# Patient Record
Sex: MALE | Race: WHITE | Employment: FULL TIME | URBAN - METROPOLITAN AREA
[De-identification: names, ages, dates, MRNs, and addresses within clinical notes are randomized per-mention and may not be internally consistent; named-entity substitution may affect disease eponyms.]

---

## 2017-01-01 ENCOUNTER — APPOINTMENT (OUTPATIENT)
Dept: CT IMAGING | Age: 42
DRG: 392 | End: 2017-01-01
Attending: EMERGENCY MEDICINE
Payer: COMMERCIAL

## 2017-01-01 ENCOUNTER — HOSPITAL ENCOUNTER (INPATIENT)
Age: 42
LOS: 4 days | Discharge: HOME | DRG: 392 | End: 2017-01-05
Attending: EMERGENCY MEDICINE | Admitting: INTERNAL MEDICINE
Payer: COMMERCIAL

## 2017-01-01 DIAGNOSIS — R10.84 GENERALIZED ABDOMINAL PAIN: ICD-10-CM

## 2017-01-01 DIAGNOSIS — R11.2 INTRACTABLE VOMITING WITH NAUSEA, UNSPECIFIED VOMITING TYPE: ICD-10-CM

## 2017-01-01 DIAGNOSIS — K50.119 CROHN'S COLITIS, UNSPECIFIED COMPLICATION (HCC): Primary | ICD-10-CM

## 2017-01-01 DIAGNOSIS — F41.9 ANXIETY AND DEPRESSION: ICD-10-CM

## 2017-01-01 DIAGNOSIS — F32.A ANXIETY AND DEPRESSION: ICD-10-CM

## 2017-01-01 DIAGNOSIS — E86.0 DEHYDRATION: ICD-10-CM

## 2017-01-01 LAB
ALBUMIN SERPL BCP-MCNC: 3.2 G/DL (ref 3.5–5)
ALBUMIN/GLOB SERPL: 1 {RATIO} (ref 1.1–2.2)
ALP SERPL-CCNC: 67 U/L (ref 45–117)
ALT SERPL-CCNC: 38 U/L (ref 12–78)
ANION GAP BLD CALC-SCNC: 8 MMOL/L (ref 5–15)
APPEARANCE UR: CLEAR
AST SERPL W P-5'-P-CCNC: 11 U/L (ref 15–37)
BACTERIA URNS QL MICRO: NEGATIVE /HPF
BASOPHILS # BLD AUTO: 0 K/UL (ref 0–0.1)
BASOPHILS # BLD: 0 % (ref 0–1)
BILIRUB SERPL-MCNC: 0.2 MG/DL (ref 0.2–1)
BILIRUB UR QL: NEGATIVE
BUN SERPL-MCNC: 13 MG/DL (ref 6–20)
BUN/CREAT SERPL: 15 (ref 12–20)
CALCIUM SERPL-MCNC: 9 MG/DL (ref 8.5–10.1)
CAOX CRY URNS QL MICRO: ABNORMAL
CHLORIDE SERPL-SCNC: 105 MMOL/L (ref 97–108)
CO2 SERPL-SCNC: 29 MMOL/L (ref 21–32)
COLOR UR: ABNORMAL
CREAT SERPL-MCNC: 0.86 MG/DL (ref 0.7–1.3)
EOSINOPHIL # BLD: 0 K/UL (ref 0–0.4)
EOSINOPHIL NFR BLD: 0 % (ref 0–7)
EPITH CASTS URNS QL MICRO: ABNORMAL /LPF
ERYTHROCYTE [DISTWIDTH] IN BLOOD BY AUTOMATED COUNT: 16.2 % (ref 11.5–14.5)
ETHANOL SERPL-MCNC: <10 MG/DL
GLOBULIN SER CALC-MCNC: 3.2 G/DL (ref 2–4)
GLUCOSE SERPL-MCNC: 124 MG/DL (ref 65–100)
GLUCOSE UR STRIP.AUTO-MCNC: 500 MG/DL
HCT VFR BLD AUTO: 40.1 % (ref 36.6–50.3)
HGB BLD-MCNC: 12.9 G/DL (ref 12.1–17)
HGB UR QL STRIP: NEGATIVE
HYALINE CASTS URNS QL MICRO: ABNORMAL /LPF (ref 0–5)
KETONES UR QL STRIP.AUTO: ABNORMAL MG/DL
LEUKOCYTE ESTERASE UR QL STRIP.AUTO: NEGATIVE
LIPASE SERPL-CCNC: 172 U/L (ref 73–393)
LYMPHOCYTES # BLD AUTO: 7 % (ref 12–49)
LYMPHOCYTES # BLD: 1 K/UL (ref 0.8–3.5)
MCH RBC QN AUTO: 30.1 PG (ref 26–34)
MCHC RBC AUTO-ENTMCNC: 32.2 G/DL (ref 30–36.5)
MCV RBC AUTO: 93.7 FL (ref 80–99)
MONOCYTES # BLD: 0.7 K/UL (ref 0–1)
MONOCYTES NFR BLD AUTO: 5 % (ref 5–13)
NEUTS SEG # BLD: 13.5 K/UL (ref 1.8–8)
NEUTS SEG NFR BLD AUTO: 88 % (ref 32–75)
NITRITE UR QL STRIP.AUTO: NEGATIVE
PH UR STRIP: 5 [PH] (ref 5–8)
PLATELET # BLD AUTO: 220 K/UL (ref 150–400)
POTASSIUM SERPL-SCNC: 4.4 MMOL/L (ref 3.5–5.1)
PROT SERPL-MCNC: 6.4 G/DL (ref 6.4–8.2)
PROT UR STRIP-MCNC: NEGATIVE MG/DL
RBC # BLD AUTO: 4.28 M/UL (ref 4.1–5.7)
RBC #/AREA URNS HPF: ABNORMAL /HPF (ref 0–5)
SODIUM SERPL-SCNC: 142 MMOL/L (ref 136–145)
SP GR UR REFRACTOMETRY: >1.03 (ref 1–1.03)
UA: UC IF INDICATED,UAUC: ABNORMAL
UROBILINOGEN UR QL STRIP.AUTO: 0.2 EU/DL (ref 0.2–1)
WBC # BLD AUTO: 15.2 K/UL (ref 4.1–11.1)
WBC URNS QL MICRO: ABNORMAL /HPF (ref 0–4)

## 2017-01-01 PROCEDURE — 74011250636 HC RX REV CODE- 250/636: Performed by: INTERNAL MEDICINE

## 2017-01-01 PROCEDURE — 74011250636 HC RX REV CODE- 250/636: Performed by: EMERGENCY MEDICINE

## 2017-01-01 PROCEDURE — 36415 COLL VENOUS BLD VENIPUNCTURE: CPT | Performed by: EMERGENCY MEDICINE

## 2017-01-01 PROCEDURE — 81001 URINALYSIS AUTO W/SCOPE: CPT | Performed by: EMERGENCY MEDICINE

## 2017-01-01 PROCEDURE — 80307 DRUG TEST PRSMV CHEM ANLYZR: CPT | Performed by: INTERNAL MEDICINE

## 2017-01-01 PROCEDURE — 96361 HYDRATE IV INFUSION ADD-ON: CPT

## 2017-01-01 PROCEDURE — 74011250637 HC RX REV CODE- 250/637: Performed by: INTERNAL MEDICINE

## 2017-01-01 PROCEDURE — 87040 BLOOD CULTURE FOR BACTERIA: CPT | Performed by: INTERNAL MEDICINE

## 2017-01-01 PROCEDURE — 96375 TX/PRO/DX INJ NEW DRUG ADDON: CPT

## 2017-01-01 PROCEDURE — 65270000029 HC RM PRIVATE

## 2017-01-01 PROCEDURE — 83690 ASSAY OF LIPASE: CPT | Performed by: EMERGENCY MEDICINE

## 2017-01-01 PROCEDURE — 93005 ELECTROCARDIOGRAM TRACING: CPT

## 2017-01-01 PROCEDURE — 74011000258 HC RX REV CODE- 258: Performed by: INTERNAL MEDICINE

## 2017-01-01 PROCEDURE — 85025 COMPLETE CBC W/AUTO DIFF WBC: CPT | Performed by: EMERGENCY MEDICINE

## 2017-01-01 PROCEDURE — 96374 THER/PROPH/DIAG INJ IV PUSH: CPT

## 2017-01-01 PROCEDURE — 74011636320 HC RX REV CODE- 636/320: Performed by: RADIOLOGY

## 2017-01-01 PROCEDURE — 80053 COMPREHEN METABOLIC PANEL: CPT | Performed by: EMERGENCY MEDICINE

## 2017-01-01 PROCEDURE — 99285 EMERGENCY DEPT VISIT HI MDM: CPT

## 2017-01-01 PROCEDURE — C9113 INJ PANTOPRAZOLE SODIUM, VIA: HCPCS | Performed by: INTERNAL MEDICINE

## 2017-01-01 PROCEDURE — 74177 CT ABD & PELVIS W/CONTRAST: CPT

## 2017-01-01 PROCEDURE — 74011000250 HC RX REV CODE- 250: Performed by: INTERNAL MEDICINE

## 2017-01-01 RX ORDER — ONDANSETRON 4 MG/1
4 TABLET, ORALLY DISINTEGRATING ORAL
COMMUNITY
End: 2017-02-25

## 2017-01-01 RX ORDER — MORPHINE SULFATE 4 MG/ML
6 INJECTION, SOLUTION INTRAMUSCULAR; INTRAVENOUS ONCE
Status: COMPLETED | OUTPATIENT
Start: 2017-01-01 | End: 2017-01-01

## 2017-01-01 RX ORDER — PREGABALIN 100 MG/1
100 CAPSULE ORAL 3 TIMES DAILY
Status: DISCONTINUED | OUTPATIENT
Start: 2017-01-01 | End: 2017-01-05 | Stop reason: HOSPADM

## 2017-01-01 RX ORDER — SODIUM FLUORIDE 5 MG/G
PASTE, DENTIFRICE DENTAL AS NEEDED
COMMUNITY
End: 2017-01-05

## 2017-01-01 RX ORDER — MORPHINE SULFATE 2 MG/ML
2 INJECTION, SOLUTION INTRAMUSCULAR; INTRAVENOUS
Status: DISCONTINUED | OUTPATIENT
Start: 2017-01-01 | End: 2017-01-01

## 2017-01-01 RX ORDER — NALOXONE HYDROCHLORIDE 0.4 MG/ML
0.4 INJECTION, SOLUTION INTRAMUSCULAR; INTRAVENOUS; SUBCUTANEOUS AS NEEDED
Status: DISCONTINUED | OUTPATIENT
Start: 2017-01-01 | End: 2017-01-05 | Stop reason: HOSPADM

## 2017-01-01 RX ORDER — KETOROLAC TROMETHAMINE 30 MG/ML
30 INJECTION, SOLUTION INTRAMUSCULAR; INTRAVENOUS
Status: DISCONTINUED | OUTPATIENT
Start: 2017-01-01 | End: 2017-01-04

## 2017-01-01 RX ORDER — ENOXAPARIN SODIUM 100 MG/ML
40 INJECTION SUBCUTANEOUS EVERY 24 HOURS
Status: DISCONTINUED | OUTPATIENT
Start: 2017-01-01 | End: 2017-01-05 | Stop reason: HOSPADM

## 2017-01-01 RX ORDER — OXYCODONE AND ACETAMINOPHEN 7.5; 325 MG/1; MG/1
1 TABLET ORAL
Status: DISCONTINUED | OUTPATIENT
Start: 2017-01-01 | End: 2017-01-04

## 2017-01-01 RX ORDER — HYDROMORPHONE HYDROCHLORIDE 1 MG/ML
1 INJECTION, SOLUTION INTRAMUSCULAR; INTRAVENOUS; SUBCUTANEOUS
Status: COMPLETED | OUTPATIENT
Start: 2017-01-01 | End: 2017-01-01

## 2017-01-01 RX ORDER — DEXTROSE, SODIUM CHLORIDE, AND POTASSIUM CHLORIDE 5; .45; .15 G/100ML; G/100ML; G/100ML
75 INJECTION INTRAVENOUS CONTINUOUS
Status: DISCONTINUED | OUTPATIENT
Start: 2017-01-01 | End: 2017-01-02

## 2017-01-01 RX ORDER — TEMAZEPAM 15 MG/1
30 CAPSULE ORAL
Status: DISCONTINUED | OUTPATIENT
Start: 2017-01-01 | End: 2017-01-05 | Stop reason: HOSPADM

## 2017-01-01 RX ORDER — DIPHENHYDRAMINE HCL 25 MG
25 CAPSULE ORAL
Status: DISCONTINUED | OUTPATIENT
Start: 2017-01-01 | End: 2017-01-05 | Stop reason: HOSPADM

## 2017-01-01 RX ORDER — ONDANSETRON 2 MG/ML
4 INJECTION INTRAMUSCULAR; INTRAVENOUS
Status: COMPLETED | OUTPATIENT
Start: 2017-01-01 | End: 2017-01-01

## 2017-01-01 RX ORDER — HYDROMORPHONE HYDROCHLORIDE 1 MG/ML
0.5 INJECTION, SOLUTION INTRAMUSCULAR; INTRAVENOUS; SUBCUTANEOUS
Status: DISCONTINUED | OUTPATIENT
Start: 2017-01-01 | End: 2017-01-03

## 2017-01-01 RX ORDER — DIAZEPAM 5 MG/1
5 TABLET ORAL
Status: DISCONTINUED | OUTPATIENT
Start: 2017-01-01 | End: 2017-01-05 | Stop reason: HOSPADM

## 2017-01-01 RX ORDER — ACETAMINOPHEN 325 MG/1
650 TABLET ORAL
Status: DISCONTINUED | OUTPATIENT
Start: 2017-01-01 | End: 2017-01-05 | Stop reason: HOSPADM

## 2017-01-01 RX ORDER — ONDANSETRON 2 MG/ML
4 INJECTION INTRAMUSCULAR; INTRAVENOUS
Status: DISCONTINUED | OUTPATIENT
Start: 2017-01-01 | End: 2017-01-04

## 2017-01-01 RX ADMIN — DIAZEPAM 5 MG: 5 TABLET ORAL at 09:14

## 2017-01-01 RX ADMIN — METHYLPREDNISOLONE SODIUM SUCCINATE 40 MG: 40 INJECTION, POWDER, FOR SOLUTION INTRAMUSCULAR; INTRAVENOUS at 17:01

## 2017-01-01 RX ADMIN — Medication 2 MG: at 06:36

## 2017-01-01 RX ADMIN — HYDROMORPHONE HYDROCHLORIDE 1 MG: 1 INJECTION, SOLUTION INTRAMUSCULAR; INTRAVENOUS; SUBCUTANEOUS at 03:53

## 2017-01-01 RX ADMIN — SODIUM CHLORIDE 1000 ML: 900 INJECTION, SOLUTION INTRAVENOUS at 02:42

## 2017-01-01 RX ADMIN — PIPERACILLIN SODIUM,TAZOBACTAM SODIUM 3.38 G: 3; .375 INJECTION, POWDER, FOR SOLUTION INTRAVENOUS at 20:31

## 2017-01-01 RX ADMIN — ONDANSETRON 4 MG: 2 INJECTION INTRAMUSCULAR; INTRAVENOUS at 16:27

## 2017-01-01 RX ADMIN — OXYCODONE HYDROCHLORIDE AND ACETAMINOPHEN 1 TABLET: 7.5; 325 TABLET ORAL at 22:33

## 2017-01-01 RX ADMIN — OXYCODONE HYDROCHLORIDE AND ACETAMINOPHEN 1 TABLET: 7.5; 325 TABLET ORAL at 14:05

## 2017-01-01 RX ADMIN — Medication 6 MG: at 02:36

## 2017-01-01 RX ADMIN — PREGABALIN 100 MG: 100 CAPSULE ORAL at 22:33

## 2017-01-01 RX ADMIN — ONDANSETRON 4 MG: 2 INJECTION INTRAMUSCULAR; INTRAVENOUS at 08:28

## 2017-01-01 RX ADMIN — DEXTROSE MONOHYDRATE, SODIUM CHLORIDE, AND POTASSIUM CHLORIDE 125 ML/HR: 50; 4.5; 1.49 INJECTION, SOLUTION INTRAVENOUS at 06:36

## 2017-01-01 RX ADMIN — ONDANSETRON 4 MG: 2 INJECTION INTRAMUSCULAR; INTRAVENOUS at 12:30

## 2017-01-01 RX ADMIN — SODIUM CHLORIDE 1000 ML: 900 INJECTION, SOLUTION INTRAVENOUS at 02:34

## 2017-01-01 RX ADMIN — TEMAZEPAM 30 MG: 15 CAPSULE ORAL at 22:33

## 2017-01-01 RX ADMIN — ENOXAPARIN SODIUM 40 MG: 40 INJECTION SUBCUTANEOUS at 08:32

## 2017-01-01 RX ADMIN — ONDANSETRON 4 MG: 2 INJECTION INTRAMUSCULAR; INTRAVENOUS at 02:34

## 2017-01-01 RX ADMIN — HYDROMORPHONE HYDROCHLORIDE 0.5 MG: 1 INJECTION, SOLUTION INTRAMUSCULAR; INTRAVENOUS; SUBCUTANEOUS at 20:46

## 2017-01-01 RX ADMIN — IOPAMIDOL 100 ML: 755 INJECTION, SOLUTION INTRAVENOUS at 03:16

## 2017-01-01 RX ADMIN — HYDROMORPHONE HYDROCHLORIDE 0.5 MG: 1 INJECTION, SOLUTION INTRAMUSCULAR; INTRAVENOUS; SUBCUTANEOUS at 16:27

## 2017-01-01 RX ADMIN — PIPERACILLIN SODIUM,TAZOBACTAM SODIUM 3.38 G: 3; .375 INJECTION, POWDER, FOR SOLUTION INTRAVENOUS at 05:42

## 2017-01-01 RX ADMIN — OXYCODONE HYDROCHLORIDE AND ACETAMINOPHEN 1 TABLET: 7.5; 325 TABLET ORAL at 09:54

## 2017-01-01 RX ADMIN — DIAZEPAM 5 MG: 5 TABLET ORAL at 17:00

## 2017-01-01 RX ADMIN — PIPERACILLIN SODIUM,TAZOBACTAM SODIUM 3.38 G: 3; .375 INJECTION, POWDER, FOR SOLUTION INTRAVENOUS at 11:03

## 2017-01-01 RX ADMIN — METHYLPREDNISOLONE SODIUM SUCCINATE 40 MG: 40 INJECTION, POWDER, FOR SOLUTION INTRAMUSCULAR; INTRAVENOUS at 11:04

## 2017-01-01 RX ADMIN — METHYLPREDNISOLONE SODIUM SUCCINATE 40 MG: 40 INJECTION, POWDER, FOR SOLUTION INTRAMUSCULAR; INTRAVENOUS at 23:08

## 2017-01-01 RX ADMIN — PREGABALIN 100 MG: 100 CAPSULE ORAL at 08:32

## 2017-01-01 RX ADMIN — PREGABALIN 100 MG: 100 CAPSULE ORAL at 15:34

## 2017-01-01 RX ADMIN — METHYLPREDNISOLONE SODIUM SUCCINATE 125 MG: 125 INJECTION, POWDER, FOR SOLUTION INTRAMUSCULAR; INTRAVENOUS at 02:36

## 2017-01-01 RX ADMIN — SODIUM CHLORIDE 40 MG: 9 INJECTION INTRAMUSCULAR; INTRAVENOUS; SUBCUTANEOUS at 08:29

## 2017-01-01 RX ADMIN — SODIUM CHLORIDE 40 MG: 9 INJECTION INTRAMUSCULAR; INTRAVENOUS; SUBCUTANEOUS at 20:31

## 2017-01-01 RX ADMIN — KETOROLAC TROMETHAMINE 30 MG: 30 INJECTION, SOLUTION INTRAMUSCULAR at 05:56

## 2017-01-01 RX ADMIN — DEXTROSE MONOHYDRATE, SODIUM CHLORIDE, AND POTASSIUM CHLORIDE 125 ML/HR: 50; 4.5; 1.49 INJECTION, SOLUTION INTRAVENOUS at 17:01

## 2017-01-01 RX ADMIN — HYDROMORPHONE HYDROCHLORIDE 0.5 MG: 1 INJECTION, SOLUTION INTRAMUSCULAR; INTRAVENOUS; SUBCUTANEOUS at 12:30

## 2017-01-01 RX ADMIN — HYDROMORPHONE HYDROCHLORIDE 0.5 MG: 1 INJECTION, SOLUTION INTRAMUSCULAR; INTRAVENOUS; SUBCUTANEOUS at 08:27

## 2017-01-01 RX ADMIN — ONDANSETRON 4 MG: 2 INJECTION INTRAMUSCULAR; INTRAVENOUS at 20:46

## 2017-01-01 RX ADMIN — SODIUM CHLORIDE 1000 ML: 900 INJECTION, SOLUTION INTRAVENOUS at 05:01

## 2017-01-01 RX ADMIN — OXYCODONE HYDROCHLORIDE AND ACETAMINOPHEN 1 TABLET: 7.5; 325 TABLET ORAL at 18:17

## 2017-01-01 NOTE — PROGRESS NOTES
[facility] Dosing Services: Antimicrobial Stewardship Progress Note    Consult for antibiotic dosing of Zosyn by Dr. Melvi Flores  Pharmacist reviewed antibiotic appropriateness for 37year old , male  for indication of SIRS criteria / Fever / Leukocytosis   Day of Therapy 1    Plan:    Non-Kinetic Antimicrobial Dosing: Zosyn 3.375 grams x 1, then 3.375 grams every 8 hours, 4 hour infusion time     Serum Creatinine     Lab Results   Component Value Date/Time    Creatinine 0.86 01/01/2017 01:59 AM       Creatinine Clearance Estimated Creatinine Clearance: 125.3 mL/min (based on Cr of 0.86).      WBC   Lab Results   Component Value Date/Time    WBC 15.2 01/01/2017 01:59 AM           Pharmacist: Janneth Carey,[address] information:

## 2017-01-01 NOTE — ED NOTES
Pt. Ambulatory to the bathroom to empty ileostomy. Label for add on labs sent. Notified lab by phone call as well.

## 2017-01-01 NOTE — ROUTINE PROCESS
TRANSFER - IN REPORT:    Verbal report received from ranjit huang(name) on Trae Washington  being received from ER(unit) for routine progression of care      Report consisted of patients Situation, Background, Assessment and   Recommendations(SBAR). Information from the following report(s) SBAR, Kardex, ED Summary, Intake/Output, MAR, Recent Results and Med Rec Status was reviewed with the receiving nurse. Opportunity for questions and clarification was provided. Assessment completed upon patients arrival to unit and care assumed.

## 2017-01-01 NOTE — IP AVS SNAPSHOT
[address] 
[phone] Patient: Gaby Hall MRN: [id number] :1975 You are allergic to the following Allergen Reactions Remicade (Infliximab) Anaphylaxis Shortness of Breath Bactrim (Sulfamethoprim Ds) Other (comments) Increased GI distress. Nsaids (Non-Steroidal Anti-Inflammatory Drug) Other (comments) Stomach ulcers Recent Documentation Height Weight BMI Smoking Status 1.829 m 86.2 kg 25.77 kg/m2 Light Tobacco Smoker Unresulted Labs Order Current Status CULTURE, BLOOD Preliminary result CULTURE, BLOOD Preliminary result Emergency Contacts Name Discharge Info Relation Home Work Mobile [address]ustamante CAREGIVER [3] Spouse [3] [phone] About your hospitalization You were admitted on:  2017 You last received care in the:  [facility] 5M1 MED SURG 1 You were discharged on:  2017 Unit phone number:  [phone] Why you were hospitalized Your primary diagnosis was: Intractable Vomiting With Nausea Your diagnoses also included:  Abdominal Pain, Anxiety And Depression, Crohn's Colitis (Hcc), Dehydration, Gerd (Gastroesophageal Reflux Disease), S/P Laparoscopic Cholecystectomy Providers Seen During Your Hospitalizations Provider Role Specialty Primary office phone [facility]. Sara Baig MD Attending Provider Emergency Medicine [phone] Venus Bower MD Attending Provider Internal Medicine [phone] Julius Cranker, DO Attending Provider Internal Medicine [phone] Your Primary Care Physician (PCP) Primary Care Physician Office Phone Office Fax [place], [address] [facility] [phone] Follow-up Information Follow up With Details Comments Contact Info Delia Murphy MD Schedule an appointment as soon as possible for a visit in 1 week  [address] [facility] [address] [facility] [address] [place] [address] 
[phone] Regan Garibay MD Schedule an appointment as soon as possible for a visit in 2 weeks  [address] [facility] 505 0980 [facility] 
[phone] Current Discharge Medication List  
  
START taking these medications Dose & Instructions Dispensing Information Comments Morning Noon Evening Bedtime  
 oxyCODONE IR 10 mg Tab immediate release tablet Commonly known as:  Lyndall Margareten Your next dose is: Today, Tomorrow Other:  _________ Dose:  10 mg Take 1 Tab by mouth every six (6) hours as needed for up to 14 days. Max Daily Amount: 40 mg.  
 Quantity:  56 Tab Refills:  0  
     
   
   
   
  
 pantoprazole 40 mg tablet Commonly known as:  PROTONIX Your next dose is: Today, Tomorrow Other:  _________ Dose:  40 mg Take 1 Tab by mouth Before breakfast and dinner. Quantity:  60 Tab Refills:  0 CONTINUE these medications which have CHANGED Dose & Instructions Dispensing Information Comments Morning Noon Evening Bedtime  
 predniSONE 20 mg tablet Commonly known as:  Nanine Knife Start taking on:  1/6/2017 What changed:   
- medication strength 
- how much to take 
- how to take this - when to take this 
- additional instructions Your next dose is: Today, Tomorrow Other:  _________ Dose:  40 mg Take 2 Tabs by mouth daily (with breakfast). Quantity:  30 Tab Refills:  0 CONTINUE these medications which have NOT CHANGED Dose & Instructions Dispensing Information Comments Morning Noon Evening Bedtime  
 cyclobenzaprine 10 mg tablet Commonly known as:  FLEXERIL Your next dose is: Today, Tomorrow Other:  _________  Dose:  10 mg  
 Take 10 mg by mouth three (3) times daily as needed for Muscle Spasm(s). Refills:  0  
     
   
   
   
  
 diphenhydrAMINE 25 mg tablet Commonly known as:  BENADRYL Your next dose is: Today, Tomorrow Other:  _________ Dose:  25 mg Take 25 mg by mouth nightly as needed for Sleep. Refills:  0 LYRICA 100 mg capsule Generic drug:  pregabalin Your next dose is: Today, Tomorrow Other:  _________ Dose:  100 mg Take 100 mg by mouth three (3) times daily. Refills:  0  
     
   
   
   
  
 ondansetron 4 mg disintegrating tablet Commonly known as:  ZOFRAN ODT Your next dose is: Today, Tomorrow Other:  _________ Dose:  4 mg Take 4 mg by mouth every eight (8) hours as needed for Nausea. Refills:  0  
     
   
   
   
  
 promethazine 25 mg tablet Commonly known as:  PHENERGAN Your next dose is: Today, Tomorrow Other:  _________ Dose:  25 mg Take 1 Tab by mouth every six (6) hours as needed for Nausea. Quantity:  60 Tab Refills:  0  
     
   
   
   
  
 temazepam 30 mg capsule Commonly known as:  RESTORIL Your next dose is: Today, Tomorrow Other:  _________ Dose:  30 mg Take 30 mg by mouth nightly. Refills:  0  
     
   
   
   
  
 ustekinaumab 90 mg/mL injection Generic drug:  Ustekinumab Your next dose is: Today, Tomorrow Other:  _________ Dose:  90 mg  
90 mg by SubCUTAneous route. Every 8 weeks Refills:  0 VALIUM 5 mg tablet Generic drug:  diazePAM  
   
Your next dose is: Today, Tomorrow Other:  _________ Dose:  5 mg Take 5 mg by mouth every eight (8) hours as needed (bowel relaxation). Refills:  0  
     
   
   
   
  
 VITAMIN B-12 1,000 mcg/mL injection Generic drug:  cyanocobalamin Your next dose is: Today, Tomorrow Other:  _________ Dose:  1000 mcg  
1,000 mcg by IntraMUSCular route every seven (7) days. Indications: Once weekly Refills:  0 ZEGERID 20-1.1 mg-gram Cap Generic drug:  omeprazole-sodium bicarbonate Your next dose is: Today, Tomorrow Other:  _________ Dose:  20 mg Take 20 mg by mouth daily. Refills:  0 STOP taking these medications COMPAZINE 10 mg tablet Generic drug:  prochlorperazine  
   
  
 oxyCODONE-acetaminophen 7.5-325 mg per tablet Commonly known as:  PERCOCET 7.5 PREVIDENT 5000 PLUS 1.1 % Crea Generic drug:  sodium fluoride Where to Get Your Medications Information on where to get these meds will be given to you by the nurse or doctor. ! Ask your nurse or doctor about these medications  
  oxyCODONE IR 10 mg Tab immediate release tablet  
 pantoprazole 40 mg tablet  
 predniSONE 20 mg tablet  
 promethazine 25 mg tablet Discharge Instructions Patient Discharge Instructions Aneesh Henriquez / [id number] : 1975 Admitted 2017 Discharged: 2017 Primary Diagnoses Problem List as of 2017  Date Reviewed: 2017 Codes Class Noted - Resolved * (Principal)Intractable vomiting with nausea ICD-10-CM: R11.2 ICD-9-CM: 536.2  2017 - Present Anxiety and depression ICD-10-CM: F41.9, F32.9 ICD-9-CM: 300.00, 311  2016 - Present Dehydration ICD-10-CM: E86.0 ICD-9-CM: 276.51  2016 - Present S/P laparoscopic cholecystectomy ICD-10-CM: Z90.49 ICD-9-CM: V45.89  2016 - Present Overview Signed 2016 12:56 PM by Cristin Wheeler MD  
  With [facility]. [facility] ICD-10-CM: K50.10 ICD-9-CM: 555.1  2016 - Present Abdominal pain ICD-10-CM: R10.9 ICD-9-CM: 789.00  2016 - Present GERD (gastroesophageal reflux disease) (Chronic) ICD-10-CM: K21.9 ICD-9-CM: 530.81  11/20/2016 - Present RESOLVED: Insomnia ICD-10-CM: G47.00 ICD-9-CM: 780.52  12/25/2016 - 1/1/2017 RESOLVED: Anemia ICD-10-CM: D64.9 ICD-9-CM: 285.9  12/25/2016 - 1/1/2017 RESOLVED: Hypernatremia ICD-10-CM: E87.0 ICD-9-CM: 276.0  12/25/2016 - 1/1/2017 RESOLVED: Biliary dyskinesia ICD-10-CM: K82.8 ICD-9-CM: 575.8  12/21/2016 - 12/25/2016 RESOLVED: Gallbladder sludge ICD-10-CM: K82.8 ICD-9-CM: 575.8  12/20/2016 - 12/25/2016 RESOLVED: Hypokalemia ICD-10-CM: E87.6 ICD-9-CM: 276.8  12/18/2016 - 1/1/2017 RESOLVED: Leukocytosis ICD-10-CM: U09.479 ICD-9-CM: 288.60  12/18/2016 - 12/25/2016 RESOLVED: Crohn's disease ([facility].) ICD-10-CM: K50.90 ICD-9-CM: 555.9  11/20/2016 - 12/25/2016 RESOLVED: Intractable abdominal pain ICD-10-CM: R10.9 ICD-9-CM: 789.00  11/20/2016 - 1/1/2017 RESOLVED: Intractable nausea and vomiting ICD-10-CM: R11.2 ICD-9-CM: 536.2  11/20/2016 - 12/25/2016 RESOLVED: Enterostomy malfunction ([facility].) (Chronic) ICD-10-CM: H23.84 
ICD-9-CM: 569.62  10/29/2015 - 11/21/2016 RESOLVED: Ileostomy dysfunction ([facility].) ICD-10-CM: H68.68 
ICD-9-CM: 569.62  10/29/2015 - 11/21/2016 RESOLVED: Skin lesion of back ICD-10-CM: L98.9 ICD-9-CM: 709.9  3/17/2014 - 11/21/2016 RESOLVED: Ileal pouchitis ([facility] ICD-10-CM: Y03.744 ICD-9-CM: 569.71  5/1/2004 - 11/21/2016 Take Home Medications · It is important that you take the medication exactly as they are prescribed. · Keep your medication in the bottles provided by the pharmacist and keep a list of the medication names, dosages, and times to be taken in your wallet. · Do not take other medications without consulting your doctor. · You will be given a 2 week supply of pain medication. You have been evaluated by our palliative care department who recommends that you obtain a referral to a pain specialist for further management of chronic pain. What to do at [facility] Recommended diet: Resume previous diet Recommended activity: Activity as tolerated If you experience fever of 101.4 or greater, severe abdominal pain, please follow up with GI or nearest ER. Follow-up with your PCP in 1 week and GI in 2 weeks Information obtained by : 
I understand that if any problems occur once I am at home I am to contact my physician. I understand and acknowledge receipt of the instructions indicated above. Physician's or R.N.'s Signature                                                                  Date/Time Patient or Representative Signature                                                          Date/Time Abdominal Pain: Care Instructions Your Care Instructions Abdominal pain has many possible causes. Some aren't serious and get better on their own in a few days. Others need more testing and treatment. If your pain continues or gets worse, you need to be rechecked and may need more tests to find out what is wrong. You may need surgery to correct the problem. Don't ignore new symptoms, such as fever, nausea and vomiting, urination problems, pain that gets worse, and dizziness. These may be signs of a more serious problem. Your doctor may have recommended a follow-up visit in the next 8 to 12 hours. If you are not getting better, you may need more tests or treatment. The doctor has checked you carefully, but problems can develop later. If you notice any problems or new symptoms, get medical treatment right away. Follow-up care is a key part of your treatment and safety.  Be sure to make and go to all appointments, and call your doctor if you are having problems. It's also a good idea to know your test results and keep a list of the medicines you take. How can you care for yourself at home? · Rest until you feel better. · To prevent dehydration, drink plenty of fluids, enough so that your urine is light yellow or clear like water. Choose water and other caffeine-free clear liquids until you feel better. If you have kidney, heart, or liver disease and have to limit fluids, talk with your doctor before you increase the amount of fluids you drink. · If your stomach is upset, eat mild foods, such as rice, dry toast or crackers, bananas, and applesauce. Try eating several small meals instead of two or three large ones. · Wait until 48 hours after all symptoms have gone away before you have spicy foods, alcohol, and drinks that contain caffeine. · Do not eat foods that are high in fat. · Avoid anti-inflammatory medicines such as aspirin, ibuprofen (Advil, Motrin), and naproxen (Aleve). These can cause stomach upset. Talk to your doctor if you take daily aspirin for another health problem. When should you call for help? Call 911 anytime you think you may need emergency care. For example, call if: 
· You passed out (lost consciousness). · You pass maroon or very bloody stools. · You vomit blood or what looks like coffee grounds. · You have new, severe belly pain. Call your doctor now or seek immediate medical care if: 
· Your pain gets worse, especially if it becomes focused in one area of your belly. · You have a new or higher fever. · Your stools are black and look like tar, or they have streaks of blood. · You have unexpected vaginal bleeding. · You have symptoms of a urinary tract infection. These may include: 
¨ Pain when you urinate. ¨ Urinating more often than usual. 
¨ Blood in your urine. · You are dizzy or lightheaded, or you feel like you may faint. Watch closely for changes in your health, and be sure to contact your doctor if: · You are not getting better after 1 day (24 hours). Where can you learn more? Go to http://malu-ashley.info/. Enter C702 in the search box to learn more about \"Abdominal Pain: Care Instructions. \" Current as of: May 27, 2016 Content Version: 11.1 © 4709-0091 [employer]. Care instructions adapted under license by [employer] (which disclaims liability or warranty for this information). If you have questions about a medical condition or this instruction, always ask your healthcare professional. [address] any warranty or liability for your use of this information. Crohn's Disease: Care Instructions Your Care Instructions Crohn's disease is a lifelong inflammatory bowel disease (IBD). Parts of the digestive tract get swollen and irritated and may develop deep sores called ulcers. Crohn's disease usually occurs in the last part of the small intestine and the first part of the large intestine. But it can develop anywhere from the mouth to the anus. The main symptoms of Crohn's disease are belly pain, diarrhea, fever, and weight loss. Some people may have constipation. Crohn's disease also sometimes causes problems with the joints, eyes, or skin. Your symptoms may be mild at some times and severe at others. The disease can also go into remission, which means that it is not active and you have no symptoms. Bad attacks of Crohn's disease often have to be treated in the hospital so that you can get medicines and liquids through a tube in your vein, called an IV. This gives your digestive system time to rest and recover. Talk with your doctor about the best treatments for you. You may need medicines that help prevent or treat flare-ups of the disease. You may need surgery to remove part of your bowel if you have an abnormal opening in the bowel (fistula), an abscess, or a bowel obstruction.  In some cases, surgery is needed if medicines do not work. But symptoms often return to other areas of the intestines after surgery. Learning good self-care can help you reduce your symptoms and manage Crohn's disease. Follow-up care is a key part of your treatment and safety. Be sure to make and go to all appointments, and call your doctor if you are having problems. Its also a good idea to know your test results and keep a list of the medicines you take. How can you care for yourself at home? · Take your medicines exactly as prescribed. Call your doctor if you think you are having a problem with your medicine. You will get more details on the specific medicines your doctor prescribes. · Do not take anti-inflammatory medicines, such as aspirin, ibuprofen (Advil, Motrin), or naproxen (Aleve). They may make your symptoms worse. Do not take any other medicines or herbal products without talking to your doctor first. 
· Avoid foods that make your symptoms worse. These might include milk, alcohol, high-fiber foods, or spicy foods. · Eat a healthy diet. Make sure to get enough iron. Rectal bleeding may make you lose iron. Good sources of iron include beef, lentils, spinach, raisins, and iron-enriched breads and cereals. · Drink liquid meal replacements if your doctor recommends them. These are high in calories and contain vitamins and minerals. Severe symptoms may make it hard for your body to absorb vitamins and minerals. · Do not smoke. Smoking makes Crohn's disease worse. If you need help quitting, talk to your doctor about stop-smoking programs and medicines. These can increase your chances of quitting for good. · Seek support from friends and family to help cope with Crohn's disease. The illness can affect all parts of your life. Get counseling if you need it. When should you call for help? Call 911 anytime you think you may need emergency care. For example, call if: 
· You have severe belly pain. · You passed out (lost consciousness). Call your doctor now or seek immediate medical care if: 
· You have signs of needing more fluids. You have sunken eyes and a dry mouth, and you pass only a little dark urine. · You have pain and swelling in the anal area, or you have pus draining from the anal area. · You have a fever or shaking chills. · Your belly is bloated. Watch closely for changes in your health, and be sure to contact your doctor if: 
· Your symptoms get worse. · You have diarrhea for more than 2 weeks. · Your pain is not steadily getting better. · You have unexplained weight loss. Where can you learn more? Go to http://maluTPP Global Developmentashley.info/. Enter 21 [phone] in the search box to learn more about \"Crohn's Disease: Care Instructions. \" Current as of: August 9, 2016 Content Version: 11.1 © 1714-6998 [employer]. Care instructions adapted under license by [employer] (which disclaims liability or warranty for this information). If you have questions about a medical condition or this instruction, always ask your healthcare professional. [place] [address] any warranty or liability for your use of this information. Discharge Orders None Elpas Announcement We are excited to announce that we are making your provider's discharge notes available to you in Elpas. You will see these notes when they are completed and signed by the physician that discharged you from your recent hospital stay. If you have any questions or concerns about any information you see in Elpas, please call the Health Information Department where you were seen or reach out to your Primary Care Provider for more information about your plan of care. Introducing [facility]! Dear Madison Leyden: Thank you for requesting a Elpas account. Our records indicate that you already have an active Elpas account.   You can access your account anytime at [email or web address] Did you know that you can access your hospital and ER discharge instructions at any time in [place]? You can also review all of your test results from your hospital stay or ER visit. Additional Information If you have questions, please visit the Frequently Asked Questions section of the Winchannel website at [email or web address] Remember, Winchannel is NOT to be used for urgent needs. For medical emergencies, dial 911. Now available from your iPhone and Android! General Information Please provide this summary of care documentation to your next provider. Patient Signature:  ____________________________________________________________ Date:  ____________________________________________________________  
  
Rad[facility] Provider Signature:  ____________________________________________________________ Date:  ____________________________________________________________

## 2017-01-01 NOTE — PROGRESS NOTES
[facility]: MED RECONCILIATION    Comments/Recommendations:    Regarding multiple medications for nausea: Patient states that ondansetron is the weakest for him and uses this primarily when he needs to function at work because it doesn't cause drowsiness like the others. However it does not work as well as promethazine (which he has taken for years) or prochlorperazine (which is relatively new for him). He is aware that he should only take one of these medications at one time and appears to have a reasonable method for determining when he uses each one. Medications added:     · Prevident 5000 - patient brushes with this several times a day usually    Medications removed:    · none    Medications adjusted:    · none    Information obtained from: patient, Rx query, discharge summary from 16    Significant PMH/Disease States:   Past Medical History   Diagnosis Date    Anal fistula     Anal stenosis     Crohn's disease ([address]     GERD (gastroesophageal reflux disease)     H/O ulcerative colitis      Symptoms began in . Total proctocolectomy was performed in . Pateint later developed Crohn's disease.  Hiatal hernia     Ileal pouchitis ([address] 2004    Nausea & vomiting      Combination of Scopalamine patch & Zofran IV worked well in past    Numbness in right leg     Psychiatric disorder      depression and anxiety    Skin lesion of back 3/17/2014    Syncopal episodes      Chief Complaint for this Admission:   Chief Complaint   Patient presents with    Abdominal Pain     Allergies: Remicade [infliximab]; Bactrim [sulfamethoprim ds]; and Nsaids (non-steroidal anti-inflammatory drug)    Prior to Admission Medications:   Prior to Admission Medications   Prescriptions Last Dose Informant Patient Reported? Taking? Ustekinumab (USTEKINAUMAB) 90 mg/mL injection 2016 Self Yes Yes   Si mg by SubCUTAneous route.  Every 8 weeks   cyanocobalamin (VITAMIN B-12) 1,000 mcg/mL injection 2016 at Unknown time Self Yes Yes   Si,000 mcg by IntraMUSCular route every seven (7) days. Indications: Once weekly   cyclobenzaprine (FLEXERIL) 10 mg tablet 2016 Self Yes Yes   Sig: Take 10 mg by mouth three (3) times daily as needed for Muscle Spasm(s). diazepam (VALIUM) 5 mg tablet 2016 at Unknown time Self Yes Yes   Sig: Take 5 mg by mouth every eight (8) hours as needed (bowel relaxation). diphenhydrAMINE (BENADRYL) 25 mg tablet 2016 at Unknown time Self Yes Yes   Sig: Take 25 mg by mouth nightly as needed for Sleep.   omeprazole-sodium bicarbonate (ZEGERID) 20-1.1 mg-gram Cap 2016 at am Self Yes Yes   Sig: Take 20 mg by mouth daily. ondansetron (ZOFRAN ODT) 4 mg disintegrating tablet 2016 at Unknown time Self Yes Yes   Sig: Take 4 mg by mouth every eight (8) hours as needed for Nausea. oxyCODONE-acetaminophen (PERCOCET 7.5) 7.5-325 mg per tablet 2016 at Unknown time Self No Yes   Sig: Take 1 Tab by mouth every four (4) hours as needed. Max Daily Amount: 6 Tabs. predniSONE (DELTASONE) 10 mg tablet 2016 at am Self No Yes   Sig: Take 30mg daily ( tabs) for 3 days then 20mg daily (2 tabs) for 7 days then 10mg daily (1 tab) for 7 days then 5mg daily (0.5 tab) for 7 days   pregabalin (LYRICA) 100 mg capsule 2016 at pm Self Yes Yes   Sig: Take 100 mg by mouth three (3) times daily. prochlorperazine (COMPAZINE) 10 mg tablet 2016 at Unknown time Self Yes Yes   Sig: Take 10 mg by mouth every six (6) hours as needed for Nausea. promethazine (PHENERGAN) 25 mg tablet 2016 Self Yes Yes   Sig: Take 25 mg by mouth every six (6) hours as needed for Nausea. sodium fluoride (PREVIDENT 5000 PLUS) 1.1 % crea 2016 at Unknown time Self Yes Yes   Sig: by Dental route as needed. temazepam (RESTORIL) 30 mg capsule 2016 at pm Self Yes Yes   Sig: Take 30 mg by mouth nightly.       Facility-Administered Medications: None     Thank you for the consult,  Kenny CORTES [facility]

## 2017-01-01 NOTE — ED PROVIDER NOTES
HPI Comments: 37 y.o. male with extensive past medical history, please see list, significant for Crohn's disease, GERD, Ulcerative colitis, Multiple GI surgeries, Ileostomy who presents from home with chief complaint of abdominal pain. Pt reports acute on chronic exacerbation of abdominal pain. Pt describes symptoms as moderate to severe RLQ pain surrounding illeostomy x 3 days accompanied by nausea and vomiting. Pain has been 8/10 and constant x 1 day. Pt states he has been unable to keep fluids or food down. Pt notes the use of Zofran, Phenergan and Percocet without relief. Pt reports blood surrounding Ileostomy site x \"copule days\" but that there is still normal output into bag. Pt also c/o fever \"99.5-100.7\". Pt specifically denies chills, chest pain, SOB or headache. There are no other acute medical concerns at this time. Old chart review: Pt seen in ED numerous times for similar symptoms. Pt admitted 12/25/16-12/26/16 for abdominal pain secondary to Crohn's colitis, GERD, s/p cholecystectomy. CT showed no evidence of acute post operative complications. Pt to f/u with GI.     PCP: Salud Grey MD  Gastroenterology: Ananda Kerr MD and Nicole Ramos. Ellis Ortiz MD (appointment scheduled for 02/05/17). Note written by Virgen Fuentes, as dictated by Rafi Erazo. Hazel Pressley MD 2:10 AM    The history is provided by the patient. No  was used. Past Medical History:   Diagnosis Date    Anal fistula     Anal stenosis     Crohn's disease ([address]     GERD (gastroesophageal reflux disease)     H/O ulcerative colitis      Symptoms began in 1996. Total proctocolectomy was performed in 2004. Pateint later developed Crohn's disease.     Hiatal hernia     Ileal pouchitis ([address] 5/2004    Nausea & vomiting      Combination of Scopalamine patch & Zofran IV worked well in past    Numbness in right leg     Psychiatric disorder      depression and anxiety    Skin lesion of back 3/17/2014  Syncopal episodes        Past Surgical History:   Procedure Laterality Date    Pr abdomen surgery proc unlisted  3/25/2004     Total proctocolectomy with creation of ileoanal J-pouch and loop ileostomy; Dr. Alka Gottlieb.   orthopaedic  2008     Left ACL repair     gi  5/2/2004     Examination under anesthesia with endoscopic evaluation of ileoanal pouch and placement of drain; Dr. Alka Gottlieb.   gi  5/25/2004     Ileostomy closure with small bowel resection and anastomosis; Dr. Alka Gottlieb.   gi  5/24/2012     Examination under anesthesia, anal dilatation, anal biopsy, and placement of draining seton; Dr. Alka Gottlieb.   gi  7/3/2012     Incision and drainage of perirectal abscess and rigid endoscopy of ileoanal pouch; Dr. Alka Gottlieb.   gi  7/31/2012     Incision and drainage of perirectal abscess, placement of draining seton, and anal dilatation; Dr. Alka Gottlieb.   gi  1/22/2013     Repair of anal fistulas with debridement, partial fistulectomy, and flap closure; Dr. Alka Gottlieb.   gi  5/17/2013     Examination under anesthesia, partial fistulotomy,  and placement of draining setons; Dr. Alka Gottlieb.   gi  8/6/2013     Anal fistulotomy and application of ACell micromatrix; Dr. Alka Gottlieb.   gi  2/20/2014     Examination under anesthesia and dilation of anal canal with rigid proctoscopy; Larissa Ramirez MD.     gi  4/29/2015     Creation of Timothy Earlene continent intestinal reservoir (BCIR), abdominoperineal resection of ileoanal J-pouch; lysis of adhesions, and gastrostomy; Tuyet Garzon MD ([place], [place])     gi  8/7/2015     Stoma revision; Everette Garzon MD ([place], [place])     gi  10/29/2015     Extensive lysis of adhesions, resection of continent intestinal reservoir, repair of serosal tears, and creation of end-ileostomy; Dr. Alka Gottlieb.          Family History:   Problem Relation Age of Onset    Cancer Father     Asthma Neg Hx     Diabetes Neg Hx     Heart Disease Neg Hx     Hypertension Neg Hx     Stroke Neg Hx     Malignant Hyperthermia Neg Hx     Pseudocholinesterase Deficiency Neg Hx     Delayed Awakening Neg Hx     Post-op Nausea/Vomiting Neg Hx        Social History     Social History    Marital status:      Spouse name: N/A    Number of children: N/A    Years of education: N/A     Occupational History    Not on file. Social History Main Topics    Smoking status: Light Tobacco Smoker     Packs/day: 0.50     Years: 7.00    Smokeless tobacco: Never Used    Alcohol use No    Drug use: No    Sexual activity: Not on file     Other Topics Concern    Not on file     Social History Narrative         ALLERGIES: Remicade [infliximab]; Bactrim [sulfamethoprim ds]; and Nsaids (non-steroidal anti-inflammatory drug)    Review of Systems   Constitutional: Positive for fever. Negative for chills. HENT: Negative for ear pain and sore throat. Eyes: Negative for pain. Respiratory: Negative for chest tightness and shortness of breath. Cardiovascular: Negative for chest pain and leg swelling. Gastrointestinal: Positive for abdominal pain, nausea and vomiting. Genitourinary: Negative for dysuria and flank pain. Musculoskeletal: Negative for back pain. Skin: Negative for rash. Neurological: Negative for headaches. All other systems reviewed and are negative. Vitals:    01/01/17 0130   BP: 118/76   Pulse: (!) 140   Resp: 18   Temp: 98.4 °F (36.9 °C)   SpO2: 100%   Weight: 86.2 kg (190 lb)   Height: 6' (1.829 m)            Physical Exam   Constitutional: He appears well-developed and well-nourished. No distress. HENT:   Head: Normocephalic and atraumatic. Eyes: Pupils are equal, round, and reactive to light. No scleral icterus. Neck: Normal range of motion. Neck supple. No tracheal deviation present. Cardiovascular: Normal rate, regular rhythm and normal heart sounds.   Exam reveals no gallop and no friction rub. No murmur heard. Pulmonary/Chest: Effort normal and breath sounds normal. No respiratory distress. He has no wheezes. He has no rales. Abdominal: Soft. He exhibits no distension. There is no rebound and no guarding. RLQ ileostomy present. Tenderness surrounding ileostomy. Musculoskeletal: He exhibits no edema. Neurological: He is alert. Skin: Skin is warm and dry. Psychiatric: He has a normal mood and affect. Nursing note and vitals reviewed. Note written by Virgen Sweeney, as dictated by Michelle Bejarano. Jodene Siemens, MD 2:20 AM    [facility]  ED Course   44-year-old male with Crohn's and multiple GI surgeries presents with abdominal pain, vomiting. Differential diagnosis includes small bowel obstruction, adhesions, abscess, Crohn's flare. CBC remarkable for white blood cells 15. Chemistries unremarkable. CT scan showed no acute process. Patient had continued abdominal pain and nausea after multiple doses of pain medicine and antibiotics. Impression: Crohn's flare  Plan: Solu-Medrol, anti-medics, pain medicine, admit. .      Procedures

## 2017-01-01 NOTE — PROGRESS NOTES
Bedside and Verbal shift change report given to Hao SKINNER RN (oncoming nurse) by Vu Miller (offgoing nurse). Report included the following information SBAR.

## 2017-01-01 NOTE — H&P
[address] [address]  [phone]    Hospitalist Admission Note      NAME:  Bc Beckman   :   1975   MRN:  [id number]     PCP:  Kaden Spivey MD     Date/Time:  2017 5:06 AM          Subjective:     CHIEF COMPLAINT: abd pain     HISTORY OF PRESENT ILLNESS:     Mr. Bhaskar Andersen is a 37 y.o.  male who presented to the Emergency Department complaining of abd pain. It is recurrent. Associated with vomiting. This is his third admit in a month, and had a cholecystectomy weeks ago here. ER workup unremarkable, other than persistent pain and vomiting. He reports also fever to 100.5 at home for a few days, after surgical wound bleed and mixed with stool from ostomy. He has been tapering his steroids, as instructed. We will admit him for management. Past Medical History   Diagnosis Date    Anal fistula     Anal stenosis     Crohn's disease ([address]     GERD (gastroesophageal reflux disease)     H/O ulcerative colitis      Symptoms began in . Total proctocolectomy was performed in . Pateint later developed Crohn's disease.  Hiatal hernia     Ileal pouchitis ([address] 2004    Nausea & vomiting      Combination of Scopalamine patch & Zofran IV worked well in past    Numbness in right leg     Psychiatric disorder      depression and anxiety    Skin lesion of back 3/17/2014    Syncopal episodes         Past Surgical History   Procedure Laterality Date    Pr abdomen surgery proc unlisted  3/25/2004     Total proctocolectomy with creation of ileoanal J-pouch and loop ileostomy; Dr. León Oneil.  Hx orthopaedic  2008     Left ACL repair    Hx gi  2004     Examination under anesthesia with endoscopic evaluation of ileoanal pouch and placement of drain; Dr. León Oneil.  Hx gi  2004     Ileostomy closure with small bowel resection and anastomosis; Dr. León Oneil.     Hx gi  2012     Examination under anesthesia, anal dilatation, anal biopsy, and placement of draining seton; Dr. Bing Nolan.   gi  7/3/2012     Incision and drainage of perirectal abscess and rigid endoscopy of ileoanal pouch; Dr. Bing Nolan.   gi  7/31/2012     Incision and drainage of perirectal abscess, placement of draining seton, and anal dilatation; Dr. Bing Nolan.   gi  1/22/2013     Repair of anal fistulas with debridement, partial fistulectomy, and flap closure; Dr. Bing Nolan.   gi  5/17/2013     Examination under anesthesia, partial fistulotomy,  and placement of draining setons; Dr. Bing Nolan.  Hx gi  8/6/2013     Anal fistulotomy and application of ACell micromatrix; Dr. Bing Nolan.   gi  2/20/2014     Examination under anesthesia and dilation of anal canal with rigid proctoscopy; Dorothea Michele MD.     gi  4/29/2015     Creation of Elana Mehul continent intestinal reservoir (BCIR), abdominoperineal resection of ileoanal J-pouch; lysis of adhesions, and gastrostomy; Dorothy Olvera MD ([place], [place])     gi  8/7/2015     Stoma revision; Vivian Olvera MD ([place], [place])     gi  10/29/2015     Extensive lysis of adhesions, resection of continent intestinal reservoir, repair of serosal tears, and creation of end-ileostomy; Dr. Bing Nolan.        Social History   Substance Use Topics    Smoking status: Light Tobacco Smoker     Packs/day: 0.50     Years: 7.00    Smokeless tobacco: Never Used    Alcohol use No        Family History   Problem Relation Age of Onset    Cancer Father     Asthma Neg Hx     Diabetes Neg Hx     Heart Disease Neg Hx     Hypertension Neg Hx     Stroke Neg Hx     Malignant Hyperthermia Neg Hx     Pseudocholinesterase Deficiency Neg Hx     Delayed Awakening Neg Hx     Post-op Nausea/Vomiting Neg Hx         Allergies   Allergen Reactions    Remicade [Infliximab] Anaphylaxis and Shortness of Breath    Bactrim [Sulfamethoprim Ds] Other (comments)     Increased GI distress.  Nsaids (Non-Steroidal Anti-Inflammatory Drug) Other (comments)     Stomach ulcers        Prior to Admission medications    Medication Sig Start Date End Date Taking? Authorizing Provider   Ustekinumab (USTEKINAUMAB) 90 mg/mL injection 90 mg by SubCUTAneous route. Every 8 weeks    Historical Provider   oxyCODONE-acetaminophen (PERCOCET 7.5) 7.5-325 mg per tablet Take 1 Tab by mouth every four (4) hours as needed. Max Daily Amount: 6 Tabs. 12/22/16   Davian Ponce MD   predniSONE (DELTASONE) 10 mg tablet Take 30mg daily ( tabs) for 3 days then 20mg daily (2 tabs) for 7 days then 10mg daily (1 tab) for 7 days then 5mg daily (0.5 tab) for 7 days 12/22/16   Davian Ponce MD   ondansetron hcl (ZOFRAN, AS HYDROCHLORIDE,) 8 mg tablet Take 8 mg by mouth every eight (8) hours as needed for Nausea. Historical Provider   prochlorperazine (COMPAZINE) 10 mg tablet Take 10 mg by mouth every six (6) hours as needed for Nausea. Historical Provider   promethazine (PHENERGAN) 25 mg tablet Take 25 mg by mouth every six (6) hours as needed for Nausea. Historical Provider   pregabalin (LYRICA) 100 mg capsule Take 100 mg by mouth three (3) times daily. Historical Provider   cyclobenzaprine (FLEXERIL) 10 mg tablet Take 10 mg by mouth three (3) times daily as needed for Muscle Spasm(s). Historical Provider   diphenhydrAMINE (BENADRYL) 25 mg tablet Take 25 mg by mouth nightly as needed for Sleep. Historical Provider   cyanocobalamin (VITAMIN B-12) 1,000 mcg/mL injection 1,000 mcg by IntraMUSCular route every seven (7) days. Indications: Once weekly    Phys MD Emily   temazepam (RESTORIL) 30 mg capsule Take 30 mg by mouth nightly. Historical Provider   diazepam (VALIUM) 5 mg tablet Take 5 mg by mouth every eight (8) hours as needed (bowel relaxation). Phys MD Emily   omeprazole-sodium bicarbonate (ZEGERID) 20-1.1 mg-gram Cap Take 20 mg by mouth daily.     Historical Provider Review of Systems:  (bold if positive, if negative)    Gen:  feverEyes:  ENT:  CVS:  Pulm:  GI:  Abdominal pain, nausea, emesis  :    MS:  Skin:  woundPsych:  Endo:    Hem:  Renal:    Neuro:        Objective:      VITALS:    Vital signs reviewed; most recent are:    Visit Vitals    /66    Pulse 95    Temp 98.4 °F (36.9 °C)    Resp 13    Ht 6' (1.829 m)    Wt 86.2 kg (190 lb)    SpO2 96%    BMI 25.77 kg/m2     SpO2 Readings from Last 6 Encounters:   01/01/17 96%   12/26/16 100%   12/22/16 100%   11/29/16 96%   11/26/16 94%   11/23/16 99%        No intake or output data in the 24 hours ending 01/01/17 0506     Exam:     Physical Exam:    Gen:  Well-developed, well-nourished, in mild acute distress  HEENT:  Pink conjunctivae, PERRL, hearing intact to voice, moist mucous membranes  Neck:  Supple, without masses, thyroid non-tender  Resp:  No accessory muscle use, clear breath sounds without wheezes rales or rhonchi  Card:  No murmurs, tachycardic S1, S2 without thrills, bruits or peripheral edema  Abd:  Soft, non-tender, non-distended, normoactive bowel sounds are present, no mass, ostomy intact  Lymph:  No cervical or inguinal adenopathy  Musc:  No cyanosis or clubbing  Skin:  No rashes or ulcers, skin turgor is good  Neuro:  Cranial nerves are grossly intact, no focal motor weakness, follows commands   Psych:  Good insight, oriented to person, place and time, alert     Labs:    Recent Labs      01/01/17   0159   WBC  15.2*   HGB  12.9   HCT  40.1   PLT  220     Recent Labs      01/01/17   0159   NA  142   K  4.4   CL  105   CO2  29   GLU  124*   BUN  13   CREA  0.86   CA  9.0   ALB  3.2*   TBILI  0.2   SGOT  11*   ALT  38     No results found for: GLUCPOC  No results for input(s): PH, PCO2, PO2, HCO3, FIO2 in the last 72 hours. No results for input(s): INR in the last 72 hours.     No lab exists for component: INREXT  All Micro Results     None          I have reviewed previous records Assessment and Plan:      Intractable vomiting with nausea / Intractable abdominal pain / S/P laparoscopic cholecystectomy - Acute on chronic. No lab or CT evidence of acute post operative issues. Similar admit last week. Consult GI. Clear diet. PO pain meds once vomiting stops. Vomiting due to opioid withdrawal at home?     SIRS criteria / Fever / Leukocytosis - He reports these symptoms after stool contaminated a bleeding lap wound. Check blood cx and start empiric Zosyn. Follow. Crohn's colitis / GERD (gastroesophageal reflux disease) - PPI by IV. GI consult. Steroids per them, if needed.     Anxiety and depression / Insomnia - Perhaps contributes to sensation of pain. Continue lyrica and prn diazepam.    Dehydration - Hydrate and follow.     Telemetry reviewed:   normal sinus rhythm    Risk of deterioration: medium      Total time spent with patient: [id number] discussed with: Patient, Nursing Staff and >50% of time spent in counseling and coordination of care    Discussed:  Care Plan       ___________________________________________________    Attending Physician: Venus Bower MD

## 2017-01-01 NOTE — PROGRESS NOTES
Xavier Tobiaselsen [facility] 79  566 [facility], [address] [place] [address]  [phone]      Medical Progress Note      NAME: Kinjal Villafuerte   :  1975  MRM:  [id number]    Date/Time: 2017  7:06 AM       Assessment and Plan:   1. Intractable vomiting with nausea / Intractable abdominal pain / S/P laparoscopic cholecystectomy - Acute on chronic. No lab or CT evidence of acute post operative issues. Similar admit last week. Consult GI. Clear liquid diet. PO pain meds once vomiting stops. Continue IVF.      2. SIRS criteria: tachycardia/  Leukocytosis/ fever -  Check blood cx and continue empiric Zosyn.        3. Crohn's colitis / GERD (gastroesophageal reflux disease) - PPI. GI consult. CT scan of the abdomen is unremarkable.       4. Anxiety and depression / Insomnia - Continue lyrica and prn diazepam.            Subjective:     Chief Complaint:  Abdominal pain and nausea     ROS:  (bold if positive, if negative)    Abd PainNausea  Tolerating PT  Tolerating Diet        Objective:     Last 24hrs VS reviewed since prior progress note.  Most recent are:    Visit Vitals    /70    Pulse (!) 110    Temp 98.4 °F (36.9 °C)    Resp 17    Ht 6' (1.829 m)    Wt 86.2 kg (190 lb)    SpO2 98%    BMI 25.77 kg/m2     SpO2 Readings from Last 6 Encounters:   17 98%   16 100%   16 100%   16 96%   16 94%   16 99%        No intake or output data in the 24 hours ending 17 0706     Physical Exam:    Gen:  Well-developed, well-nourished, in no acute distress  HEENT:  Pink conjunctivae, PERRL, hearing intact to voice, moist mucous membranes  Neck:  Supple, without masses, thyroid non-tender  Resp:  No accessory muscle use, clear breath sounds without wheezes rales or rhonchi  Card:  No murmurs, normal S1, S2 without thrills, bruits or peripheral edema  Abd:  Soft, non-tender, non-distended, normoactive bowel sounds are present, no palpable organomegaly and no detectable hernias. ileostomy bag in place. Lymph:  No cervical or inguinal adenopathy  Musc:  No cyanosis or clubbing  Skin:  No rashes or ulcers, skin turgor is good  Neuro:  Cranial nerves are grossly intact, no focal motor weakness, follows commands appropriately  Psych:  Good insight, oriented to person, place and time, alert  __________________________________________________________________  Medications Reviewed: (see below)  Medications:     Current Facility-Administered Medications   Medication Dose Route Frequency    ketorolac (TORADOL) injection 30 mg  30 mg IntraVENous Q6H PRN    temazepam (RESTORIL) capsule 30 mg  30 mg Oral QHS    pregabalin (LYRICA) capsule 100 mg  100 mg Oral TID    diazePAM (VALIUM) tablet 5 mg  5 mg Oral Q8H PRN    naloxone (NARCAN) injection 0.4 mg  0.4 mg IntraVENous PRN    dextrose 5% - 0.45% NaCl with KCl 20 mEq/L infusion  125 mL/hr IntraVENous CONTINUOUS    acetaminophen (TYLENOL) tablet 650 mg  650 mg Oral Q4H PRN    morphine injection 2 mg  2 mg IntraVENous Q4H PRN    diphenhydrAMINE (BENADRYL) capsule 25 mg  25 mg Oral Q4H PRN    ondansetron (ZOFRAN) injection 4 mg  4 mg IntraVENous Q4H PRN    enoxaparin (LOVENOX) injection 40 mg  40 mg SubCUTAneous Q24H    pantoprazole (PROTONIX) 40 mg in sodium chloride 0.9 % 10 mL injection  40 mg IntraVENous Q12H    oxyCODONE-acetaminophen (PERCOCET 7.5) 7.5-325 mg per tablet 1 Tab  1 Tab Oral Q4H PRN    piperacillin-tazobactam (ZOSYN) 3.375 g in 0.9% sodium chloride (MBP/ADV) 100 mL  3.375 g IntraVENous Q8H        Lab Data Reviewed: (see below)  Lab Review:     Recent Labs      01/01/17 0159   WBC  15.2*   HGB  12.9   HCT  40.1   PLT  220     Recent Labs      01/01/17 0159   NA  142   K  4.4   CL  105   CO2  29   GLU  124*   BUN  13   CREA  0.86   CA  9.0   ALB  3.2*   TBILI  0.2   SGOT  11*   ALT  38     No results found for: GLUCPOC  No results for input(s): PH, PCO2, PO2, HCO3, FIO2 in the last 72 hours.   No results for input(s): INR in the last 72 hours. No lab exists for component: INREXT  All Micro Results     Procedure Component Value Units Date/Time    CULTURE, BLOOD [[id number]] Collected:  01/01/17 0200    Order Status:  Completed Specimen:  Blood from Blood Updated:  01/01/17 0609    CULTURE, BLOOD [[id number]] Collected:  01/01/17 0539    Order Status:  Completed Specimen:  Blood from Blood Updated:  01/01/17 0608          I have reviewed notes of prior 24hr. Other pertinent lab:       Total time spent with patient: [address] discussed with: Patient, Nursing Staff and >50% of time spent in counseling and coordination of care    Discussed:  Care Plan    Prophylaxis:  SCD's    Disposition:  Home w/Family           ___________________________________________________    Attending Physician: Sho Pugh MD

## 2017-01-01 NOTE — IP AVS SNAPSHOT
Current Discharge Medication List  
  
Take these medications at their scheduled times Dose & Instructions Dispensing Information Comments Morning Noon Evening Bedtime LYRICA 100 mg capsule Generic drug:  pregabalin Your next dose is: Today, Tomorrow Other:  ____________ Dose:  100 mg Take 100 mg by mouth three (3) times daily. Refills:  0  
     
   
   
   
  
 pantoprazole 40 mg tablet Commonly known as:  PROTONIX Your next dose is: Today, Tomorrow Other:  ____________ Dose:  40 mg Take 1 Tab by mouth Before breakfast and dinner. Quantity:  60 Tab Refills:  0  
     
   
   
   
  
 predniSONE 20 mg tablet Commonly known as:  Joshua Felling Start taking on:  1/6/2017 Your next dose is: Today, Tomorrow Other:  ____________ Dose:  40 mg Take 2 Tabs by mouth daily (with breakfast). Quantity:  30 Tab Refills:  0  
     
   
   
   
  
 temazepam 30 mg capsule Commonly known as:  RESTORIL Your next dose is: Today, Tomorrow Other:  ____________ Dose:  30 mg Take 30 mg by mouth nightly. Refills:  0  
     
   
   
   
  
 VITAMIN B-12 1,000 mcg/mL injection Generic drug:  cyanocobalamin Your next dose is: Today, Tomorrow Other:  ____________ Dose:  1000 mcg  
1,000 mcg by IntraMUSCular route every seven (7) days. Indications: Once weekly Refills:  0 ZEGERID 20-1.1 mg-gram Cap Generic drug:  omeprazole-sodium bicarbonate Your next dose is: Today, Tomorrow Other:  ____________ Dose:  20 mg Take 20 mg by mouth daily. Refills:  0 Take these medications as needed Dose & Instructions Dispensing Information Comments Morning Noon Evening Bedtime  
 cyclobenzaprine 10 mg tablet Commonly known as:  FLEXERIL Your next dose is: Today, Tomorrow Other:  ____________ Dose:  10 mg Take 10 mg by mouth three (3) times daily as needed for Muscle Spasm(s). Refills:  0  
     
   
   
   
  
 diphenhydrAMINE 25 mg tablet Commonly known as:  BENADRYL Your next dose is: Today, Tomorrow Other:  ____________ Dose:  25 mg Take 25 mg by mouth nightly as needed for Sleep. Refills:  0  
     
   
   
   
  
 ondansetron 4 mg disintegrating tablet Commonly known as:  ZOFRAN ODT Your next dose is: Today, Tomorrow Other:  ____________ Dose:  4 mg Take 4 mg by mouth every eight (8) hours as needed for Nausea. Refills:  0  
     
   
   
   
  
 oxyCODONE IR 10 mg Tab immediate release tablet Commonly known as:  Franceen Ly Your next dose is: Today, Tomorrow Other:  ____________ Dose:  10 mg Take 1 Tab by mouth every six (6) hours as needed for up to 14 days. Max Daily Amount: 40 mg.  
 Quantity:  56 Tab Refills:  0  
     
   
   
   
  
 promethazine 25 mg tablet Commonly known as:  PHENERGAN Your next dose is: Today, Tomorrow Other:  ____________ Dose:  25 mg Take 1 Tab by mouth every six (6) hours as needed for Nausea. Quantity:  60 Tab Refills:  0 VALIUM 5 mg tablet Generic drug:  diazePAM  
   
Your next dose is: Today, Tomorrow Other:  ____________ Dose:  5 mg Take 5 mg by mouth every eight (8) hours as needed (bowel relaxation). Refills:  0 Take these medications as directed Dose & Instructions Dispensing Information Comments Morning Noon Evening Bedtime  
 ustekinaumab 90 mg/mL injection Generic drug:  Ustekinumab Your next dose is: Today, Tomorrow Other:  ____________ Dose:  90 mg  
90 mg by SubCUTAneous route. Every 8 weeks Refills:  0 Where to Get Your Medications Information about where to get these medications is not yet available ! Ask your nurse or doctor about these medications  
  oxyCODONE IR 10 mg Tab immediate release tablet  
 pantoprazole 40 mg tablet  
 predniSONE 20 mg tablet  
 promethazine 25 mg tablet

## 2017-01-01 NOTE — ED TRIAGE NOTES
Patient c/o RLQ abdominal pain, vomiting, fever onset 3 days. Patient states he has a hx of Crohn's. Patient took Percocet around 2000 without relief.  Patient states he is about 10 days post-op for gallbladder removal.

## 2017-01-01 NOTE — ROUTINE PROCESS
TRANSFER - OUT REPORT:    Verbal report given to ebony RN(name) on Ewa Michelle  being transferred to medical (unit) for routine progression of care       Report consisted of patients Situation, Background, Assessment and   Recommendations(SBAR). Information from the following report(s) SBAR, ED Summary, MAR, Recent Results and Cardiac Rhythm sinus  was reviewed with the receiving nurse. Lines:   Peripheral IV 01/01/17 Left Forearm (Active)   Site Assessment Clean, dry, & intact 1/1/2017  2:05 AM   Phlebitis Assessment 0 1/1/2017  2:05 AM   Infiltration Assessment 0 1/1/2017  2:05 AM   Dressing Status Clean, dry, & intact 1/1/2017  2:05 AM   Hub Color/Line Status Pink 1/1/2017  2:05 AM        Opportunity for questions and clarification was provided.       Patient transported with:   [employer]

## 2017-01-02 LAB
ANION GAP BLD CALC-SCNC: 8 MMOL/L (ref 5–15)
ATRIAL RATE: 131 BPM
BASOPHILS # BLD AUTO: 0 K/UL (ref 0–0.1)
BASOPHILS # BLD: 0 % (ref 0–1)
BUN SERPL-MCNC: 6 MG/DL (ref 6–20)
BUN/CREAT SERPL: 8 (ref 12–20)
CALCIUM SERPL-MCNC: 8.6 MG/DL (ref 8.5–10.1)
CALCULATED P AXIS, ECG09: 54 DEGREES
CALCULATED R AXIS, ECG10: 66 DEGREES
CALCULATED T AXIS, ECG11: 18 DEGREES
CHLORIDE SERPL-SCNC: 105 MMOL/L (ref 97–108)
CO2 SERPL-SCNC: 28 MMOL/L (ref 21–32)
CREAT SERPL-MCNC: 0.8 MG/DL (ref 0.7–1.3)
DIAGNOSIS, 93000: NORMAL
DIFFERENTIAL METHOD BLD: ABNORMAL
EOSINOPHIL # BLD: 0 K/UL (ref 0–0.4)
EOSINOPHIL NFR BLD: 0 % (ref 0–7)
ERYTHROCYTE [DISTWIDTH] IN BLOOD BY AUTOMATED COUNT: 16.6 % (ref 11.5–14.5)
GLUCOSE SERPL-MCNC: 171 MG/DL (ref 65–100)
HCT VFR BLD AUTO: 39.3 % (ref 36.6–50.3)
HGB BLD-MCNC: 12.8 G/DL (ref 12.1–17)
LYMPHOCYTES # BLD AUTO: 3 % (ref 12–49)
LYMPHOCYTES # BLD: 0.6 K/UL (ref 0.8–3.5)
MAGNESIUM SERPL-MCNC: 1.5 MG/DL (ref 1.6–2.4)
MCH RBC QN AUTO: 30 PG (ref 26–34)
MCHC RBC AUTO-ENTMCNC: 32.6 G/DL (ref 30–36.5)
MCV RBC AUTO: 92 FL (ref 80–99)
MONOCYTES # BLD: 0.4 K/UL (ref 0–1)
MONOCYTES NFR BLD AUTO: 2 % (ref 5–13)
NEUTS SEG # BLD: 17.8 K/UL (ref 1.8–8)
NEUTS SEG NFR BLD AUTO: 95 % (ref 32–75)
P-R INTERVAL, ECG05: 134 MS
PHOSPHATE SERPL-MCNC: 3.2 MG/DL (ref 2.6–4.7)
PLATELET # BLD AUTO: 206 K/UL (ref 150–400)
POTASSIUM SERPL-SCNC: 4.1 MMOL/L (ref 3.5–5.1)
Q-T INTERVAL, ECG07: 286 MS
QRS DURATION, ECG06: 86 MS
QTC CALCULATION (BEZET), ECG08: 422 MS
RBC # BLD AUTO: 4.27 M/UL (ref 4.1–5.7)
RBC MORPH BLD: ABNORMAL
SODIUM SERPL-SCNC: 141 MMOL/L (ref 136–145)
VENTRICULAR RATE, ECG03: 131 BPM
WBC # BLD AUTO: 18.8 K/UL (ref 4.1–11.1)

## 2017-01-02 PROCEDURE — 84100 ASSAY OF PHOSPHORUS: CPT | Performed by: INTERNAL MEDICINE

## 2017-01-02 PROCEDURE — 74011250636 HC RX REV CODE- 250/636: Performed by: INTERNAL MEDICINE

## 2017-01-02 PROCEDURE — 80048 BASIC METABOLIC PNL TOTAL CA: CPT | Performed by: INTERNAL MEDICINE

## 2017-01-02 PROCEDURE — 74011000250 HC RX REV CODE- 250: Performed by: INTERNAL MEDICINE

## 2017-01-02 PROCEDURE — 85025 COMPLETE CBC W/AUTO DIFF WBC: CPT | Performed by: INTERNAL MEDICINE

## 2017-01-02 PROCEDURE — 36415 COLL VENOUS BLD VENIPUNCTURE: CPT | Performed by: INTERNAL MEDICINE

## 2017-01-02 PROCEDURE — 65270000029 HC RM PRIVATE

## 2017-01-02 PROCEDURE — 83735 ASSAY OF MAGNESIUM: CPT | Performed by: INTERNAL MEDICINE

## 2017-01-02 PROCEDURE — C9113 INJ PANTOPRAZOLE SODIUM, VIA: HCPCS | Performed by: INTERNAL MEDICINE

## 2017-01-02 PROCEDURE — 74011000258 HC RX REV CODE- 258: Performed by: INTERNAL MEDICINE

## 2017-01-02 PROCEDURE — 74011250637 HC RX REV CODE- 250/637: Performed by: INTERNAL MEDICINE

## 2017-01-02 RX ORDER — MAGNESIUM SULFATE HEPTAHYDRATE 40 MG/ML
2 INJECTION, SOLUTION INTRAVENOUS ONCE
Status: COMPLETED | OUTPATIENT
Start: 2017-01-02 | End: 2017-01-02

## 2017-01-02 RX ORDER — SODIUM CHLORIDE 9 MG/ML
75 INJECTION, SOLUTION INTRAVENOUS CONTINUOUS
Status: DISCONTINUED | OUTPATIENT
Start: 2017-01-02 | End: 2017-01-05 | Stop reason: HOSPADM

## 2017-01-02 RX ADMIN — PIPERACILLIN SODIUM,TAZOBACTAM SODIUM 3.38 G: 3; .375 INJECTION, POWDER, FOR SOLUTION INTRAVENOUS at 20:31

## 2017-01-02 RX ADMIN — HYDROMORPHONE HYDROCHLORIDE 0.5 MG: 1 INJECTION, SOLUTION INTRAMUSCULAR; INTRAVENOUS; SUBCUTANEOUS at 08:35

## 2017-01-02 RX ADMIN — HYDROMORPHONE HYDROCHLORIDE 0.5 MG: 1 INJECTION, SOLUTION INTRAMUSCULAR; INTRAVENOUS; SUBCUTANEOUS at 00:46

## 2017-01-02 RX ADMIN — OXYCODONE HYDROCHLORIDE AND ACETAMINOPHEN 1 TABLET: 7.5; 325 TABLET ORAL at 11:02

## 2017-01-02 RX ADMIN — ENOXAPARIN SODIUM 40 MG: 40 INJECTION SUBCUTANEOUS at 08:35

## 2017-01-02 RX ADMIN — OXYCODONE HYDROCHLORIDE AND ACETAMINOPHEN 1 TABLET: 7.5; 325 TABLET ORAL at 14:34

## 2017-01-02 RX ADMIN — SODIUM CHLORIDE 75 ML/HR: 900 INJECTION, SOLUTION INTRAVENOUS at 08:35

## 2017-01-02 RX ADMIN — HYDROMORPHONE HYDROCHLORIDE 0.5 MG: 1 INJECTION, SOLUTION INTRAMUSCULAR; INTRAVENOUS; SUBCUTANEOUS at 12:42

## 2017-01-02 RX ADMIN — METHYLPREDNISOLONE SODIUM SUCCINATE 40 MG: 40 INJECTION, POWDER, FOR SOLUTION INTRAMUSCULAR; INTRAVENOUS at 12:42

## 2017-01-02 RX ADMIN — ONDANSETRON 4 MG: 2 INJECTION INTRAMUSCULAR; INTRAVENOUS at 04:58

## 2017-01-02 RX ADMIN — DIAZEPAM 5 MG: 5 TABLET ORAL at 09:31

## 2017-01-02 RX ADMIN — OXYCODONE HYDROCHLORIDE AND ACETAMINOPHEN 1 TABLET: 7.5; 325 TABLET ORAL at 22:34

## 2017-01-02 RX ADMIN — OXYCODONE HYDROCHLORIDE AND ACETAMINOPHEN 1 TABLET: 7.5; 325 TABLET ORAL at 03:30

## 2017-01-02 RX ADMIN — TEMAZEPAM 30 MG: 15 CAPSULE ORAL at 22:34

## 2017-01-02 RX ADMIN — PREGABALIN 100 MG: 100 CAPSULE ORAL at 22:34

## 2017-01-02 RX ADMIN — ONDANSETRON 4 MG: 2 INJECTION INTRAMUSCULAR; INTRAVENOUS at 08:35

## 2017-01-02 RX ADMIN — OXYCODONE HYDROCHLORIDE AND ACETAMINOPHEN 1 TABLET: 7.5; 325 TABLET ORAL at 07:15

## 2017-01-02 RX ADMIN — METHYLPREDNISOLONE SODIUM SUCCINATE 40 MG: 40 INJECTION, POWDER, FOR SOLUTION INTRAMUSCULAR; INTRAVENOUS at 18:21

## 2017-01-02 RX ADMIN — PIPERACILLIN SODIUM,TAZOBACTAM SODIUM 3.38 G: 3; .375 INJECTION, POWDER, FOR SOLUTION INTRAVENOUS at 03:23

## 2017-01-02 RX ADMIN — HYDROMORPHONE HYDROCHLORIDE: 1 INJECTION, SOLUTION INTRAMUSCULAR; INTRAVENOUS; SUBCUTANEOUS at 04:59

## 2017-01-02 RX ADMIN — DIAZEPAM 5 MG: 5 TABLET ORAL at 18:21

## 2017-01-02 RX ADMIN — SODIUM CHLORIDE 40 MG: 9 INJECTION INTRAMUSCULAR; INTRAVENOUS; SUBCUTANEOUS at 20:32

## 2017-01-02 RX ADMIN — SODIUM CHLORIDE 40 MG: 9 INJECTION INTRAMUSCULAR; INTRAVENOUS; SUBCUTANEOUS at 08:35

## 2017-01-02 RX ADMIN — PIPERACILLIN SODIUM,TAZOBACTAM SODIUM 3.38 G: 3; .375 INJECTION, POWDER, FOR SOLUTION INTRAVENOUS at 12:42

## 2017-01-02 RX ADMIN — ONDANSETRON 4 MG: 2 INJECTION INTRAMUSCULAR; INTRAVENOUS at 00:45

## 2017-01-02 RX ADMIN — HYDROMORPHONE HYDROCHLORIDE 0.5 MG: 1 INJECTION, SOLUTION INTRAMUSCULAR; INTRAVENOUS; SUBCUTANEOUS at 16:34

## 2017-01-02 RX ADMIN — SODIUM CHLORIDE 5 MG: 9 INJECTION INTRAMUSCULAR; INTRAVENOUS; SUBCUTANEOUS at 14:34

## 2017-01-02 RX ADMIN — OXYCODONE HYDROCHLORIDE AND ACETAMINOPHEN 1 TABLET: 7.5; 325 TABLET ORAL at 18:21

## 2017-01-02 RX ADMIN — PREGABALIN 100 MG: 100 CAPSULE ORAL at 16:33

## 2017-01-02 RX ADMIN — ONDANSETRON 4 MG: 2 INJECTION INTRAMUSCULAR; INTRAVENOUS at 20:39

## 2017-01-02 RX ADMIN — PREGABALIN 100 MG: 100 CAPSULE ORAL at 08:35

## 2017-01-02 RX ADMIN — MAGNESIUM SULFATE HEPTAHYDRATE 2 G: 40 INJECTION, SOLUTION INTRAVENOUS at 08:46

## 2017-01-02 RX ADMIN — SODIUM CHLORIDE 75 ML/HR: 900 INJECTION, SOLUTION INTRAVENOUS at 18:22

## 2017-01-02 RX ADMIN — KETOROLAC TROMETHAMINE 30 MG: 30 INJECTION, SOLUTION INTRAMUSCULAR at 16:34

## 2017-01-02 RX ADMIN — ONDANSETRON 4 MG: 2 INJECTION INTRAMUSCULAR; INTRAVENOUS at 16:34

## 2017-01-02 RX ADMIN — METHYLPREDNISOLONE SODIUM SUCCINATE 40 MG: 40 INJECTION, POWDER, FOR SOLUTION INTRAMUSCULAR; INTRAVENOUS at 06:51

## 2017-01-02 RX ADMIN — DIAZEPAM 5 MG: 5 TABLET ORAL at 03:24

## 2017-01-02 RX ADMIN — ONDANSETRON 4 MG: 2 INJECTION INTRAMUSCULAR; INTRAVENOUS at 12:42

## 2017-01-02 RX ADMIN — SODIUM CHLORIDE 5 MG: 9 INJECTION INTRAMUSCULAR; INTRAVENOUS; SUBCUTANEOUS at 22:34

## 2017-01-02 RX ADMIN — HYDROMORPHONE HYDROCHLORIDE 0.5 MG: 1 INJECTION, SOLUTION INTRAMUSCULAR; INTRAVENOUS; SUBCUTANEOUS at 20:36

## 2017-01-02 NOTE — PROGRESS NOTES
Xavier Corbett leon [place] 79  566 [facility], [address] [place] [address]  [phone]      Medical Progress Note      NAME: Aneesh Henriquez   :  1975  MRM:  [id number]    Date/Time: 2017  7:06 AM       Assessment and Plan:   1. Intractable vomiting with nausea / Intractable abdominal pain / S/P recent laparoscopic cholecystectomy - Acute on chronic. No lab or CT evidence of acute post operative issues. Similar admit last week. GI evaluation appreciated. Clear liquid diet. Symptomatic treatment.      2. SIRS criteria: tachycardia/  Leukocytosis/ fever -  Check blood cx and continue empiric Zosyn for now.        3. Crohn's colitis / GERD (gastroesophageal reflux disease) - PPI. GI consult appreciated. Started on solumedrol. CT scan of the abdomen is unremarkable.       4. Anxiety and depression / Insomnia - Continue lyrica and prn diazepam.    5.  Hypomagnesemia. Replete    6. Hyperglycemia. Likely secondary to steroid.             Subjective:     Chief Complaint:  Abdominal pain and nausea     ROS:  (bold if positive, if negative)    Abd PainNausea  Tolerating PT  Tolerating Diet        Objective:     Last 24hrs VS reviewed since prior progress note.  Most recent are:    Visit Vitals    /58 (BP 1 Location: Right arm, BP Patient Position: At rest)    Pulse 86    Temp 97.9 °F (36.6 °C)    Resp 18    Ht 6' (1.829 m)    Wt 86.2 kg (190 lb)    SpO2 96%    BMI 25.77 kg/m2     SpO2 Readings from Last 6 Encounters:   17 96%   16 100%   16 100%   16 96%   16 94%   16 99%            Intake/Output Summary (Last 24 hours) at 17 4633  Last data filed at 17 0005   Gross per 24 hour   Intake              500 ml   Output             3200 ml   Net            -2700 ml        Physical Exam:    Gen:  Well-developed, well-nourished, in no acute distress  HEENT:  Pink conjunctivae, PERRL, hearing intact to voice, moist mucous membranes  Neck: Supple, without masses, thyroid non-tender  Resp:  No accessory muscle use, clear breath sounds without wheezes rales or rhonchi  Card:  No murmurs, normal S1, S2 without thrills, bruits or peripheral edema  Abd:  Soft, non-tender, non-distended, normoactive bowel sounds are present, no palpable organomegaly and no detectable hernias. ileostomy bag in place.    Lymph:  No cervical or inguinal adenopathy  Musc:  No cyanosis or clubbing  Skin:  No rashes or ulcers, skin turgor is good  Neuro:  Cranial nerves are grossly intact, no focal motor weakness, follows commands appropriately  Psych:  Good insight, oriented to person, place and time, alert  __________________________________________________________________  Medications Reviewed: (see below)  Medications:     Current Facility-Administered Medications   Medication Dose Route Frequency    magnesium sulfate 2 g/50 ml IVPB (premix or compounded)  2 g IntraVENous ONCE    0.9% sodium chloride infusion  75 mL/hr IntraVENous CONTINUOUS    ketorolac (TORADOL) injection 30 mg  30 mg IntraVENous Q6H PRN    temazepam (RESTORIL) capsule 30 mg  30 mg Oral QHS    pregabalin (LYRICA) capsule 100 mg  100 mg Oral TID    diazePAM (VALIUM) tablet 5 mg  5 mg Oral Q8H PRN    naloxone (NARCAN) injection 0.4 mg  0.4 mg IntraVENous PRN    acetaminophen (TYLENOL) tablet 650 mg  650 mg Oral Q4H PRN    diphenhydrAMINE (BENADRYL) capsule 25 mg  25 mg Oral Q4H PRN    ondansetron (ZOFRAN) injection 4 mg  4 mg IntraVENous Q4H PRN    enoxaparin (LOVENOX) injection 40 mg  40 mg SubCUTAneous Q24H    pantoprazole (PROTONIX) 40 mg in sodium chloride 0.9 % 10 mL injection  40 mg IntraVENous Q12H    oxyCODONE-acetaminophen (PERCOCET 7.5) 7.5-325 mg per tablet 1 Tab  1 Tab Oral Q4H PRN    piperacillin-tazobactam (ZOSYN) 3.375 g in 0.9% sodium chloride (MBP/ADV) 100 mL  3.375 g IntraVENous Q8H    HYDROmorphone (PF) (DILAUDID) injection 0.5 mg  0.5 mg IntraVENous Q4H PRN    methylPREDNISolone (PF) (SOLU-MEDROL) injection 40 mg  40 mg IntraVENous Q6H        Lab Data Reviewed: (see below)  Lab Review:     Recent Labs      01/02/17 0322 01/01/17 0159   WBC  18.8*  15.2*   HGB  12.8  12.9   HCT  39.3  40.1   PLT  206  220     Recent Labs      01/02/17   0322  01/01/17 0159   NA  141  142   K  4.1  4.4   CL  105  105   CO2  28  29   GLU  171*  124*   BUN  6  13   CREA  0.80  0.86   CA  8.6  9.0   MG  1.5*   --    PHOS  3.2   --    ALB   --   3.2*   TBILI   --   0.2   SGOT   --   11*   ALT   --   38     No results found for: GLUCPOC  No results for input(s): PH, PCO2, PO2, HCO3, FIO2 in the last 72 hours. No results for input(s): INR in the last 72 hours. No lab exists for component: Mae Szymanski Micro Results     Procedure Component Value Units Date/Time    CULTURE, BLOOD [[id number]] Collected:  01/01/17 0539    Order Status:  Completed Specimen:  Blood from Blood Updated:  01/01/17 1020     Special Requests: NO SPECIAL REQUESTS        Culture result: NO GROWTH AFTER 3 HOURS       CULTURE, BLOOD [[id number]] Collected:  01/01/17 0200    Order Status:  Completed Specimen:  Blood from Blood Updated:  01/01/17 1020     Special Requests: NO SPECIAL REQUESTS        Culture result: NO GROWTH AFTER 3 HOURS             I have reviewed notes of prior 24hr. Other pertinent lab:       Total time spent with patient: [address] discussed with: Patient, Nursing Staff and >50% of time spent in counseling and coordination of care    Discussed:  Care Plan    Prophylaxis:  SCD's    Disposition:  Home w/Family           ___________________________________________________    Attending Physician: Priyanka Monsalve MD

## 2017-01-02 NOTE — ROUTINE PROCESS
Bedside and Verbal shift change report given to Williams Gant RN (oncoming nurse) by Kris Hernández RN (offgoing nurse). Report included the following information SBAR, Kardex, Intake/Output, MAR and Recent Results.

## 2017-01-02 NOTE — PROGRESS NOTES
Bedside and Verbal shift change report given to Ochoa Greene RN (oncoming nurse) by Kaley Pryor (offgoing nurse). Report included the following information SBAR, Kardex, Procedure Summary, Intake/Output, MAR, Recent Results and Med Rec Status.

## 2017-01-02 NOTE — PROGRESS NOTES
Gastrointestinal Progress Note    1/2/2017    Admit Date: 1/1/2017    Subjective:  Feels the same     New Complaints Today:  No: but pain and nausea persist.  No vomiting and no bleeding        Current Facility-Administered Medications   Medication Dose Route Frequency    magnesium sulfate 2 g/50 ml IVPB (premix or compounded)  2 g IntraVENous ONCE    0.9% sodium chloride infusion  75 mL/hr IntraVENous CONTINUOUS    ketorolac (TORADOL) injection 30 mg  30 mg IntraVENous Q6H PRN    temazepam (RESTORIL) capsule 30 mg  30 mg Oral QHS    pregabalin (LYRICA) capsule 100 mg  100 mg Oral TID    diazePAM (VALIUM) tablet 5 mg  5 mg Oral Q8H PRN    naloxone (NARCAN) injection 0.4 mg  0.4 mg IntraVENous PRN    acetaminophen (TYLENOL) tablet 650 mg  650 mg Oral Q4H PRN    diphenhydrAMINE (BENADRYL) capsule 25 mg  25 mg Oral Q4H PRN    ondansetron (ZOFRAN) injection 4 mg  4 mg IntraVENous Q4H PRN    enoxaparin (LOVENOX) injection 40 mg  40 mg SubCUTAneous Q24H    pantoprazole (PROTONIX) 40 mg in sodium chloride 0.9 % 10 mL injection  40 mg IntraVENous Q12H    oxyCODONE-acetaminophen (PERCOCET 7.5) 7.5-325 mg per tablet 1 Tab  1 Tab Oral Q4H PRN    piperacillin-tazobactam (ZOSYN) 3.375 g in 0.9% sodium chloride (MBP/ADV) 100 mL  3.375 g IntraVENous Q8H    HYDROmorphone (PF) (DILAUDID) injection 0.5 mg  0.5 mg IntraVENous Q4H PRN    methylPREDNISolone (PF) (SOLU-MEDROL) injection 40 mg  40 mg IntraVENous Q6H        Objective:     Blood pressure 107/58, pulse 86, temperature 97.9 °F (36.6 °C), resp. rate 18, height 6' (1.829 m), weight 86.2 kg (190 lb), SpO2 96 %.          12/31 1901 - 01/02 0700  In: 500 [P.O.:500]  Out: 3200 [Urine:2700]    EXAM:  GENERAL: alert, cooperative, no distress, HEART: regular rate and rhythm, LUNGS: chest clear, no wheezing, rales, normal symmetric air entry, ABDOMEN:  Bowel sounds are normal, liver is not enlarged, spleen is not enlarged and EXTREMITY: no edema      Data Review Recent Results (from the past 24 hour(s))   CBC WITH AUTOMATED DIFF    Collection Time: 01/02/17  3:22 AM   Result Value Ref Range    WBC 18.8 (H) 4.1 - 11.1 K/uL    RBC 4.27 4. 10 - 5.70 M/uL    HGB 12.8 12.1 - 17.0 g/dL    HCT 39.3 36.6 - 50.3 %    MCV 92.0 80.0 - 99.0 FL    MCH 30.0 26.0 - 34.0 PG    MCHC 32.6 30.0 - 36.5 g/dL    RDW 16.6 (H) 11.5 - 14.5 %    PLATELET 263 671 - 673 K/uL    NEUTROPHILS 95 (H) 32 - 75 %    LYMPHOCYTES 3 (L) 12 - 49 %    MONOCYTES 2 (L) 5 - 13 %    EOSINOPHILS 0 0 - 7 %    BASOPHILS 0 0 - 1 %    ABS. NEUTROPHILS 17.8 (H) 1.8 - 8.0 K/UL    ABS. LYMPHOCYTES 0.6 (L) 0.8 - 3.5 K/UL    ABS. MONOCYTES 0.4 0.0 - 1.0 K/UL    ABS. EOSINOPHILS 0.0 0.0 - 0.4 K/UL    ABS. BASOPHILS 0.0 0.0 - 0.1 K/UL    DF SMEAR SCANNED      RBC COMMENTS NORMOCYTIC, NORMOCHROMIC     METABOLIC PANEL, BASIC    Collection Time: 01/02/17  3:22 AM   Result Value Ref Range    Sodium 141 136 - 145 mmol/L    Potassium 4.1 3.5 - 5.1 mmol/L    Chloride 105 97 - 108 mmol/L    CO2 28 21 - 32 mmol/L    Anion gap 8 5 - 15 mmol/L    Glucose 171 (H) 65 - 100 mg/dL    BUN 6 6 - 20 MG/DL    Creatinine 0.80 0.70 - 1.30 MG/DL    BUN/Creatinine ratio 8 (L) 12 - 20      GFR est AA >60 >60 ml/min/1.73m2    GFR est non-AA >60 >60 ml/min/1.73m2    Calcium 8.6 8.5 - 10.1 MG/DL   MAGNESIUM    Collection Time: 01/02/17  3:22 AM   Result Value Ref Range    Magnesium 1.5 (L) 1.6 - 2.4 mg/dL   PHOSPHORUS    Collection Time: 01/02/17  3:22 AM   Result Value Ref Range    Phosphorus 3.2 2.6 - 4.7 MG/DL       Assessment:     Principal Problem:    Intractable vomiting with nausea (1/1/2017)    Active Problems:    GERD (gastroesophageal reflux disease) (11/20/2016)      Abdominal pain (11/29/2016)      Crohn's colitis ([facility] [address] (12/18/2016)      S/P laparoscopic cholecystectomy (12/21/2016)      Overview: With IOC. Anxiety and depression (12/25/2016)      Dehydration (12/25/2016)        Plan:     1.   Add prochlorperazine

## 2017-01-02 NOTE — PROGRESS NOTES
Interdisciplinary team rounds were held 1/2/2017 with the following team members:Care Management, Nursing, Nutrition, Pharmacy, Physician, Clinical Coordinator and primary RN . Plan of care discussed. See clinical pathway and/or care plan for interventions and desired outcomes.   Discharge when pain is controled

## 2017-01-02 NOTE — PROGRESS NOTES
12/20/2017 CARE MANAGEMENT NOTE: CM is following pt for READMISSION. EMR reviewed. Per chart hx, pt was hospitalized at [facility] most recently from 12/18/17 - 12/22/17 for dx of Crohn's. He returned to the hospital and was readmitted from 12/25/17 - 12/26/17 for right lower abd pain. Initial Assessment: CM met with pt and wife ([phone]) at bedside to obtain hx for this needs assessment. Reportedly, pt resides with his wife and two sons in a two story home with bedroom on the second floor. There are six entry steps and 12 steps between floors. PTA, pt was ambulatory, indepn with ADLs, and drives. He is employed but will be taking medical leave. He has RX drug coverage and uses [facility] at [place]. He does not have home healthcare currently. DME in the home includes a cane. He is aware of mail order for ostomy supplies. PCP is Dr. Jevon Freedman. Readmission Assessment: Pt presents to the hospital with N/V, abd pain and is s/p lap jossie. He was readmitted for further medical management. Action Plan: Plan is to return home when medically stable.   Adriana

## 2017-01-03 LAB
ANION GAP BLD CALC-SCNC: 7 MMOL/L (ref 5–15)
BASOPHILS # BLD AUTO: 0 K/UL (ref 0–0.1)
BASOPHILS # BLD: 0 % (ref 0–1)
BUN SERPL-MCNC: 11 MG/DL (ref 6–20)
BUN/CREAT SERPL: 13 (ref 12–20)
CALCIUM SERPL-MCNC: 9.1 MG/DL (ref 8.5–10.1)
CHLORIDE SERPL-SCNC: 105 MMOL/L (ref 97–108)
CO2 SERPL-SCNC: 29 MMOL/L (ref 21–32)
CREAT SERPL-MCNC: 0.83 MG/DL (ref 0.7–1.3)
EOSINOPHIL # BLD: 0 K/UL (ref 0–0.4)
EOSINOPHIL NFR BLD: 0 % (ref 0–7)
ERYTHROCYTE [DISTWIDTH] IN BLOOD BY AUTOMATED COUNT: 17 % (ref 11.5–14.5)
GLUCOSE SERPL-MCNC: 140 MG/DL (ref 65–100)
HCT VFR BLD AUTO: 37.9 % (ref 36.6–50.3)
HGB BLD-MCNC: 11.9 G/DL (ref 12.1–17)
LYMPHOCYTES # BLD AUTO: 3 % (ref 12–49)
LYMPHOCYTES # BLD: 0.5 K/UL (ref 0.8–3.5)
MAGNESIUM SERPL-MCNC: 1.9 MG/DL (ref 1.6–2.4)
MCH RBC QN AUTO: 29.5 PG (ref 26–34)
MCHC RBC AUTO-ENTMCNC: 31.4 G/DL (ref 30–36.5)
MCV RBC AUTO: 93.8 FL (ref 80–99)
MONOCYTES # BLD: 0.5 K/UL (ref 0–1)
MONOCYTES NFR BLD AUTO: 3 % (ref 5–13)
NEUTS SEG # BLD: 14 K/UL (ref 1.8–8)
NEUTS SEG NFR BLD AUTO: 94 % (ref 32–75)
PLATELET # BLD AUTO: 191 K/UL (ref 150–400)
POTASSIUM SERPL-SCNC: 4.1 MMOL/L (ref 3.5–5.1)
RBC # BLD AUTO: 4.04 M/UL (ref 4.1–5.7)
RBC MORPH BLD: ABNORMAL
SODIUM SERPL-SCNC: 141 MMOL/L (ref 136–145)
WBC # BLD AUTO: 15 K/UL (ref 4.1–11.1)

## 2017-01-03 PROCEDURE — 74011250636 HC RX REV CODE- 250/636: Performed by: INTERNAL MEDICINE

## 2017-01-03 PROCEDURE — 74011250637 HC RX REV CODE- 250/637: Performed by: INTERNAL MEDICINE

## 2017-01-03 PROCEDURE — 85025 COMPLETE CBC W/AUTO DIFF WBC: CPT | Performed by: INTERNAL MEDICINE

## 2017-01-03 PROCEDURE — C9113 INJ PANTOPRAZOLE SODIUM, VIA: HCPCS | Performed by: INTERNAL MEDICINE

## 2017-01-03 PROCEDURE — 74011000258 HC RX REV CODE- 258: Performed by: INTERNAL MEDICINE

## 2017-01-03 PROCEDURE — 65270000029 HC RM PRIVATE

## 2017-01-03 PROCEDURE — 74011000250 HC RX REV CODE- 250: Performed by: INTERNAL MEDICINE

## 2017-01-03 PROCEDURE — 80048 BASIC METABOLIC PNL TOTAL CA: CPT | Performed by: INTERNAL MEDICINE

## 2017-01-03 PROCEDURE — 36415 COLL VENOUS BLD VENIPUNCTURE: CPT | Performed by: INTERNAL MEDICINE

## 2017-01-03 PROCEDURE — 83735 ASSAY OF MAGNESIUM: CPT | Performed by: INTERNAL MEDICINE

## 2017-01-03 RX ORDER — HYDROMORPHONE HYDROCHLORIDE 1 MG/ML
1 INJECTION, SOLUTION INTRAMUSCULAR; INTRAVENOUS; SUBCUTANEOUS
Status: DISCONTINUED | OUTPATIENT
Start: 2017-01-03 | End: 2017-01-05

## 2017-01-03 RX ADMIN — METHYLPREDNISOLONE SODIUM SUCCINATE 40 MG: 40 INJECTION, POWDER, FOR SOLUTION INTRAMUSCULAR; INTRAVENOUS at 17:54

## 2017-01-03 RX ADMIN — SODIUM CHLORIDE 40 MG: 9 INJECTION INTRAMUSCULAR; INTRAVENOUS; SUBCUTANEOUS at 08:32

## 2017-01-03 RX ADMIN — METHYLPREDNISOLONE SODIUM SUCCINATE 40 MG: 40 INJECTION, POWDER, FOR SOLUTION INTRAMUSCULAR; INTRAVENOUS at 13:02

## 2017-01-03 RX ADMIN — DIAZEPAM 5 MG: 5 TABLET ORAL at 18:57

## 2017-01-03 RX ADMIN — DIAZEPAM 5 MG: 5 TABLET ORAL at 10:02

## 2017-01-03 RX ADMIN — HYDROMORPHONE HYDROCHLORIDE 1 MG: 1 INJECTION, SOLUTION INTRAMUSCULAR; INTRAVENOUS; SUBCUTANEOUS at 22:22

## 2017-01-03 RX ADMIN — ONDANSETRON 4 MG: 2 INJECTION INTRAMUSCULAR; INTRAVENOUS at 06:44

## 2017-01-03 RX ADMIN — PREGABALIN 100 MG: 100 CAPSULE ORAL at 08:32

## 2017-01-03 RX ADMIN — ONDANSETRON 4 MG: 2 INJECTION INTRAMUSCULAR; INTRAVENOUS at 13:53

## 2017-01-03 RX ADMIN — HYDROMORPHONE HYDROCHLORIDE 1 MG: 1 INJECTION, SOLUTION INTRAMUSCULAR; INTRAVENOUS; SUBCUTANEOUS at 13:02

## 2017-01-03 RX ADMIN — SODIUM CHLORIDE 5 MG: 9 INJECTION INTRAMUSCULAR; INTRAVENOUS; SUBCUTANEOUS at 16:16

## 2017-01-03 RX ADMIN — TEMAZEPAM 30 MG: 15 CAPSULE ORAL at 22:22

## 2017-01-03 RX ADMIN — SODIUM CHLORIDE 5 MG: 9 INJECTION INTRAMUSCULAR; INTRAVENOUS; SUBCUTANEOUS at 04:33

## 2017-01-03 RX ADMIN — HYDROMORPHONE HYDROCHLORIDE 1 MG: 1 INJECTION, SOLUTION INTRAMUSCULAR; INTRAVENOUS; SUBCUTANEOUS at 10:03

## 2017-01-03 RX ADMIN — SODIUM CHLORIDE 75 ML/HR: 900 INJECTION, SOLUTION INTRAVENOUS at 18:07

## 2017-01-03 RX ADMIN — HYDROMORPHONE HYDROCHLORIDE 1 MG: 1 INJECTION, SOLUTION INTRAMUSCULAR; INTRAVENOUS; SUBCUTANEOUS at 16:16

## 2017-01-03 RX ADMIN — ONDANSETRON 4 MG: 2 INJECTION INTRAMUSCULAR; INTRAVENOUS at 10:03

## 2017-01-03 RX ADMIN — SODIUM CHLORIDE 5 MG: 9 INJECTION INTRAMUSCULAR; INTRAVENOUS; SUBCUTANEOUS at 10:02

## 2017-01-03 RX ADMIN — PREGABALIN 100 MG: 100 CAPSULE ORAL at 16:16

## 2017-01-03 RX ADMIN — HYDROMORPHONE HYDROCHLORIDE 1 MG: 1 INJECTION, SOLUTION INTRAMUSCULAR; INTRAVENOUS; SUBCUTANEOUS at 06:44

## 2017-01-03 RX ADMIN — SODIUM CHLORIDE 40 MG: 9 INJECTION INTRAMUSCULAR; INTRAVENOUS; SUBCUTANEOUS at 22:22

## 2017-01-03 RX ADMIN — METHYLPREDNISOLONE SODIUM SUCCINATE 40 MG: 40 INJECTION, POWDER, FOR SOLUTION INTRAMUSCULAR; INTRAVENOUS at 00:40

## 2017-01-03 RX ADMIN — PIPERACILLIN SODIUM,TAZOBACTAM SODIUM 3.38 G: 3; .375 INJECTION, POWDER, FOR SOLUTION INTRAVENOUS at 03:49

## 2017-01-03 RX ADMIN — SODIUM CHLORIDE 75 ML/HR: 900 INJECTION, SOLUTION INTRAVENOUS at 08:32

## 2017-01-03 RX ADMIN — PIPERACILLIN SODIUM,TAZOBACTAM SODIUM 3.38 G: 3; .375 INJECTION, POWDER, FOR SOLUTION INTRAVENOUS at 19:58

## 2017-01-03 RX ADMIN — HYDROMORPHONE HYDROCHLORIDE 1 MG: 1 INJECTION, SOLUTION INTRAMUSCULAR; INTRAVENOUS; SUBCUTANEOUS at 00:40

## 2017-01-03 RX ADMIN — ONDANSETRON 4 MG: 2 INJECTION INTRAMUSCULAR; INTRAVENOUS at 00:40

## 2017-01-03 RX ADMIN — PREGABALIN 100 MG: 100 CAPSULE ORAL at 22:22

## 2017-01-03 RX ADMIN — PIPERACILLIN SODIUM,TAZOBACTAM SODIUM 3.38 G: 3; .375 INJECTION, POWDER, FOR SOLUTION INTRAVENOUS at 13:02

## 2017-01-03 RX ADMIN — ENOXAPARIN SODIUM 40 MG: 40 INJECTION SUBCUTANEOUS at 08:32

## 2017-01-03 RX ADMIN — HYDROMORPHONE HYDROCHLORIDE 1 MG: 1 INJECTION, SOLUTION INTRAMUSCULAR; INTRAVENOUS; SUBCUTANEOUS at 18:57

## 2017-01-03 RX ADMIN — HYDROMORPHONE HYDROCHLORIDE 1 MG: 1 INJECTION, SOLUTION INTRAMUSCULAR; INTRAVENOUS; SUBCUTANEOUS at 03:49

## 2017-01-03 RX ADMIN — METHYLPREDNISOLONE SODIUM SUCCINATE 40 MG: 40 INJECTION, POWDER, FOR SOLUTION INTRAMUSCULAR; INTRAVENOUS at 06:44

## 2017-01-03 RX ADMIN — ONDANSETRON 4 MG: 2 INJECTION INTRAMUSCULAR; INTRAVENOUS at 22:22

## 2017-01-03 RX ADMIN — ONDANSETRON 4 MG: 2 INJECTION INTRAMUSCULAR; INTRAVENOUS at 18:05

## 2017-01-03 NOTE — PROGRESS NOTES
[address] [facility]. [facility] [place], [address]  [phone]              GI PROGRESS NOTE    NAME: Marlys James   :  1975   MRN:  [id number]     CC:f/u nausea    Subjective:   Improved symptoms today. States he cannot tolerate nausea with his pain and that he could deal with one or other but not both. He would like symptoms to stabalize so he can go back to work. Objective:     VITALS:   Last 24hrs VS reviewed since prior progress note. Most recent are:  Visit Vitals    /81 (BP 1 Location: Right arm, BP Patient Position: At rest)    Pulse 90    Temp 98.4 °F (36.9 °C)    Resp 18    Ht 6' (1.829 m)    Wt 86.2 kg (190 lb)    SpO2 100%    BMI 25.77 kg/m2       Intake & Output      1901 -  0700  In: -   Out: 0189 [Urine:3250]    ROS: Neg for fever, chest pain, SOB. PHYSICAL EXAM:  General: Cooperative, no acute distress    Neurologic:  Alert and oriented X 3. HEENT: PERRLA, EOMI. Lungs:  CTA Bilaterally. No Wheezing  Heart:  s1 s2, Regular  rhythm,  No murmur   Abdomen: Soft, Non distended, Non tender. Bowel sounds normal. No guarding or rebound. No hepatosplenomegaly. Extremities: No edema  Psych:   Good insight. Not anxious nor agitated. Lab Data Reviewed:   Recent Labs      17   0710  17   0322  17   0159   WBC  15.0*  18.8*  15.2*   HGB  11.9*  12.8  12.9   MCV  93.8  92.0  93.7   PLT  191  206  220   NA  141  141  142   K  4.1  4.1  4.4   CREA  0.83  0.80  0.86   BUN  11  6  13   ALB   --    --   3.2*   TBILI   --    --   0.2   SGOT   --    --   11*   ALT   --    --   38   AP   --    --   67   LPSE   --    --   172         ________________________________________________________________________       Assessment/Plan:   Abdominal pain and nausea s/p cholecystectomy recently. CT negative. He is on solumedrol.  Discussed if he is unable to taper off of this slowly, he may benefit from discussion from rheumatology. Crohns' colitis/GERD- Fecal calprotectant normal 12/19 suggestive that IBD is controlled. Received first dose of Stelara 12/6 with Dr Nabeel Dailey. Discussed case with Dr Amrit Baez. Will continue with steroid taper as it does not appear this is colitis related. Discussed consideration for pain management referral outpatient if he does not tolerate narcotic taper per Dr Nabeel Dailey. (Considering recommending opinion at [facility] with Dr Margot Young due to complexity of his case, but this will be discussed with patient by Dr Amrit Baez. ) Agree with d/c of abx after today.       Signed By: William Moore NP     1/3/2017  9:20 AM

## 2017-01-03 NOTE — CDMP QUERY
This Crohn's patient was admitted for intractable N/V/abdominal pain. Based on your medical judgment, could you please clarify if the patient's symptoms are due to a flare of    =>Crohn's colitis  =>Other Explanation of clinical findings  =>Unable to Determine (no explanation of clinical findings)    Please clarify and document your clinical opinion in the progress notes and discharge summary including the definitive and/or presumptive diagnosis, (suspected or probable), related to the above clinical findings. Please include clinical findings supporting your diagnosis.     Orlando Thapa, [facility], [address] [place] [address]  [email or web address]

## 2017-01-03 NOTE — PROGRESS NOTES
Xavier Corbett [facility] [address]  [address] [facility], [place], [address] [place] [address]  [phone]      Medical Progress Note      NAME: Hugo Hidalgo   :  1975  MRM:  [id number]    Date/Time: 1/3/2017  7:06 AM       Assessment and Plan:   1. Intractable vomiting with nausea / Intractable abdominal pain / S/P recent laparoscopic cholecystectomy - Acute on chronic. No lab or CT evidence of acute post operative issues. Frequent flares recently. GI evaluation appreciated. Slowly advance diet. Continue solumedrol and steroid taper per GI. Symptomatic treatment for pain and nausea.      2. SIRS criteria: tachycardia/  Leukocytosis/ fever -  BCx are NGTD, continue zosyn through today and if remains afebrile and cx negative, will dc if okay from GI standpoint        3. Crohn's colitis / GERD (gastroesophageal reflux disease) - PPI. GI consult appreciated. Started on solumedrol. CT scan of the abdomen is unremarkable.       4. Anxiety and depression / Insomnia - Continue lyrica and prn diazepam.    5.  Hypomagnesemia. Replete PRN    6. Hyperglycemia. Likely secondary to steroid.             Subjective:     Chief Complaint:  Abdominal pain and nausea improving but overnight, had worsening requiring increase in pain meds    ROS:  (bold if positive, if negative)    Abd PainNausea  Tolerating PT  Tolerating Diet        Objective:     Last 24hrs VS reviewed since prior progress note.  Most recent are:    Visit Vitals    /81 (BP 1 Location: Right arm, BP Patient Position: At rest)    Pulse 90    Temp 98.4 °F (36.9 °C)    Resp 18    Ht 6' (1.829 m)    Wt 86.2 kg (190 lb)    SpO2 100%    BMI 25.77 kg/m2     SpO2 Readings from Last 6 Encounters:   17 100%   16 100%   16 100%   16 96%   16 94%   16 99%            Intake/Output Summary (Last 24 hours) at 17 1034  Last data filed at 17 1950   Gross per 24 hour   Intake                0 ml   Output 2025 ml   Net            -2025 ml        Physical Exam:    Gen:  Well-developed, well-nourished, in no acute distress  HEENT:  Pink conjunctivae, PERRL, hearing intact to voice, moist mucous membranes  Neck:  Supple, without masses, thyroid non-tender  Resp:  No accessory muscle use, clear breath sounds without wheezes rales or rhonchi  Card:  No murmurs, normal S1, S2 without thrills, bruits or peripheral edema  Abd:  Soft, non-tender, non-distended, normoactive bowel sounds are present, no palpable organomegaly and no detectable hernias. ileostomy bag in place with dark brown/green discharge.    Lymph:  No cervical or inguinal adenopathy  Musc:  No cyanosis or clubbing  Skin:  No rashes or ulcers, skin turgor is good  Neuro:  Cranial nerves are grossly intact, no focal motor weakness, follows commands appropriately  Psych:  Good insight, oriented to person, place and time, alert  __________________________________________________________________  Medications Reviewed: (see below)  Medications:     Current Facility-Administered Medications   Medication Dose Route Frequency    HYDROmorphone (PF) (DILAUDID) injection 1 mg  1 mg IntraVENous Q3H PRN    0.9% sodium chloride infusion  75 mL/hr IntraVENous CONTINUOUS    prochlorperazine (COMPAZINE) with saline injection 5 mg  5 mg IntraVENous Q6H PRN    ketorolac (TORADOL) injection 30 mg  30 mg IntraVENous Q6H PRN    temazepam (RESTORIL) capsule 30 mg  30 mg Oral QHS    pregabalin (LYRICA) capsule 100 mg  100 mg Oral TID    diazePAM (VALIUM) tablet 5 mg  5 mg Oral Q8H PRN    naloxone (NARCAN) injection 0.4 mg  0.4 mg IntraVENous PRN    acetaminophen (TYLENOL) tablet 650 mg  650 mg Oral Q4H PRN    diphenhydrAMINE (BENADRYL) capsule 25 mg  25 mg Oral Q4H PRN    ondansetron (ZOFRAN) injection 4 mg  4 mg IntraVENous Q4H PRN    enoxaparin (LOVENOX) injection 40 mg  40 mg SubCUTAneous Q24H    pantoprazole (PROTONIX) 40 mg in sodium chloride 0.9 % 10 mL injection 40 mg IntraVENous Q12H    oxyCODONE-acetaminophen (PERCOCET 7.5) 7.5-325 mg per tablet 1 Tab  1 Tab Oral Q4H PRN    piperacillin-tazobactam (ZOSYN) 3.375 g in 0.9% sodium chloride (MBP/ADV) 100 mL  3.375 g IntraVENous Q8H    methylPREDNISolone (PF) (SOLU-MEDROL) injection 40 mg  40 mg IntraVENous Q6H        Lab Data Reviewed: (see below)  Lab Review:     Recent Labs      01/03/17 0710 01/02/17 0322 01/01/17 0159   WBC  15.0*  18.8*  15.2*   HGB  11.9*  12.8  12.9   HCT  37.9  39.3  40.1   PLT  191  206  220     Recent Labs      01/03/17 0710 01/02/17 0322 01/01/17 0159   NA  141  141  142   K  4.1  4.1  4.4   CL  105  105  105   CO2  29  28  29   GLU  140*  171*  124*   BUN  11  6  13   CREA  0.83  0.80  0.86   CA  9.1  8.6  9.0   MG  1.9  1.5*   --    PHOS   --   3.2   --    ALB   --    --   3.2*   TBILI   --    --   0.2   SGOT   --    --   11*   ALT   --    --   38     No results found for: GLUCPOC  No results for input(s): PH, PCO2, PO2, HCO3, FIO2 in the last 72 hours. No results for input(s): INR in the last 72 hours. No lab exists for component: Rinku Szymanski Micro Results     Procedure Component Value Units Date/Time    CULTURE, BLOOD [[id number]] Collected:  01/01/17 0539    Order Status:  Completed Specimen:  Blood from Blood Updated:  01/03/17 0726     Special Requests: NO SPECIAL REQUESTS        Culture result: NO GROWTH 2 DAYS       CULTURE, BLOOD [[id number]] Collected:  01/01/17 0200    Order Status:  Completed Specimen:  Blood from Blood Updated:  01/03/17 0726     Special Requests: NO SPECIAL REQUESTS        Culture result: NO GROWTH 2 DAYS             I have reviewed notes of prior 24hr. Other pertinent lab:       Total time spent with patient: 35 min                 Care Plan discussed with: Patient, Nursing Staff and >50% of time spent in counseling and coordination of care    Discussed:  Care Plan    Prophylaxis:  SCD's    Disposition:  Home w/Family           ___________________________________________________    Attending Physician: Marifer Dowell DO

## 2017-01-03 NOTE — PROGRESS NOTES
0740  Bedside and Verbal shift change report given to Melany Mckeon RN (oncoming nurse) by Amee Hernandes RN (offgoing nurse). Report included the following information SBAR, Kardex, Intake/Output, MAR, Recent Results and Med Rec Status. '    0015  Patient stated pain is not being controlled and he is in a lot of pain. Called Dr. Gian Serrano ordered IV dilauded to be change from 0.5 mg every 4 hours to IV dilauded 1 mg every 3 hours. Will continue to monitor.

## 2017-01-03 NOTE — PROGRESS NOTES
Bedside and Verbal shift change report given to Marilyn RN (oncoming nurse) by Laurel Driscoll RN (offgoing nurse). Report included the following information SBAR, Kardex, Procedure Summary, Accordion, Recent Results and Med Rec Status.

## 2017-01-03 NOTE — INTERDISCIPLINARY ROUNDS
Interdisciplinary team rounds were held 1/3/2017 with the following team members:Care Management, Nursing, Nutrition, Pharmacy, Physical Therapy and Physician. Plan of care discussed. Anticipated Discharge: pending.

## 2017-01-04 LAB
ALBUMIN SERPL BCP-MCNC: 2.6 G/DL (ref 3.5–5)
ALBUMIN/GLOB SERPL: 0.9 {RATIO} (ref 1.1–2.2)
ALP SERPL-CCNC: 50 U/L (ref 45–117)
ALT SERPL-CCNC: 36 U/L (ref 12–78)
ANION GAP BLD CALC-SCNC: 5 MMOL/L (ref 5–15)
AST SERPL W P-5'-P-CCNC: 11 U/L (ref 15–37)
BASOPHILS # BLD AUTO: 0 K/UL (ref 0–0.1)
BASOPHILS # BLD: 0 % (ref 0–1)
BILIRUB DIRECT SERPL-MCNC: 0.1 MG/DL (ref 0–0.2)
BILIRUB SERPL-MCNC: 0.3 MG/DL (ref 0.2–1)
BUN SERPL-MCNC: 14 MG/DL (ref 6–20)
BUN/CREAT SERPL: 18 (ref 12–20)
CALCIUM SERPL-MCNC: 8.6 MG/DL (ref 8.5–10.1)
CHLORIDE SERPL-SCNC: 106 MMOL/L (ref 97–108)
CO2 SERPL-SCNC: 30 MMOL/L (ref 21–32)
CREAT SERPL-MCNC: 0.8 MG/DL (ref 0.7–1.3)
EOSINOPHIL # BLD: 0 K/UL (ref 0–0.4)
EOSINOPHIL NFR BLD: 0 % (ref 0–7)
ERYTHROCYTE [DISTWIDTH] IN BLOOD BY AUTOMATED COUNT: 16.8 % (ref 11.5–14.5)
GLOBULIN SER CALC-MCNC: 2.8 G/DL (ref 2–4)
GLUCOSE SERPL-MCNC: 137 MG/DL (ref 65–100)
HCT VFR BLD AUTO: 34.9 % (ref 36.6–50.3)
HGB BLD-MCNC: 11.2 G/DL (ref 12.1–17)
LYMPHOCYTES # BLD AUTO: 4 % (ref 12–49)
LYMPHOCYTES # BLD: 0.5 K/UL (ref 0.8–3.5)
MAGNESIUM SERPL-MCNC: 1.9 MG/DL (ref 1.6–2.4)
MCH RBC QN AUTO: 30 PG (ref 26–34)
MCHC RBC AUTO-ENTMCNC: 32.1 G/DL (ref 30–36.5)
MCV RBC AUTO: 93.6 FL (ref 80–99)
MONOCYTES # BLD: 0.3 K/UL (ref 0–1)
MONOCYTES NFR BLD AUTO: 3 % (ref 5–13)
NEUTS SEG # BLD: 10.5 K/UL (ref 1.8–8)
NEUTS SEG NFR BLD AUTO: 93 % (ref 32–75)
PHOSPHATE SERPL-MCNC: 4.1 MG/DL (ref 2.6–4.7)
PLATELET # BLD AUTO: 162 K/UL (ref 150–400)
POTASSIUM SERPL-SCNC: 4.1 MMOL/L (ref 3.5–5.1)
PROT SERPL-MCNC: 5.4 G/DL (ref 6.4–8.2)
RBC # BLD AUTO: 3.73 M/UL (ref 4.1–5.7)
SODIUM SERPL-SCNC: 141 MMOL/L (ref 136–145)
WBC # BLD AUTO: 11.3 K/UL (ref 4.1–11.1)

## 2017-01-04 PROCEDURE — 74011250636 HC RX REV CODE- 250/636: Performed by: NURSE PRACTITIONER

## 2017-01-04 PROCEDURE — 84100 ASSAY OF PHOSPHORUS: CPT | Performed by: INTERNAL MEDICINE

## 2017-01-04 PROCEDURE — 83735 ASSAY OF MAGNESIUM: CPT | Performed by: INTERNAL MEDICINE

## 2017-01-04 PROCEDURE — 74011250636 HC RX REV CODE- 250/636: Performed by: INTERNAL MEDICINE

## 2017-01-04 PROCEDURE — 74011000250 HC RX REV CODE- 250: Performed by: INTERNAL MEDICINE

## 2017-01-04 PROCEDURE — 65270000029 HC RM PRIVATE

## 2017-01-04 PROCEDURE — C9113 INJ PANTOPRAZOLE SODIUM, VIA: HCPCS | Performed by: INTERNAL MEDICINE

## 2017-01-04 PROCEDURE — 85025 COMPLETE CBC W/AUTO DIFF WBC: CPT | Performed by: INTERNAL MEDICINE

## 2017-01-04 PROCEDURE — 74011250637 HC RX REV CODE- 250/637: Performed by: INTERNAL MEDICINE

## 2017-01-04 PROCEDURE — 74011000258 HC RX REV CODE- 258: Performed by: INTERNAL MEDICINE

## 2017-01-04 PROCEDURE — 76450000000

## 2017-01-04 PROCEDURE — 80076 HEPATIC FUNCTION PANEL: CPT | Performed by: INTERNAL MEDICINE

## 2017-01-04 PROCEDURE — 36415 COLL VENOUS BLD VENIPUNCTURE: CPT | Performed by: INTERNAL MEDICINE

## 2017-01-04 PROCEDURE — 80048 BASIC METABOLIC PNL TOTAL CA: CPT | Performed by: INTERNAL MEDICINE

## 2017-01-04 RX ORDER — PROCHLORPERAZINE EDISYLATE 5 MG/ML
10 INJECTION INTRAMUSCULAR; INTRAVENOUS
Status: DISCONTINUED | OUTPATIENT
Start: 2017-01-04 | End: 2017-01-04

## 2017-01-04 RX ORDER — PROMETHAZINE HYDROCHLORIDE 25 MG/1
25 TABLET ORAL
Status: DISCONTINUED | OUTPATIENT
Start: 2017-01-04 | End: 2017-01-05 | Stop reason: HOSPADM

## 2017-01-04 RX ORDER — SCOLOPAMINE TRANSDERMAL SYSTEM 1 MG/1
1.5 PATCH, EXTENDED RELEASE TRANSDERMAL
Status: DISCONTINUED | OUTPATIENT
Start: 2017-01-04 | End: 2017-01-05 | Stop reason: HOSPADM

## 2017-01-04 RX ADMIN — DIAZEPAM 5 MG: 5 TABLET ORAL at 15:39

## 2017-01-04 RX ADMIN — SODIUM CHLORIDE 40 MG: 9 INJECTION INTRAMUSCULAR; INTRAVENOUS; SUBCUTANEOUS at 21:11

## 2017-01-04 RX ADMIN — PREGABALIN 100 MG: 100 CAPSULE ORAL at 08:06

## 2017-01-04 RX ADMIN — PROMETHAZINE HYDROCHLORIDE 25 MG: 25 TABLET ORAL at 16:56

## 2017-01-04 RX ADMIN — HYDROMORPHONE HYDROCHLORIDE 1 MG: 1 INJECTION, SOLUTION INTRAMUSCULAR; INTRAVENOUS; SUBCUTANEOUS at 14:37

## 2017-01-04 RX ADMIN — HYDROMORPHONE HYDROCHLORIDE 1 MG: 1 INJECTION, SOLUTION INTRAMUSCULAR; INTRAVENOUS; SUBCUTANEOUS at 08:06

## 2017-01-04 RX ADMIN — METHYLPREDNISOLONE SODIUM SUCCINATE 40 MG: 40 INJECTION, POWDER, FOR SOLUTION INTRAMUSCULAR; INTRAVENOUS at 06:35

## 2017-01-04 RX ADMIN — ONDANSETRON 4 MG: 2 INJECTION INTRAMUSCULAR; INTRAVENOUS at 09:00

## 2017-01-04 RX ADMIN — HYDROMORPHONE HYDROCHLORIDE 1 MG: 1 INJECTION, SOLUTION INTRAMUSCULAR; INTRAVENOUS; SUBCUTANEOUS at 17:40

## 2017-01-04 RX ADMIN — ENOXAPARIN SODIUM 40 MG: 40 INJECTION SUBCUTANEOUS at 08:06

## 2017-01-04 RX ADMIN — METHYLPREDNISOLONE SODIUM SUCCINATE 40 MG: 40 INJECTION, POWDER, FOR SOLUTION INTRAMUSCULAR; INTRAVENOUS at 11:15

## 2017-01-04 RX ADMIN — SODIUM CHLORIDE 5 MG: 9 INJECTION INTRAMUSCULAR; INTRAVENOUS; SUBCUTANEOUS at 00:06

## 2017-01-04 RX ADMIN — PREGABALIN 100 MG: 100 CAPSULE ORAL at 21:11

## 2017-01-04 RX ADMIN — HYDROMORPHONE HYDROCHLORIDE 1 MG: 1 INJECTION, SOLUTION INTRAMUSCULAR; INTRAVENOUS; SUBCUTANEOUS at 05:02

## 2017-01-04 RX ADMIN — PROCHLORPERAZINE EDISYLATE 10 MG: 5 INJECTION INTRAMUSCULAR; INTRAVENOUS at 14:38

## 2017-01-04 RX ADMIN — ONDANSETRON 4 MG: 2 INJECTION INTRAMUSCULAR; INTRAVENOUS at 05:02

## 2017-01-04 RX ADMIN — HYDROMORPHONE HYDROCHLORIDE 1 MG: 1 INJECTION, SOLUTION INTRAMUSCULAR; INTRAVENOUS; SUBCUTANEOUS at 11:15

## 2017-01-04 RX ADMIN — HYDROMORPHONE HYDROCHLORIDE 1 MG: 1 INJECTION, SOLUTION INTRAMUSCULAR; INTRAVENOUS; SUBCUTANEOUS at 21:11

## 2017-01-04 RX ADMIN — HYDROMORPHONE HYDROCHLORIDE 1 MG: 1 INJECTION, SOLUTION INTRAMUSCULAR; INTRAVENOUS; SUBCUTANEOUS at 01:33

## 2017-01-04 RX ADMIN — SODIUM CHLORIDE 5 MG: 9 INJECTION INTRAMUSCULAR; INTRAVENOUS; SUBCUTANEOUS at 06:34

## 2017-01-04 RX ADMIN — METHYLPREDNISOLONE SODIUM SUCCINATE 40 MG: 40 INJECTION, POWDER, FOR SOLUTION INTRAMUSCULAR; INTRAVENOUS at 17:13

## 2017-01-04 RX ADMIN — SODIUM CHLORIDE 40 MG: 9 INJECTION INTRAMUSCULAR; INTRAVENOUS; SUBCUTANEOUS at 08:06

## 2017-01-04 RX ADMIN — METHYLPREDNISOLONE SODIUM SUCCINATE 40 MG: 40 INJECTION, POWDER, FOR SOLUTION INTRAMUSCULAR; INTRAVENOUS at 00:08

## 2017-01-04 RX ADMIN — PREGABALIN 100 MG: 100 CAPSULE ORAL at 15:40

## 2017-01-04 RX ADMIN — PIPERACILLIN SODIUM,TAZOBACTAM SODIUM 3.38 G: 3; .375 INJECTION, POWDER, FOR SOLUTION INTRAVENOUS at 05:03

## 2017-01-04 RX ADMIN — TEMAZEPAM 30 MG: 15 CAPSULE ORAL at 21:11

## 2017-01-04 NOTE — PROGRESS NOTES
Interdisciplinary team rounds were held 1/4/2017 with the following team members:Care Management, Nursing, Pharmacy and Physical Therapy and the primary RN. Plan of care discussed. See clinical pathway and/or care plan for interventions and desired outcomes.   Discharge unknown at this time

## 2017-01-04 NOTE — PROGRESS NOTES
Xavier Corbett leon [place] [address]  [address] [facility], [address] [place] [address]  [phone]      Medical Progress Note      NAME: Bereket Suggs   :  1975  MRM:  [id number]    Date/Time: 2017  7:06 AM       Assessment and Plan:   1. Intractable vomiting with nausea / Intractable abdominal pain / S/P recent laparoscopic cholecystectomy - unable to explain clear precipitant with current clinical data. Acute on chronic. In setting of known Crohn's disease and crohn's colitis but this is not active now. No lab or CT evidence of acute post operative issues. Frequent flares recently. GI evaluation appreciated. Slowly advance diet. Continue solumedrol and steroid taper per GI. Symptomatic treatment for pain and nausea. Palliative care consult to assist in tailoring a pain regimen for outpt transition. Dr. Robert Bell to talk to patient about [facility] with Dr. Monalisa Caldwell for further management.      2. SIRS criteria: tachycardia/  Leukocytosis/ fever -  BCx are NGTD, dc zosyn     3. Crohn's colitis / GERD (gastroesophageal reflux disease) - PPI. GI consult appreciated. Started on solumedrol. CT scan of the abdomen is unremarkable.       4. Anxiety and depression / Insomnia - Continue lyrica and prn diazepam.    5.  Hypomagnesemia. Replete PRN    6. Hyperglycemia. Likely secondary to steroid.             Subjective:     Chief Complaint:  Abdominal pain and nausea improving,. able to eat GI soft diet with some success    ROS:  (bold if positive, if negative)    Abd PainNausea  Tolerating PT  Tolerating Diet        Objective:     Last 24hrs VS reviewed since prior progress note.  Most recent are:    Visit Vitals    /76 (BP 1 Location: Right arm, BP Patient Position: At rest)    Pulse 69    Temp 98 °F (36.7 °C)    Resp 20    Ht 6' (1.829 m)    Wt 86.2 kg (190 lb)    SpO2 97%    BMI 25.77 kg/m2     SpO2 Readings from Last 6 Encounters:   17 97%   16 100%   16 100%   16 96% 11/26/16 94%   11/23/16 99%            Intake/Output Summary (Last 24 hours) at 01/04/17 1145  Last data filed at 01/04/17 0231   Gross per 24 hour   Intake                0 ml   Output             2575 ml   Net            -2575 ml        Physical Exam:    Gen:  Well-developed, well-nourished, in no acute distress  HEENT:  Pink conjunctivae, PERRL, hearing intact to voice, moist mucous membranes  Neck:  Supple, without masses, thyroid non-tender  Resp:  No accessory muscle use, clear breath sounds without wheezes rales or rhonchi  Card:  No murmurs, normal S1, S2 without thrills, bruits or peripheral edema  Abd:  Soft, non-tender, non-distended, normoactive bowel sounds are present, no palpable organomegaly and no detectable hernias. ileostomy bag in place with dark brown/green discharge.    Lymph:  No cervical or inguinal adenopathy  Musc:  No cyanosis or clubbing  Skin:  No rashes or ulcers, skin turgor is good  Neuro:  Cranial nerves are grossly intact, no focal motor weakness, follows commands appropriately  Psych:  Good insight, oriented to person, place and time, alert  __________________________________________________________________  Medications Reviewed: (see below)  Medications:     Current Facility-Administered Medications   Medication Dose Route Frequency    HYDROmorphone (PF) (DILAUDID) injection 1 mg  1 mg IntraVENous Q3H PRN    0.9% sodium chloride infusion  75 mL/hr IntraVENous CONTINUOUS    prochlorperazine (COMPAZINE) with saline injection 5 mg  5 mg IntraVENous Q6H PRN    ketorolac (TORADOL) injection 30 mg  30 mg IntraVENous Q6H PRN    temazepam (RESTORIL) capsule 30 mg  30 mg Oral QHS    pregabalin (LYRICA) capsule 100 mg  100 mg Oral TID    diazePAM (VALIUM) tablet 5 mg  5 mg Oral Q8H PRN    naloxone (NARCAN) injection 0.4 mg  0.4 mg IntraVENous PRN    acetaminophen (TYLENOL) tablet 650 mg  650 mg Oral Q4H PRN    diphenhydrAMINE (BENADRYL) capsule 25 mg  25 mg Oral Q4H PRN    ondansetron (ZOFRAN) injection 4 mg  4 mg IntraVENous Q4H PRN    enoxaparin (LOVENOX) injection 40 mg  40 mg SubCUTAneous Q24H    pantoprazole (PROTONIX) 40 mg in sodium chloride 0.9 % 10 mL injection  40 mg IntraVENous Q12H    oxyCODONE-acetaminophen (PERCOCET 7.5) 7.5-325 mg per tablet 1 Tab  1 Tab Oral Q4H PRN    methylPREDNISolone (PF) (SOLU-MEDROL) injection 40 mg  40 mg IntraVENous Q6H        Lab Data Reviewed: (see below)  Lab Review:     Recent Labs      01/04/17 0642 01/03/17 0710 01/02/17 0322   WBC  11.3*  15.0*  18.8*   HGB  11.2*  11.9*  12.8   HCT  34.9*  37.9  39.3   PLT  162  191  206     Recent Labs      01/04/17 0642 01/03/17 0710 01/02/17 0322   NA  141  141  141   K  4.1  4.1  4.1   CL  106  105  105   CO2  30  29  28   GLU  137*  140*  171*   BUN  14  11  6   CREA  0.80  0.83  0.80   CA  8.6  9.1  8.6   MG  1.9  1.9  1.5*   PHOS  4.1   --   3.2   ALB  2.6*   --    --    TBILI  0.3   --    --    SGOT  11*   --    --    ALT  36   --    --      No results found for: GLUCPOC  No results for input(s): PH, PCO2, PO2, HCO3, FIO2 in the last 72 hours. No results for input(s): INR in the last 72 hours. No lab exists for component: Rinku Mitchell  All Micro Results     Procedure Component Value Units Date/Time    CULTURE, BLOOD [[id number]] Collected:  01/01/17 0539    Order Status:  Completed Specimen:  Blood from Blood Updated:  01/04/17 0947     Special Requests: NO SPECIAL REQUESTS        Culture result: NO GROWTH 3 DAYS       CULTURE, BLOOD [[id number]] Collected:  01/01/17 0200    Order Status:  Completed Specimen:  Blood from Blood Updated:  01/04/17 0947     Special Requests: NO SPECIAL REQUESTS        Culture result: NO GROWTH 3 DAYS             I have reviewed notes of prior 24hr. Other pertinent lab:       Total time spent with patient: 35 min                 Care Plan discussed with: Patient, Care Manager, Nursing Staff, Consultant/Specialist and >50% of time spent in counseling and coordination of care    Discussed:  Care Plan    Prophylaxis:  SCD's    Disposition:  Home w/Family           ___________________________________________________    Attending Physician: Natalie Rea DO

## 2017-01-04 NOTE — PROGRESS NOTES
4:34 PM PICC team attempted IV access 2x with no success, patient is not vomiting, is tolerating PO fluid intake with oral meds. Cade Picc RN, present at bedside on standby place IV access for IV phenergan, patient states he could try PO phenergan and states that scopolamine patches have worked in the pass. Called Dr. Reyes Clause, MD states to order 25mg PO phenergan q6hr PRN nausea, and scopolamine patch. MD states to DC order for compazine. Will continue to monitor patient.

## 2017-01-04 NOTE — PROGRESS NOTES
Bedside and Verbal shift change report given to [facility], RN (oncoming nurse) by Asha Calvo RN (offgoing nurse). Report included the following information SBAR, Kardex, Intake/Output, MAR, Recent Results and Med Rec Status.

## 2017-01-04 NOTE — PROGRESS NOTES
Palliative Medicine Consult  Balbir: [phone] ([phone])    Patient Name: Meghann Andersen  YOB: 1975    Date of Initial Consult: 1-4-17  Reason for Consult: overwhelming symptoms  Requesting Provider: Dr. Eleanor Santiago  Primary Care Physician: Al Francisco MD      SUMMARY:   Meghann Andersen is a 37y.o. year old with a past history of chronic crohn's disease with colitis, GERD, anxiety and depression , who was admitted on 1/1/2017 from home with a diagnosis of intractable nausea and vomiting, dehydration, sirs, abdominal pain. Current medical issues leading to Palliative Medicine involvement include: intractable nausea and vomiting, dehydration, sirs, abdominal pain. PALLIATIVE DIAGNOSES:   1. Intractable nausea and vomiting  2. Abdominal pain  3. Anxiety   4. Dehydration, now improved       PLAN:   1. DCd Ketoralac, contraindicated in colitis and chron's  2. DCd Zofran and compazine, not effective  3. Added phenergan 12.5mg IV to be given above cubital and q 4 hrs with  NSS for infusion  4. Continue steroids, continue benadryl, dilaudid 1 mg q 3 hrs prn, valium 5 mg q 8 hrs prn  5. Initial consult note routed to primary continuity provider  6.  Communicated plan of care with: Palliative IDT       GOALS OF CARE / TREATMENT PREFERENCES:   [====Goals of Care====]  GOALS OF CARE:  Patient / health care proxy stated goals: to return home with a reversal of his present medical condition      TREATMENT PREFERENCES:   Code Status: Full Code    Advance Care Planning:  Advance Care Planning 1/1/2017   Patient's Healthcare Decision Maker is: Patient declined (Legal Next of Kin remains as decision maker)   Primary Decision Maker Name -   Primary Decision Maker Phone Number -   Confirm Advance Directive None   Patient Would Like to Complete Advance Directive -       Other:    The palliative care team has discussed with patient / health care proxy about goals of care / treatment preferences for patient.  [====Goals of Care====]         HISTORY:     History obtained from:     CHIEF COMPLAINT: nausea, vomiting and abdominal pain    HPI/SUBJECTIVE:    The patient is:   [x] Verbal and participatory  [] Non-participatory due to:        Clinical Pain Assessment (nonverbal scale for severity on nonverbal patients): na  [++++ Clinical Pain Assessment++++]  [++++Pain Severity++++]: Pain: 6  [++++Pain Character++++]:   [++++Pain Duration++++]:   [++++Pain Effect++++]:   [++++Pain Factors++++]:   [++++Pain Frequency++++]:   [++++Pain Location++++]:   [++++ Clinical Pain Assessment++++]     FUNCTIONAL ASSESSMENT:     Palliative Performance Scale (PPS):  PPS: 70       PSYCHOSOCIAL/SPIRITUAL SCREENING:     Advance Care Planning:  Advance Care Planning 1/1/2017   Patient's Healthcare Decision Maker is: Patient declined (Legal Next of Carlie 70 remains as decision maker)   Primary Decision Maker Name -   Primary Decision [facility] Phone Number -   Confirm Advance Directive None   Patient Would Like to Complete Advance Directive -        Any spiritual / Quaker concerns:  [] Yes /  [x] No    Caregiver Burnout:  [] Yes /  [x] No /  [] No Caregiver Present      Anticipatory grief assessment:   [x] Normal  / [] Maladaptive       ESAS Anxiety: Anxiety: 3    ESAS Depression: Depression: 0        REVIEW OF SYSTEMS:     Positive and pertinent negative findings in ROS are noted above in HPI. The following systems were [x] reviewed / [] unable to be reviewed as noted in HPI  Other findings are noted below. Systems: constitutional, ears/nose/mouth/throat, respiratory, gastrointestinal, genitourinary, musculoskeletal, integumentary, neurologic, psychiatric, endocrine. Positive findings noted below.   Modified ESAS Completed by: provider   Fatigue: 0 Drowsiness: 0   Depression: 0 Pain: 6   Anxiety: 3 Nausea: 4   Anorexia: 0 Dyspnea: 0     Constipation: No     Stool Occurrence(s): 2        PHYSICAL EXAM:     Wt Readings from Last 3 Encounters:   01/01/17 190 lb (86.2 kg)   12/25/16 190 lb (86.2 kg)   12/18/16 193 lb 2 oz (87.6 kg)     Blood pressure 140/76, pulse 69, temperature 98 °F (36.7 °C), resp. rate 20, height 6' (1.829 m), weight 190 lb (86.2 kg), SpO2 97 %. Pain:  Pain Scale 1: Numeric (0 - 10)  Pain Intensity 1: 6  Pain Onset 1: chronic  Pain Location 1: Abdomen  Pain Orientation 1: Right, Lower  Pain Description 1: Aching  Pain Intervention(s) 1: Medication (see MAR)  Last bowel movement, if known:     Constitutional: pt is awake alert and verbal  Eyes: pupils equal, anicteric  ENMT: no nasal discharge, moist mucous membranes  Cardiovascular: regular rhythm, distal pulses intact  Respiratory: breathing not labored, symmetric  Gastrointestinal: soft non-tender, +bowel sounds  Musculoskeletal: no deformity, no tenderness to palpation  Skin: warm, dry  Neurologic: following commands, moving all extremities  Psychiatric: full affect, no hallucinations  Other:       HISTORY:     Principal Problem:    Intractable vomiting with nausea (1/1/2017)    Active Problems:    GERD (gastroesophageal reflux disease) (11/20/2016)      Abdominal pain (11/29/2016)      Crohn's colitis ([address] (12/18/2016)      S/P laparoscopic cholecystectomy (12/21/2016)      Overview: With IOC. Anxiety and depression (12/25/2016)      Dehydration (12/25/2016)      Past Medical History   Diagnosis Date    Anal fistula     Anal stenosis     Crohn's disease ([address]     GERD (gastroesophageal reflux disease)     H/O ulcerative colitis      Symptoms began in 1996. Total proctocolectomy was performed in 2004. Pateint later developed Crohn's disease.     Hiatal hernia     Ileal pouchitis ([address] 5/2004    Nausea & vomiting      Combination of Scopalamine patch & Zofran IV worked well in past    Numbness in right leg     Psychiatric disorder      depression and anxiety    Skin lesion of back 3/17/2014    Syncopal episodes       Past Surgical History   Procedure Laterality Date    Pr abdomen surgery proc unlisted  3/25/2004     Total proctocolectomy with creation of ileoanal J-pouch and loop ileostomy; Dr. Sameer Mayorga.  Hx orthopaedic  2008     Left ACL repair    Hx gi  5/2/2004     Examination under anesthesia with endoscopic evaluation of ileoanal pouch and placement of drain; Dr. Sameer Mayorga.  Hx gi  5/25/2004     Ileostomy closure with small bowel resection and anastomosis; Dr. Sameer Mayorga.  Hx gi  5/24/2012     Examination under anesthesia, anal dilatation, anal biopsy, and placement of draining seton; Dr. Sameer Mayorga.  Hx gi  7/3/2012     Incision and drainage of perirectal abscess and rigid endoscopy of ileoanal pouch; Dr. Sameer Mayorga.  Hx gi  7/31/2012     Incision and drainage of perirectal abscess, placement of draining seton, and anal dilatation; Dr. Sameer Mayorga.  Hx gi  1/22/2013     Repair of anal fistulas with debridement, partial fistulectomy, and flap closure; Dr. Sameer Mayorga.  Hx gi  5/17/2013     Examination under anesthesia, partial fistulotomy,  and placement of draining setons; Dr. Sameer Mayorga.  Hx gi  8/6/2013     Anal fistulotomy and application of ACell micromatrix; Dr. Sameer Mayorga.  Hx gi  2/20/2014     Examination under anesthesia and dilation of anal canal with rigid proctoscopy; Ashley Conteh MD.    Hx gi  4/29/2015     Creation of Alondra Holster continent intestinal reservoir (BCIR), abdominoperineal resection of ileoanal J-pouch; lysis of adhesions, and gastrostomy; Yulisa Justin MD ([facility] [place])    Hx gi  8/7/2015     Stoma revision; Zara Justin MD ([facility] [place])    Hx gi  10/29/2015     Extensive lysis of adhesions, resection of continent intestinal reservoir, repair of serosal tears, and creation of end-ileostomy; Dr. Sameer Mayorga.       Family History   Problem Relation Age of Onset    Cancer Father     Asthma Neg Hx     Diabetes Neg Hx     Heart Disease Neg Hx     Hypertension Neg Hx     Stroke Neg Hx     Malignant Hyperthermia Neg Hx     Pseudocholinesterase Deficiency Neg Hx     Delayed Awakening Neg Hx     Post-op Nausea/Vomiting Neg Hx       History reviewed, no pertinent family history. Social History   Substance Use Topics    Smoking status: Light Tobacco Smoker     Packs/day: 0.50     Years: 7.00    Smokeless tobacco: Never Used    Alcohol use No     Allergies   Allergen Reactions    Remicade [Infliximab] Anaphylaxis and Shortness of Breath    Bactrim [Sulfamethoprim Ds] Other (comments)     Increased GI distress.     Nsaids (Non-Steroidal Anti-Inflammatory Drug) Other (comments)     Stomach ulcers      Current Facility-Administered Medications   Medication Dose Route Frequency    promethazine (PHENERGAN) 12.5 mg in 0.9% sodium chloride 50 mL IVPB  12.5 mg IntraVENous Q4H PRN    HYDROmorphone (PF) (DILAUDID) injection 1 mg  1 mg IntraVENous Q3H PRN    0.9% sodium chloride infusion  75 mL/hr IntraVENous CONTINUOUS    temazepam (RESTORIL) capsule 30 mg  30 mg Oral QHS    pregabalin (LYRICA) capsule 100 mg  100 mg Oral TID    diazePAM (VALIUM) tablet 5 mg  5 mg Oral Q8H PRN    naloxone (NARCAN) injection 0.4 mg  0.4 mg IntraVENous PRN    acetaminophen (TYLENOL) tablet 650 mg  650 mg Oral Q4H PRN    diphenhydrAMINE (BENADRYL) capsule 25 mg  25 mg Oral Q4H PRN    enoxaparin (LOVENOX) injection 40 mg  40 mg SubCUTAneous Q24H    pantoprazole (PROTONIX) 40 mg in sodium chloride 0.9 % 10 mL injection  40 mg IntraVENous Q12H    methylPREDNISolone (PF) (SOLU-MEDROL) injection 40 mg  40 mg IntraVENous Q6H          LAB AND IMAGING FINDINGS:     Lab Results   Component Value Date/Time    WBC 11.3 01/04/2017 06:42 AM    HGB 11.2 01/04/2017 06:42 AM    PLATELET 958 41/50/8767 06:42 AM     Lab Results   Component Value Date/Time    Sodium 141 01/04/2017 06:42 AM    Potassium 4.1 01/04/2017 06:42 AM    Chloride 106 01/04/2017 06:42 AM    CO2 30 01/04/2017 06:42 AM    BUN 14 01/04/2017 06:42 AM    Creatinine 0.80 01/04/2017 06:42 AM    Calcium 8.6 01/04/2017 06:42 AM    Magnesium 1.9 01/04/2017 06:42 AM    Phosphorus 4.1 01/04/2017 06:42 AM      Lab Results   Component Value Date/Time    AST 11 01/04/2017 06:42 AM    Alk. phosphatase 50 01/04/2017 06:42 AM    Protein, total 5.4 01/04/2017 06:42 AM    Albumin 2.6 01/04/2017 06:42 AM    Globulin 2.8 01/04/2017 06:42 AM     No results found for: INR, PTMR, PTP, PT1, PT2, APTT   Lab Results   Component Value Date/Time    Ferritin 65 02/19/2009 04:00 PM      No results found for: PH, PCO2, PO2  No components found for: GLPOC   No results found for: CPK, CKMB             Total time: 55  Counseling / coordination time: 45  > 50% counseling / coordination?: y  Prolonged service was provided for  []30 min   []75 min in face to face time in the presence of the patient. Time Start:   Time End:   Note: this can only be billed with 95361 (initial) or 14146 (follow up). If multiple start / stop times, list each separately.

## 2017-01-04 NOTE — PROGRESS NOTES
9:21 AM patient has 1:40pm appointment today with Dr. Laura madison to followup post sx. Called Dr. Juliano Ni' office, spoke with Lady [facility], provided room number of patient. Lady Lab states she will relay message to Dr. Juliano Ni regarding MD to see patient.    12:18 PM Called Dr. Juliano Ni' office, Fabien Watson states he is aware that patient is here in hospital.

## 2017-01-04 NOTE — PROGRESS NOTES
12:12 PM patient has order for IV phenergan, patient in need of iv access in [facility] or higher. RN attempted 2x, called PICC team to access for possible midline catheter per Papo Varner NP for palliative.

## 2017-01-04 NOTE — PROGRESS NOTES
Bedside and Verbal shift change report given to Marilyn RN (oncoming nurse) by Thu Dykes RN (offgoing nurse).  Report included the following information SBAR, Kardex, Procedure Summary, Accordion, Recent Results and Med Rec Status.

## 2017-01-05 VITALS
WEIGHT: 190 LBS | HEART RATE: 64 BPM | OXYGEN SATURATION: 100 % | RESPIRATION RATE: 15 BRPM | HEIGHT: 72 IN | DIASTOLIC BLOOD PRESSURE: 65 MMHG | BODY MASS INDEX: 25.73 KG/M2 | SYSTOLIC BLOOD PRESSURE: 133 MMHG | TEMPERATURE: 98.4 F

## 2017-01-05 LAB
BASOPHILS # BLD AUTO: 0 K/UL (ref 0–0.1)
BASOPHILS # BLD: 0 % (ref 0–1)
EOSINOPHIL # BLD: 0 K/UL (ref 0–0.4)
EOSINOPHIL NFR BLD: 0 % (ref 0–7)
ERYTHROCYTE [DISTWIDTH] IN BLOOD BY AUTOMATED COUNT: 16.1 % (ref 11.5–14.5)
HCT VFR BLD AUTO: 37.1 % (ref 36.6–50.3)
HGB BLD-MCNC: 12.1 G/DL (ref 12.1–17)
LYMPHOCYTES # BLD AUTO: 5 % (ref 12–49)
LYMPHOCYTES # BLD: 0.5 K/UL (ref 0.8–3.5)
MCH RBC QN AUTO: 30.1 PG (ref 26–34)
MCHC RBC AUTO-ENTMCNC: 32.6 G/DL (ref 30–36.5)
MCV RBC AUTO: 92.3 FL (ref 80–99)
MONOCYTES # BLD: 0.4 K/UL (ref 0–1)
MONOCYTES NFR BLD AUTO: 3 % (ref 5–13)
NEUTS SEG # BLD: 10.6 K/UL (ref 1.8–8)
NEUTS SEG NFR BLD AUTO: 92 % (ref 32–75)
PLATELET # BLD AUTO: 170 K/UL (ref 150–400)
RBC # BLD AUTO: 4.02 M/UL (ref 4.1–5.7)
WBC # BLD AUTO: 11.5 K/UL (ref 4.1–11.1)

## 2017-01-05 PROCEDURE — 74011250636 HC RX REV CODE- 250/636: Performed by: INTERNAL MEDICINE

## 2017-01-05 PROCEDURE — 74011250637 HC RX REV CODE- 250/637: Performed by: INTERNAL MEDICINE

## 2017-01-05 PROCEDURE — 74011000250 HC RX REV CODE- 250: Performed by: INTERNAL MEDICINE

## 2017-01-05 PROCEDURE — 85025 COMPLETE CBC W/AUTO DIFF WBC: CPT | Performed by: INTERNAL MEDICINE

## 2017-01-05 PROCEDURE — 36415 COLL VENOUS BLD VENIPUNCTURE: CPT | Performed by: INTERNAL MEDICINE

## 2017-01-05 PROCEDURE — C9113 INJ PANTOPRAZOLE SODIUM, VIA: HCPCS | Performed by: INTERNAL MEDICINE

## 2017-01-05 RX ORDER — OXYCODONE HYDROCHLORIDE 5 MG/1
10 TABLET ORAL
Status: DISCONTINUED | OUTPATIENT
Start: 2017-01-05 | End: 2017-01-05 | Stop reason: HOSPADM

## 2017-01-05 RX ORDER — PROMETHAZINE HYDROCHLORIDE 25 MG/1
25 TABLET ORAL
Qty: 60 TAB | Refills: 0 | Status: SHIPPED | OUTPATIENT
Start: 2017-01-05 | End: 2017-02-25

## 2017-01-05 RX ORDER — PANTOPRAZOLE SODIUM 40 MG/1
40 TABLET, DELAYED RELEASE ORAL
Status: DISCONTINUED | OUTPATIENT
Start: 2017-01-05 | End: 2017-01-05 | Stop reason: HOSPADM

## 2017-01-05 RX ORDER — PREDNISONE 20 MG/1
40 TABLET ORAL
Qty: 30 TAB | Refills: 0 | Status: SHIPPED | OUTPATIENT
Start: 2017-01-06 | End: 2017-03-07

## 2017-01-05 RX ORDER — PANTOPRAZOLE SODIUM 40 MG/1
40 TABLET, DELAYED RELEASE ORAL
Qty: 60 TAB | Refills: 0 | Status: SHIPPED | OUTPATIENT
Start: 2017-01-05 | End: 2017-03-07 | Stop reason: CLARIF

## 2017-01-05 RX ORDER — OXYCODONE HYDROCHLORIDE 10 MG/1
10 TABLET ORAL
Qty: 56 TAB | Refills: 0 | Status: SHIPPED | OUTPATIENT
Start: 2017-01-05 | End: 2017-01-19

## 2017-01-05 RX ORDER — PREDNISONE 20 MG/1
40 TABLET ORAL
Status: DISCONTINUED | OUTPATIENT
Start: 2017-01-06 | End: 2017-01-05 | Stop reason: HOSPADM

## 2017-01-05 RX ADMIN — PROMETHAZINE HYDROCHLORIDE 25 MG: 25 TABLET ORAL at 08:08

## 2017-01-05 RX ADMIN — PREGABALIN 100 MG: 100 CAPSULE ORAL at 08:07

## 2017-01-05 RX ADMIN — SODIUM CHLORIDE 75 ML/HR: 900 INJECTION, SOLUTION INTRAVENOUS at 06:30

## 2017-01-05 RX ADMIN — DIAZEPAM 5 MG: 5 TABLET ORAL at 09:35

## 2017-01-05 RX ADMIN — SODIUM CHLORIDE 40 MG: 9 INJECTION INTRAMUSCULAR; INTRAVENOUS; SUBCUTANEOUS at 08:07

## 2017-01-05 RX ADMIN — HYDROMORPHONE HYDROCHLORIDE 1 MG: 1 INJECTION, SOLUTION INTRAMUSCULAR; INTRAVENOUS; SUBCUTANEOUS at 06:32

## 2017-01-05 RX ADMIN — METHYLPREDNISOLONE SODIUM SUCCINATE 40 MG: 40 INJECTION, POWDER, FOR SOLUTION INTRAMUSCULAR; INTRAVENOUS at 00:17

## 2017-01-05 RX ADMIN — OXYCODONE HYDROCHLORIDE 10 MG: 5 TABLET ORAL at 10:29

## 2017-01-05 RX ADMIN — METHYLPREDNISOLONE SODIUM SUCCINATE 40 MG: 40 INJECTION, POWDER, FOR SOLUTION INTRAMUSCULAR; INTRAVENOUS at 06:32

## 2017-01-05 RX ADMIN — HYDROMORPHONE HYDROCHLORIDE 1 MG: 1 INJECTION, SOLUTION INTRAMUSCULAR; INTRAVENOUS; SUBCUTANEOUS at 00:17

## 2017-01-05 RX ADMIN — ENOXAPARIN SODIUM 40 MG: 40 INJECTION SUBCUTANEOUS at 08:07

## 2017-01-05 RX ADMIN — HYDROMORPHONE HYDROCHLORIDE 1 MG: 1 INJECTION, SOLUTION INTRAMUSCULAR; INTRAVENOUS; SUBCUTANEOUS at 03:22

## 2017-01-05 NOTE — DISCHARGE SUMMARY
Physician Discharge Summary     Patient ID:  Kanika Quinonez  [id number]  17 y.o.  1975    Admit date: 1/1/2017    Discharge date and time: 1/5/2017    Admission Diagnoses: Intractable vomiting with nausea    Discharge Diagnoses:    Principal Diagnosis   Intractable vomiting with nausea                                             Other Diagnoses  Principal Problem:    Intractable vomiting with nausea (1/1/2017)    Active Problems:    GERD (gastroesophageal reflux disease) (11/20/2016)      Abdominal pain (11/29/2016)      Crohn's colitis ([facility] [address] [facility] (12/18/2016)      S/P laparoscopic cholecystectomy (12/21/2016)      Overview: With IO. Anxiety and depression (12/25/2016)      Dehydration (12/25/2016)         Hospital Course:     1. Intractable vomiting with nausea / Intractable abdominal pain / S/P recent laparoscopic cholecystectomy - unable to explain clear precipitant with current clinical data. Acute on chronic. In setting of known Crohn's disease and crohn's colitis but this is not active now. No lab or CT evidence of acute post operative issues. Frequent flares recently. GI evaluation appreciated. Slowly advance diet. Start on prednisone 40 mg and continue, will be tapered as an outpatient. Evaluated by palliative care and will be started on oxycodone 10 mg every 6 hrs as needed and strongly counseled on referral to chronic pain management clinic. He will need GI follow-up and PCP follow-up for pain referral      2. SIRS criteria: tachycardia/ Leukocytosis/ fever - BCx are NGTD, dc'd zosyn      3. Crohn's colitis / GERD (gastroesophageal reflux disease) - PPI. GI consult appreciated. Started on solumedrol. CT scan of the abdomen is unremarkable.       4. Anxiety and depression / Insomnia - Continue lyrica and prn diazepam.     5. Hypomagnesemia. Replete PRN     6. Hyperglycemia.  Likely secondary to steroid.            PCP: Jovanny Kunz MD    Consults: GI and Palliative Care    Significant Diagnostic Studies: See Hospital Course    Discharged home in improved condition. Discharge Exam:    Visit Vitals    /65 (BP 1 Location: Right arm, BP Patient Position: At rest)    Pulse 64    Temp 98.4 °F (36.9 °C)    Resp 15    Ht 6' (1.829 m)    Wt 86.2 kg (190 lb)    SpO2 100%    BMI 25.77 kg/m2     Physical Exam:    Gen: Well-developed, well-nourished, in no acute distress  HEENT:  Pink conjunctivae, hearing intact to voice, moist mucous membranes  Neck: Supple  Resp: No accessory muscle use, clear breath sounds without wheezes rales or rhonchi  Card: No murmurs, normal S1, S2 without thrills or peripheral edema  Abd:  Soft, non-tender, non-distended, normoactive bowel sounds are present  Musc: No cyanosis or clubbing  Skin: No rashes or ulcers, skin turgor is good  Neuro:  Cranial nerves are grossly intact,  strength is 5/5 bilaterally, hip flexion is 5/5 bilaterally, follows commands appropriately  Psych:  Good insight, oriented to person, place and time, alert      Disposition: home    Patient Instructions:   Current Discharge Medication List      START taking these medications    Details   oxyCODONE IR (ROXICODONE) 10 mg tab immediate release tablet Take 1 Tab by mouth every six (6) hours as needed for up to 14 days. Max Daily Amount: 40 mg.  Qty: 56 Tab, Refills: 0      pantoprazole (PROTONIX) 40 mg tablet Take 1 Tab by mouth Before breakfast and dinner. Qty: 60 Tab, Refills: 0         CONTINUE these medications which have CHANGED    Details   promethazine (PHENERGAN) 25 mg tablet Take 1 Tab by mouth every six (6) hours as needed for Nausea. Qty: 60 Tab, Refills: 0      predniSONE (DELTASONE) 20 mg tablet Take 2 Tabs by mouth daily (with breakfast). Qty: 30 Tab, Refills: 0         CONTINUE these medications which have NOT CHANGED    Details   ondansetron (ZOFRAN ODT) 4 mg disintegrating tablet Take 4 mg by mouth every eight (8) hours as needed for Nausea.       Ustekinumab (USTEKINAUMAB) 90 mg/mL injection 90 mg by SubCUTAneous route. Every 8 weeks      pregabalin (LYRICA) 100 mg capsule Take 100 mg by mouth three (3) times daily. cyclobenzaprine (FLEXERIL) 10 mg tablet Take 10 mg by mouth three (3) times daily as needed for Muscle Spasm(s). diphenhydrAMINE (BENADRYL) 25 mg tablet Take 25 mg by mouth nightly as needed for Sleep. cyanocobalamin (VITAMIN B-12) 1,000 mcg/mL injection 1,000 mcg by IntraMUSCular route every seven (7) days. Indications: Once weekly      temazepam (RESTORIL) 30 mg capsule Take 30 mg by mouth nightly. diazepam (VALIUM) 5 mg tablet Take 5 mg by mouth every eight (8) hours as needed (bowel relaxation). omeprazole-sodium bicarbonate (ZEGERID) 20-1.1 mg-gram Cap Take 20 mg by mouth daily.          STOP taking these medications       sodium fluoride (PREVIDENT 5000 PLUS) 1.1 % crea Comments:   Reason for Stopping:         oxyCODONE-acetaminophen (PERCOCET 7.5) 7.5-325 mg per tablet Comments:   Reason for Stopping:         prochlorperazine (COMPAZINE) 10 mg tablet Comments:   Reason for Stopping:             Activity: Activity as tolerated  Diet: Resume previous diet  Wound Care: As directed    Follow-up with with PCP in 1 week and 2 weeks with GI    Signed:  Eleonora Porter DO  1/5/2017  10:18 AM    Greater than 30 mins was spent in coordination, counseling, and execution of this patient's discharge

## 2017-01-05 NOTE — PROGRESS NOTES
Bedside and Verbal shift change report given to Hector Loyd RN (oncoming nurse) by Praveen Chaparro RN (offgoing nurse). Report included the following information SBAR and Kardex.

## 2017-01-05 NOTE — PROGRESS NOTES
[address] [facility]. [facility], [address]  [phone]              GI PROGRESS NOTE    NAME: Rachele Light   :  1975   MRN:  [id number]     CC:f/u N/V    Subjective:   Improved on GI lite diet, gluten free. Pain improved on current therapy per palliative. Would like to go home. No further vomiting. Mild nausea but well controlled now. Objective:     VITALS:   Last 24hrs VS reviewed since prior progress note. Most recent are:  Visit Vitals    /65 (BP 1 Location: Right arm, BP Patient Position: At rest)    Pulse 64    Temp 98.4 °F (36.9 °C)    Resp 15    Ht 6' (1.829 m)    Wt 86.2 kg (190 lb)    SpO2 100%    BMI 25.77 kg/m2       Intake & Output  701 - 1900  In: -   Out: 9358 [SQMPN:4178]  1901 -  0700  In: 250 [P.O.:250]  Out: 1146 [Urine:2000]    ROS: Neg for fever, chest pain, SOB. PHYSICAL EXAM:  General: Cooperative, no acute distress    Neurologic:  Alert and oriented X 3. HEENT: PERRLA, EOMI. Lungs:  CTA Bilaterally. No Wheezing  Heart:  s1 s2, Regular  rhythm,  No murmur   Abdomen: Soft, Non distended, Non tender. Bowel sounds normal. No guarding or rebound. No hepatosplenomegaly. RUQ ostomy. Extremities: No edema  Psych:   Good insight. Not anxious nor agitated. Lab Data Reviewed:   Recent Labs      17   0634  17   0642  17   0710   WBC  11.5*  11.3*  15.0*   HGB  12.1  11.2*  11.9*   MCV  92.3  93.6  93.8   PLT  170  162  191   NA   --   141  141   K   --   4.1  4.1   CREA   --   0.80  0.83   BUN   --   14  11   ALB   --   2.6*   --    TBILI   --   0.3   --    SGOT   --   11*   --    ALT   --   36   --    AP   --   50   --          ________________________________________________________________________       Assessment/Plan:   Abdominal pain and nausea s/p cholecystectomy recently. CT negative.  He is on solumedrol-convert to po prednisone and remain at 40 mg until hospital follow up with outpatient endoscopy. Appreciate palliative recs for pain management ( oxycodone) and prn anti-emetics ( Phenergan). He has chronic pain issues related to his ostomy and discussed with him that he would benefit from consultation with pain management and explore options including non-pharmocological pain management therapies.      Crohns' colitis/GERD- Fecal calprotectant normal 12/19 suggestive that IBD is controlled. Received first dose of Stelara 12/6 with Dr Deana Easley. Discussed case with Dr Jonatan Wood. (Considering recommending opinion at [facility] IBD clinic with Dr Abad Jorge due to complexity of his case, but this will be discussed with patient by Dr Jonatan Wood. )     Pinky Soles to discharge from our perspective.    Signed By: Fallon James NP     1/5/2017  2:32 PM

## 2017-01-05 NOTE — DISCHARGE INSTRUCTIONS
Patient Discharge Instructions    Ewa Michelle / [id number] : 1975    Admitted 2017 Discharged: 2017     Primary Diagnoses  Problem List as of 2017  Date Reviewed: 2017          Codes Class Noted - Resolved    * (Principal)Intractable vomiting with nausea ICD-10-CM: R11.2  ICD-9-CM: 536.2  2017 - Present        Anxiety and depression ICD-10-CM: F41.9, F32.9  ICD-9-CM: 300.00, 366  2016 - Present        Dehydration ICD-10-CM: E86.0  ICD-9-CM: 276.51  2016 - Present        S/P laparoscopic cholecystectomy ICD-10-CM: Z90.49  ICD-9-CM: V45.89  2016 - Present    Overview Signed 2016 12:56 PM by Denys Perrin MD     With [facility].              Crohn's colitis ([facility] ICD-10-CM: K50.10  ICD-9-CM: 555.1  2016 - Present        Abdominal pain ICD-10-CM: R10.9  ICD-9-CM: 789.00  2016 - Present        GERD (gastroesophageal reflux disease) (Chronic) ICD-10-CM: K21.9  ICD-9-CM: 530.81  2016 - Present        RESOLVED: Insomnia ICD-10-CM: G47.00  ICD-9-CM: 780.52  2016 - 2017        RESOLVED: Anemia ICD-10-CM: D64.9  ICD-9-CM: 285.9  2016 - 2017        RESOLVED: Hypernatremia ICD-10-CM: E87.0  ICD-9-CM: 276.0  2016 - 2017        RESOLVED: Biliary dyskinesia ICD-10-CM: K82.8  ICD-9-CM: 575.8  2016 - 2016        RESOLVED: Gallbladder sludge ICD-10-CM: K82.8  ICD-9-CM: 575.8  2016 - 2016        RESOLVED: Hypokalemia ICD-10-CM: E87.6  ICD-9-CM: 276.8  2016 - 2017        RESOLVED: Leukocytosis ICD-10-CM: D72.829  ICD-9-CM: 288.60  2016 - 2016        RESOLVED: Crohn's disease ([facility] ICD-10-CM: K50.90  ICD-9-CM: 555.9  2016 - 2016        RESOLVED: Intractable abdominal pain ICD-10-CM: R10.9  ICD-9-CM: 789.00  2016 - 2017        RESOLVED: Intractable nausea and vomiting ICD-10-CM: R11.2  ICD-9-CM: 536.2  2016 - 2016        RESOLVED: Enterostomy malfunction ([facility] (Chronic) ICD-10-CM: K94.13  ICD-9-CM: 569.62  10/29/2015 - 11/21/2016        RESOLVED: Ileostomy dysfunction ([facility] ICD-10-CM: A37.22  ICD-9-CM: 569.62  10/29/2015 - 11/21/2016        RESOLVED: Skin lesion of back ICD-10-CM: L98.9  ICD-9-CM: 709.9  3/17/2014 - 11/21/2016        RESOLVED: Ileal pouchitis ([facility] ICD-10-CM: Z10.788  ICD-9-CM: 569.71  5/1/2004 - 11/21/2016              Take Home Medications        · It is important that you take the medication exactly as they are prescribed. · Keep your medication in the bottles provided by the pharmacist and keep a list of the medication names, dosages, and times to be taken in your wallet. · Do not take other medications without consulting your doctor. · You will be given a 2 week supply of pain medication. You have been evaluated by our palliative care department who recommends that you obtain a referral to a pain specialist for further management of chronic pain. What to do at Home    Recommended diet: Resume previous diet    Recommended activity: Activity as tolerated    If you experience fever of 101.4 or greater, severe abdominal pain, please follow up with GI or nearest ER. Follow-up with your PCP in 1 week and GI in 2 weeks        Information obtained by :  I understand that if any problems occur once I am at home I am to contact my physician. I understand and acknowledge receipt of the instructions indicated above.                                                                                                                                            Physician's or R.N.'s Signature                                                                  Date/Time                                                                                                                                              Patient or Representative Signature                                                          Date/Time       Abdominal Pain: Care Instructions  Your Care Instructions    Abdominal pain has many possible causes. Some aren't serious and get better on their own in a few days. Others need more testing and treatment. If your pain continues or gets worse, you need to be rechecked and may need more tests to find out what is wrong. You may need surgery to correct the problem. Don't ignore new symptoms, such as fever, nausea and vomiting, urination problems, pain that gets worse, and dizziness. These may be signs of a more serious problem. Your doctor may have recommended a follow-up visit in the next 8 to 12 hours. If you are not getting better, you may need more tests or treatment. The doctor has checked you carefully, but problems can develop later. If you notice any problems or new symptoms, get medical treatment right away. Follow-up care is a key part of your treatment and safety. Be sure to make and go to all appointments, and call your doctor if you are having problems. It's also a good idea to know your test results and keep a list of the medicines you take. How can you care for yourself at home? · Rest until you feel better. · To prevent dehydration, drink plenty of fluids, enough so that your urine is light yellow or clear like water. Choose water and other caffeine-free clear liquids until you feel better. If you have kidney, heart, or liver disease and have to limit fluids, talk with your doctor before you increase the amount of fluids you drink. · If your stomach is upset, eat mild foods, such as rice, dry toast or crackers, bananas, and applesauce. Try eating several small meals instead of two or three large ones. · Wait until 48 hours after all symptoms have gone away before you have spicy foods, alcohol, and drinks that contain caffeine. · Do not eat foods that are high in fat. · Avoid anti-inflammatory medicines such as aspirin, ibuprofen (Advil, Motrin), and naproxen (Aleve). These can cause stomach upset.  Talk to your doctor if you take daily aspirin for another health problem. When should you call for help? Call 911 anytime you think you may need emergency care. For example, call if:  · You passed out (lost consciousness). · You pass maroon or very bloody stools. · You vomit blood or what looks like coffee grounds. · You have new, severe belly pain. Call your doctor now or seek immediate medical care if:  · Your pain gets worse, especially if it becomes focused in one area of your belly. · You have a new or higher fever. · Your stools are black and look like tar, or they have streaks of blood. · You have unexpected vaginal bleeding. · You have symptoms of a urinary tract infection. These may include:  ¨ Pain when you urinate. ¨ Urinating more often than usual.  ¨ Blood in your urine. · You are dizzy or lightheaded, or you feel like you may faint. Watch closely for changes in your health, and be sure to contact your doctor if:  · You are not getting better after 1 day (24 hours). Where can you learn more? Go to http://malu-ashley.info/. Enter Q586 in the search box to learn more about \"Abdominal Pain: Care Instructions. \"  Current as of: May 27, 2016  Content Version: 11.1  © 8908-5039 [employer]. Care instructions adapted under license by [employer] (which disclaims liability or warranty for this information). If you have questions about a medical condition or this instruction, always ask your healthcare professional. [place] [address] any warranty or liability for your use of this information. Crohn's Disease: Care Instructions  Your Care Instructions    Crohn's disease is a lifelong inflammatory bowel disease (IBD). Parts of the digestive tract get swollen and irritated and may develop deep sores called ulcers. Crohn's disease usually occurs in the last part of the small intestine and the first part of the large intestine.  But it can develop anywhere from the mouth to the anus. The main symptoms of Crohn's disease are belly pain, diarrhea, fever, and weight loss. Some people may have constipation. Crohn's disease also sometimes causes problems with the joints, eyes, or skin. Your symptoms may be mild at some times and severe at others. The disease can also go into remission, which means that it is not active and you have no symptoms. Bad attacks of Crohn's disease often have to be treated in the hospital so that you can get medicines and liquids through a tube in your vein, called an IV. This gives your digestive system time to rest and recover. Talk with your doctor about the best treatments for you. You may need medicines that help prevent or treat flare-ups of the disease. You may need surgery to remove part of your bowel if you have an abnormal opening in the bowel (fistula), an abscess, or a bowel obstruction. In some cases, surgery is needed if medicines do not work. But symptoms often return to other areas of the intestines after surgery. Learning good self-care can help you reduce your symptoms and manage Crohn's disease. Follow-up care is a key part of your treatment and safety. Be sure to make and go to all appointments, and call your doctor if you are having problems. Its also a good idea to know your test results and keep a list of the medicines you take. How can you care for yourself at home? · Take your medicines exactly as prescribed. Call your doctor if you think you are having a problem with your medicine. You will get more details on the specific medicines your doctor prescribes. · Do not take anti-inflammatory medicines, such as aspirin, ibuprofen (Advil, Motrin), or naproxen (Aleve). They may make your symptoms worse. Do not take any other medicines or herbal products without talking to your doctor first.  · Avoid foods that make your symptoms worse. These might include milk, alcohol, high-fiber foods, or spicy foods. · Eat a healthy diet.  Make sure to get enough iron. Rectal bleeding may make you lose iron. Good sources of iron include beef, lentils, spinach, raisins, and iron-enriched breads and cereals. · Drink liquid meal replacements if your doctor recommends them. These are high in calories and contain vitamins and minerals. Severe symptoms may make it hard for your body to absorb vitamins and minerals. · Do not smoke. Smoking makes Crohn's disease worse. If you need help quitting, talk to your doctor about stop-smoking programs and medicines. These can increase your chances of quitting for good. · Seek support from friends and family to help cope with Crohn's disease. The illness can affect all parts of your life. Get counseling if you need it. When should you call for help? Call 911 anytime you think you may need emergency care. For example, call if:  · You have severe belly pain. · You passed out (lost consciousness). Call your doctor now or seek immediate medical care if:  · You have signs of needing more fluids. You have sunken eyes and a dry mouth, and you pass only a little dark urine. · You have pain and swelling in the anal area, or you have pus draining from the anal area. · You have a fever or shaking chills. · Your belly is bloated. Watch closely for changes in your health, and be sure to contact your doctor if:  · Your symptoms get worse. · You have diarrhea for more than 2 weeks. · Your pain is not steadily getting better. · You have unexplained weight loss. Where can you learn more? Go to http://malu-ashley.info/. Enter 21 [id number] in the search box to learn more about \"Crohn's Disease: Care Instructions. \"  Current as of: August 9, 2016  Content Version: 11.1  © 5868-5277 [employer]. Care instructions adapted under license by [employer] (which disclaims liability or warranty for this information).  If you have questions about a medical condition or this instruction, always ask your healthcare professional. [address] any warranty or liability for your use of this information.

## 2017-01-05 NOTE — PROGRESS NOTES
10:30 AM Patient aware of DC, states mother can be here in one hour. 11:17 AM I have reviewed discharge instructions with the patient. The patient verbalized understanding. Gave opportunity for questions. Removed peripheral IV and placed order for volunteer transport. 11:44 AM Patient refused transport by volunteer services, patient steady on feet.

## 2017-01-05 NOTE — PROGRESS NOTES
Palliative Medicine Consult  Balbir: [phone] ([phone])    Patient Name: Africa Mendes  YOB: 1975    Date of Initial Consult: 1-4-17  Reason for Consult: overwhelming symptoms  Requesting Provider: Dr. Atul Sanderson  Primary Care Physician: Theresa Morales MD      SUMMARY:   Africa Mendes is a 37y.o. year old with a past history of chronic crohn's disease with colitis, GERD, anxiety and depression , who was admitted on 1/1/2017 from home with a diagnosis of intractable nausea and vomiting, dehydration, sirs, abdominal pain. Current medical issues leading to Palliative Medicine involvement include: intractable nausea and vomiting, dehydration, sirs, abdominal pain. PALLIATIVE DIAGNOSES:   1. Intractable nausea and vomiting, better  2. Abdominal pain  3. Anxiety   4. Dehydration, now improved       PLAN:   1. Out pt recommendations: oxycodone 10 mg 4 x day, oral phenergan 25 mg 2-4 x day  2. Added phenergan oral dosing, pt states this is effective  3. Continue steroids, continue benadryl, dilaudid 1 mg q 3 hrs prn, valium 5 mg q 8 hrs prn  4. Initial consult note routed to primary continuity provider  5.  Communicated plan of care with: Palliative IDT       GOALS OF CARE / TREATMENT PREFERENCES:   [====Goals of Care====]  GOALS OF CARE:  Patient / health care proxy stated goals: to return home with a reversal of his present medical condition      TREATMENT PREFERENCES:   Code Status: Full Code    Advance Care Planning:  Advance Care Planning 1/1/2017   Patient's Healthcare Decision Maker is: Patient declined (Legal Next of Kin remains as decision maker)   Primary Decision Maker Name -   Primary Decision Maker Phone Number -   Confirm Advance Directive None   Patient Would Like to Complete Advance Directive -       Other:    The palliative care team has discussed with patient / health care proxy about goals of care / treatment preferences for patient.  [====Goals of Care====]         HISTORY: History obtained from:     CHIEF COMPLAINT: nausea, vomiting and abdominal pain    HPI/SUBJECTIVE:    The patient is:   [x] Verbal and participatory  [] Non-participatory due to:        Clinical Pain Assessment (nonverbal scale for severity on nonverbal patients):   [++++ Clinical Pain Assessment++++]  [++++Pain Severity++++]: Pain: 3  [++++Pain Character++++]: sharp  [++++Pain Duration++++]:   Acute on chronic  [++++Pain Effect++++]: limits his ability to participate in activities if it is severe enough  [++++Pain Factors++++]: eating makes it worse, depending on what he eats  [++++Pain Frequency++++]:   [++++Pain Location++++]: abdomen generalized  [++++ Clinical Pain Assessment++++]     FUNCTIONAL ASSESSMENT:     Palliative Performance Scale (PPS):  PPS: 70       PSYCHOSOCIAL/SPIRITUAL SCREENING:     Advance Care Planning:  Advance Care Planning 1/1/2017   Patient's Healthcare Decision Maker is: Patient declined (Legal Next of Carlie 70 remains as decision maker)   Primary Decision Maker Name -   Primary Decision [address] Phone Number -   Confirm Advance Directive None   Patient Would Like to Complete Advance Directive -        Any spiritual / Orthodox concerns:  [] Yes /  [x] No    Caregiver Burnout:  [] Yes /  [x] No /  [] No Caregiver Present      Anticipatory grief assessment:   [x] Normal  / [] Maladaptive       ESAS Anxiety: Anxiety: 0    ESAS Depression: Depression: 0        REVIEW OF SYSTEMS:     Positive and pertinent negative findings in ROS are noted above in HPI. The following systems were [x] reviewed / [] unable to be reviewed as noted in HPI  Other findings are noted below. Systems: constitutional, ears/nose/mouth/throat, respiratory, gastrointestinal, genitourinary, musculoskeletal, integumentary, neurologic, psychiatric, endocrine. Positive findings noted below.   Modified ESAS Completed by: provider   Fatigue: 0 Drowsiness: 0   Depression: 0 Pain: 3   Anxiety: 0 Nausea: 2   Anorexia: 3 Dyspnea: 0 Constipation: No     Stool Occurrence(s): 2        PHYSICAL EXAM:     Wt Readings from Last 3 Encounters:   01/01/17 190 lb (86.2 kg)   12/25/16 190 lb (86.2 kg)   12/18/16 193 lb 2 oz (87.6 kg)     Blood pressure 133/65, pulse 64, temperature 98.4 °F (36.9 °C), resp. rate 15, height 6' (1.829 m), weight 190 lb (86.2 kg), SpO2 100 %. Pain:  Pain Scale 1: Numeric (0 - 10)  Pain Intensity 1: 5  Pain Onset 1: chronic  Pain Location 1: Abdomen  Pain Orientation 1: Anterior  Pain Description 1: Aching  Pain Intervention(s) 1: Medication (see MAR)      Constitutional: pt is awake alert and verbal  Eyes: pupils equal, anicteric  ENMT: no nasal discharge, moist mucous membranes  Cardiovascular: regular rhythm, distal pulses intact  Respiratory: breathing not labored, symmetric  Gastrointestinal: soft non-tender, +bowel sounds  Musculoskeletal: no deformity, no tenderness to palpation  Skin: warm, dry  Neurologic: following commands, moving all extremities  Psychiatric: full affect, no hallucinations  Other:       HISTORY:     Principal Problem:    Intractable vomiting with nausea (1/1/2017)    Active Problems:    GERD (gastroesophageal reflux disease) (11/20/2016)      Abdominal pain (11/29/2016)      Crohn's colitis ([address] (12/18/2016)      S/P laparoscopic cholecystectomy (12/21/2016)      Overview: With IO. Anxiety and depression (12/25/2016)      Dehydration (12/25/2016)      Past Medical History   Diagnosis Date    Anal fistula     Anal stenosis     Crohn's disease ([address]     GERD (gastroesophageal reflux disease)     H/O ulcerative colitis      Symptoms began in 1996. Total proctocolectomy was performed in 2004. Pateint later developed Crohn's disease.     Hiatal hernia     Ileal pouchitis ([address] 5/2004    Nausea & vomiting      Combination of Scopalamine patch & Zofran IV worked well in past    Numbness in right leg     Psychiatric disorder      depression and anxiety    Skin lesion of back 3/17/2014    Syncopal episodes       Past Surgical History   Procedure Laterality Date    Pr abdomen surgery proc unlisted  3/25/2004     Total proctocolectomy with creation of ileoanal J-pouch and loop ileostomy; Dr. Sameer Mayorga.   orthopaedic  2008     Left ACL repair    Hx gi  5/2/2004     Examination under anesthesia with endoscopic evaluation of ileoanal pouch and placement of drain; Dr. Sameer Mayorga.   gi  5/25/2004     Ileostomy closure with small bowel resection and anastomosis; Dr. Sameer Mayorga.  Hx gi  5/24/2012     Examination under anesthesia, anal dilatation, anal biopsy, and placement of draining seton; Dr. Sameer Mayorga.  Hx gi  7/3/2012     Incision and drainage of perirectal abscess and rigid endoscopy of ileoanal pouch; Dr. Sameer Mayorga.  Hx gi  7/31/2012     Incision and drainage of perirectal abscess, placement of draining seton, and anal dilatation; Dr. Sameer Mayorga.   gi  1/22/2013     Repair of anal fistulas with debridement, partial fistulectomy, and flap closure; Dr. Sameer Mayorga.   gi  5/17/2013     Examination under anesthesia, partial fistulotomy,  and placement of draining setons; Dr. Sameer Myaorga.   gi  8/6/2013     Anal fistulotomy and application of ACell micromatrix; Dr. Sameer Mayorga.   gi  2/20/2014     Examination under anesthesia and dilation of anal canal with rigid proctoscopy; Ashley Conteh MD.     gi  4/29/2015     Creation of Alondra Holster continent intestinal reservoir (BCIR), abdominoperineal resection of ileoanal J-pouch; lysis of adhesions, and gastrostomy; Yulisa Justin MD ([place], [place])     gi  8/7/2015     Stoma revision; Zara Justin MD ([place], [place])     gi  10/29/2015     Extensive lysis of adhesions, resection of continent intestinal reservoir, repair of serosal tears, and creation of end-ileostomy; Dr. Sameer Mayorga.       Family History   Problem Relation Age of Onset    Cancer Father     Asthma Neg Hx     Diabetes Neg Hx     Heart Disease Neg Hx     Hypertension Neg Hx     Stroke Neg Hx     Malignant Hyperthermia Neg Hx     Pseudocholinesterase Deficiency Neg Hx     Delayed Awakening Neg Hx     Post-op Nausea/Vomiting Neg Hx       History reviewed, no pertinent family history. Social History   Substance Use Topics    Smoking status: Light Tobacco Smoker     Packs/day: 0.50     Years: 7.00    Smokeless tobacco: Never Used    Alcohol use No     Allergies   Allergen Reactions    Remicade [Infliximab] Anaphylaxis and Shortness of Breath    Bactrim [Sulfamethoprim Ds] Other (comments)     Increased GI distress.     Nsaids (Non-Steroidal Anti-Inflammatory Drug) Other (comments)     Stomach ulcers      Current Facility-Administered Medications   Medication Dose Route Frequency    methylPREDNISolone (PF) (SOLU-MEDROL) injection 40 mg  40 mg IntraVENous Q8H    scopolamine (TRANSDERM-SCOP) 1.5 mg  1.5 mg TransDERmal Q72H    promethazine (PHENERGAN) tablet 25 mg  25 mg Oral Q6H PRN    HYDROmorphone (PF) (DILAUDID) injection 1 mg  1 mg IntraVENous Q3H PRN    0.9% sodium chloride infusion  75 mL/hr IntraVENous CONTINUOUS    temazepam (RESTORIL) capsule 30 mg  30 mg Oral QHS    pregabalin (LYRICA) capsule 100 mg  100 mg Oral TID    diazePAM (VALIUM) tablet 5 mg  5 mg Oral Q8H PRN    naloxone (NARCAN) injection 0.4 mg  0.4 mg IntraVENous PRN    acetaminophen (TYLENOL) tablet 650 mg  650 mg Oral Q4H PRN    diphenhydrAMINE (BENADRYL) capsule 25 mg  25 mg Oral Q4H PRN    enoxaparin (LOVENOX) injection 40 mg  40 mg SubCUTAneous Q24H    pantoprazole (PROTONIX) 40 mg in sodium chloride 0.9 % 10 mL injection  40 mg IntraVENous Q12H          LAB AND IMAGING FINDINGS:     Lab Results   Component Value Date/Time    WBC 11.5 01/05/2017 06:34 AM    HGB 12.1 01/05/2017 06:34 AM    PLATELET 324 05/19/1044 06:34 AM     Lab Results   Component Value Date/Time    Sodium 141 01/04/2017 06:42 AM    Potassium 4.1 01/04/2017 06:42 AM    Chloride 106 01/04/2017 06:42 AM    CO2 30 01/04/2017 06:42 AM    BUN 14 01/04/2017 06:42 AM    Creatinine 0.80 01/04/2017 06:42 AM    Calcium 8.6 01/04/2017 06:42 AM    Magnesium 1.9 01/04/2017 06:42 AM    Phosphorus 4.1 01/04/2017 06:42 AM      Lab Results   Component Value Date/Time    AST 11 01/04/2017 06:42 AM    Alk. phosphatase 50 01/04/2017 06:42 AM    Protein, total 5.4 01/04/2017 06:42 AM    Albumin 2.6 01/04/2017 06:42 AM    Globulin 2.8 01/04/2017 06:42 AM     No results found for: INR, PTMR, PTP, PT1, PT2, APTT   Lab Results   Component Value Date/Time    Ferritin 65 02/19/2009 04:00 PM      No results found for: PH, PCO2, PO2  No components found for: GLPOC   No results found for: CPK, CKMB             Total time: 35  Counseling / coordination time: 35  > 50% counseling / coordination?: y  Prolonged service was provided for  []30 min   []75 min in face to face time in the presence of the patient. Time Start:   Time End:   Note: this can only be billed with 28982 (initial) or 58574 (follow up). If multiple start / stop times, list each separately.

## 2017-01-05 NOTE — ROUTINE PROCESS
Bedside and Verbal shift change report given to [address] (oncoming nurse) by Adelita Flores (offgoing nurse). Report included the following information SBAR, Kardex, MAR, Recent Results and Med Rec Status.

## 2017-01-07 LAB
BACTERIA SPEC CULT: NORMAL
BACTERIA SPEC CULT: NORMAL
SERVICE CMNT-IMP: NORMAL
SERVICE CMNT-IMP: NORMAL

## 2017-01-14 ENCOUNTER — HOSPITAL ENCOUNTER (INPATIENT)
Age: 42
LOS: 1 days | Discharge: HOME | DRG: 392 | End: 2017-01-16
Attending: EMERGENCY MEDICINE | Admitting: SURGERY
Payer: COMMERCIAL

## 2017-01-14 ENCOUNTER — APPOINTMENT (OUTPATIENT)
Dept: CT IMAGING | Age: 42
DRG: 392 | End: 2017-01-14
Attending: EMERGENCY MEDICINE
Payer: COMMERCIAL

## 2017-01-14 ENCOUNTER — APPOINTMENT (OUTPATIENT)
Dept: GENERAL RADIOLOGY | Age: 42
DRG: 392 | End: 2017-01-14
Attending: EMERGENCY MEDICINE
Payer: COMMERCIAL

## 2017-01-14 DIAGNOSIS — R10.84 ABDOMINAL PAIN, GENERALIZED: ICD-10-CM

## 2017-01-14 DIAGNOSIS — Z87.19 HISTORY OF CROHN'S DISEASE: ICD-10-CM

## 2017-01-14 DIAGNOSIS — K56.609 SMALL BOWEL OBSTRUCTION (HCC): Primary | ICD-10-CM

## 2017-01-14 LAB
ALBUMIN SERPL BCP-MCNC: 3.5 G/DL (ref 3.5–5)
ALBUMIN/GLOB SERPL: 1.1 {RATIO} (ref 1.1–2.2)
ALP SERPL-CCNC: 76 U/L (ref 45–117)
ALT SERPL-CCNC: 69 U/L (ref 12–78)
ANION GAP BLD CALC-SCNC: 7 MMOL/L (ref 5–15)
AST SERPL W P-5'-P-CCNC: 41 U/L (ref 15–37)
BASOPHILS # BLD AUTO: 0 K/UL (ref 0–0.1)
BASOPHILS # BLD: 0 % (ref 0–1)
BILIRUB SERPL-MCNC: 0.3 MG/DL (ref 0.2–1)
BUN SERPL-MCNC: 17 MG/DL (ref 6–20)
BUN/CREAT SERPL: 18 (ref 12–20)
CALCIUM SERPL-MCNC: 8.9 MG/DL (ref 8.5–10.1)
CHLORIDE SERPL-SCNC: 99 MMOL/L (ref 97–108)
CO2 SERPL-SCNC: 30 MMOL/L (ref 21–32)
CREAT SERPL-MCNC: 0.97 MG/DL (ref 0.7–1.3)
DIFFERENTIAL METHOD BLD: ABNORMAL
EOSINOPHIL # BLD: 0 K/UL (ref 0–0.4)
EOSINOPHIL NFR BLD: 0 % (ref 0–7)
ERYTHROCYTE [DISTWIDTH] IN BLOOD BY AUTOMATED COUNT: 16.7 % (ref 11.5–14.5)
GLOBULIN SER CALC-MCNC: 3.2 G/DL (ref 2–4)
GLUCOSE SERPL-MCNC: 142 MG/DL (ref 65–100)
HCT VFR BLD AUTO: 39.7 % (ref 36.6–50.3)
HGB BLD-MCNC: 13.3 G/DL (ref 12.1–17)
LIPASE SERPL-CCNC: 174 U/L (ref 73–393)
LYMPHOCYTES # BLD AUTO: 5 % (ref 12–49)
LYMPHOCYTES # BLD: 1.1 K/UL (ref 0.8–3.5)
MCH RBC QN AUTO: 30.6 PG (ref 26–34)
MCHC RBC AUTO-ENTMCNC: 33.5 G/DL (ref 30–36.5)
MCV RBC AUTO: 91.5 FL (ref 80–99)
MONOCYTES # BLD: 0.9 K/UL (ref 0–1)
MONOCYTES NFR BLD AUTO: 4 % (ref 5–13)
NEUTS SEG # BLD: 20.4 K/UL (ref 1.8–8)
NEUTS SEG NFR BLD AUTO: 91 % (ref 32–75)
PLATELET # BLD AUTO: 232 K/UL (ref 150–400)
POTASSIUM SERPL-SCNC: 4.4 MMOL/L (ref 3.5–5.1)
PROT SERPL-MCNC: 6.7 G/DL (ref 6.4–8.2)
RBC # BLD AUTO: 4.34 M/UL (ref 4.1–5.7)
RBC MORPH BLD: ABNORMAL
RBC MORPH BLD: ABNORMAL
SODIUM SERPL-SCNC: 136 MMOL/L (ref 136–145)
WBC # BLD AUTO: 22.4 K/UL (ref 4.1–11.1)

## 2017-01-14 PROCEDURE — 74022 RADEX COMPL AQT ABD SERIES: CPT

## 2017-01-14 PROCEDURE — 36415 COLL VENOUS BLD VENIPUNCTURE: CPT | Performed by: EMERGENCY MEDICINE

## 2017-01-14 PROCEDURE — 74177 CT ABD & PELVIS W/CONTRAST: CPT

## 2017-01-14 PROCEDURE — 96375 TX/PRO/DX INJ NEW DRUG ADDON: CPT

## 2017-01-14 PROCEDURE — 80053 COMPREHEN METABOLIC PANEL: CPT | Performed by: EMERGENCY MEDICINE

## 2017-01-14 PROCEDURE — 96374 THER/PROPH/DIAG INJ IV PUSH: CPT

## 2017-01-14 PROCEDURE — 96376 TX/PRO/DX INJ SAME DRUG ADON: CPT

## 2017-01-14 PROCEDURE — 96361 HYDRATE IV INFUSION ADD-ON: CPT

## 2017-01-14 PROCEDURE — 77030008768 HC TU NG VYGC -A

## 2017-01-14 PROCEDURE — 99285 EMERGENCY DEPT VISIT HI MDM: CPT

## 2017-01-14 PROCEDURE — 74011250636 HC RX REV CODE- 250/636: Performed by: EMERGENCY MEDICINE

## 2017-01-14 PROCEDURE — 85025 COMPLETE CBC W/AUTO DIFF WBC: CPT | Performed by: EMERGENCY MEDICINE

## 2017-01-14 PROCEDURE — 74011636320 HC RX REV CODE- 636/320: Performed by: EMERGENCY MEDICINE

## 2017-01-14 PROCEDURE — 83690 ASSAY OF LIPASE: CPT | Performed by: EMERGENCY MEDICINE

## 2017-01-14 RX ORDER — SODIUM CHLORIDE 9 MG/ML
150 INJECTION, SOLUTION INTRAVENOUS CONTINUOUS
Status: DISCONTINUED | OUTPATIENT
Start: 2017-01-14 | End: 2017-01-16

## 2017-01-14 RX ORDER — METOCLOPRAMIDE HYDROCHLORIDE 5 MG/ML
10 INJECTION INTRAMUSCULAR; INTRAVENOUS
Status: COMPLETED | OUTPATIENT
Start: 2017-01-14 | End: 2017-01-14

## 2017-01-14 RX ORDER — SODIUM CHLORIDE 0.9 % (FLUSH) 0.9 %
5-10 SYRINGE (ML) INJECTION EVERY 8 HOURS
Status: DISCONTINUED | OUTPATIENT
Start: 2017-01-14 | End: 2017-01-15

## 2017-01-14 RX ORDER — HYDROMORPHONE HYDROCHLORIDE 1 MG/ML
1 INJECTION, SOLUTION INTRAMUSCULAR; INTRAVENOUS; SUBCUTANEOUS ONCE
Status: COMPLETED | OUTPATIENT
Start: 2017-01-14 | End: 2017-01-14

## 2017-01-14 RX ORDER — SODIUM CHLORIDE 0.9 % (FLUSH) 0.9 %
5-10 SYRINGE (ML) INJECTION AS NEEDED
Status: DISCONTINUED | OUTPATIENT
Start: 2017-01-14 | End: 2017-01-16 | Stop reason: HOSPADM

## 2017-01-14 RX ORDER — ONDANSETRON 2 MG/ML
4 INJECTION INTRAMUSCULAR; INTRAVENOUS
Status: COMPLETED | OUTPATIENT
Start: 2017-01-14 | End: 2017-01-14

## 2017-01-14 RX ADMIN — HYDROMORPHONE HYDROCHLORIDE 1 MG: 1 INJECTION, SOLUTION INTRAMUSCULAR; INTRAVENOUS; SUBCUTANEOUS at 22:52

## 2017-01-14 RX ADMIN — SODIUM CHLORIDE 1000 ML: 900 INJECTION, SOLUTION INTRAVENOUS at 21:49

## 2017-01-14 RX ADMIN — METOCLOPRAMIDE 10 MG: 5 INJECTION, SOLUTION INTRAMUSCULAR; INTRAVENOUS at 22:43

## 2017-01-14 RX ADMIN — METHYLPREDNISOLONE SODIUM SUCCINATE 125 MG: 125 INJECTION, POWDER, FOR SOLUTION INTRAMUSCULAR; INTRAVENOUS at 22:44

## 2017-01-14 RX ADMIN — SODIUM CHLORIDE 150 ML/HR: 900 INJECTION, SOLUTION INTRAVENOUS at 22:59

## 2017-01-14 RX ADMIN — ONDANSETRON 4 MG: 2 INJECTION INTRAMUSCULAR; INTRAVENOUS at 21:50

## 2017-01-14 RX ADMIN — DIATRIZOATE MEGLUMINE AND DIATRIZOATE SODIUM 30 ML: 660; 100 LIQUID ORAL; RECTAL at 22:48

## 2017-01-14 RX ADMIN — HYDROMORPHONE HYDROCHLORIDE 1 MG: 1 INJECTION, SOLUTION INTRAMUSCULAR; INTRAVENOUS; SUBCUTANEOUS at 21:51

## 2017-01-14 RX ADMIN — Medication 10 ML: at 22:53

## 2017-01-14 NOTE — IP AVS SNAPSHOT
Current Discharge Medication List  
  
Take these medications at their scheduled times Dose & Instructions Dispensing Information Comments Morning Noon Evening Bedtime LYRICA 100 mg capsule Generic drug:  pregabalin Your next dose is: Today, Tomorrow Other:  ____________ Dose:  100 mg Take 100 mg by mouth three (3) times daily. Refills:  0  
     
   
   
   
  
 pantoprazole 40 mg tablet Commonly known as:  PROTONIX Your next dose is: Today, Tomorrow Other:  ____________ Dose:  40 mg Take 1 Tab by mouth Before breakfast and dinner. Quantity:  60 Tab Refills:  0  
     
   
   
   
  
 predniSONE 20 mg tablet Commonly known as:  Jim Lever Your next dose is: Today, Tomorrow Other:  ____________ Dose:  40 mg Take 2 Tabs by mouth daily (with breakfast). Quantity:  30 Tab Refills:  0  
     
   
   
   
  
 temazepam 30 mg capsule Commonly known as:  RESTORIL Your next dose is: Today, Tomorrow Other:  ____________ Dose:  30 mg Take 30 mg by mouth nightly. Refills:  0  
     
   
   
   
  
 VITAMIN B-12 1,000 mcg/mL injection Generic drug:  cyanocobalamin Your next dose is: Today, Tomorrow Other:  ____________ Dose:  1000 mcg  
1,000 mcg by IntraMUSCular route every seven (7) days. Indications: Once weekly Refills:  0 ZEGERID 20-1.1 mg-gram Cap Generic drug:  omeprazole-sodium bicarbonate Your next dose is: Today, Tomorrow Other:  ____________ Dose:  20 mg Take 20 mg by mouth daily. Refills:  0 Take these medications as needed Dose & Instructions Dispensing Information Comments Morning Noon Evening Bedtime  
 cyclobenzaprine 10 mg tablet Commonly known as:  FLEXERIL Your next dose is: Today, Tomorrow Other:  ____________ Dose:  10 mg Take 10 mg by mouth three (3) times daily as needed for Muscle Spasm(s). Refills:  0  
     
   
   
   
  
 diphenhydrAMINE 25 mg tablet Commonly known as:  BENADRYL Your next dose is: Today, Tomorrow Other:  ____________ Dose:  25 mg Take 25 mg by mouth nightly as needed for Sleep. Refills:  0  
     
   
   
   
  
 ondansetron 4 mg disintegrating tablet Commonly known as:  ZOFRAN ODT Your next dose is: Today, Tomorrow Other:  ____________ Dose:  4 mg Take 4 mg by mouth every eight (8) hours as needed for Nausea. Refills:  0  
     
   
   
   
  
 oxyCODONE IR 10 mg Tab immediate release tablet Commonly known as:  Rolla Rice Your next dose is: Today, Tomorrow Other:  ____________ Dose:  10 mg Take 1 Tab by mouth every six (6) hours as needed for up to 14 days. Max Daily Amount: 40 mg.  
 Quantity:  56 Tab Refills:  0  
     
   
   
   
  
 promethazine 25 mg tablet Commonly known as:  PHENERGAN Your next dose is: Today, Tomorrow Other:  ____________ Dose:  25 mg Take 1 Tab by mouth every six (6) hours as needed for Nausea. Quantity:  60 Tab Refills:  0 VALIUM 5 mg tablet Generic drug:  diazePAM  
   
Your next dose is: Today, Tomorrow Other:  ____________ Dose:  5 mg Take 5 mg by mouth every eight (8) hours as needed (bowel relaxation). Refills:  0 Take these medications as directed Dose & Instructions Dispensing Information Comments Morning Noon Evening Bedtime  
 ustekinaumab 90 mg/mL injection Generic drug:  Ustekinumab Your next dose is: Today, Tomorrow Other:  ____________ Dose:  90 mg  
90 mg by SubCUTAneous route. Every 8 weeks Refills:  0

## 2017-01-14 NOTE — IP AVS SNAPSHOT
Jamir Crowder 
 
 
 [address] [place] [address] 
[phone] Patient: Macarena Hidalgo MRN: [id number] You are allergic to the following Allergen Reactions Remicade (Infliximab) Anaphylaxis Shortness of Breath Bactrim (Sulfamethoprim Ds) Other (comments) Increased GI distress. Nsaids (Non-Steroidal Anti-Inflammatory Drug) Other (comments) Stomach ulcers Recent Documentation Height Weight BMI Smoking Status 1.829 m 90.6 kg 27.09 kg/m2 Light Tobacco Smoker Emergency Contacts Name Discharge Info Relation Home Work Mobile [address] CAREGIVER [3] Spouse [3] [phone] About your hospitalization You were admitted on:  January 15, 2017 You last received care in the:  [facility] 4M POST SURG ORT 2 You were discharged on:  2017 Unit phone number:  [phone] Why you were hospitalized Your primary diagnosis was:  Not on File Your diagnoses also included:  Small Bowel Obstruction (Hcc) Providers Seen During Your Hospitalizations Provider Role Specialty Primary office phone Catherine Bonilla DO Attending Provider Emergency Medicine [phone] Jacob Garcia MD Attending Provider General Surgery [phone] Ameena Hermosillo MD Attending Provider Surgery [phone] Your Primary Care Physician (PCP) Primary Care Physician Office Phone Office Fax [place], [address] [phone] Follow-up Information Follow up With Details Comments Contact Info Jasmin Larson MD   [address] [place] [address] [facility] [address] [place] [address] 
[phone] Current Discharge Medication List  
  
CONTINUE these medications which have NOT CHANGED Dose & Instructions Dispensing Information Comments Morning Noon Evening Bedtime cyclobenzaprine 10 mg tablet Commonly known as:  FLEXERIL Your next dose is: Today, Tomorrow Other:  _________ Dose:  10 mg Take 10 mg by mouth three (3) times daily as needed for Muscle Spasm(s). Refills:  0  
     
   
   
   
  
 diphenhydrAMINE 25 mg tablet Commonly known as:  BENADRYL Your next dose is: Today, Tomorrow Other:  _________ Dose:  25 mg Take 25 mg by mouth nightly as needed for Sleep. Refills:  0 LYRICA 100 mg capsule Generic drug:  pregabalin Your next dose is: Today, Tomorrow Other:  _________ Dose:  100 mg Take 100 mg by mouth three (3) times daily. Refills:  0  
     
   
   
   
  
 ondansetron 4 mg disintegrating tablet Commonly known as:  ZOFRAN ODT Your next dose is: Today, Tomorrow Other:  _________ Dose:  4 mg Take 4 mg by mouth every eight (8) hours as needed for Nausea. Refills:  0  
     
   
   
   
  
 oxyCODONE IR 10 mg Tab immediate release tablet Commonly known as:  Franceen Ly Your next dose is: Today, Tomorrow Other:  _________ Dose:  10 mg Take 1 Tab by mouth every six (6) hours as needed for up to 14 days. Max Daily Amount: 40 mg.  
 Quantity:  56 Tab Refills:  0  
     
   
   
   
  
 pantoprazole 40 mg tablet Commonly known as:  PROTONIX Your next dose is: Today, Tomorrow Other:  _________ Dose:  40 mg Take 1 Tab by mouth Before breakfast and dinner. Quantity:  60 Tab Refills:  0  
     
   
   
   
  
 predniSONE 20 mg tablet Commonly known as:  Jim Lever Your next dose is: Today, Tomorrow Other:  _________ Dose:  40 mg Take 2 Tabs by mouth daily (with breakfast). Quantity:  30 Tab Refills:  0  
     
   
   
   
  
 promethazine 25 mg tablet Commonly known as:  PHENERGAN Your next dose is: Today, Tomorrow Other:  _________ Dose:  25 mg Take 1 Tab by mouth every six (6) hours as needed for Nausea. Quantity:  60 Tab Refills:  0  
     
   
   
   
  
 temazepam 30 mg capsule Commonly known as:  RESTORIL Your next dose is: Today, Tomorrow Other:  _________ Dose:  30 mg Take 30 mg by mouth nightly. Refills:  0  
     
   
   
   
  
 ustekinaumab 90 mg/mL injection Generic drug:  Ustekinumab Your next dose is: Today, Tomorrow Other:  _________ Dose:  90 mg  
90 mg by SubCUTAneous route. Every 8 weeks Refills:  0 VALIUM 5 mg tablet Generic drug:  diazePAM  
   
Your next dose is: Today, Tomorrow Other:  _________ Dose:  5 mg Take 5 mg by mouth every eight (8) hours as needed (bowel relaxation). Refills:  0  
     
   
   
   
  
 VITAMIN B-12 1,000 mcg/mL injection Generic drug:  cyanocobalamin Your next dose is: Today, Tomorrow Other:  _________ Dose:  1000 mcg  
1,000 mcg by IntraMUSCular route every seven (7) days. Indications: Once weekly Refills:  0 ZEGERID 20-1.1 mg-gram Cap Generic drug:  omeprazole-sodium bicarbonate Your next dose is: Today, Tomorrow Other:  _________ Dose:  20 mg Take 20 mg by mouth daily. Refills:  0 Discharge Instructions Pt to resume diet and follow up the GI doctor if symptoms return. Discharge Orders None Introducing [facility]! Dear Edda Rock: Thank you for requesting a [employer] account. Our records indicate that you already have an active [employer] account. You can access your account anytime at [email or web address] Did you know that you can access your hospital and ER discharge instructions at any time in [employer]? You can also review all of your test results from your hospital stay or ER visit. Additional Information If you have questions, please visit the Frequently Asked Questions section of the MyChart website at [email or web address] Remember, MyChart is NOT to be used for urgent needs. For medical emergencies, dial 911. Now available from your iPhone and Android! General Information Please provide this summary of care documentation to your next provider. Patient Signature:  ____________________________________________________________ Date:  ____________________________________________________________  
  
Ramez Abdi Provider Signature:  ____________________________________________________________ Date:  ____________________________________________________________

## 2017-01-15 ENCOUNTER — APPOINTMENT (OUTPATIENT)
Dept: GENERAL RADIOLOGY | Age: 42
DRG: 392 | End: 2017-01-15
Attending: EMERGENCY MEDICINE
Payer: COMMERCIAL

## 2017-01-15 PROBLEM — K56.609 SMALL BOWEL OBSTRUCTION (HCC): Status: ACTIVE | Noted: 2017-01-15

## 2017-01-15 LAB
ANION GAP BLD CALC-SCNC: 8 MMOL/L (ref 5–15)
BASOPHILS # BLD AUTO: 0 K/UL (ref 0–0.1)
BASOPHILS # BLD: 0 % (ref 0–1)
BUN SERPL-MCNC: 15 MG/DL (ref 6–20)
BUN/CREAT SERPL: 18 (ref 12–20)
CALCIUM SERPL-MCNC: 8.8 MG/DL (ref 8.5–10.1)
CHLORIDE SERPL-SCNC: 97 MMOL/L (ref 97–108)
CO2 SERPL-SCNC: 30 MMOL/L (ref 21–32)
CREAT SERPL-MCNC: 0.85 MG/DL (ref 0.7–1.3)
DIFFERENTIAL METHOD BLD: ABNORMAL
EOSINOPHIL # BLD: 0 K/UL (ref 0–0.4)
EOSINOPHIL NFR BLD: 0 % (ref 0–7)
ERYTHROCYTE [DISTWIDTH] IN BLOOD BY AUTOMATED COUNT: 17 % (ref 11.5–14.5)
GLUCOSE SERPL-MCNC: 158 MG/DL (ref 65–100)
HCT VFR BLD AUTO: 40.2 % (ref 36.6–50.3)
HGB BLD-MCNC: 13.5 G/DL (ref 12.1–17)
LYMPHOCYTES # BLD AUTO: 1 % (ref 12–49)
LYMPHOCYTES # BLD: 0.3 K/UL (ref 0.8–3.5)
MAGNESIUM SERPL-MCNC: 2 MG/DL (ref 1.6–2.4)
MCH RBC QN AUTO: 31 PG (ref 26–34)
MCHC RBC AUTO-ENTMCNC: 33.6 G/DL (ref 30–36.5)
MCV RBC AUTO: 92.4 FL (ref 80–99)
MONOCYTES # BLD: 0.6 K/UL (ref 0–1)
MONOCYTES NFR BLD AUTO: 2 % (ref 5–13)
MYELOCYTES NFR BLD MANUAL: 1 %
NEUTS SEG # BLD: 31.1 K/UL (ref 1.8–8)
NEUTS SEG NFR BLD AUTO: 96 % (ref 32–75)
PHOSPHATE SERPL-MCNC: 5.1 MG/DL (ref 2.6–4.7)
PLATELET # BLD AUTO: 246 K/UL (ref 150–400)
POTASSIUM SERPL-SCNC: 4.6 MMOL/L (ref 3.5–5.1)
RBC # BLD AUTO: 4.35 M/UL (ref 4.1–5.7)
RBC MORPH BLD: ABNORMAL
RBC MORPH BLD: ABNORMAL
SODIUM SERPL-SCNC: 135 MMOL/L (ref 136–145)
WBC # BLD AUTO: 32.4 K/UL (ref 4.1–11.1)

## 2017-01-15 PROCEDURE — 85025 COMPLETE CBC W/AUTO DIFF WBC: CPT | Performed by: SURGERY

## 2017-01-15 PROCEDURE — 77030010541

## 2017-01-15 PROCEDURE — 83735 ASSAY OF MAGNESIUM: CPT | Performed by: SURGERY

## 2017-01-15 PROCEDURE — 74011250636 HC RX REV CODE- 250/636: Performed by: EMERGENCY MEDICINE

## 2017-01-15 PROCEDURE — 74011250636 HC RX REV CODE- 250/636: Performed by: SURGERY

## 2017-01-15 PROCEDURE — 74011250636 HC RX REV CODE- 250/636

## 2017-01-15 PROCEDURE — 84100 ASSAY OF PHOSPHORUS: CPT | Performed by: SURGERY

## 2017-01-15 PROCEDURE — 36415 COLL VENOUS BLD VENIPUNCTURE: CPT | Performed by: SURGERY

## 2017-01-15 PROCEDURE — 65270000029 HC RM PRIVATE

## 2017-01-15 PROCEDURE — 77030032490 HC SLV COMPR SCD KNE COVD -B

## 2017-01-15 PROCEDURE — 80048 BASIC METABOLIC PNL TOTAL CA: CPT | Performed by: SURGERY

## 2017-01-15 PROCEDURE — 74011000250 HC RX REV CODE- 250

## 2017-01-15 PROCEDURE — 74011250637 HC RX REV CODE- 250/637: Performed by: SURGERY

## 2017-01-15 PROCEDURE — 74000 XR ABD (KUB): CPT

## 2017-01-15 PROCEDURE — 74011636320 HC RX REV CODE- 636/320: Performed by: RADIOLOGY

## 2017-01-15 RX ORDER — HYDROMORPHONE HYDROCHLORIDE 1 MG/ML
1 INJECTION, SOLUTION INTRAMUSCULAR; INTRAVENOUS; SUBCUTANEOUS
Status: DISCONTINUED | OUTPATIENT
Start: 2017-01-15 | End: 2017-01-16

## 2017-01-15 RX ORDER — DIAZEPAM 5 MG/1
5 TABLET ORAL
Status: DISCONTINUED | OUTPATIENT
Start: 2017-01-15 | End: 2017-01-16 | Stop reason: HOSPADM

## 2017-01-15 RX ORDER — DIPHENHYDRAMINE HYDROCHLORIDE 50 MG/ML
12.5 INJECTION, SOLUTION INTRAMUSCULAR; INTRAVENOUS
Status: DISCONTINUED | OUTPATIENT
Start: 2017-01-15 | End: 2017-01-16 | Stop reason: HOSPADM

## 2017-01-15 RX ORDER — PROCHLORPERAZINE EDISYLATE 5 MG/ML
10 INJECTION INTRAMUSCULAR; INTRAVENOUS
Status: DISCONTINUED | OUTPATIENT
Start: 2017-01-15 | End: 2017-01-16 | Stop reason: HOSPADM

## 2017-01-15 RX ORDER — HYDROMORPHONE HYDROCHLORIDE 1 MG/ML
0.5 INJECTION, SOLUTION INTRAMUSCULAR; INTRAVENOUS; SUBCUTANEOUS
Status: DISCONTINUED | OUTPATIENT
Start: 2017-01-15 | End: 2017-01-16

## 2017-01-15 RX ORDER — HYDROMORPHONE HYDROCHLORIDE 1 MG/ML
1 INJECTION, SOLUTION INTRAMUSCULAR; INTRAVENOUS; SUBCUTANEOUS ONCE
Status: COMPLETED | OUTPATIENT
Start: 2017-01-15 | End: 2017-01-15

## 2017-01-15 RX ORDER — SODIUM CHLORIDE 0.9 % (FLUSH) 0.9 %
5-10 SYRINGE (ML) INJECTION EVERY 8 HOURS
Status: DISCONTINUED | OUTPATIENT
Start: 2017-01-15 | End: 2017-01-16 | Stop reason: HOSPADM

## 2017-01-15 RX ORDER — ONDANSETRON 2 MG/ML
4 INJECTION INTRAMUSCULAR; INTRAVENOUS
Status: COMPLETED | OUTPATIENT
Start: 2017-01-15 | End: 2017-01-15

## 2017-01-15 RX ORDER — TEMAZEPAM 15 MG/1
30 CAPSULE ORAL
Status: DISCONTINUED | OUTPATIENT
Start: 2017-01-15 | End: 2017-01-16 | Stop reason: HOSPADM

## 2017-01-15 RX ORDER — SODIUM CHLORIDE 0.9 % (FLUSH) 0.9 %
5-10 SYRINGE (ML) INJECTION AS NEEDED
Status: DISCONTINUED | OUTPATIENT
Start: 2017-01-15 | End: 2017-01-16 | Stop reason: HOSPADM

## 2017-01-15 RX ORDER — ENOXAPARIN SODIUM 100 MG/ML
40 INJECTION SUBCUTANEOUS EVERY 24 HOURS
Status: DISCONTINUED | OUTPATIENT
Start: 2017-01-15 | End: 2017-01-16 | Stop reason: HOSPADM

## 2017-01-15 RX ORDER — ONDANSETRON 2 MG/ML
4 INJECTION INTRAMUSCULAR; INTRAVENOUS
Status: DISCONTINUED | OUTPATIENT
Start: 2017-01-15 | End: 2017-01-16 | Stop reason: HOSPADM

## 2017-01-15 RX ORDER — NALOXONE HYDROCHLORIDE 0.4 MG/ML
0.4 INJECTION, SOLUTION INTRAMUSCULAR; INTRAVENOUS; SUBCUTANEOUS AS NEEDED
Status: DISCONTINUED | OUTPATIENT
Start: 2017-01-15 | End: 2017-01-16 | Stop reason: HOSPADM

## 2017-01-15 RX ORDER — HYDROMORPHONE HYDROCHLORIDE 1 MG/ML
INJECTION, SOLUTION INTRAMUSCULAR; INTRAVENOUS; SUBCUTANEOUS
Status: COMPLETED
Start: 2017-01-15 | End: 2017-01-15

## 2017-01-15 RX ORDER — PREGABALIN 50 MG/1
100 CAPSULE ORAL 3 TIMES DAILY
Status: DISCONTINUED | OUTPATIENT
Start: 2017-01-15 | End: 2017-01-16 | Stop reason: HOSPADM

## 2017-01-15 RX ADMIN — PROCHLORPERAZINE EDISYLATE 10 MG: 5 INJECTION INTRAMUSCULAR; INTRAVENOUS at 23:54

## 2017-01-15 RX ADMIN — HYDROMORPHONE HYDROCHLORIDE 1 MG: 1 INJECTION, SOLUTION INTRAMUSCULAR; INTRAVENOUS; SUBCUTANEOUS at 01:22

## 2017-01-15 RX ADMIN — IOPAMIDOL 100 ML: 755 INJECTION, SOLUTION INTRAVENOUS at 00:28

## 2017-01-15 RX ADMIN — BENZOCAINE: 200 SPRAY DENTAL; ORAL; PERIODONTAL at 01:23

## 2017-01-15 RX ADMIN — SODIUM CHLORIDE 150 ML/HR: 900 INJECTION, SOLUTION INTRAVENOUS at 18:21

## 2017-01-15 RX ADMIN — ENOXAPARIN SODIUM 40 MG: 40 INJECTION SUBCUTANEOUS at 08:55

## 2017-01-15 RX ADMIN — PROCHLORPERAZINE EDISYLATE 10 MG: 5 INJECTION INTRAMUSCULAR; INTRAVENOUS at 11:52

## 2017-01-15 RX ADMIN — HYDROMORPHONE HYDROCHLORIDE 1 MG: 1 INJECTION, SOLUTION INTRAMUSCULAR; INTRAVENOUS; SUBCUTANEOUS at 11:46

## 2017-01-15 RX ADMIN — TEMAZEPAM 30 MG: 15 CAPSULE ORAL at 23:54

## 2017-01-15 RX ADMIN — ONDANSETRON 4 MG: 2 INJECTION INTRAMUSCULAR; INTRAVENOUS at 03:23

## 2017-01-15 RX ADMIN — HYDROMORPHONE HYDROCHLORIDE 0.5 MG: 1 INJECTION, SOLUTION INTRAMUSCULAR; INTRAVENOUS; SUBCUTANEOUS at 03:23

## 2017-01-15 RX ADMIN — HYDROMORPHONE HYDROCHLORIDE 1 MG: 1 INJECTION, SOLUTION INTRAMUSCULAR; INTRAVENOUS; SUBCUTANEOUS at 05:20

## 2017-01-15 RX ADMIN — PROCHLORPERAZINE EDISYLATE 10 MG: 5 INJECTION INTRAMUSCULAR; INTRAVENOUS at 17:49

## 2017-01-15 RX ADMIN — DIAZEPAM 5 MG: 5 TABLET ORAL at 21:44

## 2017-01-15 RX ADMIN — ONDANSETRON 4 MG: 2 INJECTION INTRAMUSCULAR; INTRAVENOUS at 08:55

## 2017-01-15 RX ADMIN — HYDROMORPHONE HYDROCHLORIDE 1 MG: 1 INJECTION, SOLUTION INTRAMUSCULAR; INTRAVENOUS; SUBCUTANEOUS at 14:46

## 2017-01-15 RX ADMIN — ONDANSETRON 4 MG: 2 INJECTION INTRAMUSCULAR; INTRAVENOUS at 19:34

## 2017-01-15 RX ADMIN — ONDANSETRON 4 MG: 2 INJECTION INTRAMUSCULAR; INTRAVENOUS at 14:46

## 2017-01-15 RX ADMIN — HYDROMORPHONE HYDROCHLORIDE 1 MG: 1 INJECTION, SOLUTION INTRAMUSCULAR; INTRAVENOUS; SUBCUTANEOUS at 20:48

## 2017-01-15 RX ADMIN — PROCHLORPERAZINE EDISYLATE 10 MG: 5 INJECTION INTRAMUSCULAR; INTRAVENOUS at 05:20

## 2017-01-15 RX ADMIN — PREGABALIN 100 MG: 50 CAPSULE ORAL at 22:59

## 2017-01-15 RX ADMIN — HYDROMORPHONE HYDROCHLORIDE 1 MG: 1 INJECTION, SOLUTION INTRAMUSCULAR; INTRAVENOUS; SUBCUTANEOUS at 17:49

## 2017-01-15 RX ADMIN — SODIUM CHLORIDE 150 ML/HR: 900 INJECTION, SOLUTION INTRAVENOUS at 05:28

## 2017-01-15 RX ADMIN — Medication 10 ML: at 06:37

## 2017-01-15 RX ADMIN — ONDANSETRON 4 MG: 2 INJECTION INTRAMUSCULAR; INTRAVENOUS at 02:06

## 2017-01-15 RX ADMIN — Medication 10 ML: at 23:00

## 2017-01-15 RX ADMIN — PREGABALIN 100 MG: 50 CAPSULE ORAL at 18:21

## 2017-01-15 RX ADMIN — HYDROMORPHONE HYDROCHLORIDE 1 MG: 1 INJECTION, SOLUTION INTRAMUSCULAR; INTRAVENOUS; SUBCUTANEOUS at 23:54

## 2017-01-15 RX ADMIN — SODIUM CHLORIDE 150 ML/HR: 900 INJECTION, SOLUTION INTRAVENOUS at 11:53

## 2017-01-15 RX ADMIN — HYDROMORPHONE HYDROCHLORIDE 1 MG: 1 INJECTION, SOLUTION INTRAMUSCULAR; INTRAVENOUS; SUBCUTANEOUS at 08:46

## 2017-01-15 NOTE — ED TRIAGE NOTES
Patient complains of abdominal pain and thinks he may have a bowel obstruction. Patient has chron's disease.

## 2017-01-15 NOTE — ROUTINE PROCESS
Chart entered for the following reason(s):  Reason chart accessed ü All that apply   Bed placement criteria, acuity or order review x   Patient/Family Complaint    Code Blue/Rapid Response    Follow up for patient/family event, concern or complaint    Information for further documentation    Patient disposition information for bed planning/throughput/staffing x   In error

## 2017-01-15 NOTE — ED PROVIDER NOTES
HPI Comments: 37 y.o. male with past medical history significant for ulcerative colitis s/p total proctocolectomy with ileoanal J-pouch and loop ileostomy, SBO s/p resection, cholecystectomy, hiatal hernia, GERD, and anal stenosis and fistula s/p anal fistulotomy who presents ambulatory from home accompanied by his wife with chief complaint of diffuse abdominal pain. Pt states he has been battling a Crohn's flare for the past 1.5 months. He recently saw his GI Dr. Viola Arevalo at [facility] and had his prednisone decreased to 30 mg daily and was started on 6-MP, which he has not taken yet. He was also placed on a raw vegetable diet and was following the diet with minimal, but controllable daily abdominal pain. However, about 6.5 hours, he states his abdominal pain significantly increased followed by onset of nausea and vomiting, with 2 episodes of vomiting today. He reports his pain has been gradually worsening since this afternoon and is currently an 8/10 diffuse pain that is worse in the RLQ. He has an ostomy in place and reports minimal output today as well as tightness and hardness to the area surrounding the ostomy. He has a hx of bowel obstruction and states current symptoms feel similar. He has taken oxycodone, phernegan, zofran, and compazine today with no relief. There are no other acute medical concerns at this time. Old chart review: The pt was hospitalized 5 times in the past 2 months for Crohn's exacerbation. His last admission was from 1/1-1/5/17 for intractable nausea/vomiting/abdominal pain s/p recent laparoscopic cholecystectomy, Crohn's colitis, and SIRS. He was discharged home on Prednisone 40 mg, Oxycodone 10 mg q 6 hrs, and Solumedrol. Social hx: Current smoker; no EtOH use; no illicit drug use. PCP: Kenneth Ochoa MD  GI: Payal Butler MD    Note written by Aide Tate, as dictated by Fernanda Orantes,  9:28 PM      The history is provided by the patient.         Past Medical History:   Diagnosis Date    Anal fistula     Anal stenosis     [facility]'s disease ([facility] [address]     GERD (gastroesophageal reflux disease)     H/O ulcerative colitis      Symptoms began in 1996. Total proctocolectomy was performed in 2004. Pateint later developed Crohn's disease.  Hiatal hernia     Ileal pouchitis ([facility] [address] 5/2004    Nausea & vomiting      Combination of Scopalamine patch & Zofran IV worked well in past    Numbness in right leg     Psychiatric disorder      depression and anxiety    Skin lesion of back 3/17/2014    Syncopal episodes        Past Surgical History:   Procedure Laterality Date    Pr abdomen surgery proc unlisted  3/25/2004     Total proctocolectomy with creation of ileoanal J-pouch and loop ileostomy; Dr. Laurie Salazar.   orthopaedic  2008     Left ACL repair     gi  5/2/2004     Examination under anesthesia with endoscopic evaluation of ileoanal pouch and placement of drain; Dr. Laurie Salazar.   gi  5/25/2004     Ileostomy closure with small bowel resection and anastomosis; Dr. Laurie Salazar.   gi  5/24/2012     Examination under anesthesia, anal dilatation, anal biopsy, and placement of draining seton; Dr. Laurie Salazar.   gi  7/3/2012     Incision and drainage of perirectal abscess and rigid endoscopy of ileoanal pouch; Dr. Laurie Salazar.   gi  7/31/2012     Incision and drainage of perirectal abscess, placement of draining seton, and anal dilatation; Dr. Laurie Salazar.   gi  1/22/2013     Repair of anal fistulas with debridement, partial fistulectomy, and flap closure; Dr. Laurie Salazar.   gi  5/17/2013     Examination under anesthesia, partial fistulotomy,  and placement of draining setons; Dr. Laurie Salazar.   gi  8/6/2013     Anal fistulotomy and application of ACell micromatrix; Dr. Laurie Salazar.   gi  2/20/2014     Examination under anesthesia and dilation of anal canal with rigid proctoscopy;  Susana Arias MD.   Quintin Singh  gi  4/29/2015 Creation of [place] continent intestinal reservoir (BCIR), abdominoperineal resection of ileoanal J-pouch; lysis of adhesions, and gastrostomy; Hudson Espinoza MD ([place], [place])    Hx gi  8/7/2015     Stoma revision; Russel Silverio. Juan C Espinoza MD ([place], [place])    Hx gi  10/29/2015     Extensive lysis of adhesions, resection of continent intestinal reservoir, repair of serosal tears, and creation of end-ileostomy; Dr. Meghann Caldwell. Family History:   Problem Relation Age of Onset    Cancer Father     Asthma Neg Hx     Diabetes Neg Hx     Heart Disease Neg Hx     Hypertension Neg Hx     Stroke Neg Hx     Malignant Hyperthermia Neg Hx     Pseudocholinesterase Deficiency Neg Hx     Delayed Awakening Neg Hx     Post-op Nausea/Vomiting Neg Hx        Social History     Social History    Marital status:      Spouse name: N/A    Number of children: N/A    Years of education: N/A     Occupational History    Not on file. Social History Main Topics    Smoking status: Light Tobacco Smoker     Packs/day: 0.50     Years: 7.00    Smokeless tobacco: Never Used    Alcohol use No    Drug use: No    Sexual activity: Not on file     Other Topics Concern    Not on file     Social History Narrative     ALLERGIES: Remicade [infliximab]; Bactrim [sulfamethoprim ds]; and Nsaids (non-steroidal anti-inflammatory drug)    Review of Systems   Constitutional: Negative for appetite change, chills, fever and unexpected weight change. HENT: Negative for ear pain, hearing loss, rhinorrhea and trouble swallowing. Eyes: Negative for pain and visual disturbance. Respiratory: Negative for cough, chest tightness and shortness of breath. Cardiovascular: Negative for chest pain and palpitations. Gastrointestinal: Positive for abdominal pain, constipation, nausea and vomiting. Negative for abdominal distention and blood in stool. Genitourinary: Negative for dysuria, hematuria and urgency. Musculoskeletal: Negative for back pain and myalgias. Skin: Negative for rash. Neurological: Negative for dizziness, syncope, weakness and numbness. Psychiatric/Behavioral: Negative for confusion and suicidal ideas. All other systems reviewed and are negative. Vitals:    01/14/17 2126   BP: 138/85   Pulse: (!) 124   Temp: 98 °F (36.7 °C)   SpO2: 98%   Weight: 90.6 kg (199 lb 11.8 oz)   Height: 6' (1.829 m)            Physical Exam   Constitutional: He is oriented to person, place, and time. He appears well-developed and well-nourished. No distress. HENT:   Head: Normocephalic and atraumatic. Right Ear: External ear normal.   Left Ear: External ear normal.   Nose: Nose normal.   Mouth/Throat: Mucous membranes are dry. No oropharyngeal exudate. Eyes: Conjunctivae and EOM are normal. Pupils are equal, round, and reactive to light. Right eye exhibits no discharge. Left eye exhibits no discharge. No scleral icterus. Neck: Normal range of motion. Neck supple. No JVD present. No tracheal deviation present. Cardiovascular: Regular rhythm, normal heart sounds and intact distal pulses. Tachycardia present. Exam reveals no gallop and no friction rub. No murmur heard. Pulmonary/Chest: Effort normal and breath sounds normal. No stridor. No respiratory distress. He has no decreased breath sounds. He has no wheezes. He has no rhonchi. He has no rales. He exhibits no tenderness. Abdominal: Soft. He exhibits distension (minimal). Bowel sounds are increased. There is generalized tenderness. There is no rebound and no guarding. Abdomen is mildly tympanitic. Musculoskeletal: Normal range of motion. He exhibits no edema or tenderness. Neurological: He is alert and oriented to person, place, and time. He has normal strength and normal reflexes. No cranial nerve deficit or sensory deficit. He exhibits normal muscle tone. Coordination normal. GCS eye subscore is 4. GCS verbal subscore is 5.  GCS motor subscore is 6. Skin: Skin is warm and dry. No rash noted. He is not diaphoretic. No erythema. No pallor. Psychiatric: He has a normal mood and affect. His behavior is normal. Judgment and thought content normal.   Nursing note and vitals reviewed. Note written by Azael Auguste. Aleksandra Tim, as dictated by Susana Rosen DO 9:28 PM    MDM  Number of Diagnoses or Management Options  Abdominal pain, generalized:   History of Crohn's disease:   Small bowel obstruction [facility]):      Amount and/or Complexity of Data Reviewed  Clinical lab tests: ordered and reviewed  Tests in the radiology section of CPT®: ordered and reviewed  Discuss the patient with other providers: yes (General surgery, Colorectal surgery)    Risk of Complications, Morbidity, and/or Mortality  Presenting problems: moderate  Diagnostic procedures: moderate  Management options: moderate    Patient Progress  Patient progress: stable    ED Course       Procedures    CONSULT NOTE:  1:19 AM Susana Rosen DO spoke with Dr. Rebecca Lam for Surgery. Discussed available diagnostic tests and clinical findings. He is in agreement with care plans as outlined. Dr. Gibson Bolivar will see and admit pt for further evaluation of treatment. CONSULT NOTE:  1:42 AM Susana Rosen DO spoke with Dr. Rebecca Lam for Surgery. Dr. Gibson Bolivar recommends consult with Dr. Maritza Jean to discuss pt's condition. CONSULT NOTE:  1:52 AM Susana Rosen DO spoke with Dr. Maritza Jean, Consult for Surgery. He is in a solo practice with no one else covering. Since the last group operated on the patient within the last two months, Dr. Maritza Jean recommends they admit the patient and have GI see him. CONSULT NOTE:  1:57 AM Susana Rosen DO spoke with Dr. Rebecca Lam for Surgery. Discussed available diagnostic tests and clinical findings. He is in agreement with care plans as outlined.   Dr. Gibson Bolivar will see and admit pt for further evaluation of treatment. Chief Complaint   Patient presents with    Abdominal Pain       2:31 AM  The patients presenting problems have been discussed, and they are in agreement with the care plan formulated and outlined with them. I have encouraged them to ask questions as they arise throughout their visit.     MEDICATIONS GIVEN:  Medications   sodium chloride (NS) flush 5-10 mL (10 mL IntraVENous Given 1/14/17 2253)   sodium chloride (NS) flush 5-10 mL (not administered)   0.9% sodium chloride infusion (150 mL/hr IntraVENous Continued On Admission 1/15/17 0217)   benzocaine (HURRICANE) 20 % spray ( Mucous Membrane Given 1/15/17 0123)   sodium chloride 0.9 % bolus infusion 1,000 mL (0 mL IntraVENous IV Completed 1/14/17 2253)   HYDROmorphone (PF) (DILAUDID) injection 1 mg (1 mg IntraVENous Given 1/14/17 2151)   ondansetron (ZOFRAN) injection 4 mg (4 mg IntraVENous Given 1/14/17 2150)   methylPREDNISolone (PF) (SOLU-MEDROL) injection 125 mg (125 mg IntraVENous Given 1/14/17 2244)   diatrizoate meglumine-d.sodium (MD-GASTROVIEW,GASTROGRAFIN) 66-10 % contrast solution 30 mL (30 mL Oral Given 1/14/17 2248)   metoclopramide HCl (REGLAN) injection 10 mg (10 mg IntraVENous Given 1/14/17 2243)   HYDROmorphone (PF) (DILAUDID) injection 1 mg (1 mg IntraVENous Given 1/14/17 2252)   iopamidol (ISOVUE-370) 76 % injection 100 mL (100 mL IntraVENous Given 1/15/17 0028)   HYDROmorphone (PF) (DILAUDID) injection 1 mg (1 mg IntraVENous Given 1/15/17 0122)   ondansetron (ZOFRAN) injection 4 mg (4 mg IntraVENous Given 1/15/17 0206)       LABS REVIEWED:  Recent Results (from the past 24 hour(s))   CBC WITH AUTOMATED DIFF    Collection Time: 01/14/17  9:47 PM   Result Value Ref Range    WBC 22.4 (H) 4.1 - 11.1 K/uL    RBC 4.34 4.10 - 5.70 M/uL    HGB 13.3 12.1 - 17.0 g/dL    HCT 39.7 36.6 - 50.3 %    MCV 91.5 80.0 - 99.0 FL    MCH 30.6 26.0 - 34.0 PG    MCHC 33.5 30.0 - 36.5 g/dL    RDW 16.7 (H) 11.5 - 14.5 %    PLATELET 859 163 - 411 K/uL    NEUTROPHILS 91 (H) 32 - 75 %    LYMPHOCYTES 5 (L) 12 - 49 %    MONOCYTES 4 (L) 5 - 13 %    EOSINOPHILS 0 0 - 7 %    BASOPHILS 0 0 - 1 %    ABS. NEUTROPHILS 20.4 (H) 1.8 - 8.0 K/UL    ABS. LYMPHOCYTES 1.1 0.8 - 3.5 K/UL    ABS. MONOCYTES 0.9 0.0 - 1.0 K/UL    ABS. EOSINOPHILS 0.0 0.0 - 0.4 K/UL    ABS. BASOPHILS 0.0 0.0 - 0.1 K/UL    DF SMEAR SCANNED      RBC COMMENTS ANISOCYTOSIS  1+        RBC COMMENTS MACROCYTOSIS  1+       METABOLIC PANEL, COMPREHENSIVE    Collection Time: 01/14/17  9:47 PM   Result Value Ref Range    Sodium 136 136 - 145 mmol/L    Potassium 4.4 3.5 - 5.1 mmol/L    Chloride 99 97 - 108 mmol/L    CO2 30 21 - 32 mmol/L    Anion gap 7 5 - 15 mmol/L    Glucose 142 (H) 65 - 100 mg/dL    BUN 17 6 - 20 MG/DL    Creatinine 0.97 0.70 - 1.30 MG/DL    BUN/Creatinine ratio 18 12 - 20      GFR est AA >60 >60 ml/min/1.73m2    GFR est non-AA >60 >60 ml/min/1.73m2    Calcium 8.9 8.5 - 10.1 MG/DL    Bilirubin, total 0.3 0.2 - 1.0 MG/DL    ALT 69 12 - 78 U/L    AST 41 (H) 15 - 37 U/L    Alk.  phosphatase 76 45 - 117 U/L    Protein, total 6.7 6.4 - 8.2 g/dL    Albumin 3.5 3.5 - 5.0 g/dL    Globulin 3.2 2.0 - 4.0 g/dL    A-G Ratio 1.1 1.1 - 2.2     LIPASE    Collection Time: 01/14/17  9:47 PM   Result Value Ref Range    Lipase 174 73 - 393 U/L       VITAL SIGNS:  Patient Vitals for the past 12 hrs:   Temp Pulse BP SpO2   01/15/17 0215 - - 121/89 93 %   01/15/17 0200 - - 123/79 93 %   01/15/17 0115 - - 133/75 91 %   01/15/17 0100 - - 123/81 92 %   01/15/17 0045 - - 125/69 91 %   01/15/17 0034 - - 131/75 94 %   01/15/17 0015 - - 126/85 96 %   01/15/17 0000 - - 130/79 94 %   01/14/17 2345 - - 119/80 95 %   01/14/17 2315 - - 126/81 94 %   01/14/17 2300 - - 117/72 95 %   01/14/17 2245 - - 114/72 95 %   01/14/17 2126 98 °F (36.7 °C) (!) 124 138/85 98 %       RADIOLOGY RESULTS:  The following have been ordered and reviewed:  XR ABD (KUB)   Final Result      CT ABD PELV W CONT   Final Result      XR ABD ACUTE W 1 V CHEST   Final Result        Study Result      EXAM: XR ABD ACUTE W 1 V CHEST     INDICATION: abdominal pain, h/o crohn's and SBO     COMPARISON: Chest 11/26/2016. CT abdomen 1/1/2017.     FINDINGS: The upright chest radiograph demonstrates clear lungs and normal  cardiac and mediastinal contours. There is no pleural effusion or free air under  the diaphragm.     Supine and upright views of the abdomen demonstrate there are multiple air-fluid  levels in the central abdomen with moderately dilated small bowel. Fecal-containing bowel loop noted in the left abdomen and overlying the pelvis. There is a large amount of fluid in the stomach. There is no free  intraperitoneal air. No soft tissue masses or pathologic calcifications are  identified. The bones are within normal limits.     IMPRESSION  IMPRESSION:   1. Bowel gas pattern is consistent with at least partial small bowel  obstruction. No free air. 2. Lungs clear. Study Result      INDICATION: Partial SBO Crohn's disease.     COMPARISON: 1/1/2017     TECHNIQUE:   Following the uneventful intravenous administration of 96 cc Isovue-370, thin  axial images were obtained through the abdomen and pelvis. Coronal and sagittal  reconstructions were generated. Oral contrast was administered. CT dose  reduction was achieved through use of a standardized protocol tailored for this  examination and automatic exposure control for dose modulation.     FINDINGS:   LUNG BASES: Clear. LIVER: No mass or biliary dilatation. GALLBLADDER: Absent  SPLEEN: No mass. PANCREAS: No mass or ductal dilatation. ADRENALS: Unremarkable. KIDNEYS: No mass, calculus, or hydronephrosis. GI: There is marked distention of the stomach with oral contrast material. This  has not passed out of the stomach and there is retained ingested material in the  stomach. The small bowel demonstrates marked distention and fluid levels.  The  more distal distended small bowel does contain fecal-like material. The bowel is  moderately distended to the level of the ostomy in the right lower quadrant  medially. There is more distention of the more proximal loops than the distal  loops, however a definite abrupt transition point cannot be identified. Previous  colectomy noted  APPENDIX: Absent  PERITONEUM: No ascites or pneumoperitoneum. RETROPERITONEUM: No lymphadenopathy or aortic aneurysm. REPRODUCTIVE ORGANS: Unremarkable  URINARY BLADDER: Urinary bladder is distended. BONES: No destructive bone lesion. ADDITIONAL COMMENTS: N/A     IMPRESSION  IMPRESSION:  There is marked distention of the small bowel with fecal-like material in the  more distal small bowel near to the ileostomy site. The possibility of  obstruction at the ileostomy may be considered since no other abrupt size  transition in the small bowel is identified. There is no abnormal fluid  collection in the abdomen. Study Result      Abdomen, single view     INDICATION: Enteric tube placement     COMPARISON: Earlier study yesterday.     FINDINGS: A supine radiograph of the abdomen shows the tip of an NG tube in the  stomach. There is moderate distention of small bowel loops.     IMPRESSION  IMPRESSION:        Satisfactory placement of the NG tube. .      CONSULTATIONS:   General surgery, colorectal surgery. PROGRESS NOTES:  Discussed results and plan with patient. Patient will be admitted/observed for further evaluation and treatment. DIAGNOSIS:    1. Small bowel obstruction (HCC)    2. Abdominal pain, generalized    3. History of Crohn's disease        PLAN:  Admit/obs    ED COURSE: The patients hospital course has been uncomplicated.

## 2017-01-15 NOTE — PROGRESS NOTES
Bedside shift change report given to Samira Brooks RN (oncoming nurse) by Dionne Vidal RN (offgoing nurse). Report included the following information SBAR, Kardex, Procedure Summary, Intake/Output, MAR and Recent Results.

## 2017-01-15 NOTE — ED NOTES
TRANSFER - OUT REPORT:    Verbal report given to Blossom BRUSH RN(name) on Ala Loss  being transferred to room 434 (unit) for routine progression of care       Report consisted of patients Situation, Background, Assessment and   Recommendations(SBAR). Information from the following report(s) SBAR, Kardex, ED Summary, [facility] and Recent Results was reviewed with the receiving nurse. Lines:   Peripheral IV 01/14/17 Left Arm (Active)   Site Assessment Clean, dry, & intact 1/14/2017  9:48 PM   Phlebitis Assessment 0 1/14/2017  9:48 PM   Infiltration Assessment 0 1/14/2017  9:48 PM   Dressing Status Clean, dry, & intact 1/14/2017  9:48 PM   Dressing Type Tape;Transparent 1/14/2017  9:48 PM   Hub Color/Line Status Pink;Flushed;Patent 1/14/2017  9:48 PM   Action Taken Blood drawn 1/14/2017  9:48 PM        Opportunity for questions and clarification was provided.       Patient transported with:  NG Tube

## 2017-01-15 NOTE — ED NOTES
Bedside and Verbal shift change report given to Fermin RN (oncoming nurse) by Henok Rey RN (offgoing nurse). Report included the following information SBAR, Kardex, MAR, Med Rec Status and Cardiac Rhythm nsr.

## 2017-01-15 NOTE — H&P
Surgery Consult    Subjective:      Bereket Suggs is a 37 y.o. male who admitted overnight by Dr Gracie Ocasio for abdominal pain, nausea and no ostomy output. The pain was located diffusely. Pain is described as aching and pressure and measures 9/10 in intensity on admission but has essentially resolved now. He had an NG placed in the ER. NG output has been large volume. A few hours ago he passed a large hard stool ball from his ileostomy. Since then he has had normal ileostomy output with liquid stool and gas. He states his nausea is gone. Patient Active Problem List    Diagnosis Date Noted    Small bowel obstruction ([address] 01/15/2017    Intractable vomiting with nausea 01/01/2017    Anxiety and depression 12/25/2016    Dehydration 12/25/2016    S/P laparoscopic cholecystectomy 12/21/2016    Crohn's colitis ([address] 12/18/2016    Abdominal pain 11/29/2016    GERD (gastroesophageal reflux disease) 11/20/2016     Past Medical History   Diagnosis Date    Anal fistula     Anal stenosis     Crohn's disease ([address]     GERD (gastroesophageal reflux disease)     H/O ulcerative colitis      Symptoms began in 1996. Total proctocolectomy was performed in 2004. Pateint later developed Crohn's disease.  Hiatal hernia     Ileal pouchitis ([address] 5/2004    Nausea & vomiting      Combination of Scopalamine patch & Zofran IV worked well in past    Numbness in right leg     Psychiatric disorder      depression and anxiety    Skin lesion of back 3/17/2014    Syncopal episodes       Past Surgical History   Procedure Laterality Date    Pr abdomen surgery proc unlisted  3/25/2004     Total proctocolectomy with creation of ileoanal J-pouch and loop ileostomy; Dr. Gladis Garza.  Hx orthopaedic  2008     Left ACL repair    Hx gi  5/2/2004     Examination under anesthesia with endoscopic evaluation of ileoanal pouch and placement of drain; Dr. Gladis Garza.     Hx gi  5/25/2004     Ileostomy closure with small bowel resection and anastomosis; Dr. Abelardo Oconnor.   gi  5/24/2012     Examination under anesthesia, anal dilatation, anal biopsy, and placement of draining seton; Dr. Abelardo Oconnor.   gi  7/3/2012     Incision and drainage of perirectal abscess and rigid endoscopy of ileoanal pouch; Dr. Abelardo Oconnor.   gi  7/31/2012     Incision and drainage of perirectal abscess, placement of draining seton, and anal dilatation; Dr. Abelardo Oconnor.   gi  1/22/2013     Repair of anal fistulas with debridement, partial fistulectomy, and flap closure; Dr. Abelardo Oconnor.   gi  5/17/2013     Examination under anesthesia, partial fistulotomy,  and placement of draining setons; Dr. Abelardo Oconnor.   gi  8/6/2013     Anal fistulotomy and application of ACell micromatrix; Dr. Abelardo Oconnor.   gi  2/20/2014     Examination under anesthesia and dilation of anal canal with rigid proctoscopy; Ollie Morris MD.     gi  4/29/2015     Creation of Jillyn Karen continent intestinal reservoir (BCIR), abdominoperineal resection of ileoanal J-pouch; lysis of adhesions, and gastrostomy; Kenyon Worley MD ([place], [place])     gi  8/7/2015     Stoma revision; Siddhartha Worley MD ([place])     gi  10/29/2015     Extensive lysis of adhesions, resection of continent intestinal reservoir, repair of serosal tears, and creation of end-ileostomy; Dr. Abelardo Oconnor.      cholecystectomy  12/2016     Dr. Andrew Paget       Social History   Substance Use Topics    Smoking status: Light Tobacco Smoker     Packs/day: 0.50     Years: 7.00    Smokeless tobacco: Never Used    Alcohol use No      Family History   Problem Relation Age of Onset    Cancer Father     Asthma Neg Hx     Diabetes Neg Hx     Heart Disease Neg Hx     Hypertension Neg Hx     Stroke Neg Hx     Malignant Hyperthermia Neg Hx     Pseudocholinesterase Deficiency Neg Hx     Delayed Awakening Neg Hx     Post-op Nausea/Vomiting Neg Hx Current Facility-Administered Medications   Medication Dose Route Frequency    benzocaine (HURRICANE) 20 % spray   Mucous Membrane PRN    sodium chloride (NS) flush 5-10 mL  5-10 mL IntraVENous Q8H    sodium chloride (NS) flush 5-10 mL  5-10 mL IntraVENous PRN    HYDROmorphone (PF) (DILAUDID) injection 0.5 mg  0.5 mg IntraVENous Q2H PRN    naloxone (NARCAN) injection 0.4 mg  0.4 mg IntraVENous PRN    ondansetron (ZOFRAN) injection 4 mg  4 mg IntraVENous Q4H PRN    diphenhydrAMINE (BENADRYL) injection 12.5 mg  12.5 mg IntraVENous Q4H PRN    enoxaparin (LOVENOX) injection 40 mg  40 mg SubCUTAneous Q24H    benzocaine-menthol (CEPACOL) lozenge 1 Lozenge  1 Lozenge Mucous Membrane PRN    prochlorperazine (COMPAZINE) injection 10 mg  10 mg IntraVENous Q6H PRN    HYDROmorphone (PF) (DILAUDID) injection 1 mg  1 mg IntraVENous Q3H PRN    sodium chloride (NS) flush 5-10 mL  5-10 mL IntraVENous PRN    0.9% sodium chloride infusion  150 mL/hr IntraVENous CONTINUOUS      Allergies   Allergen Reactions    Remicade [Infliximab] Anaphylaxis and Shortness of Breath    Bactrim [Sulfamethoprim Ds] Other (comments)     Increased GI distress.  Nsaids (Non-Steroidal Anti-Inflammatory Drug) Other (comments)     Stomach ulcers       Review of Systems:    A comprehensive review of systems was negative except for that written in the History of Present Illness. Objective:        Visit Vitals    /77    Pulse 98    Temp 97.6 °F (36.4 °C)    Resp 18    Ht 6' (1.829 m)    Wt 199 lb 11.8 oz (90.6 kg)    SpO2 98%    BMI 27.09 kg/m2       Physical Exam:  General:  Alert, cooperative, no distress, appears stated age. Eyes:  Conjunctivae/corneas clear. PERRL, EOMs intact. Fundi benign   Ears:  Normal TMs and external ear canals both ears. Nose: Nares normal. Septum midline. Mucosa normal. No drainage or sinus tenderness.    Mouth/Throat: Lips, mucosa, and tongue normal. Teeth and gums normal.   Neck: Supple, symmetrical, trachea midline, no adenopathy, thyroid: no enlargment/tenderness/nodules, no carotid bruit and no JVD. Back:   Symmetric, no curvature. ROM normal. No CVA tenderness. Lungs:   Clear to auscultation bilaterally. Heart:  Regular rate and rhythm, S1, S2 normal, no murmur, click, rub or gallop. Abdomen:   Soft, non-tender. Bowel sounds normal. No masses,  No organomegaly. Extremities: Extremities normal, atraumatic, no cyanosis or edema. Pulses: 2+ and symmetric all extremities. Skin: Skin color, texture, turgor normal. No rashes or lesions   Lymph nodes: Cervical, supraclavicular, and axillary nodes normal.   Neurologic: CNII-XII intact. Normal strength, sensation and reflexes throughout. Imaging:  images and reports reviewed    Lab/Data Review:  BMP:   Lab Results   Component Value Date/Time     (L) 01/15/2017 05:34 AM    K 4.6 01/15/2017 05:34 AM    CL 97 01/15/2017 05:34 AM    CO2 30 01/15/2017 05:34 AM    AGAP 8 01/15/2017 05:34 AM     (H) 01/15/2017 05:34 AM    BUN 15 01/15/2017 05:34 AM    CREA 0.85 01/15/2017 05:34 AM    GFRAA >60 01/15/2017 05:34 AM    GFRNA >60 01/15/2017 05:34 AM     CBC:   Lab Results   Component Value Date/Time    WBC 32.4 (H) 01/15/2017 05:34 AM    HGB 13.5 01/15/2017 05:34 AM    HCT 40.2 01/15/2017 05:34 AM     01/15/2017 05:34 AM         Assessment:     SBO with tachycardia and high WBC count. He has improved clinically with a decrease in his heart rate, normal ostomy output and resolution of pain and nausea with passaged of a stool ball. It appears he responded well to NG decompression and bowel rest overnight. There was dense stool like material in the distal ileum on the CT which correlates with today's events. Have removed the NG.   Will start clears and advance his diet    Plan:     D/C NG  Clear liquids  Check WBC in the AM    Signed By: Neldon Canavan, MD     January 15, 2017

## 2017-01-16 VITALS
RESPIRATION RATE: 18 BRPM | OXYGEN SATURATION: 100 % | TEMPERATURE: 98.2 F | HEIGHT: 72 IN | WEIGHT: 199.74 LBS | BODY MASS INDEX: 27.05 KG/M2 | HEART RATE: 114 BPM | SYSTOLIC BLOOD PRESSURE: 141 MMHG | DIASTOLIC BLOOD PRESSURE: 80 MMHG

## 2017-01-16 LAB
BASOPHILS # BLD AUTO: 0 K/UL (ref 0–0.1)
BASOPHILS # BLD: 0 % (ref 0–1)
EOSINOPHIL # BLD: 0.1 K/UL (ref 0–0.4)
EOSINOPHIL NFR BLD: 0 % (ref 0–7)
ERYTHROCYTE [DISTWIDTH] IN BLOOD BY AUTOMATED COUNT: 17.6 % (ref 11.5–14.5)
HCT VFR BLD AUTO: 36.9 % (ref 36.6–50.3)
HGB BLD-MCNC: 11.7 G/DL (ref 12.1–17)
LYMPHOCYTES # BLD AUTO: 14 % (ref 12–49)
LYMPHOCYTES # BLD: 1.7 K/UL (ref 0.8–3.5)
MCH RBC QN AUTO: 29.8 PG (ref 26–34)
MCHC RBC AUTO-ENTMCNC: 31.7 G/DL (ref 30–36.5)
MCV RBC AUTO: 94.1 FL (ref 80–99)
MONOCYTES # BLD: 0.7 K/UL (ref 0–1)
MONOCYTES NFR BLD AUTO: 5 % (ref 5–13)
NEUTS SEG # BLD: 10.1 K/UL (ref 1.8–8)
NEUTS SEG NFR BLD AUTO: 81 % (ref 32–75)
PLATELET # BLD AUTO: 173 K/UL (ref 150–400)
RBC # BLD AUTO: 3.92 M/UL (ref 4.1–5.7)
WBC # BLD AUTO: 12.5 K/UL (ref 4.1–11.1)

## 2017-01-16 PROCEDURE — 74011250636 HC RX REV CODE- 250/636: Performed by: SURGERY

## 2017-01-16 PROCEDURE — 36415 COLL VENOUS BLD VENIPUNCTURE: CPT | Performed by: SURGERY

## 2017-01-16 PROCEDURE — 74011250637 HC RX REV CODE- 250/637: Performed by: SURGERY

## 2017-01-16 PROCEDURE — 74011636637 HC RX REV CODE- 636/637: Performed by: SURGERY

## 2017-01-16 PROCEDURE — 85025 COMPLETE CBC W/AUTO DIFF WBC: CPT | Performed by: SURGERY

## 2017-01-16 PROCEDURE — 74011250636 HC RX REV CODE- 250/636: Performed by: EMERGENCY MEDICINE

## 2017-01-16 RX ORDER — PREDNISONE 20 MG/1
40 TABLET ORAL
Status: DISCONTINUED | OUTPATIENT
Start: 2017-01-16 | End: 2017-01-16 | Stop reason: HOSPADM

## 2017-01-16 RX ADMIN — PREGABALIN 100 MG: 50 CAPSULE ORAL at 09:07

## 2017-01-16 RX ADMIN — ONDANSETRON 4 MG: 2 INJECTION INTRAMUSCULAR; INTRAVENOUS at 02:54

## 2017-01-16 RX ADMIN — PREDNISONE 40 MG: 20 TABLET ORAL at 09:07

## 2017-01-16 RX ADMIN — PROCHLORPERAZINE EDISYLATE 10 MG: 5 INJECTION INTRAMUSCULAR; INTRAVENOUS at 05:48

## 2017-01-16 RX ADMIN — HYDROMORPHONE HYDROCHLORIDE 1 MG: 1 INJECTION, SOLUTION INTRAMUSCULAR; INTRAVENOUS; SUBCUTANEOUS at 02:54

## 2017-01-16 RX ADMIN — SODIUM CHLORIDE 150 ML/HR: 900 INJECTION, SOLUTION INTRAVENOUS at 01:35

## 2017-01-16 RX ADMIN — ENOXAPARIN SODIUM 40 MG: 40 INJECTION SUBCUTANEOUS at 09:07

## 2017-01-16 RX ADMIN — HYDROMORPHONE HYDROCHLORIDE 1 MG: 1 INJECTION, SOLUTION INTRAMUSCULAR; INTRAVENOUS; SUBCUTANEOUS at 05:48

## 2017-01-16 NOTE — PROGRESS NOTES
Handed patient a copy of discharge instructions which included list of medicines and instructions to eat normal diet and see GI if symptoms return.  Awaiting ride

## 2017-01-16 NOTE — CDMP QUERY
Dr. Tyrell Hollis,    I am writing this query to ask if you feel the patient carries the secondary diagnosis of \"SIRS POA due to non-infectious process\", as this diagnosis will increases his severity of illness, global length of stay    =>SIRS due to Non Infectious process (SIRS 2/4 Criteria ) treated with IVB, IVFs    =>Other Explanation of clinical findings  =>Unable to Determine (no explanation of clinical findings)    The medical record reflects the following clinical findings, treatment, and risk factors:    Risk Factors: Chron's/UC disease; partial SBO POA    Clinical Indicators: SIRS criteria POA:  WBC 22.4    Treatment: 1 L bolus in ED; IVFs 150/h, monitor labs & VS, steroids, NGT    Please clarify and document your clinical opinion in the progress notes and discharge summary including the definitive and/or presumptive diagnosis, (suspected or probable), related to the above clinical findings. Please include clinical findings supporting your diagnosis.     Thank you,  SUMA Wang, [address] [facility], [facility]  [phone]

## 2017-01-16 NOTE — PROGRESS NOTES
Progress Note    Patient: Carline Jackson MRN: [id number]  SSN: [id number]    YOB: 1975  Age: 37 y.o. Sex: male      Admit Date: 2017    * No surgery found *    Procedure:      Subjective:     Mr. Ana María Shepherd is doing fine. He is tolerating a full liquid diet. He denies any abdominal pain and his ostomy is functioning. Objective:     Visit Vitals    /73 (BP 1 Location: Left arm, BP Patient Position: At rest)    Pulse 95    Temp 97.9 °F (36.6 °C)    Resp 18    Ht 6' (1.829 m)    Wt 199 lb 11.8 oz (90.6 kg)    SpO2 94%    BMI 27.09 kg/m2       Temp (24hrs), Av °F (36.7 °C), Min:97.6 °F (36.4 °C), Max:98.4 °F (36.9 °C)      Physical Exam:    GENERAL: alert, cooperative, no distress, ABDOMEN: Soft, NT, ND. The ostomy is pink with liquid in the bag. Lab Review:   BMP: No results found for: NA, K, CL, CO2, AGAP, GLU, BUN, CREA, GFRAA, GFRNA  CBC:   Lab Results   Component Value Date/Time    WBC 12.5 (H) 2017 02:46 AM    HGB 11.7 (L) 2017 02:46 AM    HCT 36.9 2017 02:46 AM     2017 02:46 AM       Assessment:     Hospital Problems  Date Reviewed: 2017          Codes Class Noted POA    Small bowel obstruction ([facility] [facility] ICD-10-CM: K56.69  ICD-9-CM: 560.9  1/15/2017 Yes              Plan/Recommendations/Medical Decision Making:     Regular diet. D/C IVF's. D/C home if tolerates diet. Resume Prednisone. WBC down. There is no SIRS.       Signed By: Ricky Velasquez MD     2017

## 2017-01-16 NOTE — PROGRESS NOTES
Bedside and Verbal shift change report given to Katy leonard RN (oncoming nurse) by Isadora Burgess RN (offgoing nurse). Report included the following information SBAR, Kardex, Intake/Output and MAR.

## 2017-01-16 NOTE — PROGRESS NOTES
Bedside and Verbal shift change report given to Feliberto Schwartz RN (oncoming nurse) by Lucas Valdez RN (offgoing nurse). Report included the following information SBAR, Kardex, Procedure Summary, Intake/Output, MAR, Accordion and Recent Results.

## 2017-01-16 NOTE — PROGRESS NOTES
Called Dr. Odette Lua to inquire about discharge instructions for the pt. Doctor verbalized that there are no discharge instructions for the pt because he was admitted for constipation and then passed stool shortly after being admitted. That the pt was not treated for anything while he was here. Patient will go home and resume his normal activities.  So he is not putting in discharge instructions

## 2017-01-17 PROBLEM — K59.00 CONSTIPATION: Status: ACTIVE | Noted: 2017-01-15

## 2017-01-17 NOTE — DISCHARGE SUMMARY
Physician Discharge Summary     Patient ID:  Jose R Kumar  [id number]  70 y.o.  1975    Admit Date: 1/14/2017    Discharge Date: 1/17/2017    * Admission Diagnoses: Small bowel obstruction ([address]    * Discharge Diagnoses:  Constipation    Admission Condition: Stable    * Discharge Condition: good and improved    * Procedures: * No surgery found [facility] Course:   Normal hospital course for this procedure. Consults: None    Significant Diagnostic Studies: CT of the abdomen and pelvis. * Disposition: Home    Discharge Medications:   Discharge Medication List as of 1/16/2017 10:26 AM      CONTINUE these medications which have NOT CHANGED    Details   promethazine (PHENERGAN) 25 mg tablet Take 1 Tab by mouth every six (6) hours as needed for Nausea. , Print, Disp-60 Tab, R-0      oxyCODONE IR (ROXICODONE) 10 mg tab immediate release tablet Take 1 Tab by mouth every six (6) hours as needed for up to 14 days. Max Daily Amount: 40 mg., Print, Disp-56 Tab, R-0      pantoprazole (PROTONIX) 40 mg tablet Take 1 Tab by mouth Before breakfast and dinner., Print, Disp-60 Tab, R-0      predniSONE (DELTASONE) 20 mg tablet Take 2 Tabs by mouth daily (with breakfast). , Print, Disp-30 Tab, R-0      ondansetron (ZOFRAN ODT) 4 mg disintegrating tablet Take 4 mg by mouth every eight (8) hours as needed for Nausea., Historical Med      Ustekinumab (USTEKINAUMAB) 90 mg/mL injection 90 mg by SubCUTAneous route. Every 8 weeks, Historical Med      pregabalin (LYRICA) 100 mg capsule Take 100 mg by mouth three (3) times daily. , Historical Med      cyclobenzaprine (FLEXERIL) 10 mg tablet Take 10 mg by mouth three (3) times daily as needed for Muscle Spasm(s). , Historical Med      diphenhydrAMINE (BENADRYL) 25 mg tablet Take 25 mg by mouth nightly as needed for Sleep., Historical Med      cyanocobalamin (VITAMIN B-12) 1,000 mcg/mL injection 1,000 mcg by IntraMUSCular route every seven (7) days.  Indications: Once weekly, Historical Med      temazepam (RESTORIL) 30 mg capsule Take 30 mg by mouth nightly., Historical Med      diazepam (VALIUM) 5 mg tablet Take 5 mg by mouth every eight (8) hours as needed (bowel relaxation). , Historical Med      omeprazole-sodium bicarbonate (ZEGERID) 20-1.1 mg-gram Cap Take 20 mg by mouth daily. , Historical Med             * Follow-up Care/Patient Instructions: Activity: Activity as tolerated  Diet: Regular Diet  Wound Care: None needed    Follow-up Information     Follow up With Details Comments [address], [place]   [address] [place] [address]  [phone]          Follow-up tests/labs: None.     Signed:  Demetrius Reese MD  1/17/2017  2:10 PM

## 2017-01-19 ENCOUNTER — HOSPITAL ENCOUNTER (EMERGENCY)
Age: 42
Discharge: HOME | End: 2017-01-20
Attending: EMERGENCY MEDICINE
Payer: COMMERCIAL

## 2017-01-19 ENCOUNTER — ANESTHESIA (OUTPATIENT)
Dept: ENDOSCOPY | Age: 42
End: 2017-01-19
Payer: COMMERCIAL

## 2017-01-19 ENCOUNTER — ANESTHESIA EVENT (OUTPATIENT)
Dept: ENDOSCOPY | Age: 42
End: 2017-01-19
Payer: COMMERCIAL

## 2017-01-19 ENCOUNTER — APPOINTMENT (OUTPATIENT)
Dept: GENERAL RADIOLOGY | Age: 42
End: 2017-01-19
Attending: EMERGENCY MEDICINE
Payer: COMMERCIAL

## 2017-01-19 ENCOUNTER — HOSPITAL ENCOUNTER (OUTPATIENT)
Age: 42
Setting detail: OUTPATIENT SURGERY
Discharge: HOME | End: 2017-01-19
Attending: INTERNAL MEDICINE | Admitting: INTERNAL MEDICINE
Payer: COMMERCIAL

## 2017-01-19 ENCOUNTER — SURGERY (OUTPATIENT)
Age: 42
End: 2017-01-19

## 2017-01-19 VITALS
SYSTOLIC BLOOD PRESSURE: 128 MMHG | HEART RATE: 117 BPM | BODY MASS INDEX: 26.37 KG/M2 | RESPIRATION RATE: 18 BRPM | WEIGHT: 194.67 LBS | HEIGHT: 72 IN | DIASTOLIC BLOOD PRESSURE: 87 MMHG | TEMPERATURE: 98.7 F | OXYGEN SATURATION: 95 %

## 2017-01-19 VITALS
HEART RATE: 82 BPM | SYSTOLIC BLOOD PRESSURE: 114 MMHG | DIASTOLIC BLOOD PRESSURE: 76 MMHG | WEIGHT: 193 LBS | TEMPERATURE: 98.6 F | HEIGHT: 72 IN | RESPIRATION RATE: 15 BRPM | BODY MASS INDEX: 26.14 KG/M2 | OXYGEN SATURATION: 100 %

## 2017-01-19 DIAGNOSIS — R11.2 NON-INTRACTABLE VOMITING WITH NAUSEA, UNSPECIFIED VOMITING TYPE: ICD-10-CM

## 2017-01-19 DIAGNOSIS — R10.84 ABDOMINAL PAIN, GENERALIZED: Primary | ICD-10-CM

## 2017-01-19 LAB
ALBUMIN SERPL BCP-MCNC: 2.8 G/DL (ref 3.5–5)
ALBUMIN/GLOB SERPL: 0.8 {RATIO} (ref 1.1–2.2)
ALP SERPL-CCNC: 71 U/L (ref 45–117)
ALT SERPL-CCNC: 34 U/L (ref 12–78)
ANION GAP BLD CALC-SCNC: 9 MMOL/L (ref 5–15)
APPEARANCE UR: CLEAR
AST SERPL W P-5'-P-CCNC: 9 U/L (ref 15–37)
BACTERIA URNS QL MICRO: NEGATIVE /HPF
BASOPHILS # BLD AUTO: 0 K/UL (ref 0–0.1)
BASOPHILS # BLD: 0 % (ref 0–1)
BILIRUB SERPL-MCNC: 0.2 MG/DL (ref 0.2–1)
BILIRUB UR QL: NEGATIVE
BUN SERPL-MCNC: 8 MG/DL (ref 6–20)
BUN/CREAT SERPL: 8 (ref 12–20)
CALCIUM SERPL-MCNC: 8.2 MG/DL (ref 8.5–10.1)
CHLORIDE SERPL-SCNC: 104 MMOL/L (ref 97–108)
CO2 SERPL-SCNC: 27 MMOL/L (ref 21–32)
COLOR UR: ABNORMAL
CREAT SERPL-MCNC: 1 MG/DL (ref 0.7–1.3)
CRP SERPL-MCNC: 0.97 MG/DL
EOSINOPHIL # BLD: 0.1 K/UL (ref 0–0.4)
EOSINOPHIL NFR BLD: 1 % (ref 0–7)
EPITH CASTS URNS QL MICRO: ABNORMAL /LPF
ERYTHROCYTE [DISTWIDTH] IN BLOOD BY AUTOMATED COUNT: 16.2 % (ref 11.5–14.5)
GLOBULIN SER CALC-MCNC: 3.4 G/DL (ref 2–4)
GLUCOSE SERPL-MCNC: 254 MG/DL (ref 65–100)
GLUCOSE UR STRIP.AUTO-MCNC: 100 MG/DL
H PYLORI FROM TISSUE: NEGATIVE
HCT VFR BLD AUTO: 38 % (ref 36.6–50.3)
HGB BLD-MCNC: 12.1 G/DL (ref 12.1–17)
HGB UR QL STRIP: NEGATIVE
HYALINE CASTS URNS QL MICRO: ABNORMAL /LPF (ref 0–5)
KETONES UR QL STRIP.AUTO: NEGATIVE MG/DL
KIT LOT NO., HCLOLOT: NORMAL
LEUKOCYTE ESTERASE UR QL STRIP.AUTO: NEGATIVE
LIPASE SERPL-CCNC: 78 U/L (ref 73–393)
LYMPHOCYTES # BLD AUTO: 13 % (ref 12–49)
LYMPHOCYTES # BLD: 1 K/UL (ref 0.8–3.5)
MCH RBC QN AUTO: 30 PG (ref 26–34)
MCHC RBC AUTO-ENTMCNC: 31.8 G/DL (ref 30–36.5)
MCV RBC AUTO: 94.1 FL (ref 80–99)
MONOCYTES # BLD: 0.7 K/UL (ref 0–1)
MONOCYTES NFR BLD AUTO: 9 % (ref 5–13)
NEGATIVE CONTROL: NEGATIVE
NEUTS SEG # BLD: 5.8 K/UL (ref 1.8–8)
NEUTS SEG NFR BLD AUTO: 77 % (ref 32–75)
NITRITE UR QL STRIP.AUTO: NEGATIVE
PH UR STRIP: 5.5 [PH] (ref 5–8)
PLATELET # BLD AUTO: 165 K/UL (ref 150–400)
POSITIVE CONTROL: POSITIVE
POTASSIUM SERPL-SCNC: 3.6 MMOL/L (ref 3.5–5.1)
PROT SERPL-MCNC: 6.2 G/DL (ref 6.4–8.2)
PROT UR STRIP-MCNC: NEGATIVE MG/DL
RBC # BLD AUTO: 4.04 M/UL (ref 4.1–5.7)
RBC #/AREA URNS HPF: ABNORMAL /HPF (ref 0–5)
SODIUM SERPL-SCNC: 140 MMOL/L (ref 136–145)
SP GR UR REFRACTOMETRY: 1.02 (ref 1–1.03)
UA: UC IF INDICATED,UAUC: ABNORMAL
UROBILINOGEN UR QL STRIP.AUTO: 0.2 EU/DL (ref 0.2–1)
WBC # BLD AUTO: 7.5 K/UL (ref 4.1–11.1)
WBC URNS QL MICRO: ABNORMAL /HPF (ref 0–4)

## 2017-01-19 PROCEDURE — 76040000007: Performed by: INTERNAL MEDICINE

## 2017-01-19 PROCEDURE — 74011000250 HC RX REV CODE- 250

## 2017-01-19 PROCEDURE — 77030009426 HC FCPS BIOP ENDOSC BSC -B: Performed by: INTERNAL MEDICINE

## 2017-01-19 PROCEDURE — 74011250636 HC RX REV CODE- 250/636: Performed by: INTERNAL MEDICINE

## 2017-01-19 PROCEDURE — 88305 TISSUE EXAM BY PATHOLOGIST: CPT | Performed by: INTERNAL MEDICINE

## 2017-01-19 PROCEDURE — 76060000032 HC ANESTHESIA 0.5 TO 1 HR: Performed by: INTERNAL MEDICINE

## 2017-01-19 PROCEDURE — 87077 CULTURE AEROBIC IDENTIFY: CPT | Performed by: INTERNAL MEDICINE

## 2017-01-19 PROCEDURE — 74011250636 HC RX REV CODE- 250/636

## 2017-01-19 RX ORDER — PROPOFOL 10 MG/ML
INJECTION, EMULSION INTRAVENOUS AS NEEDED
Status: DISCONTINUED | OUTPATIENT
Start: 2017-01-19 | End: 2017-01-19 | Stop reason: HOSPADM

## 2017-01-19 RX ORDER — SODIUM CHLORIDE 9 MG/ML
50 INJECTION, SOLUTION INTRAVENOUS CONTINUOUS
Status: DISCONTINUED | OUTPATIENT
Start: 2017-01-19 | End: 2017-01-19 | Stop reason: HOSPADM

## 2017-01-19 RX ORDER — ATROPINE SULFATE 0.1 MG/ML
0.4 INJECTION INTRAVENOUS
Status: DISCONTINUED | OUTPATIENT
Start: 2017-01-19 | End: 2017-01-19 | Stop reason: HOSPADM

## 2017-01-19 RX ORDER — DEXTROMETHORPHAN/PSEUDOEPHED 2.5-7.5/.8
1.2 DROPS ORAL
Status: DISCONTINUED | OUTPATIENT
Start: 2017-01-19 | End: 2017-01-19 | Stop reason: HOSPADM

## 2017-01-19 RX ORDER — DICYCLOMINE HYDROCHLORIDE 10 MG/1
10 CAPSULE ORAL
COMMUNITY
End: 2017-02-25

## 2017-01-19 RX ORDER — POLYETHYLENE GLYCOL 3350 17 G/17G
17 POWDER, FOR SOLUTION ORAL DAILY
Qty: 510 G | Refills: 0 | Status: SHIPPED | OUTPATIENT
Start: 2017-01-19 | End: 2017-03-07 | Stop reason: CLARIF

## 2017-01-19 RX ORDER — LIDOCAINE HYDROCHLORIDE 20 MG/ML
INJECTION, SOLUTION EPIDURAL; INFILTRATION; INTRACAUDAL; PERINEURAL AS NEEDED
Status: DISCONTINUED | OUTPATIENT
Start: 2017-01-19 | End: 2017-01-19 | Stop reason: HOSPADM

## 2017-01-19 RX ORDER — PROPOFOL 10 MG/ML
INJECTION, EMULSION INTRAVENOUS
Status: DISCONTINUED | OUTPATIENT
Start: 2017-01-19 | End: 2017-01-19 | Stop reason: HOSPADM

## 2017-01-19 RX ORDER — NALOXONE HYDROCHLORIDE 0.4 MG/ML
0.4 INJECTION, SOLUTION INTRAMUSCULAR; INTRAVENOUS; SUBCUTANEOUS
Status: DISCONTINUED | OUTPATIENT
Start: 2017-01-19 | End: 2017-01-19 | Stop reason: HOSPADM

## 2017-01-19 RX ORDER — FLUMAZENIL 0.1 MG/ML
0.2 INJECTION INTRAVENOUS
Status: DISCONTINUED | OUTPATIENT
Start: 2017-01-19 | End: 2017-01-19 | Stop reason: HOSPADM

## 2017-01-19 RX ORDER — ONDANSETRON 2 MG/ML
4 INJECTION INTRAMUSCULAR; INTRAVENOUS
Status: COMPLETED | OUTPATIENT
Start: 2017-01-19 | End: 2017-01-19

## 2017-01-19 RX ORDER — EPINEPHRINE 0.1 MG/ML
1 INJECTION INTRACARDIAC; INTRAVENOUS
Status: DISCONTINUED | OUTPATIENT
Start: 2017-01-19 | End: 2017-01-19 | Stop reason: HOSPADM

## 2017-01-19 RX ORDER — MIDAZOLAM HYDROCHLORIDE 1 MG/ML
.25-5 INJECTION, SOLUTION INTRAMUSCULAR; INTRAVENOUS
Status: DISCONTINUED | OUTPATIENT
Start: 2017-01-19 | End: 2017-01-19 | Stop reason: HOSPADM

## 2017-01-19 RX ADMIN — PROPOFOL 100 MG: 10 INJECTION, EMULSION INTRAVENOUS at 08:36

## 2017-01-19 RX ADMIN — SODIUM CHLORIDE 1000 ML: 900 INJECTION, SOLUTION INTRAVENOUS at 21:35

## 2017-01-19 RX ADMIN — PROPOFOL 140 MCG/KG/MIN: 10 INJECTION, EMULSION INTRAVENOUS at 08:36

## 2017-01-19 RX ADMIN — ONDANSETRON 4 MG: 2 INJECTION INTRAMUSCULAR; INTRAVENOUS at 21:35

## 2017-01-19 RX ADMIN — LIDOCAINE HYDROCHLORIDE 40 MG: 20 INJECTION, SOLUTION EPIDURAL; INFILTRATION; INTRACAUDAL; PERINEURAL at 08:35

## 2017-01-19 RX ADMIN — SODIUM CHLORIDE: 900 INJECTION, SOLUTION INTRAVENOUS at 08:56

## 2017-01-19 RX ADMIN — SODIUM CHLORIDE 50 ML/HR: 900 INJECTION, SOLUTION INTRAVENOUS at 07:55

## 2017-01-19 NOTE — PROCEDURES
Mary Leone M.D.  [phone]            2017          Ileoscopy Operative Report  Denis Acosta  :  1975  [facility] Medical Record Number:  [id number]      Indications:    Crohn's disease     :  Airam Ashby MD    Referring Provider: Clementine Boeck, MD    Sedation:  MAC anesthesia    Pre-Procedural Exam:      Airway: clear,  No airway problems anticipated  Heart: RRR, without gallops or rubs  Lungs: clear bilaterally without wheezes, crackles, or rhonchi  Abdomen: soft, nontender, nondistended, bowel sounds present  Mental Status: awake, alert and oriented to person, place and time     Procedure Details:  After informed consent was obtained with all risks and benefits of procedure explained and preoperative exam completed, the patient was taken to the endoscopy suite and placed in the supine position. Digital exam performed through the ileostomy which appeared intact. Findings:   Solid stools seen, mucosa appeared within normal to about 50 cm from the stoma. One superficial ulceration about 2 cm in size was seen. No stricture or inflammation seen. No other lesions seen. Interventions:  none    Specimen Removed:  Ileum ulceration    Complications: None. EBL:  None. Impression:  One superficial ulceration seen about 20 cm from the stomach, without stricture, inflammation or any other lesions seen up to about 50 cm from the stoma. Solid stools seen. Recommendations:  -Await pathology.   -Continue with current therapy  -Consider further imaging of the remaining small intestine to rule out any stricture  -Taper off narcotics  -Remain on low residue diet  -Follow-up in the office    Discharge Disposition:  Home in the company of a  when able to ambulate.     Airam Ashby MD  2017  9:07 AM

## 2017-01-19 NOTE — H&P
Cassondra Brunner, M.D.  [phone]            History and Physical       NAME:  Armando Cisse   :   1975   MRN:   [id number]           Consult Date: 2017 7:59 AM    Chief Complaint:  Nausea and vomiting    History of Present Illness:  Patient is a 37 y.o. who is seen for persistent nausea and vomiting and crohn's disease with ileostomy, not fully controlled by medications. PMH:  Past Medical History   Diagnosis Date    Anal fistula     Anal stenosis     Crohn's disease ([address]     GERD (gastroesophageal reflux disease)     H/O ulcerative colitis      Symptoms began in . Total proctocolectomy was performed in . Pateint later developed Crohn's disease.  Hiatal hernia     Ileal pouchitis ([address] 2004    Nausea & vomiting      Combination of Scopalamine patch & Zofran IV worked well in past    Numbness in right leg     Psychiatric disorder      depression and anxiety    Skin lesion of back 3/17/2014    Syncopal episodes        PSH:  Past Surgical History   Procedure Laterality Date    Pr abdomen surgery proc unlisted  3/25/2004     Total proctocolectomy with creation of ileoanal J-pouch and loop ileostomy; Dr. Al Berry.   orthopaedic       Left ACL repair    Hx gi  2004     Examination under anesthesia with endoscopic evaluation of ileoanal pouch and placement of drain; Dr. Al Berry.   gi  2004     Ileostomy closure with small bowel resection and anastomosis; Dr. Al Berry.   gi  2012     Examination under anesthesia, anal dilatation, anal biopsy, and placement of draining seton; Dr. Al Berry.   gi  7/3/2012     Incision and drainage of perirectal abscess and rigid endoscopy of ileoanal pouch; Dr. Al Berry.   gi  2012     Incision and drainage of perirectal abscess, placement of draining seton, and anal dilatation; Dr. Al Berry.      gi  2013     Repair of anal fistulas with debridement, partial fistulectomy, and flap closure; Dr. John Marie.   gi  2013     Examination under anesthesia, partial fistulotomy,  and placement of draining setons; Dr. John Marie.   gi  2013     Anal fistulotomy and application of ACell micromatrix; Dr. John Marie.   gi  2014     Examination under anesthesia and dilation of anal canal with rigid proctoscopy; Shahid Garcia MD.     gi  2015     Creation of Shey Fus continent intestinal reservoir (BCIR), abdominoperineal resection of ileoanal J-pouch; lysis of adhesions, and gastrostomy; Jerrold Simmonds C. Rosetta Nottingham, MD ([place], [place])     gi  2015     Stoma revision; Judith Gallegos. Maddy Raymond MD ([place], [place])     gi  10/29/2015     Extensive lysis of adhesions, resection of continent intestinal reservoir, repair of serosal tears, and creation of end-ileostomy; Dr. John Marie.   cholecystectomy  2016     Dr. Mark Robertson        Allergies: Allergies   Allergen Reactions    Remicade [Infliximab] Anaphylaxis and Shortness of Breath    Bactrim [Sulfamethoprim Ds] Other (comments)     Increased GI distress.  Nsaids (Non-Steroidal Anti-Inflammatory Drug) Other (comments)     Stomach ulcers       Home Medications:  Prior to Admission Medications   Prescriptions Last Dose Informant Patient Reported? Taking? Ustekinumab (USTEKINAUMAB) 90 mg/mL injection 2016 at Unknown time Self Yes Yes   Si mg by SubCUTAneous route. Every 8 weeks   cyanocobalamin (VITAMIN B-12) 1,000 mcg/mL injection 2017 at Unknown time Self Yes Yes   Si,000 mcg by IntraMUSCular route every seven (7) days. Indications: Once weekly   cyclobenzaprine (FLEXERIL) 10 mg tablet 2017 at Unknown time Self Yes Yes   Sig: Take 10 mg by mouth three (3) times daily as needed for Muscle Spasm(s).    diazepam (VALIUM) 5 mg tablet 2017 at Unknown time Self Yes Yes   Sig: Take 5 mg by mouth every eight (8) hours as needed (bowel relaxation). diphenhydrAMINE (BENADRYL) 25 mg tablet 1/12/2017 at Unknown time Self Yes Yes   Sig: Take 25 mg by mouth nightly as needed for Sleep.   omeprazole-sodium bicarbonate (ZEGERID) 20-1.1 mg-gram Cap 1/18/2017 at Unknown time Self Yes Yes   Sig: Take 20 mg by mouth daily. ondansetron (ZOFRAN ODT) 4 mg disintegrating tablet 1/18/2017 at Unknown time Self Yes Yes   Sig: Take 4 mg by mouth every eight (8) hours as needed for Nausea. oxyCODONE IR (ROXICODONE) 10 mg tab immediate release tablet 1/18/2017 at Unknown time  No Yes   Sig: Take 1 Tab by mouth every six (6) hours as needed for up to 14 days. Max Daily Amount: 40 mg.   pantoprazole (PROTONIX) 40 mg tablet Not Taking at Unknown time  No No   Sig: Take 1 Tab by mouth Before breakfast and dinner. predniSONE (DELTASONE) 20 mg tablet 1/18/2017 at Unknown time  No Yes   Sig: Take 2 Tabs by mouth daily (with breakfast). pregabalin (LYRICA) 100 mg capsule 1/18/2017 at Unknown time Self Yes Yes   Sig: Take 100 mg by mouth three (3) times daily. promethazine (PHENERGAN) 25 mg tablet 1/18/2017 at Unknown time  No Yes   Sig: Take 1 Tab by mouth every six (6) hours as needed for Nausea. temazepam (RESTORIL) 30 mg capsule 1/18/2017 at Unknown time Self Yes Yes   Sig: Take 30 mg by mouth nightly.       Facility-Administered Medications: None       Hospital Medications:  Current Facility-Administered Medications   Medication Dose Route Frequency    0.9% sodium chloride infusion  50 mL/hr IntraVENous CONTINUOUS    midazolam (VERSED) injection 0.25-5 mg  0.25-5 mg IntraVENous Multiple    naloxone (NARCAN) injection 0.4 mg  0.4 mg IntraVENous Multiple    flumazenil (ROMAZICON) 0.1 mg/mL injection 0.2 mg  0.2 mg IntraVENous Multiple    simethicone (MYLICON) 96ZA/9.3AV oral drops 80 mg  1.2 mL Oral Multiple    atropine injection 0.4 mg  0.4 mg IntraVENous ONCE PRN    EPINEPHrine (ADRENALIN) 0.1 mg/mL syringe 1 mg  1 mg Endoscopically ONCE PRN Social History:  Social History   Substance Use Topics    Smoking status: Light Tobacco Smoker     Packs/day: 0.50     Years: 7.00    Smokeless tobacco: Never Used    Alcohol use No       Family History:  Family History   Problem Relation Age of Onset    Cancer Father     Asthma Neg Hx     Diabetes Neg Hx     Heart Disease Neg Hx     Hypertension Neg Hx     Stroke Neg Hx     Malignant Hyperthermia Neg Hx     Pseudocholinesterase Deficiency Neg Hx     Delayed Awakening Neg Hx     Post-op Nausea/Vomiting Neg Hx              Review of Systems:      Constitutional: negative fever, negative chills, negative weight loss  Eyes:   negative visual changes  ENT:   negative sore throat, tongue or lip swelling  Respiratory:  negative cough, negative dyspnea  Cards:  negative for chest pain, palpitations, lower extremity edema  GI:   See HPI  :  negative for frequency, dysuria  Integument:  negative for rash and pruritus  Heme:  negative for easy bruising and gum/nose bleeding  Musculoskel: negative for myalgias,  back pain and muscle weakness  Neuro: negative for headaches, dizziness, vertigo  Psych:  negative for feelings of anxiety, depression       Objective:   Patient Vitals for the past 8 hrs:   BP Temp Pulse Resp SpO2 Height Weight   01/19/17 0658 106/68 98.2 °F (36.8 °C) 93 16 96 % 6' (1.829 m) 87.5 kg (193 lb)             EXAM:     NEURO-a&o   HEENT-wnl   LUNGS-clear    COR-regular rate and rhythym     ABD-soft , (+) tenderness, no rebound, good bs, ileostomy     EXT-no edema     Data Review     No results for input(s): WBC, HGB, HCT, PLT, HGBEXT, HCTEXT, PLTEXT in the last 72 hours. No results for input(s): NA, K, CL, CO2, BUN, CREA, GLU, PHOS, CA in the last 72 hours. No results for input(s): SGOT, GPT, AP, TBIL, TP, ALB, GLOB, GGT, AML, LPSE in the last 72 hours. No lab exists for component: AMYP, HLPSE  No results for input(s): INR, PTP, APTT in the last 72 hours.     No lab exists for component: INREXT    Patient Active Problem List   Diagnosis Code    GERD (gastroesophageal reflux disease) K21.9    Abdominal pain R10.9    [facility] ([facility] K50.10    S/P laparoscopic cholecystectomy Z90.49    Anxiety and depression F41.9, F32.9    Dehydration E86.0    Intractable vomiting with nausea R11.2    Constipation K59.00      Assessment:   · Nausea and vomiting and diarrhea and abdominal pain   Plan:   · EGD and ileoscopy today.      Signed By: Nona Cohn MD     1/19/2017  7:59 AM

## 2017-01-19 NOTE — PROCEDURES
Cassondra Brunner, M.D.  [phone]           2017                EGD Operative Report  Remingtonydene Borer  :  1975  Carlos Monson Medical Record Number:  [id number]      Indication:  Abdominal pain, epigastric, Vomiting, persitent of unclear etilogy     : Marva Brown MD    Referring Provider:  Annel Jordan MD      Anesthesia/Sedation:  MAC anesthesia    Airway assessment: No airway problems anticipated    Pre-Procedural Exam:      Airway: clear, no airway problems anticipated  Heart: RRR, without gallops or rubs  Lungs: clear bilaterally without wheezes, crackles, or rhonchi  Abdomen: soft, nontender, nondistended, bowel sounds present  Mental Status: awake, alert and oriented to person, place and time       Procedure Details     After infomed consent was obtained for the procedure, with all risks and benefits of procedure explained the patient was taken to the endoscopy suite and placed in the left lateral decubitus position. Following sequential administration of sedation as per above, the endoscope was inserted into the mouth and advanced under direct vision to second portion of the duodenum. A careful inspection was made as the gastroscope was withdrawn, including a retroflexed view of the proximal stomach; findings and interventions are described below. Findings:   Esophagus:normal mucosa throughout the esophagus  Stomach: Mucosa within normal throughout the stomach, diminutive nodule seen in the fundus of the stomach, otherwise fundus and cardia appeared within normal retroflexed view. Small amount of bile residue seen in the stomach that was suctioned out. Duodenum/jejunum: normal mucosa throughout, bile residue and small amount of solid chyme. Therapies:  none    Specimens: Pyloritek, duodenum and gastric nodule           Complications:   None; patient tolerated the procedure well. EBL:  None.            Impression:    Diminutive gastric nodule, otherwise upper endoscopy within normal.    Recommendations:    -Await pathology.  -Await CRISTOPHER test result and treat for Helicobacter pylori if positive.  -Would taper off any narcotics    Selena Rodriguez MD

## 2017-01-19 NOTE — DISCHARGE INSTRUCTIONS
[address] [facility]. [place] M.D.  [phone]                 COLON and EGD DISCHARGE INSTRUCTIONS    2017    Angelo Maldonado  :  1975  [place] Medical Record Number:  [id number]      DISCOMFORT:  Sore throat- throat lozenges or warm salt water gargle  Redness at IV site- apply warm compress to area; if redness or soreness persist- contact your physician  There may be a slight amount of blood passed from the rectum  Gaseous discomfort- walking, belching will help relieve any discomfort  You may not operate a vehicle for 12 hours  You may not engage in an occupation involving machinery or appliances for rest of today  You may not drink alcoholic beverages for at least 12 hours  Avoid making any critical decisions for at least 24 hour  DIET:   Low residue diet   - however -  remember your colon is empty and a heavy meal will produce gas. Avoid these foods:  vegetables, fried / greasy foods, carbonated drinks for today     ACTIVITY:  You may  resume your normal daily activities it is recommended that you spend the remainder of the day resting -  avoid any strenuous activity and driving. CALL M.D. ANY SIGN OF:   Increasing pain, nausea, vomiting  Abdominal distension (swelling)  New increased bleeding (oral or rectal)  Fever (chills)  Pain in chest area  Bloody discharge from nose or mouth  Shortness of breath      Follow-up Instructions:   Call Dr. Amrit Baez if any questions at [phone]. Results of procedure / biopsy in 7 to 10 days, we will call you with these results. Your endoscopy showed normal mucosa throughout, small nodule seen in the stomach, biopsied. Your ileoscopy showed residual solid stools, one superficial ulceration, otherwise looked within normal.              Continue with current medications and follow-up with Dr. Bertha Ornelas.

## 2017-01-19 NOTE — ANESTHESIA POSTPROCEDURE EVALUATION
Post-Anesthesia Evaluation and Assessment    Patient: Ewa Michelle MRN: [id number]  SSN: [id number]    YOB: 1975  Age: 37 y.o. Sex: male       Cardiovascular Function/Vital Signs  Visit Vitals    /76    Pulse 82    Temp 37 °C (98.6 °F)    Resp 15    Ht 6' (1.829 m)    Wt 87.5 kg (193 lb)    SpO2 100%    BMI 26.18 kg/m2       Patient is status post MAC anesthesia for Procedure(s):  ESOPHAGOGASTRODUODENOSCOPY (EGD)  ESOPHAGOGASTRODUODENAL (EGD) BIOPSY  COLON BIOPSY  ILEOSCOPY. Nausea/Vomiting: None    Postoperative hydration reviewed and adequate. Pain:  Pain Scale 1: Numeric (0 - 10) (01/19/17 0932)  Pain Intensity 1: 6 (01/19/17 0932)   Managed    Neurological Status: At baseline    Mental Status and Level of Consciousness: Arousable    Pulmonary Status:   O2 Device: Room air (01/19/17 0932)   Adequate oxygenation and airway patent    Complications related to anesthesia: None    Post-anesthesia assessment completed.  No concerns    Signed By: Damir Arenas MD     January 19, 2017

## 2017-01-19 NOTE — PROGRESS NOTES
Africa Mendes  1975  [id number]    Situation:  Verbal report received from: ROSALBA Hill  Procedure: Procedure(s) with comments:  ESOPHAGOGASTRODUODENOSCOPY (EGD)  ESOPHAGOGASTRODUODENAL (EGD) BIOPSY  COLON BIOPSY - ileostomy biopsy  ILEOSCOPY    Background:    Preoperative diagnosis: CROHNS, EPIGASTRIC PAIN  Postoperative diagnosis: Gastric Nodule, ulceration in Ileum    :  Dr. Ya Cagle  Assistant(s): Endoscopy Technician-1: Eliu Atkins  Endoscopy RN-1: Liz Rendon RN    Specimens:   ID Type Source Tests Collected by Time Destination   1 : Duodenal Biopsy Preservative Duodenum  Nona Cohn MD 1/19/2017 1462 Pathology   2 : Gastric Nodule Biopsy Preservative   Nona Cohn MD 1/19/2017 0846 Pathology   3 : Ileum Biopsy Preservative Ileum  Nona Cohn MD 1/19/2017 5492 Pathology     H. Pylori  yes    Assessment:  Intra-procedure medications    Anesthesia gave intra-procedure sedation and medications, see anesthesia flow sheet yes    Intravenous fluids: NS@ KVO     Vital signs stable   yes    Abdominal assessment: round and soft   yes    Recommendation:  Discharge patient per MD order  yes.   Return to floor  outpatient  Family or Friend   wife  Permission to share finding with family or friend yes

## 2017-01-19 NOTE — ANESTHESIA PREPROCEDURE EVALUATION
Anesthetic History     PONV          Review of Systems / Medical History  Patient summary reviewed, nursing notes reviewed and pertinent labs reviewed    Pulmonary  Within defined limits                 Neuro/Psych   Within defined limits           Cardiovascular  Within defined limits                     GI/Hepatic/Renal     GERD           Endo/Other  Within defined limits           Other Findings              Physical Exam    Airway  Mallampati: II  TM Distance: 4 - 6 cm  Neck ROM: normal range of motion   Mouth opening: Normal     Cardiovascular    Rhythm: regular  Rate: normal         Dental  No notable dental hx       Pulmonary  Breath sounds clear to auscultation               Abdominal         Other Findings            Anesthetic Plan    ASA: 2  Anesthesia type: MAC            Anesthetic plan and risks discussed with: Patient

## 2017-01-19 NOTE — IP AVS SNAPSHOT
[address] [address] [address] [facility] 
[phone] Patient: Angelo Maldonado MRN: [id number] :1975 You are allergic to the following Allergen Reactions Remicade (Infliximab) Anaphylaxis Shortness of Breath Bactrim (Sulfamethoprim Ds) Other (comments) Increased GI distress. Nsaids (Non-Steroidal Anti-Inflammatory Drug) Other (comments) Stomach ulcers Recent Documentation Height Weight BMI Smoking Status 1.829 m 87.5 kg 26.18 kg/m2 Light Tobacco Smoker Emergency Contacts Name Discharge Info Relation Home Work Mobile [address] CAREGIVER [3] Spouse [3] [phone] About your hospitalization You were admitted on:  2017 You last received care in the:  [facility] ENDOSCOPY You were discharged on:  2017 Unit phone number:  [phone] Why you were hospitalized Your primary diagnosis was:  Not on File Providers Seen During Your Hospitalizations Provider Role Specialty Primary office phone Fredis Segura MD Attending Provider Gastroenterology [phone] Your Primary Care Physician (PCP) Primary Care Physician Office Phone Office Fax [place], [address] [phone] Follow-up Information None Current Discharge Medication List  
  
CONTINUE these medications which have NOT CHANGED Dose & Instructions Dispensing Information Comments Morning Noon Evening Bedtime  
 cyclobenzaprine 10 mg tablet Commonly known as:  FLEXERIL Your next dose is: Today, Tomorrow Other:  _________ Dose:  10 mg Take 10 mg by mouth three (3) times daily as needed for Muscle Spasm(s). Refills:  0  
     
   
   
   
  
 diphenhydrAMINE 25 mg tablet Commonly known as:  BENADRYL Your next dose is: Today, Tomorrow Other:  _________ Dose:  25 mg Take 25 mg by mouth nightly as needed for Sleep. Refills:  0 LYRICA 100 mg capsule Generic drug:  pregabalin Your next dose is: Today, Tomorrow Other:  _________ Dose:  100 mg Take 100 mg by mouth three (3) times daily. Refills:  0  
     
   
   
   
  
 ondansetron 4 mg disintegrating tablet Commonly known as:  ZOFRAN ODT Your next dose is: Today, Tomorrow Other:  _________ Dose:  4 mg Take 4 mg by mouth every eight (8) hours as needed for Nausea. Refills:  0  
     
   
   
   
  
 oxyCODONE IR 10 mg Tab immediate release tablet Commonly known as:  Jammiedabernadette Bob Your next dose is: Today, Tomorrow Other:  _________ Dose:  10 mg Take 1 Tab by mouth every six (6) hours as needed for up to 14 days. Max Daily Amount: 40 mg.  
 Quantity:  56 Tab Refills:  0  
     
   
   
   
  
 pantoprazole 40 mg tablet Commonly known as:  PROTONIX Your next dose is: Today, Tomorrow Other:  _________ Dose:  40 mg Take 1 Tab by mouth Before breakfast and dinner. Quantity:  60 Tab Refills:  0  
     
   
   
   
  
 predniSONE 20 mg tablet Commonly known as:  Nanine Knife Your next dose is: Today, Tomorrow Other:  _________ Dose:  40 mg Take 2 Tabs by mouth daily (with breakfast). Quantity:  30 Tab Refills:  0  
     
   
   
   
  
 promethazine 25 mg tablet Commonly known as:  PHENERGAN Your next dose is: Today, Tomorrow Other:  _________ Dose:  25 mg Take 1 Tab by mouth every six (6) hours as needed for Nausea. Quantity:  60 Tab Refills:  0  
     
   
   
   
  
 temazepam 30 mg capsule Commonly known as:  RESTORIL Your next dose is: Today, Tomorrow Other:  _________ Dose:  30 mg Take 30 mg by mouth nightly. Refills:  0 ustekinaumab 90 mg/mL injection Generic drug:  Ustekinumab Your next dose is: Today, Tomorrow Other:  _________ Dose:  90 mg  
90 mg by SubCUTAneous route. Every 8 weeks Refills:  0 VALIUM 5 mg tablet Generic drug:  diazePAM  
   
Your next dose is: Today, Tomorrow Other:  _________ Dose:  5 mg Take 5 mg by mouth every eight (8) hours as needed (bowel relaxation). Refills:  0  
     
   
   
   
  
 VITAMIN B-12 1,000 mcg/mL injection Generic drug:  cyanocobalamin Your next dose is: Today, Tomorrow Other:  _________ Dose:  1000 mcg  
1,000 mcg by IntraMUSCular route every seven (7) days. Indications: Once weekly Refills:  0 ZEGERID 20-1.1 mg-gram Cap Generic drug:  omeprazole-sodium bicarbonate Your next dose is: Today, Tomorrow Other:  _________ Dose:  20 mg Take 20 mg by mouth daily. Refills:  0 Discharge Instructions [facility]. [facility] Dennie Rumple, M.D. 
[phone] COLON and EGD DISCHARGE INSTRUCTIONS 
 
2017 Fiordaliza Martínez :  1975 [place] Medical Record Number:  [id number] DISCOMFORT: 
Sore throat- throat lozenges or warm salt water gargle Redness at IV site- apply warm compress to area; if redness or soreness persist- contact your physician There may be a slight amount of blood passed from the rectum Gaseous discomfort- walking, belching will help relieve any discomfort You may not operate a vehicle for 12 hours You may not engage in an occupation involving machinery or appliances for rest of today You may not drink alcoholic beverages for at least 12 hours Avoid making any critical decisions for at least 24 hour DIET: 
 Low residue diet  however -  remember your colon is empty and a heavy meal will produce gas. Avoid these foods:  vegetables, fried / greasy foods, carbonated drinks for today ACTIVITY: 
You may  resume your normal daily activities it is recommended that you spend the remainder of the day resting -  avoid any strenuous activity and driving. CALL M.D. ANY SIGN OF: Increasing pain, nausea, vomiting Abdominal distension (swelling) New increased bleeding (oral or rectal) Fever (chills) Pain in chest area Bloody discharge from nose or mouth Shortness of breath Follow-up Instructions: 
 Call Dr. Ketan Lal if any questions at [phone]. Results of procedure / biopsy in 7 to 10 days, we will call you with these results. Your endoscopy showed normal mucosa throughout, small nodule seen in the stomach, biopsied. Your ileoscopy showed residual solid stools, one superficial ulceration, otherwise looked within normal. 
            Continue with current medications and follow-up with Dr. Kalpana Zheng. Discharge Orders None Introducing [facility] [place] [facility] SERVICES! Dear Gregorio Saenz: Thank you for requesting a [employer] account. Our records indicate that you already have an active [employer] account. You can access your account anytime at [email or web address] Did you know that you can access your hospital and ER discharge instructions at any time in [employer]? You can also review all of your test results from your hospital stay or ER visit. Additional Information If you have questions, please visit the Frequently Asked Questions section of the [employer] website at [email or web address] Remember, [employer] is NOT to be used for urgent needs. For medical emergencies, dial 911. Now available from your iPhone and Android! General Information Please provide this summary of care documentation to your next provider. Patient Signature:  ____________________________________________________________ Date:  ____________________________________________________________  
  
Mae Adebayo Provider Signature:  ____________________________________________________________ Date:  ____________________________________________________________

## 2017-01-20 NOTE — ED NOTES
I have reviewed discharge instructions with the patient. The patient verbalized understanding. Pt confirmed understanding of need for follow up with primary care provider. Pt is not in any current distress and shows no evidence of clinical instability. Pt left with  Pt family/ present. Pt left with all personal belongings. Pt states they are not driving. Pt states chief complaint has improved with treatment provided    PT is alert and oriented x 4, Pt is hemodynamically/respiratorily stable. Paperwork given by provider and reviewed with patient, opportunity for questions/clarification given.

## 2017-01-20 NOTE — ED PROVIDER NOTES
HPI Comments: 37 y.o. male with extensive past medical history, please see list, significant for Crohn's disease, GERD, Ulcerative colitis, Multiple GI surgeries, Ileostomy  who presents from home with chief complaint of abdominal distension. Pt reports 1.5 month hx of persistent Crohn's exacerbation. Pt reports multiple admissions with most recent 5 days ago at which time he had SBO (dc diagnosed per records was constipation) of which he \"passed the main blockage Monday morning\" and was discharged. Pt underwent colonoscopy and endoscopy today. He statees s/p procedure increased abdominal distension \"I feel like I'm 8 months pregnant\" and \"thicker/adolfo form\" consistency to excreted bowel in ileostomy. Pt also notes nausea and 2 episodes of vomiting this evening. Pt denies pain as more severe than usual. Pt also denies fever, cough, congestion, chest pain or SOB. There are no other acute medical concerns at this time. Old chart review: Pt seen by Dr. Opal Flores today for Ileoscopy thru stoma biopsy. Impression - one superficial ulceration seen about 20cm fromt he stomach, without stricture, inflammation or any other lesions seen up about 50cm fromt he stoma. Solid stools seen. Recommendation: await pathology, continue current therapy, taper off narcotics, diet and f/u. CT enterography 11/2016 normal.     PCP: Refugio Gowers, MD  Gastroenterologist: Madeleine Rdz MD    The history is provided by the patient. No  was used. Past Medical History:   Diagnosis Date    Anal fistula     Anal stenosis     Crohn's disease ([address]     GERD (gastroesophageal reflux disease)     H/O ulcerative colitis      Symptoms began in 1996. Total proctocolectomy was performed in 2004. Pateint later developed Crohn's disease.     Hiatal hernia     Ileal pouchitis ([address] 5/2004    Nausea & vomiting      Combination of Scopalamine patch & Zofran IV worked well in past    Numbness in right leg     Psychiatric disorder depression and anxiety    Skin lesion of back 3/17/2014    Syncopal episodes        Past Surgical History:   Procedure Laterality Date    Pr abdomen surgery proc unlisted  3/25/2004     Total proctocolectomy with creation of ileoanal J-pouch and loop ileostomy; Dr. Lisa Bennett.   orthopaedic  2008     Left ACL repair    Hx gi  5/2/2004     Examination under anesthesia with endoscopic evaluation of ileoanal pouch and placement of drain; Dr. Lisa Bennett.  Hx gi  5/25/2004     Ileostomy closure with small bowel resection and anastomosis; Dr. Lisa Bennett.   gi  5/24/2012     Examination under anesthesia, anal dilatation, anal biopsy, and placement of draining seton; Dr. Lisa Bennett.   gi  7/3/2012     Incision and drainage of perirectal abscess and rigid endoscopy of ileoanal pouch; Dr. Lisa Bennett.   gi  7/31/2012     Incision and drainage of perirectal abscess, placement of draining seton, and anal dilatation; Dr. Lisa Bennett.   gi  1/22/2013     Repair of anal fistulas with debridement, partial fistulectomy, and flap closure; Dr. Lisa Bennett.   gi  5/17/2013     Examination under anesthesia, partial fistulotomy,  and placement of draining setons; Dr. Lisa Bennett.   gi  8/6/2013     Anal fistulotomy and application of ACell micromatrix; Dr. Lisa Bennett.   gi  2/20/2014     Examination under anesthesia and dilation of anal canal with rigid proctoscopy; Jessica Hoffman MD.     gi  4/29/2015     Creation of Angi Ree continent intestinal reservoir (BCIR), abdominoperineal resection of ileoanal J-pouch; lysis of adhesions, and gastrostomy; Roderick Maldonado MD ([facility])     gi  8/7/2015     Stoma revision; Dennise Maldonado MD ([facility])     gi  10/29/2015     Extensive lysis of adhesions, resection of continent intestinal reservoir, repair of serosal tears, and creation of end-ileostomy; Dr. Lisa Bennett.      cholecystectomy 12/2016     Dr. Odette Lua          Family History:   Problem Relation Age of Onset    Cancer Father     Asthma Neg Hx     Diabetes Neg Hx     Heart Disease Neg Hx     Hypertension Neg Hx     Stroke Neg Hx     Malignant Hyperthermia Neg Hx     Pseudocholinesterase Deficiency Neg Hx     Delayed Awakening Neg Hx     Post-op Nausea/Vomiting Neg Hx        Social History     Social History    Marital status:      Spouse name: N/A    Number of children: N/A    Years of education: N/A     Occupational History    Not on file. Social History Main Topics    Smoking status: Former Smoker     Packs/day: 0.00     Years: 7.00     Quit date: 1/14/2017    Smokeless tobacco: Never Used    Alcohol use No    Drug use: No    Sexual activity: Not on file     Other Topics Concern    Not on file     Social History Narrative         ALLERGIES: Remicade [infliximab]; Bactrim [sulfamethoprim ds]; and Nsaids (non-steroidal anti-inflammatory drug)    Review of Systems   Constitutional: Negative for appetite change and fever. HENT: Negative for congestion, nosebleeds and sore throat. Eyes: Negative for discharge. Respiratory: Negative for cough and shortness of breath. Cardiovascular: Negative for chest pain. Gastrointestinal: Positive for abdominal distention, abdominal pain, nausea and vomiting. Negative for diarrhea. Genitourinary: Negative for dysuria. Musculoskeletal: Negative. Skin: Negative for rash. Neurological: Negative for weakness and headaches. Hematological: Negative for adenopathy. Psychiatric/Behavioral: Negative. All other systems reviewed and are negative. Vitals:    01/19/17 2016   BP: 133/84   Pulse: (!) 120   Resp: 16   Temp: 98.5 °F (36.9 °C)   SpO2: 97%   Weight: 88.3 kg (194 lb 10.7 oz)   Height: 6' (1.829 m)            Physical Exam   Constitutional: He is oriented to person, place, and time. He appears well-developed and well-nourished.    Appears uncomfortable. HENT:   Head: Normocephalic and atraumatic. Mouth/Throat: Oropharynx is clear and moist.   Eyes: Conjunctivae are normal.   Neck: Normal range of motion. Neck supple. Cardiovascular: Regular rhythm and normal heart sounds. tachycardia   Pulmonary/Chest: Effort normal and breath sounds normal.   Abdominal: Soft. Bowel sounds are normal. He exhibits no distension. There is no tenderness. There is no rebound and no guarding. Ileostomy in place   Musculoskeletal: Normal range of motion. He exhibits no edema or tenderness. Neurological: He is alert and oriented to person, place, and time. Skin: Skin is warm and dry. Psychiatric: He has a normal mood and affect. His behavior is normal.   Nursing note and vitals reviewed. Note written by Virgen Tijerina, as dictated by Tracy Kwong MD 9:59 PM    [facility]  ED Course       Procedures     PROGRESS NOTE:  11:04 PM  Spoke with patient. Pt is continually requesting IV Dilaudid for chronic pain. Discussed with patient that we do not treat chronic pain through the ED and that he should f/u with GI for further evaluation. Pt states pain is no different then chronic symptoms. He does not feel he is perforated and declined CT when offered to r/o perforation. 11:23 PM  Attempted to give patient pamphlet on pain management but could not locate pamphlet. A/P:  1. Chronic abdominal pain - hx Crohns disease. However, no clear evidence on endoscopy or colonoscopy today of acute exacerbation. GI recs clearly state to taper off opiates. Review of records shows concern by multiple providers for possible drug seeking behavior. He does not have peritoneal signs or exam and states pain is not worse than prior Crohns pain and declined CT. Advised f/u in AM with Dr. Jonatan Wood by phone. He notes ileostomy drainage thicker today. Liquid drainage in ED. Begin Miralax. Needs OP pain management for his chronic pain.  I had long discussion with the patient after multiple requests for IV dilaudid that IV dilaudid is not indicated for chronic pain. 2. N/V - none in ED. Continue home meds. Patient's results have been reviewed with them. Patient and/or family have verbally conveyed their understanding and agreement of the patient's signs, symptoms, diagnosis, treatment and prognosis and additionally agree to follow up as recommended or return to the Emergency Room should their condition change prior to follow-up. Discharge instructions have also been provided to the patient with some educational information regarding their diagnosis as well a list of reasons why they would want to return to the ER prior to their follow-up appointment should their condition change.

## 2017-01-20 NOTE — DISCHARGE INSTRUCTIONS
We hope that we have addressed all of your medical concerns. The examination and treatment you received in the Emergency Department were for an emergent problem and were not intended as complete care. It is important that you follow up with your healthcare provider(s) for ongoing care. If your symptoms worsen or do not improve as expected, and you are unable to reach your usual health care provider(s), you should return to the Emergency Department. Today's healthcare is undergoing tremendous change, and patient satisfaction surveys are one of the many tools to assess the quality of medical care. You may receive a survey from the [employer] regarding your experience in the Emergency Department. I hope that your experience has been completely positive, particularly the medical care that I provided. As such, please participate in the survey; anything less than excellent does not meet my expectations or intentions. [facility] and [address] [place] [address] participate in nationally recognized quality of care measures. If your blood pressure is greater than 120/80, as reported below, we urge that you seek medical care to address the potential of high blood pressure, commonly known as hypertension. Hypertension can be hereditary or can be caused by certain medical conditions, pain, stress, or \"white coat syndrome. \"       Please make an appointment with your health care provider(s) for follow up of your Emergency Department visit. VITALS:   Patient Vitals for the past 8 hrs:   Temp Pulse Resp BP SpO2   01/19/17 2016 98.5 °F (36.9 °C) (!) 120 16 133/84 97 %          Thank you for allowing us to provide you with medical care today. We realize that you have many choices for your emergency care needs. Please choose us in the future for any continued health care needs. Antonella Solorzano, [address] [employer]. Office: [phone]            Recent Results (from the past 24 hour(s))   POC H. PYLORI, TISSUE    Collection Time: 01/19/17  8:45 AM   Result Value Ref Range    H. pylori from tissue Negative Negative    Positive control positive     Negative control negative     Lot no. [id number]    URINALYSIS W/ REFLEX CULTURE    Collection Time: 01/19/17  8:35 PM   Result Value Ref Range    Color YELLOW/STRAW      Appearance CLEAR CLEAR      Specific gravity 1.017 1.003 - 1.030      pH (UA) 5.5 5.0 - 8.0      Protein NEGATIVE  NEG mg/dL    Glucose 100 (A) NEG mg/dL    Ketone NEGATIVE  NEG mg/dL    Bilirubin NEGATIVE  NEG      Blood NEGATIVE  NEG      Urobilinogen 0.2 0.2 - 1.0 EU/dL    Nitrites NEGATIVE  NEG      Leukocyte Esterase NEGATIVE  NEG      WBC 0-4 0 - 4 /hpf    RBC 0-5 0 - 5 /hpf    Epithelial cells FEW FEW /lpf    Bacteria NEGATIVE  NEG /hpf    UA:UC IF INDICATED CULTURE NOT INDICATED BY UA RESULT CNI      Hyaline Cast 0-2 0 - 5 /lpf   CBC WITH AUTOMATED DIFF    Collection Time: 01/19/17  9:32 PM   Result Value Ref Range    WBC 7.5 4.1 - 11.1 K/uL    RBC 4.04 (L) 4.10 - 5.70 M/uL    HGB 12.1 12.1 - 17.0 g/dL    HCT 38.0 36.6 - 50.3 %    MCV 94.1 80.0 - 99.0 FL    MCH 30.0 26.0 - 34.0 PG    MCHC 31.8 30.0 - 36.5 g/dL    RDW 16.2 (H) 11.5 - 14.5 %    PLATELET 600 942 - 643 K/uL    NEUTROPHILS 77 (H) 32 - 75 %    LYMPHOCYTES 13 12 - 49 %    MONOCYTES 9 5 - 13 %    EOSINOPHILS 1 0 - 7 %    BASOPHILS 0 0 - 1 %    ABS. NEUTROPHILS 5.8 1.8 - 8.0 K/UL    ABS. LYMPHOCYTES 1.0 0.8 - 3.5 K/UL    ABS. MONOCYTES 0.7 0.0 - 1.0 K/UL    ABS. EOSINOPHILS 0.1 0.0 - 0.4 K/UL    ABS.  BASOPHILS 0.0 0.0 - 0.1 K/UL   METABOLIC PANEL, COMPREHENSIVE    Collection Time: 01/19/17  9:32 PM   Result Value Ref Range    Sodium 140 136 - 145 mmol/L    Potassium 3.6 3.5 - 5.1 mmol/L    Chloride 104 97 - 108 mmol/L    CO2 27 21 - 32 mmol/L    Anion gap 9 5 - 15 mmol/L    Glucose 254 (H) 65 - 100 mg/dL    BUN 8 6 - 20 MG/DL    Creatinine 1.00 0.70 - 1.30 MG/DL    BUN/Creatinine ratio 8 (L) 12 - 20      GFR est AA >60 >60 ml/min/1.73m2    GFR est non-AA >60 >60 ml/min/1.73m2    Calcium 8.2 (L) 8.5 - 10.1 MG/DL    Bilirubin, total 0.2 0.2 - 1.0 MG/DL    ALT 34 12 - 78 U/L    AST 9 (L) 15 - 37 U/L    Alk. phosphatase 71 45 - 117 U/L    Protein, total 6.2 (L) 6.4 - 8.2 g/dL    Albumin 2.8 (L) 3.5 - 5.0 g/dL    Globulin 3.4 2.0 - 4.0 g/dL    A-G Ratio 0.8 (L) 1.1 - 2.2     LIPASE    Collection Time: 01/19/17  9:32 PM   Result Value Ref Range    Lipase 78 73 - 393 U/L   C REACTIVE PROTEIN, QT    Collection Time: 01/19/17  9:32 PM   Result Value Ref Range    C-Reactive protein 0.97 (H) <0.60 mg/dL       Xr Abd Acute W 1 V Chest    Result Date: 1/19/2017   CLINICAL HISTORY: ,Abdominal pain, endoscopy and colonoscopy today INDICATION: Abdominal pain, endoscopy and colonoscopy today. COMPARISON: None FINDINGS: AP view of the chest and AP view of the abdomen are obtained. The cardiopericardial silhouette is within normal limits. There is no pleural effusion, pneumothorax or focal consolidation present. Increased enteric intraluminal gas is likely related to colonoscopy and endoscopy performed earlier today. Patient status post cholecystectomy. There is no obstruction or free intraperitoneal air. There is no organomegaly. IMPRESSION: No acute process is identified. Abdominal Pain: Care Instructions  Your Care Instructions    Abdominal pain has many possible causes. Some aren't serious and get better on their own in a few days. Others need more testing and treatment. If your pain continues or gets worse, you need to be rechecked and may need more tests to find out what is wrong. You may need surgery to correct the problem. Don't ignore new symptoms, such as fever, nausea and vomiting, urination problems, pain that gets worse, and dizziness. These may be signs of a more serious problem.   Your doctor may have recommended a follow-up visit in the next 8 to 12 hours. If you are not getting better, you may need more tests or treatment. The doctor has checked you carefully, but problems can develop later. If you notice any problems or new symptoms, get medical treatment right away. Follow-up care is a key part of your treatment and safety. Be sure to make and go to all appointments, and call your doctor if you are having problems. It's also a good idea to know your test results and keep a list of the medicines you take. How can you care for yourself at home? · Rest until you feel better. · To prevent dehydration, drink plenty of fluids, enough so that your urine is light yellow or clear like water. Choose water and other caffeine-free clear liquids until you feel better. If you have kidney, heart, or liver disease and have to limit fluids, talk with your doctor before you increase the amount of fluids you drink. · If your stomach is upset, eat mild foods, such as rice, dry toast or crackers, bananas, and applesauce. Try eating several small meals instead of two or three large ones. · Wait until 48 hours after all symptoms have gone away before you have spicy foods, alcohol, and drinks that contain caffeine. · Do not eat foods that are high in fat. · Avoid anti-inflammatory medicines such as aspirin, ibuprofen (Advil, Motrin), and naproxen (Aleve). These can cause stomach upset. Talk to your doctor if you take daily aspirin for another health problem. When should you call for help? Call 911 anytime you think you may need emergency care. For example, call if:  · You passed out (lost consciousness). · You pass maroon or very bloody stools. · You vomit blood or what looks like coffee grounds. · You have new, severe belly pain. Call your doctor now or seek immediate medical care if:  · Your pain gets worse, especially if it becomes focused in one area of your belly. · You have a new or higher fever.   · Your stools are black and look like tar, or they have streaks of blood. · You have unexpected vaginal bleeding. · You have symptoms of a urinary tract infection. These may include:  ¨ Pain when you urinate. ¨ Urinating more often than usual.  ¨ Blood in your urine. · You are dizzy or lightheaded, or you feel like you may faint. Watch closely for changes in your health, and be sure to contact your doctor if:  · You are not getting better after 1 day (24 hours). Where can you learn more? Go to http://malu-ashley.info/. Enter C135 in the search box to learn more about \"Abdominal Pain: Care Instructions. \"  Current as of: May 27, 2016  Content Version: 11.1  © 0762-2043 [employer]. Care instructions adapted under license by [employer] (which disclaims liability or warranty for this information). If you have questions about a medical condition or this instruction, always ask your healthcare professional. [place] [address] any warranty or liability for your use of this information. Nausea and Vomiting: Care Instructions  Your Care Instructions    When you are nauseated, you may feel weak and sweaty and notice a lot of saliva in your mouth. Nausea often leads to vomiting. Most of the time you do not need to worry about nausea and vomiting, but they can be signs of other illnesses. Two common causes of nausea and vomiting are stomach flu and food poisoning. Nausea and vomiting from viral stomach flu will usually start to improve within 24 hours. Nausea and vomiting from food poisoning may last from 12 to 48 hours. The doctor has checked you carefully, but problems can develop later. If you notice any problems or new symptoms, get medical treatment right away. Follow-up care is a key part of your treatment and safety. Be sure to make and go to all appointments, and call your doctor if you are having problems. It's also a good idea to know your test results and keep a list of the medicines you take.   How can you care for yourself at home? · To prevent dehydration, drink plenty of fluids, enough so that your urine is light yellow or clear like water. Choose water and other caffeine-free clear liquids until you feel better. If you have kidney, heart, or liver disease and have to limit fluids, talk with your doctor before you increase the amount of fluids you drink. · Rest in bed until you feel better. · When you are able to eat, try clear soups, mild foods, and liquids until all symptoms are gone for 12 to 48 hours. Other good choices include dry toast, crackers, cooked cereal, and gelatin dessert, such as Jell-O. When should you call for help? Call 911 anytime you think you may need emergency care. For example, call if:  · You passed out (lost consciousness). Call your doctor now or seek immediate medical care if:  · You have symptoms of dehydration, such as:  ¨ Dry eyes and a dry mouth. ¨ Passing only a little dark urine. ¨ Feeling thirstier than usual.  · You have new or worsening belly pain. · You have a new or higher fever. · You vomit blood or what looks like coffee grounds. Watch closely for changes in your health, and be sure to contact your doctor if:  · You have ongoing nausea and vomiting. · Your vomiting is getting worse. · Your vomiting lasts longer than 2 days. · You are not getting better as expected. Where can you learn more? Go to http://malu-ashley.info/. Enter 25 [id number] in the search box to learn more about \"Nausea and Vomiting: Care Instructions. \"  Current as of: May 27, 2016  Content Version: 11.1  © 0523-4323 [employer]. Care instructions adapted under license by [employer] (which disclaims liability or warranty for this information). If you have questions about a medical condition or this instruction, always ask your healthcare professional. [address] any warranty or liability for your use of this information. Chronic Pain: Care Instructions  Your Care Instructions  Chronic pain is pain that lasts a long time (months or even years) and may or may not have a clear cause. It is different from acute pain, which usually does have a clear cause--like an injury or illness--and gets better over time. Chronic pain:  · Lasts over time but may vary from day to day. · Does not go away despite efforts to end it. · May disrupt your sleep and lead to fatigue. · May cause depression or anxiety. · May make your muscles tense, causing more pain. · Can disrupt your work, hobbies, home life, and relationships with friends and family. Chronic pain is a very real condition. It is not just in your head. Treatment can help and usually includes several methods used together, such as medicines, physical therapy, exercise, and other treatments. Learning how to relax and changing negative thought patterns can also help you cope. Chronic pain is complex. Taking an active role in your treatment will help you better manage your pain. Tell your doctor if you have trouble dealing with your pain. You may have to try several things before you find what works best for you. Follow-up care is a key part of your treatment and safety. Be sure to make and go to all appointments, and call your doctor if you are having problems. Its also a good idea to know your test results and keep a list of the medicines you take. How can you care for yourself at home? · Pace yourself. Break up large jobs into smaller tasks. Save harder tasks for days when you have less pain, or go back and forth between hard tasks and easier ones. Take rest breaks. · Relax, and reduce stress. Relaxation techniques such as deep breathing or meditation can help. · Keep moving. Gentle, daily exercise can help reduce pain over the long run. Try low- or no-impact exercises such as walking, swimming, and stationary biking. Do stretches to stay flexible.   · Try heat, cold packs, and massage. · Get enough sleep. Chronic pain can make you tired and drain your energy. Talk with your doctor if you have trouble sleeping because of pain. · Think positive. Your thoughts can affect your pain level. Do things that you enjoy to distract yourself when you have pain instead of focusing on the pain. See a movie, read a book, listen to music, or spend time with a friend. · If you think you are depressed, talk to your doctor about treatment. · Keep a daily pain diary. Record how your moods, thoughts, sleep patterns, activities, and medicine affect your pain. You may find that your pain is worse during or after certain activities or when you are feeling a certain emotion. Having a record of your pain can help you and your doctor find the best ways to treat your pain. · Take pain medicines exactly as directed. ¨ If the doctor gave you a prescription medicine for pain, take it as prescribed. ¨ If you are not taking a prescription pain medicine, ask your doctor if you can take an over-the-counter medicine. Reducing constipation caused by pain medicine  · Include fruits, vegetables, beans, and whole grains in your diet each day. These foods are high in fiber. · Drink plenty of fluids, enough so that your urine is light yellow or clear like water. If you have kidney, heart, or liver disease and have to limit fluids, talk with your doctor before you increase the amount of fluids you drink. · If your doctor recommends it, get more exercise. Walking is a good choice. Bit by bit, increase the amount you walk every day. Try for at least 30 minutes on most days of the week. · Schedule time each day for a bowel movement. A daily routine may help. Take your time and do not strain when having a bowel movement. When should you call for help? Call your doctor now or seek immediate medical care if:  · Your pain gets worse or is out of control. · You feel down or blue, or you do not enjoy things like you once did. You may be depressed, which is common in people with chronic pain. Depression can be treated. · You have vomiting or cramps for more than 2 hours. Watch closely for changes in your health, and be sure to contact your doctor if:  · You cannot sleep because of pain. · You are very worried or anxious about your pain. · You have trouble taking your pain medicine. · You have any concerns about your pain medicine. · You have trouble with bowel movements, such as:  ¨ No bowel movement in 3 days. ¨ Blood in the anal area, in your stool, or on the toilet paper. ¨ Diarrhea for more than 24 hours. Where can you learn more? Go to http://malu-ashley.info/. Enter N004 in the search box to learn more about \"Chronic Pain: Care Instructions. \"  Current as of: February 19, 2016  Content Version: 11.1  © 7560-2887 [employer]. Care instructions adapted under license by [employer] (which disclaims liability or warranty for this information). If you have questions about a medical condition or this instruction, always ask your healthcare professional. [address] any warranty or liability for your use of this information. [employer] Activation    Thank you for requesting access to [employer]. Please follow the instructions below to securely access and download your online medical record. [employer] allows you to send messages to your doctor, view your test results, renew your prescriptions, schedule appointments, and more. How Do I Sign Up? 1. In your internet browser, go to [email or web address]  2. Click on the First Time User? Click Here link in the Sign In box. You will be redirect to the New Member Sign Up page. 3. Enter your Z[employer]oder Access Code exactly as it appears below. You will not need to use this code after youve completed the sign-up process. If you do not sign up before the expiration date, you must request a new code.     Zap[employer]oder Access Code: Activation code not generated  Current [employer] Status: Active (This is the date your [employer]trade access code will )    4. Enter the last four digits of your Social Security Number (xxxx) and Date of Birth (mm/dd/yyyy) as indicated and click Submit. You will be taken to the next sign-up page. 5. Create a [employer] ID. This will be your [employer]trade login ID and cannot be changed, so think of one that is secure and easy to remember. 6. Create a [employer] password. You can change your password at any time. 7. Enter your Password Reset Question and Answer. This can be used at a later time if you forget your password. 8. Enter your e-mail address. You will receive e-mail notification when new information is available in [address] 9. Click Sign Up. You can now view and download portions of your medical record. 10. Click the Download Summary menu link to download a portable copy of your medical information. Additional Information    If you have questions, please visit the Frequently Asked Questions section of the [employer] website at [email or web address] Remember, [employer] is NOT to be used for urgent needs. For medical emergencies, dial 911.

## 2017-01-20 NOTE — ED NOTES
Pt requesting to speak with Charge/Leadership RN regarding care received tonight. Introduced self to pt, sat with pt to discuss his concerns of how provider treated him tonight for his chronic condition. Pt explained that he is aware of the plan to decrease pain medication over a \"2-4 month\" period however when he came in tonight he did not receive and pain medication while waiting on labs/scans/test. He was concerned because he was just seen and every time with other providers they helped him get comfortable with pain medication while waiting. He felt like his pain was not addressed here tonight. I explained the changes made a year ago regarding pain medication and when it is and sometimes not appropriate, at the providers discretion. Pt also stated he loved the nursing care provided at this ER/4th/5th floors but was concerned as to how the provider treated him tonight. Spoke with the provider and she stated it was not appropriate to treat with narcotics as pt was seen earlier today by his GI and note stated pt needs to get off pain medications to help his condition. Spoke with pt regarding plan with his GI and asked what resolution he would like at this time. Pt denied wanting a prescription as well as a re stick for IV pain medication. However wanted to make us \"aware\" of his treatment or lack thereof.          Sidra Machado RN

## 2017-01-20 NOTE — ED TRIAGE NOTES
Pt. C/o general abdominal pain for the past few weeks, notes crohn's flare-up. Had endoscopy done here today. Pain, nausea and vomiting have increased since going home today. Was dc'd on Tuesday from here for small bowel obstruction.

## 2017-01-26 ENCOUNTER — HOSPITAL ENCOUNTER (EMERGENCY)
Age: 42
Discharge: HOME | End: 2017-01-26
Attending: EMERGENCY MEDICINE
Payer: COMMERCIAL

## 2017-01-26 VITALS
BODY MASS INDEX: 26.46 KG/M2 | HEIGHT: 72 IN | WEIGHT: 195.33 LBS | SYSTOLIC BLOOD PRESSURE: 112 MMHG | HEART RATE: 138 BPM | DIASTOLIC BLOOD PRESSURE: 74 MMHG | RESPIRATION RATE: 20 BRPM | TEMPERATURE: 97.8 F | OXYGEN SATURATION: 95 %

## 2017-01-26 DIAGNOSIS — R10.84 ABDOMINAL PAIN, GENERALIZED: Primary | ICD-10-CM

## 2017-01-26 DIAGNOSIS — R11.2 NON-INTRACTABLE VOMITING WITH NAUSEA, UNSPECIFIED VOMITING TYPE: ICD-10-CM

## 2017-01-26 LAB
ALBUMIN SERPL BCP-MCNC: 2.9 G/DL (ref 3.5–5)
ALBUMIN/GLOB SERPL: 0.9 {RATIO} (ref 1.1–2.2)
ALP SERPL-CCNC: 75 U/L (ref 45–117)
ALT SERPL-CCNC: 31 U/L (ref 12–78)
ANION GAP BLD CALC-SCNC: 9 MMOL/L (ref 5–15)
AST SERPL W P-5'-P-CCNC: 18 U/L (ref 15–37)
BASOPHILS # BLD AUTO: 0 K/UL (ref 0–0.1)
BASOPHILS # BLD: 0 % (ref 0–1)
BILIRUB SERPL-MCNC: 0.2 MG/DL (ref 0.2–1)
BUN SERPL-MCNC: 9 MG/DL (ref 6–20)
BUN/CREAT SERPL: 10 (ref 12–20)
CALCIUM SERPL-MCNC: 8.4 MG/DL (ref 8.5–10.1)
CHLORIDE SERPL-SCNC: 105 MMOL/L (ref 97–108)
CO2 SERPL-SCNC: 27 MMOL/L (ref 21–32)
CREAT SERPL-MCNC: 0.9 MG/DL (ref 0.7–1.3)
EOSINOPHIL # BLD: 0.1 K/UL (ref 0–0.4)
EOSINOPHIL NFR BLD: 1 % (ref 0–7)
ERYTHROCYTE [DISTWIDTH] IN BLOOD BY AUTOMATED COUNT: 15.8 % (ref 11.5–14.5)
GLOBULIN SER CALC-MCNC: 3.4 G/DL (ref 2–4)
GLUCOSE SERPL-MCNC: 160 MG/DL (ref 65–100)
HCT VFR BLD AUTO: 40.2 % (ref 36.6–50.3)
HGB BLD-MCNC: 13.9 G/DL (ref 12.1–17)
LACTATE SERPL-SCNC: 2 MMOL/L (ref 0.4–2)
LIPASE SERPL-CCNC: 311 U/L (ref 73–393)
LYMPHOCYTES # BLD AUTO: 24 % (ref 12–49)
LYMPHOCYTES # BLD: 3 K/UL (ref 0.8–3.5)
MCH RBC QN AUTO: 31.6 PG (ref 26–34)
MCHC RBC AUTO-ENTMCNC: 34.6 G/DL (ref 30–36.5)
MCV RBC AUTO: 91.4 FL (ref 80–99)
MONOCYTES # BLD: 0.7 K/UL (ref 0–1)
MONOCYTES NFR BLD AUTO: 6 % (ref 5–13)
NEUTS SEG # BLD: 9 K/UL (ref 1.8–8)
NEUTS SEG NFR BLD AUTO: 69 % (ref 32–75)
PLATELET # BLD AUTO: 209 K/UL (ref 150–400)
POTASSIUM SERPL-SCNC: 3.9 MMOL/L (ref 3.5–5.1)
PROT SERPL-MCNC: 6.3 G/DL (ref 6.4–8.2)
RBC # BLD AUTO: 4.4 M/UL (ref 4.1–5.7)
SODIUM SERPL-SCNC: 141 MMOL/L (ref 136–145)
WBC # BLD AUTO: 12.9 K/UL (ref 4.1–11.1)

## 2017-01-26 PROCEDURE — 83690 ASSAY OF LIPASE: CPT | Performed by: EMERGENCY MEDICINE

## 2017-01-26 PROCEDURE — 36415 COLL VENOUS BLD VENIPUNCTURE: CPT | Performed by: EMERGENCY MEDICINE

## 2017-01-26 PROCEDURE — 74011250636 HC RX REV CODE- 250/636: Performed by: EMERGENCY MEDICINE

## 2017-01-26 PROCEDURE — 96361 HYDRATE IV INFUSION ADD-ON: CPT

## 2017-01-26 PROCEDURE — 99283 EMERGENCY DEPT VISIT LOW MDM: CPT

## 2017-01-26 PROCEDURE — 83605 ASSAY OF LACTIC ACID: CPT | Performed by: EMERGENCY MEDICINE

## 2017-01-26 PROCEDURE — 85025 COMPLETE CBC W/AUTO DIFF WBC: CPT | Performed by: EMERGENCY MEDICINE

## 2017-01-26 PROCEDURE — 96374 THER/PROPH/DIAG INJ IV PUSH: CPT

## 2017-01-26 PROCEDURE — 96375 TX/PRO/DX INJ NEW DRUG ADDON: CPT

## 2017-01-26 PROCEDURE — 80053 COMPREHEN METABOLIC PANEL: CPT | Performed by: EMERGENCY MEDICINE

## 2017-01-26 RX ORDER — ONDANSETRON 2 MG/ML
4 INJECTION INTRAMUSCULAR; INTRAVENOUS
Status: COMPLETED | OUTPATIENT
Start: 2017-01-26 | End: 2017-01-26

## 2017-01-26 RX ORDER — HYDROMORPHONE HYDROCHLORIDE 1 MG/ML
1 INJECTION, SOLUTION INTRAMUSCULAR; INTRAVENOUS; SUBCUTANEOUS
Status: COMPLETED | OUTPATIENT
Start: 2017-01-26 | End: 2017-01-26

## 2017-01-26 RX ADMIN — ONDANSETRON 4 MG: 2 INJECTION INTRAMUSCULAR; INTRAVENOUS at 01:40

## 2017-01-26 RX ADMIN — SODIUM CHLORIDE 1000 ML: 900 INJECTION, SOLUTION INTRAVENOUS at 01:40

## 2017-01-26 RX ADMIN — HYDROMORPHONE HYDROCHLORIDE 1 MG: 1 INJECTION, SOLUTION INTRAMUSCULAR; INTRAVENOUS; SUBCUTANEOUS at 03:03

## 2017-01-26 RX ADMIN — METHYLPREDNISOLONE SODIUM SUCCINATE 62.5 MG: 125 INJECTION, POWDER, FOR SOLUTION INTRAMUSCULAR; INTRAVENOUS at 03:03

## 2017-01-26 NOTE — DISCHARGE INSTRUCTIONS
Abdominal Pain: Care Instructions  Your Care Instructions    Abdominal pain has many possible causes. Some aren't serious and get better on their own in a few days. Others need more testing and treatment. If your pain continues or gets worse, you need to be rechecked and may need more tests to find out what is wrong. You may need surgery to correct the problem. Don't ignore new symptoms, such as fever, nausea and vomiting, urination problems, pain that gets worse, and dizziness. These may be signs of a more serious problem. Your doctor may have recommended a follow-up visit in the next 8 to 12 hours. If you are not getting better, you may need more tests or treatment. The doctor has checked you carefully, but problems can develop later. If you notice any problems or new symptoms, get medical treatment right away. Follow-up care is a key part of your treatment and safety. Be sure to make and go to all appointments, and call your doctor if you are having problems. It's also a good idea to know your test results and keep a list of the medicines you take. How can you care for yourself at home? · Rest until you feel better. · To prevent dehydration, drink plenty of fluids, enough so that your urine is light yellow or clear like water. Choose water and other caffeine-free clear liquids until you feel better. If you have kidney, heart, or liver disease and have to limit fluids, talk with your doctor before you increase the amount of fluids you drink. · If your stomach is upset, eat mild foods, such as rice, dry toast or crackers, bananas, and applesauce. Try eating several small meals instead of two or three large ones. · Wait until 48 hours after all symptoms have gone away before you have spicy foods, alcohol, and drinks that contain caffeine. · Do not eat foods that are high in fat. · Avoid anti-inflammatory medicines such as aspirin, ibuprofen (Advil, Motrin), and naproxen (Aleve).  These can cause stomach upset. Talk to your doctor if you take daily aspirin for another health problem. When should you call for help? Call 911 anytime you think you may need emergency care. For example, call if:  · You passed out (lost consciousness). · You pass maroon or very bloody stools. · You vomit blood or what looks like coffee grounds. · You have new, severe belly pain. Call your doctor now or seek immediate medical care if:  · Your pain gets worse, especially if it becomes focused in one area of your belly. · You have a new or higher fever. · Your stools are black and look like tar, or they have streaks of blood. · You have unexpected vaginal bleeding. · You have symptoms of a urinary tract infection. These may include:  ¨ Pain when you urinate. ¨ Urinating more often than usual.  ¨ Blood in your urine. · You are dizzy or lightheaded, or you feel like you may faint. Watch closely for changes in your health, and be sure to contact your doctor if:  · You are not getting better after 1 day (24 hours). Where can you learn more? Go to http://malu-ashley.info/. Enter M057 in the search box to learn more about \"Abdominal Pain: Care Instructions. \"  Current as of: May 27, 2016  Content Version: 11.1  © 7317-2766 [employer]. Care instructions adapted under license by [employer] (which disclaims liability or warranty for this information). If you have questions about a medical condition or this instruction, always ask your healthcare professional. [place] [address] any warranty or liability for your use of this information. We hope that we have addressed all of your medical concerns. The examination and treatment you received in the Emergency Department were for an emergent problem and were not intended as complete care. It is important that you follow up with your healthcare provider(s) for ongoing care.  If your symptoms worsen or do not improve as expected, and you are unable to reach your usual health care provider(s), you should return to the Emergency Department. Today's healthcare is undergoing tremendous change, and patient satisfaction surveys are one of the many tools to assess the quality of medical care. You may receive a survey from the [employer] organization regarding your experience in the Emergency Department. I hope that your experience has been completely positive, particularly the medical care that I provided. As such, please participate in the survey; anything less than excellent does not meet my expectations or intentions. [facility] and [facility] participate in nationally recognized quality of care measures. If your blood pressure is greater than 120/80, as reported below, we urge that you seek medical care to address the potential of high blood pressure, commonly known as hypertension. Hypertension can be hereditary or can be caused by certain medical conditions, pain, stress, or \"white coat syndrome. \"       Please make an appointment with your health care provider(s) for follow up of your Emergency Department visit. VITALS:   Patient Vitals for the past 8 hrs:   Temp Pulse Resp BP SpO2   01/26/17 0330 - - - 112/74 95 %   01/26/17 0245 - - - 110/65 96 %   01/26/17 0200 - - - 105/54 95 %   01/26/17 0107 97.8 °F (36.6 °C) (!) 138 20 130/74 99 %          Thank you for allowing us to provide you with medical care today. We realize that you have many choices for your emergency care needs. Please choose us in the future for any continued health care needs. Vimal Plascencia, [address].   Office: [phone]            Recent Results (from the past 24 hour(s))   CBC WITH AUTOMATED DIFF    Collection Time: 01/26/17  1:33 AM   Result Value Ref Range    WBC 12.9 (H) 4.1 - 11.1 K/uL    RBC 4.40 4. 10 - 5.70 M/uL    HGB 13.9 12.1 - 17.0 g/dL    HCT 40.2 36.6 - 50.3 %    MCV 91.4 80.0 - 99.0 FL    MCH 31.6 26.0 - 34.0 PG    MCHC 34.6 30.0 - 36.5 g/dL    RDW 15.8 (H) 11.5 - 14.5 %    PLATELET 517 838 - 103 K/uL    NEUTROPHILS 69 32 - 75 %    LYMPHOCYTES 24 12 - 49 %    MONOCYTES 6 5 - 13 %    EOSINOPHILS 1 0 - 7 %    BASOPHILS 0 0 - 1 %    ABS. NEUTROPHILS 9.0 (H) 1.8 - 8.0 K/UL    ABS. LYMPHOCYTES 3.0 0.8 - 3.5 K/UL    ABS. MONOCYTES 0.7 0.0 - 1.0 K/UL    ABS. EOSINOPHILS 0.1 0.0 - 0.4 K/UL    ABS. BASOPHILS 0.0 0.0 - 0.1 K/UL   METABOLIC PANEL, COMPREHENSIVE    Collection Time: 01/26/17  1:33 AM   Result Value Ref Range    Sodium 141 136 - 145 mmol/L    Potassium 3.9 3.5 - 5.1 mmol/L    Chloride 105 97 - 108 mmol/L    CO2 27 21 - 32 mmol/L    Anion gap 9 5 - 15 mmol/L    Glucose 160 (H) 65 - 100 mg/dL    BUN 9 6 - 20 MG/DL    Creatinine 0.90 0.70 - 1.30 MG/DL    BUN/Creatinine ratio 10 (L) 12 - 20      GFR est AA >60 >60 ml/min/1.73m2    GFR est non-AA >60 >60 ml/min/1.73m2    Calcium 8.4 (L) 8.5 - 10.1 MG/DL    Bilirubin, total 0.2 0.2 - 1.0 MG/DL    ALT 31 12 - 78 U/L    AST 18 15 - 37 U/L    Alk. phosphatase 75 45 - 117 U/L    Protein, total 6.3 (L) 6.4 - 8.2 g/dL    Albumin 2.9 (L) 3.5 - 5.0 g/dL    Globulin 3.4 2.0 - 4.0 g/dL    A-G Ratio 0.9 (L) 1.1 - 2.2     LACTIC ACID, PLASMA    Collection Time: 01/26/17  1:33 AM   Result Value Ref Range    Lactic acid 2.0 0.4 - 2.0 MMOL/L   LIPASE    Collection Time: 01/26/17  1:33 AM   Result Value Ref Range    Lipase 311 73 - 393 U/L       No results found.

## 2017-01-26 NOTE — ED PROVIDER NOTES
HPI Comments: 37 y.o. male with past medical history significant for Crohn's disease, GERD, Ulcerative colitis, Multiple GI surgeries, Ileostomy who presents from home with chief complaint of abdominal pain. Pt reports 2 month hx of Crohn's exacerbation with symptoms of RLQ pain with peristent nausea and vomiting. Pain to abdomen is moderate to severe. Pt states at home medications were of no relief today. Pt notes the use of Prednisone 20 mg (tapered from \"60-80mg\"), Zofran yesterday morning, Phenergan 7 hours ago, Compazine 3 hours ago, Valium 5mg, Omprazole and Temazepam without relief. Pt notes he was seen by Dr. Loreta Juarez (GI at [facility]) and was prescribed 6-MP and will start next week. Pt denies abdominal distension. There are no other acute medical concerns at this time. Old chart review: Pt evaluated and admitted numerous times for symptoms of abdominal pain. Pt last seen 1/19/17 for generalized abdominal pain and non-intractable vomitng with nausea. Abd XR - no acute process. It was recommended pt taper off narcotics. Pt discharged home on Polyethylene Glycol. PCP: Caroline Katz MD  Gastroenterologist: Kanwal Gamino MD and Sanna Rios MD ([facility] [place] [facility])    Note written by Virgen Reddy, as dictated by Gretchen Magallanes MD 1:35 AM    The history is provided by the patient. No  was used. Past Medical History:   Diagnosis Date    Anal fistula     Anal stenosis     Crohn's disease ([address]     GERD (gastroesophageal reflux disease)     H/O ulcerative colitis      Symptoms began in 1996. Total proctocolectomy was performed in 2004. Pateint later developed Crohn's disease.     Hiatal hernia     Ileal pouchitis ([address] 5/2004    Nausea & vomiting      Combination of Scopalamine patch & Zofran IV worked well in past    Numbness in right leg     Psychiatric disorder      depression and anxiety    Skin lesion of back 3/17/2014    Syncopal episodes        Past Surgical History:   Procedure Laterality Date    Pr abdomen surgery proc unlisted  3/25/2004     Total proctocolectomy with creation of ileoanal J-pouch and loop ileostomy; Dr. Elisabet Campo.   orthopaedic  2008     Left ACL repair    Hx gi  5/2/2004     Examination under anesthesia with endoscopic evaluation of ileoanal pouch and placement of drain; Dr. Elisabet Campo.  Hx gi  5/25/2004     Ileostomy closure with small bowel resection and anastomosis; Dr. Elisabet Campo.  Hx gi  5/24/2012     Examination under anesthesia, anal dilatation, anal biopsy, and placement of draining seton; Dr. Elisabet Campo.  Hx gi  7/3/2012     Incision and drainage of perirectal abscess and rigid endoscopy of ileoanal pouch; Dr. Elisabet Campo.  Hx gi  7/31/2012     Incision and drainage of perirectal abscess, placement of draining seton, and anal dilatation; Dr. Elisabet Campo.  Hx gi  1/22/2013     Repair of anal fistulas with debridement, partial fistulectomy, and flap closure; Dr. Elisabet Campo.  Hx gi  5/17/2013     Examination under anesthesia, partial fistulotomy,  and placement of draining setons; Dr. Elisabet Campo.  Hx gi  8/6/2013     Anal fistulotomy and application of ACell micromatrix; Dr. Elisabet Campo.   gi  2/20/2014     Examination under anesthesia and dilation of anal canal with rigid proctoscopy; Kari Shook MD.    Hx gi  4/29/2015     Creation of Velma Salvia continent intestinal reservoir (BCIR), abdominoperineal resection of ileoanal J-pouch; lysis of adhesions, and gastrostomy; Stevie Severs C. Redmond Pin, MD ([facility])    Hx gi  8/7/2015     Stoma revision; Norm Armstrong MD ([facility])    Hx gi  10/29/2015     Extensive lysis of adhesions, resection of continent intestinal reservoir, repair of serosal tears, and creation of end-ileostomy; Dr. Elisabet Campo.      cholecystectomy  12/2016     Dr. Jean Paul Smith          Family History:   Problem Relation Age of Onset    Cancer Father    Azeem Giang Asthma Neg Hx     Diabetes Neg Hx     Heart Disease Neg Hx     Hypertension Neg Hx     Stroke Neg Hx     Malignant Hyperthermia Neg Hx     Pseudocholinesterase Deficiency Neg Hx     Delayed Awakening Neg Hx     Post-op Nausea/Vomiting Neg Hx        Social History     Social History    Marital status:      Spouse name: N/A    Number of children: N/A    Years of education: N/A     Occupational History    Not on file. Social History Main Topics    Smoking status: Former Smoker     Packs/day: 0.00     Years: 7.00     Quit date: 1/14/2017    Smokeless tobacco: Never Used    Alcohol use No    Drug use: No    Sexual activity: Not on file     Other Topics Concern    Not on file     Social History Narrative         ALLERGIES: Remicade [infliximab]; Bactrim [sulfamethoprim ds]; and Nsaids (non-steroidal anti-inflammatory drug)    Review of Systems   Constitutional: Negative for fever. HENT: Negative for congestion. Respiratory: Negative for cough and shortness of breath. Cardiovascular: Negative for chest pain. Gastrointestinal: Positive for abdominal pain (RLQ), nausea and vomiting. Negative for abdominal distention. Genitourinary: Negative. Musculoskeletal: Negative for back pain and neck pain. Neurological: Negative for headaches. All other systems reviewed and are negative. Vitals:    01/26/17 0107 01/26/17 0200 01/26/17 0245 01/26/17 0330   BP: 130/74 105/54 110/65 112/74   Pulse: (!) 138      Resp: 20      Temp: 97.8 °F (36.6 °C)      SpO2: 99% 95% 96% 95%   Weight: 88.6 kg (195 lb 5.2 oz)      Height: 6' (1.829 m)               Physical Exam   Constitutional: He appears well-developed and well-nourished. No distress. HENT:   Head: Normocephalic and atraumatic. Mouth/Throat: Oropharynx is clear and moist.   Eyes: Conjunctivae and EOM are normal.   Neck: Normal range of motion and phonation normal.   Cardiovascular: Intact distal pulses. Tachycardia present. Pulmonary/Chest: Effort normal. No respiratory distress. Abdominal: He exhibits no distension. There is no tenderness. LLQ ostomy. Musculoskeletal: Normal range of motion. He exhibits no tenderness. Neurological: He is alert. He is not disoriented. He exhibits normal muscle tone. Skin: Skin is warm and dry. Nursing note and vitals reviewed. [facility]  ED Course   3:01 AM  PAtient reassessed and states that nausea is improved but continues to have pain. Labs reviewed. Will give hydromorphone and solumedrol.      Procedures

## 2017-01-26 NOTE — ED TRIAGE NOTES
C/o abdominal pain, Chron's Flare. Symptoms for the past 2 months, increased nausea and vomiting tonight.

## 2017-01-26 NOTE — ED NOTES
Patient given discharge instructions & prescription (if applicable) by provider. 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

## 2017-02-06 ENCOUNTER — APPOINTMENT (OUTPATIENT)
Dept: GENERAL RADIOLOGY | Age: 42
End: 2017-02-06
Attending: PHYSICIAN ASSISTANT
Payer: COMMERCIAL

## 2017-02-06 ENCOUNTER — HOSPITAL ENCOUNTER (EMERGENCY)
Age: 42
Discharge: HOME | End: 2017-02-07
Attending: EMERGENCY MEDICINE
Payer: COMMERCIAL

## 2017-02-06 DIAGNOSIS — R10.84 ABDOMINAL PAIN, GENERALIZED: Primary | ICD-10-CM

## 2017-02-06 LAB
ALBUMIN SERPL BCP-MCNC: 3.4 G/DL (ref 3.5–5)
ALBUMIN/GLOB SERPL: 1 {RATIO} (ref 1.1–2.2)
ALP SERPL-CCNC: 83 U/L (ref 45–117)
ALT SERPL-CCNC: 34 U/L (ref 12–78)
ANION GAP BLD CALC-SCNC: 12 MMOL/L (ref 5–15)
APPEARANCE UR: CLEAR
AST SERPL W P-5'-P-CCNC: 9 U/L (ref 15–37)
BACTERIA URNS QL MICRO: NEGATIVE /HPF
BASOPHILS # BLD AUTO: 0 K/UL (ref 0–0.1)
BASOPHILS # BLD: 0 % (ref 0–1)
BILIRUB SERPL-MCNC: 0.2 MG/DL (ref 0.2–1)
BILIRUB UR QL: NEGATIVE
BUN SERPL-MCNC: 9 MG/DL (ref 6–20)
BUN/CREAT SERPL: 9 (ref 12–20)
CALCIUM SERPL-MCNC: 9.1 MG/DL (ref 8.5–10.1)
CHLORIDE SERPL-SCNC: 105 MMOL/L (ref 97–108)
CO2 SERPL-SCNC: 24 MMOL/L (ref 21–32)
COLOR UR: ABNORMAL
CREAT SERPL-MCNC: 0.99 MG/DL (ref 0.7–1.3)
EOSINOPHIL # BLD: 0 K/UL (ref 0–0.4)
EOSINOPHIL NFR BLD: 0 % (ref 0–7)
EPITH CASTS URNS QL MICRO: ABNORMAL /LPF
ERYTHROCYTE [DISTWIDTH] IN BLOOD BY AUTOMATED COUNT: 14.7 % (ref 11.5–14.5)
GLOBULIN SER CALC-MCNC: 3.4 G/DL (ref 2–4)
GLUCOSE SERPL-MCNC: 202 MG/DL (ref 65–100)
GLUCOSE UR STRIP.AUTO-MCNC: >1000 MG/DL
HCT VFR BLD AUTO: 41.1 % (ref 36.6–50.3)
HGB BLD-MCNC: 13.3 G/DL (ref 12.1–17)
HGB UR QL STRIP: NEGATIVE
HYALINE CASTS URNS QL MICRO: ABNORMAL /LPF (ref 0–5)
KETONES UR QL STRIP.AUTO: NEGATIVE MG/DL
LEUKOCYTE ESTERASE UR QL STRIP.AUTO: NEGATIVE
LIPASE SERPL-CCNC: 268 U/L (ref 73–393)
LYMPHOCYTES # BLD AUTO: 8 % (ref 12–49)
LYMPHOCYTES # BLD: 1 K/UL (ref 0.8–3.5)
MCH RBC QN AUTO: 30.2 PG (ref 26–34)
MCHC RBC AUTO-ENTMCNC: 32.4 G/DL (ref 30–36.5)
MCV RBC AUTO: 93.4 FL (ref 80–99)
MONOCYTES # BLD: 0.2 K/UL (ref 0–1)
MONOCYTES NFR BLD AUTO: 2 % (ref 5–13)
NEUTS SEG # BLD: 11.6 K/UL (ref 1.8–8)
NEUTS SEG NFR BLD AUTO: 90 % (ref 32–75)
NITRITE UR QL STRIP.AUTO: NEGATIVE
PH UR STRIP: 5 [PH] (ref 5–8)
PLATELET # BLD AUTO: 204 K/UL (ref 150–400)
POTASSIUM SERPL-SCNC: 4 MMOL/L (ref 3.5–5.1)
PROT SERPL-MCNC: 6.8 G/DL (ref 6.4–8.2)
PROT UR STRIP-MCNC: NEGATIVE MG/DL
RBC # BLD AUTO: 4.4 M/UL (ref 4.1–5.7)
RBC #/AREA URNS HPF: ABNORMAL /HPF (ref 0–5)
SODIUM SERPL-SCNC: 141 MMOL/L (ref 136–145)
SP GR UR REFRACTOMETRY: 1.02 (ref 1–1.03)
UA: UC IF INDICATED,UAUC: ABNORMAL
UROBILINOGEN UR QL STRIP.AUTO: 0.2 EU/DL (ref 0.2–1)
WBC # BLD AUTO: 12.8 K/UL (ref 4.1–11.1)
WBC URNS QL MICRO: ABNORMAL /HPF (ref 0–4)

## 2017-02-06 PROCEDURE — 81001 URINALYSIS AUTO W/SCOPE: CPT | Performed by: PHYSICIAN ASSISTANT

## 2017-02-06 PROCEDURE — 36415 COLL VENOUS BLD VENIPUNCTURE: CPT | Performed by: PHYSICIAN ASSISTANT

## 2017-02-06 PROCEDURE — 96375 TX/PRO/DX INJ NEW DRUG ADDON: CPT

## 2017-02-06 PROCEDURE — 80053 COMPREHEN METABOLIC PANEL: CPT | Performed by: PHYSICIAN ASSISTANT

## 2017-02-06 PROCEDURE — 83690 ASSAY OF LIPASE: CPT | Performed by: PHYSICIAN ASSISTANT

## 2017-02-06 PROCEDURE — 96361 HYDRATE IV INFUSION ADD-ON: CPT

## 2017-02-06 PROCEDURE — 85025 COMPLETE CBC W/AUTO DIFF WBC: CPT | Performed by: PHYSICIAN ASSISTANT

## 2017-02-06 PROCEDURE — 99283 EMERGENCY DEPT VISIT LOW MDM: CPT

## 2017-02-06 PROCEDURE — 74011250636 HC RX REV CODE- 250/636: Performed by: PHYSICIAN ASSISTANT

## 2017-02-06 PROCEDURE — 74020 XR ABD FLAT/ ERECT: CPT

## 2017-02-06 PROCEDURE — 96374 THER/PROPH/DIAG INJ IV PUSH: CPT

## 2017-02-06 RX ORDER — HYDROMORPHONE HYDROCHLORIDE 1 MG/ML
0.5 INJECTION, SOLUTION INTRAMUSCULAR; INTRAVENOUS; SUBCUTANEOUS
Status: COMPLETED | OUTPATIENT
Start: 2017-02-06 | End: 2017-02-07

## 2017-02-06 RX ORDER — MORPHINE SULFATE 4 MG/ML
4 INJECTION, SOLUTION INTRAMUSCULAR; INTRAVENOUS
Status: COMPLETED | OUTPATIENT
Start: 2017-02-06 | End: 2017-02-06

## 2017-02-06 RX ORDER — ONDANSETRON 2 MG/ML
4 INJECTION INTRAMUSCULAR; INTRAVENOUS
Status: COMPLETED | OUTPATIENT
Start: 2017-02-06 | End: 2017-02-06

## 2017-02-06 RX ADMIN — SODIUM CHLORIDE 1000 ML: 900 INJECTION, SOLUTION INTRAVENOUS at 22:55

## 2017-02-06 RX ADMIN — ONDANSETRON 4 MG: 2 INJECTION INTRAMUSCULAR; INTRAVENOUS at 22:55

## 2017-02-06 RX ADMIN — Medication 4 MG: at 22:56

## 2017-02-07 VITALS
OXYGEN SATURATION: 100 % | HEIGHT: 72 IN | HEART RATE: 87 BPM | SYSTOLIC BLOOD PRESSURE: 108 MMHG | RESPIRATION RATE: 16 BRPM | BODY MASS INDEX: 26.41 KG/M2 | WEIGHT: 195 LBS | DIASTOLIC BLOOD PRESSURE: 70 MMHG | TEMPERATURE: 98.6 F

## 2017-02-07 PROCEDURE — 74011250636 HC RX REV CODE- 250/636: Performed by: PHYSICIAN ASSISTANT

## 2017-02-07 RX ADMIN — METHYLPREDNISOLONE SODIUM SUCCINATE 125 MG: 125 INJECTION, POWDER, FOR SOLUTION INTRAMUSCULAR; INTRAVENOUS at 00:10

## 2017-02-07 RX ADMIN — HYDROMORPHONE HYDROCHLORIDE 0.5 MG: 1 INJECTION, SOLUTION INTRAMUSCULAR; INTRAVENOUS; SUBCUTANEOUS at 00:10

## 2017-02-07 NOTE — DISCHARGE INSTRUCTIONS
Abdominal Pain: Care Instructions  Your Care Instructions    Abdominal pain has many possible causes. Some aren't serious and get better on their own in a few days. Others need more testing and treatment. If your pain continues or gets worse, you need to be rechecked and may need more tests to find out what is wrong. You may need surgery to correct the problem. Don't ignore new symptoms, such as fever, nausea and vomiting, urination problems, pain that gets worse, and dizziness. These may be signs of a more serious problem. Your doctor may have recommended a follow-up visit in the next 8 to 12 hours. If you are not getting better, you may need more tests or treatment. The doctor has checked you carefully, but problems can develop later. If you notice any problems or new symptoms, get medical treatment right away. Follow-up care is a key part of your treatment and safety. Be sure to make and go to all appointments, and call your doctor if you are having problems. It's also a good idea to know your test results and keep a list of the medicines you take. How can you care for yourself at home? · Rest until you feel better. · To prevent dehydration, drink plenty of fluids, enough so that your urine is light yellow or clear like water. Choose water and other caffeine-free clear liquids until you feel better. If you have kidney, heart, or liver disease and have to limit fluids, talk with your doctor before you increase the amount of fluids you drink. · If your stomach is upset, eat mild foods, such as rice, dry toast or crackers, bananas, and applesauce. Try eating several small meals instead of two or three large ones. · Wait until 48 hours after all symptoms have gone away before you have spicy foods, alcohol, and drinks that contain caffeine. · Do not eat foods that are high in fat. · Avoid anti-inflammatory medicines such as aspirin, ibuprofen (Advil, Motrin), and naproxen (Aleve).  These can cause stomach upset. Talk to your doctor if you take daily aspirin for another health problem. When should you call for help? Call 911 anytime you think you may need emergency care. For example, call if:  · You passed out (lost consciousness). · You pass maroon or very bloody stools. · You vomit blood or what looks like coffee grounds. · You have new, severe belly pain. Call your doctor now or seek immediate medical care if:  · Your pain gets worse, especially if it becomes focused in one area of your belly. · You have a new or higher fever. · Your stools are black and look like tar, or they have streaks of blood. · You have unexpected vaginal bleeding. · You have symptoms of a urinary tract infection. These may include:  ¨ Pain when you urinate. ¨ Urinating more often than usual.  ¨ Blood in your urine. · You are dizzy or lightheaded, or you feel like you may faint. Watch closely for changes in your health, and be sure to contact your doctor if:  · You are not getting better after 1 day (24 hours). Where can you learn more? Go to http://maluSouthtreeashley.info/. Enter U959 in the search box to learn more about \"Abdominal Pain: Care Instructions. \"  Current as of: May 27, 2016  Content Version: 11.1  © 5013-2817 [employer]. Care instructions adapted under license by [employer] (which disclaims liability or warranty for this information). If you have questions about a medical condition or this instruction, always ask your healthcare professional. [place] [address] any warranty or liability for your use of this information. We hope that we have addressed all of your medical concerns. The examination and treatment you received in the Emergency Department were for an emergent problem and were not intended as complete care. It is important that you follow up with your healthcare provider(s) for ongoing care.  If your symptoms worsen or do not improve as expected, and you are unable to reach your usual health care provider(s), you should return to the Emergency Department. Today's healthcare is undergoing tremendous change, and patient satisfaction surveys are one of the many tools to assess the quality of medical care. You may receive a survey from the [employer] regarding your experience in the Emergency Department. I hope that your experience has been completely positive, particularly the medical care that I provided. As such, please participate in the survey; anything less than excellent does not meet my expectations or intentions. [facility] and [facility] participate in nationally recognized quality of care measures. If your blood pressure is greater than 120/80, as reported below, we urge that you seek medical care to address the potential of high blood pressure, commonly known as hypertension. Hypertension can be hereditary or can be caused by certain medical conditions, pain, stress, or \"white coat syndrome. \"       Please make an appointment with your health care provider(s) for follow up of your Emergency Department visit. VITALS:   Patient Vitals for the past 8 hrs:   Temp Pulse Resp BP SpO2   02/06/17 2219 98.6 °F (37 °C) (!) 136 16 124/86 100 %          Thank you for allowing us to provide you with medical care today. We realize that you have many choices for your emergency care needs. Please choose us in the future for any continued health care needs. Randa Smith, [address] [place], [place] [address].   Office: [phone]            Recent Results (from the past 24 hour(s))   CBC WITH AUTOMATED DIFF    Collection Time: 02/06/17 10:49 PM   Result Value Ref Range    WBC 12.8 (H) 4.1 - 11.1 K/uL    RBC 4.40 4. 10 - 5.70 M/uL    HGB 13.3 12.1 - 17.0 g/dL    HCT 41.1 36.6 - 50.3 %    MCV 93.4 80.0 - 99.0 FL    MCH 30.2 26.0 - 34.0 PG    MCHC 32.4 30.0 - 36.5 g/dL    RDW 14.7 (H) 11.5 - 14.5 %    PLATELET 098 271 - 241 K/uL    NEUTROPHILS 90 (H) 32 - 75 %    LYMPHOCYTES 8 (L) 12 - 49 %    MONOCYTES 2 (L) 5 - 13 %    EOSINOPHILS 0 0 - 7 %    BASOPHILS 0 0 - 1 %    ABS. NEUTROPHILS 11.6 (H) 1.8 - 8.0 K/UL    ABS. LYMPHOCYTES 1.0 0.8 - 3.5 K/UL    ABS. MONOCYTES 0.2 0.0 - 1.0 K/UL    ABS. EOSINOPHILS 0.0 0.0 - 0.4 K/UL    ABS. BASOPHILS 0.0 0.0 - 0.1 K/UL   METABOLIC PANEL, COMPREHENSIVE    Collection Time: 02/06/17 10:49 PM   Result Value Ref Range    Sodium 141 136 - 145 mmol/L    Potassium 4.0 3.5 - 5.1 mmol/L    Chloride 105 97 - 108 mmol/L    CO2 24 21 - 32 mmol/L    Anion gap 12 5 - 15 mmol/L    Glucose 202 (H) 65 - 100 mg/dL    BUN 9 6 - 20 MG/DL    Creatinine 0.99 0.70 - 1.30 MG/DL    BUN/Creatinine ratio 9 (L) 12 - 20      GFR est AA >60 >60 ml/min/1.73m2    GFR est non-AA >60 >60 ml/min/1.73m2    Calcium 9.1 8.5 - 10.1 MG/DL    Bilirubin, total 0.2 0.2 - 1.0 MG/DL    ALT (SGPT) 34 12 - 78 U/L    AST (SGOT) 9 (L) 15 - 37 U/L    Alk.  phosphatase 83 45 - 117 U/L    Protein, total 6.8 6.4 - 8.2 g/dL    Albumin 3.4 (L) 3.5 - 5.0 g/dL    Globulin 3.4 2.0 - 4.0 g/dL    A-G Ratio 1.0 (L) 1.1 - 2.2     LIPASE    Collection Time: 02/06/17 10:49 PM   Result Value Ref Range    Lipase 268 73 - 393 U/L   URINALYSIS W/ REFLEX CULTURE    Collection Time: 02/06/17 11:01 PM   Result Value Ref Range    Color YELLOW/STRAW      Appearance CLEAR CLEAR      Specific gravity 1.020 1.003 - 1.030      pH (UA) 5.0 5.0 - 8.0      Protein NEGATIVE  NEG mg/dL    Glucose >1000 (A) NEG mg/dL    Ketone NEGATIVE  NEG mg/dL    Bilirubin NEGATIVE  NEG      Blood NEGATIVE  NEG      Urobilinogen 0.2 0.2 - 1.0 EU/dL    Nitrites NEGATIVE  NEG      Leukocyte Esterase NEGATIVE  NEG      WBC 0-4 0 - 4 /hpf    RBC 0-5 0 - 5 /hpf    Epithelial cells FEW FEW /lpf    Bacteria NEGATIVE  NEG /hpf    UA:UC IF INDICATED CULTURE NOT INDICATED BY UA RESULT CNI      Hyaline cast 0-2 0 - 5 /lpf       Xr Abd Flat/ Erect    Result Date: 2/7/2017  EXAM:  XR ABD FLAT/ ERECT INDICATION:   abdominal pain COMPARISON: 2017. FINDINGS: Supine and upright views of the abdomen demonstrate a normal gas pattern. There is no free intraperitoneal air. Surgical clips overlie right upper quadrant. The bones and soft tissues are within normal limits. IMPRESSION: No free air. Gas pattern is nonobstructed.

## 2017-02-07 NOTE — ED TRIAGE NOTES
Pt with hx of Crohns disease c/o abd pain worsening tonight. He states that he has been attempting to wean off his prednisone and pain increased significantly tonight.   +n/v

## 2017-02-07 NOTE — ED PROVIDER NOTES
HPI Comments: Patient presents with history of crohns. Patient reports worsening lower abdominal pain that started tonight. Patient reports he as been weaning off his prednisone dose, down to 25 mg for the past 10 days. Patient reports nausea and vomiting. Denies diarrhea or blood in stool. Denies fever or chills. Patient is a 37 y.o. male presenting with abdominal pain. The history is provided by the patient. Abdominal Pain    This is a new problem. The current episode started 3 to 5 hours ago. The problem occurs constantly. The problem has not changed since onset. The pain is located in the RLQ. The pain is at a severity of 8/10. The pain is severe. Associated symptoms include nausea. Pertinent negatives include no anorexia, no fever, no belching, no diarrhea, no flatus, no hematochezia, no melena, no constipation, no dysuria, no frequency, no hematuria, no headaches, no arthralgias, no myalgias, no trauma, no chest pain, no testicular pain and no back pain. Nothing worsens the pain. The pain is relieved by nothing. Past workup includes CT scan, surgery, colonoscopy. His past medical history is significant for Crohn's disease. The patient's surgical history includes colectomy. Past Medical History:   Diagnosis Date    Anal fistula     Anal stenosis     Crohn's disease ([address]     GERD (gastroesophageal reflux disease)     H/O ulcerative colitis      Symptoms began in 1996. Total proctocolectomy was performed in 2004. Pateint later developed Crohn's disease.     Hiatal hernia     Ileal pouchitis ([address] 5/2004    Nausea & vomiting      Combination of Scopalamine patch & Zofran IV worked well in past    Numbness in right leg     Psychiatric disorder      depression and anxiety    Skin lesion of back 3/17/2014    Syncopal episodes        Past Surgical History:   Procedure Laterality Date    Pr abdomen surgery proc unlisted  3/25/2004     Total proctocolectomy with creation of ileoanal J-pouch and loop ileostomy; Dr. Manuel Alvarez.   orthopaedic  2008     Left ACL repair    Hx gi  5/2/2004     Examination under anesthesia with endoscopic evaluation of ileoanal pouch and placement of drain; Dr. Manuel Alvarez.  Hx gi  5/25/2004     Ileostomy closure with small bowel resection and anastomosis; Dr. Manuel Alvarez.   gi  5/24/2012     Examination under anesthesia, anal dilatation, anal biopsy, and placement of draining seton; Dr. Manuel Alvarez.  Hx gi  7/3/2012     Incision and drainage of perirectal abscess and rigid endoscopy of ileoanal pouch; Dr. Manuel Alvarez.   gi  7/31/2012     Incision and drainage of perirectal abscess, placement of draining seton, and anal dilatation; Dr. Manuel Alvarez.   gi  1/22/2013     Repair of anal fistulas with debridement, partial fistulectomy, and flap closure; Dr. Manuel Alvarez.   gi  5/17/2013     Examination under anesthesia, partial fistulotomy,  and placement of draining setons; Dr. Manuel Alvarez.   gi  8/6/2013     Anal fistulotomy and application of ACell micromatrix; Dr. Manuel Alvarez.   gi  2/20/2014     Examination under anesthesia and dilation of anal canal with rigid proctoscopy; Tenisha Manning MD.     gi  4/29/2015     Creation of [place] continent intestinal reservoir (BCIR), abdominoperineal resection of ileoanal J-pouch; lysis of adhesions, and gastrostomy; Anand Gibson MD ([place], [place])     gi  8/7/2015     Stoma revision; Lolly Nation. Dominik Gibson MD ([place], [place])     gi  10/29/2015     Extensive lysis of adhesions, resection of continent intestinal reservoir, repair of serosal tears, and creation of end-ileostomy; Dr. Manuel Alvarez.      cholecystectomy  12/2016     Dr. Kristal Nunez          Family History:   Problem Relation Age of Onset    Cancer Father     Asthma Neg Hx     Diabetes Neg Hx     Heart Disease Neg Hx     Hypertension Neg Hx     Stroke Neg Hx     Malignant Hyperthermia Neg Hx     Pseudocholinesterase Deficiency Neg Hx     Delayed Awakening Neg Hx     Post-op Nausea/Vomiting Neg Hx        Social History     Social History    Marital status:      Spouse name: N/A    Number of children: N/A    Years of education: N/A     Occupational History    Not on file. Social History Main Topics    Smoking status: Former Smoker     Packs/day: 0.00     Years: 7.00     Quit date: 1/14/2017    Smokeless tobacco: Never Used    Alcohol use No    Drug use: No    Sexual activity: Not on file     Other Topics Concern    Not on file     Social History Narrative         ALLERGIES: Remicade [infliximab]; Bactrim [sulfamethoprim ds]; and Nsaids (non-steroidal anti-inflammatory drug)    Review of Systems   Constitutional: Negative. Negative for fever. HENT: Negative. Eyes: Negative. Respiratory: Negative. Cardiovascular: Negative. Negative for chest pain. Gastrointestinal: Positive for abdominal pain and nausea. Negative for anorexia, constipation, diarrhea, flatus, hematochezia and melena. Endocrine: Negative. Genitourinary: Negative. Negative for dysuria, frequency, hematuria and testicular pain. Musculoskeletal: Negative. Negative for arthralgias, back pain and myalgias. Skin: Negative. Allergic/Immunologic: Negative. Neurological: Negative. Negative for headaches. Hematological: Negative. Psychiatric/Behavioral: Negative. All other systems reviewed and are negative. Vitals:    02/06/17 2219   BP: 124/86   Pulse: (!) 136   Resp: 16   Temp: 98.6 °F (37 °C)   SpO2: 100%   Weight: 88.5 kg (195 lb)   Height: 6' (1.829 m)            Physical Exam   Constitutional: He is oriented to person, place, and time. He appears well-developed and well-nourished. HENT:   Head: Normocephalic and atraumatic. Right Ear: External ear normal.   Left Ear: External ear normal.   Mouth/Throat: Oropharynx is clear and moist. No oropharyngeal exudate.    Eyes: Conjunctivae and EOM are normal. Pupils are equal, round, and reactive to light. Right eye exhibits no discharge. Left eye exhibits no discharge. No scleral icterus. Neck: Normal range of motion. Neck supple. No tracheal deviation present. No thyromegaly present. Cardiovascular: Normal rate, regular rhythm, normal heart sounds and intact distal pulses. No murmur heard. Pulmonary/Chest: Effort normal and breath sounds normal. No respiratory distress. He has no wheezes. He has no rales. Abdominal: Soft. Bowel sounds are normal. He exhibits no distension. There is tenderness in the right lower quadrant. There is no rebound and no guarding. Musculoskeletal: Normal range of motion. He exhibits no edema or tenderness. Lymphadenopathy:     He has no cervical adenopathy. Neurological: He is alert and oriented to person, place, and time. No cranial nerve deficit. Coordination normal.   Skin: Skin is warm. No rash noted. No erythema. Psychiatric: He has a normal mood and affect. His behavior is normal. Judgment and thought content normal.   Nursing note and vitals reviewed. MDM  Number of Diagnoses or Management Options  Abdominal pain, generalized:   Diagnosis management comments: Assesment/Plan- no acute findings in ED. Symptoms improved with medications in ED. Recommended patient increase prednisone back to 40mg. Patient reports having enough at home. Follow up with GI as planned on Thursday.     ED Course       Procedures

## 2017-02-21 ENCOUNTER — APPOINTMENT (OUTPATIENT)
Dept: GENERAL RADIOLOGY | Age: 42
End: 2017-02-21
Attending: EMERGENCY MEDICINE
Payer: COMMERCIAL

## 2017-02-21 ENCOUNTER — HOSPITAL ENCOUNTER (EMERGENCY)
Age: 42
Discharge: HOME | End: 2017-02-21
Attending: EMERGENCY MEDICINE | Admitting: EMERGENCY MEDICINE
Payer: COMMERCIAL

## 2017-02-21 VITALS
OXYGEN SATURATION: 94 % | HEIGHT: 72 IN | SYSTOLIC BLOOD PRESSURE: 108 MMHG | BODY MASS INDEX: 26.41 KG/M2 | RESPIRATION RATE: 16 BRPM | WEIGHT: 195 LBS | DIASTOLIC BLOOD PRESSURE: 67 MMHG | TEMPERATURE: 98 F | HEART RATE: 94 BPM

## 2017-02-21 DIAGNOSIS — Z87.19 HISTORY OF CROHN'S DISEASE: ICD-10-CM

## 2017-02-21 DIAGNOSIS — R11.2 NAUSEA AND VOMITING, INTRACTABILITY OF VOMITING NOT SPECIFIED, UNSPECIFIED VOMITING TYPE: ICD-10-CM

## 2017-02-21 DIAGNOSIS — R10.84 ABDOMINAL PAIN, GENERALIZED: Primary | ICD-10-CM

## 2017-02-21 LAB
ALBUMIN SERPL BCP-MCNC: 3.1 G/DL (ref 3.5–5)
ALBUMIN/GLOB SERPL: 1 {RATIO} (ref 1.1–2.2)
ALP SERPL-CCNC: 70 U/L (ref 45–117)
ALT SERPL-CCNC: 29 U/L (ref 12–78)
ANION GAP BLD CALC-SCNC: 6 MMOL/L (ref 5–15)
APPEARANCE UR: CLEAR
AST SERPL W P-5'-P-CCNC: 11 U/L (ref 15–37)
BACTERIA URNS QL MICRO: NEGATIVE /HPF
BASOPHILS # BLD AUTO: 0 K/UL (ref 0–0.1)
BASOPHILS # BLD: 0 % (ref 0–1)
BILIRUB SERPL-MCNC: 0.1 MG/DL (ref 0.2–1)
BILIRUB UR QL: NEGATIVE
BUN SERPL-MCNC: 16 MG/DL (ref 6–20)
BUN/CREAT SERPL: 23 (ref 12–20)
CALCIUM SERPL-MCNC: 8.7 MG/DL (ref 8.5–10.1)
CHLORIDE SERPL-SCNC: 106 MMOL/L (ref 97–108)
CO2 SERPL-SCNC: 28 MMOL/L (ref 21–32)
COLOR UR: ABNORMAL
CREAT SERPL-MCNC: 0.7 MG/DL (ref 0.7–1.3)
EOSINOPHIL # BLD: 0 K/UL (ref 0–0.4)
EOSINOPHIL NFR BLD: 0 % (ref 0–7)
EPITH CASTS URNS QL MICRO: ABNORMAL /LPF
ERYTHROCYTE [DISTWIDTH] IN BLOOD BY AUTOMATED COUNT: 14.4 % (ref 11.5–14.5)
GLOBULIN SER CALC-MCNC: 3.1 G/DL (ref 2–4)
GLUCOSE SERPL-MCNC: 144 MG/DL (ref 65–100)
GLUCOSE UR STRIP.AUTO-MCNC: 100 MG/DL
HCT VFR BLD AUTO: 38 % (ref 36.6–50.3)
HGB BLD-MCNC: 12.7 G/DL (ref 12.1–17)
HGB UR QL STRIP: NEGATIVE
HYALINE CASTS URNS QL MICRO: ABNORMAL /LPF (ref 0–5)
KETONES UR QL STRIP.AUTO: NEGATIVE MG/DL
LEUKOCYTE ESTERASE UR QL STRIP.AUTO: NEGATIVE
LIPASE SERPL-CCNC: 287 U/L (ref 73–393)
LYMPHOCYTES # BLD AUTO: 8 % (ref 12–49)
LYMPHOCYTES # BLD: 1.2 K/UL (ref 0.8–3.5)
MCH RBC QN AUTO: 31.2 PG (ref 26–34)
MCHC RBC AUTO-ENTMCNC: 33.4 G/DL (ref 30–36.5)
MCV RBC AUTO: 93.4 FL (ref 80–99)
MONOCYTES # BLD: 0.4 K/UL (ref 0–1)
MONOCYTES NFR BLD AUTO: 2 % (ref 5–13)
NEUTS SEG # BLD: 13.3 K/UL (ref 1.8–8)
NEUTS SEG NFR BLD AUTO: 90 % (ref 32–75)
NITRITE UR QL STRIP.AUTO: NEGATIVE
PH UR STRIP: 5 [PH] (ref 5–8)
PLATELET # BLD AUTO: 194 K/UL (ref 150–400)
POTASSIUM SERPL-SCNC: 4.5 MMOL/L (ref 3.5–5.1)
PROT SERPL-MCNC: 6.2 G/DL (ref 6.4–8.2)
PROT UR STRIP-MCNC: NEGATIVE MG/DL
RBC # BLD AUTO: 4.07 M/UL (ref 4.1–5.7)
RBC #/AREA URNS HPF: ABNORMAL /HPF (ref 0–5)
SODIUM SERPL-SCNC: 140 MMOL/L (ref 136–145)
SP GR UR REFRACTOMETRY: 1.01 (ref 1–1.03)
UA: UC IF INDICATED,UAUC: ABNORMAL
UROBILINOGEN UR QL STRIP.AUTO: 0.2 EU/DL (ref 0.2–1)
WBC # BLD AUTO: 14.9 K/UL (ref 4.1–11.1)
WBC URNS QL MICRO: ABNORMAL /HPF (ref 0–4)

## 2017-02-21 PROCEDURE — 85025 COMPLETE CBC W/AUTO DIFF WBC: CPT | Performed by: EMERGENCY MEDICINE

## 2017-02-21 PROCEDURE — 36415 COLL VENOUS BLD VENIPUNCTURE: CPT | Performed by: EMERGENCY MEDICINE

## 2017-02-21 PROCEDURE — 80053 COMPREHEN METABOLIC PANEL: CPT | Performed by: EMERGENCY MEDICINE

## 2017-02-21 PROCEDURE — 96361 HYDRATE IV INFUSION ADD-ON: CPT

## 2017-02-21 PROCEDURE — 96374 THER/PROPH/DIAG INJ IV PUSH: CPT

## 2017-02-21 PROCEDURE — 74020 XR ABD FLAT/ ERECT: CPT

## 2017-02-21 PROCEDURE — 83690 ASSAY OF LIPASE: CPT | Performed by: EMERGENCY MEDICINE

## 2017-02-21 PROCEDURE — 74011250636 HC RX REV CODE- 250/636: Performed by: EMERGENCY MEDICINE

## 2017-02-21 PROCEDURE — 99284 EMERGENCY DEPT VISIT MOD MDM: CPT

## 2017-02-21 PROCEDURE — 81001 URINALYSIS AUTO W/SCOPE: CPT | Performed by: EMERGENCY MEDICINE

## 2017-02-21 PROCEDURE — 96375 TX/PRO/DX INJ NEW DRUG ADDON: CPT

## 2017-02-21 RX ORDER — HYDROMORPHONE HYDROCHLORIDE 1 MG/ML
1 INJECTION, SOLUTION INTRAMUSCULAR; INTRAVENOUS; SUBCUTANEOUS ONCE
Status: COMPLETED | OUTPATIENT
Start: 2017-02-21 | End: 2017-02-21

## 2017-02-21 RX ORDER — SODIUM CHLORIDE 0.9 % (FLUSH) 0.9 %
5-10 SYRINGE (ML) INJECTION AS NEEDED
Status: DISCONTINUED | OUTPATIENT
Start: 2017-02-21 | End: 2017-02-21 | Stop reason: HOSPADM

## 2017-02-21 RX ORDER — SODIUM CHLORIDE 0.9 % (FLUSH) 0.9 %
5-10 SYRINGE (ML) INJECTION EVERY 8 HOURS
Status: DISCONTINUED | OUTPATIENT
Start: 2017-02-21 | End: 2017-02-21 | Stop reason: HOSPADM

## 2017-02-21 RX ORDER — PROCHLORPERAZINE EDISYLATE 5 MG/ML
10 INJECTION INTRAMUSCULAR; INTRAVENOUS
Status: COMPLETED | OUTPATIENT
Start: 2017-02-21 | End: 2017-02-21

## 2017-02-21 RX ADMIN — SODIUM CHLORIDE 1000 ML: 900 INJECTION, SOLUTION INTRAVENOUS at 02:54

## 2017-02-21 RX ADMIN — HYDROMORPHONE HYDROCHLORIDE 1 MG: 1 INJECTION, SOLUTION INTRAMUSCULAR; INTRAVENOUS; SUBCUTANEOUS at 02:54

## 2017-02-21 RX ADMIN — PROCHLORPERAZINE EDISYLATE 10 MG: 5 INJECTION INTRAMUSCULAR; INTRAVENOUS at 02:54

## 2017-02-21 NOTE — ED PROVIDER NOTES
HPI Comments: 37 y.o. male with past medical history significant for Crohn's disease, GERD, Ulcerative colitis, Multiple GI surgeries, Ileostomy who presents from home with chief complaint of abdominal pain. Pt reports 3 day hx of gradually worsening generalized abdominal pain accompanied by nausea and vomiting. Pt notes symptoms typically exacerbated by eating of which occured tonight. Pt describes pain as 8/10 \"but doesn't feel like my obstruction pain\". Pt notes getting results today from recent study with noted \"adhesions\" of which he awaiting surgical intervention. Pt denies constipation, \"slow moving bowel to ostomy\". There are no other acute medical concerns at this time. Old chart review: Pt evaluated in ED 2/6/17 for generalized abdominal pain. XR abd - no free air, gas pattern non-obstructed. Pt discharged home to f/u with PCP. Social hx: +Smoker  PCP: Gillian Garcia MD  Gastroenterologist: Jaron Ennis MD and Cesar Whittington MD ([facility] [place]  General surgery: Nadja Spencer MD     Note written by Virgen Tijerina, as dictated by Noa Dyer DO 2:29 AM    The history is provided by the patient. No  was used. Past Medical History:   Diagnosis Date    Anal fistula     Anal stenosis     Crohn's disease ([address]     GERD (gastroesophageal reflux disease)     H/O ulcerative colitis      Symptoms began in 1996. Total proctocolectomy was performed in 2004. Pateint later developed Crohn's disease.     Hiatal hernia     Ileal pouchitis ([address] 5/2004    Nausea & vomiting      Combination of Scopalamine patch & Zofran IV worked well in past    Numbness in right leg     Psychiatric disorder      depression and anxiety    Skin lesion of back 3/17/2014    Syncopal episodes        Past Surgical History:   Procedure Laterality Date    Pr abdomen surgery proc unlisted  3/25/2004     Total proctocolectomy with creation of ileoanal J-pouch and loop ileostomy; Dr. Amaury Lew.   orthopaedic  2008     Left ACL repair    Hx gi  5/2/2004     Examination under anesthesia with endoscopic evaluation of ileoanal pouch and placement of drain; Dr. Amaury Lew.   gi  5/25/2004     Ileostomy closure with small bowel resection and anastomosis; Dr. Amaury Lew.   gi  5/24/2012     Examination under anesthesia, anal dilatation, anal biopsy, and placement of draining seton; Dr. Amaury Lew.   gi  7/3/2012     Incision and drainage of perirectal abscess and rigid endoscopy of ileoanal pouch; Dr. Amaury Lew.   gi  7/31/2012     Incision and drainage of perirectal abscess, placement of draining seton, and anal dilatation; Dr. Amaury Lew.   gi  1/22/2013     Repair of anal fistulas with debridement, partial fistulectomy, and flap closure; Dr. Amaury Lew.   gi  5/17/2013     Examination under anesthesia, partial fistulotomy,  and placement of draining setons; Dr. Amaury Lew.   gi  8/6/2013     Anal fistulotomy and application of ACell micromatrix; Dr. Amaury Lew.   gi  2/20/2014     Examination under anesthesia and dilation of anal canal with rigid proctoscopy; Paola Connell MD.     gi  4/29/2015     Creation of [facility] continent intestinal reservoir (BCIR), abdominoperineal resection of ileoanal J-pouch; lysis of adhesions, and gastrostomy; Norma Davison MD ([facility])     gi  8/7/2015     Stoma revision; Sujata Davison MD ([facility])     gi  10/29/2015     Extensive lysis of adhesions, resection of continent intestinal reservoir, repair of serosal tears, and creation of end-ileostomy; Dr. Amaury Lew.      cholecystectomy  12/2016     Dr. Vimal Keith          Family History:   Problem Relation Age of Onset    Cancer Father     Asthma Neg Hx     Diabetes Neg Hx     Heart Disease Neg Hx     Hypertension Neg Hx     Stroke Neg Hx     Malignant Hyperthermia Neg Hx     Pseudocholinesterase Deficiency Neg Hx     Delayed Awakening Neg Hx     Post-op Nausea/Vomiting Neg Hx        Social History     Social History    Marital status:      Spouse name: N/A    Number of children: N/A    Years of education: N/A     Occupational History    Not on file. Social History Main Topics    Smoking status: Former Smoker     Packs/day: 0.00     Years: 7.00     Quit date: 1/14/2017    Smokeless tobacco: Never Used    Alcohol use No    Drug use: No    Sexual activity: Not on file     Other Topics Concern    Not on file     Social History Narrative     ALLERGIES: Remicade [infliximab]; Bactrim [sulfamethoprim ds]; and Nsaids (non-steroidal anti-inflammatory drug)    Review of Systems   Constitutional: Negative for appetite change, chills, fever and unexpected weight change. HENT: Negative for ear pain, hearing loss, rhinorrhea and trouble swallowing. Eyes: Negative for pain and visual disturbance. Respiratory: Negative for cough, chest tightness and shortness of breath. Cardiovascular: Negative for chest pain and palpitations. Gastrointestinal: Positive for abdominal pain (generalized). Negative for abdominal distention, blood in stool, constipation and vomiting. Genitourinary: Negative for dysuria, hematuria and urgency. Musculoskeletal: Negative for back pain and myalgias. Skin: Negative for rash. Neurological: Negative for dizziness, syncope, weakness and numbness. Psychiatric/Behavioral: Negative for confusion and suicidal ideas. All other systems reviewed and are negative. Vitals:    02/21/17 0154   BP: 122/77   Pulse: (!) 126   Resp: 20   Temp: 98.2 °F (36.8 °C)   SpO2: 100%   Weight: 88.5 kg (195 lb)   Height: 6' (1.829 m)            Physical Exam   Constitutional: He is oriented to person, place, and time. He appears well-developed and well-nourished. No distress. HENT:   Head: Normocephalic and atraumatic.    Right Ear: External ear normal.   Left Ear: External ear normal.   Nose: Nose normal.   Mouth/Throat: Oropharynx is clear and moist. Mucous membranes are dry. No oropharyngeal exudate. Eyes: Conjunctivae and EOM are normal. Pupils are equal, round, and reactive to light. Right eye exhibits no discharge. Left eye exhibits no discharge. No scleral icterus. Neck: Normal range of motion. Neck supple. No JVD present. No tracheal deviation present. Cardiovascular: Regular rhythm, normal heart sounds and intact distal pulses. Tachycardia present. Exam reveals no gallop and no friction rub. No murmur heard. Pulmonary/Chest: Effort normal and breath sounds normal. No stridor. No respiratory distress. He has no decreased breath sounds. He has no wheezes. He has no rhonchi. He has no rales. He exhibits no tenderness. Abdominal: Soft. Bowel sounds are normal. He exhibits no distension. There is generalized tenderness. There is no rebound and no guarding. Bowel sounds normal in all 4 quadrants   Musculoskeletal: Normal range of motion. He exhibits no edema or tenderness. Neurological: He is alert and oriented to person, place, and time. He has normal strength and normal reflexes. No cranial nerve deficit or sensory deficit. He exhibits normal muscle tone. Coordination normal. GCS eye subscore is 4. GCS verbal subscore is 5. GCS motor subscore is 6. Skin: Skin is warm and dry. No rash noted. He is not diaphoretic. No erythema. No pallor. Psychiatric: He has a normal mood and affect. His behavior is normal. Judgment and thought content normal.   Nursing note and vitals reviewed.   Note written by Virgen Barrios, as dictated by Samina Virk DO 2:42 AM    MDM  Number of Diagnoses or Management Options  Abdominal pain, generalized:   History of Crohn's disease:   Nausea and vomiting, intractability of vomiting not specified, unspecified vomiting type:      Amount and/or Complexity of Data Reviewed  Clinical lab tests: ordered and reviewed  Tests in the radiology section of CPT®: ordered and reviewed    Risk of Complications, Morbidity, and/or Mortality  Presenting problems: moderate  Diagnostic procedures: low  Management options: low    Patient Progress  Patient progress: improved    ED Course       Procedures   Chief Complaint   Patient presents with    Abdominal Pain       4:43 AM  The patients presenting problems have been discussed, and they are in agreement with the care plan formulated and outlined with them. I have encouraged them to ask questions as they arise throughout their visit.     MEDICATIONS GIVEN:  Medications   sodium chloride (NS) flush 5-10 mL (not administered)   sodium chloride (NS) flush 5-10 mL (not administered)   sodium chloride 0.9 % bolus infusion 1,000 mL (0 mL IntraVENous IV Completed 2/21/17 0352)   HYDROmorphone (PF) (DILAUDID) injection 1 mg (1 mg IntraVENous Given 2/21/17 0254)   prochlorperazine (COMPAZINE) injection 10 mg (10 mg IntraVENous Given 2/21/17 0254)       LABS REVIEWED:  Recent Results (from the past 24 hour(s))   URINALYSIS W/ REFLEX CULTURE    Collection Time: 02/21/17  2:20 AM   Result Value Ref Range    Color YELLOW/STRAW      Appearance CLEAR CLEAR      Specific gravity 1.013 1.003 - 1.030      pH (UA) 5.0 5.0 - 8.0      Protein NEGATIVE  NEG mg/dL    Glucose 100 (A) NEG mg/dL    Ketone NEGATIVE  NEG mg/dL    Bilirubin NEGATIVE  NEG      Blood NEGATIVE  NEG      Urobilinogen 0.2 0.2 - 1.0 EU/dL    Nitrites NEGATIVE  NEG      Leukocyte Esterase NEGATIVE  NEG      WBC 0-4 0 - 4 /hpf    RBC 0-5 0 - 5 /hpf    Epithelial cells FEW FEW /lpf    Bacteria NEGATIVE  NEG /hpf    UA:UC IF INDICATED CULTURE NOT INDICATED BY UA RESULT CNI      Hyaline cast 0-2 0 - 5 /lpf   CBC WITH AUTOMATED DIFF    Collection Time: 02/21/17  3:06 AM   Result Value Ref Range    WBC 14.9 (H) 4.1 - 11.1 K/uL    RBC 4.07 (L) 4.10 - 5.70 M/uL    HGB 12.7 12.1 - 17.0 g/dL    HCT 38.0 36.6 - 50.3 %    MCV 93.4 80.0 - 99.0 FL MCH 31.2 26.0 - 34.0 PG    MCHC 33.4 30.0 - 36.5 g/dL    RDW 14.4 11.5 - 14.5 %    PLATELET 101 366 - 522 K/uL    NEUTROPHILS 90 (H) 32 - 75 %    LYMPHOCYTES 8 (L) 12 - 49 %    MONOCYTES 2 (L) 5 - 13 %    EOSINOPHILS 0 0 - 7 %    BASOPHILS 0 0 - 1 %    ABS. NEUTROPHILS 13.3 (H) 1.8 - 8.0 K/UL    ABS. LYMPHOCYTES 1.2 0.8 - 3.5 K/UL    ABS. MONOCYTES 0.4 0.0 - 1.0 K/UL    ABS. EOSINOPHILS 0.0 0.0 - 0.4 K/UL    ABS. BASOPHILS 0.0 0.0 - 0.1 K/UL   METABOLIC PANEL, COMPREHENSIVE    Collection Time: 02/21/17  3:06 AM   Result Value Ref Range    Sodium 140 136 - 145 mmol/L    Potassium 4.5 3.5 - 5.1 mmol/L    Chloride 106 97 - 108 mmol/L    CO2 28 21 - 32 mmol/L    Anion gap 6 5 - 15 mmol/L    Glucose 144 (H) 65 - 100 mg/dL    BUN 16 6 - 20 MG/DL    Creatinine 0.70 0.70 - 1.30 MG/DL    BUN/Creatinine ratio 23 (H) 12 - 20      GFR est AA >60 >60 ml/min/1.73m2    GFR est non-AA >60 >60 ml/min/1.73m2    Calcium 8.7 8.5 - 10.1 MG/DL    Bilirubin, total 0.1 (L) 0.2 - 1.0 MG/DL    ALT (SGPT) 29 12 - 78 U/L    AST (SGOT) 11 (L) 15 - 37 U/L    Alk. phosphatase 70 45 - 117 U/L    Protein, total 6.2 (L) 6.4 - 8.2 g/dL    Albumin 3.1 (L) 3.5 - 5.0 g/dL    Globulin 3.1 2.0 - 4.0 g/dL    A-G Ratio 1.0 (L) 1.1 - 2.2     LIPASE    Collection Time: 02/21/17  3:06 AM   Result Value Ref Range    Lipase 287 73 - 393 U/L       VITAL SIGNS:  Patient Vitals for the past 12 hrs:   Temp Pulse Resp BP SpO2   02/21/17 0351 98 °F (36.7 °C) 94 16 108/67 94 %   02/21/17 0345 - - - 108/67 94 %   02/21/17 0340 - - - - 94 %   02/21/17 0315 - - - 113/71 92 %   02/21/17 0308 - - - - 94 %   02/21/17 0154 98.2 °F (36.8 °C) (!) 126 20 122/77 100 %       RADIOLOGY RESULTS:  The following have been ordered and reviewed:  XR ABD FLAT/ ERECT   Final Result        Study Result      History: Abdominal pain.     Supine and upright views of the abdomen show the bowel gas pattern is within  normal limits.  Soft tissue detail is normal.     No free air is demonstrated. There is nonspecific calcification in the right  abdomen. There are clips in the right upper quadrant.     IMPRESSION  IMPRESSION:  No acute findings. PROGRESS NOTES:  Discussed results and plan with patient. Patient will be discharged home with PCP followup. Patient instructed to return to the emergency room for any worsening symptoms or any other concerns. DIAGNOSIS:    1. Abdominal pain, generalized    2. Nausea and vomiting, intractability of vomiting not specified, unspecified vomiting type    3. History of Crohn's disease        PLAN:  Follow-up Information     Follow up With Details Comments Contact Info    Annel Jordan MD Schedule an appointment as soon as possible for a visit  [address] [facility] [address]  [phone]      [facility] EMERGENCY DEPT  If symptoms worsen [address] [facility]  [phone]        Discharge Medication List as of 2/21/2017  3:45 AM      CONTINUE these medications which have NOT CHANGED    Details   dicyclomine (BENTYL) 10 mg capsule Take 10 mg by mouth 4 times daily (before meals and nightly). , Historical Med      polyethylene glycol (MIRALAX) 17 gram/dose powder Take 17 g by mouth daily. 1 tablespoon with 8 oz of water daily, Print, Disp-510 g, R-0      promethazine (PHENERGAN) 25 mg tablet Take 1 Tab by mouth every six (6) hours as needed for Nausea. , Print, Disp-60 Tab, R-0      pantoprazole (PROTONIX) 40 mg tablet Take 1 Tab by mouth Before breakfast and dinner., Print, Disp-60 Tab, R-0      predniSONE (DELTASONE) 20 mg tablet Take 2 Tabs by mouth daily (with breakfast). , Print, Disp-30 Tab, R-0      ondansetron (ZOFRAN ODT) 4 mg disintegrating tablet Take 4 mg by mouth every eight (8) hours as needed for Nausea., Historical Med      Ustekinumab (USTEKINAUMAB) 90 mg/mL injection 90 mg by SubCUTAneous route.  Every 8 weeks, Historical Med      pregabalin (LYRICA) 100 mg capsule Take 100 mg by mouth three (3) times daily. , Historical Med      cyclobenzaprine (FLEXERIL) 10 mg tablet Take 10 mg by mouth three (3) times daily as needed for Muscle Spasm(s). , Historical Med      diphenhydrAMINE (BENADRYL) 25 mg tablet Take 25 mg by mouth nightly as needed for Sleep., Historical Med      cyanocobalamin (VITAMIN B-12) 1,000 mcg/mL injection 1,000 mcg by IntraMUSCular route every seven (7) days. Indications: Once weekly, Historical Med      temazepam (RESTORIL) 30 mg capsule Take 30 mg by mouth nightly., Historical Med      diazepam (VALIUM) 5 mg tablet Take 5 mg by mouth every eight (8) hours as needed (bowel relaxation). , Historical Med      omeprazole-sodium bicarbonate (ZEGERID) 20-1.1 mg-gram Cap Take 20 mg by mouth daily. , Historical Med               ED COURSE: The patients hospital course has been uncomplicated.

## 2017-02-21 NOTE — ED TRIAGE NOTES
Pt w/hx of chrons and adhesions in with flare up. Started about 3 days ago and got bad yesterday afternoon. +nausea +vomiting x2.

## 2017-02-21 NOTE — DISCHARGE INSTRUCTIONS
We hope that we have addressed all of your medical concerns. The examination and treatment you received in the Emergency Department were for an emergent problem and were not intended as complete care. It is important that you follow up with your healthcare provider(s) for ongoing care. If your symptoms worsen or do not improve as expected, and you are unable to reach your usual health care provider(s), you should return to the Emergency Department. Today's healthcare is undergoing tremendous change, and patient satisfaction surveys are one of the many tools to assess the quality of medical care. You may receive a survey from the [employer] organization regarding your experience in the Emergency Department. I hope that your experience has been completely positive, particularly the medical care that I provided. As such, please participate in the survey; anything less than excellent does not meet my expectations or intentions. [facility] and 8 [facility] participate in nationally recognized quality of care measures. If your blood pressure is greater than 120/80, as reported below, we urge that you seek medical care to address the potential of high blood pressure, commonly known as hypertension. Hypertension can be hereditary or can be caused by certain medical conditions, pain, stress, or \"white coat syndrome. \"       Please make an appointment with your health care provider(s) for follow up of your Emergency Department visit. VITALS:   Patient Vitals for the past 8 hrs:   Temp Pulse Resp BP SpO2   02/21/17 0308 - - - - 94 %   02/21/17 0154 98.2 °F (36.8 °C) (!) 126 20 122/77 100 %          Thank you for allowing us to provide you with medical care today. We realize that you have many choices for your emergency care needs. Please choose us in the future for any continued health care needs. Rae Hernandez, [address] [place] [address].   Office: [phone]            Recent Results (from the past 24 hour(s))   URINALYSIS W/ REFLEX CULTURE    Collection Time: 02/21/17  2:20 AM   Result Value Ref Range    Color YELLOW/STRAW      Appearance CLEAR CLEAR      Specific gravity 1.013 1.003 - 1.030      pH (UA) 5.0 5.0 - 8.0      Protein NEGATIVE  NEG mg/dL    Glucose 100 (A) NEG mg/dL    Ketone NEGATIVE  NEG mg/dL    Bilirubin NEGATIVE  NEG      Blood NEGATIVE  NEG      Urobilinogen 0.2 0.2 - 1.0 EU/dL    Nitrites NEGATIVE  NEG      Leukocyte Esterase NEGATIVE  NEG      WBC 0-4 0 - 4 /hpf    RBC 0-5 0 - 5 /hpf    Epithelial cells FEW FEW /lpf    Bacteria NEGATIVE  NEG /hpf    UA:UC IF INDICATED CULTURE NOT INDICATED BY UA RESULT CNI      Hyaline cast 0-2 0 - 5 /lpf   CBC WITH AUTOMATED DIFF    Collection Time: 02/21/17  3:06 AM   Result Value Ref Range    WBC 14.9 (H) 4.1 - 11.1 K/uL    RBC 4.07 (L) 4.10 - 5.70 M/uL    HGB 12.7 12.1 - 17.0 g/dL    HCT 38.0 36.6 - 50.3 %    MCV 93.4 80.0 - 99.0 FL    MCH 31.2 26.0 - 34.0 PG    MCHC 33.4 30.0 - 36.5 g/dL    RDW 14.4 11.5 - 14.5 %    PLATELET 114 928 - 134 K/uL    NEUTROPHILS 90 (H) 32 - 75 %    LYMPHOCYTES 8 (L) 12 - 49 %    MONOCYTES 2 (L) 5 - 13 %    EOSINOPHILS 0 0 - 7 %    BASOPHILS 0 0 - 1 %    ABS. NEUTROPHILS 13.3 (H) 1.8 - 8.0 K/UL    ABS. LYMPHOCYTES 1.2 0.8 - 3.5 K/UL    ABS. MONOCYTES 0.4 0.0 - 1.0 K/UL    ABS. EOSINOPHILS 0.0 0.0 - 0.4 K/UL    ABS.  BASOPHILS 0.0 0.0 - 0.1 K/UL   METABOLIC PANEL, COMPREHENSIVE    Collection Time: 02/21/17  3:06 AM   Result Value Ref Range    Sodium 140 136 - 145 mmol/L    Potassium 4.5 3.5 - 5.1 mmol/L    Chloride 106 97 - 108 mmol/L    CO2 28 21 - 32 mmol/L    Anion gap 6 5 - 15 mmol/L    Glucose 144 (H) 65 - 100 mg/dL    BUN 16 6 - 20 MG/DL    Creatinine 0.70 0.70 - 1.30 MG/DL    BUN/Creatinine ratio 23 (H) 12 - 20      GFR est AA >60 >60 ml/min/1.73m2    GFR est non-AA >60 >60 ml/min/1.73m2    Calcium 8.7 8.5 - 10.1 MG/DL    Bilirubin, total 0.1 (L) 0.2 - 1.0 MG/DL    ALT (SGPT) 29 12 - 78 U/L    AST (SGOT) 11 (L) 15 - 37 U/L    Alk. phosphatase 70 45 - 117 U/L    Protein, total 6.2 (L) 6.4 - 8.2 g/dL    Albumin 3.1 (L) 3.5 - 5.0 g/dL    Globulin 3.1 2.0 - 4.0 g/dL    A-G Ratio 1.0 (L) 1.1 - 2.2     LIPASE    Collection Time: 02/21/17  3:06 AM   Result Value Ref Range    Lipase 287 73 - 393 U/L       Xr Abd Flat/ Erect    Result Date: 2/21/2017  History: Abdominal pain. Supine and upright views of the abdomen show the bowel gas pattern is within normal limits. Soft tissue detail is normal. No free air is demonstrated. There is nonspecific calcification in the right abdomen. There are clips in the right upper quadrant. IMPRESSION: No acute findings. Abdominal Pain: Care Instructions  Your Care Instructions    Abdominal pain has many possible causes. Some aren't serious and get better on their own in a few days. Others need more testing and treatment. If your pain continues or gets worse, you need to be rechecked and may need more tests to find out what is wrong. You may need surgery to correct the problem. Don't ignore new symptoms, such as fever, nausea and vomiting, urination problems, pain that gets worse, and dizziness. These may be signs of a more serious problem. Your doctor may have recommended a follow-up visit in the next 8 to 12 hours. If you are not getting better, you may need more tests or treatment. The doctor has checked you carefully, but problems can develop later. If you notice any problems or new symptoms, get medical treatment right away. Follow-up care is a key part of your treatment and safety. Be sure to make and go to all appointments, and call your doctor if you are having problems. It's also a good idea to know your test results and keep a list of the medicines you take. How can you care for yourself at home? · Rest until you feel better.   · To prevent dehydration, drink plenty of fluids, enough so that your urine is light yellow or clear like water. Choose water and other caffeine-free clear liquids until you feel better. If you have kidney, heart, or liver disease and have to limit fluids, talk with your doctor before you increase the amount of fluids you drink. · If your stomach is upset, eat mild foods, such as rice, dry toast or crackers, bananas, and applesauce. Try eating several small meals instead of two or three large ones. · Wait until 48 hours after all symptoms have gone away before you have spicy foods, alcohol, and drinks that contain caffeine. · Do not eat foods that are high in fat. · Avoid anti-inflammatory medicines such as aspirin, ibuprofen (Advil, Motrin), and naproxen (Aleve). These can cause stomach upset. Talk to your doctor if you take daily aspirin for another health problem. When should you call for help? Call 911 anytime you think you may need emergency care. For example, call if:  · You passed out (lost consciousness). · You pass maroon or very bloody stools. · You vomit blood or what looks like coffee grounds. · You have new, severe belly pain. Call your doctor now or seek immediate medical care if:  · Your pain gets worse, especially if it becomes focused in one area of your belly. · You have a new or higher fever. · Your stools are black and look like tar, or they have streaks of blood. · You have unexpected vaginal bleeding. · You have symptoms of a urinary tract infection. These may include:  ¨ Pain when you urinate. ¨ Urinating more often than usual.  ¨ Blood in your urine. · You are dizzy or lightheaded, or you feel like you may faint. Watch closely for changes in your health, and be sure to contact your doctor if:  · You are not getting better after 1 day (24 hours). Where can you learn more? Go to http://malu-ashley.info/.   Enter M059 in the search box to learn more about \"Abdominal Pain: Care Instructions. \"  Current as of: May 27, 2016  Content Version: 11.1  © 0284-5533 [employer]. Care instructions adapted under license by [employer] (which disclaims liability or warranty for this information). If you have questions about a medical condition or this instruction, always ask your healthcare professional. [address] any warranty or liability for your use of this information. Nausea and Vomiting: Care Instructions  Your Care Instructions    When you are nauseated, you may feel weak and sweaty and notice a lot of saliva in your mouth. Nausea often leads to vomiting. Most of the time you do not need to worry about nausea and vomiting, but they can be signs of other illnesses. Two common causes of nausea and vomiting are stomach flu and food poisoning. Nausea and vomiting from viral stomach flu will usually start to improve within 24 hours. Nausea and vomiting from food poisoning may last from 12 to 48 hours. The doctor has checked you carefully, but problems can develop later. If you notice any problems or new symptoms, get medical treatment right away. Follow-up care is a key part of your treatment and safety. Be sure to make and go to all appointments, and call your doctor if you are having problems. It's also a good idea to know your test results and keep a list of the medicines you take. How can you care for yourself at home? · To prevent dehydration, drink plenty of fluids, enough so that your urine is light yellow or clear like water. Choose water and other caffeine-free clear liquids until you feel better. If you have kidney, heart, or liver disease and have to limit fluids, talk with your doctor before you increase the amount of fluids you drink. · Rest in bed until you feel better. · When you are able to eat, try clear soups, mild foods, and liquids until all symptoms are gone for 12 to 48 hours.  Other good choices include dry toast, crackers, cooked cereal, and gelatin dessert, such as Jell-O. When should you call for help? Call 911 anytime you think you may need emergency care. For example, call if:  · You passed out (lost consciousness). Call your doctor now or seek immediate medical care if:  · You have symptoms of dehydration, such as:  ¨ Dry eyes and a dry mouth. ¨ Passing only a little dark urine. ¨ Feeling thirstier than usual.  · You have new or worsening belly pain. · You have a new or higher fever. · You vomit blood or what looks like coffee grounds. Watch closely for changes in your health, and be sure to contact your doctor if:  · You have ongoing nausea and vomiting. · Your vomiting is getting worse. · Your vomiting lasts longer than 2 days. · You are not getting better as expected. Where can you learn more? Go to http://malu-ashley.info/. Enter 25 [id number] in the search box to learn more about \"Nausea and Vomiting: Care Instructions. \"  Current as of: May 27, 2016  Content Version: 11.1  © 4624-1866 [employer]. Care instructions adapted under license by [employer] (which disclaims liability or warranty for this information). If you have questions about a medical condition or this instruction, always ask your healthcare professional. [place] [address] any warranty or liability for your use of this information.

## 2017-02-25 ENCOUNTER — HOSPITAL ENCOUNTER (EMERGENCY)
Age: 42
Discharge: HOME | End: 2017-02-25
Attending: EMERGENCY MEDICINE
Payer: COMMERCIAL

## 2017-02-25 ENCOUNTER — APPOINTMENT (OUTPATIENT)
Dept: GENERAL RADIOLOGY | Age: 42
End: 2017-02-25
Attending: EMERGENCY MEDICINE
Payer: COMMERCIAL

## 2017-02-25 VITALS
WEIGHT: 200 LBS | HEART RATE: 125 BPM | RESPIRATION RATE: 20 BRPM | HEIGHT: 72 IN | DIASTOLIC BLOOD PRESSURE: 87 MMHG | SYSTOLIC BLOOD PRESSURE: 127 MMHG | TEMPERATURE: 98.7 F | BODY MASS INDEX: 27.09 KG/M2 | OXYGEN SATURATION: 100 %

## 2017-02-25 DIAGNOSIS — G89.29 CHRONIC ABDOMINAL PAIN: Primary | ICD-10-CM

## 2017-02-25 DIAGNOSIS — R10.9 CHRONIC ABDOMINAL PAIN: Primary | ICD-10-CM

## 2017-02-25 LAB
ANION GAP BLD CALC-SCNC: 10 MMOL/L (ref 5–15)
BASOPHILS # BLD AUTO: 0 K/UL (ref 0–0.1)
BASOPHILS # BLD: 0 % (ref 0–1)
BUN SERPL-MCNC: 12 MG/DL (ref 6–20)
BUN/CREAT SERPL: 12 (ref 12–20)
CALCIUM SERPL-MCNC: 9.1 MG/DL (ref 8.5–10.1)
CHLORIDE SERPL-SCNC: 103 MMOL/L (ref 97–108)
CO2 SERPL-SCNC: 27 MMOL/L (ref 21–32)
CREAT SERPL-MCNC: 1.02 MG/DL (ref 0.7–1.3)
EOSINOPHIL # BLD: 0.1 K/UL (ref 0–0.4)
EOSINOPHIL NFR BLD: 1 % (ref 0–7)
ERYTHROCYTE [DISTWIDTH] IN BLOOD BY AUTOMATED COUNT: 14 % (ref 11.5–14.5)
GLUCOSE SERPL-MCNC: 112 MG/DL (ref 65–100)
HCT VFR BLD AUTO: 40.7 % (ref 36.6–50.3)
HEMOCCULT STL QL: NEGATIVE
HGB BLD-MCNC: 13.9 G/DL (ref 12.1–17)
LACTATE SERPL-SCNC: 1.5 MMOL/L (ref 0.4–2)
LYMPHOCYTES # BLD AUTO: 24 % (ref 12–49)
LYMPHOCYTES # BLD: 2.9 K/UL (ref 0.8–3.5)
MCH RBC QN AUTO: 31.5 PG (ref 26–34)
MCHC RBC AUTO-ENTMCNC: 34.2 G/DL (ref 30–36.5)
MCV RBC AUTO: 92.3 FL (ref 80–99)
MONOCYTES # BLD: 0.6 K/UL (ref 0–1)
MONOCYTES NFR BLD AUTO: 5 % (ref 5–13)
NEUTS SEG # BLD: 8.3 K/UL (ref 1.8–8)
NEUTS SEG NFR BLD AUTO: 70 % (ref 32–75)
PLATELET # BLD AUTO: 195 K/UL (ref 150–400)
POTASSIUM SERPL-SCNC: 3.5 MMOL/L (ref 3.5–5.1)
RBC # BLD AUTO: 4.41 M/UL (ref 4.1–5.7)
SODIUM SERPL-SCNC: 140 MMOL/L (ref 136–145)
WBC # BLD AUTO: 11.9 K/UL (ref 4.1–11.1)

## 2017-02-25 PROCEDURE — 96372 THER/PROPH/DIAG INJ SC/IM: CPT

## 2017-02-25 PROCEDURE — 99282 EMERGENCY DEPT VISIT SF MDM: CPT

## 2017-02-25 PROCEDURE — 74020 XR ABD FLAT/ ERECT: CPT

## 2017-02-25 PROCEDURE — 85025 COMPLETE CBC W/AUTO DIFF WBC: CPT | Performed by: EMERGENCY MEDICINE

## 2017-02-25 PROCEDURE — 96374 THER/PROPH/DIAG INJ IV PUSH: CPT

## 2017-02-25 PROCEDURE — 80048 BASIC METABOLIC PNL TOTAL CA: CPT | Performed by: EMERGENCY MEDICINE

## 2017-02-25 PROCEDURE — 83605 ASSAY OF LACTIC ACID: CPT | Performed by: EMERGENCY MEDICINE

## 2017-02-25 PROCEDURE — 96361 HYDRATE IV INFUSION ADD-ON: CPT

## 2017-02-25 PROCEDURE — 36415 COLL VENOUS BLD VENIPUNCTURE: CPT | Performed by: EMERGENCY MEDICINE

## 2017-02-25 PROCEDURE — 74011250636 HC RX REV CODE- 250/636: Performed by: EMERGENCY MEDICINE

## 2017-02-25 PROCEDURE — 82272 OCCULT BLD FECES 1-3 TESTS: CPT | Performed by: EMERGENCY MEDICINE

## 2017-02-25 RX ORDER — PROCHLORPERAZINE EDISYLATE 5 MG/ML
10 INJECTION INTRAMUSCULAR; INTRAVENOUS
Status: COMPLETED | OUTPATIENT
Start: 2017-02-25 | End: 2017-02-25

## 2017-02-25 RX ORDER — PROCHLORPERAZINE MALEATE 5 MG
TABLET ORAL
Qty: 40 TAB | Refills: 0 | Status: SHIPPED | OUTPATIENT
Start: 2017-02-25 | End: 2017-03-07

## 2017-02-25 RX ORDER — DICYCLOMINE HYDROCHLORIDE 10 MG/ML
20 INJECTION INTRAMUSCULAR
Status: COMPLETED | OUTPATIENT
Start: 2017-02-25 | End: 2017-02-25

## 2017-02-25 RX ORDER — DICYCLOMINE HYDROCHLORIDE 20 MG/1
20 TABLET ORAL EVERY 6 HOURS
Qty: 20 TAB | Refills: 0 | Status: SHIPPED | OUTPATIENT
Start: 2017-02-25 | End: 2017-03-02

## 2017-02-25 RX ADMIN — SODIUM CHLORIDE 1000 ML: 900 INJECTION, SOLUTION INTRAVENOUS at 18:41

## 2017-02-25 RX ADMIN — PROCHLORPERAZINE EDISYLATE 10 MG: 5 INJECTION INTRAMUSCULAR; INTRAVENOUS at 18:43

## 2017-02-25 RX ADMIN — DICYCLOMINE HYDROCHLORIDE 20 MG: 20 INJECTION, SOLUTION INTRAMUSCULAR at 18:45

## 2017-02-25 NOTE — ED PROVIDER NOTES
HPI Comments: 37 y.o. male with extensive past medical history, please see list, significant for Crohn's disease, small bowel obstruction, ileostomy, ulcerative colitis, hiatal hernia, GERD, ileal pouchitis, anal stenosis, anal fistula, depression, and anxiety who presents to the ED with chief complaint of abdominal pain. Pt reports he has hx of Crohn's and has been having related issues for the past several months, but for the last few days he has had intermittent worsened abdominal pain accompanied by nausea and vomiting. Pt states the contents of his ostomy bag have been \"inconsistent\" but he has had some \"liquid black stool\" and he had a \"partial blockage\" yesterday. Pt states his sx are exacerbated by eating solid food. Pt states he was seen at [facility] ED a few nights ago for the same and had felt better following the visit but his sx worsened again. Pt states he has hx of a bowel obstruction a few weeks ago and was recently noted to have \"adhesions in his RLQ. \" Pt states he has hx of cholecystectomy. There are no other acute medical complaints voiced at this time. PCP: Carmel Mccarthy MD  Surgery: Meagan Palma MD and Dr. Humphrey Medina ([facility])  GI: Dr. Cassius Irving    Note written by Virgen Coyle, as dictated by Madalyn Rush MD 5:56 PM     The history is provided by the patient. Past Medical History:   Diagnosis Date    Anal fistula     Anal stenosis     Crohn's disease ([address]     GERD (gastroesophageal reflux disease)     H/O ulcerative colitis     Symptoms began in 1996. Total proctocolectomy was performed in 2004. Pateint later developed Crohn's disease.     Hiatal hernia     Ileal pouchitis ([address] 5/2004    Nausea & vomiting     Combination of Scopalamine patch & Zofran IV worked well in past    Numbness in right leg     Psychiatric disorder     depression and anxiety    Skin lesion of back 3/17/2014    Syncopal episodes        Past Surgical History:   Procedure Laterality Date    ABDOMEN SURGERY PROC UNLISTED  3/25/2004    Total proctocolectomy with creation of ileoanal J-pouch and loop ileostomy; Dr. Magi Walker.   CHOLECYSTECTOMY  12/2016    Dr. Dannie Marie HX GI  5/2/2004    Examination under anesthesia with endoscopic evaluation of ileoanal pouch and placement of drain; Dr. Magi Walker.  HX GI  5/25/2004    Ileostomy closure with small bowel resection and anastomosis; Dr. Magi Walker.  HX GI  5/24/2012    Examination under anesthesia, anal dilatation, anal biopsy, and placement of draining seton; Dr. Magi Walker.  HX GI  7/3/2012    Incision and drainage of perirectal abscess and rigid endoscopy of ileoanal pouch; Dr. Magi Walker.  HX GI  7/31/2012    Incision and drainage of perirectal abscess, placement of draining seton, and anal dilatation; Dr. Magi Walker.   GI  1/22/2013    Repair of anal fistulas with debridement, partial fistulectomy, and flap closure; Dr. Magi Walker.  HX GI  5/17/2013    Examination under anesthesia, partial fistulotomy,  and placement of draining setons; Dr. Magi Mcfarlane   GI  8/6/2013    Anal fistulotomy and application of ACell micromatrix; Dr. Magi Walker.   GI  2/20/2014    Examination under anesthesia and dilation of anal canal with rigid proctoscopy; Alla Moore MD.     GI  4/29/2015    Creation of Herchel Rape continent intestinal reservoir (BCIR), abdominoperineal resection of ileoanal J-pouch; lysis of adhesions, and gastrostomy; Wm Valle MD ([place], [place])     GI  8/7/2015    Stoma revision; Wm Valle MD ([place], [place])     GI  10/29/2015    Extensive lysis of adhesions, resection of continent intestinal reservoir, repair of serosal tears, and creation of end-ileostomy; Dr. Magi Walker.      ORTHOPAEDIC  2008    Left ACL repair         Family History:   Problem Relation Age of Onset    Cancer Father     Asthma Neg Hx     Diabetes Neg Hx     Heart Disease Neg Hx     Hypertension Neg Hx     Stroke Neg Hx     Malignant Hyperthermia Neg Hx     Pseudocholinesterase Deficiency Neg Hx     Delayed Awakening Neg Hx     Post-op Nausea/Vomiting Neg Hx        Social History     Social History    Marital status:      Spouse name: N/A    Number of children: N/A    Years of education: N/A     Occupational History    Not on file. Social History Main Topics    Smoking status: Former Smoker     Packs/day: 0.00     Years: 7.00     Quit date: 1/14/2017    Smokeless tobacco: Never Used    Alcohol use No    Drug use: No    Sexual activity: Not on file     Other Topics Concern    Not on file     Social History Narrative         ALLERGIES: Remicade [infliximab]; Bactrim [sulfamethoprim ds]; and Nsaids (non-steroidal anti-inflammatory drug)    Review of Systems   Constitutional: Negative for chills and fever. HENT: Negative for ear pain and sore throat. Eyes: Negative for photophobia and pain. Respiratory: Negative for chest tightness and shortness of breath. Cardiovascular: Negative for chest pain and leg swelling. Gastrointestinal: Positive for abdominal pain, diarrhea, nausea and vomiting. Genitourinary: Negative for dysuria and flank pain. Musculoskeletal: Negative for back pain and neck pain. Skin: Negative for rash and wound. Neurological: Negative for dizziness, light-headedness and headaches. All other systems reviewed and are negative. Vitals:    02/25/17 1728   BP: 127/87   Pulse: (!) 125   Resp: 20   Temp: 98.7 °F (37.1 °C)   SpO2: 100%   Weight: 90.7 kg (200 lb)   Height: 6' (1.829 m)            Physical Exam   Constitutional: He is oriented to person, place, and time. He appears well-developed and well-nourished. No distress. HENT:   Head: Normocephalic and atraumatic. Mouth/Throat: Oropharynx is clear and moist.   Eyes: Conjunctivae and EOM are normal. Pupils are equal, round, and reactive to light.    Neck: Normal range of motion. Cardiovascular: Regular rhythm, normal heart sounds and intact distal pulses. Tachycardia present. No murmur heard. Pulmonary/Chest: Effort normal and breath sounds normal. No stridor. No respiratory distress. Abdominal: Soft. Bowel sounds are normal. There is no tenderness. There is no rebound. Ostomy right lower abdomen, dark liquid stool   Musculoskeletal: Normal range of motion. He exhibits no edema or tenderness. Neurological: He is alert and oriented to person, place, and time. No cranial nerve deficit. Skin: Skin is warm and dry. He is not diaphoretic. Psychiatric: He has a normal mood and affect. Nursing note and vitals reviewed. MDM  Number of Diagnoses or Management Options  Chronic abdominal pain:   Diagnosis management comments: Patient with chronic abdominal pain - check labs, give IVF and meds for symptoms. Check x-ray for bowel obstruction and if none will d/c home to f/u with his doctors and GI.    2020 - patient with no EMC at this time, labs improved         Amount and/or Complexity of Data Reviewed  Clinical lab tests: reviewed and ordered  Tests in the radiology section of CPT®: reviewed and ordered    Patient Progress  Patient progress: stable    ED Course       Procedures    EXAM: XR ABD FLAT/ ERECT     INDICATION: Nausea and vomiting coffee-ground stool in his ostomy today.     COMPARISON: 2/21/2017.     TECHNIQUE: Frontal supine and upright views of the abdomen.     FINDINGS: Surgical clips are again seen in the right upper abdomen. There are no  dilated bowel loops, air-fluid levels, or free air. There is no abnormal  intraperitoneal soft tissue mass. Pelvic phleboliths are again noted. The bones  are stable.     IMPRESSION  IMPRESSION: No acute abnormality.

## 2017-02-26 NOTE — ED NOTES
Assumed care of patient. Acknowledged patient, introduced myself as primary RN. Process explained to patient, including tests ordered and wait times. Discussed the patient's expectations for this visit, addressed any concerns and answered questions. Stretcher in low, locked position and call bell within reach. Pt updated on plan to await xray and MD would follow up with him after regarding pain control. Pt able to empty ileostomy bag for stool sample.

## 2017-02-26 NOTE — DISCHARGE INSTRUCTIONS
Chronic Pain: Care Instructions  Your Care Instructions  Chronic pain is pain that lasts a long time (months or even years) and may or may not have a clear cause. It is different from acute pain, which usually does have a clear cause--like an injury or illness--and gets better over time. Chronic pain:  · Lasts over time but may vary from day to day. · Does not go away despite efforts to end it. · May disrupt your sleep and lead to fatigue. · May cause depression or anxiety. · May make your muscles tense, causing more pain. · Can disrupt your work, hobbies, home life, and relationships with friends and family. Chronic pain is a very real condition. It is not just in your head. Treatment can help and usually includes several methods used together, such as medicines, physical therapy, exercise, and other treatments. Learning how to relax and changing negative thought patterns can also help you cope. Chronic pain is complex. Taking an active role in your treatment will help you better manage your pain. Tell your doctor if you have trouble dealing with your pain. You may have to try several things before you find what works best for you. Follow-up care is a key part of your treatment and safety. Be sure to make and go to all appointments, and call your doctor if you are having problems. Its also a good idea to know your test results and keep a list of the medicines you take. How can you care for yourself at home? · Pace yourself. Break up large jobs into smaller tasks. Save harder tasks for days when you have less pain, or go back and forth between hard tasks and easier ones. Take rest breaks. · Relax, and reduce stress. Relaxation techniques such as deep breathing or meditation can help. · Keep moving. Gentle, daily exercise can help reduce pain over the long run. Try low- or no-impact exercises such as walking, swimming, and stationary biking. Do stretches to stay flexible.   · Try heat, cold packs, and massage. · Get enough sleep. Chronic pain can make you tired and drain your energy. Talk with your doctor if you have trouble sleeping because of pain. · Think positive. Your thoughts can affect your pain level. Do things that you enjoy to distract yourself when you have pain instead of focusing on the pain. See a movie, read a book, listen to music, or spend time with a friend. · If you think you are depressed, talk to your doctor about treatment. · Keep a daily pain diary. Record how your moods, thoughts, sleep patterns, activities, and medicine affect your pain. You may find that your pain is worse during or after certain activities or when you are feeling a certain emotion. Having a record of your pain can help you and your doctor find the best ways to treat your pain. · Take pain medicines exactly as directed. ¨ If the doctor gave you a prescription medicine for pain, take it as prescribed. ¨ If you are not taking a prescription pain medicine, ask your doctor if you can take an over-the-counter medicine. Reducing constipation caused by pain medicine  · Include fruits, vegetables, beans, and whole grains in your diet each day. These foods are high in fiber. · Drink plenty of fluids, enough so that your urine is light yellow or clear like water. If you have kidney, heart, or liver disease and have to limit fluids, talk with your doctor before you increase the amount of fluids you drink. · If your doctor recommends it, get more exercise. Walking is a good choice. Bit by bit, increase the amount you walk every day. Try for at least 30 minutes on most days of the week. · Schedule time each day for a bowel movement. A daily routine may help. Take your time and do not strain when having a bowel movement. When should you call for help? Call your doctor now or seek immediate medical care if:  · Your pain gets worse or is out of control.   · You feel down or blue, or you do not enjoy things like you once did. You may be depressed, which is common in people with chronic pain. Depression can be treated. · You have vomiting or cramps for more than 2 hours. Watch closely for changes in your health, and be sure to contact your doctor if:  · You cannot sleep because of pain. · You are very worried or anxious about your pain. · You have trouble taking your pain medicine. · You have any concerns about your pain medicine. · You have trouble with bowel movements, such as:  ¨ No bowel movement in 3 days. ¨ Blood in the anal area, in your stool, or on the toilet paper. ¨ Diarrhea for more than 24 hours. Where can you learn more? Go to http://malu-ashley.info/. Enter N004 in the search box to learn more about \"Chronic Pain: Care Instructions. \"  Current as of: February 19, 2016  Content Version: 11.1  © 1688-1497 [employer]. Care instructions adapted under license by [employer] (which disclaims liability or warranty for this information). If you have questions about a medical condition or this instruction, always ask your healthcare professional. [place] [address] any warranty or liability for your use of this information. We hope that we have addressed all of your medical concerns. The examination and treatment you received in the Emergency Department were for an emergent problem and were not intended as complete care. It is important that you follow up with your healthcare provider(s) for ongoing care. If your symptoms worsen or do not improve as expected, and you are unable to reach your usual health care provider(s), you should return to the Emergency Department. Today's healthcare is undergoing tremendous change, and patient satisfaction surveys are one of the many tools to assess the quality of medical care. You may receive a survey from the [employer] regarding your experience in the Emergency Department.   I hope that your experience has been completely positive, particularly the medical care that I provided. As such, please participate in the survey; anything less than excellent does not meet my expectations or intentions. [facility] and [facility] participate in nationally recognized quality of care measures. If your blood pressure is greater than 120/80, as reported below, we urge that you seek medical care to address the potential of high blood pressure, commonly known as hypertension. Hypertension can be hereditary or can be caused by certain medical conditions, pain, stress, or \"white coat syndrome. \"       Please make an appointment with your health care provider(s) for follow up of your Emergency Department visit. VITALS:   Patient Vitals for the past 8 hrs:   Temp Pulse Resp BP SpO2   02/25/17 1728 98.7 °F (37.1 °C) (!) 125 20 127/87 100 %          Thank you for allowing us to provide you with medical care today. We realize that you have many choices for your emergency care needs. Please choose us in the future for any continued health care needs. [facility], [address] [place] [address].   Office: [phone]            Recent Results (from the past 24 hour(s))   METABOLIC PANEL, BASIC    Collection Time: 02/25/17  6:36 PM   Result Value Ref Range    Sodium 140 136 - 145 mmol/L    Potassium 3.5 3.5 - 5.1 mmol/L    Chloride 103 97 - 108 mmol/L    CO2 27 21 - 32 mmol/L    Anion gap 10 5 - 15 mmol/L    Glucose 112 (H) 65 - 100 mg/dL    BUN 12 6 - 20 MG/DL    Creatinine 1.02 0.70 - 1.30 MG/DL    BUN/Creatinine ratio 12 12 - 20      GFR est AA >60 >60 ml/min/1.73m2    GFR est non-AA >60 >60 ml/min/1.73m2    Calcium 9.1 8.5 - 10.1 MG/DL   CBC WITH AUTOMATED DIFF    Collection Time: 02/25/17  6:36 PM   Result Value Ref Range    WBC 11.9 (H) 4.1 - 11.1 K/uL    RBC 4.41 4.10 - 5.70 M/uL    HGB 13.9 12.1 - 17.0 g/dL    HCT 40.7 36.6 - 50.3 %    MCV 92.3 80.0 - 99.0 FL    MCH 31.5 26.0 - 34.0 PG    MCHC 34.2 30.0 - 36.5 g/dL    RDW 14.0 11.5 - 14.5 %    PLATELET 696 952 - 001 K/uL    NEUTROPHILS 70 32 - 75 %    LYMPHOCYTES 24 12 - 49 %    MONOCYTES 5 5 - 13 %    EOSINOPHILS 1 0 - 7 %    BASOPHILS 0 0 - 1 %    ABS. NEUTROPHILS 8.3 (H) 1.8 - 8.0 K/UL    ABS. LYMPHOCYTES 2.9 0.8 - 3.5 K/UL    ABS. MONOCYTES 0.6 0.0 - 1.0 K/UL    ABS. EOSINOPHILS 0.1 0.0 - 0.4 K/UL    ABS. BASOPHILS 0.0 0.0 - 0.1 K/UL   LACTIC ACID, PLASMA    Collection Time: 02/25/17  6:36 PM   Result Value Ref Range    Lactic acid 1.5 0.4 - 2.0 MMOL/L       Xr Abd Flat/ Erect    Result Date: 2/25/2017  EXAM:  XR ABD FLAT/ ERECT INDICATION:  Nausea and vomiting coffee-ground stool in his ostomy today. COMPARISON: 2/21/2017. TECHNIQUE: Frontal supine and upright views of the abdomen. FINDINGS: Surgical clips are again seen in the right upper abdomen. There are no dilated bowel loops, air-fluid levels, or free air. There is no abnormal intraperitoneal soft tissue mass. Pelvic phleboliths are again noted. The bones are stable. IMPRESSION: No acute abnormality.

## 2017-02-26 NOTE — ED NOTES
Patient states pain is not getting any better after medication. Dr. Roderick Henriquez notified and no medication orders receiving at this time.

## 2017-02-26 NOTE — ED NOTES
Bedside and Verbal shift change report given to [address] [place] [address] Avenue  (oncoming nurse) by Aidan Knott RN  (offgoing nurse). Report included the following information SBAR. Patient is asking for Dilaudid. Provider aware.

## 2017-03-07 ENCOUNTER — APPOINTMENT (OUTPATIENT)
Dept: GENERAL RADIOLOGY | Age: 42
DRG: 389 | End: 2017-03-07
Attending: SURGERY
Payer: COMMERCIAL

## 2017-03-07 ENCOUNTER — APPOINTMENT (OUTPATIENT)
Dept: CT IMAGING | Age: 42
DRG: 389 | End: 2017-03-07
Attending: EMERGENCY MEDICINE
Payer: COMMERCIAL

## 2017-03-07 ENCOUNTER — HOSPITAL ENCOUNTER (INPATIENT)
Age: 42
LOS: 2 days | Discharge: ANOTHER HOSPITAL | DRG: 389 | End: 2017-03-09
Attending: EMERGENCY MEDICINE | Admitting: INTERNAL MEDICINE
Payer: COMMERCIAL

## 2017-03-07 DIAGNOSIS — R10.84 ABDOMINAL PAIN, GENERALIZED: Primary | ICD-10-CM

## 2017-03-07 DIAGNOSIS — K56.609 SBO (SMALL BOWEL OBSTRUCTION) (HCC): ICD-10-CM

## 2017-03-07 LAB
ALBUMIN SERPL BCP-MCNC: 3.9 G/DL (ref 3.5–5)
ALBUMIN/GLOB SERPL: 1.1 {RATIO} (ref 1.1–2.2)
ALP SERPL-CCNC: 106 U/L (ref 45–117)
ALT SERPL-CCNC: 19 U/L (ref 12–78)
ANION GAP BLD CALC-SCNC: 10 MMOL/L (ref 5–15)
AST SERPL W P-5'-P-CCNC: 10 U/L (ref 15–37)
BASOPHILS # BLD AUTO: 0 K/UL (ref 0–0.1)
BASOPHILS # BLD: 0 % (ref 0–1)
BILIRUB SERPL-MCNC: 0.3 MG/DL (ref 0.2–1)
BUN SERPL-MCNC: 11 MG/DL (ref 6–20)
BUN/CREAT SERPL: 12 (ref 12–20)
CALCIUM SERPL-MCNC: 9.5 MG/DL (ref 8.5–10.1)
CHLORIDE SERPL-SCNC: 106 MMOL/L (ref 97–108)
CO2 SERPL-SCNC: 27 MMOL/L (ref 21–32)
CREAT SERPL-MCNC: 0.9 MG/DL (ref 0.7–1.3)
EOSINOPHIL # BLD: 0.1 K/UL (ref 0–0.4)
EOSINOPHIL NFR BLD: 0 % (ref 0–7)
ERYTHROCYTE [DISTWIDTH] IN BLOOD BY AUTOMATED COUNT: 13.5 % (ref 11.5–14.5)
GLOBULIN SER CALC-MCNC: 3.6 G/DL (ref 2–4)
GLUCOSE SERPL-MCNC: 103 MG/DL (ref 65–100)
HCT VFR BLD AUTO: 44.4 % (ref 36.6–50.3)
HGB BLD-MCNC: 15 G/DL (ref 12.1–17)
LIPASE SERPL-CCNC: 178 U/L (ref 73–393)
LYMPHOCYTES # BLD AUTO: 9 % (ref 12–49)
LYMPHOCYTES # BLD: 1.2 K/UL (ref 0.8–3.5)
MCH RBC QN AUTO: 31.6 PG (ref 26–34)
MCHC RBC AUTO-ENTMCNC: 33.8 G/DL (ref 30–36.5)
MCV RBC AUTO: 93.5 FL (ref 80–99)
MONOCYTES # BLD: 0.6 K/UL (ref 0–1)
MONOCYTES NFR BLD AUTO: 4 % (ref 5–13)
NEUTS SEG # BLD: 11.3 K/UL (ref 1.8–8)
NEUTS SEG NFR BLD AUTO: 87 % (ref 32–75)
PLATELET # BLD AUTO: 223 K/UL (ref 150–400)
POTASSIUM SERPL-SCNC: 4 MMOL/L (ref 3.5–5.1)
PROT SERPL-MCNC: 7.5 G/DL (ref 6.4–8.2)
RBC # BLD AUTO: 4.75 M/UL (ref 4.1–5.7)
SODIUM SERPL-SCNC: 143 MMOL/L (ref 136–145)
WBC # BLD AUTO: 13.1 K/UL (ref 4.1–11.1)

## 2017-03-07 PROCEDURE — 74011250636 HC RX REV CODE- 250/636: Performed by: SURGERY

## 2017-03-07 PROCEDURE — 74011250636 HC RX REV CODE- 250/636: Performed by: STUDENT IN AN ORGANIZED HEALTH CARE EDUCATION/TRAINING PROGRAM

## 2017-03-07 PROCEDURE — 96361 HYDRATE IV INFUSION ADD-ON: CPT

## 2017-03-07 PROCEDURE — 74011000250 HC RX REV CODE- 250: Performed by: SURGERY

## 2017-03-07 PROCEDURE — 74011250636 HC RX REV CODE- 250/636: Performed by: EMERGENCY MEDICINE

## 2017-03-07 PROCEDURE — 80053 COMPREHEN METABOLIC PANEL: CPT | Performed by: EMERGENCY MEDICINE

## 2017-03-07 PROCEDURE — 77030008771 HC TU NG SALEM SUMP -A

## 2017-03-07 PROCEDURE — 71010 XR CHEST PORT: CPT

## 2017-03-07 PROCEDURE — 99285 EMERGENCY DEPT VISIT HI MDM: CPT

## 2017-03-07 PROCEDURE — 36415 COLL VENOUS BLD VENIPUNCTURE: CPT | Performed by: EMERGENCY MEDICINE

## 2017-03-07 PROCEDURE — 74177 CT ABD & PELVIS W/CONTRAST: CPT

## 2017-03-07 PROCEDURE — 65270000029 HC RM PRIVATE

## 2017-03-07 PROCEDURE — 74011636320 HC RX REV CODE- 636/320: Performed by: RADIOLOGY

## 2017-03-07 PROCEDURE — 85025 COMPLETE CBC W/AUTO DIFF WBC: CPT | Performed by: EMERGENCY MEDICINE

## 2017-03-07 PROCEDURE — 96375 TX/PRO/DX INJ NEW DRUG ADDON: CPT

## 2017-03-07 PROCEDURE — 74011250636 HC RX REV CODE- 250/636: Performed by: INTERNAL MEDICINE

## 2017-03-07 PROCEDURE — 83690 ASSAY OF LIPASE: CPT | Performed by: EMERGENCY MEDICINE

## 2017-03-07 PROCEDURE — 96374 THER/PROPH/DIAG INJ IV PUSH: CPT

## 2017-03-07 PROCEDURE — 96376 TX/PRO/DX INJ SAME DRUG ADON: CPT

## 2017-03-07 RX ORDER — ONDANSETRON 2 MG/ML
4 INJECTION INTRAMUSCULAR; INTRAVENOUS
Status: DISCONTINUED | OUTPATIENT
Start: 2017-03-07 | End: 2017-03-09 | Stop reason: HOSPADM

## 2017-03-07 RX ORDER — OMEPRAZOLE 40 MG/1
40 CAPSULE, DELAYED RELEASE ORAL
COMMUNITY
End: 2020-10-04

## 2017-03-07 RX ORDER — PREDNISONE 10 MG/1
10 TABLET ORAL DAILY
COMMUNITY
Start: 2017-03-11 | End: 2017-03-17

## 2017-03-07 RX ORDER — SODIUM CHLORIDE 0.9 % (FLUSH) 0.9 %
5-10 SYRINGE (ML) INJECTION EVERY 8 HOURS
Status: DISCONTINUED | OUTPATIENT
Start: 2017-03-07 | End: 2017-03-09 | Stop reason: HOSPADM

## 2017-03-07 RX ORDER — MIRTAZAPINE 15 MG/1
15 TABLET, FILM COATED ORAL
Status: ON HOLD | COMMUNITY
End: 2017-04-03

## 2017-03-07 RX ORDER — BUPROPION HYDROCHLORIDE 150 MG/1
150 TABLET, EXTENDED RELEASE ORAL 2 TIMES DAILY
COMMUNITY
End: 2018-01-19

## 2017-03-07 RX ORDER — PROCHLORPERAZINE MALEATE 10 MG
10 TABLET ORAL
COMMUNITY
End: 2017-05-23

## 2017-03-07 RX ORDER — TEMAZEPAM 15 MG/1
30 CAPSULE ORAL
Status: DISCONTINUED | OUTPATIENT
Start: 2017-03-08 | End: 2017-03-09 | Stop reason: HOSPADM

## 2017-03-07 RX ORDER — NALOXONE HYDROCHLORIDE 0.4 MG/ML
0.4 INJECTION, SOLUTION INTRAMUSCULAR; INTRAVENOUS; SUBCUTANEOUS AS NEEDED
Status: DISCONTINUED | OUTPATIENT
Start: 2017-03-07 | End: 2017-03-09 | Stop reason: HOSPADM

## 2017-03-07 RX ORDER — CYCLOBENZAPRINE HCL 10 MG
10 TABLET ORAL
Status: ON HOLD | COMMUNITY
End: 2017-04-03

## 2017-03-07 RX ORDER — ONDANSETRON 2 MG/ML
8 INJECTION INTRAMUSCULAR; INTRAVENOUS
Status: COMPLETED | OUTPATIENT
Start: 2017-03-07 | End: 2017-03-07

## 2017-03-07 RX ORDER — HYDROMORPHONE HYDROCHLORIDE 1 MG/ML
1 INJECTION, SOLUTION INTRAMUSCULAR; INTRAVENOUS; SUBCUTANEOUS ONCE
Status: COMPLETED | OUTPATIENT
Start: 2017-03-07 | End: 2017-03-07

## 2017-03-07 RX ORDER — DIAZEPAM 5 MG/1
5 TABLET ORAL
Status: DISCONTINUED | OUTPATIENT
Start: 2017-03-07 | End: 2017-03-09 | Stop reason: HOSPADM

## 2017-03-07 RX ORDER — MORPHINE SULFATE 2 MG/ML
2 INJECTION, SOLUTION INTRAMUSCULAR; INTRAVENOUS
Status: DISCONTINUED | OUTPATIENT
Start: 2017-03-07 | End: 2017-03-07

## 2017-03-07 RX ORDER — HYDROMORPHONE HYDROCHLORIDE 1 MG/ML
1 INJECTION, SOLUTION INTRAMUSCULAR; INTRAVENOUS; SUBCUTANEOUS
Status: COMPLETED | OUTPATIENT
Start: 2017-03-07 | End: 2017-03-07

## 2017-03-07 RX ORDER — AMITRIPTYLINE HYDROCHLORIDE 10 MG/1
30 TABLET, FILM COATED ORAL
COMMUNITY
End: 2018-01-19

## 2017-03-07 RX ORDER — HYDROMORPHONE HYDROCHLORIDE 1 MG/ML
0.5 INJECTION, SOLUTION INTRAMUSCULAR; INTRAVENOUS; SUBCUTANEOUS
Status: DISCONTINUED | OUTPATIENT
Start: 2017-03-07 | End: 2017-03-07

## 2017-03-07 RX ORDER — ONDANSETRON HYDROCHLORIDE 8 MG/1
8 TABLET, FILM COATED ORAL
Status: ON HOLD | COMMUNITY
End: 2019-03-08 | Stop reason: SDUPTHER

## 2017-03-07 RX ORDER — HYDROMORPHONE HYDROCHLORIDE 1 MG/ML
1 INJECTION, SOLUTION INTRAMUSCULAR; INTRAVENOUS; SUBCUTANEOUS
Status: DISCONTINUED | OUTPATIENT
Start: 2017-03-07 | End: 2017-03-07

## 2017-03-07 RX ORDER — TRAMADOL HYDROCHLORIDE 50 MG/1
50 TABLET ORAL
COMMUNITY
End: 2017-03-21

## 2017-03-07 RX ORDER — OXYCODONE HYDROCHLORIDE 10 MG/1
10 TABLET ORAL
COMMUNITY
End: 2017-03-21

## 2017-03-07 RX ORDER — AMITRIPTYLINE HYDROCHLORIDE 10 MG/1
30 TABLET, FILM COATED ORAL
Status: DISCONTINUED | OUTPATIENT
Start: 2017-03-08 | End: 2017-03-09 | Stop reason: HOSPADM

## 2017-03-07 RX ORDER — SODIUM CHLORIDE 0.9 % (FLUSH) 0.9 %
5-10 SYRINGE (ML) INJECTION AS NEEDED
Status: DISCONTINUED | OUTPATIENT
Start: 2017-03-07 | End: 2017-03-09 | Stop reason: HOSPADM

## 2017-03-07 RX ORDER — PROCHLORPERAZINE EDISYLATE 5 MG/ML
5 INJECTION INTRAMUSCULAR; INTRAVENOUS
Status: DISCONTINUED | OUTPATIENT
Start: 2017-03-07 | End: 2017-03-09 | Stop reason: HOSPADM

## 2017-03-07 RX ORDER — ENOXAPARIN SODIUM 100 MG/ML
40 INJECTION SUBCUTANEOUS EVERY 24 HOURS
Status: DISCONTINUED | OUTPATIENT
Start: 2017-03-07 | End: 2017-03-07 | Stop reason: SDUPTHER

## 2017-03-07 RX ORDER — PREDNISONE 10 MG/1
5 TABLET ORAL DAILY
COMMUNITY
Start: 2017-03-18 | End: 2017-03-24

## 2017-03-07 RX ORDER — PREDNISONE 10 MG/1
15 TABLET ORAL DAILY
COMMUNITY
Start: 2017-03-04 | End: 2017-03-10

## 2017-03-07 RX ORDER — ERGOCALCIFEROL 1.25 MG/1
50000 CAPSULE ORAL
COMMUNITY

## 2017-03-07 RX ORDER — SODIUM CHLORIDE, SODIUM LACTATE, POTASSIUM CHLORIDE, CALCIUM CHLORIDE 600; 310; 30; 20 MG/100ML; MG/100ML; MG/100ML; MG/100ML
125 INJECTION, SOLUTION INTRAVENOUS CONTINUOUS
Status: DISCONTINUED | OUTPATIENT
Start: 2017-03-07 | End: 2017-03-09 | Stop reason: HOSPADM

## 2017-03-07 RX ORDER — HYDROMORPHONE HYDROCHLORIDE 1 MG/ML
1 INJECTION, SOLUTION INTRAMUSCULAR; INTRAVENOUS; SUBCUTANEOUS
Status: DISCONTINUED | OUTPATIENT
Start: 2017-03-07 | End: 2017-03-08

## 2017-03-07 RX ORDER — PROMETHAZINE HYDROCHLORIDE 25 MG/1
25 TABLET ORAL
COMMUNITY

## 2017-03-07 RX ORDER — DICYCLOMINE HYDROCHLORIDE 10 MG/1
10 CAPSULE ORAL
COMMUNITY

## 2017-03-07 RX ORDER — ENOXAPARIN SODIUM 100 MG/ML
40 INJECTION SUBCUTANEOUS EVERY 24 HOURS
Status: DISCONTINUED | OUTPATIENT
Start: 2017-03-07 | End: 2017-03-09 | Stop reason: HOSPADM

## 2017-03-07 RX ORDER — BUPROPION HYDROCHLORIDE 75 MG/1
150 TABLET ORAL 2 TIMES DAILY
COMMUNITY
End: 2017-03-07

## 2017-03-07 RX ADMIN — ENOXAPARIN SODIUM 40 MG: 40 INJECTION SUBCUTANEOUS at 10:55

## 2017-03-07 RX ADMIN — SODIUM CHLORIDE, SODIUM LACTATE, POTASSIUM CHLORIDE, AND CALCIUM CHLORIDE 150 ML/HR: 600; 310; 30; 20 INJECTION, SOLUTION INTRAVENOUS at 21:23

## 2017-03-07 RX ADMIN — ONDANSETRON 4 MG: 2 INJECTION INTRAMUSCULAR; INTRAVENOUS at 13:21

## 2017-03-07 RX ADMIN — Medication 10 ML: at 21:23

## 2017-03-07 RX ADMIN — IOPAMIDOL 100 ML: 755 INJECTION, SOLUTION INTRAVENOUS at 07:42

## 2017-03-07 RX ADMIN — FAMOTIDINE 20 MG: 10 INJECTION, SOLUTION INTRAVENOUS at 21:22

## 2017-03-07 RX ADMIN — PROCHLORPERAZINE EDISYLATE 5 MG: 5 INJECTION INTRAMUSCULAR; INTRAVENOUS at 17:00

## 2017-03-07 RX ADMIN — ONDANSETRON 8 MG: 2 INJECTION INTRAMUSCULAR; INTRAVENOUS at 06:49

## 2017-03-07 RX ADMIN — HYDROMORPHONE HYDROCHLORIDE 0.5 MG: 1 INJECTION, SOLUTION INTRAMUSCULAR; INTRAVENOUS; SUBCUTANEOUS at 21:23

## 2017-03-07 RX ADMIN — Medication 10 ML: at 17:00

## 2017-03-07 RX ADMIN — Medication 10 ML: at 14:00

## 2017-03-07 RX ADMIN — SODIUM CHLORIDE 1000 ML: 900 INJECTION, SOLUTION INTRAVENOUS at 06:45

## 2017-03-07 RX ADMIN — Medication 2 MG: at 13:21

## 2017-03-07 RX ADMIN — HYDROMORPHONE HYDROCHLORIDE 1 MG: 1 INJECTION, SOLUTION INTRAMUSCULAR; INTRAVENOUS; SUBCUTANEOUS at 06:49

## 2017-03-07 RX ADMIN — HYDROMORPHONE HYDROCHLORIDE 0.5 MG: 1 INJECTION, SOLUTION INTRAMUSCULAR; INTRAVENOUS; SUBCUTANEOUS at 17:01

## 2017-03-07 RX ADMIN — HYDROMORPHONE HYDROCHLORIDE 1 MG: 1 INJECTION, SOLUTION INTRAMUSCULAR; INTRAVENOUS; SUBCUTANEOUS at 08:54

## 2017-03-07 NOTE — ED NOTES
TRANSFER - OUT REPORT:    Verbal report given to Angela Jones RN(name) on Milo Oakes  being transferred to [facility](unit) for routine progression of care       Report consisted of patients Situation, Background, Assessment and   Recommendations(SBAR). Information from the following report(s) SBAR, ED Summary, MAR, Recent Results and Med Rec Status was reviewed with the receiving nurse. Lines:   Peripheral IV 03/07/17 Left Antecubital (Active)   Site Assessment Clean, dry, & intact 3/7/2017  7:20 AM   Phlebitis Assessment 0 3/7/2017  7:20 AM   Infiltration Assessment 0 3/7/2017  7:20 AM   Dressing Status Clean, dry, & intact 3/7/2017  7:20 AM   Dressing Type Transparent 3/7/2017  7:20 AM        Opportunity for questions and clarification was provided.

## 2017-03-07 NOTE — PROGRESS NOTES
[facility]: MED RECONCILIATION    Comments/Recommendations:    Patient was alert and oriented; mild discomfort due to pain   Allergies were verified with patient   Patient was a very good historian of current medications   Patient has not taken cyanocobalamin this past Sat (out of syringes), but plans to continue   Patient stated he will try ondansetron first since he feels it is the weakest; will try prochlorperazine and/or phenergan if insufficient relief   Patient reported decreasing dose of prednisone from 20mg to 15mg this past Sat (03/04/2017) and will continue with current dose for 7 days; will decrease to 10mg on 03/11/2017   Receives Stelara injection every 8 weeks (last received on 01/31/2017)   Takes Tramadol for mild/moderate pain and oxycodone for severe bowel pain   Takes either temazepam or diazepam depending on his but he does not take both medications at the same time   Has not started Vitamin D2 because patient has not picked up from pharmacy    Medications added:     · Dicyclomine 10mg  · Ergocalciferol 92284 U  · Tramadol 50mg  · Oxycodone 10mg  · Cyclobenzaprine 10mg  · Omeprazole 40mg    Medications removed:    · Diphenhydramine 25mg tablet (one tablet at night for sleep)  · Omeprazole-sodium bicarbonate 20/1.1 mg-gram capsules (one capsule daily)    Medications adjusted:    · Amitriptyline dose added to show taking (3) 10mg tablets at night  · Bupropion tablet strength changed from 75mg to 150mg SR tablet  · Prednisone tablet strength changed from 20mg tablets to 10mg tablets and dose changed from 20mg to 15mg daily  · Prochlorperazine tablet strength changed from 5mg to 10mg every 6 hours for nausea     Information obtained from: patient, Rx Query    Significant PMH/Disease States:   Past Medical History:   Diagnosis Date    Anal fistula     Anal stenosis     [address] [address]     GERD (gastroesophageal reflux disease)     H/O ulcerative colitis     Symptoms began in 1996. Total proctocolectomy was performed in . Pateint later developed Crohn's disease.  Hiatal hernia     Ileal pouchitis ([address] 2004    Nausea & vomiting     Combination of Scopalamine patch & Zofran IV worked well in past    Numbness in right leg     Psychiatric disorder     depression and anxiety    Skin lesion of back 3/17/2014    Syncopal episodes      Chief Complaint for this Admission:   Chief Complaint   Patient presents with    Abdominal Pain     Allergies: Remicade [infliximab]; Bactrim [sulfamethoprim ds]; and Nsaids (non-steroidal anti-inflammatory drug)    Prior to Admission Medications:   Prior to Admission Medications   Prescriptions Last Dose Informant Patient Reported? Taking? Ustekinumab (USTEKINAUMAB) 90 mg/mL injection  Self Yes Yes   Si mg by SubCUTAneous route. Every 8 weeks   Indications: CROHN'S DISEASE   amitriptyline (ELAVIL) 10 mg tablet 3/6/2017 at hs Self Yes Yes   Sig: Take 30 mg by mouth nightly. buPROPion SR (WELLBUTRIN SR) 150 mg SR tablet 3/6/2017 at pm Self Yes Yes   Sig: Take 150 mg by mouth two (2) times a day. cyanocobalamin (VITAMIN B-12) 1,000 mcg/mL injection 2017 at am Self Yes Yes   Si,000 mcg by IntraMUSCular route every seven (7) days. Indications: Once weekly   cyclobenzaprine (FLEXERIL) 10 mg tablet 3/5/2017 at pm Self Yes Yes   Sig: Take 10 mg by mouth two (2) times daily as needed for Muscle Spasm(s). diazepam (VALIUM) 5 mg tablet 3/7/2017 at 0300 Self Yes Yes   Sig: Take 5 mg by mouth every eight (8) hours as needed (bowel relaxation). dicyclomine (BENTYL) 10 mg capsule 3/6/2017 at pm Self Yes Yes   Sig: Take 10 mg by mouth every six (6) hours as needed. ergocalciferol (ERGOCALCIFEROL) 50,000 unit capsule Not started Self Yes Yes   Sig: Take 50,000 Units by mouth every seven (7) days. mirtazapine (REMERON) 15 mg tablet 3/6/2017 at hs Self Yes Yes   Sig: Take 15 mg by mouth nightly.    omeprazole (PRILOSEC) 40 mg capsule 3/6/2017 at pm Self Yes Yes   Sig: Take 40 mg by mouth Daily (before dinner). ondansetron hcl (ZOFRAN, AS HYDROCHLORIDE,) 8 mg tablet 3/6/2017 at pm Self Yes Yes   Sig: Take 8 mg by mouth every eight (8) hours as needed for Nausea. oxyCODONE IR (ROXICODONE) 10 mg tab immediate release tablet 3/6/2017 at pm Self Yes Yes   Sig: Take 10 mg by mouth two (2) times daily as needed. predniSONE (DELTASONE) 10 mg tablet 3/6/2017 at am Self Yes Yes   Sig: Take 15 mg by mouth daily. pregabalin (LYRICA) 100 mg capsule 3/6/2017 at pm Self Yes Yes   Sig: Take 100 mg by mouth three (3) times daily. prochlorperazine (COMPAZINE) 10 mg tablet 3/6/2017 at pm Self Yes Yes   Sig: Take 10 mg by mouth every six (6) hours as needed for Nausea. promethazine (PHENERGAN) 25 mg tablet 3/5/2017 at pm Self Yes Yes   Sig: Take 25 mg by mouth every six (6) hours as needed for Nausea. temazepam (RESTORIL) 30 mg capsule 3/6/2017 at hs Self Yes Yes   Sig: Take 30 mg by mouth nightly. traMADol (ULTRAM) 50 mg tablet 3/6/2017 at am Self Yes Yes   Sig: Take 50 mg by mouth three (3) times daily as needed for Pain.       Facility-Administered Medications: None     Thank you,  [employer]  Class of 2017

## 2017-03-07 NOTE — ED NOTES
Bedside and Verbal shift change report given to Chanelle Geller RN (oncoming nurse) by ROSALBA Izaguirre (offgoing nurse). Report included the following information SBAR, Kardex, ED Summary, Procedure Summary, Intake/Output, MAR and Recent Results.

## 2017-03-07 NOTE — ED PROVIDER NOTES
Patient is a 37 y.o. male presenting with abdominal pain. Abdominal Pain    The problem has been gradually worsening. Associated symptoms include nausea and vomiting. Pertinent negatives include no fever, no dysuria, no headaches and no chest pain. 37year old male with history of crohns, SBO's, anal fistulas, chronic abdominal pain, adhesions, presents with 8/76 sharp colicky abdominal pain since 11pm last night, acutely worse at 3am with vomiting. No fever. No output in ostomy since he emptied it before bed last night. Feels like previous obstructions. [place] [facility] [place], has adhesions, scheduled for surgery with Dr. Laurie Salazar on 3/21. Took Roxicet around 3am without relief. Urinating ok. Has been on a liquid type diet for several weeks and started eating solids again today. Slight congestion/sorethroat over the weekend with fever x 1 day but that resolved. On stelara x 2 injections (q8 weeks)    Past Medical History:   Diagnosis Date    Anal fistula     Anal stenosis     Crohn's disease ([address]     GERD (gastroesophageal reflux disease)     H/O ulcerative colitis     Symptoms began in 1996. Total proctocolectomy was performed in 2004. Pateint later developed Crohn's disease.  Hiatal hernia     Ileal pouchitis ([address] 5/2004    Nausea & vomiting     Combination of Scopalamine patch & Zofran IV worked well in past    Numbness in right leg     Psychiatric disorder     depression and anxiety    Skin lesion of back 3/17/2014    Syncopal episodes        Past Surgical History:   Procedure Laterality Date    ABDOMEN SURGERY PROC UNLISTED  3/25/2004    Total proctocolectomy with creation of ileoanal J-pouch and loop ileostomy; Dr. Laurie Salazar.   CHOLECYSTECTOMY  12/2016    Dr. Gilles Kamara  GI  5/2/2004    Examination under anesthesia with endoscopic evaluation of ileoanal pouch and placement of drain; Dr. Laurie Salazar.      GI  5/25/2004    Ileostomy closure with small bowel resection and anastomosis; Dr. Kiah Espinal  HX GI  5/24/2012    Examination under anesthesia, anal dilatation, anal biopsy, and placement of draining seton; Dr. Kiah Espinal  HX GI  7/3/2012    Incision and drainage of perirectal abscess and rigid endoscopy of ileoanal pouch; Dr. Kiah Espinal  HX GI  7/31/2012    Incision and drainage of perirectal abscess, placement of draining seton, and anal dilatation; Dr. Kiah Espinal  HX GI  1/22/2013    Repair of anal fistulas with debridement, partial fistulectomy, and flap closure; Dr. Kiah Espinal  HX GI  5/17/2013    Examination under anesthesia, partial fistulotomy,  and placement of draining setons; Dr. Kiah Espinal  HX GI  8/6/2013    Anal fistulotomy and application of ACell micromatrix; Dr. Kiah Espinal   GI  2/20/2014    Examination under anesthesia and dilation of anal canal with rigid proctoscopy; Chanell Thomason MD.     GI  4/29/2015    Creation of Nayeli Vaughn continent intestinal reservoir (BCIR), abdominoperineal resection of ileoanal J-pouch; lysis of adhesions, and gastrostomy; Florencio Whyte. Jl Ojeda MD ([place], [place])     GI  8/7/2015    Stoma revision; Florencio Whyte. Jl Ojeda MD ([place], [place])     GI  10/29/2015    Extensive lysis of adhesions, resection of continent intestinal reservoir, repair of serosal tears, and creation of end-ileostomy; Dr. Kiah Espinal   ORTHOPAEDIC  2008    Left ACL repair         Family History:   Problem Relation Age of Onset    Cancer Father     Asthma Neg Hx     Diabetes Neg Hx     Heart Disease Neg Hx     Hypertension Neg Hx     Stroke Neg Hx     Malignant Hyperthermia Neg Hx     Pseudocholinesterase Deficiency Neg Hx     Delayed Awakening Neg Hx     Post-op Nausea/Vomiting Neg Hx        Social History     Social History    Marital status:      Spouse name: N/A    Number of children: N/A    Years of education: N/A     Occupational History    Not on file. Social History Main Topics    Smoking status: Former Smoker     Packs/day: 0.00     Years: 7.00     Quit date: 1/14/2017    Smokeless tobacco: Never Used    Alcohol use No    Drug use: No    Sexual activity: Not on file     Other Topics Concern    Not on file     Social History Narrative         ALLERGIES: Remicade [infliximab]; Bactrim [sulfamethoprim ds]; and Nsaids (non-steroidal anti-inflammatory drug)    Review of Systems   Constitutional: Negative for fever. HENT: Negative for congestion. Eyes: Negative for visual disturbance. Respiratory: Negative for cough. Cardiovascular: Negative for chest pain. Gastrointestinal: Positive for abdominal pain, nausea and vomiting. Endocrine: Negative for polyuria. Genitourinary: Negative for dysuria. Musculoskeletal: Negative for gait problem. Skin: Negative for rash. Neurological: Negative for headaches. Psychiatric/Behavioral: Negative for dysphoric mood. Vitals:    03/07/17 0611   BP: 132/77   Pulse: (!) 115   Resp: 22   Temp: 97.8 °F (36.6 °C)   SpO2: 100%   Weight: 90.4 kg (199 lb 4.7 oz)   Height: 6' (1.829 m)            Physical Exam   Constitutional: He is oriented to person, place, and time. He appears well-developed and well-nourished. No distress. HENT:   Head: Normocephalic and atraumatic. Mouth/Throat: Oropharynx is clear and moist. No oropharyngeal exudate. Eyes: Conjunctivae and EOM are normal. Pupils are equal, round, and reactive to light. Right eye exhibits no discharge. Left eye exhibits no discharge. No scleral icterus. Neck: Normal range of motion. Neck supple. No JVD present. Cardiovascular: Regular rhythm, normal heart sounds and intact distal pulses. Tachycardia present. Exam reveals no gallop and no friction rub. No murmur heard. Pulmonary/Chest: Effort normal and breath sounds normal. No stridor. No respiratory distress. He has no wheezes. He has no rales. He exhibits no tenderness. Abdominal: Soft. He exhibits no distension and no mass. There is tenderness. There is no rebound and no guarding. Ostomy in place with minimal output in bag   Musculoskeletal: Normal range of motion. He exhibits no edema or tenderness. Neurological: He is alert and oriented to person, place, and time. He has normal reflexes. No cranial nerve deficit. He exhibits normal muscle tone. Coordination normal.   Skin: Skin is warm and dry. No rash noted. No erythema. Psychiatric: He has a normal mood and affect. His behavior is normal. Judgment and thought content normal.        MDM  ED Course       Procedures    Labs, urine, dilaudid/zofran and fluids. CT scan to evaluate for obstructions     care signed over to Dr. Hetal Sharp at change of shifts. Labs/ct pending.

## 2017-03-07 NOTE — CONSULTS
Surgery Consult    Subjective:      Fiordaliza Martínez is a 37 y.o. white male who presents with abdominal pain, and nausea and vomiting. Mr. Chon Lui is known to me from a previous admission. He has a Crohn's disease and has had multiple abdominal procedures including a total proctocolectomy with end ileostomy. He has been followed by Dr. Rosi Young who referred to an IBD specialist at [address] [place] [address] recently. He was found to have \"adhesions\" on a recent small bowel series and was referred to his primary colorectal surgeon. Prior to seeing him, for the past week or two, he has put himself on a liquid diet because he was having abdominal pain and not feeling well. He was seen by his surgeon and scheduled for elective surgery later this month with no other treatment offered. Last night, he began experiencing intense sharp, crampy central and lower abdominal pain with no output from his ostomy since about 3 AM.  He has also had several bouts of nausea and vomiting. His pain has persisted, but did improve with Dilaudid. He denies any fever. He has had multiple admissions for small bowel obstructions, but has always been able to be treated conservatively with bowel rest +/- NGT decompression. He is presently on Stelara and steroids for his Crohn's. Past Medical History:   Diagnosis Date    Anal fistula     Anal stenosis     Crohn's disease ([address]     GERD (gastroesophageal reflux disease)     H/O ulcerative colitis     Symptoms began in 1996. Total proctocolectomy was performed in 2004. Pateint later developed Crohn's disease.     Hiatal hernia     Ileal pouchitis ([address] 5/2004    Nausea & vomiting     Combination of Scopalamine patch & Zofran IV worked well in past    Numbness in right leg     Psychiatric disorder     depression and anxiety    Skin lesion of back 3/17/2014    Syncopal episodes      Past Surgical History:   Procedure Laterality Date    ABDOMEN SURGERY PROC UNLISTED  3/25/2004    Total proctocolectomy with creation of ileoanal J-pouch and loop ileostomy; Dr. Luis Manuel Chandler.   CHOLECYSTECTOMY  12/2016    Dr. Hubert Stephenson  GI  5/2/2004    Examination under anesthesia with endoscopic evaluation of ileoanal pouch and placement of drain; Dr. Luis Manuel Chandler.  HX GI  5/25/2004    Ileostomy closure with small bowel resection and anastomosis; Dr. Luis Manuel Chandler.   GI  5/24/2012    Examination under anesthesia, anal dilatation, anal biopsy, and placement of draining seton; Dr. Luis Manuel Chandler.  HX GI  7/3/2012    Incision and drainage of perirectal abscess and rigid endoscopy of ileoanal pouch; Dr. Luis Manuel Chandler.  HX GI  7/31/2012    Incision and drainage of perirectal abscess, placement of draining seton, and anal dilatation; Dr. Luis Manuel Chandler.   GI  1/22/2013    Repair of anal fistulas with debridement, partial fistulectomy, and flap closure; Dr. Luis Manuel Chandler.   GI  5/17/2013    Examination under anesthesia, partial fistulotomy,  and placement of draining setons; Dr. Luis Manuel Chandler.  HX GI  8/6/2013    Anal fistulotomy and application of ACell micromatrix; Dr. Luis Manuel Chandler.   GI  2/20/2014    Examination under anesthesia and dilation of anal canal with rigid proctoscopy; Kika Alvarenga MD.     GI  4/29/2015    Creation of Kemi Alfredo continent intestinal reservoir (BCIR), abdominoperineal resection of ileoanal J-pouch; lysis of adhesions, and gastrostomy; [place] Ledezma. Macy James MD ([place], [place])     GI  8/7/2015    Stoma revision; [place] Ledezma. Macy James MD ([place], [place])     GI  10/29/2015    Extensive lysis of adhesions, resection of continent intestinal reservoir, repair of serosal tears, and creation of end-ileostomy; Dr. Luis Manuel Chandler.     HX ORTHOPAEDIC  2008    Left ACL repair      Family History   Problem Relation Age of Onset    Cancer Father     Asthma Neg Hx     Diabetes Neg Hx     Heart Disease Neg Hx     Hypertension Neg Hx     Stroke Neg Hx     Malignant Hyperthermia Neg Hx     Pseudocholinesterase Deficiency Neg Hx     Delayed Awakening Neg Hx     Post-op Nausea/Vomiting Neg Hx      Social History     Social History    Marital status:      Spouse name: N/A    Number of children: N/A    Years of education: N/A     Social History Main Topics    Smoking status: Former Smoker     Packs/day: 0.00     Years: 7.00     Quit date: 1/14/2017    Smokeless tobacco: Never Used    Alcohol use No    Drug use: No    Sexual activity: Not Asked     Other Topics Concern    None     Social History Narrative      Current Facility-Administered Medications   Medication Dose Route Frequency Provider Last Rate Last Dose    sodium chloride (NS) flush 5-10 mL  5-10 mL IntraVENous Q8H Cyndra Fleischer, MD        sodium chloride (NS) flush 5-10 mL  5-10 mL IntraVENous PRN Cyndra Fleischer, MD        ondansetron [facility]) injection 4 mg  4 mg IntraVENous Q4H PRN Cyndra Fleischer, MD   4 mg at 03/07/17 1321    morphine injection 2 mg  2 mg IntraVENous Q6H PRN Cyndra Fleischer, MD   2 mg at 03/07/17 1321    enoxaparin (LOVENOX) injection 40 mg  40 mg SubCUTAneous Q24H Cyndra Fleischer, MD   40 mg at 03/07/17 1055        Allergies   Allergen Reactions    Remicade [Infliximab] Anaphylaxis and Shortness of Breath    Bactrim [Sulfamethoprim Ds] Other (comments)     Increased GI distress.  Nsaids (Non-Steroidal Anti-Inflammatory Drug) Other (comments)     Stomach ulcers       Review of Systems:  A comprehensive review of systems was negative except for that written in the History of Present Illness.     Objective:      Patient Vitals for the past 8 hrs:   BP Temp Pulse Resp SpO2   03/07/17 1430 123/76 98 °F (36.7 °C) 84 16 95 %   03/07/17 1331 - - - - 94 %   03/07/17 1330 127/75 - - - 93 %   03/07/17 1300 131/75 - - - 94 %   03/07/17 1231 - - - - 95 %   03/07/17 1230 127/75 - - - 92 %   03/07/17 1216 - - - - 95 %   03/07/17 1215 124/71 - - - 93 %   03/07/17 1201 - - - - 95 %   03/07/17 1200 128/76 - - - 94 %   17 1147 - - - - 95 %   17 1146 - - - - 94 %   17 1145 126/69 - - - -   17 1130 120/65 - - - 97 %   17 1115 134/78 - - - 96 %   17 1101 - - - - 95 %   17 1100 131/79 - - - -   17 1048 - - - - 94 %   17 1045 133/75 - - - -   17 1031 - - - - 94 %   17 1030 126/72 - - - -   17 1015 125/77 - - - 95 %   17 1001 - - - - 96 %   17 1000 133/80 - - - -   17 0948 - - - - 94 %   17 0945 127/63 - - - 93 %   17 0940 - - - - 95 %   17 0939 124/65 - 84 17 95 %   17 0938 124/65 - - - -   17 0918 - - - - 93 %   17 0855 - - - - 95 %   17 0811 126/61 - 81 17 98 %   17 0715 126/70 - 86 18 95 %       Temp (24hrs), Av.9 °F (36.6 °C), Min:97.8 °F (36.6 °C), Max:98 °F (36.7 °C)      Physical Exam:  GENERAL: alert, cooperative, no distress, appears stated age, EYE: negative findings: anicteric sclera, LUNG: clear to auscultation bilaterally, HEART: regular rate and rhythm, ABDOMEN: Soft, normoactive BS, mild distention and tenderness in the lower abdomen without guarding. There is a RLQ ileostomy with minimal stool and no flatus in the bag. The ostomy is pink and viable. There is a healed midline surgical scar with no hernia., EXTREMITIES:  no edema, SKIN: Normal., NEUROLOGIC: negative, PSYCHIATRIC: non focal    Assessment:     Hospital Problems  Date Reviewed: 2017          Codes Class Noted POA    SBO (small bowel obstruction) ([facility] ICD-10-CM: K56.69  ICD-9-CM: 560.9  3/7/2017 Unknown              Plan:     I would attempt to treat Mr. Kobe Godwin conservatively with bowel rest, IVF's, and NGT decompression. If he does not improve clinically, then he will need exploration, most likely via an open approach. I would continue narcotics as needed for his pain management. I appreciate the medical service allowing me to assist in Mr. Hernandez's care.     Signed By: Gayla Burger Moshe Parks MD     March 7, 2017

## 2017-03-07 NOTE — PROGRESS NOTES
Attempted to place NG tube in right nare, unsuccessful due to resistance, right nare began to bleed bright red blood which stopped after pressure applied. NG tube inserted into left nare without difficulty at 65 cm, awaiting chest x-ray to confirm placement. Patient tolerated well.

## 2017-03-07 NOTE — H&P
[address] [place] [address]  [phone]    Admission History and Physical      NAME:  Aneesh Henriquez   :   1975   MRN:  [id number]     PCP:  Chris Mckeon MD     Date/Time:  3/7/2017         Subjective:     CHIEF COMPLAINT: abdominal pain     HISTORY OF PRESENT ILLNESS:     Mr. Pastor Obando is a 37 y.o. with h/o crohns disease, SBO who presents with abdominal pain. Pain started suddenly last night and was associated with nausea and vomiting. He vomited once. He also notes a decrease in his ostomy outpt. He denies fever or chills. He continues to receive stelara treatments for his crohns. He had a surgery planned in two weeks for lysis of adhesions with a surgeon at [facility]      Past Medical History:   Diagnosis Date    Anal fistula     Anal stenosis     Crohn's disease ([address]     GERD (gastroesophageal reflux disease)     H/O ulcerative colitis     Symptoms began in . Total proctocolectomy was performed in . Pateint later developed Crohn's disease.  Hiatal hernia     Ileal pouchitis ([address] 2004    Nausea & vomiting     Combination of Scopalamine patch & Zofran IV worked well in past    Numbness in right leg     Psychiatric disorder     depression and anxiety    Skin lesion of back 3/17/2014    Syncopal episodes         Past Surgical History:   Procedure Laterality Date    ABDOMEN SURGERY PROC UNLISTED  3/25/2004    Total proctocolectomy with creation of ileoanal J-pouch and loop ileostomy; Dr. Emily Garrison.   CHOLECYSTECTOMY  2016    Dr. Koby Courtney  GI  2004    Examination under anesthesia with endoscopic evaluation of ileoanal pouch and placement of drain; Dr. Emily Garrison.   GI  2004    Ileostomy closure with small bowel resection and anastomosis; Dr. Emily Garrison.   GI  2012    Examination under anesthesia, anal dilatation, anal biopsy, and placement of draining seton; Dr. Emily Garrison.     HX GI  7/3/2012    Incision and drainage of perirectal abscess and rigid endoscopy of ileoanal pouch; Dr. Abilio Campos.  HX GI  7/31/2012    Incision and drainage of perirectal abscess, placement of draining seton, and anal dilatation; Dr. Abilio Campos.  HX GI  1/22/2013    Repair of anal fistulas with debridement, partial fistulectomy, and flap closure; Dr. Abilio Campos.  HX GI  5/17/2013    Examination under anesthesia, partial fistulotomy,  and placement of draining setons; Dr. Abilio Campos.  HX GI  8/6/2013    Anal fistulotomy and application of ACell micromatrix; Dr. Abilio Campos.  HX GI  2/20/2014    Examination under anesthesia and dilation of anal canal with rigid proctoscopy; Bria Carmona MD.    HX GI  4/29/2015    Creation of Eugune John continent intestinal reservoir (BCIR), abdominoperineal resection of ileoanal J-pouch; lysis of adhesions, and gastrostomy; Walter Guzman. Maxi Rachel MD ([place], [place])    HX GI  8/7/2015    Stoma revision; Walter Guzman. Maxi Rachel MD ([place], [place])    HX GI  10/29/2015    Extensive lysis of adhesions, resection of continent intestinal reservoir, repair of serosal tears, and creation of end-ileostomy; Dr. Abilio Campos.  HX ORTHOPAEDIC  2008    Left ACL repair       Social History   Substance Use Topics    Smoking status: Former Smoker     Packs/day: 0.00     Years: 7.00     Quit date: 1/14/2017    Smokeless tobacco: Never Used    Alcohol use No        Family History   Problem Relation Age of Onset    Cancer Father     Asthma Neg Hx     Diabetes Neg Hx     Heart Disease Neg Hx     Hypertension Neg Hx     Stroke Neg Hx     Malignant Hyperthermia Neg Hx     Pseudocholinesterase Deficiency Neg Hx     Delayed Awakening Neg Hx     Post-op Nausea/Vomiting Neg Hx         Allergies   Allergen Reactions    Remicade [Infliximab] Anaphylaxis and Shortness of Breath    Bactrim [Sulfamethoprim Ds] Other (comments)     Increased GI distress.     Nsaids (Non-Steroidal Anti-Inflammatory Drug) Other (comments)     Stomach ulcers        Prior to Admission medications    Medication Sig Start Date End Date Taking? Authorizing Provider   promethazine (PHENERGAN) 25 mg tablet Take 25 mg by mouth every six (6) hours as needed for Nausea. Yes Miles Diaz MD   ondansetron hcl (ZOFRAN, AS HYDROCHLORIDE,) 8 mg tablet Take 8 mg by mouth every eight (8) hours as needed for Nausea. Yes Miles Diaz MD   mirtazapine (REMERON) 15 mg tablet Take 15 mg by mouth nightly. Yes Miles Diaz MD   amitriptyline (ELAVIL) 10 mg tablet Take 30 mg by mouth nightly. Yes Historical Provider   buPROPion SR [facility] SR) 150 mg SR tablet Take 150 mg by mouth two (2) times a day. Yes Historical Provider   omeprazole (PRILOSEC) 40 mg capsule Take 40 mg by mouth Daily (before dinner). Yes Historical Provider   predniSONE (DELTASONE) 10 mg tablet Take 15 mg by mouth daily. Prednisone taper  X 7 days started 3/4/17 3/4/17 3/10/17 Yes Historical Provider   prochlorperazine (COMPAZINE) 10 mg tablet Take 10 mg by mouth every six (6) hours as needed for Nausea. Yes Historical Provider   dicyclomine (BENTYL) 10 mg capsule Take 10 mg by mouth every six (6) hours as needed. Yes Historical Provider   ergocalciferol (ERGOCALCIFEROL) 50,000 unit capsule Take 50,000 Units by mouth every seven (7) days. Yes Historical Provider   traMADol (ULTRAM) 50 mg tablet Take 50 mg by mouth three (3) times daily as needed for Pain. Yes Historical Provider   oxyCODONE IR (ROXICODONE) 10 mg tab immediate release tablet Take 10 mg by mouth two (2) times daily as needed. Yes Historical Provider   cyclobenzaprine (FLEXERIL) 10 mg tablet Take 10 mg by mouth two (2) times daily as needed for Muscle Spasm(s). Yes Historical Provider   predniSONE (DELTASONE) 10 mg tablet Take 10 mg by mouth daily.  Prednisone taper  X 7 days to start 3/11/17 3/11/17 3/17/17 Yes Historical Provider   predniSONE (DELTASONE) 10 mg tablet Take 5 mg by mouth daily. Prednisone taper  X 7 days to start 3/18/17 3/18/17 3/24/17 Yes Historical Provider   Ustekinumab (USTEKINAUMAB) 90 mg/mL injection 90 mg by SubCUTAneous route. Every 8 weeks   Indications: CROHN'S DISEASE   Yes Historical Provider   pregabalin (LYRICA) 100 mg capsule Take 100 mg by mouth three (3) times daily. Yes Historical Provider   cyanocobalamin (VITAMIN B-12) 1,000 mcg/mL injection 1,000 mcg by IntraMUSCular route every seven (7) days. Indications: Once weekly   Yes Miles Diaz MD   temazepam (RESTORIL) 30 mg capsule Take 30 mg by mouth nightly. Yes Historical Provider   diazepam (VALIUM) 5 mg tablet Take 5 mg by mouth every eight (8) hours as needed (bowel relaxation).    Yes Miles Diaz MD         Review of Systems:  (bold if positive, if negative)    Gen:  Eyes:  ENT:  CVS:  Pulm:  GI:  Abdominal pain, nausea, emesis  :    MS:  Skin:  Psych:  Endo:    Hem:  Renal:    Neuro:            Objective:      VITALS:    Vital signs reviewed; most recent are:    Visit Vitals    /76 (BP 1 Location: Right arm, BP Patient Position: At rest)    Pulse 84    Temp 98 °F (36.7 °C)    Resp 16    Ht 6' (1.829 m)    Wt 90.4 kg (199 lb 4.7 oz)    SpO2 95%    BMI 27.03 kg/m2     SpO2 Readings from Last 6 Encounters:   03/07/17 95%   02/25/17 100%   02/21/17 94%   02/06/17 100%   01/26/17 95%   01/19/17 95%        No intake or output data in the 24 hours ending 03/07/17 1604         Exam:     Physical Exam:    Gen:  Well-developed, well-nourished, in no acute distress  HEENT:  Pink conjunctivae, PERRL, hearing intact to voice, moist mucous membranes  Neck:  Supple, without masses, thyroid non-tender  Resp:  No accessory muscle use, clear breath sounds without wheezes rales or rhonchi  Card:  No murmurs, normal S1, S2 without thrills, bruits or peripheral edema  Abd:  Soft, non-tender, non-distended, normoactive bowel sounds are present, no palpable organomegaly  Lymph:  No cervical adenopathy  Musc:  No cyanosis or clubbing  Skin:  No rashes or ulcers, skin turgor is good  Neuro:  Cranial nerves 3-12 are grossly intact,  strength is 5/5 bilaterally, dorsi / plantarflexion strength is 5/5 bilaterally, follows commands appropriately  Psych:  Alert with good insight. Oriented to person, place, and time       Labs:    Recent Labs      03/07/17   0643   WBC  13.1*   HGB  15.0   HCT  44.4   PLT  223     Recent Labs      03/07/17   0643   NA  143   K  4.0   CL  106   CO2  27   GLU  103*   BUN  11   CREA  0.90   CA  9.5   ALB  3.9   SGOT  10*   ALT  19     No components found for: GLPOC  No results for input(s): PH, PCO2, PO2, HCO3, FIO2 in the last 72 hours. No results for input(s): INR in the last 72 hours. No lab exists for component: INREXT         Assessment/Plan:         SBO (small bowel obstruction): recent admission in January for the same. Consult general surgery and start supportive care    Intractable nausea / Vomiting / Abdominal pain: long standing history with multiple admissions. tx SBP as above. Seen by palliative care last hospital stay     Crohns disease: does not appear to be an active flare, no steroids or abx for now. On stelara injections outpt.  Consult GI    Anxiety / Depression / Insomnia: continue lyrica      Surrogate decision maker: wife    Total time spent with patient: [address] discussed with: Patient and Consultant/Specialist    Discussed:  Care Plan    Prophylaxis:  Lovenox    Probable Disposition:  Home w/Family           ___________________________________________________    Attending Physician: Jesse Abdalla MD

## 2017-03-07 NOTE — ED TRIAGE NOTES
Pt c/o similar symptoms of bowel obstruction. Pt states he started with pain after dinner last night was able to rest until 0300 when abd pain woke him. Pt has significant hx with abd problems.  +N/V

## 2017-03-07 NOTE — PROGRESS NOTES
3/7/2017   CARE MANAGEMENT NOTE: CM is following pt in the ER for initial discharge planning. EMR reviewed. CM met with pt who was his own historian for this needs assessment. Reportedly, pt resides with his wife and two children in a two story home with bedroom on the second floor. There are about 4 entry steps and 12 to 15 steps inside the home. PTA, pt was ambulatory, indepn with ADLs, and drives. He is on medical disability currently and has RX drug coverage. Pt uses [facility] on [address]. He does not have home healthcare currently. DME in the home includes a cane. He also has ostomy supplies with provisions for 90 days at a time. PCP is Dr. Abdoulaye Briceno. Plan is to return home when medically stable.   Adriana

## 2017-03-07 NOTE — PROGRESS NOTES
PROGRESS NOTE:  9:21 AM  Updated pt on CT results. Pt still in pain. Will admit for pain control. CONSULT NOTE:  9:25 AM Jayden Lopez MD spoke with Dr. Lori Hernandez, Consult for Hospitalist.  Discussed available diagnostic tests and clinical findings. He is in agreement with care plans as outlined. Dr. Lori Hernandez recommends admitting the patient.

## 2017-03-07 NOTE — CONSULTS
[address] [facility]. [address], [address] [place] [address] Emily Hank.  [phone]                GASTROENTEROLOGY CONSULTATION NOTE        NAME:  Coni Greene   :   1975   MRN:   [id number]       Requesting Physician:    Smiley Gregg MD    PCP:     Consult Date: 3/7/2017 2:27 PM    Chief Complaint:  Abdominal pain/nausea/vomiting     History of Present Illness:  Patient is a 37 y.o.  male who is seen in consultation at the request of Dr. Shana Velásquez for evaluation and treatment of above complaints. He is complaining of right lower quadrant pain with nausea after he advaced his diet yesterday after he advanced his diet from liquids to solids. He has been followed by surgery and reports plans soon for further surgery due to imaging at [facility] which showed concern for small bowel adhesions. He continues to have rlq pain here in ED. He has history of Crohns disease with history of total colectomy in . He has history of recurrences with fistula formations and surgical interventions. He has been started on Stelara for Crohns with initial dose . He reports some right hip pain afterwards, but initially felt better from his GI symptoms. No fevers or chills noted. He has also seen Dr Loreta Juarez at [facility] IBD clinic recently. EGD 17 Impression: Diminutive gastric nodule, otherwise upper endoscopy within normal.  Colonoscopy 17 Impression: One superficial ulceration seen about 20 cm from the stomach, without stricture, inflammation or any other lesions seen up to about 50 cm from the stoma. Solid stools seen.     PMH:  Past Medical History:   Diagnosis Date    Anal fistula     Anal stenosis     Crohn's disease ([address]     GERD (gastroesophageal reflux disease)     H/O ulcerative colitis     Symptoms began in . Total proctocolectomy was performed in . Pateint later developed Crohn's disease.     Hiatal hernia     Ileal pouchitis ([address] 2004    Nausea & vomiting     Combination of Scopalamine patch & Zofran IV worked well in past    Numbness in right leg     Psychiatric disorder     depression and anxiety    Skin lesion of back 3/17/2014    Syncopal episodes        PSH:  Past Surgical History:   Procedure Laterality Date    ABDOMEN SURGERY PROC UNLISTED  3/25/2004    Total proctocolectomy with creation of ileoanal J-pouch and loop ileostomy; Dr. Alka Gottlieb.   CHOLECYSTECTOMY  12/2016    Dr. Jess Mcdonald  GI  5/2/2004    Examination under anesthesia with endoscopic evaluation of ileoanal pouch and placement of drain; Dr. Alka Gottlieb.   GI  5/25/2004    Ileostomy closure with small bowel resection and anastomosis; Dr. Alka Gottlieb.   GI  5/24/2012    Examination under anesthesia, anal dilatation, anal biopsy, and placement of draining seton; Dr. Alka Gottlieb.   GI  7/3/2012    Incision and drainage of perirectal abscess and rigid endoscopy of ileoanal pouch; Dr. Alka Gottlieb.   GI  7/31/2012    Incision and drainage of perirectal abscess, placement of draining seton, and anal dilatation; Dr. Alka Gottlieb.   GI  1/22/2013    Repair of anal fistulas with debridement, partial fistulectomy, and flap closure; Dr. Alka Gottlieb.   GI  5/17/2013    Examination under anesthesia, partial fistulotomy,  and placement of draining setons; Dr. Alka Gottlieb.   GI  8/6/2013    Anal fistulotomy and application of ACell micromatrix; Dr. Alka Gottlieb.   GI  2/20/2014    Examination under anesthesia and dilation of anal canal with rigid proctoscopy; Larissa Ramirez MD.     GI  4/29/2015    Creation of Timothy Earlene continent intestinal reservoir (BCIR), abdominoperineal resection of ileoanal J-pouch; lysis of adhesions, and gastrostomy; Everette Gordillo. Maria T Garzon MD ([place], [place])     GI  8/7/2015    Stoma revision; Everette Garzon MD ([place], [place])     GI  10/29/2015    Extensive lysis of adhesions, resection of continent intestinal reservoir, repair of serosal tears, and creation of end-ileostomy; Dr. Robson Adair.  HX ORTHOPAEDIC  2008    Left ACL repair       Allergies: Allergies   Allergen Reactions    Remicade [Infliximab] Anaphylaxis and Shortness of Breath    Bactrim [Sulfamethoprim Ds] Other (comments)     Increased GI distress.  Nsaids (Non-Steroidal Anti-Inflammatory Drug) Other (comments)     Stomach ulcers       Home Medications:  Prior to Admission Medications   Prescriptions Last Dose Informant Patient Reported? Taking? Ustekinumab (USTEKINAUMAB) 90 mg/mL injection 17 Self Yes Yes   Si mg by SubCUTAneous route. Every 8 weeks   Indications: CROHN'S DISEASE   amitriptyline (ELAVIL) 10 mg tablet 3/6/2017 at hs Self Yes Yes   Sig: Take 30 mg by mouth nightly. buPROPion SR (WELLBUTRIN SR) 150 mg SR tablet 3/6/2017 at pm Self Yes Yes   Sig: Take 150 mg by mouth two (2) times a day. cyanocobalamin (VITAMIN B-12) 1,000 mcg/mL injection 2017 at am Self Yes Yes   Si,000 mcg by IntraMUSCular route every seven (7) days. Indications: Once weekly   cyclobenzaprine (FLEXERIL) 10 mg tablet 3/5/2017 at pm Self Yes Yes   Sig: Take 10 mg by mouth two (2) times daily as needed for Muscle Spasm(s). diazepam (VALIUM) 5 mg tablet 3/7/2017 at 0300 Self Yes Yes   Sig: Take 5 mg by mouth every eight (8) hours as needed (bowel relaxation). dicyclomine (BENTYL) 10 mg capsule 3/6/2017 at pm Self Yes Yes   Sig: Take 10 mg by mouth every six (6) hours as needed. ergocalciferol (ERGOCALCIFEROL) 50,000 unit capsule Not started Self Yes Yes   Sig: Take 50,000 Units by mouth every seven (7) days. mirtazapine (REMERON) 15 mg tablet 3/6/2017 at hs Self Yes Yes   Sig: Take 15 mg by mouth nightly. omeprazole (PRILOSEC) 40 mg capsule 3/6/2017 at pm Self Yes Yes   Sig: Take 40 mg by mouth Daily (before dinner).    ondansetron hcl (ZOFRAN, AS HYDROCHLORIDE,) 8 mg tablet 3/6/2017 at pm Self Yes Yes   Sig: Take 8 mg by mouth every eight (8) hours as needed for Nausea. oxyCODONE IR (ROXICODONE) 10 mg tab immediate release tablet 3/6/2017 at pm Self Yes Yes   Sig: Take 10 mg by mouth two (2) times daily as needed. predniSONE (DELTASONE) 10 mg tablet 3/6/2017 at am Self Yes Yes   Sig: Take 15 mg by mouth daily. Prednisone taper  X 7 days started 3/4/17   predniSONE (DELTASONE) 10 mg tablet   Yes Yes   Sig: Take 10 mg by mouth daily. Prednisone taper  X 7 days to start 3/11/17   predniSONE (DELTASONE) 10 mg tablet   Yes Yes   Sig: Take 5 mg by mouth daily. Prednisone taper  X 7 days to start 3/18/17   pregabalin (LYRICA) 100 mg capsule 3/6/2017 at pm Self Yes Yes   Sig: Take 100 mg by mouth three (3) times daily. prochlorperazine (COMPAZINE) 10 mg tablet 3/6/2017 at pm Self Yes Yes   Sig: Take 10 mg by mouth every six (6) hours as needed for Nausea. promethazine (PHENERGAN) 25 mg tablet 3/5/2017 at pm Self Yes Yes   Sig: Take 25 mg by mouth every six (6) hours as needed for Nausea. temazepam (RESTORIL) 30 mg capsule 3/6/2017 at hs Self Yes Yes   Sig: Take 30 mg by mouth nightly. traMADol (ULTRAM) 50 mg tablet 3/6/2017 at am Self Yes Yes   Sig: Take 50 mg by mouth three (3) times daily as needed for Pain. Facility-Administered Medications: None       Hospital Medications:  Current Facility-Administered Medications   Medication Dose Route Frequency    sodium chloride (NS) flush 5-10 mL  5-10 mL IntraVENous Q8H    sodium chloride (NS) flush 5-10 mL  5-10 mL IntraVENous PRN    ondansetron (ZOFRAN) injection 4 mg  4 mg IntraVENous Q4H PRN    morphine injection 2 mg  2 mg IntraVENous Q6H PRN    enoxaparin (LOVENOX) injection 40 mg  40 mg SubCUTAneous Q24H     Current Outpatient Prescriptions   Medication Sig    promethazine (PHENERGAN) 25 mg tablet Take 25 mg by mouth every six (6) hours as needed for Nausea.     ondansetron hcl (ZOFRAN, AS HYDROCHLORIDE,) 8 mg tablet Take 8 mg by mouth every eight (8) hours as needed for Nausea.  mirtazapine (REMERON) 15 mg tablet Take 15 mg by mouth nightly.  amitriptyline (ELAVIL) 10 mg tablet Take 30 mg by mouth nightly.  buPROPion SR (WELLBUTRIN SR) 150 mg SR tablet Take 150 mg by mouth two (2) times a day.  omeprazole (PRILOSEC) 40 mg capsule Take 40 mg by mouth Daily (before dinner).  predniSONE (DELTASONE) 10 mg tablet Take 15 mg by mouth daily. Prednisone taper  X 7 days started 3/4/17    prochlorperazine (COMPAZINE) 10 mg tablet Take 10 mg by mouth every six (6) hours as needed for Nausea.  dicyclomine (BENTYL) 10 mg capsule Take 10 mg by mouth every six (6) hours as needed.  ergocalciferol (ERGOCALCIFEROL) 50,000 unit capsule Take 50,000 Units by mouth every seven (7) days.  traMADol (ULTRAM) 50 mg tablet Take 50 mg by mouth three (3) times daily as needed for Pain.  oxyCODONE IR (ROXICODONE) 10 mg tab immediate release tablet Take 10 mg by mouth two (2) times daily as needed.  cyclobenzaprine (FLEXERIL) 10 mg tablet Take 10 mg by mouth two (2) times daily as needed for Muscle Spasm(s).  [START ON 3/11/2017] predniSONE (DELTASONE) 10 mg tablet Take 10 mg by mouth daily. Prednisone taper  X 7 days to start 3/11/17    [START ON 3/18/2017] predniSONE (DELTASONE) 10 mg tablet Take 5 mg by mouth daily. Prednisone taper  X 7 days to start 3/18/17    Ustekinumab (USTEKINAUMAB) 90 mg/mL injection 90 mg by SubCUTAneous route. Every 8 weeks   Indications: CROHN'S DISEASE    pregabalin (LYRICA) 100 mg capsule Take 100 mg by mouth three (3) times daily.  cyanocobalamin (VITAMIN B-12) 1,000 mcg/mL injection 1,000 mcg by IntraMUSCular route every seven (7) days. Indications: Once weekly    temazepam (RESTORIL) 30 mg capsule Take 30 mg by mouth nightly.  diazepam (VALIUM) 5 mg tablet Take 5 mg by mouth every eight (8) hours as needed (bowel relaxation). Social History:  He is  and has children.    Social History   Substance Use Topics    Smoking status: Former Smoker     Packs/day: 0.00     Years: 7.00     Quit date: 1/14/2017    Smokeless tobacco: Never Used    Alcohol use No       Family History:  Family History   Problem Relation Age of Onset    Cancer Father     Asthma Neg Hx     Diabetes Neg Hx     Heart Disease Neg Hx     Hypertension Neg Hx     Stroke Neg Hx     Malignant Hyperthermia Neg Hx     Pseudocholinesterase Deficiency Neg Hx     Delayed Awakening Neg Hx     Post-op Nausea/Vomiting Neg Hx        Review of Systems:  Constitutional: Denies fever, negative chills, weight loss. Eyes:   Denies visual changes. ENT:   Denies sore throat, tongue or lip swelling. Respiratory:  Denies cough or dyspnea. Cards:  Denies chest pain, palpitations, lower extremity edema. GI:   See HPI  :  Denies frequency, dysuria. Integument:  Denies rash and pruritus. Heme:  Denies easy bruising and gum/nose bleeding. Musculoskel: Denies myalgias, back pain and muscle weakness. Neuro: Denies headaches, dizziness, vertigo.   Psych:  + anxiety or depression     Objective:   Patient Vitals for the past 8 hrs:   BP Pulse Resp SpO2   03/07/17 1331 - - - 94 %   03/07/17 1330 127/75 - - 93 %   03/07/17 1300 131/75 - - 94 %   03/07/17 1231 - - - 95 %   03/07/17 1230 127/75 - - 92 %   03/07/17 1216 - - - 95 %   03/07/17 1215 124/71 - - 93 %   03/07/17 1201 - - - 95 %   03/07/17 1200 128/76 - - 94 %   03/07/17 1147 - - - 95 %   03/07/17 1146 - - - 94 %   03/07/17 1145 126/69 - - -   03/07/17 1130 120/65 - - 97 %   03/07/17 1115 134/78 - - 96 %   03/07/17 1101 - - - 95 %   03/07/17 1100 131/79 - - -   03/07/17 1048 - - - 94 %   03/07/17 1045 133/75 - - -   03/07/17 1031 - - - 94 %   03/07/17 1030 126/72 - - -   03/07/17 1015 125/77 - - 95 %   03/07/17 1001 - - - 96 %   03/07/17 1000 133/80 - - -   03/07/17 0948 - - - 94 %   03/07/17 0945 127/63 - - 93 %   03/07/17 0940 - - - 95 %   03/07/17 0939 124/65 84 17 95 %   03/07/17 0938 124/65 - - -   03/07/17 3064 - - - 93 %   03/07/17 0855 - - - 95 %   03/07/17 0811 126/61 81 17 98 %   03/07/17 0715 126/70 86 18 95 %             EXAM:   GEN- well developed   NEURO-Alert and oriented x 3   HEENT-ANNABELLE, EOMI, nonichteric   LUNGS-Clear to ausculation bilaterally. CARD-Rate regular and rhythym, S1 S2. No murmur. ABD-Soft, right lower quadrant tender, non-distended. Bowel sounds normoactive. RLQ ileostomy with small amount fecal material.    EXT-No edema, warm to touch. PSYCH - Pleasant and cooperative to care. Data Review     Recent Labs      03/07/17   0643   WBC  13.1*   HGB  15.0   HCT  44.4   PLT  223     Recent Labs      03/07/17   0643   NA  143   K  4.0   CL  106   CO2  27   BUN  11   CREA  0.90   GLU  103*   CA  9.5     Recent Labs      03/07/17   0643   SGOT  10*   AP  106   TP  7.5   ALB  3.9   GLOB  3.6   LPSE  178     INDICATION: abdominal pain, vomiting, h/o crohns, h/o obstructions     COMPARISON: 1/15/2017     TECHNIQUE:   Following the uneventful intravenous administration of 100 cc Isovue-370, thin  axial images were obtained through the abdomen and pelvis. Coronal and sagittal  reconstructions were generated. Oral contrast was not administered. CT dose  reduction was achieved through use of a standardized protocol tailored for this  examination and automatic exposure control for dose modulation.     FINDINGS:   LUNG BASES: Bibasilar atelectasis is noted. INCIDENTALLY IMAGED HEART AND MEDIASTINUM: There is a trace pericardial  effusion. LIVER: No mass or biliary dilatation. GALLBLADDER: Surgically absent. SPLEEN: No mass. PANCREAS: No mass or ductal dilatation. ADRENALS: Unremarkable. KIDNEYS: No mass, calculus, or hydronephrosis. STOMACH: Unremarkable. SMALL BOWEL: Dilated small bowel loops are seen in the deep pelvis with some  fecal material. This represents a significant improvement compared to the prior  examination.  Focal small bowel narrowing and slight angulation in the right  lower quadrant best visualized on the coronal views is stable dating back to  multiple examinations. This may be related to an internal hernia. COLON: The patient is status post total colectomy. Right lower quadrant ostomy  is noted. APPENDIX: Surgically absent  PERITONEUM: No ascites or pneumoperitoneum. RETROPERITONEUM: No lymphadenopathy or aortic aneurysm. REPRODUCTIVE ORGANS: No pelvic mass or lymphadenopathy. URINARY BLADDER: No mass or calculus. BONES: No destructive bone lesion. ADDITIONAL COMMENTS: N/A     IMPRESSION  IMPRESSION:  Small bowel dilatation is noted in the deep pelvis, however this has  significantly improved compared to the prior examination. This dilatation does  not appear to extend to the level of the anastomosis as on the prior  examination. There may be some obstruction related to the mildly narrowed small  bowel loop in the right lower quadrant which is essentially unchanged compared  to multiple prior examinations. Problem List:     Patient Active Problem List   Diagnosis Code    GERD (gastroesophageal reflux disease) K21.9    Abdominal pain R10.9    Crohn's colitis ([address] K50.10    S/P laparoscopic cholecystectomy Z90.49    Anxiety and depression F41.9, F32.9    Dehydration E86.0    Intractable vomiting with nausea R11.2    Constipation K59.00    SBO (small bowel obstruction) ([address] K56.69     Assessment/Plan:   · Abdominal pain with nausea and vomiting with history of Crohns s/p total proctocolectomy in 2004, SBO, and anal fistulas. He has most recently been on Stelara ( initial dose 12/6/16 with Dr Nabeel Dailey) for Crohns. He is followed by Dr Bertha Ornelas at [facility] IBD clinic as well. He reports recent imaging at [facility] showing adhesions and is getting set up for surgery with Dr Manuel hammer at [facility]. Concern for partial obstruction vs parastomal hernia. CT here shows small bowel dilation improved from prior imaging.  He has had recent endoscopy and ileoscopy did not active disease. Continue supportive care, anti-emetics, and agree with surgical consult considering he is having pain and low ileostomy output. Patient seen in collaboration with Dr Sheela Osman.    Signed By: Bud Andersen NP     3/7/2017  2:27 PM

## 2017-03-08 LAB
ANION GAP BLD CALC-SCNC: 8 MMOL/L (ref 5–15)
APPEARANCE UR: CLEAR
BACTERIA URNS QL MICRO: NEGATIVE /HPF
BILIRUB UR QL CFM: NEGATIVE
BUN SERPL-MCNC: 10 MG/DL (ref 6–20)
BUN/CREAT SERPL: 14 (ref 12–20)
CALCIUM SERPL-MCNC: 8.9 MG/DL (ref 8.5–10.1)
CAOX CRY URNS QL MICRO: ABNORMAL
CHLORIDE SERPL-SCNC: 105 MMOL/L (ref 97–108)
CO2 SERPL-SCNC: 27 MMOL/L (ref 21–32)
COLOR UR: ABNORMAL
CREAT SERPL-MCNC: 0.7 MG/DL (ref 0.7–1.3)
EPITH CASTS URNS QL MICRO: ABNORMAL /LPF
ERYTHROCYTE [DISTWIDTH] IN BLOOD BY AUTOMATED COUNT: 13.6 % (ref 11.5–14.5)
GLUCOSE SERPL-MCNC: 106 MG/DL (ref 65–100)
GLUCOSE UR STRIP.AUTO-MCNC: NEGATIVE MG/DL
GRAN CASTS URNS QL MICRO: ABNORMAL /LPF
HCT VFR BLD AUTO: 44.9 % (ref 36.6–50.3)
HGB BLD-MCNC: 14.1 G/DL (ref 12.1–17)
HGB UR QL STRIP: NEGATIVE
HYALINE CASTS URNS QL MICRO: ABNORMAL /LPF (ref 0–5)
KETONES UR QL STRIP.AUTO: 40 MG/DL
LEUKOCYTE ESTERASE UR QL STRIP.AUTO: NEGATIVE
MCH RBC QN AUTO: 29.7 PG (ref 26–34)
MCHC RBC AUTO-ENTMCNC: 31.4 G/DL (ref 30–36.5)
MCV RBC AUTO: 94.5 FL (ref 80–99)
MUCOUS THREADS URNS QL MICRO: ABNORMAL /LPF
NITRITE UR QL STRIP.AUTO: NEGATIVE
PH UR STRIP: 5 [PH] (ref 5–8)
PLATELET # BLD AUTO: 165 K/UL (ref 150–400)
POTASSIUM SERPL-SCNC: 4.1 MMOL/L (ref 3.5–5.1)
PROT UR STRIP-MCNC: NEGATIVE MG/DL
RBC # BLD AUTO: 4.75 M/UL (ref 4.1–5.7)
RBC #/AREA URNS HPF: ABNORMAL /HPF (ref 0–5)
SODIUM SERPL-SCNC: 140 MMOL/L (ref 136–145)
SP GR UR REFRACTOMETRY: 1.03 (ref 1–1.03)
UA: UC IF INDICATED,UAUC: ABNORMAL
UROBILINOGEN UR QL STRIP.AUTO: 0.2 EU/DL (ref 0.2–1)
WBC # BLD AUTO: 10.1 K/UL (ref 4.1–11.1)
WBC URNS QL MICRO: ABNORMAL /HPF (ref 0–4)

## 2017-03-08 PROCEDURE — 74011250637 HC RX REV CODE- 250/637: Performed by: INTERNAL MEDICINE

## 2017-03-08 PROCEDURE — 85027 COMPLETE CBC AUTOMATED: CPT | Performed by: INTERNAL MEDICINE

## 2017-03-08 PROCEDURE — 81001 URINALYSIS AUTO W/SCOPE: CPT | Performed by: EMERGENCY MEDICINE

## 2017-03-08 PROCEDURE — 65270000029 HC RM PRIVATE

## 2017-03-08 PROCEDURE — 36415 COLL VENOUS BLD VENIPUNCTURE: CPT | Performed by: INTERNAL MEDICINE

## 2017-03-08 PROCEDURE — 77030019563 HC DEV ATTCH FEED HOLL -A

## 2017-03-08 PROCEDURE — 74011000250 HC RX REV CODE- 250: Performed by: SURGERY

## 2017-03-08 PROCEDURE — 74011250636 HC RX REV CODE- 250/636: Performed by: SURGERY

## 2017-03-08 PROCEDURE — 74011250636 HC RX REV CODE- 250/636: Performed by: INTERNAL MEDICINE

## 2017-03-08 PROCEDURE — 80048 BASIC METABOLIC PNL TOTAL CA: CPT | Performed by: INTERNAL MEDICINE

## 2017-03-08 RX ORDER — HYDROMORPHONE HYDROCHLORIDE 1 MG/ML
1 INJECTION, SOLUTION INTRAMUSCULAR; INTRAVENOUS; SUBCUTANEOUS
Status: DISCONTINUED | OUTPATIENT
Start: 2017-03-08 | End: 2017-03-09 | Stop reason: HOSPADM

## 2017-03-08 RX ADMIN — PROCHLORPERAZINE EDISYLATE 5 MG: 5 INJECTION INTRAMUSCULAR; INTRAVENOUS at 16:20

## 2017-03-08 RX ADMIN — HYDROMORPHONE HYDROCHLORIDE 1 MG: 1 INJECTION, SOLUTION INTRAMUSCULAR; INTRAVENOUS; SUBCUTANEOUS at 09:43

## 2017-03-08 RX ADMIN — Medication 10 ML: at 05:50

## 2017-03-08 RX ADMIN — SODIUM CHLORIDE, SODIUM LACTATE, POTASSIUM CHLORIDE, AND CALCIUM CHLORIDE 125 ML/HR: 600; 310; 30; 20 INJECTION, SOLUTION INTRAVENOUS at 16:20

## 2017-03-08 RX ADMIN — SODIUM CHLORIDE, SODIUM LACTATE, POTASSIUM CHLORIDE, AND CALCIUM CHLORIDE 150 ML/HR: 600; 310; 30; 20 INJECTION, SOLUTION INTRAVENOUS at 09:48

## 2017-03-08 RX ADMIN — HYDROMORPHONE HYDROCHLORIDE 1 MG: 1 INJECTION, SOLUTION INTRAMUSCULAR; INTRAVENOUS; SUBCUTANEOUS at 22:20

## 2017-03-08 RX ADMIN — DIAZEPAM 5 MG: 5 TABLET ORAL at 20:59

## 2017-03-08 RX ADMIN — HYDROMORPHONE HYDROCHLORIDE 1 MG: 1 INJECTION, SOLUTION INTRAMUSCULAR; INTRAVENOUS; SUBCUTANEOUS at 12:56

## 2017-03-08 RX ADMIN — FAMOTIDINE 20 MG: 10 INJECTION, SOLUTION INTRAVENOUS at 09:43

## 2017-03-08 RX ADMIN — DIAZEPAM 5 MG: 5 TABLET ORAL at 05:53

## 2017-03-08 RX ADMIN — SODIUM CHLORIDE, SODIUM LACTATE, POTASSIUM CHLORIDE, AND CALCIUM CHLORIDE 150 ML/HR: 600; 310; 30; 20 INJECTION, SOLUTION INTRAVENOUS at 04:22

## 2017-03-08 RX ADMIN — ENOXAPARIN SODIUM 40 MG: 40 INJECTION SUBCUTANEOUS at 11:31

## 2017-03-08 RX ADMIN — Medication 10 ML: at 21:03

## 2017-03-08 RX ADMIN — TEMAZEPAM 30 MG: 15 CAPSULE ORAL at 22:05

## 2017-03-08 RX ADMIN — FAMOTIDINE 20 MG: 10 INJECTION, SOLUTION INTRAVENOUS at 21:03

## 2017-03-08 RX ADMIN — HYDROMORPHONE HYDROCHLORIDE 1 MG: 1 INJECTION, SOLUTION INTRAMUSCULAR; INTRAVENOUS; SUBCUTANEOUS at 19:32

## 2017-03-08 RX ADMIN — HYDROMORPHONE HYDROCHLORIDE 1 MG: 1 INJECTION, SOLUTION INTRAMUSCULAR; INTRAVENOUS; SUBCUTANEOUS at 01:22

## 2017-03-08 RX ADMIN — HYDROMORPHONE HYDROCHLORIDE 1 MG: 1 INJECTION, SOLUTION INTRAMUSCULAR; INTRAVENOUS; SUBCUTANEOUS at 05:47

## 2017-03-08 RX ADMIN — SODIUM CHLORIDE, SODIUM LACTATE, POTASSIUM CHLORIDE, AND CALCIUM CHLORIDE 125 ML/HR: 600; 310; 30; 20 INJECTION, SOLUTION INTRAVENOUS at 23:25

## 2017-03-08 RX ADMIN — Medication 10 ML: at 13:05

## 2017-03-08 RX ADMIN — ONDANSETRON 4 MG: 2 INJECTION INTRAMUSCULAR; INTRAVENOUS at 13:05

## 2017-03-08 RX ADMIN — AMITRIPTYLINE HYDROCHLORIDE 30 MG: 10 TABLET, FILM COATED ORAL at 01:22

## 2017-03-08 RX ADMIN — HYDROMORPHONE HYDROCHLORIDE 1 MG: 1 INJECTION, SOLUTION INTRAMUSCULAR; INTRAVENOUS; SUBCUTANEOUS at 16:12

## 2017-03-08 RX ADMIN — TEMAZEPAM 30 MG: 15 CAPSULE ORAL at 01:22

## 2017-03-08 NOTE — PROGRESS NOTES
Progress Note    Patient: Jose R Loss MRN: [id number]  SSN: [id number]    YOB: 1975  Age: 37 y.o. Sex: male      Admit Date: 3/7/2017    * No surgery found *    Procedure:      Subjective:     Mr. Ponce Scarce c/o continued abdominal pain which is partially relieved with Dilaudid. He is not passing any flatus or BM through his ostomy. Objective:     Visit Vitals    /79 (BP 1 Location: Right arm, BP Patient Position: At rest)    Pulse (!) 110    Temp 98.2 °F (36.8 °C)    Resp 18    Ht 6' (1.829 m)    Wt 199 lb 4.7 oz (90.4 kg)    SpO2 96%    BMI 27.03 kg/m2       Temp (24hrs), Av.2 °F (36.8 °C), Min:97.9 °F (36.6 °C), Max:98.4 °F (36.9 °C)      Physical Exam:    GENERAL: alert, cooperative, no distress, flushed, diaphoretic, ABDOMEN: Soft, mildly distended and tympanitic, mild lower quadrant tenderness. There is no output in the ostomy.     Lab Review:   BMP:   Lab Results   Component Value Date/Time     2017 07:08 AM    K 4.1 2017 07:08 AM     2017 07:08 AM    CO2 27 2017 07:08 AM    AGAP 8 2017 07:08 AM     (H) 2017 07:08 AM    BUN 10 2017 07:08 AM    CREA 0.70 2017 07:08 AM    GFRAA >60 2017 07:08 AM    GFRNA >60 2017 07:08 AM     CMP:   Lab Results   Component Value Date/Time     2017 07:08 AM    K 4.1 2017 07:08 AM     2017 07:08 AM    CO2 27 2017 07:08 AM    AGAP 8 2017 07:08 AM     (H) 2017 07:08 AM    BUN 10 2017 07:08 AM    CREA 0.70 2017 07:08 AM    GFRAA >60 2017 07:08 AM    GFRNA >60 2017 07:08 AM    CA 8.9 2017 07:08 AM     CBC:   Lab Results   Component Value Date/Time    WBC 10.1 2017 07:08 AM    HGB 14.1 2017 07:08 AM    HCT 44.9 2017 07:08 AM     2017 07:08 AM       Assessment:     Hospital Problems  Date Reviewed: 2017          Codes Class Noted POA    SBO (small bowel obstruction) [facility]) ICD-10-CM: K56.69  ICD-9-CM: 560.9  3/7/2017 Unknown              Plan/Recommendations/Medical Decision Making:     No better with conservative treatment. Still c/o abdominal pain. I think he needs to proceed to the OR for exploration. He is trying to decide if he wants to be transferred since he was already scheduled to have surgery with his colorectal surgeon this month. Will continue to follow. Continue present treatment for now until he has decided. I am certainly happy to continue his care if he stays. I have explained the risks of surgery including: short bowel syndrome, fistulas, anastomotic leak, wound healing problem, infections, need to revise or reposition the ostomy, recurrent bowel obstructions, and bleeding. He and his wife understand.     Signed By: Latha Bernal MD     March 8, 2017

## 2017-03-08 NOTE — PROGRESS NOTES
Bedside and Verbal shift change report given to Sylvie Hernandez (oncoming nurse) by Sandip Patton (offgoing nurse). Report included the following information SBAR, Kardex, Intake/Output, MAR and Recent Results.

## 2017-03-08 NOTE — PROGRESS NOTES
[phone].  Patient complaining of 7/10 pain after dilaudid administration. Spoke to Dr. Andrew Paget regarding changing dilaudid to every 3 hours instead of 4 hours. Per Dr. Andrew Paget, RN has to check with Dr. Elisa Green as she is the attending. Call to Dr. Elisa Green- no new orders at this time. 1534. Patient and wife decided they would like to follow with colorectal surgeon at [facility]. [employer] called- all questions answered to begin EMTALA process. 1805. Call to [facility] to follow up with status of EMTALA process. Per [facility] staff, they are awaiting a call back from the physicians. 1835. Call received from [facility] to follow up with status of EMTALA. Per [facility] staff, the physicians will be having a conversation in the morning regarding status of transferring patient. Verbalized to patient.

## 2017-03-08 NOTE — PROGRESS NOTES
Call placed to Dr. Arabella Baig patient has informed nurse that morphine and zofran do not work for him that the only thing that works is dilaudid and compazine MD will place orders in [facility] care. 8:30 PM  Primary Nurse Savannah Shultz, ROSALBA and Mae Ojeda RN performed a dual skin assessment on this patient No impairment noted  Eduardo score is 23. Bedside shift change report given to [facility] [id number] (oncoming nurse) by David Yoder (offgoing nurse). Report included the following information SBAR, Kardex and MAR.

## 2017-03-09 ENCOUNTER — APPOINTMENT (OUTPATIENT)
Dept: GENERAL RADIOLOGY | Age: 42
DRG: 330 | End: 2017-03-09
Attending: COLON & RECTAL SURGERY
Payer: COMMERCIAL

## 2017-03-09 ENCOUNTER — HOSPITAL ENCOUNTER (INPATIENT)
Age: 42
LOS: 12 days | Discharge: HOME | DRG: 330 | End: 2017-03-21
Attending: COLON & RECTAL SURGERY | Admitting: COLON & RECTAL SURGERY
Payer: COMMERCIAL

## 2017-03-09 VITALS
HEART RATE: 100 BPM | WEIGHT: 199.3 LBS | SYSTOLIC BLOOD PRESSURE: 121 MMHG | HEIGHT: 72 IN | BODY MASS INDEX: 26.99 KG/M2 | OXYGEN SATURATION: 98 % | TEMPERATURE: 98.6 F | DIASTOLIC BLOOD PRESSURE: 75 MMHG | RESPIRATION RATE: 19 BRPM

## 2017-03-09 PROBLEM — K56.609 SMALL BOWEL OBSTRUCTION (HCC): Status: ACTIVE | Noted: 2017-03-09

## 2017-03-09 LAB
ABO + RH BLD: NORMAL
ANION GAP BLD CALC-SCNC: 10 MMOL/L (ref 5–15)
BASOPHILS # BLD AUTO: 0 K/UL (ref 0–0.1)
BASOPHILS # BLD: 0 % (ref 0–1)
BLOOD GROUP ANTIBODIES SERPL: NORMAL
BUN SERPL-MCNC: 14 MG/DL (ref 6–20)
BUN/CREAT SERPL: 24 (ref 12–20)
CALCIUM SERPL-MCNC: 8.7 MG/DL (ref 8.5–10.1)
CHLORIDE SERPL-SCNC: 100 MMOL/L (ref 97–108)
CO2 SERPL-SCNC: 27 MMOL/L (ref 21–32)
CREAT SERPL-MCNC: 0.58 MG/DL (ref 0.7–1.3)
DIFFERENTIAL METHOD BLD: ABNORMAL
EOSINOPHIL # BLD: 0.1 K/UL (ref 0–0.4)
EOSINOPHIL NFR BLD: 1 % (ref 0–7)
ERYTHROCYTE [DISTWIDTH] IN BLOOD BY AUTOMATED COUNT: 13.3 % (ref 11.5–14.5)
GLUCOSE SERPL-MCNC: 102 MG/DL (ref 65–100)
HCT VFR BLD AUTO: 44.6 % (ref 36.6–50.3)
HGB BLD-MCNC: 14.5 G/DL (ref 12.1–17)
LACTATE SERPL-SCNC: 0.9 MMOL/L (ref 0.4–2)
LYMPHOCYTES # BLD AUTO: 13 % (ref 12–49)
LYMPHOCYTES # BLD: 0.9 K/UL (ref 0.8–3.5)
MAGNESIUM SERPL-MCNC: 1.5 MG/DL (ref 1.6–2.4)
MCH RBC QN AUTO: 30.3 PG (ref 26–34)
MCHC RBC AUTO-ENTMCNC: 32.5 G/DL (ref 30–36.5)
MCV RBC AUTO: 93.1 FL (ref 80–99)
MONOCYTES # BLD: 1 K/UL (ref 0–1)
MONOCYTES NFR BLD AUTO: 15 % (ref 5–13)
NEUTS BAND NFR BLD MANUAL: 5 %
NEUTS SEG # BLD: 4.7 K/UL (ref 1.8–8)
NEUTS SEG NFR BLD AUTO: 66 % (ref 32–75)
PLATELET # BLD AUTO: 167 K/UL (ref 150–400)
POTASSIUM SERPL-SCNC: 4.1 MMOL/L (ref 3.5–5.1)
RBC # BLD AUTO: 4.79 M/UL (ref 4.1–5.7)
RBC MORPH BLD: ABNORMAL
SODIUM SERPL-SCNC: 137 MMOL/L (ref 136–145)
SPECIMEN EXP DATE BLD: NORMAL
WBC # BLD AUTO: 6.7 K/UL (ref 4.1–11.1)

## 2017-03-09 PROCEDURE — 74011250636 HC RX REV CODE- 250/636: Performed by: INTERNAL MEDICINE

## 2017-03-09 PROCEDURE — 80048 BASIC METABOLIC PNL TOTAL CA: CPT | Performed by: SURGERY

## 2017-03-09 PROCEDURE — 83735 ASSAY OF MAGNESIUM: CPT | Performed by: SURGERY

## 2017-03-09 PROCEDURE — 74020 XR ABD FLAT/ ERECT: CPT

## 2017-03-09 PROCEDURE — 74011000250 HC RX REV CODE- 250: Performed by: COLON & RECTAL SURGERY

## 2017-03-09 PROCEDURE — 77030010520

## 2017-03-09 PROCEDURE — 86900 BLOOD TYPING SEROLOGIC ABO: CPT | Performed by: COLON & RECTAL SURGERY

## 2017-03-09 PROCEDURE — 74011250636 HC RX REV CODE- 250/636: Performed by: SURGERY

## 2017-03-09 PROCEDURE — 74011250636 HC RX REV CODE- 250/636: Performed by: COLON & RECTAL SURGERY

## 2017-03-09 PROCEDURE — 51798 US URINE CAPACITY MEASURE: CPT

## 2017-03-09 PROCEDURE — 36415 COLL VENOUS BLD VENIPUNCTURE: CPT | Performed by: SURGERY

## 2017-03-09 PROCEDURE — 36569 INSJ PICC 5 YR+ W/O IMAGING: CPT | Performed by: COLON & RECTAL SURGERY

## 2017-03-09 PROCEDURE — 77030010540 HC BG OST DRN BMS -A

## 2017-03-09 PROCEDURE — 74011250637 HC RX REV CODE- 250/637: Performed by: COLON & RECTAL SURGERY

## 2017-03-09 PROCEDURE — 65270000032 HC RM SEMIPRIVATE

## 2017-03-09 PROCEDURE — 85025 COMPLETE CBC W/AUTO DIFF WBC: CPT | Performed by: SURGERY

## 2017-03-09 PROCEDURE — 77030011641 HC PASTE OST ADH BMS -A

## 2017-03-09 PROCEDURE — 74011000250 HC RX REV CODE- 250: Performed by: SURGERY

## 2017-03-09 PROCEDURE — 83605 ASSAY OF LACTIC ACID: CPT | Performed by: COLON & RECTAL SURGERY

## 2017-03-09 PROCEDURE — 74011250637 HC RX REV CODE- 250/637: Performed by: INTERNAL MEDICINE

## 2017-03-09 PROCEDURE — 36415 COLL VENOUS BLD VENIPUNCTURE: CPT | Performed by: COLON & RECTAL SURGERY

## 2017-03-09 PROCEDURE — 0D9670Z DRAINAGE OF STOMACH WITH DRAINAGE DEVICE, VIA NATURAL OR ARTIFICIAL OPENING: ICD-10-PCS | Performed by: COLON & RECTAL SURGERY

## 2017-03-09 RX ORDER — NALOXONE HYDROCHLORIDE 0.4 MG/ML
0.4 INJECTION, SOLUTION INTRAMUSCULAR; INTRAVENOUS; SUBCUTANEOUS AS NEEDED
Status: DISCONTINUED | OUTPATIENT
Start: 2017-03-09 | End: 2017-03-21 | Stop reason: HOSPADM

## 2017-03-09 RX ORDER — SODIUM CHLORIDE 0.9 % (FLUSH) 0.9 %
5-10 SYRINGE (ML) INJECTION EVERY 8 HOURS
Status: DISCONTINUED | OUTPATIENT
Start: 2017-03-09 | End: 2017-03-21 | Stop reason: HOSPADM

## 2017-03-09 RX ORDER — DIPHENHYDRAMINE HYDROCHLORIDE 50 MG/ML
12.5 INJECTION, SOLUTION INTRAMUSCULAR; INTRAVENOUS
Status: DISCONTINUED | OUTPATIENT
Start: 2017-03-09 | End: 2017-03-21 | Stop reason: HOSPADM

## 2017-03-09 RX ORDER — HYDROCORTISONE SODIUM SUCCINATE 100 MG/2ML
100 INJECTION, POWDER, FOR SOLUTION INTRAMUSCULAR; INTRAVENOUS ONCE
Status: COMPLETED | OUTPATIENT
Start: 2017-03-09 | End: 2017-03-09

## 2017-03-09 RX ORDER — DIAZEPAM 5 MG/1
5 TABLET ORAL
Status: DISCONTINUED | OUTPATIENT
Start: 2017-03-09 | End: 2017-03-14

## 2017-03-09 RX ORDER — HYDROCORTISONE SODIUM SUCCINATE 100 MG/2ML
100 INJECTION, POWDER, FOR SOLUTION INTRAMUSCULAR; INTRAVENOUS EVERY 8 HOURS
Status: DISCONTINUED | OUTPATIENT
Start: 2017-03-09 | End: 2017-03-11

## 2017-03-09 RX ORDER — SODIUM CHLORIDE 0.9 % (FLUSH) 0.9 %
5-10 SYRINGE (ML) INJECTION AS NEEDED
Status: DISCONTINUED | OUTPATIENT
Start: 2017-03-09 | End: 2017-03-21 | Stop reason: HOSPADM

## 2017-03-09 RX ORDER — SODIUM CHLORIDE, SODIUM LACTATE, POTASSIUM CHLORIDE, CALCIUM CHLORIDE 600; 310; 30; 20 MG/100ML; MG/100ML; MG/100ML; MG/100ML
125 INJECTION, SOLUTION INTRAVENOUS CONTINUOUS
Status: DISCONTINUED | OUTPATIENT
Start: 2017-03-09 | End: 2017-03-11

## 2017-03-09 RX ORDER — PROCHLORPERAZINE EDISYLATE 5 MG/ML
5 INJECTION INTRAMUSCULAR; INTRAVENOUS
Status: DISCONTINUED | OUTPATIENT
Start: 2017-03-09 | End: 2017-03-21 | Stop reason: HOSPADM

## 2017-03-09 RX ORDER — HYDROMORPHONE HYDROCHLORIDE 1 MG/ML
1 INJECTION, SOLUTION INTRAMUSCULAR; INTRAVENOUS; SUBCUTANEOUS
Status: DISCONTINUED | OUTPATIENT
Start: 2017-03-09 | End: 2017-03-14

## 2017-03-09 RX ORDER — HYDROMORPHONE HYDROCHLORIDE 1 MG/ML
1 INJECTION, SOLUTION INTRAMUSCULAR; INTRAVENOUS; SUBCUTANEOUS ONCE
Status: COMPLETED | OUTPATIENT
Start: 2017-03-09 | End: 2017-03-09

## 2017-03-09 RX ORDER — TEMAZEPAM 15 MG/1
30 CAPSULE ORAL
Status: DISCONTINUED | OUTPATIENT
Start: 2017-03-09 | End: 2017-03-14

## 2017-03-09 RX ADMIN — HYDROMORPHONE HYDROCHLORIDE 1 MG: 1 INJECTION, SOLUTION INTRAMUSCULAR; INTRAVENOUS; SUBCUTANEOUS at 04:20

## 2017-03-09 RX ADMIN — DIAZEPAM 5 MG: 5 TABLET ORAL at 11:08

## 2017-03-09 RX ADMIN — HYDROMORPHONE HYDROCHLORIDE 1 MG: 1 INJECTION, SOLUTION INTRAMUSCULAR; INTRAVENOUS; SUBCUTANEOUS at 20:17

## 2017-03-09 RX ADMIN — ENOXAPARIN SODIUM 40 MG: 40 INJECTION SUBCUTANEOUS at 10:21

## 2017-03-09 RX ADMIN — SODIUM CHLORIDE, SODIUM LACTATE, POTASSIUM CHLORIDE, AND CALCIUM CHLORIDE 125 ML/HR: 600; 310; 30; 20 INJECTION, SOLUTION INTRAVENOUS at 23:01

## 2017-03-09 RX ADMIN — ONDANSETRON 4 MG: 2 INJECTION INTRAMUSCULAR; INTRAVENOUS at 10:26

## 2017-03-09 RX ADMIN — HYDROMORPHONE HYDROCHLORIDE 1 MG: 1 INJECTION, SOLUTION INTRAMUSCULAR; INTRAVENOUS; SUBCUTANEOUS at 16:16

## 2017-03-09 RX ADMIN — HYDROMORPHONE HYDROCHLORIDE 1 MG: 1 INJECTION, SOLUTION INTRAMUSCULAR; INTRAVENOUS; SUBCUTANEOUS at 01:26

## 2017-03-09 RX ADMIN — Medication 10 ML: at 07:21

## 2017-03-09 RX ADMIN — HYDROMORPHONE HYDROCHLORIDE 1 MG: 1 INJECTION, SOLUTION INTRAMUSCULAR; INTRAVENOUS; SUBCUTANEOUS at 15:00

## 2017-03-09 RX ADMIN — SODIUM CHLORIDE, SODIUM LACTATE, POTASSIUM CHLORIDE, AND CALCIUM CHLORIDE 125 ML/HR: 600; 310; 30; 20 INJECTION, SOLUTION INTRAVENOUS at 07:32

## 2017-03-09 RX ADMIN — FAMOTIDINE 20 MG: 10 INJECTION, SOLUTION INTRAVENOUS at 20:17

## 2017-03-09 RX ADMIN — PROCHLORPERAZINE EDISYLATE 5 MG: 5 INJECTION INTRAMUSCULAR; INTRAVENOUS at 22:08

## 2017-03-09 RX ADMIN — HYDROMORPHONE HYDROCHLORIDE 1 MG: 1 INJECTION, SOLUTION INTRAMUSCULAR; INTRAVENOUS; SUBCUTANEOUS at 07:20

## 2017-03-09 RX ADMIN — SODIUM CHLORIDE 500 ML: 900 INJECTION, SOLUTION INTRAVENOUS at 15:12

## 2017-03-09 RX ADMIN — FAMOTIDINE 20 MG: 10 INJECTION, SOLUTION INTRAVENOUS at 08:13

## 2017-03-09 RX ADMIN — HYDROMORPHONE HYDROCHLORIDE 1 MG: 1 INJECTION, SOLUTION INTRAMUSCULAR; INTRAVENOUS; SUBCUTANEOUS at 10:21

## 2017-03-09 RX ADMIN — SODIUM CHLORIDE, SODIUM LACTATE, POTASSIUM CHLORIDE, AND CALCIUM CHLORIDE 125 ML/HR: 600; 310; 30; 20 INJECTION, SOLUTION INTRAVENOUS at 15:47

## 2017-03-09 RX ADMIN — PROCHLORPERAZINE EDISYLATE 5 MG: 5 INJECTION INTRAMUSCULAR; INTRAVENOUS at 14:36

## 2017-03-09 RX ADMIN — HYDROMORPHONE HYDROCHLORIDE 1 MG: 1 INJECTION, SOLUTION INTRAMUSCULAR; INTRAVENOUS; SUBCUTANEOUS at 18:22

## 2017-03-09 RX ADMIN — TEMAZEPAM 30 MG: 15 CAPSULE ORAL at 22:08

## 2017-03-09 RX ADMIN — HYDROMORPHONE HYDROCHLORIDE 1 MG: 1 INJECTION, SOLUTION INTRAMUSCULAR; INTRAVENOUS; SUBCUTANEOUS at 22:08

## 2017-03-09 RX ADMIN — Medication 10 ML: at 22:08

## 2017-03-09 RX ADMIN — Medication 10 ML: at 15:13

## 2017-03-09 RX ADMIN — HYDROCORTISONE SODIUM SUCCINATE 100 MG: 100 INJECTION, POWDER, FOR SOLUTION INTRAMUSCULAR; INTRAVENOUS at 16:16

## 2017-03-09 RX ADMIN — HYDROCORTISONE SODIUM SUCCINATE 100 MG: 100 INJECTION, POWDER, FOR SOLUTION INTRAMUSCULAR; INTRAVENOUS at 22:08

## 2017-03-09 NOTE — PROGRESS NOTES
I received report from Isadora Burgess at [facility], but Ad Parekh the float pull nurse will now be taking care of this patient when he arrives to [address] [place] [address]. Still waiting for patient to arrive at [address] [place] [address] Drive report given to Ad Parekh (Float Pool) RN  by Araseli Crook RN. Report included the following information SBAR and Kardex.

## 2017-03-09 NOTE — PROGRESS NOTES
03/09/17 1522   Vital Signs   Temp 98.7 °F (37.1 °C)   Temp Source Oral   Pulse (Heart Rate) (!) 117   Heart Rate Source Monitor   Resp Rate 18   O2 Sat (%) 96 %   Level of Consciousness Alert   /79   MAP (Calculated) 97   BP 1 Method Automatic   BP 1 Location Right arm   BP Patient Position At rest   MEWS Score 3   Oxygen Therapy   O2 Device Room air   MD aware present at bedside. Will Continue to monitor, see orders.

## 2017-03-09 NOTE — IP AVS SNAPSHOT
[address] [facility]  
[phone] Patient: Wade Vela MRN: [id number] :1975 You are allergic to the following Allergen Reactions Remicade (Infliximab) Anaphylaxis Shortness of Breath Bactrim (Sulfamethoprim Ds) Other (comments) Increased GI distress. Nsaids (Non-Steroidal Anti-Inflammatory Drug) Other (comments) Stomach ulcers Recent Documentation Height Weight BMI Smoking Status 1.829 m 89.8 kg 26.84 kg/m2 Former Smoker Emergency Contacts Name Discharge Info Relation Home Work Mobile [address] CAREGIVER [3] Spouse [3] [phone] About your hospitalization You were admitted on:  2017 You last received care in the:  [place] [id number] You were discharged on:  2017 Unit phone number:  [phone] Why you were hospitalized Your primary diagnosis was:  Not on File Your diagnoses also included:  Small Bowel Obstruction (Hcc) Providers Seen During Your Hospitalizations Provider Role Specialty Primary office phone Cindy Zarco MD Attending Provider Colon and Rectal Surgery [phone] Your Primary Care Physician (PCP) Primary Care Physician Office Phone Office Fax [place], [facility] [address] [phone] Follow-up Information Follow up With Details Comments Contact Info Livan Littlejohn MD   [address] [facility] [address] [facility] [place] 25 
[phone] Cindy Zarco MD On 3/29/2017 post op visit 2:00pm [address] [facility] [address] Phoebe Taylor 25 
[phone] Current Discharge Medication List  
  
START taking these medications Dose & Instructions Dispensing Information Comments Morning Noon Evening Bedtime  
 oxyCODONE-acetaminophen  mg per tablet Commonly known as:  PERCOCET 10 Your last dose was: Your next dose is:    
   
   
 Dose:  1-2 Tab Take 1-2 Tabs by mouth every four (4) hours as needed. Max Daily Amount: 12 Tabs. Quantity:  75 Tab Refills:  0 CONTINUE these medications which have NOT CHANGED Dose & Instructions Dispensing Information Comments Morning Noon Evening Bedtime  
 amitriptyline 10 mg tablet Commonly known as:  ELAVIL Your last dose was: Your next dose is:    
   
   
 Dose:  30 mg Take 30 mg by mouth nightly. Refills:  0  
     
   
   
   
  
 buPROPion  mg SR tablet Commonly known as:  Flores Lyonsoked Your last dose was: Your next dose is:    
   
   
 Dose:  150 mg Take 150 mg by mouth two (2) times a day. Refills:  0  
     
   
   
   
  
 cyclobenzaprine 10 mg tablet Commonly known as:  FLEXERIL Your last dose was: Your next dose is:    
   
   
 Dose:  10 mg Take 10 mg by mouth two (2) times daily as needed for Muscle Spasm(s). Refills:  0  
     
   
   
   
  
 dicyclomine 10 mg capsule Commonly known as:  BENTYL Your last dose was: Your next dose is:    
   
   
 Dose:  10 mg Take 10 mg by mouth every six (6) hours as needed. Refills:  0  
     
   
   
   
  
 ergocalciferol 50,000 unit capsule Commonly known as:  ERGOCALCIFEROL Your last dose was: Your next dose is:    
   
   
 Dose:  92223 Units Take 50,000 Units by mouth every seven (7) days. Refills:  0 LYRICA 100 mg capsule Generic drug:  pregabalin Your last dose was: Your next dose is:    
   
   
 Dose:  100 mg Take 100 mg by mouth three (3) times daily. Refills:  0  
     
   
   
   
  
 omeprazole 40 mg capsule Commonly known as:  PRILOSEC Your last dose was:     
   
Your next dose is:    
   
   
 Dose:  40 mg  
 Take 40 mg by mouth Daily (before dinner). Refills:  0  
     
   
   
   
  
 * predniSONE 10 mg tablet Commonly known as:  Jose Alfredo Smith Your last dose was: Your next dose is:    
   
   
 Dose:  5 mg Take 5 mg by mouth daily. Prednisone taper X 7 days to start 3/18/17 Refills:  0  
     
   
   
   
  
 prochlorperazine 10 mg tablet Commonly known as:  COMPAZINE Your last dose was: Your next dose is:    
   
   
 Dose:  10 mg Take 10 mg by mouth every six (6) hours as needed for Nausea. Refills:  0  
     
   
   
   
  
 promethazine 25 mg tablet Commonly known as:  PHENERGAN Your last dose was: Your next dose is:    
   
   
 Dose:  25 mg Take 25 mg by mouth every six (6) hours as needed for Nausea. Refills:  0 REMERON 15 mg tablet Generic drug:  mirtazapine Your last dose was: Your next dose is:    
   
   
 Dose:  15 mg Take 15 mg by mouth nightly. Refills:  0  
     
   
   
   
  
 temazepam 30 mg capsule Commonly known as:  RESTORIL Your last dose was: Your next dose is:    
   
   
 Dose:  30 mg Take 30 mg by mouth nightly. Refills:  0 VALIUM 5 mg tablet Generic drug:  diazePAM  
   
Your last dose was: Your next dose is:    
   
   
 Dose:  5 mg Take 5 mg by mouth every eight (8) hours as needed (bowel relaxation). Refills:  0  
     
   
   
   
  
 VITAMIN B-12 1,000 mcg/mL injection Generic drug:  cyanocobalamin Your last dose was: Your next dose is:    
   
   
 Dose:  1000 mcg  
1,000 mcg by IntraMUSCular route every seven (7) days. Indications: Once weekly Refills:  0 ZOFRAN (AS HYDROCHLORIDE) 8 mg tablet Generic drug:  ondansetron hcl Your last dose was: Your next dose is:    
   
   
 Dose:  8 mg Take 8 mg by mouth every eight (8) hours as needed for Nausea. Refills:  0  
     
   
   
   
  
 * Notice: This list has 1 medication(s) that are the same as other medications prescribed for you. Read the directions carefully, and ask your doctor or other care provider to review them with you. STOP taking these medications   
 oxyCODONE IR 10 mg Tab immediate release tablet Commonly known as:  ROXICODONE  
   
  
 traMADol 50 mg tablet Commonly known as:  ULTRAM  
   
  
 ustekinaumab 90 mg/mL injection Generic drug:  Ustekinumab ASK your doctor about these medications Dose & Instructions Dispensing Information Comments Morning Noon Evening Bedtime * predniSONE 10 mg tablet Commonly known as:  Gonzalo Watkins Ask about: Should I take this medication? Your last dose was: Your next dose is:    
   
   
 Dose:  15 mg Take 15 mg by mouth daily. Prednisone taper X 7 days started 3/4/17 Refills:  0  
     
   
   
   
  
 * predniSONE 10 mg tablet Commonly known as:  Gonzalo Watkins Ask about: Should I take this medication? Your last dose was: Your next dose is:    
   
   
 Dose:  10 mg Take 10 mg by mouth daily. Prednisone taper X 7 days to start 3/11/17 Refills:  0  
     
   
   
   
  
 * Notice: This list has 2 medication(s) that are the same as other medications prescribed for you. Read the directions carefully, and ask your doctor or other care provider to review them with you. Where to Get Your Medications Information on where to get these meds will be given to you by the nurse or doctor. ! Ask your nurse or doctor about these medications  
  oxyCODONE-acetaminophen  mg per tablet Discharge Instructions DISCHARGE SUMMARY from Nurse The following personal items are in your possession at time of discharge: 
 
Dental Appliances: None Visual Aid: Glasses Home Medications: None Jewelry: None Clothing: Footwear, [address] [facility], Sent home Other Valuables: Cell Phone, With patient PATIENT INSTRUCTIONS: 
 
 
F-face looks uneven A-arms unable to move or move unevenly S-speech slurred or non-existent T-time-call 911 as soon as signs and symptoms begin-DO NOT go Back to bed or wait to see if you get better-TIME IS BRAIN. Warning Signs of HEART ATTACK Call 911 if you have these symptoms: 
? Chest discomfort. Most heart attacks involve discomfort in the center of the chest that lasts more than a few minutes, or that goes away and comes back. It can feel like uncomfortable pressure, squeezing, fullness, or pain. ? Discomfort in other areas of the upper body. Symptoms can include pain or discomfort in one or both arms, the back, neck, jaw, or stomach. ? Shortness of breath with or without chest discomfort. ? Other signs may include breaking out in a cold sweat, nausea, or lightheadedness. Don't wait more than five minutes to call [address]! Fast action can save your life. Calling 911 is almost always the fastest way to get lifesaving treatment. Emergency Medical Services staff can begin treatment when they arrive  up to an hour sooner than if someone gets to the hospital by car. The discharge information has been reviewed with the patient. The patient verbalized understanding. Discharge medications reviewed with the patient and appropriate educational materials and side effects teaching were provided. Patient Discharge Instructions Belen Perez / [id number] : 1975 Admitted 3/9/2017 Discharged: 3/21/2017 · It is important that you take medications exactly as they are prescribed. · Keep your medication in the bottles provided by the pharmacist and keep a list of the medication names, dosages, and times to be taken in your wallet. · Do not take other medications without consulting your doctor. What To Do At [facility] Recommended Diet:  You should continue to consume a low fiber diet until you have been seen for follow-up. Recommended Activity: You should not drive, lift more than 10 lb., or engage in strenuous activity until four weeks have passed since the operation. You should walk frequently to gradually regain your stamina, and you may climb stairs as tolerated. Hygiene: You may shower with the incision loosely covered, but do not bathe or submerge your abdomen until you have been seen for follow-up. Medications:  Resume the prednisone taper beginning at the the last dose that you were taking prior to discharge. (The discharge medication list does not reflect the correct dose/schedule because of a problem with the software.) Problems:  If you have a fever (temperature > 100.0 degrees Fahrenheit), if there is drainage from an incision, or if there is increasing redness around an incision, you should call Dr. Cruz Courser. Other:  If you have questions or other problems, call Dr. Cruz Courser. Follow-Up:  Please return to Dr. Hernán Mendoza office at 2:00 PM on Wednesday 3/29/2017. If you need to change the appointment, please call [phone] on a weekday between 9:00 AM and noon. Information obtained by : 
I understand that if any problems occur once I am at home I am to contact my physician. I understand and acknowledge receipt of the instructions indicated above. Physician's or R.N.'s Signature                                                                  Date/Time Patient or Representative Signature                                                          Date/Time Discharge Orders None [employer] Announcement We are excited to announce that we are making your provider's discharge notes available to you in [employer]. You will see these notes when they are completed and signed by the physician that discharged you from your recent hospital stay. If you have any questions or concerns about any information you see in [employer], please call the Health Information Department where you were seen or reach out to your Primary Care Provider for more information about your plan of care. Introducing [facility]! Dear Sesar Castaneda: Thank you for requesting a [employer] account. Our records indicate that you already have an active [employer] account. You can access your account anytime at [email or web address] Did you know that you can access your hospital and ER discharge instructions at any time in [employer]? You can also review all of your test results from your hospital stay or ER visit. Additional Information If you have questions, please visit the Frequently Asked Questions section of the [employer] website at [email or web address] Remember, [employer] is NOT to be used for urgent needs. For medical emergencies, dial 911. Now available from your iPhone and Android! General Information Please provide this summary of care documentation to your next provider. Patient Signature:  ____________________________________________________________ Date:  ____________________________________________________________  
  
[facility] Provider Signature:  ____________________________________________________________ Date:  ____________________________________________________________

## 2017-03-09 NOTE — PROGRESS NOTES
3/9/2017 9:59 AM [facility] cannot accommodate transport until 12PM. Pt's RN is aware of transport time.      3/9/2017  9:58 AM Ambulance transport has been arranged for pt's transfer to [facility] through [address] [facility] for Tenisha Xie 1723, BSW

## 2017-03-09 NOTE — PROGRESS NOTES
Bedside and Verbal shift change report given to Villa Garner RN (oncoming nurse) by Joanna Metcalf (offgoing nurse). Report included the following information Kardex, Procedure Summary, Intake/Output, MAR, Recent Results and Med Rec Status.

## 2017-03-09 NOTE — IP AVS SNAPSHOT
Current Discharge Medication List  
  
START taking these medications Dose & Instructions Dispensing Information Comments Morning Noon Evening Bedtime  
 oxyCODONE-acetaminophen  mg per tablet Commonly known as:  PERCOCET 10 Your last dose was: Your next dose is:    
   
   
 Dose:  1-2 Tab Take 1-2 Tabs by mouth every four (4) hours as needed. Max Daily Amount: 12 Tabs. Quantity:  75 Tab Refills:  0 CONTINUE these medications which have NOT CHANGED Dose & Instructions Dispensing Information Comments Morning Noon Evening Bedtime  
 amitriptyline 10 mg tablet Commonly known as:  ELAVIL Your last dose was: Your next dose is:    
   
   
 Dose:  30 mg Take 30 mg by mouth nightly. Refills:  0  
     
   
   
   
  
 buPROPion  mg SR tablet Commonly known as:  Zayra Hightower Your last dose was: Your next dose is:    
   
   
 Dose:  150 mg Take 150 mg by mouth two (2) times a day. Refills:  0  
     
   
   
   
  
 cyclobenzaprine 10 mg tablet Commonly known as:  FLEXERIL Your last dose was: Your next dose is:    
   
   
 Dose:  10 mg Take 10 mg by mouth two (2) times daily as needed for Muscle Spasm(s). Refills:  0  
     
   
   
   
  
 dicyclomine 10 mg capsule Commonly known as:  BENTYL Your last dose was: Your next dose is:    
   
   
 Dose:  10 mg Take 10 mg by mouth every six (6) hours as needed. Refills:  0  
     
   
   
   
  
 ergocalciferol 50,000 unit capsule Commonly known as:  ERGOCALCIFEROL Your last dose was: Your next dose is:    
   
   
 Dose:  03284 Units Take 50,000 Units by mouth every seven (7) days. Refills:  0 LYRICA 100 mg capsule Generic drug:  pregabalin Your last dose was:     
   
Your next dose is:    
   
   
 Dose:  100 mg  
 Take 100 mg by mouth three (3) times daily. Refills:  0  
     
   
   
   
  
 omeprazole 40 mg capsule Commonly known as:  PRILOSEC Your last dose was: Your next dose is:    
   
   
 Dose:  40 mg Take 40 mg by mouth Daily (before dinner). Refills:  0  
     
   
   
   
  
 * predniSONE 10 mg tablet Commonly known as:  Jonetta Moment Your last dose was: Your next dose is:    
   
   
 Dose:  5 mg Take 5 mg by mouth daily. Prednisone taper X 7 days to start 3/18/17 Refills:  0  
     
   
   
   
  
 prochlorperazine 10 mg tablet Commonly known as:  COMPAZINE Your last dose was: Your next dose is:    
   
   
 Dose:  10 mg Take 10 mg by mouth every six (6) hours as needed for Nausea. Refills:  0  
     
   
   
   
  
 promethazine 25 mg tablet Commonly known as:  PHENERGAN Your last dose was: Your next dose is:    
   
   
 Dose:  25 mg Take 25 mg by mouth every six (6) hours as needed for Nausea. Refills:  0 REMERON 15 mg tablet Generic drug:  mirtazapine Your last dose was: Your next dose is:    
   
   
 Dose:  15 mg Take 15 mg by mouth nightly. Refills:  0  
     
   
   
   
  
 temazepam 30 mg capsule Commonly known as:  RESTORIL Your last dose was: Your next dose is:    
   
   
 Dose:  30 mg Take 30 mg by mouth nightly. Refills:  0 VALIUM 5 mg tablet Generic drug:  diazePAM  
   
Your last dose was: Your next dose is:    
   
   
 Dose:  5 mg Take 5 mg by mouth every eight (8) hours as needed (bowel relaxation). Refills:  0  
     
   
   
   
  
 VITAMIN B-12 1,000 mcg/mL injection Generic drug:  cyanocobalamin Your last dose was: Your next dose is:    
   
   
 Dose:  1000 mcg  
1,000 mcg by IntraMUSCular route every seven (7) days. Indications: Once weekly Refills:  0 ZOFRAN (AS HYDROCHLORIDE) 8 mg tablet Generic drug:  ondansetron hcl Your last dose was: Your next dose is:    
   
   
 Dose:  8 mg Take 8 mg by mouth every eight (8) hours as needed for Nausea. Refills:  0  
     
   
   
   
  
 * Notice: This list has 1 medication(s) that are the same as other medications prescribed for you. Read the directions carefully, and ask your doctor or other care provider to review them with you. STOP taking these medications   
 oxyCODONE IR 10 mg Tab immediate release tablet Commonly known as:  ROXICODONE  
   
  
 traMADol 50 mg tablet Commonly known as:  ULTRAM  
   
  
 ustekinaumab 90 mg/mL injection Generic drug:  Ustekinumab ASK your doctor about these medications Dose & Instructions Dispensing Information Comments Morning Noon Evening Bedtime * predniSONE 10 mg tablet Commonly known as:  Miryam Perez Ask about: Should I take this medication? Your last dose was: Your next dose is:    
   
   
 Dose:  15 mg Take 15 mg by mouth daily. Prednisone taper X 7 days started 3/4/17 Refills:  0  
     
   
   
   
  
 * predniSONE 10 mg tablet Commonly known as:  Miryam Perez Ask about: Should I take this medication? Your last dose was: Your next dose is:    
   
   
 Dose:  10 mg Take 10 mg by mouth daily. Prednisone taper X 7 days to start 3/11/17 Refills:  0  
     
   
   
   
  
 * Notice: This list has 2 medication(s) that are the same as other medications prescribed for you. Read the directions carefully, and ask your doctor or other care provider to review them with you. Where to Get Your Medications Information on where to get these meds will be given to you by the nurse or doctor. ! Ask your nurse or doctor about these medications  
  oxyCODONE-acetaminophen  mg per tablet

## 2017-03-09 NOTE — PROGRESS NOTES
Primary Nurse Pierre Rosario and Anju Garcia RN performed a dual skin assessment on this patient No impairment noted  Deuardo score is 21

## 2017-03-09 NOTE — PROGRESS NOTES
Problem: Nausea/Vomiting (Adult)  Goal: *Absence of nausea/vomiting  Outcome: Not Progressing Towards Goal  Requires prn antiemetic     Problem: Pain  Goal: *Control of Pain  Outcome: Not Progressing Towards Goal  Requires prn Dilaudid     Problem: General Medical Care Plan  Goal: *Vital signs within specified parameters  Outcome: Progressing Towards Goal  Oriented to unit, call bell, and room.

## 2017-03-09 NOTE — DISCHARGE SUMMARY
Physician Discharge Summary     Patient ID:  Armando Cisse  [id number]  52 y.o.  1975    Admit date: 3/7/2017    Discharge date and time: 3/9/2017    Admission Diagnoses: SBO (small bowel obstruction) [facility])    Discharge Diagnoses: Active Problems:    SBO (small bowel obstruction) ([address] (3/7/2017)           Hospital Course:   SBO (small bowel obstruction): recent admission in January for the same. Consult general surgery and continue supportive care. Pt transferred [facility] for surgery with his colorectal surgeon      Intractable nausea / Vomiting / Abdominal pain: long standing history with multiple admissions. tx SBP as above. Seen by palliative care last hospital stay      Crohns disease: does not appear to be an active flare, no steroids or abx for now. On stelara injections outpt. Consult GI      Anxiety / Depression / Insomnia: continue lyrica    PCP: Annel Jordan MD     Consults: GI and General Surgery    Condition of patient at discharge: improved    Discharge Exam:  Please refer to progress note from today. Disposition: [facility]    Patient Instructions:   Current Discharge Medication List      CONTINUE these medications which have NOT CHANGED    Details   promethazine (PHENERGAN) 25 mg tablet Take 25 mg by mouth every six (6) hours as needed for Nausea. ondansetron hcl (ZOFRAN, AS HYDROCHLORIDE,) 8 mg tablet Take 8 mg by mouth every eight (8) hours as needed for Nausea. mirtazapine (REMERON) 15 mg tablet Take 15 mg by mouth nightly. amitriptyline (ELAVIL) 10 mg tablet Take 30 mg by mouth nightly. buPROPion SR (WELLBUTRIN SR) 150 mg SR tablet Take 150 mg by mouth two (2) times a day. omeprazole (PRILOSEC) 40 mg capsule Take 40 mg by mouth Daily (before dinner). !! predniSONE (DELTASONE) 10 mg tablet Take 15 mg by mouth daily.  Prednisone taper  X 7 days started 3/4/17      prochlorperazine (COMPAZINE) 10 mg tablet Take 10 mg by mouth every six (6) hours as needed for Nausea. dicyclomine (BENTYL) 10 mg capsule Take 10 mg by mouth every six (6) hours as needed. ergocalciferol (ERGOCALCIFEROL) 50,000 unit capsule Take 50,000 Units by mouth every seven (7) days. traMADol (ULTRAM) 50 mg tablet Take 50 mg by mouth three (3) times daily as needed for Pain. oxyCODONE IR (ROXICODONE) 10 mg tab immediate release tablet Take 10 mg by mouth two (2) times daily as needed. cyclobenzaprine (FLEXERIL) 10 mg tablet Take 10 mg by mouth two (2) times daily as needed for Muscle Spasm(s). !! predniSONE (DELTASONE) 10 mg tablet Take 10 mg by mouth daily. Prednisone taper  X 7 days to start 3/11/17      !! predniSONE (DELTASONE) 10 mg tablet Take 5 mg by mouth daily. Prednisone taper  X 7 days to start 3/18/17      Ustekinumab (USTEKINAUMAB) 90 mg/mL injection 90 mg by SubCUTAneous route. Every 8 weeks   Indications: CROHN'S DISEASE      pregabalin (LYRICA) 100 mg capsule Take 100 mg by mouth three (3) times daily. cyanocobalamin (VITAMIN B-12) 1,000 mcg/mL injection 1,000 mcg by IntraMUSCular route every seven (7) days. Indications: Once weekly      temazepam (RESTORIL) 30 mg capsule Take 30 mg by mouth nightly. diazepam (VALIUM) 5 mg tablet Take 5 mg by mouth every eight (8) hours as needed (bowel relaxation). !! - Potential duplicate medications found. Please discuss with provider.         Activity: Activity as tolerated  Diet: NPO    Approximate time spent in patient care on day of discharge: 32 minutes    Signed:  Anastasia Osborn MD  3/9/2017  10:29 AM

## 2017-03-09 NOTE — WOUND CARE
Stoma Marking:   Marked right and left abdominal quadrant for revision of ileostomy away from creases, folds, and umbilicus; within visual field and rectus muscle; marked with an X, using an indelible marker.        Will continue to follow for care/teaching;     Romelia Dodson RN

## 2017-03-09 NOTE — PROGRESS NOTES
Progress Note    Patient: Africa Mendes MRN: [id number]  SSN: [id number]    YOB: 1975  Age: 37 y.o. Sex: male      Admit Date: 3/7/2017    * No surgery found *    Procedure:      Subjective:     Mr. Carlos Perrin is still no better. He is c/o abdominal pain. He still has no ostomy output. Objective:     Visit Vitals    /74 (BP 1 Location: Left arm, BP Patient Position: At rest)    Pulse (!) 123    Temp 98.2 °F (36.8 °C)    Resp 19    Ht 6' (1.829 m)    Wt 199 lb 4.7 oz (90.4 kg)    SpO2 92%    BMI 27.03 kg/m2       Temp (24hrs), Av.4 °F (36.9 °C), Min:98.2 °F (36.8 °C), Max:98.8 °F (37.1 °C)      Physical Exam:    GENERAL: alert, cooperative, no distress, flushed, diaphoretic., ABDOMEN: Soft, tender, distended. Lab Review:   BMP: No results found for: NA, K, CL, CO2, AGAP, GLU, BUN, CREA, GFRAA, GFRNA  CBC: No results found for: WBC, HGB, HGBEXT, HCT, HCTEXT, PLT, PLTEXT, HGBEXT, HCTEXT, PLTEXT    Assessment:     Hospital Problems  Date Reviewed: 2017          Codes Class Noted POA    SBO (small bowel obstruction) ([facility] ICD-10-CM: N15.60  ICD-9-CM: 560.9  3/7/2017 Unknown              Plan/Recommendations/Medical Decision Making:     No improvement in 48 hours. Needs to proceed to the OR for exploration. Mr. Carlos Perrin is still waiting for transport to [facility] where his colorectal surgeon is waiting. If there is any problem, then I am available to take care of him here.     Signed By: Zandra Cornelius MD     2017

## 2017-03-09 NOTE — PROGRESS NOTES
Xavier Corbett [facility]  [address] [facility], [place], [address] [place] [address]  [phone]      Medical Progress Note      NAME: Bereket Suggs   :  1975  MRM:  [id number]    Date/Time: 3/9/2017           Subjective:     Chief Complaint:      No acute events. Pt still with abdominal pain and minimal ostomy outpt          Objective:       Vitals:          Last 24hrs VS reviewed since prior progress note.  Most recent are:    Visit Vitals    /74 (BP 1 Location: Left arm, BP Patient Position: At rest)    Pulse (!) 123    Temp 98.2 °F (36.8 °C)    Resp 19    Ht 6' (1.829 m)    Wt 90.4 kg (199 lb 4.7 oz)    SpO2 92%    BMI 27.03 kg/m2     SpO2 Readings from Last 6 Encounters:   17 92%   17 100%   17 94%   17 100%   17 95%   17 95%            Intake/Output Summary (Last 24 hours) at 17 1028  Last data filed at 17 0817   Gross per 24 hour   Intake           153.75 ml   Output             2000 ml   Net         -1846.25 ml          Exam:     Physical Exam:    Gen:  Well-developed, well-nourished, in no acute distress  HEENT:  Pink conjunctivae, PERRL, hearing intact to voice, moist mucous membranes  Neck:  Supple, without masses, thyroid non-tender  Resp:  No accessory muscle use, clear breath sounds without wheezes rales or rhonchi  Card:  No murmurs, normal S1, S2 without thrills, bruits or peripheral edema  Abd:  Soft, diffuse ttp with decreased ostomy outpt, non-distended, normoactive bowel sounds are present  Musc:  No cyanosis or clubbing  Skin:  No rashes or ulcers, skin turgor is good  Neuro:  Cranial nerves 3-12 are grossly intact,  strength is 5/5 bilaterally and dorsi / plantarflexion is 5/5 bilaterally, follows commands appropriately  Psych:  Good insight, oriented to person, place and time, alert      Medications Reviewed: (see below)    Lab Data Reviewed: (see below)    ______________________________________________________________________    Medications:     Current Facility-Administered Medications   Medication Dose Route Frequency    HYDROmorphone (PF) (DILAUDID) injection 1 mg  1 mg IntraVENous Q3H PRN    sodium chloride (NS) flush 5-10 mL  5-10 mL IntraVENous Q8H    sodium chloride (NS) flush 5-10 mL  5-10 mL IntraVENous PRN    ondansetron (ZOFRAN) injection 4 mg  4 mg IntraVENous Q4H PRN    enoxaparin (LOVENOX) injection 40 mg  40 mg SubCUTAneous Q24H    lactated ringers infusion  125 mL/hr IntraVENous CONTINUOUS    naloxone (NARCAN) injection 0.4 mg  0.4 mg IntraVENous PRN    famotidine (PF) (PEPCID) 20 mg in sodium chloride 0.9 % 10 mL injection  20 mg IntraVENous Q12H    sodium chloride (NS) flush 5-10 mL  5-10 mL IntraVENous Q8H    sodium chloride (NS) flush 5-10 mL  5-10 mL IntraVENous PRN    prochlorperazine (COMPAZINE) injection 5 mg  5 mg IntraVENous Q8H PRN    amitriptyline (ELAVIL) tablet 30 mg  30 mg Oral QHS    temazepam (RESTORIL) capsule 30 mg  30 mg Oral QHS    diazePAM (VALIUM) tablet 5 mg  5 mg Oral Q8H PRN            Lab Review:     Recent Labs      03/08/17   0708  03/07/17   0643   WBC  10.1  13.1*   HGB  14.1  15.0   HCT  44.9  44.4   PLT  165  223     Recent Labs      03/08/17   0708  03/07/17   0643   NA  140  143   K  4.1  4.0   CL  105  106   CO2  27  27   GLU  106*  103*   BUN  10  11   CREA  0.70  0.90   CA  8.9  9.5   ALB   --   3.9   SGOT   --   10*   ALT   --   19     No components found for: [place]         Assessment / Plan:     SBO (small bowel obstruction): recent admission in January for the same. Consult general surgery and start supportive care     Intractable nausea / Vomiting / Abdominal pain: long standing history with multiple admissions. tx SBP as above. Seen by palliative care last hospital stay     Crohns disease: does not appear to be an active flare, no steroids or abx for now. On stelara injections outpt. Consult GI     Anxiety / Depression / Insomnia: continue lyrica        Total time spent with patient: 35 Minutes                  Care Plan discussed with: Patient    Discussed:  Care Plan    Prophylaxis:  Lovenox    Disposition:  Home w/Family           ___________________________________________________    Attending Physician: Willy Richardson MD

## 2017-03-09 NOTE — H&P
Colorectal Surgery Admission History and Physical Examination Note          NAME:  Jose R Kumar   :   1975   MRN:   [id number]     Admission Date: 3/9/2017    Chief Complaint:  Small bowel obstruction. History of Present Illness: The patient is a 50-year-old male with whom I am very familiar. As documented below, I have operated on him 10 times for inflammatory bowel disease (originally ulcerative colitis and later Crohn's disease) and its complications. The last operation that I performed on him included resection of a dysfunctional continent intestinal reservoir and construction of an end-ileostomy on 10/29/2015. He did reasonably well afterwards once he found an ostomy appliance that worked well for him, but over the last few months he has been having chronic recurrent problems with abdominal pain, solid food intolerance, and intermittent obstructive symptoms. He has had multiple hospitalizations, mostly at [facility]), and numerous imaging studies have been performed. Simultaneously, he has also continued to receive treatment for Crohn's disease including Stelara and, when needed, steroids. He has gained weight because of the steroids, but his diet has consisted mostly of liquids. I saw him for an office consultation recently, and we discussed the possible utility of a laparotomy to lyse adhesions and revise the ileostomy. He had presented me with the disc and report from a small bowel follow-through that had been performed at [facility] and that reportedly revealed a fixed area of adhesions that was though likely to be part of what had been causing his symptoms. We had tentatively planned surgery for 3/21/2017 pending my review of the actual images. Earlier this week, I was able to review the images with one of the radiologists here, and I had intended to tell the patient that we could proceed with surgery.     On 3/6/2017, he began to experience an exacerbation of his chronic abdominal pain. It progressed, and he also had nausea and vomiting. His ileostomy output seemed to stop sometime in the early morning on 3/7/2017, and he was admitted to [facility] for evaluation and treatment of what appeared to be a small bowel obstruction. A nasogastric tube was placed and he received parenteral fluids, antiemetics, and conservative doses of Dilaudid. He failed to make progress with this therapy, and because we had already planned an operation it was decided that it owuld be best to transfer him to [facility] [address] [place] [address] so that I could take care of him. Currently, after just having received a dose of Dilaudid, his pain is moderate. The NG tube has been returned to suction (after transport), and he is not currently nauseated. He confirms that no gas or stool has passed from the ileostomy since prior to admission to [facility]. PMH:  Past Medical History:   Diagnosis Date    Anal fistula     Anal stenosis     Crohn's disease ([address]     GERD (gastroesophageal reflux disease)     H/O ulcerative colitis     Symptoms began in 1996. Total proctocolectomy was performed in 2004. Pateint later developed Crohn's disease.  Hiatal hernia     Ileal pouchitis ([address] 5/2004    Nausea & vomiting     Combination of Scopalamine patch & Zofran IV worked well in past    Numbness in right leg     Psychiatric disorder     depression and anxiety    Skin lesion of back 3/17/2014    Syncopal episodes        PSH:  Past Surgical History:   Procedure Laterality Date    ABDOMEN SURGERY PROC UNLISTED  3/25/2004    Total proctocolectomy with creation of ileoanal J-pouch and loop ileostomy; Dr. Gladis Garza.   CHOLECYSTECTOMY  12/2016    Dr. Washington Benz HX GI  5/2/2004    Examination under anesthesia with endoscopic evaluation of ileoanal pouch and placement of drain; Dr. Gladis Garza.   GI  5/25/2004    Ileostomy closure with small bowel resection and anastomosis; Dr. Gladis Garza.      GI 2012    Examination under anesthesia, anal dilatation, anal biopsy, and placement of draining seton; Dr. Mortimer Capes.   GI  7/3/2012    Incision and drainage of perirectal abscess and rigid endoscopy of ileoanal pouch; Dr. Mortimer Capes.   GI  2012    Incision and drainage of perirectal abscess, placement of draining seton, and anal dilatation; Dr. Mortimer Capes.   GI  2013    Repair of anal fistulas with debridement, partial fistulectomy, and flap closure; Dr. Mortimer Capes.   GI  2013    Examination under anesthesia, partial fistulotomy,  and placement of draining setons; Dr. Mortimer Capes.   GI  2013    Anal fistulotomy and application of ACell micromatrix; Dr. Mortimer Capes.   GI  2014    Examination under anesthesia and dilation of anal canal with rigid proctoscopy; Dayanna Sanchez MD.     GI  2015    Creation of Ranelle Dom continent intestinal reservoir (BCIR), abdominoperineal resection of ileoanal J-pouch; lysis of adhesions, and gastrostomy; Matteo Grant. Eliana Adams MD ([place], [place])     GI  2015    Stoma revision; Matteo Grant. Eliana Adams MD ([place], [place])     GI  10/29/2015    Extensive lysis of adhesions, resection of continent intestinal reservoir, repair of serosal tears, and creation of end-ileostomy; Dr. Mortimer Capes.   ORTHOPAEDIC      Left ACL repair       Home Medications:  Prior to Admission Medications   Prescriptions Last Dose Informant Patient Reported? Taking? Ustekinumab (USTEKINAUMAB) 90 mg/mL injection  Self Yes No   Si mg by SubCUTAneous route. Every 8 weeks   Indications: CROHN'S DISEASE   amitriptyline (ELAVIL) 10 mg tablet  Self Yes No   Sig: Take 30 mg by mouth nightly. buPROPion SR (WELLBUTRIN SR) 150 mg SR tablet  Self Yes No   Sig: Take 150 mg by mouth two (2) times a day.    cyanocobalamin (VITAMIN B-12) 1,000 mcg/mL injection  Self Yes No   Si,000 mcg by IntraMUSCular route every seven (7) days. Indications: Once weekly   cyclobenzaprine (FLEXERIL) 10 mg tablet  Self Yes No   Sig: Take 10 mg by mouth two (2) times daily as needed for Muscle Spasm(s). diazepam (VALIUM) 5 mg tablet  Self Yes No   Sig: Take 5 mg by mouth every eight (8) hours as needed (bowel relaxation). dicyclomine (BENTYL) 10 mg capsule  Self Yes No   Sig: Take 10 mg by mouth every six (6) hours as needed. ergocalciferol (ERGOCALCIFEROL) 50,000 unit capsule  Self Yes No   Sig: Take 50,000 Units by mouth every seven (7) days. mirtazapine (REMERON) 15 mg tablet  Self Yes No   Sig: Take 15 mg by mouth nightly. omeprazole (PRILOSEC) 40 mg capsule  Self Yes No   Sig: Take 40 mg by mouth Daily (before dinner). ondansetron hcl (ZOFRAN, AS HYDROCHLORIDE,) 8 mg tablet  Self Yes No   Sig: Take 8 mg by mouth every eight (8) hours as needed for Nausea. oxyCODONE IR (ROXICODONE) 10 mg tab immediate release tablet  Self Yes No   Sig: Take 10 mg by mouth two (2) times daily as needed. predniSONE (DELTASONE) 10 mg tablet  Self Yes No   Sig: Take 15 mg by mouth daily. Prednisone taper  X 7 days started 3/4/17   predniSONE (DELTASONE) 10 mg tablet   Yes No   Sig: Take 10 mg by mouth daily. Prednisone taper  X 7 days to start 3/11/17   predniSONE (DELTASONE) 10 mg tablet   Yes No   Sig: Take 5 mg by mouth daily. Prednisone taper  X 7 days to start 3/18/17   pregabalin (LYRICA) 100 mg capsule  Self Yes No   Sig: Take 100 mg by mouth three (3) times daily. prochlorperazine (COMPAZINE) 10 mg tablet  Self Yes No   Sig: Take 10 mg by mouth every six (6) hours as needed for Nausea. promethazine (PHENERGAN) 25 mg tablet  Self Yes No   Sig: Take 25 mg by mouth every six (6) hours as needed for Nausea. temazepam (RESTORIL) 30 mg capsule  Self Yes No   Sig: Take 30 mg by mouth nightly. traMADol (ULTRAM) 50 mg tablet  Self Yes No   Sig: Take 50 mg by mouth three (3) times daily as needed for Pain.       Facility-Administered Medications: None       Hospital Medications:  Current Facility-Administered Medications   Medication Dose Route Frequency    sodium chloride (NS) flush 5-10 mL  5-10 mL IntraVENous Q8H    sodium chloride (NS) flush 5-10 mL  5-10 mL IntraVENous PRN    lactated ringers infusion  125 mL/hr IntraVENous CONTINUOUS    naloxone (NARCAN) injection 0.4 mg  0.4 mg IntraVENous PRN    prochlorperazine (COMPAZINE) injection 5 mg  5 mg IntraVENous Q8H PRN    diphenhydrAMINE (BENADRYL) injection 12.5 mg  12.5 mg IntraVENous Q4H PRN    sodium chloride 0.9 % bolus infusion 500 mL  500 mL IntraVENous ONCE    hydrocortisone Sod Succ (PF) (SOLU-CORTEF) injection 100 mg  100 mg IntraVENous ONCE    HYDROmorphone (PF) (DILAUDID) injection 1 mg  1 mg IntraVENous Q2H PRN       Allergies: Allergies   Allergen Reactions    Remicade [Infliximab] Anaphylaxis and Shortness of Breath    Bactrim [Sulfamethoprim Ds] Other (comments)     Increased GI distress.     Nsaids (Non-Steroidal Anti-Inflammatory Drug) Other (comments)     Stomach ulcers       Family History:  Family History   Problem Relation Age of Onset    Cancer Father     Asthma Neg Hx     Diabetes Neg Hx     Heart Disease Neg Hx     Hypertension Neg Hx     Stroke Neg Hx     Malignant Hyperthermia Neg Hx     Pseudocholinesterase Deficiency Neg Hx     Delayed Awakening Neg Hx     Post-op Nausea/Vomiting Neg Hx        Social History:  Social History   Substance Use Topics    Smoking status: Former Smoker     Packs/day: 0.00     Years: 7.00     Quit date: 1/14/2017    Smokeless tobacco: Never Used    Alcohol use No       Review of Systems:    Symptom Y/N Comments  Symptom Y/N Comments   Fever/Chills    Chest Pain     Cough    Abdominal Pain     Sputum    Joint Pain     SOB/CEDILLO    Pruritis/Rash     Nausea/vomit    Tolerating PT/OT     Diarrhea    Tolerating Diet     Constipation    Other       Could NOT obtain due to:        Objective:   Patient Vitals for the past 8 hrs:   BP Temp Pulse Resp SpO2   03/09/17 1522 133/79 98.7 °F (37.1 °C) (!) 117 18 96 %   03/09/17 1326 127/85 98.3 °F (36.8 °C) (!) 130 19 96 %             PHYSICAL EXAMINATION:    GENERAL:  Uncomfortable. HEENT:  Anicteric. LUNGS:  Clear to auscultation bilaterally. HEART:  Regular tachycardia. ABDOMEN:  Somewhat distended. Soft. Moderately tender diffusely with greatest tenderness in the right lower quadrant. Ileostomy retracted but viable. Digitation of the ileostomy reveals no obstruction or impaction. Midline scar. EXTREMITIES:  No edema. NEURO:  Alert and oriented. Data Review     Recent Labs      03/09/17   1011  03/08/17   0708   WBC  6.7  10.1   HGB  14.5  14.1   HCT  44.6  44.9   PLT  167  165     Recent Labs      03/09/17   1011  03/08/17   0708   NA  137  140   K  4.1  4.1   CL  100  105   CO2  27  27   BUN  14  10   CREA  0.58*  0.70   GLU  102*  106*   CA  8.7  8.9     Recent Labs      03/07/17   0643   SGOT  10*   AP  106   TP  7.5   ALB  3.9   GLOB  3.6   LPSE  178     No results for input(s): INR, PTP, APTT in the last 72 hours. No lab exists for component: INREXT                    Assessment and Plan:      The patient has a small bowel obstruction (high grade partial versus complete) that will require surgical treatment. Despite his pain and the absence of ileostomy output, I do not think that he has intestinal ischemia or a perforation. I do not think that there is an absolute indication for surgery to be performed tonight, and I explained that I think that it would be best to medically optimize him now and perform surgery tomorrow instead. The plan will include the following:    Parenteral fluids. Strict I&O. NG decompression. Stress dose steroids. Stress ulcer prophylaxis. DVT prophylaxis. Abdominal radiographs. PICC placement for TPN. WOCN consult for alternate ostomy site marking.     To OR tomorrow afternoon for exploration, lysis of adhesions, possible bowel resection, and revision of ileostomy.       Active Problems:    Small bowel obstruction ([facility] (3/9/2017)

## 2017-03-09 NOTE — PROGRESS NOTES
Bedside and Verbal shift change report given to Aakash Pederson RN (oncoming nurse) by SOPHIA White (offgoing nurse).   Report given with SBAR, Kardex, OR Summary, Intake/Output, MAR and Recent Results

## 2017-03-10 LAB
ALBUMIN SERPL BCP-MCNC: 2.6 G/DL (ref 3.5–5)
ALBUMIN/GLOB SERPL: 0.8 {RATIO} (ref 1.1–2.2)
ALP SERPL-CCNC: 117 U/L (ref 45–117)
ALT SERPL-CCNC: 27 U/L (ref 12–78)
ANION GAP BLD CALC-SCNC: 10 MMOL/L (ref 5–15)
AST SERPL W P-5'-P-CCNC: 9 U/L (ref 15–37)
BASOPHILS # BLD AUTO: 0 K/UL (ref 0–0.1)
BASOPHILS # BLD: 0 % (ref 0–1)
BILIRUB SERPL-MCNC: 0.7 MG/DL (ref 0.2–1)
BUN SERPL-MCNC: 11 MG/DL (ref 6–20)
BUN/CREAT SERPL: 20 (ref 12–20)
CALCIUM SERPL-MCNC: 8.5 MG/DL (ref 8.5–10.1)
CHLORIDE SERPL-SCNC: 96 MMOL/L (ref 97–108)
CO2 SERPL-SCNC: 30 MMOL/L (ref 21–32)
CREAT SERPL-MCNC: 0.54 MG/DL (ref 0.7–1.3)
DIFFERENTIAL METHOD BLD: ABNORMAL
EOSINOPHIL # BLD: 0 K/UL (ref 0–0.4)
EOSINOPHIL NFR BLD: 0 % (ref 0–7)
ERYTHROCYTE [DISTWIDTH] IN BLOOD BY AUTOMATED COUNT: 13 % (ref 11.5–14.5)
GLOBULIN SER CALC-MCNC: 3.3 G/DL (ref 2–4)
GLUCOSE BLD STRIP.AUTO-MCNC: 104 MG/DL (ref 65–100)
GLUCOSE BLD STRIP.AUTO-MCNC: 113 MG/DL (ref 65–100)
GLUCOSE BLD STRIP.AUTO-MCNC: 165 MG/DL (ref 65–100)
GLUCOSE SERPL-MCNC: 121 MG/DL (ref 65–100)
HCT VFR BLD AUTO: 38.4 % (ref 36.6–50.3)
HGB BLD-MCNC: 12.9 G/DL (ref 12.1–17)
LYMPHOCYTES # BLD AUTO: 5 % (ref 12–49)
LYMPHOCYTES # BLD: 0.2 K/UL (ref 0.8–3.5)
MAGNESIUM SERPL-MCNC: 1.6 MG/DL (ref 1.6–2.4)
MCH RBC QN AUTO: 30.5 PG (ref 26–34)
MCHC RBC AUTO-ENTMCNC: 33.6 G/DL (ref 30–36.5)
MCV RBC AUTO: 90.8 FL (ref 80–99)
MONOCYTES # BLD: 0.3 K/UL (ref 0–1)
MONOCYTES NFR BLD AUTO: 6 % (ref 5–13)
NEUTS BAND NFR BLD MANUAL: 11 % (ref 0–6)
NEUTS SEG # BLD: 4.2 K/UL (ref 1.8–8)
NEUTS SEG NFR BLD AUTO: 78 % (ref 32–75)
PHOSPHATE SERPL-MCNC: 3.6 MG/DL (ref 2.6–4.7)
PLATELET # BLD AUTO: 158 K/UL (ref 150–400)
PLATELET COMMENTS,PCOM: ABNORMAL
POTASSIUM SERPL-SCNC: 3.8 MMOL/L (ref 3.5–5.1)
PROT SERPL-MCNC: 5.9 G/DL (ref 6.4–8.2)
RBC # BLD AUTO: 4.23 M/UL (ref 4.1–5.7)
RBC MORPH BLD: ABNORMAL
SERVICE CMNT-IMP: ABNORMAL
SODIUM SERPL-SCNC: 136 MMOL/L (ref 136–145)
WBC # BLD AUTO: 4.7 K/UL (ref 4.1–11.1)
WBC MORPH BLD: ABNORMAL

## 2017-03-10 PROCEDURE — 85025 COMPLETE CBC W/AUTO DIFF WBC: CPT | Performed by: COLON & RECTAL SURGERY

## 2017-03-10 PROCEDURE — 84100 ASSAY OF PHOSPHORUS: CPT | Performed by: COLON & RECTAL SURGERY

## 2017-03-10 PROCEDURE — 76937 US GUIDE VASCULAR ACCESS: CPT

## 2017-03-10 PROCEDURE — 74011000250 HC RX REV CODE- 250: Performed by: COLON & RECTAL SURGERY

## 2017-03-10 PROCEDURE — 82962 GLUCOSE BLOOD TEST: CPT

## 2017-03-10 PROCEDURE — 3E0436Z INTRODUCTION OF NUTRITIONAL SUBSTANCE INTO CENTRAL VEIN, PERCUTANEOUS APPROACH: ICD-10-PCS | Performed by: COLON & RECTAL SURGERY

## 2017-03-10 PROCEDURE — 65270000032 HC RM SEMIPRIVATE

## 2017-03-10 PROCEDURE — 02HV33Z INSERTION OF INFUSION DEVICE INTO SUPERIOR VENA CAVA, PERCUTANEOUS APPROACH: ICD-10-PCS | Performed by: COLON & RECTAL SURGERY

## 2017-03-10 PROCEDURE — 74011250636 HC RX REV CODE- 250/636: Performed by: COLON & RECTAL SURGERY

## 2017-03-10 PROCEDURE — C1751 CATH, INF, PER/CENT/MIDLINE: HCPCS

## 2017-03-10 PROCEDURE — 77030019563 HC DEV ATTCH FEED HOLL -A

## 2017-03-10 PROCEDURE — 80053 COMPREHEN METABOLIC PANEL: CPT | Performed by: COLON & RECTAL SURGERY

## 2017-03-10 PROCEDURE — 77030020365 HC SOL INJ SOD CL 0.9% 50ML

## 2017-03-10 PROCEDURE — 74011250637 HC RX REV CODE- 250/637: Performed by: COLON & RECTAL SURGERY

## 2017-03-10 PROCEDURE — 36415 COLL VENOUS BLD VENIPUNCTURE: CPT | Performed by: COLON & RECTAL SURGERY

## 2017-03-10 PROCEDURE — 83735 ASSAY OF MAGNESIUM: CPT | Performed by: COLON & RECTAL SURGERY

## 2017-03-10 PROCEDURE — 77030020847 HC STATLOK BARD -A

## 2017-03-10 PROCEDURE — 77030018719 HC DRSG PTCH ANTIMIC J&J -A

## 2017-03-10 PROCEDURE — 74011000258 HC RX REV CODE- 258: Performed by: COLON & RECTAL SURGERY

## 2017-03-10 RX ORDER — SODIUM CHLORIDE 0.9 % (FLUSH) 0.9 %
20 SYRINGE (ML) INJECTION AS NEEDED
Status: DISCONTINUED | OUTPATIENT
Start: 2017-03-10 | End: 2017-03-14

## 2017-03-10 RX ORDER — MAGNESIUM SULFATE 100 %
4 CRYSTALS MISCELLANEOUS AS NEEDED
Status: DISCONTINUED | OUTPATIENT
Start: 2017-03-10 | End: 2017-03-21 | Stop reason: HOSPADM

## 2017-03-10 RX ORDER — SODIUM CHLORIDE 0.9 % (FLUSH) 0.9 %
10 SYRINGE (ML) INJECTION EVERY 24 HOURS
Status: DISCONTINUED | OUTPATIENT
Start: 2017-03-10 | End: 2017-03-17

## 2017-03-10 RX ORDER — SODIUM CHLORIDE 0.9 % (FLUSH) 0.9 %
10 SYRINGE (ML) INJECTION EVERY 8 HOURS
Status: DISCONTINUED | OUTPATIENT
Start: 2017-03-10 | End: 2017-03-14

## 2017-03-10 RX ORDER — BACITRACIN 500 UNIT/G
1 PACKET (EA) TOPICAL AS NEEDED
Status: DISCONTINUED | OUTPATIENT
Start: 2017-03-10 | End: 2017-03-21 | Stop reason: HOSPADM

## 2017-03-10 RX ORDER — SODIUM CHLORIDE 0.9 % (FLUSH) 0.9 %
10 SYRINGE (ML) INJECTION AS NEEDED
Status: DISCONTINUED | OUTPATIENT
Start: 2017-03-10 | End: 2017-03-14

## 2017-03-10 RX ORDER — INSULIN LISPRO 100 [IU]/ML
INJECTION, SOLUTION INTRAVENOUS; SUBCUTANEOUS EVERY 6 HOURS
Status: DISCONTINUED | OUTPATIENT
Start: 2017-03-10 | End: 2017-03-21 | Stop reason: HOSPADM

## 2017-03-10 RX ORDER — ONDANSETRON 2 MG/ML
4 INJECTION INTRAMUSCULAR; INTRAVENOUS
Status: DISCONTINUED | OUTPATIENT
Start: 2017-03-10 | End: 2017-03-21 | Stop reason: HOSPADM

## 2017-03-10 RX ORDER — DEXTROSE 50 % IN WATER (D50W) INTRAVENOUS SYRINGE
12.5-25 AS NEEDED
Status: DISCONTINUED | OUTPATIENT
Start: 2017-03-10 | End: 2017-03-21 | Stop reason: HOSPADM

## 2017-03-10 RX ADMIN — FAMOTIDINE 20 MG: 10 INJECTION, SOLUTION INTRAVENOUS at 20:28

## 2017-03-10 RX ADMIN — HYDROMORPHONE HYDROCHLORIDE 1 MG: 1 INJECTION, SOLUTION INTRAMUSCULAR; INTRAVENOUS; SUBCUTANEOUS at 00:27

## 2017-03-10 RX ADMIN — FAMOTIDINE 20 MG: 10 INJECTION, SOLUTION INTRAVENOUS at 09:22

## 2017-03-10 RX ADMIN — TEMAZEPAM 30 MG: 15 CAPSULE ORAL at 22:48

## 2017-03-10 RX ADMIN — Medication 10 ML: at 14:14

## 2017-03-10 RX ADMIN — HYDROMORPHONE HYDROCHLORIDE 1 MG: 1 INJECTION, SOLUTION INTRAMUSCULAR; INTRAVENOUS; SUBCUTANEOUS at 09:22

## 2017-03-10 RX ADMIN — Medication 10 ML: at 19:11

## 2017-03-10 RX ADMIN — HYDROMORPHONE HYDROCHLORIDE 1 MG: 1 INJECTION, SOLUTION INTRAMUSCULAR; INTRAVENOUS; SUBCUTANEOUS at 19:56

## 2017-03-10 RX ADMIN — HYDROMORPHONE HYDROCHLORIDE 1 MG: 1 INJECTION, SOLUTION INTRAMUSCULAR; INTRAVENOUS; SUBCUTANEOUS at 15:48

## 2017-03-10 RX ADMIN — SODIUM CHLORIDE, SODIUM LACTATE, POTASSIUM CHLORIDE, AND CALCIUM CHLORIDE 125 ML/HR: 600; 310; 30; 20 INJECTION, SOLUTION INTRAVENOUS at 14:18

## 2017-03-10 RX ADMIN — Medication 10 ML: at 15:49

## 2017-03-10 RX ADMIN — PROCHLORPERAZINE EDISYLATE 5 MG: 5 INJECTION INTRAMUSCULAR; INTRAVENOUS at 17:34

## 2017-03-10 RX ADMIN — HYDROCORTISONE SODIUM SUCCINATE 100 MG: 100 INJECTION, POWDER, FOR SOLUTION INTRAMUSCULAR; INTRAVENOUS at 22:49

## 2017-03-10 RX ADMIN — HYDROCORTISONE SODIUM SUCCINATE 100 MG: 100 INJECTION, POWDER, FOR SOLUTION INTRAMUSCULAR; INTRAVENOUS at 15:53

## 2017-03-10 RX ADMIN — I.V. FAT EMULSION 500 ML: 20 EMULSION INTRAVENOUS at 19:11

## 2017-03-10 RX ADMIN — ASCORBIC ACID: 500 INJECTION, SOLUTION INTRAMUSCULAR; INTRAVENOUS; SUBCUTANEOUS at 19:10

## 2017-03-10 RX ADMIN — Medication 10 ML: at 06:07

## 2017-03-10 RX ADMIN — HYDROMORPHONE HYDROCHLORIDE 1 MG: 1 INJECTION, SOLUTION INTRAMUSCULAR; INTRAVENOUS; SUBCUTANEOUS at 22:05

## 2017-03-10 RX ADMIN — HYDROCORTISONE SODIUM SUCCINATE 100 MG: 100 INJECTION, POWDER, FOR SOLUTION INTRAMUSCULAR; INTRAVENOUS at 06:06

## 2017-03-10 RX ADMIN — DIAZEPAM 5 MG: 5 TABLET ORAL at 14:13

## 2017-03-10 RX ADMIN — HYDROMORPHONE HYDROCHLORIDE 1 MG: 1 INJECTION, SOLUTION INTRAMUSCULAR; INTRAVENOUS; SUBCUTANEOUS at 06:07

## 2017-03-10 RX ADMIN — HYDROMORPHONE HYDROCHLORIDE 1 MG: 1 INJECTION, SOLUTION INTRAMUSCULAR; INTRAVENOUS; SUBCUTANEOUS at 02:24

## 2017-03-10 RX ADMIN — HYDROMORPHONE HYDROCHLORIDE 1 MG: 1 INJECTION, SOLUTION INTRAMUSCULAR; INTRAVENOUS; SUBCUTANEOUS at 17:34

## 2017-03-10 RX ADMIN — ONDANSETRON 4 MG: 2 INJECTION INTRAMUSCULAR; INTRAVENOUS at 11:21

## 2017-03-10 RX ADMIN — Medication 10 ML: at 22:05

## 2017-03-10 RX ADMIN — HYDROMORPHONE HYDROCHLORIDE 1 MG: 1 INJECTION, SOLUTION INTRAMUSCULAR; INTRAVENOUS; SUBCUTANEOUS at 11:21

## 2017-03-10 RX ADMIN — HYDROMORPHONE HYDROCHLORIDE 1 MG: 1 INJECTION, SOLUTION INTRAMUSCULAR; INTRAVENOUS; SUBCUTANEOUS at 13:33

## 2017-03-10 RX ADMIN — PROCHLORPERAZINE EDISYLATE 5 MG: 5 INJECTION INTRAMUSCULAR; INTRAVENOUS at 06:06

## 2017-03-10 RX ADMIN — SODIUM CHLORIDE, SODIUM LACTATE, POTASSIUM CHLORIDE, AND CALCIUM CHLORIDE 125 ML/HR: 600; 310; 30; 20 INJECTION, SOLUTION INTRAVENOUS at 06:20

## 2017-03-10 NOTE — PROCEDURES
PICC Placement Note    PRE-PROCEDURE VERIFICATION  Correct Procedure: yes  Correct Site:  yes  Temperature: Temp: 97.9 °F (36.6 °C), Temperature Source: Temp Source: Oral  Recent Labs      03/10/17   0231   BUN  11   CREA  0.54*   PLT  158   WBC  4.7     Allergies: Remicade [infliximab]; Bactrim [sulfamethoprim ds]; and Nsaids (non-steroidal anti-inflammatory drug)  Education materials, including PICC Booklet, for PICC Care given to patient: yes. See Patient Education activity for further details. PROCEDURE DETAIL  A double lumen PICC line was started for TPN. The following documentation is in addition to the PICC properties in the lines/airways flowsheet :  Lot #:  [id number]  Was xylocaine 1% used intradermally:  yes  Catheter Length: 42 (cm)  Vein Selection for PICC:right basilic  Central Line Bundle followed yes  Complication Related to Insertion: none    The placement was verified by ECG technology: The  results show the tip location is on the left side and the tip is in the  superior vena cava. Line is okay to use. Report given to nurse.     Isaias Corona, RN, BSN, [address] [facility]  [facility]

## 2017-03-10 NOTE — PROGRESS NOTES
NUTRITION COMPLETE ASSESSMENT    RECOMMENDATIONS:   1. Tomorrow increase TPN to:   5%AA, D20 @ 90ml/hr + 500ml, 20% lipids 3x/week  2. Daily weights while on TPN  3. Add Ensure Clear -> Ensure Enlive as diet advanced per surgery     Interventions/Plan:   Food/Nutrient Delivery:          Initiate parenteral nutrition    Assessment:   Reason for Assessment: [x]Other: new TPN    Diet: NPO   TPN: (to start tonight): 5%AA, D20 @ 42ml/hr (with 100mg thiamin + vit C + zinc) + 500ml, 20% lipids 3x/week  Nutritionally Significant Medications: [x] Reviewed & Includes: pepcid, solu-cotref, SSI, LR @ 125ml/hr; PRN: zofran, compazine    Pre-Hospitalization:  Usual Appetite: Fair  Diet at Home: liquids, low fiber  Vitamins/Supplements: Yes (Ensure/Boost)    Subjective:  I had been on TPN and had a PEG for about 30 days about 5 years ago when I was [place]. Objective:  Pt admitted for abd pain. PMHx: Crohn's dx, s/p total colectomy 2004 with end ileostomy, SB resectio, anal fistula, GERD, depression, anxiety. Extensive GI hx noted with 10 previous surgeries - see surgical hx. SBO noted, plans for surgery for LUCIEN and possible SB resection with revision of ileostomy. Wt trending between 190-195# but steroids rx could be contributing to some of weight per surgery notes and pt reports. Pt visited today. He reports having been up to 200# but since tapering of steroids has lost some wt. Low of 180# noted. Will adjust diet as need to low fiber/liquids diet as tolerated. Likes Boost best but agreeable to Ensure and Ensure Clear. Does well with saltines, grits/oatmeal, white meat. Previous TPN/PEG tube about 5 years ago noted. No questions at this time. On a liquid diet for 1-2 weeks prior to admit d/t poor tolerance of solids. PICC placed today with plans to start TPN. Agree with this plan since pt likely to be NPO/clears x several days with sub-optimal nutrition PTA. As ordered TPN provides: 1308kcal, 50g protein.  Happy to see Zn, Vit C and thiamine included. Recommend increase tomorrow to goal rate of: 5%AA, D20 @ 90ml/hr + 500ml, 20% lipids 3x/week. Will provide: 2329kcal, 108g protein, 432g CHO (GIR = 3.48mg/kg/min). Meets 100% energy and protein needs. As diet advanced please add supplements (Ensure Clear -> Ensure Enlive). Will follow for diet progression per surgery after return from OR. Estimated Nutrition Needs:   Kcals/day: 6140 Kcals/day (2162-2342kcal)  Protein: 103 g (103-120g (1.2-1.4g/kg))  Fluid: 2342 ml (1ml/kcal)  Based On: [place] [address] [place] (x 1.3-1.4)  Weight Used: Actual wt (86.kg (also UBW))    Pt expected to meet estimated nutrient needs:  [x]   Yes  (with TPN at goal)   []  No [] Unable to predict at this time  Nutrition Diagnosis:   1. Inadequate oral food/beverage intake related to altered GI fx as evidenced by IBD with SBO, NPO w/ TPN to start    Goals:     PN to meet at least 90% needs in 1-2days      Monitoring & Evaluation:    - Enteral/parenteral nutrition intake   - Weight/weight change, GI, Electrolyte and renal profile   -      Previous Nutrition Goals Met:   N/A  Previous Recommendations:    N/A    Education & Discharge Needs:   [] None Identified   [] Identified and addressed    [x] Participated in care plan, discharge planning, and/or interdisciplinary rounds        Cultural, Adventist and ethnic food preferences identified: None    Skin Integrity: [x]Intact  []Other  Edema: [x]None []Other  Last BM: 3/10  Food Allergies: [x]None []Other  Diet Restrictions: Food Intolerance: high fiber  Cultural/Episcopalian Preference(s): None     Anthropometrics:    Weight Loss Metrics 3/9/2017 3/7/2017 2/25/2017 2/21/2017 2/6/2017 1/26/2017 1/19/2017   Today's Wt 190 lb 4.8 oz 199 lb 4.7 oz 200 lb 195 lb 195 lb 195 lb 5.2 oz 194 lb 10.7 oz   BMI 25.81 kg/m2 27.03 kg/m2 27.12 kg/m2 26.45 kg/m2 26.45 kg/m2 26.49 kg/m2 26.4 kg/m2         Height: 6' (182.9 cm),    Body mass index is 25.81 kg/(m^2).   IBW : 80.7 kg (178 lb), % IBW (Calculated): 106.91 %  Usual Body Weight: 86.2 kg (190 lb),      Labs:    Lab Results   Component Value Date/Time    Sodium 136 03/10/2017 02:31 AM    Potassium 3.8 03/10/2017 02:31 AM    Chloride 96 03/10/2017 02:31 AM    CO2 30 03/10/2017 02:31 AM    Glucose 121 03/10/2017 02:31 AM    BUN 11 03/10/2017 02:31 AM    Creatinine 0.54 03/10/2017 02:31 AM    Calcium 8.5 03/10/2017 02:31 AM    Magnesium 1.6 03/10/2017 02:31 AM    Phosphorus 3.6 03/10/2017 02:31 AM    Albumin 2.6 03/10/2017 02:31 AM       Maximiliano Abdi RD

## 2017-03-10 NOTE — PROGRESS NOTES
General Daily Progress Note    Admission Date: 3/9/2017  Hospital Day 2  Post-Op Day Not Applicable  Subjective:   Pain has been about the same, but adequately managed with the Dilaudid. Objective:   Patient Vitals for the past 24 hrs:   BP Temp Pulse Resp SpO2 Weight   03/10/17 0438 114/67 98.2 °F (36.8 °C) (!) 104 16 94 % -   03/10/17 0026 124/70 98 °F (36.7 °C) (!) 104 18 92 % -   03/09/17 1939 121/79 98.3 °F (36.8 °C) (!) 103 18 - -   03/09/17 1522 133/79 98.7 °F (37.1 °C) (!) 117 18 96 % 86.3 kg (190 lb 4.8 oz)   03/09/17 1326 127/85 98.3 °F (36.8 °C) (!) 130 19 96 % -        03/08 1901 - 03/10 0700  In: 1081.3 [I.V.:1081.3]  Out: 1555 [Urine:850]      Physical Examination:  General Appearance:  Uncomfortable and tired. HEENT:  NG tube draining bilious fluid. Abdomen:  Somewhat distended, but soft. Tenderness about the same as yesterday. Ileostomy expelling thick stool. Alternate ostomy site marks in place. Extremities:  Pneumatic compression stockings in use. Data Review   Recent Results (from the past 24 hour(s))   CBC WITH AUTOMATED DIFF    Collection Time: 03/09/17 10:11 AM   Result Value Ref Range    WBC 6.7 4.1 - 11.1 K/uL    RBC 4.79 4.10 - 5.70 M/uL    HGB 14.5 12.1 - 17.0 g/dL    HCT 44.6 36.6 - 50.3 %    MCV 93.1 80.0 - 99.0 FL    MCH 30.3 26.0 - 34.0 PG    MCHC 32.5 30.0 - 36.5 g/dL    RDW 13.3 11.5 - 14.5 %    PLATELET 733 553 - 230 K/uL    NEUTROPHILS 66 32 - 75 %    BAND NEUTROPHILS 5 %    LYMPHOCYTES 13 12 - 49 %    MONOCYTES 15 (H) 5 - 13 %    EOSINOPHILS 1 0 - 7 %    BASOPHILS 0 0 - 1 %    ABS. NEUTROPHILS 4.7 1.8 - 8.0 K/UL    ABS. LYMPHOCYTES 0.9 0.8 - 3.5 K/UL    ABS. MONOCYTES 1.0 0.0 - 1.0 K/UL    ABS. EOSINOPHILS 0.1 0.0 - 0.4 K/UL    ABS.  BASOPHILS 0.0 0.0 - 0.1 K/UL    DF MANUAL      RBC COMMENTS NORMOCYTIC, NORMOCHROMIC     METABOLIC PANEL, BASIC    Collection Time: 03/09/17 10:11 AM   Result Value Ref Range    Sodium 137 136 - 145 mmol/L    Potassium 4.1 3.5 - 5.1 mmol/L    Chloride 100 97 - 108 mmol/L    CO2 27 21 - 32 mmol/L    Anion gap 10 5 - 15 mmol/L    Glucose 102 (H) 65 - 100 mg/dL    BUN 14 6 - 20 MG/DL    Creatinine 0.58 (L) 0.70 - 1.30 MG/DL    BUN/Creatinine ratio 24 (H) 12 - 20      GFR est AA >60 >60 ml/min/1.73m2    GFR est non-AA >60 >60 ml/min/1.73m2    Calcium 8.7 8.5 - 10.1 MG/DL   MAGNESIUM    Collection Time: 03/09/17 10:11 AM   Result Value Ref Range    Magnesium 1.5 (L) 1.6 - 2.4 mg/dL   LACTIC ACID, PLASMA    Collection Time: 03/09/17  3:17 PM   Result Value Ref Range    Lactic acid 0.9 0.4 - 2.0 MMOL/L   TYPE & SCREEN    Collection Time: 03/09/17  3:17 PM   Result Value Ref Range    Crossmatch Expiration 03/12/2017     ABO/Rh(D) A POSITIVE     Antibody screen NEG    CBC WITH AUTOMATED DIFF    Collection Time: 03/10/17  2:31 AM   Result Value Ref Range    WBC 4.7 4.1 - 11.1 K/uL    RBC 4.23 4. 10 - 5.70 M/uL    HGB 12.9 12.1 - 17.0 g/dL    HCT 38.4 36.6 - 50.3 %    MCV 90.8 80.0 - 99.0 FL    MCH 30.5 26.0 - 34.0 PG    MCHC 33.6 30.0 - 36.5 g/dL    RDW 13.0 11.5 - 14.5 %    PLATELET 385 646 - 798 K/uL    NEUTROPHILS 78 (H) 32 - 75 %    BAND NEUTROPHILS 11 (H) 0 - 6 %    LYMPHOCYTES 5 (L) 12 - 49 %    MONOCYTES 6 5 - 13 %    EOSINOPHILS 0 0 - 7 %    BASOPHILS 0 0 - 1 %    ABS. NEUTROPHILS 4.2 1.8 - 8.0 K/UL    ABS. LYMPHOCYTES 0.2 (L) 0.8 - 3.5 K/UL    ABS. MONOCYTES 0.3 0.0 - 1.0 K/UL    ABS. EOSINOPHILS 0.0 0.0 - 0.4 K/UL    ABS.  BASOPHILS 0.0 0.0 - 0.1 K/UL    DF MANUAL      PLATELET COMMENTS LARGE PLATELETS      RBC COMMENTS ANISOCYTOSIS  1+        WBC COMMENTS REACTIVE LYMPHS     METABOLIC PANEL, COMPREHENSIVE    Collection Time: 03/10/17  2:31 AM   Result Value Ref Range    Sodium 136 136 - 145 mmol/L    Potassium 3.8 3.5 - 5.1 mmol/L    Chloride 96 (L) 97 - 108 mmol/L    CO2 30 21 - 32 mmol/L    Anion gap 10 5 - 15 mmol/L    Glucose 121 (H) 65 - 100 mg/dL    BUN 11 6 - 20 MG/DL    Creatinine 0.54 (L) 0.70 - 1.30 MG/DL    BUN/Creatinine ratio 20 12 - 20      GFR est AA >60 >60 ml/min/1.73m2    GFR est non-AA >60 >60 ml/min/1.73m2    Calcium 8.5 8.5 - 10.1 MG/DL    Bilirubin, total 0.7 0.2 - 1.0 MG/DL    ALT (SGPT) 27 12 - 78 U/L    AST (SGOT) 9 (L) 15 - 37 U/L    Alk. phosphatase 117 45 - 117 U/L    Protein, total 5.9 (L) 6.4 - 8.2 g/dL    Albumin 2.6 (L) 3.5 - 5.0 g/dL    Globulin 3.3 2.0 - 4.0 g/dL    A-G Ratio 0.8 (L) 1.1 - 2.2     MAGNESIUM    Collection Time: 03/10/17  2:31 AM   Result Value Ref Range    Magnesium 1.6 1.6 - 2.4 mg/dL   PHOSPHORUS    Collection Time: 03/10/17  2:31 AM   Result Value Ref Range    Phosphorus 3.6 2.6 - 4.7 MG/DL           Assessment:     Active Problems:    Small bowel obstruction (HCC) (3/9/2017)    Hemodynamically stable. The ileostomy has output for the first time this week, and the abdominal examination is no worse. The only parameter that has worsened is the left shift. The WBC is within normal limits, but the bands are 11%. I explained to the patient that I think surgery should be performed during this hospitalization but that it might be best not to do it today since the obstruction seems to be opening up a bit. Waiting will hopefully allow for greater intestinal decompression as well as some metabolic improvement. Plan:   Continue NG tube and NPO except medications and occasional ice chips. Begin TPN and lipids this evening. Repeat labs tomorrow. PICC placement. Continue parenteral steroids. Surgery now rescheduled for 12:30 PM on 3/14/2017, but if there is significant decompensation between now and then it might need to be performed sooner.

## 2017-03-10 NOTE — PROGRESS NOTES
Reviewed medical chart; met with the patient at the bedside. Patient has a history of anal fistula, anal stenosis, Crohn's disease, GERD, H/O ulcerative colitis, hiatal hernia, ileal pouchitis, N/V, numbness of right leg, depression and anxiety, skin lesion of back, and syncopal episodes. Patient is being seen at this visit for small bowel obstruction. Patient lives with his wife and teenage sons (ages 15 and 12). He owns a cane that he uses at times. He has a PCP - Dr. Jovanny Kunz and gets his prescriptions at [facility] on [facility]. 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 Patient explained that he has had medical issues since November; this  does note 8 admissions within the past 6 months. Care Management will continue to follow his disposition. JEREMIAH Mary     Care Management Interventions  PCP Verified by CM:  Yes  Palliative Care Consult (Criteria: CHF and RRAT>21): No  Mode of Transport at Discharge:  (Patient will travel via car at discharge.  )  MyChart Signup: No  Discharge Durable Medical Equipment: No  Physical Therapy Consult: No  Occupational Therapy Consult: No  Speech Therapy Consult: No  Current Support Network: Lives with Spouse  Confirm Follow Up Transport:  (Patient will travel via car at discharge.  )  Plan discussed with Pt/Family/Caregiver: Yes  Freedom of Choice Offered: Yes  Discharge Location  Discharge Placement: Home

## 2017-03-11 LAB
ANION GAP BLD CALC-SCNC: 8 MMOL/L (ref 5–15)
BASOPHILS # BLD AUTO: 0 K/UL (ref 0–0.1)
BASOPHILS # BLD: 0 % (ref 0–1)
BUN SERPL-MCNC: 6 MG/DL (ref 6–20)
BUN/CREAT SERPL: 12 (ref 12–20)
CALCIUM SERPL-MCNC: 8.2 MG/DL (ref 8.5–10.1)
CHLORIDE SERPL-SCNC: 98 MMOL/L (ref 97–108)
CO2 SERPL-SCNC: 32 MMOL/L (ref 21–32)
CREAT SERPL-MCNC: 0.5 MG/DL (ref 0.7–1.3)
DIFFERENTIAL METHOD BLD: ABNORMAL
EOSINOPHIL # BLD: 0 K/UL (ref 0–0.4)
EOSINOPHIL NFR BLD: 0 % (ref 0–7)
ERYTHROCYTE [DISTWIDTH] IN BLOOD BY AUTOMATED COUNT: 13 % (ref 11.5–14.5)
GLUCOSE BLD STRIP.AUTO-MCNC: 118 MG/DL (ref 65–100)
GLUCOSE BLD STRIP.AUTO-MCNC: 127 MG/DL (ref 65–100)
GLUCOSE BLD STRIP.AUTO-MCNC: 140 MG/DL (ref 65–100)
GLUCOSE BLD STRIP.AUTO-MCNC: 166 MG/DL (ref 65–100)
GLUCOSE SERPL-MCNC: 162 MG/DL (ref 65–100)
HCT VFR BLD AUTO: 34.9 % (ref 36.6–50.3)
HGB BLD-MCNC: 11.4 G/DL (ref 12.1–17)
LYMPHOCYTES # BLD AUTO: 9 % (ref 12–49)
LYMPHOCYTES # BLD: 0.4 K/UL (ref 0.8–3.5)
MAGNESIUM SERPL-MCNC: 1.8 MG/DL (ref 1.6–2.4)
MCH RBC QN AUTO: 29.8 PG (ref 26–34)
MCHC RBC AUTO-ENTMCNC: 32.7 G/DL (ref 30–36.5)
MCV RBC AUTO: 91.1 FL (ref 80–99)
MONOCYTES # BLD: 0.3 K/UL (ref 0–1)
MONOCYTES NFR BLD AUTO: 6 % (ref 5–13)
NEUTS BAND NFR BLD MANUAL: 8 % (ref 0–6)
NEUTS SEG # BLD: 4 K/UL (ref 1.8–8)
NEUTS SEG NFR BLD AUTO: 77 % (ref 32–75)
PHOSPHATE SERPL-MCNC: 2 MG/DL (ref 2.6–4.7)
PLATELET # BLD AUTO: 157 K/UL (ref 150–400)
PLATELET COMMENTS,PCOM: ABNORMAL
POTASSIUM SERPL-SCNC: 3.5 MMOL/L (ref 3.5–5.1)
RBC # BLD AUTO: 3.83 M/UL (ref 4.1–5.7)
RBC MORPH BLD: ABNORMAL
SERVICE CMNT-IMP: ABNORMAL
SODIUM SERPL-SCNC: 138 MMOL/L (ref 136–145)
WBC # BLD AUTO: 4.7 K/UL (ref 4.1–11.1)
WBC MORPH BLD: ABNORMAL

## 2017-03-11 PROCEDURE — 83735 ASSAY OF MAGNESIUM: CPT | Performed by: COLON & RECTAL SURGERY

## 2017-03-11 PROCEDURE — 74011250637 HC RX REV CODE- 250/637: Performed by: COLON & RECTAL SURGERY

## 2017-03-11 PROCEDURE — 74011000258 HC RX REV CODE- 258: Performed by: COLON & RECTAL SURGERY

## 2017-03-11 PROCEDURE — 82962 GLUCOSE BLOOD TEST: CPT

## 2017-03-11 PROCEDURE — 36415 COLL VENOUS BLD VENIPUNCTURE: CPT | Performed by: COLON & RECTAL SURGERY

## 2017-03-11 PROCEDURE — 74011000250 HC RX REV CODE- 250: Performed by: COLON & RECTAL SURGERY

## 2017-03-11 PROCEDURE — 85025 COMPLETE CBC W/AUTO DIFF WBC: CPT | Performed by: COLON & RECTAL SURGERY

## 2017-03-11 PROCEDURE — 80048 BASIC METABOLIC PNL TOTAL CA: CPT | Performed by: COLON & RECTAL SURGERY

## 2017-03-11 PROCEDURE — 84100 ASSAY OF PHOSPHORUS: CPT | Performed by: COLON & RECTAL SURGERY

## 2017-03-11 PROCEDURE — 65270000032 HC RM SEMIPRIVATE

## 2017-03-11 PROCEDURE — 74011250636 HC RX REV CODE- 250/636: Performed by: COLON & RECTAL SURGERY

## 2017-03-11 RX ORDER — HYDROCORTISONE SODIUM SUCCINATE 100 MG/2ML
50 INJECTION, POWDER, FOR SOLUTION INTRAMUSCULAR; INTRAVENOUS EVERY 12 HOURS
Status: DISCONTINUED | OUTPATIENT
Start: 2017-03-11 | End: 2017-03-17

## 2017-03-11 RX ORDER — SODIUM CHLORIDE 9 MG/ML
10 INJECTION, SOLUTION INTRAVENOUS CONTINUOUS
Status: DISCONTINUED | OUTPATIENT
Start: 2017-03-11 | End: 2017-03-21 | Stop reason: HOSPADM

## 2017-03-11 RX ADMIN — HYDROMORPHONE HYDROCHLORIDE 1 MG: 1 INJECTION, SOLUTION INTRAMUSCULAR; INTRAVENOUS; SUBCUTANEOUS at 00:17

## 2017-03-11 RX ADMIN — ASCORBIC ACID: 500 INJECTION, SOLUTION INTRAMUSCULAR; INTRAVENOUS; SUBCUTANEOUS at 18:22

## 2017-03-11 RX ADMIN — Medication 10 ML: at 13:13

## 2017-03-11 RX ADMIN — HYDROCORTISONE SODIUM SUCCINATE 50 MG: 100 INJECTION, POWDER, FOR SOLUTION INTRAMUSCULAR; INTRAVENOUS at 21:22

## 2017-03-11 RX ADMIN — SODIUM CHLORIDE 60 ML/HR: 900 INJECTION, SOLUTION INTRAVENOUS at 23:13

## 2017-03-11 RX ADMIN — HYDROMORPHONE HYDROCHLORIDE 1 MG: 1 INJECTION, SOLUTION INTRAMUSCULAR; INTRAVENOUS; SUBCUTANEOUS at 19:15

## 2017-03-11 RX ADMIN — SODIUM CHLORIDE, SODIUM LACTATE, POTASSIUM CHLORIDE, AND CALCIUM CHLORIDE 125 ML/HR: 600; 310; 30; 20 INJECTION, SOLUTION INTRAVENOUS at 03:02

## 2017-03-11 RX ADMIN — Medication 10 ML: at 07:22

## 2017-03-11 RX ADMIN — INSULIN LISPRO 2 UNITS: 100 INJECTION, SOLUTION INTRAVENOUS; SUBCUTANEOUS at 11:56

## 2017-03-11 RX ADMIN — TEMAZEPAM 30 MG: 15 CAPSULE ORAL at 23:13

## 2017-03-11 RX ADMIN — Medication 10 ML: at 21:23

## 2017-03-11 RX ADMIN — HYDROMORPHONE HYDROCHLORIDE 1 MG: 1 INJECTION, SOLUTION INTRAMUSCULAR; INTRAVENOUS; SUBCUTANEOUS at 07:21

## 2017-03-11 RX ADMIN — HYDROMORPHONE HYDROCHLORIDE 1 MG: 1 INJECTION, SOLUTION INTRAMUSCULAR; INTRAVENOUS; SUBCUTANEOUS at 09:01

## 2017-03-11 RX ADMIN — BENZOCAINE AND MENTHOL 1 LOZENGE: 15; 3.6 LOZENGE ORAL at 20:11

## 2017-03-11 RX ADMIN — SODIUM CHLORIDE 60 ML/HR: 900 INJECTION, SOLUTION INTRAVENOUS at 11:14

## 2017-03-11 RX ADMIN — PROCHLORPERAZINE EDISYLATE 5 MG: 5 INJECTION INTRAMUSCULAR; INTRAVENOUS at 21:22

## 2017-03-11 RX ADMIN — HYDROMORPHONE HYDROCHLORIDE 1 MG: 1 INJECTION, SOLUTION INTRAMUSCULAR; INTRAVENOUS; SUBCUTANEOUS at 13:13

## 2017-03-11 RX ADMIN — FAMOTIDINE 20 MG: 10 INJECTION, SOLUTION INTRAVENOUS at 21:22

## 2017-03-11 RX ADMIN — HYDROMORPHONE HYDROCHLORIDE 1 MG: 1 INJECTION, SOLUTION INTRAMUSCULAR; INTRAVENOUS; SUBCUTANEOUS at 21:22

## 2017-03-11 RX ADMIN — DIAZEPAM 5 MG: 5 TABLET ORAL at 16:42

## 2017-03-11 RX ADMIN — PROCHLORPERAZINE EDISYLATE 5 MG: 5 INJECTION INTRAMUSCULAR; INTRAVENOUS at 13:12

## 2017-03-11 RX ADMIN — HYDROMORPHONE HYDROCHLORIDE 1 MG: 1 INJECTION, SOLUTION INTRAMUSCULAR; INTRAVENOUS; SUBCUTANEOUS at 15:19

## 2017-03-11 RX ADMIN — Medication 10 ML: at 18:24

## 2017-03-11 RX ADMIN — BENZOCAINE AND MENTHOL 1 LOZENGE: 15; 3.6 LOZENGE ORAL at 16:40

## 2017-03-11 RX ADMIN — INSULIN LISPRO 3 UNITS: 100 INJECTION, SOLUTION INTRAVENOUS; SUBCUTANEOUS at 06:00

## 2017-03-11 RX ADMIN — FAMOTIDINE 20 MG: 10 INJECTION, SOLUTION INTRAVENOUS at 09:00

## 2017-03-11 RX ADMIN — BENZOCAINE AND MENTHOL 1 LOZENGE: 15; 3.6 LOZENGE ORAL at 23:12

## 2017-03-11 RX ADMIN — HYDROMORPHONE HYDROCHLORIDE 1 MG: 1 INJECTION, SOLUTION INTRAMUSCULAR; INTRAVENOUS; SUBCUTANEOUS at 02:31

## 2017-03-11 RX ADMIN — HYDROCORTISONE SODIUM SUCCINATE 100 MG: 100 INJECTION, POWDER, FOR SOLUTION INTRAMUSCULAR; INTRAVENOUS at 06:35

## 2017-03-11 RX ADMIN — HYDROMORPHONE HYDROCHLORIDE 1 MG: 1 INJECTION, SOLUTION INTRAMUSCULAR; INTRAVENOUS; SUBCUTANEOUS at 23:13

## 2017-03-11 RX ADMIN — HYDROMORPHONE HYDROCHLORIDE 1 MG: 1 INJECTION, SOLUTION INTRAMUSCULAR; INTRAVENOUS; SUBCUTANEOUS at 11:14

## 2017-03-11 RX ADMIN — HYDROMORPHONE HYDROCHLORIDE 1 MG: 1 INJECTION, SOLUTION INTRAMUSCULAR; INTRAVENOUS; SUBCUTANEOUS at 17:08

## 2017-03-11 NOTE — PROGRESS NOTES
General Daily Progress Note    Admission Date: 3/9/2017  Hospital Day 3  Post-Op Day Not Applicable  Subjective:   Pain control adequate. Had a rough night because of roommate. Didn't get much rest.        Objective:   Patient Vitals for the past 24 hrs:   BP Temp Pulse Resp SpO2 Weight   03/11/17 0800 126/87 97.8 °F (36.6 °C) 100 18 96 % -   03/11/17 0645 - - - - - 86.5 kg (190 lb 9.6 oz)   03/11/17 0355 112/70 97.4 °F (36.3 °C) 90 18 95 % -   03/10/17 2324 103/64 97.9 °F (36.6 °C) 88 19 95 % -   03/10/17 1936 114/68 98 °F (36.7 °C) 85 16 95 % -   03/10/17 1720 - - - - - 91.9 kg (202 lb 8 oz)   03/10/17 1559 126/86 98.4 °F (36.9 °C) 96 16 95 % -        03/09 1901 - 03/11 0700  In: 3288 [I.V.:3288]  Out: 4950 [Urine:2200]    Physical Examination:  General Appearance:  Fatigued. HEENT: NG tube draining bilious fluid. Abdomen:  Less distended. Soft. Tenderness seems to be less than yesterday. Ileostomy expelling thinner liquid stool. Alternate ostomy site marks in place.           Data Review   Recent Results (from the past 24 hour(s))   GLUCOSE, POC    Collection Time: 03/10/17 11:12 AM   Result Value Ref Range    Glucose (POC) 113 (H) 65 - 100 mg/dL    Performed by Ole 55, POC    Collection Time: 03/10/17  4:44 PM   Result Value Ref Range    Glucose (POC) 104 (H) 65 - 100 mg/dL    Performed by Leo Del Toro    GLUCOSE, POC    Collection Time: 03/10/17  9:39 PM   Result Value Ref Range    Glucose (POC) 165 (H) 65 - 100 mg/dL    Performed by Leo Del Toro    CBC WITH AUTOMATED DIFF    Collection Time: 03/11/17  2:36 AM   Result Value Ref Range    WBC 4.7 4.1 - 11.1 K/uL    RBC 3.83 (L) 4.10 - 5.70 M/uL    HGB 11.4 (L) 12.1 - 17.0 g/dL    HCT 34.9 (L) 36.6 - 50.3 %    MCV 91.1 80.0 - 99.0 FL    MCH 29.8 26.0 - 34.0 PG    MCHC 32.7 30.0 - 36.5 g/dL    RDW 13.0 11.5 - 14.5 %    PLATELET 526 627 - 397 K/uL    NEUTROPHILS 77 (H) 32 - 75 %    BAND NEUTROPHILS 8 (H) 0 - 6 %    LYMPHOCYTES 9 (L) 12 - 49 %    MONOCYTES 6 5 - 13 %    EOSINOPHILS 0 0 - 7 %    BASOPHILS 0 0 - 1 %    ABS. NEUTROPHILS 4.0 1.8 - 8.0 K/UL    ABS. LYMPHOCYTES 0.4 (L) 0.8 - 3.5 K/UL    ABS. MONOCYTES 0.3 0.0 - 1.0 K/UL    ABS. EOSINOPHILS 0.0 0.0 - 0.4 K/UL    ABS. BASOPHILS 0.0 0.0 - 0.1 K/UL    DF MANUAL      PLATELET COMMENTS LARGE PLATELETS      RBC COMMENTS ANISOCYTOSIS  1+        WBC COMMENTS REACTIVE LYMPHS     METABOLIC PANEL, BASIC    Collection Time: 03/11/17  2:36 AM   Result Value Ref Range    Sodium 138 136 - 145 mmol/L    Potassium 3.5 3.5 - 5.1 mmol/L    Chloride 98 97 - 108 mmol/L    CO2 32 21 - 32 mmol/L    Anion gap 8 5 - 15 mmol/L    Glucose 162 (H) 65 - 100 mg/dL    BUN 6 6 - 20 MG/DL    Creatinine 0.50 (L) 0.70 - 1.30 MG/DL    BUN/Creatinine ratio 12 12 - 20      GFR est AA >60 >60 ml/min/1.73m2    GFR est non-AA >60 >60 ml/min/1.73m2    Calcium 8.2 (L) 8.5 - 10.1 MG/DL   MAGNESIUM    Collection Time: 03/11/17  2:36 AM   Result Value Ref Range    Magnesium 1.8 1.6 - 2.4 mg/dL   PHOSPHORUS    Collection Time: 03/11/17  2:36 AM   Result Value Ref Range    Phosphorus 2.0 (L) 2.6 - 4.7 MG/DL   GLUCOSE, POC    Collection Time: 03/11/17  6:17 AM   Result Value Ref Range    Glucose (POC) 166 (H) 65 - 100 mg/dL    Performed by JOSIAS LI(LPN student)            Assessment:     Active Problems:    Small bowel obstruction ([address] (3/9/2017)        RUE PICC placed on 3/10/2017. Hemodynamically stable.     The ileostomy output has been significant (1400 mL yesterday), and the abdominal examination seems to be improving. The left shift has also improved (8% bands versus 11% yesterday). I am still in favor of continuing the current treatment in preparation for surgery. Plan:   Continue NG tube and NPO except medications and occasional ice chips. Increase TPN to goal rate. Decrease other IV fluid rate. Repeat labs tomorrow.     Decrease parenteral steroid dose.     Surgery scheduled for 12:30 PM on 3/14/2017.

## 2017-03-12 LAB
ANION GAP BLD CALC-SCNC: 6 MMOL/L (ref 5–15)
BASOPHILS # BLD AUTO: 0 K/UL (ref 0–0.1)
BASOPHILS # BLD: 0 % (ref 0–1)
BUN SERPL-MCNC: 10 MG/DL (ref 6–20)
BUN/CREAT SERPL: 17 (ref 12–20)
CALCIUM SERPL-MCNC: 8.7 MG/DL (ref 8.5–10.1)
CHLORIDE SERPL-SCNC: 103 MMOL/L (ref 97–108)
CO2 SERPL-SCNC: 32 MMOL/L (ref 21–32)
CREAT SERPL-MCNC: 0.58 MG/DL (ref 0.7–1.3)
EOSINOPHIL # BLD: 0 K/UL (ref 0–0.4)
EOSINOPHIL NFR BLD: 0 % (ref 0–7)
ERYTHROCYTE [DISTWIDTH] IN BLOOD BY AUTOMATED COUNT: 13 % (ref 11.5–14.5)
GLUCOSE BLD STRIP.AUTO-MCNC: 143 MG/DL (ref 65–100)
GLUCOSE BLD STRIP.AUTO-MCNC: 144 MG/DL (ref 65–100)
GLUCOSE BLD STRIP.AUTO-MCNC: 157 MG/DL (ref 65–100)
GLUCOSE BLD STRIP.AUTO-MCNC: 157 MG/DL (ref 65–100)
GLUCOSE SERPL-MCNC: 165 MG/DL (ref 65–100)
HCT VFR BLD AUTO: 35.1 % (ref 36.6–50.3)
HGB BLD-MCNC: 11.4 G/DL (ref 12.1–17)
LYMPHOCYTES # BLD AUTO: 12 % (ref 12–49)
LYMPHOCYTES # BLD: 0.9 K/UL (ref 0.8–3.5)
MAGNESIUM SERPL-MCNC: 1.9 MG/DL (ref 1.6–2.4)
MCH RBC QN AUTO: 30.2 PG (ref 26–34)
MCHC RBC AUTO-ENTMCNC: 32.5 G/DL (ref 30–36.5)
MCV RBC AUTO: 92.9 FL (ref 80–99)
MONOCYTES # BLD: 0.7 K/UL (ref 0–1)
MONOCYTES NFR BLD AUTO: 10 % (ref 5–13)
NEUTS SEG # BLD: 5.5 K/UL (ref 1.8–8)
NEUTS SEG NFR BLD AUTO: 78 % (ref 32–75)
PHOSPHATE SERPL-MCNC: 3.4 MG/DL (ref 2.6–4.7)
PLATELET # BLD AUTO: 173 K/UL (ref 150–400)
POTASSIUM SERPL-SCNC: 3.4 MMOL/L (ref 3.5–5.1)
RBC # BLD AUTO: 3.78 M/UL (ref 4.1–5.7)
SERVICE CMNT-IMP: ABNORMAL
SODIUM SERPL-SCNC: 141 MMOL/L (ref 136–145)
WBC # BLD AUTO: 7.1 K/UL (ref 4.1–11.1)

## 2017-03-12 PROCEDURE — 74011000250 HC RX REV CODE- 250: Performed by: COLON & RECTAL SURGERY

## 2017-03-12 PROCEDURE — 74011250636 HC RX REV CODE- 250/636: Performed by: COLON & RECTAL SURGERY

## 2017-03-12 PROCEDURE — 74011250637 HC RX REV CODE- 250/637: Performed by: COLON & RECTAL SURGERY

## 2017-03-12 PROCEDURE — 85025 COMPLETE CBC W/AUTO DIFF WBC: CPT | Performed by: COLON & RECTAL SURGERY

## 2017-03-12 PROCEDURE — 36415 COLL VENOUS BLD VENIPUNCTURE: CPT | Performed by: COLON & RECTAL SURGERY

## 2017-03-12 PROCEDURE — 82962 GLUCOSE BLOOD TEST: CPT

## 2017-03-12 PROCEDURE — 65270000032 HC RM SEMIPRIVATE

## 2017-03-12 PROCEDURE — 84100 ASSAY OF PHOSPHORUS: CPT | Performed by: COLON & RECTAL SURGERY

## 2017-03-12 PROCEDURE — 83735 ASSAY OF MAGNESIUM: CPT | Performed by: COLON & RECTAL SURGERY

## 2017-03-12 PROCEDURE — 80048 BASIC METABOLIC PNL TOTAL CA: CPT | Performed by: COLON & RECTAL SURGERY

## 2017-03-12 PROCEDURE — 74011000258 HC RX REV CODE- 258: Performed by: COLON & RECTAL SURGERY

## 2017-03-12 RX ADMIN — HYDROMORPHONE HYDROCHLORIDE 1 MG: 1 INJECTION, SOLUTION INTRAMUSCULAR; INTRAVENOUS; SUBCUTANEOUS at 08:08

## 2017-03-12 RX ADMIN — ONDANSETRON 4 MG: 2 INJECTION INTRAMUSCULAR; INTRAVENOUS at 15:00

## 2017-03-12 RX ADMIN — PROCHLORPERAZINE EDISYLATE 5 MG: 5 INJECTION INTRAMUSCULAR; INTRAVENOUS at 17:01

## 2017-03-12 RX ADMIN — Medication 10 ML: at 11:00

## 2017-03-12 RX ADMIN — INSULIN LISPRO 3 UNITS: 100 INJECTION, SOLUTION INTRAVENOUS; SUBCUTANEOUS at 23:21

## 2017-03-12 RX ADMIN — Medication 10 ML: at 08:09

## 2017-03-12 RX ADMIN — ONDANSETRON 4 MG: 2 INJECTION INTRAMUSCULAR; INTRAVENOUS at 19:09

## 2017-03-12 RX ADMIN — HYDROCORTISONE SODIUM SUCCINATE 50 MG: 100 INJECTION, POWDER, FOR SOLUTION INTRAMUSCULAR; INTRAVENOUS at 08:41

## 2017-03-12 RX ADMIN — HYDROCORTISONE SODIUM SUCCINATE 50 MG: 100 INJECTION, POWDER, FOR SOLUTION INTRAMUSCULAR; INTRAVENOUS at 21:12

## 2017-03-12 RX ADMIN — HYDROMORPHONE HYDROCHLORIDE 1 MG: 1 INJECTION, SOLUTION INTRAMUSCULAR; INTRAVENOUS; SUBCUTANEOUS at 13:07

## 2017-03-12 RX ADMIN — TEMAZEPAM 30 MG: 15 CAPSULE ORAL at 21:13

## 2017-03-12 RX ADMIN — INSULIN LISPRO 3 UNITS: 100 INJECTION, SOLUTION INTRAVENOUS; SUBCUTANEOUS at 11:35

## 2017-03-12 RX ADMIN — PROCHLORPERAZINE EDISYLATE 5 MG: 5 INJECTION INTRAMUSCULAR; INTRAVENOUS at 08:08

## 2017-03-12 RX ADMIN — HYDROMORPHONE HYDROCHLORIDE 1 MG: 1 INJECTION, SOLUTION INTRAMUSCULAR; INTRAVENOUS; SUBCUTANEOUS at 14:59

## 2017-03-12 RX ADMIN — HYDROMORPHONE HYDROCHLORIDE 1 MG: 1 INJECTION, SOLUTION INTRAMUSCULAR; INTRAVENOUS; SUBCUTANEOUS at 19:04

## 2017-03-12 RX ADMIN — HYDROMORPHONE HYDROCHLORIDE 1 MG: 1 INJECTION, SOLUTION INTRAMUSCULAR; INTRAVENOUS; SUBCUTANEOUS at 17:06

## 2017-03-12 RX ADMIN — INSULIN LISPRO 3 UNITS: 100 INJECTION, SOLUTION INTRAVENOUS; SUBCUTANEOUS at 06:25

## 2017-03-12 RX ADMIN — ONDANSETRON 4 MG: 2 INJECTION INTRAMUSCULAR; INTRAVENOUS at 23:20

## 2017-03-12 RX ADMIN — FAMOTIDINE 20 MG: 10 INJECTION, SOLUTION INTRAVENOUS at 08:41

## 2017-03-12 RX ADMIN — ASCORBIC ACID: 500 INJECTION, SOLUTION INTRAMUSCULAR; INTRAVENOUS; SUBCUTANEOUS at 19:00

## 2017-03-12 RX ADMIN — Medication 10 ML: at 22:00

## 2017-03-12 RX ADMIN — ONDANSETRON 4 MG: 2 INJECTION INTRAMUSCULAR; INTRAVENOUS at 10:59

## 2017-03-12 RX ADMIN — INSULIN LISPRO 3 UNITS: 100 INJECTION, SOLUTION INTRAVENOUS; SUBCUTANEOUS at 17:37

## 2017-03-12 RX ADMIN — HYDROMORPHONE HYDROCHLORIDE 1 MG: 1 INJECTION, SOLUTION INTRAMUSCULAR; INTRAVENOUS; SUBCUTANEOUS at 21:12

## 2017-03-12 RX ADMIN — Medication 10 ML: at 05:05

## 2017-03-12 RX ADMIN — HYDROMORPHONE HYDROCHLORIDE 1 MG: 1 INJECTION, SOLUTION INTRAMUSCULAR; INTRAVENOUS; SUBCUTANEOUS at 10:59

## 2017-03-12 RX ADMIN — HYDROMORPHONE HYDROCHLORIDE 1 MG: 1 INJECTION, SOLUTION INTRAMUSCULAR; INTRAVENOUS; SUBCUTANEOUS at 23:21

## 2017-03-12 RX ADMIN — FAMOTIDINE 20 MG: 10 INJECTION, SOLUTION INTRAVENOUS at 21:18

## 2017-03-12 RX ADMIN — HYDROMORPHONE HYDROCHLORIDE 1 MG: 1 INJECTION, SOLUTION INTRAMUSCULAR; INTRAVENOUS; SUBCUTANEOUS at 05:36

## 2017-03-12 NOTE — PROGRESS NOTES
Bedside and Verbal shift change report given to Beverly Mathews RN (oncoming nurse) by Chandrakant Sood RN (offgoing nurse). Report included the following information SBAR, Kardex, Intake/Output, MAR, Accordion, Recent Results and Med Rec Status.

## 2017-03-12 NOTE — PROGRESS NOTES
8:14 AM  Bedside and Verbal shift change report given to Kody Whipple RN (oncoming nurse) by Muriel Andres, student nurse and Porter Scheuermann, RN (offgoing nurse). Report included the following information SBAR, Kardex, OR Summary, Intake/Output, MAR, Accordion, Recent Results and Med Rec Status.

## 2017-03-12 NOTE — PROGRESS NOTES
General Daily Progress Note    Admission Date: 3/9/2017  Hospital Day 4  Post-Op Day Not Applicable  Subjective:   Pain control adequate. No new complaints. [birth year]. Objective:   Patient Vitals for the past 24 hrs:   BP Temp Pulse Resp SpO2 Weight   03/12/17 0826 115/71 98.2 °F (36.8 °C) 82 18 96 % 91.2 kg (201 lb)   03/12/17 0503 115/72 98.2 °F (36.8 °C) 91 18 99 % -   03/11/17 1946 127/75 98.2 °F (36.8 °C) 94 18 97 % -   03/11/17 1601 123/66 98 °F (36.7 °C) 71 18 97 % -     03/12 0701 - 03/12 1900  In: -   Out: 300   03/10 1901 - 03/12 0700  In: 3037.7 [I.V.:3037.7]  Out: 2676 [Urine:2175]      Physical Examination:  General Appearance: Fatigued. HEENT: NG tube draining bilious fluid. Abdomen: Less distended. Soft. Tenderness seems to be less than yesterday. Ileostomy expelling thinner liquid stool. Alternate ostomy site marks in place. Data Review   Recent Results (from the past 24 hour(s))   GLUCOSE, POC    Collection Time: 03/11/17 11:37 AM   Result Value Ref Range    Glucose (POC) 140 (H) 65 - 100 mg/dL    Performed by Hannah Velez, POC    Collection Time: 03/11/17  5:25 PM   Result Value Ref Range    Glucose (POC) 127 (H) 65 - 100 mg/dL    Performed by Sergio Selby    GLUCOSE, POC    Collection Time: 03/11/17  9:23 PM   Result Value Ref Range    Glucose (POC) 118 (H) 65 - 100 mg/dL    Performed by Sergio Selby    CBC WITH AUTOMATED DIFF    Collection Time: 03/12/17  5:05 AM   Result Value Ref Range    WBC 7.1 4.1 - 11.1 K/uL    RBC 3.78 (L) 4.10 - 5.70 M/uL    HGB 11.4 (L) 12.1 - 17.0 g/dL    HCT 35.1 (L) 36.6 - 50.3 %    MCV 92.9 80.0 - 99.0 FL    MCH 30.2 26.0 - 34.0 PG    MCHC 32.5 30.0 - 36.5 g/dL    RDW 13.0 11.5 - 14.5 %    PLATELET 705 636 - 218 K/uL    NEUTROPHILS 78 (H) 32 - 75 %    LYMPHOCYTES 12 12 - 49 %    MONOCYTES 10 5 - 13 %    EOSINOPHILS 0 0 - 7 %    BASOPHILS 0 0 - 1 %    ABS. NEUTROPHILS 5.5 1.8 - 8.0 K/UL    ABS.  LYMPHOCYTES 0.9 0.8 - 3.5 K/UL    ABS. MONOCYTES 0.7 0.0 - 1.0 K/UL    ABS. EOSINOPHILS 0.0 0.0 - 0.4 K/UL    ABS. BASOPHILS 0.0 0.0 - 0.1 K/UL   METABOLIC PANEL, BASIC    Collection Time: 03/12/17  5:05 AM   Result Value Ref Range    Sodium 141 136 - 145 mmol/L    Potassium 3.4 (L) 3.5 - 5.1 mmol/L    Chloride 103 97 - 108 mmol/L    CO2 32 21 - 32 mmol/L    Anion gap 6 5 - 15 mmol/L    Glucose 165 (H) 65 - 100 mg/dL    BUN 10 6 - 20 MG/DL    Creatinine 0.58 (L) 0.70 - 1.30 MG/DL    BUN/Creatinine ratio 17 12 - 20      GFR est AA >60 >60 ml/min/1.73m2    GFR est non-AA >60 >60 ml/min/1.73m2    Calcium 8.7 8.5 - 10.1 MG/DL   MAGNESIUM    Collection Time: 03/12/17  5:05 AM   Result Value Ref Range    Magnesium 1.9 1.6 - 2.4 mg/dL   PHOSPHORUS    Collection Time: 03/12/17  5:05 AM   Result Value Ref Range    Phosphorus 3.4 2.6 - 4.7 MG/DL   GLUCOSE, POC    Collection Time: 03/12/17  6:18 AM   Result Value Ref Range    Glucose (POC) 157 (H) 65 - 100 mg/dL    Performed by Jeremias Peraza            Assessment:     Active Problems:    Small bowel obstruction ([address] (3/9/2017)      RUE PICC placed on 3/10/2017.     Hemodynamically stable.      The ileostomy output continues to be significant, and the abdominal examination continues to improve. The left shift has resolved.     I am still in favor of continuing the current treatment in preparation for surgery. Plan:   Continue NG tube and NPO except medications and occasional ice chips. Continue TPN. Repeat labs tomorrow.     Continue current parenteral steroid dose.      Surgery scheduled for 12:30 PM on 3/14/2017.

## 2017-03-13 ENCOUNTER — ANESTHESIA EVENT (OUTPATIENT)
Dept: SURGERY | Age: 42
DRG: 330 | End: 2017-03-13
Payer: COMMERCIAL

## 2017-03-13 LAB
ANION GAP BLD CALC-SCNC: 5 MMOL/L (ref 5–15)
BASOPHILS # BLD AUTO: 0 K/UL (ref 0–0.1)
BASOPHILS # BLD: 0 % (ref 0–1)
BUN SERPL-MCNC: 15 MG/DL (ref 6–20)
BUN/CREAT SERPL: 25 (ref 12–20)
CALCIUM SERPL-MCNC: 8.6 MG/DL (ref 8.5–10.1)
CHLORIDE SERPL-SCNC: 106 MMOL/L (ref 97–108)
CO2 SERPL-SCNC: 34 MMOL/L (ref 21–32)
CREAT SERPL-MCNC: 0.59 MG/DL (ref 0.7–1.3)
EOSINOPHIL # BLD: 0 K/UL (ref 0–0.4)
EOSINOPHIL NFR BLD: 0 % (ref 0–7)
ERYTHROCYTE [DISTWIDTH] IN BLOOD BY AUTOMATED COUNT: 13.3 % (ref 11.5–14.5)
GLUCOSE BLD STRIP.AUTO-MCNC: 121 MG/DL (ref 65–100)
GLUCOSE BLD STRIP.AUTO-MCNC: 141 MG/DL (ref 65–100)
GLUCOSE BLD STRIP.AUTO-MCNC: 142 MG/DL (ref 65–100)
GLUCOSE BLD STRIP.AUTO-MCNC: 148 MG/DL (ref 65–100)
GLUCOSE SERPL-MCNC: 148 MG/DL (ref 65–100)
HCT VFR BLD AUTO: 34 % (ref 36.6–50.3)
HGB BLD-MCNC: 10.8 G/DL (ref 12.1–17)
LYMPHOCYTES # BLD AUTO: 14 % (ref 12–49)
LYMPHOCYTES # BLD: 1.2 K/UL (ref 0.8–3.5)
MAGNESIUM SERPL-MCNC: 2 MG/DL (ref 1.6–2.4)
MCH RBC QN AUTO: 29.6 PG (ref 26–34)
MCHC RBC AUTO-ENTMCNC: 31.8 G/DL (ref 30–36.5)
MCV RBC AUTO: 93.2 FL (ref 80–99)
MONOCYTES # BLD: 0.8 K/UL (ref 0–1)
MONOCYTES NFR BLD AUTO: 9 % (ref 5–13)
NEUTS SEG # BLD: 6.5 K/UL (ref 1.8–8)
NEUTS SEG NFR BLD AUTO: 77 % (ref 32–75)
PHOSPHATE SERPL-MCNC: 3.8 MG/DL (ref 2.6–4.7)
PLATELET # BLD AUTO: 183 K/UL (ref 150–400)
POTASSIUM SERPL-SCNC: 3.2 MMOL/L (ref 3.5–5.1)
RBC # BLD AUTO: 3.65 M/UL (ref 4.1–5.7)
SERVICE CMNT-IMP: ABNORMAL
SODIUM SERPL-SCNC: 145 MMOL/L (ref 136–145)
WBC # BLD AUTO: 8.6 K/UL (ref 4.1–11.1)

## 2017-03-13 PROCEDURE — 74011000258 HC RX REV CODE- 258: Performed by: COLON & RECTAL SURGERY

## 2017-03-13 PROCEDURE — 74011250636 HC RX REV CODE- 250/636: Performed by: COLON & RECTAL SURGERY

## 2017-03-13 PROCEDURE — 74011000250 HC RX REV CODE- 250: Performed by: COLON & RECTAL SURGERY

## 2017-03-13 PROCEDURE — 65270000032 HC RM SEMIPRIVATE

## 2017-03-13 PROCEDURE — 85025 COMPLETE CBC W/AUTO DIFF WBC: CPT | Performed by: COLON & RECTAL SURGERY

## 2017-03-13 PROCEDURE — 84100 ASSAY OF PHOSPHORUS: CPT | Performed by: COLON & RECTAL SURGERY

## 2017-03-13 PROCEDURE — 74011250637 HC RX REV CODE- 250/637: Performed by: COLON & RECTAL SURGERY

## 2017-03-13 PROCEDURE — 83735 ASSAY OF MAGNESIUM: CPT | Performed by: COLON & RECTAL SURGERY

## 2017-03-13 PROCEDURE — 80048 BASIC METABOLIC PNL TOTAL CA: CPT | Performed by: COLON & RECTAL SURGERY

## 2017-03-13 PROCEDURE — 82962 GLUCOSE BLOOD TEST: CPT

## 2017-03-13 PROCEDURE — 36415 COLL VENOUS BLD VENIPUNCTURE: CPT | Performed by: COLON & RECTAL SURGERY

## 2017-03-13 RX ORDER — SODIUM CHLORIDE 9 MG/ML
INJECTION INTRAMUSCULAR; INTRAVENOUS; SUBCUTANEOUS
Status: DISPENSED
Start: 2017-03-13 | End: 2017-03-13

## 2017-03-13 RX ORDER — POTASSIUM CHLORIDE 14.9 MG/ML
10 INJECTION INTRAVENOUS
Status: COMPLETED | OUTPATIENT
Start: 2017-03-13 | End: 2017-03-14

## 2017-03-13 RX ADMIN — POTASSIUM CHLORIDE 10 MEQ: 200 INJECTION, SOLUTION INTRAVENOUS at 10:32

## 2017-03-13 RX ADMIN — HYDROMORPHONE HYDROCHLORIDE 1 MG: 1 INJECTION, SOLUTION INTRAMUSCULAR; INTRAVENOUS; SUBCUTANEOUS at 11:55

## 2017-03-13 RX ADMIN — HYDROMORPHONE HYDROCHLORIDE 1 MG: 1 INJECTION, SOLUTION INTRAMUSCULAR; INTRAVENOUS; SUBCUTANEOUS at 23:19

## 2017-03-13 RX ADMIN — I.V. FAT EMULSION 500 ML: 20 EMULSION INTRAVENOUS at 19:23

## 2017-03-13 RX ADMIN — Medication 10 ML: at 06:00

## 2017-03-13 RX ADMIN — POTASSIUM CHLORIDE 10 MEQ: 200 INJECTION, SOLUTION INTRAVENOUS at 09:22

## 2017-03-13 RX ADMIN — INSULIN LISPRO 3 UNITS: 100 INJECTION, SOLUTION INTRAVENOUS; SUBCUTANEOUS at 11:53

## 2017-03-13 RX ADMIN — FAMOTIDINE 20 MG: 10 INJECTION, SOLUTION INTRAVENOUS at 09:11

## 2017-03-13 RX ADMIN — HYDROMORPHONE HYDROCHLORIDE 1 MG: 1 INJECTION, SOLUTION INTRAMUSCULAR; INTRAVENOUS; SUBCUTANEOUS at 17:16

## 2017-03-13 RX ADMIN — ONDANSETRON 4 MG: 2 INJECTION INTRAMUSCULAR; INTRAVENOUS at 19:09

## 2017-03-13 RX ADMIN — HYDROMORPHONE HYDROCHLORIDE 1 MG: 1 INJECTION, SOLUTION INTRAMUSCULAR; INTRAVENOUS; SUBCUTANEOUS at 04:15

## 2017-03-13 RX ADMIN — PROCHLORPERAZINE EDISYLATE 5 MG: 5 INJECTION INTRAMUSCULAR; INTRAVENOUS at 14:37

## 2017-03-13 RX ADMIN — INSULIN LISPRO 3 UNITS: 100 INJECTION, SOLUTION INTRAVENOUS; SUBCUTANEOUS at 06:42

## 2017-03-13 RX ADMIN — BENZOCAINE AND MENTHOL 1 LOZENGE: 15; 3.6 LOZENGE ORAL at 04:29

## 2017-03-13 RX ADMIN — Medication 10 ML: at 06:29

## 2017-03-13 RX ADMIN — HYDROMORPHONE HYDROCHLORIDE 1 MG: 1 INJECTION, SOLUTION INTRAMUSCULAR; INTRAVENOUS; SUBCUTANEOUS at 19:18

## 2017-03-13 RX ADMIN — HYDROMORPHONE HYDROCHLORIDE 1 MG: 1 INJECTION, SOLUTION INTRAMUSCULAR; INTRAVENOUS; SUBCUTANEOUS at 21:24

## 2017-03-13 RX ADMIN — HYDROMORPHONE HYDROCHLORIDE 1 MG: 1 INJECTION, SOLUTION INTRAMUSCULAR; INTRAVENOUS; SUBCUTANEOUS at 14:36

## 2017-03-13 RX ADMIN — Medication 10 ML: at 21:05

## 2017-03-13 RX ADMIN — TEMAZEPAM 30 MG: 15 CAPSULE ORAL at 21:05

## 2017-03-13 RX ADMIN — ASCORBIC ACID: 500 INJECTION, SOLUTION INTRAMUSCULAR; INTRAVENOUS; SUBCUTANEOUS at 19:23

## 2017-03-13 RX ADMIN — ONDANSETRON 4 MG: 2 INJECTION INTRAMUSCULAR; INTRAVENOUS at 11:54

## 2017-03-13 RX ADMIN — HYDROMORPHONE HYDROCHLORIDE 1 MG: 1 INJECTION, SOLUTION INTRAMUSCULAR; INTRAVENOUS; SUBCUTANEOUS at 09:15

## 2017-03-13 RX ADMIN — HYDROCORTISONE SODIUM SUCCINATE 50 MG: 100 INJECTION, POWDER, FOR SOLUTION INTRAMUSCULAR; INTRAVENOUS at 21:05

## 2017-03-13 RX ADMIN — ONDANSETRON 4 MG: 2 INJECTION INTRAMUSCULAR; INTRAVENOUS at 04:15

## 2017-03-13 RX ADMIN — Medication 10 ML: at 11:57

## 2017-03-13 RX ADMIN — HYDROMORPHONE HYDROCHLORIDE 1 MG: 1 INJECTION, SOLUTION INTRAMUSCULAR; INTRAVENOUS; SUBCUTANEOUS at 01:44

## 2017-03-13 RX ADMIN — PROCHLORPERAZINE EDISYLATE 5 MG: 5 INJECTION INTRAMUSCULAR; INTRAVENOUS at 01:44

## 2017-03-13 RX ADMIN — Medication 10 ML: at 21:24

## 2017-03-13 RX ADMIN — HYDROMORPHONE HYDROCHLORIDE 1 MG: 1 INJECTION, SOLUTION INTRAMUSCULAR; INTRAVENOUS; SUBCUTANEOUS at 06:29

## 2017-03-13 RX ADMIN — FAMOTIDINE 20 MG: 10 INJECTION, SOLUTION INTRAVENOUS at 21:05

## 2017-03-13 RX ADMIN — HYDROCORTISONE SODIUM SUCCINATE 50 MG: 100 INJECTION, POWDER, FOR SOLUTION INTRAMUSCULAR; INTRAVENOUS at 09:10

## 2017-03-13 RX ADMIN — INSULIN LISPRO 3 UNITS: 100 INJECTION, SOLUTION INTRAVENOUS; SUBCUTANEOUS at 23:28

## 2017-03-13 RX ADMIN — PROCHLORPERAZINE EDISYLATE 5 MG: 5 INJECTION INTRAMUSCULAR; INTRAVENOUS at 23:19

## 2017-03-13 NOTE — PROGRESS NOTES
NUTRITION brief    Recommendations:   1. Continue current TPN and lipids  2. Monitor K+ with additional repletion PRN       Diet: NPO  TPN: 5%AA, D20 @ 90ml/hr (with 100mg thiamin + vit C + zinc)+ 500ml, 20% lipids 3x/week  Nutritionally Significant Medications:pepcid, solu-cortef, SSI, KCl, NS @ 35ml/hr, PRN: zofran, compazine     Chart reviewed for TPN check. Hypokalemia noted and repleted but happy to see TPN and lipids at goal. NGT fell out this morning. Plans to trial without but if distention increases will need to be replaced per chart review. Plans to continue with surgery tomorrow. Continue with TPN/lipids to meet needs with: 2329kcal, 108g protein, 432g CHO (GIR = 3.48mg/kg/min), 2160ml fluid. Estimated Nutrition Needs:   Kcals/day: 1460 Kcals/day (2162-2342kcal)  Protein: 103 g (103-120g (1.2-1.4g/kg))  Fluid: 2342 ml (1ml/kcal)  Based On:  [address] [place] (x 1.3-1.4)  Weight Used: Actual wt (86.kg (also UBW))    Mitzi Schroeder RD

## 2017-03-13 NOTE — PROGRESS NOTES
General Daily Progress Note    Admission Date: 3/9/2017  Hospital Day 5  Post-Op Day Not Applicable  Subjective:   NG tube came out sometime early this morning. No increase in abdominal distension, pain, or nausea since then. Objective:   Patient Vitals for the past 24 hrs:   BP Temp Pulse Resp SpO2   03/13/17 0053 133/84 98.5 °F (36.9 °C) 83 18 96 %        03/11 1901 - 03/13 0700  In: -   Out: 2978 [Urine:1545]    General Appearance:  No distress. Abdomen: Less distended. Soft. Tenderness about the same as yesterday. Ileostomy expelling thin liquid stool. Alternate ostomy site marks in place.              Data Review   Recent Results (from the past 24 hour(s))   GLUCOSE, POC    Collection Time: 03/12/17 11:12 AM   Result Value Ref Range    Glucose (POC) 157 (H) 65 - 100 mg/dL    Performed by Hannah Velez, POC    Collection Time: 03/12/17  5:05 PM   Result Value Ref Range    Glucose (POC) 144 (H) 65 - 100 mg/dL    Performed by Zetta Leventhal    GLUCOSE, POC    Collection Time: 03/12/17 10:59 PM   Result Value Ref Range    Glucose (POC) 143 (H) 65 - 100 mg/dL    Performed by Zetta Leventhal    CBC WITH AUTOMATED DIFF    Collection Time: 03/13/17  4:09 AM   Result Value Ref Range    WBC 8.6 4.1 - 11.1 K/uL    RBC 3.65 (L) 4.10 - 5.70 M/uL    HGB 10.8 (L) 12.1 - 17.0 g/dL    HCT 34.0 (L) 36.6 - 50.3 %    MCV 93.2 80.0 - 99.0 FL    MCH 29.6 26.0 - 34.0 PG    MCHC 31.8 30.0 - 36.5 g/dL    RDW 13.3 11.5 - 14.5 %    PLATELET 921 636 - 062 K/uL    NEUTROPHILS 77 (H) 32 - 75 %    LYMPHOCYTES 14 12 - 49 %    MONOCYTES 9 5 - 13 %    EOSINOPHILS 0 0 - 7 %    BASOPHILS 0 0 - 1 %    ABS. NEUTROPHILS 6.5 1.8 - 8.0 K/UL    ABS. LYMPHOCYTES 1.2 0.8 - 3.5 K/UL    ABS. MONOCYTES 0.8 0.0 - 1.0 K/UL    ABS. EOSINOPHILS 0.0 0.0 - 0.4 K/UL    ABS.  BASOPHILS 0.0 0.0 - 0.1 K/UL   METABOLIC PANEL, BASIC    Collection Time: 03/13/17  4:09 AM   Result Value Ref Range    Sodium 145 136 - 145 mmol/L    Potassium 3.2 (L) 3.5 - 5.1 mmol/L    Chloride 106 97 - 108 mmol/L    CO2 34 (H) 21 - 32 mmol/L    Anion gap 5 5 - 15 mmol/L    Glucose 148 (H) 65 - 100 mg/dL    BUN 15 6 - 20 MG/DL    Creatinine 0.59 (L) 0.70 - 1.30 MG/DL    BUN/Creatinine ratio 25 (H) 12 - 20      GFR est AA >60 >60 ml/min/1.73m2    GFR est non-AA >60 >60 ml/min/1.73m2    Calcium 8.6 8.5 - 10.1 MG/DL   MAGNESIUM    Collection Time: 03/13/17  4:09 AM   Result Value Ref Range    Magnesium 2.0 1.6 - 2.4 mg/dL   PHOSPHORUS    Collection Time: 03/13/17  4:09 AM   Result Value Ref Range    Phosphorus 3.8 2.6 - 4.7 MG/DL   GLUCOSE, POC    Collection Time: 03/13/17  6:33 AM   Result Value Ref Range    Glucose (POC) 148 (H) 65 - 100 mg/dL    Performed by Miriam Castro            Assessment:     Active Problems:    Small bowel obstruction ([address] (3/9/2017)      RUE PICC placed on 3/10/2017.      Hemodynamically stable. Significantly improved clinically compare dot admission. Probably will not decompensate between today and tomorrow without NG tube. Mildly hypokalemic. Plan:   Continue NPO except medications and occasional ice chips. Replace NG tube if there is increased distress. Replace potassium. Continue TPN.     Continue current parenteral steroid dose.      Surgery scheduled for 12:30 PM on 3/14/2017.

## 2017-03-13 NOTE — PROGRESS NOTES
Spiritual Care Partner Volunteer visited patient in [address] [place] [address] on 3/10/17. Documented by:  Jacklyn Aranda M.Div.    Paging Service [phone])

## 2017-03-13 NOTE — PROGRESS NOTES
Was called into patients room shortly after shift change, pts NG tube was out. Talked to Dr. Sherryle Chamber, he stated that we can keep the NG tube out for now, however if the pt starts feeling bloated, looks/feels distended and and has worsening nausea and/or vomiting that we can go ahead and place a new NG tube.

## 2017-03-13 NOTE — PROGRESS NOTES
Bedside shift change report given to Dasia Nevarez RN (oncoming nurse) by Neftali Espinoza (offgoing nurse). Report included the following information SBAR, Kardex, Intake/Output and MAR.

## 2017-03-14 ENCOUNTER — APPOINTMENT (OUTPATIENT)
Dept: GENERAL RADIOLOGY | Age: 42
DRG: 330 | End: 2017-03-14
Attending: COLON & RECTAL SURGERY
Payer: COMMERCIAL

## 2017-03-14 ENCOUNTER — ANESTHESIA (OUTPATIENT)
Dept: SURGERY | Age: 42
DRG: 330 | End: 2017-03-14
Payer: COMMERCIAL

## 2017-03-14 LAB
ABO + RH BLD: NORMAL
ALBUMIN SERPL BCP-MCNC: 2.6 G/DL (ref 3.5–5)
ALBUMIN/GLOB SERPL: 0.8 {RATIO} (ref 1.1–2.2)
ALP SERPL-CCNC: 104 U/L (ref 45–117)
ALT SERPL-CCNC: 47 U/L (ref 12–78)
ANION GAP BLD CALC-SCNC: 5 MMOL/L (ref 5–15)
ANION GAP BLD CALC-SCNC: 6 MMOL/L (ref 5–15)
AST SERPL W P-5'-P-CCNC: 21 U/L (ref 15–37)
BASOPHILS # BLD AUTO: 0 K/UL (ref 0–0.1)
BASOPHILS # BLD AUTO: 0 K/UL (ref 0–0.1)
BASOPHILS # BLD: 0 % (ref 0–1)
BASOPHILS # BLD: 0 % (ref 0–1)
BILIRUB SERPL-MCNC: 0.1 MG/DL (ref 0.2–1)
BLOOD GROUP ANTIBODIES SERPL: NORMAL
BUN SERPL-MCNC: 12 MG/DL (ref 6–20)
BUN SERPL-MCNC: 14 MG/DL (ref 6–20)
BUN/CREAT SERPL: 24 (ref 12–20)
BUN/CREAT SERPL: 9 (ref 12–20)
CALCIUM SERPL-MCNC: 8.5 MG/DL (ref 8.5–10.1)
CALCIUM SERPL-MCNC: 8.9 MG/DL (ref 8.5–10.1)
CHLORIDE SERPL-SCNC: 105 MMOL/L (ref 97–108)
CHLORIDE SERPL-SCNC: 106 MMOL/L (ref 97–108)
CO2 SERPL-SCNC: 26 MMOL/L (ref 21–32)
CO2 SERPL-SCNC: 31 MMOL/L (ref 21–32)
CREAT SERPL-MCNC: 0.59 MG/DL (ref 0.7–1.3)
CREAT SERPL-MCNC: 1.37 MG/DL (ref 0.7–1.3)
DIFFERENTIAL METHOD BLD: ABNORMAL
EOSINOPHIL # BLD: 0 K/UL (ref 0–0.4)
EOSINOPHIL # BLD: 0 K/UL (ref 0–0.4)
EOSINOPHIL NFR BLD: 0 % (ref 0–7)
EOSINOPHIL NFR BLD: 0 % (ref 0–7)
ERYTHROCYTE [DISTWIDTH] IN BLOOD BY AUTOMATED COUNT: 12.9 % (ref 11.5–14.5)
ERYTHROCYTE [DISTWIDTH] IN BLOOD BY AUTOMATED COUNT: 13 % (ref 11.5–14.5)
GLOBULIN SER CALC-MCNC: 3.2 G/DL (ref 2–4)
GLUCOSE BLD STRIP.AUTO-MCNC: 104 MG/DL (ref 65–100)
GLUCOSE BLD STRIP.AUTO-MCNC: 196 MG/DL (ref 65–100)
GLUCOSE BLD STRIP.AUTO-MCNC: 210 MG/DL (ref 65–100)
GLUCOSE BLD STRIP.AUTO-MCNC: 215 MG/DL (ref 65–100)
GLUCOSE BLD STRIP.AUTO-MCNC: 218 MG/DL (ref 65–100)
GLUCOSE BLD STRIP.AUTO-MCNC: 223 MG/DL (ref 65–100)
GLUCOSE BLD STRIP.AUTO-MCNC: 230 MG/DL (ref 65–100)
GLUCOSE BLD STRIP.AUTO-MCNC: 269 MG/DL (ref 65–100)
GLUCOSE SERPL-MCNC: 155 MG/DL (ref 65–100)
GLUCOSE SERPL-MCNC: 234 MG/DL (ref 65–100)
HCT VFR BLD AUTO: 33.4 % (ref 36.6–50.3)
HCT VFR BLD AUTO: 33.8 % (ref 36.6–50.3)
HGB BLD-MCNC: 10.7 G/DL (ref 12.1–17)
HGB BLD-MCNC: 10.9 G/DL (ref 12.1–17)
LYMPHOCYTES # BLD AUTO: 15 % (ref 12–49)
LYMPHOCYTES # BLD AUTO: 4 % (ref 12–49)
LYMPHOCYTES # BLD: 0.7 K/UL (ref 0.8–3.5)
LYMPHOCYTES # BLD: 1.4 K/UL (ref 0.8–3.5)
MAGNESIUM SERPL-MCNC: 1.7 MG/DL (ref 1.6–2.4)
MCH RBC QN AUTO: 29.8 PG (ref 26–34)
MCH RBC QN AUTO: 30.1 PG (ref 26–34)
MCHC RBC AUTO-ENTMCNC: 32 G/DL (ref 30–36.5)
MCHC RBC AUTO-ENTMCNC: 32.2 G/DL (ref 30–36.5)
MCV RBC AUTO: 92.3 FL (ref 80–99)
MCV RBC AUTO: 94.1 FL (ref 80–99)
MONOCYTES # BLD: 0.7 K/UL (ref 0–1)
MONOCYTES # BLD: 1.1 K/UL (ref 0–1)
MONOCYTES NFR BLD AUTO: 6 % (ref 5–13)
MONOCYTES NFR BLD AUTO: 7 % (ref 5–13)
NEUTS SEG # BLD: 16.2 K/UL (ref 1.8–8)
NEUTS SEG # BLD: 7.4 K/UL (ref 1.8–8)
NEUTS SEG NFR BLD AUTO: 78 % (ref 32–75)
NEUTS SEG NFR BLD AUTO: 90 % (ref 32–75)
PHOSPHATE SERPL-MCNC: 2.7 MG/DL (ref 2.6–4.7)
PLATELET # BLD AUTO: 186 K/UL (ref 150–400)
PLATELET # BLD AUTO: 199 K/UL (ref 150–400)
POTASSIUM SERPL-SCNC: 4 MMOL/L (ref 3.5–5.1)
POTASSIUM SERPL-SCNC: 4.3 MMOL/L (ref 3.5–5.1)
PROT SERPL-MCNC: 5.8 G/DL (ref 6.4–8.2)
RBC # BLD AUTO: 3.55 M/UL (ref 4.1–5.7)
RBC # BLD AUTO: 3.66 M/UL (ref 4.1–5.7)
RBC MORPH BLD: ABNORMAL
SERVICE CMNT-IMP: ABNORMAL
SODIUM SERPL-SCNC: 138 MMOL/L (ref 136–145)
SODIUM SERPL-SCNC: 141 MMOL/L (ref 136–145)
SPECIMEN EXP DATE BLD: NORMAL
WBC # BLD AUTO: 18 K/UL (ref 4.1–11.1)
WBC # BLD AUTO: 9.5 K/UL (ref 4.1–11.1)

## 2017-03-14 PROCEDURE — 77030026438 HC STYL ET INTUB CARD -A: Performed by: NURSE ANESTHETIST, CERTIFIED REGISTERED

## 2017-03-14 PROCEDURE — 0D1B0Z4 BYPASS ILEUM TO CUTANEOUS, OPEN APPROACH: ICD-10-PCS | Performed by: COLON & RECTAL SURGERY

## 2017-03-14 PROCEDURE — 77030018846 HC SOL IRR STRL H20 ICUM -A: Performed by: COLON & RECTAL SURGERY

## 2017-03-14 PROCEDURE — 74011000250 HC RX REV CODE- 250: Performed by: ANESTHESIOLOGY

## 2017-03-14 PROCEDURE — 77030013079 HC BLNKT BAIR HGGR 3M -A: Performed by: NURSE ANESTHETIST, CERTIFIED REGISTERED

## 2017-03-14 PROCEDURE — 77030018832 HC SOL IRR H20 ICUM -A: Performed by: COLON & RECTAL SURGERY

## 2017-03-14 PROCEDURE — 77030008771 HC TU NG SALEM SUMP -A: Performed by: NURSE ANESTHETIST, CERTIFIED REGISTERED

## 2017-03-14 PROCEDURE — 77030015404 HC RELD STPLR INT J&J -C: Performed by: COLON & RECTAL SURGERY

## 2017-03-14 PROCEDURE — 82962 GLUCOSE BLOOD TEST: CPT

## 2017-03-14 PROCEDURE — 86900 BLOOD TYPING SEROLOGIC ABO: CPT | Performed by: ANESTHESIOLOGY

## 2017-03-14 PROCEDURE — 77030014007 HC SPNG HEMSTAT J&J -B: Performed by: COLON & RECTAL SURGERY

## 2017-03-14 PROCEDURE — 80048 BASIC METABOLIC PNL TOTAL CA: CPT | Performed by: COLON & RECTAL SURGERY

## 2017-03-14 PROCEDURE — 77030008684 HC TU ET CUF COVD -B: Performed by: NURSE ANESTHETIST, CERTIFIED REGISTERED

## 2017-03-14 PROCEDURE — 74011000250 HC RX REV CODE- 250: Performed by: COLON & RECTAL SURGERY

## 2017-03-14 PROCEDURE — 76210000000 HC OR PH I REC 2 TO 2.5 HR: Performed by: COLON & RECTAL SURGERY

## 2017-03-14 PROCEDURE — 77030019927 HC TBNG IRR CYSTO BAXT -A: Performed by: COLON & RECTAL SURGERY

## 2017-03-14 PROCEDURE — 77030018836 HC SOL IRR NACL ICUM -A: Performed by: COLON & RECTAL SURGERY

## 2017-03-14 PROCEDURE — 74011000258 HC RX REV CODE- 258: Performed by: COLON & RECTAL SURGERY

## 2017-03-14 PROCEDURE — 77030009969 HC RELD STPLR GIA COVD -B: Performed by: COLON & RECTAL SURGERY

## 2017-03-14 PROCEDURE — 85025 COMPLETE CBC W/AUTO DIFF WBC: CPT | Performed by: COLON & RECTAL SURGERY

## 2017-03-14 PROCEDURE — 80053 COMPREHEN METABOLIC PANEL: CPT | Performed by: COLON & RECTAL SURGERY

## 2017-03-14 PROCEDURE — 77030032490 HC SLV COMPR SCD KNE COVD -B: Performed by: COLON & RECTAL SURGERY

## 2017-03-14 PROCEDURE — 77030002916 HC SUT ETHLN J&J -A: Performed by: COLON & RECTAL SURGERY

## 2017-03-14 PROCEDURE — 77030019702 HC WRP THER MENM -C: Performed by: COLON & RECTAL SURGERY

## 2017-03-14 PROCEDURE — 76060000044 HC ANESTHESIA 6.5 TO 7 HR: Performed by: COLON & RECTAL SURGERY

## 2017-03-14 PROCEDURE — 77030034850: Performed by: COLON & RECTAL SURGERY

## 2017-03-14 PROCEDURE — 77030016154: Performed by: COLON & RECTAL SURGERY

## 2017-03-14 PROCEDURE — 77030011640 HC PAD GRND REM COVD -A: Performed by: COLON & RECTAL SURGERY

## 2017-03-14 PROCEDURE — 83735 ASSAY OF MAGNESIUM: CPT | Performed by: COLON & RECTAL SURGERY

## 2017-03-14 PROCEDURE — 36415 COLL VENOUS BLD VENIPUNCTURE: CPT | Performed by: COLON & RECTAL SURGERY

## 2017-03-14 PROCEDURE — 74011250636 HC RX REV CODE- 250/636

## 2017-03-14 PROCEDURE — 74011250636 HC RX REV CODE- 250/636: Performed by: COLON & RECTAL SURGERY

## 2017-03-14 PROCEDURE — 0DNA0ZZ RELEASE JEJUNUM, OPEN APPROACH: ICD-10-PCS | Performed by: COLON & RECTAL SURGERY

## 2017-03-14 PROCEDURE — 84100 ASSAY OF PHOSPHORUS: CPT | Performed by: COLON & RECTAL SURGERY

## 2017-03-14 PROCEDURE — C1765 ADHESION BARRIER: HCPCS | Performed by: COLON & RECTAL SURGERY

## 2017-03-14 PROCEDURE — 0T788DZ DILATION OF BILATERAL URETERS WITH INTRALUMINAL DEVICE, VIA NATURAL OR ARTIFICIAL OPENING ENDOSCOPIC: ICD-10-PCS | Performed by: UROLOGY

## 2017-03-14 PROCEDURE — C1758 CATHETER, URETERAL: HCPCS | Performed by: COLON & RECTAL SURGERY

## 2017-03-14 PROCEDURE — 77030002996 HC SUT SLK J&J -A: Performed by: COLON & RECTAL SURGERY

## 2017-03-14 PROCEDURE — 76010000180 HC OR TIME 6.5 TO 7 HR INTENSV-TIER 1: Performed by: COLON & RECTAL SURGERY

## 2017-03-14 PROCEDURE — 74011000250 HC RX REV CODE- 250

## 2017-03-14 PROCEDURE — 65270000032 HC RM SEMIPRIVATE

## 2017-03-14 PROCEDURE — 77030008467 HC STPLR SKN COVD -B: Performed by: COLON & RECTAL SURGERY

## 2017-03-14 PROCEDURE — 77030019908 HC STETH ESOPH SIMS -A: Performed by: NURSE ANESTHETIST, CERTIFIED REGISTERED

## 2017-03-14 PROCEDURE — 0DNB0ZZ RELEASE ILEUM, OPEN APPROACH: ICD-10-PCS | Performed by: COLON & RECTAL SURGERY

## 2017-03-14 PROCEDURE — 77030010296 HC STPLR INT COVD -B: Performed by: COLON & RECTAL SURGERY

## 2017-03-14 PROCEDURE — 77030007880 HC KT SPN EPDRL BBMI -B

## 2017-03-14 PROCEDURE — P9045 ALBUMIN (HUMAN), 5%, 250 ML: HCPCS

## 2017-03-14 PROCEDURE — 74000 XR ABD PORT  1 V: CPT

## 2017-03-14 PROCEDURE — 77030002966 HC SUT PDS J&J -A: Performed by: COLON & RECTAL SURGERY

## 2017-03-14 PROCEDURE — 74011250636 HC RX REV CODE- 250/636: Performed by: ANESTHESIOLOGY

## 2017-03-14 PROCEDURE — 77030031139 HC SUT VCRL2 J&J -A: Performed by: COLON & RECTAL SURGERY

## 2017-03-14 RX ORDER — ROCURONIUM BROMIDE 10 MG/ML
INJECTION, SOLUTION INTRAVENOUS AS NEEDED
Status: DISCONTINUED | OUTPATIENT
Start: 2017-03-14 | End: 2017-03-14 | Stop reason: HOSPADM

## 2017-03-14 RX ORDER — LIDOCAINE HYDROCHLORIDE 20 MG/ML
INJECTION, SOLUTION EPIDURAL; INFILTRATION; INTRACAUDAL; PERINEURAL AS NEEDED
Status: DISCONTINUED | OUTPATIENT
Start: 2017-03-14 | End: 2017-03-14 | Stop reason: HOSPADM

## 2017-03-14 RX ORDER — DIAZEPAM 5 MG/1
5 TABLET ORAL
Status: DISCONTINUED | OUTPATIENT
Start: 2017-03-14 | End: 2017-03-19

## 2017-03-14 RX ORDER — HYDROMORPHONE HYDROCHLORIDE 1 MG/ML
0.2 INJECTION, SOLUTION INTRAMUSCULAR; INTRAVENOUS; SUBCUTANEOUS
Status: DISCONTINUED | OUTPATIENT
Start: 2017-03-14 | End: 2017-03-14 | Stop reason: HOSPADM

## 2017-03-14 RX ORDER — MORPHINE SULFATE 10 MG/ML
2 INJECTION, SOLUTION INTRAMUSCULAR; INTRAVENOUS
Status: DISCONTINUED | OUTPATIENT
Start: 2017-03-14 | End: 2017-03-14 | Stop reason: HOSPADM

## 2017-03-14 RX ORDER — MIDAZOLAM HYDROCHLORIDE 1 MG/ML
0.5 INJECTION, SOLUTION INTRAMUSCULAR; INTRAVENOUS
Status: DISCONTINUED | OUTPATIENT
Start: 2017-03-14 | End: 2017-03-14 | Stop reason: HOSPADM

## 2017-03-14 RX ORDER — ALBUMIN HUMAN 50 G/1000ML
SOLUTION INTRAVENOUS AS NEEDED
Status: DISCONTINUED | OUTPATIENT
Start: 2017-03-14 | End: 2017-03-14 | Stop reason: HOSPADM

## 2017-03-14 RX ORDER — FENTANYL CITRATE 50 UG/ML
25 INJECTION, SOLUTION INTRAMUSCULAR; INTRAVENOUS
Status: DISCONTINUED | OUTPATIENT
Start: 2017-03-14 | End: 2017-03-14 | Stop reason: HOSPADM

## 2017-03-14 RX ORDER — SODIUM CHLORIDE 0.9 % (FLUSH) 0.9 %
5-10 SYRINGE (ML) INJECTION AS NEEDED
Status: DISCONTINUED | OUTPATIENT
Start: 2017-03-14 | End: 2017-03-14 | Stop reason: HOSPADM

## 2017-03-14 RX ORDER — NALOXONE HYDROCHLORIDE 0.4 MG/ML
0.4 INJECTION, SOLUTION INTRAMUSCULAR; INTRAVENOUS; SUBCUTANEOUS AS NEEDED
Status: DISCONTINUED | OUTPATIENT
Start: 2017-03-14 | End: 2017-03-21 | Stop reason: HOSPADM

## 2017-03-14 RX ORDER — ONDANSETRON 2 MG/ML
4 INJECTION INTRAMUSCULAR; INTRAVENOUS
Status: DISCONTINUED | OUTPATIENT
Start: 2017-03-14 | End: 2017-03-14

## 2017-03-14 RX ORDER — DEXAMETHASONE SODIUM PHOSPHATE 4 MG/ML
INJECTION, SOLUTION INTRA-ARTICULAR; INTRALESIONAL; INTRAMUSCULAR; INTRAVENOUS; SOFT TISSUE AS NEEDED
Status: DISCONTINUED | OUTPATIENT
Start: 2017-03-14 | End: 2017-03-14 | Stop reason: HOSPADM

## 2017-03-14 RX ORDER — SODIUM CHLORIDE, SODIUM LACTATE, POTASSIUM CHLORIDE, CALCIUM CHLORIDE 600; 310; 30; 20 MG/100ML; MG/100ML; MG/100ML; MG/100ML
125 INJECTION, SOLUTION INTRAVENOUS CONTINUOUS
Status: DISCONTINUED | OUTPATIENT
Start: 2017-03-14 | End: 2017-03-14 | Stop reason: HOSPADM

## 2017-03-14 RX ORDER — OXYCODONE AND ACETAMINOPHEN 5; 325 MG/1; MG/1
1 TABLET ORAL AS NEEDED
Status: DISCONTINUED | OUTPATIENT
Start: 2017-03-14 | End: 2017-03-14 | Stop reason: HOSPADM

## 2017-03-14 RX ORDER — NALBUPHINE HYDROCHLORIDE 10 MG/ML
2.5 INJECTION, SOLUTION INTRAMUSCULAR; INTRAVENOUS; SUBCUTANEOUS
Status: DISCONTINUED | OUTPATIENT
Start: 2017-03-14 | End: 2017-03-19

## 2017-03-14 RX ORDER — SODIUM CHLORIDE 9 MG/ML
25 INJECTION, SOLUTION INTRAVENOUS CONTINUOUS
Status: DISCONTINUED | OUTPATIENT
Start: 2017-03-14 | End: 2017-03-14 | Stop reason: HOSPADM

## 2017-03-14 RX ORDER — LIDOCAINE HYDROCHLORIDE 10 MG/ML
0.1 INJECTION, SOLUTION EPIDURAL; INFILTRATION; INTRACAUDAL; PERINEURAL AS NEEDED
Status: DISCONTINUED | OUTPATIENT
Start: 2017-03-14 | End: 2017-03-14 | Stop reason: HOSPADM

## 2017-03-14 RX ORDER — NEOSTIGMINE METHYLSULFATE 1 MG/ML
INJECTION INTRAVENOUS AS NEEDED
Status: DISCONTINUED | OUTPATIENT
Start: 2017-03-14 | End: 2017-03-14 | Stop reason: HOSPADM

## 2017-03-14 RX ORDER — ONDANSETRON 2 MG/ML
4 INJECTION INTRAMUSCULAR; INTRAVENOUS AS NEEDED
Status: DISCONTINUED | OUTPATIENT
Start: 2017-03-14 | End: 2017-03-14 | Stop reason: HOSPADM

## 2017-03-14 RX ORDER — ONDANSETRON 2 MG/ML
INJECTION INTRAMUSCULAR; INTRAVENOUS AS NEEDED
Status: DISCONTINUED | OUTPATIENT
Start: 2017-03-14 | End: 2017-03-14 | Stop reason: HOSPADM

## 2017-03-14 RX ORDER — SODIUM CHLORIDE 0.9 % (FLUSH) 0.9 %
5-10 SYRINGE (ML) INJECTION EVERY 8 HOURS
Status: DISCONTINUED | OUTPATIENT
Start: 2017-03-15 | End: 2017-03-21 | Stop reason: HOSPADM

## 2017-03-14 RX ORDER — FENTANYL CITRATE 50 UG/ML
50 INJECTION, SOLUTION INTRAMUSCULAR; INTRAVENOUS AS NEEDED
Status: DISCONTINUED | OUTPATIENT
Start: 2017-03-14 | End: 2017-03-14 | Stop reason: HOSPADM

## 2017-03-14 RX ORDER — DIPHENHYDRAMINE HYDROCHLORIDE 50 MG/ML
12.5 INJECTION, SOLUTION INTRAMUSCULAR; INTRAVENOUS AS NEEDED
Status: DISCONTINUED | OUTPATIENT
Start: 2017-03-14 | End: 2017-03-14 | Stop reason: HOSPADM

## 2017-03-14 RX ORDER — HYDROMORPHONE HYDROCHLORIDE 2 MG/ML
INJECTION, SOLUTION INTRAMUSCULAR; INTRAVENOUS; SUBCUTANEOUS AS NEEDED
Status: DISCONTINUED | OUTPATIENT
Start: 2017-03-14 | End: 2017-03-14 | Stop reason: HOSPADM

## 2017-03-14 RX ORDER — GLYCOPYRROLATE 0.2 MG/ML
INJECTION INTRAMUSCULAR; INTRAVENOUS AS NEEDED
Status: DISCONTINUED | OUTPATIENT
Start: 2017-03-14 | End: 2017-03-14 | Stop reason: HOSPADM

## 2017-03-14 RX ORDER — SODIUM CHLORIDE, SODIUM LACTATE, POTASSIUM CHLORIDE, CALCIUM CHLORIDE 600; 310; 30; 20 MG/100ML; MG/100ML; MG/100ML; MG/100ML
INJECTION, SOLUTION INTRAVENOUS
Status: DISCONTINUED | OUTPATIENT
Start: 2017-03-14 | End: 2017-03-14 | Stop reason: HOSPADM

## 2017-03-14 RX ORDER — SODIUM CHLORIDE 0.9 % (FLUSH) 0.9 %
5-10 SYRINGE (ML) INJECTION AS NEEDED
Status: DISCONTINUED | OUTPATIENT
Start: 2017-03-14 | End: 2017-03-21 | Stop reason: HOSPADM

## 2017-03-14 RX ORDER — SUCCINYLCHOLINE CHLORIDE 20 MG/ML
INJECTION INTRAMUSCULAR; INTRAVENOUS AS NEEDED
Status: DISCONTINUED | OUTPATIENT
Start: 2017-03-14 | End: 2017-03-14 | Stop reason: HOSPADM

## 2017-03-14 RX ORDER — SODIUM CHLORIDE 9 MG/ML
INJECTION INTRAMUSCULAR; INTRAVENOUS; SUBCUTANEOUS
Status: DISPENSED
Start: 2017-03-14 | End: 2017-03-15

## 2017-03-14 RX ORDER — MIDAZOLAM HYDROCHLORIDE 1 MG/ML
INJECTION, SOLUTION INTRAMUSCULAR; INTRAVENOUS AS NEEDED
Status: DISCONTINUED | OUTPATIENT
Start: 2017-03-14 | End: 2017-03-14 | Stop reason: HOSPADM

## 2017-03-14 RX ORDER — LABETALOL HYDROCHLORIDE 5 MG/ML
INJECTION, SOLUTION INTRAVENOUS AS NEEDED
Status: DISCONTINUED | OUTPATIENT
Start: 2017-03-14 | End: 2017-03-14 | Stop reason: HOSPADM

## 2017-03-14 RX ORDER — FENTANYL CITRATE 50 UG/ML
INJECTION, SOLUTION INTRAMUSCULAR; INTRAVENOUS AS NEEDED
Status: DISCONTINUED | OUTPATIENT
Start: 2017-03-14 | End: 2017-03-14 | Stop reason: HOSPADM

## 2017-03-14 RX ORDER — TEMAZEPAM 15 MG/1
30 CAPSULE ORAL
Status: DISCONTINUED | OUTPATIENT
Start: 2017-03-15 | End: 2017-03-21 | Stop reason: HOSPADM

## 2017-03-14 RX ORDER — SODIUM CHLORIDE 0.9 % (FLUSH) 0.9 %
5-10 SYRINGE (ML) INJECTION EVERY 8 HOURS
Status: DISCONTINUED | OUTPATIENT
Start: 2017-03-14 | End: 2017-03-14 | Stop reason: HOSPADM

## 2017-03-14 RX ORDER — MIDAZOLAM HYDROCHLORIDE 1 MG/ML
1 INJECTION, SOLUTION INTRAMUSCULAR; INTRAVENOUS AS NEEDED
Status: DISCONTINUED | OUTPATIENT
Start: 2017-03-14 | End: 2017-03-14 | Stop reason: HOSPADM

## 2017-03-14 RX ORDER — PROPOFOL 10 MG/ML
INJECTION, EMULSION INTRAVENOUS AS NEEDED
Status: DISCONTINUED | OUTPATIENT
Start: 2017-03-14 | End: 2017-03-14 | Stop reason: HOSPADM

## 2017-03-14 RX ADMIN — SODIUM CHLORIDE, SODIUM LACTATE, POTASSIUM CHLORIDE, CALCIUM CHLORIDE: 600; 310; 30; 20 INJECTION, SOLUTION INTRAVENOUS at 15:57

## 2017-03-14 RX ADMIN — ROCURONIUM BROMIDE 5 MG: 10 INJECTION, SOLUTION INTRAVENOUS at 13:00

## 2017-03-14 RX ADMIN — FENTANYL CITRATE 50 MCG: 50 INJECTION, SOLUTION INTRAMUSCULAR; INTRAVENOUS at 13:05

## 2017-03-14 RX ADMIN — ROCURONIUM BROMIDE 20 MG: 10 INJECTION, SOLUTION INTRAVENOUS at 14:20

## 2017-03-14 RX ADMIN — Medication 20 ML: at 20:52

## 2017-03-14 RX ADMIN — FENTANYL CITRATE 100 MCG: 50 INJECTION, SOLUTION INTRAMUSCULAR; INTRAVENOUS at 15:49

## 2017-03-14 RX ADMIN — NEOSTIGMINE METHYLSULFATE 3.5 MG: 1 INJECTION INTRAVENOUS at 19:36

## 2017-03-14 RX ADMIN — HYDROMORPHONE HYDROCHLORIDE 1 MG: 1 INJECTION, SOLUTION INTRAMUSCULAR; INTRAVENOUS; SUBCUTANEOUS at 07:02

## 2017-03-14 RX ADMIN — Medication 10 ML: at 06:00

## 2017-03-14 RX ADMIN — MIDAZOLAM HYDROCHLORIDE 4 MG: 1 INJECTION, SOLUTION INTRAMUSCULAR; INTRAVENOUS at 12:13

## 2017-03-14 RX ADMIN — HYDROMORPHONE HYDROCHLORIDE 2 MG: 2 INJECTION, SOLUTION INTRAMUSCULAR; INTRAVENOUS; SUBCUTANEOUS at 14:22

## 2017-03-14 RX ADMIN — ONDANSETRON 4 MG: 2 INJECTION INTRAMUSCULAR; INTRAVENOUS at 23:16

## 2017-03-14 RX ADMIN — SODIUM CHLORIDE, SODIUM LACTATE, POTASSIUM CHLORIDE, AND CALCIUM CHLORIDE 125 ML/HR: 600; 310; 30; 20 INJECTION, SOLUTION INTRAVENOUS at 12:19

## 2017-03-14 RX ADMIN — PROCHLORPERAZINE EDISYLATE 5 MG: 5 INJECTION INTRAMUSCULAR; INTRAVENOUS at 07:02

## 2017-03-14 RX ADMIN — FENTANYL CITRATE 50 MCG: 50 INJECTION, SOLUTION INTRAMUSCULAR; INTRAVENOUS at 17:07

## 2017-03-14 RX ADMIN — FAMOTIDINE 20 MG: 10 INJECTION, SOLUTION INTRAVENOUS at 23:16

## 2017-03-14 RX ADMIN — ROCURONIUM BROMIDE 10 MG: 10 INJECTION, SOLUTION INTRAVENOUS at 18:33

## 2017-03-14 RX ADMIN — SODIUM CHLORIDE 5 MG: 9 INJECTION INTRAMUSCULAR; INTRAVENOUS; SUBCUTANEOUS at 21:42

## 2017-03-14 RX ADMIN — LABETALOL HYDROCHLORIDE 10 MG: 5 INJECTION, SOLUTION INTRAVENOUS at 17:16

## 2017-03-14 RX ADMIN — FENTANYL CITRATE 50 MCG: 50 INJECTION, SOLUTION INTRAMUSCULAR; INTRAVENOUS at 18:42

## 2017-03-14 RX ADMIN — DEXAMETHASONE SODIUM PHOSPHATE 6 MG: 4 INJECTION, SOLUTION INTRA-ARTICULAR; INTRALESIONAL; INTRAMUSCULAR; INTRAVENOUS; SOFT TISSUE at 14:20

## 2017-03-14 RX ADMIN — ROCURONIUM BROMIDE 20 MG: 10 INJECTION, SOLUTION INTRAVENOUS at 14:54

## 2017-03-14 RX ADMIN — HYDROMORPHONE HYDROCHLORIDE 1 MG: 1 INJECTION, SOLUTION INTRAMUSCULAR; INTRAVENOUS; SUBCUTANEOUS at 04:53

## 2017-03-14 RX ADMIN — ONDANSETRON 4 MG: 2 INJECTION INTRAMUSCULAR; INTRAVENOUS at 08:58

## 2017-03-14 RX ADMIN — MIDAZOLAM HYDROCHLORIDE 2.5 MG: 1 INJECTION, SOLUTION INTRAMUSCULAR; INTRAVENOUS at 12:53

## 2017-03-14 RX ADMIN — SODIUM CHLORIDE 35 ML/HR: 900 INJECTION, SOLUTION INTRAVENOUS at 21:01

## 2017-03-14 RX ADMIN — CEFOTETAN DISODIUM 2 G: 2 INJECTION, POWDER, FOR SOLUTION INTRAMUSCULAR; INTRAVENOUS at 13:30

## 2017-03-14 RX ADMIN — SODIUM CHLORIDE 35 ML/HR: 900 INJECTION, SOLUTION INTRAVENOUS at 05:56

## 2017-03-14 RX ADMIN — FENTANYL CITRATE 50 MCG: 50 INJECTION, SOLUTION INTRAMUSCULAR; INTRAVENOUS at 16:30

## 2017-03-14 RX ADMIN — SODIUM CHLORIDE, SODIUM LACTATE, POTASSIUM CHLORIDE, CALCIUM CHLORIDE: 600; 310; 30; 20 INJECTION, SOLUTION INTRAVENOUS at 12:45

## 2017-03-14 RX ADMIN — HYDROMORPHONE HYDROCHLORIDE 1 MG: 1 INJECTION, SOLUTION INTRAMUSCULAR; INTRAVENOUS; SUBCUTANEOUS at 08:58

## 2017-03-14 RX ADMIN — ASCORBIC ACID: 500 INJECTION, SOLUTION INTRAMUSCULAR; INTRAVENOUS; SUBCUTANEOUS at 20:52

## 2017-03-14 RX ADMIN — FAMOTIDINE 20 MG: 10 INJECTION, SOLUTION INTRAVENOUS at 08:58

## 2017-03-14 RX ADMIN — CEFOTETAN DISODIUM 2 G: 2 INJECTION, POWDER, FOR SOLUTION INTRAMUSCULAR; INTRAVENOUS at 19:30

## 2017-03-14 RX ADMIN — MIDAZOLAM HYDROCHLORIDE 2.5 MG: 1 INJECTION, SOLUTION INTRAMUSCULAR; INTRAVENOUS at 12:48

## 2017-03-14 RX ADMIN — SODIUM CHLORIDE, SODIUM LACTATE, POTASSIUM CHLORIDE, CALCIUM CHLORIDE: 600; 310; 30; 20 INJECTION, SOLUTION INTRAVENOUS at 14:11

## 2017-03-14 RX ADMIN — PROPOFOL 200 MG: 10 INJECTION, EMULSION INTRAVENOUS at 13:00

## 2017-03-14 RX ADMIN — INSULIN LISPRO 3 UNITS: 100 INJECTION, SOLUTION INTRAVENOUS; SUBCUTANEOUS at 23:54

## 2017-03-14 RX ADMIN — Medication 10 ML: at 23:16

## 2017-03-14 RX ADMIN — Medication 10 ML/HR: at 13:25

## 2017-03-14 RX ADMIN — ROCURONIUM BROMIDE 20 MG: 10 INJECTION, SOLUTION INTRAVENOUS at 16:24

## 2017-03-14 RX ADMIN — HYDROCORTISONE SODIUM SUCCINATE 50 MG: 100 INJECTION, POWDER, FOR SOLUTION INTRAMUSCULAR; INTRAVENOUS at 23:16

## 2017-03-14 RX ADMIN — SODIUM CHLORIDE, SODIUM LACTATE, POTASSIUM CHLORIDE, CALCIUM CHLORIDE: 600; 310; 30; 20 INJECTION, SOLUTION INTRAVENOUS at 18:58

## 2017-03-14 RX ADMIN — LIDOCAINE HYDROCHLORIDE 80 MG: 20 INJECTION, SOLUTION EPIDURAL; INFILTRATION; INTRACAUDAL; PERINEURAL at 13:00

## 2017-03-14 RX ADMIN — SODIUM CHLORIDE 5 MG: 9 INJECTION INTRAMUSCULAR; INTRAVENOUS; SUBCUTANEOUS at 21:18

## 2017-03-14 RX ADMIN — SUCCINYLCHOLINE CHLORIDE 140 MG: 20 INJECTION INTRAMUSCULAR; INTRAVENOUS at 13:01

## 2017-03-14 RX ADMIN — GLYCOPYRROLATE 0.5 MG: 0.2 INJECTION INTRAMUSCULAR; INTRAVENOUS at 19:36

## 2017-03-14 RX ADMIN — HYDROMORPHONE HYDROCHLORIDE 1 MG: 1 INJECTION, SOLUTION INTRAMUSCULAR; INTRAVENOUS; SUBCUTANEOUS at 10:52

## 2017-03-14 RX ADMIN — FENTANYL CITRATE 200 MCG: 50 INJECTION, SOLUTION INTRAMUSCULAR; INTRAVENOUS at 13:00

## 2017-03-14 RX ADMIN — ONDANSETRON 4 MG: 2 INJECTION INTRAMUSCULAR; INTRAVENOUS at 01:47

## 2017-03-14 RX ADMIN — SODIUM CHLORIDE, SODIUM LACTATE, POTASSIUM CHLORIDE, CALCIUM CHLORIDE: 600; 310; 30; 20 INJECTION, SOLUTION INTRAVENOUS at 12:48

## 2017-03-14 RX ADMIN — ALBUMIN HUMAN 250 ML: 50 SOLUTION INTRAVENOUS at 18:11

## 2017-03-14 RX ADMIN — HYDROMORPHONE HYDROCHLORIDE 1 MG: 1 INJECTION, SOLUTION INTRAMUSCULAR; INTRAVENOUS; SUBCUTANEOUS at 01:47

## 2017-03-14 RX ADMIN — ALBUMIN HUMAN 250 ML: 50 SOLUTION INTRAVENOUS at 16:21

## 2017-03-14 RX ADMIN — ONDANSETRON 8 MG: 2 INJECTION INTRAMUSCULAR; INTRAVENOUS at 19:27

## 2017-03-14 RX ADMIN — HYDROCORTISONE SODIUM SUCCINATE 50 MG: 100 INJECTION, POWDER, FOR SOLUTION INTRAMUSCULAR; INTRAVENOUS at 08:57

## 2017-03-14 RX ADMIN — ROCURONIUM BROMIDE 10 MG: 10 INJECTION, SOLUTION INTRAVENOUS at 15:36

## 2017-03-14 RX ADMIN — FENTANYL CITRATE 50 MCG: 50 INJECTION, SOLUTION INTRAMUSCULAR; INTRAVENOUS at 12:13

## 2017-03-14 RX ADMIN — ROCURONIUM BROMIDE 20 MG: 10 INJECTION, SOLUTION INTRAVENOUS at 17:11

## 2017-03-14 RX ADMIN — ROCURONIUM BROMIDE 45 MG: 10 INJECTION, SOLUTION INTRAVENOUS at 13:11

## 2017-03-14 NOTE — ANESTHESIA PREPROCEDURE EVALUATION
Anesthetic History     PONV          Review of Systems / Medical History  Patient summary reviewed, nursing notes reviewed and pertinent labs reviewed    Pulmonary  Within defined limits                 Neuro/Psych   Within defined limits           Cardiovascular  Within defined limits                Exercise tolerance: >4 METS     GI/Hepatic/Renal     GERD: well controlled          Comments: Crohn's Endo/Other        Anemia     Other Findings   Comments: Crohn's         Physical Exam    Airway  Mallampati: II  TM Distance: 4 - 6 cm  Neck ROM: normal range of motion   Mouth opening: Normal     Cardiovascular  Regular rate and rhythm,  S1 and S2 normal,  no murmur, click, rub, or gallop             Dental  No notable dental hx       Pulmonary  Breath sounds clear to auscultation               Abdominal  GI exam deferred       Other Findings            Anesthetic Plan    ASA: 2  Anesthesia type: general      Post-op pain plan if not by surgeon: indwelling epidural catheter    Induction: Intravenous  Anesthetic plan and risks discussed with: Patient

## 2017-03-14 NOTE — PERIOP NOTES
Patient's wife contacted to provide update on surgical procedure via surgical waiting area phone at [phone]    Seprafilm procedure pack added to sterile field   Lot: [id number]  EXP: 06/30/2019

## 2017-03-14 NOTE — PROGRESS NOTES
Bedside and Verbal shift change report given to Sven Vargas (oncoming nurse) by Chema Acosta RN (offgoing nurse). Report included the following information SBAR, Kardex, MAR and Recent Results.

## 2017-03-14 NOTE — ANESTHESIA PROCEDURE NOTES
Epidural Block    Start time: 3/14/2017 12:24 PM  End time: 3/14/2017 12:32 PM  Performed by: Shari Arrington  Authorized by: Bimal CORTES     Pre-Procedure  Indication: at surgeon's request and post-op pain management    Preanesthetic Checklist: patient identified, risks and benefits discussed, patient being monitored and timeout performed      Epidural:   Patient position:  Left lateral decubitus  Prep region:  Thoracic  Prep: DuraPrep    Location:  T10-11    Needle and Epidural Catheter:   Needle Type:  Tuohy  Needle Gauge:  17 G  Injection Technique:  Loss of resistance using air  Attempts:  1  Catheter Size:  19 G  Catheter at Skin Depth (cm):  10  Depth in Epidural Space (cm):  4  Events: no blood with aspiration, no cerebrospinal fluid with aspiration, no paresthesia and negative aspiration test    Test Dose:  Negative and lidocaine 1.5% w/ epi    Assessment:   Catheter Secured:  Tegaderm and tape  Insertion:  Uncomplicated  Patient tolerance:  Patient tolerated the procedure well with no immediate complications  5 ml 6.5% Lidocaine with epi

## 2017-03-14 NOTE — ROUTINE PROCESS
TRANSFER - IN REPORT:    Verbal report received from [place] on [facility]  being received from [facility](unit) for ordered procedure      Report consisted of patients Situation, Background, Assessment and   Recommendations(SBAR). Information from the following report(s) SBAR was reviewed with the sending nurse. Opportunity for questions and clarification was provided. Assessment completed upon patients arrival to unit and care assumed.

## 2017-03-14 NOTE — PROGRESS NOTES
Bedside shift change report given to Sonny Bearden RN (oncoming nurse) by Sami Howe (offgoing nurse). Report included the following information SBAR, Kardex, Intake/Output and MAR.

## 2017-03-14 NOTE — BRIEF OP NOTE
BRIEF OPERATIVE NOTE    Date of Procedure: 3/14/2017   Preoperative Diagnosis: PARTIAL SMALL BOWEL OBSTRUCTION, ILEOSTOMY DYSFUNCTION  Postoperative Diagnosis: ARTIAL SMALL BOWEL OBSTRUCTION, ILEOSTOMY DYSFUNCTION    Procedure(s):  CYSTOSCOPY  INSERTION OF TEMPORARY URETERAL CATHETERS  Surgeon(s) and Role:  Panel 2:     * Chad Davison MD - Primary  Surgical Staff:  Circ-1: Michael Walden  Scrub RN-1: Mic Szymanski RN  Surg Asst-1: Brad Rosa RN  Event Time In   Incision Start 1343   Incision Close      Anesthesia: General   Estimated Blood Loss: None  Specimens: * No specimens in log *   Findings:   Normal bladder   Complications: None  Implants: * No implants in log *

## 2017-03-14 NOTE — PROGRESS NOTES
Bedside and Verbal shift change report given to [address] Elliot Young  (oncoming nurse) by Ricci Gandara (offgoing nurse). Report included the following information SBAR, Kardex, Procedure Summary, MAR and Recent Results.

## 2017-03-14 NOTE — OP NOTES
[address], [facility]   OP NOTE       Name:  Clara Cristina   MR#:  [id number]   :  1975   Account #:  [de-identified]    Surgery Date:  2017   Date of Adm:  2017       PREOPERATIVE DIAGNOSIS: Bowel obstruction. POSTOPERATIVE DIAGNOSIS: Bowel obstruction. PROCEDURES PERFORMED: Cystoscopy with temporary ureteral   stent placement. SURGEON: Rajesh Cottrell MD    ANESTHESIA: General.    FINDINGS: Normal bladder and stent placement without difficulty. SPECIMENS REMOVED: None. COMPLICATIONS: None. ESTIMATED BLOOD LOSS: 0.    DESCRIPTION OF PROCEDURE: The patient was brought to the   operating room by Dr. Antonette Guerra. He had been placed in a   supine position, dorsal lithotomy, by the surgical staff. At this point, I   was called then in. Time-out was performed verifying the patient,   procedure and laterality. A rigid cystoscope was advanced into the   urethra, demonstrating a normal pendulous and prostatic urethra   and minimal prostate enlargement. The bladder itself demonstrated no   significant mucosal lesions, including visualization of the trigone,   posterior wall, bladder wall and bladder neck. Both orifices were   orthotopic. The right orifice was targeted and a red-ended catheter was   then placed, whistle-tip catheter without difficulty to 25 cm on the left   side was then performed. In a similar fashion with a blue-tipped   catheter up to 25 cm. At this time, the cystoscope was withdrawn, and   the Alexander catheter was then inserted and the stents were then secured   to the Alexander catheter without difficulty. The case was then turned over to Dr. Antonette Guerra for the   remainder of his case.         MD Reggie Beard / NIRAJ   D:  2017   14:25   T:  2017   14:43   Job #:  [id number]

## 2017-03-14 NOTE — PROGRESS NOTES
Problem: Pain  Goal: *Control of Pain  Outcome: Progressing Towards Goal  Patient stated pain as an 8 on numeric 0-10 pain scale. PRN pain medication given as ordered. Will continue to monitor. Problem: Falls - Risk of  Goal: *Absence of falls  Outcome: Progressing Towards Goal  Patient up ad kim. Fall precautions in place. Bed in lowest position, side rails up, slip resistant footwear on patient and personal belongings within reach. Patient instructed to use call bell when needing assistance. Problem: Nutrition Deficit  Goal: *Optimize nutritional status  Outcome: Progressing Towards Goal  Patient NPO and receiving TPN. Will continue to monitor.

## 2017-03-14 NOTE — BRIEF OP NOTE
BRIEF OPERATIVE NOTE    Date of Procedure:  3/14/2017  Preoperative Diagnosis:  Small bowel obstruction. Crohn's disease. Ileostomy retraction. Postoperative Diagnosis:  Small bowel obstruction. Crohn's disease. Ileostomy retraction. Procedure:  Laparotomy, extensive lysis of adhesions (> 3 hours), and revision of ileostomy. Surgeon:  Tenisha Klein MD  Assistants:  Rosemary Duvall. [facility]; Debby L. Stephany Alejandre, [place]; [facility]; [place], [place]. Anesthesia:  General endotracheal and epidural.  Estimated Blood Loss:  500 mL  Crystalloid:  3000 mL  Albumin:  500 mL  Urine:  1500 mL  Specimens:  None. Tubes and Drains:  None. Findings: There were extensive adhesions involving essentially every part of the jejunum and ileum except for the ileostomy itself. There was no evidence of active Crohn's disease. The ileostomy was retracted. The small intestinal length from the  Ligament of Treitz to the ileostomy was approximately 407 cm. Complications:  None apparent.

## 2017-03-14 NOTE — PERIOP NOTES
Patient: Denis Acosta MRN: [id number]  SSN: [id number]   YOB: 1975  Age: 40 y.o. Sex: male     Patient is status post Procedure(s):  LAPAROTOMY WITH LYSIS OF ADHESIONS, ILEOSTOMY REVISION, CYSTOSCOPY AND PLACEMENT OF BILATERAL URETERAL CATHETERS    CYSTOSCOPY INSERTION URETERAL CATHETERS. Surgeon(s) and Role:  Panel 1:     * Uday Brown MD - Primary    Panel 2:     * Dena Romo MD - Primary    Local/Dose/Irrigation:              PICC Double Lumen 24/94/74 Left;Basilic (Active)   Central Line Being Utilized Yes 3/14/2017  8:01 AM   Criteria for Appropriate Use Total parenteral nutrition 3/14/2017  8:01 AM   Site Assessment Clean, dry, & intact 3/14/2017  8:01 AM   Phlebitis Assessment 0 3/14/2017  8:01 AM   Infiltration Assessment 0 3/14/2017  8:01 AM   Date of Last Dressing Change 03/10/17 3/14/2017  8:01 AM   Dressing Status Clean, dry, & intact 3/14/2017  8:01 AM   Action Taken Open ports on tubing capped 3/14/2017  8:01 AM   Dressing Type Disk with Chlorhexadine gluconate (CHG); Transparent 3/14/2017  8:01 AM   Hub Color/Line Status Red; Infusing 3/14/2017  8:01 AM   Positive Blood Return (Site #1) Yes 3/14/2017  8:01 AM   Hub Color/Line Status Purple; Infusing 3/14/2017  8:01 AM   Positive Blood Return (Site #2) Yes 3/14/2017  8:01 AM   Alcohol Cap Used Yes 3/14/2017  8:01 AM                Ileostomy 01/14/17 Right ;Mid Abdomen (Active)   Drainage Color Brown 3/14/2017  4:53 AM   Site Assessment Clean, dry, intact 3/14/2017  4:53 AM   Treatment Other (Comment) 3/13/2017  8:45 PM   Output (ml) 75 mL 3/13/2017  8:45 PM      Airway - Endotracheal Tube 03/14/17 Oral (Active)        Epidural/Intrathecal 03/14/17 Thoracic spine (comment) (Active)               Dressing/Packing:  Wound Abdomen Medial;Mid-DRESSING TYPE: 4 x 4;Staples;Special tape (comment); Non-adherent (03/14/17 1700)  Wound Abdomen Anterior; Lower;Right-DRESSING TYPE: Other (Comment) (ostomy appliance ) (03/14/17 9150)  Splint/Cast:  ]    Other:  noman

## 2017-03-15 LAB
ALBUMIN SERPL BCP-MCNC: 2.4 G/DL (ref 3.5–5)
ALBUMIN/GLOB SERPL: 0.8 {RATIO} (ref 1.1–2.2)
ALP SERPL-CCNC: 81 U/L (ref 45–117)
ALT SERPL-CCNC: 33 U/L (ref 12–78)
ANION GAP BLD CALC-SCNC: 4 MMOL/L (ref 5–15)
AST SERPL W P-5'-P-CCNC: 12 U/L (ref 15–37)
BASOPHILS # BLD AUTO: 0 K/UL (ref 0–0.1)
BASOPHILS # BLD: 0 % (ref 0–1)
BILIRUB SERPL-MCNC: 0.3 MG/DL (ref 0.2–1)
BUN SERPL-MCNC: 12 MG/DL (ref 6–20)
BUN/CREAT SERPL: 16 (ref 12–20)
CALCIUM SERPL-MCNC: 8.4 MG/DL (ref 8.5–10.1)
CHLORIDE SERPL-SCNC: 105 MMOL/L (ref 97–108)
CO2 SERPL-SCNC: 32 MMOL/L (ref 21–32)
CREAT SERPL-MCNC: 0.74 MG/DL (ref 0.7–1.3)
EOSINOPHIL # BLD: 0 K/UL (ref 0–0.4)
EOSINOPHIL NFR BLD: 0 % (ref 0–7)
ERYTHROCYTE [DISTWIDTH] IN BLOOD BY AUTOMATED COUNT: 13.1 % (ref 11.5–14.5)
GLOBULIN SER CALC-MCNC: 2.9 G/DL (ref 2–4)
GLUCOSE BLD STRIP.AUTO-MCNC: 125 MG/DL (ref 65–100)
GLUCOSE BLD STRIP.AUTO-MCNC: 144 MG/DL (ref 65–100)
GLUCOSE BLD STRIP.AUTO-MCNC: 157 MG/DL (ref 65–100)
GLUCOSE BLD STRIP.AUTO-MCNC: 167 MG/DL (ref 65–100)
GLUCOSE SERPL-MCNC: 204 MG/DL (ref 65–100)
HCT VFR BLD AUTO: 35.6 % (ref 36.6–50.3)
HGB BLD-MCNC: 11.5 G/DL (ref 12.1–17)
LYMPHOCYTES # BLD AUTO: 5 % (ref 12–49)
LYMPHOCYTES # BLD: 0.7 K/UL (ref 0.8–3.5)
MAGNESIUM SERPL-MCNC: 1.9 MG/DL (ref 1.6–2.4)
MCH RBC QN AUTO: 29.5 PG (ref 26–34)
MCHC RBC AUTO-ENTMCNC: 32.3 G/DL (ref 30–36.5)
MCV RBC AUTO: 91.3 FL (ref 80–99)
MONOCYTES # BLD: 1.1 K/UL (ref 0–1)
MONOCYTES NFR BLD AUTO: 8 % (ref 5–13)
NEUTS SEG # BLD: 12.3 K/UL (ref 1.8–8)
NEUTS SEG NFR BLD AUTO: 87 % (ref 32–75)
PHOSPHATE SERPL-MCNC: 2.4 MG/DL (ref 2.6–4.7)
PLATELET # BLD AUTO: 210 K/UL (ref 150–400)
POTASSIUM SERPL-SCNC: 4 MMOL/L (ref 3.5–5.1)
PROT SERPL-MCNC: 5.3 G/DL (ref 6.4–8.2)
RBC # BLD AUTO: 3.9 M/UL (ref 4.1–5.7)
SERVICE CMNT-IMP: ABNORMAL
SODIUM SERPL-SCNC: 141 MMOL/L (ref 136–145)
WBC # BLD AUTO: 14.1 K/UL (ref 4.1–11.1)

## 2017-03-15 PROCEDURE — 65270000032 HC RM SEMIPRIVATE

## 2017-03-15 PROCEDURE — 74011000250 HC RX REV CODE- 250: Performed by: ANESTHESIOLOGY

## 2017-03-15 PROCEDURE — 83735 ASSAY OF MAGNESIUM: CPT | Performed by: COLON & RECTAL SURGERY

## 2017-03-15 PROCEDURE — 85025 COMPLETE CBC W/AUTO DIFF WBC: CPT | Performed by: COLON & RECTAL SURGERY

## 2017-03-15 PROCEDURE — 84100 ASSAY OF PHOSPHORUS: CPT | Performed by: COLON & RECTAL SURGERY

## 2017-03-15 PROCEDURE — 74011250636 HC RX REV CODE- 250/636: Performed by: COLON & RECTAL SURGERY

## 2017-03-15 PROCEDURE — 74011250636 HC RX REV CODE- 250/636

## 2017-03-15 PROCEDURE — 82962 GLUCOSE BLOOD TEST: CPT

## 2017-03-15 PROCEDURE — 74011000250 HC RX REV CODE- 250

## 2017-03-15 PROCEDURE — 74011250637 HC RX REV CODE- 250/637: Performed by: COLON & RECTAL SURGERY

## 2017-03-15 PROCEDURE — 80053 COMPREHEN METABOLIC PANEL: CPT | Performed by: COLON & RECTAL SURGERY

## 2017-03-15 PROCEDURE — 36415 COLL VENOUS BLD VENIPUNCTURE: CPT | Performed by: COLON & RECTAL SURGERY

## 2017-03-15 PROCEDURE — 74011000250 HC RX REV CODE- 250: Performed by: COLON & RECTAL SURGERY

## 2017-03-15 PROCEDURE — 74011000258 HC RX REV CODE- 258: Performed by: COLON & RECTAL SURGERY

## 2017-03-15 RX ORDER — HYDROMORPHONE HYDROCHLORIDE 2 MG/ML
2 INJECTION, SOLUTION INTRAMUSCULAR; INTRAVENOUS; SUBCUTANEOUS ONCE
Status: COMPLETED | OUTPATIENT
Start: 2017-03-15 | End: 2017-03-15

## 2017-03-15 RX ORDER — BACITRACIN 500 UNIT/G
PACKET (EA) TOPICAL
Status: COMPLETED
Start: 2017-03-15 | End: 2017-03-15

## 2017-03-15 RX ORDER — HYDROMORPHONE HCL IN 0.9% NACL 15 MG/30ML
PATIENT CONTROLLED ANALGESIA VIAL INTRAVENOUS
Status: DISCONTINUED | OUTPATIENT
Start: 2017-03-15 | End: 2017-03-19

## 2017-03-15 RX ADMIN — Medication: at 10:29

## 2017-03-15 RX ADMIN — Medication 4 ML/HR: at 08:21

## 2017-03-15 RX ADMIN — DIAZEPAM 5 MG: 5 TABLET ORAL at 00:34

## 2017-03-15 RX ADMIN — Medication 4 ML/HR: at 07:56

## 2017-03-15 RX ADMIN — Medication 10 ML: at 21:55

## 2017-03-15 RX ADMIN — Medication 4 ML/HR: at 05:58

## 2017-03-15 RX ADMIN — Medication 10 ML: at 11:00

## 2017-03-15 RX ADMIN — Medication 4 ML/HR: at 06:55

## 2017-03-15 RX ADMIN — HYDROCORTISONE SODIUM SUCCINATE 50 MG: 100 INJECTION, POWDER, FOR SOLUTION INTRAMUSCULAR; INTRAVENOUS at 21:54

## 2017-03-15 RX ADMIN — Medication 10 ML: at 06:00

## 2017-03-15 RX ADMIN — Medication 2 ML/HR: at 04:36

## 2017-03-15 RX ADMIN — Medication 10 ML: at 13:10

## 2017-03-15 RX ADMIN — Medication 2 ML/HR: at 00:33

## 2017-03-15 RX ADMIN — BACITRACIN ZINC: 500 OINTMENT TOPICAL at 10:00

## 2017-03-15 RX ADMIN — I.V. FAT EMULSION 500 ML: 20 EMULSION INTRAVENOUS at 20:45

## 2017-03-15 RX ADMIN — Medication 2 ML/HR: at 02:13

## 2017-03-15 RX ADMIN — Medication 10 ML: at 22:00

## 2017-03-15 RX ADMIN — FAMOTIDINE 20 MG: 10 INJECTION, SOLUTION INTRAVENOUS at 21:54

## 2017-03-15 RX ADMIN — INSULIN LISPRO 3 UNITS: 100 INJECTION, SOLUTION INTRAVENOUS; SUBCUTANEOUS at 13:08

## 2017-03-15 RX ADMIN — INSULIN LISPRO 3 UNITS: 100 INJECTION, SOLUTION INTRAVENOUS; SUBCUTANEOUS at 18:07

## 2017-03-15 RX ADMIN — Medication 4 ML/HR: at 06:26

## 2017-03-15 RX ADMIN — Medication 10 ML: at 06:58

## 2017-03-15 RX ADMIN — FAMOTIDINE 20 MG: 10 INJECTION, SOLUTION INTRAVENOUS at 09:49

## 2017-03-15 RX ADMIN — HYDROCORTISONE SODIUM SUCCINATE 50 MG: 100 INJECTION, POWDER, FOR SOLUTION INTRAMUSCULAR; INTRAVENOUS at 09:50

## 2017-03-15 RX ADMIN — INSULIN LISPRO 3 UNITS: 100 INJECTION, SOLUTION INTRAVENOUS; SUBCUTANEOUS at 06:54

## 2017-03-15 RX ADMIN — HYDROMORPHONE HYDROCHLORIDE 2 MG: 2 INJECTION, SOLUTION INTRAMUSCULAR; INTRAVENOUS; SUBCUTANEOUS at 09:40

## 2017-03-15 RX ADMIN — ONDANSETRON 4 MG: 2 INJECTION INTRAMUSCULAR; INTRAVENOUS at 19:25

## 2017-03-15 RX ADMIN — TEMAZEPAM 30 MG: 15 CAPSULE ORAL at 21:54

## 2017-03-15 RX ADMIN — Medication 2 ML/HR: at 05:33

## 2017-03-15 RX ADMIN — ASCORBIC ACID: 500 INJECTION, SOLUTION INTRAMUSCULAR; INTRAVENOUS; SUBCUTANEOUS at 19:15

## 2017-03-15 RX ADMIN — PROCHLORPERAZINE EDISYLATE 5 MG: 5 INJECTION INTRAMUSCULAR; INTRAVENOUS at 18:12

## 2017-03-15 NOTE — DIABETES MGMT
DTC Progress Note    Recommendations/ Comments: Chart review for elevated BG's.  yesterday, then  > 200. Surgery yesterday. BBG's today 167, 144. Noted on TPN without insulin. Lispro resistant scale correction ordered. If appropriate, please consider   If BG's > 180 persistently, Add insulin to TPN  Please add A1c to next lab draw    Chart reviewed on Denis Acosta. Patient is a 40 y.o. male with no hx DM. He has been on tapering prednisone PTA but not taking it at time of admission per PTA record. s/p ileostomy repair 3/14/2017. Lengthy hx surgeries. A1c:   Lab Results   Component Value Date/Time    Hemoglobin A1c 5.2 11/27/2016 06:43 AM       Recent Glucose Results:   Lab Results   Component Value Date/Time     (H) 03/15/2017 05:27 AM     (H) 03/14/2017 08:17 PM    GLUCPOC 144 (H) 03/15/2017 11:35 AM    GLUCPOC 167 (H) 03/15/2017 06:01 AM    GLUCPOC 196 (H) 03/14/2017 11:48 PM        Lab Results   Component Value Date/Time    Creatinine 0.74 03/15/2017 05:27 AM       Active Orders   Diet    DIET NPO Except Meds        PO intake: No data found. Current hospital DM medication: lispro resistant scale    Will continue to follow as needed.     Thank you  Maria T Maravilla RN, CDE

## 2017-03-15 NOTE — ANESTHESIA POSTPROCEDURE EVALUATION
Post-Anesthesia Evaluation and Assessment    Patient: Macarena Hidalgo MRN: [id number]  SSN: [id number]    YOB: 1975  Age: 40 y.o. Sex: male       Cardiovascular Function/Vital Signs  Visit Vitals    /73    Pulse (!) 109    Temp 35.8 °C (96.4 °F)    Resp 10    Ht 6' (1.829 m)    Wt 93.5 kg (206 lb 3.2 oz)    SpO2 98%    BMI 27.97 kg/m2       Patient is status post general anesthesia for Procedure(s):  LAPAROTOMY WITH LYSIS OF ADHESIONS, ILEOSTOMY REVISION, CYSTOSCOPY AND PLACEMENT OF BILATERAL URETERAL CATHETERS    CYSTOSCOPY INSERTION URETERAL CATHETERS. Nausea/Vomiting: None    Postoperative hydration reviewed and adequate. Pain:  Pain Scale 1: Numeric (0 - 10) (03/14/17 2030)  Pain Intensity 1: 0 (03/14/17 2030)   Managed    Neurological Status:   Neuro (WDL): Exceptions to WDL (03/14/17 2005)  Neuro  Neurologic State: Eyes open to voice;Sleeping (03/14/17 2005)  Cognition: Decreased command following (03/14/17 2005)  Speech: Clear;Delayed responses (03/14/17 2005)  LUE Motor Response: Purposeful (03/14/17 2005)  LLE Motor Response: Purposeful (03/14/17 2005)  RUE Motor Response: Purposeful (03/14/17 2005)  RLE Motor Response: Purposeful (03/14/17 2005)   At baseline    Mental Status and Level of Consciousness: Arousable    Pulmonary Status:   O2 Device: Nasal cannula (03/14/17 2005)   Adequate oxygenation and airway patent    Complications related to anesthesia: None    Post-anesthesia assessment completed.  No concerns    Signed By: Jeff Lopez MD     March 14, 2017

## 2017-03-15 NOTE — WOUND CARE
Ostomy Consult:   POD#1 for ileostomy revision;  Pain not managed well at this time; RN aware; Stoma pink, moist, budded; small amount of pink drainage in appliance; appliance intact; will continue to follow for care/teaching; Brissa Jin RN

## 2017-03-15 NOTE — PROGRESS NOTES
General Daily Progress Note    Admission Date: 3/9/2017  Hospital Day 7  Post-Op Day 1  Subjective:   Poor pain control. Cannot move around or take deep breaths secondary to the pain. No nausea. Objective:   Patient Vitals for the past 24 hrs:   BP Temp Pulse Resp SpO2   03/15/17 0828 136/78 98.7 °F (37.1 °C) (!) 103 16 100 %   03/15/17 0318 123/75 98.7 °F (37.1 °C) (!) 114 16 99 %   03/15/17 0052 136/84 99.3 °F (37.4 °C) (!) 106 18 96 %   03/14/17 2240 145/85 99.3 °F (37.4 °C) (!) 110 16 99 %   03/14/17 2215 132/73 - 94 10 97 %   03/14/17 2200 128/73 - 91 9 97 %   03/14/17 2145 134/76 - 99 10 97 %   03/14/17 2140 - - 89 9 97 %   03/14/17 2130 139/78 - 98 11 97 %   03/14/17 2129 - 99.1 °F (37.3 °C) - - -   03/14/17 2115 150/74 - (!) 113 14 98 %   03/14/17 2100 136/81 - (!) 104 12 97 %   03/14/17 2045 145/77 - (!) 116 15 99 %   03/14/17 2030 142/79 - 90 8 98 %   03/14/17 2015 142/73 - (!) 109 10 98 %   03/14/17 2010 140/75 - (!) 105 9 99 %   03/14/17 2005 140/79 96.4 °F (35.8 °C) (!) 108 9 98 %   03/14/17 2002 139/80 96.4 °F (35.8 °C) 100 8 97 %   03/14/17 2000 141/80 - 100 8 97 %   03/14/17 1958 139/80 - (!) 108 8 98 %   03/14/17 1245 141/83 - 73 16 100 %   03/14/17 1237 143/75 - 71 16 100 %   03/14/17 1236 153/87 - 83 18 100 %     03/15 0701 - 03/15 1900  In: -   Out: 880 [Urine:650]  03/13 1901 - 03/15 0700  In: 6205.6 [I.V.:6125.6]  Out: 5295 [Urine:4600]      General Appearance: Very uncomfortable. HEENT:  NG tube draining bilious fluid. Lungs:  Clear to auscultastion with limited inspiration. Heart:  Normal rate and regular rhythm. Abdomen:  Dressing clean, dry, and intact. Ileostomy edematous but viable.   Extremities:  Pneumatic compression stockings in use.                  Data Review   Recent Results (from the past 24 hour(s))   TYPE & SCREEN    Collection Time: 03/14/17 12:15 PM   Result Value Ref Range    Crossmatch Expiration 03/17/2017     ABO/Rh(D) A POSITIVE     Antibody screen NEG GLUCOSE, POC    Collection Time: 03/14/17  2:35 PM   Result Value Ref Range    Glucose (POC) 215 (H) 65 - 100 mg/dL    Performed by Logan Thompson    GLUCOSE, POC    Collection Time: 03/14/17  3:48 PM   Result Value Ref Range    Glucose (POC) 218 (H) 65 - 100 mg/dL    Performed by Logan Thompson    GLUCOSE, POC    Collection Time: 03/14/17  4:49 PM   Result Value Ref Range    Glucose (POC) 223 (H) 65 - 100 mg/dL    Performed by SURAJ Jones 106, POC    Collection Time: 03/14/17  5:53 PM   Result Value Ref Range    Glucose (POC) 230 (H) 65 - 100 mg/dL    Performed by SURAJ Marcum, POC    Collection Time: 03/14/17  6:54 PM   Result Value Ref Range    Glucose (POC) 269 (H) 65 - 100 mg/dL    Performed by Rhett Fernandez    CBC WITH AUTOMATED DIFF    Collection Time: 03/14/17  8:17 PM   Result Value Ref Range    WBC 18.0 (H) 4.1 - 11.1 K/uL    RBC 3.66 (L) 4.10 - 5.70 M/uL    HGB 10.9 (L) 12.1 - 17.0 g/dL    HCT 33.8 (L) 36.6 - 50.3 %    MCV 92.3 80.0 - 99.0 FL    MCH 29.8 26.0 - 34.0 PG    MCHC 32.2 30.0 - 36.5 g/dL    RDW 13.0 11.5 - 14.5 %    PLATELET 364 369 - 676 K/uL    NEUTROPHILS 90 (H) 32 - 75 %    LYMPHOCYTES 4 (L) 12 - 49 %    MONOCYTES 6 5 - 13 %    EOSINOPHILS 0 0 - 7 %    BASOPHILS 0 0 - 1 %    ABS. NEUTROPHILS 16.2 (H) 1.8 - 8.0 K/UL    ABS. LYMPHOCYTES 0.7 (L) 0.8 - 3.5 K/UL    ABS. MONOCYTES 1.1 (H) 0.0 - 1.0 K/UL    ABS. EOSINOPHILS 0.0 0.0 - 0.4 K/UL    ABS.  BASOPHILS 0.0 0.0 - 0.1 K/UL   METABOLIC PANEL, BASIC    Collection Time: 03/14/17  8:17 PM   Result Value Ref Range    Sodium 138 136 - 145 mmol/L    Potassium 4.3 3.5 - 5.1 mmol/L    Chloride 106 97 - 108 mmol/L    CO2 26 21 - 32 mmol/L    Anion gap 6 5 - 15 mmol/L    Glucose 234 (H) 65 - 100 mg/dL    BUN 12 6 - 20 MG/DL    Creatinine 1.37 (H) 0.70 - 1.30 MG/DL    BUN/Creatinine ratio 9 (L) 12 - 20      GFR est AA >60 >60 ml/min/1.73m2    GFR est non-AA 57 (L) >60 ml/min/1.73m2    Calcium 8.5 8.5 - 10.1 MG/DL   MAGNESIUM Collection Time: 03/14/17  8:17 PM   Result Value Ref Range    Magnesium 1.7 1.6 - 2.4 mg/dL   PHOSPHORUS    Collection Time: 03/14/17  8:17 PM   Result Value Ref Range    Phosphorus 2.7 2.6 - 4.7 MG/DL   GLUCOSE, POC    Collection Time: 03/14/17 10:10 PM   Result Value Ref Range    Glucose (POC) 210 (H) 65 - 100 mg/dL    Performed by Leslie Dye 98, POC    Collection Time: 03/14/17 11:48 PM   Result Value Ref Range    Glucose (POC) 196 (H) 65 - 100 mg/dL    Performed by Radha Mccloud    CBC WITH AUTOMATED DIFF    Collection Time: 03/15/17  5:27 AM   Result Value Ref Range    WBC 14.1 (H) 4.1 - 11.1 K/uL    RBC 3.90 (L) 4.10 - 5.70 M/uL    HGB 11.5 (L) 12.1 - 17.0 g/dL    HCT 35.6 (L) 36.6 - 50.3 %    MCV 91.3 80.0 - 99.0 FL    MCH 29.5 26.0 - 34.0 PG    MCHC 32.3 30.0 - 36.5 g/dL    RDW 13.1 11.5 - 14.5 %    PLATELET 645 046 - 693 K/uL    NEUTROPHILS 87 (H) 32 - 75 %    LYMPHOCYTES 5 (L) 12 - 49 %    MONOCYTES 8 5 - 13 %    EOSINOPHILS 0 0 - 7 %    BASOPHILS 0 0 - 1 %    ABS. NEUTROPHILS 12.3 (H) 1.8 - 8.0 K/UL    ABS. LYMPHOCYTES 0.7 (L) 0.8 - 3.5 K/UL    ABS. MONOCYTES 1.1 (H) 0.0 - 1.0 K/UL    ABS. EOSINOPHILS 0.0 0.0 - 0.4 K/UL    ABS. BASOPHILS 0.0 0.0 - 0.1 K/UL   METABOLIC PANEL, COMPREHENSIVE    Collection Time: 03/15/17  5:27 AM   Result Value Ref Range    Sodium 141 136 - 145 mmol/L    Potassium 4.0 3.5 - 5.1 mmol/L    Chloride 105 97 - 108 mmol/L    CO2 32 21 - 32 mmol/L    Anion gap 4 (L) 5 - 15 mmol/L    Glucose 204 (H) 65 - 100 mg/dL    BUN 12 6 - 20 MG/DL    Creatinine 0.74 0.70 - 1.30 MG/DL    BUN/Creatinine ratio 16 12 - 20      GFR est AA >60 >60 ml/min/1.73m2    GFR est non-AA >60 >60 ml/min/1.73m2    Calcium 8.4 (L) 8.5 - 10.1 MG/DL    Bilirubin, total 0.3 0.2 - 1.0 MG/DL    ALT (SGPT) 33 12 - 78 U/L    AST (SGOT) 12 (L) 15 - 37 U/L    Alk.  phosphatase 81 45 - 117 U/L    Protein, total 5.3 (L) 6.4 - 8.2 g/dL    Albumin 2.4 (L) 3.5 - 5.0 g/dL    Globulin 2.9 2.0 - 4.0 g/dL    A-G Ratio 0.8 (L) 1.1 - 2.2     MAGNESIUM    Collection Time: 03/15/17  5:27 AM   Result Value Ref Range    Magnesium 1.9 1.6 - 2.4 mg/dL   PHOSPHORUS    Collection Time: 03/15/17  5:27 AM   Result Value Ref Range    Phosphorus 2.4 (L) 2.6 - 4.7 MG/DL   GLUCOSE, POC    Collection Time: 03/15/17  6:01 AM   Result Value Ref Range    Glucose (POC) 167 (H) 65 - 100 mg/dL    Performed by Sophie Cintron            Assessment:     Active Problems:    Small bowel obstruction ([address] (3/9/2017)        1 Day Post-Op s/p Laparotomy, extensive lysis of adhesions, and revision of ileostomy. Hemodynamically stable. Good urine output. Cratinine normal.  Hemoglobin acceptable, but probably artificially elevated by intravascular volume depletion. As it turns out, the epidural catheter has been disconnected. Consequently, it must be removed. A prolonged ileus is expected. Plan:   Continue NG tube. Continue TPN and lipids. Continue steroids at current dose. Replace epidural analgesia with Dilaudid PCA>  Incentive spirometer. Ambulate.

## 2017-03-15 NOTE — PROGRESS NOTES
TRANSFER - OUT REPORT:    Verbal report given to ROSALBA Reyna(name) on Sandra Freedman  being transferred to [facility](unit) for routine post - op       Report consisted of patients Situation, Background, Assessment and   Recommendations(SBAR). Time Pre op antibiotic given:Cefotetan  Anesthesia Stop time: 20:02  Alexander Present on Transfer to floor:yes  Order for Alexander on Chart:yes    Information from the following report(s) SBAR, Kardex, OR Summary, Procedure Summary, Intake/Output and MAR was reviewed with the receiving nurse. Opportunity for questions and clarification was provided. Is the patient on 02? YES       L/Min 2       Other     Is the patient on a monitor? NO    Is the nurse transporting with the patient? YES    Surgical Waiting Area notified of patient's transfer from PACU? YES      The following personal items collected during your admission accompanied patient upon transfer:   Dental Appliance: Dental Appliances: None  Vision: Visual Aid: None  Hearing Aid:    Jewelry: Jewelry: None  Clothing: Clothing: Footwear, Gloves, Sent home  Other Valuables:  Other Valuables: Cell Phone, With patient  Valuables sent to safe:

## 2017-03-15 NOTE — PROGRESS NOTES
[facility] ( Dr. Randolph Hurst) to clarify patient's Epidural order. RN was informed patient can have up to six 2ml boluses with a lockout of 10 minutes per hour as needed. 1076    Patient has had no relief with Epidural. Paged Dr. Randolph Hurst from anesthesiology and orders were received to change rate to 10ml/hr with a bolus of 4ml, lockout of 15 minutes, and total of 4 boluses per hour. 0830    After changing the rate of patient's Epidural, patient still complains of pain 9/10 with no relief. Page out to Dr. Marissa Rosen at this time. [id number]    Dr. Marissa Rosen notified on patient's pain condition. He informed RN that it is important to give patient the highest level of dose and rate for the epidural before we discontinue it. Page out to Anesthesiologist at this time. 0900    Notified Dr. Wilda Cordero and orders were received for a continuous rate of 14 ML/hr for the epidural with a one time 6ML bolus NOW. He said to keep continuing the 4ML bolus (four total boluses an hour).

## 2017-03-15 NOTE — PROGRESS NOTES
Pt is POD #1 and had a thoracic epidural for post-op pain. Unfortunately he did not got adequate relief with epidural analgesia. Despite multiple boluses and increasing his infusion rate to 14cc/hr we could not get his pain well controlled. Early this afternoon his catheter was pulled and he was changed to IV PCA. Currently he is resting comfortably.

## 2017-03-16 LAB
ANION GAP BLD CALC-SCNC: 4 MMOL/L (ref 5–15)
BASOPHILS # BLD AUTO: 0 K/UL (ref 0–0.1)
BASOPHILS # BLD: 0 % (ref 0–1)
BUN SERPL-MCNC: 11 MG/DL (ref 6–20)
BUN/CREAT SERPL: 18 (ref 12–20)
CALCIUM SERPL-MCNC: 8.3 MG/DL (ref 8.5–10.1)
CHLORIDE SERPL-SCNC: 101 MMOL/L (ref 97–108)
CO2 SERPL-SCNC: 31 MMOL/L (ref 21–32)
CREAT SERPL-MCNC: 0.6 MG/DL (ref 0.7–1.3)
EOSINOPHIL # BLD: 0 K/UL (ref 0–0.4)
EOSINOPHIL NFR BLD: 0 % (ref 0–7)
ERYTHROCYTE [DISTWIDTH] IN BLOOD BY AUTOMATED COUNT: 13.3 % (ref 11.5–14.5)
GLUCOSE BLD STRIP.AUTO-MCNC: 143 MG/DL (ref 65–100)
GLUCOSE BLD STRIP.AUTO-MCNC: 157 MG/DL (ref 65–100)
GLUCOSE BLD STRIP.AUTO-MCNC: 161 MG/DL (ref 65–100)
GLUCOSE BLD STRIP.AUTO-MCNC: 173 MG/DL (ref 65–100)
GLUCOSE BLD STRIP.AUTO-MCNC: 90 MG/DL (ref 65–100)
GLUCOSE SERPL-MCNC: 142 MG/DL (ref 65–100)
HCT VFR BLD AUTO: 35.2 % (ref 36.6–50.3)
HGB BLD-MCNC: 11.4 G/DL (ref 12.1–17)
LYMPHOCYTES # BLD AUTO: 8 % (ref 12–49)
LYMPHOCYTES # BLD: 1.2 K/UL (ref 0.8–3.5)
MAGNESIUM SERPL-MCNC: 1.8 MG/DL (ref 1.6–2.4)
MCH RBC QN AUTO: 29.7 PG (ref 26–34)
MCHC RBC AUTO-ENTMCNC: 32.4 G/DL (ref 30–36.5)
MCV RBC AUTO: 91.7 FL (ref 80–99)
MONOCYTES # BLD: 1.9 K/UL (ref 0–1)
MONOCYTES NFR BLD AUTO: 12 % (ref 5–13)
NEUTS SEG # BLD: 12.9 K/UL (ref 1.8–8)
NEUTS SEG NFR BLD AUTO: 80 % (ref 32–75)
PHOSPHATE SERPL-MCNC: 3 MG/DL (ref 2.6–4.7)
PLATELET # BLD AUTO: 220 K/UL (ref 150–400)
POTASSIUM SERPL-SCNC: 4.1 MMOL/L (ref 3.5–5.1)
RBC # BLD AUTO: 3.84 M/UL (ref 4.1–5.7)
SERVICE CMNT-IMP: ABNORMAL
SERVICE CMNT-IMP: NORMAL
SODIUM SERPL-SCNC: 136 MMOL/L (ref 136–145)
WBC # BLD AUTO: 16 K/UL (ref 4.1–11.1)

## 2017-03-16 PROCEDURE — 77030010535 HC BELT OST ADJ HOLL -A

## 2017-03-16 PROCEDURE — 74011000250 HC RX REV CODE- 250: Performed by: COLON & RECTAL SURGERY

## 2017-03-16 PROCEDURE — 74011000258 HC RX REV CODE- 258: Performed by: COLON & RECTAL SURGERY

## 2017-03-16 PROCEDURE — 65270000032 HC RM SEMIPRIVATE

## 2017-03-16 PROCEDURE — 36415 COLL VENOUS BLD VENIPUNCTURE: CPT | Performed by: COLON & RECTAL SURGERY

## 2017-03-16 PROCEDURE — 85025 COMPLETE CBC W/AUTO DIFF WBC: CPT | Performed by: COLON & RECTAL SURGERY

## 2017-03-16 PROCEDURE — 82962 GLUCOSE BLOOD TEST: CPT

## 2017-03-16 PROCEDURE — 80048 BASIC METABOLIC PNL TOTAL CA: CPT | Performed by: COLON & RECTAL SURGERY

## 2017-03-16 PROCEDURE — 83735 ASSAY OF MAGNESIUM: CPT | Performed by: COLON & RECTAL SURGERY

## 2017-03-16 PROCEDURE — 77030010541

## 2017-03-16 PROCEDURE — 77030011641 HC PASTE OST ADH BMS -A

## 2017-03-16 PROCEDURE — 74011250636 HC RX REV CODE- 250/636: Performed by: COLON & RECTAL SURGERY

## 2017-03-16 PROCEDURE — 84100 ASSAY OF PHOSPHORUS: CPT | Performed by: COLON & RECTAL SURGERY

## 2017-03-16 PROCEDURE — 77030010522

## 2017-03-16 PROCEDURE — 74011250637 HC RX REV CODE- 250/637: Performed by: COLON & RECTAL SURGERY

## 2017-03-16 RX ORDER — ENOXAPARIN SODIUM 100 MG/ML
40 INJECTION SUBCUTANEOUS EVERY 24 HOURS
Status: DISCONTINUED | OUTPATIENT
Start: 2017-03-16 | End: 2017-03-21

## 2017-03-16 RX ADMIN — FAMOTIDINE 20 MG: 10 INJECTION, SOLUTION INTRAVENOUS at 08:27

## 2017-03-16 RX ADMIN — Medication 10 ML: at 22:52

## 2017-03-16 RX ADMIN — Medication 10 ML: at 14:00

## 2017-03-16 RX ADMIN — TEMAZEPAM 30 MG: 15 CAPSULE ORAL at 22:52

## 2017-03-16 RX ADMIN — Medication 10 ML: at 06:00

## 2017-03-16 RX ADMIN — ENOXAPARIN SODIUM 40 MG: 40 INJECTION SUBCUTANEOUS at 12:13

## 2017-03-16 RX ADMIN — HYDROCORTISONE SODIUM SUCCINATE 50 MG: 100 INJECTION, POWDER, FOR SOLUTION INTRAMUSCULAR; INTRAVENOUS at 08:27

## 2017-03-16 RX ADMIN — Medication 10 ML: at 11:00

## 2017-03-16 RX ADMIN — Medication: at 17:00

## 2017-03-16 RX ADMIN — SODIUM CHLORIDE 35 ML/HR: 900 INJECTION, SOLUTION INTRAVENOUS at 00:20

## 2017-03-16 RX ADMIN — ASCORBIC ACID: 500 INJECTION, SOLUTION INTRAMUSCULAR; INTRAVENOUS; SUBCUTANEOUS at 19:00

## 2017-03-16 RX ADMIN — FAMOTIDINE 20 MG: 10 INJECTION, SOLUTION INTRAVENOUS at 20:35

## 2017-03-16 RX ADMIN — HYDROCORTISONE SODIUM SUCCINATE 50 MG: 100 INJECTION, POWDER, FOR SOLUTION INTRAMUSCULAR; INTRAVENOUS at 20:36

## 2017-03-16 RX ADMIN — DIAZEPAM 5 MG: 5 TABLET ORAL at 17:18

## 2017-03-16 RX ADMIN — INSULIN LISPRO 3 UNITS: 100 INJECTION, SOLUTION INTRAVENOUS; SUBCUTANEOUS at 17:07

## 2017-03-16 RX ADMIN — ONDANSETRON 4 MG: 2 INJECTION INTRAMUSCULAR; INTRAVENOUS at 17:18

## 2017-03-16 RX ADMIN — INSULIN LISPRO 3 UNITS: 100 INJECTION, SOLUTION INTRAVENOUS; SUBCUTANEOUS at 12:13

## 2017-03-16 RX ADMIN — PROCHLORPERAZINE EDISYLATE 5 MG: 5 INJECTION INTRAMUSCULAR; INTRAVENOUS at 12:17

## 2017-03-16 RX ADMIN — PROCHLORPERAZINE EDISYLATE 5 MG: 5 INJECTION INTRAMUSCULAR; INTRAVENOUS at 20:36

## 2017-03-16 RX ADMIN — INSULIN LISPRO 3 UNITS: 100 INJECTION, SOLUTION INTRAVENOUS; SUBCUTANEOUS at 00:20

## 2017-03-16 NOTE — PROGRESS NOTES
General Daily Progress Note    Admission Date: 3/9/2017  Hospital Day 8  Post-Op Day 2  Subjective:   Pain control significantly improved since changing to PCA. No nausea or vomiting with NG tube out. Objective:   Patient Vitals for the past 24 hrs:   BP Temp Pulse Resp SpO2 Weight   03/16/17 0824 143/90 98.6 °F (37 °C) (!) 114 18 98 % -   03/16/17 0514 136/81 98.3 °F (36.8 °C) (!) 105 20 95 % -   03/16/17 0024 145/86 98.8 °F (37.1 °C) (!) 122 18 93 % -   03/15/17 2104 145/86 98.7 °F (37.1 °C) (!) 113 20 96 % -   03/15/17 1548 134/87 98.3 °F (36.8 °C) (!) 101 18 99 % -   03/15/17 1450 - - - - - 95.3 kg (210 lb)   03/15/17 1215 142/86 98.1 °F (36.7 °C) 96 18 97 % -        03/14 1901 - 03/16 0700  In: 3205.6 [I.V.:3125.6]  Out: 6925 [Urine:6575]      General Appearance: No distress. Lungs: Clear to auscultastion with limited inspiration. Heart:  Mildly tachycardic. Regular rhythm. Abdomen: Dressing clean, dry, and intact. Ileostomy edematous but viable. Extremities: Pneumatic compression stockings in use.             Data Review   Recent Results (from the past 24 hour(s))   GLUCOSE, POC    Collection Time: 03/15/17 11:35 AM   Result Value Ref Range    Glucose (POC) 144 (H) 65 - 100 mg/dL    Performed by Ole Duff, POC    Collection Time: 03/15/17  4:46 PM   Result Value Ref Range    Glucose (POC) 157 (H) 65 - 100 mg/dL    Performed by Gabe Daigle    GLUCOSE, POC    Collection Time: 03/15/17  9:07 PM   Result Value Ref Range    Glucose (POC) 125 (H) 65 - 100 mg/dL    Performed by [address] [place] [id number], POC    Collection Time: 03/15/17 11:52 PM   Result Value Ref Range    Glucose (POC) 157 (H) 65 - 100 mg/dL    Performed by KAMI NOWAK    CBC WITH AUTOMATED DIFF    Collection Time: 03/16/17  4:45 AM   Result Value Ref Range    WBC 16.0 (H) 4.1 - 11.1 K/uL    RBC 3.84 (L) 4.10 - 5.70 M/uL    HGB 11.4 (L) 12.1 - 17.0 g/dL    HCT 35.2 (L) 36.6 - 50.3 %    MCV 91.7 80.0 - 99.0 FL MCH 29.7 26.0 - 34.0 PG    MCHC 32.4 30.0 - 36.5 g/dL    RDW 13.3 11.5 - 14.5 %    PLATELET 287 158 - 962 K/uL    NEUTROPHILS 80 (H) 32 - 75 %    LYMPHOCYTES 8 (L) 12 - 49 %    MONOCYTES 12 5 - 13 %    EOSINOPHILS 0 0 - 7 %    BASOPHILS 0 0 - 1 %    ABS. NEUTROPHILS 12.9 (H) 1.8 - 8.0 K/UL    ABS. LYMPHOCYTES 1.2 0.8 - 3.5 K/UL    ABS. MONOCYTES 1.9 (H) 0.0 - 1.0 K/UL    ABS. EOSINOPHILS 0.0 0.0 - 0.4 K/UL    ABS. BASOPHILS 0.0 0.0 - 0.1 K/UL   METABOLIC PANEL, BASIC    Collection Time: 03/16/17  4:45 AM   Result Value Ref Range    Sodium 136 136 - 145 mmol/L    Potassium 4.1 3.5 - 5.1 mmol/L    Chloride 101 97 - 108 mmol/L    CO2 31 21 - 32 mmol/L    Anion gap 4 (L) 5 - 15 mmol/L    Glucose 142 (H) 65 - 100 mg/dL    BUN 11 6 - 20 MG/DL    Creatinine 0.60 (L) 0.70 - 1.30 MG/DL    BUN/Creatinine ratio 18 12 - 20      GFR est AA >60 >60 ml/min/1.73m2    GFR est non-AA >60 >60 ml/min/1.73m2    Calcium 8.3 (L) 8.5 - 10.1 MG/DL   MAGNESIUM    Collection Time: 03/16/17  4:45 AM   Result Value Ref Range    Magnesium 1.8 1.6 - 2.4 mg/dL   PHOSPHORUS    Collection Time: 03/16/17  4:45 AM   Result Value Ref Range    Phosphorus 3.0 2.6 - 4.7 MG/DL   GLUCOSE, POC    Collection Time: 03/16/17  6:19 AM   Result Value Ref Range    Glucose (POC) 90 65 - 100 mg/dL    Performed by Sanchez Clayton            Assessment:     Active Problems:    Small bowel obstruction ([facility]) (3/9/2017)        2 Days Post-Op s/p Laparotomy, extensive lysis of adhesions, and revision of ileostomy.     Somewhat tachycardic, but hemodynamically stable. Hemoglobin stable. Good urine output. Creatinine normal.     A prolonged ileus is expected. The NG tube was removed in the evening on 3/15/2017 because the tape had repeatedly come loose and it was probably going to come out anyway. The patient needs to begin ambulating.       Plan:   Begin sips of clear liquids. Continue TPN.   Continue Alexander for one more day secondary to history of urinary retention. Continue steroids at current dose. Begin prophylactic Lovenox. Incentive spirometer. Physical therapy.

## 2017-03-16 NOTE — ROUTINE PROCESS
Bedside and Verbal shift change report given to Corinne RN (oncoming nurse) by Raya Morris (offgoing nurse). Report included the following information SBAR, Kardex, Intake/Output, MAR, Accordion and Recent Results.

## 2017-03-16 NOTE — PROGRESS NOTES
Bedside shift change report given to ROSALBA Saez (oncoming nurse) by Amalia Foster RN (offgoing nurse). Report included the following information SBAR, Kardex, Intake/Output and MAR.

## 2017-03-16 NOTE — PROGRESS NOTES
RN educated patient on importance of Incentive spirometry and getting up from the bed. Patient states he, \"wants to take a baby step at a time. \" RN informed patient that a goal would to get up in the chair for the morning. RN will attempt to get patient up and encourage incentive spirometry.

## 2017-03-16 NOTE — PROGRESS NOTES
Bedside and Verbal shift change report given to Larry Alexis RN (oncoming nurse) by Ciara Stroud (offgoing nurse). Report included the following information SBAR, ED Summary, OR Summary, Intake/Output and Recent Results.

## 2017-03-16 NOTE — OP NOTES
[address] [facility] [place] [address]   OP NOTE       Name:  Nahid Hudson   MR#:  [id number]   :  1975   Account #:  [de-identified]    Surgery Date:  2017   Date of Adm:  2017       PREOPERATIVE DIAGNOSES:     1. Small-bowel obstruction. 2. Crohn's disease. 3. Ileostomy retraction. POSTOPERATIVE DIAGNOSES:     1. Small-bowel obstruction. 2. Crohn's disease. 3. Ileostomy retraction. PROCEDURES PERFORMED:  Laparotomy, extensive lysis of adhesions (greater than 3 hours), and revision of ileostomy. SURGEON: Yeny Beaulieu MD    ASSISTANT:  1. [facility]   2. Debby Frost SA    3. Shea Pedraza SA   4. Nilay Montoya    ANESTHESIA:  General endotracheal and epidural.    SPECIMENS REMOVED: None. TUBES AND DRAINS: None. ESTIMATED BLOOD LOSS: 500 mL. CRYSTALLOID: 3000 mL. ALBUMIN: 500 mL. URINE OUTPUT: 1500 mL. INDICATIONS: The patient is a 67-year-old male on whom I have operated 10 times for inflammatory bowel disease and its complications. The last operation that I performed on him included  resection of a dysfunctional continent intestinal reservoir and construction of an end ileostomy on 10/29/2015. He did reasonably well afterwards, but over the last few months he has been having chronic recurrent problems with abdominal pain, solid food intolerance, and intermittent obstructive symptoms. He has had multiple hospitalizations, and numerous imaging studies have been performed. Simultaneously, he does also continue to receive treatment for Crohn's disease including Stelara and, when needed, steroids. He has gained weight because of the steroids, but his diet has consisted mostly of liquids. On 2017 he began to experience an exacerbation of his chronic abdominal pain. It progressed, and he also had nausea and vomiting.  Ileostomy output seemed to stop sometime in the early morning on 2017, and he was admitted to [place] [address] for evaluation and treatment of what appeared to be a small-bowel obstruction. A nasogastric tube was placed, and he received parenteral fluids, antiemetics, and conservative doses of Dilaudid. He failed to make progress with this therapy, and it was decided that it would be best to transfer him to [facility] so that I could take care of him. He was transferred on 03/09/2017. Once at [facility], his nonoperative treatment continued. The nasogastric tube was used to continue to decompress the GI tract. Digitation of the ileostomy did not reveal any fecal impaction. He was given parenteral fluids and parenteral steroids, and plans were made for surgery. Within the first 24 hours at [facility], the ileostomy began to expel thick liquid stool. This was the first output since before his admission to [place] [address] on 03/07/2017. This output continued, and it was decided that it would be best to perform surgery during this hospitalization, but also to wait and allow more time for proximal and distal decompression. In the meantime, a right upper extremity PICC was placed and he was started on total parenteral nutrition (TPN). He remained stable and continued to improve. The planned surgery date was 03/14/2017 and because there was no decompensation, we waited until then. The plan was to perform a laparotomy, lyse adhesions, possibly resect any bowel that was irreversibly obstructed or sufficiently diseased, and to revise the ileostomy. The risks were discussed in detail, and the patient agreed to proceed. OPERATIVE FINDINGS: There were extensive adhesions involving essentially every part of the jejunum and ileum except for the ileostomy itself. There was no evidence of active Crohn's disease. The ileostomy was retracted. The small intestinal length from the ligament of Treitz to the ileostomy was approximately 407 cm.     DESCRIPTION OF PROCEDURE: After informed consent was obtained, and after an epidural catheter had been placed in the preoperative holding area, the patient was taken to the operating room and placed in the supine position on the operating table. Standard monitoring devices were attached and adequate general endotracheal anesthesia was induced. The lower extremities had already been fitted with pneumatic compression stockings. The nasogastric tube that had been in place on the floor had inadvertently come out approximately 24 hours earlier; so a new orogastric tube was inserted. A left external jugular vein Gelco was inserted by the anesthesiologist. The patient was repositioned into lithotomy, and the perineum was prepped and draped in the usual sterile fashion. Matthew Alejo MD then performed cystoscopy and placement of bilateral ureteral catheters and Alexander catheter. The urologic procedure had been requested in order to facilitate intraoperative identification of the ureters. Once it was completed, the patient was repositioned supine. Two grams of cefotetan were administered parenterally. The ileostomy was closed using a 2-0 silk pursestring suture, then the abdomen was prepped and draped in the usual sterile fashion. The abdomen was entered through the midline. The existing scar was carefully incised with a scalpel, and the dissection was carried through the moderately thick layer of subcutaneous adipose tissue to the level of the midline scar. The scar was gradually opened, initially using the knife and then afterwards, once a clear area had been identified, using the electrocautery. There were some adhesions to the anterior abdominal wall that were carefully taken down, mostly using a sharp technique. The area of greatest adherence was on the right side lateral to and inferior to the ileostomy. There were also some dense adhesions in the suprapubic midline to the anterior abdominal wall.  Once sufficient clearance of these adhesions had been performed, the ileostomy was carefully detached from the abdominal wall by making a circumferential incision at the mucocutaneous junction using the scalpel, followed by electrocautery. The bowel was  from the abdominal wall and then a large [place] wound protector was put in place. Most of the rest of the operation, which in total took approximately 6 hours, was performed lysing adhesions. The adhesions were ubiquitous and in some places quite dense. Most were of a moderate density, probably at least in part because Seprafilm had been used on the previous operation. Care was taken to avoid creating any enterotomies or significant serosal tears. Care was also taken to avoid injuring any other organs including the bladder and ureters. The ureters were never truly identified, but anything that was cut or cauterized was first determined not to contain a ureter. Some of the most dense adhesions were in the pelvis, where the absence of a rectum created a funnel-shaped space. Once the intestines had been sufficiently mobilized, including dividing almost all interloop adhesions, the bowel was run, inspected, and found to be without enterotomies or other serious injury. The small intestinal length was measured from the ligament of Treitz to the ileostomy itself, and it was approximately 407 cm. None of the bowel appeared to be narrowed to the point that resection or strictureplasty would be beneficial. Furthermore, none of the bowel appeared to be actively involved with Crohn's disease. Some additional preparation of the closed end of the ileum was performed. A portion of the mesentery was divided to increase the length that could be passed through the abdominal wall. The bowel was then grasped with a Obie clamp and brought through. Careful inspection of the pelvis after irrigation revealed some bleeding points, mostly on the serosal side of the bladder.  Several of these were lightly cauterized. Other oozing areas that were not discrete enough to control with cautery or sutures were dealt with by placing a large sheet of Surgicel in the pelvis. Next, four small sheets of Seprafilm were placed in the pelvis. Some thought had been given to attempting to exclude the pelvis using a Vicryl mesh, but it was decided that the placement of the sutures that would be required for that part of the procedure would be too hazardous. The bowel was carefully returned to its domain, and then eight more small sheets of Seprafilm were placed on the viscera. Per protocol, gowns and gloves were changed and the dedicated closing instrument set was brought onto the field. The patient was reprepped and then the midline fascia was closed with two running #1 PDS sutures. The subcutaneous space was irrigated with saline, then the adipose tissue was reapproximated using a running 2-0 Vicryl suture. Skin closure was performed with staples. The midline wound was covered and then the ileostomy was matured using 3-0 Vicryl sutures. Tripartite 0 Vicryl sutures were placed in four quadrants to achieve good eversion of the bowel. Additional sutures were placed between the 4-quadrant sutures to complete the reconstruction of the mucocutaneous junction. Upon completion, the stoma was viable and well budded. The ostomy appliance was brought into the field, cut to the appropriate specifications, and then put in place. A midline dressing consisting of Telfa, 4 x 4's and tape was applied. The ureteral catheters were removed. The Alexander catheter was left in place. Earlier in the case, approximately 4 hours into it, the orogastric tube was removed and replaced by a nasogastric tube. It should also be noted that the patient received appropriate redosing with 2 grams of cefotetan once 6 hours had passed after the pre-incision dose. There were no apparent complications, and the patient appeared to have tolerated the procedure well.  At the conclusion, he was extubated and transported in stable condition to the recovery room. All sponge, needle and instrument counts were correct.         MD Pawan Baltazar / Jnuo Cartagena   D:  03/15/2017   23:09   T:  03/16/2017   00:39   Job #:  [id number]

## 2017-03-16 NOTE — WOUND CARE
Ostomy Consult:   POD#2 for ileostomy revision; Pain better managed today, still very tender; with no output yet will most likely wait for first appliance change on Monday; Stoma pink, moist, budded; small amount of pink drainage in appliance; appliance intact; will continue to follow for care/teaching;  Supplies in room;     Yaniv Boss RN

## 2017-03-17 LAB
ANION GAP BLD CALC-SCNC: 5 MMOL/L (ref 5–15)
APPEARANCE UR: CLEAR
BACTERIA URNS QL MICRO: NEGATIVE /HPF
BASOPHILS # BLD AUTO: 0 K/UL (ref 0–0.1)
BASOPHILS # BLD: 0 % (ref 0–1)
BILIRUB UR QL: NEGATIVE
BUN SERPL-MCNC: 11 MG/DL (ref 6–20)
BUN/CREAT SERPL: 20 (ref 12–20)
CALCIUM SERPL-MCNC: 8.5 MG/DL (ref 8.5–10.1)
CHLORIDE SERPL-SCNC: 104 MMOL/L (ref 97–108)
CO2 SERPL-SCNC: 32 MMOL/L (ref 21–32)
COLOR UR: ABNORMAL
CREAT SERPL-MCNC: 0.55 MG/DL (ref 0.7–1.3)
EOSINOPHIL # BLD: 0.1 K/UL (ref 0–0.4)
EOSINOPHIL NFR BLD: 1 % (ref 0–7)
EPITH CASTS URNS QL MICRO: ABNORMAL /LPF
ERYTHROCYTE [DISTWIDTH] IN BLOOD BY AUTOMATED COUNT: 13 % (ref 11.5–14.5)
GLUCOSE BLD STRIP.AUTO-MCNC: 110 MG/DL (ref 65–100)
GLUCOSE BLD STRIP.AUTO-MCNC: 142 MG/DL (ref 65–100)
GLUCOSE BLD STRIP.AUTO-MCNC: 161 MG/DL (ref 65–100)
GLUCOSE BLD STRIP.AUTO-MCNC: 170 MG/DL (ref 65–100)
GLUCOSE SERPL-MCNC: 181 MG/DL (ref 65–100)
GLUCOSE UR STRIP.AUTO-MCNC: NEGATIVE MG/DL
HCT VFR BLD AUTO: 32.7 % (ref 36.6–50.3)
HGB BLD-MCNC: 10.7 G/DL (ref 12.1–17)
HGB UR QL STRIP: ABNORMAL
KETONES UR QL STRIP.AUTO: NEGATIVE MG/DL
LEUKOCYTE ESTERASE UR QL STRIP.AUTO: NEGATIVE
LYMPHOCYTES # BLD AUTO: 10 % (ref 12–49)
LYMPHOCYTES # BLD: 1.5 K/UL (ref 0.8–3.5)
MAGNESIUM SERPL-MCNC: 2.1 MG/DL (ref 1.6–2.4)
MCH RBC QN AUTO: 30.2 PG (ref 26–34)
MCHC RBC AUTO-ENTMCNC: 32.7 G/DL (ref 30–36.5)
MCV RBC AUTO: 92.4 FL (ref 80–99)
MONOCYTES # BLD: 1.3 K/UL (ref 0–1)
MONOCYTES NFR BLD AUTO: 9 % (ref 5–13)
NEUTS SEG # BLD: 12 K/UL (ref 1.8–8)
NEUTS SEG NFR BLD AUTO: 80 % (ref 32–75)
NITRITE UR QL STRIP.AUTO: NEGATIVE
PH UR STRIP: 6.5 [PH] (ref 5–8)
PHOSPHATE SERPL-MCNC: 3.5 MG/DL (ref 2.6–4.7)
PLATELET # BLD AUTO: 218 K/UL (ref 150–400)
POTASSIUM SERPL-SCNC: 4 MMOL/L (ref 3.5–5.1)
PROT UR STRIP-MCNC: NEGATIVE MG/DL
RBC # BLD AUTO: 3.54 M/UL (ref 4.1–5.7)
RBC #/AREA URNS HPF: >100 /HPF (ref 0–5)
SERVICE CMNT-IMP: ABNORMAL
SODIUM SERPL-SCNC: 141 MMOL/L (ref 136–145)
SP GR UR REFRACTOMETRY: 1.02 (ref 1–1.03)
UA: UC IF INDICATED,UAUC: ABNORMAL
UROBILINOGEN UR QL STRIP.AUTO: 0.2 EU/DL (ref 0.2–1)
WBC # BLD AUTO: 14.9 K/UL (ref 4.1–11.1)
WBC URNS QL MICRO: ABNORMAL /HPF (ref 0–4)

## 2017-03-17 PROCEDURE — 83735 ASSAY OF MAGNESIUM: CPT | Performed by: COLON & RECTAL SURGERY

## 2017-03-17 PROCEDURE — 80048 BASIC METABOLIC PNL TOTAL CA: CPT | Performed by: COLON & RECTAL SURGERY

## 2017-03-17 PROCEDURE — 84100 ASSAY OF PHOSPHORUS: CPT | Performed by: COLON & RECTAL SURGERY

## 2017-03-17 PROCEDURE — 74011250636 HC RX REV CODE- 250/636: Performed by: COLON & RECTAL SURGERY

## 2017-03-17 PROCEDURE — 74011250637 HC RX REV CODE- 250/637: Performed by: COLON & RECTAL SURGERY

## 2017-03-17 PROCEDURE — 74011000250 HC RX REV CODE- 250: Performed by: COLON & RECTAL SURGERY

## 2017-03-17 PROCEDURE — 85025 COMPLETE CBC W/AUTO DIFF WBC: CPT | Performed by: COLON & RECTAL SURGERY

## 2017-03-17 PROCEDURE — 74011000250 HC RX REV CODE- 250

## 2017-03-17 PROCEDURE — 65270000032 HC RM SEMIPRIVATE

## 2017-03-17 PROCEDURE — 36415 COLL VENOUS BLD VENIPUNCTURE: CPT | Performed by: COLON & RECTAL SURGERY

## 2017-03-17 PROCEDURE — 81001 URINALYSIS AUTO W/SCOPE: CPT | Performed by: COLON & RECTAL SURGERY

## 2017-03-17 PROCEDURE — 74011000258 HC RX REV CODE- 258: Performed by: COLON & RECTAL SURGERY

## 2017-03-17 PROCEDURE — 82962 GLUCOSE BLOOD TEST: CPT

## 2017-03-17 RX ORDER — HYDROCORTISONE SODIUM SUCCINATE 100 MG/2ML
25 INJECTION, POWDER, FOR SOLUTION INTRAMUSCULAR; INTRAVENOUS EVERY 12 HOURS
Status: COMPLETED | OUTPATIENT
Start: 2017-03-17 | End: 2017-03-20

## 2017-03-17 RX ORDER — ENOXAPARIN SODIUM 100 MG/ML
40 INJECTION SUBCUTANEOUS EVERY 24 HOURS
Status: DISCONTINUED | OUTPATIENT
Start: 2017-03-17 | End: 2017-03-17 | Stop reason: SDUPTHER

## 2017-03-17 RX ORDER — SODIUM CHLORIDE 9 MG/ML
INJECTION INTRAMUSCULAR; INTRAVENOUS; SUBCUTANEOUS
Status: COMPLETED
Start: 2017-03-17 | End: 2017-03-17

## 2017-03-17 RX ADMIN — HYDROCORTISONE SODIUM SUCCINATE 25 MG: 100 INJECTION, POWDER, FOR SOLUTION INTRAMUSCULAR; INTRAVENOUS at 20:04

## 2017-03-17 RX ADMIN — INSULIN LISPRO 3 UNITS: 100 INJECTION, SOLUTION INTRAVENOUS; SUBCUTANEOUS at 06:48

## 2017-03-17 RX ADMIN — I.V. FAT EMULSION 500 ML: 20 EMULSION INTRAVENOUS at 18:32

## 2017-03-17 RX ADMIN — Medication 10 ML: at 14:46

## 2017-03-17 RX ADMIN — FAMOTIDINE 20 MG: 10 INJECTION, SOLUTION INTRAVENOUS at 20:04

## 2017-03-17 RX ADMIN — DIAZEPAM 5 MG: 5 TABLET ORAL at 20:04

## 2017-03-17 RX ADMIN — ONDANSETRON 4 MG: 2 INJECTION INTRAMUSCULAR; INTRAVENOUS at 20:04

## 2017-03-17 RX ADMIN — Medication 10 ML: at 06:49

## 2017-03-17 RX ADMIN — Medication 10 ML: at 22:26

## 2017-03-17 RX ADMIN — PROCHLORPERAZINE EDISYLATE 5 MG: 5 INJECTION INTRAMUSCULAR; INTRAVENOUS at 14:37

## 2017-03-17 RX ADMIN — ONDANSETRON 4 MG: 2 INJECTION INTRAMUSCULAR; INTRAVENOUS at 11:19

## 2017-03-17 RX ADMIN — SODIUM CHLORIDE: 9 INJECTION, SOLUTION INTRAMUSCULAR; INTRAVENOUS; SUBCUTANEOUS at 15:00

## 2017-03-17 RX ADMIN — ASCORBIC ACID: 500 INJECTION, SOLUTION INTRAMUSCULAR; INTRAVENOUS; SUBCUTANEOUS at 18:33

## 2017-03-17 RX ADMIN — INSULIN LISPRO 3 UNITS: 100 INJECTION, SOLUTION INTRAVENOUS; SUBCUTANEOUS at 18:35

## 2017-03-17 RX ADMIN — TEMAZEPAM 30 MG: 15 CAPSULE ORAL at 22:26

## 2017-03-17 RX ADMIN — PROCHLORPERAZINE EDISYLATE 5 MG: 5 INJECTION INTRAMUSCULAR; INTRAVENOUS at 06:49

## 2017-03-17 RX ADMIN — FAMOTIDINE 20 MG: 10 INJECTION, SOLUTION INTRAVENOUS at 08:39

## 2017-03-17 RX ADMIN — SODIUM CHLORIDE 35 ML/HR: 900 INJECTION, SOLUTION INTRAVENOUS at 06:49

## 2017-03-17 RX ADMIN — HYDROCORTISONE SODIUM SUCCINATE 50 MG: 100 INJECTION, POWDER, FOR SOLUTION INTRAMUSCULAR; INTRAVENOUS at 08:40

## 2017-03-17 RX ADMIN — PROCHLORPERAZINE EDISYLATE 5 MG: 5 INJECTION INTRAMUSCULAR; INTRAVENOUS at 22:25

## 2017-03-17 RX ADMIN — ONDANSETRON 4 MG: 2 INJECTION INTRAMUSCULAR; INTRAVENOUS at 04:03

## 2017-03-17 RX ADMIN — INSULIN LISPRO 3 UNITS: 100 INJECTION, SOLUTION INTRAVENOUS; SUBCUTANEOUS at 12:08

## 2017-03-17 RX ADMIN — Medication: at 22:48

## 2017-03-17 RX ADMIN — Medication 10 ML: at 14:34

## 2017-03-17 RX ADMIN — ENOXAPARIN SODIUM 40 MG: 40 INJECTION SUBCUTANEOUS at 08:41

## 2017-03-17 NOTE — ROUTINE PROCESS
Bedside shift change report given to Denis Rosario RN (oncoming nurse) by Shreya Lozada RN (offgoing nurse). Report included the following information SBAR.

## 2017-03-17 NOTE — PROGRESS NOTES
General Daily Progress Note    Admission Date: 3/9/2017  Hospital Day 9  Post-Op Day 3  Subjective:   No nausea overnight. Small amount of gas in the ostomy appliance. Pain control adequate. Ambulated a bit yesterday. Had some painful coughing fits afterwards. Objective:   Patient Vitals for the past 24 hrs:   BP Temp Pulse Resp SpO2 Height Weight   03/17/17 0835 136/74 98.1 °F (36.7 °C) (!) 102 14 97 % - -   03/17/17 0656 - - - - - - 92.9 kg (204 lb 14.4 oz)   03/17/17 0438 119/74 97.9 °F (36.6 °C) (!) 101 16 97 % - -   03/17/17 0036 118/71 98.1 °F (36.7 °C) (!) 103 18 95 % - -   03/16/17 2007 137/79 98.2 °F (36.8 °C) 90 16 96 % - -   03/16/17 1528 122/80 99 °F (37.2 °C) (!) 102 16 96 % - -   03/16/17 1437 123/79 99.1 °F (37.3 °C) (!) 109 16 96 % - -   03/16/17 1048 - - - - - 6' (1.829 m) 92.1 kg (203 lb)        03/15 1901 - 03/17 0700  In: -   Out: 5580 [Urine:5580]    General Appearance: No distress. Lungs: Clear to auscultation. Heart:  Normal rate and regular rhythm. Abdomen:  Incision clean. Small amount of serosanguinous drainage between staples. No abnormal erythema. Extremities: Pneumatic compression stockings in use.             Data Review   Recent Results (from the past 24 hour(s))   GLUCOSE, POC    Collection Time: 03/16/17 11:39 AM   Result Value Ref Range    Glucose (POC) 173 (H) 65 - 100 mg/dL    Performed by Ole 55, POC    Collection Time: 03/16/17  4:48 PM   Result Value Ref Range    Glucose (POC) 161 (H) 65 - 100 mg/dL    Performed by Juli Trejo    GLUCOSE, POC    Collection Time: 03/16/17  9:28 PM   Result Value Ref Range    Glucose (POC) 143 (H) 65 - 100 mg/dL    Performed by Juli Trejo    CBC WITH AUTOMATED DIFF    Collection Time: 03/17/17  3:50 AM   Result Value Ref Range    WBC 14.9 (H) 4.1 - 11.1 K/uL    RBC 3.54 (L) 4.10 - 5.70 M/uL    HGB 10.7 (L) 12.1 - 17.0 g/dL    HCT 32.7 (L) 36.6 - 50.3 %    MCV 92.4 80.0 - 99.0 FL    MCH 30.2 26.0 - 34.0 PG    MCHC 32.7 30.0 - 36.5 g/dL    RDW 13.0 11.5 - 14.5 %    PLATELET 750 070 - 657 K/uL    NEUTROPHILS 80 (H) 32 - 75 %    LYMPHOCYTES 10 (L) 12 - 49 %    MONOCYTES 9 5 - 13 %    EOSINOPHILS 1 0 - 7 %    BASOPHILS 0 0 - 1 %    ABS. NEUTROPHILS 12.0 (H) 1.8 - 8.0 K/UL    ABS. LYMPHOCYTES 1.5 0.8 - 3.5 K/UL    ABS. MONOCYTES 1.3 (H) 0.0 - 1.0 K/UL    ABS. EOSINOPHILS 0.1 0.0 - 0.4 K/UL    ABS. BASOPHILS 0.0 0.0 - 0.1 K/UL   METABOLIC PANEL, BASIC    Collection Time: 03/17/17  3:50 AM   Result Value Ref Range    Sodium 141 136 - 145 mmol/L    Potassium 4.0 3.5 - 5.1 mmol/L    Chloride 104 97 - 108 mmol/L    CO2 32 21 - 32 mmol/L    Anion gap 5 5 - 15 mmol/L    Glucose 181 (H) 65 - 100 mg/dL    BUN 11 6 - 20 MG/DL    Creatinine 0.55 (L) 0.70 - 1.30 MG/DL    BUN/Creatinine ratio 20 12 - 20      GFR est AA >60 >60 ml/min/1.73m2    GFR est non-AA >60 >60 ml/min/1.73m2    Calcium 8.5 8.5 - 10.1 MG/DL   MAGNESIUM    Collection Time: 03/17/17  3:50 AM   Result Value Ref Range    Magnesium 2.1 1.6 - 2.4 mg/dL   PHOSPHORUS    Collection Time: 03/17/17  3:50 AM   Result Value Ref Range    Phosphorus 3.5 2.6 - 4.7 MG/DL   GLUCOSE, POC    Collection Time: 03/17/17  6:22 AM   Result Value Ref Range    Glucose (POC) 142 (H) 65 - 100 mg/dL    Performed by Howell Dandy            Assessment:     Active Problems:    Small bowel obstruction (HCC) (3/9/2017)        3 Days Post-Op s/p Laparotomy, extensive lysis of adhesions, and revision of ileostomy.      Hemodynamically stable. Hemoglobin stable. Good urine output. Creatinine normal.      A prolonged ileus is expected. The NG tube was removed in the evening on 3/15/2017 because the tape had repeatedly come loose and it was probably going to come out anyway. No setback so far.     Leukocytosis is persistent, but somewhat improved compared to yesterday. The wound does not appear to be infected. Plan:   Begin clear liquid diet as tolerated. Continue TPN.   Collect specimen for urinalysis with reflex culture, then remove Alexander catheter. Monitor for urinary retention. Decrease steroid dose. Prophylactic Lovenox. Incentive spirometer. Physical therapy.

## 2017-03-17 NOTE — PROGRESS NOTES
03/17/2017 12:14pm  Call placed to Dr. José Miguel Thayer office to inform MD of UA results ordered today:  Large amount of blood  Negative bacteria  Negative leukocyte esterase

## 2017-03-17 NOTE — PROGRESS NOTES
NUTRITION COMPLETE ASSESSMENT    RECOMMENDATIONS:   1. Continue current TPN/lipids until meeting at least 60% needs orally     2. Switch supplements to Ensure Enlive TID as diet advanced per surgery    3. Daily weights while on TPN     Interventions/Plan:   Food/Nutrient Delivery:    Commercial supplement (Ensure Clear TID)      (continue TPN)    Assessment:   Reason for Assessment: [x]Reassessment    Diet: Clear liquids   TPN: 5%AA, D20 @ 90ml/hr + 500ml, 20% lipids 3x/week (with 100mg thiamin + vit C + zinc) + 500ml, 20% lipids 3x/week  Nutritionally Significant Medications: [x] Reviewed & Includes: cefotetan, pepcid, solu-cotref, SSI, NS @ 35ml/hr; PRN: zofran, compazine    Subjective:  n/a - RN removing tineo. Objective:  Pt admitted for abd pain. PMHx: Crohn's dx, s/p total colectomy  with end ileostomy, SB resectio, anal fistula, GERD, depression, anxiety. Extensive GI hx noted with 10 previous surgeries - see surgical hx. S/p extensive LUCIEN for SBO with revision of ileostomy on 3/14. Per Brief Op Note: \"extensive adhesions involving essentially every part of the jejunum and ileum except for the ileostomy itself. There was no evidence of active Crohn's disease. The small intestinal length from the Ligament of Treitz to the ileostomy was approximately 407 cm. \"    Plans to start clear liquids today, TPN to continue. Current TPN providinkcal, 108g protein, 432g CHO (GIR = 3.48mg/kg/min). Meets 100% energy and protein needs. Continue TPN until PO intake meeting approximately 60% needs. Will add ensure Clear TID (600kcal, 21g protein) since now on clear liquids. As diet advanced please switch supplements Ensure Enlive TID (1050kcal, 60g protein). Will follow for further diet advancement surgery and PO intake. Estimated Nutrition Needs:   Kcals/day: 6145 Kcals/day (2162-2342kcal)  Protein: 103 g (103-120g (1.2-1.4g/kg))  Fluid: 2342 ml (1ml/kcal)  Based On:  [address] [place] [address] (x 1.3-1. 4)  Weight Used: Actual wt (86.kg (also UBW))    Pt expected to meet estimated nutrient needs:  [x]   Yes  (with TPN at goal)   []  No [] Unable to predict at this time  Nutrition Diagnosis:   1. Inadequate oral food/beverage intake related to altered GI fx as evidenced by s/p extensive LUCIEN/ileostomy revision; TPN infusing w/ clear liquids    Goals:     PN to continue until PO meeting at least 60% needs     Monitoring & Evaluation:    - Liquid meal replacement, Enteral/parenteral nutrition intake, Total energy intake   - Weight/weight change, GI   -      Previous Nutrition Goals Met:   Yes  Previous Recommendations:    Yes    Education & Discharge Needs:   [x] None Identified   [] Identified and addressed    [x] Participated in care plan, discharge planning, and/or interdisciplinary rounds        Cultural, Scientology and ethnic food preferences identified: None    Skin Integrity: [x]Intact  []Other  Edema: [x]None []Other  Last BM: 3/14 - via ileostomy  Food Allergies: [x]None []Other  Diet Restrictions: Food Intolerance: high fiber  Cultural/Confucianism Preference(s): None     Anthropometrics:    Weight Loss Metrics 3/17/2017 3/7/2017 2/25/2017 2/21/2017 2/6/2017 1/26/2017 1/19/2017   Today's Wt 204 lb 14.4 oz 199 lb 4.7 oz 200 lb 195 lb 195 lb 195 lb 5.2 oz 194 lb 10.7 oz   BMI 27.79 kg/m2 27.03 kg/m2 27.12 kg/m2 26.45 kg/m2 26.45 kg/m2 26.49 kg/m2 26.4 kg/m2      Weight Source: Standing scale (comment)  Height: 6' (182.9 cm),    Body mass index is 27.79 kg/(m^2).   IBW : 80.7 kg (178 lb), % IBW (Calculated): 106.91 %  Usual Body Weight: 86.2 kg (190 lb),      Labs:    Lab Results   Component Value Date/Time    Sodium 141 03/17/2017 03:50 AM    Potassium 4.0 03/17/2017 03:50 AM    Chloride 104 03/17/2017 03:50 AM    CO2 32 03/17/2017 03:50 AM    Glucose 181 03/17/2017 03:50 AM    BUN 11 03/17/2017 03:50 AM    Creatinine 0.55 03/17/2017 03:50 AM    Calcium 8.5 03/17/2017 03:50 AM    Magnesium 2.1 03/17/2017 03:50 AM    Phosphorus 3.5 03/17/2017 03:50 AM    Albumin 2.4 03/15/2017 05:27 AM       Anish Stoner RD

## 2017-03-18 LAB
ANION GAP BLD CALC-SCNC: 7 MMOL/L (ref 5–15)
BASOPHILS # BLD AUTO: 0 K/UL (ref 0–0.1)
BASOPHILS # BLD: 0 % (ref 0–1)
BUN SERPL-MCNC: 10 MG/DL (ref 6–20)
BUN/CREAT SERPL: 19 (ref 12–20)
CALCIUM SERPL-MCNC: 8.5 MG/DL (ref 8.5–10.1)
CHLORIDE SERPL-SCNC: 105 MMOL/L (ref 97–108)
CO2 SERPL-SCNC: 30 MMOL/L (ref 21–32)
CREAT SERPL-MCNC: 0.54 MG/DL (ref 0.7–1.3)
DIFFERENTIAL METHOD BLD: ABNORMAL
EOSINOPHIL # BLD: 0.5 K/UL (ref 0–0.4)
EOSINOPHIL NFR BLD: 4 % (ref 0–7)
ERYTHROCYTE [DISTWIDTH] IN BLOOD BY AUTOMATED COUNT: 13.1 % (ref 11.5–14.5)
GLUCOSE BLD STRIP.AUTO-MCNC: 119 MG/DL (ref 65–100)
GLUCOSE BLD STRIP.AUTO-MCNC: 139 MG/DL (ref 65–100)
GLUCOSE BLD STRIP.AUTO-MCNC: 139 MG/DL (ref 65–100)
GLUCOSE BLD STRIP.AUTO-MCNC: 143 MG/DL (ref 65–100)
GLUCOSE SERPL-MCNC: 162 MG/DL (ref 65–100)
HCT VFR BLD AUTO: 30.7 % (ref 36.6–50.3)
HGB BLD-MCNC: 10.1 G/DL (ref 12.1–17)
LYMPHOCYTES # BLD AUTO: 16 % (ref 12–49)
LYMPHOCYTES # BLD: 2 K/UL (ref 0.8–3.5)
MCH RBC QN AUTO: 30.3 PG (ref 26–34)
MCHC RBC AUTO-ENTMCNC: 32.9 G/DL (ref 30–36.5)
MCV RBC AUTO: 92.2 FL (ref 80–99)
MONOCYTES # BLD: 0.5 K/UL (ref 0–1)
MONOCYTES NFR BLD AUTO: 4 % (ref 5–13)
MYELOCYTES NFR BLD MANUAL: 2 %
NEUTS SEG # BLD: 9.5 K/UL (ref 1.8–8)
NEUTS SEG NFR BLD AUTO: 74 % (ref 32–75)
PLATELET # BLD AUTO: 237 K/UL (ref 150–400)
POTASSIUM SERPL-SCNC: 3.8 MMOL/L (ref 3.5–5.1)
RBC # BLD AUTO: 3.33 M/UL (ref 4.1–5.7)
RBC MORPH BLD: ABNORMAL
SERVICE CMNT-IMP: ABNORMAL
SODIUM SERPL-SCNC: 142 MMOL/L (ref 136–145)
WBC # BLD AUTO: 12.8 K/UL (ref 4.1–11.1)
WBC MORPH BLD: ABNORMAL

## 2017-03-18 PROCEDURE — 74011250636 HC RX REV CODE- 250/636: Performed by: COLON & RECTAL SURGERY

## 2017-03-18 PROCEDURE — 80048 BASIC METABOLIC PNL TOTAL CA: CPT | Performed by: COLON & RECTAL SURGERY

## 2017-03-18 PROCEDURE — 74011000258 HC RX REV CODE- 258: Performed by: COLON & RECTAL SURGERY

## 2017-03-18 PROCEDURE — 82962 GLUCOSE BLOOD TEST: CPT

## 2017-03-18 PROCEDURE — 85025 COMPLETE CBC W/AUTO DIFF WBC: CPT | Performed by: COLON & RECTAL SURGERY

## 2017-03-18 PROCEDURE — 65270000032 HC RM SEMIPRIVATE

## 2017-03-18 PROCEDURE — 36415 COLL VENOUS BLD VENIPUNCTURE: CPT | Performed by: COLON & RECTAL SURGERY

## 2017-03-18 PROCEDURE — 74011000250 HC RX REV CODE- 250: Performed by: COLON & RECTAL SURGERY

## 2017-03-18 PROCEDURE — 74011250637 HC RX REV CODE- 250/637: Performed by: COLON & RECTAL SURGERY

## 2017-03-18 RX ADMIN — SODIUM CHLORIDE 10 ML/HR: 900 INJECTION, SOLUTION INTRAVENOUS at 03:50

## 2017-03-18 RX ADMIN — DIAZEPAM 5 MG: 5 TABLET ORAL at 04:19

## 2017-03-18 RX ADMIN — Medication 10 ML: at 21:08

## 2017-03-18 RX ADMIN — ONDANSETRON 4 MG: 2 INJECTION INTRAMUSCULAR; INTRAVENOUS at 04:19

## 2017-03-18 RX ADMIN — TEMAZEPAM 30 MG: 15 CAPSULE ORAL at 21:25

## 2017-03-18 RX ADMIN — Medication 10 ML: at 06:19

## 2017-03-18 RX ADMIN — ASCORBIC ACID: 500 INJECTION, SOLUTION INTRAMUSCULAR; INTRAVENOUS; SUBCUTANEOUS at 18:56

## 2017-03-18 RX ADMIN — ENOXAPARIN SODIUM 40 MG: 40 INJECTION SUBCUTANEOUS at 08:43

## 2017-03-18 RX ADMIN — INSULIN LISPRO 3 UNITS: 100 INJECTION, SOLUTION INTRAVENOUS; SUBCUTANEOUS at 12:20

## 2017-03-18 RX ADMIN — Medication 10 ML: at 12:20

## 2017-03-18 RX ADMIN — ONDANSETRON 4 MG: 2 INJECTION INTRAMUSCULAR; INTRAVENOUS at 19:04

## 2017-03-18 RX ADMIN — HYDROCORTISONE SODIUM SUCCINATE 25 MG: 100 INJECTION, POWDER, FOR SOLUTION INTRAMUSCULAR; INTRAVENOUS at 08:43

## 2017-03-18 RX ADMIN — PROCHLORPERAZINE EDISYLATE 5 MG: 5 INJECTION INTRAMUSCULAR; INTRAVENOUS at 08:44

## 2017-03-18 RX ADMIN — DIAZEPAM 5 MG: 5 TABLET ORAL at 19:49

## 2017-03-18 RX ADMIN — FAMOTIDINE 20 MG: 10 INJECTION, SOLUTION INTRAVENOUS at 08:43

## 2017-03-18 RX ADMIN — FAMOTIDINE 20 MG: 10 INJECTION, SOLUTION INTRAVENOUS at 20:57

## 2017-03-18 RX ADMIN — PROCHLORPERAZINE EDISYLATE 5 MG: 5 INJECTION INTRAMUSCULAR; INTRAVENOUS at 17:25

## 2017-03-18 RX ADMIN — ONDANSETRON 4 MG: 2 INJECTION INTRAMUSCULAR; INTRAVENOUS at 14:33

## 2017-03-18 RX ADMIN — HYDROCORTISONE SODIUM SUCCINATE 25 MG: 100 INJECTION, POWDER, FOR SOLUTION INTRAMUSCULAR; INTRAVENOUS at 20:54

## 2017-03-18 RX ADMIN — Medication 10 ML: at 21:09

## 2017-03-18 NOTE — ROUTINE PROCESS
Bedside shift change report given to Jori Murillo (oncoming nurse) by Shreya Lozada RN (offgoing nurse). Report included the following information SBAR.

## 2017-03-18 NOTE — PROGRESS NOTES
Bedside and Verbal shift change report given to Wilder Cortez RN (oncoming nurse) by Garfield Sheppard RN (offgoing nurse). Report included the following information SBAR, Kardex, Intake/Output, MAR, Accordion, Recent Results and Med Rec Status.

## 2017-03-18 NOTE — PROGRESS NOTES
Bedside shift change report given to Ariadne (oncoming nurse) by Nora Bates (offgoing nurse). Report included the following information SBAR.

## 2017-03-18 NOTE — PROGRESS NOTES
General Daily Progress Note    Admission Date: 3/9/2017  Hospital Day 10  Post-Op Day 4  Subjective: Tolerating clear liquid diet. Pain control adequate. Voiding. Objective:   Patient Vitals for the past 24 hrs:   BP Temp Pulse Resp SpO2 Weight   03/18/17 0852 116/70 98.9 °F (37.2 °C) (!) 106 16 98 % -   03/18/17 0829 - - - - - 91.2 kg (201 lb 1.6 oz)   03/18/17 0348 115/67 97.9 °F (36.6 °C) (!) 102 16 97 % -   03/18/17 0047 136/80 98.4 °F (36.9 °C) 99 16 96 % -   03/17/17 1600 127/77 98.3 °F (36.8 °C) 93 16 96 % -   03/17/17 1302 125/77 98.3 °F (36.8 °C) 99 16 91 % -     03/18 0701 - 03/18 1900  In: -   Out: 500 [Urine:400]  03/16 1901 - 03/18 0700  In: -   Out: 3350 [Urine:3250]    General Appearance: No distress. Lungs: Clear to auscultation. Heart: Normal rate and regular rhythm. Abdomen: Incision clean. Small amount of serosanguinous staining on the dressing. No abnormal erythema. Extremities: Pneumatic compression stockings in use.             Data Review   Recent Results (from the past 24 hour(s))   URINALYSIS W/ REFLEX CULTURE    Collection Time: 03/17/17 11:10 AM   Result Value Ref Range    Color YELLOW/STRAW      Appearance CLEAR CLEAR      Specific gravity 1.019 1.003 - 1.030      pH (UA) 6.5 5.0 - 8.0      Protein NEGATIVE  NEG mg/dL    Glucose NEGATIVE  NEG mg/dL    Ketone NEGATIVE  NEG mg/dL    Bilirubin NEGATIVE  NEG      Blood LARGE (A) NEG      Urobilinogen 0.2 0.2 - 1.0 EU/dL    Nitrites NEGATIVE  NEG      Leukocyte Esterase NEGATIVE  NEG      WBC 0-4 0 - 4 /hpf    RBC >100 (H) 0 - 5 /hpf    Epithelial cells FEW FEW /lpf    Bacteria NEGATIVE  NEG /hpf    UA:UC IF INDICATED CULTURE NOT INDICATED BY UA RESULT CNI     GLUCOSE, POC    Collection Time: 03/17/17 11:36 AM   Result Value Ref Range    Glucose (POC) 161 (H) 65 - 100 mg/dL    Performed by Neeraj MUHAMMAD, POC    Collection Time: 03/17/17  5:11 PM   Result Value Ref Range    Glucose (POC) 170 (H) 65 - 100 mg/dL Performed by Helyn Lanes, POC    Collection Time: 03/17/17  8:25 PM   Result Value Ref Range    Glucose (POC) 110 (H) 65 - 100 mg/dL    Performed by Neo Shafer    CBC WITH AUTOMATED DIFF    Collection Time: 03/18/17  3:51 AM   Result Value Ref Range    WBC 12.8 (H) 4.1 - 11.1 K/uL    RBC 3.33 (L) 4.10 - 5.70 M/uL    HGB 10.1 (L) 12.1 - 17.0 g/dL    HCT 30.7 (L) 36.6 - 50.3 %    MCV 92.2 80.0 - 99.0 FL    MCH 30.3 26.0 - 34.0 PG    MCHC 32.9 30.0 - 36.5 g/dL    RDW 13.1 11.5 - 14.5 %    PLATELET 489 641 - 337 K/uL    NEUTROPHILS 74 32 - 75 %    LYMPHOCYTES 16 12 - 49 %    MONOCYTES 4 (L) 5 - 13 %    EOSINOPHILS 4 0 - 7 %    BASOPHILS 0 0 - 1 %    MYELOCYTES 2 (H) 0 %    ABS. NEUTROPHILS 9.5 (H) 1.8 - 8.0 K/UL    ABS. LYMPHOCYTES 2.0 0.8 - 3.5 K/UL    ABS. MONOCYTES 0.5 0.0 - 1.0 K/UL    ABS. EOSINOPHILS 0.5 (H) 0.0 - 0.4 K/UL    ABS. BASOPHILS 0.0 0.0 - 0.1 K/UL    DF MANUAL      RBC COMMENTS ANISOCYTOSIS  1+        RBC COMMENTS POLYCHROMASIA  PRESENT        RBC COMMENTS OVALOCYTES  PRESENT        WBC COMMENTS REACTIVE LYMPHS     METABOLIC PANEL, BASIC    Collection Time: 03/18/17  3:51 AM   Result Value Ref Range    Sodium 142 136 - 145 mmol/L    Potassium 3.8 3.5 - 5.1 mmol/L    Chloride 105 97 - 108 mmol/L    CO2 30 21 - 32 mmol/L    Anion gap 7 5 - 15 mmol/L    Glucose 162 (H) 65 - 100 mg/dL    BUN 10 6 - 20 MG/DL    Creatinine 0.54 (L) 0.70 - 1.30 MG/DL    BUN/Creatinine ratio 19 12 - 20      GFR est AA >60 >60 ml/min/1.73m2    GFR est non-AA >60 >60 ml/min/1.73m2    Calcium 8.5 8.5 - 10.1 MG/DL   GLUCOSE, POC    Collection Time: 03/18/17  6:18 AM   Result Value Ref Range    Glucose (POC) 139 (H) 65 - 100 mg/dL    Performed by Deni Chandler            Assessment:     Active Problems:    Small bowel obstruction ([address] (3/9/2017)        4 Days Post-Op s/p Laparotomy, extensive lysis of adhesions, and revision of ileostomy.      Hemodynamically stable. Hemoglobin stable.   Good urine output. Creatinine normal.      A prolonged ileus was expected. The NG tube was removed in the evening on 3/15/2017 because the tape had repeatedly come loose and it was probably going to come out anyway. No setback so far. Tolerating clear liquids. Beginning to have some ileostomy output.      Leukocytosis has been persistent, but it is gradually decreasing. The wound does not appear to be infected. Urinalysis on 3/17/2017 was negative for evidence of infection. Plan:   Begin full liquid diet as tolerated. Decrease TPN rate. Continue current steroid dose. Prophylactic Lovenox. Incentive spirometer. Physical therapy. Ambulate. If not setback by tomorrow, will hopefully be able to begin solid food and change to oral analgesics.

## 2017-03-19 LAB
BASOPHILS # BLD AUTO: 0 K/UL (ref 0–0.1)
BASOPHILS # BLD: 0 % (ref 0–1)
EOSINOPHIL # BLD: 0.2 K/UL (ref 0–0.4)
EOSINOPHIL NFR BLD: 2 % (ref 0–7)
ERYTHROCYTE [DISTWIDTH] IN BLOOD BY AUTOMATED COUNT: 13.1 % (ref 11.5–14.5)
GLUCOSE BLD STRIP.AUTO-MCNC: 128 MG/DL (ref 65–100)
GLUCOSE BLD STRIP.AUTO-MCNC: 134 MG/DL (ref 65–100)
GLUCOSE BLD STRIP.AUTO-MCNC: 135 MG/DL (ref 65–100)
GLUCOSE BLD STRIP.AUTO-MCNC: 141 MG/DL (ref 65–100)
GLUCOSE BLD STRIP.AUTO-MCNC: 145 MG/DL (ref 65–100)
HCT VFR BLD AUTO: 32 % (ref 36.6–50.3)
HGB BLD-MCNC: 10.3 G/DL (ref 12.1–17)
LYMPHOCYTES # BLD AUTO: 15 % (ref 12–49)
LYMPHOCYTES # BLD: 1.8 K/UL (ref 0.8–3.5)
MCH RBC QN AUTO: 29.9 PG (ref 26–34)
MCHC RBC AUTO-ENTMCNC: 32.2 G/DL (ref 30–36.5)
MCV RBC AUTO: 93 FL (ref 80–99)
MONOCYTES # BLD: 1 K/UL (ref 0–1)
MONOCYTES NFR BLD AUTO: 8 % (ref 5–13)
NEUTS SEG # BLD: 9 K/UL (ref 1.8–8)
NEUTS SEG NFR BLD AUTO: 75 % (ref 32–75)
PLATELET # BLD AUTO: 263 K/UL (ref 150–400)
RBC # BLD AUTO: 3.44 M/UL (ref 4.1–5.7)
SERVICE CMNT-IMP: ABNORMAL
WBC # BLD AUTO: 12 K/UL (ref 4.1–11.1)

## 2017-03-19 PROCEDURE — 74011000250 HC RX REV CODE- 250: Performed by: COLON & RECTAL SURGERY

## 2017-03-19 PROCEDURE — 74011000258 HC RX REV CODE- 258: Performed by: COLON & RECTAL SURGERY

## 2017-03-19 PROCEDURE — 74011250637 HC RX REV CODE- 250/637: Performed by: COLON & RECTAL SURGERY

## 2017-03-19 PROCEDURE — 82962 GLUCOSE BLOOD TEST: CPT

## 2017-03-19 PROCEDURE — 74011250636 HC RX REV CODE- 250/636: Performed by: COLON & RECTAL SURGERY

## 2017-03-19 PROCEDURE — 85025 COMPLETE CBC W/AUTO DIFF WBC: CPT | Performed by: COLON & RECTAL SURGERY

## 2017-03-19 PROCEDURE — 36415 COLL VENOUS BLD VENIPUNCTURE: CPT | Performed by: COLON & RECTAL SURGERY

## 2017-03-19 PROCEDURE — 65270000032 HC RM SEMIPRIVATE

## 2017-03-19 RX ORDER — DIAZEPAM 5 MG/1
5 TABLET ORAL
Status: DISCONTINUED | OUTPATIENT
Start: 2017-03-19 | End: 2017-03-21 | Stop reason: HOSPADM

## 2017-03-19 RX ORDER — HYDROMORPHONE HYDROCHLORIDE 1 MG/ML
1 INJECTION, SOLUTION INTRAMUSCULAR; INTRAVENOUS; SUBCUTANEOUS
Status: DISCONTINUED | OUTPATIENT
Start: 2017-03-19 | End: 2017-03-21 | Stop reason: HOSPADM

## 2017-03-19 RX ORDER — OMEPRAZOLE 40 MG/1
40 CAPSULE, DELAYED RELEASE ORAL
Status: DISCONTINUED | OUTPATIENT
Start: 2017-03-19 | End: 2017-03-19 | Stop reason: CLARIF

## 2017-03-19 RX ORDER — OXYCODONE AND ACETAMINOPHEN 10; 325 MG/1; MG/1
1-2 TABLET ORAL
Status: DISCONTINUED | OUTPATIENT
Start: 2017-03-19 | End: 2017-03-20

## 2017-03-19 RX ORDER — PANTOPRAZOLE SODIUM 40 MG/1
40 TABLET, DELAYED RELEASE ORAL
Status: DISCONTINUED | OUTPATIENT
Start: 2017-03-19 | End: 2017-03-21 | Stop reason: HOSPADM

## 2017-03-19 RX ADMIN — ONDANSETRON 4 MG: 2 INJECTION INTRAMUSCULAR; INTRAVENOUS at 16:23

## 2017-03-19 RX ADMIN — ENOXAPARIN SODIUM 40 MG: 40 INJECTION SUBCUTANEOUS at 08:46

## 2017-03-19 RX ADMIN — PROCHLORPERAZINE EDISYLATE 5 MG: 5 INJECTION INTRAMUSCULAR; INTRAVENOUS at 01:21

## 2017-03-19 RX ADMIN — OXYCODONE HYDROCHLORIDE AND ACETAMINOPHEN 2 TABLET: 10; 325 TABLET ORAL at 19:08

## 2017-03-19 RX ADMIN — DIAZEPAM 5 MG: 5 TABLET ORAL at 16:23

## 2017-03-19 RX ADMIN — ONDANSETRON 4 MG: 2 INJECTION INTRAMUSCULAR; INTRAVENOUS at 12:12

## 2017-03-19 RX ADMIN — Medication: at 08:40

## 2017-03-19 RX ADMIN — OXYCODONE HYDROCHLORIDE AND ACETAMINOPHEN 2 TABLET: 10; 325 TABLET ORAL at 23:12

## 2017-03-19 RX ADMIN — ONDANSETRON 4 MG: 2 INJECTION INTRAMUSCULAR; INTRAVENOUS at 21:09

## 2017-03-19 RX ADMIN — OXYCODONE HYDROCHLORIDE AND ACETAMINOPHEN 1 TABLET: 10; 325 TABLET ORAL at 15:20

## 2017-03-19 RX ADMIN — INSULIN LISPRO 3 UNITS: 100 INJECTION, SOLUTION INTRAVENOUS; SUBCUTANEOUS at 07:06

## 2017-03-19 RX ADMIN — INSULIN LISPRO 3 UNITS: 100 INJECTION, SOLUTION INTRAVENOUS; SUBCUTANEOUS at 12:22

## 2017-03-19 RX ADMIN — Medication 10 ML: at 07:06

## 2017-03-19 RX ADMIN — ASCORBIC ACID: 500 INJECTION, SOLUTION INTRAMUSCULAR; INTRAVENOUS; SUBCUTANEOUS at 19:00

## 2017-03-19 RX ADMIN — TEMAZEPAM 30 MG: 15 CAPSULE ORAL at 22:31

## 2017-03-19 RX ADMIN — Medication 10 ML: at 13:55

## 2017-03-19 RX ADMIN — HYDROMORPHONE HYDROCHLORIDE 1 MG: 1 INJECTION, SOLUTION INTRAMUSCULAR; INTRAVENOUS; SUBCUTANEOUS at 18:02

## 2017-03-19 RX ADMIN — HYDROMORPHONE HYDROCHLORIDE 1 MG: 1 INJECTION, SOLUTION INTRAMUSCULAR; INTRAVENOUS; SUBCUTANEOUS at 21:08

## 2017-03-19 RX ADMIN — HYDROCORTISONE SODIUM SUCCINATE 25 MG: 100 INJECTION, POWDER, FOR SOLUTION INTRAMUSCULAR; INTRAVENOUS at 08:46

## 2017-03-19 RX ADMIN — Medication 10 ML: at 21:10

## 2017-03-19 RX ADMIN — DIAZEPAM 5 MG: 5 TABLET ORAL at 07:14

## 2017-03-19 RX ADMIN — Medication: at 01:50

## 2017-03-19 RX ADMIN — HYDROMORPHONE HYDROCHLORIDE 1 MG: 1 INJECTION, SOLUTION INTRAMUSCULAR; INTRAVENOUS; SUBCUTANEOUS at 12:10

## 2017-03-19 RX ADMIN — Medication 10 ML: at 07:07

## 2017-03-19 RX ADMIN — Medication 10 ML: at 13:54

## 2017-03-19 RX ADMIN — PROCHLORPERAZINE EDISYLATE 5 MG: 5 INJECTION INTRAMUSCULAR; INTRAVENOUS at 18:11

## 2017-03-19 RX ADMIN — FAMOTIDINE 20 MG: 10 INJECTION, SOLUTION INTRAVENOUS at 08:48

## 2017-03-19 RX ADMIN — PANTOPRAZOLE SODIUM 40 MG: 40 TABLET, DELAYED RELEASE ORAL at 16:22

## 2017-03-19 RX ADMIN — PROCHLORPERAZINE EDISYLATE 5 MG: 5 INJECTION INTRAMUSCULAR; INTRAVENOUS at 09:51

## 2017-03-19 RX ADMIN — HYDROCORTISONE SODIUM SUCCINATE 25 MG: 100 INJECTION, POWDER, FOR SOLUTION INTRAMUSCULAR; INTRAVENOUS at 21:09

## 2017-03-19 NOTE — PROGRESS NOTES
Bedside shift change report given to Kojo Benjamin (oncoming nurse) by Fany Reno RN (offgoing nurse). Report included the following information SBAR, Kardex, Intake/Output, MAR and Accordion.

## 2017-03-19 NOTE — PROGRESS NOTES
General Daily Progress Note    Admission Date: 3/9/2017  Hospital Day 11  Post-Op Day 5  Subjective:   Good pain control. Tolerating full liquid diet. Voiding is becoming easier. Objective:   Patient Vitals for the past 24 hrs:   BP Temp Pulse Resp SpO2 Weight   03/19/17 0837 115/77 97.9 °F (36.6 °C) (!) 103 18 96 % -   03/19/17 0641 - - - - - 88.5 kg (195 lb)   03/19/17 0416 113/69 98.3 °F (36.8 °C) (!) 105 18 95 % -   03/19/17 0037 124/78 98.4 °F (36.9 °C) (!) 108 18 96 % -   03/18/17 2144 115/75 98.1 °F (36.7 °C) (!) 106 18 98 % -   03/18/17 1608 106/68 97.9 °F (36.6 °C) (!) 108 18 96 % -   03/18/17 1209 111/73 98 °F (36.7 °C) (!) 112 18 95 % -     03/19 0701 - 03/19 1900  In: -   Out: 450 [Urine:300]  03/17 1901 - 03/19 0700  In: 1212.5 [P.O.:390; I.V.:822.5]  Out: 3055 [Urine:2330]    General Appearance: No distress. Abdomen: Incision clean. Small amount of serosanguinous staining on the dressing. No abnormal erythema. Ileostomy edematous, but viable and functioning. Extremities: Pneumatic compression stockings in use.             Data Review   Recent Results (from the past 24 hour(s))   GLUCOSE, POC    Collection Time: 03/18/17 11:28 AM   Result Value Ref Range    Glucose (POC) 143 (H) 65 - 100 mg/dL    Performed by Hannah Velez, POC    Collection Time: 03/18/17  5:21 PM   Result Value Ref Range    Glucose (POC) 119 (H) 65 - 100 mg/dL    Performed by Rik Caban    GLUCOSE, POC    Collection Time: 03/18/17 10:44 PM   Result Value Ref Range    Glucose (POC) 139 (H) 65 - 100 mg/dL    Performed by [facility] SPENCER(CON)    CBC WITH AUTOMATED DIFF    Collection Time: 03/19/17  4:20 AM   Result Value Ref Range    WBC 12.0 (H) 4.1 - 11.1 K/uL    RBC 3.44 (L) 4.10 - 5.70 M/uL    HGB 10.3 (L) 12.1 - 17.0 g/dL    HCT 32.0 (L) 36.6 - 50.3 %    MCV 93.0 80.0 - 99.0 FL    MCH 29.9 26.0 - 34.0 PG    MCHC 32.2 30.0 - 36.5 g/dL    RDW 13.1 11.5 - 14.5 %    PLATELET 406 031 - 724 K/uL    NEUTROPHILS 75 32 - 75 %    LYMPHOCYTES 15 12 - 49 %    MONOCYTES 8 5 - 13 %    EOSINOPHILS 2 0 - 7 %    BASOPHILS 0 0 - 1 %    ABS. NEUTROPHILS 9.0 (H) 1.8 - 8.0 K/UL    ABS. LYMPHOCYTES 1.8 0.8 - 3.5 K/UL    ABS. MONOCYTES 1.0 0.0 - 1.0 K/UL    ABS. EOSINOPHILS 0.2 0.0 - 0.4 K/UL    ABS. BASOPHILS 0.0 0.0 - 0.1 K/UL   GLUCOSE, POC    Collection Time: 03/19/17  6:17 AM   Result Value Ref Range    Glucose (POC) 145 (H) 65 - 100 mg/dL    Performed by Leopold Capuchin            Assessment:     Active Problems:    Small bowel obstruction ([address] (3/9/2017)        5 Days Post-Op s/p Laparotomy, extensive lysis of adhesions, and revision of ileostomy.      Hemodynamically stable. Hemoglobin stable. Good urine output.      A prolonged ileus was expected. The NG tube was removed in the evening on 3/15/2017 because the tape had repeatedly come loose and it was probably going to come out anyway. No setback so far. Tolerating full liquids. Gi function is returning.      Leukocytosis has been persistent, but it continues to gradually decrease. The wound does not appear to be infected. Urinalysis on 3/17/2017 was negative for evidence of infection. Transition from parenteral to oral analgesics will need to be made. Plan:   Begin low fiber diet. Decrease TPN again. Continue current steroid dose. Discontinue PCA. Begin Percocet. Prophylactic Lovenox. Incentive spirometer. Physical therapy. Ambulate.

## 2017-03-19 NOTE — PROGRESS NOTES
Bedside shift change report given to Ana Márquez RN (oncoming nurse) by Janay Hammer RN (offgoing nurse). Report included the following information SBAR, Kardex, Procedure Summary, Intake/Output, MAR and Recent Results.

## 2017-03-20 LAB
ANION GAP BLD CALC-SCNC: 7 MMOL/L (ref 5–15)
BASOPHILS # BLD AUTO: 0 K/UL (ref 0–0.1)
BASOPHILS # BLD: 0 % (ref 0–1)
BUN SERPL-MCNC: 12 MG/DL (ref 6–20)
BUN/CREAT SERPL: 17 (ref 12–20)
CALCIUM SERPL-MCNC: 8.8 MG/DL (ref 8.5–10.1)
CHLORIDE SERPL-SCNC: 104 MMOL/L (ref 97–108)
CO2 SERPL-SCNC: 31 MMOL/L (ref 21–32)
CREAT SERPL-MCNC: 0.7 MG/DL (ref 0.7–1.3)
EOSINOPHIL # BLD: 0.1 K/UL (ref 0–0.4)
EOSINOPHIL NFR BLD: 1 % (ref 0–7)
ERYTHROCYTE [DISTWIDTH] IN BLOOD BY AUTOMATED COUNT: 13.2 % (ref 11.5–14.5)
GLUCOSE BLD STRIP.AUTO-MCNC: 100 MG/DL (ref 65–100)
GLUCOSE BLD STRIP.AUTO-MCNC: 111 MG/DL (ref 65–100)
GLUCOSE BLD STRIP.AUTO-MCNC: 130 MG/DL (ref 65–100)
GLUCOSE BLD STRIP.AUTO-MCNC: 140 MG/DL (ref 65–100)
GLUCOSE SERPL-MCNC: 147 MG/DL (ref 65–100)
HCT VFR BLD AUTO: 32.4 % (ref 36.6–50.3)
HGB BLD-MCNC: 10.2 G/DL (ref 12.1–17)
LYMPHOCYTES # BLD AUTO: 13 % (ref 12–49)
LYMPHOCYTES # BLD: 1.5 K/UL (ref 0.8–3.5)
MAGNESIUM SERPL-MCNC: 2.1 MG/DL (ref 1.6–2.4)
MCH RBC QN AUTO: 29.5 PG (ref 26–34)
MCHC RBC AUTO-ENTMCNC: 31.5 G/DL (ref 30–36.5)
MCV RBC AUTO: 93.6 FL (ref 80–99)
MONOCYTES # BLD: 0.7 K/UL (ref 0–1)
MONOCYTES NFR BLD AUTO: 6 % (ref 5–13)
NEUTS SEG # BLD: 9.5 K/UL (ref 1.8–8)
NEUTS SEG NFR BLD AUTO: 80 % (ref 32–75)
PHOSPHATE SERPL-MCNC: 3.9 MG/DL (ref 2.6–4.7)
PLATELET # BLD AUTO: 268 K/UL (ref 150–400)
POTASSIUM SERPL-SCNC: 3.9 MMOL/L (ref 3.5–5.1)
RBC # BLD AUTO: 3.46 M/UL (ref 4.1–5.7)
SERVICE CMNT-IMP: ABNORMAL
SERVICE CMNT-IMP: NORMAL
SODIUM SERPL-SCNC: 142 MMOL/L (ref 136–145)
WBC # BLD AUTO: 11.9 K/UL (ref 4.1–11.1)

## 2017-03-20 PROCEDURE — 65270000032 HC RM SEMIPRIVATE

## 2017-03-20 PROCEDURE — 82962 GLUCOSE BLOOD TEST: CPT

## 2017-03-20 PROCEDURE — 84100 ASSAY OF PHOSPHORUS: CPT | Performed by: COLON & RECTAL SURGERY

## 2017-03-20 PROCEDURE — 74011250637 HC RX REV CODE- 250/637: Performed by: COLON & RECTAL SURGERY

## 2017-03-20 PROCEDURE — 80048 BASIC METABOLIC PNL TOTAL CA: CPT | Performed by: COLON & RECTAL SURGERY

## 2017-03-20 PROCEDURE — 36415 COLL VENOUS BLD VENIPUNCTURE: CPT | Performed by: COLON & RECTAL SURGERY

## 2017-03-20 PROCEDURE — 74011250636 HC RX REV CODE- 250/636: Performed by: COLON & RECTAL SURGERY

## 2017-03-20 PROCEDURE — 83735 ASSAY OF MAGNESIUM: CPT | Performed by: COLON & RECTAL SURGERY

## 2017-03-20 PROCEDURE — 85025 COMPLETE CBC W/AUTO DIFF WBC: CPT | Performed by: COLON & RECTAL SURGERY

## 2017-03-20 RX ORDER — OXYCODONE AND ACETAMINOPHEN 10; 325 MG/1; MG/1
2 TABLET ORAL
Status: DISCONTINUED | OUTPATIENT
Start: 2017-03-20 | End: 2017-03-21 | Stop reason: HOSPADM

## 2017-03-20 RX ADMIN — DIAZEPAM 5 MG: 5 TABLET ORAL at 08:30

## 2017-03-20 RX ADMIN — Medication 10 ML: at 13:01

## 2017-03-20 RX ADMIN — HYDROMORPHONE HYDROCHLORIDE 1 MG: 1 INJECTION, SOLUTION INTRAMUSCULAR; INTRAVENOUS; SUBCUTANEOUS at 08:37

## 2017-03-20 RX ADMIN — ONDANSETRON 4 MG: 2 INJECTION INTRAMUSCULAR; INTRAVENOUS at 14:14

## 2017-03-20 RX ADMIN — HYDROCORTISONE SODIUM SUCCINATE 25 MG: 100 INJECTION, POWDER, FOR SOLUTION INTRAMUSCULAR; INTRAVENOUS at 08:30

## 2017-03-20 RX ADMIN — OXYCODONE HYDROCHLORIDE AND ACETAMINOPHEN 2 TABLET: 10; 325 TABLET ORAL at 18:13

## 2017-03-20 RX ADMIN — OXYCODONE HYDROCHLORIDE AND ACETAMINOPHEN 2 TABLET: 10; 325 TABLET ORAL at 10:45

## 2017-03-20 RX ADMIN — HYDROCORTISONE SODIUM SUCCINATE 25 MG: 100 INJECTION, POWDER, FOR SOLUTION INTRAMUSCULAR; INTRAVENOUS at 21:16

## 2017-03-20 RX ADMIN — Medication 10 ML: at 06:00

## 2017-03-20 RX ADMIN — PROCHLORPERAZINE EDISYLATE 5 MG: 5 INJECTION INTRAMUSCULAR; INTRAVENOUS at 02:13

## 2017-03-20 RX ADMIN — PANTOPRAZOLE SODIUM 40 MG: 40 TABLET, DELAYED RELEASE ORAL at 18:13

## 2017-03-20 RX ADMIN — Medication 10 ML: at 21:17

## 2017-03-20 RX ADMIN — INSULIN LISPRO 3 UNITS: 100 INJECTION, SOLUTION INTRAVENOUS; SUBCUTANEOUS at 12:59

## 2017-03-20 RX ADMIN — ENOXAPARIN SODIUM 40 MG: 40 INJECTION SUBCUTANEOUS at 08:30

## 2017-03-20 RX ADMIN — OXYCODONE HYDROCHLORIDE AND ACETAMINOPHEN 2 TABLET: 10; 325 TABLET ORAL at 22:23

## 2017-03-20 RX ADMIN — Medication 10 ML: at 21:16

## 2017-03-20 RX ADMIN — PROCHLORPERAZINE EDISYLATE 5 MG: 5 INJECTION INTRAMUSCULAR; INTRAVENOUS at 10:49

## 2017-03-20 RX ADMIN — OXYCODONE HYDROCHLORIDE AND ACETAMINOPHEN 2 TABLET: 10; 325 TABLET ORAL at 04:48

## 2017-03-20 RX ADMIN — PROCHLORPERAZINE EDISYLATE 5 MG: 5 INJECTION INTRAMUSCULAR; INTRAVENOUS at 18:29

## 2017-03-20 RX ADMIN — TEMAZEPAM 30 MG: 15 CAPSULE ORAL at 21:17

## 2017-03-20 RX ADMIN — HYDROMORPHONE HYDROCHLORIDE 1 MG: 1 INJECTION, SOLUTION INTRAMUSCULAR; INTRAVENOUS; SUBCUTANEOUS at 02:13

## 2017-03-20 RX ADMIN — Medication 10 ML: at 13:02

## 2017-03-20 RX ADMIN — OXYCODONE HYDROCHLORIDE AND ACETAMINOPHEN 2 TABLET: 10; 325 TABLET ORAL at 14:10

## 2017-03-20 RX ADMIN — DIAZEPAM 5 MG: 5 TABLET ORAL at 20:04

## 2017-03-20 NOTE — WOUND CARE
Ostomy Assessment: Stoma pink, moist, budded, edematous draining green liquid drainage, 20cc emptied; appliance removed, peristomal skin intact; cut to fit two piece 2 1/4 wafer to fit around stoma, attached pouch and clip applied appliance around stoma; supplies in room;   Will continue to follow for care/teaching; plan is for patient to be discharged tomorrow;     Michaelle Esteban RN

## 2017-03-20 NOTE — PROGRESS NOTES
Reviewed medical chart; note that the patient is being weaned off of TPN today and may be ready for discharge as early as tomorrow. Care Management will continue to follow his disposition.    JEREMIAH Henley

## 2017-03-20 NOTE — PROGRESS NOTES
General Daily Progress Note    Admission Date: 3/9/2017  Hospital Day 12  Post-Op Day 6  Subjective:   Difficult transition from PCA to oral narcotics yesterday, due in part to underdosing. Doing better now. Less nausea. Objective:   Patient Vitals for the past 24 hrs:   BP Temp Pulse Resp SpO2   03/20/17 0827 119/74 98.1 °F (36.7 °C) (!) 113 18 97 %   03/19/17 2324 99/76 98.4 °F (36.9 °C) 88 18 94 %   03/19/17 1926 117/76 98.6 °F (37 °C) (!) 102 18 99 %   03/19/17 1533 110/75 98.2 °F (36.8 °C) (!) 106 18 98 %   03/19/17 1144 108/67 98.5 °F (36.9 °C) (!) 112 18 97 %        03/18 1901 - 03/20 0700  In: 2378.9 [P.O.:1300; I.V.:1078.9]  Out: 4930 [Urine:3480]    General Appearance: No distress. Lungs:  Clear to auscultation. Heart:  Normal rate and regular rhythm. Abdomen: Incision clean. Small amount of serosanguinous staining on the dressing. No abnormal erythema. Ileostomy edematous, but viable and functioning. Extremities: Pneumatic compression stockings in use.             Data Review   Recent Results (from the past 24 hour(s))   GLUCOSE, POC    Collection Time: 03/19/17 12:02 PM   Result Value Ref Range    Glucose (POC) 141 (H) 65 - 100 mg/dL    Performed by [address] [place] [id number], POC    Collection Time: 03/19/17  5:16 PM   Result Value Ref Range    Glucose (POC) 134 (H) 65 - 100 mg/dL    Performed by Evi Goldstein, POC    Collection Time: 03/19/17  5:59 PM   Result Value Ref Range    Glucose (POC) 135 (H) 65 - 100 mg/dL    Performed by Christina MUHAMMAD, POC    Collection Time: 03/19/17 11:15 PM   Result Value Ref Range    Glucose (POC) 128 (H) 65 - 100 mg/dL    Performed by Azra Briones    CBC WITH AUTOMATED DIFF    Collection Time: 03/20/17  2:07 AM   Result Value Ref Range    WBC 11.9 (H) 4.1 - 11.1 K/uL    RBC 3.46 (L) 4.10 - 5.70 M/uL    HGB 10.2 (L) 12.1 - 17.0 g/dL    HCT 32.4 (L) 36.6 - 50.3 %    MCV 93.6 80.0 - 99.0 FL    MCH 29.5 26.0 - 34.0 PG    MCHC 31.5 30.0 - 36.5 g/dL    RDW 13.2 11.5 - 14.5 %    PLATELET 792 862 - 413 K/uL    NEUTROPHILS 80 (H) 32 - 75 %    LYMPHOCYTES 13 12 - 49 %    MONOCYTES 6 5 - 13 %    EOSINOPHILS 1 0 - 7 %    BASOPHILS 0 0 - 1 %    ABS. NEUTROPHILS 9.5 (H) 1.8 - 8.0 K/UL    ABS. LYMPHOCYTES 1.5 0.8 - 3.5 K/UL    ABS. MONOCYTES 0.7 0.0 - 1.0 K/UL    ABS. EOSINOPHILS 0.1 0.0 - 0.4 K/UL    ABS. BASOPHILS 0.0 0.0 - 0.1 K/UL   METABOLIC PANEL, BASIC    Collection Time: 03/20/17  2:07 AM   Result Value Ref Range    Sodium 142 136 - 145 mmol/L    Potassium 3.9 3.5 - 5.1 mmol/L    Chloride 104 97 - 108 mmol/L    CO2 31 21 - 32 mmol/L    Anion gap 7 5 - 15 mmol/L    Glucose 147 (H) 65 - 100 mg/dL    BUN 12 6 - 20 MG/DL    Creatinine 0.70 0.70 - 1.30 MG/DL    BUN/Creatinine ratio 17 12 - 20      GFR est AA >60 >60 ml/min/1.73m2    GFR est non-AA >60 >60 ml/min/1.73m2    Calcium 8.8 8.5 - 10.1 MG/DL   MAGNESIUM    Collection Time: 03/20/17  2:07 AM   Result Value Ref Range    Magnesium 2.1 1.6 - 2.4 mg/dL   PHOSPHORUS    Collection Time: 03/20/17  2:07 AM   Result Value Ref Range    Phosphorus 3.9 2.6 - 4.7 MG/DL   GLUCOSE, POC    Collection Time: 03/20/17  6:36 AM   Result Value Ref Range    Glucose (POC) 130 (H) 65 - 100 mg/dL    Performed by Gt Holcomb            Assessment:     Active Problems:    Small bowel obstruction ([address] (3/9/2017)        6 Days Post-Op s/p Laparotomy, extensive lysis of adhesions, and revision of ileostomy.      Hemodynamically stable. Hemoglobin stable. Good urine output.      A prolonged ileus was expected. The NG tube was removed in the evening on 3/15/2017 because the tape had repeatedly come loose and it was probably going to come out anyway. No setback so far. GI function has returned, but it is too soon to know how well he will do with solid food. Because he struggled with pain and nausea yesterday, he couldn't eat much.        Leukocytosis has been persistent, but it continues to gradually decrease.  The wound does not appear to be infected. Urinalysis on 3/17/2017 was negative for evidence of infection. Plan:   Continue low fiber diet. Wean and discontinue TPN. Change to oral steroids tomorrow morning.     Continue Percocet with parenteral Dilaudid for breakthrough pain.     Prophylactic Lovenox. Incentive spirometer. Physical therapy. Ambulate. Probably discharge tomorrow.

## 2017-03-21 VITALS
RESPIRATION RATE: 18 BRPM | SYSTOLIC BLOOD PRESSURE: 119 MMHG | TEMPERATURE: 98.4 F | WEIGHT: 197.9 LBS | DIASTOLIC BLOOD PRESSURE: 74 MMHG | HEART RATE: 92 BPM | HEIGHT: 72 IN | BODY MASS INDEX: 26.81 KG/M2 | OXYGEN SATURATION: 97 %

## 2017-03-21 LAB
BASOPHILS # BLD AUTO: 0 K/UL (ref 0–0.1)
BASOPHILS # BLD: 0 % (ref 0–1)
EOSINOPHIL # BLD: 0.2 K/UL (ref 0–0.4)
EOSINOPHIL NFR BLD: 2 % (ref 0–7)
ERYTHROCYTE [DISTWIDTH] IN BLOOD BY AUTOMATED COUNT: 13.3 % (ref 11.5–14.5)
GLUCOSE BLD STRIP.AUTO-MCNC: 92 MG/DL (ref 65–100)
HCT VFR BLD AUTO: 31.5 % (ref 36.6–50.3)
HGB BLD-MCNC: 10 G/DL (ref 12.1–17)
LYMPHOCYTES # BLD AUTO: 31 % (ref 12–49)
LYMPHOCYTES # BLD: 2.8 K/UL (ref 0.8–3.5)
MCH RBC QN AUTO: 29.8 PG (ref 26–34)
MCHC RBC AUTO-ENTMCNC: 31.7 G/DL (ref 30–36.5)
MCV RBC AUTO: 93.8 FL (ref 80–99)
MONOCYTES # BLD: 0.7 K/UL (ref 0–1)
MONOCYTES NFR BLD AUTO: 7 % (ref 5–13)
NEUTS SEG # BLD: 5.6 K/UL (ref 1.8–8)
NEUTS SEG NFR BLD AUTO: 60 % (ref 32–75)
PLATELET # BLD AUTO: 267 K/UL (ref 150–400)
RBC # BLD AUTO: 3.36 M/UL (ref 4.1–5.7)
SERVICE CMNT-IMP: NORMAL
WBC # BLD AUTO: 9.2 K/UL (ref 4.1–11.1)

## 2017-03-21 PROCEDURE — 74011250637 HC RX REV CODE- 250/637: Performed by: COLON & RECTAL SURGERY

## 2017-03-21 PROCEDURE — 74011250636 HC RX REV CODE- 250/636: Performed by: COLON & RECTAL SURGERY

## 2017-03-21 PROCEDURE — 85025 COMPLETE CBC W/AUTO DIFF WBC: CPT | Performed by: COLON & RECTAL SURGERY

## 2017-03-21 PROCEDURE — 82962 GLUCOSE BLOOD TEST: CPT

## 2017-03-21 PROCEDURE — 36415 COLL VENOUS BLD VENIPUNCTURE: CPT | Performed by: COLON & RECTAL SURGERY

## 2017-03-21 PROCEDURE — 74011636637 HC RX REV CODE- 636/637: Performed by: COLON & RECTAL SURGERY

## 2017-03-21 RX ORDER — OXYCODONE AND ACETAMINOPHEN 10; 325 MG/1; MG/1
1-2 TABLET ORAL
Qty: 75 TAB | Refills: 0 | Status: ON HOLD | OUTPATIENT
Start: 2017-03-21 | End: 2017-06-12

## 2017-03-21 RX ADMIN — ENOXAPARIN SODIUM 40 MG: 40 INJECTION SUBCUTANEOUS at 08:19

## 2017-03-21 RX ADMIN — OXYCODONE HYDROCHLORIDE AND ACETAMINOPHEN 2 TABLET: 10; 325 TABLET ORAL at 02:25

## 2017-03-21 RX ADMIN — PREDNISONE 15 MG: 10 TABLET ORAL at 08:17

## 2017-03-21 RX ADMIN — OXYCODONE HYDROCHLORIDE AND ACETAMINOPHEN 2 TABLET: 10; 325 TABLET ORAL at 06:23

## 2017-03-21 RX ADMIN — HYDROMORPHONE HYDROCHLORIDE 1 MG: 1 INJECTION, SOLUTION INTRAMUSCULAR; INTRAVENOUS; SUBCUTANEOUS at 11:58

## 2017-03-21 RX ADMIN — OXYCODONE HYDROCHLORIDE AND ACETAMINOPHEN 2 TABLET: 10; 325 TABLET ORAL at 10:26

## 2017-03-21 RX ADMIN — Medication 10 ML: at 06:00

## 2017-03-21 NOTE — PROGRESS NOTES
General Daily Progress Note    Admission Date: 3/9/2017  Hospital Day 13  Post-Op Day 7  Subjective:   Good pain control with 20 mg oxycodone every 4 hours. Hasn't used Dilaudid or antiemetic since yesterday. Feels well enough to go home. Objective:   Patient Vitals for the past 24 hrs:   BP Temp Pulse Resp SpO2 Height Weight   03/21/17 0813 119/74 98.4 °F (36.9 °C) 92 18 97 % - -   03/21/17 0230 118/69 98.4 °F (36.9 °C) 99 18 98 % - -   03/20/17 2238 116/74 98.6 °F (37 °C) (!) 103 18 98 % - -   03/20/17 1541 121/69 98.3 °F (36.8 °C) 99 18 98 % - -   03/20/17 1334 - - - - - 6' (1.829 m) 89.8 kg (197 lb 14.4 oz)        03/19 1901 - 03/21 0700  In: 1256.4 [P.O.:1000; I.V.:256.4]  Out: 3575 [Urine:2100]    General Appearance: No distress. Abdomen: Incision clean. Small amount of serosanguinous staining on the dressing. No abnormal erythema. Ileostomy viable and functioning. Extremities: Pneumatic compression stockings in use.              Data Review   Recent Results (from the past 24 hour(s))   GLUCOSE, POC    Collection Time: 03/20/17 12:05 PM   Result Value Ref Range    Glucose (POC) 140 (H) 65 - 100 mg/dL    Performed by Osmar Meza    GLUCOSE, POC    Collection Time: 03/20/17  5:19 PM   Result Value Ref Range    Glucose (POC) 100 65 - 100 mg/dL    Performed by Rajinder Rdz    GLUCOSE, POC    Collection Time: 03/20/17 10:27 PM   Result Value Ref Range    Glucose (POC) 111 (H) 65 - 100 mg/dL    Performed by Jeanne Mojica    CBC WITH AUTOMATED DIFF    Collection Time: 03/21/17  2:30 AM   Result Value Ref Range    WBC 9.2 4.1 - 11.1 K/uL    RBC 3.36 (L) 4.10 - 5.70 M/uL    HGB 10.0 (L) 12.1 - 17.0 g/dL    HCT 31.5 (L) 36.6 - 50.3 %    MCV 93.8 80.0 - 99.0 FL    MCH 29.8 26.0 - 34.0 PG    MCHC 31.7 30.0 - 36.5 g/dL    RDW 13.3 11.5 - 14.5 %    PLATELET 337 685 - 220 K/uL    NEUTROPHILS 60 32 - 75 %    LYMPHOCYTES 31 12 - 49 %    MONOCYTES 7 5 - 13 %    EOSINOPHILS 2 0 - 7 %    BASOPHILS 0 0 - 1 % ABS. NEUTROPHILS 5.6 1.8 - 8.0 K/UL    ABS. LYMPHOCYTES 2.8 0.8 - 3.5 K/UL    ABS. MONOCYTES 0.7 0.0 - 1.0 K/UL    ABS. EOSINOPHILS 0.2 0.0 - 0.4 K/UL    ABS. BASOPHILS 0.0 0.0 - 0.1 K/UL   GLUCOSE, POC    Collection Time: 03/21/17  6:25 AM   Result Value Ref Range    Glucose (POC) 92 65 - 100 mg/dL    Performed by Warren Henderson            Assessment:     Active Problems:    Small bowel obstruction ([address] (3/9/2017)        7 Days Post-Op s/p Laparotomy, extensive lysis of adhesions, and revision of ileostomy.      Hemodynamically stable. Hemoglobin stable. Good urine output.      A prolonged ileus was expected. The NG tube was removed in the evening on 3/15/2017 because the tape had repeatedly come loose and it was probably going to come out anyway. No setback so far. GI function has returned, and he seems to be tolerating small quantities of solid food. Tpn was weaned and discontinued yesterday.      Leukocytosis was persistent, but it gradually decreased and today it has resolved. The wound does not appear to be infected. Urinalysis on 3/17/2017 was negative for evidence of infection. The patient appears to be fit for discharge. Plan:   Discharge to home. Follow-up in approximately one week.

## 2017-03-21 NOTE — PROGRESS NOTES
Bedside shift change report given to Shellie Velasquez RN (oncoming nurse) by Perry Schreiber RN (offgoing nurse). Report included the following information SBAR, Kardex, Procedure Summary, Intake/Output, MAR and Recent Results.

## 2017-03-21 NOTE — DISCHARGE INSTRUCTIONS
DISCHARGE SUMMARY from Nurse    The following personal items are in your possession at time of discharge:    Dental Appliances: None  Visual Aid: Glasses     Home Medications: None  Jewelry: None  Clothing: Footwear, Gloves, Sent home  Other Valuables: Cell Phone, With patient             PATIENT INSTRUCTIONS:    After general anesthesia or intravenous sedation, for 24 hours or while taking prescription Narcotics:  · Limit your activities  · Do not drive and operate hazardous machinery  · Do not make important personal or business decisions  · Do  not drink alcoholic beverages  · If you have not urinated within 8 hours after discharge, please contact your surgeon on call. Report the following to your surgeon:  · Excessive pain, swelling, redness or odor of or around the surgical area  · Temperature over 100.5  · Nausea and vomiting lasting longer than 4 hours or if unable to take medications  · Any signs of decreased circulation or nerve impairment to extremity: change in color, persistent  numbness, tingling, coldness or increase pain  · Any questions              *  Please give a list of your current medications to your Primary Care Provider. *  Please update this list whenever your medications are discontinued, doses are      changed, or new medications (including over-the-counter products) are added. *  Please carry medication information at all times in case of emergency situations. These are general instructions for a healthy lifestyle:    No smoking/ No tobacco products/ Avoid exposure to second hand smoke    Surgeon General's Warning:  Quitting smoking now greatly reduces serious risk to your health.     Obesity, smoking, and sedentary lifestyle greatly increases your risk for illness    A healthy diet, regular physical exercise & weight monitoring are important for maintaining a healthy lifestyle    You may be retaining fluid if you have a history of heart failure or if you experience any of the following symptoms:  Weight gain of 3 pounds or more overnight or 5 pounds in a week, increased swelling in our hands or feet or shortness of breath while lying flat in bed. Please call your doctor as soon as you notice any of these symptoms; do not wait until your next office visit. Recognize signs and symptoms of STROKE:    F-face looks uneven    A-arms unable to move or move unevenly    S-speech slurred or non-existent    T-time-call 911 as soon as signs and symptoms begin-DO NOT go       Back to bed or wait to see if you get better-TIME IS BRAIN. Warning Signs of HEART ATTACK     Call 911 if you have these symptoms:   Chest discomfort. Most heart attacks involve discomfort in the center of the chest that lasts more than a few minutes, or that goes away and comes back. It can feel like uncomfortable pressure, squeezing, fullness, or pain.  Discomfort in other areas of the upper body. Symptoms can include pain or discomfort in one or both arms, the back, neck, jaw, or stomach.  Shortness of breath with or without chest discomfort.  Other signs may include breaking out in a cold sweat, nausea, or lightheadedness. Don't wait more than five minutes to call 911 - MINUTES MATTER! Fast action can save your life. Calling 911 is almost always the fastest way to get lifesaving treatment. Emergency Medical Services staff can begin treatment when they arrive -- up to an hour sooner than if someone gets to the hospital by car. The discharge information has been reviewed with the patient. The patient verbalized understanding. Discharge medications reviewed with the patient and appropriate educational materials and side effects teaching were provided. Patient Discharge Instructions    Bc Beckman / [id number] : 1975    Admitted 3/9/2017 Discharged: 3/21/2017        · It is important that you take medications exactly as they are prescribed.    · Keep your medication in the bottles provided by the pharmacist and keep a list of the medication names, dosages, and times to be taken in your wallet. · Do not take other medications without consulting your doctor. What To Do At Home  Recommended Diet:  You should continue to consume a low fiber diet until you have been seen for follow-up. Recommended Activity: You should not drive, lift more than 10 lb., or engage in strenuous activity until four weeks have passed since the operation. You should walk frequently to gradually regain your stamina, and you may climb stairs as tolerated. Hygiene: You may shower with the incision loosely covered, but do not bathe or submerge your abdomen until you have been seen for follow-up. Medications:  Resume the prednisone taper beginning at the the last dose that you were taking prior to discharge. (The discharge medication list does not reflect the correct dose/schedule because of a problem with the software.)    Problems:  If you have a fever (temperature > 100.0 degrees Fahrenheit), if there is drainage from an incision, or if there is increasing redness around an incision, you should call Dr. Lisa Bennett. Other:  If you have questions or other problems, call Dr. Lisa Bennett. Follow-Up:  Please return to Dr. Swartz Bridger office at 2:00 PM on Wednesday 3/29/2017. If you need to change the appointment, please call [phone] on a weekday between 9:00 AM and noon. Information obtained by :  I understand that if any problems occur once I am at home I am to contact my physician. I understand and acknowledge receipt of the instructions indicated above.                                                                                                                                            Physician's or R.N.'s Signature                                                                  Date/Time Patient or Representative Signature                                                          Date/Time

## 2017-03-21 NOTE — DISCHARGE SUMMARY
Discharge Summary     Patient ID:    Ayleen Kaiser  [id number]  81 y.o.  1975    Admission Date: 3/9/2017    Discharge Date: 3/21/2017    Admission Diagnoses:  1. Small bowel obstruction  2. Crohn's disease      Chronic Diagnoses:    Patient Active Problem List   Diagnosis Code    GERD (gastroesophageal reflux disease) K21.9    Abdominal pain R10.9    Crohn's colitis ([facility] K50.10    S/P laparoscopic cholecystectomy Z90.49    Anxiety and depression F41.9, F32.9    Dehydration E86.0    Intractable vomiting with nausea R11.2    Constipation K59.00    SBO (small bowel obstruction) ([facility] K56.69    Small bowel obstruction ([address] K56.69       Discharge Diagnoses:    Hospital Problems as of 3/21/2017  Date Reviewed: 3/14/2017          Codes Class Noted - Resolved POA    Small bowel obstruction ([facility] ICD-10-CM: E40.10  ICD-9-CM: 560.9  3/9/2017 - Present Unknown              Procedures Performed:  1. Right upper extremity PICC placement was performed on 3/10/2017. 2.  Cystoscopy and placement of temporary bilateral ureteral catheters was performed by Maximo Norton MD on 3/14/2017.  3.  Laparotomy, extensive lysis of adhesions, and revision of ileostomy was performed by Dr. Maritza Jean on 3/14/2017. Discharge Medications:   Current Discharge Medication List      START taking these medications    Details   oxyCODONE-acetaminophen (PERCOCET 10)  mg per tablet Take 1-2 Tabs by mouth every four (4) hours as needed. Max Daily Amount: 12 Tabs. Qty: 75 Tab, Refills: 0         CONTINUE these medications which have NOT CHANGED    Details   promethazine (PHENERGAN) 25 mg tablet Take 25 mg by mouth every six (6) hours as needed for Nausea. ondansetron hcl (ZOFRAN, AS HYDROCHLORIDE,) 8 mg tablet Take 8 mg by mouth every eight (8) hours as needed for Nausea. mirtazapine (REMERON) 15 mg tablet Take 15 mg by mouth nightly.       amitriptyline (ELAVIL) 10 mg tablet Take 30 mg by mouth nightly. buPROPion SR (WELLBUTRIN SR) 150 mg SR tablet Take 150 mg by mouth two (2) times a day. omeprazole (PRILOSEC) 40 mg capsule Take 40 mg by mouth Daily (before dinner). prochlorperazine (COMPAZINE) 10 mg tablet Take 10 mg by mouth every six (6) hours as needed for Nausea. dicyclomine (BENTYL) 10 mg capsule Take 10 mg by mouth every six (6) hours as needed. ergocalciferol (ERGOCALCIFEROL) 50,000 unit capsule Take 50,000 Units by mouth every seven (7) days. cyclobenzaprine (FLEXERIL) 10 mg tablet Take 10 mg by mouth two (2) times daily as needed for Muscle Spasm(s). predniSONE (DELTASONE) 10 mg tablet Take 5 mg by mouth daily. Prednisone taper  X 7 days to start 3/18/17      pregabalin (LYRICA) 100 mg capsule Take 100 mg by mouth three (3) times daily. cyanocobalamin (VITAMIN B-12) 1,000 mcg/mL injection 1,000 mcg by IntraMUSCular route every seven (7) days. Indications: Once weekly      temazepam (RESTORIL) 30 mg capsule Take 30 mg by mouth nightly. diazepam (VALIUM) 5 mg tablet Take 5 mg by mouth every eight (8) hours as needed (bowel relaxation). STOP taking these medications       traMADol (ULTRAM) 50 mg tablet Comments:   Reason for Stopping:         oxyCODONE IR (ROXICODONE) 10 mg tab immediate release tablet Comments:   Reason for Stopping:         Ustekinumab (USTEKINAUMAB) 90 mg/mL injection Comments:   Reason for Stopping:                Diet: Low fiber diet until seen for follow-up. Activity:  No driving, lifting more than 10 lb., or strenuous activity until 4 weeks have passed after the operation. Frequent non-strenuous activity including walking and stair climbing is encouraged. Wound Care:  None. Discharge Condition:  Improved compared to admission. Disposition:  Home with home health. Follow-up Care:  1. Dr. Louise Nj on 3/29/2017  2.  Chris Mckeon MD in 1-2 weeks      Significant Diagnostic Studies:   Recent Labs 03/21/17   0230  03/20/17   0207   WBC  9.2  11.9*   HGB  10.0*  10.2*   HCT  31.5*  32.4*   PLT  267  268     Recent Labs      03/20/17   0207   NA  142   K  3.9   CL  104   CO2  31   BUN  12   CREA  0.70   GLU  147*   CA  8.8   MG  2.1   PHOS  3.9       Lab Results   Component Value Date/Time    Glucose (POC) 92 03/21/2017 06:25 AM    Glucose (POC) 111 03/20/2017 10:27 PM    Glucose (POC) 100 03/20/2017 05:19 PM    Glucose (POC) 140 03/20/2017 12:05 PM    Glucose (POC) 130 03/20/2017 06:36 AM         HOSPITAL COURSE & TREATMENT RENDERED:     The patient is a 81-year-old male on whom I had operated 10 times for inflammatory bowel disease and its complications. 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 He did reasonably well afterwards, but over the last few months he had been having chronic recurrent problems with abdominal pain, solid food intolerance, and intermittent obstructive symptoms. He has had multiple hospitalizations, and numerous imaging studies have been performed. Simultaneously, he also continued to receive treatment for Crohn's disease including Stelara and, when needed, steroids. He had gained weight because of the steroids, but his diet had consisted mostly of liquids. On 03/06/2017 he began to experience an exacerbation of his chronic abdominal pain. It progressed, and he also had nausea and vomiting. Ileostomy output seemed to stop sometime in the early morning on 03/07/2017, and he was admitted to [place] [address] for evaluation and treatment of what appeared to be a small-bowel obstruction. A nasogastric tube was placed, and he received parenteral fluids, antiemetics, and conservative doses of Dilaudid.  He failed to make progress with this therapy, and it was decided that it would be best to transfer him to [facility] so that I could take care of him. He was transferred on 03/09/2017. Once at [facility], his nonoperative treatment continued. The nasogastric tube was used to continue to decompress the GI tract. Digitation of the ileostomy did not reveal any fecal impaction. He was given parenteral fluids and parenteral steroids, and plans were made for surgery. Within the first 24 hours at [facility], the ileostomy began to expel thick liquid stool. This was the first output since before his admission to [place] [address] on 03/07/2017. This output continued, and it was decided that it would be best to perform surgery during this hospitalization, but also to wait and allow more time for proximal and distal decompression. In the meantime, a right upper extremity PICC was placed and he was started on total parenteral nutrition (TPN). He remained stable and continued to improve. The planned surgery date was 03/14/2017 and because there was no decompensation, we waited until then. The plan was to perform a laparotomy, lyse adhesions, possibly resect any bowel that was irreversibly obstructed or sufficiently diseased, and to revise the ileostomy. The risks were discussed in detail, and the patient agreed to proceed. He was taken to the operating room on 3/14/2017, and there the above-listed procedures were performed without apparent complications. The operative findings included extensive adhesions involving essentially every part of the jejunum and ileum except for the ileostomy itself. There was no evidence of active Crohn's disease. The ileostomy was retracted. The small intestinal length from the ligament of Treitz to the ileostomy was approximately 407 cm. Post-operatively, the patient was transferred to the floor in stable condition. There, he experienced a gradual return of gastrointestinal function. His diet was advanced appropriately, and the TPN was weaned and, on 3/20/2017 (POD#6), discontinued.   His post-operative leukocytosis had resolved by 3/21/2017, and on that day he appeared to be fit for discharge to home with home health.         Signed:  Ciara Vang MD

## 2017-04-02 ENCOUNTER — APPOINTMENT (OUTPATIENT)
Dept: GENERAL RADIOLOGY | Age: 42
DRG: 193 | End: 2017-04-02
Attending: EMERGENCY MEDICINE
Payer: COMMERCIAL

## 2017-04-02 ENCOUNTER — HOSPITAL ENCOUNTER (INPATIENT)
Age: 42
LOS: 1 days | Discharge: HOME | DRG: 193 | End: 2017-04-04
Attending: EMERGENCY MEDICINE | Admitting: INTERNAL MEDICINE
Payer: COMMERCIAL

## 2017-04-02 DIAGNOSIS — D72.829 LEUKOCYTOSIS, UNSPECIFIED TYPE: ICD-10-CM

## 2017-04-02 DIAGNOSIS — G89.29 CHRONIC ABDOMINAL PAIN: ICD-10-CM

## 2017-04-02 DIAGNOSIS — J18.9 PNEUMONIA OF BOTH UPPER LOBES DUE TO INFECTIOUS ORGANISM: Primary | ICD-10-CM

## 2017-04-02 DIAGNOSIS — R10.9 CHRONIC ABDOMINAL PAIN: ICD-10-CM

## 2017-04-02 DIAGNOSIS — R10.9 PAIN IN THE ABDOMEN: ICD-10-CM

## 2017-04-02 PROCEDURE — 96361 HYDRATE IV INFUSION ADD-ON: CPT

## 2017-04-02 PROCEDURE — 83690 ASSAY OF LIPASE: CPT | Performed by: EMERGENCY MEDICINE

## 2017-04-02 PROCEDURE — 71020 XR CHEST PA LAT: CPT

## 2017-04-02 PROCEDURE — 99285 EMERGENCY DEPT VISIT HI MDM: CPT

## 2017-04-02 PROCEDURE — 80053 COMPREHEN METABOLIC PANEL: CPT | Performed by: EMERGENCY MEDICINE

## 2017-04-02 PROCEDURE — 85025 COMPLETE CBC W/AUTO DIFF WBC: CPT | Performed by: EMERGENCY MEDICINE

## 2017-04-02 PROCEDURE — 96375 TX/PRO/DX INJ NEW DRUG ADDON: CPT

## 2017-04-02 PROCEDURE — 87804 INFLUENZA ASSAY W/OPTIC: CPT | Performed by: EMERGENCY MEDICINE

## 2017-04-02 PROCEDURE — 74011250637 HC RX REV CODE- 250/637: Performed by: EMERGENCY MEDICINE

## 2017-04-02 PROCEDURE — 74011250636 HC RX REV CODE- 250/636: Performed by: EMERGENCY MEDICINE

## 2017-04-02 PROCEDURE — 86738 MYCOPLASMA ANTIBODY: CPT | Performed by: INTERNAL MEDICINE

## 2017-04-02 PROCEDURE — 96374 THER/PROPH/DIAG INJ IV PUSH: CPT

## 2017-04-02 PROCEDURE — 36415 COLL VENOUS BLD VENIPUNCTURE: CPT | Performed by: INTERNAL MEDICINE

## 2017-04-02 PROCEDURE — 74020 XR ABD FLAT/ ERECT: CPT

## 2017-04-02 RX ORDER — HYDROMORPHONE HYDROCHLORIDE 1 MG/ML
1 INJECTION, SOLUTION INTRAMUSCULAR; INTRAVENOUS; SUBCUTANEOUS ONCE
Status: COMPLETED | OUTPATIENT
Start: 2017-04-02 | End: 2017-04-02

## 2017-04-02 RX ORDER — ONDANSETRON 2 MG/ML
4 INJECTION INTRAMUSCULAR; INTRAVENOUS
Status: COMPLETED | OUTPATIENT
Start: 2017-04-02 | End: 2017-04-02

## 2017-04-02 RX ORDER — ACETAMINOPHEN 500 MG
1000 TABLET ORAL
Status: COMPLETED | OUTPATIENT
Start: 2017-04-02 | End: 2017-04-02

## 2017-04-02 RX ADMIN — ONDANSETRON 4 MG: 2 INJECTION INTRAMUSCULAR; INTRAVENOUS at 23:51

## 2017-04-02 RX ADMIN — SODIUM CHLORIDE 1000 ML: 900 INJECTION, SOLUTION INTRAVENOUS at 23:53

## 2017-04-02 RX ADMIN — HYDROMORPHONE HYDROCHLORIDE 1 MG: 1 INJECTION, SOLUTION INTRAMUSCULAR; INTRAVENOUS; SUBCUTANEOUS at 23:52

## 2017-04-02 RX ADMIN — ACETAMINOPHEN 1000 MG: 500 TABLET, FILM COATED ORAL at 23:53

## 2017-04-02 NOTE — IP AVS SNAPSHOT
Current Discharge Medication List  
  
START taking these medications Dose & Instructions Dispensing Information Comments Morning Noon Evening Bedtime  
 levoFLOXacin 750 mg tablet Commonly known as:  Karenrickey Angeleser Your last dose was: Your next dose is:    
   
   
 Dose:  750 mg Take 1 Tab by mouth daily. Quantity:  7 Tab Refills:  0 CONTINUE these medications which have NOT CHANGED Dose & Instructions Dispensing Information Comments Morning Noon Evening Bedtime  
 amitriptyline 10 mg tablet Commonly known as:  ELAVIL Your last dose was: Your next dose is:    
   
   
 Dose:  30 mg Take 30 mg by mouth nightly. Refills:  0  
     
   
   
   
  
 buPROPion  mg SR tablet Commonly known as:  Kenneth Lava Your last dose was: Your next dose is:    
   
   
 Dose:  150 mg Take 150 mg by mouth two (2) times a day. Refills:  0  
     
   
   
   
  
 dicyclomine 10 mg capsule Commonly known as:  BENTYL Your last dose was: Your next dose is:    
   
   
 Dose:  10 mg Take 10 mg by mouth every six (6) hours as needed. Refills:  0  
     
   
   
   
  
 ergocalciferol 50,000 unit capsule Commonly known as:  ERGOCALCIFEROL Your last dose was: Your next dose is:    
   
   
 Dose:  51797 Units Take 50,000 Units by mouth every seven (7) days. Refills:  0 LYRICA 100 mg capsule Generic drug:  pregabalin Your last dose was: Your next dose is:    
   
   
 Dose:  100 mg Take 100 mg by mouth three (3) times daily. Refills:  0  
     
   
   
   
  
 mirtazapine 30 mg tablet Commonly known as:  Elizabeth Campo Your last dose was: Your next dose is:    
   
   
 Dose:  15 mg Take 15 mg by mouth nightly. Refills:  0  
     
   
   
   
  
 omeprazole 40 mg capsule Commonly known as:  PRILOSEC  
   
 Your last dose was: Your next dose is:    
   
   
 Dose:  40 mg Take 40 mg by mouth Daily (before dinner). Refills:  0  
     
   
   
   
  
 oxyCODONE-acetaminophen  mg per tablet Commonly known as:  PERCOCET 10 Your last dose was: Your next dose is:    
   
   
 Dose:  1-2 Tab Take 1-2 Tabs by mouth every four (4) hours as needed. Max Daily Amount: 12 Tabs. Quantity:  75 Tab Refills:  0  
     
   
   
   
  
 prochlorperazine 10 mg tablet Commonly known as:  COMPAZINE Your last dose was: Your next dose is:    
   
   
 Dose:  10 mg Take 10 mg by mouth daily as needed for Nausea. Refills:  0  
     
   
   
   
  
 promethazine 25 mg tablet Commonly known as:  PHENERGAN Your last dose was: Your next dose is:    
   
   
 Dose:  25 mg Take 25 mg by mouth every six (6) hours as needed for Nausea. Refills:  0  
     
   
   
   
  
 temazepam 30 mg capsule Commonly known as:  RESTORIL Your last dose was: Your next dose is:    
   
   
 Dose:  30 mg Take 30 mg by mouth nightly. Refills:  0  
     
   
   
   
  
 traMADol 50 mg tablet Commonly known as:  ULTRAM  
   
Your last dose was: Your next dose is:    
   
   
 Dose:  50 mg Take 50 mg by mouth every eight (8) hours as needed for Pain. Refills:  0 Ustekinumab 90 mg/mL injection Commonly known as:  Nahun Isles Your last dose was: Your next dose is:    
   
   
 Dose:  90 mg  
90 mg by SubCUTAneous route. Every 8 weeks Refills:  0 VALIUM 5 mg tablet Generic drug:  diazePAM  
   
Your last dose was: Your next dose is:    
   
   
 Dose:  5 mg Take 5 mg by mouth every eight (8) hours as needed (bowel relaxation). Refills:  0  
     
   
   
   
  
 VITAMIN B-12 1,000 mcg/mL injection Generic drug:  cyanocobalamin Your last dose was: Your next dose is:    
   
   
 Dose:  1000 mcg  
1,000 mcg by IntraMUSCular route every seven (7) days. Indications: Once weekly Refills:  0 ZOFRAN (AS HYDROCHLORIDE) 8 mg tablet Generic drug:  ondansetron hcl Your last dose was: Your next dose is:    
   
   
 Dose:  8 mg Take 8 mg by mouth every twelve (12) hours as needed for Nausea. Refills:  0 Where to Get Your Medications Information on where to get these meds will be given to you by the nurse or doctor. ! Ask your nurse or doctor about these medications  
  levoFLOXacin 750 mg tablet

## 2017-04-02 NOTE — IP AVS SNAPSHOT
Mateo Aldana 
 
 
 [address] [facility] 
[phone] Patient: Richard Denise MRN: [id number] :1975 You are allergic to the following Allergen Reactions Remicade (Infliximab) Anaphylaxis Shortness of Breath Bactrim (Sulfamethoprim Ds) Other (comments) Increased GI distress. Nsaids (Non-Steroidal Anti-Inflammatory Drug) Other (comments) Stomach ulcers Recent Documentation Height Weight BMI Smoking Status 1.829 m 88.5 kg 26.45 kg/m2 Former Smoker Unresulted Labs Order Current Status MYCOPLASMA AB, IGG/IGM In process CULTURE, RESPIRATORY/SPUTUM/BRONCH W GRAM STAIN Preliminary result Emergency Contacts Name Discharge Info Relation Home Work Mobile [address] CAREGIVER [3] Spouse [3] [id number] About your hospitalization You were admitted on:  April 3, 2017 You last received care in the:  [facility]  MED SURG 2 You were discharged on:  2017 Unit phone number:  [phone] Why you were hospitalized Your primary diagnosis was:  Not on File Your diagnoses also included:  Hcap (Healthcare-Associated Pneumonia), Crohn's Colitis (Hcc), Sirs (Systemic Inflammatory Response Syndrome) (Hcc), Depression With Anxiety, Anemia, Abdominal Pain Providers Seen During Your Hospitalizations Provider Role Specialty Primary office phone Malachi Hall DO Attending Provider Emergency Medicine [phone] Cindy Grider MD Attending Provider Internal Medicine [phone] Claudette Anand MD Attending Provider Hospitalist [phone] Your Primary Care Physician (PCP) Primary Care Physician Office Phone Office Fax [place], [address] [phone] Follow-up Information Follow up With Details Comments Contact Info Brigido Osman MD   [address] [place] [address] [facility] [address]el[address] Tr[address] 
[phone] Current Discharge Medication List  
  
START taking these medications Dose & Instructions Dispensing Information Comments Morning Noon Evening Bedtime  
 levoFLOXacin 750 mg tablet Commonly known as:  Purvi Lyonsfausto Your last dose was: Your next dose is:    
   
   
 Dose:  750 mg Take 1 Tab by mouth daily. Quantity:  7 Tab Refills:  0 CONTINUE these medications which have NOT CHANGED Dose & Instructions Dispensing Information Comments Morning Noon Evening Bedtime  
 amitriptyline 10 mg tablet Commonly known as:  ELAVIL Your last dose was: Your next dose is:    
   
   
 Dose:  30 mg Take 30 mg by mouth nightly. Refills:  0  
     
   
   
   
  
 buPROPion  mg SR tablet Commonly known as:  Delta Deal Your last dose was: Your next dose is:    
   
   
 Dose:  150 mg Take 150 mg by mouth two (2) times a day. Refills:  0  
     
   
   
   
  
 dicyclomine 10 mg capsule Commonly known as:  BENTYL Your last dose was: Your next dose is:    
   
   
 Dose:  10 mg Take 10 mg by mouth every six (6) hours as needed. Refills:  0  
     
   
   
   
  
 ergocalciferol 50,000 unit capsule Commonly known as:  ERGOCALCIFEROL Your last dose was: Your next dose is:    
   
   
 Dose:  29054 Units Take 50,000 Units by mouth every seven (7) days. Refills:  0 LYRICA 100 mg capsule Generic drug:  pregabalin Your last dose was: Your next dose is:    
   
   
 Dose:  100 mg Take 100 mg by mouth three (3) times daily. Refills:  0  
     
   
   
   
  
 mirtazapine 30 mg tablet Commonly known as:  Nisa Cayetano Your last dose was: Your next dose is:    
   
   
 Dose:  15 mg Take 15 mg by mouth nightly. Refills:  0 omeprazole 40 mg capsule Commonly known as:  PRILOSEC Your last dose was: Your next dose is:    
   
   
 Dose:  40 mg Take 40 mg by mouth Daily (before dinner). Refills:  0  
     
   
   
   
  
 oxyCODONE-acetaminophen  mg per tablet Commonly known as:  PERCOCET 10 Your last dose was: Your next dose is:    
   
   
 Dose:  1-2 Tab Take 1-2 Tabs by mouth every four (4) hours as needed. Max Daily Amount: 12 Tabs. Quantity:  75 Tab Refills:  0  
     
   
   
   
  
 prochlorperazine 10 mg tablet Commonly known as:  COMPAZINE Your last dose was: Your next dose is:    
   
   
 Dose:  10 mg Take 10 mg by mouth daily as needed for Nausea. Refills:  0  
     
   
   
   
  
 promethazine 25 mg tablet Commonly known as:  PHENERGAN Your last dose was: Your next dose is:    
   
   
 Dose:  25 mg Take 25 mg by mouth every six (6) hours as needed for Nausea. Refills:  0  
     
   
   
   
  
 temazepam 30 mg capsule Commonly known as:  RESTORIL Your last dose was: Your next dose is:    
   
   
 Dose:  30 mg Take 30 mg by mouth nightly. Refills:  0  
     
   
   
   
  
 traMADol 50 mg tablet Commonly known as:  ULTRAM  
   
Your last dose was: Your next dose is:    
   
   
 Dose:  50 mg Take 50 mg by mouth every eight (8) hours as needed for Pain. Refills:  0 Ustekinumab 90 mg/mL injection Commonly known as:  Yamileth Meager Your last dose was: Your next dose is:    
   
   
 Dose:  90 mg  
90 mg by SubCUTAneous route. Every 8 weeks Refills:  0 VALIUM 5 mg tablet Generic drug:  diazePAM  
   
Your last dose was: Your next dose is:    
   
   
 Dose:  5 mg Take 5 mg by mouth every eight (8) hours as needed (bowel relaxation). Refills:  0  
     
   
   
   
  
 VITAMIN B-12 1,000 mcg/mL injection Generic drug:  cyanocobalamin Your last dose was: Your next dose is:    
   
   
 Dose:  1000 mcg  
1,000 mcg by IntraMUSCular route every seven (7) days. Indications: Once weekly Refills:  0 ZOFRAN (AS HYDROCHLORIDE) 8 mg tablet Generic drug:  ondansetron hcl Your last dose was: Your next dose is:    
   
   
 Dose:  8 mg Take 8 mg by mouth every twelve (12) hours as needed for Nausea. Refills:  0 Where to Get Your Medications Information on where to get these meds will be given to you by the nurse or doctor. ! Ask your nurse or doctor about these medications  
  levoFLOXacin 750 mg tablet Discharge Instructions ACUTE DIAGNOSES: 
HCAP (healthcare-associated pneumonia) CHRONIC MEDICAL DIAGNOSES: 
Problem List as of 4/4/2017  Date Reviewed: 4/3/2017 Codes Class Noted - Resolved HCAP (healthcare-associated pneumonia) ICD-10-CM: J18.9 ICD-9-CM: 738  4/3/2017 - Present Small bowel obstruction ([facility] ICD-10-CM: K56.69 
ICD-9-CM: 560.9  3/9/2017 - Present SBO (small bowel obstruction) ([facility] ICD-10-CM: K56.69 
ICD-9-CM: 560.9  3/7/2017 - Present Constipation ICD-10-CM: K59.00 ICD-9-CM: 564.00  1/15/2017 - Present Intractable vomiting with nausea ICD-10-CM: R11.2 ICD-9-CM: 536.2  1/1/2017 - Present Depression with anxiety ICD-10-CM: F41.8 ICD-9-CM: 300.4  12/25/2016 - Present Anemia ICD-10-CM: D64.9 ICD-9-CM: 285.9  12/25/2016 - Present Dehydration ICD-10-CM: E86.0 ICD-9-CM: 276.51  12/25/2016 - Present S/P laparoscopic cholecystectomy ICD-10-CM: Z90.49 ICD-9-CM: V45.89  12/21/2016 - Present Overview Signed 12/21/2016 12:56 PM by Pavan Sánchez MD  
  With [facility]. Crohn's colitis ([facility] ICD-10-CM: K50.10 ICD-9-CM: 555.1  12/18/2016 - Present Abdominal pain ICD-10-CM: R10.9 ICD-9-CM: 789.00  11/29/2016 - Present GERD (gastroesophageal reflux disease) (Chronic) ICD-10-CM: K21.9 ICD-9-CM: 530.81  11/20/2016 - Present RESOLVED: SIRS (systemic inflammatory response syndrome) (HCC) ICD-10-CM: R65.10 ICD-9-CM: 995.90  4/3/2017 - 4/4/2017 RESOLVED: Insomnia ICD-10-CM: G47.00 ICD-9-CM: 780.52  12/25/2016 - 1/1/2017 RESOLVED: Hypernatremia ICD-10-CM: E87.0 ICD-9-CM: 276.0  12/25/2016 - 1/1/2017 RESOLVED: Biliary dyskinesia ICD-10-CM: K82.8 ICD-9-CM: 575.8  12/21/2016 - 12/25/2016 RESOLVED: Gallbladder sludge ICD-10-CM: K82.8 ICD-9-CM: 575.8  12/20/2016 - 12/25/2016 RESOLVED: Hypokalemia ICD-10-CM: E87.6 ICD-9-CM: 276.8  12/18/2016 - 1/1/2017 RESOLVED: Leukocytosis ICD-10-CM: Q91.036 ICD-9-CM: 288.60  12/18/2016 - 12/25/2016 RESOLVED: Crohn's disease ([facility] ICD-10-CM: K50.90 ICD-9-CM: 555.9  11/20/2016 - 12/25/2016 RESOLVED: Intractable abdominal pain ICD-10-CM: R10.9 ICD-9-CM: 789.00  11/20/2016 - 1/1/2017 RESOLVED: Intractable nausea and vomiting ICD-10-CM: R11.2 ICD-9-CM: 536.2  11/20/2016 - 12/25/2016 RESOLVED: Enterostomy malfunction ([facility] (Chronic) ICD-10-CM: S00.39 
ICD-9-CM: 569.62  10/29/2015 - 11/21/2016 RESOLVED: Ileostomy dysfunction ([facility] ICD-10-CM: N24.43 
ICD-9-CM: 569.62  10/29/2015 - 11/21/2016 RESOLVED: Skin lesion of back ICD-10-CM: L98.9 ICD-9-CM: 709.9  3/17/2014 - 11/21/2016 RESOLVED: Ileal pouchitis ([facility] ICD-10-CM: S13.379 ICD-9-CM: 569.71  5/1/2004 - 11/21/2016 DISCHARGE MEDICATIONS:  
  
 
 
· It is important that you take the medication exactly as they are prescribed. · Keep your medication in the bottles provided by the pharmacist and keep a list of the medication names, dosages, and times to be taken in your wallet. · Do not take other medications without consulting your doctor. DIET:  Regular Diet ACTIVITY: Activity as tolerated ADDITIONAL INFORMATION: If you experience any of the following symptoms then please call your primary care physician or return to the emergency room if you cannot get hold of your doctor: Fever, chills, nausea, vomiting, diarrhea, change in mentation, falling, bleeding, shortness of breath. FOLLOW UP CARE: 
Dr. Hazel Horowitz MD  you are to call and set up an appointment to see them in 2 weeks. Information obtained by : 
I understand that if any problems occur once I am at home I am to contact my physician. I understand and acknowledge receipt of the instructions indicated above. Physician's or R.N.'s Signature                                                                  Date/Time Patient or Representative Signature                                                          Date/Time Discharge Orders None [employer] Announcement We are excited to announce that we are making your provider's discharge notes available to you in AXADO. You will see these notes when they are completed and signed by the physician that discharged you from your recent hospital stay. If you have any questions or concerns about any information you see in AXADO, please call the Health Information Department where you were seen or reach out to your Primary Care Provider for more information about your plan of care. Introducing [facility]! Dear Helen Parks: Thank you for requesting a AXADO account. Our records indicate that you already have an active AXADO account. You can access your account anytime at [email or web address] Soul Did you know that you can access your hospital and ER discharge instructions at any time in Yoostay? You can also review all of your test results from your hospital stay or ER visit. Additional Information If you have questions, please visit the Frequently Asked Questions section of the Yoostay website at [email or web address] Remember, Maginhart is NOT to be used for urgent needs. For medical emergencies, dial 911. Now available from your iPhone and Android! General Information Please provide this summary of care documentation to your next provider. Patient Signature:  ____________________________________________________________ Date:  ____________________________________________________________  
  
Aleks Bough Provider Signature:  ____________________________________________________________ Date:  ____________________________________________________________

## 2017-04-03 ENCOUNTER — APPOINTMENT (OUTPATIENT)
Dept: CT IMAGING | Age: 42
DRG: 193 | End: 2017-04-03
Attending: EMERGENCY MEDICINE
Payer: COMMERCIAL

## 2017-04-03 PROBLEM — R65.10 SIRS (SYSTEMIC INFLAMMATORY RESPONSE SYNDROME) (HCC): Status: ACTIVE | Noted: 2017-04-03

## 2017-04-03 PROBLEM — J18.9 HCAP (HEALTHCARE-ASSOCIATED PNEUMONIA): Status: ACTIVE | Noted: 2017-04-03

## 2017-04-03 LAB
ALBUMIN SERPL BCP-MCNC: 3.1 G/DL (ref 3.5–5)
ALBUMIN/GLOB SERPL: 0.9 {RATIO} (ref 1.1–2.2)
ALP SERPL-CCNC: 131 U/L (ref 45–117)
ALT SERPL-CCNC: 51 U/L (ref 12–78)
ANION GAP BLD CALC-SCNC: 7 MMOL/L (ref 5–15)
AST SERPL W P-5'-P-CCNC: 23 U/L (ref 15–37)
ATRIAL RATE: 112 BPM
BASOPHILS # BLD AUTO: 0 K/UL (ref 0–0.1)
BASOPHILS # BLD: 0 % (ref 0–1)
BILIRUB SERPL-MCNC: 0.2 MG/DL (ref 0.2–1)
BUN SERPL-MCNC: 7 MG/DL (ref 6–20)
BUN/CREAT SERPL: 6 (ref 12–20)
CALCIUM SERPL-MCNC: 8.6 MG/DL (ref 8.5–10.1)
CALCULATED P AXIS, ECG09: 28 DEGREES
CALCULATED R AXIS, ECG10: 16 DEGREES
CALCULATED T AXIS, ECG11: 56 DEGREES
CHLORIDE SERPL-SCNC: 101 MMOL/L (ref 97–108)
CO2 SERPL-SCNC: 29 MMOL/L (ref 21–32)
CREAT SERPL-MCNC: 1.26 MG/DL (ref 0.7–1.3)
DIAGNOSIS, 93000: NORMAL
EOSINOPHIL # BLD: 0 K/UL (ref 0–0.4)
EOSINOPHIL NFR BLD: 0 % (ref 0–7)
ERYTHROCYTE [DISTWIDTH] IN BLOOD BY AUTOMATED COUNT: 13.3 % (ref 11.5–14.5)
FLUAV AG NPH QL IA: NEGATIVE
FLUBV AG NOSE QL IA: NEGATIVE
GLOBULIN SER CALC-MCNC: 3.6 G/DL (ref 2–4)
GLUCOSE SERPL-MCNC: 100 MG/DL (ref 65–100)
HCT VFR BLD AUTO: 34.3 % (ref 36.6–50.3)
HGB BLD-MCNC: 10.9 G/DL (ref 12.1–17)
LIPASE SERPL-CCNC: 63 U/L (ref 73–393)
LYMPHOCYTES # BLD AUTO: 6 % (ref 12–49)
LYMPHOCYTES # BLD: 0.9 K/UL (ref 0.8–3.5)
MCH RBC QN AUTO: 28.9 PG (ref 26–34)
MCHC RBC AUTO-ENTMCNC: 31.8 G/DL (ref 30–36.5)
MCV RBC AUTO: 91 FL (ref 80–99)
MONOCYTES # BLD: 0.9 K/UL (ref 0–1)
MONOCYTES NFR BLD AUTO: 6 % (ref 5–13)
NEUTS SEG # BLD: 13.1 K/UL (ref 1.8–8)
NEUTS SEG NFR BLD AUTO: 88 % (ref 32–75)
P-R INTERVAL, ECG05: 146 MS
PLATELET # BLD AUTO: 284 K/UL (ref 150–400)
POTASSIUM SERPL-SCNC: 4.1 MMOL/L (ref 3.5–5.1)
PROT SERPL-MCNC: 6.7 G/DL (ref 6.4–8.2)
Q-T INTERVAL, ECG07: 318 MS
QRS DURATION, ECG06: 100 MS
QTC CALCULATION (BEZET), ECG08: 434 MS
RBC # BLD AUTO: 3.77 M/UL (ref 4.1–5.7)
SODIUM SERPL-SCNC: 137 MMOL/L (ref 136–145)
VENTRICULAR RATE, ECG03: 112 BPM
WBC # BLD AUTO: 15 K/UL (ref 4.1–11.1)

## 2017-04-03 PROCEDURE — 77030027138 HC INCENT SPIROMETER -A

## 2017-04-03 PROCEDURE — 74177 CT ABD & PELVIS W/CONTRAST: CPT

## 2017-04-03 PROCEDURE — 65660000000 HC RM CCU STEPDOWN

## 2017-04-03 PROCEDURE — 74011250637 HC RX REV CODE- 250/637: Performed by: INTERNAL MEDICINE

## 2017-04-03 PROCEDURE — 87899 AGENT NOS ASSAY W/OPTIC: CPT | Performed by: INTERNAL MEDICINE

## 2017-04-03 PROCEDURE — 77030013140 HC MSK NEB VYRM -A

## 2017-04-03 PROCEDURE — 93005 ELECTROCARDIOGRAM TRACING: CPT

## 2017-04-03 PROCEDURE — 74011000258 HC RX REV CODE- 258: Performed by: INTERNAL MEDICINE

## 2017-04-03 PROCEDURE — 74011250636 HC RX REV CODE- 250/636: Performed by: INTERNAL MEDICINE

## 2017-04-03 PROCEDURE — 74011250636 HC RX REV CODE- 250/636: Performed by: EMERGENCY MEDICINE

## 2017-04-03 PROCEDURE — 74011000250 HC RX REV CODE- 250: Performed by: EMERGENCY MEDICINE

## 2017-04-03 PROCEDURE — 87449 NOS EACH ORGANISM AG IA: CPT | Performed by: INTERNAL MEDICINE

## 2017-04-03 PROCEDURE — 74011000258 HC RX REV CODE- 258: Performed by: EMERGENCY MEDICINE

## 2017-04-03 PROCEDURE — 74011636320 HC RX REV CODE- 636/320: Performed by: RADIOLOGY

## 2017-04-03 PROCEDURE — 71275 CT ANGIOGRAPHY CHEST: CPT

## 2017-04-03 RX ORDER — ENOXAPARIN SODIUM 100 MG/ML
40 INJECTION SUBCUTANEOUS EVERY 24 HOURS
Status: DISCONTINUED | OUTPATIENT
Start: 2017-04-03 | End: 2017-04-04 | Stop reason: HOSPADM

## 2017-04-03 RX ORDER — GUAIFENESIN 600 MG/1
600 TABLET, EXTENDED RELEASE ORAL EVERY 12 HOURS
Status: DISCONTINUED | OUTPATIENT
Start: 2017-04-03 | End: 2017-04-04 | Stop reason: HOSPADM

## 2017-04-03 RX ORDER — DIAZEPAM 5 MG/1
5 TABLET ORAL
Status: DISCONTINUED | OUTPATIENT
Start: 2017-04-03 | End: 2017-04-04 | Stop reason: HOSPADM

## 2017-04-03 RX ORDER — TEMAZEPAM 15 MG/1
30 CAPSULE ORAL
Status: DISCONTINUED | OUTPATIENT
Start: 2017-04-04 | End: 2017-04-03

## 2017-04-03 RX ORDER — BUPROPION HYDROCHLORIDE 150 MG/1
150 TABLET, EXTENDED RELEASE ORAL 2 TIMES DAILY
Status: DISCONTINUED | OUTPATIENT
Start: 2017-04-03 | End: 2017-04-04 | Stop reason: HOSPADM

## 2017-04-03 RX ORDER — PROMETHAZINE HYDROCHLORIDE 25 MG/1
25 TABLET ORAL
Status: DISCONTINUED | OUTPATIENT
Start: 2017-04-03 | End: 2017-04-04 | Stop reason: HOSPADM

## 2017-04-03 RX ORDER — MIRTAZAPINE 15 MG/1
15 TABLET, FILM COATED ORAL
Status: DISCONTINUED | OUTPATIENT
Start: 2017-04-04 | End: 2017-04-04 | Stop reason: HOSPADM

## 2017-04-03 RX ORDER — OXYCODONE AND ACETAMINOPHEN 10; 325 MG/1; MG/1
1-2 TABLET ORAL
Status: DISCONTINUED | OUTPATIENT
Start: 2017-04-03 | End: 2017-04-03 | Stop reason: SDUPTHER

## 2017-04-03 RX ORDER — PANTOPRAZOLE SODIUM 40 MG/1
40 TABLET, DELAYED RELEASE ORAL
Status: DISCONTINUED | OUTPATIENT
Start: 2017-04-03 | End: 2017-04-04 | Stop reason: HOSPADM

## 2017-04-03 RX ORDER — ERGOCALCIFEROL 1.25 MG/1
50000 CAPSULE ORAL
Status: DISCONTINUED | OUTPATIENT
Start: 2017-04-03 | End: 2017-04-04 | Stop reason: HOSPADM

## 2017-04-03 RX ORDER — DICYCLOMINE HYDROCHLORIDE 10 MG/1
10 CAPSULE ORAL
Status: DISCONTINUED | OUTPATIENT
Start: 2017-04-03 | End: 2017-04-04 | Stop reason: HOSPADM

## 2017-04-03 RX ORDER — PROCHLORPERAZINE MALEATE 5 MG
10 TABLET ORAL
Status: DISCONTINUED | OUTPATIENT
Start: 2017-04-03 | End: 2017-04-04 | Stop reason: HOSPADM

## 2017-04-03 RX ORDER — GUAIFENESIN 600 MG/1
600 TABLET, EXTENDED RELEASE ORAL EVERY 12 HOURS
Status: DISCONTINUED | OUTPATIENT
Start: 2017-04-03 | End: 2017-04-03

## 2017-04-03 RX ORDER — TEMAZEPAM 15 MG/1
30 CAPSULE ORAL
Status: DISCONTINUED | OUTPATIENT
Start: 2017-04-04 | End: 2017-04-04 | Stop reason: HOSPADM

## 2017-04-03 RX ORDER — LEVOFLOXACIN 5 MG/ML
750 INJECTION, SOLUTION INTRAVENOUS EVERY 24 HOURS
Status: DISCONTINUED | OUTPATIENT
Start: 2017-04-04 | End: 2017-04-04 | Stop reason: HOSPADM

## 2017-04-03 RX ORDER — MIRTAZAPINE 30 MG/1
15 TABLET, FILM COATED ORAL
Status: ON HOLD | COMMUNITY
End: 2017-06-12

## 2017-04-03 RX ORDER — LEVOFLOXACIN 5 MG/ML
750 INJECTION, SOLUTION INTRAVENOUS ONCE
Status: COMPLETED | OUTPATIENT
Start: 2017-04-03 | End: 2017-04-03

## 2017-04-03 RX ORDER — SODIUM CHLORIDE 0.9 % (FLUSH) 0.9 %
5-10 SYRINGE (ML) INJECTION EVERY 8 HOURS
Status: DISCONTINUED | OUTPATIENT
Start: 2017-04-03 | End: 2017-04-04 | Stop reason: HOSPADM

## 2017-04-03 RX ORDER — AMITRIPTYLINE HYDROCHLORIDE 10 MG/1
30 TABLET, FILM COATED ORAL
Status: DISCONTINUED | OUTPATIENT
Start: 2017-04-04 | End: 2017-04-03

## 2017-04-03 RX ORDER — SODIUM CHLORIDE 0.9 % (FLUSH) 0.9 %
5-10 SYRINGE (ML) INJECTION AS NEEDED
Status: DISCONTINUED | OUTPATIENT
Start: 2017-04-03 | End: 2017-04-04 | Stop reason: HOSPADM

## 2017-04-03 RX ORDER — CYANOCOBALAMIN 1000 UG/ML
1000 INJECTION, SOLUTION INTRAMUSCULAR; SUBCUTANEOUS
Status: DISCONTINUED | OUTPATIENT
Start: 2017-04-03 | End: 2017-04-04 | Stop reason: HOSPADM

## 2017-04-03 RX ORDER — OXYCODONE AND ACETAMINOPHEN 10; 325 MG/1; MG/1
1 TABLET ORAL
Status: DISCONTINUED | OUTPATIENT
Start: 2017-04-03 | End: 2017-04-04 | Stop reason: HOSPADM

## 2017-04-03 RX ORDER — OXYCODONE AND ACETAMINOPHEN 10; 325 MG/1; MG/1
2 TABLET ORAL
Status: DISCONTINUED | OUTPATIENT
Start: 2017-04-03 | End: 2017-04-04 | Stop reason: HOSPADM

## 2017-04-03 RX ORDER — AMITRIPTYLINE HYDROCHLORIDE 10 MG/1
30 TABLET, FILM COATED ORAL
Status: DISCONTINUED | OUTPATIENT
Start: 2017-04-04 | End: 2017-04-04 | Stop reason: HOSPADM

## 2017-04-03 RX ORDER — HYDROMORPHONE HYDROCHLORIDE 1 MG/ML
1 INJECTION, SOLUTION INTRAMUSCULAR; INTRAVENOUS; SUBCUTANEOUS
Status: DISPENSED | OUTPATIENT
Start: 2017-04-03 | End: 2017-04-03

## 2017-04-03 RX ORDER — PREGABALIN 50 MG/1
100 CAPSULE ORAL 3 TIMES DAILY
Status: DISCONTINUED | OUTPATIENT
Start: 2017-04-03 | End: 2017-04-04 | Stop reason: HOSPADM

## 2017-04-03 RX ORDER — TRAMADOL HYDROCHLORIDE 50 MG/1
50 TABLET ORAL
Status: DISCONTINUED | OUTPATIENT
Start: 2017-04-03 | End: 2017-04-04 | Stop reason: HOSPADM

## 2017-04-03 RX ORDER — NALOXONE HYDROCHLORIDE 0.4 MG/ML
0.4 INJECTION, SOLUTION INTRAMUSCULAR; INTRAVENOUS; SUBCUTANEOUS AS NEEDED
Status: DISCONTINUED | OUTPATIENT
Start: 2017-04-03 | End: 2017-04-04 | Stop reason: HOSPADM

## 2017-04-03 RX ORDER — CYCLOBENZAPRINE HCL 10 MG
10 TABLET ORAL
Status: DISCONTINUED | OUTPATIENT
Start: 2017-04-03 | End: 2017-04-03

## 2017-04-03 RX ORDER — MIRTAZAPINE 15 MG/1
15 TABLET, FILM COATED ORAL
Status: DISCONTINUED | OUTPATIENT
Start: 2017-04-04 | End: 2017-04-03

## 2017-04-03 RX ORDER — TRAMADOL HYDROCHLORIDE 50 MG/1
50 TABLET ORAL
COMMUNITY
End: 2018-12-21

## 2017-04-03 RX ADMIN — PREGABALIN 100 MG: 50 CAPSULE ORAL at 16:18

## 2017-04-03 RX ADMIN — CYANOCOBALAMIN 1000 MCG: 1000 INJECTION, SOLUTION INTRAMUSCULAR at 13:35

## 2017-04-03 RX ADMIN — GUAIFENESIN 600 MG: 600 TABLET, EXTENDED RELEASE ORAL at 09:55

## 2017-04-03 RX ADMIN — BUPROPION HYDROCHLORIDE 150 MG: 150 TABLET, FILM COATED, EXTENDED RELEASE ORAL at 09:55

## 2017-04-03 RX ADMIN — VANCOMYCIN HYDROCHLORIDE 1250 MG: 10 INJECTION, POWDER, LYOPHILIZED, FOR SOLUTION INTRAVENOUS at 17:05

## 2017-04-03 RX ADMIN — Medication 10 ML: at 06:16

## 2017-04-03 RX ADMIN — GUAIFENESIN 600 MG: 600 TABLET, EXTENDED RELEASE ORAL at 21:08

## 2017-04-03 RX ADMIN — Medication 10 ML: at 19:21

## 2017-04-03 RX ADMIN — PROCHLORPERAZINE MALEATE 10 MG: 5 TABLET, FILM COATED ORAL at 13:47

## 2017-04-03 RX ADMIN — PREGABALIN 100 MG: 50 CAPSULE ORAL at 21:08

## 2017-04-03 RX ADMIN — HYDROMORPHONE HYDROCHLORIDE 1 MG: 1 INJECTION, SOLUTION INTRAMUSCULAR; INTRAVENOUS; SUBCUTANEOUS at 07:02

## 2017-04-03 RX ADMIN — PIPERACILLIN SODIUM,TAZOBACTAM SODIUM 3.38 G: 3; .375 INJECTION, POWDER, FOR SOLUTION INTRAVENOUS at 19:16

## 2017-04-03 RX ADMIN — IOPAMIDOL 100 ML: 755 INJECTION, SOLUTION INTRAVENOUS at 01:53

## 2017-04-03 RX ADMIN — HYDROMORPHONE HYDROCHLORIDE 1 MG: 1 INJECTION, SOLUTION INTRAMUSCULAR; INTRAVENOUS; SUBCUTANEOUS at 03:07

## 2017-04-03 RX ADMIN — PREGABALIN 100 MG: 50 CAPSULE ORAL at 09:55

## 2017-04-03 RX ADMIN — SODIUM CHLORIDE 1000 ML: 900 INJECTION, SOLUTION INTRAVENOUS at 02:47

## 2017-04-03 RX ADMIN — ERGOCALCIFEROL 50000 UNITS: 1.25 CAPSULE ORAL at 13:36

## 2017-04-03 RX ADMIN — DIAZEPAM 5 MG: 5 TABLET ORAL at 23:14

## 2017-04-03 RX ADMIN — PANTOPRAZOLE SODIUM 40 MG: 40 TABLET, DELAYED RELEASE ORAL at 16:18

## 2017-04-03 RX ADMIN — DIAZEPAM 5 MG: 5 TABLET ORAL at 13:36

## 2017-04-03 RX ADMIN — LEVOFLOXACIN 750 MG: 5 INJECTION, SOLUTION INTRAVENOUS at 03:43

## 2017-04-03 RX ADMIN — OXYCODONE HYDROCHLORIDE AND ACETAMINOPHEN 2 TABLET: 10; 325 TABLET ORAL at 16:23

## 2017-04-03 RX ADMIN — PIPERACILLIN SODIUM,TAZOBACTAM SODIUM 3.38 G: 3; .375 INJECTION, POWDER, FOR SOLUTION INTRAVENOUS at 02:41

## 2017-04-03 RX ADMIN — GUAIFENESIN 600 MG: 600 TABLET, EXTENDED RELEASE ORAL at 02:45

## 2017-04-03 RX ADMIN — ENOXAPARIN SODIUM 40 MG: 40 INJECTION SUBCUTANEOUS at 03:43

## 2017-04-03 RX ADMIN — BUPROPION HYDROCHLORIDE 150 MG: 150 TABLET, FILM COATED, EXTENDED RELEASE ORAL at 17:05

## 2017-04-03 RX ADMIN — ALBUTEROL SULFATE 1 DOSE: 2.5 SOLUTION RESPIRATORY (INHALATION) at 02:03

## 2017-04-03 RX ADMIN — PROMETHAZINE HYDROCHLORIDE 25 MG: 25 TABLET ORAL at 23:14

## 2017-04-03 RX ADMIN — OXYCODONE HYDROCHLORIDE AND ACETAMINOPHEN 2 TABLET: 10; 325 TABLET ORAL at 21:08

## 2017-04-03 RX ADMIN — VANCOMYCIN HYDROCHLORIDE 2250 MG: 10 INJECTION, POWDER, LYOPHILIZED, FOR SOLUTION INTRAVENOUS at 04:57

## 2017-04-03 RX ADMIN — PIPERACILLIN SODIUM,TAZOBACTAM SODIUM 3.38 G: 3; .375 INJECTION, POWDER, FOR SOLUTION INTRAVENOUS at 09:55

## 2017-04-03 RX ADMIN — OXYCODONE HYDROCHLORIDE AND ACETAMINOPHEN 2 TABLET: 10; 325 TABLET ORAL at 10:10

## 2017-04-03 NOTE — ED TRIAGE NOTES
Pt reports he has frequent bowel obstructions since November. Pt had surgery with adhesion removal and illeostomy repair. Pt returns because he has significant pain, nausea, and vomiting. Katja Granger was surgeon.

## 2017-04-03 NOTE — PROGRESS NOTES
[facility] Dosing Services: Antimicrobial Stewardship Progress Note    Consult for antibiotic dosing of Vancomycin by Dr. Francisco Massey  Pharmacist reviewed antibiotic appropriateness for 40year old , male  for indication of HCAP  Day of Therapy 1    Plan:  Vancomycin therapy:  Start Vancomycin therapy, with loading dose of 2250 (mg) at 0400 4/3/17. Follow with maintenance dose of 1250(mg) at 1600 4/3/17, every 12 hours (frequency). Dose calculated to approximate a therapeutic trough of 15-20 mcg/mL. Last trough level / Plan for level:   Pharmacy to follow daily and will make changes to dose and/or frequency based on clinical status. Non-Kinetic Antimicrobial Dosing:   Current Regimen:    Recommendation:   Other Antimicrobial  (not dosed by pharmacist)      Cultures        Serum Creatinine     Lab Results   Component Value Date/Time    Creatinine 1.26 04/02/2017 11:58 PM       Creatinine Clearance Estimated Creatinine Clearance: 84.7 mL/min (based on Cr of 1.26).      Temp   98.4 °F (36.9 °C)    WBC   Lab Results   Component Value Date/Time    WBC 15.0 04/02/2017 11:58 PM       H/H   Lab Results   Component Value Date/Time    HGB 10.9 04/02/2017 11:58 PM        Platelets   Lab Results   Component Value Date/Time    PLATELET 662 44/03/6496 11:58 PM          Pharmacist: Geremias information: [phone]

## 2017-04-03 NOTE — ED PROVIDER NOTES
HPI Comments: 40 y.o. male with extensive past medical history, please see list, significant for Crohn's disease, GERD, Ulcerative colitis, Multiple GI surgeries, Ileostomy, Lysis of adhesions who presents from home with chief complaint of abdominal pain. Pt reports persistent generalized abdominal pain accompanied by nausea and vomiting. He is 2 weeks, 5 days post-op lysis of adhesions and revision of ileostomy. Pt states ostomy as produced sufficient output. Pt also c/o cough, congestion and subjective fever. He denies current use of antibiotics. Last use of Percocet was 1800 (4.5 hours ago). There are no other acute medical concerns at this time. Chart review: 3/14/17 - laparotomy with lysis of adhesions and revision of ileostomy. General Surgeon: Nitish Mijares MD  PCP: Kristian Mcguire MD    Note written by Virgen Lazaro, as dictated by Jaycob Kasper DO 11:48 PM    The history is provided by the patient. No  was used. Past Medical History:   Diagnosis Date    Anal fistula     Anal stenosis     Crohn's disease ([address]     GERD (gastroesophageal reflux disease)     H/O ulcerative colitis     Symptoms began in 1996. Total proctocolectomy was performed in 2004. Pateint later developed Crohn's disease.  Hiatal hernia     Ileal pouchitis ([address] 5/2004    Nausea & vomiting     Combination of Scopalamine patch & Zofran IV worked well in past    Numbness in right leg     Psychiatric disorder     depression and anxiety    Skin lesion of back 3/17/2014    Syncopal episodes        Past Surgical History:   Procedure Laterality Date    ABDOMEN SURGERY PROC UNLISTED  3/25/2004    Total proctocolectomy with creation of ileoanal J-pouch and loop ileostomy; Dr. Kwok File.  HX CHOLECYSTECTOMY  12/2016    Dr. Patricio Abel HX GI  5/2/2004    Examination under anesthesia with endoscopic evaluation of ileoanal pouch and placement of drain; Dr. Kwok File.  HX GI  5/25/2004    Ileostomy closure with small bowel resection and anastomosis; Dr. Abimbola James.   GI  5/24/2012    Examination under anesthesia, anal dilatation, anal biopsy, and placement of draining seton; Dr. Abimbola James.  HX GI  7/3/2012    Incision and drainage of perirectal abscess and rigid endoscopy of ileoanal pouch; Dr. Abimbola James.  HX GI  7/31/2012    Incision and drainage of perirectal abscess, placement of draining seton, and anal dilatation; Dr. Abimbola James.  HX GI  1/22/2013    Repair of anal fistulas with debridement, partial fistulectomy, and flap closure; Dr. Abimbola James.  HX GI  5/17/2013    Examination under anesthesia, partial fistulotomy,  and placement of draining setons; Dr. Abimbola James.  HX GI  8/6/2013    Anal fistulotomy and application of ACell micromatrix; Dr. Abimbola James.   GI  2/20/2014    Examination under anesthesia and dilation of anal canal with rigid proctoscopy; Winston Culp MD.     GI  4/29/2015    Creation of Lutricia Chamber continent intestinal reservoir (BCIR), abdominoperineal resection of ileoanal J-pouch; lysis of adhesions, and gastrostomy; Radha Kate. Jorge Edmondson MD ([place], [place])     GI  8/7/2015    Stoma revision; Radha Kate. Jorge Edmondson MD ([place], [place])     GI  10/29/2015    Extensive lysis of adhesions, resection of continent intestinal reservoir, repair of serosal tears, and creation of end-ileostomy; Dr. Abimbola James.     HX ORTHOPAEDIC  2008    Left ACL repair         Family History:   Problem Relation Age of Onset    Cancer Father     Asthma Neg Hx     Diabetes Neg Hx     Heart Disease Neg Hx     Hypertension Neg Hx     Stroke Neg Hx     Malignant Hyperthermia Neg Hx     Pseudocholinesterase Deficiency Neg Hx     Delayed Awakening Neg Hx     Post-op Nausea/Vomiting Neg Hx        Social History     Social History    Marital status:      Spouse name: N/A    Number of children: N/A    Years of education: N/A     Occupational History    Not on file. Social History Main Topics    Smoking status: Former Smoker     Packs/day: 0.00     Years: 7.00     Quit date: 1/14/2017    Smokeless tobacco: Never Used    Alcohol use No    Drug use: No    Sexual activity: Not on file     Other Topics Concern    Not on file     Social History Narrative     ALLERGIES: Remicade [infliximab]; Bactrim [sulfamethoprim ds]; and Nsaids (non-steroidal anti-inflammatory drug)    Review of Systems   Constitutional: Negative for appetite change, chills, fever and unexpected weight change. HENT: Positive for congestion. Negative for ear pain, hearing loss, rhinorrhea and trouble swallowing. Eyes: Negative for pain and visual disturbance. Respiratory: Positive for cough. Negative for chest tightness and shortness of breath. Cardiovascular: Negative for chest pain and palpitations. Gastrointestinal: Positive for abdominal pain, nausea and vomiting. Negative for abdominal distention and blood in stool. Genitourinary: Negative for dysuria, hematuria and urgency. Musculoskeletal: Negative for back pain and myalgias. Skin: Negative for rash. Neurological: Negative for dizziness, syncope, weakness and numbness. Psychiatric/Behavioral: Negative for confusion and suicidal ideas. All other systems reviewed and are negative. Vitals:    04/02/17 2301   BP: (!) 133/100   Pulse: (!) 134   Resp: 18   Temp: 98.8 °F (37.1 °C)   SpO2: 97%   Weight: 88.5 kg (195 lb)   Height: 6' (1.829 m)            Physical Exam   Constitutional: He is oriented to person, place, and time. He appears well-developed and well-nourished. No distress. HENT:   Head: Normocephalic and atraumatic. Right Ear: External ear normal.   Left Ear: External ear normal.   Nose: Nose normal.   Mouth/Throat: Oropharynx is clear and moist. No oropharyngeal exudate.    Eyes: Conjunctivae and EOM are normal. Pupils are equal, round, and reactive to light. Right eye exhibits no discharge. Left eye exhibits no discharge. No scleral icterus. Neck: Normal range of motion. Neck supple. No JVD present. No tracheal deviation present. Cardiovascular: Regular rhythm, normal heart sounds and intact distal pulses. Tachycardia present. Exam reveals no gallop and no friction rub. No murmur heard. Pulmonary/Chest: Effort normal. No stridor. Tachypnea noted. No respiratory distress. He has no decreased breath sounds. He has no wheezes. He has rhonchi in the right middle field, the right lower field and the left middle field. He has no rales. He exhibits no tenderness. Abdominal: Soft. He exhibits no distension. Bowel sounds are decreased. There is tenderness (minimal, diffuse). There is no rebound and no guarding. Right sided ostomy stoma pink with good output. Musculoskeletal: Normal range of motion. He exhibits no edema or tenderness. Neurological: He is alert and oriented to person, place, and time. He has normal strength and normal reflexes. No cranial nerve deficit or sensory deficit. He exhibits normal muscle tone. Coordination normal. GCS eye subscore is 4. GCS verbal subscore is 5. GCS motor subscore is 6. Skin: Skin is warm and dry. No rash noted. He is not diaphoretic. No erythema. No pallor. Psychiatric: He has a normal mood and affect. His behavior is normal. Judgment and thought content normal.   Nursing note and vitals reviewed.    Note written by Virgen Bautista, as dictated by Jerome Perez, DO 11:53 PM    MDM  Number of Diagnoses or Management Options  Chronic abdominal pain:   Leukocytosis, unspecified type:   Pneumonia of both upper lobes due to infectious organism:      Amount and/or Complexity of Data Reviewed  Clinical lab tests: ordered and reviewed  Tests in the radiology section of CPT®: ordered and reviewed  Discuss the patient with other providers: yes (Hospitalist)    Risk of Complications, Morbidity, and/or Mortality  Presenting problems: moderate  Diagnostic procedures: moderate  Management options: moderate    Patient Progress  Patient progress: stable    ED Course       Procedures     CONSULT NOTE:  2:25 AM Alvarez Thomas DO spoke with Dr. Francisco Massey, Consult for Hospitalist.  Discussed available diagnostic tests and clinical findings. Dr. Francisco Massey will see and admit. Chief Complaint   Patient presents with    Post OP Complication    Vomiting    Nausea       2:49 AM  The patients presenting problems have been discussed, and they are in agreement with the care plan formulated and outlined with them. I have encouraged them to ask questions as they arise throughout their visit.     MEDICATIONS GIVEN:  Medications   iopamidol (ISOVUE-370) 76 % injection (not administered)   piperacillin-tazobactam (ZOSYN) 3.375 g in 0.9% sodium chloride (MBP/ADV) 100 mL (3.375 g IntraVENous New Bag 4/3/17 0241)   levoFLOXacin (LEVAQUIN) 750 mg in D5W IVPB (not administered)   sodium chloride 0.9 % bolus infusion 1,000 mL (1,000 mL IntraVENous New Bag 4/3/17 0247)   sodium chloride (NS) flush 5-10 mL (not administered)   sodium chloride (NS) flush 5-10 mL (not administered)   naloxone (NARCAN) injection 0.4 mg (not administered)   guaiFENesin ER (MUCINEX) tablet 600 mg (600 mg Oral Given 4/3/17 0245)   vancomycin (VANCOCIN) 2,250 mg in 0.9% sodium chloride 500 mL IVPB (not administered)   vancomycin (VANCOCIN) 1,250 mg in 0.9% sodium chloride 250 mL IVPB (not administered)   acetaminophen (TYLENOL) tablet 1,000 mg (1,000 mg Oral Given 4/2/17 2353)   sodium chloride 0.9 % bolus infusion 1,000 mL (0 mL IntraVENous IV Completed 4/3/17 0120)   HYDROmorphone (PF) (DILAUDID) injection 1 mg (1 mg IntraVENous Given 4/2/17 2352)   ondansetron (ZOFRAN) injection 4 mg (4 mg IntraVENous Given 4/2/17 2351)   albuterol 5mg / ipratropium 0.5mg neb solution (1 Dose Nebulization Given 4/3/17 0203)   iopamidol (ISOVUE-370) 76 % injection 100 mL (100 mL IntraVENous Given 4/3/17 0153)       LABS REVIEWED:  Recent Results (from the past 24 hour(s))   INFLUENZA A & B AG (RAPID TEST)    Collection Time: 04/02/17 11:58 PM   Result Value Ref Range    Influenza A Antigen NEGATIVE  NEG      Influenza B Antigen NEGATIVE  NEG     CBC WITH AUTOMATED DIFF    Collection Time: 04/02/17 11:58 PM   Result Value Ref Range    WBC 15.0 (H) 4.1 - 11.1 K/uL    RBC 3.77 (L) 4.10 - 5.70 M/uL    HGB 10.9 (L) 12.1 - 17.0 g/dL    HCT 34.3 (L) 36.6 - 50.3 %    MCV 91.0 80.0 - 99.0 FL    MCH 28.9 26.0 - 34.0 PG    MCHC 31.8 30.0 - 36.5 g/dL    RDW 13.3 11.5 - 14.5 %    PLATELET 420 587 - 230 K/uL    NEUTROPHILS 88 (H) 32 - 75 %    LYMPHOCYTES 6 (L) 12 - 49 %    MONOCYTES 6 5 - 13 %    EOSINOPHILS 0 0 - 7 %    BASOPHILS 0 0 - 1 %    ABS. NEUTROPHILS 13.1 (H) 1.8 - 8.0 K/UL    ABS. LYMPHOCYTES 0.9 0.8 - 3.5 K/UL    ABS. MONOCYTES 0.9 0.0 - 1.0 K/UL    ABS. EOSINOPHILS 0.0 0.0 - 0.4 K/UL    ABS. BASOPHILS 0.0 0.0 - 0.1 K/UL   METABOLIC PANEL, COMPREHENSIVE    Collection Time: 04/02/17 11:58 PM   Result Value Ref Range    Sodium 137 136 - 145 mmol/L    Potassium 4.1 3.5 - 5.1 mmol/L    Chloride 101 97 - 108 mmol/L    CO2 29 21 - 32 mmol/L    Anion gap 7 5 - 15 mmol/L    Glucose 100 65 - 100 mg/dL    BUN 7 6 - 20 MG/DL    Creatinine 1.26 0.70 - 1.30 MG/DL    BUN/Creatinine ratio 6 (L) 12 - 20      GFR est AA >60 >60 ml/min/1.73m2    GFR est non-AA >60 >60 ml/min/1.73m2    Calcium 8.6 8.5 - 10.1 MG/DL    Bilirubin, total 0.2 0.2 - 1.0 MG/DL    ALT (SGPT) 51 12 - 78 U/L    AST (SGOT) 23 15 - 37 U/L    Alk.  phosphatase 131 (H) 45 - 117 U/L    Protein, total 6.7 6.4 - 8.2 g/dL    Albumin 3.1 (L) 3.5 - 5.0 g/dL    Globulin 3.6 2.0 - 4.0 g/dL    A-G Ratio 0.9 (L) 1.1 - 2.2     LIPASE    Collection Time: 04/02/17 11:58 PM   Result Value Ref Range    Lipase 63 (L) 73 - 393 U/L       VITAL SIGNS:  Patient Vitals for the past 12 hrs:   Temp Pulse Resp BP SpO2   04/03/17 0200 - - - 135/80 95 %   04/03/17 0130 - - - 150/83 94 %   04/03/17 0115 - - - (!) 142/94 93 %   04/03/17 0100 - - - (!) 150/91 93 %   04/03/17 0045 - - - 143/90 93 %   04/03/17 0030 - - - (!) 148/96 (!) 89 %   04/03/17 0024 - - - - 91 %   04/03/17 0020 - - - - (!) 88 %   04/03/17 0016 - - - - (!) 88 %   04/03/17 0015 - - - (!) 140/99 95 %   04/03/17 0012 - - - (!) 143/91 (!) 85 %   04/02/17 2355 98.4 °F (36.9 °C) - - - -   04/02/17 2354 98.2 °F (36.8 °C) - - - -   04/02/17 2301 98.8 °F (37.1 °C) (!) 134 18 (!) 133/100 97 %       RADIOLOGY RESULTS:  The following have been ordered and reviewed:  CT ABD PELV W CONT   Final Result      CTA CHEST W OR W WO CONT   Final Result      XR ABD FLAT/ ERECT   Final Result      XR CHEST PA LAT   Final Result        Study Result      INDICATION: cough, abd pain      EXAM: CT Angio Chest:     TECHNIQUE: Unenhanced localizing CT imaging of the pulmonary arteries is  followed by bolus injection of 100 mL Isovue 370 contrast, with thin section  axial Chest CT obtained and 3D image post processing performed including coronal  MIPS. CT dose reduction was achieved through use of a standardized protocol  tailored for this examination and automatic exposure control for dose  modulation.     FINDINGS: There is no pulmonary embolism. There is no apparent aortic dissection  or aneurysm.     Lungs show patchy airspace disease in the upper lobes which could represent  pneumonia. There is no pneumothorax. There is no pleural or significant  pericardial effusion. There is no significant adenopathy.     IMPRESSION  IMPRESSION:   1. No Pulmonary Embolus. 2. Bilateral upper lobe infiltrates. Study Result      INDICATION: Unspecified abdominal pain history of ulcerative colitis and total  proctocolectomy.  And ileostomy.      EXAM: CT Abdomen and CT Pelvis are performed with 100 mL Isovue 370contrast.  CT dose reduction was achieved through use of a standardized protocol tailored  for this examination and automatic exposure control for dose modulation.     FINDINGS:      Urinary bladder is distended. There is no pattern of small bowel obstruction.     There is no abscess or CT apparent fistula. There is no acute inflammation.     There is no ascites, free air or significant adenopathy. Liver shows no  significant finding. Bile ducts are not enlarged. Gallbladder is absent. Pancreas shows no mass or inflammation. Spleen is unremarkable. Adrenal glands  are normal in size. Kidneys show no mass or hydronephrosis. Aorta shows no  aneurysm.      IMPRESSION  IMPRESSION: No Acute Disease. Study Result      INDICATION: cough, fever     EXAM: CXR 2 Views.     COMPARISON: 3/7/2017.     FINDINGS: Frontal and lateral views of the chest show the lungs are free of  acute disease. Heart size is normal. There is no overt pulmonary edema. There is  no evident pneumothorax, adenopathy or pleural effusion.         IMPRESSION  IMPRESSION: No acute disease. No significant interval change. Study Result      EXAMINATION: Abdomen 2 views.     INDICATION: abdominal pain      Supine and upright views of the abdomen show the bowel gas pattern is within  normal limits. There is no pneumoperitoneum. There is no significant  calcification.     IMPRESSION  IMPRESSION: No acute findings. CONSULTATIONS:   Hospitalist    PROGRESS NOTES:  Discussed results and plan with patient. Patient will be admitted/observed for further evaluation and treatment. DIAGNOSIS:    1. Pneumonia of both upper lobes due to infectious organism    2. Pain in the abdomen    3. Leukocytosis, unspecified type    4. Chronic abdominal pain        PLAN:  Admit/obs    ED COURSE: The patients hospital course has been uncomplicated.

## 2017-04-03 NOTE — INTERDISCIPLINARY ROUNDS
Interdisciplinary team rounds were held 4/3/2017 with the following team members:Care Management, Nursing, and Clinical Coordinator. Plan of care discussed. See clinical pathway and/or care plan for interventions and desired outcomes.     Anticipated discharge date:

## 2017-04-03 NOTE — H&P
[facility] [address] [address]  [phone]    Hospitalist Admission Note      NAME:  Nancy Ledezma   :   1975   MRN:  [id number]     PCP:  Balbir Howe MD     Date/Time:  4/3/2017 2:54 AM          Subjective:     CHIEF COMPLAINT: abdominal pain, nausea    HISTORY OF PRESENT ILLNESS:     Mr. Yoon Bledsoe is a 40 y.o. male w/ hx of Crohn's disease s/p recent laparotomy, extensive lysis of adhesions, and revision of ileostomy on 3/14/2017, depression/anxiety who presents with abdominal pain and nausea. Abdominal pain is diffuse, ongoing for 3-4 days, moderate, constant, worsening, associated with nausea and vomiting and mild dyspnea. Some cough, nonproductive. No fevers or chills. Tmax 99.5 at home. ED workup showed tachycardia, hypoxia, and leukocytosis. CTA chest was done showing no e/o PE but BUL PNA. CT abd showed no acute process. Mr. Yoon Bledsoe is admitted for further evaluation and management of HCAP. Past Medical History:   Diagnosis Date    Anal fistula     Anal stenosis     Crohn's disease ([address]     GERD (gastroesophageal reflux disease)     H/O ulcerative colitis     Symptoms began in . Total proctocolectomy was performed in . Pateint later developed Crohn's disease.  Hiatal hernia     Ileal pouchitis ([address] 2004    Nausea & vomiting     Combination of Scopalamine patch & Zofran IV worked well in past    Numbness in right leg     Psychiatric disorder     depression and anxiety    Skin lesion of back 3/17/2014    Syncopal episodes         Past Surgical History:   Procedure Laterality Date    ABDOMEN SURGERY PROC UNLISTED  3/25/2004    Total proctocolectomy with creation of ileoanal J-pouch and loop ileostomy; Dr. Bonilla.  HX CHOLECYSTECTOMY  2016    Dr. Raina Leslie HX GI  2004    Examination under anesthesia with endoscopic evaluation of ileoanal pouch and placement of drain; Dr. Bonilla.     HX GI  5/25/2004    Ileostomy closure with small bowel resection and anastomosis; Dr. Alexx Richard.   GI  5/24/2012    Examination under anesthesia, anal dilatation, anal biopsy, and placement of draining seton; Dr. Alexx Richard.   GI  7/3/2012    Incision and drainage of perirectal abscess and rigid endoscopy of ileoanal pouch; Dr. Alexx Richard.   GI  7/31/2012    Incision and drainage of perirectal abscess, placement of draining seton, and anal dilatation; Dr. Alexx Richard.   GI  1/22/2013    Repair of anal fistulas with debridement, partial fistulectomy, and flap closure; Dr. Alexx Richard.   GI  5/17/2013    Examination under anesthesia, partial fistulotomy,  and placement of draining setons; Dr. Alexx Richard.   GI  8/6/2013    Anal fistulotomy and application of ACell micromatrix; Dr. Alexx Richard.   GI  2/20/2014    Examination under anesthesia and dilation of anal canal with rigid proctoscopy; Newton Centeno MD.     GI  4/29/2015    Creation of Bhavik Sauk continent intestinal reservoir (BCIR), abdominoperineal resection of ileoanal J-pouch; lysis of adhesions, and gastrostomy; Barber Valentine. Rajinder Meyers MD ([place], [place])     GI  8/7/2015    Stoma revision; Barber Valentine. Rajinder Meyers MD ([place], [place])     GI  10/29/2015    Extensive lysis of adhesions, resection of continent intestinal reservoir, repair of serosal tears, and creation of end-ileostomy; Dr. Alexx Richard.   GI  03/14/2017    Laparotomy, extensive lysis of adhesions and revision of ileostomy; Dr. Alexx Richard.      ORTHOPAEDIC  2008    Left ACL repair    HX UROLOGICAL  03/14/2017    Cystoscopy and placement of bilateral temporary ureteral catheters; Auroar Bauman MD.       Social History   Substance Use Topics    Smoking status: Former Smoker     Packs/day: 0.00     Years: 7.00     Quit date: 1/14/2017    Smokeless tobacco: Never Used    Alcohol use No        Family History   Problem Relation Age of Onset    Cancer Father     Asthma Neg Hx     Diabetes Neg Hx     Heart Disease Neg Hx     Hypertension Neg Hx     Stroke Neg Hx     Malignant Hyperthermia Neg Hx     Pseudocholinesterase Deficiency Neg Hx     Delayed Awakening Neg Hx     Post-op Nausea/Vomiting Neg Hx         Allergies   Allergen Reactions    Remicade [Infliximab] Anaphylaxis and Shortness of Breath    Bactrim [Sulfamethoprim Ds] Other (comments)     Increased GI distress.  Nsaids (Non-Steroidal Anti-Inflammatory Drug) Other (comments)     Stomach ulcers        Prior to Admission medications    Medication Sig Start Date End Date Taking? Authorizing Provider   oxyCODONE-acetaminophen (PERCOCET 10)  mg per tablet Take 1-2 Tabs by mouth every four (4) hours as needed. Max Daily Amount: 12 Tabs. 3/21/17   Cricket Tracy MD   promethazine (PHENERGAN) 25 mg tablet Take 25 mg by mouth every six (6) hours as needed for Nausea. Miles Diaz MD   ondansetron hcl (ZOFRAN, AS HYDROCHLORIDE,) 8 mg tablet Take 8 mg by mouth every eight (8) hours as needed for Nausea. Miles Diaz MD   mirtazapine (REMERON) 15 mg tablet Take 15 mg by mouth nightly. Miles Diaz MD   amitriptyline (ELAVIL) 10 mg tablet Take 30 mg by mouth nightly. Historical Provider   buPROPion SR [facility] SR) 150 mg SR tablet Take 150 mg by mouth two (2) times a day. Historical Provider   omeprazole (PRILOSEC) 40 mg capsule Take 40 mg by mouth Daily (before dinner). Historical Provider   prochlorperazine (COMPAZINE) 10 mg tablet Take 10 mg by mouth every six (6) hours as needed for Nausea. Historical Provider   dicyclomine (BENTYL) 10 mg capsule Take 10 mg by mouth every six (6) hours as needed. Historical Provider   ergocalciferol (ERGOCALCIFEROL) 50,000 unit capsule Take 50,000 Units by mouth every seven (7) days.     Historical Provider   cyclobenzaprine (FLEXERIL) 10 mg tablet Take 10 mg by mouth two (2) times daily as needed for Muscle Spasm(s). Historical Provider   pregabalin (LYRICA) 100 mg capsule Take 100 mg by mouth three (3) times daily. Historical Provider   cyanocobalamin (VITAMIN B-12) 1,000 mcg/mL injection 1,000 mcg by IntraMUSCular route every seven (7) days. Indications: Once weekly    Miles Diaz MD   temazepam (RESTORIL) 30 mg capsule Take 30 mg by mouth nightly. Historical Provider   diazepam (VALIUM) 5 mg tablet Take 5 mg by mouth every eight (8) hours as needed (bowel relaxation). Phys MD Emily       Review of Systems:  (bold if positive, if negative)    Gen:  fatigueEyes:  ENT:  CVS:  Pulm:  Cough, dyspneaGI:  Abdominal pain, nausea, emesis  :    MS:  Skin:  Psych:   depression, anxiety,Endo:    Hem:  Renal:    Neuro:            Objective:      VITALS:    Vital signs reviewed; most recent are:    Visit Vitals    /80    Pulse (!) 134    Temp 98.4 °F (36.9 °C)    Resp 18    Ht 6' (1.829 m)    Wt 88.5 kg (195 lb)    SpO2 95%    BMI 26.45 kg/m2     SpO2 Readings from Last 6 Encounters:   04/03/17 95%   03/21/17 97%   03/09/17 98%   02/25/17 100%   02/21/17 94%   02/06/17 100%    O2 Flow Rate (L/min): 2 l/min   No intake or output data in the 24 hours ending 04/03/17 0254         Exam:     Physical Exam:    Gen:  Disheveled. Chronically ill-appearing. Pleasant   HEENT:  No scleral icterus,  hearing intact to voice, dry mucous membranes  Neck:  Supple, without masses. Resp:  No accessory muscle use, increased WOB. Bilateral rales, R>L, with few rhonchi. No wheezing  Card: tachycardic, reg rhythm. Normal S1 and S2 without murmurs, rubs, or gallops. No peripheral lower extremity edema. No JVD. Abd:  Normoactive bowel sounds. Soft, mild diffuse TTP. Ostomy in place, with brown liquid stool  Musc:  No cyanosis or clubbing  Skin:  No rashes or ulcers; turgor intact   Neuro:  Cranial nerves are grossly intact, no focal motor weakness, follows commands appropriately  Psych:  Good insight, normal affect. Alert, oriented x 3. Answers questions appropriately       Labs:    Recent Labs      04/02/17   2358   WBC  15.0*   HGB  10.9*   HCT  34.3*   PLT  284     Recent Labs      04/02/17   2358   NA  137   K  4.1   CL  101   CO2  29   GLU  100   BUN  7   CREA  1.26   CA  8.6   ALB  3.1*   SGOT  23   ALT  51     No components found for: GLPOC  No results for input(s): PH, PCO2, PO2, HCO3, FIO2 in the last 72 hours. No results for input(s): INR in the last 72 hours. No lab exists for component: INREXT  Lab Results   Component Value Date/Time    Specimen Description: MISC. WOUND RECTAL ABCESS 07/31/2012 12:48 PM    Specimen Description: MISC. WOUND RECTAL ABCESS 07/31/2012 12:48 PM    Specimen Description: ABSCESS PERIRECTAL 07/03/2012 07:00 PM    Specimen Description: PERIRECTAL 07/03/2012 07:00 PM     Lab Results   Component Value Date/Time    Culture result: NO GROWTH 6 DAYS 01/01/2017 05:39 AM    Culture result: NO GROWTH 6 DAYS 01/01/2017 02:00 AM    Culture result:  11/21/2016 05:16 AM     NO ROUTINE ENTERIC PATHOGENS ISOLATED INCLUDING SALMONELLA, SHIGELLA, YERSINIA, VIBRIO OR SHIGA TOXIN PRODUCING E. COLI     All other current labs reviewed in the computer. Imaging/Studies:    CTA chest and CT abd reviewed, as per HPI       Assessment / Plan:       40 y.o. male with hx of Crohn's disease s/p recent laparotomy, extensive lysis of adhesions, and revision of ileostomy on 3/14/2017, depression/anxiety who presents with abdominal pain, nausea and dyspnea, found to hypoxia and BUL infiltrates on CT chest, admitted for HCAP     HCAP (healthcare-associated pneumonia): evident on CT chest. With hypoxia  -- agree with Zosyn and Levaquin. Add vanc  -- send sputum culture, PNA workup (Legionella and Strep Pneumo urine ag, Mycoplasma IgM)      Abdominal pain / Crohn's colitis ([facility]): appears to have chronic pain.  Asking for IV opioids  -- can continue IV Dilaudid tonight but since no evidence of acute intraabdominal process would favor discontinuing soon (ordered for 12 hours only)  -- cont home PO opioids PRN (Percocet 10's)  -- cont Bentyl PRN. On Valium PRN as well for \"bowel relaxation\"      SIRS (systemic inflammatory response syndrome) ([facility]): due to HCAP, pain. No fevers. Not sepsis  -- follow up IV abx      Depression with anxiety: likely playing a role in chronic pain  -- cont Elavil, Wellbutrin, Remeron, Temazepam      Anemia (12/25/2016): likely from ACD.  Near baseline  -- follow Hg      Code Status: FULL     Previous notes and lab results reviewed including d/c summaries      Total time spent with patient: 79 Minutes     Risk of deterioration: High                 Care Plan discussed with: ED provider, Patient, Nursing Staff and >50% of time spent in counseling and coordination of care    Discussed:  Care Plan and D/C Planning    Prophylaxis:  Lovenox    Disposition:  Home w/Family       ___________________________________________________    Attending Physician: Bart Davis MD

## 2017-04-03 NOTE — PROGRESS NOTES
[facility]  [address] [facility], [address] [place] [address]  [phone]      Medical Progress Note      NAME: Adán Appiah   :  1975  MRM:  [id number]    Date/Time: 4/3/2017  9:13 AM       Assessment and Plan:   1. HCAP (healthcare-associated pneumonia): pt has been having nonproductive cough recently. Evident on CT chest with hypoxia. On broad spectrum AB. Check sputum culture, PNA workup (Legionella and Strep Pneumo urine ag, Mycoplasma IgM)    2. Acute hypoxic respiratory failure. Likely secondary to above. Continue supplement O2 to keep SAO2 > 90%     3.  Abdominal pain / Crohn's colitis ([facility]): appears to have chronic pain. Cont home PO opioids PRN (Percocet 10's). Avoid IV narcotics. cont Bentyl PRN.       4. SIRS (systemic inflammatory response syndrome) ([facility]): due to HCAP, pain. No fevers. Not sepsis. Continue ABx as above. 5.  Depression with anxiety: likely playing a role in chronic pain. cont Elavil, Wellbutrin, Remeron, Temazepam     6. Anemia (2016): likely from ACD. Monitor H/H.               Subjective:     Chief Complaint:  Cough, nonproductive. .     ROS:  (bold if positive, if negative)    Cough   Tolerating PT  Tolerating Diet        Objective:     Last 24hrs VS reviewed since prior progress note.  Most recent are:    Visit Vitals    /63 (BP 1 Location: Left arm)    Pulse 100    Temp 98.7 °F (37.1 °C)    Resp 18    Ht 6' (1.829 m)    Wt 88.5 kg (195 lb)    SpO2 97%    BMI 26.45 kg/m2     SpO2 Readings from Last 6 Encounters:   17 97%   17 97%   17 98%   17 100%   17 94%   17 100%    O2 Flow Rate (L/min): 2 l/min   No intake or output data in the 24 hours ending 17 0913     Physical Exam:    Gen:  Well-developed, well-nourished, in no acute distress  HEENT:  Pink conjunctivae, PERRL, hearing intact to voice, moist mucous membranes  Neck:  Supple, without masses, thyroid non-tender  Resp:  No accessory muscle use, rales BL  Card:  No murmurs, normal S1, S2 without thrills, bruits or peripheral edema  Abd:  Soft, non-tender, non-distended, normoactive bowel sounds are present, no palpable organomegaly and no detectable hernias  Lymph:  No cervical or inguinal adenopathy  Musc:  No cyanosis or clubbing  Skin:  No rashes or ulcers, skin turgor is good  Neuro:  Cranial nerves are grossly intact, no focal motor weakness, follows commands appropriately  Psych:  Good insight, oriented to person, place and time, alert  __________________________________________________________________  Medications Reviewed: (see below)  Medications:     Current Facility-Administered Medications   Medication Dose Route Frequency    iopamidol (ISOVUE-370) 76 % injection        sodium chloride (NS) flush 5-10 mL  5-10 mL IntraVENous Q8H    sodium chloride (NS) flush 5-10 mL  5-10 mL IntraVENous PRN    naloxone (NARCAN) injection 0.4 mg  0.4 mg IntraVENous PRN    vancomycin (VANCOCIN) 1,250 mg in 0.9% sodium chloride 250 mL IVPB  1,250 mg IntraVENous Q12H    HYDROmorphone (PF) (DILAUDID) injection 1 mg  1 mg IntraVENous Q4H PRN    [START ON 4/4/2017] amitriptyline (ELAVIL) tablet 30 mg  30 mg Oral QHS    buPROPion SR (WELLBUTRIN SR) tablet 150 mg  150 mg Oral BID    cyclobenzaprine (FLEXERIL) tablet 10 mg  10 mg Oral BID PRN    diazePAM (VALIUM) tablet 5 mg  5 mg Oral Q8H PRN    dicyclomine (BENTYL) capsule 10 mg  10 mg Oral Q6H PRN    [START ON 4/4/2017] mirtazapine (REMERON) tablet 15 mg  15 mg Oral QHS    omeprazole (PRILOSEC) capsule 40 mg  40 mg Oral ACD    oxyCODONE-acetaminophen (PERCOCET 10)  mg per tablet 1-2 Tab  1-2 Tab Oral Q4H PRN    pregabalin (LYRICA) capsule 100 mg  100 mg Oral TID    prochlorperazine (COMPAZINE) tablet 10 mg  10 mg Oral Q6H PRN    promethazine (PHENERGAN) tablet 25 mg  25 mg Oral Q6H PRN    [START ON 4/4/2017] temazepam (RESTORIL) capsule 30 mg  30 mg Oral QHS    piperacillin-tazobactam (ZOSYN) 3.375 g in 0.9% sodium chloride (MBP/ADV) 100 mL  3.375 g IntraVENous Q8H    [START ON 4/4/2017] levoFLOXacin (LEVAQUIN) 750 mg in D5W IVPB  750 mg IntraVENous Q24H    enoxaparin (LOVENOX) injection 40 mg  40 mg SubCUTAneous Q24H    guaiFENesin ER (MUCINEX) tablet 600 mg  600 mg Oral Q12H        Lab Data Reviewed: (see below)  Lab Review:     Recent Labs      04/02/17 2358   WBC  15.0*   HGB  10.9*   HCT  34.3*   PLT  284     Recent Labs      04/02/17 2358   NA  137   K  4.1   CL  101   CO2  29   GLU  100   BUN  7   CREA  1.26   CA  8.6   ALB  3.1*   TBILI  0.2   SGOT  23   ALT  51     Lab Results   Component Value Date/Time    Glucose (POC) 92 03/21/2017 06:25 AM    Glucose (POC) 111 03/20/2017 10:27 PM    Glucose (POC) 100 03/20/2017 05:19 PM    Glucose (POC) 140 03/20/2017 12:05 PM    Glucose (POC) 130 03/20/2017 06:36 AM     No results for input(s): PH, PCO2, PO2, HCO3, FIO2 in the last 72 hours. No results for input(s): INR in the last 72 hours. No lab exists for component: INREXT  All Micro Results     Procedure Component Value Units Date/Time    LEGIONELLA Yves Dumont URINE  [[id number]] Collected:  04/03/17 0309    Order Status:  Completed Specimen:  Urine from Urine Updated:  04/03/17 0329    S. Lonnie Pare ONLY [[id number]] Collected:  04/03/17 0309    Order Status:  Completed Specimen:  Other Updated:  04/03/17 0328    MYCOPLASMA AB, IGG/IGM [[id number]] Collected:  04/02/17 2358    Order Status:  Completed Specimen:  Serum Updated:  04/03/17 0257    CULTURE, RESPIRATORY/SPUTUM/BRONCH Maryjane Dao STAIN [[id number]]     Order Status:  Sent Specimen:  Sputum     INFLUENZA A & B AG (RAPID TEST) [[id number]] Collected:  04/02/17 2358    Order Status:  Completed Specimen:  Nasopharyngeal from Nasal washing Updated:  04/03/17 0030     Influenza A Antigen NEGATIVE         Influenza B Antigen NEGATIVE              I have reviewed notes of prior 24hr.     Other pertinent lab:      Total time spent with patient: Sonia 59 discussed with: Patient, Nursing Staff and >50% of time spent in counseling and coordination of care    Discussed:  Care Plan    Prophylaxis:  Lovenox    Disposition:  Home w/Family           ___________________________________________________    Attending Physician: Raymon Gr MD

## 2017-04-03 NOTE — PROGRESS NOTES
[facility]: MED RECONCILIATION    Comments/Recommendations:    Patient is awake and aware of the visit. Allergies verified.  Patient uses [address] He is a good historian of his medications.  Patient was counseled on avoiding taking same class of medications (Diazepam and Temazepam,Tramadol and Percocet) at the same time due to increased sedation. Patient shows understanding.  Of note, patient states he takes Tramadol for light pain, and Percocet for more severe pain.  He is getting Ustekinumab injection every 8 weeks.  For nausea, patients take Ondansetron twice daily and Prochlorperazine once daily as needed. He only takes Promethazine if the nausea does not subside with the 2 previous medications.  Patient has not been taking cyclobenzaprine for 2 weeks. Last filled on 2016 for 60 tablets. Medications added:     · Tramadol 50 mg   · Ustekinumab 90 mg/mL     Medications removed:    · Cyclobenzaprine 10 mg twice daily prn muscle spasm    Medications adjusted to:    · Mirtazapine 15 mg (half of 30 mg tablet)  · Ondansetron 8 mg twice daily as needed   · Prochlorperazine 25 mg once daily as needed    Information obtained from: patient, rx query    Chief Complaint for this Admission:   Chief Complaint   Patient presents with    Post OP Complication    Vomiting    Nausea         Allergies: Remicade [infliximab]; Bactrim [sulfamethoprim ds]; and Nsaids (non-steroidal anti-inflammatory drug)    Prior to Admission Medications   Prescriptions Last Dose Informant Patient Reported? Taking? Ustekinumab (STELARA) 90 mg/mL injection  Self Yes Yes   Si mg by SubCUTAneous route once. Every 8 weeks   amitriptyline (ELAVIL) 10 mg tablet 2017 at hs Self Yes Yes   Sig: Take 30 mg by mouth nightly. buPROPion SR (WELLBUTRIN SR) 150 mg SR tablet 2017 at pm Self Yes Yes   Sig: Take 150 mg by mouth two (2) times a day.    cyanocobalamin (VITAMIN B-12) 1,000 mcg/mL injection 3/27/2017 Self Yes Yes   Si,000 mcg by IntraMUSCular route every seven (7) days. Indications: Once weekly   diazepam (VALIUM) 5 mg tablet  Self Yes Yes   Sig: Take 5 mg by mouth every eight (8) hours as needed (bowel relaxation). dicyclomine (BENTYL) 10 mg capsule 2017 at pm Self Yes Yes   Sig: Take 10 mg by mouth every six (6) hours as needed. ergocalciferol (ERGOCALCIFEROL) 50,000 unit capsule 3/27/2017 Self Yes Yes   Sig: Take 50,000 Units by mouth every seven (7) days. mirtazapine (REMERON) 30 mg tablet 2017 at hs Self Yes Yes   Sig: Take 15 mg by mouth nightly. omeprazole (PRILOSEC) 40 mg capsule 2017 Self Yes Yes   Sig: Take 40 mg by mouth Daily (before dinner). ondansetron hcl (ZOFRAN, AS HYDROCHLORIDE,) 8 mg tablet 2017 Self Yes Yes   Sig: Take 8 mg by mouth every twelve (12) hours as needed for Nausea. oxyCODONE-acetaminophen (PERCOCET 10)  mg per tablet 2017 Self No Yes   Sig: Take 1-2 Tabs by mouth every four (4) hours as needed. Max Daily Amount: 12 Tabs. pregabalin (LYRICA) 100 mg capsule 2017 Self Yes Yes   Sig: Take 100 mg by mouth three (3) times daily. prochlorperazine (COMPAZINE) 10 mg tablet  Self Yes Yes   Sig: Take 10 mg by mouth daily as needed for Nausea. promethazine (PHENERGAN) 25 mg tablet  Self Yes Yes   Sig: Take 25 mg by mouth every six (6) hours as needed for Nausea. temazepam (RESTORIL) 30 mg capsule 2017 at hs Self Yes Yes   Sig: Take 30 mg by mouth nightly. traMADol (ULTRAM) 50 mg tablet  Self Yes Yes   Sig: Take 50 mg by mouth every eight (8) hours as needed for Pain. Facility-Administered Medications: None             Thank you  Kelly Crowe  Pharm. D.  Candidate 2017

## 2017-04-03 NOTE — PROGRESS NOTES
Bedside and Verbal shift change report given to Katey Lewis RN (oncoming nurse) by Leoncio Snowden RN (offgoing nurse). Report included the following information SBAR, Kardex, MAR, Accordion and Recent Results.

## 2017-04-03 NOTE — PROGRESS NOTES
Bedside and Verbal shift change report given to Kyra Monroy RN (oncoming nurse) by Jb Rodriguez (offgoing nurse). Report included the following information SBAR, Kardex, ED Summary, Procedure Summary, Intake/Output, MAR, Recent Results and Med Rec Status.

## 2017-04-03 NOTE — PROGRESS NOTES
Primary Nurse Andrew Howell RN and Ferdie Ahumada, RN performed a dual skin assessment on this patient No impairment noted  Eduardo score is 19

## 2017-04-03 NOTE — PROGRESS NOTES
TRANSFER - IN REPORt Verbal report received from [facility] (name) on Carmen Liu  being received from ED (unit) for routine progression of care      Report consisted of patients Situation, Background, Assessment and   Recommendations(SBAR). Information from the following report(s) SBAR, Kardex, ED Summary, Procedure Summary, Intake/Output, MAR, Recent Results, Med Rec Status and Cardiac Rhythm NSR was reviewed with the receiving nurse. Opportunity for questions and clarification was provided. Assessment completed upon patients arrival to unit and care assumed.

## 2017-04-04 VITALS
WEIGHT: 195 LBS | HEART RATE: 106 BPM | DIASTOLIC BLOOD PRESSURE: 58 MMHG | HEIGHT: 72 IN | TEMPERATURE: 98.8 F | RESPIRATION RATE: 15 BRPM | SYSTOLIC BLOOD PRESSURE: 120 MMHG | OXYGEN SATURATION: 96 % | BODY MASS INDEX: 26.41 KG/M2

## 2017-04-04 PROBLEM — R65.10 SIRS (SYSTEMIC INFLAMMATORY RESPONSE SYNDROME) (HCC): Status: RESOLVED | Noted: 2017-04-03 | Resolved: 2017-04-04

## 2017-04-04 LAB
ALBUMIN SERPL BCP-MCNC: 2.3 G/DL (ref 3.5–5)
ALBUMIN/GLOB SERPL: 0.7 {RATIO} (ref 1.1–2.2)
ALP SERPL-CCNC: 96 U/L (ref 45–117)
ALT SERPL-CCNC: 29 U/L (ref 12–78)
ANION GAP BLD CALC-SCNC: 8 MMOL/L (ref 5–15)
AST SERPL W P-5'-P-CCNC: 17 U/L (ref 15–37)
BASOPHILS # BLD AUTO: 0 K/UL (ref 0–0.1)
BASOPHILS # BLD: 0 % (ref 0–1)
BILIRUB SERPL-MCNC: 0.1 MG/DL (ref 0.2–1)
BUN SERPL-MCNC: 5 MG/DL (ref 6–20)
BUN/CREAT SERPL: 7 (ref 12–20)
CALCIUM SERPL-MCNC: 8.4 MG/DL (ref 8.5–10.1)
CHLORIDE SERPL-SCNC: 105 MMOL/L (ref 97–108)
CO2 SERPL-SCNC: 28 MMOL/L (ref 21–32)
CREAT SERPL-MCNC: 0.71 MG/DL (ref 0.7–1.3)
EOSINOPHIL # BLD: 0.4 K/UL (ref 0–0.4)
EOSINOPHIL NFR BLD: 4 % (ref 0–7)
ERYTHROCYTE [DISTWIDTH] IN BLOOD BY AUTOMATED COUNT: 13.5 % (ref 11.5–14.5)
FLUID CULTURE, SPNG2: NORMAL
GLOBULIN SER CALC-MCNC: 3.5 G/DL (ref 2–4)
GLUCOSE SERPL-MCNC: 101 MG/DL (ref 65–100)
HCT VFR BLD AUTO: 30.9 % (ref 36.6–50.3)
HGB BLD-MCNC: 10 G/DL (ref 12.1–17)
L PNEUMO1 AG UR QL IA: NEGATIVE
LYMPHOCYTES # BLD AUTO: 17 % (ref 12–49)
LYMPHOCYTES # BLD: 1.8 K/UL (ref 0.8–3.5)
M PNEUMO IGG SER IA-ACNC: 835 U/ML (ref 0–99)
M PNEUMO IGM SER IA-ACNC: <770 U/ML (ref 0–769)
MAGNESIUM SERPL-MCNC: 1.3 MG/DL (ref 1.6–2.4)
MCH RBC QN AUTO: 29.2 PG (ref 26–34)
MCHC RBC AUTO-ENTMCNC: 32.4 G/DL (ref 30–36.5)
MCV RBC AUTO: 90.1 FL (ref 80–99)
MONOCYTES # BLD: 0.6 K/UL (ref 0–1)
MONOCYTES NFR BLD AUTO: 5 % (ref 5–13)
NEUTS SEG # BLD: 7.6 K/UL (ref 1.8–8)
NEUTS SEG NFR BLD AUTO: 74 % (ref 32–75)
ORGANISM ID, SPNG3: NORMAL
PLATELET # BLD AUTO: 243 K/UL (ref 150–400)
PLEASE NOTE, SPNG4: NORMAL
POTASSIUM SERPL-SCNC: 3.9 MMOL/L (ref 3.5–5.1)
PROT SERPL-MCNC: 5.8 G/DL (ref 6.4–8.2)
RBC # BLD AUTO: 3.43 M/UL (ref 4.1–5.7)
S PNEUM AG SPEC QL LA: NEGATIVE
SODIUM SERPL-SCNC: 141 MMOL/L (ref 136–145)
SPECIMEN SOURCE: NORMAL
SPECIMEN SOURCE: NORMAL
SPECIMEN, SPNG1: NORMAL
WBC # BLD AUTO: 10.4 K/UL (ref 4.1–11.1)

## 2017-04-04 PROCEDURE — 74011250637 HC RX REV CODE- 250/637: Performed by: INTERNAL MEDICINE

## 2017-04-04 PROCEDURE — 74011250636 HC RX REV CODE- 250/636: Performed by: INTERNAL MEDICINE

## 2017-04-04 PROCEDURE — 87070 CULTURE OTHR SPECIMN AEROBIC: CPT | Performed by: INTERNAL MEDICINE

## 2017-04-04 PROCEDURE — 74011000258 HC RX REV CODE- 258: Performed by: INTERNAL MEDICINE

## 2017-04-04 PROCEDURE — 83735 ASSAY OF MAGNESIUM: CPT | Performed by: INTERNAL MEDICINE

## 2017-04-04 PROCEDURE — 85025 COMPLETE CBC W/AUTO DIFF WBC: CPT | Performed by: INTERNAL MEDICINE

## 2017-04-04 PROCEDURE — 80053 COMPREHEN METABOLIC PANEL: CPT | Performed by: INTERNAL MEDICINE

## 2017-04-04 PROCEDURE — 36415 COLL VENOUS BLD VENIPUNCTURE: CPT | Performed by: INTERNAL MEDICINE

## 2017-04-04 RX ORDER — MAGNESIUM SULFATE HEPTAHYDRATE 40 MG/ML
2 INJECTION, SOLUTION INTRAVENOUS ONCE
Status: COMPLETED | OUTPATIENT
Start: 2017-04-04 | End: 2017-04-04

## 2017-04-04 RX ORDER — LEVOFLOXACIN 750 MG/1
750 TABLET ORAL DAILY
Qty: 7 TAB | Refills: 0 | Status: SHIPPED | OUTPATIENT
Start: 2017-04-04 | End: 2017-05-20

## 2017-04-04 RX ADMIN — BUPROPION HYDROCHLORIDE 150 MG: 150 TABLET, FILM COATED, EXTENDED RELEASE ORAL at 17:43

## 2017-04-04 RX ADMIN — TEMAZEPAM 30 MG: 15 CAPSULE ORAL at 00:37

## 2017-04-04 RX ADMIN — ENOXAPARIN SODIUM 40 MG: 40 INJECTION SUBCUTANEOUS at 03:36

## 2017-04-04 RX ADMIN — PREGABALIN 100 MG: 50 CAPSULE ORAL at 16:49

## 2017-04-04 RX ADMIN — GUAIFENESIN 600 MG: 600 TABLET, EXTENDED RELEASE ORAL at 08:04

## 2017-04-04 RX ADMIN — PIPERACILLIN SODIUM,TAZOBACTAM SODIUM 3.38 G: 3; .375 INJECTION, POWDER, FOR SOLUTION INTRAVENOUS at 10:18

## 2017-04-04 RX ADMIN — PREGABALIN 100 MG: 50 CAPSULE ORAL at 08:04

## 2017-04-04 RX ADMIN — BUPROPION HYDROCHLORIDE 150 MG: 150 TABLET, FILM COATED, EXTENDED RELEASE ORAL at 08:05

## 2017-04-04 RX ADMIN — PIPERACILLIN SODIUM,TAZOBACTAM SODIUM 3.38 G: 3; .375 INJECTION, POWDER, FOR SOLUTION INTRAVENOUS at 03:36

## 2017-04-04 RX ADMIN — MAGNESIUM SULFATE HEPTAHYDRATE 2 G: 40 INJECTION, SOLUTION INTRAVENOUS at 16:50

## 2017-04-04 RX ADMIN — OXYCODONE HYDROCHLORIDE AND ACETAMINOPHEN 2 TABLET: 10; 325 TABLET ORAL at 01:13

## 2017-04-04 RX ADMIN — OXYCODONE HYDROCHLORIDE AND ACETAMINOPHEN 2 TABLET: 10; 325 TABLET ORAL at 12:53

## 2017-04-04 RX ADMIN — OXYCODONE HYDROCHLORIDE AND ACETAMINOPHEN 2 TABLET: 10; 325 TABLET ORAL at 08:24

## 2017-04-04 RX ADMIN — DIAZEPAM 5 MG: 5 TABLET ORAL at 10:16

## 2017-04-04 RX ADMIN — OXYCODONE HYDROCHLORIDE AND ACETAMINOPHEN 2 TABLET: 10; 325 TABLET ORAL at 16:58

## 2017-04-04 RX ADMIN — MIRTAZAPINE 15 MG: 15 TABLET, FILM COATED ORAL at 00:37

## 2017-04-04 RX ADMIN — VANCOMYCIN HYDROCHLORIDE 1250 MG: 10 INJECTION, POWDER, LYOPHILIZED, FOR SOLUTION INTRAVENOUS at 08:05

## 2017-04-04 RX ADMIN — PROCHLORPERAZINE MALEATE 10 MG: 5 TABLET, FILM COATED ORAL at 10:16

## 2017-04-04 RX ADMIN — LEVOFLOXACIN 750 MG: 5 INJECTION, SOLUTION INTRAVENOUS at 02:13

## 2017-04-04 RX ADMIN — PANTOPRAZOLE SODIUM 40 MG: 40 TABLET, DELAYED RELEASE ORAL at 16:49

## 2017-04-04 RX ADMIN — AMITRIPTYLINE HYDROCHLORIDE 30 MG: 10 TABLET, FILM COATED ORAL at 00:36

## 2017-04-04 NOTE — DISCHARGE SUMMARY
Hospitalist Discharge Summary     Patient ID:    Eva Langston  [id number]  73 y.o.  1975    Admit date: 4/2/2017    Discharge date and time: 4/4/2017    Admission Diagnoses: HCAP (healthcare-associated pneumonia)    Chronic Diagnoses:    Problem List as of 4/4/2017  Date Reviewed: 4/3/2017          Codes Class Noted - Resolved    HCAP (healthcare-associated pneumonia) ICD-10-CM: J18.9  ICD-9-CM: 486  4/3/2017 - Present        Small bowel obstruction ([facility] ICD-10-CM: K56.69  ICD-9-CM: 560.9  3/9/2017 - Present        SBO (small bowel obstruction) ([facility] ICD-10-CM: K56.69  ICD-9-CM: 560.9  3/7/2017 - Present        Constipation ICD-10-CM: K59.00  ICD-9-CM: 564.00  1/15/2017 - Present        Intractable vomiting with nausea ICD-10-CM: R11.2  ICD-9-CM: 536.2  1/1/2017 - Present        Depression with anxiety ICD-10-CM: F41.8  ICD-9-CM: 300.4  12/25/2016 - Present        Anemia ICD-10-CM: D64.9  ICD-9-CM: 285.9  12/25/2016 - Present        Dehydration ICD-10-CM: E86.0  ICD-9-CM: 276.51  12/25/2016 - Present        S/P laparoscopic cholecystectomy ICD-10-CM: Z90.49  ICD-9-CM: V45.89  12/21/2016 - Present    Overview Signed 12/21/2016 12:56 PM by Fanny Rowe MD     With [facility].              Crohn's colitis ([facility] ICD-10-CM: K50.10  ICD-9-CM: 555.1  12/18/2016 - Present        Abdominal pain ICD-10-CM: R10.9  ICD-9-CM: 789.00  11/29/2016 - Present        GERD (gastroesophageal reflux disease) (Chronic) ICD-10-CM: K21.9  ICD-9-CM: 530.81  11/20/2016 - Present        RESOLVED: SIRS (systemic inflammatory response syndrome) ([facility] ICD-10-CM: R65.10  ICD-9-CM: 995.90  4/3/2017 - 4/4/2017        RESOLVED: Insomnia ICD-10-CM: G47.00  ICD-9-CM: 780.52  12/25/2016 - 1/1/2017        RESOLVED: Hypernatremia ICD-10-CM: E87.0  ICD-9-CM: 276.0  12/25/2016 - 1/1/2017        RESOLVED: Biliary dyskinesia ICD-10-CM: K82.8  ICD-9-CM: 575.8  12/21/2016 - 12/25/2016        RESOLVED: Gallbladder sludge ICD-10-CM: K82.8  ICD-9-CM: 575.8 12/20/2016 - 12/25/2016        RESOLVED: Hypokalemia ICD-10-CM: E87.6  ICD-9-CM: 276.8  12/18/2016 - 1/1/2017        RESOLVED: Leukocytosis ICD-10-CM: T69.535  ICD-9-CM: 288.60  12/18/2016 - 12/25/2016        RESOLVED: Crohn's disease ([facility] ICD-10-CM: K50.90  ICD-9-CM: 555.9  11/20/2016 - 12/25/2016        RESOLVED: Intractable abdominal pain ICD-10-CM: R10.9  ICD-9-CM: 789.00  11/20/2016 - 1/1/2017        RESOLVED: Intractable nausea and vomiting ICD-10-CM: R11.2  ICD-9-CM: 536.2  11/20/2016 - 12/25/2016        RESOLVED: Enterostomy malfunction ([facility] (Chronic) ICD-10-CM: K94.13  ICD-9-CM: 569.62  10/29/2015 - 11/21/2016        RESOLVED: Ileostomy dysfunction ([facility] ICD-10-CM: K94.13  ICD-9-CM: 569.62  10/29/2015 - 11/21/2016        RESOLVED: Skin lesion of back ICD-10-CM: L98.9  ICD-9-CM: 709.9  3/17/2014 - 11/21/2016        RESOLVED: Ileal pouchitis ([facility] ICD-10-CM: V61.434  ICD-9-CM: 569.71  5/1/2004 - 11/21/2016              Discharge Medications:   Current Discharge Medication List      START taking these medications    Details   levoFLOXacin (LEVAQUIN) 750 mg tablet Take 1 Tab by mouth daily. Qty: 7 Tab, Refills: 0         CONTINUE these medications which have NOT CHANGED    Details   mirtazapine (REMERON) 30 mg tablet Take 15 mg by mouth nightly. traMADol (ULTRAM) 50 mg tablet Take 50 mg by mouth every eight (8) hours as needed for Pain. Ustekinumab (STELARA) 90 mg/mL injection 90 mg by SubCUTAneous route. Every 8 weeks       oxyCODONE-acetaminophen (PERCOCET 10)  mg per tablet Take 1-2 Tabs by mouth every four (4) hours as needed. Max Daily Amount: 12 Tabs. Qty: 75 Tab, Refills: 0      promethazine (PHENERGAN) 25 mg tablet Take 25 mg by mouth every six (6) hours as needed for Nausea. ondansetron hcl (ZOFRAN, AS HYDROCHLORIDE,) 8 mg tablet Take 8 mg by mouth every twelve (12) hours as needed for Nausea. amitriptyline (ELAVIL) 10 mg tablet Take 30 mg by mouth nightly.       buPROPion SR (WELLBUTRIN SR) 150 mg SR tablet Take 150 mg by mouth two (2) times a day. omeprazole (PRILOSEC) 40 mg capsule Take 40 mg by mouth Daily (before dinner). prochlorperazine (COMPAZINE) 10 mg tablet Take 10 mg by mouth daily as needed for Nausea. dicyclomine (BENTYL) 10 mg capsule Take 10 mg by mouth every six (6) hours as needed. ergocalciferol (ERGOCALCIFEROL) 50,000 unit capsule Take 50,000 Units by mouth every seven (7) days. pregabalin (LYRICA) 100 mg capsule Take 100 mg by mouth three (3) times daily. cyanocobalamin (VITAMIN B-12) 1,000 mcg/mL injection 1,000 mcg by IntraMUSCular route every seven (7) days. Indications: Once weekly      temazepam (RESTORIL) 30 mg capsule Take 30 mg by mouth nightly. diazepam (VALIUM) 5 mg tablet Take 5 mg by mouth every eight (8) hours as needed (bowel relaxation). Follow up Care:    Kellie Muller MD in 1-2 weeks  2. Diet:  Regular Diet    Disposition:  Home. Advanced Directive:    Discharge Exam:  See today's note. CONSULTATIONS: None    Significant Diagnostic Studies:   Recent Labs      04/04/17   0331 04/02/17   2358   WBC  10.4  15.0*   HGB  10.0*  10.9*   HCT  30.9*  34.3*   PLT  243  284     Recent Labs      04/04/17   0331  04/02/17   2358   NA  141  137   K  3.9  4.1   CL  105  101   CO2  28  29   BUN  5*  7   CREA  0.71  1.26   GLU  101*  100   CA  8.4*  8.6   MG  1.3*   --      Recent Labs      04/04/17   0331  04/02/17   2358   SGOT  17  23   ALT  29  51   AP  96  131*   TBILI  0.1*  0.2   TP  5.8*  6.7   ALB  2.3*  3.1*   GLOB  3.5  3.6   LPSE   --   63*     No results for input(s): INR, PTP, APTT in the last 72 hours. No lab exists for component: INREXT   No results for input(s): FE, TIBC, PSAT, FERR in the last 72 hours. No results for input(s): PH, PCO2, PO2 in the last 72 hours. No results for input(s): CPK, CKMB in the last 72 hours.     No lab exists for component: TROPONINI  Lab Results Component Value Date/Time    Glucose (POC) 92 03/21/2017 06:25 AM    Glucose (POC) 111 03/20/2017 10:27 PM    Glucose (POC) 100 03/20/2017 05:19 PM    Glucose (POC) 140 03/20/2017 12:05 PM    Glucose (POC) 130 03/20/2017 06:36 AM             HOSPITAL COURSE & TREATMENT RENDERED:   1. HCAP (healthcare-associated pneumonia): pt has been having nonproductive cough recently. Evident on CT chest with hypoxia. On broad spectrum AB. PNA workup (Legionella and Strep Pneumo urine ag are negative)     2. Acute hypoxic respiratory failure. Likely secondary to above. Resolved.       3. Abdominal pain / Crohn's colitis ([address]): appears to have chronic pain. Cont home PO opioids PRN (Percocet 10's). Avoid IV narcotics. cont Bentyl PRN.     4. SIRS (systemic inflammatory response syndrome) ([address]): due to HCAP, pain. No fevers. Not sepsis. Continue ABx as above.       5. Depression with anxiety: likely playing a role in chronic pain. cont Elavil, Wellbutrin, Remeron, Temazepam      6. Anemia (12/25/2016): likely from ACD.  Monitor H/H.      Pt is discharged in improved condition       Signed:  Cj Sandoval MD  4/4/2017  3:45 PM

## 2017-04-04 NOTE — PROGRESS NOTES
I have reviewed discharge instructions with the patient. The patient verbalized understanding. Pt alone in room at time of discharge waiting for spouse to be here soon. IV removed, belongings gathered. Pt given RX and discharge instructions.

## 2017-04-04 NOTE — PROGRESS NOTES
4/4/2017 5:21 PM SW met with this pt. Plan is for this pt to return to their home at discharge. Pt is followed by Dr. Stephanie Felipe for primary care. Pt reports that his family will transport him home. No CM needs.    Fiona Cox, NOAMW

## 2017-04-04 NOTE — PROGRESS NOTES
Bedside and Verbal shift change report given to James Mascorro RN (oncoming nurse) by Flory Thompson RN (offgoing nurse). Report included the following information SBAR, Kardex, MAR and Accordion.

## 2017-04-04 NOTE — PROGRESS NOTES
Bedside and Verbal shift change report given to Lynn Sadler (oncoming nurse) by Anya Figueroa (offgoing nurse).  Report included the following information SBAR, Kardex, Intake/Output, MAR, Recent Results and Cardiac Rhythm ST.

## 2017-04-04 NOTE — DISCHARGE INSTRUCTIONS
ACUTE DIAGNOSES:  HCAP (healthcare-associated pneumonia)    CHRONIC MEDICAL DIAGNOSES:  Problem List as of 4/4/2017  Date Reviewed: 4/3/2017          Codes Class Noted - Resolved    HCAP (healthcare-associated pneumonia) ICD-10-CM: J18.9  ICD-9-CM: 486  4/3/2017 - Present        Small bowel obstruction ([place] [address].) ICD-10-CM: K56.69  ICD-9-CM: 560.9  3/9/2017 - Present        SBO (small bowel obstruction) ([facility] ICD-10-CM: K56.69  ICD-9-CM: 560.9  3/7/2017 - Present        Constipation ICD-10-CM: K59.00  ICD-9-CM: 564.00  1/15/2017 - Present        Intractable vomiting with nausea ICD-10-CM: R11.2  ICD-9-CM: 536.2  1/1/2017 - Present        Depression with anxiety ICD-10-CM: F41.8  ICD-9-CM: 300.4  12/25/2016 - Present        Anemia ICD-10-CM: D64.9  ICD-9-CM: 285.9  12/25/2016 - Present        Dehydration ICD-10-CM: E86.0  ICD-9-CM: 276.51  12/25/2016 - Present        S/P laparoscopic cholecystectomy ICD-10-CM: Z90.49  ICD-9-CM: V45.89  12/21/2016 - Present    Overview Signed 12/21/2016 12:56 PM by Catalina Torre MD     With C.              Crohn's colitis ([place] [address].) ICD-10-CM: K50.10  ICD-9-CM: 555.1  12/18/2016 - Present        Abdominal pain ICD-10-CM: R10.9  ICD-9-CM: 789.00  11/29/2016 - Present        GERD (gastroesophageal reflux disease) (Chronic) ICD-10-CM: K21.9  ICD-9-CM: 530.81  11/20/2016 - Present        RESOLVED: SIRS (systemic inflammatory response syndrome) ([facility] ICD-10-CM: R65.10  ICD-9-CM: 995.90  4/3/2017 - 4/4/2017        RESOLVED: Insomnia ICD-10-CM: G47.00  ICD-9-CM: 780.52  12/25/2016 - 1/1/2017        RESOLVED: Hypernatremia ICD-10-CM: E87.0  ICD-9-CM: 276.0  12/25/2016 - 1/1/2017        RESOLVED: Biliary dyskinesia ICD-10-CM: K82.8  ICD-9-CM: 575.8  12/21/2016 - 12/25/2016        RESOLVED: Gallbladder sludge ICD-10-CM: K82.8  ICD-9-CM: 575.8  12/20/2016 - 12/25/2016        RESOLVED: Hypokalemia ICD-10-CM: E87.6  ICD-9-CM: 276.8  12/18/2016 - 1/1/2017        RESOLVED: Leukocytosis ICD-10-CM: T27.890  ICD-9-CM: 288.60  12/18/2016 - 12/25/2016        RESOLVED: Crohn's disease ([facility] ICD-10-CM: K50.90  ICD-9-CM: 555.9  11/20/2016 - 12/25/2016        RESOLVED: Intractable abdominal pain ICD-10-CM: R10.9  ICD-9-CM: 789.00  11/20/2016 - 1/1/2017        RESOLVED: Intractable nausea and vomiting ICD-10-CM: R11.2  ICD-9-CM: 536.2  11/20/2016 - 12/25/2016        RESOLVED: Enterostomy malfunction ([facility] (Chronic) ICD-10-CM: K94.13  ICD-9-CM: 569.62  10/29/2015 - 11/21/2016        RESOLVED: Ileostomy dysfunction ([facility] ICD-10-CM: R69.90  ICD-9-CM: 569.62  10/29/2015 - 11/21/2016        RESOLVED: Skin lesion of back ICD-10-CM: L98.9  ICD-9-CM: 709.9  3/17/2014 - 11/21/2016        RESOLVED: Ileal pouchitis ([facility] ICD-10-CM: Z07.380  ICD-9-CM: 569.71  5/1/2004 - 11/21/2016              DISCHARGE MEDICATIONS:          · It is important that you take the medication exactly as they are prescribed. · Keep your medication in the bottles provided by the pharmacist and keep a list of the medication names, dosages, and times to be taken in your wallet. · Do not take other medications without consulting your doctor. DIET:  Regular Diet    ACTIVITY: Activity as tolerated    ADDITIONAL INFORMATION: If you experience any of the following symptoms then please call your primary care physician or return to the emergency room if you cannot get hold of your doctor: Fever, chills, nausea, vomiting, diarrhea, change in mentation, falling, bleeding, shortness of breath. FOLLOW UP CARE:  Dr. Ebony Welch MD  you are to call and set up an appointment to see them in 2 weeks. Information obtained by :  I understand that if any problems occur once I am at home I am to contact my physician. I understand and acknowledge receipt of the instructions indicated above.                                                                                                                                            Physician's or R.N.'s Signature                                                                  Date/Time                                                                                                                                              Patient or Representative Signature                                                          Date/Time

## 2017-04-04 NOTE — PROGRESS NOTES
Bedside and Verbal shift change report given to Dakotah Driscoll RN and Liz Sung RN (oncoming nurse) by Edward Calzada RN (offgoing nurse). Report included the following information SBAR and Kardex.

## 2017-04-04 NOTE — PROGRESS NOTES
Xavier Corbett [facility]  [address] [facility] [place], [place], [address] [place] [address]  [phone]      Medical Progress Note      NAME: Kwesi Dyer   :  1975  MRM:  [id number]    Date/Time: 2017  9:13 AM       Assessment and Plan:   1. HCAP (healthcare-associated pneumonia): pt has been having nonproductive cough recently. Evident on CT chest with hypoxia. On broad spectrum AB. Check sputum culture, PNA workup (Legionella and Strep Pneumo urine ag, Mycoplasma IgM)    2. Acute hypoxic respiratory failure. Likely secondary to above. Continue supplement O2 to keep SAO2 > 90%     3.  Abdominal pain / Crohn's colitis ([facility]): appears to have chronic pain. Cont home PO opioids PRN (Percocet 10's). Avoid IV narcotics. cont Bentyl PRN.       4. SIRS (systemic inflammatory response syndrome) ([facility]): due to HCAP, pain. No fevers. Not sepsis. Continue ABx as above. 5.  Depression with anxiety: likely playing a role in chronic pain. cont Elavil, Wellbutrin, Remeron, Temazepam     6. Anemia (2016): likely from ACD. Monitor H/H.               Subjective:     Chief Complaint:  Cough, nonproductive. .     ROS:  (bold if positive, if negative)    Cough   Tolerating PT  Tolerating Diet        Objective:     Last 24hrs VS reviewed since prior progress note.  Most recent are:    Visit Vitals    /61 (BP 1 Location: Right arm, BP Patient Position: At rest)    Pulse (!) 123    Temp 97.7 °F (36.5 °C)    Resp 16    Ht 6' (1.829 m)    Wt 88.5 kg (195 lb)    SpO2 96%    BMI 26.45 kg/m2     SpO2 Readings from Last 6 Encounters:   17 96%   17 97%   17 98%   17 100%   17 94%   17 100%    O2 Flow Rate (L/min): 2 l/min       Intake/Output Summary (Last 24 hours) at 17 0954  Last data filed at 17 0754   Gross per 24 hour   Intake              350 ml   Output             4600 ml   Net            -4250 ml        Physical Exam:    Gen:  Well-developed, well-nourished, in no acute distress  HEENT:  Pink conjunctivae, PERRL, hearing intact to voice, moist mucous membranes  Neck:  Supple, without masses, thyroid non-tender  Resp:  No accessory muscle use, rales BL  Card:  No murmurs, normal S1, S2 without thrills, bruits or peripheral edema  Abd:  Soft, non-tender, non-distended, normoactive bowel sounds are present, no palpable organomegaly and no detectable hernias  Lymph:  No cervical or inguinal adenopathy  Musc:  No cyanosis or clubbing  Skin:  No rashes or ulcers, skin turgor is good  Neuro:  Cranial nerves are grossly intact, no focal motor weakness, follows commands appropriately  Psych:  Good insight, oriented to person, place and time, alert  __________________________________________________________________  Medications Reviewed: (see below)  Medications:     Current Facility-Administered Medications   Medication Dose Route Frequency    sodium chloride (NS) flush 5-10 mL  5-10 mL IntraVENous Q8H    sodium chloride (NS) flush 5-10 mL  5-10 mL IntraVENous PRN    naloxone (NARCAN) injection 0.4 mg  0.4 mg IntraVENous PRN    vancomycin (VANCOCIN) 1,250 mg in 0.9% sodium chloride 250 mL IVPB  1,250 mg IntraVENous Q12H    buPROPion SR (WELLBUTRIN SR) tablet 150 mg  150 mg Oral BID    diazePAM (VALIUM) tablet 5 mg  5 mg Oral Q8H PRN    dicyclomine (BENTYL) capsule 10 mg  10 mg Oral Q6H PRN    pantoprazole (PROTONIX) tablet 40 mg  40 mg Oral ACD    pregabalin (LYRICA) capsule 100 mg  100 mg Oral TID    prochlorperazine (COMPAZINE) tablet 10 mg  10 mg Oral Q6H PRN    promethazine (PHENERGAN) tablet 25 mg  25 mg Oral Q6H PRN    piperacillin-tazobactam (ZOSYN) 3.375 g in 0.9% sodium chloride (MBP/ADV) 100 mL  3.375 g IntraVENous Q8H    levoFLOXacin (LEVAQUIN) 750 mg in D5W IVPB  750 mg IntraVENous Q24H    enoxaparin (LOVENOX) injection 40 mg  40 mg SubCUTAneous Q24H    guaiFENesin ER (MUCINEX) tablet 600 mg  600 mg Oral Q12H    oxyCODONE-acetaminophen (PERCOCET 10)  mg per tablet 1 Tab  1 Tab Oral Q4H PRN    Or    oxyCODONE-acetaminophen (PERCOCET 10)  mg per tablet 2 Tab  2 Tab Oral Q4H PRN    cyanocobalamin (VITAMIN B12) injection 1,000 mcg  1,000 mcg IntraMUSCular Q7D    ergocalciferol (ERGOCALCIFEROL) capsule 50,000 Units  50,000 Units Oral Q7D    traMADol (ULTRAM) tablet 50 mg  50 mg Oral Q8H PRN    amitriptyline (ELAVIL) tablet 30 mg  30 mg Oral QHS    mirtazapine (REMERON) tablet 15 mg  15 mg Oral QHS    temazepam (RESTORIL) capsule 30 mg  30 mg Oral QHS        Lab Data Reviewed: (see below)  Lab Review:     Recent Labs      04/04/17 0331 04/02/17   2358   WBC  10.4  15.0*   HGB  10.0*  10.9*   HCT  30.9*  34.3*   PLT  243  284     Recent Labs      04/04/17 0331 04/02/17   2358   NA  141  137   K  3.9  4.1   CL  105  101   CO2  28  29   GLU  101*  100   BUN  5*  7   CREA  0.71  1.26   CA  8.4*  8.6   MG  1.3*   --    ALB  2.3*  3.1*   TBILI  0.1*  0.2   SGOT  17  23   ALT  29  51     Lab Results   Component Value Date/Time    Glucose (POC) 92 03/21/2017 06:25 AM    Glucose (POC) 111 03/20/2017 10:27 PM    Glucose (POC) 100 03/20/2017 05:19 PM    Glucose (POC) 140 03/20/2017 12:05 PM    Glucose (POC) 130 03/20/2017 06:36 AM     No results for input(s): PH, PCO2, PO2, HCO3, FIO2 in the last 72 hours. No results for input(s): INR in the last 72 hours. No lab exists for component: INREXT, INREXT  All Micro Results     Procedure Component Value Units Date/Time    CULTURE, RESPIRATORY/SPUTUM/BRONCH Yadira Collins [[id number]] Collected:  04/04/17 0116    Order Status:  Completed Specimen:  Sputum Updated:  04/04/17 0856    LEGIONELLA PNEUMOPHILA AG, URINE  [[id number]] Collected:  04/03/17 0309    Order Status:  Completed Specimen:  Urine from Urine Updated:  04/03/17 0329    LAUREN Alvaradogabriel Too ONLY [[id number]] Collected:  04/03/17 0309    Order Status:  Completed Specimen:  Other Updated:  04/03/17 0328    MYCOPLASMA AB, IGG/IGM [[id number]] Collected:  04/02/17 2358    Order Status:  Completed Specimen:  Serum Updated:  04/03/17 0257    INFLUENZA A & B AG (RAPID TEST) [[id number]] Collected:  04/02/17 2358    Order Status:  Completed Specimen:  Nasopharyngeal from Nasal washing Updated:  04/03/17 0030     Influenza A Antigen NEGATIVE         Influenza B Antigen NEGATIVE              I have reviewed notes of prior 24hr. Other pertinent lab:       Total time spent with patient: [address] discussed with: Patient, Nursing Staff and >50% of time spent in counseling and coordination of care    Discussed:  Care Plan    Prophylaxis:  Lovenox    Disposition:  Home w/Family           ___________________________________________________    Attending Physician: Pavel Joshi MD

## 2017-04-07 LAB
BACTERIA SPEC CULT: ABNORMAL
BACTERIA SPEC CULT: ABNORMAL
GRAM STN SPEC: ABNORMAL
SERVICE CMNT-IMP: ABNORMAL

## 2017-04-21 ENCOUNTER — HOSPITAL ENCOUNTER (EMERGENCY)
Age: 42
Discharge: HOME | End: 2017-04-22
Attending: EMERGENCY MEDICINE
Payer: COMMERCIAL

## 2017-04-21 ENCOUNTER — APPOINTMENT (OUTPATIENT)
Dept: CT IMAGING | Age: 42
End: 2017-04-21
Attending: NURSE PRACTITIONER
Payer: COMMERCIAL

## 2017-04-21 DIAGNOSIS — F41.8 DEPRESSION WITH ANXIETY: ICD-10-CM

## 2017-04-21 DIAGNOSIS — R11.0 NAUSEA WITHOUT VOMITING: ICD-10-CM

## 2017-04-21 DIAGNOSIS — Z79.891 CHRONIC PRESCRIPTION OPIATE USE: ICD-10-CM

## 2017-04-21 DIAGNOSIS — R10.9 CHRONIC ABDOMINAL PAIN: Primary | ICD-10-CM

## 2017-04-21 DIAGNOSIS — G89.29 CHRONIC ABDOMINAL PAIN: Primary | ICD-10-CM

## 2017-04-21 LAB
ALBUMIN SERPL BCP-MCNC: 3.3 G/DL (ref 3.5–5)
ALBUMIN/GLOB SERPL: 1 {RATIO} (ref 1.1–2.2)
ALP SERPL-CCNC: 95 U/L (ref 45–117)
ALT SERPL-CCNC: 47 U/L (ref 12–78)
ANION GAP BLD CALC-SCNC: 9 MMOL/L (ref 5–15)
AST SERPL W P-5'-P-CCNC: 25 U/L (ref 15–37)
BASOPHILS # BLD AUTO: 0 K/UL (ref 0–0.1)
BASOPHILS # BLD: 0 % (ref 0–1)
BILIRUB SERPL-MCNC: 0.2 MG/DL (ref 0.2–1)
BUN SERPL-MCNC: 11 MG/DL (ref 6–20)
BUN/CREAT SERPL: 10 (ref 12–20)
CALCIUM SERPL-MCNC: 8.5 MG/DL (ref 8.5–10.1)
CHLORIDE SERPL-SCNC: 105 MMOL/L (ref 97–108)
CO2 SERPL-SCNC: 26 MMOL/L (ref 21–32)
CREAT SERPL-MCNC: 1.13 MG/DL (ref 0.7–1.3)
EOSINOPHIL # BLD: 0.1 K/UL (ref 0–0.4)
EOSINOPHIL NFR BLD: 1 % (ref 0–7)
ERYTHROCYTE [DISTWIDTH] IN BLOOD BY AUTOMATED COUNT: 13.5 % (ref 11.5–14.5)
GLOBULIN SER CALC-MCNC: 3.3 G/DL (ref 2–4)
GLUCOSE SERPL-MCNC: 139 MG/DL (ref 65–100)
HCT VFR BLD AUTO: 33.4 % (ref 36.6–50.3)
HGB BLD-MCNC: 11 G/DL (ref 12.1–17)
LACTATE SERPL-SCNC: 2.1 MMOL/L (ref 0.4–2)
LYMPHOCYTES # BLD AUTO: 24 % (ref 12–49)
LYMPHOCYTES # BLD: 2.1 K/UL (ref 0.8–3.5)
MCH RBC QN AUTO: 28.6 PG (ref 26–34)
MCHC RBC AUTO-ENTMCNC: 32.9 G/DL (ref 30–36.5)
MCV RBC AUTO: 86.8 FL (ref 80–99)
MONOCYTES # BLD: 0.6 K/UL (ref 0–1)
MONOCYTES NFR BLD AUTO: 7 % (ref 5–13)
NEUTS SEG # BLD: 6 K/UL (ref 1.8–8)
NEUTS SEG NFR BLD AUTO: 68 % (ref 32–75)
PLATELET # BLD AUTO: 257 K/UL (ref 150–400)
POTASSIUM SERPL-SCNC: 4 MMOL/L (ref 3.5–5.1)
PROT SERPL-MCNC: 6.6 G/DL (ref 6.4–8.2)
RBC # BLD AUTO: 3.85 M/UL (ref 4.1–5.7)
SODIUM SERPL-SCNC: 140 MMOL/L (ref 136–145)
WBC # BLD AUTO: 8.8 K/UL (ref 4.1–11.1)

## 2017-04-21 PROCEDURE — 85025 COMPLETE CBC W/AUTO DIFF WBC: CPT | Performed by: NURSE PRACTITIONER

## 2017-04-21 PROCEDURE — 96376 TX/PRO/DX INJ SAME DRUG ADON: CPT

## 2017-04-21 PROCEDURE — 74011250636 HC RX REV CODE- 250/636: Performed by: NURSE PRACTITIONER

## 2017-04-21 PROCEDURE — 83605 ASSAY OF LACTIC ACID: CPT | Performed by: NURSE PRACTITIONER

## 2017-04-21 PROCEDURE — 80053 COMPREHEN METABOLIC PANEL: CPT | Performed by: NURSE PRACTITIONER

## 2017-04-21 PROCEDURE — 99284 EMERGENCY DEPT VISIT MOD MDM: CPT

## 2017-04-21 PROCEDURE — 96361 HYDRATE IV INFUSION ADD-ON: CPT

## 2017-04-21 PROCEDURE — 74177 CT ABD & PELVIS W/CONTRAST: CPT

## 2017-04-21 PROCEDURE — 96374 THER/PROPH/DIAG INJ IV PUSH: CPT

## 2017-04-21 PROCEDURE — 36415 COLL VENOUS BLD VENIPUNCTURE: CPT | Performed by: NURSE PRACTITIONER

## 2017-04-21 PROCEDURE — 96375 TX/PRO/DX INJ NEW DRUG ADDON: CPT

## 2017-04-21 RX ORDER — MORPHINE SULFATE 2 MG/ML
2 INJECTION, SOLUTION INTRAMUSCULAR; INTRAVENOUS
Status: COMPLETED | OUTPATIENT
Start: 2017-04-21 | End: 2017-04-21

## 2017-04-21 RX ORDER — PROCHLORPERAZINE EDISYLATE 5 MG/ML
5 INJECTION INTRAMUSCULAR; INTRAVENOUS ONCE
Status: COMPLETED | OUTPATIENT
Start: 2017-04-21 | End: 2017-04-21

## 2017-04-21 RX ADMIN — SODIUM CHLORIDE 1000 ML: 900 INJECTION, SOLUTION INTRAVENOUS at 23:32

## 2017-04-21 RX ADMIN — PROCHLORPERAZINE EDISYLATE 5 MG: 5 INJECTION INTRAMUSCULAR; INTRAVENOUS at 23:32

## 2017-04-21 RX ADMIN — Medication 2 MG: at 23:32

## 2017-04-22 VITALS
RESPIRATION RATE: 18 BRPM | HEIGHT: 72 IN | SYSTOLIC BLOOD PRESSURE: 119 MMHG | OXYGEN SATURATION: 97 % | WEIGHT: 213.85 LBS | DIASTOLIC BLOOD PRESSURE: 73 MMHG | HEART RATE: 95 BPM | BODY MASS INDEX: 28.96 KG/M2 | TEMPERATURE: 98.1 F

## 2017-04-22 PROCEDURE — 74011636320 HC RX REV CODE- 636/320: Performed by: RADIOLOGY

## 2017-04-22 PROCEDURE — 74011250636 HC RX REV CODE- 250/636: Performed by: NURSE PRACTITIONER

## 2017-04-22 RX ORDER — MORPHINE SULFATE 2 MG/ML
2 INJECTION, SOLUTION INTRAMUSCULAR; INTRAVENOUS
Status: COMPLETED | OUTPATIENT
Start: 2017-04-22 | End: 2017-04-22

## 2017-04-22 RX ADMIN — IOPAMIDOL 100 ML: 755 INJECTION, SOLUTION INTRAVENOUS at 00:03

## 2017-04-22 RX ADMIN — SODIUM CHLORIDE 1000 ML: 900 INJECTION, SOLUTION INTRAVENOUS at 00:38

## 2017-04-22 RX ADMIN — Medication 2 MG: at 00:22

## 2017-04-22 NOTE — DISCHARGE INSTRUCTIONS
We hope that we have addressed all of your medical concerns. The examination and treatment you received in the Emergency Department were for an emergent problem and were not intended as complete care. It is important that you follow up with your healthcare provider(s) for ongoing care. If your symptoms worsen or do not improve as expected, and you are unable to reach your usual health care provider(s), you should return to the Emergency Department. Today's healthcare is undergoing tremendous change, and patient satisfaction surveys are one of the many tools to assess the quality of medical care. You may receive a survey from the [employer] regarding your experience in the Emergency Department. I hope that your experience has been completely positive, particularly the medical care that I provided. As such, please participate in the survey; anything less than excellent does not meet my expectations or intentions. Formerly [facility], [facility] and [address] [place] [address] participate in nationally recognized quality of care measures. If your blood pressure is greater than 120/80, as reported below, we urge that you seek medical care to address the potential of high blood pressure, commonly known as hypertension. Hypertension can be hereditary or can be caused by certain medical conditions, pain, stress, or \"white coat syndrome. \"       Please make an appointment with your health care provider(s) for follow up of your Emergency Department visit. VITALS:   Patient Vitals for the past 8 hrs:   Temp Pulse Resp BP SpO2   04/21/17 2335 - - - - 95 %   04/21/17 2334 - - - 115/69 -   04/21/17 2237 98.7 °F (37.1 °C) (!) 124 19 - 97 %          Thank you for allowing us to provide you with medical care today. We realize that you have many choices for your emergency care needs. Please choose us in the future for any continued health care needs. Yaneli Cornelius [place], [address] [place] [address].   Office: [phone]            Recent Results (from the past 24 hour(s))   CBC WITH AUTOMATED DIFF    Collection Time: 04/21/17 10:57 PM   Result Value Ref Range    WBC 8.8 4.1 - 11.1 K/uL    RBC 3.85 (L) 4.10 - 5.70 M/uL    HGB 11.0 (L) 12.1 - 17.0 g/dL    HCT 33.4 (L) 36.6 - 50.3 %    MCV 86.8 80.0 - 99.0 FL    MCH 28.6 26.0 - 34.0 PG    MCHC 32.9 30.0 - 36.5 g/dL    RDW 13.5 11.5 - 14.5 %    PLATELET 774 649 - 089 K/uL    NEUTROPHILS 68 32 - 75 %    LYMPHOCYTES 24 12 - 49 %    MONOCYTES 7 5 - 13 %    EOSINOPHILS 1 0 - 7 %    BASOPHILS 0 0 - 1 %    ABS. NEUTROPHILS 6.0 1.8 - 8.0 K/UL    ABS. LYMPHOCYTES 2.1 0.8 - 3.5 K/UL    ABS. MONOCYTES 0.6 0.0 - 1.0 K/UL    ABS. EOSINOPHILS 0.1 0.0 - 0.4 K/UL    ABS. BASOPHILS 0.0 0.0 - 0.1 K/UL   METABOLIC PANEL, COMPREHENSIVE    Collection Time: 04/21/17 10:57 PM   Result Value Ref Range    Sodium 140 136 - 145 mmol/L    Potassium 4.0 3.5 - 5.1 mmol/L    Chloride 105 97 - 108 mmol/L    CO2 26 21 - 32 mmol/L    Anion gap 9 5 - 15 mmol/L    Glucose 139 (H) 65 - 100 mg/dL    BUN 11 6 - 20 MG/DL    Creatinine 1.13 0.70 - 1.30 MG/DL    BUN/Creatinine ratio 10 (L) 12 - 20      GFR est AA >60 >60 ml/min/1.73m2    GFR est non-AA >60 >60 ml/min/1.73m2    Calcium 8.5 8.5 - 10.1 MG/DL    Bilirubin, total 0.2 0.2 - 1.0 MG/DL    ALT (SGPT) 47 12 - 78 U/L    AST (SGOT) 25 15 - 37 U/L    Alk. phosphatase 95 45 - 117 U/L    Protein, total 6.6 6.4 - 8.2 g/dL    Albumin 3.3 (L) 3.5 - 5.0 g/dL    Globulin 3.3 2.0 - 4.0 g/dL    A-G Ratio 1.0 (L) 1.1 - 2.2     LACTIC ACID, PLASMA    Collection Time: 04/21/17 10:57 PM   Result Value Ref Range    Lactic acid 2.1 (HH) 0.4 - 2.0 MMOL/L              Abdominal Pain: Care Instructions  Your Care Instructions    Abdominal pain has many possible causes. Some aren't serious and get better on their own in a few days. Others need more testing and treatment.  If your pain continues or gets worse, you need to be rechecked and may need more tests to find out what is wrong. You may need surgery to correct the problem. Don't ignore new symptoms, such as fever, nausea and vomiting, urination problems, pain that gets worse, and dizziness. These may be signs of a more serious problem. Your doctor may have recommended a follow-up visit in the next 8 to 12 hours. If you are not getting better, you may need more tests or treatment. The doctor has checked you carefully, but problems can develop later. If you notice any problems or new symptoms, get medical treatment right away. Follow-up care is a key part of your treatment and safety. Be sure to make and go to all appointments, and call your doctor if you are having problems. It's also a good idea to know your test results and keep a list of the medicines you take. How can you care for yourself at home? · Rest until you feel better. · To prevent dehydration, drink plenty of fluids, enough so that your urine is light yellow or clear like water. Choose water and other caffeine-free clear liquids until you feel better. If you have kidney, heart, or liver disease and have to limit fluids, talk with your doctor before you increase the amount of fluids you drink. · If your stomach is upset, eat mild foods, such as rice, dry toast or crackers, bananas, and applesauce. Try eating several small meals instead of two or three large ones. · Wait until 48 hours after all symptoms have gone away before you have spicy foods, alcohol, and drinks that contain caffeine. · Do not eat foods that are high in fat. · Avoid anti-inflammatory medicines such as aspirin, ibuprofen (Advil, Motrin), and naproxen (Aleve). These can cause stomach upset. Talk to your doctor if you take daily aspirin for another health problem. When should you call for help? Call 911 anytime you think you may need emergency care. For example, call if:  · You passed out (lost consciousness).   · You pass maroon or very bloody stools. · You vomit blood or what looks like coffee grounds. · You have new, severe belly pain. Call your doctor now or seek immediate medical care if:  · Your pain gets worse, especially if it becomes focused in one area of your belly. · You have a new or higher fever. · Your stools are black and look like tar, or they have streaks of blood. · You have unexpected vaginal bleeding. · You have symptoms of a urinary tract infection. These may include:  ¨ Pain when you urinate. ¨ Urinating more often than usual.  ¨ Blood in your urine. · You are dizzy or lightheaded, or you feel like you may faint. Watch closely for changes in your health, and be sure to contact your doctor if:  · You are not getting better after 1 day (24 hours). Where can you learn more? Go to http://[facility].info/. Enter O828 in the search box to learn more about \"Abdominal Pain: Care Instructions. \"  Current as of: May 27, 2016  Content Version: 11.2  © 6491-4905 [employer]. Care instructions adapted under license by [employer] (which disclaims liability or warranty for this information). If you have questions about a medical condition or this instruction, always ask your healthcare professional. [address] any warranty or liability for your use of this information. Chronic Pain: Care Instructions  Your Care Instructions  Chronic pain is pain that lasts a long time (months or even years) and may or may not have a clear cause. It is different from acute pain, which usually does have a clear cause--like an injury or illness--and gets better over time. Chronic pain:  · Lasts over time but may vary from day to day. · Does not go away despite efforts to end it. · May disrupt your sleep and lead to fatigue. · May cause depression or anxiety. · May make your muscles tense, causing more pain.   · Can disrupt your work, hobbies, home life, and relationships with friends and family. Chronic pain is a very real condition. It is not just in your head. Treatment can help and usually includes several methods used together, such as medicines, physical therapy, exercise, and other treatments. Learning how to relax and changing negative thought patterns can also help you cope. Chronic pain is complex. Taking an active role in your treatment will help you better manage your pain. Tell your doctor if you have trouble dealing with your pain. You may have to try several things before you find what works best for you. Follow-up care is a key part of your treatment and safety. Be sure to make and go to all appointments, and call your doctor if you are having problems. Its also a good idea to know your test results and keep a list of the medicines you take. How can you care for yourself at home? · Pace yourself. Break up large jobs into smaller tasks. Save harder tasks for days when you have less pain, or go back and forth between hard tasks and easier ones. Take rest breaks. · Relax, and reduce stress. Relaxation techniques such as deep breathing or meditation can help. · Keep moving. Gentle, daily exercise can help reduce pain over the long run. Try low- or no-impact exercises such as walking, swimming, and stationary biking. Do stretches to stay flexible. · Try heat, cold packs, and massage. · Get enough sleep. Chronic pain can make you tired and drain your energy. Talk with your doctor if you have trouble sleeping because of pain. · Think positive. Your thoughts can affect your pain level. Do things that you enjoy to distract yourself when you have pain instead of focusing on the pain. See a movie, read a book, listen to music, or spend time with a friend. · If you think you are depressed, talk to your doctor about treatment. · Keep a daily pain diary. Record how your moods, thoughts, sleep patterns, activities, and medicine affect your pain.  You may find that your pain is worse during or after certain activities or when you are feeling a certain emotion. Having a record of your pain can help you and your doctor find the best ways to treat your pain. · Take pain medicines exactly as directed. ¨ If the doctor gave you a prescription medicine for pain, take it as prescribed. ¨ If you are not taking a prescription pain medicine, ask your doctor if you can take an over-the-counter medicine. Reducing constipation caused by pain medicine  · Include fruits, vegetables, beans, and whole grains in your diet each day. These foods are high in fiber. · Drink plenty of fluids, enough so that your urine is light yellow or clear like water. If you have kidney, heart, or liver disease and have to limit fluids, talk with your doctor before you increase the amount of fluids you drink. · If your doctor recommends it, get more exercise. Walking is a good choice. Bit by bit, increase the amount you walk every day. Try for at least 30 minutes on most days of the week. · Schedule time each day for a bowel movement. A daily routine may help. Take your time and do not strain when having a bowel movement. When should you call for help? Call your doctor now or seek immediate medical care if:  · Your pain gets worse or is out of control. · You feel down or blue, or you do not enjoy things like you once did. You may be depressed, which is common in people with chronic pain. Depression can be treated. · You have vomiting or cramps for more than 2 hours. Watch closely for changes in your health, and be sure to contact your doctor if:  · You cannot sleep because of pain. · You are very worried or anxious about your pain. · You have trouble taking your pain medicine. · You have any concerns about your pain medicine. · You have trouble with bowel movements, such as:  ¨ No bowel movement in 3 days. ¨ Blood in the anal area, in your stool, or on the toilet paper.   ¨ Diarrhea for more than 24 hours. Where can you learn more? Go to http://[employer]u-ashley.info/. Enter N004 in the search box to learn more about \"Chronic Pain: Care Instructions. \"  Current as of: October 14, 2016  Content Version: 11.2  © 6586-2195 [employer]. Care instructions adapted under license by [employer] (which disclaims liability or warranty for this information). If you have questions about a medical condition or this instruction, always ask your healthcare professional. [address] any warranty or liability for your use of this information.

## 2017-04-22 NOTE — ED PROVIDER NOTES
HPI Comments: Yeimi Freitas is a 40 y.o. male with Hx significant for Crohn's disease, small bowel obstruction, ileostomy, ulcerative colitis, hiatal hernia, GERD, ileal pouchitis, anal stenosis, anal fistula, depression, chronic abdominal pain, and anxiety who presents to the ED with chief complaint of abdominal pain. Pt reports he has hx of Crohn's and has been having related issues for the past several months. He was hospitalized at [facility] for CAP at the beginning of this month. He states that today he has had intermittent worsened abdominal pain accompanied by nausea. He denies any vomiting. Pt states the contents of his ostomy bag have been \"inconsistent\" but he has had some liquid stool earlier which has decreased in output throughout the course of the evening. Pt states \"I am worried I have another blockage. \"  He reports no exacerbating or relieving factors. He states that he has taken Zofran, Compazine, Promethazine, and Oxycodone 10/325 at home THE [facility]. Pt states he has an ex lap 1 mos ago at [facility]. Denies any F/C, urinary complaints, decrease in appetite, cough, SOB, CP.     PCP: Randee Cortez MD  Surgery: Franco Wright MD and Dr. Maribell Cabral ([facility])  GI: Dr. Shaun Leonardo     There are no other complaints, changes or physical findings at this time. The history is provided by the patient. Past Medical History:   Diagnosis Date    Anal fistula     Anal stenosis     Crohn's disease ([address]     GERD (gastroesophageal reflux disease)     H/O ulcerative colitis     Symptoms began in 1996. Total proctocolectomy was performed in 2004. Pateint later developed Crohn's disease.     Hiatal hernia     Ileal pouchitis ([address] 5/2004    Nausea & vomiting     Combination of Scopalamine patch & Zofran IV worked well in past    Numbness in right leg     Psychiatric disorder     depression and anxiety    Skin lesion of back 3/17/2014    Syncopal episodes        Past Surgical History:   Procedure Laterality Date    ABDOMEN SURGERY PROC UNLISTED  3/25/2004    Total proctocolectomy with creation of ileoanal J-pouch and loop ileostomy; Dr. Gable Brunner.   CHOLECYSTECTOMY  12/2016    Dr. Adelaide Hunter  GI  5/2/2004    Examination under anesthesia with endoscopic evaluation of ileoanal pouch and placement of drain; Dr. Gable Brunner.   GI  5/25/2004    Ileostomy closure with small bowel resection and anastomosis; Dr. Gable Brunner.   GI  5/24/2012    Examination under anesthesia, anal dilatation, anal biopsy, and placement of draining seton; Dr. Gable Brunner.   GI  7/3/2012    Incision and drainage of perirectal abscess and rigid endoscopy of ileoanal pouch; Dr. Gable Brunner.   GI  7/31/2012    Incision and drainage of perirectal abscess, placement of draining seton, and anal dilatation; Dr. Gable Brunner.   GI  1/22/2013    Repair of anal fistulas with debridement, partial fistulectomy, and flap closure; Dr. Gable Brunner.   GI  5/17/2013    Examination under anesthesia, partial fistulotomy,  and placement of draining setons; Dr. Gable Brunner.   GI  8/6/2013    Anal fistulotomy and application of ACell micromatrix; Dr. Gable Brunner.   GI  2/20/2014    Examination under anesthesia and dilation of anal canal with rigid proctoscopy; Kwesi Easley MD.     GI  4/29/2015    Creation of Star Distel continent intestinal reservoir (BCIR), abdominoperineal resection of ileoanal J-pouch; lysis of adhesions, and gastrostomy; Susana Robledo MD ([place], [place])     GI  8/7/2015    Stoma revision; Susana Robledo MD ([place], [place])     GI  10/29/2015    Extensive lysis of adhesions, resection of continent intestinal reservoir, repair of serosal tears, and creation of end-ileostomy; Dr. Gable Brunner.   GI  03/14/2017    Laparotomy, extensive lysis of adhesions and revision of ileostomy; Dr. Gable Brunner.      ORTHOPAEDIC  2008    Left ACL repair    HX UROLOGICAL  03/14/2017 Cystoscopy and placement of bilateral temporary ureteral catheters; Anika Oneill MD.         Family History:   Problem Relation Age of Onset    Cancer Father     Asthma Neg Hx     Diabetes Neg Hx     Heart Disease Neg Hx     Hypertension Neg Hx     Stroke Neg Hx     Malignant Hyperthermia Neg Hx     Pseudocholinesterase Deficiency Neg Hx     Delayed Awakening Neg Hx     Post-op Nausea/Vomiting Neg Hx        Social History     Social History    Marital status:      Spouse name: N/A    Number of children: N/A    Years of education: N/A     Occupational History    Not on file. Social History Main Topics    Smoking status: Former Smoker     Packs/day: 0.00     Years: 7.00     Quit date: 1/14/2017    Smokeless tobacco: Never Used    Alcohol use No    Drug use: No    Sexual activity: Not on file     Other Topics Concern    Not on file     Social History Narrative         ALLERGIES: Remicade [infliximab]; Bactrim [sulfamethoprim ds]; and Nsaids (non-steroidal anti-inflammatory drug)    Review of Systems   Constitutional: Negative for activity change, appetite change, chills and fever. HENT: Negative for congestion, rhinorrhea, sinus pressure, sneezing and sore throat. Eyes: Negative for pain, discharge and visual disturbance. Respiratory: Negative for cough and shortness of breath. Cardiovascular: Negative for chest pain. Gastrointestinal: Positive for abdominal pain and nausea. Negative for diarrhea and vomiting. Genitourinary: Negative for dysuria, flank pain, frequency and urgency. Musculoskeletal: Negative for arthralgias, back pain, gait problem, joint swelling, myalgias and neck pain. Skin: Negative for color change and rash. Neurological: Negative for dizziness, speech difficulty, weakness, light-headedness, numbness and headaches. Psychiatric/Behavioral: Negative for agitation, behavioral problems and confusion.    All other systems reviewed and are negative. Vitals:    04/21/17 2237 04/21/17 2334 04/21/17 2335 04/22/17 0056   BP:  115/69  119/73   Pulse: (!) 124   95   Resp: 19   18   Temp: 98.7 °F (37.1 °C)   98.1 °F (36.7 °C)   SpO2: 97%  95% 97%   Weight: 97 kg (213 lb 13.5 oz)      Height: 6' (1.829 m)               Physical Exam   Constitutional: He is oriented to person, place, and time. He appears well-developed and well-nourished. No distress. HENT:   Head: Normocephalic and atraumatic. Right Ear: External ear normal.   Left Ear: External ear normal.   Nose: Nose normal.   Mouth/Throat: Oropharynx is clear and moist. No oropharyngeal exudate. Eyes: Conjunctivae and EOM are normal. Pupils are equal, round, and reactive to light. Neck: Normal range of motion. Neck supple. Cardiovascular: Regular rhythm, normal heart sounds and intact distal pulses. Tachycardia present. Pulmonary/Chest: Effort normal and breath sounds normal.   Abdominal: Soft. Bowel sounds are normal. He exhibits no distension. There is no tenderness. There is no rebound and no guarding. Midline healed incision noted to abdomen   Ostomy present in R-middle quadrant, moderate amount of liquid stool present in bag     Musculoskeletal: Normal range of motion. Neurological: He is alert and oriented to person, place, and time. Skin: Skin is warm and dry. Psychiatric: He has a normal mood and affect. His behavior is normal. Judgment and thought content normal.   Nursing note and vitals reviewed. MDM  Number of Diagnoses or Management Options  Chronic abdominal pain:   Chronic prescription opiate use:   Depression with anxiety:   Nausea without vomiting:   Diagnosis management comments: DDx: SBO, chronic abdominal pain, adhesions, opiate induced constipation, ileus     41 yo M with extensive GI hx presents with abdominal pain.    CT scan read as ileus vs SBO, Dr. Janeth Hawthorne (colorectal surgery) reviewed the scan and reports does not appreciate SBO and pt likely has chronic ileus which is exacerbated by pt opioid dependence and recent intake of \"roughage\" as pt reported. Pt asking RN for IV Dilaudid throughout visit. Advised pt to stick to pain medication agreement as per Dr. Dunn Mention for opiate taper and to maintain dietary adherence. Pt agreeable. Has PCP f/u this week per his report. VSS at time of d/c. Amount and/or Complexity of Data Reviewed  Clinical lab tests: ordered and reviewed  Tests in the radiology section of CPT®: ordered and reviewed  Review and summarize past medical records: yes  Discuss the patient with other providers: yes      ED Course       Procedures     12:42 AM   Dr. Sagar Nolasco consulted. He reviewed the patient's CT scan. Reports no SBO as stool/gas present in pt ostomy bag, likely chronic ileus. No surgical intervention warranted. Pt with opiate taper agreement to follow, discussed at office visit. Advised to encourage pt towards less narcotic dependence and dietary adherence. LABORATORY TESTS:  Recent Results (from the past 12 hour(s))   CBC WITH AUTOMATED DIFF    Collection Time: 04/21/17 10:57 PM   Result Value Ref Range    WBC 8.8 4.1 - 11.1 K/uL    RBC 3.85 (L) 4.10 - 5.70 M/uL    HGB 11.0 (L) 12.1 - 17.0 g/dL    HCT 33.4 (L) 36.6 - 50.3 %    MCV 86.8 80.0 - 99.0 FL    MCH 28.6 26.0 - 34.0 PG    MCHC 32.9 30.0 - 36.5 g/dL    RDW 13.5 11.5 - 14.5 %    PLATELET 675 521 - 076 K/uL    NEUTROPHILS 68 32 - 75 %    LYMPHOCYTES 24 12 - 49 %    MONOCYTES 7 5 - 13 %    EOSINOPHILS 1 0 - 7 %    BASOPHILS 0 0 - 1 %    ABS. NEUTROPHILS 6.0 1.8 - 8.0 K/UL    ABS. LYMPHOCYTES 2.1 0.8 - 3.5 K/UL    ABS. MONOCYTES 0.6 0.0 - 1.0 K/UL    ABS. EOSINOPHILS 0.1 0.0 - 0.4 K/UL    ABS.  BASOPHILS 0.0 0.0 - 0.1 K/UL   METABOLIC PANEL, COMPREHENSIVE    Collection Time: 04/21/17 10:57 PM   Result Value Ref Range    Sodium 140 136 - 145 mmol/L    Potassium 4.0 3.5 - 5.1 mmol/L    Chloride 105 97 - 108 mmol/L    CO2 26 21 - 32 mmol/L    Anion gap 9 5 - 15 mmol/L Glucose 139 (H) 65 - 100 mg/dL    BUN 11 6 - 20 MG/DL    Creatinine 1.13 0.70 - 1.30 MG/DL    BUN/Creatinine ratio 10 (L) 12 - 20      GFR est AA >60 >60 ml/min/1.73m2    GFR est non-AA >60 >60 ml/min/1.73m2    Calcium 8.5 8.5 - 10.1 MG/DL    Bilirubin, total 0.2 0.2 - 1.0 MG/DL    ALT (SGPT) 47 12 - 78 U/L    AST (SGOT) 25 15 - 37 U/L    Alk. phosphatase 95 45 - 117 U/L    Protein, total 6.6 6.4 - 8.2 g/dL    Albumin 3.3 (L) 3.5 - 5.0 g/dL    Globulin 3.3 2.0 - 4.0 g/dL    A-G Ratio 1.0 (L) 1.1 - 2.2     LACTIC ACID, PLASMA    Collection Time: 04/21/17 10:57 PM   Result Value Ref Range    Lactic acid 2.1 (HH) 0.4 - 2.0 MMOL/L       IMAGING RESULTS:  CT ABD PELV W CONT   Final Result      INDICATION: Abdominal pain     COMPARISON: April 3, 2017     TECHNIQUE:   Following the uneventful intravenous administration of 100 cc Isovue-370, thin  axial images were obtained through the abdomen and pelvis. Coronal and sagittal  reconstructions were generated. Oral contrast was not administered. CT dose  reduction was achieved through use of a standardized protocol tailored for this  examination and automatic exposure control for dose modulation.     FINDINGS:   LUNG BASES: No abnormality. LIVER: No mass or biliary dilatation. GALLBLADDER: Surgically absent. SPLEEN: No enlargement or lesion. PANCREAS: No mass or ductal dilatation. ADRENALS: No mass. KIDNEYS: No mass, calculus, or hydronephrosis. GI TRACT: There is a right lower quadrant ostomy site. There is now large  amount of fluid and food material and slightly distended stomach. There is also  significant, fluid filled distended descending duodenal segment. Interval  increase in dilatation and amount of stool within small bowel loops of the lower  abdomen leading to the ostomy site. Prior total colectomy. PERITONEUM: No free air or free fluid. APPENDIX: Surgically absent.   RETROPERITONEUM: No lymphadenopathy or aortic aneurysm.     REPRODUCTIVE ORGANS: Unremarkable. URINARY BLADDER: No mass or calculus. LYMPH NODES: None enlarged. FREE FLUID: None. BONES: No destructive bone lesion. ADDITIONAL COMMENTS: N/A.     IMPRESSION  IMPRESSION:  Prior total colectomy, with interval increase in dilated now stool filled distal  small bowel loops proximal to the ostomy site, as well as fluid-filled duodenum. Findings are concerning for either ileus or obstruction and have progressed  since recent comparison study. No free air or free fluid. Arvell Marialuisa MEDICATIONS GIVEN:  Medications   sodium chloride 0.9 % bolus infusion 1,000 mL (0 mL IntraVENous IV Completed 4/22/17 0038)   morphine injection 2 mg (2 mg IntraVENous Given 4/21/17 2332)   iopamidol (ISOVUE-370) 76 % injection 100 mL (100 mL IntraVENous Given 4/22/17 0003)   prochlorperazine (COMPAZINE) injection 5 mg (5 mg IntraVENous Given 4/21/17 2332)   sodium chloride 0.9 % bolus infusion 1,000 mL (0 mL IntraVENous IV Completed 4/22/17 0056)   morphine injection 2 mg (2 mg IntraVENous Given 4/22/17 0022)       IMPRESSION:  1. Chronic abdominal pain    2. Depression with anxiety    3. Nausea without vomiting    4. Chronic prescription opiate use        PLAN:  1. Discharge Medication List as of 4/22/2017 12:51 AM      CONTINUE these medications which have NOT CHANGED    Details   levoFLOXacin (LEVAQUIN) 750 mg tablet Take 1 Tab by mouth daily. , Print, Disp-7 Tab, R-0      mirtazapine (REMERON) 30 mg tablet Take 15 mg by mouth nightly., Historical Med      traMADol (ULTRAM) 50 mg tablet Take 50 mg by mouth every eight (8) hours as needed for Pain., Historical Med      Ustekinumab (STELARA) 90 mg/mL injection 90 mg by SubCUTAneous route. Every 8 weeks , Historical Med      oxyCODONE-acetaminophen (PERCOCET 10)  mg per tablet Take 1-2 Tabs by mouth every four (4) hours as needed.  Max Daily Amount: 12 Tabs., Print, Disp-75 Tab, R-0      promethazine (PHENERGAN) 25 mg tablet Take 25 mg by mouth every six (6) hours as needed for Nausea., Historical Med      ondansetron hcl (ZOFRAN, AS HYDROCHLORIDE,) 8 mg tablet Take 8 mg by mouth every twelve (12) hours as needed for Nausea., Historical Med      amitriptyline (ELAVIL) 10 mg tablet Take 30 mg by mouth nightly., Historical Med      buPROPion SR (WELLBUTRIN SR) 150 mg SR tablet Take 150 mg by mouth two (2) times a day., Historical Med      omeprazole (PRILOSEC) 40 mg capsule Take 40 mg by mouth Daily (before dinner). , Historical Med      prochlorperazine (COMPAZINE) 10 mg tablet Take 10 mg by mouth daily as needed for Nausea., Historical Med      dicyclomine (BENTYL) 10 mg capsule Take 10 mg by mouth every six (6) hours as needed., Historical Med      ergocalciferol (ERGOCALCIFEROL) 50,000 unit capsule Take 50,000 Units by mouth every seven (7) days. , Historical Med      pregabalin (LYRICA) 100 mg capsule Take 100 mg by mouth three (3) times daily. , Historical Med      cyanocobalamin (VITAMIN B-12) 1,000 mcg/mL injection 1,000 mcg by IntraMUSCular route every seven (7) days. Indications: Once weekly, Historical Med      temazepam (RESTORIL) 30 mg capsule Take 30 mg by mouth nightly., Historical Med      diazepam (VALIUM) 5 mg tablet Take 5 mg by mouth every eight (8) hours as needed (bowel relaxation). , Historical Med           2. Follow-up Information     Follow up With Details Comments Contact Info    Tatianna Ojeda MD Schedule an appointment as soon as possible for a visit  [address] [facility] [address] [phone]      [facility] EMERGENCY DEPT Go to As needed, If symptoms worsen Yari Hicks [address] [facility]    Ana Peacock MD Schedule an appointment as soon as possible for a visit  [address] [place] [address]  [address]  [phone]          3. Return to ED if worse     Discharge Note:    The patient is ready for discharge.  The patient's signs, symptoms, diagnosis, and discharge instruction have been discussed and the patient has conveyed their understanding. The patient is to follow up as recommended or return to the ER should their symptoms worsen. Plan has been discussed and the patient is in agreement.     Agustín Funk NP

## 2017-04-22 NOTE — ED NOTES
I have reviewed discharge instructions with the patient. The patient verbalized understanding. Pt confirmed understanding of need for follow up with primary care provider. Pt is not in any current distress and shows no evidence of clinical instability. Pt left ambulatory  Pt family/friends not present. Pt left with all personal belongings. Pt states they are not driving. Pt states chief complaint has improved with treatment provided    PT is alert and oriented x 4, Pt is hemodynamically/respiratorily stable. Paperwork given by provider and reviewed with patient, opportunity for questions/clarification given.

## 2017-04-22 NOTE — ED TRIAGE NOTES
Pt presents with lower abdominal pain today. Pt states it feels like an obstruction. +nausea and decreased ostomy output. History of Crohn's disease.

## 2017-05-20 ENCOUNTER — HOSPITAL ENCOUNTER (INPATIENT)
Age: 42
LOS: 3 days | Discharge: HOME | DRG: 389 | End: 2017-05-23
Attending: INTERNAL MEDICINE | Admitting: INTERNAL MEDICINE
Payer: COMMERCIAL

## 2017-05-20 ENCOUNTER — HOSPITAL ENCOUNTER (EMERGENCY)
Age: 42
Discharge: OTHER INSTITUTION | End: 2017-05-20
Attending: EMERGENCY MEDICINE
Payer: COMMERCIAL

## 2017-05-20 ENCOUNTER — APPOINTMENT (OUTPATIENT)
Dept: CT IMAGING | Age: 42
End: 2017-05-20
Attending: FAMILY MEDICINE
Payer: COMMERCIAL

## 2017-05-20 ENCOUNTER — APPOINTMENT (OUTPATIENT)
Dept: GENERAL RADIOLOGY | Age: 42
DRG: 389 | End: 2017-05-20
Attending: INTERNAL MEDICINE
Payer: COMMERCIAL

## 2017-05-20 VITALS
HEART RATE: 110 BPM | HEIGHT: 72 IN | RESPIRATION RATE: 12 BRPM | WEIGHT: 200 LBS | TEMPERATURE: 98.8 F | BODY MASS INDEX: 27.09 KG/M2 | SYSTOLIC BLOOD PRESSURE: 134 MMHG | DIASTOLIC BLOOD PRESSURE: 83 MMHG | OXYGEN SATURATION: 98 %

## 2017-05-20 DIAGNOSIS — K56.609 SBO (SMALL BOWEL OBSTRUCTION) (HCC): Primary | ICD-10-CM

## 2017-05-20 DIAGNOSIS — R10.13 ABDOMINAL PAIN, EPIGASTRIC: ICD-10-CM

## 2017-05-20 PROBLEM — J18.9 HCAP (HEALTHCARE-ASSOCIATED PNEUMONIA): Status: RESOLVED | Noted: 2017-04-03 | Resolved: 2017-05-20

## 2017-05-20 PROBLEM — R73.9 HYPERGLYCEMIA: Status: ACTIVE | Noted: 2017-05-20

## 2017-05-20 LAB
ALBUMIN SERPL BCP-MCNC: 3.4 G/DL (ref 3.5–5)
ALBUMIN/GLOB SERPL: 1 {RATIO} (ref 1.1–2.2)
ALP SERPL-CCNC: 112 U/L (ref 45–117)
ALT SERPL-CCNC: 27 U/L (ref 12–78)
ANION GAP BLD CALC-SCNC: 5 MMOL/L (ref 5–15)
APPEARANCE UR: CLEAR
AST SERPL W P-5'-P-CCNC: 17 U/L (ref 15–37)
BACTERIA URNS QL MICRO: NEGATIVE /HPF
BASOPHILS # BLD AUTO: 0 K/UL (ref 0–0.1)
BASOPHILS # BLD: 0 % (ref 0–1)
BILIRUB SERPL-MCNC: 0.1 MG/DL (ref 0.2–1)
BILIRUB UR QL: NEGATIVE
BUN SERPL-MCNC: 17 MG/DL (ref 6–20)
BUN/CREAT SERPL: 18 (ref 12–20)
CALCIUM SERPL-MCNC: 8.5 MG/DL (ref 8.5–10.1)
CHLORIDE SERPL-SCNC: 105 MMOL/L (ref 97–108)
CO2 SERPL-SCNC: 30 MMOL/L (ref 21–32)
COLOR UR: ABNORMAL
CREAT SERPL-MCNC: 0.96 MG/DL (ref 0.7–1.3)
EOSINOPHIL # BLD: 0.2 K/UL (ref 0–0.4)
EOSINOPHIL NFR BLD: 2 % (ref 0–7)
EPITH CASTS URNS QL MICRO: ABNORMAL /LPF
ERYTHROCYTE [DISTWIDTH] IN BLOOD BY AUTOMATED COUNT: 13.6 % (ref 11.5–14.5)
GLOBULIN SER CALC-MCNC: 3.3 G/DL (ref 2–4)
GLUCOSE SERPL-MCNC: 167 MG/DL (ref 65–100)
GLUCOSE UR STRIP.AUTO-MCNC: NEGATIVE MG/DL
HCT VFR BLD AUTO: 35.7 % (ref 36.6–50.3)
HGB BLD-MCNC: 11 G/DL (ref 12.1–17)
HGB UR QL STRIP: NEGATIVE
HYALINE CASTS URNS QL MICRO: ABNORMAL /LPF (ref 0–5)
KETONES UR QL STRIP.AUTO: NEGATIVE MG/DL
LACTATE SERPL-SCNC: 1 MMOL/L (ref 0.4–2)
LEUKOCYTE ESTERASE UR QL STRIP.AUTO: NEGATIVE
LIPASE SERPL-CCNC: 221 U/L (ref 73–393)
LYMPHOCYTES # BLD AUTO: 24 % (ref 12–49)
LYMPHOCYTES # BLD: 2.2 K/UL (ref 0.8–3.5)
MCH RBC QN AUTO: 25.9 PG (ref 26–34)
MCHC RBC AUTO-ENTMCNC: 30.8 G/DL (ref 30–36.5)
MCV RBC AUTO: 84 FL (ref 80–99)
MONOCYTES # BLD: 0.5 K/UL (ref 0–1)
MONOCYTES NFR BLD AUTO: 6 % (ref 5–13)
NEUTS SEG # BLD: 6.2 K/UL (ref 1.8–8)
NEUTS SEG NFR BLD AUTO: 68 % (ref 32–75)
NITRITE UR QL STRIP.AUTO: NEGATIVE
PH UR STRIP: 5.5 [PH] (ref 5–8)
PLATELET # BLD AUTO: 226 K/UL (ref 150–400)
POTASSIUM SERPL-SCNC: 4.1 MMOL/L (ref 3.5–5.1)
PROT SERPL-MCNC: 6.7 G/DL (ref 6.4–8.2)
PROT UR STRIP-MCNC: NEGATIVE MG/DL
RBC # BLD AUTO: 4.25 M/UL (ref 4.1–5.7)
RBC #/AREA URNS HPF: ABNORMAL /HPF (ref 0–5)
SODIUM SERPL-SCNC: 140 MMOL/L (ref 136–145)
SP GR UR REFRACTOMETRY: >1.03 (ref 1–1.03)
UA: UC IF INDICATED,UAUC: ABNORMAL
UROBILINOGEN UR QL STRIP.AUTO: 0.2 EU/DL (ref 0.2–1)
WBC # BLD AUTO: 9.2 K/UL (ref 4.1–11.1)
WBC URNS QL MICRO: ABNORMAL /HPF (ref 0–4)

## 2017-05-20 PROCEDURE — 83690 ASSAY OF LIPASE: CPT | Performed by: EMERGENCY MEDICINE

## 2017-05-20 PROCEDURE — 85025 COMPLETE CBC W/AUTO DIFF WBC: CPT | Performed by: EMERGENCY MEDICINE

## 2017-05-20 PROCEDURE — 74177 CT ABD & PELVIS W/CONTRAST: CPT

## 2017-05-20 PROCEDURE — 81001 URINALYSIS AUTO W/SCOPE: CPT | Performed by: EMERGENCY MEDICINE

## 2017-05-20 PROCEDURE — 74000 XR ABD PORT  1 V: CPT

## 2017-05-20 PROCEDURE — 74011250636 HC RX REV CODE- 250/636: Performed by: EMERGENCY MEDICINE

## 2017-05-20 PROCEDURE — 0D9670Z DRAINAGE OF STOMACH WITH DRAINAGE DEVICE, VIA NATURAL OR ARTIFICIAL OPENING: ICD-10-PCS | Performed by: INTERNAL MEDICINE

## 2017-05-20 PROCEDURE — 74011250637 HC RX REV CODE- 250/637: Performed by: INTERNAL MEDICINE

## 2017-05-20 PROCEDURE — 96365 THER/PROPH/DIAG IV INF INIT: CPT

## 2017-05-20 PROCEDURE — 74011250636 HC RX REV CODE- 250/636: Performed by: INTERNAL MEDICINE

## 2017-05-20 PROCEDURE — 36415 COLL VENOUS BLD VENIPUNCTURE: CPT | Performed by: EMERGENCY MEDICINE

## 2017-05-20 PROCEDURE — 96376 TX/PRO/DX INJ SAME DRUG ADON: CPT

## 2017-05-20 PROCEDURE — 65270000032 HC RM SEMIPRIVATE

## 2017-05-20 PROCEDURE — 96367 TX/PROPH/DG ADDL SEQ IV INF: CPT

## 2017-05-20 PROCEDURE — 99285 EMERGENCY DEPT VISIT HI MDM: CPT

## 2017-05-20 PROCEDURE — 83605 ASSAY OF LACTIC ACID: CPT | Performed by: EMERGENCY MEDICINE

## 2017-05-20 PROCEDURE — 74011250636 HC RX REV CODE- 250/636: Performed by: FAMILY MEDICINE

## 2017-05-20 PROCEDURE — 80053 COMPREHEN METABOLIC PANEL: CPT | Performed by: EMERGENCY MEDICINE

## 2017-05-20 PROCEDURE — 96375 TX/PRO/DX INJ NEW DRUG ADDON: CPT

## 2017-05-20 PROCEDURE — 74011000250 HC RX REV CODE- 250: Performed by: INTERNAL MEDICINE

## 2017-05-20 PROCEDURE — 74011000258 HC RX REV CODE- 258: Performed by: INTERNAL MEDICINE

## 2017-05-20 PROCEDURE — 96361 HYDRATE IV INFUSION ADD-ON: CPT

## 2017-05-20 PROCEDURE — 74011636320 HC RX REV CODE- 636/320: Performed by: RADIOLOGY

## 2017-05-20 RX ORDER — HYDROMORPHONE HYDROCHLORIDE 1 MG/ML
1 INJECTION, SOLUTION INTRAMUSCULAR; INTRAVENOUS; SUBCUTANEOUS
Status: COMPLETED | OUTPATIENT
Start: 2017-05-20 | End: 2017-05-20

## 2017-05-20 RX ORDER — HYDROMORPHONE HYDROCHLORIDE 2 MG/ML
1.5 INJECTION, SOLUTION INTRAMUSCULAR; INTRAVENOUS; SUBCUTANEOUS
Status: DISCONTINUED | OUTPATIENT
Start: 2017-05-20 | End: 2017-05-21 | Stop reason: CLARIF

## 2017-05-20 RX ORDER — ONDANSETRON 2 MG/ML
4 INJECTION INTRAMUSCULAR; INTRAVENOUS
Status: DISCONTINUED | OUTPATIENT
Start: 2017-05-20 | End: 2017-05-23 | Stop reason: HOSPADM

## 2017-05-20 RX ORDER — MORPHINE SULFATE 4 MG/ML
4 INJECTION, SOLUTION INTRAMUSCULAR; INTRAVENOUS
Status: DISCONTINUED | OUTPATIENT
Start: 2017-05-20 | End: 2017-05-20

## 2017-05-20 RX ORDER — SODIUM CHLORIDE 0.9 % (FLUSH) 0.9 %
5-10 SYRINGE (ML) INJECTION AS NEEDED
Status: DISCONTINUED | OUTPATIENT
Start: 2017-05-20 | End: 2017-05-23 | Stop reason: HOSPADM

## 2017-05-20 RX ORDER — SODIUM CHLORIDE 9 MG/ML
75 INJECTION, SOLUTION INTRAVENOUS CONTINUOUS
Status: DISCONTINUED | OUTPATIENT
Start: 2017-05-20 | End: 2017-05-23 | Stop reason: HOSPADM

## 2017-05-20 RX ORDER — HYDROMORPHONE HYDROCHLORIDE 1 MG/ML
1 INJECTION, SOLUTION INTRAMUSCULAR; INTRAVENOUS; SUBCUTANEOUS ONCE
Status: COMPLETED | OUTPATIENT
Start: 2017-05-20 | End: 2017-05-20

## 2017-05-20 RX ORDER — ZOLPIDEM TARTRATE 10 MG/1
10 TABLET ORAL
Status: DISCONTINUED | OUTPATIENT
Start: 2017-05-20 | End: 2017-05-20

## 2017-05-20 RX ORDER — ONDANSETRON 2 MG/ML
4 INJECTION INTRAMUSCULAR; INTRAVENOUS
Status: COMPLETED | OUTPATIENT
Start: 2017-05-20 | End: 2017-05-20

## 2017-05-20 RX ORDER — METRONIDAZOLE 500 MG/100ML
500 INJECTION, SOLUTION INTRAVENOUS EVERY 6 HOURS
Status: DISCONTINUED | OUTPATIENT
Start: 2017-05-20 | End: 2017-05-20 | Stop reason: HOSPADM

## 2017-05-20 RX ORDER — HYDROMORPHONE HYDROCHLORIDE 1 MG/ML
1 INJECTION, SOLUTION INTRAMUSCULAR; INTRAVENOUS; SUBCUTANEOUS
Status: DISCONTINUED | OUTPATIENT
Start: 2017-05-20 | End: 2017-05-20

## 2017-05-20 RX ORDER — SODIUM CHLORIDE 0.9 % (FLUSH) 0.9 %
5-10 SYRINGE (ML) INJECTION EVERY 8 HOURS
Status: DISCONTINUED | OUTPATIENT
Start: 2017-05-20 | End: 2017-05-23 | Stop reason: HOSPADM

## 2017-05-20 RX ORDER — PROCHLORPERAZINE EDISYLATE 5 MG/ML
10 INJECTION INTRAMUSCULAR; INTRAVENOUS
Status: COMPLETED | OUTPATIENT
Start: 2017-05-20 | End: 2017-05-20

## 2017-05-20 RX ORDER — ZOLPIDEM TARTRATE 10 MG/1
10 TABLET ORAL
Status: DISCONTINUED | OUTPATIENT
Start: 2017-05-20 | End: 2017-05-23 | Stop reason: HOSPADM

## 2017-05-20 RX ORDER — ONDANSETRON 2 MG/ML
4 INJECTION INTRAMUSCULAR; INTRAVENOUS
Status: DISCONTINUED | OUTPATIENT
Start: 2017-05-20 | End: 2017-05-20 | Stop reason: SDUPTHER

## 2017-05-20 RX ADMIN — HYDROMORPHONE HYDROCHLORIDE 1 MG: 1 INJECTION, SOLUTION INTRAMUSCULAR; INTRAVENOUS; SUBCUTANEOUS at 18:45

## 2017-05-20 RX ADMIN — BENZOCAINE: 200 SPRAY DENTAL; ORAL; PERIODONTAL at 14:32

## 2017-05-20 RX ADMIN — SODIUM CHLORIDE 125 ML/HR: 900 INJECTION, SOLUTION INTRAVENOUS at 21:10

## 2017-05-20 RX ADMIN — ONDANSETRON 4 MG: 2 INJECTION INTRAMUSCULAR; INTRAVENOUS at 18:47

## 2017-05-20 RX ADMIN — HYDROMORPHONE HYDROCHLORIDE 1.5 MG: 2 INJECTION, SOLUTION INTRAMUSCULAR; INTRAVENOUS; SUBCUTANEOUS at 21:54

## 2017-05-20 RX ADMIN — ZOLPIDEM TARTRATE 10 MG: 10 TABLET ORAL at 22:53

## 2017-05-20 RX ADMIN — HYDROMORPHONE HYDROCHLORIDE 1 MG: 1 INJECTION, SOLUTION INTRAMUSCULAR; INTRAVENOUS; SUBCUTANEOUS at 08:40

## 2017-05-20 RX ADMIN — IOPAMIDOL 100 ML: 755 INJECTION, SOLUTION INTRAVENOUS at 03:04

## 2017-05-20 RX ADMIN — VANCOMYCIN HYDROCHLORIDE 2000 MG: 10 INJECTION, POWDER, LYOPHILIZED, FOR SOLUTION INTRAVENOUS at 04:45

## 2017-05-20 RX ADMIN — METRONIDAZOLE 500 MG: 500 INJECTION, SOLUTION INTRAVENOUS at 05:22

## 2017-05-20 RX ADMIN — HYDROMORPHONE HYDROCHLORIDE 1 MG: 1 INJECTION, SOLUTION INTRAMUSCULAR; INTRAVENOUS; SUBCUTANEOUS at 12:24

## 2017-05-20 RX ADMIN — ONDANSETRON 4 MG: 2 INJECTION INTRAMUSCULAR; INTRAVENOUS at 08:39

## 2017-05-20 RX ADMIN — PROCHLORPERAZINE EDISYLATE 10 MG: 5 INJECTION INTRAMUSCULAR; INTRAVENOUS at 03:49

## 2017-05-20 RX ADMIN — Medication 10 ML: at 21:54

## 2017-05-20 RX ADMIN — SODIUM CHLORIDE 1000 ML: 900 INJECTION, SOLUTION INTRAVENOUS at 01:56

## 2017-05-20 RX ADMIN — PIPERACILLIN SODIUM,TAZOBACTAM SODIUM 3.38 G: 3; .375 INJECTION, POWDER, FOR SOLUTION INTRAVENOUS at 21:53

## 2017-05-20 RX ADMIN — ONDANSETRON 4 MG: 2 INJECTION INTRAMUSCULAR; INTRAVENOUS at 12:23

## 2017-05-20 RX ADMIN — HYDROMORPHONE HYDROCHLORIDE 1 MG: 1 INJECTION, SOLUTION INTRAMUSCULAR; INTRAVENOUS; SUBCUTANEOUS at 02:27

## 2017-05-20 RX ADMIN — HYDROMORPHONE HYDROCHLORIDE 1 MG: 1 INJECTION, SOLUTION INTRAMUSCULAR; INTRAVENOUS; SUBCUTANEOUS at 15:40

## 2017-05-20 RX ADMIN — HYDROMORPHONE HYDROCHLORIDE 1 MG: 1 INJECTION, SOLUTION INTRAMUSCULAR; INTRAVENOUS; SUBCUTANEOUS at 04:31

## 2017-05-20 NOTE — IP AVS SNAPSHOT
Current Discharge Medication List  
  
START taking these medications Dose & Instructions Dispensing Information Comments Morning Noon Evening Bedtime  
 oxyCODONE IR 30 mg immediate release tablet Commonly known as:  OXY-IR Your last dose was: Your next dose is:    
   
   
 Dose:  30 mg Take 1 Tab by mouth every four (4) hours as needed for Pain. Max Daily Amount: 180 mg.  
 Quantity:  12 Tab Refills:  0 CONTINUE these medications which have NOT CHANGED Dose & Instructions Dispensing Information Comments Morning Noon Evening Bedtime  
 amitriptyline 10 mg tablet Commonly known as:  ELAVIL Your last dose was: Your next dose is:    
   
   
 Dose:  30 mg Take 30 mg by mouth nightly. Refills:  0  
     
   
   
   
  
 buPROPion  mg SR tablet Commonly known as:  Margret Hernández Your last dose was: Your next dose is:    
   
   
 Dose:  150 mg Take 150 mg by mouth two (2) times a day. Refills:  0  
     
   
   
   
  
 dicyclomine 10 mg capsule Commonly known as:  BENTYL Your last dose was: Your next dose is:    
   
   
 Dose:  10 mg Take 10 mg by mouth every six (6) hours as needed. Refills:  0  
     
   
   
   
  
 ergocalciferol 50,000 unit capsule Commonly known as:  ERGOCALCIFEROL Your last dose was: Your next dose is:    
   
   
 Dose:  74554 Units Take 50,000 Units by mouth every seven (7) days. Refills:  0 LYRICA 100 mg capsule Generic drug:  pregabalin Your last dose was: Your next dose is:    
   
   
 Dose:  100 mg Take 100 mg by mouth three (3) times daily. Refills:  0  
     
   
   
   
  
 mirtazapine 30 mg tablet Commonly known as:  Miguel Castellanoir Your last dose was: Your next dose is:    
   
   
 Dose:  15 mg Take 15 mg by mouth nightly. Refills:  0 omeprazole 40 mg capsule Commonly known as:  PRILOSEC Your last dose was: Your next dose is:    
   
   
 Dose:  40 mg Take 40 mg by mouth Daily (before dinner). Refills:  0  
     
   
   
   
  
 oxyCODONE-acetaminophen  mg per tablet Commonly known as:  PERCOCET 10 Your last dose was: Your next dose is:    
   
   
 Dose:  1-2 Tab Take 1-2 Tabs by mouth every four (4) hours as needed. Max Daily Amount: 12 Tabs. Quantity:  75 Tab Refills:  0  
     
   
   
   
  
 promethazine 25 mg tablet Commonly known as:  PHENERGAN Your last dose was: Your next dose is:    
   
   
 Dose:  25 mg Take 25 mg by mouth every six (6) hours as needed for Nausea. Refills:  0  
     
   
   
   
  
 temazepam 30 mg capsule Commonly known as:  RESTORIL Your last dose was: Your next dose is:    
   
   
 Dose:  30 mg Take 30 mg by mouth nightly. Refills:  0  
     
   
   
   
  
 traMADol 50 mg tablet Commonly known as:  ULTRAM  
   
Your last dose was: Your next dose is:    
   
   
 Dose:  50 mg Take 50 mg by mouth every eight (8) hours as needed for Pain. Refills:  0 Ustekinumab 90 mg/mL injection Commonly known as:  Grace Bushkill Your last dose was: Your next dose is:    
   
   
 Dose:  90 mg  
90 mg by SubCUTAneous route. Every 8 weeks Refills:  0 VALIUM 5 mg tablet Generic drug:  diazePAM  
   
Your last dose was: Your next dose is:    
   
   
 Dose:  5 mg Take 5 mg by mouth every eight (8) hours as needed (bowel relaxation). Refills:  0  
     
   
   
   
  
 VITAMIN B-12 1,000 mcg/mL injection Generic drug:  cyanocobalamin Your last dose was: Your next dose is:    
   
   
 Dose:  1000 mcg  
1,000 mcg by IntraMUSCular route every seven (7) days. Indications: Once weekly Refills:  0 ZOFRAN (AS HYDROCHLORIDE) 8 mg tablet Generic drug:  ondansetron hcl Your last dose was: Your next dose is:    
   
   
 Dose:  8 mg Take 8 mg by mouth every twelve (12) hours as needed for Nausea. Refills:  0 STOP taking these medications   
 prochlorperazine 10 mg tablet Commonly known as:  COMPAZINE Where to Get Your Medications Information on where to get these meds will be given to you by the nurse or doctor. ! Ask your nurse or doctor about these medications  
  oxyCODONE IR 30 mg immediate release tablet

## 2017-05-20 NOTE — ED PROVIDER NOTES
HPI Comments: 40 y.o. male with extensive past medical history, please see list, significant for Crohn's disease, Ulcerative colitis, Ileostomy, GERD, Multiple GI procedures who presents from home with chief complaint of abdominal pain. Pt reports acute on chronic exacerbation of abdominal pain. Pt describes symptoms as \"sharp\" 10/10 epigastric pain which began s/p dinner. Pain also significant surrounding colostomy bag. Pt also c/o nausea, vomiting and decreased colostomy output. He notes typically he has moderate output but has not had any since onset of pain. Pt notes hx of similar symptoms related to SBO of which he had surgery for recently. Pt denies any other acute medical concerns at this time. Chart review: Admitted: 3/14/17 - Lapartomy, lysis of adhesions (greater than 3 hours) and revision of ileostomy. Admitted: 4/2/17-4/4/17 for healthcare associated PNA     GI: Osiel Mendez MD   General Surgery: Jose G Jang MD  PCP: Ebony Welch MD    Note written by Virgen Baker, as dictated by Leonard Burnham MD 6:56 AM    The history is provided by the patient. No  was used. Past Medical History:   Diagnosis Date    Anal fistula     Anal stenosis     Crohn's disease ([address]     GERD (gastroesophageal reflux disease)     H/O ulcerative colitis     Symptoms began in 1996. Total proctocolectomy was performed in 2004. Pateint later developed Crohn's disease.  Hiatal hernia     Ileal pouchitis ([address] 5/2004    Nausea & vomiting     Combination of Scopalamine patch & Zofran IV worked well in past    Numbness in right leg     Psychiatric disorder     depression and anxiety    Skin lesion of back 3/17/2014    Syncopal episodes        Past Surgical History:   Procedure Laterality Date    ABDOMEN SURGERY PROC UNLISTED  3/25/2004    Total proctocolectomy with creation of ileoanal J-pouch and loop ileostomy; Dr. Jann Olivier.     HX CHOLECYSTECTOMY  12/2016 Dr. Urmila Larkin  GI  5/2/2004    Examination under anesthesia with endoscopic evaluation of ileoanal pouch and placement of drain; Dr. Keila Peoples.   GI  5/25/2004    Ileostomy closure with small bowel resection and anastomosis; Dr. Keila Peoples.   GI  5/24/2012    Examination under anesthesia, anal dilatation, anal biopsy, and placement of draining seton; Dr. Keila Peoples.   GI  7/3/2012    Incision and drainage of perirectal abscess and rigid endoscopy of ileoanal pouch; Dr. Keila Peoples.   GI  7/31/2012    Incision and drainage of perirectal abscess, placement of draining seton, and anal dilatation; Dr. Keila Peoples.   GI  1/22/2013    Repair of anal fistulas with debridement, partial fistulectomy, and flap closure; Dr. Keila ePoples.   GI  5/17/2013    Examination under anesthesia, partial fistulotomy,  and placement of draining setons; Dr. Keila Peoples.   GI  8/6/2013    Anal fistulotomy and application of ACell micromatrix; Dr. Keila Peoples.   GI  2/20/2014    Examination under anesthesia and dilation of anal canal with rigid proctoscopy; Servando Swartz MD.     GI  4/29/2015    Creation of [place] continent intestinal reservoir (BCIR), abdominoperineal resection of ileoanal J-pouch; lysis of adhesions, and gastrostomy; Kari Carpenter. Rony Morales MD ([place], [place])     GI  8/7/2015    Stoma revision; Kari Morales MD ([place], [place])     GI  10/29/2015    Extensive lysis of adhesions, resection of continent intestinal reservoir, repair of serosal tears, and creation of end-ileostomy; Dr. Keila Peoples.   GI  03/14/2017    Laparotomy, extensive lysis of adhesions and revision of ileostomy; Dr. Keila Peoples.      ORTHOPAEDIC  2008    Left ACL repair    HX UROLOGICAL  03/14/2017    Cystoscopy and placement of bilateral temporary ureteral catheters; Lonny Li MD.         Family History:   Problem Relation Age of Onset    Cancer Father    [facility] Asthma Neg Hx     Diabetes Neg Hx     Heart Disease Neg Hx     Hypertension Neg Hx     Stroke Neg Hx     Malignant Hyperthermia Neg Hx     Pseudocholinesterase Deficiency Neg Hx     Delayed Awakening Neg Hx     Post-op Nausea/Vomiting Neg Hx        Social History     Social History    Marital status:      Spouse name: N/A    Number of children: N/A    Years of education: N/A     Occupational History    Not on file. Social History Main Topics    Smoking status: Former Smoker     Packs/day: 0.00     Years: 7.00     Quit date: 1/14/2017    Smokeless tobacco: Never Used    Alcohol use No    Drug use: No    Sexual activity: Not on file     Other Topics Concern    Not on file     Social History Narrative         ALLERGIES: Remicade [infliximab]; Bactrim [sulfamethoprim ds]; and Nsaids (non-steroidal anti-inflammatory drug)    Review of Systems   Constitutional: Negative for chills and fever. HENT: Negative for congestion. Eyes: Negative for visual disturbance. Respiratory: Negative for shortness of breath and wheezing. Cardiovascular: Negative for chest pain, palpitations and leg swelling. Gastrointestinal: Positive for abdominal pain, nausea and vomiting. Negative for blood in stool. Decreased colostomy output    Endocrine: Negative. Genitourinary: Negative for difficulty urinating and hematuria. Musculoskeletal: Negative for arthralgias. Neurological: Negative for dizziness, seizures, syncope and headaches. Hematological: Negative. Psychiatric/Behavioral: Negative. Vitals:    05/20/17 0430 05/20/17 0457 05/20/17 0506 05/20/17 0630   BP: 121/65   132/69   Pulse:       Resp:       Temp:       SpO2: 94% 95% 93% 96%   Weight:       Height:                Physical Exam   Constitutional: He is oriented to person, place, and time. He appears well-developed and well-nourished. He appears distressed. HENT:   Head: Normocephalic and atraumatic.    Right Ear: External ear normal.   Left Ear: External ear normal.   Nose: Nose normal.   Mouth/Throat: Oropharynx is clear and moist.   Eyes: Conjunctivae are normal. Pupils are equal, round, and reactive to light. Neck: Normal range of motion. Neck supple. Cardiovascular: Regular rhythm, normal heart sounds and intact distal pulses. Tachycardia present. Exam reveals no gallop and no friction rub. No murmur heard. Pulmonary/Chest: Effort normal and breath sounds normal. No respiratory distress. He has no wheezes. He has no rales. He exhibits no tenderness. Abdominal: Soft. Bowel sounds are normal. He exhibits no distension and no mass. There is tenderness. There is no rebound and no guarding. Musculoskeletal: Normal range of motion. Neurological: He is alert and oriented to person, place, and time. No cranial nerve deficit. Skin: Skin is warm and dry. He is not diaphoretic. MDM  Number of Diagnoses or Management Options     Amount and/or Complexity of Data Reviewed  Clinical lab tests: reviewed  Tests in the radiology section of CPT®: reviewed  Tests in the medicine section of CPT®: reviewed  Discussion of test results with the performing providers: yes  Decide to obtain previous medical records or to obtain history from someone other than the patient: yes  Discuss the patient with other providers: yes    Risk of Complications, Morbidity, and/or Mortality  Presenting problems: moderate  Diagnostic procedures: moderate  Management options: moderate    Patient Progress  Patient progress: stable    ED Course     CONSULT NOTE:  3:51 AM Carlita Poe MD spoke with Dr. Ad Brooks, Consult for Hospitalist.  Discussed available diagnostic tests and clinical findings. Dr. Ad Brooks recommends contacting [facility]    CONSULT NOTE:  4:25 AM John Patel MD (on behalf of Ángel Monroy MD) spoke with Dr. Dejuan Freeman, Consult for Colon and Rectal.  Discussed available diagnostic tests and clinical findings. Dr. Marco Lopez recommends contacting [facility]. CONSULT NOTE:  5:17 AM Ariel Blair MD (on behalf of Charlene Andrew MD) spoke with Dr. Akash Christianson, Consult for Surgery. Discussed available diagnostic tests and clinical findings. Dr. Akash Christianson recommends transferring patient to Hospitalist service at [facility]. PROGRESS NOTE:  6:15 AM  Updated patient on plan. CONSULT NOTE:  6:44 AM Ariel Blair MD spoke the Transfer Center + Dr. Christian Lemus regarding transfer to hospitalist service. Dr. Christian Lemus accepts transfer.        Procedures

## 2017-05-20 NOTE — PROGRESS NOTES
05/20/17 1533   Vital Signs   Temp 97.6 °F (36.4 °C)   Temp Source Oral   Pulse (Heart Rate) (!) 112   Resp Rate 16   O2 Sat (%) 97 %   Level of Consciousness Alert   /85   MAP (Calculated) 101   BP 1 Method Automatic   BP 1 Location Right arm   BP Patient Position At rest   MEWS Score 3   Patient Observation   Observations yes   Activity In bed;Watching t.v.     NG tube just placed and patient is in pain

## 2017-05-20 NOTE — ED NOTES
Pt transported via [facility] to [facility]. Pt states pain is better at this time.

## 2017-05-20 NOTE — PROGRESS NOTES
[facility] Dosing Services: Antimicrobial Stewardship Progress Note    Consult for antibiotic dosing of Vancomycin by Dr. Wade Ji  Pharmacist reviewed antibiotic appropriateness for 40year old , male  for indication of intraabdominal abscess  Day of Therapy 1    Plan:  Vancomycin therapy:  Start Vancomycin therapy, with loading dose of 2 grams  Follow with maintenance dose of 1.25 grams every 8 hours  Dose calculated to approximate a therapeutic trough of 15-20 mcg/mL. Last trough level / Plan for level: after 3rd dose  Pharmacy to follow daily and will make changes to dose and/or frequency based on clinical status. Non-Kinetic Antimicrobial Dosing: Zosyn 3.375 grams every 8 hours extended infusion    Other Antimicrobial  (not dosed by pharmacist)   Flagyl 500 mg IV every 6 hours   Serum Creatinine     Lab Results   Component Value Date/Time    Creatinine 0.96 05/20/2017 01:45 AM       Creatinine Clearance Estimated Creatinine Clearance: 111.1 mL/min (based on Cr of 0.96).      WBC   Lab Results   Component Value Date/Time    WBC 9.2 05/20/2017 01:45 AM       H/H   Lab Results   Component Value Date/Time    HGB 11.0 05/20/2017 01:45 AM        Platelets   Lab Results   Component Value Date/Time    PLATELET 368 23/68/5874 01:45 AM          Pharmacist: Janneth Carey,[address] information:

## 2017-05-20 NOTE — ED NOTES
Spoke with transfer center will call back when bed arranged at [facility]

## 2017-05-20 NOTE — ED TRIAGE NOTES
Pt  Has mid and LLQ abdominal pain. Pt has had similar pain prior that was a bowel obstruction. Hx of crohns, ostomy, and decreased output. Sx's started last night and worsened today.

## 2017-05-20 NOTE — IP AVS SNAPSHOT
[address] [facility] [address] 
[phone] Patient: Leann Dodson MRN: [id number] :1975 You are allergic to the following Allergen Reactions Remicade (Infliximab) Anaphylaxis Shortness of Breath Bactrim (Sulfamethoprim Ds) Other (comments) Increased GI distress. Nsaids (Non-Steroidal Anti-Inflammatory Drug) Other (comments) Stomach ulcers Recent Documentation Height Weight BMI Smoking Status 1.829 m 90.7 kg 27.12 kg/m2 Former Smoker Emergency Contacts Name Discharge Info Relation Home Work Mobile [address] CAREGIVER [3] Spouse [3] [phone] About your hospitalization You were admitted on:  May 20, 2017 You last received care in the:  [facility] MED SURG You were discharged on:  May 23, 2017 Unit phone number:  [phone] Why you were hospitalized Your primary diagnosis was:  Abdominal Pain Your diagnoses also included:  Small Bowel Obstruction (Hcc) Providers Seen During Your Hospitalizations Provider Role Specialty Primary office phone Angelique Adams MD Attending Provider Internal Medicine [phone] Piyush Montano MD Attending Provider Internal Medicine [phone] Temitope Mcdowell MD Attending Provider Internal Medicine [phone] Janet Velasco MD Attending Provider Internal Medicine [phone] Your Primary Care Physician (PCP) Primary Care Physician Office Phone Office Fax [place], [address] [phone] Follow-up Information Follow up With Details Comments Contact Info Francine Conte MD On 2017 Discharge follow up on Tuesday, 17 @ 2:00 p.m. [address] [facility] [address] [place] [address] 
[phone] Current Discharge Medication List  
  
START taking these medications Dose & Instructions Dispensing Information Comments Morning Noon Evening Bedtime  
 oxyCODONE IR 30 mg immediate release tablet Commonly known as:  OXY-IR Your last dose was: Your next dose is:    
   
   
 Dose:  30 mg Take 1 Tab by mouth every four (4) hours as needed for Pain. Max Daily Amount: 180 mg.  
 Quantity:  12 Tab Refills:  0 CONTINUE these medications which have NOT CHANGED Dose & Instructions Dispensing Information Comments Morning Noon Evening Bedtime  
 amitriptyline 10 mg tablet Commonly known as:  ELAVIL Your last dose was: Your next dose is:    
   
   
 Dose:  30 mg Take 30 mg by mouth nightly. Refills:  0  
     
   
   
   
  
 buPROPion  mg SR tablet Commonly known as:  Lovecinthyae Sunnyside Your last dose was: Your next dose is:    
   
   
 Dose:  150 mg Take 150 mg by mouth two (2) times a day. Refills:  0  
     
   
   
   
  
 dicyclomine 10 mg capsule Commonly known as:  BENTYL Your last dose was: Your next dose is:    
   
   
 Dose:  10 mg Take 10 mg by mouth every six (6) hours as needed. Refills:  0  
     
   
   
   
  
 ergocalciferol 50,000 unit capsule Commonly known as:  ERGOCALCIFEROL Your last dose was: Your next dose is:    
   
   
 Dose:  27852 Units Take 50,000 Units by mouth every seven (7) days. Refills:  0 LYRICA 100 mg capsule Generic drug:  pregabalin Your last dose was: Your next dose is:    
   
   
 Dose:  100 mg Take 100 mg by mouth three (3) times daily. Refills:  0  
     
   
   
   
  
 mirtazapine 30 mg tablet Commonly known as:  Sukhjinder Lass Your last dose was: Your next dose is:    
   
   
 Dose:  15 mg Take 15 mg by mouth nightly. Refills:  0  
     
   
   
   
  
 omeprazole 40 mg capsule Commonly known as:  PRILOSEC Your last dose was: Your next dose is:    
   
   
 Dose:  40 mg Take 40 mg by mouth Daily (before dinner). Refills:  0  
     
   
   
   
  
 oxyCODONE-acetaminophen  mg per tablet Commonly known as:  PERCOCET 10 Your last dose was: Your next dose is:    
   
   
 Dose:  1-2 Tab Take 1-2 Tabs by mouth every four (4) hours as needed. Max Daily Amount: 12 Tabs. Quantity:  75 Tab Refills:  0  
     
   
   
   
  
 promethazine 25 mg tablet Commonly known as:  PHENERGAN Your last dose was: Your next dose is:    
   
   
 Dose:  25 mg Take 25 mg by mouth every six (6) hours as needed for Nausea. Refills:  0  
     
   
   
   
  
 temazepam 30 mg capsule Commonly known as:  RESTORIL Your last dose was: Your next dose is:    
   
   
 Dose:  30 mg Take 30 mg by mouth nightly. Refills:  0  
     
   
   
   
  
 traMADol 50 mg tablet Commonly known as:  ULTRAM  
   
Your last dose was: Your next dose is:    
   
   
 Dose:  50 mg Take 50 mg by mouth every eight (8) hours as needed for Pain. Refills:  0 Ustekinumab 90 mg/mL injection Commonly known as:  Soniya Jewel Your last dose was: Your next dose is:    
   
   
 Dose:  90 mg  
90 mg by SubCUTAneous route. Every 8 weeks Refills:  0 VALIUM 5 mg tablet Generic drug:  diazePAM  
   
Your last dose was: Your next dose is:    
   
   
 Dose:  5 mg Take 5 mg by mouth every eight (8) hours as needed (bowel relaxation). Refills:  0  
     
   
   
   
  
 VITAMIN B-12 1,000 mcg/mL injection Generic drug:  cyanocobalamin Your last dose was: Your next dose is:    
   
   
 Dose:  1000 mcg  
1,000 mcg by IntraMUSCular route every seven (7) days. Indications: Once weekly Refills:  0 ZOFRAN (AS HYDROCHLORIDE) 8 mg tablet Generic drug:  ondansetron hcl Your last dose was: Your next dose is:    
   
   
 Dose:  8 mg Take 8 mg by mouth every twelve (12) hours as needed for Nausea. Refills:  0 STOP taking these medications   
 prochlorperazine 10 mg tablet Commonly known as:  COMPAZINE Where to Get Your Medications Information on where to get these meds will be given to you by the nurse or doctor. ! Ask your nurse or doctor about these medications  
  oxyCODONE IR 30 mg immediate release tablet Discharge Instructions Please bring this form with you to show your primary care provider at your follow-up appointment. Primary care provider:  Dr. Margaret Winston MD 
 
Discharging provider:  Jennifer Rangel MD 
 
You have been admitted to the hospital with the following diagnoses: 
· small bowel obstruction · Small bowel obstruction ([facility] [address] FOLLOW-UP CARE RECOMMENDATIONS: 
 
APPOINTMENTS: 
Follow-up Information Follow up With Details Comments Contact Info Margaret Winston MD In 1 week Discharge follow up  [address] [facility] [address] [place] [address] 
[phone] FOLLOW-UP TESTS recommended: none PENDING TEST RESULTS: 
At the time of your discharge the following test results are still pending: none Please make sure you review these results with your outpatient follow-up provider(s). SYMPTOMS to watch for: chest pain, shortness of breath, fever, chills, nausea, vomiting, diarrhea, change in mentation, falling, weakness, bleeding. DIET/what to eat:  Resume previous diet ACTIVITY:  Activity as tolerated WOUND CARE: none EQUIPMENT needed:  none What to do if new or unexpected symptoms occur? If you experience any of the above symptoms (or should other concerns or questions arise after discharge) please call your primary care physician. Return to the emergency room if you cannot get hold of your doctor. · It is very important that you keep your follow-up appointment(s). · Please bring discharge papers, medication list (and/or medication bottles) to your follow-up appointments for review by your outpatient provider(s). · Please check the list of medications and be sure it includes every medication (even non-prescription medications) that your provider wants you to take. · It is important that you take the medication exactly as they are prescribed. · Keep your medication in the bottles provided by the pharmacist and keep a list of the medication names, dosages, and times to be taken in your wallet. · Do not take other medications without consulting your doctor. · If you have any questions about your medications or other instructions, please talk to your nurse or care provider before you leave the hospital. 
 
I understand that if any problems occur once I am at home I am to contact my physician. These instructions were explained to me and I had the opportunity to ask questions. Discharge Orders None [facility] Announcement We are excited to announce that we are making your provider's discharge notes available to you in [facility]. You will see these notes when they are completed and signed by the physician that discharged you from your recent hospital stay. If you have any questions or concerns about any information you see in [facility], please call the Health Information Department where you were seen or reach out to your Primary Care Provider for more information about your plan of care. Introducing [address]! Dear Radha Boyd: Thank you for requesting a [facility] account. Our records indicate that you already have an active [facility] account. You can access your account anytime at [email or web address] Did you know that you can access your hospital and ER discharge instructions at any time in [facility]?   You can also review all of your test results from your hospital stay or ER visit. Additional Information If you have questions, please visit the Frequently Asked Questions section of the [employer] website at [email or web address] Remember, MyChart is NOT to be used for urgent needs. For medical emergencies, dial 911. Now available from your iPhone and Android! General Information Please provide this summary of care documentation to your next provider. Patient Signature:  ____________________________________________________________ Date:  ____________________________________________________________  
  
Crysatl Watts Provider Signature:  ____________________________________________________________ Date:  ____________________________________________________________

## 2017-05-20 NOTE — CONSULTS
Colorectal Surgery Consultation Note          NAME:  Romelia Reyes   :   1975   MRN:   [id number]     Referring Physician:  Rylan Santiago MD    Consultation Date: 2017    Chief Complaint:  Increased abdominal pain. History of Present Illness: The patient is a 69-year-old male with Crohn's disease and a permanent ileostomy who is well known to me. I have operated on him numerous times (as outlined below). The last operation was performed on 3/14/2017, and when I last saw the patient in the office he seemed to be doing well other than still being on a slow narcotic taper. He reports that on 2017 he experienced a transient increaese in his abdominal pain that was relieved by the passage of a large amount of stool. Yesterday morning, the ileostomy output was more loose and more voluminous than normal.  In the evening, he began to have increased pain again,  He took Percocet and Bentyl without relief. At about midnight last night he vomited. Afterwards, he presented to the ER at [facility]). There he had laboratory studies that were essentially unremarkable and a CT scan of the abdomen and pelvis without (oral contrast) that was interpreted as suggestive of a partial small bowel obstruction. The presence of a 2.4 cm peripherally enhancing fluid collection between a small bowel loop and the superior aspect of the urinary bladder was also noted. The patient denies having any fever or chills (but of course he has been taking Percocet). He was given doses of Vancomycin, Zosyn, and Flagyl, and he was transferred to [facility] for further evaluation and treatment. PMH:  Past Medical History:   Diagnosis Date    Anal fistula     The patient no longer has an anus.  Anal stenosis     Crohn's disease ([address]     GERD (gastroesophageal reflux disease)     H/O ulcerative colitis     Symptoms began in .   Total proctocolectomy was performed in 2004. Patient later developed Crohn's disease.  Hiatal hernia     Ileal pouchitis ([facility] 05/2004    The patient no longer has a pouch.  Nausea & vomiting     Combination of Scopalamine patch & Zofran IV worked well in past    Numbness in right leg     Chronic.  Opioid dependence ([address]     History of opioid dependence requiring a detox program.    Psychiatric disorder     depression and anxiety    Skin lesion of back 3/17/2014    Syncopal episodes        PSH:  Past Surgical History:   Procedure Laterality Date    ABDOMEN SURGERY PROC UNLISTED  3/25/2004    Total proctocolectomy with creation of ileoanal J-pouch and loop ileostomy; Dr. Tammie Frye.   CHOLECYSTECTOMY  12/2016    Dr. Addison Parker  GI  5/2/2004    Examination under anesthesia with endoscopic evaluation of ileoanal pouch and placement of drain; Dr. Tammie Frye.   GI  5/25/2004    Ileostomy closure with small bowel resection and anastomosis; Dr. Tammie Frye.   GI  5/24/2012    Examination under anesthesia, anal dilatation, anal biopsy, and placement of draining seton; Dr. Tammie Frye.   GI  7/3/2012    Incision and drainage of perirectal abscess and rigid endoscopy of ileoanal pouch; Dr. Tammie Frye.   GI  7/31/2012    Incision and drainage of perirectal abscess, placement of draining seton, and anal dilatation; Dr. Tammie Frye.   GI  1/22/2013    Repair of anal fistulas with debridement, partial fistulectomy, and flap closure; Dr. Tammie Frye.   GI  5/17/2013    Examination under anesthesia, partial fistulotomy,  and placement of draining setons; Dr. Tammie Frye.   GI  8/6/2013    Anal fistulotomy and application of ACell micromatrix; Dr. Tammie Frye.   GI  2/20/2014    Examination under anesthesia and dilation of anal canal with rigid proctoscopy;  Jackie Motta MD.     GI  4/29/2015    Creation of [facility] continent intestinal reservoir (BCIR), abdominoperineal resection of ileoanal J-pouch; lysis of adhesions, and gastrostomy; Maikel aMdera MD ([place], [place])     GI  2015    Stoma revision; Juan C Foster. Osiel Madera MD ([place], [place])     GI  10/29/2015    Extensive lysis of adhesions, resection of continent intestinal reservoir, repair of serosal tears, and creation of end-ileostomy; Dr. Sagar Nolasco.  HX GI  2017    Laparotomy, extensive lysis of adhesions and revision of ileostomy; Dr. Sagar Nolasco.  HX ORTHOPAEDIC  2008    Left ACL repair    HX UROLOGICAL  2017    Cystoscopy and placement of bilateral temporary ureteral catheters; Zana Kingsley MD.       Home Medications:  Prior to Admission Medications   Prescriptions Last Dose Informant Patient Reported? Taking? Ustekinumab (STELARA) 90 mg/mL injection  Self Yes No   Si mg by SubCUTAneous route. Every 8 weeks    amitriptyline (ELAVIL) 10 mg tablet  Self Yes No   Sig: Take 30 mg by mouth nightly. buPROPion SR (WELLBUTRIN SR) 150 mg SR tablet  Self Yes No   Sig: Take 150 mg by mouth two (2) times a day. cyanocobalamin (VITAMIN B-12) 1,000 mcg/mL injection  Self Yes No   Si,000 mcg by IntraMUSCular route every seven (7) days. Indications: Once weekly   diazepam (VALIUM) 5 mg tablet  Self Yes No   Sig: Take 5 mg by mouth every eight (8) hours as needed (bowel relaxation). dicyclomine (BENTYL) 10 mg capsule  Self Yes No   Sig: Take 10 mg by mouth every six (6) hours as needed. ergocalciferol (ERGOCALCIFEROL) 50,000 unit capsule  Self Yes No   Sig: Take 50,000 Units by mouth every seven (7) days. mirtazapine (REMERON) 30 mg tablet  Self Yes No   Sig: Take 15 mg by mouth nightly. omeprazole (PRILOSEC) 40 mg capsule  Self Yes No   Sig: Take 40 mg by mouth Daily (before dinner). ondansetron hcl (ZOFRAN, AS HYDROCHLORIDE,) 8 mg tablet  Self Yes No   Sig: Take 8 mg by mouth every twelve (12) hours as needed for Nausea.    oxyCODONE-acetaminophen (PERCOCET 10)  mg per tablet Self No No   Sig: Take 1-2 Tabs by mouth every four (4) hours as needed. Max Daily Amount: 12 Tabs. pregabalin (LYRICA) 100 mg capsule  Self Yes No   Sig: Take 100 mg by mouth three (3) times daily. prochlorperazine (COMPAZINE) 10 mg tablet  Self Yes No   Sig: Take 10 mg by mouth daily as needed for Nausea. promethazine (PHENERGAN) 25 mg tablet  Self Yes No   Sig: Take 25 mg by mouth every six (6) hours as needed for Nausea. temazepam (RESTORIL) 30 mg capsule  Self Yes No   Sig: Take 30 mg by mouth nightly. traMADol (ULTRAM) 50 mg tablet  Self Yes No   Sig: Take 50 mg by mouth every eight (8) hours as needed for Pain. Facility-Administered Medications: None       Hospital Medications:  No current facility-administered medications for this encounter. Allergies: Allergies   Allergen Reactions    Remicade [Infliximab] Anaphylaxis and Shortness of Breath    Bactrim [Sulfamethoprim Ds] Other (comments)     Increased GI distress.     Nsaids (Non-Steroidal Anti-Inflammatory Drug) Other (comments)     Stomach ulcers       Family History:  Family History   Problem Relation Age of Onset    Cancer Father     Asthma Neg Hx     Diabetes Neg Hx     Heart Disease Neg Hx     Hypertension Neg Hx     Stroke Neg Hx     Malignant Hyperthermia Neg Hx     Pseudocholinesterase Deficiency Neg Hx     Delayed Awakening Neg Hx     Post-op Nausea/Vomiting Neg Hx        Social History:  Social History   Substance Use Topics    Smoking status: Former Smoker     Packs/day: 0.00     Years: 7.00     Quit date: 1/14/2017    Smokeless tobacco: Never Used    Alcohol use No       Review of Systems:    Symptom Y/N Comments  Symptom Y/N Comments   Fever/Chills    Chest Pain     Cough    Abdominal Pain     Sputum    Joint Pain     SOB/CEDILLO    Pruritis/Rash     Nausea/vomit    Tolerating PT/OT     Diarrhea    Tolerating Diet     Constipation    Other       Could NOT obtain due to:        Objective:     Patient Vitals for the past 8 hrs:   BP Temp Pulse Resp SpO2   05/20/17 0940 119/73 98.1 °F (36.7 °C) 96 15 96 %             PHYSICAL EXAMINATION:    GENERAL:  Somewhat uncomfortable. HEENT:  Anicteric. ABDOMEN:  Mild-to-moderate generalized palpation tenderness without rebound tenderness of involuntary guarding. Well healed scars. Right-sided ileostomy (obscured by an opaque appliance that I chose not to removed). NEURO:  Alert and oriented. Data Review     Recent Labs      05/20/17 0145   WBC  9.2   HGB  11.0*   HCT  35.7*   PLT  226     Recent Labs      05/20/17 0145   NA  140   K  4.1   CL  105   CO2  30   BUN  17   CREA  0.96   GLU  167*   CA  8.5     Recent Labs      05/20/17 0145   SGOT  17   AP  112   TP  6.7   ALB  3.4*   GLOB  3.3   LPSE  221     No results for input(s): INR, PTP, APTT in the last 72 hours. No lab exists for component: INREXT, INREXT                      Assessment and Plan:      The patient seems to be experiencing an acute exacerbation of his chronic abdominal pain. The signifcance of the pain is unclear. I have reviewed the CT images, and the \"fecalization\" could be secondary to either an ileus (probably medication-related) or a partial small bowel obstruction. The false expediency of obtaining a CT scan without oral contrast has predicably decreased the quality of the images and also made it more difficult to know what the significance of the described fluid collection is. I do not think that another scan needs to be performed today, but I will almost certainly be ordering one tomorrow with oral and IV contrast to better characterize the fluid and to allow for a judgement to be made about whether or not an attempt should be made to drain it. I will defer to the hospitalists regarding antibiotics and analgesics.   From my standpoint, the patient should be kept NPO for now except for ice chips and sips with medications, and I would prefer that any blood thinning medications be avoided in case invasive treatment becomes necessary. I will see him again tomorrow.         Principal Problem:    Abdominal pain (11/29/2016)

## 2017-05-20 NOTE — CONSULTS
[address]  [phone]    Hospitalist Consult Note      NAME:  Leann Dodson   :   1975   MRN:  [id number]     Requesting Physician Felicia Lazaro MD   Reason for Consult:  Medical management      PCP:  Francine Conte MD     Date/Time:  2017 4:23 AM       Assessment:      Active Problems:    GERD (gastroesophageal reflux disease) (2016)      Crohn's colitis ([address] (2016)      Depression with anxiety (2016)      Anemia (2016)      Hyperglycemia (2017)            Plan: Active Problems:    Anemia (2016)   - Hgb stable at baseline   - follow with IV hydration      Depression with anxiety (2016)   - continue home meds if able to take PO      Hyperglycemia (2017)   - check A1c       GERD (gastroesophageal reflux disease) (2016)   - IV PPI       Crohn's colitis ([address] (2016)   - he has required multiple surgeries to treat his multiple bowel complications   - his surgeon is Dr. Ean Mcdonald at [facility]   - would manage conservatively for now but given his history, he is likely to require surgical intervention   - possible abscess seen on CT, could likely be drained by IR, defer to Surgery   - I have ordered IV antibiotics for possible abscess          Subjective:     CHIEF COMPLAINT: abdominal pain     HISTORY OF PRESENT ILLNESS:     Mr. Ingrid Daley is a 40 y.o.  male who is admitted to the Surgery Service with partial SBO. We are asked to evaluate for medical management. Mr. Ingrid aDley is complaining of abdominal pain: severe, constant, present for the last 24 hours, associated with decreased ostomy output, like prior SBOs; he has had over 10 surgeries for complications related to his inflammatory bowel disease.       Past Medical History:   Diagnosis Date    Anal fistula     Anal stenosis     Crohn's disease ([address]     GERD (gastroesophageal reflux disease)     H/O ulcerative colitis     Symptoms began in 1996. Total proctocolectomy was performed in 2004. Pateint later developed Crohn's disease.  Hiatal hernia     Ileal pouchitis ([address] 5/2004    Nausea & vomiting     Combination of Scopalamine patch & Zofran IV worked well in past    Numbness in right leg     Psychiatric disorder     depression and anxiety    Skin lesion of back 3/17/2014    Syncopal episodes         Past Surgical History:   Procedure Laterality Date    ABDOMEN SURGERY PROC UNLISTED  3/25/2004    Total proctocolectomy with creation of ileoanal J-pouch and loop ileostomy; Dr. Jann Olivier.   CHOLECYSTECTOMY  12/2016    Dr. Sonny Patel  GI  5/2/2004    Examination under anesthesia with endoscopic evaluation of ileoanal pouch and placement of drain; Dr. Jann Olivier.   GI  5/25/2004    Ileostomy closure with small bowel resection and anastomosis; Dr. Jann Olivier.   GI  5/24/2012    Examination under anesthesia, anal dilatation, anal biopsy, and placement of draining seton; Dr. Jann Olivier.   GI  7/3/2012    Incision and drainage of perirectal abscess and rigid endoscopy of ileoanal pouch; Dr. Jann Olivier.   GI  7/31/2012    Incision and drainage of perirectal abscess, placement of draining seton, and anal dilatation; Dr. Jann Olivier.   GI  1/22/2013    Repair of anal fistulas with debridement, partial fistulectomy, and flap closure; Dr. Jann Olivier.   GI  5/17/2013    Examination under anesthesia, partial fistulotomy,  and placement of draining setons; Dr. Jann Olivier.   GI  8/6/2013    Anal fistulotomy and application of ACell micromatrix; Dr. Jann Olivier.   GI  2/20/2014    Examination under anesthesia and dilation of anal canal with rigid proctoscopy;  Billy Villanueva MD.     GI  4/29/2015    Creation of Mitchel Sánchez continent intestinal reservoir (BCIR), abdominoperineal resection of ileoanal J-pouch; lysis of adhesions, and gastrostomy; Madison Gamboa Rachel Jaffe MD ([place], [place])    HX GI  8/7/2015    Stoma revision; Michaelle Oates. Reba Sparks MD ([place], [place])    HX GI  10/29/2015    Extensive lysis of adhesions, resection of continent intestinal reservoir, repair of serosal tears, and creation of end-ileostomy; Dr. Irene Grider.  HX GI  03/14/2017    Laparotomy, extensive lysis of adhesions and revision of ileostomy; Dr. Irene Grider.  HX ORTHOPAEDIC  2008    Left ACL repair    HX UROLOGICAL  03/14/2017    Cystoscopy and placement of bilateral temporary ureteral catheters; Delbert Lobo MD.       Social History   Substance Use Topics    Smoking status: Former Smoker     Packs/day: 0.00     Years: 7.00     Quit date: 1/14/2017    Smokeless tobacco: Never Used    Alcohol use No        Family History   Problem Relation Age of Onset    Cancer Father     Asthma Neg Hx     Diabetes Neg Hx     Heart Disease Neg Hx     Hypertension Neg Hx     Stroke Neg Hx     Malignant Hyperthermia Neg Hx     Pseudocholinesterase Deficiency Neg Hx     Delayed Awakening Neg Hx     Post-op Nausea/Vomiting Neg Hx         Allergies   Allergen Reactions    Remicade [Infliximab] Anaphylaxis and Shortness of Breath    Bactrim [Sulfamethoprim Ds] Other (comments)     Increased GI distress.  Nsaids (Non-Steroidal Anti-Inflammatory Drug) Other (comments)     Stomach ulcers        Prior to Admission medications    Medication Sig Start Date End Date Taking? Authorizing Provider   mirtazapine (REMERON) 30 mg tablet Take 15 mg by mouth nightly. Yes Historical Provider   traMADol (ULTRAM) 50 mg tablet Take 50 mg by mouth every eight (8) hours as needed for Pain. Yes Historical Provider   Ustekinumab (STELARA) 90 mg/mL injection 90 mg by SubCUTAneous route. Every 8 weeks    Yes Historical Provider   oxyCODONE-acetaminophen (PERCOCET 10)  mg per tablet Take 1-2 Tabs by mouth every four (4) hours as needed. Max Daily Amount: 12 Tabs.  3/21/17  Yes José Luis Barragan Aleksandra Collins MD   promethazine (PHENERGAN) 25 mg tablet Take 25 mg by mouth every six (6) hours as needed for Nausea. Yes Miles Diaz MD   amitriptyline (ELAVIL) 10 mg tablet Take 30 mg by mouth nightly. Yes Historical Provider   buPROPion SR [facility] SR) 150 mg SR tablet Take 150 mg by mouth two (2) times a day. Yes Historical Provider   omeprazole (PRILOSEC) 40 mg capsule Take 40 mg by mouth Daily (before dinner). Yes Historical Provider   prochlorperazine (COMPAZINE) 10 mg tablet Take 10 mg by mouth daily as needed for Nausea. Yes Historical Provider   dicyclomine (BENTYL) 10 mg capsule Take 10 mg by mouth every six (6) hours as needed. Yes Historical Provider   ergocalciferol (ERGOCALCIFEROL) 50,000 unit capsule Take 50,000 Units by mouth every seven (7) days. Yes Historical Provider   pregabalin (LYRICA) 100 mg capsule Take 100 mg by mouth three (3) times daily. Yes Historical Provider   cyanocobalamin (VITAMIN B-12) 1,000 mcg/mL injection 1,000 mcg by IntraMUSCular route every seven (7) days. Indications: Once weekly   Yes Miles Diaz MD   temazepam (RESTORIL) 30 mg capsule Take 30 mg by mouth nightly. Yes Historical Provider   diazepam (VALIUM) 5 mg tablet Take 5 mg by mouth every eight (8) hours as needed (bowel relaxation). Yes Miles Diaz MD   ondansetron hcl (ZOFRAN, AS HYDROCHLORIDE,) 8 mg tablet Take 8 mg by mouth every twelve (12) hours as needed for Nausea.     Miles Diaz MD       Current Facility-Administered Medications   Medication Dose Route Frequency    pantoprazole (PROTONIX) 40 mg in sodium chloride 0.9 % 10 mL injection  40 mg IntraVENous Q12H    metroNIDAZOLE (FLAGYL) IVPB premix 500 mg  500 mg IntraVENous Q6H    piperacillin-tazobactam (ZOSYN) 3.375 g in 0.9% sodium chloride (MBP/ADV) 100 mL  3.375 g IntraVENous Q8H    vancomycin (VANCOCIN) 2,000 mg in 0.9% sodium chloride 500 mL IVPB  2,000 mg IntraVENous ONCE    HYDROmorphone (PF) (DILAUDID) injection 1 mg  1 mg IntraVENous ONCE     Current Outpatient Prescriptions   Medication Sig    mirtazapine (REMERON) 30 mg tablet Take 15 mg by mouth nightly.  traMADol (ULTRAM) 50 mg tablet Take 50 mg by mouth every eight (8) hours as needed for Pain.  Ustekinumab (STELARA) 90 mg/mL injection 90 mg by SubCUTAneous route. Every 8 weeks     oxyCODONE-acetaminophen (PERCOCET 10)  mg per tablet Take 1-2 Tabs by mouth every four (4) hours as needed. Max Daily Amount: 12 Tabs.  promethazine (PHENERGAN) 25 mg tablet Take 25 mg by mouth every six (6) hours as needed for Nausea.  amitriptyline (ELAVIL) 10 mg tablet Take 30 mg by mouth nightly.  buPROPion SR (WELLBUTRIN SR) 150 mg SR tablet Take 150 mg by mouth two (2) times a day.  omeprazole (PRILOSEC) 40 mg capsule Take 40 mg by mouth Daily (before dinner).  prochlorperazine (COMPAZINE) 10 mg tablet Take 10 mg by mouth daily as needed for Nausea.  dicyclomine (BENTYL) 10 mg capsule Take 10 mg by mouth every six (6) hours as needed.  ergocalciferol (ERGOCALCIFEROL) 50,000 unit capsule Take 50,000 Units by mouth every seven (7) days.  pregabalin (LYRICA) 100 mg capsule Take 100 mg by mouth three (3) times daily.  cyanocobalamin (VITAMIN B-12) 1,000 mcg/mL injection 1,000 mcg by IntraMUSCular route every seven (7) days. Indications: Once weekly    temazepam (RESTORIL) 30 mg capsule Take 30 mg by mouth nightly.  diazepam (VALIUM) 5 mg tablet Take 5 mg by mouth every eight (8) hours as needed (bowel relaxation).  ondansetron hcl (ZOFRAN, AS HYDROCHLORIDE,) 8 mg tablet Take 8 mg by mouth every twelve (12) hours as needed for Nausea.          Review of Systems:  (bold if positive, if negative)    Gen:  fatigueEyes:  ENT:  CVS:  Pulm:  GI:  Abdominal pain, nausea,  :    MS:  Skin:  Psych:  Endo:    Hem:  Renal:    Neuro:            Objective:      VITALS:    Vital signs reviewed; most recent are:    Visit Vitals    /65    Pulse (!) 110    Temp 98.8 °F (37.1 °C)    Resp 12    Ht 6' (1.829 m)    Wt 90.7 kg (200 lb)    SpO2 94%    BMI 27.12 kg/m2     SpO2 Readings from Last 6 Encounters:   05/20/17 94%   04/22/17 97%   04/04/17 96%   03/21/17 97%   03/09/17 98%   02/25/17 100%        No intake or output data in the 24 hours ending 05/20/17 0456         Exam:     Physical Exam:    Gen:  Well-developed, well-nourished, in no acute distress  HEENT:  Pink conjunctivae, PERRL, hearing intact to voice, moist mucous membranes  Neck:  Supple, without masses, thyroid non-tender  Resp:  No accessory muscle use, clear breath sounds without wheezes rales or rhonchi  Card:  No murmurs, normal S1, S2 without thrills, bruits or peripheral edema  Abd:  Soft, tender diffusely, greastest just above bladder, non-distended, normoactive bowel sounds are present, no palpable organomegaly and no detectable hernias  Lymph:  No cervical or inguinal adenopathy  Musc:  No cyanosis or clubbing  Skin:  No rashes or ulcers, skin turgor is good  Neuro:  Cranial nerves are grossly intact, no focal motor weakness, follows commands appropriately  Psych:  Good insight, oriented to person, place and time, alert       Preoperative Labs:    Recent Labs      05/20/17   0145   WBC  9.2   HGB  11.0*   HCT  35.7*   PLT  226     Recent Labs      05/20/17   0145   NA  140   K  4.1   CL  105   CO2  30   GLU  167*   BUN  17   CREA  0.96   CA  8.5   ALB  3.4*   TBILI  0.1*   SGOT  17   ALT  27     Lab Results   Component Value Date/Time    Glucose (POC) 92 03/21/2017 06:25 AM    Glucose (POC) 111 03/20/2017 10:27 PM    Glucose (POC) 100 03/20/2017 05:19 PM    Glucose (POC) 140 03/20/2017 12:05 PM    Glucose (POC) 130 03/20/2017 06:36 AM     No results for input(s): PH, PCO2, PO2, HCO3, FIO2 in the last 72 hours. No results for input(s): INR in the last 72 hours. No lab exists for component: INREXT, INREXT    Lab Results   Component Value Date/Time    Specimen Description: MISC.  WOUND RECTAL ABCESS 07/31/2012 12:48 PM    Specimen Description: MISC.  WOUND RECTAL ABCESS 07/31/2012 12:48 PM    Specimen Description: ABSCESS PERIRECTAL 07/03/2012 07:00 PM    Specimen Description: PERIRECTAL 07/03/2012 07:00 PM     Lab Results   Component Value Date/Time    Culture result: LIGHT  NORMAL RESPIRATORY VALERIE   04/04/2017 01:16 AM    Culture result: LIGHT  APPARENT  DALI ALBICANS   04/04/2017 01:16 AM    Culture result: NO GROWTH 6 DAYS 01/01/2017 05:39 AM    Culture result: NO GROWTH 6 DAYS 01/01/2017 02:00 AM    Culture result:  11/21/2016 05:16 AM     NO ROUTINE ENTERIC PATHOGENS ISOLATED INCLUDING SALMONELLA, SHIGELLA, YERSINIA, VIBRIO OR SHIGA TOXIN PRODUCING E. COLI    Culture result:  10/18/2015 04:15 PM     NO ROUTINE ENTERIC PATHOGENS ISOLATED INCLUDING SALMONELLA, SHIGELLA, YERSINIA, VIBRIO OR SHIGA TOXIN PRODUCING E. COLI    Culture result: NO ANAEROBES ISOLATED 07/31/2012 12:48 PM    Culture result: MODERATE ENTEROCOCCUS FAECALIS GROUP D 07/31/2012 12:48 PM    Culture result:  07/03/2012 07:00 PM     LIGHT BACTEROIDES SPECIES , BETA LACTAMASE POSITIVE    Culture result: LIGHT MIXED RECTAL VALERIE 07/03/2012 07:00 PM       ___________________________________________________    Attending Physician: Patience Raya MD

## 2017-05-20 NOTE — ROUTINE PROCESS
TRANSFER - OUT REPORT:    Verbal report given adonis sheikh rn) on Hillary Rueda  being transferred [id number] [place] [address] routine progression of care       Report consisted of patients Situation, Background, Assessment and   Recommendations(SBAR). Information from the following report(s) SBAR, ED Summary and MAR was reviewed with the receiving nurse. Lines:   Peripheral IV 05/20/17 Right Antecubital (Active)   Site Assessment Clean, dry, & intact 5/20/2017  1:55 AM   Phlebitis Assessment 0 5/20/2017  1:55 AM   Infiltration Assessment 0 5/20/2017  1:55 AM   Dressing Status Clean, dry, & intact 5/20/2017  1:55 AM   Dressing Type Tape;Transparent 5/20/2017  1:55 AM   Hub Color/Line Status Green;Flushed;Patent 5/20/2017  1:55 AM   Action Taken Blood drawn 5/20/2017  1:55 AM       Peripheral IV 05/20/17 Left Hand (Active)   Site Assessment Clean, dry, & intact 5/20/2017  5:19 AM   Phlebitis Assessment 0 5/20/2017  5:19 AM   Infiltration Assessment 0 5/20/2017  5:19 AM   Dressing Status Clean, dry, & intact 5/20/2017  5:19 AM        Opportunity for questions and clarification was provided.       Patient transported with:   Monitor

## 2017-05-21 ENCOUNTER — APPOINTMENT (OUTPATIENT)
Dept: CT IMAGING | Age: 42
DRG: 389 | End: 2017-05-21
Attending: COLON & RECTAL SURGERY
Payer: COMMERCIAL

## 2017-05-21 LAB
ALBUMIN SERPL BCP-MCNC: 3 G/DL (ref 3.5–5)
ALBUMIN/GLOB SERPL: 0.9 {RATIO} (ref 1.1–2.2)
ALP SERPL-CCNC: 128 U/L (ref 45–117)
ALT SERPL-CCNC: 30 U/L (ref 12–78)
ANION GAP BLD CALC-SCNC: 7 MMOL/L (ref 5–15)
AST SERPL W P-5'-P-CCNC: 17 U/L (ref 15–37)
BASOPHILS # BLD AUTO: 0 K/UL (ref 0–0.1)
BASOPHILS # BLD: 0 % (ref 0–1)
BILIRUB SERPL-MCNC: 0.4 MG/DL (ref 0.2–1)
BUN SERPL-MCNC: 13 MG/DL (ref 6–20)
BUN/CREAT SERPL: 14 (ref 12–20)
CALCIUM SERPL-MCNC: 8.6 MG/DL (ref 8.5–10.1)
CHLORIDE SERPL-SCNC: 106 MMOL/L (ref 97–108)
CO2 SERPL-SCNC: 25 MMOL/L (ref 21–32)
CREAT SERPL-MCNC: 0.9 MG/DL (ref 0.7–1.3)
EOSINOPHIL # BLD: 0.2 K/UL (ref 0–0.4)
EOSINOPHIL NFR BLD: 2 % (ref 0–7)
ERYTHROCYTE [DISTWIDTH] IN BLOOD BY AUTOMATED COUNT: 13.7 % (ref 11.5–14.5)
GLOBULIN SER CALC-MCNC: 3.5 G/DL (ref 2–4)
GLUCOSE SERPL-MCNC: 104 MG/DL (ref 65–100)
HCT VFR BLD AUTO: 33.7 % (ref 36.6–50.3)
HGB BLD-MCNC: 10.6 G/DL (ref 12.1–17)
LYMPHOCYTES # BLD AUTO: 18 % (ref 12–49)
LYMPHOCYTES # BLD: 1.6 K/UL (ref 0.8–3.5)
MCH RBC QN AUTO: 26.4 PG (ref 26–34)
MCHC RBC AUTO-ENTMCNC: 31.5 G/DL (ref 30–36.5)
MCV RBC AUTO: 83.8 FL (ref 80–99)
MONOCYTES # BLD: 0.7 K/UL (ref 0–1)
MONOCYTES NFR BLD AUTO: 8 % (ref 5–13)
NEUTS SEG # BLD: 6.2 K/UL (ref 1.8–8)
NEUTS SEG NFR BLD AUTO: 72 % (ref 32–75)
PLATELET # BLD AUTO: 212 K/UL (ref 150–400)
POTASSIUM SERPL-SCNC: 4.2 MMOL/L (ref 3.5–5.1)
PROT SERPL-MCNC: 6.5 G/DL (ref 6.4–8.2)
RBC # BLD AUTO: 4.02 M/UL (ref 4.1–5.7)
SODIUM SERPL-SCNC: 138 MMOL/L (ref 136–145)
WBC # BLD AUTO: 8.7 K/UL (ref 4.1–11.1)

## 2017-05-21 PROCEDURE — 36415 COLL VENOUS BLD VENIPUNCTURE: CPT

## 2017-05-21 PROCEDURE — 74011250636 HC RX REV CODE- 250/636: Performed by: INTERNAL MEDICINE

## 2017-05-21 PROCEDURE — 74011000258 HC RX REV CODE- 258: Performed by: HOSPITALIST

## 2017-05-21 PROCEDURE — 74011250637 HC RX REV CODE- 250/637: Performed by: INTERNAL MEDICINE

## 2017-05-21 PROCEDURE — 74011000258 HC RX REV CODE- 258: Performed by: INTERNAL MEDICINE

## 2017-05-21 PROCEDURE — 74011636320 HC RX REV CODE- 636/320: Performed by: HOSPITALIST

## 2017-05-21 PROCEDURE — 65270000032 HC RM SEMIPRIVATE

## 2017-05-21 PROCEDURE — 85025 COMPLETE CBC W/AUTO DIFF WBC: CPT

## 2017-05-21 PROCEDURE — 74177 CT ABD & PELVIS W/CONTRAST: CPT

## 2017-05-21 PROCEDURE — 80053 COMPREHEN METABOLIC PANEL: CPT

## 2017-05-21 RX ORDER — HYDROMORPHONE HYDROCHLORIDE 1 MG/ML
1.5 INJECTION, SOLUTION INTRAMUSCULAR; INTRAVENOUS; SUBCUTANEOUS
Status: DISCONTINUED | OUTPATIENT
Start: 2017-05-21 | End: 2017-05-23

## 2017-05-21 RX ORDER — SODIUM CHLORIDE 0.9 % (FLUSH) 0.9 %
10 SYRINGE (ML) INJECTION
Status: COMPLETED | OUTPATIENT
Start: 2017-05-21 | End: 2017-05-21

## 2017-05-21 RX ADMIN — HYDROMORPHONE HYDROCHLORIDE 1.5 MG: 1 INJECTION, SOLUTION INTRAMUSCULAR; INTRAVENOUS; SUBCUTANEOUS at 10:32

## 2017-05-21 RX ADMIN — HYDROMORPHONE HYDROCHLORIDE 1.5 MG: 1 INJECTION, SOLUTION INTRAMUSCULAR; INTRAVENOUS; SUBCUTANEOUS at 17:47

## 2017-05-21 RX ADMIN — PIPERACILLIN SODIUM,TAZOBACTAM SODIUM 3.38 G: 3; .375 INJECTION, POWDER, FOR SOLUTION INTRAVENOUS at 22:05

## 2017-05-21 RX ADMIN — HYDROMORPHONE HYDROCHLORIDE 1.5 MG: 1 INJECTION, SOLUTION INTRAMUSCULAR; INTRAVENOUS; SUBCUTANEOUS at 14:31

## 2017-05-21 RX ADMIN — ZOLPIDEM TARTRATE 10 MG: 10 TABLET ORAL at 22:05

## 2017-05-21 RX ADMIN — ONDANSETRON 4 MG: 2 INJECTION INTRAMUSCULAR; INTRAVENOUS at 14:31

## 2017-05-21 RX ADMIN — PIPERACILLIN SODIUM,TAZOBACTAM SODIUM 3.38 G: 3; .375 INJECTION, POWDER, FOR SOLUTION INTRAVENOUS at 06:12

## 2017-05-21 RX ADMIN — HYDROMORPHONE HYDROCHLORIDE 1.5 MG: 1 INJECTION, SOLUTION INTRAMUSCULAR; INTRAVENOUS; SUBCUTANEOUS at 01:25

## 2017-05-21 RX ADMIN — HYDROMORPHONE HYDROCHLORIDE 1.5 MG: 1 INJECTION, SOLUTION INTRAMUSCULAR; INTRAVENOUS; SUBCUTANEOUS at 06:21

## 2017-05-21 RX ADMIN — HYDROMORPHONE HYDROCHLORIDE 1.5 MG: 1 INJECTION, SOLUTION INTRAMUSCULAR; INTRAVENOUS; SUBCUTANEOUS at 23:51

## 2017-05-21 RX ADMIN — HYDROMORPHONE HYDROCHLORIDE 1.5 MG: 1 INJECTION, SOLUTION INTRAMUSCULAR; INTRAVENOUS; SUBCUTANEOUS at 20:47

## 2017-05-21 RX ADMIN — IOHEXOL 50 ML: 240 INJECTION, SOLUTION INTRATHECAL; INTRAVASCULAR; INTRAVENOUS; ORAL at 13:51

## 2017-05-21 RX ADMIN — Medication 10 ML: at 14:31

## 2017-05-21 RX ADMIN — Medication 10 ML: at 22:00

## 2017-05-21 RX ADMIN — IOPAMIDOL 100 ML: 755 INJECTION, SOLUTION INTRAVENOUS at 13:51

## 2017-05-21 RX ADMIN — PIPERACILLIN SODIUM,TAZOBACTAM SODIUM 3.38 G: 3; .375 INJECTION, POWDER, FOR SOLUTION INTRAVENOUS at 14:31

## 2017-05-21 RX ADMIN — Medication 10 ML: at 13:51

## 2017-05-21 RX ADMIN — SODIUM CHLORIDE 100 ML: 900 INJECTION, SOLUTION INTRAVENOUS at 13:51

## 2017-05-21 NOTE — PROGRESS NOTES
Bedside shift change report given to Megan Villarreal RN (oncoming nurse) by Brissa Hill RN (offgoing nurse). Report included the following information SBAR, Kardex and MAR.

## 2017-05-21 NOTE — H&P
History and Physical    Subjective:     Patient is a 79-year-old white male with history of Crohns disease, who has had multiple surgeries, finally resulting in ileostomy. He estimates that he has had small bowel obstruction for about a dozen times. He states that he has been hospitalized during half of these times, being treated with NG tube decompression. At other times, he was able to gauge that he was having small bowel obstruction and attempted to treat himself at home by remaining NPO. Patient states that on May 19, 2017, patient recognized such an episode. He states that in the afternoon, he had tons of watery ileostomy output. Around 6 PM, the output became solid. Within two hours, patient started having pain around the ileostomy, in an L-shaped distribution. Patient states that he had one episode of vomiting on midnight. Patient states that he has had increase in nausea since Thanksgiving actually, with vomiting occurring occurring about 1-2 times a week. Prior to that, he would have nausea much less often, and vomited perhaps once a month. Due to the recurrent nausea, laparoscopic cholecystectomy was performed in December, 2016. However, patient states that his nausea persists. Patient states that he actually was initially diagnosed with ulcerative colitis. He underwent procto colectomy with creation of ilio-anal J pouch and loop ileostomy in March, 2004 by Dr. Nguyen Cho. Ileostomy was closed two months later. Patient continued to have diarrhea, raising suspicions for Crohns disease. Patient states that biopsy results did confirm Crohns as well eventually. Most of patients bowel surgeries, as detailed below, have been performed by colorectal surgeon, Dr. Elina Rueda. Patient is on stelara for Crohn's disease. At time of exam, patient continues to have sharp pain in L-shaped distribution around his right lower quadrant ileostomy. The pain waxes and wanes.  He states that 1 mg IV dilaudid reduces the pain from 8/10 to 5/10, but does not relieve it completely. He states that 1.5 mg IV dilaudid has been more effective in the past. Patient does have chronic pain, and takes Percocet 10/325 p.o. up to three times daily at home. Patient denies fever, chills. He denies any overt blood through the ileostomy. He denies black fecal output. Patient denies hematemesis. Patient had CT scan of the abdomen/pelvis with IV contrast (without p.o. contrast) at [address] [place] [address] prior to being transferred to [facility]. This CT scan was suggestive of small bowel obstruction. Per colorectal surgeon,  Dr. Omer Martinez evaluation, the \"fecalization\" seen on the scan could be secondary to either an ileus, possibly due to opiates or a partial small bowel obstruction. Regarding the fluid collection, due to a lack of p.o. contrast, significance of fluid collection is difficult to gauge, and repeat CT scan with p.o. and IV contrast scheduled for the a.m. of May 21st.          Past Medical History:   Diagnosis Date    Anal fistula     The patient no longer has an anus.  Anal stenosis     Crohn's disease ([address]     GERD (gastroesophageal reflux disease)     H/O ulcerative colitis     Symptoms began in 1996. Total proctocolectomy was performed in 2004. Patient later developed Crohn's disease.  Hiatal hernia     Ileal pouchitis ([address] 05/2004    The patient no longer has a pouch.  Nausea & vomiting     Combination of Scopalamine patch & Zofran IV worked well in past    Numbness in right leg     Chronic.  Opioid dependence ([address] History of opioid dependence requiring a detox program.     depression and anxiety    Skin lesion of back 3/17/2014      Past Surgical History:   Procedure Laterality Date    ABDOMEN SURGERY PROC UNLISTED  3/25/2004    Total proctocolectomy with creation of ileoanal J-pouch and loop ileostomy; Dr. Shahida Chaparro.     HX CHOLECYSTECTOMY  12/2016    Dr. Alcira Johnson   GI  5/2/2004    Examination under anesthesia with endoscopic evaluation of ileoanal pouch and placement of drain; Dr. Jose Vila.   GI  5/25/2004    Ileostomy closure with small bowel resection and anastomosis; Dr. Joes Vila.   GI  5/24/2012    Examination under anesthesia, anal dilatation, anal biopsy, and placement of draining seton; Dr. Jose Vila.   GI  7/3/2012    Incision and drainage of perirectal abscess and rigid endoscopy of ileoanal pouch; Dr. Jose Vila.   GI  7/31/2012    Incision and drainage of perirectal abscess, placement of draining seton, and anal dilatation; Dr. Jose Vila.   GI  1/22/2013    Repair of anal fistulas with debridement, partial fistulectomy, and flap closure; Dr. Jose Vila.   GI  5/17/2013    Examination under anesthesia, partial fistulotomy,  and placement of draining setons; Dr. Jose Vila.   GI  8/6/2013    Anal fistulotomy and application of ACell micromatrix; Dr. Jose Vila.   GI  2/20/2014    Examination under anesthesia and dilation of anal canal with rigid proctoscopy; Eder Marie MD.     GI  4/29/2015    [facility] reservoir (BCIR), abdominoperineal resection of ileoanal J-pouch; lysis of adhesions, and gastrostomy; Osmin Hernandes. Marcy Lee MD ([place], [place])     GI  8/7/2015    Stoma revision; Osmin Hernandes. Marcy Lee MD ([place], [place])     GI  10/29/2015    Extensive lysis of adhesions, resection of continent intestinal reservoir, repair of serosal tears, and creation of end-ileostomy; Dr. Jose Vila.   GI  03/14/2017    Laparotomy, extensive lysis of adhesions and revision of ileostomy; Dr. Jose Vila.      ORTHOPAEDIC  2008    Left ACL repair    HX UROLOGICAL  03/14/2017    Cystoscopy and placement of bilateral temporary ureteral catheters; Blake Alba MD.     Family History   Problem Relation Age of Onset    Cancer Father has been treated for and survived prostrate cancer.  Asthma Neg Hx     Diabetes Mother is currently 79years old and has diabetes mellitus.  Heart Disease Father is currently 79years old, and has a history of cardiac stents.  Hypertension Neg Hx     Stroke Neg Hx     Malignant Hyperthermia Neg Hx     Pseudocholinesterase Deficiency Neg Hx     Delayed Awakening Neg Hx     Post-op Nausea/Vomiting Neg Hx       Social History   Substance Use Topics    Smoking status: Former Smoker     Patient states that he smoked cigarettes half pack a day between the ages of [age]. He resume smoking about seven years ago, and had been smoking up to 1-2 ppd before quitting again in March, 2017.  Smokeless tobacco: Never Used    Alcohol use Patient  denies any history of excessive alcohol use. Patient denies any history of recreational drug use. Prior to Admission medications    Medication Sig Start Date End Date Taking? Authorizing Provider   mirtazapine (REMERON) 30 mg tablet Take 15 mg by mouth nightly. Historical Provider   traMADol (ULTRAM) 50 mg tablet Take 50 mg by mouth every eight (8) hours as needed for Pain. Historical Provider   Ustekinumab (STELARA) 90 mg/mL injection 90 mg by SubCUTAneous route. Every 8 weeks     Historical Provider   oxyCODONE-acetaminophen (PERCOCET 10)  mg per tablet Take 1-2 Tabs by mouth every four (4) hours as needed. Max Daily Amount: 12 Tabs. 3/21/17   Bandar Longoria MD   promethazine (PHENERGAN) 25 mg tablet Take 25 mg by mouth every six (6) hours as needed for Nausea. Miles Diaz MD   ondansetron hcl (ZOFRAN, AS HYDROCHLORIDE,) 8 mg tablet Take 8 mg by mouth every twelve (12) hours as needed for Nausea. Miles Diaz MD   amitriptyline (ELAVIL) 10 mg tablet Take 30 mg by mouth nightly. Historical Provider   buPROPion SR [facility] SR) 150 mg SR tablet Take 150 mg by mouth two (2) times a day.     Historical Provider   omeprazole (PRILOSEC) 40 mg capsule Take 40 mg by mouth Daily (before dinner). Historical Provider   prochlorperazine (COMPAZINE) 10 mg tablet Take 10 mg by mouth daily as needed for Nausea. Historical Provider   dicyclomine (BENTYL) 10 mg capsule Take 10 mg by mouth every six (6) hours as needed. Historical Provider   ergocalciferol (ERGOCALCIFEROL) 50,000 unit capsule Take 50,000 Units by mouth every seven (7) days. Historical Provider   pregabalin (LYRICA) 100 mg capsule Take 100 mg by mouth three (3) times daily. Historical Provider   cyanocobalamin (VITAMIN B-12) 1,000 mcg/mL injection 1,000 mcg by IntraMUSCular route every seven (7) days. Indications: Once weekly    Phys Other, MD   temazepam (RESTORIL) 30 mg capsule Take 30 mg by mouth nightly. Historical Provider   diazepam (VALIUM) 5 mg tablet Take 5 mg by mouth every eight (8) hours as needed (bowel relaxation). Phys Other, MD     Allergies   Allergen Reactions    Remicade [Infliximab] Anaphylaxis and Shortness of Breath    Bactrim [Sulfamethoprim Ds] Other (comments)     Increased GI distress.  Nsaids (Non-Steroidal Anti-Inflammatory Drug) Other (comments)     Stomach ulcers        Review of Systems:  A comprehensive review of systems was negative except for that written in the History of Present Illness. Objective: Intake and Output:       05/19 0701 - 05/20 1900  In: -   Out: 390     Physical Exam:   Visit Vitals    /72 (BP 1 Location: Right arm, BP Patient Position: At rest)    Pulse (!) 105    Temp 98.4 °F (36.9 °C)    Resp 9    SpO2 96%     General:  Alert, cooperative, no distress, appears stated age. Head:  Normocephalic, without obvious abnormality, atraumatic. Eyes:  Conjunctivae/corneas clear. PERRL, EOMs intact. Fundi benign   Ears:  Normal TMs and external ear canals both ears. Nose: Nares normal. Septum midline. Mucosa normal. No drainage or sinus tenderness.    Throat: Lips, mucosa, and tongue normal. Teeth and gums normal.   Neck: Supple, symmetrical, trachea midline, no adenopathy, thyroid: no enlargement/tenderness/nodules, no carotid bruit and no JVD. Lungs:   Clear to auscultation bilaterally. Chest wall:  No tenderness or deformity. Heart:  Regular rate and rhythm, S1, S2 normal, no murmur, click, rub or gallop. Abdomen:   Soft, ileostomy intact in RLQ with pink stoma and yellowish fecal output of loose consistency. Bowel sounds normoactive in all 4 quadrants. Tender to palpation around ileostomy in L-shaped distribution on superomedial aspect. Extremities: Extremities normal, atraumatic, no cyanosis or edema. Pulses: 2+ and symmetric all extremities. Skin: Skin color, texture, turgor normal. No rashes or lesions   Lymph nodes: Cervical, supraclavicular, and axillary nodes normal.   Neurologic: CNII-XII intact. Normal strength, sensation and reflexes throughout. Data Review:   Recent Results (from the past 24 hour(s))   CBC WITH AUTOMATED DIFF    Collection Time: 05/21/17  2:21 AM   Result Value Ref Range    WBC 8.7 4.1 - 11.1 K/uL    RBC 4.02 (L) 4.10 - 5.70 M/uL    HGB 10.6 (L) 12.1 - 17.0 g/dL    HCT 33.7 (L) 36.6 - 50.3 %    MCV 83.8 80.0 - 99.0 FL    MCH 26.4 26.0 - 34.0 PG    MCHC 31.5 30.0 - 36.5 g/dL    RDW 13.7 11.5 - 14.5 %    PLATELET 591 658 - 794 K/uL    NEUTROPHILS 72 32 - 75 %    LYMPHOCYTES 18 12 - 49 %    MONOCYTES 8 5 - 13 %    EOSINOPHILS 2 0 - 7 %    BASOPHILS 0 0 - 1 %    ABS. NEUTROPHILS 6.2 1.8 - 8.0 K/UL    ABS. LYMPHOCYTES 1.6 0.8 - 3.5 K/UL    ABS. MONOCYTES 0.7 0.0 - 1.0 K/UL    ABS. EOSINOPHILS 0.2 0.0 - 0.4 K/UL    ABS.  BASOPHILS 0.0 0.0 - 0.1 K/UL   METABOLIC PANEL, COMPREHENSIVE    Collection Time: 05/21/17  2:21 AM   Result Value Ref Range    Sodium 138 136 - 145 mmol/L    Potassium 4.2 3.5 - 5.1 mmol/L    Chloride 106 97 - 108 mmol/L    CO2 25 21 - 32 mmol/L    Anion gap 7 5 - 15 mmol/L    Glucose 104 (H) 65 - 100 mg/dL    BUN 13 6 - 20 MG/DL    Creatinine 0.90 0.70 - 1.30 MG/DL    BUN/Creatinine ratio 14 12 - 20      GFR est AA >60 >60 ml/min/1.73m2    GFR est non-AA >60 >60 ml/min/1.73m2    Calcium 8.6 8.5 - 10.1 MG/DL    Bilirubin, total 0.4 0.2 - 1.0 MG/DL    ALT (SGPT) 30 12 - 78 U/L    AST (SGOT) 17 15 - 37 U/L    Alk. phosphatase 128 (H) 45 - 117 U/L    Protein, total 6.5 6.4 - 8.2 g/dL    Albumin 3.0 (L) 3.5 - 5.0 g/dL    Globulin 3.5 2.0 - 4.0 g/dL    A-G Ratio 0.9 (L) 1.1 - 2.2       CT of the abdomen/pelvis on 5/20/17, per radiologist interpretation:  The visualized lung bases demonstrate no mass or consolidation. The heart size is normal. There is no pericardial or pleural effusion. The liver, spleen, pancreas, and adrenal glands are normal. The kidneys are symmetric without hydronephrosis. The gall bladder is surgically absent without intra- or extra-hepatic biliary dilatation. Surgical changes are again seen following colectomy with a right lower abdomen ostomy. There are mildly dilated small bowel loops in the lower midabdomen and left lower abdomen measuring measuring up to 4.1 cm. Fecalization of these bowel  loops is noted. There is mild distention of the stomach and proximal duodenum with enteric contents as seen on the prior exam.      There is a round peripherally enhancing collection in the midline between the urinary bladder and a loop of small bowel measuring 2.1 x 2.4 cm (series 2, image 70; series 601B, image 73). There are no enlarged lymph nodes. There is no free fluid or free air. The aorta tapers without aneurysm. The urinary bladder is nondistended. The bony structures are age-appropriate. In Summary:  1. Findings suggesting partial bowel obstruction with dilated and fecalized  small bowel loops in the mid lower abdomen and left lower abdomen. The etiology  may be adhesions or the patient's underlying Crohn's disease.      2. A peripherally enhancing collection is seen between a small bowel loop in the  superior aspect of the urinary bladder. This may represent an abscess and/or  fistula. Assessment:     Principal Problem:    Abdominal pain (11/29/2016)    Active Problems:    Small bowel obstruction ([address] (5/20/2017)        Plan:     Assessment/plan:  1. Small bowel obstruction. NPO. NG tube. IV fluids with normal saline @ 125 cc/h.  2. Fluid collection. Patient has been started on IV Zosyn empirically until further information regarding fluid collection can be elucidated via CT scan with double contrast.  3. Crohns disease. Patient is on chronic immunotherapy with stalera. 4. Depression and anxiety. Continue home medications that may be crushed through the NG tube. 5. Chronic pain syndrome. Dilaudid dosage increased to 1.5 mg IV Q3 hours PRN.        Signed By: Amos Greenberg DO     May 21, 2017

## 2017-05-21 NOTE — PROGRESS NOTES
05/20/17 2031   Vital Signs   Temp 97.5 °F (36.4 °C)   Temp Source Axillary   Pulse (Heart Rate) (!) 112   Heart Rate Source Monitor   Resp Rate 16   O2 Sat (%) 96 %   Level of Consciousness Alert   /84   MAP (Calculated) 96   BP 1 Method Automatic   BP 1 Location Right arm   BP Patient Position At rest   MEWS Score 3   Pain 1   Pain Scale 1 Numeric (0 - 10)   Pain Intensity 1 5   Patient Stated Pain Goal 3   Pain Location 1 Abdomen   Pain Description 1 Sharp   Pain Intervention(s) 1 Rest   Patient Observation   Hourly Rounds Yes   Dr. Jose Basurto notified about HR of 112. Orders for fluids received. Will continue to monitor.

## 2017-05-21 NOTE — PROGRESS NOTES
Bedside and Verbal shift change report given to [facility] MAO RN (oncoming nurse) by Ana Álvarez RN (offgoing nurse). Report included the following information SBAR, Kardex, Intake/Output, Recent Results and Med Rec Status.

## 2017-05-21 NOTE — PROGRESS NOTES
Hospitalist  Progress Note  Velia Mena NP        NAME:  Mariella Howell  :  1975  MRN:  [id number]  Date of Service:  2017  9:36 AM    Assessment/Plan:   Patient is a 43-year-old white male with history of Crohns disease s/p permanent ileosotmy, who has had multiple surgeries, finally resulting in ileostomy. He estimates that he has had small bowel obstruction for about a dozen times. He states that he has been hospitalized during half of these times, being treated with NG tube decompression. He is admitted on  for PSBO  Patient reported adequate pain control ileostomy output less liquid  1. Hx of Crohn's s/p permanent ileostomy and hx of multiple PSBO -admitted with abdominal pain and possible small bowel obstruction   - NPO x meds  - NG tube to suction  - Continue zosyn  - Consult Colorectal Surgery appreciate input  - non contrasted abdomen CT: revealed partial bowel obstruction with dilated and fecalized small bowel loops in the mid lower abdomen and left lower abdomen. The etiology may be adhesions or the patient's underlying Crohn's disease  - Schedule CT scan abdomen/pelvix with oral/iv contrast for better deliniation   - Pain control   - trend electrolytes  - strict I&O  - if protracted NPO will need nutrition source. 2. Chron's- possible flare  - Continue home rx    3. Chronic pain - appears control on current regiment but concern with respiratory suppression  - Continuous pulse ox     VTE Prophylaxis scd  FEN: NS at 125 : NPO  Code Status: Full  Dispo:home when stable         Subjective:     Review of Systems:  A comprehensive review of systems was negative except for that written in the HPI. Objective:     VITALS:   VS reviewed since presentation.  Most recent are:  Visit Vitals    /71 (BP 1 Location: Left arm, BP Patient Position: At rest)    Pulse (!) 106    Temp 98.2 °F (36.8 °C)    Resp 13    SpO2 97%       Intake/Output Summary (Last 24 hours) at 17 5633  Last data filed at 05/20/17 1720   Gross per 24 hour   Intake                0 ml   Output              390 ml   Net             -390 ml        PHYSICAL EXAM:    General: No acute distress, cooperative, pleasant   EENT: EOMI. Anicteric sclerae. Oral mucous moist, oropharynx benign  Resp: CTA bilaterally. No wheezing/rhonchi/rales. No accessory muscle use  CV: Regular rhythm, normal rate, no murmurs, gallops, rubs  GI: NG tube + bilious drainage; soft + bs + ileostomy   Extremities: No edema, warm, 2+ pulses throughout  Neurologic: Moves all extremities. AAOx3, CN II-XII grossly intact  Psych: Good insight. Not anxious nor agitated. Skin: Good Turgor, no rashes or ulcers          Lab Data Personally Reviewed:  x YES  NO     Recent Results (from the past 12 hour(s))   CBC WITH AUTOMATED DIFF    Collection Time: 05/21/17  2:21 AM   Result Value Ref Range    WBC 8.7 4.1 - 11.1 K/uL    RBC 4.02 (L) 4.10 - 5.70 M/uL    HGB 10.6 (L) 12.1 - 17.0 g/dL    HCT 33.7 (L) 36.6 - 50.3 %    MCV 83.8 80.0 - 99.0 FL    MCH 26.4 26.0 - 34.0 PG    MCHC 31.5 30.0 - 36.5 g/dL    RDW 13.7 11.5 - 14.5 %    PLATELET 683 562 - 001 K/uL    NEUTROPHILS 72 32 - 75 %    LYMPHOCYTES 18 12 - 49 %    MONOCYTES 8 5 - 13 %    EOSINOPHILS 2 0 - 7 %    BASOPHILS 0 0 - 1 %    ABS. NEUTROPHILS 6.2 1.8 - 8.0 K/UL    ABS. LYMPHOCYTES 1.6 0.8 - 3.5 K/UL    ABS. MONOCYTES 0.7 0.0 - 1.0 K/UL    ABS. EOSINOPHILS 0.2 0.0 - 0.4 K/UL    ABS.  BASOPHILS 0.0 0.0 - 0.1 K/UL   METABOLIC PANEL, COMPREHENSIVE    Collection Time: 05/21/17  2:21 AM   Result Value Ref Range    Sodium 138 136 - 145 mmol/L    Potassium 4.2 3.5 - 5.1 mmol/L    Chloride 106 97 - 108 mmol/L    CO2 25 21 - 32 mmol/L    Anion gap 7 5 - 15 mmol/L    Glucose 104 (H) 65 - 100 mg/dL    BUN 13 6 - 20 MG/DL    Creatinine 0.90 0.70 - 1.30 MG/DL    BUN/Creatinine ratio 14 12 - 20      GFR est AA >60 >60 ml/min/1.73m2    GFR est non-AA >60 >60 ml/min/1.73m2    Calcium 8.6 8.5 - 10.1 MG/DL Bilirubin, total 0.4 0.2 - 1.0 MG/DL    ALT (SGPT) 30 12 - 78 U/L    AST (SGOT) 17 15 - 37 U/L    Alk.  phosphatase 128 (H) 45 - 117 U/L    Protein, total 6.5 6.4 - 8.2 g/dL    Albumin 3.0 (L) 3.5 - 5.0 g/dL    Globulin 3.5 2.0 - 4.0 g/dL    A-G Ratio 0.9 (L) 1.1 - 2.2         Medications list Personally Reviewed:  x YES  NO     Imaging/Procedures Reviewed:  x YES  NO     Telemetry     ECG     Xray     CT scan x   MRI     Echocardiogram                 _______________________________________________________________________  Care Plan discussed with:  Patient/Family and Nurse    Total time spent coordinating care:  30 minutes    Mike Hopson NP

## 2017-05-21 NOTE — PROGRESS NOTES
Day #1 of Zosyn  Indication:  Intra-abdominal infection   Current regimen:  3.375 gm q6h  Abx regimen:  Monotherapy  ID Following ?: NO  Frequency of BMP?: 5/21 x 1   Recent Labs      17   0145   WBC  9.2   CREA  0.96   BUN  17     Est CrCl: >20 ml/min;    Temp (24hrs), Av.2 °F (36.8 °C), Min:97.6 °F (36.4 °C), Max:98.8 °F (37.1 °C)    Cultures:   None    Ab CT: Possible small bowel abcess    Plan: Change to 3.375 gm q8h per extended infusion protocol      Robert Sorenson, PharmD

## 2017-05-21 NOTE — PROGRESS NOTES
General Daily Progress Note    Admission Date: 5/20/2017  Hospital Day 2  Post-Op Day Not Applicable  Subjective:   Pain is somewhat better right now, but it was severe earlier. Objective:   Patient Vitals for the past 24 hrs:   BP Temp Pulse Resp SpO2   05/21/17 1029 - - - 13 99 %   05/21/17 0818 116/71 98.2 °F (36.8 °C) (!) 106 13 97 %   05/21/17 0625 - - - 10 -   05/21/17 0236 129/72 98.4 °F (36.9 °C) (!) 105 9 96 %   05/21/17 0230 - - - (!) 6 (!) 80 %   05/20/17 2031 120/84 97.5 °F (36.4 °C) (!) 112 16 96 %   05/20/17 1718 122/69 98.4 °F (36.9 °C) (!) 117 16 96 %   05/20/17 1533 133/85 97.6 °F (36.4 °C) (!) 112 16 97 %        05/19 1901 - 05/21 0700  In: -   Out: 390       PHYSICAL EXAMINATION:   GENERAL:  No distress. HEENT: Anicteric. ABDOMEN: Mild generalized palpation tenderness without rebound tenderness of involuntary guarding. Well healed scars. NEURO: Alert and oriented. Data Review   Recent Results (from the past 24 hour(s))   CBC WITH AUTOMATED DIFF    Collection Time: 05/21/17  2:21 AM   Result Value Ref Range    WBC 8.7 4.1 - 11.1 K/uL    RBC 4.02 (L) 4.10 - 5.70 M/uL    HGB 10.6 (L) 12.1 - 17.0 g/dL    HCT 33.7 (L) 36.6 - 50.3 %    MCV 83.8 80.0 - 99.0 FL    MCH 26.4 26.0 - 34.0 PG    MCHC 31.5 30.0 - 36.5 g/dL    RDW 13.7 11.5 - 14.5 %    PLATELET 689 844 - 469 K/uL    NEUTROPHILS 72 32 - 75 %    LYMPHOCYTES 18 12 - 49 %    MONOCYTES 8 5 - 13 %    EOSINOPHILS 2 0 - 7 %    BASOPHILS 0 0 - 1 %    ABS. NEUTROPHILS 6.2 1.8 - 8.0 K/UL    ABS. LYMPHOCYTES 1.6 0.8 - 3.5 K/UL    ABS. MONOCYTES 0.7 0.0 - 1.0 K/UL    ABS. EOSINOPHILS 0.2 0.0 - 0.4 K/UL    ABS.  BASOPHILS 0.0 0.0 - 0.1 K/UL   METABOLIC PANEL, COMPREHENSIVE    Collection Time: 05/21/17  2:21 AM   Result Value Ref Range    Sodium 138 136 - 145 mmol/L    Potassium 4.2 3.5 - 5.1 mmol/L    Chloride 106 97 - 108 mmol/L    CO2 25 21 - 32 mmol/L    Anion gap 7 5 - 15 mmol/L    Glucose 104 (H) 65 - 100 mg/dL    BUN 13 6 - 20 MG/DL Creatinine 0.90 0.70 - 1.30 MG/DL    BUN/Creatinine ratio 14 12 - 20      GFR est AA >60 >60 ml/min/1.73m2    GFR est non-AA >60 >60 ml/min/1.73m2    Calcium 8.6 8.5 - 10.1 MG/DL    Bilirubin, total 0.4 0.2 - 1.0 MG/DL    ALT (SGPT) 30 12 - 78 U/L    AST (SGOT) 17 15 - 37 U/L    Alk. phosphatase 128 (H) 45 - 117 U/L    Protein, total 6.5 6.4 - 8.2 g/dL    Albumin 3.0 (L) 3.5 - 5.0 g/dL    Globulin 3.5 2.0 - 4.0 g/dL    A-G Ratio 0.9 (L) 1.1 - 2.2             Assessment:     Principal Problem:    Abdominal pain (11/29/2016)    Active Problems:    Small bowel obstruction ([address] (5/20/2017)      Stable. No laboratory evidence of sepsis or intestinal ischemia. If obstructed, the obstruction remains partial.    Zosyn day #2. Plan:   Continue NG tube. CT scan of abdomen and pelvis with oral and IV contrast to reevaluate the bowel and the possible fluid collection described on the previous CT.

## 2017-05-22 ENCOUNTER — APPOINTMENT (OUTPATIENT)
Dept: CT IMAGING | Age: 42
DRG: 389 | End: 2017-05-22
Attending: COLON & RECTAL SURGERY
Payer: COMMERCIAL

## 2017-05-22 PROCEDURE — 74011250637 HC RX REV CODE- 250/637: Performed by: INTERNAL MEDICINE

## 2017-05-22 PROCEDURE — 74011000258 HC RX REV CODE- 258: Performed by: INTERNAL MEDICINE

## 2017-05-22 PROCEDURE — 65270000032 HC RM SEMIPRIVATE

## 2017-05-22 PROCEDURE — 74011250636 HC RX REV CODE- 250/636: Performed by: INTERNAL MEDICINE

## 2017-05-22 PROCEDURE — 74011636320 HC RX REV CODE- 636/320: Performed by: INTERNAL MEDICINE

## 2017-05-22 PROCEDURE — 72193 CT PELVIS W/DYE: CPT

## 2017-05-22 RX ORDER — ACETAMINOPHEN 325 MG/1
650 TABLET ORAL
Status: DISCONTINUED | OUTPATIENT
Start: 2017-05-22 | End: 2017-05-23 | Stop reason: HOSPADM

## 2017-05-22 RX ORDER — SODIUM CHLORIDE 0.9 % (FLUSH) 0.9 %
10 SYRINGE (ML) INJECTION
Status: COMPLETED | OUTPATIENT
Start: 2017-05-22 | End: 2017-05-22

## 2017-05-22 RX ADMIN — HYDROMORPHONE HYDROCHLORIDE 1.5 MG: 1 INJECTION, SOLUTION INTRAMUSCULAR; INTRAVENOUS; SUBCUTANEOUS at 02:41

## 2017-05-22 RX ADMIN — IOPAMIDOL 100 ML: 612 INJECTION, SOLUTION INTRAVENOUS at 13:53

## 2017-05-22 RX ADMIN — HYDROMORPHONE HYDROCHLORIDE 1.5 MG: 1 INJECTION, SOLUTION INTRAMUSCULAR; INTRAVENOUS; SUBCUTANEOUS at 15:02

## 2017-05-22 RX ADMIN — HYDROMORPHONE HYDROCHLORIDE 1.5 MG: 1 INJECTION, SOLUTION INTRAMUSCULAR; INTRAVENOUS; SUBCUTANEOUS at 11:59

## 2017-05-22 RX ADMIN — HYDROMORPHONE HYDROCHLORIDE 1.5 MG: 1 INJECTION, SOLUTION INTRAMUSCULAR; INTRAVENOUS; SUBCUTANEOUS at 18:13

## 2017-05-22 RX ADMIN — SODIUM CHLORIDE 100 ML: 900 INJECTION, SOLUTION INTRAVENOUS at 13:53

## 2017-05-22 RX ADMIN — PIPERACILLIN SODIUM,TAZOBACTAM SODIUM 3.38 G: 3; .375 INJECTION, POWDER, FOR SOLUTION INTRAVENOUS at 21:31

## 2017-05-22 RX ADMIN — HYDROMORPHONE HYDROCHLORIDE 1.5 MG: 1 INJECTION, SOLUTION INTRAMUSCULAR; INTRAVENOUS; SUBCUTANEOUS at 05:35

## 2017-05-22 RX ADMIN — ZOLPIDEM TARTRATE 10 MG: 10 TABLET ORAL at 21:31

## 2017-05-22 RX ADMIN — IOHEXOL 50 ML: 240 INJECTION, SOLUTION INTRATHECAL; INTRAVASCULAR; INTRAVENOUS; ORAL at 13:53

## 2017-05-22 RX ADMIN — Medication 10 ML: at 21:32

## 2017-05-22 RX ADMIN — Medication 10 ML: at 15:02

## 2017-05-22 RX ADMIN — HYDROMORPHONE HYDROCHLORIDE 1.5 MG: 1 INJECTION, SOLUTION INTRAMUSCULAR; INTRAVENOUS; SUBCUTANEOUS at 21:31

## 2017-05-22 RX ADMIN — HYDROMORPHONE HYDROCHLORIDE 1.5 MG: 1 INJECTION, SOLUTION INTRAMUSCULAR; INTRAVENOUS; SUBCUTANEOUS at 08:52

## 2017-05-22 RX ADMIN — PIPERACILLIN SODIUM,TAZOBACTAM SODIUM 3.38 G: 3; .375 INJECTION, POWDER, FOR SOLUTION INTRAVENOUS at 13:35

## 2017-05-22 RX ADMIN — PIPERACILLIN SODIUM,TAZOBACTAM SODIUM 3.38 G: 3; .375 INJECTION, POWDER, FOR SOLUTION INTRAVENOUS at 05:35

## 2017-05-22 RX ADMIN — Medication 10 ML: at 13:53

## 2017-05-22 NOTE — PROGRESS NOTES
Problem: Nutrition Deficit  Goal: *Optimize nutritional status  Outcome: Progressing Towards Goal  begin cl liquid diet

## 2017-05-22 NOTE — PROGRESS NOTES
Hospitalist  Progress Note  Ching Serrano MD        NAME:  Hillary Rueda  :  1975  MRN:  [id number]  Date of Service:  2017  9:36 AM    Assessment/Plan:   Patient is a 29-year-old white male with history of Crohns disease s/p permanent ileosotmy, who has had multiple surgeries, finally resulting in ileostomy. He estimates that he has had small bowel obstruction for about a dozen times and has been treated with NG tube. NG tube with suction was inserted. Surgery has followed patient. Patient's CT abdomen on 2017 showed a peripherally enhancing collection between a small bowel loop in the superior aspect of the urinary bladder which could represent an abscess and/or fistula. Today 2017 surgery referred patient to IR for possible percutaneous drainage of the pelvis fluid collection but there was no fluid collection amenable to percutaneous drainage on repeated CT scan contrast by radiology. Surgery then decided to d/c NG tube and allowed clear liquid diet to advance as tolerated    Patient eating now and tolerating diet well. 1. Hx of Crohn's s/p permanent ileostomy and hx of multiple PSBO -admitted with abdominal pain and possible small bowel obstruction   - NG tube to suction initially. NG tube d/ivana on   - NPO initially,on 2017 allowed clears to progress as tolerated. - Continue zosyn  - Colorectal Surgery following  - non contrasted abdomen CT: revealed partial bowel obstruction with dilated and fecalized small bowel loops in the mid lower abdomen and left lower abdomen. The etiology may be adhesions or the patient's underlying Crohn's disease  -CT scan of abdm/pelvis on 2017:Nearly complete resolution of the small bowel distention. Persistent duodenal distention and pelvic fluid collections  -CT pelvis with contrast on 2017: There is no fluid collection  amenable to percutaneous drainage. 2. Chron's- possible flare  - Continue home rx    3.  Chronic pain - controlled       VTE Prophylaxis scd  FEN: Resumed diet on 5/22  Code Status: Full  Dispo:home 5/23       Subjective:     Review of Systems:  A comprehensive review of systems was negative except for that written in the HPI. Objective:     VITALS:   VS reviewed since presentation. Most recent are:  Visit Vitals    /71 (BP 1 Location: Right arm, BP Patient Position: At rest)    Pulse 86    Temp 98.4 °F (36.9 °C)    Resp 16    Ht 6' (1.829 m)    Wt 90.7 kg (200 lb)    SpO2 98%    BMI 27.12 kg/m2       Intake/Output Summary (Last 24 hours) at 05/22/17 1709  Last data filed at 05/22/17 1205   Gross per 24 hour   Intake              960 ml   Output             2400 ml   Net            -1440 ml        PHYSICAL EXAM:    General: No acute distress, cooperative, pleasant   EENT: EOMI. Anicteric sclerae. Oral mucous moist, oropharynx benign  Resp: CTA bilaterally. No wheezing/rhonchi/rales. No accessory muscle use  CV: Regular rhythm, normal rate, no murmurs, gallops, rubs  GI: S/p NG tube,soft + bs + ileostomy   Extremities: No edema, warm, 2+ pulses throughout  Neurologic: Moves all extremities. AAOx3, CN II-XII grossly intact  Psych: Good insight. Not anxious nor agitated. Skin: Good Turgor, no rashes or ulcers          Lab Data Personally Reviewed:  x YES  NO     No results found for this or any previous visit (from the past 12 hour(s)).     Medications list Personally Reviewed:  x YES  NO     Imaging/Procedures Reviewed:  x YES  NO     Telemetry     ECG     Xray     CT scan x   MRI     Echocardiogram                 _______________________________________________________________________  Care Plan discussed with:  Patient and Nurse    Total time spent coordinating care:  25 minutes    Elonda Snellen, MD

## 2017-05-22 NOTE — PROGRESS NOTES
General Daily Progress Note    Admission Date: 5/20/2017   Hospital Day 3  Post-Op Day Not Applicable  Subjective:   Pain seems to have improved quite a bit, but he is still requiring Dilaudid. He denies having any new urologic complaints. Objective:   Patient Vitals for the past 24 hrs:   BP Temp Pulse Resp SpO2   05/22/17 0443 125/70 98.7 °F (37.1 °C) 98 16 98 %   05/21/17 2351 - - 97 16 -   05/21/17 2047 - - 98 16 -   05/21/17 2037 117/75 - - - -   05/21/17 2018 98/56 99.1 °F (37.3 °C) 97 16 98 %   05/21/17 1747 - - - 14 100 %   05/21/17 1438 108/65 98.6 °F (37 °C) 95 14 99 %   05/21/17 1029 - - - 13 99 %        05/20 1901 - 05/22 0700  In: 10   Out: 1200 [Urine:850]      PHYSICAL EXAMINATION:   GENERAL: No distress. HEENT: Anicteric. NG tube draining thin fluid. ABDOMEN: Mild generalized palpation tenderness decreased compared to yesterday. No rebound tenderness of involuntary guarding. Well healed scars. NEURO: Alert and oriented. Data Review No results found for this or any previous visit (from the past 24 hour(s)). CT Scan of Abdomen and Pelvis (with oral and IV contrast) (5/21/2017):  Little if any evidence of partial small bowel obstruction. Persistent, peripherally enhancing fluid collection between somewhat thick-walled small bowel and the posterior bladder wall. Assessment:     Principal Problem:    Abdominal pain (11/29/2016)    Active Problems:    Small bowel obstruction ([address] (5/20/2017)      Stable. No laboratory evidence of sepsis or intestinal ischemia.     Zosyn day #3. Pelvic fluid collection is concerning for abscess, but it is unclear whether or not it has anything to do with the patwient's symptoms. I have discussed the findings with one of the radiologists, and it might be possible to drain the fluid percutaneously for diagnostic and potentially therapeutic purposes. Plan:   Continue NG tube for now.     IR consult for possible percutaneous drainage of pelvis fluid collection.

## 2017-05-22 NOTE — PROGRESS NOTES
NUTRITION COMPLETE ASSESSMENT    RECOMMENDATIONS:   1. Diet advancement per surgery to GI lite   - add Ensure Enlive (vanilla) 1-2x/day   2. Check Vit B12  - with hx of Crohn's at higher risk for deficiency (last checked 2009)  3. Daily MVI      Interventions/Plan:   Food/Nutrient Delivery:    Commercial supplement (once diet advanced (see above))          Assessment:   Reason for Assessment: [x]BPA/MST Referral (2-13# wt loss, poor appetite)    Diet: NPO  Supplements: none  Nutritionally Significant Medications: [x] Reviewed & Includes: zosyn, NS @ 125ml/hr; PRNL zofran q4hr (IV)  Meal Intake: No data found. Pre-Hospitalization:  Usual Appetite: Fair  Diet at Home: low fiber, low fat  Vitamins/Supplements: Yes (Boost High protein 1x/day (vanilla))    Current Hospitalization:   Appetite:    PO Ability: Independent Average po intake:   Average supplements intake:        Subjective:  I usually have a Boost each day, the High Protein. I avoid fried foods, pan seared is ok usually. Objective:  Pt admitted for abd pain. PMHx: depression, GERD, Crohn's dx, UC. Extensive GI surgical hx - total proctocolectomy 2004, permanent ileostomy, anal fistula,multiple LUCIEN. Maintains about 400cm of small intestine per Brief Op note of LUCIEN on 3/14/2017. Abd pain with increase stool output noted PTA with some N/V.  CT with fluid collection noted with concerns for abscess noted. Plans for percutaneous drainage with drain placement. NGT in place. [place]-rectal surgeon following. Ileostomy output 300-350ml/day since admit. NGT output 90ml yesterday. Plans to continue for at least today    Previous TPN and PEG tube noted. Wt has been stable between 195-200#. Pt does not wt gain with steroids rx. Had been doing fair with appetite PTA, usually drinks about 1 Boost High Protein per day at home (vanilla) and avoids high fat, high fiber foods. Will continue to follow for diet advancement/tolerance.  TPN not indicated at this time but will continue to follow for needs if unable to advance diet over next 3-4 days. Recommend adding Ensure Enlive 1-2x/day once diet advance (likes vanilla). Estimated Nutrition Needs:   Kcals/day: 2393 Kcals/day (2209-2393kcal)  Protein: 109 g (109-118g (1.2-1.3g/kg))  Fluid: 2393 ml (1ml/kcal)  Based On: [address] (x 1.2-1.3)  Weight Used: Actual wt (90.7kg)    Pt expected to meet estimated nutrient needs:  []   Yes     [x]  No (while NPO) [] Unable to predict at this time  Nutrition Diagnosis:   1. Inadequate oral food/beverage intake related to altered GI fx 2/2 Crohn's/UC as evidenced by NPO status; N/V/diarrhea prior to admit    Goals:     Diet advancement/tolerance in 3-4 days or start of nutrition support; wt maintenance     Monitoring & Evaluation:    - Total energy intake, Liquid meal replacement   - Weight/weight change, GI, Vitamin profile     Previous Nutrition Goals Met:   N/A  Previous Recommendations:    N/A    Education & Discharge Needs:   [x] None Identified   [] Identified and addressed    [x] Participated in care plan, discharge planning, and/or interdisciplinary rounds        Cultural, Holiness and ethnic food preferences identified: None    Skin Integrity: [x]Intact  []Other  Edema: [x]None []Other  Last BM: 5/21 - ileostomy  Food Allergies: [x]None []Other  Diet Restrictions: Cultural/Muslim Preference(s): None     Anthropometrics:    Weight Loss Metrics 5/22/2017 5/20/2017 5/20/2017 5/20/2017 4/21/2017 4/2/2017 3/20/2017   Today's Wt 200 lb - 200 lb - 213 lb 13.5 oz 195 lb 197 lb 14.4 oz   BMI - 27.12 kg/m2 - 27.12 kg/m2 29 kg/m2 26.45 kg/m2 26.84 kg/m2         Height: 6' (182.9 cm),    Body mass index is 27.12 kg/(m^2).   IBW : 80.7 kg (178 lb), % IBW (Calculated): 112.36 %  Usual Body Weight: 88.5 kg (195 lb),      Labs:    Lab Results   Component Value Date/Time    Sodium 138 05/21/2017 02:21 AM    Potassium 4.2 05/21/2017 02:21 AM    Chloride 106 05/21/2017 02:21 AM    CO2 25 05/21/2017 02:21 AM    Glucose 104 05/21/2017 02:21 AM    BUN 13 05/21/2017 02:21 AM    Creatinine 0.90 05/21/2017 02:21 AM    Calcium 8.6 05/21/2017 02:21 AM    Magnesium 1.3 04/04/2017 03:31 AM    Phosphorus 3.9 03/20/2017 02:07 AM    Albumin 3.0 05/21/2017 02:21 AM     Rachell Claros RD

## 2017-05-23 VITALS
HEART RATE: 106 BPM | RESPIRATION RATE: 16 BRPM | HEIGHT: 72 IN | WEIGHT: 200 LBS | SYSTOLIC BLOOD PRESSURE: 131 MMHG | DIASTOLIC BLOOD PRESSURE: 78 MMHG | TEMPERATURE: 98.1 F | OXYGEN SATURATION: 98 % | BODY MASS INDEX: 27.09 KG/M2

## 2017-05-23 LAB
ANION GAP BLD CALC-SCNC: 10 MMOL/L (ref 5–15)
BUN SERPL-MCNC: 3 MG/DL (ref 6–20)
BUN/CREAT SERPL: 4 (ref 12–20)
CALCIUM SERPL-MCNC: 9 MG/DL (ref 8.5–10.1)
CHLORIDE SERPL-SCNC: 105 MMOL/L (ref 97–108)
CO2 SERPL-SCNC: 27 MMOL/L (ref 21–32)
CREAT SERPL-MCNC: 0.7 MG/DL (ref 0.7–1.3)
GLUCOSE SERPL-MCNC: 89 MG/DL (ref 65–100)
POTASSIUM SERPL-SCNC: 3.5 MMOL/L (ref 3.5–5.1)
SODIUM SERPL-SCNC: 142 MMOL/L (ref 136–145)

## 2017-05-23 PROCEDURE — 80048 BASIC METABOLIC PNL TOTAL CA: CPT | Performed by: INTERNAL MEDICINE

## 2017-05-23 PROCEDURE — 36415 COLL VENOUS BLD VENIPUNCTURE: CPT | Performed by: INTERNAL MEDICINE

## 2017-05-23 PROCEDURE — 74011250637 HC RX REV CODE- 250/637: Performed by: HOSPITALIST

## 2017-05-23 PROCEDURE — 74011250636 HC RX REV CODE- 250/636: Performed by: INTERNAL MEDICINE

## 2017-05-23 PROCEDURE — 74011000258 HC RX REV CODE- 258: Performed by: INTERNAL MEDICINE

## 2017-05-23 RX ORDER — PREGABALIN 100 MG/1
100 CAPSULE ORAL 3 TIMES DAILY
Status: DISCONTINUED | OUTPATIENT
Start: 2017-05-23 | End: 2017-05-23 | Stop reason: HOSPADM

## 2017-05-23 RX ORDER — PANTOPRAZOLE SODIUM 40 MG/1
40 TABLET, DELAYED RELEASE ORAL
Status: DISCONTINUED | OUTPATIENT
Start: 2017-05-23 | End: 2017-05-23 | Stop reason: HOSPADM

## 2017-05-23 RX ORDER — OXYCODONE AND ACETAMINOPHEN 10; 325 MG/1; MG/1
1-2 TABLET ORAL
Status: DISCONTINUED | OUTPATIENT
Start: 2017-05-23 | End: 2017-05-23 | Stop reason: HOSPADM

## 2017-05-23 RX ORDER — OMEPRAZOLE 40 MG/1
40 CAPSULE, DELAYED RELEASE ORAL
Status: DISCONTINUED | OUTPATIENT
Start: 2017-05-23 | End: 2017-05-23 | Stop reason: CLARIF

## 2017-05-23 RX ORDER — MIRTAZAPINE 15 MG/1
15 TABLET, FILM COATED ORAL
Status: DISCONTINUED | OUTPATIENT
Start: 2017-05-23 | End: 2017-05-23 | Stop reason: HOSPADM

## 2017-05-23 RX ORDER — DIPHENHYDRAMINE HCL 25 MG
25 CAPSULE ORAL ONCE
Status: COMPLETED | OUTPATIENT
Start: 2017-05-23 | End: 2017-05-23

## 2017-05-23 RX ORDER — AMITRIPTYLINE HYDROCHLORIDE 10 MG/1
30 TABLET, FILM COATED ORAL
Status: DISCONTINUED | OUTPATIENT
Start: 2017-05-23 | End: 2017-05-23 | Stop reason: HOSPADM

## 2017-05-23 RX ORDER — TEMAZEPAM 15 MG/1
30 CAPSULE ORAL
Status: DISCONTINUED | OUTPATIENT
Start: 2017-05-23 | End: 2017-05-23 | Stop reason: HOSPADM

## 2017-05-23 RX ORDER — DICYCLOMINE HYDROCHLORIDE 10 MG/1
10 CAPSULE ORAL
Status: DISCONTINUED | OUTPATIENT
Start: 2017-05-23 | End: 2017-05-23 | Stop reason: HOSPADM

## 2017-05-23 RX ORDER — OXYCODONE HYDROCHLORIDE 30 MG/1
30 TABLET ORAL
Qty: 12 TAB | Refills: 0 | Status: ON HOLD | OUTPATIENT
Start: 2017-05-23 | End: 2017-06-12

## 2017-05-23 RX ORDER — BUPROPION HYDROCHLORIDE 150 MG/1
150 TABLET, EXTENDED RELEASE ORAL 2 TIMES DAILY
Status: DISCONTINUED | OUTPATIENT
Start: 2017-05-23 | End: 2017-05-23 | Stop reason: HOSPADM

## 2017-05-23 RX ADMIN — PIPERACILLIN SODIUM,TAZOBACTAM SODIUM 3.38 G: 3; .375 INJECTION, POWDER, FOR SOLUTION INTRAVENOUS at 07:10

## 2017-05-23 RX ADMIN — DIPHENHYDRAMINE HYDROCHLORIDE 25 MG: 25 CAPSULE ORAL at 10:58

## 2017-05-23 RX ADMIN — PREGABALIN 100 MG: 100 CAPSULE ORAL at 10:58

## 2017-05-23 RX ADMIN — HYDROMORPHONE HYDROCHLORIDE 1.5 MG: 1 INJECTION, SOLUTION INTRAMUSCULAR; INTRAVENOUS; SUBCUTANEOUS at 08:08

## 2017-05-23 RX ADMIN — BUPROPION HYDROCHLORIDE 150 MG: 150 TABLET, EXTENDED RELEASE ORAL at 10:58

## 2017-05-23 RX ADMIN — HYDROMORPHONE HYDROCHLORIDE 1.5 MG: 1 INJECTION, SOLUTION INTRAMUSCULAR; INTRAVENOUS; SUBCUTANEOUS at 04:26

## 2017-05-23 RX ADMIN — OXYCODONE HYDROCHLORIDE AND ACETAMINOPHEN 2 TABLET: 10; 325 TABLET ORAL at 11:01

## 2017-05-23 RX ADMIN — OXYCODONE HYDROCHLORIDE AND ACETAMINOPHEN 2 TABLET: 10; 325 TABLET ORAL at 15:16

## 2017-05-23 RX ADMIN — HYDROMORPHONE HYDROCHLORIDE 1.5 MG: 1 INJECTION, SOLUTION INTRAMUSCULAR; INTRAVENOUS; SUBCUTANEOUS at 00:14

## 2017-05-23 RX ADMIN — Medication 10 ML: at 05:21

## 2017-05-23 NOTE — PROGRESS NOTES
Colorectal Surgery Progress Note    Dr. Mel Hurst and I discussed the CT findings in detail this afternoon. He had reviewed the images from the last 5 scans (including today's), and he explained that the pelvic fluid collection described today has been present, and decreasing in size, since the first scan after the last operation. [The absence of oral contrast on the scans performed prior to 5/21/2017 was quite detrimental.]    The fluid collection does not appear to be an abscess, it is not amenable to percutaneous drainage, and I do not believe that it has anything to do with the patient's symptoms. Antibiotics were presumably started because the fluid was believed to be an abscess, and the Zosyn might not even be treating anything. I am in favor of advancing the patient's diet as tolerated. When he can at least tolerate a full liquid diet and when his pain can be adequately controlled with oral medication (ideally, resuming the Percocet taper that I had him on prior to admission), then he should be dischargeable (with or without antibiotics).

## 2017-05-23 NOTE — PROGRESS NOTES
Bedside shift change report given to [address] (oncoming nurse) by Pooja Dutta (offgoing nurse). Report included the following information SBAR, Kardex, Intake/Output, MAR and Recent Results.

## 2017-05-23 NOTE — DISCHARGE SUMMARY
Discharge Summary     Patient:  Vinayak Lowery       MRN: [id number]       YOB: 1975       Age: 40 y.o. Date of admission:  5/20/2017    Date of discharge:  5/23/2017    Primary care provider: Dr. Kristian Mcguire MD    Admitting provider:  Erik Yoder DO    Discharging provider:  Jaida Viveros MD - [phone]  If unavailable, call [phone] and ask the  to page the triage hospitalist.    Consultations  · IP CONSULT TO INTERVENTIONAL RADIOLOGY    Procedures  · * No surgery found *    Discharge destination: HOME. The patient is stable for discharge. Admission diagnosis  · small bowel obstruction  · Small bowel obstruction ([address]    Current Discharge Medication List      START taking these medications    Details   oxyCODONE IR (OXY-IR) 30 mg immediate release tablet Take 1 Tab by mouth every four (4) hours as needed for Pain. Max Daily Amount: 180 mg.  Qty: 12 Tab, Refills: 0         CONTINUE these medications which have NOT CHANGED    Details   mirtazapine (REMERON) 30 mg tablet Take 15 mg by mouth nightly. traMADol (ULTRAM) 50 mg tablet Take 50 mg by mouth every eight (8) hours as needed for Pain. Ustekinumab (STELARA) 90 mg/mL injection 90 mg by SubCUTAneous route. Every 8 weeks       oxyCODONE-acetaminophen (PERCOCET 10)  mg per tablet Take 1-2 Tabs by mouth every four (4) hours as needed. Max Daily Amount: 12 Tabs. Qty: 75 Tab, Refills: 0      promethazine (PHENERGAN) 25 mg tablet Take 25 mg by mouth every six (6) hours as needed for Nausea. ondansetron hcl (ZOFRAN, AS HYDROCHLORIDE,) 8 mg tablet Take 8 mg by mouth every twelve (12) hours as needed for Nausea. amitriptyline (ELAVIL) 10 mg tablet Take 30 mg by mouth nightly. buPROPion SR (WELLBUTRIN SR) 150 mg SR tablet Take 150 mg by mouth two (2) times a day.       omeprazole (PRILOSEC) 40 mg capsule Take 40 mg by mouth Daily (before dinner). dicyclomine (BENTYL) 10 mg capsule Take 10 mg by mouth every six (6) hours as needed. ergocalciferol (ERGOCALCIFEROL) 50,000 unit capsule Take 50,000 Units by mouth every seven (7) days. pregabalin (LYRICA) 100 mg capsule Take 100 mg by mouth three (3) times daily. cyanocobalamin (VITAMIN B-12) 1,000 mcg/mL injection 1,000 mcg by IntraMUSCular route every seven (7) days. Indications: Once weekly      temazepam (RESTORIL) 30 mg capsule Take 30 mg by mouth nightly. diazepam (VALIUM) 5 mg tablet Take 5 mg by mouth every eight (8) hours as needed (bowel relaxation). STOP taking these medications       prochlorperazine (COMPAZINE) 10 mg tablet Comments:   Reason for Stopping: Follow-up Information     Follow up With Details Comments [address], MD In 1 week Discharge follow up  [address] [place] [address]  [phone]            Final discharge diagnoses and brief hospital course  Please also refer to the admission H&P for details on the presenting problem. 50-year-old white male with history of Crohns disease s/p permanent ileosotmy, who has had multiple surgeries, finally resulting in ileostomy. He estimates that he has had small bowel obstruction for about a dozen times and has been treated with NG tube. NG tube with suction was inserted. Surgery has followed patient. Patient's CT abdomen on 5/20/2017 showed a peripherally enhancing collection between a small bowel loop in the superior aspect of the urinary bladder which could represent an abscess and/or fistula. Today 5/22/2017 surgery referred patient to IR for possible percutaneous drainage of the pelvis fluid collection but there was no fluid collection amenable to percutaneous drainage on repeated CT scan contrast by radiology. Surgery then decided to d/c NG tube and allowed clear liquid diet to advance as tolerated    1.  Hx of Crohn's s/p permanent ileostomy and hx of multiple PSBO -admitted with Ileus vs partial SBP  - NG tube to suction initially. NG tube d/ivana on 5/23  - NPO initially,on 5/23/2017 allowed clears, advanced to Low-residue diet   - was started on Zosyn, d/ivana on 5/23 as no signs of abscess. - Colorectal Surgery following  - non contrasted abdomen CT: revealed partial bowel obstruction with dilated and fecalized small bowel loops in the mid lower abdomen and left lower abdomen. The etiology may be adhesions or the patient's underlying Crohn's disease  -CT scan of abdm/pelvis on 5/21/2017:Nearly complete resolution of the small bowel distention. Persistent duodenal distention and pelvic fluid collections  -CT pelvis with contrast on 5/22/2017: There is no fluid collection amenable to percutaneous drainage.        2. Crohn's disease   - Continue home rx     3. Chronic pain - controlled, resume home PO meds    FOLLOW-UP TESTS recommended: none     PENDING TEST RESULTS:  At the time of your discharge the following test results are still pending: none  Please make sure you review these results with your outpatient follow-up provider(s).     SYMPTOMS to watch for: chest pain, shortness of breath, fever, chills, nausea, vomiting, diarrhea, change in mentation, falling, weakness, bleeding.     DIET/what to eat:  Resume previous diet     ACTIVITY:  Activity as tolerated     WOUND CARE: none     EQUIPMENT needed: none    Physical examination at discharge  Visit Vitals    /75 (BP 1 Location: Left arm, BP Patient Position: Sitting)    Pulse 92    Temp 98.7 °F (37.1 °C)    Resp 16    Ht 6' (1.829 m)    Wt 90.7 kg (200 lb)    SpO2 98%    BMI 27.12 kg/m2     AO3  PERRLA  EOMI  Lung: CTA  CVS: RRR  Abd: soft NT ND, +multiple surgical scar healed, +Ileostomy     Pertinent imaging studies:  CT pel w/ cont:  IMPRESSION: There is a small 2.3 x 2.6 cm fluid collection along the dome of the urinary bladder posteriorly.  This has diminished in size over the course of CT examinations extending from 4/3/2017 to current. There is no inflammatory change surrounding it or gas within it. Urinary bladder extends into the posterior  pelvis into the region of rectal resection. There is no fluid collection amenable to percutaneous drainage. Recent Labs      05/21/17 0221   WBC  8.7   HGB  10.6*   HCT  33.7*   PLT  212     Recent Labs      05/23/17   0400  05/21/17 0221   NA  142  138   K  3.5  4.2   CL  105  106   CO2  27  25   BUN  3*  13   CREA  0.70  0.90   GLU  89  104*   CA  9.0  8.6     Recent Labs      05/21/17 0221   SGOT  17   AP  128*   TP  6.5   ALB  3.0*   GLOB  3.5     No results for input(s): INR, PTP, APTT in the last 72 hours. No lab exists for component: INREXT   No results for input(s): FE, TIBC, PSAT, FERR in the last 72 hours. No results for input(s): PH, PCO2, PO2 in the last 72 hours. No results for input(s): CPK, CKMB in the last 72 hours.     No lab exists for component: TROPONINI  No components found for: [place]    Chronic Diagnoses:    Problem List as of 5/23/2017  Date Reviewed: 4/3/2017          Codes Class Noted - Resolved    Hyperglycemia ICD-10-CM: R73.9  ICD-9-CM: 790.29  5/20/2017 - Present        Small bowel obstruction ([facility] ICD-10-CM: K56.69  ICD-9-CM: 560.9  5/20/2017 - Present        SBO (small bowel obstruction) ([facility] ICD-10-CM: K56.69  ICD-9-CM: 560.9  3/7/2017 - Present        Constipation ICD-10-CM: K59.00  ICD-9-CM: 564.00  1/15/2017 - Present        Intractable vomiting with nausea ICD-10-CM: R11.2  ICD-9-CM: 536.2  1/1/2017 - Present        Depression with anxiety (Chronic) ICD-10-CM: F41.8  ICD-9-CM: 300.4  12/25/2016 - Present        Anemia (Chronic) ICD-10-CM: D64.9  ICD-9-CM: 285.9  12/25/2016 - Present        S/P laparoscopic cholecystectomy (Chronic) ICD-10-CM: Z90.49  ICD-9-CM: V45.89  12/21/2016 - Present    Overview Signed 12/21/2016 12:56 PM by Fran Corrales MD     With [facility].             Crohn's colitis ([place] [address] (Chronic) ICD-10-CM: K50.10  ICD-9-CM: 555.1  12/18/2016 - Present        * (Principal)Abdominal pain (Chronic) ICD-10-CM: R10.9  ICD-9-CM: 789.00  11/29/2016 - Present        GERD (gastroesophageal reflux disease) (Chronic) ICD-10-CM: K21.9  ICD-9-CM: 530.81  11/20/2016 - Present        RESOLVED: HCAP (healthcare-associated pneumonia) ICD-10-CM: J18.9  ICD-9-CM: 249  4/3/2017 - 5/20/2017        RESOLVED: SIRS (systemic inflammatory response syndrome) ([place] [address] ICD-10-CM: R65.10  ICD-9-CM: 995.90  4/3/2017 - 4/4/2017        RESOLVED: Small bowel obstruction ([place] [address] ICD-10-CM: K56.69  ICD-9-CM: 560.9  3/9/2017 - 5/20/2017        RESOLVED: Insomnia ICD-10-CM: G47.00  ICD-9-CM: 780.52  12/25/2016 - 1/1/2017        RESOLVED: Hypernatremia ICD-10-CM: E87.0  ICD-9-CM: 276.0  12/25/2016 - 1/1/2017        RESOLVED: Biliary dyskinesia ICD-10-CM: K82.8  ICD-9-CM: 575.8  12/21/2016 - 12/25/2016        RESOLVED: Gallbladder sludge ICD-10-CM: K82.8  ICD-9-CM: 575.8  12/20/2016 - 12/25/2016        RESOLVED: Hypokalemia ICD-10-CM: E87.6  ICD-9-CM: 276.8  12/18/2016 - 1/1/2017        RESOLVED: Leukocytosis ICD-10-CM: D72.829  ICD-9-CM: 288.60  12/18/2016 - 12/25/2016        RESOLVED: Crohn's disease ([place] [address] ICD-10-CM: K50.90  ICD-9-CM: 555.9  11/20/2016 - 12/25/2016        RESOLVED: Intractable abdominal pain ICD-10-CM: R10.9  ICD-9-CM: 789.00  11/20/2016 - 1/1/2017        RESOLVED: Intractable nausea and vomiting ICD-10-CM: R11.2  ICD-9-CM: 536.2  11/20/2016 - 12/25/2016        RESOLVED: Enterostomy malfunction ([facility] (Chronic) ICD-10-CM: K94.13  ICD-9-CM: 569.62  10/29/2015 - 11/21/2016        RESOLVED: Ileostomy dysfunction ([facility] ICD-10-CM: K94.13  ICD-9-CM: 569.62  10/29/2015 - 11/21/2016        RESOLVED: Skin lesion of back ICD-10-CM: L98.9  ICD-9-CM: 709.9  3/17/2014 - 11/21/2016        RESOLVED: Ileal pouchitis ([facility] ICD-10-CM: K81.652  ICD-9-CM: 569.71  5/1/2004 - 11/21/2016              Time spent on discharge related activities today greater than 30 minutes.       Signed:  Dagoberto Granger MD                 Hospitalist, Internal Medicine      Cc: Eva Pantoja MD

## 2017-05-23 NOTE — DISCHARGE INSTRUCTIONS
Please bring this form with you to show your primary care provider at your follow-up appointment. Primary care provider:  Dr. Hazel Horowitz MD    Discharging provider:  Mariah Hendricks MD    You have been admitted to the hospital with the following diagnoses:  · small bowel obstruction  · Small bowel obstruction ([address]    FOLLOW-UP CARE RECOMMENDATIONS:    APPOINTMENTS:  Follow-up Information     Follow up With Details Comments Contact Info    Hazel Horowitz MD In 1 week Discharge follow up  [address] [facility] [address]  [phone]              FOLLOW-UP TESTS recommended: none    PENDING TEST RESULTS:  At the time of your discharge the following test results are still pending: none  Please make sure you review these results with your outpatient follow-up provider(s). SYMPTOMS to watch for: chest pain, shortness of breath, fever, chills, nausea, vomiting, diarrhea, change in mentation, falling, weakness, bleeding. DIET/what to eat:  Resume previous diet    ACTIVITY:  Activity as tolerated    WOUND CARE: none    EQUIPMENT needed:  none      What to do if new or unexpected symptoms occur? If you experience any of the above symptoms (or should other concerns or questions arise after discharge) please call your primary care physician. Return to the emergency room if you cannot get hold of your doctor. · It is very important that you keep your follow-up appointment(s). · Please bring discharge papers, medication list (and/or medication bottles) to your follow-up appointments for review by your outpatient provider(s). · Please check the list of medications and be sure it includes every medication (even non-prescription medications) that your provider wants you to take. · It is important that you take the medication exactly as they are prescribed.    · Keep your medication in the bottles provided by the pharmacist and keep a list of the medication names, dosages, and times to be taken in your wallet. · Do not take other medications without consulting your doctor. · If you have any questions about your medications or other instructions, please talk to your nurse or care provider before you leave the hospital.    I understand that if any problems occur once I am at home I am to contact my physician. These instructions were explained to me and I had the opportunity to ask questions.

## 2017-05-24 NOTE — PROGRESS NOTES
Spiritual Care Partner Volunteer visited patient in 2N on 5/22/17. Documented by:  Maximilian Bedoya M.Div.    Paging Service [phone])

## 2017-06-11 ENCOUNTER — HOSPITAL ENCOUNTER (EMERGENCY)
Age: 42
Discharge: ANOTHER HOSPITAL | End: 2017-06-12
Attending: EMERGENCY MEDICINE | Admitting: EMERGENCY MEDICINE
Payer: COMMERCIAL

## 2017-06-11 ENCOUNTER — APPOINTMENT (OUTPATIENT)
Dept: CT IMAGING | Age: 42
End: 2017-06-11
Attending: EMERGENCY MEDICINE
Payer: COMMERCIAL

## 2017-06-11 DIAGNOSIS — K56.609 SBO (SMALL BOWEL OBSTRUCTION) (HCC): Primary | ICD-10-CM

## 2017-06-11 LAB
ALBUMIN SERPL BCP-MCNC: 3.9 G/DL (ref 3.5–5)
ALBUMIN/GLOB SERPL: 1.2 {RATIO} (ref 1.1–2.2)
ALP SERPL-CCNC: 139 U/L (ref 45–117)
ALT SERPL-CCNC: 33 U/L (ref 12–78)
ANION GAP BLD CALC-SCNC: 19 MMOL/L (ref 5–15)
ANION GAP BLD CALC-SCNC: 8 MMOL/L (ref 5–15)
APPEARANCE UR: CLEAR
AST SERPL W P-5'-P-CCNC: 16 U/L (ref 15–37)
BACTERIA URNS QL MICRO: NEGATIVE /HPF
BASOPHILS # BLD AUTO: 0 K/UL (ref 0–0.1)
BASOPHILS # BLD: 0 % (ref 0–1)
BILIRUB SERPL-MCNC: 0.2 MG/DL (ref 0.2–1)
BILIRUB UR QL: NEGATIVE
BUN BLD-MCNC: 7 MG/DL (ref 9–20)
BUN SERPL-MCNC: 7 MG/DL (ref 6–20)
BUN/CREAT SERPL: 7 (ref 12–20)
CA-I BLD-MCNC: 1.13 MMOL/L (ref 1.12–1.32)
CALCIUM SERPL-MCNC: 8.9 MG/DL (ref 8.5–10.1)
CHLORIDE BLD-SCNC: 102 MMOL/L (ref 98–107)
CHLORIDE SERPL-SCNC: 105 MMOL/L (ref 97–108)
CO2 BLD-SCNC: 25 MMOL/L (ref 21–32)
CO2 SERPL-SCNC: 29 MMOL/L (ref 21–32)
COLOR UR: NORMAL
CREAT BLD-MCNC: 0.9 MG/DL (ref 0.6–1.3)
CREAT SERPL-MCNC: 1 MG/DL (ref 0.7–1.3)
EOSINOPHIL # BLD: 0.1 K/UL (ref 0–0.4)
EOSINOPHIL NFR BLD: 1 % (ref 0–7)
EPITH CASTS URNS QL MICRO: NORMAL /LPF
ERYTHROCYTE [DISTWIDTH] IN BLOOD BY AUTOMATED COUNT: 15 % (ref 11.5–14.5)
GLOBULIN SER CALC-MCNC: 3.2 G/DL (ref 2–4)
GLUCOSE BLD-MCNC: 106 MG/DL (ref 65–100)
GLUCOSE SERPL-MCNC: 105 MG/DL (ref 65–100)
GLUCOSE UR STRIP.AUTO-MCNC: NEGATIVE MG/DL
HCT VFR BLD AUTO: 36 % (ref 36.6–50.3)
HCT VFR BLD CALC: 37 % (ref 36.6–50.3)
HGB BLD-MCNC: 11.7 G/DL (ref 12.1–17)
HGB BLD-MCNC: 12.6 GM/DL (ref 12.1–17)
HGB UR QL STRIP: NEGATIVE
HYALINE CASTS URNS QL MICRO: NORMAL /LPF (ref 0–5)
KETONES UR QL STRIP.AUTO: NEGATIVE MG/DL
LEUKOCYTE ESTERASE UR QL STRIP.AUTO: NEGATIVE
LIPASE SERPL-CCNC: 189 U/L (ref 73–393)
LYMPHOCYTES # BLD AUTO: 22 % (ref 12–49)
LYMPHOCYTES # BLD: 1.9 K/UL (ref 0.8–3.5)
MCH RBC QN AUTO: 26.2 PG (ref 26–34)
MCHC RBC AUTO-ENTMCNC: 32.5 G/DL (ref 30–36.5)
MCV RBC AUTO: 80.5 FL (ref 80–99)
MONOCYTES # BLD: 0.5 K/UL (ref 0–1)
MONOCYTES NFR BLD AUTO: 6 % (ref 5–13)
NEUTS SEG # BLD: 6.1 K/UL (ref 1.8–8)
NEUTS SEG NFR BLD AUTO: 71 % (ref 32–75)
NITRITE UR QL STRIP.AUTO: NEGATIVE
PH UR STRIP: 5.5 [PH] (ref 5–8)
PLATELET # BLD AUTO: 256 K/UL (ref 150–400)
POTASSIUM BLD-SCNC: 3.7 MMOL/L (ref 3.5–5.1)
POTASSIUM SERPL-SCNC: 3.8 MMOL/L (ref 3.5–5.1)
PROT SERPL-MCNC: 7.1 G/DL (ref 6.4–8.2)
PROT UR STRIP-MCNC: NEGATIVE MG/DL
RBC # BLD AUTO: 4.47 M/UL (ref 4.1–5.7)
RBC #/AREA URNS HPF: NORMAL /HPF (ref 0–5)
SERVICE CMNT-IMP: ABNORMAL
SODIUM BLD-SCNC: 141 MMOL/L (ref 136–145)
SODIUM SERPL-SCNC: 142 MMOL/L (ref 136–145)
SP GR UR REFRACTOMETRY: 1.01 (ref 1–1.03)
UA: UC IF INDICATED,UAUC: NORMAL
UROBILINOGEN UR QL STRIP.AUTO: 0.2 EU/DL (ref 0.2–1)
WBC # BLD AUTO: 8.6 K/UL (ref 4.1–11.1)
WBC URNS QL MICRO: NORMAL /HPF (ref 0–4)

## 2017-06-11 PROCEDURE — 74011636320 HC RX REV CODE- 636/320: Performed by: EMERGENCY MEDICINE

## 2017-06-11 PROCEDURE — 74177 CT ABD & PELVIS W/CONTRAST: CPT

## 2017-06-11 PROCEDURE — 96376 TX/PRO/DX INJ SAME DRUG ADON: CPT

## 2017-06-11 PROCEDURE — 96375 TX/PRO/DX INJ NEW DRUG ADDON: CPT

## 2017-06-11 PROCEDURE — 85025 COMPLETE CBC W/AUTO DIFF WBC: CPT | Performed by: EMERGENCY MEDICINE

## 2017-06-11 PROCEDURE — 74011636320 HC RX REV CODE- 636/320: Performed by: RADIOLOGY

## 2017-06-11 PROCEDURE — 99285 EMERGENCY DEPT VISIT HI MDM: CPT

## 2017-06-11 PROCEDURE — 81001 URINALYSIS AUTO W/SCOPE: CPT | Performed by: EMERGENCY MEDICINE

## 2017-06-11 PROCEDURE — 83690 ASSAY OF LIPASE: CPT | Performed by: EMERGENCY MEDICINE

## 2017-06-11 PROCEDURE — 96374 THER/PROPH/DIAG INJ IV PUSH: CPT

## 2017-06-11 PROCEDURE — 74011250636 HC RX REV CODE- 250/636: Performed by: EMERGENCY MEDICINE

## 2017-06-11 PROCEDURE — 80053 COMPREHEN METABOLIC PANEL: CPT | Performed by: EMERGENCY MEDICINE

## 2017-06-11 PROCEDURE — 80047 BASIC METABLC PNL IONIZED CA: CPT

## 2017-06-11 PROCEDURE — 36415 COLL VENOUS BLD VENIPUNCTURE: CPT | Performed by: EMERGENCY MEDICINE

## 2017-06-11 PROCEDURE — 96361 HYDRATE IV INFUSION ADD-ON: CPT

## 2017-06-11 RX ORDER — HYDROMORPHONE HYDROCHLORIDE 1 MG/ML
1 INJECTION, SOLUTION INTRAMUSCULAR; INTRAVENOUS; SUBCUTANEOUS ONCE
Status: COMPLETED | OUTPATIENT
Start: 2017-06-11 | End: 2017-06-11

## 2017-06-11 RX ORDER — ONDANSETRON 2 MG/ML
4 INJECTION INTRAMUSCULAR; INTRAVENOUS
Status: COMPLETED | OUTPATIENT
Start: 2017-06-11 | End: 2017-06-12

## 2017-06-11 RX ORDER — ONDANSETRON 2 MG/ML
4 INJECTION INTRAMUSCULAR; INTRAVENOUS
Status: COMPLETED | OUTPATIENT
Start: 2017-06-11 | End: 2017-06-11

## 2017-06-11 RX ADMIN — ONDANSETRON 4 MG: 2 INJECTION INTRAMUSCULAR; INTRAVENOUS at 23:37

## 2017-06-11 RX ADMIN — IOPAMIDOL 100 ML: 755 INJECTION, SOLUTION INTRAVENOUS at 22:40

## 2017-06-11 RX ADMIN — HYDROMORPHONE HYDROCHLORIDE 1 MG: 1 INJECTION, SOLUTION INTRAMUSCULAR; INTRAVENOUS; SUBCUTANEOUS at 23:37

## 2017-06-11 RX ADMIN — SODIUM CHLORIDE 1000 ML: 900 INJECTION, SOLUTION INTRAVENOUS at 20:51

## 2017-06-11 RX ADMIN — HYDROMORPHONE HYDROCHLORIDE 1 MG: 1 INJECTION, SOLUTION INTRAMUSCULAR; INTRAVENOUS; SUBCUTANEOUS at 21:02

## 2017-06-11 RX ADMIN — ONDANSETRON 4 MG: 2 INJECTION INTRAMUSCULAR; INTRAVENOUS at 21:02

## 2017-06-11 RX ADMIN — DIATRIZOATE MEGLUMINE AND DIATRIZOATE SODIUM 30 ML: 660; 100 LIQUID ORAL; RECTAL at 21:02

## 2017-06-12 ENCOUNTER — HOSPITAL ENCOUNTER (INPATIENT)
Age: 42
LOS: 5 days | Discharge: HOME | DRG: 389 | End: 2017-06-17
Attending: FAMILY MEDICINE | Admitting: FAMILY MEDICINE
Payer: COMMERCIAL

## 2017-06-12 ENCOUNTER — APPOINTMENT (OUTPATIENT)
Dept: GENERAL RADIOLOGY | Age: 42
DRG: 389 | End: 2017-06-12
Attending: FAMILY MEDICINE
Payer: COMMERCIAL

## 2017-06-12 ENCOUNTER — APPOINTMENT (OUTPATIENT)
Dept: GENERAL RADIOLOGY | Age: 42
DRG: 389 | End: 2017-06-12
Attending: COLON & RECTAL SURGERY
Payer: COMMERCIAL

## 2017-06-12 VITALS
WEIGHT: 210.54 LBS | HEART RATE: 126 BPM | DIASTOLIC BLOOD PRESSURE: 62 MMHG | TEMPERATURE: 98 F | HEIGHT: 72 IN | OXYGEN SATURATION: 95 % | BODY MASS INDEX: 28.52 KG/M2 | SYSTOLIC BLOOD PRESSURE: 111 MMHG | RESPIRATION RATE: 17 BRPM

## 2017-06-12 LAB
ANION GAP BLD CALC-SCNC: 7 MMOL/L (ref 5–15)
BASOPHILS # BLD AUTO: 0 K/UL (ref 0–0.1)
BASOPHILS # BLD: 0 % (ref 0–1)
BUN SERPL-MCNC: 7 MG/DL (ref 6–20)
BUN/CREAT SERPL: 8 (ref 12–20)
CALCIUM SERPL-MCNC: 9 MG/DL (ref 8.5–10.1)
CHLORIDE SERPL-SCNC: 106 MMOL/L (ref 97–108)
CO2 SERPL-SCNC: 24 MMOL/L (ref 21–32)
CREAT SERPL-MCNC: 0.83 MG/DL (ref 0.7–1.3)
EOSINOPHIL # BLD: 0.1 K/UL (ref 0–0.4)
EOSINOPHIL NFR BLD: 1 % (ref 0–7)
ERYTHROCYTE [DISTWIDTH] IN BLOOD BY AUTOMATED COUNT: 15.3 % (ref 11.5–14.5)
GLUCOSE SERPL-MCNC: 126 MG/DL (ref 65–100)
HCT VFR BLD AUTO: 36.9 % (ref 36.6–50.3)
HGB BLD-MCNC: 11.7 G/DL (ref 12.1–17)
LIPASE SERPL-CCNC: 81 U/L (ref 73–393)
LYMPHOCYTES # BLD AUTO: 14 % (ref 12–49)
LYMPHOCYTES # BLD: 1.8 K/UL (ref 0.8–3.5)
MAGNESIUM SERPL-MCNC: 1.9 MG/DL (ref 1.6–2.4)
MCH RBC QN AUTO: 25.7 PG (ref 26–34)
MCHC RBC AUTO-ENTMCNC: 31.7 G/DL (ref 30–36.5)
MCV RBC AUTO: 81.1 FL (ref 80–99)
MONOCYTES # BLD: 0.7 K/UL (ref 0–1)
MONOCYTES NFR BLD AUTO: 6 % (ref 5–13)
NEUTS SEG # BLD: 10 K/UL (ref 1.8–8)
NEUTS SEG NFR BLD AUTO: 79 % (ref 32–75)
PHOSPHATE SERPL-MCNC: 4.3 MG/DL (ref 2.6–4.7)
PLATELET # BLD AUTO: 258 K/UL (ref 150–400)
POTASSIUM SERPL-SCNC: 3.9 MMOL/L (ref 3.5–5.1)
RBC # BLD AUTO: 4.55 M/UL (ref 4.1–5.7)
SODIUM SERPL-SCNC: 137 MMOL/L (ref 136–145)
WBC # BLD AUTO: 12.6 K/UL (ref 4.1–11.1)

## 2017-06-12 PROCEDURE — 74011250636 HC RX REV CODE- 250/636

## 2017-06-12 PROCEDURE — 36415 COLL VENOUS BLD VENIPUNCTURE: CPT | Performed by: FAMILY MEDICINE

## 2017-06-12 PROCEDURE — 84100 ASSAY OF PHOSPHORUS: CPT | Performed by: FAMILY MEDICINE

## 2017-06-12 PROCEDURE — 85025 COMPLETE CBC W/AUTO DIFF WBC: CPT | Performed by: FAMILY MEDICINE

## 2017-06-12 PROCEDURE — 74011250636 HC RX REV CODE- 250/636: Performed by: EMERGENCY MEDICINE

## 2017-06-12 PROCEDURE — 74270 X-RAY XM COLON 1CNTRST STD: CPT

## 2017-06-12 PROCEDURE — 83690 ASSAY OF LIPASE: CPT | Performed by: FAMILY MEDICINE

## 2017-06-12 PROCEDURE — 80048 BASIC METABOLIC PNL TOTAL CA: CPT | Performed by: FAMILY MEDICINE

## 2017-06-12 PROCEDURE — 74000 XR ABD (KUB): CPT

## 2017-06-12 PROCEDURE — 74011250637 HC RX REV CODE- 250/637: Performed by: INTERNAL MEDICINE

## 2017-06-12 PROCEDURE — 74011000250 HC RX REV CODE- 250: Performed by: INTERNAL MEDICINE

## 2017-06-12 PROCEDURE — 65270000029 HC RM PRIVATE

## 2017-06-12 PROCEDURE — 83735 ASSAY OF MAGNESIUM: CPT | Performed by: FAMILY MEDICINE

## 2017-06-12 PROCEDURE — 74011250636 HC RX REV CODE- 250/636: Performed by: FAMILY MEDICINE

## 2017-06-12 PROCEDURE — 74011250636 HC RX REV CODE- 250/636: Performed by: INTERNAL MEDICINE

## 2017-06-12 RX ORDER — FENTANYL CITRATE 50 UG/ML
25 INJECTION, SOLUTION INTRAMUSCULAR; INTRAVENOUS
Status: CANCELLED | OUTPATIENT
Start: 2017-06-12

## 2017-06-12 RX ORDER — PROCHLORPERAZINE MALEATE 10 MG
10 TABLET ORAL
COMMUNITY

## 2017-06-12 RX ORDER — SODIUM CHLORIDE, SODIUM LACTATE, POTASSIUM CHLORIDE, CALCIUM CHLORIDE 600; 310; 30; 20 MG/100ML; MG/100ML; MG/100ML; MG/100ML
50 INJECTION, SOLUTION INTRAVENOUS CONTINUOUS
Status: CANCELLED | OUTPATIENT
Start: 2017-06-12 | End: 2017-06-13

## 2017-06-12 RX ORDER — ONDANSETRON 2 MG/ML
INJECTION INTRAMUSCULAR; INTRAVENOUS
Status: COMPLETED
Start: 2017-06-12 | End: 2017-06-12

## 2017-06-12 RX ORDER — HYDROMORPHONE HYDROCHLORIDE 1 MG/ML
0.2 INJECTION, SOLUTION INTRAMUSCULAR; INTRAVENOUS; SUBCUTANEOUS
Status: CANCELLED | OUTPATIENT
Start: 2017-06-12

## 2017-06-12 RX ORDER — HYDROMORPHONE HYDROCHLORIDE 1 MG/ML
1 INJECTION, SOLUTION INTRAMUSCULAR; INTRAVENOUS; SUBCUTANEOUS ONCE
Status: COMPLETED | OUTPATIENT
Start: 2017-06-12 | End: 2017-06-12

## 2017-06-12 RX ORDER — ONDANSETRON 2 MG/ML
4 INJECTION INTRAMUSCULAR; INTRAVENOUS
Status: DISCONTINUED | OUTPATIENT
Start: 2017-06-12 | End: 2017-06-17 | Stop reason: HOSPADM

## 2017-06-12 RX ORDER — OXYCODONE AND ACETAMINOPHEN 5; 325 MG/1; MG/1
1 TABLET ORAL
Status: ON HOLD | COMMUNITY
End: 2017-06-17

## 2017-06-12 RX ORDER — SODIUM CHLORIDE 0.9 % (FLUSH) 0.9 %
5-10 SYRINGE (ML) INJECTION AS NEEDED
Status: CANCELLED | OUTPATIENT
Start: 2017-06-12

## 2017-06-12 RX ORDER — TEMAZEPAM 15 MG/1
30 CAPSULE ORAL
Status: DISCONTINUED | OUTPATIENT
Start: 2017-06-12 | End: 2017-06-17 | Stop reason: HOSPADM

## 2017-06-12 RX ORDER — ACETAMINOPHEN 500 MG
500 TABLET ORAL
COMMUNITY

## 2017-06-12 RX ORDER — ONDANSETRON 2 MG/ML
4 INJECTION INTRAMUSCULAR; INTRAVENOUS
Status: COMPLETED | OUTPATIENT
Start: 2017-06-12 | End: 2017-06-12

## 2017-06-12 RX ORDER — LIDOCAINE HYDROCHLORIDE 10 MG/ML
0.1 INJECTION, SOLUTION EPIDURAL; INFILTRATION; INTRACAUDAL; PERINEURAL AS NEEDED
Status: CANCELLED | OUTPATIENT
Start: 2017-06-12

## 2017-06-12 RX ORDER — SODIUM CHLORIDE 9 MG/ML
125 INJECTION, SOLUTION INTRAVENOUS CONTINUOUS
Status: DISCONTINUED | OUTPATIENT
Start: 2017-06-12 | End: 2017-06-14

## 2017-06-12 RX ORDER — SODIUM CHLORIDE 0.9 % (FLUSH) 0.9 %
5-10 SYRINGE (ML) INJECTION EVERY 8 HOURS
Status: DISCONTINUED | OUTPATIENT
Start: 2017-06-12 | End: 2017-06-17 | Stop reason: HOSPADM

## 2017-06-12 RX ORDER — HYDROMORPHONE HYDROCHLORIDE 1 MG/ML
1 INJECTION, SOLUTION INTRAMUSCULAR; INTRAVENOUS; SUBCUTANEOUS
Status: DISCONTINUED | OUTPATIENT
Start: 2017-06-12 | End: 2017-06-12

## 2017-06-12 RX ORDER — HYDROMORPHONE HYDROCHLORIDE 2 MG/ML
2 INJECTION, SOLUTION INTRAMUSCULAR; INTRAVENOUS; SUBCUTANEOUS ONCE
Status: DISPENSED | OUTPATIENT
Start: 2017-06-12 | End: 2017-06-12

## 2017-06-12 RX ORDER — SODIUM CHLORIDE 0.9 % (FLUSH) 0.9 %
5-10 SYRINGE (ML) INJECTION EVERY 8 HOURS
Status: CANCELLED | OUTPATIENT
Start: 2017-06-12

## 2017-06-12 RX ORDER — ONDANSETRON 2 MG/ML
4 INJECTION INTRAMUSCULAR; INTRAVENOUS AS NEEDED
Status: CANCELLED | OUTPATIENT
Start: 2017-06-12

## 2017-06-12 RX ORDER — HYDROMORPHONE HYDROCHLORIDE 1 MG/ML
2 INJECTION, SOLUTION INTRAMUSCULAR; INTRAVENOUS; SUBCUTANEOUS
Status: DISCONTINUED | OUTPATIENT
Start: 2017-06-12 | End: 2017-06-12

## 2017-06-12 RX ORDER — MORPHINE SULFATE 10 MG/ML
2 INJECTION, SOLUTION INTRAMUSCULAR; INTRAVENOUS
Status: CANCELLED | OUTPATIENT
Start: 2017-06-12

## 2017-06-12 RX ORDER — HYDROMORPHONE HYDROCHLORIDE 2 MG/ML
2 INJECTION, SOLUTION INTRAMUSCULAR; INTRAVENOUS; SUBCUTANEOUS
Status: DISCONTINUED | OUTPATIENT
Start: 2017-06-12 | End: 2017-06-16 | Stop reason: SDUPTHER

## 2017-06-12 RX ORDER — HYDROMORPHONE HYDROCHLORIDE 2 MG/ML
INJECTION, SOLUTION INTRAMUSCULAR; INTRAVENOUS; SUBCUTANEOUS
Status: COMPLETED
Start: 2017-06-12 | End: 2017-06-12

## 2017-06-12 RX ORDER — SODIUM CHLORIDE 0.9 % (FLUSH) 0.9 %
5-10 SYRINGE (ML) INJECTION AS NEEDED
Status: DISCONTINUED | OUTPATIENT
Start: 2017-06-12 | End: 2017-06-17 | Stop reason: HOSPADM

## 2017-06-12 RX ADMIN — TEMAZEPAM 30 MG: 15 CAPSULE ORAL at 23:54

## 2017-06-12 RX ADMIN — HYDROMORPHONE HYDROCHLORIDE 1 MG: 1 INJECTION, SOLUTION INTRAMUSCULAR; INTRAVENOUS; SUBCUTANEOUS at 06:12

## 2017-06-12 RX ADMIN — ONDANSETRON 4 MG: 2 INJECTION INTRAMUSCULAR; INTRAVENOUS at 02:12

## 2017-06-12 RX ADMIN — HYDROMORPHONE HYDROCHLORIDE 1 MG: 1 INJECTION, SOLUTION INTRAMUSCULAR; INTRAVENOUS; SUBCUTANEOUS at 03:33

## 2017-06-12 RX ADMIN — ONDANSETRON 4 MG: 2 INJECTION INTRAMUSCULAR; INTRAVENOUS at 02:11

## 2017-06-12 RX ADMIN — HYDROMORPHONE HYDROCHLORIDE: 2 INJECTION, SOLUTION INTRAMUSCULAR; INTRAVENOUS; SUBCUTANEOUS at 10:00

## 2017-06-12 RX ADMIN — HYDROMORPHONE HYDROCHLORIDE 1 MG: 1 INJECTION, SOLUTION INTRAMUSCULAR; INTRAVENOUS; SUBCUTANEOUS at 01:52

## 2017-06-12 RX ADMIN — HYDROMORPHONE HYDROCHLORIDE 2 MG: 2 INJECTION, SOLUTION INTRAMUSCULAR; INTRAVENOUS; SUBCUTANEOUS at 20:52

## 2017-06-12 RX ADMIN — HYDROMORPHONE HYDROCHLORIDE 2 MG: 2 INJECTION, SOLUTION INTRAMUSCULAR; INTRAVENOUS; SUBCUTANEOUS at 13:46

## 2017-06-12 RX ADMIN — PROCHLORPERAZINE EDISYLATE 5 MG: 5 INJECTION INTRAMUSCULAR; INTRAVENOUS at 12:20

## 2017-06-12 RX ADMIN — HYDROMORPHONE HYDROCHLORIDE 2 MG: 2 INJECTION, SOLUTION INTRAMUSCULAR; INTRAVENOUS; SUBCUTANEOUS at 23:57

## 2017-06-12 RX ADMIN — ONDANSETRON 4 MG: 2 INJECTION INTRAMUSCULAR; INTRAVENOUS at 06:12

## 2017-06-12 RX ADMIN — SODIUM CHLORIDE 125 ML/HR: 900 INJECTION, SOLUTION INTRAVENOUS at 20:53

## 2017-06-12 RX ADMIN — HYDROMORPHONE HYDROCHLORIDE 2 MG: 2 INJECTION, SOLUTION INTRAMUSCULAR; INTRAVENOUS; SUBCUTANEOUS at 17:26

## 2017-06-12 RX ADMIN — SODIUM CHLORIDE 125 ML/HR: 900 INJECTION, SOLUTION INTRAVENOUS at 11:30

## 2017-06-12 RX ADMIN — SODIUM CHLORIDE 125 ML/HR: 900 INJECTION, SOLUTION INTRAVENOUS at 03:33

## 2017-06-12 NOTE — ED TRIAGE NOTES
Pt. C/o pain to midabomen with n/v since today, notes has an ostomy and contents are 'liquidy brown'.

## 2017-06-12 NOTE — ED NOTES
11:44 PM  Change of shift. Care of patient taken over from Dr. Shayla Baum; H&P reviewed, handoff complete. Awaiting consultant. CONSULT NOTE:  11:54 PM Arabella Silvestre DO spoke with Dr. Viki Angel, Consult for General Surgery. Discussed available diagnostic tests and clinical findings. Dr. Viki Angel wishes for the Hospitalist at [facility] at admit patient. 12:38 AM Arabella Silvestre DO spoke with Dr[facility] Amrit Bowman, [facility]  Discussed available diagnostic tests and clinical findings. Dr. Amrit Bowman accepts patient for admission. 2:10 AM  EMS here to take pt to [facility] for admission.

## 2017-06-12 NOTE — PROGRESS NOTES
4650:  Call placed to Dr Terrence Schneider to notify that pain and nausea medication are not giving the patient any relief.

## 2017-06-12 NOTE — CONSULTS
Colorectal Surgery Consultation Note          NAME:  Lesia Lord   :   1975   MRN:   [id number]     Referring Physician: Jason Harp. Anamaria Cartagena MD    Consultation Date: 2017    Chief Complaint:  Possible bowel obstruction. History of Present Illness: The patient is a 21-year-old male with Crohn's disease and a permanent ileostomy who is well known to me. I have operated on him eleven times for treatment of inflammatory bowel disease and its complications (see below). The last operation was performed on 3/14/2017. He was most recently hospitalized from 2017 to 2017 for treatment of abdominal pain associated with a possible partial small bowel obstruction. This hospitalization seems to have derailed a plan already being enacted to gradually wean and discontinue his use of narcotic analgesics. He was discharged with a prescription for twelve oxycodone 30 mg tablets, but I do not know how many he used (if any). He filled a prescription for 105 (one hundred five) Percocet 5-325 mg tablets on 2017, and today he states that prior to this admission he had been taking one tablet 3 times per day. He reports that he had dark brown, watery diarrhea on 2017 and 6/10/2017. He took Imodium yesterday morning and yesterday afternoon, and then the ileostomy output abruptly ceased. He has experienced an increase in abdominal pain that has been greatest on the left side. He has also had nausea and vomiting. He presented to the ER at [facility]) yesterday evening, and a CT scan with oral and IV contrast revealed small bowel dilation consistent with obstruction (but also consistent with ileus). A nasogastric tube was placed, and he was transferred here so that I would be able to participate in his care. After reviewing his CT scan and examining him, I ordered a water soluble contrast enema study via the ileostomy.   That study has been completed, and I have reviewed the images and discussed them with the radiologist (Dr. Newton John). The retrograde flow of contrast did not reach the dilated small bowel. In addition, right hydroureteronephrosis not present on the CT scan was observed. PMH:  Past Medical History:   Diagnosis Date    Anal fistula     The patient no longer has an anus.  Anal stenosis     Crohn's disease ([address]     GERD (gastroesophageal reflux disease)     H/O ulcerative colitis     Symptoms began in 1996. Total proctocolectomy was performed in 2004. Patient later developed Crohn's disease.  Hiatal hernia     Ileal pouchitis ([address] 05/2004    The patient no longer has a pouch.  Nausea & vomiting     Combination of Scopalamine patch & Zofran IV worked well in past    Numbness in right leg     Chronic.  Opioid dependence ([address]     History of opioid dependence requiring a detox program.    Psychiatric disorder     depression and anxiety    Skin lesion of back 3/17/2014    Syncopal episodes        PSH:  Past Surgical History:   Procedure Laterality Date    ABDOMEN SURGERY PROC UNLISTED  3/25/2004    Total proctocolectomy with creation of ileoanal J-pouch and loop ileostomy; Dr. Zimmerman Fraction.   CHOLECYSTECTOMY  12/2016    Dr. Yeboah Running  GI  5/2/2004    Examination under anesthesia with endoscopic evaluation of ileoanal pouch and placement of drain; Dr. Zimmerman Fraction.   GI  5/25/2004    Ileostomy closure with small bowel resection and anastomosis; Dr. Zimmerman Fraction.   GI  5/24/2012    Examination under anesthesia, anal dilatation, anal biopsy, and placement of draining seton; Dr. Zimmerman Fraction.   GI  7/3/2012    Incision and drainage of perirectal abscess and rigid endoscopy of ileoanal pouch; Dr. Zimmerman Fraction.   GI  7/31/2012    Incision and drainage of perirectal abscess, placement of draining seton, and anal dilatation; Dr. Zimmerman Fraction.      GI  1/22/2013    Repair of anal fistulas with debridement, partial fistulectomy, and flap closure; Dr. Lucrecia Greenwood.   GI  2013    Examination under anesthesia, partial fistulotomy,  and placement of draining setons; Dr. Lucrecia Greenwood.   GI  2013    Anal fistulotomy and application of ACell micromatrix; Dr. Lucrecia Greenwood.   GI  2014    Examination under anesthesia and dilation of anal canal with rigid proctoscopy; Wilder Rodney MD.     GI  2015    Creation of Nguyen Maiers continent intestinal reservoir (BCIR), abdominoperineal resection of ileoanal J-pouch; lysis of adhesions, and gastrostomy; Oliva Marx. Meryle Christmas, MD ([facility])     GI  2015    Stoma revision; Oliva Marx. Meryle Christmas, MD ([facility])     GI  10/29/2015    Extensive lysis of adhesions, resection of continent intestinal reservoir, repair of serosal tears, and creation of end-ileostomy; Dr. Lucrecia Greenwood.   GI  2017    Laparotomy, extensive lysis of adhesions and revision of ileostomy; Dr. Lucrecia Greenwood.   ORTHOPAEDIC      Left ACL repair    HX UROLOGICAL  2017    Cystoscopy and placement of bilateral temporary ureteral catheters; Antonio Sood MD.       Home Medications:  Prior to Admission Medications   Prescriptions Last Dose Informant Patient Reported? Taking? Ustekinumab (STELARA) 90 mg/mL injection  Self Yes Yes   Si mg by SubCUTAneous route. Every 8 weeks    acetaminophen (TYLENOL) 500 mg tablet   Yes Yes   Sig: Take 500 mg by mouth four (4) times daily as needed (mild pain). amitriptyline (ELAVIL) 10 mg tablet  Self Yes Yes   Sig: Take 30 mg by mouth nightly. buPROPion SR (WELLBUTRIN SR) 150 mg SR tablet  Self Yes Yes   Sig: Take 150 mg by mouth two (2) times a day. cyanocobalamin (VITAMIN B-12) 1,000 mcg/mL injection 2017 Self Yes Yes   Si,000 mcg by IntraMUSCular route every .  Indications: Once weekly   diazepam (VALIUM) 5 mg tablet  Self Yes Yes   Sig: Take 5 mg by mouth every eight (8) hours as needed (bowel relaxation). dicyclomine (BENTYL) 10 mg capsule  Self Yes Yes   Sig: Take 10 mg by mouth every six (6) hours as needed. ergocalciferol (ERGOCALCIFEROL) 50,000 unit capsule 6/5/2017 Self Yes Yes   Sig: Take 50,000 Units by mouth every Monday. omeprazole (PRILOSEC) 40 mg capsule  Self Yes Yes   Sig: Take 40 mg by mouth Daily (before dinner). ondansetron hcl (ZOFRAN, AS HYDROCHLORIDE,) 8 mg tablet  Self Yes Yes   Sig: Take 8 mg by mouth every twelve (12) hours as needed for Nausea. oxyCODONE-acetaminophen (PERCOCET) 5-325 mg per tablet   Yes Yes   Sig: Take 1 Tab by mouth four (4) times daily as needed (severe pain). pregabalin (LYRICA) 100 mg capsule  Self Yes Yes   Sig: Take 100 mg by mouth three (3) times daily. prochlorperazine (COMPAZINE) 10 mg tablet   Yes Yes   Sig: Take 10 mg by mouth two (2) times daily as needed for Nausea. promethazine (PHENERGAN) 25 mg tablet  Self Yes Yes   Sig: Take 25 mg by mouth every six (6) hours as needed for Nausea. temazepam (RESTORIL) 30 mg capsule  Self Yes Yes   Sig: Take 30 mg by mouth nightly. traMADol (ULTRAM) 50 mg tablet  Self Yes Yes   Sig: Take 50 mg by mouth three (3) times daily as needed (moderate pain). Facility-Administered Medications: None       Hospital Medications:  Current Facility-Administered Medications   Medication Dose Route Frequency    sodium chloride (NS) flush 5-10 mL  5-10 mL IntraVENous Q8H    sodium chloride (NS) flush 5-10 mL  5-10 mL IntraVENous PRN    ondansetron (ZOFRAN) injection 4 mg  4 mg IntraVENous Q6H PRN    0.9% sodium chloride infusion  125 mL/hr IntraVENous CONTINUOUS    HYDROmorphone (PF) (DILAUDID) injection 2 mg  2 mg IntraVENous ONCE    HYDROmorphone (PF) (DILAUDID) injection 2 mg  2 mg IntraVENous Q3H PRN    prochlorperazine (COMPAZINE) with saline injection 5 mg  5 mg IntraVENous Q4H PRN       Allergies:   Allergies   Allergen Reactions    Remicade [Infliximab] Anaphylaxis and Shortness of Breath    Bactrim [Sulfamethoprim Ds] Other (comments)     Increased GI distress.  Nsaids (Non-Steroidal Anti-Inflammatory Drug) Other (comments)     Stomach ulcers       Family History:  Family History   Problem Relation Age of Onset    Cancer Father     Asthma Neg Hx     Diabetes Neg Hx     Heart Disease Neg Hx     Hypertension Neg Hx     Stroke Neg Hx     Malignant Hyperthermia Neg Hx     Pseudocholinesterase Deficiency Neg Hx     Delayed Awakening Neg Hx     Post-op Nausea/Vomiting Neg Hx        Social History:  Social History   Substance Use Topics    Smoking status: Former Smoker     Packs/day: 0.00     Years: 7.00     Quit date: 1/14/2017    Smokeless tobacco: Never Used    Alcohol use No       Review of Systems:    Symptom Y/N Comments  Symptom Y/N Comments   Fever/Chills N   Chest Pain     Cough    Abdominal Pain Y    Sputum    Joint Pain     SOB/CEDILLO    Pruritis/Rash     Nausea/vomit Y   Tolerating PT/OT     Diarrhea Y   Tolerating Diet N    Constipation Y   Other       Could NOT obtain due to:        Objective:   Patient Vitals for the past 8 hrs:   Height Weight   06/12/17 1058 6' (1.829 m) 95.5 kg (210 lb 8.6 oz)     06/12 0701 - 06/12 1900  In: -   Out: 1450 [Urine:600]       PHYSICAL EXAMINATION:    GENERAL:  Uncomfortable male. HEENT:  NG tube draining bilious fluid containing oral contrast.  ABDOMEN:  Somewhat distended. Diffusely tender. No palpable masses or hernias. No rebound tenderness or involuntary guarding. Midline scar well healed. Ileostomy viable. NEURO:  Alert and oriented.        Data Review     Recent Labs      06/12/17 0442  06/11/17 2053   WBC  12.6*  8.6   HGB  11.7*  11.7*   HCT  36.9  36.0*   PLT  258  256     Recent Labs      06/12/17 0442  06/11/17 2053   NA  137  142   K  3.9  3.8   CL  106  105   CO2  24  29   BUN  7  7   CREA  0.83  1.00   GLU  126*  105*   PHOS  4.3   --    CA  9.0  8.9     Recent Labs      06/12/17 0442  06/11/17 2053 SGOT   --   16   AP   --   139*   TP   --   7.1   ALB   --   3.9   GLOB   --   3.2   LPSE  81  189     No results for input(s): INR, PTP, APTT in the last 72 hours. No lab exists for component: INREXT                    Assessment and Plan:      The radiographic findings are consistent with small bowel obstruction. The obstruction could be partial or complete, and the dilated bowel might also be compressing the distal portion of the right ureter. An ileus with some element of fecal impaction is also a possibility, especially with the combination of narcotics and antidiarrheals that the patient has taken. The patient had a mild leukocytosis this morning (which is concerning), but a normal serum creatinine. He also feels somewhat better than he did before the contrast study as there has been an increase in output from the ileostomy and from the NG tube. I believe that there is a high likelihood that surgery will be needed to relieve the obstruction, but I also think that it is reasonable to continue with the non-operative management for now. If there is no clear improvement within the next 24 hours, then the patient should probably be explored. I have scheduled him for surgery for tomorrow afternoon just in case.         Principal Problem:    Small bowel obstruction ([address] (5/20/2017)

## 2017-06-12 NOTE — ED NOTES
TRANSFER - OUT REPORT:    Verbal report given to Paulene Kehr (name) on Richard Denise  being transferred to [facility] 5th floor (unit) for routine progression of care       Report consisted of patients Situation, Background, Assessment and   Recommendations(SBAR). Information from the following report(s) SBAR, Kardex, ED Summary, Intake/Output and MAR was reviewed with the receiving nurse. Lines:   Peripheral IV 06/11/17 Left Forearm (Active)   Site Assessment Clean, dry, & intact 6/11/2017  8:58 PM   Phlebitis Assessment 0 6/11/2017  8:58 PM   Infiltration Assessment 0 6/11/2017  8:58 PM   Dressing Status Clean, dry, & intact 6/11/2017  8:58 PM   Dressing Type Tape;Transparent 6/11/2017  8:58 PM   Hub Color/Line Status Pink;Flushed 6/11/2017  8:58 PM        Opportunity for questions and clarification was provided.       Patient transported with:  SEBASTIÁN

## 2017-06-12 NOTE — IP AVS SNAPSHOT
[address] [facility] [address] [facility] 
[phone] Patient: Chandan Welch MRN: [id number] :1975 You are allergic to the following Allergen Reactions Remicade (Infliximab) Anaphylaxis Shortness of Breath Bactrim (Sulfamethoprim Ds) Other (comments) Increased GI distress. Nsaids (Non-Steroidal Anti-Inflammatory Drug) Other (comments) Stomach ulcers Recent Documentation Height Weight BMI Smoking Status 1.829 m 91.9 kg 27.46 kg/m2 Former Smoker Emergency Contacts Name Discharge Info Relation Home Work Mobile [address] CAREGIVER [3] Spouse [3] [phone] About your hospitalization You were admitted on:  2017 You last received care in the:  [place] [id number] You were discharged on:  2017 Unit phone number:  [phone] Why you were hospitalized Your primary diagnosis was:  Small Bowel Obstruction (Hcc) Providers Seen During Your Hospitalizations Provider Role Specialty Primary office phone Jamarcus Cosby MD Attending Provider [facility] [phone] Katie Sims MD Attending Provider Internal Medicine [phone] Rosibel Reyes MD Attending Provider Internal Medicine [phone] Your Primary Care Physician (PCP) Primary Care Physician Office Phone Office Fax [place], [address] [phone] Follow-up Information Follow up With Details Comments Contact Info Devorah Wilks MD Schedule an appointment as soon as possible for a visit in 1 week For follow up of narcotic taper [address] [facility] [address] [facility] [address] 
[phone] Avril Glass MD Schedule an appointment as soon as possible for a visit in 2 weeks For follow up of Crohn's disease [address] [facility] [address] [facility] [address] [phone] Current Discharge Medication List  
  
CONTINUE these medications which have CHANGED Dose & Instructions Dispensing Information Comments Morning Noon Evening Bedtime  
 oxyCODONE-acetaminophen 5-325 mg per tablet Commonly known as:  PERCOCET What changed:   
- how much to take 
- how to take this - when to take this 
- reasons to take this 
- additional instructions Your last dose was: Your next dose is: Take by mouth per taper: 2 tablets every 4 hours for 5 days, then 1 tablet every 4 hours for 5 days. After that, please see Dr. Cory Fox to continue your taper. (Do NOT take additional acetaminophen/Tyelonol while taking Percocet) Quantity:  90 Tab Refills:  0 CONTINUE these medications which have NOT CHANGED Dose & Instructions Dispensing Information Comments Morning Noon Evening Bedtime  
 acetaminophen 500 mg tablet Commonly known as:  TYLENOL Your last dose was: Your next dose is:    
   
   
 Dose:  500 mg Take 500 mg by mouth four (4) times daily as needed (mild pain). Refills:  0  
     
   
   
   
  
 amitriptyline 10 mg tablet Commonly known as:  ELAVIL Your last dose was: Your next dose is:    
   
   
 Dose:  30 mg Take 30 mg by mouth nightly. Refills:  0  
     
   
   
   
  
 buPROPion  mg SR tablet Commonly known as:  Kriste Rear Your last dose was: Your next dose is:    
   
   
 Dose:  150 mg Take 150 mg by mouth two (2) times a day. Refills:  0  
     
   
   
   
  
 COMPAZINE 10 mg tablet Generic drug:  prochlorperazine Your last dose was: Your next dose is:    
   
   
 Dose:  10 mg Take 10 mg by mouth two (2) times daily as needed for Nausea. Refills:  0  
     
   
   
   
  
 dicyclomine 10 mg capsule Commonly known as:  BENTYL Your last dose was: Your next dose is: Dose:  10 mg Take 10 mg by mouth every six (6) hours as needed. Refills:  0  
     
   
   
   
  
 ergocalciferol 50,000 unit capsule Commonly known as:  ERGOCALCIFEROL Your last dose was: Your next dose is:    
   
   
 Dose:  51362 Units Take 50,000 Units by mouth every Monday. Refills:  0 LYRICA 100 mg capsule Generic drug:  pregabalin Your last dose was: Your next dose is:    
   
   
 Dose:  100 mg Take 100 mg by mouth three (3) times daily. Refills:  0  
     
   
   
   
  
 omeprazole 40 mg capsule Commonly known as:  PRILOSEC Your last dose was: Your next dose is:    
   
   
 Dose:  40 mg Take 40 mg by mouth Daily (before dinner). Refills:  0  
     
   
   
   
  
 promethazine 25 mg tablet Commonly known as:  PHENERGAN Your last dose was: Your next dose is:    
   
   
 Dose:  25 mg Take 25 mg by mouth every six (6) hours as needed for Nausea. Refills:  0  
     
   
   
   
  
 temazepam 30 mg capsule Commonly known as:  RESTORIL Your last dose was: Your next dose is:    
   
   
 Dose:  30 mg Take 30 mg by mouth nightly. Refills:  0  
     
   
   
   
  
 traMADol 50 mg tablet Commonly known as:  ULTRAM  
   
Your last dose was: Your next dose is:    
   
   
 Dose:  50 mg Take 50 mg by mouth three (3) times daily as needed (moderate pain). Refills:  0 Ustekinumab 90 mg/mL injection Commonly known as:  Eronelen Milder Your last dose was: Your next dose is:    
   
   
 Dose:  90 mg  
90 mg by SubCUTAneous route. Every 8 weeks Refills:  0 VALIUM 5 mg tablet Generic drug:  diazePAM  
   
Your last dose was: Your next dose is:    
   
   
 Dose:  5 mg Take 5 mg by mouth every eight (8) hours as needed (bowel relaxation). Refills:  0 VITAMIN B-12 1,000 mcg/mL injection Generic drug:  cyanocobalamin Your last dose was: Your next dose is:    
   
   
 Dose:  1000 mcg  
1,000 mcg by IntraMUSCular route every Sunday. Indications: Once weekly Refills:  0 ZOFRAN (AS HYDROCHLORIDE) 8 mg tablet Generic drug:  ondansetron hcl Your last dose was: Your next dose is:    
   
   
 Dose:  8 mg Take 8 mg by mouth every twelve (12) hours as needed for Nausea. Refills:  0 Where to Get Your Medications Information on where to get these meds will be given to you by the nurse or doctor. ! Ask your nurse or doctor about these medications  
  oxyCODONE-acetaminophen 5-325 mg per tablet Discharge Instructions Please bring this form with you to show your primary care provider at your follow-up appointment. Primary care provider:  Dr. Santa Tripp MD 
 
Discharging provider:  Myrtle Swann MD 
 
You have been admitted to the hospital with the following diagnoses: · SMALL BOWEL OBSTRUCTION 
 
FOLLOW-UP CARE RECOMMENDATIONS: 
 
APPOINTMENTS: 
Follow-up Information Follow up With Details Comments Contact Info Santa Tripp MD Schedule an appointment as soon as possible for a visit in 1 week For follow up of narcotic taper [address] [place] [address] [facility] [address] [place] [address] 
[phone] Cristi Pelayo MD Schedule an appointment as soon as possible for a visit in 2 weeks For follow up of Crohn's disease [address] [place] [address] 101 [address] [facility] [address] 
[phone] SYMPTOMS to watch for: fever, chills, nausea, vomiting. DIET/what to eat:  Please start with a soft, bland diet and plenty of fluids. As you are feeling better and better, you can advance back to your regular diet. ACTIVITY:  Activity as tolerated and No driving while on analgesics What to do if new or unexpected symptoms occur? If you experience any of the above symptoms (or should other concerns or questions arise after discharge) please call your primary care physician. Return to the emergency room if you cannot get hold of your doctor. · It is very important that you keep your follow-up appointment(s). · Please bring discharge papers, medication list (and/or medication bottles) to your follow-up appointments for review by your outpatient provider(s). · Please check the list of medications and be sure it includes every medication (even non-prescription medications) that your provider wants you to take. · It is important that you take the medication exactly as they are prescribed. · Keep your medication in the bottles provided by the pharmacist and keep a list of the medication names, dosages, and times to be taken in your wallet. · Do not take other medications without consulting your doctor. · If you have any questions about your medications or other instructions, please talk to your nurse or care provider before you leave the hospital. 
 
I understand that if any problems occur once I am at home I am to contact my physician. These instructions were explained to me and I had the opportunity to ask questions. DISCHARGE SUMMARY from Nurse The following personal items are in your possession at time of discharge: 
 
Dental Appliances: None Visual Aid: None Home Medications: None Jewelry: Ring Clothing: At bedside Other Valuables: Cell Phone, Ghada Bicker PATIENT INSTRUCTIONS: 
 
After general anesthesia or intravenous sedation, for 24 hours or while taking prescription Narcotics: · Limit your activities · Do not drive and operate hazardous machinery · Do not make important personal or business decisions · Do  not drink alcoholic beverages · If you have not urinated within 8 hours after discharge, please contact your surgeon on call. Report the following to your surgeon: 
· Excessive pain, swelling, redness or odor of or around the surgical area · Temperature over 100.5 · Nausea and vomiting lasting longer than 4 hours or if unable to take medications · Any signs of decreased circulation or nerve impairment to extremity: change in color, persistent  numbness, tingling, coldness or increase pain · Any questions What to do at Home: *  Please give a list of your current medications to your Primary Care Provider. *  Please update this list whenever your medications are discontinued, doses are 
    changed, or new medications (including over-the-counter products) are added. *  Please carry medication information at all times in case of emergency situations. These are general instructions for a healthy lifestyle: No smoking/ No tobacco products/ Avoid exposure to second hand smoke Surgeon General's Warning:  Quitting smoking now greatly reduces serious risk to your health. Obesity, smoking, and sedentary lifestyle greatly increases your risk for illness A healthy diet, regular physical exercise & weight monitoring are important for maintaining a healthy lifestyle You may be retaining fluid if you have a history of heart failure or if you experience any of the following symptoms:  Weight gain of 3 pounds or more overnight or 5 pounds in a week, increased swelling in our hands or feet or shortness of breath while lying flat in bed. Please call your doctor as soon as you notice any of these symptoms; do not wait until your next office visit. Recognize signs and symptoms of STROKE: 
 
F-face looks uneven A-arms unable to move or move unevenly S-speech slurred or non-existent T-time-call 911 as soon as signs and symptoms begin-DO NOT go Back to bed or wait to see if you get better-TIME IS BRAIN. Warning Signs of HEART ATTACK Call 911 if you have these symptoms: ? Chest discomfort. Most heart attacks involve discomfort in the center of the chest that lasts more than a few minutes, or that goes away and comes back. It can feel like uncomfortable pressure, squeezing, fullness, or pain. ? Discomfort in other areas of the upper body. Symptoms can include pain or discomfort in one or both arms, the back, neck, jaw, or stomach. ? Shortness of breath with or without chest discomfort. ? Other signs may include breaking out in a cold sweat, nausea, or lightheadedness. Don't wait more than five minutes to call [address]! Fast action can save your life. Calling 911 is almost always the fastest way to get lifesaving treatment. Emergency Medical Services staff can begin treatment when they arrive  up to an hour sooner than if someone gets to the hospital by car. The discharge information has been reviewed with the patient. The patient verbalized understanding. Discharge medications reviewed with the patient and appropriate educational materials and side effects teaching were provided. Wevod Activation Thank you for requesting access to Wevod. Please follow the instructions below to securely access and download your online medical record. Wevod allows you to send messages to your doctor, view your test results, renew your prescriptions, schedule appointments, and more. How Do I Sign Up? 1. In your internet browser, go to [email or web address] 
2. Click on the First Time User? Click Here link in the Sign In box. You will be redirect to the New Member Sign Up page. 3. Enter your Wevod Access Code exactly as it appears below. You will not need to use this code after youve completed the sign-up process. If you do not sign up before the expiration date, you must request a new code. Wevod Access Code: Activation code not generated Current Wevod Status: Active (This is the date your Wevod access code will ) 4. Enter the last four digits of your Social Security Number (xxxx) and Date of Birth (mm/dd/yyyy) as indicated and click Submit. You will be taken to the next sign-up page. 5. Create a Pikimal ID. This will be your Pikimal login ID and cannot be changed, so think of one that is secure and easy to remember. 6. Create a Pikimal password. You can change your password at any time. 7. Enter your Password Reset Question and Answer. This can be used at a later time if you forget your password. 8. Enter your e-mail address. You will receive e-mail notification when new information is available in [address] 9. Click Sign Up. You can now view and download portions of your medical record. 10. Click the Download Summary menu link to download a portable copy of your medical information. Additional Information If you have questions, please visit the Frequently Asked Questions section of the Pikimal website at [email or web address] Remember, Pikimal is NOT to be used for urgent needs. For medical emergencies, dial 911. Discharge Orders None Pikimal Announcement We are excited to announce that we are making your provider's discharge notes available to you in Pikimal. You will see these notes when they are completed and signed by the physician that discharged you from your recent hospital stay. If you have any questions or concerns about any information you see in Pikimal, please call the Health Information Department where you were seen or reach out to your Primary Care Provider for more information about your plan of care. Introducing hospitals & HEALTH SERVICES! Dear Fiona Mills: Thank you for requesting a Pikimal account. Our records indicate that you already have an active Pikimal account. You can access your account anytime at [email or web address] Did you know that you can access your hospital and ER discharge instructions at any time in MyChart? You can also review all of your test results from your hospital stay or ER visit. Additional Information If you have questions, please visit the Frequently Asked Questions section of the [employer] website at [email or web address]/[email or web address] [email or web address] Remember, MyChart is NOT to be used for urgent needs. For medical emergencies, dial 911. Now available from your iPhone and Android! General Information Please provide this summary of care documentation to your next provider. Patient Signature:  ____________________________________________________________ Date:  ____________________________________________________________  
  
[facility] Provider Signature:  ____________________________________________________________ Date:  ____________________________________________________________

## 2017-06-12 NOTE — PROGRESS NOTES
Admission Medication Reconciliation:    Information obtained from:  Patient report + Rx Query    Comments/Recommendations:  Patient does not have his pill bottles with him or a list, but appears to be a reliable historian. He begins listing his nausea medications followed by bedtime medications and finally his pain medications. He reports currently being on a pain medication \"taper\". He last took his home medications  morning. Added:  APAP prn mild pain, Compazine prn nausea  Removed:  Oxycodone IR 30mg prn  Changed:  Percocet 10/325 to Percocet 5/325    Prior to Admission Medications:   Prior to Admission Medications   Prescriptions Last Dose Informant Patient Reported? Taking? Ustekinumab (STELARA) 90 mg/mL injection  Self Yes Yes   Si mg by SubCUTAneous route. Every 8 weeks    acetaminophen (TYLENOL) 500 mg tablet   Yes Yes   Sig: Take 500 mg by mouth four (4) times daily as needed (mild pain). amitriptyline (ELAVIL) 10 mg tablet  Self Yes Yes   Sig: Take 30 mg by mouth nightly. buPROPion SR (WELLBUTRIN SR) 150 mg SR tablet  Self Yes Yes   Sig: Take 150 mg by mouth two (2) times a day. cyanocobalamin (VITAMIN B-12) 1,000 mcg/mL injection 2017 Self Yes Yes   Si,000 mcg by IntraMUSCular route every . Indications: Once weekly   diazepam (VALIUM) 5 mg tablet  Self Yes Yes   Sig: Take 5 mg by mouth every eight (8) hours as needed (bowel relaxation). dicyclomine (BENTYL) 10 mg capsule  Self Yes Yes   Sig: Take 10 mg by mouth every six (6) hours as needed. ergocalciferol (ERGOCALCIFEROL) 50,000 unit capsule 2017 Self Yes Yes   Sig: Take 50,000 Units by mouth every Monday. omeprazole (PRILOSEC) 40 mg capsule  Self Yes Yes   Sig: Take 40 mg by mouth Daily (before dinner). ondansetron hcl (ZOFRAN, AS HYDROCHLORIDE,) 8 mg tablet  Self Yes Yes   Sig: Take 8 mg by mouth every twelve (12) hours as needed for Nausea.    oxyCODONE-acetaminophen (PERCOCET) 5-325 mg per tablet Yes Yes   Sig: Take 1 Tab by mouth four (4) times daily as needed (severe pain). pregabalin (LYRICA) 100 mg capsule  Self Yes Yes   Sig: Take 100 mg by mouth three (3) times daily. prochlorperazine (COMPAZINE) 10 mg tablet   Yes Yes   Sig: Take 10 mg by mouth two (2) times daily as needed for Nausea. promethazine (PHENERGAN) 25 mg tablet  Self Yes Yes   Sig: Take 25 mg by mouth every six (6) hours as needed for Nausea. temazepam (RESTORIL) 30 mg capsule  Self Yes Yes   Sig: Take 30 mg by mouth nightly. traMADol (ULTRAM) 50 mg tablet  Self Yes Yes   Sig: Take 50 mg by mouth three (3) times daily as needed (moderate pain).       Facility-Administered Medications: None

## 2017-06-12 NOTE — PROGRESS NOTES
Primary Nurse Dotty Beatty RN and Celso Brambila RN performed a dual skin assessment on this patient Impairment noted - colostomy.   Eduardo score is 21

## 2017-06-12 NOTE — PROGRESS NOTES
Bedside shift change report given to Ronnie Pierce (oncoming nurse) by Teodora Cowan (offgoing nurse). Report included the following information SBAR.

## 2017-06-12 NOTE — IP AVS SNAPSHOT
Current Discharge Medication List  
  
CONTINUE these medications which have CHANGED Dose & Instructions Dispensing Information Comments Morning Noon Evening Bedtime  
 oxyCODONE-acetaminophen 5-325 mg per tablet Commonly known as:  PERCOCET What changed:   
- how much to take 
- how to take this - when to take this 
- reasons to take this 
- additional instructions Your last dose was: Your next dose is: Take by mouth per taper: 2 tablets every 4 hours for 5 days, then 1 tablet every 4 hours for 5 days. After that, please see Dr. Jacob Sampson to continue your taper. (Do NOT take additional acetaminophen/Tyelonol while taking Percocet) Quantity:  90 Tab Refills:  0 CONTINUE these medications which have NOT CHANGED Dose & Instructions Dispensing Information Comments Morning Noon Evening Bedtime  
 acetaminophen 500 mg tablet Commonly known as:  TYLENOL Your last dose was: Your next dose is:    
   
   
 Dose:  500 mg Take 500 mg by mouth four (4) times daily as needed (mild pain). Refills:  0  
     
   
   
   
  
 amitriptyline 10 mg tablet Commonly known as:  ELAVIL Your last dose was: Your next dose is:    
   
   
 Dose:  30 mg Take 30 mg by mouth nightly. Refills:  0  
     
   
   
   
  
 buPROPion  mg SR tablet Commonly known as:  Cami Asp Your last dose was: Your next dose is:    
   
   
 Dose:  150 mg Take 150 mg by mouth two (2) times a day. Refills:  0  
     
   
   
   
  
 COMPAZINE 10 mg tablet Generic drug:  prochlorperazine Your last dose was: Your next dose is:    
   
   
 Dose:  10 mg Take 10 mg by mouth two (2) times daily as needed for Nausea. Refills:  0  
     
   
   
   
  
 dicyclomine 10 mg capsule Commonly known as:  BENTYL Your last dose was:     
   
Your next dose is:    
   
   
 Dose:  10 mg  
 Take 10 mg by mouth every six (6) hours as needed. Refills:  0  
     
   
   
   
  
 ergocalciferol 50,000 unit capsule Commonly known as:  ERGOCALCIFEROL Your last dose was: Your next dose is:    
   
   
 Dose:  76129 Units Take 50,000 Units by mouth every Monday. Refills:  0 LYRICA 100 mg capsule Generic drug:  pregabalin Your last dose was: Your next dose is:    
   
   
 Dose:  100 mg Take 100 mg by mouth three (3) times daily. Refills:  0  
     
   
   
   
  
 omeprazole 40 mg capsule Commonly known as:  PRILOSEC Your last dose was: Your next dose is:    
   
   
 Dose:  40 mg Take 40 mg by mouth Daily (before dinner). Refills:  0  
     
   
   
   
  
 promethazine 25 mg tablet Commonly known as:  PHENERGAN Your last dose was: Your next dose is:    
   
   
 Dose:  25 mg Take 25 mg by mouth every six (6) hours as needed for Nausea. Refills:  0  
     
   
   
   
  
 temazepam 30 mg capsule Commonly known as:  RESTORIL Your last dose was: Your next dose is:    
   
   
 Dose:  30 mg Take 30 mg by mouth nightly. Refills:  0  
     
   
   
   
  
 traMADol 50 mg tablet Commonly known as:  ULTRAM  
   
Your last dose was: Your next dose is:    
   
   
 Dose:  50 mg Take 50 mg by mouth three (3) times daily as needed (moderate pain). Refills:  0 Ustekinumab 90 mg/mL injection Commonly known as:  Nahun Isles Your last dose was: Your next dose is:    
   
   
 Dose:  90 mg  
90 mg by SubCUTAneous route. Every 8 weeks Refills:  0 VALIUM 5 mg tablet Generic drug:  diazePAM  
   
Your last dose was: Your next dose is:    
   
   
 Dose:  5 mg Take 5 mg by mouth every eight (8) hours as needed (bowel relaxation). Refills:  0  
     
   
   
   
  
 VITAMIN B-12 1,000 mcg/mL injection Generic drug:  cyanocobalamin Your last dose was: Your next dose is:    
   
   
 Dose:  1000 mcg  
1,000 mcg by IntraMUSCular route every Sunday. Indications: Once weekly Refills:  0 ZOFRAN (AS HYDROCHLORIDE) 8 mg tablet Generic drug:  ondansetron hcl Your last dose was: Your next dose is:    
   
   
 Dose:  8 mg Take 8 mg by mouth every twelve (12) hours as needed for Nausea. Refills:  0 Where to Get Your Medications Information on where to get these meds will be given to you by the nurse or doctor. ! Ask your nurse or doctor about these medications  
  oxyCODONE-acetaminophen 5-325 mg per tablet

## 2017-06-12 NOTE — ED PROVIDER NOTES
HPI Comments: 40 y.o. male with past medical history significant for extensive GI hx involving multiple surgeries, ostomy in place, ulcerative colitis, hiatal hernia, GERD, anal stenosis, anal fistula, anxiety/depression, and opioid dependence who presents from home, accompanied by spouse, with chief complaint of abdominal pain. Pt complains of left sided abdominal pain progressively worsening since onset around 15:00 today (~5 hours PTA). Pt reports feeling nauseated and notes one episode of vomiting today. Additionally, pt reports increased output of brown liquid in ostomy bag earlier today. Pt reports same pain with small bowel obstructions described as a \"knife stabbing and slowly being pulled out\" of his abdomen. Pt reports compliance with all medications. Pt denies eating anything out of the ordinary, but says he has hx of different reactions to the same food. Pt denies fever. There are no other acute medical concerns at this time. Social hx: Former tobacco smoker; no EtOH. PCP: Josiah Dumont MD  Gastroenterologist: Hector Seqeuira MD  Surgeon: Brannon Smalls MD    Note written by Josep Lott. Magy Given, as dictated by Opal Argueta MD 8:10 PM      The history is provided by the patient. No  was used. Past Medical History:   Diagnosis Date    Anal fistula     The patient no longer has an anus.  Anal stenosis     Crohn's disease ([address]     GERD (gastroesophageal reflux disease)     H/O ulcerative colitis     Symptoms began in 1996. Total proctocolectomy was performed in 2004. Patient later developed Crohn's disease.  Hiatal hernia     Ileal pouchitis ([address] 05/2004    The patient no longer has a pouch.  Nausea & vomiting     Combination of Scopalamine patch & Zofran IV worked well in past    Numbness in right leg     Chronic.     Opioid dependence ([address]     History of opioid dependence requiring a detox program.    Psychiatric disorder     depression and anxiety    Skin lesion of back 3/17/2014    Syncopal episodes        Past Surgical History:   Procedure Laterality Date    ABDOMEN SURGERY PROC UNLISTED  3/25/2004    Total proctocolectomy with creation of ileoanal J-pouch and loop ileostomy; Dr. Nguyen Velez   CHOLECYSTECTOMY  12/2016    Dr. Mike Wooten  GI  5/2/2004    Examination under anesthesia with endoscopic evaluation of ileoanal pouch and placement of drain; Dr. Nguyen Velez   GI  5/25/2004    Ileostomy closure with small bowel resection and anastomosis; Dr. Nguyen Velez   GI  5/24/2012    Examination under anesthesia, anal dilatation, anal biopsy, and placement of draining seton; Dr. Nguyen Velez   GI  7/3/2012    Incision and drainage of perirectal abscess and rigid endoscopy of ileoanal pouch; Dr. Nguyen Velez   GI  7/31/2012    Incision and drainage of perirectal abscess, placement of draining seton, and anal dilatation; Dr. Nguyen Velez   GI  1/22/2013    Repair of anal fistulas with debridement, partial fistulectomy, and flap closure; Dr. Nguyen Velez   GI  5/17/2013    Examination under anesthesia, partial fistulotomy,  and placement of draining setons; Dr. Nguyen Velez   GI  8/6/2013    Anal fistulotomy and application of ACell micromatrix; Dr. Nguyen Velez   GI  2/20/2014    Examination under anesthesia and dilation of anal canal with rigid proctoscopy; Mars Ibrahim MD.     GI  4/29/2015    Creation of Fausto Lemmings continent intestinal reservoir (BCIR), abdominoperineal resection of ileoanal J-pouch; lysis of adhesions, and gastrostomy; Sindi Betancourt. Teresa Bryant MD ([place], [place])     GI  8/7/2015    Stoma revision; Sindi Bryant MD ([place], [place])     GI  10/29/2015    Extensive lysis of adhesions, resection of continent intestinal reservoir, repair of serosal tears, and creation of end-ileostomy; Dr. Nguyen Velez      GI  03/14/2017    Laparotomy, extensive lysis of adhesions and revision of ileostomy; Dr. Darlyn Martinez.  HX ORTHOPAEDIC  2008    Left ACL repair    HX UROLOGICAL  03/14/2017    Cystoscopy and placement of bilateral temporary ureteral catheters; Anika Oneill MD.         Family History:   Problem Relation Age of Onset    Cancer Father     Asthma Neg Hx     Diabetes Neg Hx     Heart Disease Neg Hx     Hypertension Neg Hx     Stroke Neg Hx     Malignant Hyperthermia Neg Hx     Pseudocholinesterase Deficiency Neg Hx     Delayed Awakening Neg Hx     Post-op Nausea/Vomiting Neg Hx        Social History     Social History    Marital status:      Spouse name: N/A    Number of children: N/A    Years of education: N/A     Occupational History    Not on file. Social History Main Topics    Smoking status: Former Smoker     Packs/day: 0.00     Years: 7.00     Quit date: 1/14/2017    Smokeless tobacco: Never Used    Alcohol use No    Drug use: No    Sexual activity: Not on file     Other Topics Concern    Not on file     Social History Narrative         ALLERGIES: Remicade [infliximab]; Bactrim [sulfamethoprim ds]; and Nsaids (non-steroidal anti-inflammatory drug)    Review of Systems   Constitutional: Negative for chills and fever. Respiratory: Negative for cough and shortness of breath. Gastrointestinal: Positive for abdominal pain, nausea and vomiting. Genitourinary: Negative for difficulty urinating and dysuria. Musculoskeletal: Negative for back pain and myalgias. Skin: Negative for color change and rash. All other systems reviewed and are negative. Vitals:    06/11/17 2005   BP: 136/84   Pulse: (!) 126   Resp: 17   Temp: 98 °F (36.7 °C)   SpO2: 99%   Weight: 95.5 kg (210 lb 8.6 oz)   Height: 6' (1.829 m)            Physical Exam   Constitutional: He is oriented to person, place, and time. Pt is chronically ill-appearing and uncomfortable. HENT:   Head: Normocephalic and atraumatic.    Eyes: Conjunctivae are normal. No scleral icterus. Neck: Neck supple. No tracheal deviation present. Cardiovascular: Regular rhythm, normal heart sounds and intact distal pulses. Exam reveals no gallop and no friction rub. No murmur heard. tachycardia   Pulmonary/Chest: Effort normal and breath sounds normal. He has no wheezes. He has no rales. Abdominal: Soft. He exhibits no distension. There is tenderness (Left sided). There is no rebound and no guarding. Pt has ostomy in mid-abdomen. Musculoskeletal: He exhibits no edema. Neurological: He is alert and oriented to person, place, and time. Skin: Skin is warm and dry. No rash noted. Psychiatric: He has a normal mood and affect. Nursing note and vitals reviewed. Note written by Iron. Zafar Sams, as dictated by Renee Simmons MD 8:42 PM        [facility]  ED Course       Procedures    A/P: SBO with hx same (multiple abd surgeries) - likely will need transfer to [facility] where his surgeon practices.   Renee Simmons MD  11:29 PM

## 2017-06-12 NOTE — H&P
History & Physical    Date of admission: 6/12/2017    Patient name: Lety Martinez  MRN: [id number]  YOB: 1975  Age: 40 y.o. Primary care provider:  Sarah Meza MD     Source of Information: patient, and medical records                                Chief complaint:  Abdominal pain, nausea/ vomiting    History of present illness  Lety Martinez is a 40 y.o. white male with past medical history of Crohn's disease, multiple surgeries, total proctocolectomy with creation of ileo-anal pouch and end ileostomy, s/p ileostomy revision, anxiety, depression presented as a direct admission from [address] ER to [facility] with reported abdominal pain, nausea, vomiting and diagnosis of SBO (small bowel obstruction). Patient was last hospitalized from 5/20/2017 - 5/23/2017 with SBO, treated conservatively with NGT suction, IV fluids. Patient is followed by colorectal surgeon for the same. Tonight, workup at [facility] ER included CT abdomen/ pelvis with contrast showing dilated fluid/ stool filled distal small bowel loops concerning for obstruction. Patient was given IV fluids, Dilaudid, and Zofran. Patient is now seen for admission to the hospitalist service for continued evaluation and treatments. There were no reports of new onset symptoms of syncope, loss of consciousness, headache, neck pain, visual disturbance, numbness, paresthesias, focal weakness, chest pain, palpitations, shortness of breath, cough, congestion, diarrhea, calf pain, increased leg swelling/ edema, fever, chills. Past Medical History:   Diagnosis Date    Anal fistula     The patient no longer has an anus.  Anal stenosis     Crohn's disease ([address]     GERD (gastroesophageal reflux disease)     H/O ulcerative colitis     Symptoms began in 1996. Total proctocolectomy was performed in 2004.   Patient later developed Crohn's disease.  Hiatal hernia     [address] 05/2004    The patient no longer has a pouch.  Nausea & vomiting     Combination of Scopalamine patch & Zofran IV worked well in past    Numbness in right leg     Chronic.  Opioid dependence ([facility]     History of opioid dependence requiring a detox program.    Psychiatric disorder     depression and anxiety    Skin lesion of back 3/17/2014    Syncopal episodes       Past Surgical History:   Procedure Laterality Date    ABDOMEN SURGERY PROC UNLISTED  3/25/2004    Total proctocolectomy with creation of ileoanal J-pouch and loop ileostomy; Dr. Darlyn Martinez.   CHOLECYSTECTOMY  12/2016    Dr. Gutiérrez Cue  GI  5/2/2004    Examination under anesthesia with endoscopic evaluation of ileoanal pouch and placement of drain; Dr. Darlyn Martinez.   GI  5/25/2004    Ileostomy closure with small bowel resection and anastomosis; Dr. Darlyn Martinez.   GI  5/24/2012    Examination under anesthesia, anal dilatation, anal biopsy, and placement of draining seton; Dr. Darlyn Martinez.   GI  7/3/2012    Incision and drainage of perirectal abscess and rigid endoscopy of ileoanal pouch; Dr. Darlyn Martinez.   GI  7/31/2012    Incision and drainage of perirectal abscess, placement of draining seton, and anal dilatation; Dr. Darlyn Martinez.   GI  1/22/2013    Repair of anal fistulas with debridement, partial fistulectomy, and flap closure; Dr. Darlyn Martinez.   GI  5/17/2013    Examination under anesthesia, partial fistulotomy,  and placement of draining setons; Dr. Darlyn Martinez.   GI  8/6/2013    Anal fistulotomy and application of ACell micromatrix; Dr. Darlyn Martinez.   GI  2/20/2014    Examination under anesthesia and dilation of anal canal with rigid proctoscopy;  Gibson Peralta MD.     GI  4/29/2015    Creation of Raymona Tee continent intestinal reservoir (BCIR), abdominoperineal resection of ileoanal J-pouch; lysis of adhesions, and gastrostomy; Sindi Betancourt. Teresa Bryant MD ([place], [place])    HX GI  8/7/2015    Stoma revision; Sindi Bryant MD ([place], [place])    HX GI  10/29/2015    Extensive lysis of adhesions, resection of continent intestinal reservoir, repair of serosal tears, and creation of end-ileostomy; Dr. Nguyen Velez  HX GI  03/14/2017    Laparotomy, extensive lysis of adhesions and revision of ileostomy; Dr. Nguyen Cho.  HX ORTHOPAEDIC  2008    Left ACL repair    HX UROLOGICAL  03/14/2017    Cystoscopy and placement of bilateral temporary ureteral catheters; Davida Light MD.     Prior to Admission medications    Medication Sig Start Date End Date Taking? Authorizing Provider   oxyCODONE IR (OXY-IR) 30 mg immediate release tablet Take 1 Tab by mouth every four (4) hours as needed for Pain. Max Daily Amount: 180 mg. 5/23/17   Aleshia Silvestre MD   mirtazapine (REMERON) 30 mg tablet Take 15 mg by mouth nightly. Historical Provider   traMADol (ULTRAM) 50 mg tablet Take 50 mg by mouth every eight (8) hours as needed for Pain. Historical Provider   Ustekinumab (STELARA) 90 mg/mL injection 90 mg by SubCUTAneous route. Every 8 weeks     Historical Provider   oxyCODONE-acetaminophen (PERCOCET 10)  mg per tablet Take 1-2 Tabs by mouth every four (4) hours as needed. Max Daily Amount: 12 Tabs. 3/21/17   Clayton Hopson MD   promethazine (PHENERGAN) 25 mg tablet Take 25 mg by mouth every six (6) hours as needed for Nausea. Miles Diaz MD   ondansetron hcl (ZOFRAN, AS HYDROCHLORIDE,) 8 mg tablet Take 8 mg by mouth every twelve (12) hours as needed for Nausea. Miles Diaz MD   amitriptyline (ELAVIL) 10 mg tablet Take 30 mg by mouth nightly. Historical Provider   buPROPion SR [facility] SR) 150 mg SR tablet Take 150 mg by mouth two (2) times a day. Historical Provider   omeprazole (PRILOSEC) 40 mg capsule Take 40 mg by mouth Daily (before dinner).     Historical Provider   dicyclomine (BENTYL) 10 mg capsule Take 10 mg by mouth every six (6) hours as needed. Historical Provider   ergocalciferol (ERGOCALCIFEROL) 50,000 unit capsule Take 50,000 Units by mouth every seven (7) days. Historical Provider   pregabalin (LYRICA) 100 mg capsule Take 100 mg by mouth three (3) times daily. Historical Provider   cyanocobalamin (VITAMIN B-12) 1,000 mcg/mL injection 1,000 mcg by IntraMUSCular route every seven (7) days. Indications: Once weekly    Phys Other, MD   temazepam (RESTORIL) 30 mg capsule Take 30 mg by mouth nightly. Historical Provider   diazepam (VALIUM) 5 mg tablet Take 5 mg by mouth every eight (8) hours as needed (bowel relaxation). Phys Other, MD     Allergies   Allergen Reactions    Remicade [Infliximab] Anaphylaxis and Shortness of Breath    Bactrim [Sulfamethoprim Ds] Other (comments)     Increased GI distress.  Nsaids (Non-Steroidal Anti-Inflammatory Drug) Other (comments)     Stomach ulcers      Family History   Problem Relation Age of Onset    Cancer Father     Asthma Neg Hx     Diabetes Neg Hx     Heart Disease Neg Hx     Hypertension Neg Hx     Stroke Neg Hx     Malignant Hyperthermia Neg Hx     Pseudocholinesterase Deficiency Neg Hx     Delayed Awakening Neg Hx     Post-op Nausea/Vomiting Neg Hx          Social history  Patient resides  x  Independently                Ambulates  x  Independently                 Alcohol history     None     Social     Chronic   Smoking history      x  Former smoker         History   Smoking Status    Former Smoker    Packs/day: 0.00    Years: 7.00    Quit date: 1/14/2017   Smokeless Tobacco    Never Used       Code status  x  Full code     Review of systems  I performed an 11 systems review; pertinent positives were as noted in HPI, otherwise negative.     Physical Examination   Visit Vitals    /83    Pulse 84    Temp 98.5 °F (36.9 °C)    Resp 18    SpO2 93%               General:  Patient in no acute respiratory distress   Head:  Normocephalic, without obvious abnormality, atraumatic   Eyes:  Conjunctivae/corneas clear. PERRL, EOMs intact   E/N/M/T: Nares normal. Septum midline. No nasal drainage or sinus tenderness  Tongue midline/ non-edematous  Clear oropharynx   Neck: Normal appearance and movements, symmetrical, trachea midline  No palpable adenopathy  No thyroid enlargement, tenderness or nodules  No carotid bruit   Normal JVD   Lungs:   Symmetrical chest expansion and respiratory effort  Clear to auscultation bilaterally   Chest wall:  No tenderness or deformity   Heart:  Regular rate and rhythm   Sounds normal; no murmur, click, rub or gallop   Abdomen:   Soft, no tenderness  No rebound, guarding, or rigidity  Non-distended  Bowel sounds normal  No masses or hepatosplenomegaly  No hernias present  Ileostomy in place   Back: No CVA tenderness   Extremities: Extremities normal, atraumatic  No cyanosis, clubbing, or edema     Pulses 2+ radial/ 1+ DP bilateral pulses   Skin: No rashes or ulcers  Warm/ dry   Musculo-      skeletal: Gait not tested  Normal symmetry, ROM, strength and tone   Neuro: GCS 15. Moves all extremities x 4. No slurred speech. No facial droop. Sensation grossly intact. Psych: Alert, oriented x 3           Data Review      24 Hour Results:  Recent Results (from the past 24 hour(s))   POC CHEM8    Collection Time: 06/11/17  8:52 PM   Result Value Ref Range    Calcium, ionized (POC) 1.13 1.12 - 1.32 MMOL/L    Sodium (POC) 141 136 - 145 MMOL/L    Potassium (POC) 3.7 3.5 - 5.1 MMOL/L    Chloride (POC) 102 98 - 107 MMOL/L    CO2 (POC) 25 21 - 32 MMOL/L    Anion gap (POC) 19 (H) 5 - 15 mmol/L    Glucose (POC) 106 (H) 65 - 100 MG/DL    BUN (POC) 7 (L) 9 - 20 MG/DL    Creatinine (POC) 0.9 0.6 - 1.3 MG/DL    GFR-AA (POC) >60 >60 ml/min/1.73m2    GFR, non-AA (POC) >60 >60 ml/min/1.73m2    Hemoglobin (POC) 12.6 12.1 - 17.0 GM/DL    Hematocrit (POC) 37 36.6 - 50.3 %    Comment Comment Not Indicated. CBC WITH AUTOMATED DIFF    Collection Time: 06/11/17  8:53 PM   Result Value Ref Range    WBC 8.6 4.1 - 11.1 K/uL    RBC 4.47 4.10 - 5.70 M/uL    HGB 11.7 (L) 12.1 - 17.0 g/dL    HCT 36.0 (L) 36.6 - 50.3 %    MCV 80.5 80.0 - 99.0 FL    MCH 26.2 26.0 - 34.0 PG    MCHC 32.5 30.0 - 36.5 g/dL    RDW 15.0 (H) 11.5 - 14.5 %    PLATELET 384 747 - 547 K/uL    NEUTROPHILS 71 32 - 75 %    LYMPHOCYTES 22 12 - 49 %    MONOCYTES 6 5 - 13 %    EOSINOPHILS 1 0 - 7 %    BASOPHILS 0 0 - 1 %    ABS. NEUTROPHILS 6.1 1.8 - 8.0 K/UL    ABS. LYMPHOCYTES 1.9 0.8 - 3.5 K/UL    ABS. MONOCYTES 0.5 0.0 - 1.0 K/UL    ABS. EOSINOPHILS 0.1 0.0 - 0.4 K/UL    ABS.  BASOPHILS 0.0 0.0 - 0.1 K/UL   LIPASE    Collection Time: 06/11/17  8:53 PM   Result Value Ref Range    Lipase 189 73 - 393 U/L   URINALYSIS W/ REFLEX CULTURE    Collection Time: 06/11/17  8:53 PM   Result Value Ref Range    Color YELLOW/STRAW      Appearance CLEAR CLEAR      Specific gravity 1.011 1.003 - 1.030      pH (UA) 5.5 5.0 - 8.0      Protein NEGATIVE  NEG mg/dL    Glucose NEGATIVE  NEG mg/dL    Ketone NEGATIVE  NEG mg/dL    Bilirubin NEGATIVE  NEG      Blood NEGATIVE  NEG      Urobilinogen 0.2 0.2 - 1.0 EU/dL    Nitrites NEGATIVE  NEG      Leukocyte Esterase NEGATIVE  NEG      WBC 0-4 0 - 4 /hpf    RBC 0-5 0 - 5 /hpf    Epithelial cells FEW FEW /lpf    Bacteria NEGATIVE  NEG /hpf    UA:UC IF INDICATED CULTURE NOT INDICATED BY UA RESULT CNI      Hyaline cast 0-2 0 - 5 /lpf   METABOLIC PANEL, COMPREHENSIVE    Collection Time: 06/11/17  8:53 PM   Result Value Ref Range    Sodium 142 136 - 145 mmol/L    Potassium 3.8 3.5 - 5.1 mmol/L    Chloride 105 97 - 108 mmol/L    CO2 29 21 - 32 mmol/L    Anion gap 8 5 - 15 mmol/L    Glucose 105 (H) 65 - 100 mg/dL    BUN 7 6 - 20 MG/DL    Creatinine 1.00 0.70 - 1.30 MG/DL    BUN/Creatinine ratio 7 (L) 12 - 20      GFR est AA >60 >60 ml/min/1.73m2    GFR est non-AA >60 >60 ml/min/1.73m2    Calcium 8.9 8.5 - 10.1 MG/DL    Bilirubin, total 0.2 0.2 - 1.0 MG/DL    ALT (SGPT) 33 12 - 78 U/L    AST (SGOT) 16 15 - 37 U/L    Alk. phosphatase 139 (H) 45 - 117 U/L    Protein, total 7.1 6.4 - 8.2 g/dL    Albumin 3.9 3.5 - 5.0 g/dL    Globulin 3.2 2.0 - 4.0 g/dL    A-G Ratio 1.2 1.1 - 2.2       Recent Labs      06/11/17 2053   WBC  8.6   HGB  11.7*   HCT  36.0*   PLT  256     Recent Labs      06/11/17 2053   NA  142   K  3.8   CL  105   CO2  29   GLU  105*   BUN  7   CREA  1.00   CA  8.9   ALB  3.9   TBILI  0.2   SGOT  16   ALT  33       Imaging  CT abdomen/ pelvis with IV contrast:  FINDINGS:   LUNG BASES: Bibasilar atelectasis. LIVER: No mass or biliary dilatation. GALLBLADDER: Surgically absent. SPLEEN: No enlargement or lesion. PANCREAS: No mass or ductal dilatation. ADRENALS: No mass. KIDNEYS: No mass, calculus, or hydronephrosis. GI TRACT: Right lower quadrant ostomy site. Postsurgical change right lower  quadrant, with evidence of prior colectomy. Dilated, fluid/stool filled distal  small bowel loops  PERITONEUM: No free air or free fluid. APPENDIX: Surgically absent. RETROPERITONEUM: No lymphadenopathy or aortic aneurysm. ADDITIONAL COMMENTS: N/A.     URINARY BLADDER: Unremarkable. REPRODUCTIVE ORGANS: Unremarkable. LYMPH NODES: None enlarged. FREE FLUID: None. BONES: No destructive bone lesion. ADDITIONAL COMMENTS: N/A.     IMPRESSION  IMPRESSION:  Prior colectomy. Dilated fluid/stool filled distal small bowel loops concerning  for obstruction.            Assessment and Plan   1. Small bowel obstruction. Admit to medical floor. Consult Dr. Bailee Claros- colorectal surgeon. NGT to LIWS. Keep NPO on IV fluids. May repeat abdominal xray today. 2.  Nausea/ vomiting. Plan as noted. Ordered Zofran prn.  3.  Abdominal pain. Ordered Dilaudid 1 mg IV q 6 hours prn for pain control. 4.  Crohn's disease. Plan as noted. 5.  Anemia. Repeat CBC. 6.  GERD. Order Protonix IV while NPO. 7.  VTE prophylaxis. SCDs to BLEs.            Signed by: Conrado Conte MD    June 12, 2017 at 3:13 AM

## 2017-06-12 NOTE — PROGRESS NOTES
Reviewed medical chart; met with the patient at the bedside. Patient is being seen for small bowel obstruction. Patient has an extensive surgical history; he has an ileostomy. Patient lives with his wife and teenage sons (ages 15 and 12). He owns a cane that he uses at times. He has a PCP - Dr. Cecilia Youssef and gets his prescriptions at [facility]. 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 Patient explained that he has had medical issues since November; this  does note 8 ER visits and 9 admissions within the past 6 months. Care Management will continue to follow his disposition. JEREMIAH Vallejo     Care Management Interventions  PCP Verified by CM:  Yes  Palliative Care Consult (Criteria: CHF and RRAT>21): No  Mode of Transport at Discharge:  (Patient will travel home via car.  )  MyChart Signup: No  Discharge Durable Medical Equipment: No  Physical Therapy Consult: No  Occupational Therapy Consult: No  Speech Therapy Consult: No  Current Support Network: Lives with Spouse  Confirm Follow Up Transport:  (Patient will travel home via car.)  Plan discussed with Pt/Family/Caregiver: Yes  Freedom of Choice Offered: Yes  Discharge Location  Discharge Placement: Home with family assistance

## 2017-06-12 NOTE — CONSULTS
Colorectal Surgery Consultation Note    I have interviewed and examined the patient and reviewed the radiographs. I have ordered a water soluble contrast study via the ileostomy to better differentiate ileus versus obstruction and to potentially define the site of obstruction. Full note to follow.

## 2017-06-12 NOTE — PROGRESS NOTES
Radiology called to get results of the KUB. They said it had not yet been read. I reminded them that it was a stat order.

## 2017-06-13 LAB
ANION GAP BLD CALC-SCNC: 7 MMOL/L (ref 5–15)
BASOPHILS # BLD AUTO: 0 K/UL (ref 0–0.1)
BASOPHILS # BLD: 0 % (ref 0–1)
BUN SERPL-MCNC: 8 MG/DL (ref 6–20)
BUN/CREAT SERPL: 10 (ref 12–20)
CALCIUM SERPL-MCNC: 9.2 MG/DL (ref 8.5–10.1)
CHLORIDE SERPL-SCNC: 103 MMOL/L (ref 97–108)
CO2 SERPL-SCNC: 27 MMOL/L (ref 21–32)
CREAT SERPL-MCNC: 0.78 MG/DL (ref 0.7–1.3)
EOSINOPHIL # BLD: 0.1 K/UL (ref 0–0.4)
EOSINOPHIL NFR BLD: 3 % (ref 0–7)
ERYTHROCYTE [DISTWIDTH] IN BLOOD BY AUTOMATED COUNT: 15.2 % (ref 11.5–14.5)
GLUCOSE SERPL-MCNC: 92 MG/DL (ref 65–100)
HCT VFR BLD AUTO: 35 % (ref 36.6–50.3)
HGB BLD-MCNC: 10.9 G/DL (ref 12.1–17)
LYMPHOCYTES # BLD AUTO: 24 % (ref 12–49)
LYMPHOCYTES # BLD: 1.1 K/UL (ref 0.8–3.5)
MCH RBC QN AUTO: 25.4 PG (ref 26–34)
MCHC RBC AUTO-ENTMCNC: 31.1 G/DL (ref 30–36.5)
MCV RBC AUTO: 81.6 FL (ref 80–99)
MONOCYTES # BLD: 0.6 K/UL (ref 0–1)
MONOCYTES NFR BLD AUTO: 14 % (ref 5–13)
NEUTS SEG # BLD: 2.8 K/UL (ref 1.8–8)
NEUTS SEG NFR BLD AUTO: 59 % (ref 32–75)
PLATELET # BLD AUTO: 182 K/UL (ref 150–400)
POTASSIUM SERPL-SCNC: 5 MMOL/L (ref 3.5–5.1)
RBC # BLD AUTO: 4.29 M/UL (ref 4.1–5.7)
SODIUM SERPL-SCNC: 137 MMOL/L (ref 136–145)
WBC # BLD AUTO: 4.7 K/UL (ref 4.1–11.1)

## 2017-06-13 PROCEDURE — 74011000250 HC RX REV CODE- 250: Performed by: INTERNAL MEDICINE

## 2017-06-13 PROCEDURE — 74011250636 HC RX REV CODE- 250/636: Performed by: FAMILY MEDICINE

## 2017-06-13 PROCEDURE — 80048 BASIC METABOLIC PNL TOTAL CA: CPT | Performed by: COLON & RECTAL SURGERY

## 2017-06-13 PROCEDURE — 36415 COLL VENOUS BLD VENIPUNCTURE: CPT | Performed by: COLON & RECTAL SURGERY

## 2017-06-13 PROCEDURE — 74011250637 HC RX REV CODE- 250/637: Performed by: INTERNAL MEDICINE

## 2017-06-13 PROCEDURE — 65270000029 HC RM PRIVATE

## 2017-06-13 PROCEDURE — 74011250636 HC RX REV CODE- 250/636: Performed by: COLON & RECTAL SURGERY

## 2017-06-13 PROCEDURE — 85025 COMPLETE CBC W/AUTO DIFF WBC: CPT | Performed by: COLON & RECTAL SURGERY

## 2017-06-13 PROCEDURE — 74011250636 HC RX REV CODE- 250/636: Performed by: INTERNAL MEDICINE

## 2017-06-13 RX ADMIN — PROCHLORPERAZINE EDISYLATE 5 MG: 5 INJECTION INTRAMUSCULAR; INTRAVENOUS at 05:26

## 2017-06-13 RX ADMIN — HYDROMORPHONE HYDROCHLORIDE 2 MG: 2 INJECTION, SOLUTION INTRAMUSCULAR; INTRAVENOUS; SUBCUTANEOUS at 21:53

## 2017-06-13 RX ADMIN — HYDROMORPHONE HYDROCHLORIDE 2 MG: 2 INJECTION, SOLUTION INTRAMUSCULAR; INTRAVENOUS; SUBCUTANEOUS at 12:16

## 2017-06-13 RX ADMIN — HYDROMORPHONE HYDROCHLORIDE 2 MG: 2 INJECTION, SOLUTION INTRAMUSCULAR; INTRAVENOUS; SUBCUTANEOUS at 18:37

## 2017-06-13 RX ADMIN — SODIUM CHLORIDE 1000 ML: 900 INJECTION, SOLUTION INTRAVENOUS at 09:46

## 2017-06-13 RX ADMIN — SODIUM CHLORIDE 125 ML/HR: 900 INJECTION, SOLUTION INTRAVENOUS at 15:47

## 2017-06-13 RX ADMIN — TEMAZEPAM 30 MG: 15 CAPSULE ORAL at 23:56

## 2017-06-13 RX ADMIN — HYDROMORPHONE HYDROCHLORIDE 2 MG: 2 INJECTION, SOLUTION INTRAMUSCULAR; INTRAVENOUS; SUBCUTANEOUS at 08:56

## 2017-06-13 RX ADMIN — PROCHLORPERAZINE EDISYLATE 5 MG: 5 INJECTION INTRAMUSCULAR; INTRAVENOUS at 18:36

## 2017-06-13 RX ADMIN — HYDROMORPHONE HYDROCHLORIDE 2 MG: 2 INJECTION, SOLUTION INTRAMUSCULAR; INTRAVENOUS; SUBCUTANEOUS at 05:14

## 2017-06-13 RX ADMIN — Medication 10 ML: at 21:53

## 2017-06-13 RX ADMIN — Medication 10 ML: at 05:30

## 2017-06-13 RX ADMIN — HYDROMORPHONE HYDROCHLORIDE 2 MG: 2 INJECTION, SOLUTION INTRAMUSCULAR; INTRAVENOUS; SUBCUTANEOUS at 15:29

## 2017-06-13 RX ADMIN — SODIUM CHLORIDE 125 ML/HR: 900 INJECTION, SOLUTION INTRAVENOUS at 05:16

## 2017-06-13 NOTE — PROGRESS NOTES
Colorectal Surgery Progress Note    The patient continues to have high output from NG tube and ileostomy, and the leukocytosis has resolved. Will cancel the operation planned for today and continue with the current management.

## 2017-06-13 NOTE — PROGRESS NOTES
General Daily Progress Note    Admission Date: 6/12/2017  Hospital Day 2  Post-Op Day Not Applicable  Subjective:   Abdominal pain and tenderness have decreased. Ileostomy had been expelling fluid that looks similar to what is coming from the NG tube and some small bits of food. Objective:   Patient Vitals for the past 24 hrs:   BP Temp Pulse Resp SpO2 Height Weight   06/13/17 0732 126/76 98.6 °F (37 °C) (!) 106 17 94 % - -   06/13/17 0516 - - - - - - 94.5 kg (208 lb 6.4 oz)   06/13/17 0426 116/78 98.5 °F (36.9 °C) (!) 109 - 95 % - -   06/13/17 0003 118/74 98.5 °F (36.9 °C) 88 16 97 % - -   06/12/17 2112 135/65 99.4 °F (37.4 °C) (!) 109 16 93 % - -   06/12/17 1640 123/81 98.4 °F (36.9 °C) (!) 102 16 95 % - -   06/12/17 1058 - - - - - 6' (1.829 m) 95.5 kg (210 lb 8.6 oz)        06/11 1901 - 06/13 0700  In: 3922.9 [P.O.:50; I.V.:3872.9]  Out: 4975 [Urine:1100]    PHYSICAL EXAMINATION:   GENERAL: Fatigued, but not obviously uncomfortable. HEENT: NG tube draining bilious fluid. ABDOMEN: Somewhat less distended. Diffusely tender, but less so than yesterday. No rebound tenderness or involuntary guarding. NEURO: Alert and oriented. Data Review   Recent Results (from the past 24 hour(s))   METABOLIC PANEL, BASIC    Collection Time: 06/13/17  5:07 AM   Result Value Ref Range    Sodium 137 136 - 145 mmol/L    Potassium 5.0 3.5 - 5.1 mmol/L    Chloride 103 97 - 108 mmol/L    CO2 27 21 - 32 mmol/L    Anion gap 7 5 - 15 mmol/L    Glucose 92 65 - 100 mg/dL    BUN 8 6 - 20 MG/DL    Creatinine 0.78 0.70 - 1.30 MG/DL    BUN/Creatinine ratio 10 (L) 12 - 20      GFR est AA >60 >60 ml/min/1.73m2    GFR est non-AA >60 >60 ml/min/1.73m2    Calcium 9.2 8.5 - 10.1 MG/DL           Assessment:     Principal Problem:    Small bowel obstruction (HCC) (5/20/2017)      Hemodynamically stable. Clinically improving, with significant ileostomy output and less pain. Hasn't voided for a few hours.   Probably a bit dehydrated although the labs do not reflect that. No increase in BUN or creatinine. Awaiting CBC. Plan:   Continue Ng tube. Continue IV fluids. 1000 mL NS bolus to replace some of the GI output. If the new IV fails, he might need a PICC. Will check CBC. In the absence of a significant increase in the leukocytosis, bandemia, or other sign of possible intestinal compromise, we can hopefully hold off on surgery. For now however, the patient remains on the schedule for this afternoon. I will reevaluate him between cases this afternoon and make a final decision.

## 2017-06-13 NOTE — PROGRESS NOTES
Tony David MD   Phone/text: [phone] (7am to 7pm)  After 7pm please call  for hospitalist on call    Hospitalist Progress Note     6/13/2017   PCP:  Dr. Taylor Ashley MD  Chief complaint: abdominal pain, nausea    Admission Summary:   Oliva Noel is a 40 y.o. 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 Reason For Visit:  SBO    Assessment/Plan   Small bowel obstruction (POA) - patient notes good output from ostomy but still large NG output  - CT abdomen/pelvis 6/11 with dilated fluid/stool filled distal small bowel loops concerning  for obstruction  - Barium enema 6/12 with contrast reflux into nondilated small bowel but not into dilated ileum, suggesting obstruction  - NG tube placed  - IV fluids  - Dr. Zulema Zaman consulted - plans for surgery if not improving    Nausea/ vomiting (POA) - due to SBO and chronic nausea  - Zofran prn    Abdominal pain, acute on chronic - stable  - Continued pain though noted previous plans to wean narcotics given recurrent SBO, will discuss after acute episode is resolved  - Monitor    Right hydronephrosis - seen on barium enema but not on CT scan, renal function normal. Asymptomatic. Likely false positive. Monitor    Crohn's disease (chronic) - plan as above    Anemia (chronic) - monitor HH    GERD (chronic) - IV PPI    See orders for other plans. VTE prophylaxis: SCDs  Discussed plan of care with Patient/Family and Nurse   Pre-admission lived at home  Discharge plan: home  Estimated time to discharge: unclear     Subjective   . Jeremy Do notes passage of liquid stool and gas into his ostomy.  He has no nausea with his NG tube in place. He continues to have diffuse abdominal pain requiring narcotics. Reviewed interval history  Physical examination     Visit Vitals    /76 (BP 1 Location: Left arm, BP Patient Position: At rest)    Pulse (!) 106    Temp 98.6 °F (37 °C)    Resp 17    Ht 6' (1.829 m)    Wt 94.5 kg (208 lb 6.4 oz)    SpO2 94%    BMI 28.26 kg/m2       Temp (24hrs), Av.7 °F (37.1 °C), Min:98.4 °F (36.9 °C), Max:99.4 °F (37.4 °C)      Intake/Output Summary (Last 24 hours) at 17 0812  Last data filed at 17 0540   Gross per 24 hour   Intake          3922.92 ml   Output             4975 ml   Net         -1052.08 ml       Gen - NAD  HEENT - MMM, NG tube in place  Neck - supple, full ROM  CV - RRR, no MRG  Resp - lungs CTA b/l, no wheezing, normal WOB  GI - abdomen S, diffusely tender without guarding or rebound, ND, +BS. Ostomy with stool in place   - no CVA tenderness, bladder non-palpable in lower abdomen  MSK - normal tone and bulk, no edema  Neuro - A&O, no focal deficits  Psych - calm and cooperative with normal affect    Data Review       Telemetry    ECG    Xray x   CT scan x   MRI    Echocardiogram    Ultrasound              I have reviewed the flow sheet and recent notes  New labs and data personally reviewed.     Recent Labs      17   WBC  12.6*  8.6   HGB  11.7*  11.7*   HCT  36.9  36.0*   PLT  258  256     Recent Labs      17   0507  172  17   NA  137  137  142   K  5.0  3.9  3.8   CL  103  106  105   CO2  27  24  29   GLU  92  126*  105*   BUN  8  7  7   CREA  0.78  0.83  1.00   CA  9.2  9.0  8.9   MG   --   1.9   --    PHOS   --   4.3   --    ALB   --    --   3.9   TBILI   --    --   0.2   SGOT   --    --   16   ALT   --    --   33       Medications reviewed  Current Facility-Administered Medications   Medication Dose Route Frequency    sodium chloride (NS) flush 5-10 mL  5-10 mL IntraVENous Q8H    sodium chloride (NS) flush 5-10 mL  5-10 mL IntraVENous PRN    ondansetron (ZOFRAN) injection 4 mg  4 mg IntraVENous Q6H PRN    0.9% sodium chloride infusion  125 mL/hr IntraVENous CONTINUOUS    HYDROmorphone (PF) (DILAUDID) injection 2 mg  2 mg IntraVENous Q3H PRN    prochlorperazine (COMPAZINE) with saline injection 5 mg  5 mg IntraVENous Q4H PRN    temazepam (RESTORIL) capsule 30 mg  30 mg Oral QHS PRN         Black Kay MD  Internal Medicine  6/13/2017

## 2017-06-14 LAB
ANION GAP BLD CALC-SCNC: 11 MMOL/L (ref 5–15)
BASOPHILS # BLD AUTO: 0 K/UL (ref 0–0.1)
BASOPHILS # BLD: 0 % (ref 0–1)
BUN SERPL-MCNC: 6 MG/DL (ref 6–20)
BUN/CREAT SERPL: 11 (ref 12–20)
CALCIUM SERPL-MCNC: 8.7 MG/DL (ref 8.5–10.1)
CHLORIDE SERPL-SCNC: 102 MMOL/L (ref 97–108)
CO2 SERPL-SCNC: 27 MMOL/L (ref 21–32)
CREAT SERPL-MCNC: 0.53 MG/DL (ref 0.7–1.3)
EOSINOPHIL # BLD: 0.1 K/UL (ref 0–0.4)
EOSINOPHIL NFR BLD: 2 % (ref 0–7)
ERYTHROCYTE [DISTWIDTH] IN BLOOD BY AUTOMATED COUNT: 15.2 % (ref 11.5–14.5)
GLUCOSE SERPL-MCNC: 78 MG/DL (ref 65–100)
HCT VFR BLD AUTO: 32.6 % (ref 36.6–50.3)
HGB BLD-MCNC: 10.2 G/DL (ref 12.1–17)
LYMPHOCYTES # BLD AUTO: 18 % (ref 12–49)
LYMPHOCYTES # BLD: 1.3 K/UL (ref 0.8–3.5)
MCH RBC QN AUTO: 25.4 PG (ref 26–34)
MCHC RBC AUTO-ENTMCNC: 31.3 G/DL (ref 30–36.5)
MCV RBC AUTO: 81.1 FL (ref 80–99)
MONOCYTES # BLD: 0.9 K/UL (ref 0–1)
MONOCYTES NFR BLD AUTO: 12 % (ref 5–13)
NEUTS SEG # BLD: 5 K/UL (ref 1.8–8)
NEUTS SEG NFR BLD AUTO: 68 % (ref 32–75)
PLATELET # BLD AUTO: 184 K/UL (ref 150–400)
POTASSIUM SERPL-SCNC: 3.1 MMOL/L (ref 3.5–5.1)
RBC # BLD AUTO: 4.02 M/UL (ref 4.1–5.7)
SODIUM SERPL-SCNC: 140 MMOL/L (ref 136–145)
WBC # BLD AUTO: 7.3 K/UL (ref 4.1–11.1)

## 2017-06-14 PROCEDURE — 36569 INSJ PICC 5 YR+ W/O IMAGING: CPT | Performed by: HOSPITALIST

## 2017-06-14 PROCEDURE — 74011250636 HC RX REV CODE- 250/636: Performed by: HOSPITALIST

## 2017-06-14 PROCEDURE — 74011000250 HC RX REV CODE- 250: Performed by: INTERNAL MEDICINE

## 2017-06-14 PROCEDURE — 77030019563 HC DEV ATTCH FEED HOLL -A

## 2017-06-14 PROCEDURE — 80048 BASIC METABOLIC PNL TOTAL CA: CPT | Performed by: HOSPITALIST

## 2017-06-14 PROCEDURE — 74011250637 HC RX REV CODE- 250/637: Performed by: INTERNAL MEDICINE

## 2017-06-14 PROCEDURE — 74011250636 HC RX REV CODE- 250/636: Performed by: INTERNAL MEDICINE

## 2017-06-14 PROCEDURE — 65270000029 HC RM PRIVATE

## 2017-06-14 PROCEDURE — 85025 COMPLETE CBC W/AUTO DIFF WBC: CPT | Performed by: COLON & RECTAL SURGERY

## 2017-06-14 PROCEDURE — 36415 COLL VENOUS BLD VENIPUNCTURE: CPT | Performed by: HOSPITALIST

## 2017-06-14 RX ORDER — POTASSIUM CHLORIDE AND SODIUM CHLORIDE 900; 300 MG/100ML; MG/100ML
INJECTION, SOLUTION INTRAVENOUS CONTINUOUS
Status: DISCONTINUED | OUTPATIENT
Start: 2017-06-14 | End: 2017-06-15

## 2017-06-14 RX ADMIN — Medication 10 ML: at 22:12

## 2017-06-14 RX ADMIN — HYDROMORPHONE HYDROCHLORIDE 2 MG: 2 INJECTION, SOLUTION INTRAMUSCULAR; INTRAVENOUS; SUBCUTANEOUS at 07:21

## 2017-06-14 RX ADMIN — SODIUM CHLORIDE AND POTASSIUM CHLORIDE: 9; 2.98 INJECTION, SOLUTION INTRAVENOUS at 09:34

## 2017-06-14 RX ADMIN — HYDROMORPHONE HYDROCHLORIDE 2 MG: 2 INJECTION, SOLUTION INTRAMUSCULAR; INTRAVENOUS; SUBCUTANEOUS at 16:18

## 2017-06-14 RX ADMIN — HYDROMORPHONE HYDROCHLORIDE 2 MG: 2 INJECTION, SOLUTION INTRAMUSCULAR; INTRAVENOUS; SUBCUTANEOUS at 10:17

## 2017-06-14 RX ADMIN — HYDROMORPHONE HYDROCHLORIDE 2 MG: 2 INJECTION, SOLUTION INTRAMUSCULAR; INTRAVENOUS; SUBCUTANEOUS at 19:17

## 2017-06-14 RX ADMIN — PROCHLORPERAZINE EDISYLATE 5 MG: 5 INJECTION INTRAMUSCULAR; INTRAVENOUS at 23:14

## 2017-06-14 RX ADMIN — HYDROMORPHONE HYDROCHLORIDE 2 MG: 2 INJECTION, SOLUTION INTRAMUSCULAR; INTRAVENOUS; SUBCUTANEOUS at 22:12

## 2017-06-14 RX ADMIN — PROCHLORPERAZINE EDISYLATE 5 MG: 5 INJECTION INTRAMUSCULAR; INTRAVENOUS at 16:18

## 2017-06-14 RX ADMIN — HYDROMORPHONE HYDROCHLORIDE 2 MG: 2 INJECTION, SOLUTION INTRAMUSCULAR; INTRAVENOUS; SUBCUTANEOUS at 13:28

## 2017-06-14 RX ADMIN — HYDROMORPHONE HYDROCHLORIDE 2 MG: 2 INJECTION, SOLUTION INTRAMUSCULAR; INTRAVENOUS; SUBCUTANEOUS at 00:54

## 2017-06-14 RX ADMIN — PROCHLORPERAZINE EDISYLATE 5 MG: 5 INJECTION INTRAMUSCULAR; INTRAVENOUS at 10:17

## 2017-06-14 RX ADMIN — Medication 10 ML: at 16:18

## 2017-06-14 RX ADMIN — HYDROMORPHONE HYDROCHLORIDE 2 MG: 2 INJECTION, SOLUTION INTRAMUSCULAR; INTRAVENOUS; SUBCUTANEOUS at 04:13

## 2017-06-14 RX ADMIN — TEMAZEPAM 30 MG: 15 CAPSULE ORAL at 23:14

## 2017-06-14 NOTE — PROGRESS NOTES
Vanessa Saenz MD   Phone/text: [phone] (7am to 7pm)  After 7pm please call  for hospitalist on call    Hospitalist Progress Note     6/14/2017   PCP:  Dr. Alex Valladares MD  Chief complaint: abdominal pain, nausea    Admission Summary:   Jessica Hui is a 40 y.o. 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 Reason For Visit:  SBO    Assessment/Plan   Small bowel obstruction (POA) - ostomy output dropping, NG output increasing  - CT abdomen/pelvis 6/11 with dilated fluid/stool filled distal small bowel loops concerning  for obstruction  - Barium enema 6/12 with contrast reflux into nondilated small bowel but not into dilated ileum, suggesting obstruction  - NG tube to suction  - IV fluids  - Dr. Lucrecia Greenwood consulted    Nausea/ vomiting (POA) - due to SBO and chronic nausea  - Zofran prn    Abdominal pain, acute on chronic - stable  - Continued pain though noted previous plans to wean narcotics given recurrent SBO, will discuss after acute episode is resolved  - Monitor    Right hydronephrosis - seen on barium enema but not on CT scan, renal function normal. Asymptomatic. Likely false positive. Monitor    Crohn's disease (chronic) - plan as above    Anemia (chronic) - monitor HH    GERD (chronic) - IV PPI    See orders for other plans. VTE prophylaxis: SCDs  Discussed plan of care with Patient/Family and Nurse   Pre-admission lived at home  Discharge plan: home  Estimated time to discharge: unclear     Subjective   Mr. Vincenzo Almeida continues to have abdominal pain. He is not nauseated but has noticed the increasing output from his NG tube. Reviewed interval history  Physical examination     Visit Vitals    /72 (BP 1 Location: Left arm, BP Patient Position: At rest)    Pulse (!) 109    Temp 98.4 °F (36.9 °C)    Resp 18    Ht 6' (1.829 m)    Wt 93.4 kg (205 lb 14.4 oz)    SpO2 95%    BMI 27.93 kg/m2       Temp (24hrs), Av.3 °F (36.8 °C), Min:97.7 °F (36.5 °C), Max:99.1 °F (37.3 °C)      Intake/Output Summary (Last 24 hours) at 17 1254  Last data filed at 17 0944   Gross per 24 hour   Intake             2575 ml   Output             4275 ml   Net            -1700 ml       Gen - NAD  HEENT - MMM, NG tube in place  Neck - supple, full ROM  CV - RRR, no MRG  Resp - lungs CTA b/l, no wheezing, normal WOB  GI - abdomen S, diffusely tender without guarding or rebound, ND, +BS. Ostomy with stool in place   - no CVA tenderness, bladder non-palpable in lower abdomen  MSK - normal tone and bulk, no edema  Neuro - A&O, no focal deficits  Psych - calm and cooperative with normal affect    Data Review       Telemetry    ECG    Xray    CT scan    MRI    Echocardiogram    Ultrasound              I have reviewed the flow sheet and recent notes  New labs and data personally reviewed.     Recent Labs      17   0507  17   0951  17   0442   WBC  7.3  4.7  12.6*   HGB  10.2*  10.9*  11.7*   HCT  32.6*  35.0*  36.9   PLT  184  182  258     Recent Labs      17   0507  17   0507  17   0442  17   2053   NA  140  137  137  142   K  3.1*  5.0  3.9  3.8   CL  102  103  106  105   CO2  27  27  24  29   GLU  78  92  126*  105*   BUN  6  8  7  7   CREA  0.53*  0.78  0.83  1.00   CA  8.7  9.2  9.0  8.9   MG   --    --   1.9   --    PHOS   --    --   4.3   --    ALB   --    --    --   3.9   TBILI   --    --    --   0.2   SGOT   --    --    --   16   ALT   --    --    --   33       Medications reviewed  Current Facility-Administered Medications   Medication Dose Route Frequency    0.9% sodium chloride with KCl 40 mEq/L infusion   IntraVENous CONTINUOUS    fat emulsion 20% (LIPOSYN, INTRALIPID) infusion 500 mL  500 mL IntraVENous Q MON, WED & FRI    TPN ADULT - PERIPHERAL AA 4.25% D10% W/ ELECTRLYTES AND CA   IntraVENous CONTINUOUS    sodium chloride (NS) flush 5-10 mL  5-10 mL IntraVENous Q8H    sodium chloride (NS) flush 5-10 mL  5-10 mL IntraVENous PRN    ondansetron (ZOFRAN) injection 4 mg  4 mg IntraVENous Q6H PRN    HYDROmorphone (PF) (DILAUDID) injection 2 mg  2 mg IntraVENous Q3H PRN    prochlorperazine (COMPAZINE) with saline injection 5 mg  5 mg IntraVENous Q4H PRN    temazepam (RESTORIL) capsule 30 mg  30 mg Oral QHS PRN         Sydna Sacks, MD  Internal Medicine  6/14/2017

## 2017-06-14 NOTE — PROGRESS NOTES
Bedside shift change report given to Bouchra Orta (oncoming nurse) by Haja José (offgoing nurse). Report included the following information SBAR.

## 2017-06-14 NOTE — PROGRESS NOTES
Spiritual Care Partner Volunteer visited patient in [address] [place] [address] on 6/14/17. Documented by:  FILIPPO Garcia

## 2017-06-14 NOTE — PROGRESS NOTES
NUTRITION COMPLETE ASSESSMENT    RECOMMENDATIONS:   1. If unable to advance diet in 48hrs recommend PICC with TPN:   5%AA, D20 @ 83ml/hr + 500ml, 20% lipids 3x/week  2. If diet advanced add Ensure Clear TID -> Ensure Enlive BID  3. Check Vitamin D and Vitamin B12 (last checked 2009)    Interventions/Plan:   Food/Nutrient Delivery:    Commercial supplement (add Ensure BID once on full liquids)     Initiate parenteral nutrition (if unable to advance diet in 48 hrs)  Assessment:   Reason for Assessment: [x]At Nutrition Risk    Diet: NPO  Supplements: none  Nutritionally Significant Medications: [x] Reviewed & Includes: NS w/ KCl @ 100ml/hr; PRN: zofran, compazine    Subjective:  \"I'm just waiting to see Dr. Kelly Milton to see what he thinks. I hope we can get the NGT out to see how just my ostomy is doing with output. \"    Objective:  Pt admitted for SBO. PMHx: depression, GERD, Crohn's dx, UC, anal stenosis/fistula. Extensive GI surgical hx - total proctocolectomy 2004, permanent ileostomy, anal fistula,multiple LUCIEN. Maintains about 400cm of small intestine per Brief Op note of LUCIEN on 3/14/2017. Recent admit for the same 5/20-5/23/2017. N/V and abd pain prior to admit. NGT in place but with ostomy with output as well. [place]-rectal surgeon following. Surgery had been planned for yesterday but on hold with for hopeful improvement. Date Ileostomy NGT   6/14-15 - 850ml   6/13-14 1100ml 1950ml + 2 emesis   6/12-13 1425ml 840ml     Pt well known to nutrition services from previous admissions. Pt visited today. Discussed possible need for TPN (which he has had in the past pending surgery plans). No questions at this time. Pt has been NPO x 2 days but with compromised nutrition earlier this month as well and possible need for surgery. If unable to advance diet to at least full liquids in 48hrs recommend start of TPN.  Currently only with peripheral access but may needs PICC just d/t difficulty maintaining IV per chart review and pt reports. If TPN indicated recommend: 5%AA, D20 @ 83ml/hr + 500ml, 20% lipids 3x/week. Provides: 2188kcal (99% energy needs); 100g protein (100% protein needs), 2000ml fluid + IVF. Will continue to follow for plan for surgery vs diet advancement. Wt fluctuating greatly over past 4 months (195-213#) with steroids rx at home. Hypokalemia noted, ?d/t high GI output. Repleted via IVF. Estimated Nutrition Needs:   Kcals/day: 9215 Kcals/day (2210-2395kcal)  Protein: 100 g (100-109g (1.1-1.2g/kg))  Fluid: 2730 ml (30ml/kg)  Based On: [address] (x 1.2-1.3)  Weight Used: UBW (90.9kg (200#))    Pt expected to meet estimated nutrient needs:  []   Yes     [x]  No [] Unable to predict at this time  Nutrition Diagnosis:   1.  Inadequate oral food/beverage intake related to altered GI fx as evidenced by recurrent SBO; Crohn's dx, NPO x 2  Goals:     Diet advancement or start of TPN in 48 hrs     Monitoring & Evaluation:    - Total energy intake   - Weight/weight change, GI, Electrolyte and renal profile     Previous Nutrition Goals Met:   N/A  Previous Recommendations:    N/A    Education & Discharge Needs:   [x] None Identified   [] Identified and addressed    [x] Participated in care plan, discharge planning, and/or interdisciplinary rounds        Cultural, Jain and ethnic food preferences identified: None    Skin Integrity: [x]Intact  []Other  Edema: [x]None []Other  Last BM: 6/13 - watery via permanent ileostomy  Food Allergies: [x]None []Other  Diet Restrictions: Food Intolerance:  (high fat foods, high fiber)  Cultural/Orthodox Preference(s): None     Anthropometrics:    Weight Loss Metrics 6/14/2017 6/11/2017 5/22/2017 5/20/2017 5/20/2017 5/20/2017 4/21/2017   Today's Wt 205 lb 14.4 oz 210 lb 8.6 oz 200 lb - 200 lb - 213 lb 13.5 oz   BMI 27.93 kg/m2 28.55 kg/m2 - 27.12 kg/m2 - 27.12 kg/m2 29 kg/m2      Weight Source: Standing scale (comment)  Height: 6' (182.9 cm),    Body mass index is 27.93 kg/(m^2).   IBW : 80.7 kg (178 lb), % IBW (Calculated): 115.67 %  Usual Body Weight: 90.7 kg (200 lb),      Labs:    Lab Results   Component Value Date/Time    Sodium 140 06/14/2017 05:07 AM    Potassium 3.1 06/14/2017 05:07 AM    Chloride 102 06/14/2017 05:07 AM    CO2 27 06/14/2017 05:07 AM    Glucose 78 06/14/2017 05:07 AM    BUN 6 06/14/2017 05:07 AM    Creatinine 0.53 06/14/2017 05:07 AM    Calcium 8.7 06/14/2017 05:07 AM    Magnesium 1.9 06/12/2017 04:42 AM    Phosphorus 4.3 06/12/2017 04:42 AM    Albumin 3.9 06/11/2017 08:53 PM     Selvin Guerrero RD

## 2017-06-14 NOTE — PROGRESS NOTES
General Daily Progress Note    Admission Date: 6/12/2017  Hospital Day 3  Post-Op Day Not Applicable  Subjective:   Pain about the same as yesterday evening. Nausea controlled. Objective:   Patient Vitals for the past 24 hrs:   BP Temp Pulse Resp SpO2 Height Weight   06/14/17 1123 122/72 98.4 °F (36.9 °C) (!) 109 18 95 % - -   06/14/17 0935 115/75 97.7 °F (36.5 °C) (!) 108 18 98 % - -   06/14/17 0725 - - - - - 6' (1.829 m) 93.4 kg (205 lb 14.4 oz)   06/13/17 2359 114/76 98.4 °F (36.9 °C) (!) 114 18 96 % - -   06/13/17 2020 105/68 99.1 °F (37.3 °C) (!) 111 18 94 % - -     06/14 0701 - 06/14 1900  In: -   Out: 2350 [Urine:500]  06/12 1901 - 06/14 0700  In: 5497.9 [P.O.:150; I.V.:5347.9]  Out: 7075 [Urine:1750]       PHYSICAL EXAMINATION:   GENERAL: Fatigued, but not obviously uncomfortable. HEENT: NG tube draining bilious fluid. ABDOMEN: Somewhat distended. Less tender than yesterday. No rebound tenderness or involuntary guarding. NEURO: Alert and oriented.             Data Review   Recent Results (from the past 24 hour(s))   METABOLIC PANEL, BASIC    Collection Time: 06/14/17  5:07 AM   Result Value Ref Range    Sodium 140 136 - 145 mmol/L    Potassium 3.1 (L) 3.5 - 5.1 mmol/L    Chloride 102 97 - 108 mmol/L    CO2 27 21 - 32 mmol/L    Anion gap 11 5 - 15 mmol/L    Glucose 78 65 - 100 mg/dL    BUN 6 6 - 20 MG/DL    Creatinine 0.53 (L) 0.70 - 1.30 MG/DL    BUN/Creatinine ratio 11 (L) 12 - 20      GFR est AA >60 >60 ml/min/1.73m2    GFR est non-AA >60 >60 ml/min/1.73m2    Calcium 8.7 8.5 - 10.1 MG/DL   CBC WITH AUTOMATED DIFF    Collection Time: 06/14/17  5:07 AM   Result Value Ref Range    WBC 7.3 4.1 - 11.1 K/uL    RBC 4.02 (L) 4.10 - 5.70 M/uL    HGB 10.2 (L) 12.1 - 17.0 g/dL    HCT 32.6 (L) 36.6 - 50.3 %    MCV 81.1 80.0 - 99.0 FL    MCH 25.4 (L) 26.0 - 34.0 PG    MCHC 31.3 30.0 - 36.5 g/dL    RDW 15.2 (H) 11.5 - 14.5 %    PLATELET 937 424 - 952 K/uL    NEUTROPHILS 68 32 - 75 %    LYMPHOCYTES 18 12 - 49 % MONOCYTES 12 5 - 13 %    EOSINOPHILS 2 0 - 7 %    BASOPHILS 0 0 - 1 %    ABS. NEUTROPHILS 5.0 1.8 - 8.0 K/UL    ABS. LYMPHOCYTES 1.3 0.8 - 3.5 K/UL    ABS. MONOCYTES 0.9 0.0 - 1.0 K/UL    ABS. EOSINOPHILS 0.1 0.0 - 0.4 K/UL    ABS. BASOPHILS 0.0 0.0 - 0.1 K/UL           Assessment:     Principal Problem:    Small bowel obstruction (HCC) (5/20/2017)      Hemodynamically stable.     Significant ileostomy and NG tube output continues.     No leukocytosis or left shift. I agree with the recommendation of Marcos Perez RD to place PICC and begin TPN if diet cannot be started by 6/16/2017. Plan:   Continue NG decompression and observation.

## 2017-06-15 ENCOUNTER — APPOINTMENT (OUTPATIENT)
Dept: GENERAL RADIOLOGY | Age: 42
DRG: 389 | End: 2017-06-15
Attending: HOSPITALIST
Payer: COMMERCIAL

## 2017-06-15 LAB
ANION GAP BLD CALC-SCNC: 13 MMOL/L (ref 5–15)
BUN SERPL-MCNC: 5 MG/DL (ref 6–20)
BUN/CREAT SERPL: 9 (ref 12–20)
CALCIUM SERPL-MCNC: 8.9 MG/DL (ref 8.5–10.1)
CHLORIDE SERPL-SCNC: 104 MMOL/L (ref 97–108)
CO2 SERPL-SCNC: 25 MMOL/L (ref 21–32)
CREAT SERPL-MCNC: 0.55 MG/DL (ref 0.7–1.3)
GLUCOSE SERPL-MCNC: 71 MG/DL (ref 65–100)
POTASSIUM SERPL-SCNC: 3.8 MMOL/L (ref 3.5–5.1)
SODIUM SERPL-SCNC: 142 MMOL/L (ref 136–145)

## 2017-06-15 PROCEDURE — 74011000250 HC RX REV CODE- 250: Performed by: HOSPITALIST

## 2017-06-15 PROCEDURE — C1751 CATH, INF, PER/CENT/MIDLINE: HCPCS

## 2017-06-15 PROCEDURE — 74011000250 HC RX REV CODE- 250: Performed by: INTERNAL MEDICINE

## 2017-06-15 PROCEDURE — 02HV33Z INSERTION OF INFUSION DEVICE INTO SUPERIOR VENA CAVA, PERCUTANEOUS APPROACH: ICD-10-PCS | Performed by: HOSPITALIST

## 2017-06-15 PROCEDURE — 74011250636 HC RX REV CODE- 250/636: Performed by: INTERNAL MEDICINE

## 2017-06-15 PROCEDURE — 74000 XR ABD (KUB): CPT

## 2017-06-15 PROCEDURE — 76937 US GUIDE VASCULAR ACCESS: CPT

## 2017-06-15 PROCEDURE — 74011250636 HC RX REV CODE- 250/636: Performed by: HOSPITALIST

## 2017-06-15 PROCEDURE — 36415 COLL VENOUS BLD VENIPUNCTURE: CPT | Performed by: HOSPITALIST

## 2017-06-15 PROCEDURE — 3E0436Z INTRODUCTION OF NUTRITIONAL SUBSTANCE INTO CENTRAL VEIN, PERCUTANEOUS APPROACH: ICD-10-PCS | Performed by: HOSPITALIST

## 2017-06-15 PROCEDURE — 74011000258 HC RX REV CODE- 258: Performed by: HOSPITALIST

## 2017-06-15 PROCEDURE — 74011250637 HC RX REV CODE- 250/637: Performed by: INTERNAL MEDICINE

## 2017-06-15 PROCEDURE — 80048 BASIC METABOLIC PNL TOTAL CA: CPT | Performed by: HOSPITALIST

## 2017-06-15 PROCEDURE — 77030020365 HC SOL INJ SOD CL 0.9% 50ML

## 2017-06-15 PROCEDURE — 77030018719 HC DRSG PTCH ANTIMIC J&J -A

## 2017-06-15 PROCEDURE — 77030020847 HC STATLOK BARD -A

## 2017-06-15 PROCEDURE — 65270000029 HC RM PRIVATE

## 2017-06-15 RX ORDER — POTASSIUM CHLORIDE AND SODIUM CHLORIDE 900; 300 MG/100ML; MG/100ML
INJECTION, SOLUTION INTRAVENOUS CONTINUOUS
Status: DISPENSED | OUTPATIENT
Start: 2017-06-15 | End: 2017-06-15

## 2017-06-15 RX ORDER — SODIUM CHLORIDE 0.9 % (FLUSH) 0.9 %
20 SYRINGE (ML) INJECTION AS NEEDED
Status: DISCONTINUED | OUTPATIENT
Start: 2017-06-15 | End: 2017-06-17 | Stop reason: HOSPADM

## 2017-06-15 RX ORDER — BACITRACIN 500 UNIT/G
1 PACKET (EA) TOPICAL AS NEEDED
Status: DISCONTINUED | OUTPATIENT
Start: 2017-06-15 | End: 2017-06-17 | Stop reason: HOSPADM

## 2017-06-15 RX ORDER — SODIUM CHLORIDE 9 MG/ML
INJECTION INTRAMUSCULAR; INTRAVENOUS; SUBCUTANEOUS
Status: DISPENSED
Start: 2017-06-15 | End: 2017-06-15

## 2017-06-15 RX ORDER — SODIUM CHLORIDE 0.9 % (FLUSH) 0.9 %
10 SYRINGE (ML) INJECTION EVERY 24 HOURS
Status: DISCONTINUED | OUTPATIENT
Start: 2017-06-15 | End: 2017-06-17 | Stop reason: HOSPADM

## 2017-06-15 RX ORDER — SODIUM CHLORIDE 0.9 % (FLUSH) 0.9 %
10 SYRINGE (ML) INJECTION EVERY 8 HOURS
Status: DISCONTINUED | OUTPATIENT
Start: 2017-06-15 | End: 2017-06-17 | Stop reason: HOSPADM

## 2017-06-15 RX ORDER — SODIUM CHLORIDE 0.9 % (FLUSH) 0.9 %
10 SYRINGE (ML) INJECTION AS NEEDED
Status: DISCONTINUED | OUTPATIENT
Start: 2017-06-15 | End: 2017-06-17 | Stop reason: HOSPADM

## 2017-06-15 RX ADMIN — PROCHLORPERAZINE EDISYLATE 5 MG: 5 INJECTION INTRAMUSCULAR; INTRAVENOUS at 11:51

## 2017-06-15 RX ADMIN — Medication 20 ML: at 11:52

## 2017-06-15 RX ADMIN — HYDROMORPHONE HYDROCHLORIDE 2 MG: 2 INJECTION, SOLUTION INTRAMUSCULAR; INTRAVENOUS; SUBCUTANEOUS at 08:44

## 2017-06-15 RX ADMIN — Medication 10 ML: at 08:44

## 2017-06-15 RX ADMIN — Medication 10 ML: at 11:41

## 2017-06-15 RX ADMIN — HYDROMORPHONE HYDROCHLORIDE 2 MG: 2 INJECTION, SOLUTION INTRAMUSCULAR; INTRAVENOUS; SUBCUTANEOUS at 01:03

## 2017-06-15 RX ADMIN — TEMAZEPAM 30 MG: 15 CAPSULE ORAL at 23:38

## 2017-06-15 RX ADMIN — Medication 10 ML: at 05:06

## 2017-06-15 RX ADMIN — HYDROMORPHONE HYDROCHLORIDE 2 MG: 2 INJECTION, SOLUTION INTRAMUSCULAR; INTRAVENOUS; SUBCUTANEOUS at 15:29

## 2017-06-15 RX ADMIN — HYDROMORPHONE HYDROCHLORIDE 2 MG: 2 INJECTION, SOLUTION INTRAMUSCULAR; INTRAVENOUS; SUBCUTANEOUS at 18:40

## 2017-06-15 RX ADMIN — RETINOL, ERGOCALCIFEROL, .ALPHA.-TOCOPHEROL ACETATE, DL-, PHYTONADIONE, ASCORBIC ACID, NIACINAMIDE, RIBOFLAVIN 5-PHOSPHATE SODIUM, THIAMINE HYDROCHLORIDE, PYRIDOXINE HYDROCHLORIDE, DEXPANTHENOL, BIOTIN, FOLIC ACID, AND CYANOCOBALAMIN: KIT at 19:24

## 2017-06-15 RX ADMIN — HYDROMORPHONE HYDROCHLORIDE 2 MG: 2 INJECTION, SOLUTION INTRAMUSCULAR; INTRAVENOUS; SUBCUTANEOUS at 11:51

## 2017-06-15 RX ADMIN — SODIUM CHLORIDE AND POTASSIUM CHLORIDE: 9; 2.98 INJECTION, SOLUTION INTRAVENOUS at 11:40

## 2017-06-15 RX ADMIN — HYDROMORPHONE HYDROCHLORIDE 2 MG: 2 INJECTION, SOLUTION INTRAMUSCULAR; INTRAVENOUS; SUBCUTANEOUS at 22:00

## 2017-06-15 RX ADMIN — HYDROMORPHONE HYDROCHLORIDE 2 MG: 2 INJECTION, SOLUTION INTRAMUSCULAR; INTRAVENOUS; SUBCUTANEOUS at 05:06

## 2017-06-15 RX ADMIN — Medication 10 ML: at 15:30

## 2017-06-15 RX ADMIN — SODIUM CHLORIDE AND POTASSIUM CHLORIDE: 9; 2.98 INJECTION, SOLUTION INTRAVENOUS at 05:22

## 2017-06-15 NOTE — ROUTINE PROCESS
Patient with over 1000 cc output from NG tube overnight, minimal nausea and pain well controlled on prn medications. Bedside shift change report given to [address] [place] [address] [address] (oncoming nurse) by Elaine Welch RN (offgoing nurse). Report included the following information SBAR, Kardex, Intake/Output, MAR, Accordion and Recent Results.

## 2017-06-15 NOTE — ROUTINE PROCESS
Bedside and Verbal shift change report given to Dimitry Zamora (oncoming nurse) by Snehal Viera (offgoing nurse). Report included the following information SBAR, Kardex, Intake/Output, MAR, Accordion and Recent Results. All opportunity for clarification/questions given.

## 2017-06-15 NOTE — PROCEDURES
PICC Placement Note. Order received. PICC procedure, risks, benefits and complications reviewed with the patient. His has had PICC procedure in the past and offers no questions. Florentino Ram signed informed consent for bedside placement of PICC line. PRE-PROCEDURE VERIFICATION  Correct Procedure: yes  Correct Site:  yes  Temperature: Temp: 98.1 °F (36.7 °C), Temperature Source: Temp Source: Oral  Recent Labs      06/15/17   0409  06/14/17   0507   BUN  5*  6   CREA  0.55*  0.53*   PLT   --   184   WBC   --   7.3     Allergies: Remicade [infliximab]; Bactrim [sulfamethoprim ds]; and Nsaids (non-steroidal anti-inflammatory drug)  Education materials, including PICC Booklet, for PICC Care given to patient: yes. See Patient Education activity for further details. PROCEDURE DETAIL  A double lumen PICC line was started for TPN. The following documentation is in addition to the PICC properties in the lines/airways flowsheet : The vein was accessed with a 20g IV catheter using ultrasound guidance and a guidewire was easily thread. Modified Seldinger Technique was used to thread the PICC and the tip direction was determined with a PICC tip  device  Lot #: [id number]  Was xylocaine 1% used intradermally:  yes  Catheter Length: 43 (cm) at the \"0\" loc  Vein Selection for PICC:left basilic  Central Line Bundle followed yes  Complication Related to Insertion: none    The placement was verified by ECG technology:  Waveform placed on the patient's paper chart to document that the tip is in superior vena cava. Report to [place], [employer] is functioning and ready for immediate use.

## 2017-06-15 NOTE — PROGRESS NOTES
General Daily Progress Note    Admission Date: 6/12/2017  Hospital Day 4  Post-Op Day Not Applicable  Subjective:   Pain somewhat less. Objective:   Patient Vitals for the past 24 hrs:   BP Temp Pulse Resp SpO2   06/15/17 0955 127/78 98.4 °F (36.9 °C) (!) 108 16 97 %   06/15/17 0117 120/73 98.1 °F (36.7 °C) (!) 104 16 95 %     06/15 0701 - 06/15 1900  In: -   Out: 750 [Urine:600]  06/13 1901 - 06/15 0700  In: 1100 [P.O.:100; I.V.:1000]  Out: 7310 [Urine:2150]       PHYSICAL EXAMINATION:   GENERAL:  No distress. HEENT: NG tube draining bilious fluid. ABDOMEN:  Tenderness similar to yesterday. No rebound tenderness or involuntary guarding. NEURO: Alert and oriented. Data Review   Recent Results (from the past 24 hour(s))   METABOLIC PANEL, BASIC    Collection Time: 06/15/17  4:09 AM   Result Value Ref Range    Sodium 142 136 - 145 mmol/L    Potassium 3.8 3.5 - 5.1 mmol/L    Chloride 104 97 - 108 mmol/L    CO2 25 21 - 32 mmol/L    Anion gap 13 5 - 15 mmol/L    Glucose 71 65 - 100 mg/dL    BUN 5 (L) 6 - 20 MG/DL    Creatinine 0.55 (L) 0.70 - 1.30 MG/DL    BUN/Creatinine ratio 9 (L) 12 - 20      GFR est AA >60 >60 ml/min/1.73m2    GFR est non-AA >60 >60 ml/min/1.73m2    Calcium 8.9 8.5 - 10.1 MG/DL       KUB (6/15/2017): Non-obstructive bowel gas pattern. Assessment:     Principal Problem:    Small bowel obstruction ([address] (5/20/2017)    LUE PICC placed this morning. Hemodynamically stable. Radiographic evidence of improvement. I agree with the plan for NG tube clamping trial.        Plan:   NG tube clamping trial.    TPN.

## 2017-06-15 NOTE — PROGRESS NOTES
2006 Orders placed for pt to have PICC line insertion for administration of TPN; per IV team they spoke with the MD and will place the PICC line in the AM. Pt noted to have orders for PPN and Lipids this evening, however pt is a very difficult stick and only IV access is a 24G in pt's hand.  Spoke with on call Kyle Gentile NP regarding pt's limited IV access and per NP will hold scheduled evening dose of PPN and Lipids as pt will have PICC line placed in the AM.

## 2017-06-16 LAB
GLUCOSE BLD STRIP.AUTO-MCNC: 120 MG/DL (ref 65–100)
GLUCOSE BLD STRIP.AUTO-MCNC: 92 MG/DL (ref 65–100)
GLUCOSE BLD STRIP.AUTO-MCNC: 97 MG/DL (ref 65–100)
SERVICE CMNT-IMP: ABNORMAL
SERVICE CMNT-IMP: NORMAL
SERVICE CMNT-IMP: NORMAL

## 2017-06-16 PROCEDURE — 82962 GLUCOSE BLOOD TEST: CPT

## 2017-06-16 PROCEDURE — 65270000029 HC RM PRIVATE

## 2017-06-16 PROCEDURE — 74011000258 HC RX REV CODE- 258: Performed by: HOSPITALIST

## 2017-06-16 PROCEDURE — 74011250637 HC RX REV CODE- 250/637: Performed by: INTERNAL MEDICINE

## 2017-06-16 PROCEDURE — 74011000250 HC RX REV CODE- 250: Performed by: HOSPITALIST

## 2017-06-16 PROCEDURE — 74011250636 HC RX REV CODE- 250/636: Performed by: INTERNAL MEDICINE

## 2017-06-16 PROCEDURE — 74011000250 HC RX REV CODE- 250: Performed by: INTERNAL MEDICINE

## 2017-06-16 RX ORDER — HYDROMORPHONE HYDROCHLORIDE 1 MG/ML
2 INJECTION, SOLUTION INTRAMUSCULAR; INTRAVENOUS; SUBCUTANEOUS
Status: DISCONTINUED | OUTPATIENT
Start: 2017-06-16 | End: 2017-06-17

## 2017-06-16 RX ADMIN — HYDROMORPHONE HYDROCHLORIDE 2 MG: 2 INJECTION, SOLUTION INTRAMUSCULAR; INTRAVENOUS; SUBCUTANEOUS at 07:29

## 2017-06-16 RX ADMIN — Medication 10 ML: at 06:36

## 2017-06-16 RX ADMIN — HYDROMORPHONE HYDROCHLORIDE 2 MG: 2 INJECTION, SOLUTION INTRAMUSCULAR; INTRAVENOUS; SUBCUTANEOUS at 10:56

## 2017-06-16 RX ADMIN — HYDROMORPHONE HYDROCHLORIDE 2 MG: 1 INJECTION, SOLUTION INTRAMUSCULAR; INTRAVENOUS; SUBCUTANEOUS at 17:27

## 2017-06-16 RX ADMIN — I.V. FAT EMULSION 500 ML: 20 EMULSION INTRAVENOUS at 19:20

## 2017-06-16 RX ADMIN — HYDROMORPHONE HYDROCHLORIDE 2 MG: 2 INJECTION, SOLUTION INTRAMUSCULAR; INTRAVENOUS; SUBCUTANEOUS at 01:16

## 2017-06-16 RX ADMIN — Medication 10 ML: at 10:56

## 2017-06-16 RX ADMIN — TEMAZEPAM 30 MG: 15 CAPSULE ORAL at 23:14

## 2017-06-16 RX ADMIN — HYDROMORPHONE HYDROCHLORIDE 2 MG: 2 INJECTION, SOLUTION INTRAMUSCULAR; INTRAVENOUS; SUBCUTANEOUS at 04:22

## 2017-06-16 RX ADMIN — HYDROMORPHONE HYDROCHLORIDE 2 MG: 1 INJECTION, SOLUTION INTRAMUSCULAR; INTRAVENOUS; SUBCUTANEOUS at 23:07

## 2017-06-16 RX ADMIN — HYDROMORPHONE HYDROCHLORIDE 2 MG: 1 INJECTION, SOLUTION INTRAMUSCULAR; INTRAVENOUS; SUBCUTANEOUS at 20:03

## 2017-06-16 RX ADMIN — Medication 10 ML: at 20:03

## 2017-06-16 RX ADMIN — HYDROMORPHONE HYDROCHLORIDE 2 MG: 2 INJECTION, SOLUTION INTRAMUSCULAR; INTRAVENOUS; SUBCUTANEOUS at 14:09

## 2017-06-16 RX ADMIN — RETINOL, ERGOCALCIFEROL, .ALPHA.-TOCOPHEROL ACETATE, DL-, PHYTONADIONE, ASCORBIC ACID, NIACINAMIDE, RIBOFLAVIN 5-PHOSPHATE SODIUM, THIAMINE HYDROCHLORIDE, PYRIDOXINE HYDROCHLORIDE, DEXPANTHENOL, BIOTIN, FOLIC ACID, AND CYANOCOBALAMIN: KIT at 19:19

## 2017-06-16 RX ADMIN — PROCHLORPERAZINE EDISYLATE 5 MG: 5 INJECTION INTRAMUSCULAR; INTRAVENOUS at 14:15

## 2017-06-16 NOTE — PROGRESS NOTES
General Daily Progress Note    Admission Date: 6/12/2017  Hospital Day 5  Post-Op Day Not Applicable  Subjective:   No emesis since removal of the NG tube last night. Tolerating clear liquids so far. Objective:   Patient Vitals for the past 24 hrs:   BP Temp Pulse Resp SpO2 Weight   06/16/17 0834 118/85 98.4 °F (36.9 °C) (!) 110 18 95 % -   06/16/17 0557 - - - - - 92.4 kg (203 lb 12.8 oz)   06/16/17 0419 116/81 98.3 °F (36.8 °C) 100 18 96 % -   06/16/17 0030 107/68 99.2 °F (37.3 °C) (!) 105 18 95 % -   06/15/17 2104 127/79 98.5 °F (36.9 °C) (!) 105 18 95 % -        06/14 1901 - 06/16 0700  In: 950 [P.O.:300; I.V.:650]  Out: 4641 [Urine:2400]       PHYSICAL EXAMINATION:   GENERAL: No distress. ABDOMEN:  Abdomen softer. Tenderness decreased compared to yesterday. No rebound tenderness or involuntary guarding. NEURO: Alert and oriented. Data Review   Recent Results (from the past 24 hour(s))   GLUCOSE, POC    Collection Time: 06/16/17 11:19 AM   Result Value Ref Range    Glucose (POC) 97 65 - 100 mg/dL    Performed by Teresa Gaviria            Assessment and Plan:     Principal Problem:    Small bowel obstruction ([address] (5/20/2017)      LUE PICC placed on 6/15/2017 and TPN begun.     Hemodynamically stable. I agree with the plan to continue the TPN and to advance the diet as tolerated. It would probably be best to continue with clear liquids today and then change the diet tomorrow if there has been no setback.

## 2017-06-16 NOTE — PROGRESS NOTES
NUTRITION brief    Recommendations:    1. Continue TPN at current rate until able to meet at least 60% needs orally   - add Ensure Enlive TID once on full liquids  2. Continue to monitor K+ and replete PRN  3. Daily weights on standing scale        Diet: clear liquids  Supplements: none  TPN: 5%AA, D20 @ 83ml/hr + 500ml, 20% lipids 3x/week  Nutritionally Significant Medications: [x] Reviewed & Includes: PRN: zofran q6hr, compazine q4hr    Chart reviewed for TPN check. Passed clamping trial with NGT removed yesterday. Higher ileostomy output noted yesterday. Spoke with pt who reports ostomy output now back to usually volume and consistency. Date Ileostomy NGT   6/15-16 1031ml removed   6/14-15 110ml 2950ml   6/13-14 1100ml 1950ml   + 2 emesis   6/12-13 1425ml 840ml   Hypokalemia improved after repletion. Will need to continue to monitor and adjust based on GI losses. Happy to see TPN at goal rate to meet 100% energy and protein needs. Recommend continuing current TPN since pt still remains only on clear liquids. Pt visited today, happy to have NGT out and just about to start on first clear liquid tray. Will add Ensure Clear TID for now (600kcal, 21g protein). Once on full liquids please add Ensure Enlive TID (1050kcal, 60g protein). Plan to continue to follow for diet advancement/tolerance and TPN. Estimated Nutrition Needs:   Kcals/day: 3925 Kcals/day (2210-2395kcal)  Protein: 100 g (100-109g (1.1-1.2g/kg))  Fluid: 2730 ml (30ml/kg)  Based On:  [place] (x 1.2-1.3)  Weight Used: UBW (90.9kg (200#))    Xu Noriega RD

## 2017-06-16 NOTE — PROGRESS NOTES
Bedside shift change report given to [facility] KIMBERLY (oncoming nurse) by Jarred Hooks. ROSALBA Fay (offgoing nurse). Report included the following information SBAR.

## 2017-06-16 NOTE — PROGRESS NOTES
Pepe Valenzuela MD   Phone/text: [phone] (7am to 7pm)  After 7pm please call  for hospitalist on call    Hospitalist Progress Note     6/16/2017   PCP:  Dr. Neal Robles MD  Chief complaint: abdominal pain, nausea    Admission Summary:   Jordy Cobian is a 40 y.o. 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 Reason For Visit:  SBO    Assessment/Plan   Small bowel obstruction (POA) - doing well on clears with NG out  - CT abdomen/pelvis 6/11 with dilated fluid/stool filled distal small bowel loops concerning  for obstruction  - Barium enema 6/12 with contrast reflux into nondilated small bowel but not into dilated ileum, suggesting obstruction  - AXR 6/15 without evidence of bowel obstruction  - Continue TPN for nutrition  - Clear liquid diet, advance as tolerated  - Dr. Aleksandra Collins consulted    Nausea/ vomiting (POA) - due to SBO and chronic nausea, improved  - Zofran prn    Abdominal pain, acute on chronic - stable  - Continued pain though noted previous plans to wean narcotics given recurrent SBO, will discuss after acute episode is resolved  - Monitor    Right hydronephrosis - seen on barium enema but not on CT scan, renal function normal. Asymptomatic. Likely false positive. Monitor    Crohn's disease (chronic) - plan as above    Anemia (chronic) - monitor HH    GERD (chronic) - IV PPI    See orders for other plans. VTE prophylaxis: SCDs  Discussed plan of care with Patient/Family and Nurse   Pre-admission lived at home  Discharge plan: home  Estimated time to discharge: unclear     Subjective   Mr. Basim Ghosh is doing well.  Still with some abdominal pain but able to get up and walk around. Nausea improved. Tolerating clear liquid diet. Reviewed interval history  Physical examination     Visit Vitals    /85 (BP 1 Location: Right arm, BP Patient Position: At rest)    Pulse (!) 110    Temp 98.4 °F (36.9 °C)    Resp 18    Ht 6' (1.829 m)    Wt 92.4 kg (203 lb 12.8 oz)    SpO2 95%    BMI 27.64 kg/m2       Temp (24hrs), Av.6 °F (37 °C), Min:98.3 °F (36.8 °C), Max:99.2 °F (37.3 °C)      Intake/Output Summary (Last 24 hours) at 17 1404  Last data filed at 17 0426   Gross per 24 hour   Intake              950 ml   Output             2581 ml   Net            -1631 ml       Gen - NAD  HEENT - MMM, NG tube in place  Neck - supple, full ROM  CV - RRR, no MRG  Resp - lungs CTA b/l, no wheezing, normal WOB  GI - abdomen S, NT, ND, +BS. Ostomy   - no CVA tenderness, bladder non-palpable in lower abdomen  MSK - normal tone and bulk, no edema  Neuro - A&O, no focal deficits  Psych - calm and cooperative with normal affect    Data Review       Telemetry    ECG    Xray    CT scan    MRI    Echocardiogram    Ultrasound              I have reviewed the flow sheet and recent notes  New labs and data personally reviewed.     Recent Labs      17   0507   WBC  7.3   HGB  10.2*   HCT  32.6*   PLT  184     Recent Labs      06/15/17   0409  17   0507   NA  142  140   K  3.8  3.1*   CL  104  102   CO2  25  27   GLU  71  78   BUN  5*  6   CREA  0.55*  0.53*   CA  8.9  8.7       Medications reviewed  Current Facility-Administered Medications   Medication Dose Route Frequency    TPN ADULT - CENTRAL AA 5% D20% W/ ELECTROLYTES AND CA   IntraVENous CONTINUOUS    sodium chloride (NS) flush 20 mL  20 mL InterCATHeter PRN    sodium chloride (NS) flush 10 mL  10 mL InterCATHeter Q24H    sodium chloride (NS) flush 10 mL  10 mL InterCATHeter PRN    sodium chloride (NS) flush 10 mL  10 mL InterCATHeter Q8H    alteplase (CATHFLO) 1 mg in sterile water (preservative free) 1 mL injection  1 mg InterCATHeter PRN    bacitracin 500 unit/gram packet 1 Packet  1 Packet Topical PRN    TPN ADULT - CENTRAL AA 5% D20% W/ ELECTROLYTES AND CA   IntraVENous CONTINUOUS    fat emulsion 20% (LIPOSYN, INTRALIPID) infusion 500 mL  500 mL IntraVENous Q MON, WED & FRI    sodium chloride (NS) flush 5-10 mL  5-10 mL IntraVENous Q8H    sodium chloride (NS) flush 5-10 mL  5-10 mL IntraVENous PRN    ondansetron (ZOFRAN) injection 4 mg  4 mg IntraVENous Q6H PRN    HYDROmorphone (PF) (DILAUDID) injection 2 mg  2 mg IntraVENous Q3H PRN    prochlorperazine (COMPAZINE) with saline injection 5 mg  5 mg IntraVENous Q4H PRN    temazepam (RESTORIL) capsule 30 mg  30 mg Oral QHS PRN         Ban Espinosa MD  Internal Medicine  6/16/2017

## 2017-06-17 VITALS
BODY MASS INDEX: 27.43 KG/M2 | DIASTOLIC BLOOD PRESSURE: 68 MMHG | SYSTOLIC BLOOD PRESSURE: 119 MMHG | WEIGHT: 202.5 LBS | HEART RATE: 101 BPM | HEIGHT: 72 IN | RESPIRATION RATE: 18 BRPM | TEMPERATURE: 98 F | OXYGEN SATURATION: 100 %

## 2017-06-17 LAB
ANION GAP BLD CALC-SCNC: 8 MMOL/L (ref 5–15)
BUN SERPL-MCNC: 4 MG/DL (ref 6–20)
BUN/CREAT SERPL: 7 (ref 12–20)
CALCIUM SERPL-MCNC: 8.7 MG/DL (ref 8.5–10.1)
CHLORIDE SERPL-SCNC: 102 MMOL/L (ref 97–108)
CO2 SERPL-SCNC: 30 MMOL/L (ref 21–32)
CREAT SERPL-MCNC: 0.56 MG/DL (ref 0.7–1.3)
GLUCOSE BLD STRIP.AUTO-MCNC: 100 MG/DL (ref 65–100)
GLUCOSE BLD STRIP.AUTO-MCNC: 137 MG/DL (ref 65–100)
GLUCOSE SERPL-MCNC: 129 MG/DL (ref 65–100)
MAGNESIUM SERPL-MCNC: 1.6 MG/DL (ref 1.6–2.4)
PHOSPHATE SERPL-MCNC: 4.6 MG/DL (ref 2.6–4.7)
POTASSIUM SERPL-SCNC: 3.1 MMOL/L (ref 3.5–5.1)
SERVICE CMNT-IMP: ABNORMAL
SERVICE CMNT-IMP: NORMAL
SODIUM SERPL-SCNC: 140 MMOL/L (ref 136–145)

## 2017-06-17 PROCEDURE — 83735 ASSAY OF MAGNESIUM: CPT | Performed by: HOSPITALIST

## 2017-06-17 PROCEDURE — 74011250636 HC RX REV CODE- 250/636: Performed by: HOSPITALIST

## 2017-06-17 PROCEDURE — 74011250636 HC RX REV CODE- 250/636: Performed by: INTERNAL MEDICINE

## 2017-06-17 PROCEDURE — 84100 ASSAY OF PHOSPHORUS: CPT | Performed by: HOSPITALIST

## 2017-06-17 PROCEDURE — 82962 GLUCOSE BLOOD TEST: CPT

## 2017-06-17 PROCEDURE — 74011250637 HC RX REV CODE- 250/637: Performed by: HOSPITALIST

## 2017-06-17 PROCEDURE — 80048 BASIC METABOLIC PNL TOTAL CA: CPT | Performed by: HOSPITALIST

## 2017-06-17 PROCEDURE — 36415 COLL VENOUS BLD VENIPUNCTURE: CPT | Performed by: HOSPITALIST

## 2017-06-17 RX ORDER — POTASSIUM CHLORIDE 750 MG/1
40 TABLET, FILM COATED, EXTENDED RELEASE ORAL
Status: COMPLETED | OUTPATIENT
Start: 2017-06-17 | End: 2017-06-17

## 2017-06-17 RX ORDER — LANOLIN ALCOHOL/MO/W.PET/CERES
400 CREAM (GRAM) TOPICAL 2 TIMES DAILY
Status: DISCONTINUED | OUTPATIENT
Start: 2017-06-17 | End: 2017-06-17 | Stop reason: HOSPADM

## 2017-06-17 RX ORDER — OXYCODONE AND ACETAMINOPHEN 5; 325 MG/1; MG/1
TABLET ORAL
Qty: 90 TAB | Refills: 0 | Status: SHIPPED | OUTPATIENT
Start: 2017-06-17 | End: 2018-01-19

## 2017-06-17 RX ORDER — HYDROMORPHONE HYDROCHLORIDE 1 MG/ML
1 INJECTION, SOLUTION INTRAMUSCULAR; INTRAVENOUS; SUBCUTANEOUS
Status: DISCONTINUED | OUTPATIENT
Start: 2017-06-17 | End: 2017-06-17 | Stop reason: HOSPADM

## 2017-06-17 RX ORDER — MAGNESIUM SULFATE HEPTAHYDRATE 40 MG/ML
2 INJECTION, SOLUTION INTRAVENOUS ONCE
Status: COMPLETED | OUTPATIENT
Start: 2017-06-17 | End: 2017-06-17

## 2017-06-17 RX ADMIN — Medication 10 ML: at 09:11

## 2017-06-17 RX ADMIN — POTASSIUM CHLORIDE 40 MEQ: 750 TABLET, FILM COATED, EXTENDED RELEASE ORAL at 09:11

## 2017-06-17 RX ADMIN — HYDROMORPHONE HYDROCHLORIDE 1 MG: 1 INJECTION, SOLUTION INTRAMUSCULAR; INTRAVENOUS; SUBCUTANEOUS at 09:24

## 2017-06-17 RX ADMIN — MAGNESIUM SULFATE IN WATER 2 G: 40 INJECTION, SOLUTION INTRAVENOUS at 09:11

## 2017-06-17 RX ADMIN — HYDROMORPHONE HYDROCHLORIDE 1 MG: 1 INJECTION, SOLUTION INTRAMUSCULAR; INTRAVENOUS; SUBCUTANEOUS at 12:49

## 2017-06-17 RX ADMIN — Medication 400 MG: at 09:11

## 2017-06-17 RX ADMIN — Medication 10 ML: at 05:47

## 2017-06-17 RX ADMIN — Medication 5 ML: at 06:00

## 2017-06-17 RX ADMIN — HYDROMORPHONE HYDROCHLORIDE 2 MG: 1 INJECTION, SOLUTION INTRAMUSCULAR; INTRAVENOUS; SUBCUTANEOUS at 02:11

## 2017-06-17 RX ADMIN — HYDROMORPHONE HYDROCHLORIDE 2 MG: 1 INJECTION, SOLUTION INTRAMUSCULAR; INTRAVENOUS; SUBCUTANEOUS at 05:46

## 2017-06-17 NOTE — PROGRESS NOTES
Bedside and Verbal shift change report given to Corinne, RN (oncoming nurse) by Radha Nguyen RN (offgoing nurse). Report included the following information SBAR, Kardex, Intake/Output, MAR, Accordion, Recent Results and Med Rec Status.

## 2017-06-17 NOTE — DISCHARGE INSTRUCTIONS
Please bring this form with you to show your primary care provider at your follow-up appointment. Primary care provider:  Dr. Francine Conte MD    Discharging provider:  Lindsey Cabrera MD    You have been admitted to the hospital with the following diagnoses:  · SMALL BOWEL OBSTRUCTION    FOLLOW-UP CARE RECOMMENDATIONS:    APPOINTMENTS:  Follow-up Information     Follow up With Details Comments Contact Info    Francine Conte MD Schedule an appointment as soon as possible for a visit in 1 week For follow up of narcotic taper [address] [facility] [address] [place] [facility]      Aleksandr Meng MD Schedule an appointment as soon as possible for a visit in 2 weeks For follow up of Crohn's disease [address] [facility]  [address] [facility]  [phone]           SYMPTOMS to watch for: fever, chills, nausea, vomiting. DIET/what to eat:  Please start with a soft, bland diet and plenty of fluids. As you are feeling better and better, you can advance back to your regular diet. ACTIVITY:  Activity as tolerated and No driving while on analgesics    What to do if new or unexpected symptoms occur? If you experience any of the above symptoms (or should other concerns or questions arise after discharge) please call your primary care physician. Return to the emergency room if you cannot get hold of your doctor. · It is very important that you keep your follow-up appointment(s). · Please bring discharge papers, medication list (and/or medication bottles) to your follow-up appointments for review by your outpatient provider(s). · Please check the list of medications and be sure it includes every medication (even non-prescription medications) that your provider wants you to take. · It is important that you take the medication exactly as they are prescribed.    · Keep your medication in the bottles provided by the pharmacist and keep a list of the medication names, dosages, and times to be taken in your wallet. · Do not take other medications without consulting your doctor. · If you have any questions about your medications or other instructions, please talk to your nurse or care provider before you leave the hospital.    I understand that if any problems occur once I am at home I am to contact my physician. These instructions were explained to me and I had the opportunity to ask questions. DISCHARGE SUMMARY from Nurse    The following personal items are in your possession at time of discharge:    Dental Appliances: None  Visual Aid: None     Home Medications: None  Jewelry: Ring  Clothing: At bedside  Other Valuables: Cell Phone, Wallet             PATIENT INSTRUCTIONS:    After general anesthesia or intravenous sedation, for 24 hours or while taking prescription Narcotics:  · Limit your activities  · Do not drive and operate hazardous machinery  · Do not make important personal or business decisions  · Do  not drink alcoholic beverages  · If you have not urinated within 8 hours after discharge, please contact your surgeon on call. Report the following to your surgeon:  · Excessive pain, swelling, redness or odor of or around the surgical area  · Temperature over 100.5  · Nausea and vomiting lasting longer than 4 hours or if unable to take medications  · Any signs of decreased circulation or nerve impairment to extremity: change in color, persistent  numbness, tingling, coldness or increase pain  · Any questions        What to do at Home:  *  Please give a list of your current medications to your Primary Care Provider. *  Please update this list whenever your medications are discontinued, doses are      changed, or new medications (including over-the-counter products) are added. *  Please carry medication information at all times in case of emergency situations.           These are general instructions for a healthy lifestyle:    No smoking/ No tobacco products/ Avoid exposure to second hand smoke    Surgeon General's Warning:  Quitting smoking now greatly reduces serious risk to your health. Obesity, smoking, and sedentary lifestyle greatly increases your risk for illness    A healthy diet, regular physical exercise & weight monitoring are important for maintaining a healthy lifestyle    You may be retaining fluid if you have a history of heart failure or if you experience any of the following symptoms:  Weight gain of 3 pounds or more overnight or 5 pounds in a week, increased swelling in our hands or feet or shortness of breath while lying flat in bed. Please call your doctor as soon as you notice any of these symptoms; do not wait until your next office visit. Recognize signs and symptoms of STROKE:    F-face looks uneven    A-arms unable to move or move unevenly    S-speech slurred or non-existent    T-time-call 911 as soon as signs and symptoms begin-DO NOT go       Back to bed or wait to see if you get better-TIME IS BRAIN. Warning Signs of HEART ATTACK     Call 911 if you have these symptoms:   Chest discomfort. Most heart attacks involve discomfort in the center of the chest that lasts more than a few minutes, or that goes away and comes back. It can feel like uncomfortable pressure, squeezing, fullness, or pain.  Discomfort in other areas of the upper body. Symptoms can include pain or discomfort in one or both arms, the back, neck, jaw, or stomach.  Shortness of breath with or without chest discomfort.  Other signs may include breaking out in a cold sweat, nausea, or lightheadedness. Don't wait more than five minutes to call 911 - MINUTES MATTER! Fast action can save your life. Calling 911 is almost always the fastest way to get lifesaving treatment. Emergency Medical Services staff can begin treatment when they arrive -- up to an hour sooner than if someone gets to the hospital by car.        The discharge information has been reviewed with the patient. The patient verbalized understanding. Discharge medications reviewed with the patient and appropriate educational materials and side effects teaching were provided. [employer] Activation    Thank you for requesting access to [employer]. Please follow the instructions below to securely access and download your online medical record. [employer] allows you to send messages to your doctor, view your test results, renew your prescriptions, schedule appointments, and more. How Do I Sign Up? 1. In your internet browser, go to [email or web address]  2. Click on the First Time User? Click Here link in the Sign In box. You will be redirect to the New Member Sign Up page. 3. Enter your [employer] Access Code exactly as it appears below. You will not need to use this code after youve completed the sign-up process. If you do not sign up before the expiration date, you must request a new code. [employer] Access Code: Activation code not generated  Current [employer] Status: Active (This is the date your [employer] access code will )    4. Enter the last four digits of your Social Security Number (xxxx) and Date of Birth (mm/dd/yyyy) as indicated and click Submit. You will be taken to the next sign-up page. 5. Create a [employer] ID. This will be your [employer] login ID and cannot be changed, so think of one that is secure and easy to remember. 6. Create a [employer] password. You can change your password at any time. 7. Enter your Password Reset Question and Answer. This can be used at a later time if you forget your password. 8. Enter your e-mail address. You will receive e-mail notification when new information is available in [address] 9. Click Sign Up. You can now view and download portions of your medical record. 10. Click the Download Summary menu link to download a portable copy of your medical information.     Additional Information    If you have questions, please visit the Frequently Asked Questions section of the [employer] website at [email or web address] Remember, [employer] is NOT to be used for urgent needs. For medical emergencies, dial 911.

## 2017-06-17 NOTE — DISCHARGE SUMMARY
Discharge Summary     Patient:  Hi Parekh       MRN: [id number]       YOB: 1975       Age: 40 y.o. Date of admission:  6/12/2017    Date of discharge:  6/17/2017    Primary care provider: Dr. Eladio Galindo MD    Admitting provider:  Eze Dodd MD    Discharging provider:  Emiliano Pereira 91.: [phone]. If unavailable, call [phone] and ask the  to page the triage hospitalist.    Consultations  · IP CONSULT TO COLORECTAL SURGERY    Procedures  · Procedure(s):  · EXPLORATORY LAPAROTOMY,  LYSIS OF ADHESION, POSSIBLE SMALL BOWEL RESECTION  ([place])    Discharge destination: Home. The patient is stable for discharge. Admission diagnosis  · SBO  · Small bowel obstruction ([address]  · SMALL BOWEL OBSTRUCTION    Current Discharge Medication List      CONTINUE these medications which have CHANGED    Details   oxyCODONE-acetaminophen (PERCOCET) 5-325 mg per tablet Take by mouth per taper: 2 tablets every 4 hours for 5 days, then 1 tablet every 4 hours for 5 days. After that, please see Dr. Amy Adair to continue your taper. (Do NOT take additional acetaminophen/Tyelonol while taking Percocet)  Qty: 90 Tab, Refills: 0         CONTINUE these medications which have NOT CHANGED    Details   acetaminophen (TYLENOL) 500 mg tablet Take 500 mg by mouth four (4) times daily as needed (mild pain). prochlorperazine (COMPAZINE) 10 mg tablet Take 10 mg by mouth two (2) times daily as needed for Nausea. traMADol (ULTRAM) 50 mg tablet Take 50 mg by mouth three (3) times daily as needed (moderate pain). Ustekinumab (STELARA) 90 mg/mL injection 90 mg by SubCUTAneous route. Every 8 weeks       promethazine (PHENERGAN) 25 mg tablet Take 25 mg by mouth every six (6) hours as needed for Nausea.       ondansetron hcl (ZOFRAN, AS HYDROCHLORIDE,) 8 mg tablet Take 8 mg by mouth every twelve (12) hours as needed for Nausea. amitriptyline (ELAVIL) 10 mg tablet Take 30 mg by mouth nightly. buPROPion SR (WELLBUTRIN SR) 150 mg SR tablet Take 150 mg by mouth two (2) times a day. omeprazole (PRILOSEC) 40 mg capsule Take 40 mg by mouth Daily (before dinner). dicyclomine (BENTYL) 10 mg capsule Take 10 mg by mouth every six (6) hours as needed. ergocalciferol (ERGOCALCIFEROL) 50,000 unit capsule Take 50,000 Units by mouth every Monday. pregabalin (LYRICA) 100 mg capsule Take 100 mg by mouth three (3) times daily. cyanocobalamin (VITAMIN B-12) 1,000 mcg/mL injection 1,000 mcg by IntraMUSCular route every Sunday. Indications: Once weekly      temazepam (RESTORIL) 30 mg capsule Take 30 mg by mouth nightly. diazepam (VALIUM) 5 mg tablet Take 5 mg by mouth every eight (8) hours as needed (bowel relaxation). Follow-up Information     Follow up With Details Comments [address], [place] Schedule an appointment as soon as possible for a visit in 1 week For follow up of narcotic taper [address] [facility] [address] [facility]      Suzanne Gray MD Schedule an appointment as soon as possible for a visit in 2 weeks For follow up of Crohn's disease [address] [place] [address]  [address] [facility] [address]  [phone]            Final discharge diagnoses and brief hospital course  Please also refer to the admission H&P for details on the presenting problem. Kwesi Dyer is a 40 y.o. 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) with reported abdominal pain, nausea, vomiting and diagnosis of SBO (small bowel obstruction).     Small bowel obstruction (POA) - doing well on GI lite diet  - CT abdomen/pelvis 6/11 with dilated fluid/stool filled distal small bowel loops concerning  for obstruction  - Barium enema 6/12 with contrast reflux into nondilated small bowel but not into dilated ileum, suggesting obstruction  - AXR 6/15 without evidence of bowel obstruction  - Received TPN for nutrition  - Advance diet as tolerated  - Dr. Jose Vila consulted     Nausea/ vomiting (POA) - due to SBO and chronic nausea, improved  - Zofran prn     Abdominal pain, acute on chronic - stable  - Discussed previous plans to wean narcotics given recurrent SBO with Mr Alessandro Nicolas, Dr. Jose Vila and Dr. Vito Smalls.  - I have prescribed the beginning of a taper of percocet and Mr. Alessandro Nicolas will follow up with Dr. Vito Smalls to continue this until he is off narcotics.     Right hydronephrosis - seen on barium enema but not on CT scan, renal function normal. Asymptomatic. Likely false positive. Monitor     Crohn's disease (chronic) - plan as above     Anemia (chronic) - monitor HH     GERD (chronic) - PPI    FOLLOW-UP CARE RECOMMENDATIONS:     SYMPTOMS to watch for: fever, chills, nausea, vomiting. DIET/what to eat:  Please start with a soft, bland diet and plenty of fluids. As you are feeling better and better, you can advance back to your regular diet. ACTIVITY:  Activity as tolerated and No driving while on analgesics    What to do if new or unexpected symptoms occur? If you experience any of the above symptoms (or should other concerns or questions arise after discharge) please call your primary care physician. Return to the emergency room if you cannot get hold of your doctor. · It is very important that you keep your follow-up appointment(s). · Please bring discharge papers, medication list (and/or medication bottles) to your follow-up appointments for review by your outpatient provider(s).   · Please check the list of medications and be sure it includes every medication (even non-prescription medications) that your provider wants you to take. · It is important that you take the medication exactly as they are prescribed. · Keep your medication in the bottles provided by the pharmacist and keep a list of the medication names, dosages, and times to be taken in your wallet. · Do not take other medications without consulting your doctor. · If you have any questions about your medications or other instructions, please talk to your nurse or care provider before you leave the hospital.      Physical examination at discharge  Visit Vitals    /72 (BP 1 Location: Right arm, BP Patient Position: At rest)    Pulse (!) 106    Temp 98.7 °F (37.1 °C)    Resp 18    Ht 6' (1.829 m)    Wt 91.9 kg (202 lb 8 oz)    SpO2 98%    BMI 27.46 kg/m2       Gen - NAD  HEENT - MMM, NG tube in place  Neck - supple, full ROM  CV - RRR, no MRG  Resp - lungs CTA b/l, no wheezing, normal WOB  GI - abdomen S, NT, ND, +BS. Ostomy   - no CVA tenderness, bladder non-palpable in lower abdomen  MSK - normal tone and bulk, no edema  Neuro - A&O, no focal deficits  Psych - calm and cooperative with normal affect    Pertinent imaging studies:    CT abdomen/pelvis 6/11/17  FINDINGS:   LUNG BASES: Bibasilar atelectasis. LIVER: No mass or biliary dilatation. GALLBLADDER: Surgically absent. SPLEEN: No enlargement or lesion. PANCREAS: No mass or ductal dilatation. ADRENALS: No mass. KIDNEYS: No mass, calculus, or hydronephrosis. GI TRACT: Right lower quadrant ostomy site. Postsurgical change right lower  quadrant, with evidence of prior colectomy. Dilated, fluid/stool filled distal  small bowel loops  PERITONEUM: No free air or free fluid. APPENDIX: Surgically absent. RETROPERITONEUM: No lymphadenopathy or aortic aneurysm. ADDITIONAL COMMENTS: N/A.   URINARY BLADDER: Unremarkable. REPRODUCTIVE ORGANS: Unremarkable. LYMPH NODES: None enlarged. FREE FLUID: None.   BONES: No destructive bone lesion. ADDITIONAL COMMENTS: N/A. IMPRESSION:  Prior colectomy. Dilated fluid/stool filled distal small bowel loops concerning  for obstruction. Xray abdomen 6/12/17  FINDINGS:   AP portable semierect abdominal radiograph demonstrates contrast material in the  stomach and kidneys. Nasogastric tube tip overlies the gastric fundus. IMPRESSION:  Nasogastric tube tip over the gastric fundus. Xray barium enema 6/12/17  FINDINGS:   The preliminary radiograph of the abdomen shows NG tube overlying the stomach  with barium in the stomach. There is contrast in the dilated right upper  collecting system and right ureter. There is contrast within the urinary  bladder.   A red rubber catheter and then a Alexander catheter was inserted into the ileostomy  and Gastrografin was injected. Contrast was refluxed into nondilated ileum. Contrast could not be refluxed into the dilated loops of bowel. IMPRESSION:   1. Preliminary film demonstrates mild right hydroureteronephrosis with the  ureter dilated to the pelvic brim. 2. Water-soluble contrast was infused into the ileostomy with reflux into  nondilated small bowel. Contrast could not be refluxed into dilated ileum  suggesting obstruction. Xray abdomen 6/15/17  FINDINGS: A supine radiograph of the abdomen shows nonobstructive bowel gas  pattern. Tip of the NG tube overlies the gastric air shadow. There is an ostomy  in the right lower quadrant. . No soft tissue masses or pathologic calcifications  are identified. The bones and soft tissues are within normal limits. IMPRESSION: No evidence of bowel obstruction. No results for input(s): WBC, HGB, HCT, PLT, HGBEXT, HCTEXT, PLTEXT in the last 72 hours.   Recent Labs      06/17/17   0204  06/15/17   0409   NA  140  142   K  3.1*  3.8   CL  102  104   CO2  30  25   BUN  4*  5*   CREA  0.56*  0.55*   GLU  129*  71   CA  8.7  8.9   MG  1.6   --    PHOS  4.6   --      No results for input(s): SGOT, GPT, AP, TBIL, TP, ALB, GLOB, GGT, AML, LPSE in the last 72 hours. No lab exists for component: AMYP, HLPSE  No results for input(s): INR, PTP, APTT in the last 72 hours. No lab exists for component: INREXT   No results for input(s): FE, TIBC, PSAT, FERR in the last 72 hours. No results for input(s): PH, PCO2, PO2 in the last 72 hours. No results for input(s): CPK, CKMB in the last 72 hours. No lab exists for component: TROPONINI  No components found for: [place]    Chronic Diagnoses:    Problem List as of 6/17/2017  Date Reviewed: 6/12/2017          Codes Class Noted - Resolved    Hyperglycemia ICD-10-CM: R73.9  ICD-9-CM: 790.29  5/20/2017 - Present        * (Principal)Small bowel obstruction ([facility] ICD-10-CM: K56.69  ICD-9-CM: 560.9  5/20/2017 - Present        SBO (small bowel obstruction) ([facility] ICD-10-CM: K56.69  ICD-9-CM: 560.9  3/7/2017 - Present        Constipation ICD-10-CM: K59.00  ICD-9-CM: 564.00  1/15/2017 - Present        Intractable vomiting with nausea ICD-10-CM: R11.2  ICD-9-CM: 536.2  1/1/2017 - Present        Depression with anxiety (Chronic) ICD-10-CM: F41.8  ICD-9-CM: 300.4  12/25/2016 - Present        Anemia (Chronic) ICD-10-CM: D64.9  ICD-9-CM: 285.9  12/25/2016 - Present        S/P laparoscopic cholecystectomy (Chronic) ICD-10-CM: Z90.49  ICD-9-CM: V45.89  12/21/2016 - Present    Overview Signed 12/21/2016 12:56 PM by Alec Silva MD     With Critical access hospital.              Crohn's colitis ([facility] (Chronic) ICD-10-CM: K50.10  ICD-9-CM: 555.1  12/18/2016 - Present        Abdominal pain (Chronic) ICD-10-CM: R10.9  ICD-9-CM: 789.00  11/29/2016 - Present        GERD (gastroesophageal reflux disease) (Chronic) ICD-10-CM: K21.9  ICD-9-CM: 530.81  11/20/2016 - Present        RESOLVED: HCAP (healthcare-associated pneumonia) ICD-10-CM: J18.9  ICD-9-CM: 486  4/3/2017 - 5/20/2017        RESOLVED: SIRS (systemic inflammatory response syndrome) ([facility] ICD-10-CM: R65.10  ICD-9-CM: 995.90  4/3/2017 - 4/4/2017        RESOLVED: Darnell Zafar bowel obstruction ([facility] ICD-10-CM: K56.69  ICD-9-CM: 560.9  3/9/2017 - 5/20/2017        RESOLVED: Insomnia ICD-10-CM: G47.00  ICD-9-CM: 780.52  12/25/2016 - 1/1/2017        RESOLVED: Hypernatremia ICD-10-CM: E87.0  ICD-9-CM: 276.0  12/25/2016 - 1/1/2017        RESOLVED: Biliary dyskinesia ICD-10-CM: K82.8  ICD-9-CM: 575.8  12/21/2016 - 12/25/2016        RESOLVED: Gallbladder sludge ICD-10-CM: K82.8  ICD-9-CM: 575.8  12/20/2016 - 12/25/2016        RESOLVED: Hypokalemia ICD-10-CM: E87.6  ICD-9-CM: 276.8  12/18/2016 - 1/1/2017        RESOLVED: Leukocytosis ICD-10-CM: D72.829  ICD-9-CM: 288.60  12/18/2016 - 12/25/2016        RESOLVED: Crohn's disease ([facility] ICD-10-CM: K50.90  ICD-9-CM: 555.9  11/20/2016 - 12/25/2016        RESOLVED: Intractable abdominal pain ICD-10-CM: R10.9  ICD-9-CM: 789.00  11/20/2016 - 1/1/2017        RESOLVED: Intractable nausea and vomiting ICD-10-CM: R11.2  ICD-9-CM: 536.2  11/20/2016 - 12/25/2016        RESOLVED: Enterostomy malfunction ([facility] (Chronic) ICD-10-CM: K94.13  ICD-9-CM: 569.62  10/29/2015 - 11/21/2016        RESOLVED: Ileostomy dysfunction ([facility] ICD-10-CM: K94.13  ICD-9-CM: 569.62  10/29/2015 - 11/21/2016        RESOLVED: Skin lesion of back ICD-10-CM: L98.9  ICD-9-CM: 709.9  3/17/2014 - 11/21/2016        RESOLVED: Ileal pouchitis ([facility] ICD-10-CM: N94.473  ICD-9-CM: 569.71  5/1/2004 - 11/21/2016              Time spent on discharge related activities today greater than 30 minutes.       Signed:  Kerwin Garcia MD                 Hospitalist, Internal Medicine      Cc: Brigido Osman MD

## 2017-06-17 NOTE — PROGRESS NOTES
Left PICC line removed with pt lying flat in bed. Line removed without any difficulty using sterile technique. Bacitracin applied to site and manual pressure to site for several minutes with no bleeding noted. Sterile occlusive dressing applied and teaching done with pt and his wife. Pt aware to leave dressing in place for the next 3 days. Site without redness, swelling or drainage at time PICC line was removed.

## 2017-06-17 NOTE — PROGRESS NOTES
Bedside shift change report given to [facility] (oncoming nurse) by Temitope Francisco (offgoing nurse). Report included the following information SBAR, Kardex, Intake/Output, MAR and Recent Results.

## 2017-06-17 NOTE — PROGRESS NOTES
General Daily Progress Note    Admission Date: 6/12/2017  Hospital Day 6  Post-Op Day Not Applicable  Subjective: Tolerated solid food this morning (so far). Pain significantly decreased. Objective:   Patient Vitals for the past 24 hrs:   BP Temp Pulse Resp SpO2 Weight   06/17/17 0908 116/72 98.7 °F (37.1 °C) (!) 106 18 - -   06/17/17 0558 - - - - - 91.9 kg (202 lb 8 oz)   06/16/17 2242 115/79 98.2 °F (36.8 °C) 87 18 98 % -   06/16/17 1610 122/80 98.3 °F (36.8 °C) (!) 107 18 98 % -        06/15 1901 - 06/17 0700  In: 950 [P.O.:300; I.V.:650]  Out: 3506 [Urine:1850]       PHYSICAL EXAMINATION:   GENERAL: No distress. ABDOMEN: Abdomen softer. Tenderness mild. NEURO: Alert and oriented.             Data Review   Recent Results (from the past 24 hour(s))   GLUCOSE, POC    Collection Time: 06/16/17 11:19 AM   Result Value Ref Range    Glucose (POC) 97 65 - 100 mg/dL    Performed by Milagro Bucio    GLUCOSE, POC    Collection Time: 06/16/17  4:44 PM   Result Value Ref Range    Glucose (POC) 92 65 - 100 mg/dL    Performed by Danii Payne    GLUCOSE, POC    Collection Time: 06/16/17  9:40 PM   Result Value Ref Range    Glucose (POC) 120 (H) 65 - 100 mg/dL    Performed by [facility] SPENCER(CON)    METABOLIC PANEL, BASIC    Collection Time: 06/17/17  2:04 AM   Result Value Ref Range    Sodium 140 136 - 145 mmol/L    Potassium 3.1 (L) 3.5 - 5.1 mmol/L    Chloride 102 97 - 108 mmol/L    CO2 30 21 - 32 mmol/L    Anion gap 8 5 - 15 mmol/L    Glucose 129 (H) 65 - 100 mg/dL    BUN 4 (L) 6 - 20 MG/DL    Creatinine 0.56 (L) 0.70 - 1.30 MG/DL    BUN/Creatinine ratio 7 (L) 12 - 20      GFR est AA >60 >60 ml/min/1.73m2    GFR est non-AA >60 >60 ml/min/1.73m2    Calcium 8.7 8.5 - 10.1 MG/DL   MAGNESIUM    Collection Time: 06/17/17  2:04 AM   Result Value Ref Range    Magnesium 1.6 1.6 - 2.4 mg/dL   PHOSPHORUS    Collection Time: 06/17/17  2:04 AM   Result Value Ref Range    Phosphorus 4.6 2.6 - 4.7 MG/DL   GLUCOSE, POC Collection Time: 06/17/17  5:54 AM   Result Value Ref Range    Glucose (POC) 137 (H) 65 - 100 mg/dL    Performed by Jose Echevarria            Assessment and Plan:     Principal Problem:    Small bowel obstruction ([address] (5/20/2017)        LUE PICC placed on 6/15/2017 and TPN begun.      Hemodynamically stable. The obstruction appears to have resolved. I agree that the patient should probably be able to go home today. If so, he should resume the narcotic taper that he had already been on prior to admission. He should follow up with Dr. Maximiliano Amato for any pain management issues, and he may follow up with me on an as needed basis.

## 2017-06-18 NOTE — PROGRESS NOTES
Spiritual Care Partner Volunteer visited patient in [address] [place] [address] on 6/17/17. Documented by:  Dorothea Peralta M.Div.    Paging Service [phone] ([phone])

## 2017-06-26 ENCOUNTER — HOSPITAL ENCOUNTER (OUTPATIENT)
Dept: GENERAL RADIOLOGY | Age: 42
Discharge: HOME | End: 2017-06-26
Attending: COLON & RECTAL SURGERY
Payer: COMMERCIAL

## 2017-06-26 DIAGNOSIS — K56.609 SMALL BOWEL OBSTRUCTION (HCC): ICD-10-CM

## 2017-06-26 PROCEDURE — 74250 X-RAY XM SM INT 1CNTRST STD: CPT

## 2017-07-07 ENCOUNTER — APPOINTMENT (OUTPATIENT)
Dept: CT IMAGING | Age: 42
End: 2017-07-07
Attending: EMERGENCY MEDICINE
Payer: COMMERCIAL

## 2017-07-07 ENCOUNTER — HOSPITAL ENCOUNTER (EMERGENCY)
Age: 42
Discharge: STILL ADMITTED OR ADMINISTRATIVE | End: 2017-07-07
Attending: EMERGENCY MEDICINE
Payer: COMMERCIAL

## 2017-07-07 ENCOUNTER — HOSPITAL ENCOUNTER (INPATIENT)
Age: 42
LOS: 2 days | Discharge: HOME | DRG: 386 | End: 2017-07-09
Attending: INTERNAL MEDICINE | Admitting: HOSPITALIST
Payer: COMMERCIAL

## 2017-07-07 ENCOUNTER — HOSPITAL ENCOUNTER (EMERGENCY)
Age: 42
Discharge: ANOTHER HOSPITAL | End: 2017-07-07
Attending: EMERGENCY MEDICINE
Payer: COMMERCIAL

## 2017-07-07 VITALS
HEIGHT: 72 IN | SYSTOLIC BLOOD PRESSURE: 114 MMHG | OXYGEN SATURATION: 92 % | HEART RATE: 106 BPM | DIASTOLIC BLOOD PRESSURE: 77 MMHG | RESPIRATION RATE: 18 BRPM | BODY MASS INDEX: 28.44 KG/M2 | WEIGHT: 210 LBS | TEMPERATURE: 98.4 F

## 2017-07-07 DIAGNOSIS — K56.600 PARTIAL SMALL BOWEL OBSTRUCTION (HCC): Primary | ICD-10-CM

## 2017-07-07 LAB
ALBUMIN SERPL BCP-MCNC: 3.7 G/DL (ref 3.5–5)
ALBUMIN/GLOB SERPL: 0.9 {RATIO} (ref 1.1–2.2)
ALP SERPL-CCNC: 140 U/L (ref 45–117)
ALT SERPL-CCNC: 23 U/L (ref 12–78)
ANION GAP BLD CALC-SCNC: 8 MMOL/L (ref 5–15)
APPEARANCE UR: CLEAR
AST SERPL W P-5'-P-CCNC: 19 U/L (ref 15–37)
BACTERIA URNS QL MICRO: NEGATIVE /HPF
BASOPHILS # BLD AUTO: 0 K/UL (ref 0–0.1)
BASOPHILS # BLD: 0 % (ref 0–1)
BILIRUB SERPL-MCNC: 0.3 MG/DL (ref 0.2–1)
BILIRUB UR QL CFM: NEGATIVE
BUN SERPL-MCNC: 5 MG/DL (ref 6–20)
BUN/CREAT SERPL: 5 (ref 12–20)
CALCIUM SERPL-MCNC: 9.1 MG/DL (ref 8.5–10.1)
CHLORIDE SERPL-SCNC: 102 MMOL/L (ref 97–108)
CO2 SERPL-SCNC: 29 MMOL/L (ref 21–32)
COLOR UR: ABNORMAL
CREAT SERPL-MCNC: 0.97 MG/DL (ref 0.7–1.3)
EOSINOPHIL # BLD: 0.1 K/UL (ref 0–0.4)
EOSINOPHIL NFR BLD: 1 % (ref 0–7)
EPITH CASTS URNS QL MICRO: ABNORMAL /LPF
ERYTHROCYTE [DISTWIDTH] IN BLOOD BY AUTOMATED COUNT: 15.9 % (ref 11.5–14.5)
GLOBULIN SER CALC-MCNC: 3.9 G/DL (ref 2–4)
GLUCOSE SERPL-MCNC: 135 MG/DL (ref 65–100)
GLUCOSE UR STRIP.AUTO-MCNC: NEGATIVE MG/DL
HCT VFR BLD AUTO: 37.9 % (ref 36.6–50.3)
HGB BLD-MCNC: 12.1 G/DL (ref 12.1–17)
HGB UR QL STRIP: NEGATIVE
HYALINE CASTS URNS QL MICRO: ABNORMAL /LPF (ref 0–5)
KETONES UR QL STRIP.AUTO: ABNORMAL MG/DL
LEUKOCYTE ESTERASE UR QL STRIP.AUTO: NEGATIVE
LIPASE SERPL-CCNC: 99 U/L (ref 73–393)
LYMPHOCYTES # BLD AUTO: 13 % (ref 12–49)
LYMPHOCYTES # BLD: 1.4 K/UL (ref 0.8–3.5)
MCH RBC QN AUTO: 25.1 PG (ref 26–34)
MCHC RBC AUTO-ENTMCNC: 31.9 G/DL (ref 30–36.5)
MCV RBC AUTO: 78.6 FL (ref 80–99)
MONOCYTES # BLD: 0.5 K/UL (ref 0–1)
MONOCYTES NFR BLD AUTO: 5 % (ref 5–13)
NEUTS SEG # BLD: 8.4 K/UL (ref 1.8–8)
NEUTS SEG NFR BLD AUTO: 81 % (ref 32–75)
NITRITE UR QL STRIP.AUTO: NEGATIVE
PH UR STRIP: 5.5 [PH] (ref 5–8)
PLATELET # BLD AUTO: 266 K/UL (ref 150–400)
POTASSIUM SERPL-SCNC: 3.9 MMOL/L (ref 3.5–5.1)
PROT SERPL-MCNC: 7.6 G/DL (ref 6.4–8.2)
PROT UR STRIP-MCNC: NEGATIVE MG/DL
RBC # BLD AUTO: 4.82 M/UL (ref 4.1–5.7)
RBC #/AREA URNS HPF: ABNORMAL /HPF (ref 0–5)
SODIUM SERPL-SCNC: 139 MMOL/L (ref 136–145)
SP GR UR REFRACTOMETRY: 1.02 (ref 1–1.03)
UROBILINOGEN UR QL STRIP.AUTO: 0.2 EU/DL (ref 0.2–1)
WBC # BLD AUTO: 10.4 K/UL (ref 4.1–11.1)
WBC URNS QL MICRO: ABNORMAL /HPF (ref 0–4)

## 2017-07-07 PROCEDURE — 96361 HYDRATE IV INFUSION ADD-ON: CPT

## 2017-07-07 PROCEDURE — 74011250636 HC RX REV CODE- 250/636: Performed by: EMERGENCY MEDICINE

## 2017-07-07 PROCEDURE — 81001 URINALYSIS AUTO W/SCOPE: CPT | Performed by: EMERGENCY MEDICINE

## 2017-07-07 PROCEDURE — 99285 EMERGENCY DEPT VISIT HI MDM: CPT

## 2017-07-07 PROCEDURE — 75810000275 HC EMERGENCY DEPT VISIT NO LEVEL OF CARE

## 2017-07-07 PROCEDURE — 96374 THER/PROPH/DIAG INJ IV PUSH: CPT

## 2017-07-07 PROCEDURE — 65270000032 HC RM SEMIPRIVATE

## 2017-07-07 PROCEDURE — 96376 TX/PRO/DX INJ SAME DRUG ADON: CPT

## 2017-07-07 PROCEDURE — 85025 COMPLETE CBC W/AUTO DIFF WBC: CPT | Performed by: EMERGENCY MEDICINE

## 2017-07-07 PROCEDURE — 80053 COMPREHEN METABOLIC PANEL: CPT | Performed by: EMERGENCY MEDICINE

## 2017-07-07 PROCEDURE — 96375 TX/PRO/DX INJ NEW DRUG ADDON: CPT

## 2017-07-07 PROCEDURE — 36415 COLL VENOUS BLD VENIPUNCTURE: CPT | Performed by: EMERGENCY MEDICINE

## 2017-07-07 PROCEDURE — 83690 ASSAY OF LIPASE: CPT | Performed by: EMERGENCY MEDICINE

## 2017-07-07 PROCEDURE — 74177 CT ABD & PELVIS W/CONTRAST: CPT

## 2017-07-07 PROCEDURE — 74011636320 HC RX REV CODE- 636/320: Performed by: RADIOLOGY

## 2017-07-07 RX ORDER — SODIUM CHLORIDE 0.9 % (FLUSH) 0.9 %
5-10 SYRINGE (ML) INJECTION AS NEEDED
Status: DISCONTINUED | OUTPATIENT
Start: 2017-07-07 | End: 2017-07-07 | Stop reason: HOSPADM

## 2017-07-07 RX ORDER — TRAMADOL HYDROCHLORIDE 50 MG/1
50 TABLET ORAL
Status: DISCONTINUED | OUTPATIENT
Start: 2017-07-07 | End: 2017-07-09 | Stop reason: HOSPADM

## 2017-07-07 RX ORDER — ONDANSETRON 4 MG/1
8 TABLET, ORALLY DISINTEGRATING ORAL
Status: DISCONTINUED | OUTPATIENT
Start: 2017-07-07 | End: 2017-07-09 | Stop reason: HOSPADM

## 2017-07-07 RX ORDER — DIAZEPAM 5 MG/1
5 TABLET ORAL
Status: DISCONTINUED | OUTPATIENT
Start: 2017-07-07 | End: 2017-07-09 | Stop reason: HOSPADM

## 2017-07-07 RX ORDER — ONDANSETRON 2 MG/ML
4 INJECTION INTRAMUSCULAR; INTRAVENOUS
Status: COMPLETED | OUTPATIENT
Start: 2017-07-07 | End: 2017-07-07

## 2017-07-07 RX ORDER — HYDROMORPHONE HYDROCHLORIDE 1 MG/ML
2 INJECTION, SOLUTION INTRAMUSCULAR; INTRAVENOUS; SUBCUTANEOUS
Status: DISCONTINUED | OUTPATIENT
Start: 2017-07-07 | End: 2017-07-09 | Stop reason: HOSPADM

## 2017-07-07 RX ORDER — SODIUM CHLORIDE 0.9 % (FLUSH) 0.9 %
5-10 SYRINGE (ML) INJECTION AS NEEDED
Status: DISCONTINUED | OUTPATIENT
Start: 2017-07-07 | End: 2017-07-09 | Stop reason: HOSPADM

## 2017-07-07 RX ORDER — SODIUM CHLORIDE 0.9 % (FLUSH) 0.9 %
5-10 SYRINGE (ML) INJECTION EVERY 8 HOURS
Status: DISCONTINUED | OUTPATIENT
Start: 2017-07-08 | End: 2017-07-09 | Stop reason: HOSPADM

## 2017-07-07 RX ORDER — AMITRIPTYLINE HYDROCHLORIDE 10 MG/1
30 TABLET, FILM COATED ORAL
Status: DISCONTINUED | OUTPATIENT
Start: 2017-07-08 | End: 2017-07-09 | Stop reason: HOSPADM

## 2017-07-07 RX ORDER — SODIUM CHLORIDE 0.9 % (FLUSH) 0.9 %
5-10 SYRINGE (ML) INJECTION EVERY 8 HOURS
Status: DISCONTINUED | OUTPATIENT
Start: 2017-07-07 | End: 2017-07-07

## 2017-07-07 RX ORDER — HYDROMORPHONE HYDROCHLORIDE 1 MG/ML
1 INJECTION, SOLUTION INTRAMUSCULAR; INTRAVENOUS; SUBCUTANEOUS
Status: DISCONTINUED | OUTPATIENT
Start: 2017-07-07 | End: 2017-07-07 | Stop reason: HOSPADM

## 2017-07-07 RX ORDER — PROMETHAZINE HYDROCHLORIDE 25 MG/1
25 TABLET ORAL
Status: DISCONTINUED | OUTPATIENT
Start: 2017-07-07 | End: 2017-07-09 | Stop reason: HOSPADM

## 2017-07-07 RX ORDER — TEMAZEPAM 15 MG/1
30 CAPSULE ORAL
Status: DISCONTINUED | OUTPATIENT
Start: 2017-07-08 | End: 2017-07-09 | Stop reason: HOSPADM

## 2017-07-07 RX ORDER — PANTOPRAZOLE SODIUM 40 MG/1
40 TABLET, DELAYED RELEASE ORAL
Status: DISCONTINUED | OUTPATIENT
Start: 2017-07-08 | End: 2017-07-09 | Stop reason: HOSPADM

## 2017-07-07 RX ORDER — SODIUM CHLORIDE 9 MG/ML
150 INJECTION, SOLUTION INTRAVENOUS CONTINUOUS
Status: DISCONTINUED | OUTPATIENT
Start: 2017-07-08 | End: 2017-07-09 | Stop reason: HOSPADM

## 2017-07-07 RX ORDER — BUPROPION HYDROCHLORIDE 150 MG/1
150 TABLET, EXTENDED RELEASE ORAL 2 TIMES DAILY
Status: DISCONTINUED | OUTPATIENT
Start: 2017-07-08 | End: 2017-07-09 | Stop reason: HOSPADM

## 2017-07-07 RX ORDER — SODIUM CHLORIDE 0.9 % (FLUSH) 0.9 %
5-10 SYRINGE (ML) INJECTION EVERY 8 HOURS
Status: DISCONTINUED | OUTPATIENT
Start: 2017-07-07 | End: 2017-07-07 | Stop reason: HOSPADM

## 2017-07-07 RX ORDER — PROCHLORPERAZINE MALEATE 10 MG
10 TABLET ORAL
Status: DISCONTINUED | OUTPATIENT
Start: 2017-07-07 | End: 2017-07-09 | Stop reason: HOSPADM

## 2017-07-07 RX ORDER — DICYCLOMINE HYDROCHLORIDE 10 MG/1
10 CAPSULE ORAL
Status: DISCONTINUED | OUTPATIENT
Start: 2017-07-07 | End: 2017-07-09 | Stop reason: HOSPADM

## 2017-07-07 RX ORDER — ACETAMINOPHEN 500 MG
500 TABLET ORAL
Status: DISCONTINUED | OUTPATIENT
Start: 2017-07-07 | End: 2017-07-09 | Stop reason: HOSPADM

## 2017-07-07 RX ADMIN — HYDROMORPHONE HYDROCHLORIDE 1 MG: 1 INJECTION, SOLUTION INTRAMUSCULAR; INTRAVENOUS; SUBCUTANEOUS at 16:55

## 2017-07-07 RX ADMIN — HYDROMORPHONE HYDROCHLORIDE 1 MG: 1 INJECTION, SOLUTION INTRAMUSCULAR; INTRAVENOUS; SUBCUTANEOUS at 21:02

## 2017-07-07 RX ADMIN — SODIUM CHLORIDE 1000 ML: 900 INJECTION, SOLUTION INTRAVENOUS at 17:15

## 2017-07-07 RX ADMIN — SODIUM CHLORIDE 1000 ML: 900 INJECTION, SOLUTION INTRAVENOUS at 16:50

## 2017-07-07 RX ADMIN — IOPAMIDOL 90 ML: 755 INJECTION, SOLUTION INTRAVENOUS at 18:02

## 2017-07-07 RX ADMIN — HYDROMORPHONE HYDROCHLORIDE 1 MG: 1 INJECTION, SOLUTION INTRAMUSCULAR; INTRAVENOUS; SUBCUTANEOUS at 19:08

## 2017-07-07 RX ADMIN — ONDANSETRON 4 MG: 2 INJECTION INTRAMUSCULAR; INTRAVENOUS at 16:55

## 2017-07-07 NOTE — ED NOTES
Bedside and Verbal shift change report given to Jackie Crockett (oncoming nurse) by Bettye Tolbert  (offgoing nurse). Report included the following information SBAR.

## 2017-07-07 NOTE — ED PROVIDER NOTES
HPI Comments: 40 y.o. male with past medical history significant for crohn's disease, ulcerative colitis, ileal pouchitis, anal stenosis, and GERD who presents ambulatory from home with chief complaint of abdominal pain. Pt states severe mid-abdominal pain and nausea onset last night. Pt describes abdominal pain as intermittent sharp waves. Pt states his ostomy bag produces thick output last night, but he hasn't noticed any output since 0600 this morning. Pt has a history of chron's disease requiring extensive surgical intervention. Pt's last surgery was performed on 6/12/17 after pt was diagnosed with a small bowel obstruction. Pt's Crohn's is controlled with Stelara injections every 8 weeks. Otherwise, pt is treated symptomatically. Pt's last PO intake was approximately 2 hours prior to arrival. Pt specifically denies vomiting. There are no other acute medical concerns at this time. Social hx: former tobacco smoker; denies EtOH use; denies illicit drug use  PCP: Andrew Bartholomew MD  GI: Dr. Gurwinder Rivas    Note written by Virgen Zhang, as dictated by Janneth Rice MD 4:12 PM         The history is provided by the patient. Past Medical History:   Diagnosis Date    Anal fistula     The patient no longer has an anus.  Anal stenosis     Crohn's disease ([address]     GERD (gastroesophageal reflux disease)     H/O ulcerative colitis     Symptoms began in 1996. Total proctocolectomy was performed in 2004. Patient later developed Crohn's disease.  Hiatal hernia     Ileal pouchitis ([address] 05/2004    The patient no longer has a pouch.  Nausea & vomiting     Combination of Scopalamine patch & Zofran IV worked well in past    Numbness in right leg     Chronic.     Opioid dependence ([address]     History of opioid dependence requiring a detox program.    Psychiatric disorder     depression and anxiety    Skin lesion of back 3/17/2014    Syncopal episodes        Past Surgical History:   Procedure Laterality Date    ABDOMEN SURGERY PROC UNLISTED  3/25/2004    Total proctocolectomy with creation of ileoanal J-pouch and loop ileostomy; Dr. Ron Ojeda.   CHOLECYSTECTOMY  12/2016    Dr. Gabriel Marking  GI  5/2/2004    Examination under anesthesia with endoscopic evaluation of ileoanal pouch and placement of drain; Dr. Ron Ojeda.   GI  5/25/2004    Ileostomy closure with small bowel resection and anastomosis; Dr. Ron Ojeda.   GI  5/24/2012    Examination under anesthesia, anal dilatation, anal biopsy, and placement of draining seton; Dr. Ron Ojeda.   GI  7/3/2012    Incision and drainage of perirectal abscess and rigid endoscopy of ileoanal pouch; Dr. Ron Ojeda.   GI  7/31/2012    Incision and drainage of perirectal abscess, placement of draining seton, and anal dilatation; Dr. Ron Ojeda.   GI  1/22/2013    Repair of anal fistulas with debridement, partial fistulectomy, and flap closure; Dr. Ron Ojeda.   GI  5/17/2013    Examination under anesthesia, partial fistulotomy,  and placement of draining setons; Dr. Ron Ojeda.   GI  8/6/2013    Anal fistulotomy and application of ACell micromatrix; Dr. Ron Ojeda.   GI  2/20/2014    Examination under anesthesia and dilation of anal canal with rigid proctoscopy; Elis Putnam MD.     GI  4/29/2015    Creation of Sarwat Dang continent intestinal reservoir (BCIR), abdominoperineal resection of ileoanal J-pouch; lysis of adhesions, and gastrostomy; Minus Keepers. Jones Hagen MD ([address])     GI  8/7/2015    Stoma revision; Minus Gayla. Jones Hagen MD ([address])     GI  10/29/2015    Extensive lysis of adhesions, resection of continent intestinal reservoir, repair of serosal tears, and creation of end-ileostomy; Dr. Ron Ojeda.   GI  03/14/2017    Laparotomy, extensive lysis of adhesions and revision of ileostomy; Dr. Ron Ojeda.      ORTHOPAEDIC  2008    Left ACL repair    HX UROLOGICAL  03/14/2017    Cystoscopy and placement of bilateral temporary ureteral catheters; Jayden Cruz MD.         Family History:   Problem Relation Age of Onset    Cancer Father     Asthma Neg Hx     Diabetes Neg Hx     Heart Disease Neg Hx     Hypertension Neg Hx     Stroke Neg Hx     Malignant Hyperthermia Neg Hx     Pseudocholinesterase Deficiency Neg Hx     Delayed Awakening Neg Hx     Post-op Nausea/Vomiting Neg Hx        Social History     Social History    Marital status:      Spouse name: N/A    Number of children: N/A    Years of education: N/A     Occupational History    Not on file. Social History Main Topics    Smoking status: Former Smoker     Packs/day: 0.00     Years: 7.00     Quit date: 1/14/2017    Smokeless tobacco: Never Used    Alcohol use No    Drug use: No    Sexual activity: Not on file     Other Topics Concern    Not on file     Social History Narrative         ALLERGIES: Remicade [infliximab]; Bactrim [sulfamethoprim ds]; and Nsaids (non-steroidal anti-inflammatory drug)    Review of Systems   Constitutional: Negative. Negative for appetite change, fever and unexpected weight change. HENT: Negative. Negative for ear pain, hearing loss, nosebleeds, rhinorrhea, sore throat and trouble swallowing. Respiratory: Negative. Negative for cough, chest tightness and shortness of breath. Cardiovascular: Negative. Negative for chest pain and palpitations. Gastrointestinal: Positive for abdominal pain and nausea. Negative for abdominal distention, blood in stool and vomiting. Endocrine: Negative. Genitourinary: Negative for dysuria and hematuria. Musculoskeletal: Negative. Negative for back pain and myalgias. Skin: Negative. Negative for rash. Allergic/Immunologic: Negative. Neurological: Negative. Negative for dizziness, syncope, weakness and numbness. Hematological: Negative. Psychiatric/Behavioral: Negative.     All other systems reviewed and are negative. Vitals:    07/07/17 1603   BP: 135/86   Pulse: (!) 138   Resp: 16   Temp: 98.9 °F (37.2 °C)   SpO2: 98%   Weight: 95.3 kg (210 lb)   Height: 6' (1.829 m)            Physical Exam   Constitutional: He is oriented to person, place, and time. He appears well-developed and well-nourished. Non-toxic appearance. He appears distressed (mild pain). HENT:   Head: Normocephalic and atraumatic. Right Ear: External ear normal.   Left Ear: External ear normal.   Nose: Nose normal.   Mouth/Throat: Oropharynx is clear and moist.   Eyes: Conjunctivae and EOM are normal. Pupils are equal, round, and reactive to light. Neck: Normal range of motion. Neck supple. No JVD present. No thyromegaly present. Cardiovascular: Regular rhythm, normal heart sounds and intact distal pulses. Tachycardia present. No murmur heard. Pulmonary/Chest: Effort normal and breath sounds normal. No respiratory distress. He has no wheezes. He has no rales. Abdominal: Soft. He exhibits no distension. Bowel sounds are decreased. There is no tenderness. Diffuse abdominal tenderness. No focal guarding or rebound. Genitourinary:   Genitourinary Comments: Colostomy bag in RLQ with minimal brown output. No signs of blood. Musculoskeletal: Normal range of motion. He exhibits no edema. Neurological: He is alert and oriented to person, place, and time. No cranial nerve deficit. Skin: Skin is warm. No rash noted. He is diaphoretic. Psychiatric: He has a normal mood and affect. His behavior is normal. Thought content normal.   Nursing note and vitals reviewed. Note written by Virgen Son, as dictated by Dalia Crooks MD 4:12 PM     [facility]  ED Course       Procedures    CONSULT NOTE:  7:36 PM Dalia Crooks MD spoke with Dr. Katie Seaman, Consult for General Surgery. Discussed available diagnostic tests and clinical findings. He is in agreement with care plans as outlined.   Dr. Katie Seaman recommends consulting colo-rectal surgery. Assessment:  Developing bowel obstruction versus ileus. UA, chemistry, CBC, lipase unremarkable. CT shows few scattered air fluid bubbles and fluid filled ostomy in LLQ. Less bowel distention when compared to previous study. Will consult general surgery and colo-rectal surgery for admission for conservative management at this time. Discussed available lab/imaging results with patient. Patient verbalizes understanding and agrees with care plan. Will transfer patient to [facility] for further care. CONSULT NOTE:  8:05 PM Shhazad Ayala MD spoke with Dr. Saida Hayward, Consult for Gastroenterology. Discussed available diagnostic tests and clinical findings. He is in agreement with care plans as outlined. Dr. Saida Hayward recommends transferring the patient to the hospitalist service at [facility] for further management. CONSULT NOTE:  8:27 PM Shahzad Ayala MD spoke with Dr. Naye Kate, Consult for Hospitalist.  Discussed available diagnostic tests and clinical findings. He is in agreement with care plans as outlined. Dr. Naye Kate accepts the patient as a transfer.

## 2017-07-07 NOTE — IP AVS SNAPSHOT
[address] [facility] [address] 
[phone] Patient: Nicola Eisenmenger MRN: [id number] :1975 You are allergic to the following Allergen Reactions Remicade (Infliximab) Anaphylaxis Shortness of Breath Bactrim (Sulfamethoprim Ds) Other (comments) Increased GI distress. Nsaids (Non-Steroidal Anti-Inflammatory Drug) Other (comments) Stomach ulcers Recent Documentation Smoking Status Former Smoker Emergency Contacts Name Discharge Info Relation Home Work Mobile [address] CAREGIVER [3] Spouse [3] [phone] About your hospitalization You were admitted on:  2017 You last received care in the:  [facility] [id number] You were discharged on:  2017 Unit phone number:  [phone] Why you were hospitalized Your primary diagnosis was:  Not on File Your diagnoses also included:  Sbo (Small Bowel Obstruction) (Hcc), Bowel Obstruction (Hcc) Providers Seen During Your Hospitalizations Provider Role Specialty Primary office phone Jad Marc MD Attending Provider Internal Medicine [phone] Raudel Kimball MD Attending Provider Internal Medicine [phone] Your Primary Care Physician (PCP) Primary Care Physician Office Phone Office Fax [place], [address] [phone] Follow-up Information Follow up With Details Comments Contact Info Mendez Ham MD Schedule an appointment as soon as possible for a visit  [address] [facility] [address] [facility] [address] 
[phone] Current Discharge Medication List  
  
CONTINUE these medications which have NOT CHANGED Dose & Instructions Dispensing Information Comments Morning Noon Evening Bedtime  
 acetaminophen 500 mg tablet Commonly known as:  TYLENOL  
   
 Your last dose was: Your next dose is:    
   
   
 Dose:  500 mg Take 500 mg by mouth four (4) times daily as needed (mild pain). Refills:  0  
     
   
   
   
  
 amitriptyline 10 mg tablet Commonly known as:  ELAVIL Your last dose was: Your next dose is:    
   
   
 Dose:  30 mg Take 30 mg by mouth nightly. Refills:  0  
     
   
   
   
  
 buPROPion  mg SR tablet Commonly known as:  Rell Sanchez Your last dose was: Your next dose is:    
   
   
 Dose:  150 mg Take 150 mg by mouth two (2) times a day. Refills:  0  
     
   
   
   
  
 COMPAZINE 10 mg tablet Generic drug:  prochlorperazine Your last dose was: Your next dose is:    
   
   
 Dose:  10 mg Take 10 mg by mouth two (2) times daily as needed for Nausea. Refills:  0  
     
   
   
   
  
 dicyclomine 10 mg capsule Commonly known as:  BENTYL Your last dose was: Your next dose is:    
   
   
 Dose:  10 mg Take 10 mg by mouth every six (6) hours as needed. Refills:  0  
     
   
   
   
  
 ergocalciferol 50,000 unit capsule Commonly known as:  ERGOCALCIFEROL Your last dose was: Your next dose is:    
   
   
 Dose:  06233 Units Take 50,000 Units by mouth every Monday. Refills:  0 LYRICA 100 mg capsule Generic drug:  pregabalin Your last dose was: Your next dose is:    
   
   
 Dose:  100 mg Take 100 mg by mouth three (3) times daily. Refills:  0  
     
   
   
   
  
 omeprazole 40 mg capsule Commonly known as:  PRILOSEC Your last dose was: Your next dose is:    
   
   
 Dose:  40 mg Take 40 mg by mouth Daily (before dinner). Refills:  0  
     
   
   
   
  
 oxyCODONE-acetaminophen 5-325 mg per tablet Commonly known as:  PERCOCET Your last dose was: Your next dose is: Take by mouth per taper: 2 tablets every 4 hours for 5 days, then 1 tablet every 4 hours for 5 days. After that, please see Dr. Kaleb Dow to continue your taper. (Do NOT take additional acetaminophen/Tyelonol while taking Percocet) Quantity:  90 Tab Refills:  0  
     
   
   
   
  
 promethazine 25 mg tablet Commonly known as:  PHENERGAN Your last dose was: Your next dose is:    
   
   
 Dose:  25 mg Take 25 mg by mouth every six (6) hours as needed for Nausea. Refills:  0  
     
   
   
   
  
 temazepam 30 mg capsule Commonly known as:  RESTORIL Your last dose was: Your next dose is:    
   
   
 Dose:  30 mg Take 30 mg by mouth nightly. Refills:  0  
     
   
   
   
  
 traMADol 50 mg tablet Commonly known as:  ULTRAM  
   
Your last dose was: Your next dose is:    
   
   
 Dose:  50 mg Take 50 mg by mouth three (3) times daily as needed (moderate pain). Refills:  0 Ustekinumab 90 mg/mL injection Commonly known as:  Ralene Carrington Your last dose was: Your next dose is:    
   
   
 Dose:  90 mg  
90 mg by SubCUTAneous route. Every 8 weeks Refills:  0 VALIUM 5 mg tablet Generic drug:  diazePAM  
   
Your last dose was: Your next dose is:    
   
   
 Dose:  5 mg Take 5 mg by mouth every eight (8) hours as needed (bowel relaxation). Refills:  0  
     
   
   
   
  
 VITAMIN B-12 1,000 mcg/mL injection Generic drug:  cyanocobalamin Your last dose was: Your next dose is:    
   
   
 Dose:  1000 mcg  
1,000 mcg by IntraMUSCular route every Sunday. Indications: Once weekly Refills:  0 ZOFRAN (AS HYDROCHLORIDE) 8 mg tablet Generic drug:  ondansetron hcl Your last dose was: Your next dose is:    
   
   
 Dose:  8 mg Take 8 mg by mouth every twelve (12) hours as needed for Nausea. Refills:  0 Discharge Instructions Discharge Instructions PATIENT ID: Diamond Garrison MRN: [id number] YOB: 1975 DATE OF ADMISSION: 7/7/2017 10:25 PM   
DATE OF DISCHARGE: 7/9/2017 PRIMARY CARE PROVIDER: Astrid Kaufman MD  
 
ATTENDING PHYSICIAN: Katie Farfan MD 
DISCHARGING PROVIDER: Katie Farfan MD   
To contact this individual call [phone] and ask the  to page. If unavailable ask to be transferred the Adult Hospitalist Department. DISCHARGE DIAGNOSES Crohn's disease Abdominal pain, possible partial small bowel obstruction vs ileus Depression/anxiety CONSULTATIONS: IP CONSULT TO COLORECTAL SURGERY 
 
PROCEDURES/SURGERIES: * No surgery found * PENDING TEST RESULTS:  
At the time of discharge the following test results are still pending: None. FOLLOW UP APPOINTMENTS:  
Follow-up Information Follow up With Details Comments Contact Info Astrid Kaufman MD Schedule an appointment as soon as possible for a visit  [address] [address] [facility] [address] [place] [address] 
[phone] ADDITIONAL CARE RECOMMENDATIONS:  
  You may resume your home medications as previously prescribed. Do not resume Imodium. Follow-up with your primary care provider in 1 week. DIET: Regular Diet ACTIVITY: Activity as tolerated WOUND CARE: Resume previous care EQUIPMENT needed: N/A 
 
 
  
 SNF/Inpatient Rehab/LTAC Independent/assisted living Hospice Other: CDMP Checked:  
Yes x PROBLEM LIST Updated: 
Yes x Signed:  
Jude José MD 
7/9/2017 
10:58 AM 
 
 
DISCHARGE SUMMARY from Nurse The following personal items are in your possession at time of discharge: 
 
Dental Appliances: None Home Medications: None Jewelry: With patient Clothing: At bedside PATIENT INSTRUCTIONS: 
 
After general anesthesia or intravenous sedation, for 24 hours or while taking prescription Narcotics: · Limit your activities · Do not drive and operate hazardous machinery · Do not make important personal or business decisions · Do  not drink alcoholic beverages · If you have not urinated within 8 hours after discharge, please contact your surgeon on call. Report the following to your surgeon: 
· Excessive pain, swelling, redness or odor of or around the surgical area · Temperature over 100.5 · Nausea and vomiting lasting longer than 4 hours or if unable to take medications · Any signs of decreased circulation or nerve impairment to extremity: change in color, persistent  numbness, tingling, coldness or increase pain · Any questions What to do at Home: *  Please give a list of your current medications to your Primary Care Provider. *  Please update this list whenever your medications are discontinued, doses are 
    changed, or new medications (including over-the-counter products) are added. *  Please carry medication information at all times in case of emergency situations. These are general instructions for a healthy lifestyle: No smoking/ No tobacco products/ Avoid exposure to second hand smoke Surgeon General's Warning:  Quitting smoking now greatly reduces serious risk to your health. Obesity, smoking, and sedentary lifestyle greatly increases your risk for illness A healthy diet, regular physical exercise & weight monitoring are important for maintaining a healthy lifestyle You may be retaining fluid if you have a history of heart failure or if you experience any of the following symptoms:  Weight gain of 3 pounds or more overnight or 5 pounds in a week, increased swelling in our hands or feet or shortness of breath while lying flat in bed. Please call your doctor as soon as you notice any of these symptoms; do not wait until your next office visit. Recognize signs and symptoms of STROKE: 
 
F-face looks uneven A-arms unable to move or move unevenly S-speech slurred or non-existent T-time-call 911 as soon as signs and symptoms begin-DO NOT go Back to bed or wait to see if you get better-TIME IS BRAIN. Warning Signs of HEART ATTACK Call 911 if you have these symptoms: 
? Chest discomfort. Most heart attacks involve discomfort in the center of the chest that lasts more than a few minutes, or that goes away and comes back. It can feel like uncomfortable pressure, squeezing, fullness, or pain. ? Discomfort in other areas of the upper body. Symptoms can include pain or discomfort in one or both arms, the back, neck, jaw, or stomach. ? Shortness of breath with or without chest discomfort. ? Other signs may include breaking out in a cold sweat, nausea, or lightheadedness. Don't wait more than five minutes to call [address]! Fast action can save your life. Calling 911 is almost always the fastest way to get lifesaving treatment. Emergency Medical Services staff can begin treatment when they arrive  up to an hour sooner than if someone gets to the hospital by car. The discharge information has been reviewed with the patient and spouse. The patient and spouse verbalized understanding. Discharge medications reviewed with the patient and spouse and appropriate educational materials and side effects teaching were provided. Backtrace I/Ohart Activation Thank you for requesting access to [employer]. Please follow the instructions below to securely access and download your online medical record. [employer] allows you to send messages to your doctor, view your test results, renew your prescriptions, schedule appointments, and more. How Do I Sign Up? 1. In your internet browser, go to [email or web address]. 2. Click on the First Time User? Click Here link in the Sign In box. You will see the New Member Sign Up page. 3. Enter your [employer] Access Code exactly as it appears below. You will not need to use this code after youve completed the sign-up process. If you do not sign up before the expiration date, you must request a new code. [employer] Access Code: Activation code not generated Current [employer] Status: Active (This is the date your [employer] access code will ) 4. Enter the last four digits of your Social Security Number (xxxx) and Date of Birth (mm/dd/yyyy) as indicated and click Submit. You will be taken to the next sign-up page. 5. Create a Avuba ID. This will be your [employer] login ID and cannot be changed, so think of one that is secure and easy to remember. 6. Create a [employer] password. You can change your password at any time. 7. Enter your Password Reset Question and Answer. This can be used at a later time if you forget your password. 8. Enter your e-mail address. You will receive e-mail notification when new information is available in [address] 9. Click Sign Up. You can now view and download portions of your medical record. 10. Click the Download Summary menu link to download a portable copy of your medical information. Additional Information If you have questions, please visit the Frequently Asked Questions section of the [employer] website at [email or web address] Remember, MyChart is NOT to be used for urgent needs. For medical emergencies, dial 911. Discharge Orders None IntelliWheelshart Announcement We are excited to announce that we are making your provider's discharge notes available to you in [employer]. You will see these notes when they are completed and signed by the physician that discharged you from your recent hospital stay. If you have any questions or concerns about any information you see in [employer], please call the Health Information Department where you were seen or reach out to your Primary Care Provider for more information about your plan of care. Introducing [facility] SERVICES! Dear Radha Goss: Thank you for requesting a [employer] account. Our records indicate that you already have an active [employer] account. You can access your account anytime at [email or web address] Did you know that you can access your hospital and ER discharge instructions at any time in [employer]? You can also review all of your test results from your hospital stay or ER visit. Additional Information If you have questions, please visit the Frequently Asked Questions section of the [employer] website at [email or web address] Remember, MyChart is NOT to be used for urgent needs. For medical emergencies, dial 911. Now available from your iPhone and Android! General Information Please provide this summary of care documentation to your next provider. Patient Signature:  ____________________________________________________________ Date:  ____________________________________________________________  
  
Katie Harrison Provider Signature:  ____________________________________________________________ Date:  ____________________________________________________________

## 2017-07-07 NOTE — ED TRIAGE NOTES
Patient arrives with the complaint of mid abdominal pain and nausea since yesterday. States that he has an ostomy and has had decreased output today, denies any blood in his stool or fevers.   States that he has had several bowel obstructions in the past, hx of Crohns,  Has seen  in the past.

## 2017-07-08 LAB
ANION GAP BLD CALC-SCNC: 8 MMOL/L (ref 5–15)
BASOPHILS # BLD AUTO: 0 K/UL (ref 0–0.1)
BASOPHILS # BLD: 0 % (ref 0–1)
BUN SERPL-MCNC: 5 MG/DL (ref 6–20)
BUN/CREAT SERPL: 6 (ref 12–20)
CALCIUM SERPL-MCNC: 8.5 MG/DL (ref 8.5–10.1)
CHLORIDE SERPL-SCNC: 108 MMOL/L (ref 97–108)
CO2 SERPL-SCNC: 25 MMOL/L (ref 21–32)
CREAT SERPL-MCNC: 0.78 MG/DL (ref 0.7–1.3)
EOSINOPHIL # BLD: 0.2 K/UL (ref 0–0.4)
EOSINOPHIL NFR BLD: 3 % (ref 0–7)
ERYTHROCYTE [DISTWIDTH] IN BLOOD BY AUTOMATED COUNT: 16.1 % (ref 11.5–14.5)
GLUCOSE SERPL-MCNC: 83 MG/DL (ref 65–100)
HCT VFR BLD AUTO: 33.9 % (ref 36.6–50.3)
HGB BLD-MCNC: 10.5 G/DL (ref 12.1–17)
LYMPHOCYTES # BLD AUTO: 26 % (ref 12–49)
LYMPHOCYTES # BLD: 2 K/UL (ref 0.8–3.5)
MCH RBC QN AUTO: 24.9 PG (ref 26–34)
MCHC RBC AUTO-ENTMCNC: 31 G/DL (ref 30–36.5)
MCV RBC AUTO: 80.3 FL (ref 80–99)
MONOCYTES # BLD: 0.5 K/UL (ref 0–1)
MONOCYTES NFR BLD AUTO: 6 % (ref 5–13)
NEUTS SEG # BLD: 5.1 K/UL (ref 1.8–8)
NEUTS SEG NFR BLD AUTO: 65 % (ref 32–75)
PLATELET # BLD AUTO: 209 K/UL (ref 150–400)
POTASSIUM SERPL-SCNC: 4.1 MMOL/L (ref 3.5–5.1)
RBC # BLD AUTO: 4.22 M/UL (ref 4.1–5.7)
SODIUM SERPL-SCNC: 141 MMOL/L (ref 136–145)
WBC # BLD AUTO: 7.8 K/UL (ref 4.1–11.1)

## 2017-07-08 PROCEDURE — 65270000032 HC RM SEMIPRIVATE

## 2017-07-08 PROCEDURE — 74011250636 HC RX REV CODE- 250/636: Performed by: NURSE PRACTITIONER

## 2017-07-08 PROCEDURE — 85025 COMPLETE CBC W/AUTO DIFF WBC: CPT | Performed by: NURSE PRACTITIONER

## 2017-07-08 PROCEDURE — 36415 COLL VENOUS BLD VENIPUNCTURE: CPT | Performed by: NURSE PRACTITIONER

## 2017-07-08 PROCEDURE — 74011250637 HC RX REV CODE- 250/637: Performed by: NURSE PRACTITIONER

## 2017-07-08 PROCEDURE — 80048 BASIC METABOLIC PNL TOTAL CA: CPT | Performed by: NURSE PRACTITIONER

## 2017-07-08 RX ORDER — HEPARIN SODIUM 5000 [USP'U]/ML
5000 INJECTION, SOLUTION INTRAVENOUS; SUBCUTANEOUS EVERY 8 HOURS
Status: DISCONTINUED | OUTPATIENT
Start: 2017-07-08 | End: 2017-07-09 | Stop reason: HOSPADM

## 2017-07-08 RX ADMIN — AMITRIPTYLINE HYDROCHLORIDE 30 MG: 10 TABLET, FILM COATED ORAL at 21:37

## 2017-07-08 RX ADMIN — Medication 10 ML: at 16:59

## 2017-07-08 RX ADMIN — Medication 10 ML: at 00:19

## 2017-07-08 RX ADMIN — HYDROMORPHONE HYDROCHLORIDE 2 MG: 1 INJECTION, SOLUTION INTRAMUSCULAR; INTRAVENOUS; SUBCUTANEOUS at 13:15

## 2017-07-08 RX ADMIN — HEPARIN SODIUM 5000 UNITS: 5000 INJECTION, SOLUTION INTRAVENOUS; SUBCUTANEOUS at 22:38

## 2017-07-08 RX ADMIN — PREGABALIN 100 MG: 75 CAPSULE ORAL at 17:13

## 2017-07-08 RX ADMIN — BUPROPION HYDROCHLORIDE 150 MG: 150 TABLET, EXTENDED RELEASE ORAL at 17:13

## 2017-07-08 RX ADMIN — Medication 10 ML: at 04:28

## 2017-07-08 RX ADMIN — Medication 10 ML: at 13:16

## 2017-07-08 RX ADMIN — PROMETHAZINE HYDROCHLORIDE 25 MG: 25 TABLET ORAL at 00:36

## 2017-07-08 RX ADMIN — TEMAZEPAM 30 MG: 15 CAPSULE ORAL at 21:25

## 2017-07-08 RX ADMIN — HYDROMORPHONE HYDROCHLORIDE 2 MG: 1 INJECTION, SOLUTION INTRAMUSCULAR; INTRAVENOUS; SUBCUTANEOUS at 16:59

## 2017-07-08 RX ADMIN — PREGABALIN 100 MG: 75 CAPSULE ORAL at 21:27

## 2017-07-08 RX ADMIN — HYDROMORPHONE HYDROCHLORIDE 2 MG: 1 INJECTION, SOLUTION INTRAMUSCULAR; INTRAVENOUS; SUBCUTANEOUS at 00:19

## 2017-07-08 RX ADMIN — PANTOPRAZOLE SODIUM 40 MG: 40 TABLET, DELAYED RELEASE ORAL at 17:13

## 2017-07-08 RX ADMIN — SODIUM CHLORIDE 150 ML/HR: 900 INJECTION, SOLUTION INTRAVENOUS at 00:19

## 2017-07-08 RX ADMIN — SODIUM CHLORIDE 150 ML/HR: 900 INJECTION, SOLUTION INTRAVENOUS at 06:44

## 2017-07-08 RX ADMIN — HYDROMORPHONE HYDROCHLORIDE 2 MG: 1 INJECTION, SOLUTION INTRAMUSCULAR; INTRAVENOUS; SUBCUTANEOUS at 04:27

## 2017-07-08 RX ADMIN — Medication 10 ML: at 21:28

## 2017-07-08 RX ADMIN — AMITRIPTYLINE HYDROCHLORIDE 30 MG: 10 TABLET, FILM COATED ORAL at 00:35

## 2017-07-08 RX ADMIN — TEMAZEPAM 30 MG: 15 CAPSULE ORAL at 00:19

## 2017-07-08 RX ADMIN — HYDROMORPHONE HYDROCHLORIDE 2 MG: 1 INJECTION, SOLUTION INTRAMUSCULAR; INTRAVENOUS; SUBCUTANEOUS at 08:59

## 2017-07-08 RX ADMIN — HYDROMORPHONE HYDROCHLORIDE 2 MG: 1 INJECTION, SOLUTION INTRAMUSCULAR; INTRAVENOUS; SUBCUTANEOUS at 21:21

## 2017-07-08 RX ADMIN — SODIUM CHLORIDE 150 ML/HR: 900 INJECTION, SOLUTION INTRAVENOUS at 13:14

## 2017-07-08 RX ADMIN — PREGABALIN 100 MG: 75 CAPSULE ORAL at 08:59

## 2017-07-08 RX ADMIN — BUPROPION HYDROCHLORIDE 150 MG: 150 TABLET, EXTENDED RELEASE ORAL at 08:59

## 2017-07-08 NOTE — CONSULTS
Colorectal Surgery Consultation Note          NAME:  El Varma   :   1975   MRN:   [id number]     Consutation Date: 2017    Chief Complaint:  Possible early partial small bowel obstruction. History of Present Illness: The patient is a 52-year-old male with Crohn's disease and a permanent ileostomy who is well known to me. I have operated on him eleven times for treatment of inflammatory bowel disease and its complications (see below). The last operation was performed on 3/14/2017.     He was most recently hospitalized from 2017 to 2017 for treatment of abdominal pain associated with a possible partial small bowel obstruction, and he followed up afterwards with Dr. Sabrina Prader. This episode seemed to begin suddenly two nights ago after he had consumed some popcorn (on 2017) and food from [address] [place] [address] [facility] ([facility]) and then he had taken Imodium on 2017 because his ileostomy output was voluminous and watery. His ostomy output abruptly decreased and he experienced increased abdominal pain and nausea, but he did not vomit. For his chronic pain, he remains on a very slow narcotic taper. His current dose is one Percocet 5-325 mg bu mouth three times per day, and he is scheduled to decrease that does to twice per day beginning about 10 days from now. In the ER at [facility] last night, he expelled 400 mL from the ileostomy just before the CT scan (done without oral contrast) and then 475 mL shortly before being transferred here. The CT scan revealed some small bowel dilation, but no definite obstructive picture, and overall the bowel looked better than it had the last time. Here, he has been tolerating clear liquids and he has continued to have loose ileostomy output. He still has pain, but it is more consistent with his baseline.       PMH:  Past Medical History:   Diagnosis Date    Anal fistula     The patient no longer has an anus.     Anal stenosis     The patient no longer has an anus.    Crohn's disease ([facility]     GERD (gastroesophageal reflux disease)     H/O ulcerative colitis     Symptoms began in 1996. Total proctocolectomy was performed in 2004. Patient later developed Crohn's disease.  Hiatal hernia     Ileal pouchitis ([facility] 05/2004    The patient no longer has a pouch.  Nausea & vomiting     Combination of Scopalamine patch & Zofran IV worked well in past    Numbness in right leg     Chronic.  Opioid dependence ([facility]     History of opioid dependence requiring a detox program.    Psychiatric disorder     depression and anxiety    Skin lesion of back 3/17/2014    Syncopal episodes        PSH:  Past Surgical History:   Procedure Laterality Date    ABDOMEN SURGERY PROC UNLISTED  3/25/2004    Total proctocolectomy with creation of ileoanal J-pouch and loop ileostomy; Dr. Lu Goins   CHOLECYSTECTOMY  12/2016    Dr. Justin Rush  GI  5/2/2004    Examination under anesthesia with endoscopic evaluation of ileoanal pouch and placement of drain; Dr. Lu Goins   GI  5/25/2004    Ileostomy closure with small bowel resection and anastomosis; Dr. Lu Goins   GI  5/24/2012    Examination under anesthesia, anal dilatation, anal biopsy, and placement of draining seton; Dr. Lu Goins   GI  7/3/2012    Incision and drainage of perirectal abscess and rigid endoscopy of ileoanal pouch; Dr. Lu Goins   GI  7/31/2012    Incision and drainage of perirectal abscess, placement of draining seton, and anal dilatation; Dr. Lu Goins   GI  1/22/2013    Repair of anal fistulas with debridement, partial fistulectomy, and flap closure; Dr. Lu Goins   GI  5/17/2013    Examination under anesthesia, partial fistulotomy,  and placement of draining setons; Dr. Lu Goins   GI  8/6/2013    Anal fistulotomy and application of ACell micromatrix; Dr. Lu Goins      GI  2/20/2014    Examination under anesthesia and dilation of anal canal with rigid proctoscopy; Tam Ibarra MD.    HX GI  2015    Creation of Yesy Early continent intestinal reservoir (BCIR), abdominoperineal resection of ileoanal J-pouch; lysis of adhesions, and gastrostomy; Matilde Fields. Rufus Crystal MD ([place], [place])    HX GI  2015    Stoma revision; Matilde Fields. Rufus Crystal MD ([place], [place])    HX GI  10/29/2015    Extensive lysis of adhesions, resection of continent intestinal reservoir, repair of serosal tears, and creation of end-ileostomy; Dr. Ulises Mckeon.  HX GI  2017    Laparotomy, extensive lysis of adhesions and revision of ileostomy; Dr. Ulises Mckeon.  HX ORTHOPAEDIC  2008    Left ACL repair    HX UROLOGICAL  2017    Cystoscopy and placement of bilateral temporary ureteral catheters; Gordo Laughlin MD.       Home Medications:  Prior to Admission Medications   Prescriptions Last Dose Informant Patient Reported? Taking? Ustekinumab (STELARA) 90 mg/mL injection  Self Yes No   Si mg by SubCUTAneous route. Every 8 weeks    acetaminophen (TYLENOL) 500 mg tablet   Yes No   Sig: Take 500 mg by mouth four (4) times daily as needed (mild pain). amitriptyline (ELAVIL) 10 mg tablet  Self Yes No   Sig: Take 30 mg by mouth nightly. buPROPion SR (WELLBUTRIN SR) 150 mg SR tablet  Self Yes No   Sig: Take 150 mg by mouth two (2) times a day. cyanocobalamin (VITAMIN B-12) 1,000 mcg/mL injection  Self Yes No   Si,000 mcg by IntraMUSCular route every . Indications: Once weekly   diazepam (VALIUM) 5 mg tablet  Self Yes No   Sig: Take 5 mg by mouth every eight (8) hours as needed (bowel relaxation). dicyclomine (BENTYL) 10 mg capsule  Self Yes No   Sig: Take 10 mg by mouth every six (6) hours as needed. ergocalciferol (ERGOCALCIFEROL) 50,000 unit capsule  Self Yes No   Sig: Take 50,000 Units by mouth every Monday. omeprazole (PRILOSEC) 40 mg capsule  Self Yes No   Sig: Take 40 mg by mouth Daily (before dinner).    ondansetron hcl (ZOFRAN, AS HYDROCHLORIDE,) 8 mg tablet  Self Yes No   Sig: Take 8 mg by mouth every twelve (12) hours as needed for Nausea. oxyCODONE-acetaminophen (PERCOCET) 5-325 mg per tablet   No No   Sig: Take by mouth per taper: 2 tablets every 4 hours for 5 days, then 1 tablet every 4 hours for 5 days. After that, please see Dr. Juan Carlos Strauss to continue your taper. (Do NOT take additional acetaminophen/Tyelonol while taking Percocet)   pregabalin (LYRICA) 100 mg capsule  Self Yes No   Sig: Take 100 mg by mouth three (3) times daily. prochlorperazine (COMPAZINE) 10 mg tablet   Yes No   Sig: Take 10 mg by mouth two (2) times daily as needed for Nausea. promethazine (PHENERGAN) 25 mg tablet  Self Yes No   Sig: Take 25 mg by mouth every six (6) hours as needed for Nausea. temazepam (RESTORIL) 30 mg capsule  Self Yes No   Sig: Take 30 mg by mouth nightly. traMADol (ULTRAM) 50 mg tablet  Self Yes No   Sig: Take 50 mg by mouth three (3) times daily as needed (moderate pain).       Facility-Administered Medications: None       Hospital Medications:  Current Facility-Administered Medications   Medication Dose Route Frequency    heparin (porcine) injection 5,000 Units  5,000 Units SubCUTAneous Q8H    acetaminophen (TYLENOL) tablet 500 mg  500 mg Oral QID PRN    amitriptyline (ELAVIL) tablet 30 mg  30 mg Oral QHS    buPROPion SR (WELLBUTRIN SR) tablet 150 mg  150 mg Oral BID    diazePAM (VALIUM) tablet 5 mg  5 mg Oral Q8H PRN    dicyclomine (BENTYL) capsule 10 mg  10 mg Oral Q6H PRN    pantoprazole (PROTONIX) tablet 40 mg  40 mg Oral ACD    ondansetron (ZOFRAN ODT) tablet 8 mg  8 mg Oral Q12H PRN    pregabalin (LYRICA) capsule 100 mg  100 mg Oral TID    prochlorperazine (COMPAZINE) tablet 10 mg  10 mg Oral BID PRN    promethazine (PHENERGAN) tablet 25 mg  25 mg Oral Q6H PRN    temazepam (RESTORIL) capsule 30 mg  30 mg Oral QHS    traMADol (ULTRAM) tablet 50 mg  50 mg Oral TID PRN    sodium chloride (NS) flush 5-10 mL  5-10 mL IntraVENous Q8H    sodium chloride (NS) flush 5-10 mL  5-10 mL IntraVENous PRN    0.9% sodium chloride infusion  150 mL/hr IntraVENous CONTINUOUS    HYDROmorphone (PF) (DILAUDID) injection 2 mg  2 mg IntraVENous Q4H PRN       Allergies: Allergies   Allergen Reactions    Remicade [Infliximab] Anaphylaxis and Shortness of Breath    Bactrim [Sulfamethoprim Ds] Other (comments)     Increased GI distress.  Nsaids (Non-Steroidal Anti-Inflammatory Drug) Other (comments)     Stomach ulcers       Family History:  Family History   Problem Relation Age of Onset    Cancer Father     Asthma Neg Hx     Diabetes Neg Hx     Heart Disease Neg Hx     Hypertension Neg Hx     Stroke Neg Hx     Malignant Hyperthermia Neg Hx     Pseudocholinesterase Deficiency Neg Hx     Delayed Awakening Neg Hx     Post-op Nausea/Vomiting Neg Hx        Social History:  Social History   Substance Use Topics    Smoking status: Former Smoker     Packs/day: 0.00     Years: 7.00     Quit date: 1/14/2017    Smokeless tobacco: Never Used    Alcohol use No       Review of Systems:    Symptom Y/N Comments  Symptom Y/N Comments   Fever/Chills    Chest Pain     Cough    Abdominal Pain     Sputum    Joint Pain     SOB/CEDILLO    Pruritis/Rash     Nausea/vomit    Tolerating PT/OT     Diarrhea    Tolerating Diet     Constipation    Other       Could NOT obtain due to:        Objective:   Patient Vitals for the past 8 hrs:   BP Temp Pulse Resp SpO2   07/08/17 0903 115/74 98.3 °F (36.8 °C) 79 18 97 %     07/08 0701 - 07/08 1900  In: 480 [P.O.:480]  Out: -   07/06 1901 - 07/08 0700  In: 967.5 [I.V.:967.5]  Out: 850 [Urine:500]    PHYSICAL EXAMINATION:    GENERAL:  No distress. HEENT:  Anicteric. ABDOMEN:  Somewhat tender. Non-distended. Multiple scars. Right-sided ileostomy. NEURO:  Alert and oriented.        Data Review     Recent Labs      07/08/17   0530  07/07/17   1656   WBC  7.8  10.4   HGB  10.5*  12.1   HCT  33.9*  37.9 PLT  209  266     Recent Labs      07/08/17   0530  07/07/17   1656   NA  141  139   K  4.1  3.9   CL  108  102   CO2  25  29   BUN  5*  5*   CREA  0.78  0.97   GLU  83  135*   CA  8.5  9.1     Recent Labs      07/07/17   1656   SGOT  19   AP  140*   TP  7.6   ALB  3.7   GLOB  3.9   LPSE  99     No results for input(s): INR, PTP, APTT in the last 72 hours. No lab exists for component: INREXT                    Assessment and Plan:      The patient appears to be doing better than he was when he presented th the ER. I think that his problems are due to a combination of anatomical and functional abnormalities that are compounded by his opioid dependence. There is no evidence of intestinal compromise or high-grade obstruction, and I do not think that there is any indication for surgery. I stressed the importance of continuing to work toward getting off of the opioids. I also advised him not to take the Imodium any more. I would like him to continue with the clear liquid diet for now, then try the GI Lite diet for dinner. If he can tolerate at least some solid food for dinner tonight and breakfast tomorrow without a significant setback, then he should be able to go home tomorrow and to follow up with Dr. Seth Brian as scheduled. Active Problems:    * No active hospital problems.  *

## 2017-07-08 NOTE — PROGRESS NOTES
Primary Nurse Aminta Gloria RN and Taz Yoon RN performed a dual skin assessment on this patient No impairment noted  Eduardo score is 22

## 2017-07-08 NOTE — PROGRESS NOTES
TRANSFER - IN REPORT:    Verbal report received from Lyndsay Oseguera on Jonathan Bernal  being received from [facility] ED(unit) for routine progression of care      Report consisted of patients Situation, Background, Assessment and   Recommendations(SBAR). Information from the following report(s) SBAR, ED Summary, [facility] and Recent Results was reviewed with the receiving nurse. Opportunity for questions and clarification was provided. Assessment completed upon patients arrival to unit and care assumed.

## 2017-07-08 NOTE — PROGRESS NOTES
Spiritual Care Partner Volunteer visited patient in [address] [place] [address] on 7.8. 17.     Documented by:  Lamotne Cox, Staff   Please call [phone] to page  if needed

## 2017-07-08 NOTE — H&P
History & Physical  Clinical Observation Unit    Date of admission: 7/7/2017    Patient name: Leonardo Shepard  MRN: [id number]  YOB: 1975  Age: 40 y.o. Primary care provider:  Abimbola Hong MD     Source of Information: patient, medical records and attending physician                                  Chief complain: Abdominal pain; early SBO    History of present illness   Leonardo Shepard is a 40 y.o. white male with past medical history of Crohn's disease, multiple surgeries, total proctocolectomy with creation of ileo-anal pouch and end ileostomy, s/p ileostomy revision 3/17), anxiety, depression presented as a direct admission from [facility]  ER to [address]  with reported abdominal pain, nausea, without vomiting and cessation of ileostomy output. This patient began feeling unwell Thursday night into Friday morning. He began to note a decrease in his ostomy output until it ceased altogether at ~6 am. Mr. Kt Patel also noted worsening colikcy abdominal pain that disrupted his sleep. He could not tolerate his discomfort and he was concerned that he may have had another SBO. This patient had been was most recently  hospitalized at [facility]  6/12-17/17 secondary to SBO that  resolved without surgical intervention. This patient also reports continued severe coliky ( coming in waves cramping burning) abdominal pain despite resumption of ileostomy function after undergoing imaging studies. He denies bloody drainage from ostomy, cough, chest pain, vomiting, fever, night sweats, headache or change in LOC. In the ER at [facility] he underwent the following diagnostic studies: CT ABD/Pelv w Cont-Fluid-filled right lower quadrant  ostomy, but less small bowel distension as  compared to the prior study. His laboratory data  Revealed WBC 10.4, hgb 12.1, plt 266, , K 3.9, glucose 135, creatine 0.97.   He was given IVF, IV antiemetic, and IV analgesic. The ER provider at [facility] communicated with Dr. Theresa Alvarado who recommended hospitalist admission at [facility]. Pt was therefore transferred to [facility] as direct admit for continued monitoring and treatment for possible recurrent SBO. Past Medical History:   Diagnosis Date    Anal fistula     The patient no longer has an anus.  Anal stenosis     Crohn's disease ([address]     GERD (gastroesophageal reflux disease)     H/O ulcerative colitis     Symptoms began in 1996. Total proctocolectomy was performed in 2004. Patient later developed Crohn's disease.  Hiatal hernia     Ileal pouchitis ([address] 05/2004    The patient no longer has a pouch.  Nausea & vomiting     Combination of Scopalamine patch & Zofran IV worked well in past    Numbness in right leg     Chronic.  Opioid dependence ([address]     History of opioid dependence requiring a detox program.    Psychiatric disorder     depression and anxiety    Skin lesion of back 3/17/2014    Syncopal episodes       Past Surgical History:   Procedure Laterality Date    ABDOMEN SURGERY PROC UNLISTED  3/25/2004    Total proctocolectomy with creation of ileoanal J-pouch and loop ileostomy; Dr. Theresa Alvarado.   CHOLECYSTECTOMY  12/2016    Dr. Berta Ennis  GI  5/2/2004    Examination under anesthesia with endoscopic evaluation of ileoanal pouch and placement of drain; Dr. Theresa Alvarado.   GI  5/25/2004    Ileostomy closure with small bowel resection and anastomosis; Dr. Theresa Alvarado.   GI  5/24/2012    Examination under anesthesia, anal dilatation, anal biopsy, and placement of draining seton; Dr. Theresa Alvarado.   GI  7/3/2012    Incision and drainage of perirectal abscess and rigid endoscopy of ileoanal pouch; Dr. Theresa Alvarado.   GI  7/31/2012    Incision and drainage of perirectal abscess, placement of draining seton, and anal dilatation; Dr. Theresa Alvarado.      GI  1/22/2013    Repair of anal fistulas with debridement, partial fistulectomy, and flap closure; Dr. Alina Chaudhry.   GI  5/17/2013    Examination under anesthesia, partial fistulotomy,  and placement of draining setons; Dr. Alina Chaudhry.   GI  8/6/2013    Anal fistulotomy and application of ACell micromatrix; Dr. Alina Chaudhry.   GI  2/20/2014    Examination under anesthesia and dilation of anal canal with rigid proctoscopy; Vonnie Staples MD.     GI  4/29/2015    [facility]IR), abdominoperineal resection of ileoanal J-pouch; lysis of adhesions, and gastrostomy; Miguel Fatima. Nato Marin MD ([place], [place])     GI  8/7/2015    Stoma revision; Miguel Fatima. Nato Marin MD ([place], [place])     GI  10/29/2015    Extensive lysis of adhesions, resection of continent intestinal reservoir, repair of serosal tears, and creation of end-ileostomy; Dr. Alina Chaudhry.   GI  03/14/2017    Laparotomy, extensive lysis of adhesions and revision of ileostomy; Dr. Alina Chaudhry.   ORTHOPAEDIC  2008    Left ACL repair    HX UROLOGICAL  03/14/2017    Cystoscopy and placement of bilateral temporary ureteral catheters; Tamia Ny MD.     Prior to Admission medications    Medication Sig Start Date End Date Taking? Authorizing Provider   oxyCODONE-acetaminophen (PERCOCET) 5-325 mg per tablet Take by mouth per taper: 2 tablets every 4 hours for 5 days, then 1 tablet every 4 hours for 5 days. After that, please see Dr. Randolph Finley to continue your taper. (Do NOT take additional acetaminophen/Tyelonol while taking Percocet) 6/17/17   Forrest Hobbs MD   acetaminophen (TYLENOL) 500 mg tablet Take 500 mg by mouth four (4) times daily as needed (mild pain). Historical Provider   prochlorperazine (COMPAZINE) 10 mg tablet Take 10 mg by mouth two (2) times daily as needed for Nausea. Historical Provider   traMADol (ULTRAM) 50 mg tablet Take 50 mg by mouth three (3) times daily as needed (moderate pain). Historical Provider   Ustekinumab (STELARA) 90 mg/mL injection 90 mg by SubCUTAneous route. Every 8 weeks     Historical Provider   promethazine (PHENERGAN) 25 mg tablet Take 25 mg by mouth every six (6) hours as needed for Nausea. Phys MD Emily   ondansetron hcl (ZOFRAN, AS HYDROCHLORIDE,) 8 mg tablet Take 8 mg by mouth every twelve (12) hours as needed for Nausea. Phys MD Emily   amitriptyline (ELAVIL) 10 mg tablet Take 30 mg by mouth nightly. Historical Provider   buPROPion SR [facility] SR) 150 mg SR tablet Take 150 mg by mouth two (2) times a day. Historical Provider   omeprazole (PRILOSEC) 40 mg capsule Take 40 mg by mouth Daily (before dinner). Historical Provider   dicyclomine (BENTYL) 10 mg capsule Take 10 mg by mouth every six (6) hours as needed. Historical Provider   ergocalciferol (ERGOCALCIFEROL) 50,000 unit capsule Take 50,000 Units by mouth every Monday. Historical Provider   pregabalin (LYRICA) 100 mg capsule Take 100 mg by mouth three (3) times daily. Historical Provider   cyanocobalamin (VITAMIN B-12) 1,000 mcg/mL injection 1,000 mcg by IntraMUSCular route every Sunday. Indications: Once weekly    Miles Diaz MD   temazepam (RESTORIL) 30 mg capsule Take 30 mg by mouth nightly. Historical Provider   diazepam (VALIUM) 5 mg tablet Take 5 mg by mouth every eight (8) hours as needed (bowel relaxation). Phys MD Emily     Allergies   Allergen Reactions    Remicade [Infliximab] Anaphylaxis and Shortness of Breath    Bactrim [Sulfamethoprim Ds] Other (comments)     Increased GI distress.     Nsaids (Non-Steroidal Anti-Inflammatory Drug) Other (comments)     Stomach ulcers      Family History   Problem Relation Age of Onset    Cancer Father     Asthma Neg Hx     Diabetes Neg Hx     Heart Disease Neg Hx     Hypertension Neg Hx     Stroke Neg Hx     Malignant Hyperthermia Neg Hx     Pseudocholinesterase Deficiency Neg Hx     Delayed Awakening Neg Hx     Post-op Nausea/Vomiting Neg Hx       Family history reviewed and non-contributory. Social history  Patient resides  x  Independently      With family care      Assisted living      SNF    Ambulates  x  Independently      With cane       Assisted walker         Alcohol history   x  None     Social     Chronic   Smoking history  x  None     Former smoker     Current smoker       Code status  x  Full code     DNR/DNI        Code status discussed with the patient/caregivers. Prior    Review of systems  The patient denies any fever, chills, chest pain, cough, congestion, recent illness, palpitations, or dysuria. Constitutional: positive for malaise  Eyes: negative  Ears, Nose, Mouth, Throat, and Face: negative  Respiratory: negative  Cardiovascular: negative  Gastrointestinal: positive for nausea, constipation and abdominal pain  Genitourinary:negative  Hematologic/Lymphatic: negative  Musculoskeletal:positive for myalgias and arthralgias  Neurological: negative   The remainder of the review of systems was reviewed and is noncontributory. Physical Examination   Visit Vitals    /70 (BP 1 Location: Left arm, BP Patient Position: At rest)    Pulse 98    Temp 98.6 °F (37 °C)    Resp 18    SpO2 95%          O2 Device: Room air    General:  Alert, cooperative, no distress   Head:  Normocephalic, without obvious abnormality, atraumatic   Eyes:  Conjunctivae/corneas clear. PERRL, EOMs intact   E/N/M/T: Nares normal. Septum midline.  No nasal drainage or sinus tenderness  Lips, mucosa, and tongue normal   Teeth and gums normal  Clear oropharynx   Neck: Normal appearance and movements, symmetrical, trachea midline  No palpable adenopathy  No thyroid enlargement, tenderness or nodules  No carotid bruit   Normal JVP   Lungs:   Symmetrical chest expansion and respiratory effort  Clear to auscultation bilaterally   Chest wall:  No tenderness or deformity   Heart:  Regular rhythm   Sounds normal; no murmur, click, rub or gallop   Abdomen:   Soft, + tenderness  Bowel sounds normal  ileostomy stoma pink with liquid output     Back: No CVA tenderness   Extremities: Extremities normal, atraumatic  No cyanosis or edema  No DVT signs   Pulses 2+ and symmetric all extremities   Skin: No rashes or ulcers   Musculo-      skeletal: Gait not tested  Normal symmetry, ROM, strength and tone   Neuro: Normal cranial nerves  Normal reflexes and sensation   Psych: Alert, oriented x3  Normal affect, judgement and insight   Geniturinary: Deferred      Data Review      24 Hour Results:  Recent Results (from the past 24 hour(s))   METABOLIC PANEL, COMPREHENSIVE    Collection Time: 07/07/17  4:56 PM   Result Value Ref Range    Sodium 139 136 - 145 mmol/L    Potassium 3.9 3.5 - 5.1 mmol/L    Chloride 102 97 - 108 mmol/L    CO2 29 21 - 32 mmol/L    Anion gap 8 5 - 15 mmol/L    Glucose 135 (H) 65 - 100 mg/dL    BUN 5 (L) 6 - 20 MG/DL    Creatinine 0.97 0.70 - 1.30 MG/DL    BUN/Creatinine ratio 5 (L) 12 - 20      GFR est AA >60 >60 ml/min/1.73m2    GFR est non-AA >60 >60 ml/min/1.73m2    Calcium 9.1 8.5 - 10.1 MG/DL    Bilirubin, total 0.3 0.2 - 1.0 MG/DL    ALT (SGPT) 23 12 - 78 U/L    AST (SGOT) 19 15 - 37 U/L    Alk.  phosphatase 140 (H) 45 - 117 U/L    Protein, total 7.6 6.4 - 8.2 g/dL    Albumin 3.7 3.5 - 5.0 g/dL    Globulin 3.9 2.0 - 4.0 g/dL    A-G Ratio 0.9 (L) 1.1 - 2.2     LIPASE    Collection Time: 07/07/17  4:56 PM   Result Value Ref Range    Lipase 99 73 - 393 U/L   CBC WITH AUTOMATED DIFF    Collection Time: 07/07/17  4:56 PM   Result Value Ref Range    WBC 10.4 4.1 - 11.1 K/uL    RBC 4.82 4.10 - 5.70 M/uL    HGB 12.1 12.1 - 17.0 g/dL    HCT 37.9 36.6 - 50.3 %    MCV 78.6 (L) 80.0 - 99.0 FL    MCH 25.1 (L) 26.0 - 34.0 PG    MCHC 31.9 30.0 - 36.5 g/dL    RDW 15.9 (H) 11.5 - 14.5 %    PLATELET 203 141 - 516 K/uL    NEUTROPHILS 81 (H) 32 - 75 %    LYMPHOCYTES 13 12 - 49 %    MONOCYTES 5 5 - 13 %    EOSINOPHILS 1 0 - 7 %    BASOPHILS 0 0 - 1 % ABS. NEUTROPHILS 8.4 (H) 1.8 - 8.0 K/UL    ABS. LYMPHOCYTES 1.4 0.8 - 3.5 K/UL    ABS. MONOCYTES 0.5 0.0 - 1.0 K/UL    ABS. EOSINOPHILS 0.1 0.0 - 0.4 K/UL    ABS. BASOPHILS 0.0 0.0 - 0.1 K/UL   URINALYSIS W/MICROSCOPIC    Collection Time: 07/07/17  5:49 PM   Result Value Ref Range    Color DARK YELLOW      Appearance CLEAR CLEAR      Specific gravity 1.025 1.003 - 1.030      pH (UA) 5.5 5.0 - 8.0      Protein NEGATIVE  NEG mg/dL    Glucose NEGATIVE  NEG mg/dL    Ketone TRACE (A) NEG mg/dL    Blood NEGATIVE  NEG      Urobilinogen 0.2 0.2 - 1.0 EU/dL    Nitrites NEGATIVE  NEG      Leukocyte Esterase NEGATIVE  NEG      WBC 0-4 0 - 4 /hpf    RBC 0-5 0 - 5 /hpf    Epithelial cells FEW FEW /lpf    Bacteria NEGATIVE  NEG /hpf    Hyaline cast 2-5 0 - 5 /lpf   BILIRUBIN, CONFIRM    Collection Time: 07/07/17  5:49 PM   Result Value Ref Range    Bilirubin UA, confirm NEGATIVE  NEG       Recent Labs      07/07/17   1656   WBC  10.4   HGB  12.1   HCT  37.9   PLT  266     Recent Labs      07/07/17   1656   NA  139   K  3.9   CL  102   CO2  29   GLU  135*   BUN  5*   CREA  0.97   CA  9.1   ALB  3.7   TBILI  0.3   SGOT  19   ALT  23       Imaging  FINDINGS: CT ABD-PELV w cont 7/7/17  LUNG BASES: Clear. INCIDENTALLY IMAGED HEART AND MEDIASTINUM: Unremarkable. LIVER: No mass or biliary dilatation. GALLBLADDER: Surgically absent  SPLEEN: No mass. PANCREAS: No mass or ductal dilatation. ADRENALS: Unremarkable. KIDNEYS: No mass, calculus, or hydronephrosis. STOMACH: Unremarkable. SMALL BOWEL: Less distention than seen on the prior study. COLON: Surgically absent. Fluid-filled right lower quadrant ostomy. APPENDIX: Unremarkable. PERITONEUM: No ascites or pneumoperitoneum. RETROPERITONEUM: No lymphadenopathy or aortic aneurysm. REPRODUCTIVE ORGANS: Unremarkable  URINARY BLADDER: No mass or calculus. BONES: No destructive bone lesion.   ADDITIONAL COMMENTS: N/A    IMPRESSION:  Fluid-filled right lower quadrant  ostomy, but less small bowel distension as  compared to the prior study      Assessment and Plan   1. Hx of SBO in patient with significant colorectal surgical hx including recent exploartory Lap with LUCIEN and bowel resection (3/17)-  Appears to have resolved no vomiting/ ielostomy functioning  - will hold on NGT for now  -Admit to medical/ surgical floor  - IV hydration   - Clear liquid diet  - Consult colorectal surgery -Dr. Ary Garcia  - may possible need small bowel series  -I/O  - PRN antiemetics  2. Crohn disease chronic  - continue home therapy  3. Abdominal pain acute on chronic-  - patient has had long hx of opioid use he reports requiring 2 mg IV dilaudid with antiemetic to control pain. Suspect chronic opioid use contributes to SBO frequency   - Concerned that long term use has collided with addiction. - Will follow up with op provider for continued attempts at weaning.      Diet: Clear liquid advance as per surgery   Activity: encourage ambulation   Consultations: Colorectal Surgery   Anticipated disposition: TBD expect 1-2 days  DVT prophy SCD/ SQ heparin          Signed by: Luna Banks NP    July 7, 2017 at 11:33 PM

## 2017-07-08 NOTE — PROGRESS NOTES
Hospitalist Progress Note  Shyla Georges MD  Office: [phone]  Cell: [phone]      Date of Service:  2017  NAME:  Leonardo Shepard  :  1975  MRN:  [id number]      Admission Summary:   40 y.o. white male with past medical history of Crohn's disease, multiple surgeries, total proctocolectomy with creation of ileo-anal pouch and end ileostomy, s/p ileostomy revision 3/17), anxiety, depression presented as a direct admission from [facility]  ER to [facility]  with reported abdominal pain, nausea, without vomiting and cessation of ileostomy output. Interval history / Subjective:     His ileostomy has started to put out liquid output again. His pain has improved towards his baseline. He states that he has been eating things that he usually does not, had been taking Imodium in addition to his chronic opiate regimen. He overall feels better. Assessment & Plan:     1. Abdominal pain, reduced ileostomy output, history of Crohn's disease, ruled out small bowel obstruction   - suspect confluence of factors including changes in diet, use of Imodium along with narcotic medications contributing, now improving   - CT Abd/Pelvis  - Fluid-filled right lower quadrant  ostomy, but less small bowel distension as compared to the prior study   - IV fluids   - pain control, prn anti-emetics   - seen by colorectal surgery - advance diet to GI lite, if tolerated, likely discharge to home tomorrow    2. Depression/anxiety   - continue home medications including Wellbutrin, Elavil, Lyrica    Code status: FULL  DVT prophylaxis: heparin    Care Plan discussed with: Patient/Family and Nurse  Disposition: Home w/Family     Hospital Problems  Date Reviewed: 2017    None            Review of Systems:   A comprehensive review of systems was negative except for that written in the HPI.        Vital Signs:    Last 24hrs VS reviewed since prior progress note. Most recent are:  Visit Vitals    /69 (BP 1 Location: Left arm, BP Patient Position: At rest)    Pulse 69    Temp 97.6 °F (36.4 °C)    Resp 16    SpO2 100%         Intake/Output Summary (Last 24 hours) at 07/08/17 1829  Last data filed at 07/08/17 1722   Gross per 24 hour   Intake           2287.5 ml   Output             3500 ml   Net          -1212.5 ml        Physical Examination:             Constitutional:  No acute distress, cooperative, pleasant    ENT:  Oral mucous moist, oropharynx benign. Neck supple,    Resp:  CTA bilaterally. No wheezing/rhonchi/rales. No accessory muscle use   CV:  Regular rhythm, normal rate, no murmurs, gallops, rubs    GI:  Soft, non distended, non tender. normoactive bowel sounds, no hepatosplenomegaly, ileostomy in place with some stool output    Musculoskeletal:  No edema, warm, 2+ pulses throughout    Neurologic:  Moves all extremities. AAOx3, CN II-XII reviewed          Data Review:    Review and/or order of clinical lab test      Labs:     Recent Labs      07/08/17   0530  07/07/17   1656   WBC  7.8  10.4   HGB  10.5*  12.1   HCT  33.9*  37.9   PLT  209  266     Recent Labs      07/08/17   0530  07/07/17   1656   NA  141  139   K  4.1  3.9   CL  108  102   CO2  25  29   BUN  5*  5*   CREA  0.78  0.97   GLU  83  135*   CA  8.5  9.1     Recent Labs      07/07/17   1656   SGOT  19   ALT  23   AP  140*   TBILI  0.3   TP  7.6   ALB  3.7   GLOB  3.9   LPSE  99     No results for input(s): INR, PTP, APTT in the last 72 hours. No lab exists for component: INREXT   No results for input(s): FE, TIBC, PSAT, FERR in the last 72 hours. No results found for: FOL, RBCF   No results for input(s): PH, PCO2, PO2 in the last 72 hours. No results for input(s): CPK, CKNDX, TROIQ in the last 72 hours.     No lab exists for component: CPKMB  Lab Results   Component Value Date/Time    Cholesterol, total 223 02/19/2009 04:00 PM    HDL Cholesterol 51 02/19/2009 04:00 PM    LDL, calculated 129.4 02/19/2009 04:00 PM    Triglyceride 213 02/19/2009 04:00 PM    CHOL/HDL Ratio 4.4 02/19/2009 04:00 PM     Lab Results   Component Value Date/Time    Glucose (POC) 100 06/17/2017 11:22 AM    Glucose (POC) 137 06/17/2017 05:54 AM    Glucose (POC) 120 06/16/2017 09:40 PM    Glucose (POC) 92 06/16/2017 04:44 PM    Glucose (POC) 97 06/16/2017 11:19 AM     Lab Results   Component Value Date/Time    Color DARK YELLOW 07/07/2017 05:49 PM    Appearance CLEAR 07/07/2017 05:49 PM    Specific gravity 1.025 07/07/2017 05:49 PM    Specific gravity >1.030 05/20/2017 01:45 AM    pH (UA) 5.5 07/07/2017 05:49 PM    Protein NEGATIVE  07/07/2017 05:49 PM    Glucose NEGATIVE  07/07/2017 05:49 PM    Ketone TRACE 07/07/2017 05:49 PM    Bilirubin NEGATIVE  06/11/2017 08:53 PM    Urobilinogen 0.2 07/07/2017 05:49 PM    Nitrites NEGATIVE  07/07/2017 05:49 PM    Leukocyte Esterase NEGATIVE  07/07/2017 05:49 PM    Epithelial cells FEW 07/07/2017 05:49 PM    Bacteria NEGATIVE  07/07/2017 05:49 PM    WBC 0-4 07/07/2017 05:49 PM    RBC 0-5 07/07/2017 05:49 PM         Medications Reviewed:     Current Facility-Administered Medications   Medication Dose Route Frequency    heparin (porcine) injection 5,000 Units  5,000 Units SubCUTAneous Q8H    acetaminophen (TYLENOL) tablet 500 mg  500 mg Oral QID PRN    amitriptyline (ELAVIL) tablet 30 mg  30 mg Oral QHS    buPROPion SR (WELLBUTRIN SR) tablet 150 mg  150 mg Oral BID    diazePAM (VALIUM) tablet 5 mg  5 mg Oral Q8H PRN    dicyclomine (BENTYL) capsule 10 mg  10 mg Oral Q6H PRN    pantoprazole (PROTONIX) tablet 40 mg  40 mg Oral ACD    ondansetron (ZOFRAN ODT) tablet 8 mg  8 mg Oral Q12H PRN    pregabalin (LYRICA) capsule 100 mg  100 mg Oral TID    prochlorperazine (COMPAZINE) tablet 10 mg  10 mg Oral BID PRN    promethazine (PHENERGAN) tablet 25 mg  25 mg Oral Q6H PRN    temazepam (RESTORIL) capsule 30 mg  30 mg Oral QHS    traMADol (ULTRAM) tablet 50 mg 50 mg Oral TID PRN    sodium chloride (NS) flush 5-10 mL  5-10 mL IntraVENous Q8H    sodium chloride (NS) flush 5-10 mL  5-10 mL IntraVENous PRN    0.9% sodium chloride infusion  150 mL/hr IntraVENous CONTINUOUS    HYDROmorphone (PF) (DILAUDID) injection 2 mg  2 mg IntraVENous Q4H PRN     ______________________________________________________________________  EXPECTED LENGTH OF STAY: - - -  ACTUAL LENGTH OF STAY:          1                 Reggie Rosen MD

## 2017-07-08 NOTE — PROGRESS NOTES
Pt arrives from [facility]; pain level 6/10 upper mid abd crampy/sharp and slight nausea; pt reports ileostomy output 400ml before CT at [facility] and then 475ml before transfer at 9pm from [facility]. VSS at this time. Call out to Dr Sharad Vega for orders.

## 2017-07-09 VITALS
DIASTOLIC BLOOD PRESSURE: 72 MMHG | RESPIRATION RATE: 18 BRPM | TEMPERATURE: 98.3 F | SYSTOLIC BLOOD PRESSURE: 109 MMHG | OXYGEN SATURATION: 97 % | HEART RATE: 103 BPM

## 2017-07-09 PROBLEM — K56.609 BOWEL OBSTRUCTION (HCC): Status: ACTIVE | Noted: 2017-07-09

## 2017-07-09 PROBLEM — K56.609 BOWEL OBSTRUCTION (HCC): Status: RESOLVED | Noted: 2017-07-09 | Resolved: 2017-07-09

## 2017-07-09 PROBLEM — K56.609 SBO (SMALL BOWEL OBSTRUCTION) (HCC): Status: RESOLVED | Noted: 2017-03-07 | Resolved: 2017-07-09

## 2017-07-09 LAB
ANION GAP BLD CALC-SCNC: 8 MMOL/L (ref 5–15)
BASOPHILS # BLD AUTO: 0 K/UL (ref 0–0.1)
BASOPHILS # BLD: 0 % (ref 0–1)
BUN SERPL-MCNC: 3 MG/DL (ref 6–20)
BUN/CREAT SERPL: 4 (ref 12–20)
CALCIUM SERPL-MCNC: 8.4 MG/DL (ref 8.5–10.1)
CHLORIDE SERPL-SCNC: 108 MMOL/L (ref 97–108)
CO2 SERPL-SCNC: 26 MMOL/L (ref 21–32)
CREAT SERPL-MCNC: 0.68 MG/DL (ref 0.7–1.3)
EOSINOPHIL # BLD: 0.2 K/UL (ref 0–0.4)
EOSINOPHIL NFR BLD: 4 % (ref 0–7)
ERYTHROCYTE [DISTWIDTH] IN BLOOD BY AUTOMATED COUNT: 15.8 % (ref 11.5–14.5)
GLUCOSE SERPL-MCNC: 75 MG/DL (ref 65–100)
HCT VFR BLD AUTO: 31 % (ref 36.6–50.3)
HGB BLD-MCNC: 9.6 G/DL (ref 12.1–17)
LYMPHOCYTES # BLD AUTO: 33 % (ref 12–49)
LYMPHOCYTES # BLD: 1.7 K/UL (ref 0.8–3.5)
MCH RBC QN AUTO: 24.7 PG (ref 26–34)
MCHC RBC AUTO-ENTMCNC: 31 G/DL (ref 30–36.5)
MCV RBC AUTO: 79.7 FL (ref 80–99)
MONOCYTES # BLD: 0.3 K/UL (ref 0–1)
MONOCYTES NFR BLD AUTO: 5 % (ref 5–13)
NEUTS SEG # BLD: 3 K/UL (ref 1.8–8)
NEUTS SEG NFR BLD AUTO: 58 % (ref 32–75)
PLATELET # BLD AUTO: 164 K/UL (ref 150–400)
POTASSIUM SERPL-SCNC: 3.8 MMOL/L (ref 3.5–5.1)
RBC # BLD AUTO: 3.89 M/UL (ref 4.1–5.7)
SODIUM SERPL-SCNC: 142 MMOL/L (ref 136–145)
WBC # BLD AUTO: 5.2 K/UL (ref 4.1–11.1)

## 2017-07-09 PROCEDURE — 80048 BASIC METABOLIC PNL TOTAL CA: CPT | Performed by: STUDENT IN AN ORGANIZED HEALTH CARE EDUCATION/TRAINING PROGRAM

## 2017-07-09 PROCEDURE — 85025 COMPLETE CBC W/AUTO DIFF WBC: CPT | Performed by: COLON & RECTAL SURGERY

## 2017-07-09 PROCEDURE — 74011250636 HC RX REV CODE- 250/636: Performed by: NURSE PRACTITIONER

## 2017-07-09 PROCEDURE — 74011250637 HC RX REV CODE- 250/637: Performed by: NURSE PRACTITIONER

## 2017-07-09 PROCEDURE — 36415 COLL VENOUS BLD VENIPUNCTURE: CPT | Performed by: COLON & RECTAL SURGERY

## 2017-07-09 RX ADMIN — HYDROMORPHONE HYDROCHLORIDE 2 MG: 1 INJECTION, SOLUTION INTRAMUSCULAR; INTRAVENOUS; SUBCUTANEOUS at 05:25

## 2017-07-09 RX ADMIN — HYDROMORPHONE HYDROCHLORIDE 2 MG: 1 INJECTION, SOLUTION INTRAMUSCULAR; INTRAVENOUS; SUBCUTANEOUS at 13:31

## 2017-07-09 RX ADMIN — Medication 10 ML: at 06:42

## 2017-07-09 RX ADMIN — HYDROMORPHONE HYDROCHLORIDE 2 MG: 1 INJECTION, SOLUTION INTRAMUSCULAR; INTRAVENOUS; SUBCUTANEOUS at 09:32

## 2017-07-09 RX ADMIN — BUPROPION HYDROCHLORIDE 150 MG: 150 TABLET, EXTENDED RELEASE ORAL at 09:32

## 2017-07-09 RX ADMIN — HEPARIN SODIUM 5000 UNITS: 5000 INJECTION, SOLUTION INTRAVENOUS; SUBCUTANEOUS at 06:39

## 2017-07-09 RX ADMIN — SODIUM CHLORIDE 150 ML/HR: 900 INJECTION, SOLUTION INTRAVENOUS at 02:12

## 2017-07-09 RX ADMIN — HYDROMORPHONE HYDROCHLORIDE 2 MG: 1 INJECTION, SOLUTION INTRAMUSCULAR; INTRAVENOUS; SUBCUTANEOUS at 01:13

## 2017-07-09 RX ADMIN — PREGABALIN 100 MG: 75 CAPSULE ORAL at 09:32

## 2017-07-09 NOTE — PROGRESS NOTES
Written and verbal instructions given to pt and his spouse. No prescriptions, pt aware of when to follow up with his pcp and gi md. No questions or concerns voiced, teach back completed.

## 2017-07-09 NOTE — PROGRESS NOTES
General Daily Progress Note    Admission Date: 7/7/2017  Hospital Day 3  Post-Op Day Not Applicable  Subjective:   Pain mostly back to baseline. Tolerating low fiber diet. Objective:   Patient Vitals for the past 24 hrs:   BP Temp Pulse Resp SpO2   07/09/17 0930 109/72 98.3 °F (36.8 °C) (!) 103 18 97 %   07/09/17 0009 123/81 98.3 °F (36.8 °C) 99 16 98 %   07/08/17 1601 104/69 97.6 °F (36.4 °C) 69 16 100 %     07/09 0701 - 07/09 1900  In: 360 [P.O.:360]  Out: 800 [Urine:600]  07/07 1901 - 07/09 0700  In: 2287.5 [P.O.:1320; I.V.:967.5]  Out: 7050 [Urine:5700]      Physical Examination:  No distress. Abdomen softer, non-distended, and only mildly tender. Data Review   Recent Results (from the past 24 hour(s))   CBC WITH AUTOMATED DIFF    Collection Time: 07/09/17  1:21 AM   Result Value Ref Range    WBC 5.2 4.1 - 11.1 K/uL    RBC 3.89 (L) 4.10 - 5.70 M/uL    HGB 9.6 (L) 12.1 - 17.0 g/dL    HCT 31.0 (L) 36.6 - 50.3 %    MCV 79.7 (L) 80.0 - 99.0 FL    MCH 24.7 (L) 26.0 - 34.0 PG    MCHC 31.0 30.0 - 36.5 g/dL    RDW 15.8 (H) 11.5 - 14.5 %    PLATELET 921 356 - 307 K/uL    NEUTROPHILS 58 32 - 75 %    LYMPHOCYTES 33 12 - 49 %    MONOCYTES 5 5 - 13 %    EOSINOPHILS 4 0 - 7 %    BASOPHILS 0 0 - 1 %    ABS. NEUTROPHILS 3.0 1.8 - 8.0 K/UL    ABS. LYMPHOCYTES 1.7 0.8 - 3.5 K/UL    ABS. MONOCYTES 0.3 0.0 - 1.0 K/UL    ABS. EOSINOPHILS 0.2 0.0 - 0.4 K/UL    ABS. BASOPHILS 0.0 0.0 - 0.1 K/UL   METABOLIC PANEL, BASIC    Collection Time: 07/09/17  8:05 AM   Result Value Ref Range    Sodium 142 136 - 145 mmol/L    Potassium 3.8 3.5 - 5.1 mmol/L    Chloride 108 97 - 108 mmol/L    CO2 26 21 - 32 mmol/L    Anion gap 8 5 - 15 mmol/L    Glucose 75 65 - 100 mg/dL    BUN 3 (L) 6 - 20 MG/DL    Creatinine 0.68 (L) 0.70 - 1.30 MG/DL    BUN/Creatinine ratio 4 (L) 12 - 20      GFR est AA >60 >60 ml/min/1.73m2    GFR est non-AA >60 >60 ml/min/1.73m2    Calcium 8.4 (L) 8.5 - 10.1 MG/DL           Assessment and Plan:      Active Problems:    SBO (small bowel obstruction) ([facility] (3/7/2017)      Bowel obstruction ([facility] (7/9/2017)      Partial obstruction versus ileus precipitated by Imodium and poorly digested foods. The patient appears to have improved sufficiently to be able to go home. He should avoid Imodium, and he should follow up with Dr. Tenzin Garcia.

## 2017-07-09 NOTE — DISCHARGE INSTRUCTIONS
Discharge Instructions       PATIENT ID: Leonardo Shepard  MRN: [id number]   YOB: 1975    DATE OF ADMISSION: 7/7/2017 10:25 PM    DATE OF DISCHARGE: 7/9/2017    PRIMARY CARE PROVIDER: Abimbola Hong MD     ATTENDING PHYSICIAN: Shyla Georges MD  DISCHARGING PROVIDER: Shyla Georges MD    To contact this individual call [phone] and ask the  to page. If unavailable ask to be transferred the Adult Hospitalist Department. DISCHARGE DIAGNOSES     Crohn's disease    Abdominal pain, possible partial small bowel obstruction vs ileus    Depression/anxiety    CONSULTATIONS: IP CONSULT TO COLORECTAL SURGERY    PROCEDURES/SURGERIES: * No surgery found *    PENDING TEST RESULTS:   At the time of discharge the following test results are still pending: None. FOLLOW UP APPOINTMENTS:   Follow-up Information     Follow up With Details Comments Contact Info    Abimbola Hong MD Schedule an appointment as soon as possible for a visit  [address] [facility] [address]  [phone]             ADDITIONAL CARE RECOMMENDATIONS:     You may resume your home medications as previously prescribed. Do not resume Imodium. Follow-up with your primary care provider in 1 week. DIET: Regular Diet    ACTIVITY: Activity as tolerated    WOUND CARE: Resume previous care    EQUIPMENT needed: N/A      DISCHARGE MEDICATIONS:   See Medication Reconciliation Form    · It is important that you take the medication exactly as they are prescribed. · Keep your medication in the bottles provided by the pharmacist and keep a list of the medication names, dosages, and times to be taken in your wallet. · Do not take other medications without consulting your doctor. NOTIFY YOUR PHYSICIAN FOR ANY OF THE FOLLOWING:   Fever over 101 degrees for 24 hours. Chest pain, shortness of breath, fever, chills, nausea, vomiting, diarrhea, change in mentation, falling, weakness, bleeding. Severe pain or pain not relieved by medications. Or, any other signs or symptoms that you may have questions about. DISPOSITION:  x  Home With:   OT  PT  HH  RN       SNF/Inpatient Rehab/LTAC    Independent/assisted living    Hospice    Other:     CDMP Checked:   Yes x     PROBLEM LIST Updated:  Yes x       Signed:   Fauzia Macdonald MD  7/9/2017  10:58 AM      DISCHARGE SUMMARY from Nurse    The following personal items are in your possession at time of discharge:    Dental Appliances: None        Home Medications: None  Jewelry: With patient  Clothing: At bedside                PATIENT INSTRUCTIONS:    After general anesthesia or intravenous sedation, for 24 hours or while taking prescription Narcotics:  · Limit your activities  · Do not drive and operate hazardous machinery  · Do not make important personal or business decisions  · Do  not drink alcoholic beverages  · If you have not urinated within 8 hours after discharge, please contact your surgeon on call. Report the following to your surgeon:  · Excessive pain, swelling, redness or odor of or around the surgical area  · Temperature over 100.5  · Nausea and vomiting lasting longer than 4 hours or if unable to take medications  · Any signs of decreased circulation or nerve impairment to extremity: change in color, persistent  numbness, tingling, coldness or increase pain  · Any questions        What to do at Home:  *  Please give a list of your current medications to your Primary Care Provider. *  Please update this list whenever your medications are discontinued, doses are      changed, or new medications (including over-the-counter products) are added. *  Please carry medication information at all times in case of emergency situations.           These are general instructions for a healthy lifestyle:    No smoking/ No tobacco products/ Avoid exposure to second hand smoke    Surgeon General's Warning:  Quitting smoking now greatly reduces serious risk to your health. Obesity, smoking, and sedentary lifestyle greatly increases your risk for illness    A healthy diet, regular physical exercise & weight monitoring are important for maintaining a healthy lifestyle    You may be retaining fluid if you have a history of heart failure or if you experience any of the following symptoms:  Weight gain of 3 pounds or more overnight or 5 pounds in a week, increased swelling in our hands or feet or shortness of breath while lying flat in bed. Please call your doctor as soon as you notice any of these symptoms; do not wait until your next office visit. Recognize signs and symptoms of STROKE:    F-face looks uneven    A-arms unable to move or move unevenly    S-speech slurred or non-existent    T-time-call 911 as soon as signs and symptoms begin-DO NOT go       Back to bed or wait to see if you get better-TIME IS BRAIN. Warning Signs of HEART ATTACK     Call 911 if you have these symptoms:   Chest discomfort. Most heart attacks involve discomfort in the center of the chest that lasts more than a few minutes, or that goes away and comes back. It can feel like uncomfortable pressure, squeezing, fullness, or pain.  Discomfort in other areas of the upper body. Symptoms can include pain or discomfort in one or both arms, the back, neck, jaw, or stomach.  Shortness of breath with or without chest discomfort.  Other signs may include breaking out in a cold sweat, nausea, or lightheadedness. Don't wait more than five minutes to call 911 - MINUTES MATTER! Fast action can save your life. Calling 911 is almost always the fastest way to get lifesaving treatment. Emergency Medical Services staff can begin treatment when they arrive -- up to an hour sooner than if someone gets to the hospital by car. The discharge information has been reviewed with the patient and spouse. The patient and spouse verbalized understanding.     Discharge medications reviewed with the patient and spouse and appropriate educational materials and side effects teaching were provided. [employer] Digital[employer] Activation    Thank you for requesting access to [employer]. Please follow the instructions below to securely access and download your online medical record. [employer]io allows you to send messages to your doctor, view your test results, renew your prescriptions, schedule appointments, and more. How Do I Sign Up? 1. In your internet browser, go to [email or web address]. 2. Click on the First Time User? Click Here link in the Sign In box. You will see the New Member Sign Up page. 3. Enter your Qranio Access Code exactly as it appears below. You will not need to use this code after youve completed the sign-up process. If you do not sign up before the expiration date, you must request a new code. Qranio Access Code: Activation code not generated  Current Qranio Status: Active (This is the date your Qranio access code will )    4. Enter the last four digits of your Social Security Number (xxxx) and Date of Birth (mm/dd/yyyy) as indicated and click Submit. You will be taken to the next sign-up page. 5. Create a Qranio ID. This will be your Qranio login ID and cannot be changed, so think of one that is secure and easy to remember. 6. Create a Qranio password. You can change your password at any time. 7. Enter your Password Reset Question and Answer. This can be used at a later time if you forget your password. 8. Enter your e-mail address. You will receive e-mail notification when new information is available in [address] 9. Click Sign Up. You can now view and download portions of your medical record. 10. Click the Download Summary menu link to download a portable copy of your medical information. Additional Information    If you have questions, please visit the Frequently Asked Questions section of the [employer]io website at [email or web address] Remember, MyChart is NOT to be used for urgent needs. For medical emergencies, dial 911.

## 2017-07-09 NOTE — PROGRESS NOTES
Bedside and Verbal shift change report given to Bacilio RN  (oncoming nurse) by Felipa Woodall (offgoing nurse). Report included the following information SBAR.

## 2017-07-09 NOTE — DISCHARGE SUMMARY
Discharge Summary       PATIENT ID: Fatemeh Arevalo  MRN: [id number]   YOB: 1975    DATE OF ADMISSION: 7/7/2017 10:25 PM    DATE OF DISCHARGE: 7/9/17   PRIMARY CARE PROVIDER: Fredy Perkins MD     ATTENDING PHYSICIAN: Jose Douglas MD  DISCHARGING PROVIDER: Noa Jiménez MD    To contact this individual call [phone] and ask the  to page. If unavailable ask to be transferred the Adult Hospitalist Department. CONSULTATIONS: IP CONSULT TO COLORECTAL SURGERY    PROCEDURES/SURGERIES: * No surgery found *    [address] COURSE:   40 y.o. 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 1.  Abdominal pain, reduced ileostomy output, history of Crohn's disease, ruled out small bowel obstruction, likely partial SBO vs ileus   - suspect confluence of factors including changes in diet, use of Imodium along with narcotic medications contributing, improved   - CT Abd/Pelvis 7/7 - Fluid-filled right lower quadrant  ostomy, but less small bowel distension as compared to the prior study   - IV fluids   - pain control, prn anti-emetics   - seen by colorectal surgery - his diet was advanced to GI lite, which he tolerated well with improvement in his nausea, abdominal pain towards baseline     2. Depression/anxiety   - continued home medications including Wellbutrin, Elavil, Lyrica        DISCHARGE DIAGNOSES / PLAN:      1. See above. PENDING TEST RESULTS:   At the time of discharge the following test results are still pending: None.     FOLLOW UP APPOINTMENTS:    Follow-up Information     Follow up With Details Comments Ricky Peterson Jutso Thompson MD Schedule an appointment as soon as possible for a visit  [address]  [facility] [address]  [phone]             ADDITIONAL CARE RECOMMENDATIONS:    You may resume your home medications as previously prescribed. Do not resume Imodium. Follow-up with your primary care provider in 1 week. DIET: Regular Diet    ACTIVITY: Activity as tolerated    WOUND CARE: Resume previous care    EQUIPMENT needed: N/A        DISCHARGE MEDICATIONS:  Current Discharge Medication List      CONTINUE these medications which have NOT CHANGED    Details   oxyCODONE-acetaminophen (PERCOCET) 5-325 mg per tablet Take by mouth per taper: 2 tablets every 4 hours for 5 days, then 1 tablet every 4 hours for 5 days. After that, please see Dr. Justo Thompson to continue your taper. (Do NOT take additional acetaminophen/Tyelonol while taking Percocet)  Qty: 90 Tab, Refills: 0      acetaminophen (TYLENOL) 500 mg tablet Take 500 mg by mouth four (4) times daily as needed (mild pain). prochlorperazine (COMPAZINE) 10 mg tablet Take 10 mg by mouth two (2) times daily as needed for Nausea. traMADol (ULTRAM) 50 mg tablet Take 50 mg by mouth three (3) times daily as needed (moderate pain). Ustekinumab (STELARA) 90 mg/mL injection 90 mg by SubCUTAneous route. Every 8 weeks       promethazine (PHENERGAN) 25 mg tablet Take 25 mg by mouth every six (6) hours as needed for Nausea. ondansetron hcl (ZOFRAN, AS HYDROCHLORIDE,) 8 mg tablet Take 8 mg by mouth every twelve (12) hours as needed for Nausea. amitriptyline (ELAVIL) 10 mg tablet Take 30 mg by mouth nightly. buPROPion SR (WELLBUTRIN SR) 150 mg SR tablet Take 150 mg by mouth two (2) times a day. omeprazole (PRILOSEC) 40 mg capsule Take 40 mg by mouth Daily (before dinner). dicyclomine (BENTYL) 10 mg capsule Take 10 mg by mouth every six (6) hours as needed.       ergocalciferol (ERGOCALCIFEROL) 50,000 unit capsule Take 50,000 Units by mouth every Monday. pregabalin (LYRICA) 100 mg capsule Take 100 mg by mouth three (3) times daily. cyanocobalamin (VITAMIN B-12) 1,000 mcg/mL injection 1,000 mcg by IntraMUSCular route every Sunday. Indications: Once weekly      temazepam (RESTORIL) 30 mg capsule Take 30 mg by mouth nightly. diazepam (VALIUM) 5 mg tablet Take 5 mg by mouth every eight (8) hours as needed (bowel relaxation). NOTIFY YOUR PHYSICIAN FOR ANY OF THE FOLLOWING:   Fever over 101 degrees for 24 hours. Chest pain, shortness of breath, fever, chills, nausea, vomiting, diarrhea, change in mentation, falling, weakness, bleeding. Severe pain or pain not relieved by medications. Or, any other signs or symptoms that you may have questions about. DISPOSITION:  x  Home With:   OT  PT  HH  RN       Long term SNF/Inpatient Rehab    Independent/assisted living    Hospice    Other:       PATIENT CONDITION AT DISCHARGE:     Functional status    Poor     Deconditioned    x Independent      Cognition   x  Lucid     Forgetful     Dementia      Catheters/lines (plus indication)    Alexander     PICC     PEG    x None      Code status   x  Full code     DNR      PHYSICAL EXAMINATION AT DISCHARGE:    Visit Vitals    /72    Pulse (!) 103    Temp 98.3 °F (36.8 °C)    Resp 18    SpO2 97%     Constitutional:  No acute distress, cooperative, pleasant    ENT:  Oral mucous moist, oropharynx benign. Neck supple,    Resp:  CTA bilaterally. No wheezing/rhonchi/rales. No accessory muscle use   CV:  Regular rhythm, normal rate, no murmurs, gallops, rubs    GI:  Soft, non distended, non tender. normoactive bowel sounds, no hepatosplenomegaly, ileostomy in place with some stool output    Musculoskeletal:  No edema, warm, 2+ pulses throughout    Neurologic:  Moves all extremities.   AAOx3, CN II-XII reviewed                CHRONIC MEDICAL DIAGNOSES:  Problem List as of 7/9/2017  Date Reviewed: 6/12/2017          Codes Class Noted - Resolved    Hyperglycemia ICD-10-CM: R73.9  ICD-9-CM: 790.29  5/20/2017 - Present        Small bowel obstruction ([facility] ICD-10-CM: K56.69  ICD-9-CM: 560.9  5/20/2017 - Present        Constipation ICD-10-CM: K59.00  ICD-9-CM: 564.00  1/15/2017 - Present        Intractable vomiting with nausea ICD-10-CM: R11.2  ICD-9-CM: 536.2  1/1/2017 - Present        Depression with anxiety (Chronic) ICD-10-CM: F41.8  ICD-9-CM: 300.4  12/25/2016 - Present        Anemia (Chronic) ICD-10-CM: D64.9  ICD-9-CM: 285.9  12/25/2016 - Present        S/P laparoscopic cholecystectomy (Chronic) ICD-10-CM: Z90.49  ICD-9-CM: V45.89  12/21/2016 - Present    Overview Signed 12/21/2016 12:56 PM by Norberto Mcgrath MD     With [facility].              Crohn's colitis ([facility] (Chronic) ICD-10-CM: K50.10  ICD-9-CM: 555.1  12/18/2016 - Present        Abdominal pain (Chronic) ICD-10-CM: R10.9  ICD-9-CM: 789.00  11/29/2016 - Present        GERD (gastroesophageal reflux disease) (Chronic) ICD-10-CM: K21.9  ICD-9-CM: 530.81  11/20/2016 - Present        RESOLVED: Bowel obstruction ([facility] ICD-10-CM: K56.60  ICD-9-CM: 560.9  7/9/2017 - 7/9/2017        RESOLVED: HCAP (healthcare-associated pneumonia) ICD-10-CM: J18.9  ICD-9-CM: 486  4/3/2017 - 5/20/2017        RESOLVED: SIRS (systemic inflammatory response syndrome) ([facility] ICD-10-CM: R65.10  ICD-9-CM: 995.90  4/3/2017 - 4/4/2017        RESOLVED: Small bowel obstruction ([facility] ICD-10-CM: K56.69  ICD-9-CM: 560.9  3/9/2017 - 5/20/2017        RESOLVED: SBO (small bowel obstruction) ([facility] ICD-10-CM: K56.69  ICD-9-CM: 560.9  3/7/2017 - 7/9/2017        RESOLVED: Insomnia ICD-10-CM: G47.00  ICD-9-CM: 780.52  12/25/2016 - 1/1/2017        RESOLVED: Hypernatremia ICD-10-CM: E87.0  ICD-9-CM: 276.0  12/25/2016 - 1/1/2017        RESOLVED: Biliary dyskinesia ICD-10-CM: K82.8  ICD-9-CM: 575.8  12/21/2016 - 12/25/2016        RESOLVED: Gallbladder sludge ICD-10-CM: K82.8  ICD-9-CM: 575.8  12/20/2016 - 12/25/2016 RESOLVED: Hypokalemia ICD-10-CM: E87.6  ICD-9-CM: 276.8  12/18/2016 - 1/1/2017        RESOLVED: Leukocytosis ICD-10-CM: R81.540  ICD-9-CM: 288.60  12/18/2016 - 12/25/2016        RESOLVED: Crohn's disease ([facility] ICD-10-CM: K50.90  ICD-9-CM: 555.9  11/20/2016 - 12/25/2016        RESOLVED: Intractable abdominal pain ICD-10-CM: R10.9  ICD-9-CM: 789.00  11/20/2016 - 1/1/2017        RESOLVED: Intractable nausea and vomiting ICD-10-CM: R11.2  ICD-9-CM: 536.2  11/20/2016 - 12/25/2016        RESOLVED: Enterostomy malfunction ([facility] (Chronic) ICD-10-CM: K94.13  ICD-9-CM: 569.62  10/29/2015 - 11/21/2016        RESOLVED: Ileostomy dysfunction ([facility] ICD-10-CM: A70.00  ICD-9-CM: 569.62  10/29/2015 - 11/21/2016        RESOLVED: Skin lesion of back ICD-10-CM: L98.9  ICD-9-CM: 709.9  3/17/2014 - 11/21/2016        RESOLVED: Ileal pouchitis ([facility] ICD-10-CM: H64.017  ICD-9-CM: 569.71  5/1/2004 - 11/21/2016              Greater than 30 minutes were spent with the patient on counseling and coordination of care    Signed:   Raudel Kimball MD  7/9/2017  11:00 AM

## 2018-01-19 ENCOUNTER — HOSPITAL ENCOUNTER (INPATIENT)
Age: 43
LOS: 3 days | Discharge: HOME | DRG: 389 | End: 2018-01-22
Attending: STUDENT IN AN ORGANIZED HEALTH CARE EDUCATION/TRAINING PROGRAM | Admitting: COLON & RECTAL SURGERY
Payer: COMMERCIAL

## 2018-01-19 ENCOUNTER — APPOINTMENT (OUTPATIENT)
Dept: CT IMAGING | Age: 43
DRG: 389 | End: 2018-01-19
Attending: STUDENT IN AN ORGANIZED HEALTH CARE EDUCATION/TRAINING PROGRAM
Payer: COMMERCIAL

## 2018-01-19 DIAGNOSIS — K56.609 SMALL BOWEL OBSTRUCTION (HCC): Primary | ICD-10-CM

## 2018-01-19 LAB
ALBUMIN SERPL-MCNC: 4.2 G/DL (ref 3.5–5)
ALBUMIN/GLOB SERPL: 0.9 {RATIO} (ref 1.1–2.2)
ALP SERPL-CCNC: 157 U/L (ref 45–117)
ALT SERPL-CCNC: 20 U/L (ref 12–78)
AMORPH CRY URNS QL MICRO: ABNORMAL
ANION GAP SERPL CALC-SCNC: 8 MMOL/L (ref 5–15)
APPEARANCE UR: ABNORMAL
AST SERPL-CCNC: 11 U/L (ref 15–37)
BACTERIA URNS QL MICRO: NEGATIVE /HPF
BASOPHILS # BLD: 0 K/UL (ref 0–0.1)
BASOPHILS NFR BLD: 0 % (ref 0–1)
BILIRUB SERPL-MCNC: 0.5 MG/DL (ref 0.2–1)
BILIRUB UR QL CFM: NEGATIVE
BUN SERPL-MCNC: 20 MG/DL (ref 6–20)
BUN/CREAT SERPL: 11 (ref 12–20)
CALCIUM SERPL-MCNC: 10.1 MG/DL (ref 8.5–10.1)
CHLORIDE SERPL-SCNC: 98 MMOL/L (ref 97–108)
CO2 SERPL-SCNC: 29 MMOL/L (ref 21–32)
COLOR UR: ABNORMAL
CREAT SERPL-MCNC: 1.82 MG/DL (ref 0.7–1.3)
EOSINOPHIL # BLD: 0.1 K/UL (ref 0–0.4)
EOSINOPHIL NFR BLD: 1 % (ref 0–7)
EPITH CASTS URNS QL MICRO: ABNORMAL /LPF
ERYTHROCYTE [DISTWIDTH] IN BLOOD BY AUTOMATED COUNT: 14.4 % (ref 11.5–14.5)
GLOBULIN SER CALC-MCNC: 4.5 G/DL (ref 2–4)
GLUCOSE SERPL-MCNC: 127 MG/DL (ref 65–100)
GLUCOSE UR STRIP.AUTO-MCNC: NEGATIVE MG/DL
GRAN CASTS URNS QL MICRO: ABNORMAL /LPF
HCT VFR BLD AUTO: 43.4 % (ref 36.6–50.3)
HGB BLD-MCNC: 14.3 G/DL (ref 12.1–17)
HGB UR QL STRIP: NEGATIVE
HYALINE CASTS URNS QL MICRO: >20 /LPF (ref 0–5)
KETONES UR QL STRIP.AUTO: ABNORMAL MG/DL
LEUKOCYTE ESTERASE UR QL STRIP.AUTO: NEGATIVE
LIPASE SERPL-CCNC: 70 U/L (ref 73–393)
LYMPHOCYTES # BLD: 2 K/UL (ref 0.8–3.5)
LYMPHOCYTES NFR BLD: 20 % (ref 12–49)
MCH RBC QN AUTO: 26.4 PG (ref 26–34)
MCHC RBC AUTO-ENTMCNC: 32.9 G/DL (ref 30–36.5)
MCV RBC AUTO: 80.2 FL (ref 80–99)
MONOCYTES # BLD: 0.6 K/UL (ref 0–1)
MONOCYTES NFR BLD: 6 % (ref 5–13)
MUCOUS THREADS URNS QL MICRO: ABNORMAL /LPF
NEUTS SEG # BLD: 7.2 K/UL (ref 1.8–8)
NEUTS SEG NFR BLD: 73 % (ref 32–75)
NITRITE UR QL STRIP.AUTO: NEGATIVE
PH UR STRIP: 5.5 [PH] (ref 5–8)
PLATELET # BLD AUTO: 331 K/UL (ref 150–400)
POTASSIUM SERPL-SCNC: 4.2 MMOL/L (ref 3.5–5.1)
PROT SERPL-MCNC: 8.7 G/DL (ref 6.4–8.2)
PROT UR STRIP-MCNC: 30 MG/DL
RBC # BLD AUTO: 5.41 M/UL (ref 4.1–5.7)
RBC #/AREA URNS HPF: ABNORMAL /HPF (ref 0–5)
SODIUM SERPL-SCNC: 135 MMOL/L (ref 136–145)
SP GR UR REFRACTOMETRY: 1.03 (ref 1–1.03)
UR CULT HOLD, URHOLD: NORMAL
UROBILINOGEN UR QL STRIP.AUTO: 0.2 EU/DL (ref 0.2–1)
WBC # BLD AUTO: 9.9 K/UL (ref 4.1–11.1)
WBC URNS QL MICRO: ABNORMAL /HPF (ref 0–4)

## 2018-01-19 PROCEDURE — 80053 COMPREHEN METABOLIC PANEL: CPT | Performed by: EMERGENCY MEDICINE

## 2018-01-19 PROCEDURE — 96374 THER/PROPH/DIAG INJ IV PUSH: CPT

## 2018-01-19 PROCEDURE — 99284 EMERGENCY DEPT VISIT MOD MDM: CPT

## 2018-01-19 PROCEDURE — 94761 N-INVAS EAR/PLS OXIMETRY MLT: CPT

## 2018-01-19 PROCEDURE — 36415 COLL VENOUS BLD VENIPUNCTURE: CPT | Performed by: EMERGENCY MEDICINE

## 2018-01-19 PROCEDURE — 83690 ASSAY OF LIPASE: CPT | Performed by: STUDENT IN AN ORGANIZED HEALTH CARE EDUCATION/TRAINING PROGRAM

## 2018-01-19 PROCEDURE — 96375 TX/PRO/DX INJ NEW DRUG ADDON: CPT

## 2018-01-19 PROCEDURE — 85025 COMPLETE CBC W/AUTO DIFF WBC: CPT | Performed by: EMERGENCY MEDICINE

## 2018-01-19 PROCEDURE — 74011636320 HC RX REV CODE- 636/320: Performed by: STUDENT IN AN ORGANIZED HEALTH CARE EDUCATION/TRAINING PROGRAM

## 2018-01-19 PROCEDURE — 74011250636 HC RX REV CODE- 250/636: Performed by: STUDENT IN AN ORGANIZED HEALTH CARE EDUCATION/TRAINING PROGRAM

## 2018-01-19 PROCEDURE — 74011000258 HC RX REV CODE- 258: Performed by: STUDENT IN AN ORGANIZED HEALTH CARE EDUCATION/TRAINING PROGRAM

## 2018-01-19 PROCEDURE — 74177 CT ABD & PELVIS W/CONTRAST: CPT

## 2018-01-19 PROCEDURE — 65270000032 HC RM SEMIPRIVATE

## 2018-01-19 PROCEDURE — 81001 URINALYSIS AUTO W/SCOPE: CPT | Performed by: EMERGENCY MEDICINE

## 2018-01-19 PROCEDURE — 74011250636 HC RX REV CODE- 250/636: Performed by: COLON & RECTAL SURGERY

## 2018-01-19 RX ORDER — CITALOPRAM 20 MG/1
20 TABLET, FILM COATED ORAL DAILY
COMMUNITY
End: 2018-12-07

## 2018-01-19 RX ORDER — HYDROMORPHONE HYDROCHLORIDE 1 MG/ML
1 INJECTION, SOLUTION INTRAMUSCULAR; INTRAVENOUS; SUBCUTANEOUS ONCE
Status: COMPLETED | OUTPATIENT
Start: 2018-01-19 | End: 2018-01-19

## 2018-01-19 RX ORDER — SODIUM CHLORIDE 9 MG/ML
25 INJECTION, SOLUTION INTRAVENOUS CONTINUOUS
Status: DISCONTINUED | OUTPATIENT
Start: 2018-01-19 | End: 2018-01-22 | Stop reason: HOSPADM

## 2018-01-19 RX ORDER — SODIUM CHLORIDE 0.9 % (FLUSH) 0.9 %
5-10 SYRINGE (ML) INJECTION EVERY 8 HOURS
Status: DISCONTINUED | OUTPATIENT
Start: 2018-01-19 | End: 2018-01-22 | Stop reason: HOSPADM

## 2018-01-19 RX ORDER — ZOLPIDEM TARTRATE 10 MG/1
10 TABLET ORAL
COMMUNITY
End: 2018-12-21

## 2018-01-19 RX ORDER — MORPHINE SULFATE 2 MG/ML
4 INJECTION, SOLUTION INTRAMUSCULAR; INTRAVENOUS ONCE
Status: DISCONTINUED | OUTPATIENT
Start: 2018-01-19 | End: 2018-01-19

## 2018-01-19 RX ORDER — NALOXONE HYDROCHLORIDE 0.4 MG/ML
0.4 INJECTION, SOLUTION INTRAMUSCULAR; INTRAVENOUS; SUBCUTANEOUS AS NEEDED
Status: DISCONTINUED | OUTPATIENT
Start: 2018-01-19 | End: 2018-01-22 | Stop reason: HOSPADM

## 2018-01-19 RX ORDER — ONDANSETRON 2 MG/ML
4 INJECTION INTRAMUSCULAR; INTRAVENOUS
Status: COMPLETED | OUTPATIENT
Start: 2018-01-19 | End: 2018-01-19

## 2018-01-19 RX ORDER — ONDANSETRON 2 MG/ML
4 INJECTION INTRAMUSCULAR; INTRAVENOUS
Status: DISCONTINUED | OUTPATIENT
Start: 2018-01-19 | End: 2018-01-22 | Stop reason: HOSPADM

## 2018-01-19 RX ORDER — SODIUM CHLORIDE 0.9 % (FLUSH) 0.9 %
10 SYRINGE (ML) INJECTION
Status: COMPLETED | OUTPATIENT
Start: 2018-01-19 | End: 2018-01-19

## 2018-01-19 RX ORDER — SODIUM CHLORIDE 0.9 % (FLUSH) 0.9 %
5-10 SYRINGE (ML) INJECTION AS NEEDED
Status: DISCONTINUED | OUTPATIENT
Start: 2018-01-19 | End: 2018-01-22 | Stop reason: HOSPADM

## 2018-01-19 RX ORDER — OXYCODONE AND ACETAMINOPHEN 10; 325 MG/1; MG/1
1-2 TABLET ORAL
COMMUNITY
End: 2018-12-21

## 2018-01-19 RX ADMIN — Medication 10 ML: at 22:32

## 2018-01-19 RX ADMIN — ONDANSETRON 4 MG: 2 INJECTION INTRAMUSCULAR; INTRAVENOUS at 19:37

## 2018-01-19 RX ADMIN — SODIUM CHLORIDE 1000 ML: 900 INJECTION, SOLUTION INTRAVENOUS at 19:26

## 2018-01-19 RX ADMIN — HYDROMORPHONE HYDROCHLORIDE 1 MG: 1 INJECTION, SOLUTION INTRAMUSCULAR; INTRAVENOUS; SUBCUTANEOUS at 20:28

## 2018-01-19 RX ADMIN — IOPAMIDOL 100 ML: 755 INJECTION, SOLUTION INTRAVENOUS at 16:58

## 2018-01-19 RX ADMIN — SODIUM CHLORIDE 100 ML: 900 INJECTION, SOLUTION INTRAVENOUS at 16:59

## 2018-01-19 RX ADMIN — SODIUM CHLORIDE 100 ML/HR: 900 INJECTION, SOLUTION INTRAVENOUS at 22:32

## 2018-01-19 RX ADMIN — Medication 10 ML: at 16:59

## 2018-01-19 RX ADMIN — SODIUM CHLORIDE 1000 ML: 900 INJECTION, SOLUTION INTRAVENOUS at 16:20

## 2018-01-19 RX ADMIN — HYDROMORPHONE HYDROCHLORIDE 1 MG: 1 INJECTION, SOLUTION INTRAMUSCULAR; INTRAVENOUS; SUBCUTANEOUS at 16:29

## 2018-01-19 NOTE — IP AVS SNAPSHOT
1111  13 
[phone] Patient: Olena Carmona MRN: [id number] :1975 A check loc indicates which time of day the medication should be taken. My Medications CONTINUE taking these medications Instructions Each Dose to Equal  
 Morning Noon Evening Bedtime  
 acetaminophen 500 mg tablet Commonly known as:  TYLENOL Your last dose was: Your next dose is: Take 500 mg by mouth four (4) times daily as needed (mild pain, heache (usually has headaches 1 week after Stelara)). 500 mg  
    
   
   
   
  
 citalopram 20 mg tablet Commonly known as:  Henry Chavarria Your last dose was: Your next dose is: Take 20 mg by mouth daily. 20 mg  
    
   
   
   
  
 COMPAZINE 10 mg tablet Generic drug:  prochlorperazine Your last dose was: Your next dose is: Take 10 mg by mouth two (2) times daily as needed (Moderate Nausea). 10 mg  
    
   
   
   
  
 dicyclomine 10 mg capsule Commonly known as:  BENTYL Your last dose was: Your next dose is: Take 10 mg by mouth every six (6) hours as needed. 10 mg  
    
   
   
   
  
 ergocalciferol 50,000 unit capsule Commonly known as:  ERGOCALCIFEROL Your last dose was: Your next dose is: Take 50,000 Units by mouth every . 89654 Units LYRICA 100 mg capsule Generic drug:  pregabalin Your last dose was: Your next dose is: Take 100 mg by mouth four (4) times daily. 100 mg  
    
   
   
   
  
 omeprazole 40 mg capsule Commonly known as:  PRILOSEC Your last dose was: Your next dose is: Take 40 mg by mouth nightly. 40 mg  
    
   
   
   
  
 OTHER Your last dose was: Your next dose is: Take 2-3 Tabs by mouth three (3) times daily (with meals). Kimberley Enzymes 2-3 Tab PERCOCET  mg per tablet Generic drug:  oxyCODONE-acetaminophen Your last dose was: Your next dose is: Take 1-2 Tabs by mouth every four (4) hours as needed for Pain (Being tapered per Dr. Sugar Kumar). 1-2 Tab PROBIOTIC 4X 10-15 mg Tbec Generic drug:  B.infantis-B.ani-B.long-B.bifi Your last dose was: Your next dose is: Take 1 Cap by mouth nightly. 1 Cap  
    
   
   
   
  
 promethazine 25 mg tablet Commonly known as:  PHENERGAN Your last dose was: Your next dose is: Take 25 mg by mouth every six (6) hours as needed (Severe Nausea). 25 mg  
    
   
   
   
  
 traMADol 50 mg tablet Commonly known as:  ULTRAM  
   
Your last dose was: Your next dose is: Take 50 mg by mouth three (3) times daily as needed (moderate pain). 50 mg Ustekinumab 90 mg/mL injection Commonly known as:  Syed Mercados Your last dose was: Your next dose is:    
   
   
 90 mg by SubCUTAneous route. Every 8 weeks 90 mg  
    
   
   
   
  
 VALIUM 5 mg tablet Generic drug:  diazePAM  
   
Your last dose was: Your next dose is: Take 5 mg by mouth every eight (8) hours as needed (bowel relaxation). 5 mg VITAMIN B-12 1,000 mcg/mL injection Generic drug:  cyanocobalamin Your last dose was: Your next dose is:    
   
   
 1,000 mcg by IntraMUSCular route every Sunday. Indications: Once weekly 1000 mcg ZOFRAN (AS HYDROCHLORIDE) 8 mg tablet Generic drug:  ondansetron hcl Your last dose was: Your next dose is: Take 8 mg by mouth every twelve (12) hours as needed (Mild Nausea). 8 mg  
    
   
   
   
  
 zolpidem 10 mg tablet Commonly known as:  AMBIEN Your last dose was: Your next dose is: Take 10 mg by mouth nightly. 10 mg ASK your doctor about these medications Instructions Each Dose to Equal  
 Morning Noon Evening Bedtime  
 temazepam 30 mg capsule Commonly known as:  RESTORIL Your last dose was: Your next dose is: Take 30 mg by mouth nightly.   
 30 mg

## 2018-01-19 NOTE — ED TRIAGE NOTES
S/P [facility] ([address]) placement in 12/17 while in [place]. Developed abdominal pain and nausea with decreased output Wednesday progressively getting worse. Denies fever.

## 2018-01-19 NOTE — IP AVS SNAPSHOT
[address] [place] [address] 
[phone] Patient: Laura Simmons MRN: [id number] :1975 About your hospitalization You were admitted on:  2018 You last received care in the:  [place] [id number] You were discharged on:  2018 Why you were hospitalized Your primary diagnosis was:  Small Bowel Obstruction Your diagnoses also included:  Chronic Narcotic Dependence (Hcc) Follow-up Information Follow up With Details Comments Contact Info Katie Maher MD   [facility] [address] 
[phone] Discharge Orders None A check loc indicates which time of day the medication should be taken. My Medications CONTINUE taking these medications Instructions Each Dose to Equal  
 Morning Noon Evening Bedtime  
 acetaminophen 500 mg tablet Commonly known as:  TYLENOL Your last dose was: Your next dose is: Take 500 mg by mouth four (4) times daily as needed (mild pain, heache (usually has headaches 1 week after Stelara)). 500 mg  
    
   
   
   
  
 citalopram 20 mg tablet Commonly known as:  Jerome El Your last dose was: Your next dose is: Take 20 mg by mouth daily. 20 mg  
    
   
   
   
  
 COMPAZINE 10 mg tablet Generic drug:  prochlorperazine Your last dose was: Your next dose is: Take 10 mg by mouth two (2) times daily as needed (Moderate Nausea). 10 mg  
    
   
   
   
  
 dicyclomine 10 mg capsule Commonly known as:  BENTYL Your last dose was: Your next dose is: Take 10 mg by mouth every six (6) hours as needed. 10 mg  
    
   
   
   
  
 ergocalciferol 50,000 unit capsule Commonly known as:  ERGOCALCIFEROL Your last dose was: Your next dose is: Take 50,000 Units by mouth every Sunday. [id number] Units LYRICA 100 mg capsule Generic drug:  pregabalin Your last dose was: Your next dose is: Take 100 mg by mouth four (4) times daily. 100 mg  
    
   
   
   
  
 omeprazole 40 mg capsule Commonly known as:  PRILOSEC Your last dose was: Your next dose is: Take 40 mg by mouth nightly. 40 mg  
    
   
   
   
  
 OTHER Your last dose was: Your next dose is: Take 2-3 Tabs by mouth three (3) times daily (with meals). Kimberley Enzymes 2-3 Tab PERCOCET  mg per tablet Generic drug:  oxyCODONE-acetaminophen Your last dose was: Your next dose is: Take 1-2 Tabs by mouth every four (4) hours as needed for Pain (Being tapered per Dr. Rosalie Castro). 1-2 Tab PROBIOTIC 4X 10-15 mg Tbec Generic drug:  B.infantis-B.ani-B.long-B.bifi Your last dose was: Your next dose is: Take 1 Cap by mouth nightly. 1 Cap  
    
   
   
   
  
 promethazine 25 mg tablet Commonly known as:  PHENERGAN Your last dose was: Your next dose is: Take 25 mg by mouth every six (6) hours as needed (Severe Nausea). 25 mg  
    
   
   
   
  
 traMADol 50 mg tablet Commonly known as:  ULTRAM  
   
Your last dose was: Your next dose is: Take 50 mg by mouth three (3) times daily as needed (moderate pain). 50 mg Ustekinumab 90 mg/mL injection Commonly known as:  Rodríguez Sers Your last dose was: Your next dose is:    
   
   
 90 mg by SubCUTAneous route. Every 8 weeks 90 mg  
    
   
   
   
  
 VALIUM 5 mg tablet Generic drug:  diazePAM  
   
Your last dose was: Your next dose is: Take 5 mg by mouth every eight (8) hours as needed (bowel relaxation). 5 mg VITAMIN B-12 1,000 mcg/mL injection Generic drug:  cyanocobalamin Your last dose was: Your next dose is:    
   
   
 1,000 mcg by IntraMUSCular route every . Indications: Once weekly 1000 mcg ZOFRAN (AS HYDROCHLORIDE) 8 mg tablet Generic drug:  ondansetron hcl Your last dose was: Your next dose is: Take 8 mg by mouth every twelve (12) hours as needed (Mild Nausea). 8 mg  
    
   
   
   
  
 zolpidem 10 mg tablet Commonly known as:  AMBIEN Your last dose was: Your next dose is: Take 10 mg by mouth nightly. 10 mg ASK your doctor about these medications Instructions Each Dose to Equal  
 Morning Noon Evening Bedtime  
 temazepam 30 mg capsule Commonly known as:  RESTORIL Your last dose was: Your next dose is: Take 30 mg by mouth nightly. 30 mg Discharge Instructions Patient Discharge Instructions Stuart Richard / [id number] : 1975 Admitted 2018 Discharged: 2018 · It is important that you take medications exactly as they are prescribed. · Keep your medication in the bottles provided by the pharmacist and keep a list of the medication names, dosages, and times to be taken in your wallet. · Do not take other medications without consulting your doctor. What To Do At [facility] Recommended Diet:  You should continue to consume the low fiber diet as tolerated and then liberalize it in accordance with what has been recommended by your [facility] surgeon. Recommended Activity:  You should not lift more than 20 lb. until it has been at least 2 months since the last operation. Other than that, you may engage in physical activity as tolerated unless otherwise instructed by your [facility] surgeon. Hygiene and Tube Care: You may resume your usual routines. Other:  If you have questions or other problems, call Dr. Yazmin Dorsey or Dr. Theresa Lei depending upon their nature. Follow-Up 1. Follow up with Dr. Theresa Lei regarding the management of your medications. 2.  Follow up with Dr. Yazmin Dorsey on robert s needed basis. Information obtained by : 
I understand that if any problems occur once I am at home I am to contact my physician. I understand and acknowledge receipt of the instructions indicated above. Physician's or R.N.'s Signature                                                                  Date/Time Patient or Representative Signature                                                          Date/Time [employer] Announcement We are excited to announce that we are making your provider's discharge notes available to you in SportSty[employer]. You will see these notes when they are completed and signed by the physician that discharged you from your recent hospital stay. If you have any questions or concerns about any information you see in SportStylist, please call the Health Information Department where you were seen or reach out to your Primary Care Provider for more information about your plan of care. Introducing [facility]! Dear Aamir Hall: Thank you for requesting a [employer] account. Our records indicate that you already have an active [employer] account. You can access your account anytime at [email or web address] Did you know that you can access your hospital and ER discharge instructions at any time in SportStylist? You can also review all of your test results from your hospital stay or ER visit. Additional Information If you have questions, please visit the Frequently Asked Questions section of the [employer] website at [email or web address] Remember, U[employer]hart is NOT to be used for urgent needs. For medical emergencies, dial 911. Now available from your iPhone and Android! Providers Seen During Your Hospitalization Provider Specialty Primary office phone Triston Curiel MD Emergency Medicine [phone] Gardenia Varela MD Colon and Rectal Surgery [phone] Your Primary Care Physician (PCP) Primary Care Physician Office Phone Office Fax [place], [address] [phone] You are allergic to the following Allergen Reactions Remicade (Infliximab) Anaphylaxis Shortness of Breath Bactrim (Sulfamethoprim Ds) Other (comments) Increased GI distress. Morphine Nausea Only Tolerates Dilaudid Nsaids (Non-Steroidal Anti-Inflammatory Drug) Other (comments) Stomach ulcers Recent Documentation Height Weight BMI Smoking Status 1.829 m 82.1 kg 24.55 kg/m2 Former Smoker Emergency Contacts Name Discharge Info Relation Home Work Mobile [address] CAREGIVER [3] Spouse [3] [phone] Patient Belongings The following personal items are in your possession at time of discharge: 
     Visual Aid: Contacts, With patient Please provide this summary of care documentation to your next provider. Signatures-by signing, you are acknowledging that this After Visit Summary has been reviewed with you and you have received a copy. Patient Signature:  ____________________________________________________________ Date:  ____________________________________________________________  
  
Teetee Dee Provider Signature:  ____________________________________________________________ Date:  ____________________________________________________________

## 2018-01-19 NOTE — ED PROVIDER NOTES
HPI Comments: 40 y.o. male s/p BCIR placement on 12/1/2017 with extensive past medical history, please see list, significant for ulcerative colitis, crohn's disease, ileal pouchitis, anal stenosis, anal fistula, and opiod dependence who presents ambulatory from home with chief complaint of abdominal pain. Pt reports 3-day history of gradually worsening lower abdominal pain onset after \"eating something that didn't agree with me\". Pt recently underwent BCIR placement on 12/1/17 which established an internal pouch to store waste. Pt drains the pouch by inserting a catheter into a stoma several times per day. Pt was hospitalized in [place] where he underwent the procedure from 12/1/17-12/22/17. Pt states drainage is usually \"free-flowing liquid\" that he was previously draining without issues. Pt noticed output was \"harder to get out\" when symptoms initially onset 3 days ago. Pt denies any output 2 days ago, but he finally drained a minimal amount of output yesterday morning. Pt states he was draining output as directed this morning when nausea suddenly onset with 5 episodes of vomiting. Pt states drainage increased as he vomited. Pt took phenergan with minimal relief. Pt elicits previous history of \"instestinal blockages\" and is concerned that today's symptoms are indicative of the same. Pt specifically denies fever and difficulty urinating. There are no other acute medical concerns at this time. Social hx: former tobacco smoker; denies EtOH use; denies illicit drug use  PCP: Jorge Luis Luna MD  Colorectal Surgery/GI: Yahaira Alexander MD    Note written by Virgen Carter, as dictated by Eder Caraballo MD 4:15 PM         The history is provided by the patient. No  was used. Past Medical History:   Diagnosis Date    Anal fistula     The patient no longer has an anus.  Anal stenosis     The patient no longer has an anus.     Crohn's disease ([address]     GERD (gastroesophageal reflux disease)     H/O ulcerative colitis     Symptoms began in 1996. Total proctocolectomy was performed in 2004. Patient later developed Crohn's disease.  Hiatal hernia     I[address]al pouchitis [address] 05/2004    The patient no longer has a pouch.  Nausea & vomiting     Combination of Scopalamine patch & Zofran IV worked well in past    Numbness in right leg     Chronic.  Opioid dependence ([facility]     History of opioid dependence requiring a detox program.    Psychiatric disorder     depression and anxiety    Skin lesion of back 3/17/2014    Syncopal episodes        Past Surgical History:   Procedure Laterality Date    ABDOMEN SURGERY PROC UNLISTED  3/25/2004    Total proctocolectomy with creation of ileoanal J-pouch and loop ileostomy; Dr. Deana Douglass.   CHOLECYSTECTOMY  12/2016    Dr. Taylor Danger  GI  5/2/2004    Examination under anesthesia with endoscopic evaluation of ileoanal pouch and placement of drain; Dr. Deana Douglass.   GI  5/25/2004    Ileostomy closure with small bowel resection and anastomosis; Dr. Deana Douglass.   GI  5/24/2012    Examination under anesthesia, anal dilatation, anal biopsy, and placement of draining seton; Dr. Deana Douglass.   GI  7/3/2012    Incision and drainage of perirectal abscess and rigid endoscopy of ileoanal pouch; Dr. Deana Douglass.   GI  7/31/2012    Incision and drainage of perirectal abscess, placement of draining seton, and anal dilatation; Dr. Deana Douglass.   GI  1/22/2013    Repair of anal fistulas with debridement, partial fistulectomy, and flap closure; Dr. Deana Douglass.   GI  5/17/2013    Examination under anesthesia, partial fistulotomy,  and placement of draining setons; Dr. Deana Douglass.   GI  8/6/2013    Anal fistulotomy and application of ACell micromatrix; Dr. Deana Douglass.   GI  2/20/2014    Examination under anesthesia and dilation of anal canal with rigid proctoscopy;  Brenton Claude, MD.   Benny Garza GI 4/29/2015    Creation of Brestanica continent intestinal reservoir (BCIR), abdominoperineal resection of ileoanal J-pouch; lysis of adhesions, and gastrostomy; James Weber MD ([place], [place])    HX GI  8/7/2015    Stoma revision; Fatou Weber MD ([place], [place])    HX GI  10/29/2015    Extensive lysis of adhesions, resection of continent intestinal reservoir, repair of serosal tears, and creation of end-ileostomy; Dr. Kylee Gatica.  HX GI  03/14/2017    Laparotomy, extensive lysis of adhesions and revision of ileostomy; Dr. Kylee Gatica.  HX ORTHOPAEDIC  2008    Left ACL repair    HX OTHER SURGICAL  12/2017    BCIR    HX UROLOGICAL  03/14/2017    Cystoscopy and placement of bilateral temporary ureteral catheters; Ponce Kingsley MD.         Family History:   Problem Relation Age of Onset    Cancer Father     Asthma Neg Hx     Diabetes Neg Hx     Heart Disease Neg Hx     Hypertension Neg Hx     Stroke Neg Hx     Malignant Hyperthermia Neg Hx     Pseudocholinesterase Deficiency Neg Hx     Delayed Awakening Neg Hx     Post-op Nausea/Vomiting Neg Hx        Social History     Social History    Marital status:      Spouse name: N/A    Number of children: N/A    Years of education: N/A     Occupational History    Not on file. Social History Main Topics    Smoking status: Former Smoker     Packs/day: 0.00     Years: 7.00     Quit date: 1/14/2017    Smokeless tobacco: Never Used    Alcohol use No    Drug use: No    Sexual activity: Not on file     Other Topics Concern    Not on file     Social History Narrative         ALLERGIES: Remicade [infliximab]; Bactrim [sulfamethoprim ds]; and Nsaids (non-steroidal anti-inflammatory drug)    Review of Systems   Constitutional: Negative for chills and fever. HENT: Negative for sore throat. Eyes: Negative for pain. Respiratory: Negative for cough and shortness of breath. Cardiovascular: Negative for chest pain. Gastrointestinal: Positive for abdominal pain (lower), nausea and vomiting. Genitourinary: Negative for dysuria. Skin: Negative for rash. Neurological: Negative for syncope and headaches. Psychiatric/Behavioral: Negative for confusion. All other systems reviewed and are negative. Vitals:    01/19/18 1305   BP: 121/81   Pulse: (!) 141   Resp: 16   Temp: 98 °F (36.7 °C)   SpO2: 98%   Weight: 82.1 kg (181 lb)   Height: 6' (1.829 m)            Physical Exam   Constitutional: He is oriented to person, place, and time. He appears well-developed. No distress. HENT:   Head: Normocephalic and atraumatic. Eyes: Conjunctivae and EOM are normal.   Neck: Normal range of motion. Neck supple. Cardiovascular: Normal rate, regular rhythm and normal heart sounds. No murmur heard. Pulmonary/Chest: Effort normal and breath sounds normal. No respiratory distress. Abdominal: Soft. Bowel sounds are normal. He exhibits no distension. Hepatomegaly: mild, RLQ. There is tenderness in the right lower quadrant and left lower quadrant. There is guarding. There is no rebound. Ostomy in RLQ with indwelling tineo catheter draining green, liquid stool into leg bag. Bilateral lower abdominal pain with mild guarding in RLQ. Musculoskeletal: Normal range of motion. He exhibits no edema or tenderness. Neurological: He is alert and oriented to person, place, and time. No cranial nerve deficit. He exhibits normal muscle tone. Coordination normal.   Skin: Skin is warm and dry. Previous surgical incisions well-healed, clean, dry, intact. Nursing note and vitals reviewed. Note written by Virgen Green, as dictated by Ashley Verma MD 4:15 PM     [facility]  ED Course       Procedures    7:23 PM  CT abd/pelv demonstrates findings consistent with mild mid and distal small bowel obstruction. There is also concern for inflammatory change of the distal remnant of the ileum.  A small amount of free intrapelvic fluid is shown as well. CONSULT NOTE:  7:33 PM Nikita Canales MD spoke with Dr. Almaz Acosta, Consult for Colorectal Surgery. Discussed available diagnostic tests and clinical findings. He is in agreement with care plans as outlined. Dr. Kaden Rascon will admit patient.

## 2018-01-20 PROBLEM — F11.20 CHRONIC NARCOTIC DEPENDENCE (HCC): Chronic | Status: ACTIVE | Noted: 2018-01-20

## 2018-01-20 LAB
ANION GAP SERPL CALC-SCNC: 11 MMOL/L (ref 5–15)
BASOPHILS # BLD: 0 K/UL (ref 0–0.1)
BASOPHILS NFR BLD: 0 % (ref 0–1)
BUN SERPL-MCNC: 13 MG/DL (ref 6–20)
BUN/CREAT SERPL: 13 (ref 12–20)
CALCIUM SERPL-MCNC: 8.8 MG/DL (ref 8.5–10.1)
CHLORIDE SERPL-SCNC: 104 MMOL/L (ref 97–108)
CO2 SERPL-SCNC: 21 MMOL/L (ref 21–32)
CREAT SERPL-MCNC: 1.01 MG/DL (ref 0.7–1.3)
EOSINOPHIL # BLD: 0.2 K/UL (ref 0–0.4)
EOSINOPHIL NFR BLD: 4 % (ref 0–7)
ERYTHROCYTE [DISTWIDTH] IN BLOOD BY AUTOMATED COUNT: 14.4 % (ref 11.5–14.5)
EST. AVERAGE GLUCOSE BLD GHB EST-MCNC: 128 MG/DL
GLUCOSE SERPL-MCNC: 78 MG/DL (ref 65–100)
HBA1C MFR BLD: 6.1 % (ref 4.2–6.3)
HCT VFR BLD AUTO: 33.7 % (ref 36.6–50.3)
HGB BLD-MCNC: 10.8 G/DL (ref 12.1–17)
LYMPHOCYTES # BLD: 2 K/UL (ref 0.8–3.5)
LYMPHOCYTES NFR BLD: 35 % (ref 12–49)
MAGNESIUM SERPL-MCNC: 1.6 MG/DL (ref 1.6–2.4)
MCH RBC QN AUTO: 26.2 PG (ref 26–34)
MCHC RBC AUTO-ENTMCNC: 32 G/DL (ref 30–36.5)
MCV RBC AUTO: 81.8 FL (ref 80–99)
MONOCYTES # BLD: 0.3 K/UL (ref 0–1)
MONOCYTES NFR BLD: 6 % (ref 5–13)
NEUTS SEG # BLD: 3.2 K/UL (ref 1.8–8)
NEUTS SEG NFR BLD: 55 % (ref 32–75)
PHOSPHATE SERPL-MCNC: 4.1 MG/DL (ref 2.6–4.7)
PLATELET # BLD AUTO: 210 K/UL (ref 150–400)
POTASSIUM SERPL-SCNC: 4.7 MMOL/L (ref 3.5–5.1)
RBC # BLD AUTO: 4.12 M/UL (ref 4.1–5.7)
SODIUM SERPL-SCNC: 136 MMOL/L (ref 136–145)
WBC # BLD AUTO: 5.8 K/UL (ref 4.1–11.1)

## 2018-01-20 PROCEDURE — 74011250637 HC RX REV CODE- 250/637: Performed by: COLON & RECTAL SURGERY

## 2018-01-20 PROCEDURE — 65270000032 HC RM SEMIPRIVATE

## 2018-01-20 PROCEDURE — 83036 HEMOGLOBIN GLYCOSYLATED A1C: CPT | Performed by: COLON & RECTAL SURGERY

## 2018-01-20 PROCEDURE — 74011250636 HC RX REV CODE- 250/636: Performed by: COLON & RECTAL SURGERY

## 2018-01-20 PROCEDURE — 84100 ASSAY OF PHOSPHORUS: CPT | Performed by: COLON & RECTAL SURGERY

## 2018-01-20 PROCEDURE — 80048 BASIC METABOLIC PNL TOTAL CA: CPT | Performed by: COLON & RECTAL SURGERY

## 2018-01-20 PROCEDURE — 83735 ASSAY OF MAGNESIUM: CPT | Performed by: COLON & RECTAL SURGERY

## 2018-01-20 PROCEDURE — 36415 COLL VENOUS BLD VENIPUNCTURE: CPT | Performed by: COLON & RECTAL SURGERY

## 2018-01-20 PROCEDURE — 74011250636 HC RX REV CODE- 250/636

## 2018-01-20 PROCEDURE — 85025 COMPLETE CBC W/AUTO DIFF WBC: CPT | Performed by: COLON & RECTAL SURGERY

## 2018-01-20 RX ORDER — DICYCLOMINE HYDROCHLORIDE 10 MG/1
10 CAPSULE ORAL
Status: DISCONTINUED | OUTPATIENT
Start: 2018-01-20 | End: 2018-01-22 | Stop reason: HOSPADM

## 2018-01-20 RX ORDER — ACETAMINOPHEN 500 MG
500 TABLET ORAL
Status: DISCONTINUED | OUTPATIENT
Start: 2018-01-20 | End: 2018-01-22 | Stop reason: HOSPADM

## 2018-01-20 RX ORDER — HYDROMORPHONE HYDROCHLORIDE 1 MG/ML
INJECTION, SOLUTION INTRAMUSCULAR; INTRAVENOUS; SUBCUTANEOUS
Status: DISPENSED
Start: 2018-01-20 | End: 2018-01-20

## 2018-01-20 RX ORDER — OMEPRAZOLE 40 MG/1
40 CAPSULE, DELAYED RELEASE ORAL
Status: DISCONTINUED | OUTPATIENT
Start: 2018-01-20 | End: 2018-01-20 | Stop reason: CLARIF

## 2018-01-20 RX ORDER — PREGABALIN 50 MG/1
100 CAPSULE ORAL 4 TIMES DAILY
Status: DISCONTINUED | OUTPATIENT
Start: 2018-01-20 | End: 2018-01-22 | Stop reason: HOSPADM

## 2018-01-20 RX ORDER — PROMETHAZINE HYDROCHLORIDE 25 MG/1
25 TABLET ORAL
Status: DISCONTINUED | OUTPATIENT
Start: 2018-01-20 | End: 2018-01-22 | Stop reason: HOSPADM

## 2018-01-20 RX ORDER — PANTOPRAZOLE SODIUM 40 MG/1
40 TABLET, DELAYED RELEASE ORAL
Status: DISCONTINUED | OUTPATIENT
Start: 2018-01-20 | End: 2018-01-22 | Stop reason: HOSPADM

## 2018-01-20 RX ORDER — DIAZEPAM 5 MG/1
5 TABLET ORAL
Status: DISCONTINUED | OUTPATIENT
Start: 2018-01-20 | End: 2018-01-22 | Stop reason: HOSPADM

## 2018-01-20 RX ORDER — ZOLPIDEM TARTRATE 5 MG/1
10 TABLET ORAL
Status: DISCONTINUED | OUTPATIENT
Start: 2018-01-20 | End: 2018-01-22 | Stop reason: HOSPADM

## 2018-01-20 RX ORDER — HYDROMORPHONE HYDROCHLORIDE 1 MG/ML
0.6 INJECTION, SOLUTION INTRAMUSCULAR; INTRAVENOUS; SUBCUTANEOUS
Status: DISCONTINUED | OUTPATIENT
Start: 2018-01-20 | End: 2018-01-22 | Stop reason: HOSPADM

## 2018-01-20 RX ORDER — CITALOPRAM 20 MG/1
20 TABLET, FILM COATED ORAL DAILY
Status: DISCONTINUED | OUTPATIENT
Start: 2018-01-20 | End: 2018-01-22 | Stop reason: HOSPADM

## 2018-01-20 RX ORDER — HYDROMORPHONE HYDROCHLORIDE 1 MG/ML
0.6 INJECTION, SOLUTION INTRAMUSCULAR; INTRAVENOUS; SUBCUTANEOUS ONCE
Status: COMPLETED | OUTPATIENT
Start: 2018-01-20 | End: 2018-01-20

## 2018-01-20 RX ORDER — PROCHLORPERAZINE MALEATE 10 MG
10 TABLET ORAL
Status: DISCONTINUED | OUTPATIENT
Start: 2018-01-20 | End: 2018-01-22 | Stop reason: HOSPADM

## 2018-01-20 RX ADMIN — ZOLPIDEM TARTRATE 10 MG: 5 TABLET ORAL at 23:08

## 2018-01-20 RX ADMIN — DIAZEPAM 5 MG: 5 TABLET ORAL at 18:40

## 2018-01-20 RX ADMIN — Medication 10 ML: at 07:11

## 2018-01-20 RX ADMIN — HYDROMORPHONE HYDROCHLORIDE 0.6 MG: 1 INJECTION, SOLUTION INTRAMUSCULAR; INTRAVENOUS; SUBCUTANEOUS at 01:35

## 2018-01-20 RX ADMIN — PREGABALIN 100 MG: 50 CAPSULE ORAL at 09:22

## 2018-01-20 RX ADMIN — PREGABALIN 100 MG: 50 CAPSULE ORAL at 18:31

## 2018-01-20 RX ADMIN — Medication 10 ML: at 01:36

## 2018-01-20 RX ADMIN — ACETAMINOPHEN 500 MG: 500 TABLET, FILM COATED ORAL at 09:22

## 2018-01-20 RX ADMIN — HYDROMORPHONE HYDROCHLORIDE 0.6 MG: 1 INJECTION, SOLUTION INTRAMUSCULAR; INTRAVENOUS; SUBCUTANEOUS at 23:10

## 2018-01-20 RX ADMIN — CITALOPRAM HYDROBROMIDE 20 MG: 20 TABLET ORAL at 09:22

## 2018-01-20 RX ADMIN — HYDROMORPHONE HYDROCHLORIDE 0.6 MG: 1 INJECTION, SOLUTION INTRAMUSCULAR; INTRAVENOUS; SUBCUTANEOUS at 07:09

## 2018-01-20 RX ADMIN — DIAZEPAM 5 MG: 5 TABLET ORAL at 09:22

## 2018-01-20 RX ADMIN — DICYCLOMINE HYDROCHLORIDE 10 MG: 10 CAPSULE ORAL at 18:40

## 2018-01-20 RX ADMIN — PANTOPRAZOLE SODIUM 40 MG: 40 TABLET, DELAYED RELEASE ORAL at 01:15

## 2018-01-20 RX ADMIN — Medication 10 ML: at 14:34

## 2018-01-20 RX ADMIN — PREGABALIN 100 MG: 50 CAPSULE ORAL at 23:08

## 2018-01-20 RX ADMIN — HYDROMORPHONE HYDROCHLORIDE 0.6 MG: 1 INJECTION, SOLUTION INTRAMUSCULAR; INTRAVENOUS; SUBCUTANEOUS at 14:32

## 2018-01-20 RX ADMIN — PANTOPRAZOLE SODIUM 40 MG: 40 TABLET, DELAYED RELEASE ORAL at 21:15

## 2018-01-20 RX ADMIN — HYDROMORPHONE HYDROCHLORIDE 0.6 MG: 1 INJECTION, SOLUTION INTRAMUSCULAR; INTRAVENOUS; SUBCUTANEOUS at 18:32

## 2018-01-20 RX ADMIN — PREGABALIN 100 MG: 50 CAPSULE ORAL at 14:32

## 2018-01-20 RX ADMIN — ACETAMINOPHEN 500 MG: 500 TABLET, FILM COATED ORAL at 16:07

## 2018-01-20 RX ADMIN — HYDROMORPHONE HYDROCHLORIDE 0.6 MG: 1 INJECTION, SOLUTION INTRAMUSCULAR; INTRAVENOUS; SUBCUTANEOUS at 11:22

## 2018-01-20 RX ADMIN — ZOLPIDEM TARTRATE 10 MG: 5 TABLET ORAL at 01:14

## 2018-01-20 RX ADMIN — SODIUM CHLORIDE 50 ML/HR: 900 INJECTION, SOLUTION INTRAVENOUS at 18:32

## 2018-01-20 RX ADMIN — SODIUM CHLORIDE 100 ML/HR: 900 INJECTION, SOLUTION INTRAVENOUS at 07:10

## 2018-01-20 NOTE — PROGRESS NOTES
0845 Lab samples sent to lab this AM clotted, new orders put in per lab protocol and RN to redraw labs.

## 2018-01-20 NOTE — PROGRESS NOTES
Bedside shift change report given to Simi Terry RN (oncoming nurse) by Bhargav Stokes RN (offgoing nurse). Report included the following information SBAR, Kardex, Procedure Summary, Intake/Output, MAR, Recent Results and Med Rec Status     Per Bhargav Stokes RN Admission required documentation not completed. Resource nurse, eN Uribe notified by [employer] nurse.

## 2018-01-20 NOTE — PROGRESS NOTES
Primary Nurse Dimple Mckeon RN and [facility], RN performed a dual skin assessment on this patient No impairment noted  Eduardo score is 23

## 2018-01-20 NOTE — PROGRESS NOTES
General Daily Progress Note    Admission Date: 1/19/2018  Hospital Day 2  Post-Op Day Not Applicable  Subjective:   Feeling better. No emesis. Urine looks more normal.  Ileostomy tube is draining. Pain is adequately controlled. Objective:   Patient Vitals for the past 24 hrs:   BP Temp Pulse Resp SpO2 Height Weight   01/20/18 1233 94/58 97.8 °F (36.6 °C) 99 16 99 % - -   01/20/18 0823 98/62 98.7 °F (37.1 °C) 92 16 100 % - -   01/20/18 0030 108/67 98.8 °F (37.1 °C) 92 16 97 % - -   01/19/18 2145 108/68 98.1 °F (36.7 °C) 98 16 100 % - -   01/19/18 2100 108/71 98.5 °F (36.9 °C) (!) 115 18 98 % - -   01/19/18 1630 107/71 98.6 °F (37 °C) (!) 117 16 98 % - -   01/19/18 1305 121/81 98 °F (36.7 °C) (!) 141 16 98 % 6' (1.829 m) 82.1 kg (181 lb)     01/20 0701 - 01/20 1900  In: -   Out: 450 [Urine:300]         Physical Examination:  General Appearance:  No distress. Abdomen:  Soft. Mildly tender. Ileostomy tube draining dark liquid. Data Review   Recent Results (from the past 24 hour(s))   CBC WITH AUTOMATED DIFF    Collection Time: 01/19/18  3:51 PM   Result Value Ref Range    WBC 9.9 4.1 - 11.1 K/uL    RBC 5.41 4.10 - 5.70 M/uL    HGB 14.3 12.1 - 17.0 g/dL    HCT 43.4 36.6 - 50.3 %    MCV 80.2 80.0 - 99.0 FL    MCH 26.4 26.0 - 34.0 PG    MCHC 32.9 30.0 - 36.5 g/dL    RDW 14.4 11.5 - 14.5 %    PLATELET 191 058 - 521 K/uL    NEUTROPHILS 73 32 - 75 %    LYMPHOCYTES 20 12 - 49 %    MONOCYTES 6 5 - 13 %    EOSINOPHILS 1 0 - 7 %    BASOPHILS 0 0 - 1 %    ABS. NEUTROPHILS 7.2 1.8 - 8.0 K/UL    ABS. LYMPHOCYTES 2.0 0.8 - 3.5 K/UL    ABS. MONOCYTES 0.6 0.0 - 1.0 K/UL    ABS. EOSINOPHILS 0.1 0.0 - 0.4 K/UL    ABS.  BASOPHILS 0.0 0.0 - 0.1 K/UL   METABOLIC PANEL, COMPREHENSIVE    Collection Time: 01/19/18  3:51 PM   Result Value Ref Range    Sodium 135 (L) 136 - 145 mmol/L    Potassium 4.2 3.5 - 5.1 mmol/L    Chloride 98 97 - 108 mmol/L    CO2 29 21 - 32 mmol/L    Anion gap 8 5 - 15 mmol/L    Glucose 127 (H) 65 - 100 mg/dL    BUN 20 6 - 20 MG/DL    Creatinine 1.82 (H) 0.70 - 1.30 MG/DL    BUN/Creatinine ratio 11 (L) 12 - 20      GFR est AA 50 (L) >60 ml/min/1.73m2    GFR est non-AA 41 (L) >60 ml/min/1.73m2    Calcium 10.1 8.5 - 10.1 MG/DL    Bilirubin, total 0.5 0.2 - 1.0 MG/DL    ALT (SGPT) 20 12 - 78 U/L    AST (SGOT) 11 (L) 15 - 37 U/L    Alk.  phosphatase 157 (H) 45 - 117 U/L    Protein, total 8.7 (H) 6.4 - 8.2 g/dL    Albumin 4.2 3.5 - 5.0 g/dL    Globulin 4.5 (H) 2.0 - 4.0 g/dL    A-G Ratio 0.9 (L) 1.1 - 2.2     LIPASE    Collection Time: 01/19/18  3:51 PM   Result Value Ref Range    Lipase 70 (L) 73 - 393 U/L   URINALYSIS W/MICROSCOPIC    Collection Time: 01/19/18  4:31 PM   Result Value Ref Range    Color DARK YELLOW      Appearance CLOUDY (A) CLEAR      Specific gravity 1.027 1.003 - 1.030      pH (UA) 5.5 5.0 - 8.0      Protein 30 (A) NEG mg/dL    Glucose NEGATIVE  NEG mg/dL    Ketone TRACE (A) NEG mg/dL    Blood NEGATIVE  NEG      Urobilinogen 0.2 0.2 - 1.0 EU/dL    Nitrites NEGATIVE  NEG      Leukocyte Esterase NEGATIVE  NEG      WBC 0-4 0 - 4 /hpf    RBC 0-5 0 - 5 /hpf    Epithelial cells FEW FEW /lpf    Bacteria NEGATIVE  NEG /hpf    Mucus 3+ (A) NEG /lpf    Amorphous Crystals FEW (A) NEG      Hyaline cast >20 (H) 0 - 5 /lpf    Granular cast 0-2 (A) NEG /lpf   URINE CULTURE HOLD SAMPLE    Collection Time: 01/19/18  4:31 PM   Result Value Ref Range    Urine culture hold URINE ON HOLD IN MICROBIOLOGY DEPT FOR 3 DAYS     BILIRUBIN, CONFIRM    Collection Time: 01/19/18  4:31 PM   Result Value Ref Range    Bilirubin UA, confirm NEGATIVE  NEG     METABOLIC PANEL, BASIC    Collection Time: 01/20/18  7:12 AM   Result Value Ref Range    Sodium 136 136 - 145 mmol/L    Potassium 4.7 3.5 - 5.1 mmol/L    Chloride 104 97 - 108 mmol/L    CO2 21 21 - 32 mmol/L    Anion gap 11 5 - 15 mmol/L    Glucose 78 65 - 100 mg/dL    BUN 13 6 - 20 MG/DL    Creatinine 1.01 0.70 - 1.30 MG/DL    BUN/Creatinine ratio 13 12 - 20      GFR est AA >60 >60 ml/min/1.73m2    GFR est non-AA >60 >60 ml/min/1.73m2    Calcium 8.8 8.5 - 10.1 MG/DL   MAGNESIUM    Collection Time: 01/20/18  7:12 AM   Result Value Ref Range    Magnesium 1.6 1.6 - 2.4 mg/dL   PHOSPHORUS    Collection Time: 01/20/18  7:12 AM   Result Value Ref Range    Phosphorus 4.1 2.6 - 4.7 MG/DL   CBC WITH AUTOMATED DIFF    Collection Time: 01/20/18  9:11 AM   Result Value Ref Range    WBC 5.8 4.1 - 11.1 K/uL    RBC 4.12 4.10 - 5.70 M/uL    HGB 10.8 (L) 12.1 - 17.0 g/dL    HCT 33.7 (L) 36.6 - 50.3 %    MCV 81.8 80.0 - 99.0 FL    MCH 26.2 26.0 - 34.0 PG    MCHC 32.0 30.0 - 36.5 g/dL    RDW 14.4 11.5 - 14.5 %    PLATELET 324 994 - 523 K/uL    NEUTROPHILS 55 32 - 75 %    LYMPHOCYTES 35 12 - 49 %    MONOCYTES 6 5 - 13 %    EOSINOPHILS 4 0 - 7 %    BASOPHILS 0 0 - 1 %    ABS. NEUTROPHILS 3.2 1.8 - 8.0 K/UL    ABS. LYMPHOCYTES 2.0 0.8 - 3.5 K/UL    ABS. MONOCYTES 0.3 0.0 - 1.0 K/UL    ABS. EOSINOPHILS 0.2 0.0 - 0.4 K/UL    ABS. BASOPHILS 0.0 0.0 - 0.1 K/UL   HEMOGLOBIN A1C WITH EAG    Collection Time: 01/20/18  9:11 AM   Result Value Ref Range    Hemoglobin A1c 6.1 4.2 - 6.3 %    Est. average glucose 128 mg/dL           Assessment:     Principal Problem:    Small bowel obstruction (5/20/2017)    Active Problems:    Chronic narcotic dependence ([address] (1/20/2018)      Partial small bowel obstruction appears to be resolving. Dehydration improving. Hemodynamically stable, and creatine now within normal limits. No signs of narcotic withdrawal.    Hospitalist consultation appreciated, but assistance no longer needed at this time. Plan:   Clear liquid diet as tolerated. Decrease IV rate. Patient to irrigate ileostomy tube per his usual routine. Repeat labs tomorrow.

## 2018-01-20 NOTE — ROUTINE PROCESS
TRANSFER - OUT REPORT:    Verbal report given to Chidi Robertson RN(name) on David Avila  being transferred to (unit) for routine progression of care       Report consisted of patients Situation, Background, Assessment and   Recommendations(SBAR). Information from the following report(s) SBAR, ED Summary, Procedure Summary, MAR and Recent Results was reviewed with the receiving nurse. Lines:   Peripheral IV 01/19/18 Right Antecubital (Active)   Site Assessment Clean, dry, & intact 1/19/2018  3:53 PM   Phlebitis Assessment 0 1/19/2018  3:53 PM   Infiltration Assessment 0 1/19/2018  3:53 PM   Dressing Status Clean, dry, & intact 1/19/2018  3:53 PM   Dressing Type Tape;Transparent 1/19/2018  3:53 PM   Hub Color/Line Status Pink;Capped;Flushed;Patent 1/19/2018  3:53 PM   Action Taken Blood drawn 1/19/2018  3:53 PM        Opportunity for questions and clarification was provided.       Patient transported with:   [employer]

## 2018-01-20 NOTE — H&P
Colorectal Surgery Consultation/Admission History and Physical Examination Note          NAME:  Byron Jha   :   1975   MRN:   [id number]     Admission Date: 2018    Chief Complaint:  Nausea, vomiting, and decreased ileostomy output. History of Present Illness: The patient is a 50-year-old male who is well known to me from multiple previous admissions and operations (see below). Most recently, on 2017 in [place], [place], he underwent the conversion of his end-ileostomy to (his second) [place] continent intestinal reservoir (BCIR). His post-operative hospital course was reportedly uneventful, and per protocol he was discharged to home on 2017. At home, he has been following a protocol in which he has been gradually increasing the interval between catheterizations of the BCIR and he had reached 5 hours. He uses a 30 Fr. catheter to drain the BCIR, and the output has usually been watery. He has been consuming mostly liquids but recently added small amounts of pasta and sometimes chicken to his diet. He has learned to avoid anything with a skin. Concurrently, he has been slowly tapering his narcotic dosing under the direction of his primary physician, José Luis Dave MD). When he was discharged from the hospital in [place], he was taking 15 to 30 mg of oxycodone every 4 hours. Once that ended, he was switched to Percocet . That drug is being tapered slowly in order to avoid withdrawal, and his current regimen has been to take 5 pills per day. On 2018, he underwent an extensive dental procedure to repair a cracked tooth. The procedure was performed using nitrous oxide, and afterwards he was allowed to take one Norco 7.5-325 mg every 6 hours in addition to the Percocet. He was prescribed 14 pills, and he has probably used 11 of them. He aslo has an active prescription for Tramadol 50 mg that he only takes at bedtime.     When he catheterized the BCIR on the night after the dental procedure, the output was just a small amount of gas and perhaps 2 tablespoons of fluid. On 1/17/2018, there was almost no output. Yesterday, he had a small amount of output in the morning but he could not tolerate much oral intake and later in the day the output from the BCIR was very low. This morning, he found a small amount of feces on the gauze pad that he was using to cover the stoma. Catheterization yielded little output. He experienced increased abdominal discomfort, and he vomited for 20 to 30 minutes. At this poiint, he decided that he should go to the hospital.    He presented to the ER, and there a CT scan of the abdomen and pelvis with oral contrast was performed. The scan, the images from which I have reviewed, was interpreted as demonstrating mild distension of the distal small bowel consistent with a \"mild mid and distal small bowel obstruction. \"  Laboratory studies revealed evidence of dehydration, but not of intestinal compromise or infection. Nasogastric tube placement was attempted without success. Currently, there is thin severiano liquid draining from the [facility] via a catheter. The patient still has some abdominal pain, but he is not nauseated. PMH:  Past Medical History:   Diagnosis Date    Anal fistula     The patient no longer has an anus.  Anal stenosis     The patient no longer has an anus.  Crohn's disease ([address]     GERD (gastroesophageal reflux disease)     H/O ulcerative colitis     Symptoms began in 1996. Total proctocolectomy was performed in 2004. Patient later developed Crohn's disease.  Hiatal hernia     Ileal pouchitis ([address] 05/2004    The patient no longer has a pouch.  Nausea & vomiting     Combination of Scopalamine patch & Zofran IV worked well in past    Numbness in right leg     Chronic.     Opioid dependence ([address]     History of opioid dependence requiring a detox program.    Psychiatric disorder depression and anxiety    Skin lesion of back 3/17/2014    Syncopal episodes        PSH:  Past Surgical History:   Procedure Laterality Date    ABDOMEN SURGERY PROC UNLISTED  3/25/2004    Total proctocolectomy with creation of ileoanal J-pouch and loop ileostomy; Dr. Augusto Bosworth.   CHOLECYSTECTOMY  12/2016    Dr. Isidro Bodily  GI  5/2/2004    Examination under anesthesia with endoscopic evaluation of ileoanal pouch and placement of drain; Dr. Augusto Bosworth.   GI  5/25/2004    Ileostomy closure with small bowel resection and anastomosis; Dr. Augusto Bosworth.   GI  5/24/2012    Examination under anesthesia, anal dilatation, anal biopsy, and placement of draining seton; Dr. Augusto Bosworth.   GI  7/3/2012    Incision and drainage of perirectal abscess and rigid endoscopy of ileoanal pouch; Dr. Augusto Bosworth.   GI  7/31/2012    Incision and drainage of perirectal abscess, placement of draining seton, and anal dilatation; Dr. Augusto Bosworth.   GI  1/22/2013    Repair of anal fistulas with debridement, partial fistulectomy, and flap closure; Dr. Augusto Bosworth.   GI  5/17/2013    Examination under anesthesia, partial fistulotomy,  and placement of draining setons; Dr. Augusto Bosworth.   GI  8/6/2013    Anal fistulotomy and application of ACell micromatrix; Dr. Augusto Bosworth.   GI  2/20/2014    Examination under anesthesia and dilation of anal canal with rigid proctoscopy; Francisco Roberto MD.     GI  4/29/2015    Creation of [place] continent intestinal reservoir (BCIR), abdominoperineal resection of ileoanal J-pouch; lysis of adhesions, and gastrostomy; Shi Crystal. Tammy Davies MD ([place], [place])     GI  8/7/2015    Stoma revision; Shi Crystal. Tammy Davies MD ([place], [place])     GI  10/29/2015    Extensive lysis of adhesions, resection of continent intestinal reservoir, repair of serosal tears, and creation of end-ileostomy; Dr. Augusto Bosworth.      GI  03/14/2017    Laparotomy, extensive lysis of adhesions and revision of ileostomy; Dr. Deana Douglass.  HX ORTHOPAEDIC      Left ACL repair    HX OTHER SURGICAL  2017    BCIR    HX UROLOGICAL  2017    Cystoscopy and placement of bilateral temporary ureteral catheters; Lara Fitzgerald MD.       Home Medications:  Prior to Admission Medications   Prescriptions Last Dose Informant Patient Reported? Taking? B.infantis-B.ani-B.long-B.bifi (PROBIOTIC 4X) 10-15 mg TbEC 2018 at Unknown time  Yes Yes   Sig: Take 1 Cap by mouth nightly. OTHER 2018 at Unknown time  Yes Yes   Sig: Take 2-3 Tabs by mouth three (3) times daily (with meals). Kimberley Enzymes   Ustekinumab (STELARA) 90 mg/mL injection 10/1/2017 Self Yes No   Si mg by SubCUTAneous route. Every 8 weeks    acetaminophen (TYLENOL) 500 mg tablet   Yes Yes   Sig: Take 500 mg by mouth four (4) times daily as needed (mild pain, heache (usually has headaches 1 week after Stelara)). citalopram (CELEXA) 20 mg tablet 2018 at Unknown time  Yes Yes   Sig: Take 20 mg by mouth daily. cyanocobalamin (VITAMIN B-12) 1,000 mcg/mL injection 2018 Self Yes Yes   Si,000 mcg by IntraMUSCular route every . Indications: Once weekly   diazepam (VALIUM) 5 mg tablet 2018 at Unknown time Self Yes Yes   Sig: Take 5 mg by mouth every eight (8) hours as needed (bowel relaxation). dicyclomine (BENTYL) 10 mg capsule 2018 at Unknown time Self Yes Yes   Sig: Take 10 mg by mouth every six (6) hours as needed. ergocalciferol (ERGOCALCIFEROL) 50,000 unit capsule 2018 at Unknown time Self Yes Yes   Sig: Take 50,000 Units by mouth every . omeprazole (PRILOSEC) 40 mg capsule 2018 at Unknown time Self Yes Yes   Sig: Take 40 mg by mouth nightly. ondansetron hcl (ZOFRAN, AS HYDROCHLORIDE,) 8 mg tablet 2018 at Unknown time Self Yes Yes   Sig: Take 8 mg by mouth every twelve (12) hours as needed (Mild Nausea).    oxyCODONE-acetaminophen (PERCOCET)  mg per tablet 1/18/2018 at Unknown time  Yes Yes   Sig: Take 1-2 Tabs by mouth every four (4) hours as needed for Pain (Being tapered per Dr. Olvin Varma). pregabalin (LYRICA) 100 mg capsule 1/18/2018 at Unknown time Self Yes Yes   Sig: Take 100 mg by mouth four (4) times daily. prochlorperazine (COMPAZINE) 10 mg tablet   Yes Yes   Sig: Take 10 mg by mouth two (2) times daily as needed (Moderate Nausea). promethazine (PHENERGAN) 25 mg tablet 1/19/2018 at Unknown time Self Yes Yes   Sig: Take 25 mg by mouth every six (6) hours as needed (Severe Nausea). traMADol (ULTRAM) 50 mg tablet  Self Yes Yes   Sig: Take 50 mg by mouth three (3) times daily as needed (moderate pain). zolpidem (AMBIEN) 10 mg tablet 1/18/2018 at Unknown time  Yes Yes   Sig: Take 10 mg by mouth nightly. Facility-Administered Medications: None       Hospital Medications:  Current Facility-Administered Medications   Medication Dose Route Frequency    sodium chloride (NS) flush 5-10 mL  5-10 mL IntraVENous Q8H    sodium chloride (NS) flush 5-10 mL  5-10 mL IntraVENous PRN    0.9% sodium chloride infusion  100 mL/hr IntraVENous CONTINUOUS    naloxone (NARCAN) injection 0.4 mg  0.4 mg IntraVENous PRN    ondansetron (ZOFRAN) injection 4 mg  4 mg IntraVENous Q4H PRN       Allergies: Allergies   Allergen Reactions    Remicade [Infliximab] Anaphylaxis and Shortness of Breath    Bactrim [Sulfamethoprim Ds] Other (comments)     Increased GI distress.     Morphine Nausea Only     Tolerates Dilaudid    Nsaids (Non-Steroidal Anti-Inflammatory Drug) Other (comments)     Stomach ulcers       Family History:  Family History   Problem Relation Age of Onset    Cancer Father     Asthma Neg Hx     Diabetes Neg Hx     Heart Disease Neg Hx     Hypertension Neg Hx     Stroke Neg Hx     Malignant Hyperthermia Neg Hx     Pseudocholinesterase Deficiency Neg Hx     Delayed Awakening Neg Hx     Post-op Nausea/Vomiting Neg Hx Social History:  Social History   Substance Use Topics    Smoking status: Former Smoker     Packs/day: 0.00     Years: 7.00     Quit date: 1/14/2017    Smokeless tobacco: Never Used    Alcohol use No       Review of Systems:    Symptom Y/N Comments  Symptom Y/N Comments   Fever/Chills N   Chest Pain N    Cough N   Abdominal Pain Y    Sputum    Joint Pain     SOB/CEDILLO N   Pruritis/Rash     Nausea/vomit Y   Tolerating PT/OT     Diarrhea N   Tolerating Diet N    Constipation N   Other       Could NOT obtain due to:        Objective:   Patient Vitals for the past 8 hrs:   BP Temp Pulse Resp SpO2   01/19/18 2145 108/68 98.1 °F (36.7 °C) 98 16 100 %   01/19/18 2100 108/71 98.5 °F (36.9 °C) (!) 115 18 98 %   01/19/18 1630 107/71 98.6 °F (37 °C) (!) 117 16 98 %             PHYSICAL EXAMINATION:    GENERAL:  Fatigued but in  no distress. HEENT:  Anicteric. HEART:  Regular rate and rhythm with no murmurs, gallops, or rubs. ABDOMEN:  Mildly tender. Slightly distended. Well healed midline and right paramedian scars. 30 Fr. catheter present in right lower quadrant and draining thin severiano fluid. NEURO:  Alert and oriented. Data Review     Recent Labs      01/19/18   1551   WBC  9.9   HGB  14.3   HCT  43.4   PLT  331     Recent Labs      01/19/18   1551   NA  135*   K  4.2   CL  98   CO2  29   BUN  20   CREA  1.82*   GLU  127*   CA  10.1     Recent Labs      01/19/18   1551   SGOT  11*   AP  157*   TP  8.7*   ALB  4.2   GLOB  4.5*   LPSE  70*     No results for input(s): INR, PTP, APTT in the last 72 hours. No lab exists for component: INREXT                    Assessment and Plan:      The patient could have a mild partial small bowel obstruction, an ileus secondary to narcotics, or both. There is no indication for surgery, and the problem will hopefully resolve with bowel rest and parenteral hydration.     His condition and its management are complicated by his polypharmacy and especially by his narcotic dependence. I spent approximately 30 minutes interviewing him, and much of that time was spent attempting to understand his medication regimen. While hospitalized, he will need scheduled narcotics to prevent withdrawal.  I have discussed the dosing with the on-duty pharmacist and determined that the parenteral dose of Dilaudid that is roughly equivalent to the Percocet dose that he has been taking is 0.6 mg five times per day. In addition to the Dilaudid, he will be allowed to take most of his usual medictions with sips of water. I will re-evaluate him in the morning on 1/20/2018, and if he appears to be likely to tolerate it I will order a clear liquid diet.       Active Problems:    Small bowel obstruction (5/20/2017)

## 2018-01-20 NOTE — PROGRESS NOTES
Admission Medication Reconciliation:    Information obtained from:  Patient    Comments/Recommendations: Updated PTA meds/reviewed patient's allergies. 1)  Medication Changes: Added  - Citalopram  - Zolpidem  - Kimberley Enzymes  - Probiotic  - Norco 10/325    Adjusted  - Lyrica to 100 mg QID vs 100 mg TID  - Prilosec to Bedtime  - B-12 injection and Ergocalciferol Both on Sundays    Removed   - Elavil  - Wellbutrin SR  - Restoril  - [place] 5/325    2)  Has been on Stelara for Crohn's however that has been on hold for several months prior to surgery    3)  Last doses as below    4)  Reviewed allergies       Significant PMH/Disease States:   Past Medical History:   Diagnosis Date    Anal fistula     The patient no longer has an anus.  Anal stenosis     The patient no longer has an anus.  Crohn's disease ([address]     GERD (gastroesophageal reflux disease)     H/O ulcerative colitis     Symptoms began in 1996. Total proctocolectomy was performed in 2004. Patient later developed Crohn's disease.  Hiatal hernia     Ileal pouchitis ([address] 05/2004    The patient no longer has a pouch.  Nausea & vomiting     Combination of Scopalamine patch & Zofran IV worked well in past    Numbness in right leg     Chronic.  Opioid dependence ([address]     History of opioid dependence requiring a detox program.    Psychiatric disorder     depression and anxiety    Skin lesion of back 3/17/2014    Syncopal episodes      Chief Complaint for this Admission:    Chief Complaint   Patient presents with    Post OP Complication    Abdominal Pain     Allergies:  Remicade [infliximab]; Bactrim [sulfamethoprim ds]; Morphine; and Nsaids (non-steroidal anti-inflammatory drug)    Prior to Admission Medications:   Prior to Admission Medications   Prescriptions Last Dose Informant Patient Reported? Taking? B.infantis-B.ani-B.long-B.bifi (PROBIOTIC 4X) 10-15 mg TbEC 1/18/2018 at Unknown time  Yes Yes   Sig: Take 1 Cap by mouth nightly. OTHER 2018 at Unknown time  Yes Yes   Sig: Take 2-3 Tabs by mouth three (3) times daily (with meals). Kimberley Enzymes   Ustekinumab (STELARA) 90 mg/mL injection 10/1/2017 Self Yes No   Si mg by SubCUTAneous route. Every 8 weeks    acetaminophen (TYLENOL) 500 mg tablet   Yes Yes   Sig: Take 500 mg by mouth four (4) times daily as needed (mild pain, heache (usually has headaches 1 week after Stelara)). citalopram (CELEXA) 20 mg tablet 2018 at Unknown time  Yes Yes   Sig: Take 20 mg by mouth daily. cyanocobalamin (VITAMIN B-12) 1,000 mcg/mL injection 2018 Self Yes Yes   Si,000 mcg by IntraMUSCular route every . Indications: Once weekly   diazepam (VALIUM) 5 mg tablet 2018 at Unknown time Self Yes Yes   Sig: Take 5 mg by mouth every eight (8) hours as needed (bowel relaxation). dicyclomine (BENTYL) 10 mg capsule 2018 at Unknown time Self Yes Yes   Sig: Take 10 mg by mouth every six (6) hours as needed. ergocalciferol (ERGOCALCIFEROL) 50,000 unit capsule 2018 at Unknown time Self Yes Yes   Sig: Take 50,000 Units by mouth every . omeprazole (PRILOSEC) 40 mg capsule 2018 at Unknown time Self Yes Yes   Sig: Take 40 mg by mouth nightly. ondansetron hcl (ZOFRAN, AS HYDROCHLORIDE,) 8 mg tablet 2018 at Unknown time Self Yes Yes   Sig: Take 8 mg by mouth every twelve (12) hours as needed (Mild Nausea). oxyCODONE-acetaminophen (PERCOCET)  mg per tablet 2018 at Unknown time  Yes Yes   Sig: Take 1-2 Tabs by mouth every four (4) hours as needed for Pain (Being tapered per Dr. Isaac Carmona). pregabalin (LYRICA) 100 mg capsule 2018 at Unknown time Self Yes Yes   Sig: Take 100 mg by mouth four (4) times daily. prochlorperazine (COMPAZINE) 10 mg tablet   Yes Yes   Sig: Take 10 mg by mouth two (2) times daily as needed (Moderate Nausea).    promethazine (PHENERGAN) 25 mg tablet 2018 at Unknown time Self Yes Yes   Sig: Take 25 mg by mouth every six (6) hours as needed (Severe Nausea). traMADol (ULTRAM) 50 mg tablet  Self Yes Yes   Sig: Take 50 mg by mouth three (3) times daily as needed (moderate pain). zolpidem (AMBIEN) 10 mg tablet 1/18/2018 at Unknown time  Yes Yes   Sig: Take 10 mg by mouth nightly.       Facility-Administered Medications: None

## 2018-01-20 NOTE — CONSULTS
[address]    Rolando Fitzgerald  MR#: [id number]  : 1975  ACCOUNT #: [de-identified]   DATE OF SERVICE: 2018    PRIMARY CARE PHYSICIAN:  Dr. Estrada Vila. DOCTOR REQUESTING THE MEDICAL CONSULTATION:  Dr. Nani Vaughan, colorectal surgeon. REASON FOR MEDICAL CONSULT:  Pain management. SOURCE OF INFORMATION:  Patient. CHIEF COMPLAINT:  Abdominal pain. HISTORY OF PRESENT ILLNESS:  This is a 78-year-old man with a past medical history significant for anxiety/depression, Crohn's disease complicated by multiple abdominal surgeries, was in his usual state of health until about 3 days ago when the patient developed abdominal pain. The pain is progressive and getting worse. It is located at the lower abdomen 9/10 in severity. The pain is constant, associated with nausea and vomiting. No fever, no rigors, no chills. Patient has a history of Crohn's disease. He has had multiple abdominal surgeries as a result of Crohn's disease. Patient recently had conversion of his ileostomy to BCIR. The surgery was done in [place], and the surgery was done last month from 2017 to 2017. According to the patient, the BCIR has been functioning properly without any complication. He came to the emergency room today because of the worsening abdominal pain, nausea and vomiting. When the patient arrived by the emergency room, a CT scan of the abdomen and pelvis was obtained. This showed evidence of a possible small-bowel obstruction. Patient was admitted to the surgical service. The patient has chronic pain syndrome for which the patient is on a chronic narcotic medication. A medical consult was requested for pain management. PAST MEDICAL HISTORY:  Anxiety/depression, chronic pain syndrome from multiple abdominal surgeries, Crohn's, ulcerative colitis.     ALLERGIES:  PATIENT IS ALLERGIC TO REMICADE, BACTRIM, AND NONSTEROIDAL ANTI-INFLAMMATORY DRUGS.    MEDICATIONS:  Tylenol 500 mg 4 times daily as needed for pain, Celexa 20 mg daily, Valium 5 mg every 8 hours as needed, Bentyl 10 mg every 6 hours as needed, Prilosec 40 mg daily at bedtime, Zofran 8 mg every 12 hours as needed, Percocet 10/325 one to two tablets every 4 hours as needed for pain, Lyrica 100 mg 4 times daily, Compazine 10 mg twice daily, Phenergan 25 mg every 6 hours as needed, tramadol 50 mg 3 times daily as needed, Stelara 90 mg subcutaneously every 8 weeks, Ambien 10 mg daily at bedtime. FAMILY HISTORY:  This was reviewed. His father had cancer, the type of cancer is not known. PAST SURGICAL HISTORY:  This is significant for cholecystectomy, multiple abdominal surgeries. SOCIAL HISTORY:  Patient is a former smoker, quit tobacco abuse in 01/2017. No history of alcohol abuse. REVIEW OF SYSTEMS:    HEAD, EYES, EARS, NOSE, THROAT:  No headache, no dizziness, no blurring of vision, no photophobia. RESPIRATORY SYSTEM:  No cough, no shortness of breath, no hemoptysis. CARDIOVASCULAR SYSTEM:  No chest pain, orthopnea, no palpitation. GASTROINTESTINAL SYSTEM:  This is positive for nausea and vomiting, abdominal pain, no diarrhea, no constipation. GENITOURINARY SYSTEM:  No dysuria, no urgency, no frequency. All other systems are reviewed and they are negative. PHYSICAL EXAMINATION:  GENERAL APPEARANCE:  Patient appeared ill and in moderate distress. VITAL SIGNS:  On arrival at the emergency room, temperature 98, pulse 141, respiratory rate 16, blood pressure 121/81, oxygen saturation 98% on room air. HEAD:  Normocephalic, atraumatic. EYES:  Normal eye movements. No redness, no drainage, no discharge. EARS:  Normal external ears with no obvious drainage. NOSE:  No deformity, no drainage. MOUTH AND THROAT:  No visible oral lesions. Dry oral mucosa. NECK:  Supple, no JVD, no thyromegaly. CHEST:  Clear breath sounds, no wheezing, no crackles.   CARDIOVASCULAR: Normal S1 and S2, regular. No clinically appreciable murmurs. ABDOMEN:  Soft. Diffuse tenderness, no rebound tenderness, no guarding. Functioning BCIR in the low abdomen noted. EXTREMITIES:  No edema. Pulses 2+ bilaterally. MUSCULOSKELETAL SYSTEM:  No obvious joint deformity or swelling. SKIN:  No acute skin lesions seen on the exposed part of the body. PSYCHIATRIC:  Normal mood and affect. LYMPHATIC SYSTEM:  No cervical lymphadenopathy. DIAGNOSTIC DATA:  CT scan of the abdomen and pelvis that showed evidence of a small-bowel obstruction. LABORATORY DATA:  Urinalysis: This is significant for negative nitrite, negative leuk esterase, negative for bacteria. Lipase level 70. Chemistry:  Sodium 135, potassium 4.2, chloride 98, CO2 29, glucose 127, BUN 20, creatinine 1.82, calcium 10.1, bilirubin total 0.5, ALT 20, AST 11, alkaline phosphatase 157, total protein 8.7, albumin level 4.2, globulin 4.5. Hematology:  WBC 9.9, hemoglobin 14.3, hematocrit 43.4, platelet 259. ASSESSMENT:  1.  Small-bowel obstruction. 2.  Chronic pain syndrome. 3.  Anxiety/depression. 4.  Hyperglycemia. 5.  Acute renal failure. PLAN:  1. Small-bowel obstruction. Patient will continue evaluation and treatment of small-bowel obstruction as per surgical service. 2.  Chronic appearing syndrome. Patient is presently on established pain management. We will continue with the present pain management. Patient will be monitored closely. If the present pain management plan does not work, patient may require a consultation with the palliative care team for further pain assessment and management. 3.  Anxiety/depression. Patient has been placed back on his home medication. 4.  Hyperglycemia. Will check hemoglobin A1c level. The patient has no history of diabetes. 5.  Acute renal failure. This is most likely due to volume depletion. Patient is receiving IV fluid therapy.   We will monitor the patient's renal function. There is no significant improvement with the hydration, patient may require further workup such as a renal ultrasound and a nephrology consult. In summary, this is a 67-year-old man with multiple abdominal surgeries, presented in the emergency room with abdominal pain. Evaluation revealed a small-bowel obstruction. Patient was admitted to the surgical service. Medical consult was requested for pain management. The management of the patient's medical problems are as stated above. Thank you for involving the hospitalist service in the care of this patient. We will continue to follow the patient alongside with the surgical service. Less than 1 hour spent on this medical consult.       MD SUNDEEP Koo / [place]  D: 01/20/2018 02:42     T: 01/20/2018 03:55  JOB #: [id number]  CC: Manuel Thomas MD

## 2018-01-21 PROCEDURE — 65270000032 HC RM SEMIPRIVATE

## 2018-01-21 PROCEDURE — 77030019927 HC TBNG IRR CYSTO BAXT -A

## 2018-01-21 PROCEDURE — 77030010545

## 2018-01-21 PROCEDURE — 74011250637 HC RX REV CODE- 250/637: Performed by: COLON & RECTAL SURGERY

## 2018-01-21 PROCEDURE — 74011250636 HC RX REV CODE- 250/636: Performed by: COLON & RECTAL SURGERY

## 2018-01-21 RX ADMIN — Medication 10 ML: at 22:59

## 2018-01-21 RX ADMIN — ZOLPIDEM TARTRATE 10 MG: 5 TABLET ORAL at 22:28

## 2018-01-21 RX ADMIN — PREGABALIN 100 MG: 50 CAPSULE ORAL at 22:28

## 2018-01-21 RX ADMIN — HYDROMORPHONE HYDROCHLORIDE 0.6 MG: 1 INJECTION, SOLUTION INTRAMUSCULAR; INTRAVENOUS; SUBCUTANEOUS at 10:58

## 2018-01-21 RX ADMIN — DIAZEPAM 5 MG: 5 TABLET ORAL at 16:08

## 2018-01-21 RX ADMIN — HYDROMORPHONE HYDROCHLORIDE 0.6 MG: 1 INJECTION, SOLUTION INTRAMUSCULAR; INTRAVENOUS; SUBCUTANEOUS at 22:59

## 2018-01-21 RX ADMIN — PREGABALIN 100 MG: 50 CAPSULE ORAL at 08:51

## 2018-01-21 RX ADMIN — DICYCLOMINE HYDROCHLORIDE 10 MG: 10 CAPSULE ORAL at 07:31

## 2018-01-21 RX ADMIN — SODIUM CHLORIDE 25 ML/HR: 900 INJECTION, SOLUTION INTRAVENOUS at 19:17

## 2018-01-21 RX ADMIN — DICYCLOMINE HYDROCHLORIDE 10 MG: 10 CAPSULE ORAL at 23:05

## 2018-01-21 RX ADMIN — ACETAMINOPHEN 500 MG: 500 TABLET, FILM COATED ORAL at 16:08

## 2018-01-21 RX ADMIN — Medication 10 ML: at 13:53

## 2018-01-21 RX ADMIN — HYDROMORPHONE HYDROCHLORIDE 0.6 MG: 1 INJECTION, SOLUTION INTRAMUSCULAR; INTRAVENOUS; SUBCUTANEOUS at 13:53

## 2018-01-21 RX ADMIN — HYDROMORPHONE HYDROCHLORIDE 0.6 MG: 1 INJECTION, SOLUTION INTRAMUSCULAR; INTRAVENOUS; SUBCUTANEOUS at 18:46

## 2018-01-21 RX ADMIN — DIAZEPAM 5 MG: 5 TABLET ORAL at 07:31

## 2018-01-21 RX ADMIN — PROCHLORPERAZINE MALEATE 10 MG: 10 TABLET, FILM COATED ORAL at 13:58

## 2018-01-21 RX ADMIN — PREGABALIN 100 MG: 50 CAPSULE ORAL at 19:17

## 2018-01-21 RX ADMIN — PANTOPRAZOLE SODIUM 40 MG: 40 TABLET, DELAYED RELEASE ORAL at 21:47

## 2018-01-21 RX ADMIN — DICYCLOMINE HYDROCHLORIDE 10 MG: 10 CAPSULE ORAL at 13:59

## 2018-01-21 RX ADMIN — Medication 10 ML: at 07:08

## 2018-01-21 RX ADMIN — HYDROMORPHONE HYDROCHLORIDE 0.6 MG: 1 INJECTION, SOLUTION INTRAMUSCULAR; INTRAVENOUS; SUBCUTANEOUS at 07:07

## 2018-01-21 RX ADMIN — CITALOPRAM HYDROBROMIDE 20 MG: 20 TABLET ORAL at 08:52

## 2018-01-21 RX ADMIN — PREGABALIN 100 MG: 50 CAPSULE ORAL at 13:02

## 2018-01-21 NOTE — DISCHARGE INSTRUCTIONS
Patient Discharge Instructions    Bishop Batres / [id number] : 1975    Admitted 2018 Discharged: 2018        · It is important that you take medications exactly as they are prescribed. · Keep your medication in the bottles provided by the pharmacist and keep a list of the medication names, dosages, and times to be taken in your wallet. · Do not take other medications without consulting your doctor. What To Do At Home  Recommended Diet:  You should continue to consume the low fiber diet as tolerated and then liberalize it in accordance with what has been recommended by your [facility] surgeon. Recommended Activity:  You should not lift more than 20 lb. until it has been at least 2 months since the last operation. Other than that, you may engage in physical activity as tolerated unless otherwise instructed by your [facility] surgeon. Hygiene and Tube Care: You may resume your usual routines. Other:  If you have questions or other problems, call Dr. Ab Barrett or Dr. Lalito Mena depending upon their nature. Follow-Up  1. Follow up with Dr. Lalito Mena regarding the management of your medications. 2.  Follow up with Dr. Ab Barrett on robert s needed basis. Information obtained by :  I understand that if any problems occur once I am at home I am to contact my physician. I understand and acknowledge receipt of the instructions indicated above.                                                                                                                                            Physician's or R.N.'s Signature                                                                  Date/Time                                                                                                                                              Patient or Representative Signature                                                          Date/Time

## 2018-01-21 NOTE — PROGRESS NOTES
General Daily Progress Note    Admission Date: 1/19/2018  Hospital Day 3  Post-Op Day Not Applicable  Subjective:   Feeling better. Pain well managed. No nausea. Tolerating clear liquids. Irrigating the pouch. Objective:   Patient Vitals for the past 24 hrs:   BP Temp Pulse Resp SpO2   01/21/18 0758 102/66 98.6 °F (37 °C) 73 16 100 %   01/21/18 0340 91/62 98.2 °F (36.8 °C) 95 14 95 %   01/20/18 2358 95/59 98.3 °F (36.8 °C) (!) 106 14 99 %   01/20/18 2035 93/55 97.9 °F (36.6 °C) 84 16 98 %   01/20/18 1528 103/66 97.9 °F (36.6 °C) 88 16 97 %   01/20/18 1233 94/58 97.8 °F (36.6 °C) 99 16 99 %     01/21 0701 - 01/21 1900  In: -   Out: 039 [FBROF:187]  01/19 1901 - 01/21 0700  In: 700 [P.O.:700]  Out: 3000 [Urine:1900]      Physical Examination:  General Appearance:  No distress. Abdomen:  Soft. Mildly tender. Ileostomy tube draining thin fluid. Data Review No results found for this or any previous visit (from the past 24 hour(s)). Assessment:     Principal Problem:    Small bowel obstruction (5/20/2017)    Active Problems:    Chronic narcotic dependence ([facility] [address] (1/20/2018)      Partial small bowel obstruction might have resolved. Hemodynamically stable. Blood collection this morning was unsuccessful; so there are no new lab results. No signs of narcotic withdrawal.         Plan:   Low fiber diet as tolerated. Decrease IV rate. Patient to irrigate ileostomy tube per his usual routine. Labs tomorrow if patient still here. Discharge to home this evening if patient has no setback after having eaten lunch and dinner.

## 2018-01-22 VITALS
SYSTOLIC BLOOD PRESSURE: 100 MMHG | OXYGEN SATURATION: 99 % | WEIGHT: 181 LBS | RESPIRATION RATE: 18 BRPM | TEMPERATURE: 98.4 F | HEIGHT: 72 IN | DIASTOLIC BLOOD PRESSURE: 64 MMHG | HEART RATE: 86 BPM | BODY MASS INDEX: 24.52 KG/M2

## 2018-01-22 PROCEDURE — 74011250636 HC RX REV CODE- 250/636: Performed by: COLON & RECTAL SURGERY

## 2018-01-22 PROCEDURE — 74011250637 HC RX REV CODE- 250/637: Performed by: COLON & RECTAL SURGERY

## 2018-01-22 RX ADMIN — CITALOPRAM HYDROBROMIDE 20 MG: 20 TABLET ORAL at 09:50

## 2018-01-22 RX ADMIN — ACETAMINOPHEN 500 MG: 500 TABLET, FILM COATED ORAL at 14:08

## 2018-01-22 RX ADMIN — DIAZEPAM 5 MG: 5 TABLET ORAL at 00:16

## 2018-01-22 RX ADMIN — DICYCLOMINE HYDROCHLORIDE 10 MG: 10 CAPSULE ORAL at 09:50

## 2018-01-22 RX ADMIN — PREGABALIN 100 MG: 50 CAPSULE ORAL at 09:49

## 2018-01-22 RX ADMIN — Medication 10 ML: at 07:02

## 2018-01-22 RX ADMIN — DIAZEPAM 5 MG: 5 TABLET ORAL at 09:50

## 2018-01-22 RX ADMIN — HYDROMORPHONE HYDROCHLORIDE 0.6 MG: 1 INJECTION, SOLUTION INTRAMUSCULAR; INTRAVENOUS; SUBCUTANEOUS at 14:09

## 2018-01-22 RX ADMIN — PROCHLORPERAZINE MALEATE 10 MG: 10 TABLET, FILM COATED ORAL at 11:27

## 2018-01-22 RX ADMIN — HYDROMORPHONE HYDROCHLORIDE 0.6 MG: 1 INJECTION, SOLUTION INTRAMUSCULAR; INTRAVENOUS; SUBCUTANEOUS at 07:02

## 2018-01-22 RX ADMIN — HYDROMORPHONE HYDROCHLORIDE 0.6 MG: 1 INJECTION, SOLUTION INTRAMUSCULAR; INTRAVENOUS; SUBCUTANEOUS at 11:19

## 2018-01-22 NOTE — PROGRESS NOTES
Patient performed self care to ostomy site.  Tolerated supper tonight well without any nausea after meal this pm.

## 2018-01-22 NOTE — PROGRESS NOTES
CM reviewing for discharge planning. Per colorectal surgery note, anticipate discharge home when medically clear, likely today. Patient independent with ADL/IADL's, expect discharge home with no additional CM needs. CM available for consult should new needs arise.  JEREMIAH Serra/ERLINDA

## 2018-01-22 NOTE — PROGRESS NOTES
Bedside and Verbal shift change report given to Saida NAVARRETE (oncoming nurse) by Daria Rose (offgoing nurse). Report included the following information SBAR.

## 2018-01-22 NOTE — PROGRESS NOTES
Bedside and Verbal shift change report given to Tisha Valentin (oncoming nurse) by Radha Durant (offgoing nurse). Report included the following information SBAR, Kardex, Procedure Summary, Intake/Output, MAR, Accordion, Recent Results and Med Rec Status.

## 2018-01-22 NOTE — DISCHARGE SUMMARY
Discharge Summary     Patient ID:    Bishop Batres  [id number]  52 y.o.  1975    Admission Date: 1/19/2018    Discharge Date: 1/22/2018    Admission Diagnoses:  1. Partial small bowel obstruction  2. Acute kidney injury  3. Chronic narcotic dependence      Chronic Diagnoses:    Patient Active Problem List   Diagnosis Code    GERD (gastroesophageal reflux disease) K21.9    Abdominal pain R10.9    S/P laparoscopic cholecystectomy Z90.49    Depression with anxiety F41.8    Anemia D64.9    Intractable vomiting with nausea R11.2    Constipation K59.00    Hyperglycemia R73.9    Small bowel obstruction K56.609    Chronic narcotic dependence ([address] F11.20       Discharge Diagnoses:  1. Partial small bowel obstruction (resolved)  2. Acute kidney injury (resolved)  3. Chronic narcotic dependence. Hospital Problems as of 1/22/2018  Date Reviewed: 1/20/2018          Codes Class Noted - Resolved POA    Chronic narcotic dependence ([address] (Chronic) ICD-10-CM: F11.20  ICD-9-CM: 304.91  1/20/2018 - Present Yes        * (Principal)Small bowel obstruction ICD-10-CM: P88.466  ICD-9-CM: 560.9  5/20/2017 - Present Yes              Procedures Performed:  None. Discharge Medications:   Current Discharge Medication List      CONTINUE these medications which have NOT CHANGED    Details   citalopram (CELEXA) 20 mg tablet Take 20 mg by mouth daily. zolpidem (AMBIEN) 10 mg tablet Take 10 mg by mouth nightly. OTHER Take 2-3 Tabs by mouth three (3) times daily (with meals). Kimberley Enzymes      B.infantis-B.ani-B.long-B.bifi (PROBIOTIC 4X) 10-15 mg TbEC Take 1 Cap by mouth nightly. oxyCODONE-acetaminophen (PERCOCET)  mg per tablet Take 1-2 Tabs by mouth every four (4) hours as needed for Pain (Being tapered per Dr. Sugar Kumar). acetaminophen (TYLENOL) 500 mg tablet Take 500 mg by mouth four (4) times daily as needed (mild pain, heache (usually has headaches 1 week after Stelara)). prochlorperazine (COMPAZINE) 10 mg tablet Take 10 mg by mouth two (2) times daily as needed (Moderate Nausea). traMADol (ULTRAM) 50 mg tablet Take 50 mg by mouth three (3) times daily as needed (moderate pain). promethazine (PHENERGAN) 25 mg tablet Take 25 mg by mouth every six (6) hours as needed (Severe Nausea). ondansetron hcl (ZOFRAN, AS HYDROCHLORIDE,) 8 mg tablet Take 8 mg by mouth every twelve (12) hours as needed (Mild Nausea). omeprazole (PRILOSEC) 40 mg capsule Take 40 mg by mouth nightly. dicyclomine (BENTYL) 10 mg capsule Take 10 mg by mouth every six (6) hours as needed. ergocalciferol (ERGOCALCIFEROL) 50,000 unit capsule Take 50,000 Units by mouth every Sunday. pregabalin (LYRICA) 100 mg capsule Take 100 mg by mouth four (4) times daily. cyanocobalamin (VITAMIN B-12) 1,000 mcg/mL injection 1,000 mcg by IntraMUSCular route every Sunday. Indications: Once weekly      diazepam (VALIUM) 5 mg tablet Take 5 mg by mouth every eight (8) hours as needed (bowel relaxation). Ustekinumab (STELARA) 90 mg/mL injection 90 mg by SubCUTAneous route. Every 8 weeks          STOP taking these medications       temazepam (RESTORIL) 30 mg capsule Comments:  Not taking prior to admission. Reason for Stopping:                Diet:  Low fiber diet as tolerated. Activity:  As tolerated, but no lifting more than 20 lb. until 2 months after the last operation. Wound Care:  None. Discharge Condition:  Improved compared to that upon admission. Disposition:  Home. Follow-up Care:  1. Dr. Alejandra Shabazz on an as needed basis. 2. Geovanny Duran MD  as scheduled for management of medications.       Significant Diagnostic Studies:   Recent Labs      01/20/18   0911  01/19/18   1551   WBC  5.8  9.9   HGB  10.8*  14.3   HCT  33.7*  43.4   PLT  210  331     Recent Labs      01/20/18   0712  01/19/18   1551   NA  136  135*   K  4.7  4.2   CL  104  98   CO2  21  29   BUN  13  20 CREA  1.01  1.82*   GLU  78  127*   CA  8.8  10.1   MG  1.6   --    PHOS  4.1   --      Recent Labs      01/19/18   1551   SGOT  11*   AP  157*   TBILI  0.5   TP  8.7*   ALB  4.2   GLOB  4.5*   LPSE  70*       Lab Results   Component Value Date/Time    Glucose (POC) 100 06/17/2017 11:22 AM    Glucose (POC) 137 06/17/2017 05:54 AM    Glucose (POC) 120 06/16/2017 09:40 PM    Glucose (POC) 92 06/16/2017 04:44 PM    Glucose (POC) 97 06/16/2017 11:19 AM         HOSPITAL COURSE & TREATMENT RENDERED:     The patient is a 43-year-old male with a complicated medical and surgical history. His most recent surgical procedure was the conversion of an end-ileostomy to a [address] continent intestinal reservoir (BCIR) on 11/29/2017 in [place], [place]. He presented to the ER on 1/19/2018 with nausea, vomiting, and decreased ileostomy output, and laboratory studies and a CT scan of the abdomen and pelvis revealed dehydration and what was characterized as a \"mild mid and distal small bowel obstruction,\" respectively. He was admitted for bowel rest and parenteral fluids. A catheter was left in the ileostomy to promote drainage, and he was eventually allowed to flush the drain per his usual routine. His symptoms gradually improved, and his diet was advanced appropriately. in order to prevent narcotic withdrawal, he was give narcotic doses roughly equivalent to what he reports using at home. On 1/22/2018, after a trial of a low fiber diet and intermittent catheterization of the BCIR, he was judged to be fit for discharge to home. His hospital course was without complications, and he appeared to have understood all discharge and follow-up instructions.           Signed:  Gardenia Varela MD

## 2018-01-22 NOTE — PROGRESS NOTES
General Daily Progress Note    Admission Date: 1/19/2018  Hospital Day 4  Post-Op Day Not Applicable  Subjective:   No setback overnight. Objective:   Patient Vitals for the past 24 hrs:   BP Temp Pulse Resp SpO2   01/22/18 0821 100/64 98.4 °F (36.9 °C) 86 18 99 %   01/21/18 2306 109/68 98.3 °F (36.8 °C) 65 16 98 %   01/21/18 2037 95/61 98.3 °F (36.8 °C) 88 16 99 %   01/21/18 1503 101/63 98.9 °F (37.2 °C) 90 16 98 %     01/22 0701 - 01/22 1900  In: 240 [P.O.:240]  Out: 400 [Urine:400]  01/20 1901 - 01/22 0700  In: 1400 [P.O.:1400]  Out: 3700 [Urine:1875]    Physical Examination:  General Appearance:  No distress. Data Review No results found for this or any previous visit (from the past 24 hour(s)). Assessment:     Principal Problem:    Small bowel obstruction (5/20/2017)    Active Problems:    Chronic narcotic dependence ([address] (1/20/2018)      Partial small bowel obstruction apparently resolved. Hemodynamically stable. No signs of narcotic withdrawal.    The patient was kept overnight to make sure that there would npt be any setback once he resumed intermittent catheterization of the ileal reservoir. There has been none, and he appears to be fit for discharge to home. Plan:   Discharge to home.

## 2018-02-11 PROCEDURE — 99284 EMERGENCY DEPT VISIT MOD MDM: CPT

## 2018-02-11 PROCEDURE — 94761 N-INVAS EAR/PLS OXIMETRY MLT: CPT

## 2018-02-12 ENCOUNTER — APPOINTMENT (OUTPATIENT)
Dept: CT IMAGING | Age: 43
DRG: 389 | End: 2018-02-12
Attending: PHYSICIAN ASSISTANT
Payer: COMMERCIAL

## 2018-02-12 ENCOUNTER — HOSPITAL ENCOUNTER (INPATIENT)
Age: 43
LOS: 3 days | Discharge: HOME | DRG: 389 | End: 2018-02-15
Attending: EMERGENCY MEDICINE | Admitting: COLON & RECTAL SURGERY
Payer: COMMERCIAL

## 2018-02-12 ENCOUNTER — APPOINTMENT (OUTPATIENT)
Dept: GENERAL RADIOLOGY | Age: 43
DRG: 389 | End: 2018-02-12
Attending: PHYSICIAN ASSISTANT
Payer: COMMERCIAL

## 2018-02-12 DIAGNOSIS — R10.84 ABDOMINAL PAIN, GENERALIZED: ICD-10-CM

## 2018-02-12 DIAGNOSIS — K56.609 SMALL BOWEL OBSTRUCTION (HCC): Primary | ICD-10-CM

## 2018-02-12 PROBLEM — K56.7 DRUG-INDUCED ILEUS (HCC): Chronic | Status: ACTIVE | Noted: 2018-02-12

## 2018-02-12 PROBLEM — T50.905A DRUG-INDUCED ILEUS (HCC): Chronic | Status: ACTIVE | Noted: 2018-02-12

## 2018-02-12 PROBLEM — N17.9 ACUTE KIDNEY INJURY (HCC): Status: ACTIVE | Noted: 2018-02-12

## 2018-02-12 LAB
ALBUMIN SERPL-MCNC: 4.1 G/DL (ref 3.5–5)
ALBUMIN/GLOB SERPL: 0.8 {RATIO} (ref 1.1–2.2)
ALP SERPL-CCNC: 193 U/L (ref 45–117)
ALT SERPL-CCNC: 22 U/L (ref 12–78)
ANION GAP SERPL CALC-SCNC: 5 MMOL/L (ref 5–15)
ANION GAP SERPL CALC-SCNC: 9 MMOL/L (ref 5–15)
APPEARANCE UR: CLEAR
AST SERPL-CCNC: 16 U/L (ref 15–37)
BACTERIA URNS QL MICRO: NEGATIVE /HPF
BASOPHILS # BLD: 0 K/UL (ref 0–0.1)
BASOPHILS # BLD: 0.1 K/UL (ref 0–0.1)
BASOPHILS NFR BLD: 0 % (ref 0–1)
BASOPHILS NFR BLD: 0 % (ref 0–1)
BILIRUB SERPL-MCNC: 0.4 MG/DL (ref 0.2–1)
BILIRUB UR QL CFM: NEGATIVE
BUN SERPL-MCNC: 15 MG/DL (ref 6–20)
BUN SERPL-MCNC: 16 MG/DL (ref 6–20)
BUN/CREAT SERPL: 10 (ref 12–20)
BUN/CREAT SERPL: 15 (ref 12–20)
CALCIUM SERPL-MCNC: 10.2 MG/DL (ref 8.5–10.1)
CALCIUM SERPL-MCNC: 7.9 MG/DL (ref 8.5–10.1)
CHLORIDE SERPL-SCNC: 106 MMOL/L (ref 97–108)
CHLORIDE SERPL-SCNC: 99 MMOL/L (ref 97–108)
CO2 SERPL-SCNC: 27 MMOL/L (ref 21–32)
CO2 SERPL-SCNC: 28 MMOL/L (ref 21–32)
COLOR UR: NORMAL
CREAT SERPL-MCNC: 1.01 MG/DL (ref 0.7–1.3)
CREAT SERPL-MCNC: 1.55 MG/DL (ref 0.7–1.3)
DIFFERENTIAL METHOD BLD: ABNORMAL
DIFFERENTIAL METHOD BLD: ABNORMAL
EOSINOPHIL # BLD: 0.1 K/UL (ref 0–0.4)
EOSINOPHIL # BLD: 0.1 K/UL (ref 0–0.4)
EOSINOPHIL NFR BLD: 1 % (ref 0–7)
EOSINOPHIL NFR BLD: 1 % (ref 0–7)
EPITH CASTS URNS QL MICRO: NORMAL /LPF
ERYTHROCYTE [DISTWIDTH] IN BLOOD BY AUTOMATED COUNT: 15.4 % (ref 11.5–14.5)
ERYTHROCYTE [DISTWIDTH] IN BLOOD BY AUTOMATED COUNT: 16.1 % (ref 11.5–14.5)
GLOBULIN SER CALC-MCNC: 5 G/DL (ref 2–4)
GLUCOSE SERPL-MCNC: 102 MG/DL (ref 65–100)
GLUCOSE SERPL-MCNC: 82 MG/DL (ref 65–100)
GLUCOSE UR STRIP.AUTO-MCNC: NEGATIVE MG/DL
HCT VFR BLD AUTO: 35.2 % (ref 36.6–50.3)
HCT VFR BLD AUTO: 47.4 % (ref 36.6–50.3)
HGB BLD-MCNC: 11.3 G/DL (ref 12.1–17)
HGB BLD-MCNC: 15.5 G/DL (ref 12.1–17)
HGB UR QL STRIP: NEGATIVE
IMM GRANULOCYTES # BLD: 0 K/UL (ref 0–0.04)
IMM GRANULOCYTES # BLD: 0 K/UL (ref 0–0.04)
IMM GRANULOCYTES NFR BLD AUTO: 0 % (ref 0–0.5)
IMM GRANULOCYTES NFR BLD AUTO: 0 % (ref 0–0.5)
KETONES UR QL STRIP.AUTO: NORMAL MG/DL
LEUKOCYTE ESTERASE UR QL STRIP.AUTO: NEGATIVE
LYMPHOCYTES # BLD: 2.7 K/UL (ref 0.8–3.5)
LYMPHOCYTES # BLD: 3.9 K/UL (ref 0.8–3.5)
LYMPHOCYTES NFR BLD: 25 % (ref 12–49)
LYMPHOCYTES NFR BLD: 26 % (ref 12–49)
MCH RBC QN AUTO: 26.6 PG (ref 26–34)
MCH RBC QN AUTO: 27 PG (ref 26–34)
MCHC RBC AUTO-ENTMCNC: 32.1 G/DL (ref 30–36.5)
MCHC RBC AUTO-ENTMCNC: 32.7 G/DL (ref 30–36.5)
MCV RBC AUTO: 81.3 FL (ref 80–99)
MCV RBC AUTO: 84 FL (ref 80–99)
MONOCYTES # BLD: 0.5 K/UL (ref 0–1)
MONOCYTES # BLD: 0.7 K/UL (ref 0–1)
MONOCYTES NFR BLD: 4 % (ref 5–13)
MONOCYTES NFR BLD: 5 % (ref 5–13)
NEUTS SEG # BLD: 10.7 K/UL (ref 1.8–8)
NEUTS SEG # BLD: 6.8 K/UL (ref 1.8–8)
NEUTS SEG NFR BLD: 67 % (ref 32–75)
NEUTS SEG NFR BLD: 70 % (ref 32–75)
NITRITE UR QL STRIP.AUTO: NEGATIVE
NRBC # BLD: 0 K/UL (ref 0–0.01)
NRBC # BLD: 0 K/UL (ref 0–0.01)
NRBC BLD-RTO: 0 PER 100 WBC
NRBC BLD-RTO: 0 PER 100 WBC
PH UR STRIP: 5 [PH]
PLATELET # BLD AUTO: 281 K/UL (ref 150–400)
PLATELET # BLD AUTO: 432 K/UL (ref 150–400)
PMV BLD AUTO: 10.3 FL (ref 8.9–12.9)
PMV BLD AUTO: 10.3 FL (ref 8.9–12.9)
POTASSIUM SERPL-SCNC: 3.7 MMOL/L (ref 3.5–5.1)
POTASSIUM SERPL-SCNC: 3.8 MMOL/L (ref 3.5–5.1)
PROT SERPL-MCNC: 9.1 G/DL (ref 6.4–8.2)
PROT UR STRIP-MCNC: NORMAL MG/DL
RBC # BLD AUTO: 4.19 M/UL (ref 4.1–5.7)
RBC # BLD AUTO: 5.83 M/UL (ref 4.1–5.7)
RBC #/AREA URNS HPF: NORMAL /HPF (ref 0–5)
SODIUM SERPL-SCNC: 135 MMOL/L (ref 136–145)
SODIUM SERPL-SCNC: 139 MMOL/L (ref 136–145)
SP GR UR REFRACTOMETRY: 1.03
UR CULT HOLD, URHOLD: NORMAL
UROBILINOGEN UR QL STRIP.AUTO: 0.2 EU/DL
WBC # BLD AUTO: 10.1 K/UL (ref 4.1–11.1)
WBC # BLD AUTO: 15.4 K/UL (ref 4.1–11.1)
WBC URNS QL MICRO: NORMAL /HPF (ref 0–4)

## 2018-02-12 PROCEDURE — 74018 RADEX ABDOMEN 1 VIEW: CPT

## 2018-02-12 PROCEDURE — 0D9670Z DRAINAGE OF STOMACH WITH DRAINAGE DEVICE, VIA NATURAL OR ARTIFICIAL OPENING: ICD-10-PCS | Performed by: COLON & RECTAL SURGERY

## 2018-02-12 PROCEDURE — 80048 BASIC METABOLIC PNL TOTAL CA: CPT | Performed by: COLON & RECTAL SURGERY

## 2018-02-12 PROCEDURE — 36415 COLL VENOUS BLD VENIPUNCTURE: CPT | Performed by: COLON & RECTAL SURGERY

## 2018-02-12 PROCEDURE — 65270000032 HC RM SEMIPRIVATE

## 2018-02-12 PROCEDURE — 81001 URINALYSIS AUTO W/SCOPE: CPT | Performed by: EMERGENCY MEDICINE

## 2018-02-12 PROCEDURE — 3E0G76Z INTRODUCTION OF NUTRITIONAL SUBSTANCE INTO UPPER GI, VIA NATURAL OR ARTIFICIAL OPENING: ICD-10-PCS | Performed by: COLON & RECTAL SURGERY

## 2018-02-12 PROCEDURE — 96375 TX/PRO/DX INJ NEW DRUG ADDON: CPT

## 2018-02-12 PROCEDURE — 74011250636 HC RX REV CODE- 250/636: Performed by: COLON & RECTAL SURGERY

## 2018-02-12 PROCEDURE — 74177 CT ABD & PELVIS W/CONTRAST: CPT

## 2018-02-12 PROCEDURE — C9113 INJ PANTOPRAZOLE SODIUM, VIA: HCPCS | Performed by: COLON & RECTAL SURGERY

## 2018-02-12 PROCEDURE — 96374 THER/PROPH/DIAG INJ IV PUSH: CPT

## 2018-02-12 PROCEDURE — 96361 HYDRATE IV INFUSION ADD-ON: CPT

## 2018-02-12 PROCEDURE — 80053 COMPREHEN METABOLIC PANEL: CPT | Performed by: EMERGENCY MEDICINE

## 2018-02-12 PROCEDURE — 74011250636 HC RX REV CODE- 250/636: Performed by: PHYSICIAN ASSISTANT

## 2018-02-12 PROCEDURE — 74011250637 HC RX REV CODE- 250/637: Performed by: COLON & RECTAL SURGERY

## 2018-02-12 PROCEDURE — 74011000250 HC RX REV CODE- 250: Performed by: PHYSICIAN ASSISTANT

## 2018-02-12 PROCEDURE — 85025 COMPLETE CBC W/AUTO DIFF WBC: CPT | Performed by: COLON & RECTAL SURGERY

## 2018-02-12 PROCEDURE — 74011000250 HC RX REV CODE- 250: Performed by: COLON & RECTAL SURGERY

## 2018-02-12 PROCEDURE — 74011636320 HC RX REV CODE- 636/320: Performed by: EMERGENCY MEDICINE

## 2018-02-12 PROCEDURE — 74011000258 HC RX REV CODE- 258: Performed by: EMERGENCY MEDICINE

## 2018-02-12 RX ORDER — ONDANSETRON 2 MG/ML
4 INJECTION INTRAMUSCULAR; INTRAVENOUS
Status: COMPLETED | OUTPATIENT
Start: 2018-02-12 | End: 2018-02-12

## 2018-02-12 RX ORDER — SODIUM CHLORIDE 0.9 % (FLUSH) 0.9 %
5-10 SYRINGE (ML) INJECTION AS NEEDED
Status: DISCONTINUED | OUTPATIENT
Start: 2018-02-12 | End: 2018-02-15 | Stop reason: HOSPADM

## 2018-02-12 RX ORDER — ONDANSETRON 2 MG/ML
4 INJECTION INTRAMUSCULAR; INTRAVENOUS
Status: DISCONTINUED | OUTPATIENT
Start: 2018-02-12 | End: 2018-02-15 | Stop reason: HOSPADM

## 2018-02-12 RX ORDER — LORAZEPAM 2 MG/ML
1 INJECTION INTRAMUSCULAR
Status: DISCONTINUED | OUTPATIENT
Start: 2018-02-12 | End: 2018-02-15 | Stop reason: HOSPADM

## 2018-02-12 RX ORDER — DICYCLOMINE HYDROCHLORIDE 10 MG/ML
20 INJECTION INTRAMUSCULAR 4 TIMES DAILY
Status: DISCONTINUED | OUTPATIENT
Start: 2018-02-12 | End: 2018-02-13

## 2018-02-12 RX ORDER — SODIUM CHLORIDE, SODIUM LACTATE, POTASSIUM CHLORIDE, CALCIUM CHLORIDE 600; 310; 30; 20 MG/100ML; MG/100ML; MG/100ML; MG/100ML
100 INJECTION, SOLUTION INTRAVENOUS CONTINUOUS
Status: DISCONTINUED | OUTPATIENT
Start: 2018-02-12 | End: 2018-02-12 | Stop reason: ALTCHOICE

## 2018-02-12 RX ORDER — SODIUM CHLORIDE, SODIUM LACTATE, POTASSIUM CHLORIDE, CALCIUM CHLORIDE 600; 310; 30; 20 MG/100ML; MG/100ML; MG/100ML; MG/100ML
100 INJECTION, SOLUTION INTRAVENOUS CONTINUOUS
Status: DISCONTINUED | OUTPATIENT
Start: 2018-02-12 | End: 2018-02-14

## 2018-02-12 RX ORDER — HYDROMORPHONE HYDROCHLORIDE 1 MG/ML
0.6 INJECTION, SOLUTION INTRAMUSCULAR; INTRAVENOUS; SUBCUTANEOUS
Status: DISCONTINUED | OUTPATIENT
Start: 2018-02-12 | End: 2018-02-15 | Stop reason: HOSPADM

## 2018-02-12 RX ORDER — ZOLPIDEM TARTRATE 5 MG/1
10 TABLET ORAL
Status: DISCONTINUED | OUTPATIENT
Start: 2018-02-12 | End: 2018-02-15 | Stop reason: HOSPADM

## 2018-02-12 RX ORDER — NALOXONE HYDROCHLORIDE 0.4 MG/ML
0.4 INJECTION, SOLUTION INTRAMUSCULAR; INTRAVENOUS; SUBCUTANEOUS AS NEEDED
Status: DISCONTINUED | OUTPATIENT
Start: 2018-02-12 | End: 2018-02-15 | Stop reason: HOSPADM

## 2018-02-12 RX ORDER — SODIUM CHLORIDE 0.9 % (FLUSH) 0.9 %
10 SYRINGE (ML) INJECTION
Status: COMPLETED | OUTPATIENT
Start: 2018-02-12 | End: 2018-02-12

## 2018-02-12 RX ORDER — HYDROMORPHONE HYDROCHLORIDE 1 MG/ML
1 INJECTION, SOLUTION INTRAMUSCULAR; INTRAVENOUS; SUBCUTANEOUS
Status: COMPLETED | OUTPATIENT
Start: 2018-02-12 | End: 2018-02-12

## 2018-02-12 RX ORDER — SODIUM CHLORIDE 0.9 % (FLUSH) 0.9 %
5-10 SYRINGE (ML) INJECTION EVERY 8 HOURS
Status: DISCONTINUED | OUTPATIENT
Start: 2018-02-12 | End: 2018-02-15 | Stop reason: HOSPADM

## 2018-02-12 RX ORDER — METRONIDAZOLE 500 MG/100ML
500 INJECTION, SOLUTION INTRAVENOUS EVERY 8 HOURS
Status: COMPLETED | OUTPATIENT
Start: 2018-02-12 | End: 2018-02-13

## 2018-02-12 RX ORDER — PREGABALIN 100 MG/1
200 CAPSULE ORAL 2 TIMES DAILY
Status: DISCONTINUED | OUTPATIENT
Start: 2018-02-12 | End: 2018-02-15 | Stop reason: HOSPADM

## 2018-02-12 RX ORDER — SODIUM CHLORIDE 9 MG/ML
500 INJECTION, SOLUTION INTRAVENOUS CONTINUOUS
Status: DISCONTINUED | OUTPATIENT
Start: 2018-02-12 | End: 2018-02-12 | Stop reason: ALTCHOICE

## 2018-02-12 RX ADMIN — DICYCLOMINE HYDROCHLORIDE 20 MG: 20 INJECTION, SOLUTION INTRAMUSCULAR at 18:10

## 2018-02-12 RX ADMIN — SODIUM CHLORIDE 1000 ML: 900 INJECTION, SOLUTION INTRAVENOUS at 00:25

## 2018-02-12 RX ADMIN — SODIUM CHLORIDE 40 MG: 9 INJECTION INTRAMUSCULAR; INTRAVENOUS; SUBCUTANEOUS at 08:56

## 2018-02-12 RX ADMIN — LORAZEPAM 1 MG: 2 INJECTION INTRAMUSCULAR; INTRAVENOUS at 23:18

## 2018-02-12 RX ADMIN — HYDROMORPHONE HYDROCHLORIDE 0.6 MG: 1 INJECTION, SOLUTION INTRAMUSCULAR; INTRAVENOUS; SUBCUTANEOUS at 13:41

## 2018-02-12 RX ADMIN — HYDROMORPHONE HYDROCHLORIDE 0.6 MG: 1 INJECTION, SOLUTION INTRAMUSCULAR; INTRAVENOUS; SUBCUTANEOUS at 10:45

## 2018-02-12 RX ADMIN — ACETAMINOPHEN 650 MG: 650 SOLUTION ORAL at 10:42

## 2018-02-12 RX ADMIN — SODIUM CHLORIDE 5 MG: 9 INJECTION INTRAMUSCULAR; INTRAVENOUS; SUBCUTANEOUS at 06:57

## 2018-02-12 RX ADMIN — Medication 10 ML: at 01:26

## 2018-02-12 RX ADMIN — HYDROMORPHONE HYDROCHLORIDE 0.6 MG: 1 INJECTION, SOLUTION INTRAMUSCULAR; INTRAVENOUS; SUBCUTANEOUS at 04:06

## 2018-02-12 RX ADMIN — Medication 10 ML: at 13:40

## 2018-02-12 RX ADMIN — HYDROMORPHONE HYDROCHLORIDE 0.6 MG: 1 INJECTION, SOLUTION INTRAMUSCULAR; INTRAVENOUS; SUBCUTANEOUS at 07:15

## 2018-02-12 RX ADMIN — SODIUM CHLORIDE 5 MG: 9 INJECTION INTRAMUSCULAR; INTRAVENOUS; SUBCUTANEOUS at 21:45

## 2018-02-12 RX ADMIN — Medication 10 ML: at 21:46

## 2018-02-12 RX ADMIN — ACETAMINOPHEN 650 MG: 650 SOLUTION ORAL at 18:19

## 2018-02-12 RX ADMIN — SODIUM CHLORIDE 100 ML: 900 INJECTION, SOLUTION INTRAVENOUS at 01:26

## 2018-02-12 RX ADMIN — HYDROMORPHONE HYDROCHLORIDE 0.6 MG: 1 INJECTION, SOLUTION INTRAMUSCULAR; INTRAVENOUS; SUBCUTANEOUS at 18:10

## 2018-02-12 RX ADMIN — SODIUM CHLORIDE 5 MG: 9 INJECTION INTRAMUSCULAR; INTRAVENOUS; SUBCUTANEOUS at 14:05

## 2018-02-12 RX ADMIN — PREGABALIN 200 MG: 100 CAPSULE ORAL at 18:10

## 2018-02-12 RX ADMIN — SODIUM CHLORIDE, SODIUM LACTATE, POTASSIUM CHLORIDE, AND CALCIUM CHLORIDE 100 ML/HR: 600; 310; 30; 20 INJECTION, SOLUTION INTRAVENOUS at 06:14

## 2018-02-12 RX ADMIN — METRONIDAZOLE 500 MG: 500 INJECTION, SOLUTION INTRAVENOUS at 23:18

## 2018-02-12 RX ADMIN — HYDROMORPHONE HYDROCHLORIDE 1 MG: 1 INJECTION, SOLUTION INTRAMUSCULAR; INTRAVENOUS; SUBCUTANEOUS at 00:26

## 2018-02-12 RX ADMIN — LORAZEPAM 1 MG: 2 INJECTION INTRAMUSCULAR; INTRAVENOUS at 15:05

## 2018-02-12 RX ADMIN — METRONIDAZOLE 500 MG: 500 INJECTION, SOLUTION INTRAVENOUS at 14:47

## 2018-02-12 RX ADMIN — SODIUM CHLORIDE, SODIUM LACTATE, POTASSIUM CHLORIDE, AND CALCIUM CHLORIDE 100 ML/HR: 600; 310; 30; 20 INJECTION, SOLUTION INTRAVENOUS at 17:34

## 2018-02-12 RX ADMIN — ONDANSETRON 4 MG: 2 INJECTION INTRAMUSCULAR; INTRAVENOUS at 04:07

## 2018-02-12 RX ADMIN — ZOLPIDEM TARTRATE 10 MG: 5 TABLET ORAL at 23:18

## 2018-02-12 RX ADMIN — ONDANSETRON 4 MG: 2 INJECTION INTRAMUSCULAR; INTRAVENOUS at 00:25

## 2018-02-12 RX ADMIN — HYDROMORPHONE HYDROCHLORIDE 0.6 MG: 1 INJECTION, SOLUTION INTRAMUSCULAR; INTRAVENOUS; SUBCUTANEOUS at 21:48

## 2018-02-12 RX ADMIN — BENZOCAINE 1 SPRAY: 200 SPRAY DENTAL; ORAL; PERIODONTAL at 03:07

## 2018-02-12 RX ADMIN — SODIUM CHLORIDE 1000 ML: 900 INJECTION, SOLUTION INTRAVENOUS at 19:00

## 2018-02-12 RX ADMIN — SODIUM CHLORIDE 2000 ML: 900 INJECTION, SOLUTION INTRAVENOUS at 04:04

## 2018-02-12 RX ADMIN — IOPAMIDOL 100 ML: 755 INJECTION, SOLUTION INTRAVENOUS at 01:26

## 2018-02-12 NOTE — PROGRESS NOTES
TRANSFER - IN REPORT:    Verbal report received from Carolina(name) on Claudeen Pitchhernando  being received from ED(unit) for routine progression of care      Report consisted of patients Situation, Background, Assessment and   Recommendations(SBAR). Information from the following report(s) SBAR was reviewed with the receiving nurse. Opportunity for questions and clarification was provided. Assessment completed upon patients arrival to unit and care assumed.

## 2018-02-12 NOTE — IP AVS SNAPSHOT
[address] 
[phone] Patient: Linnea Morrison MRN: [id number] :1975 A check loc indicates which time of day the medication should be taken. My Medications START taking these medications Instructions Each Dose to Equal  
 Morning Noon Evening Bedtime  
 metroNIDAZOLE 500 mg tablet Commonly known as:  FLAGYL Your last dose was: Your next dose is: Take 1 Tab by mouth three (3) times daily for 10 days. 500 mg CONTINUE taking these medications Instructions Each Dose to Equal  
 Morning Noon Evening Bedtime  
 acetaminophen 500 mg tablet Commonly known as:  TYLENOL Your last dose was: Your next dose is: Take 500 mg by mouth four (4) times daily as needed (mild pain, heache (usually has headaches 1 week after Stelara)). 500 mg  
    
   
   
   
  
 citalopram 20 mg tablet Commonly known as:  Keshia Carbine Your last dose was: Your next dose is: Take 20 mg by mouth daily. 20 mg  
    
   
   
   
  
 COMPAZINE 10 mg tablet Generic drug:  prochlorperazine Your last dose was: Your next dose is: Take 10 mg by mouth two (2) times daily as needed (Moderate Nausea). 10 mg  
    
   
   
   
  
 dicyclomine 10 mg capsule Commonly known as:  BENTYL Your last dose was: Your next dose is: Take 10 mg by mouth every six (6) hours as needed. 10 mg  
    
   
   
   
  
 ergocalciferol 50,000 unit capsule Commonly known as:  ERGOCALCIFEROL Your last dose was: Your next dose is: Take 50,000 Units by mouth every . 21209 Units LYRICA 100 mg capsule Generic drug:  pregabalin Your last dose was: Your next dose is: Take 100 mg by mouth four (4) times daily. 100 mg  
    
   
   
   
  
 omeprazole 40 mg capsule Commonly known as:  PRILOSEC Your last dose was: Your next dose is: Take 40 mg by mouth nightly. 40 mg  
    
   
   
   
  
 OTHER Your last dose was: Your next dose is: Take 2-3 Tabs by mouth three (3) times daily (with meals). Kimberley Enzymes 2-3 Tab PERCOCET  mg per tablet Generic drug:  oxyCODONE-acetaminophen Your last dose was: Your next dose is: Take 1-2 Tabs by mouth every four (4) hours as needed for Pain (Being tapered per Dr. Juan Andersen). 1-2 Tab PROBIOTIC 4X 10-15 mg Tbec Generic drug:  B.infantis-B.ani-B.long-B.bifi Your last dose was: Your next dose is: Take 1 Cap by mouth nightly. 1 Cap  
    
   
   
   
  
 promethazine 25 mg tablet Commonly known as:  PHENERGAN Your last dose was: Your next dose is: Take 25 mg by mouth every six (6) hours as needed (Severe Nausea). 25 mg  
    
   
   
   
  
 traMADol 50 mg tablet Commonly known as:  ULTRAM  
   
Your last dose was: Your next dose is: Take 50 mg by mouth three (3) times daily as needed (moderate pain). 50 mg Ustekinumab 90 mg/mL injection Commonly known as:  Gray Oskar Your last dose was: Your next dose is:    
   
   
 90 mg by SubCUTAneous route. Every 8 weeks 90 mg  
    
   
   
   
  
 VALIUM 5 mg tablet Generic drug:  diazePAM  
   
Your last dose was: Your next dose is: Take 5 mg by mouth every eight (8) hours as needed (bowel relaxation). 5 mg VITAMIN B-12 1,000 mcg/mL injection Generic drug:  cyanocobalamin Your last dose was: Your next dose is:    
   
   
 1,000 mcg by IntraMUSCular route every Sunday. Indications: Once weekly 1000 mcg ZOFRAN (AS HYDROCHLORIDE) 8 mg tablet Generic drug:  ondansetron hcl Your last dose was: Your next dose is: Take 8 mg by mouth every twelve (12) hours as needed (Mild Nausea). 8 mg  
    
   
   
   
  
 zolpidem 10 mg tablet Commonly known as:  AMBIEN Your last dose was: Your next dose is: Take 10 mg by mouth nightly. 10 mg Where to Get Your Medications Information on where to get these meds will be given to you by the nurse or doctor. ! Ask your nurse or doctor about these medications  
  metroNIDAZOLE 500 mg tablet

## 2018-02-12 NOTE — PROGRESS NOTES
Primary Nurse Dustin Conner RN and Rupert Goldmann, RN performed a dual skin assessment on this patient No impairment noted  Eduardo score is 22

## 2018-02-12 NOTE — PROGRESS NOTES
CM noted patient readmission. Patient last at [facility] 1/19-1/22-18 for partial SBO. Patient s/p BCIR placement in Nov, 2017. PMH ulcerative colitis, crohn's disease, ileal pouchitis, anal stenosis, anal fistula, and opiod dependence. Patient now readmitted for suspected SBO. CM attempted to visit patient bedside, patient with nurse for personal care, CM to defer eval for later time. Per chart, patient is ambulatory and independent with ADL/IADL's, expect discharge home when medically clear.  JEREMIAH Qureshi/CRM

## 2018-02-12 NOTE — H&P
Colorectal Surgery Admission History and Physical Examination Note          NAME:  Kenia Ibanez   :   1975   MRN:   [id number]     Admission Date: 2018    Chief Complaint:  Bowel obstruction. History of Present Illness: The patient is a 52-year-old male with a complicated medical and surgical history who is well known to me. His most recent surgical procedure was the conversion of an end-ileostomy to a [place] continent intestinal reservoir (BCIR) on 2017 in [place], [place]. His most recent hospitalization was from 2018 to 2018 for treatment of a partial small bowel obstruction that resolved with bowel rest and parenteral fluids. He presented to the ER during the night on 2018 with abdominal pain, nausea, and a decrease in ileostomy output followed a marked increase in the output that had occurred several hours earlier. He had also been unable to catheterize his ileal pouch. Upon presentation, his laboratory results were remarkable for a leukocytosis (WBC 15.4 K/uL), and artificially elevated hemoglobin (15.5 g/dL) and an elevated creatinine (1.55 mg/dL). A CT scan of the abdomen and pelvis performed without oral contrast revealed dilated small bowel loops consistent with an obstruction. PMH:  Past Medical History:   Diagnosis Date    Anal fistula     The patient no longer has an anus.  Anal stenosis     The patient no longer has an anus.  Crohn's disease ([address]     GERD (gastroesophageal reflux disease)     H/O ulcerative colitis     Symptoms began in . Total proctocolectomy was performed in . Patient later developed Crohn's disease.  Hiatal hernia     Ileal pouchitis ([address] 2004    The patient no longer has a pouch.  Nausea & vomiting     Combination of Scopalamine patch & Zofran IV worked well in past    Numbness in right leg     Chronic.     Opioid dependence ([address]     History of opioid dependence requiring a detox program.  Psychiatric disorder     depression and anxiety    Skin lesion of back 3/17/2014    Syncopal episodes        PSH:  Past Surgical History:   Procedure Laterality Date    ABDOMEN SURGERY PROC UNLISTED  3/25/2004    Total proctocolectomy with creation of ileoanal J-pouch and loop ileostomy; Dr. Yeni Jiang.   CHOLECYSTECTOMY  12/2016    Dr. Salome Gregorio  GI  5/2/2004    Examination under anesthesia with endoscopic evaluation of ileoanal pouch and placement of drain; Dr. Yeni Jiang.   GI  5/25/2004    Ileostomy closure with small bowel resection and anastomosis; Dr. Yeni Jiang.   GI  5/24/2012    Examination under anesthesia, anal dilatation, anal biopsy, and placement of draining seton; Dr. Yeni Jiang.   GI  7/3/2012    Incision and drainage of perirectal abscess and rigid endoscopy of ileoanal pouch; Dr. Yeni Jiang.   GI  7/31/2012    Incision and drainage of perirectal abscess, placement of draining seton, and anal dilatation; Dr. Yeni Jiang.   GI  1/22/2013    Repair of anal fistulas with debridement, partial fistulectomy, and flap closure; Dr. Yeni Jiang.   GI  5/17/2013    Examination under anesthesia, partial fistulotomy,  and placement of draining setons; Dr. Yeni Jiang.   GI  8/6/2013    Anal fistulotomy and application of ACell micromatrix; Dr. Yeni Jiang.   GI  2/20/2014    Examination under anesthesia and dilation of anal canal with rigid proctoscopy; Td Kendrick MD.     GI  4/29/2015    Creation of [place] continent intestinal reservoir (BCIR), abdominoperineal resection of ileoanal J-pouch; lysis of adhesions, and gastrostomy; [facility]. Tere Rashid MD ([facility])     GI  8/7/2015    Stoma revision; [facility]. Tere Rashid MD ([facility])     GI  10/29/2015    Extensive lysis of adhesions, resection of continent intestinal reservoir, repair of serosal tears, and creation of end-ileostomy; Dr. Yeni Jiang.      GI 2017    Laparotomy, extensive lysis of adhesions and revision of ileostomy; Dr. Gabriel Ayala.  HX ORTHOPAEDIC      Left ACL repair    HX OTHER SURGICAL  2017    Conversion of end-ileostomy to BCIR ([address]); [place], [address] [facility] HX UROLOGICAL  2017    Cystoscopy and placement of bilateral temporary ureteral catheters; Kentrell Clarke MD.       Home Medications:  Prior to Admission Medications   Prescriptions Last Dose Informant Patient Reported? Taking? B.infantis-B.ani-B.long-B.bifi (PROBIOTIC 4X) 10-15 mg TbEC 2018 at 900  Yes Yes   Sig: Take 1 Cap by mouth nightly. OTHER 2018 at Unknown time  Yes Yes   Sig: Take 2-3 Tabs by mouth three (3) times daily (with meals). Kimberley Enzymes   Ustekinumab (STELARA) 90 mg/mL injection 2018 at Unknown time Self Yes Yes   Si mg by SubCUTAneous route. Every 8 weeks    acetaminophen (TYLENOL) 500 mg tablet 2018 at 1200  Yes Yes   Sig: Take 500 mg by mouth four (4) times daily as needed (mild pain, heache (usually has headaches 1 week after Stelara)). citalopram (CELEXA) 20 mg tablet 2018 at 1700  Yes Yes   Sig: Take 20 mg by mouth daily. cyanocobalamin (VITAMIN B-12) 1,000 mcg/mL injection 2018 at 900 Self Yes Yes   Si,000 mcg by IntraMUSCular route every . Indications: Once weekly   diazepam (VALIUM) 5 mg tablet 2018 at 1700 Self Yes Yes   Sig: Take 5 mg by mouth every eight (8) hours as needed (bowel relaxation). dicyclomine (BENTYL) 10 mg capsule 2018 at 1700 Self Yes Yes   Sig: Take 10 mg by mouth every six (6) hours as needed. ergocalciferol (ERGOCALCIFEROL) 50,000 unit capsule 2018 at Unknown time Self Yes Yes   Sig: Take 50,000 Units by mouth every . omeprazole (PRILOSEC) 40 mg capsule 2018 at 900 Self Yes Yes   Sig: Take 40 mg by mouth nightly.    ondansetron hcl (ZOFRAN, AS HYDROCHLORIDE,) 8 mg tablet 2018 at Unknown time Self Yes Yes   Sig: Take 8 mg by mouth every twelve (12) hours as needed (Mild Nausea). oxyCODONE-acetaminophen (PERCOCET)  mg per tablet 2/11/2018 at 1700  Yes Yes   Sig: Take 1-2 Tabs by mouth every four (4) hours as needed for Pain (Being tapered per Dr. Douglas Abbasi). pregabalin (LYRICA) 100 mg capsule 2/11/2018 at 1700 Self Yes Yes   Sig: Take 100 mg by mouth four (4) times daily. prochlorperazine (COMPAZINE) 10 mg tablet 2/11/2018 at 1200  Yes Yes   Sig: Take 10 mg by mouth two (2) times daily as needed (Moderate Nausea). promethazine (PHENERGAN) 25 mg tablet 2/5/2018 at Unknown time Self Yes Yes   Sig: Take 25 mg by mouth every six (6) hours as needed (Severe Nausea). traMADol (ULTRAM) 50 mg tablet Not Taking at Unknown time Self Yes No   Sig: Take 50 mg by mouth three (3) times daily as needed (moderate pain). zolpidem (AMBIEN) 10 mg tablet 2/10//2018 at 2200  Yes Yes   Sig: Take 10 mg by mouth nightly.       Facility-Administered Medications: None       Hospital Medications:  Current Facility-Administered Medications   Medication Dose Route Frequency    sodium chloride (NS) flush 5-10 mL  5-10 mL IntraVENous Q8H    sodium chloride (NS) flush 5-10 mL  5-10 mL IntraVENous PRN    HYDROmorphone (PF) (DILAUDID) injection 0.6 mg  0.6 mg IntraVENous 5XD    naloxone (NARCAN) injection 0.4 mg  0.4 mg IntraVENous PRN    ondansetron (ZOFRAN) injection 4 mg  4 mg IntraVENous Q4H PRN    pantoprazole (PROTONIX) 40 mg in sodium chloride 0.9 % 10 mL injection  40 mg IntraVENous DAILY    prochlorperazine (COMPAZINE) with saline injection 5 mg  5 mg IntraVENous Q6H PRN    lactated Ringers infusion  100 mL/hr IntraVENous CONTINUOUS    acetaminophen (TYLENOL) solution 650 mg  650 mg Oral Q6H PRN    dicyclomine (BENTYL) 10 mg/mL injection 20 mg  20 mg IntraMUSCular QID    metroNIDAZOLE (FLAGYL) IVPB premix 500 mg  500 mg IntraVENous Q8H    LORazepam (ATIVAN) injection 1 mg  1 mg IntraVENous Q8H PRN    pregabalin (LYRICA) capsule 200 mg  200 mg Oral BID    zolpidem (AMBIEN) tablet 10 mg  10 mg Oral QHS    influenza vaccine 2017-18 (3 yrs+)(PF) (FLUZONE QUAD/FLUARIX QUAD) injection 0.5 mL  0.5 mL IntraMUSCular PRIOR TO DISCHARGE    sodium chloride 0.9 % bolus infusion 1,000 mL  1,000 mL IntraVENous ONCE       Allergies: Allergies   Allergen Reactions    Remicade [Infliximab] Anaphylaxis and Shortness of Breath    Bactrim [Sulfamethoprim Ds] Other (comments)     Increased GI distress.  Morphine Nausea Only     Tolerates Dilaudid    Nsaids (Non-Steroidal Anti-Inflammatory Drug) Other (comments)     Stomach ulcers       Family History:  Family History   Problem Relation Age of Onset    Cancer Father     Asthma Neg Hx     Diabetes Neg Hx     Heart Disease Neg Hx     Hypertension Neg Hx     Stroke Neg Hx     Malignant Hyperthermia Neg Hx     Pseudocholinesterase Deficiency Neg Hx     Delayed Awakening Neg Hx     Post-op Nausea/Vomiting Neg Hx        Social History:  Social History   Substance Use Topics    Smoking status: Former Smoker     Packs/day: 0.00     Years: 7.00     Quit date: 1/14/2017    Smokeless tobacco: Never Used    Alcohol use No       Review of Systems:    Symptom Y/N Comments  Symptom Y/N Comments   Fever/Chills N   Chest Pain N    Cough    Abdominal Pain Y    Sputum    Joint Pain     SOB/CEDILLO    Pruritis/Rash     Nausea/vomit Y   Tolerating PT/OT     Diarrhea Y   Tolerating Diet N    Constipation    Other       Could NOT obtain due to:        Objective:   Patient Vitals for the past 8 hrs:   BP Temp Pulse Resp SpO2   02/12/18 1616 101/66 97.9 °F (36.6 °C) 75 16 95 %     02/12 0701 - 02/12 1900  In: -   Out: 1450   02/10 1901 - 02/12 0700  In: -   Out: 175     PHYSICAL EXAMINATION:    GENERAL:  Somewhat uncomfortable. HEENT:  Anicteric. Nasogastric tube in place and draining thick yellowish fluid containing contrast.  ABDOMEN:  Soft. Mildly distended. Mildly tender.   Multiple scars. Catheter in place in right lower quadrant ostomy an draining brown liquid stool. EXTREMITIES:  No edema. NEURO:  Alert and oriented. Data Review     Recent Labs      02/12/18   0611  02/12/18   0019   WBC  10.1  15.4*   HGB  11.3*  15.5   HCT  35.2*  47.4   PLT  281  432*     Recent Labs      02/12/18   0611  02/12/18   0019   NA  139  135*   K  3.7  3.8   CL  106  99   CO2  28  27   BUN  15  16   CREA  1.01  1.55*   GLU  82  102*   CA  7.9*  10.2*     Recent Labs      02/12/18   0019   SGOT  16   AP  193*   TP  9.1*   ALB  4.1   GLOB  5.0*     No results for input(s): INR, PTP, APTT in the last 72 hours. No lab exists for component: INREXT                    Assessment and Plan:      The patient has what appears to be a distal partial small bowel obstruction. The site of the obstruction could be the \"valve\" of the pouch itself or it could be proximal to, but near, the pouch. It seems likely that the narcotic use has contributed to abnormal motility, and it is also possible that the patient has an element of pouchitis. His difficulty catheterizing the pouch might be indicative of a mechanical problem and/or edema of the \"valve. \"    He will be treated with parenteral fluids, nasogastric tube decompression, decompression of the ileal pouch via a catheter, and parenteral metronidazole (to cover possible pouchitis).       Active Problems:    Chronic narcotic dependence ([address] (1/20/2018)      Drug-induced ileus ([address] (2/12/2018)      Acute kidney injury ([address] (2/12/2018)      Small bowel obstruction (2/12/2018)

## 2018-02-12 NOTE — ROUTINE PROCESS
TRANSFER - OUT REPORT:    Verbal report given to THE [facility] [place] (name) on Lindsey Khan  being transferred to (unit) for routine progression of care       Report consisted of patients Situation, Background, Assessment and   Recommendations(SBAR). Information from the following report(s) SBAR, ED Summary, Intake/Output, MAR and Recent Results was reviewed with the receiving nurse. Lines:   Peripheral IV 02/12/18 Right Antecubital (Active)   Site Assessment Clean, dry, & intact 2/12/2018 12:23 AM   Phlebitis Assessment 0 2/12/2018 12:23 AM   Infiltration Assessment 0 2/12/2018 12:23 AM   Dressing Status Clean, dry, & intact 2/12/2018 12:23 AM        Opportunity for questions and clarification was provided.       Patient transported with:  Transport

## 2018-02-12 NOTE — ED NOTES
NG tube inserted, 16 Fr and 70 at the nares,  Placement verified with XR.   150ml of brown fluids aspirated from tube. NG tube hooked to continuous suction. Patient denies any needs or concerns at this time. Will continue to monitor.

## 2018-02-12 NOTE — ED NOTES
Patient resting in stretcher, VSS, respirations unlabored and in no acute distress, call bell within reach, will continue to monitor.

## 2018-02-12 NOTE — ED NOTES
Patient resting comfortably in stretcher, VSS, respirations unlabored and in no acute distress upon transfer to the floor.    A total output of 400ml was excreted from pt's NG tube

## 2018-02-12 NOTE — PROGRESS NOTES
Bedside shift change report given to Chepe Kasper (oncoming nurse) by Georgie Mixon (offgoing nurse). Report included the following information SBAR.

## 2018-02-12 NOTE — IP AVS SNAPSHOT
[address] [address] [address] 
[phone] Patient: Saima Alexander MRN: [id number] :1975 About your hospitalization You were admitted on:  2018 [facility] You were discharged on:  February 15, 2018 Why you were hospitalized Your primary diagnosis was:  Not on File Your diagnoses also included:  Drug-Induced Ileus (Hcc), Chronic Narcotic Dependence (Hcc), Acute Kidney Injury (Hcc), Small Bowel Obstruction, Ileal Pouchitis (Hcc) Follow-up Information Follow up With Details Comments Contact Info Karely Bernal MD   [address] [facility] [address] 
[phone] Discharge Orders None A check loc indicates which time of day the medication should be taken. My Medications START taking these medications Instructions Each Dose to Equal  
 Morning Noon Evening Bedtime  
 metroNIDAZOLE 500 mg tablet Commonly known as:  FLAGYL Your last dose was: Your next dose is: Take 1 Tab by mouth three (3) times daily for 10 days. 500 mg CONTINUE taking these medications Instructions Each Dose to Equal  
 Morning Noon Evening Bedtime  
 acetaminophen 500 mg tablet Commonly known as:  TYLENOL Your last dose was: Your next dose is: Take 500 mg by mouth four (4) times daily as needed (mild pain, heache (usually has headaches 1 week after Stelara)). 500 mg  
    
   
   
   
  
 citalopram 20 mg tablet Commonly known as:  Dorla Kitchen Your last dose was: Your next dose is: Take 20 mg by mouth daily. 20 mg  
    
   
   
   
  
 COMPAZINE 10 mg tablet Generic drug:  prochlorperazine Your last dose was: Your next dose is: Take 10 mg by mouth two (2) times daily as needed (Moderate Nausea). 10 mg  
    
   
   
   
  
 dicyclomine 10 mg capsule Commonly known as:  BENTYL Your last dose was: Your next dose is: Take 10 mg by mouth every six (6) hours as needed. 10 mg  
    
   
   
   
  
 ergocalciferol 50,000 unit capsule Commonly known as:  ERGOCALCIFEROL Your last dose was: Your next dose is: Take 50,000 Units by mouth every Sunday. 17185 Units LYRICA 100 mg capsule Generic drug:  pregabalin Your last dose was: Your next dose is: Take 100 mg by mouth four (4) times daily. 100 mg  
    
   
   
   
  
 omeprazole 40 mg capsule Commonly known as:  PRILOSEC Your last dose was: Your next dose is: Take 40 mg by mouth nightly. 40 mg  
    
   
   
   
  
 OTHER Your last dose was: Your next dose is: Take 2-3 Tabs by mouth three (3) times daily (with meals). Kimberley Enzymes 2-3 Tab PERCOCET  mg per tablet Generic drug:  oxyCODONE-acetaminophen Your last dose was: Your next dose is: Take 1-2 Tabs by mouth every four (4) hours as needed for Pain (Being tapered per Dr. Flakito Bates). 1-2 Tab PROBIOTIC 4X 10-15 mg Tbec Generic drug:  B.infantis-B.ani-B.long-B.bifi Your last dose was: Your next dose is: Take 1 Cap by mouth nightly. 1 Cap  
    
   
   
   
  
 promethazine 25 mg tablet Commonly known as:  PHENERGAN Your last dose was: Your next dose is: Take 25 mg by mouth every six (6) hours as needed (Severe Nausea). 25 mg  
    
   
   
   
  
 traMADol 50 mg tablet Commonly known as:  ULTRAM  
   
Your last dose was: Your next dose is: Take 50 mg by mouth three (3) times daily as needed (moderate pain). 50 mg Ustekinumab 90 mg/mL injection Commonly known as:  Jose Worley Your last dose was: Your next dose is:    
   
   
 90 mg by SubCUTAneous route. Every 8 weeks 90 mg  
    
   
   
   
  
 VALIUM 5 mg tablet Generic drug:  diazePAM  
   
Your last dose was: Your next dose is: Take 5 mg by mouth every eight (8) hours as needed (bowel relaxation). 5 mg VITAMIN B-12 1,000 mcg/mL injection Generic drug:  cyanocobalamin Your last dose was: Your next dose is:    
   
   
 1,000 mcg by IntraMUSCular route every . Indications: Once weekly 1000 mcg ZOFRAN (AS HYDROCHLORIDE) 8 mg tablet Generic drug:  ondansetron hcl Your last dose was: Your next dose is: Take 8 mg by mouth every twelve (12) hours as needed (Mild Nausea). 8 mg  
    
   
   
   
  
 zolpidem 10 mg tablet Commonly known as:  AMBIEN Your last dose was: Your next dose is: Take 10 mg by mouth nightly. 10 mg Where to Get Your Medications Information on where to get these meds will be given to you by the nurse or doctor. ! Ask your nurse or doctor about these medications  
  metroNIDAZOLE 500 mg tablet Discharge Instructions Patient Discharge Instructions Eagle Martini / [id number] : 1975 Admitted 2018 Discharged: 2/15/2018 · It is important that you take medications exactly as they are prescribed. · Keep your medication in the bottles provided by the pharmacist and keep a list of the medication names, dosages, and times to be taken in your wallet. · Do not take other medications without consulting your doctor. What To Do At [facility] Recommended Diet:  You should resume your usual diet as tolerated. Recommended Activity: As tolerated. Other:  If you have questions or other problems, call Dr. Ab Barrett. Follow-Up:  Follow up with Dr. Starkey Last on 2/16/2018 as scheduled. Information obtained by : 
I understand that if any problems occur once I am at home I am to contact my physician. I understand and acknowledge receipt of the instructions indicated above. Physician's or R.N.'s Signature                                                                  Date/Time Patient or Representative Signature                                                          Date/Time biNu Announcement We are excited to announce that we are making your provider's discharge notes available to you in biNu. You will see these notes when they are completed and signed by the physician that discharged you from your recent hospital stay. If you have any questions or concerns about any information you see in biNu, please call the Health Information Department where you were seen or reach out to your Primary Care Provider for more information about your plan of care. Introducing [facility]! Dear Yoly Nguyen: Thank you for requesting a biNu account. Our records indicate that you already have an active biNu account. You can access your account anytime at [email or web address] Did you know that you can access your hospital and ER discharge instructions at any time in biNu? You can also review all of your test results from your hospital stay or ER visit. Additional Information If you have questions, please visit the Frequently Asked Questions section of the [employer] website at [email or web address] Remember, [employer] is NOT to be used for urgent needs. For medical emergencies, dial 911. Now available from your iPhone and Android! Providers Seen During Your Hospitalization Provider Specialty Primary office phone Lito Greenberg MD Emergency Medicine [phone] José Venegas MD Colon and Rectal Surgery [phone] Immunizations Administered for This Admission Name Date Influenza Vaccine (Quad) PF  Deferred () Your Primary Care Physician (PCP) Primary Care Physician Office Phone Office Fax [place], [address] [phone] You are allergic to the following Allergen Reactions Remicade (Infliximab) Anaphylaxis Shortness of Breath Bactrim (Sulfamethoprim Ds) Other (comments) Increased GI distress. Morphine Nausea Only Tolerates Dilaudid Nsaids (Non-Steroidal Anti-Inflammatory Drug) Other (comments) Stomach ulcers Recent Documentation Height Weight BMI Smoking Status 1.829 m 81.2 kg 24.28 kg/m2 Former Smoker Emergency Contacts Name Discharge Info Relation Home Work Mobile [address] CAREGIVER [3] Spouse [3] [phone] Patient Belongings The following personal items are in your possession at time of discharge: 
  Dental Appliances: None  Visual Aid: None      Home Medications: None   Jewelry: None  Clothing: At bedside    Other Valuables: At bedside, Cell Phone  Personal Items Sent to Safe: none Please provide this summary of care documentation to your next provider. Signatures-by signing, you are acknowledging that this After Visit Summary has been reviewed with you and you have received a copy. Patient Signature:  ____________________________________________________________ Date:  ____________________________________________________________  
  
Donald Lion Provider Signature:  ____________________________________________________________ Date:  ____________________________________________________________

## 2018-02-12 NOTE — ED PROVIDER NOTES
HPI Comments:  40 y.o. male s/p BCIR placement on 12/1/2017 with extensive past medical history, including significant for ulcerative colitis, crohn's disease, ileal pouchitis, anal stenosis, anal fistula, and opiod dependence who presents ambulatory from home with chief complaint of abdominal pain. Underwent BCIR placement on 12/1/17 which established an internal pouch to store waste. Pt drains the pouch by inserting a catheter into a stoma. States drainage is usually \"free-flowing liquid\" that he was previously draining without issues. Pt noticed increased output yesterday of 3000cc and then today output was \"harder to get out\" and last drained at 1pm; has not been able to get catheter in to drain since that time. +Nausea; no vomiting. History of \"instestinal blockages\" and is concerned that today's symptoms are indicative of the same. Pt specifically denies fever and difficulty urinating. There are no other acute medical concerns at this time.     Social hx: former tobacco smoker; denies EtOH use; denies illicit drug use  PCP: Katie Maher MD  Colorectal Surgery/GI: William Maurer MD    Patient is a 40 y.o. male presenting with abdominal pain. Abdominal Pain    Associated symptoms include nausea. Pertinent negatives include no vomiting, no dysuria, no frequency, no hematuria, no headaches, no myalgias, no chest pain and no back pain. Past Medical History:   Diagnosis Date    Anal fistula     The patient no longer has an anus.  Anal stenosis     The patient no longer has an anus.  Crohn's disease ([address]     GERD (gastroesophageal reflux disease)     H/O ulcerative colitis     Symptoms began in 1996. Total proctocolectomy was performed in 2004. Patient later developed Crohn's disease.  Hiatal hernia     Ileal pouchitis ([address] 05/2004    The patient no longer has a pouch.     Nausea & vomiting     Combination of Scopalamine patch & Zofran IV worked well in past    Numbness in right leg Chronic.  Opioid dependence ([facility]     History of opioid dependence requiring a detox program.    Psychiatric disorder     depression and anxiety    Skin lesion of back 3/17/2014    Syncopal episodes        Past Surgical History:   Procedure Laterality Date    ABDOMEN SURGERY PROC UNLISTED  3/25/2004    Total proctocolectomy with creation of ileoanal J-pouch and loop ileostomy; Dr. Jim Han.   CHOLECYSTECTOMY  12/2016    Dr. Yohana Joseph  GI  5/2/2004    Examination under anesthesia with endoscopic evaluation of ileoanal pouch and placement of drain; Dr. Jim Han.   GI  5/25/2004    Ileostomy closure with small bowel resection and anastomosis; Dr. Jim Han.   GI  5/24/2012    Examination under anesthesia, anal dilatation, anal biopsy, and placement of draining seton; Dr. Jim Han.   GI  7/3/2012    Incision and drainage of perirectal abscess and rigid endoscopy of ileoanal pouch; Dr. Jim Han.   GI  7/31/2012    Incision and drainage of perirectal abscess, placement of draining seton, and anal dilatation; Dr. Jim Han.   GI  1/22/2013    Repair of anal fistulas with debridement, partial fistulectomy, and flap closure; Dr. Jim Han.   GI  5/17/2013    Examination under anesthesia, partial fistulotomy,  and placement of draining setons; Dr. Jim Han.   GI  8/6/2013    Anal fistulotomy and application of ACell micromatrix; Dr. Jim Han.   GI  2/20/2014    Examination under anesthesia and dilation of anal canal with rigid proctoscopy; Peggy Santamaria MD.     GI  4/29/2015    Creation of [facility] [place] continent intestinal reservoir (BCIR), abdominoperineal resection of ileoanal J-pouch; lysis of adhesions, and gastrostomy; Rutha Devoid. Candi Fabry, MD ([place], [place])     GI  8/7/2015    Stoma revision; Rutha Devoid. Candi Fabry, MD ([place], [place])     GI  10/29/2015    Extensive lysis of adhesions, resection of continent intestinal reservoir, repair of serosal tears, and creation of end-ileostomy; Dr. Alejandra Shabazz.  HX GI  03/14/2017    Laparotomy, extensive lysis of adhesions and revision of ileostomy; Dr. Alejandra Shabazz.  HX ORTHOPAEDIC  2008    Left ACL repair    HX OTHER SURGICAL  11/29/2017    Conversion of end-ileostomy to BCIR ([address]); [place], [place].    HX UROLOGICAL  03/14/2017    Cystoscopy and placement of bilateral temporary ureteral catheters; Angi Amaya MD.         Family History:   Problem Relation Age of Onset    Cancer Father     Asthma Neg Hx     Diabetes Neg Hx     Heart Disease Neg Hx     Hypertension Neg Hx     Stroke Neg Hx     Malignant Hyperthermia Neg Hx     Pseudocholinesterase Deficiency Neg Hx     Delayed Awakening Neg Hx     Post-op Nausea/Vomiting Neg Hx        Social History     Social History    Marital status:      Spouse name: N/A    Number of children: N/A    Years of education: N/A     Occupational History    Not on file. Social History Main Topics    Smoking status: Former Smoker     Packs/day: 0.00     Years: 7.00     Quit date: 1/14/2017    Smokeless tobacco: Never Used    Alcohol use No    Drug use: No    Sexual activity: Not on file     Other Topics Concern    Not on file     Social History Narrative         ALLERGIES: Remicade [infliximab]; Bactrim [sulfamethoprim ds]; Morphine; and Nsaids (non-steroidal anti-inflammatory drug)    Review of Systems   Constitutional: Negative. HENT: Negative for ear discharge. Eyes: Negative for photophobia, pain, discharge and visual disturbance. Respiratory: Negative for apnea, cough, chest tightness and shortness of breath. Cardiovascular: Negative for chest pain, palpitations and leg swelling. Gastrointestinal: Positive for abdominal pain and nausea. Negative for abdominal distention, blood in stool and vomiting.    Genitourinary: Negative for difficulty urinating, dysuria, flank pain, frequency and hematuria. Musculoskeletal: Negative for back pain, gait problem, joint swelling, myalgias and neck pain. Skin: Negative for color change and pallor. Neurological: Negative for dizziness, syncope, weakness, numbness and headaches. Psychiatric/Behavioral: Negative for behavioral problems and confusion. The patient is not nervous/anxious. Vitals:    02/11/18 2233   BP: 109/70   Pulse: (!) 139   Resp: 18   Temp: 98.3 °F (36.8 °C)   SpO2: 99%   Weight: 81.2 kg (179 lb)   Height: 6' (1.829 m)            Physical Exam   Constitutional: He is oriented to person, place, and time. He appears well-developed and well-nourished. HENT:   Head: Normocephalic and atraumatic. Right Ear: External ear normal.   Left Ear: External ear normal.   Nose: Nose normal.   Mouth/Throat: Oropharynx is clear and moist.   Eyes: Conjunctivae and EOM are normal. Pupils are equal, round, and reactive to light. Right eye exhibits no discharge. Left eye exhibits no discharge. Neck: Normal range of motion. Neck supple. Cardiovascular: Regular rhythm, normal heart sounds and intact distal pulses. HR 130s   Pulmonary/Chest: Effort normal and breath sounds normal.   Abdominal: Soft. Bowel sounds are normal. He exhibits no distension. There is no rebound and no guarding. Stoma noted to RLQ; no surrounding edema/erythema   Musculoskeletal: Normal range of motion. He exhibits no edema or tenderness. Neurological: He is alert and oriented to person, place, and time. No cranial nerve deficit. Coordination normal.   Skin: Skin is warm and dry. No rash noted. Psychiatric: He has a normal mood and affect. His behavior is normal. Judgment and thought content normal.   Nursing note and vitals reviewed.        MDM  Number of Diagnoses or Management Options     Amount and/or Complexity of Data Reviewed  Clinical lab tests: ordered and reviewed  Tests in the radiology section of CPT®: ordered and reviewed  Decide to obtain previous medical records or to obtain history from someone other than the patient: yes  Review and summarize past medical records: yes  Discuss the patient with other providers: yes  Independent visualization of images, tracings, or specimens: yes          ED Course       Procedures    Patient has been reassessed. Reviewed labs, medications and radiographics with patient. Will consult Dr Carolina Deshpande. RIDGE Gamboa    Spoke to Dr Carolina Deshpande; he will admit. RIDGE Gamboa    Sagar Pharmacist in to do med rec. RIDGE Gamboa    Patient has been reassessed. Feeling better; HR improved. Reviewed labs, medications and radiographics with patient. RIDGE Gamboa     Discussed case with attending Physician Sukhwinder Alcazar. Agrees with care and plan.  RIDGE Gamboa

## 2018-02-12 NOTE — ED TRIAGE NOTES
Triage: Patient arrives ambulatory from home with c/o feeling as if he has SBO. D/t extensive hx Crohns patient has internal intestinal pouch which he drains with a catheter. Patient reports he has not been able to pass catheter nor drain stool for entire day. Admitted with SBO 1/19/18 which resolved with conservative tx.

## 2018-02-13 LAB
ANION GAP SERPL CALC-SCNC: 9 MMOL/L (ref 5–15)
BASOPHILS # BLD: 0 K/UL (ref 0–0.1)
BASOPHILS NFR BLD: 0 % (ref 0–1)
BUN SERPL-MCNC: 11 MG/DL (ref 6–20)
BUN/CREAT SERPL: 17 (ref 12–20)
CALCIUM SERPL-MCNC: 8.7 MG/DL (ref 8.5–10.1)
CHLORIDE SERPL-SCNC: 108 MMOL/L (ref 97–108)
CO2 SERPL-SCNC: 26 MMOL/L (ref 21–32)
CREAT SERPL-MCNC: 0.63 MG/DL (ref 0.7–1.3)
DIFFERENTIAL METHOD BLD: ABNORMAL
EOSINOPHIL # BLD: 0.1 K/UL (ref 0–0.4)
EOSINOPHIL NFR BLD: 1 % (ref 0–7)
ERYTHROCYTE [DISTWIDTH] IN BLOOD BY AUTOMATED COUNT: 15 % (ref 11.5–14.5)
GLUCOSE SERPL-MCNC: 73 MG/DL (ref 65–100)
HCT VFR BLD AUTO: 33.3 % (ref 36.6–50.3)
HGB BLD-MCNC: 10.8 G/DL (ref 12.1–17)
IMM GRANULOCYTES # BLD: 0 K/UL (ref 0–0.04)
IMM GRANULOCYTES NFR BLD AUTO: 0 % (ref 0–0.5)
LYMPHOCYTES # BLD: 1.7 K/UL (ref 0.8–3.5)
LYMPHOCYTES NFR BLD: 18 % (ref 12–49)
MCH RBC QN AUTO: 26.7 PG (ref 26–34)
MCHC RBC AUTO-ENTMCNC: 32.4 G/DL (ref 30–36.5)
MCV RBC AUTO: 82.4 FL (ref 80–99)
MONOCYTES # BLD: 0.4 K/UL (ref 0–1)
MONOCYTES NFR BLD: 4 % (ref 5–13)
NEUTS SEG # BLD: 7.6 K/UL (ref 1.8–8)
NEUTS SEG NFR BLD: 77 % (ref 32–75)
NRBC # BLD: 0 K/UL (ref 0–0.01)
NRBC BLD-RTO: 0 PER 100 WBC
PHOSPHATE SERPL-MCNC: 2.8 MG/DL (ref 2.6–4.7)
PLATELET # BLD AUTO: 270 K/UL (ref 150–400)
PMV BLD AUTO: 10.5 FL (ref 8.9–12.9)
POTASSIUM SERPL-SCNC: 3.6 MMOL/L (ref 3.5–5.1)
RBC # BLD AUTO: 4.04 M/UL (ref 4.1–5.7)
SODIUM SERPL-SCNC: 143 MMOL/L (ref 136–145)
WBC # BLD AUTO: 9.9 K/UL (ref 4.1–11.1)

## 2018-02-13 PROCEDURE — 65270000032 HC RM SEMIPRIVATE

## 2018-02-13 PROCEDURE — 74011250636 HC RX REV CODE- 250/636: Performed by: COLON & RECTAL SURGERY

## 2018-02-13 PROCEDURE — 80048 BASIC METABOLIC PNL TOTAL CA: CPT | Performed by: COLON & RECTAL SURGERY

## 2018-02-13 PROCEDURE — C9113 INJ PANTOPRAZOLE SODIUM, VIA: HCPCS | Performed by: COLON & RECTAL SURGERY

## 2018-02-13 PROCEDURE — 36415 COLL VENOUS BLD VENIPUNCTURE: CPT | Performed by: COLON & RECTAL SURGERY

## 2018-02-13 PROCEDURE — 84100 ASSAY OF PHOSPHORUS: CPT | Performed by: COLON & RECTAL SURGERY

## 2018-02-13 PROCEDURE — 74011000250 HC RX REV CODE- 250: Performed by: COLON & RECTAL SURGERY

## 2018-02-13 PROCEDURE — 76450000000

## 2018-02-13 PROCEDURE — 85025 COMPLETE CBC W/AUTO DIFF WBC: CPT | Performed by: COLON & RECTAL SURGERY

## 2018-02-13 PROCEDURE — 74011250637 HC RX REV CODE- 250/637: Performed by: COLON & RECTAL SURGERY

## 2018-02-13 RX ORDER — HYDROMORPHONE HYDROCHLORIDE 1 MG/ML
1 INJECTION, SOLUTION INTRAMUSCULAR; INTRAVENOUS; SUBCUTANEOUS ONCE
Status: COMPLETED | OUTPATIENT
Start: 2018-02-13 | End: 2018-02-13

## 2018-02-13 RX ORDER — DICYCLOMINE HYDROCHLORIDE 10 MG/1
10 CAPSULE ORAL 4 TIMES DAILY
Status: COMPLETED | OUTPATIENT
Start: 2018-02-13 | End: 2018-02-14

## 2018-02-13 RX ORDER — HYDROMORPHONE HYDROCHLORIDE 1 MG/ML
1 INJECTION, SOLUTION INTRAMUSCULAR; INTRAVENOUS; SUBCUTANEOUS
Status: DISCONTINUED | OUTPATIENT
Start: 2018-02-13 | End: 2018-02-15 | Stop reason: HOSPADM

## 2018-02-13 RX ADMIN — METRONIDAZOLE 500 MG: 500 INJECTION, SOLUTION INTRAVENOUS at 06:02

## 2018-02-13 RX ADMIN — SODIUM CHLORIDE 5 MG: 9 INJECTION INTRAMUSCULAR; INTRAVENOUS; SUBCUTANEOUS at 23:47

## 2018-02-13 RX ADMIN — LORAZEPAM 1 MG: 2 INJECTION INTRAMUSCULAR; INTRAVENOUS at 08:55

## 2018-02-13 RX ADMIN — DICYCLOMINE HYDROCHLORIDE 10 MG: 10 CAPSULE ORAL at 17:31

## 2018-02-13 RX ADMIN — DICYCLOMINE HYDROCHLORIDE 20 MG: 20 INJECTION, SOLUTION INTRAMUSCULAR at 08:55

## 2018-02-13 RX ADMIN — SODIUM CHLORIDE, SODIUM LACTATE, POTASSIUM CHLORIDE, AND CALCIUM CHLORIDE 100 ML/HR: 600; 310; 30; 20 INJECTION, SOLUTION INTRAVENOUS at 17:32

## 2018-02-13 RX ADMIN — SODIUM CHLORIDE 5 MG: 9 INJECTION INTRAMUSCULAR; INTRAVENOUS; SUBCUTANEOUS at 14:14

## 2018-02-13 RX ADMIN — HYDROMORPHONE HYDROCHLORIDE 0.6 MG: 1 INJECTION, SOLUTION INTRAMUSCULAR; INTRAVENOUS; SUBCUTANEOUS at 13:23

## 2018-02-13 RX ADMIN — Medication 10 ML: at 18:42

## 2018-02-13 RX ADMIN — HYDROMORPHONE HYDROCHLORIDE 0.6 MG: 1 INJECTION, SOLUTION INTRAMUSCULAR; INTRAVENOUS; SUBCUTANEOUS at 23:44

## 2018-02-13 RX ADMIN — Medication 10 ML: at 23:44

## 2018-02-13 RX ADMIN — LORAZEPAM 1 MG: 2 INJECTION INTRAMUSCULAR; INTRAVENOUS at 18:41

## 2018-02-13 RX ADMIN — DICYCLOMINE HYDROCHLORIDE 10 MG: 10 CAPSULE ORAL at 23:44

## 2018-02-13 RX ADMIN — SODIUM CHLORIDE 5 MG: 9 INJECTION INTRAMUSCULAR; INTRAVENOUS; SUBCUTANEOUS at 05:22

## 2018-02-13 RX ADMIN — Medication 10 ML: at 05:22

## 2018-02-13 RX ADMIN — ACETAMINOPHEN 650 MG: 650 SOLUTION ORAL at 18:51

## 2018-02-13 RX ADMIN — DICYCLOMINE HYDROCHLORIDE 10 MG: 10 CAPSULE ORAL at 13:23

## 2018-02-13 RX ADMIN — HYDROMORPHONE HYDROCHLORIDE 0.6 MG: 1 INJECTION, SOLUTION INTRAMUSCULAR; INTRAVENOUS; SUBCUTANEOUS at 06:59

## 2018-02-13 RX ADMIN — SODIUM CHLORIDE, SODIUM LACTATE, POTASSIUM CHLORIDE, AND CALCIUM CHLORIDE 100 ML/HR: 600; 310; 30; 20 INJECTION, SOLUTION INTRAVENOUS at 05:15

## 2018-02-13 RX ADMIN — SODIUM CHLORIDE 40 MG: 9 INJECTION INTRAMUSCULAR; INTRAVENOUS; SUBCUTANEOUS at 08:55

## 2018-02-13 RX ADMIN — PREGABALIN 200 MG: 100 CAPSULE ORAL at 08:55

## 2018-02-13 RX ADMIN — METRONIDAZOLE 500 MG: 500 INJECTION, SOLUTION INTRAVENOUS at 14:11

## 2018-02-13 RX ADMIN — HYDROMORPHONE HYDROCHLORIDE 1 MG: 1 INJECTION, SOLUTION INTRAMUSCULAR; INTRAVENOUS; SUBCUTANEOUS at 10:15

## 2018-02-13 RX ADMIN — ZOLPIDEM TARTRATE 10 MG: 5 TABLET ORAL at 23:44

## 2018-02-13 RX ADMIN — HYDROMORPHONE HYDROCHLORIDE 0.6 MG: 1 INJECTION, SOLUTION INTRAMUSCULAR; INTRAVENOUS; SUBCUTANEOUS at 17:29

## 2018-02-13 RX ADMIN — ONDANSETRON 4 MG: 2 INJECTION INTRAMUSCULAR; INTRAVENOUS at 08:56

## 2018-02-13 RX ADMIN — HYDROMORPHONE HYDROCHLORIDE 0.6 MG: 1 INJECTION, SOLUTION INTRAMUSCULAR; INTRAVENOUS; SUBCUTANEOUS at 10:15

## 2018-02-13 RX ADMIN — PREGABALIN 200 MG: 100 CAPSULE ORAL at 17:40

## 2018-02-13 NOTE — PROGRESS NOTES
Bedside shift change report given to Anna Pimentel RN (oncoming nurse) by Reina Mathis RN (offgoing nurse). Report included the following information SBAR, Kardex, MAR and Recent Results.

## 2018-02-13 NOTE — PROGRESS NOTES
General Daily Progress Note    Admission Date: 2/12/2018  Hospital Day 2  Post-Op Day Not Applicable. Subjective:   Complains of needing more narcotics to replace what he normally takes; i.e. Percocet  mg 2 by mouth every 4 hours. Feels some relief with ileostomy catheterized. NG tube \"came out\" yesterday evening. No appreciable setback since then. Objective:   Patient Vitals for the past 24 hrs:   BP Temp Pulse Resp SpO2   02/13/18 0851 102/62 98.2 °F (36.8 °C) 98 12 -   02/13/18 0125 118/68 97.7 °F (36.5 °C) 88 16 95 %   02/13/18 0051 - - - 14 -   02/12/18 1616 101/66 97.9 °F (36.6 °C) 75 16 95 %        02/11 1901 - 02/13 0700  In: -   Out: 4324 [Urine:550]      PHYSICAL EXAMINATION:    GENERAL:  Somewhat uncomfortable. HEENT:  Anicteric. ABDOMEN:  Soft. Mildly distended. Mildly tender. Multiple scars. Catheter in place in right lower quadrant ostomy an draining thin liquid stool. NEURO:  Alert and oriented. Data Review   Recent Results (from the past 24 hour(s))   CBC WITH AUTOMATED DIFF    Collection Time: 02/13/18  5:13 AM   Result Value Ref Range    WBC 9.9 4.1 - 11.1 K/uL    RBC 4.04 (L) 4.10 - 5.70 M/uL    HGB 10.8 (L) 12.1 - 17.0 g/dL    HCT 33.3 (L) 36.6 - 50.3 %    MCV 82.4 80.0 - 99.0 FL    MCH 26.7 26.0 - 34.0 PG    MCHC 32.4 30.0 - 36.5 g/dL    RDW 15.0 (H) 11.5 - 14.5 %    PLATELET 366 406 - 614 K/uL    MPV 10.5 8.9 - 12.9 FL    NRBC 0.0 0  WBC    ABSOLUTE NRBC 0.00 0.00 - 0.01 K/uL    NEUTROPHILS 77 (H) 32 - 75 %    LYMPHOCYTES 18 12 - 49 %    MONOCYTES 4 (L) 5 - 13 %    EOSINOPHILS 1 0 - 7 %    BASOPHILS 0 0 - 1 %    IMMATURE GRANULOCYTES 0 0.0 - 0.5 %    ABS. NEUTROPHILS 7.6 1.8 - 8.0 K/UL    ABS. LYMPHOCYTES 1.7 0.8 - 3.5 K/UL    ABS. MONOCYTES 0.4 0.0 - 1.0 K/UL    ABS. EOSINOPHILS 0.1 0.0 - 0.4 K/UL    ABS. BASOPHILS 0.0 0.0 - 0.1 K/UL    ABS. IMM.  GRANS. 0.0 0.00 - 0.04 K/UL    DF AUTOMATED     METABOLIC PANEL, BASIC    Collection Time: 02/13/18  5:13 AM   Result Value Ref Range    Sodium 143 136 - 145 mmol/L    Potassium 3.6 3.5 - 5.1 mmol/L    Chloride 108 97 - 108 mmol/L    CO2 26 21 - 32 mmol/L    Anion gap 9 5 - 15 mmol/L    Glucose 73 65 - 100 mg/dL    BUN 11 6 - 20 MG/DL    Creatinine 0.63 (L) 0.70 - 1.30 MG/DL    BUN/Creatinine ratio 17 12 - 20      GFR est AA >60 >60 ml/min/1.73m2    GFR est non-AA >60 >60 ml/min/1.73m2    Calcium 8.7 8.5 - 10.1 MG/DL   PHOSPHORUS    Collection Time: 02/13/18  5:13 AM   Result Value Ref Range    Phosphorus 2.8 2.6 - 4.7 MG/DL           Assessment:     Active Problems:    Ileal pouchitis ([address] (5/1/2004)      Chronic narcotic dependence (HCC) (1/20/2018)      Drug-induced ileus ([address] (2/12/2018)      Acute kidney injury ([address] (2/12/2018)      Small bowel obstruction (2/12/2018)      Hemodynamically stable. Catheter appears to be draining the ileal pouch effectively. Might be able to tolerate clear liquids. Flagyl day #2. We discussed management of the medications. Whatever abdominal pain he is having is probably not going to be best treated with opioids. He should not have any surgical/incisional pain this far out from the last operation. The narcotic that I have been giving him (Dilaudid 0.6 mg IV 5 times per day) is intended to prevent withdrawal, not to control pain. I think that a Palliative Medicine consultation might be helpful. Plan:   Clear liquid diet with caution. Continue drainage of ileal pouch via catheter. Continue parenteral fluids. Continue Flagyl for treatment of possible pouchitis. Palliative Medicine consultation.

## 2018-02-13 NOTE — PROGRESS NOTES
2100 Patient pulled out NGT. Paged MD to notify. 2105 Dr. Sarwat Jameson notified, MD would like NGT replaced. 2130 Patient refusing NGT at this time stating, \"I want to wait and see how I feel over night. If I start to feel like I need to vomit or get sick we can put it back in.\" Will continue to monitor.

## 2018-02-13 NOTE — PROGRESS NOTES
CM following for discharge planning, discussed case with attending surgeon Dr. Inna Osei. Patient able to perform home ileal pouch care independently, physician does not anticipate need for additional home services. Expect discharge home when medically clear with no further CM interventions necessary. CM available for consult should new needs arise.  Yareli Hadley, JEREMIAH/CRM

## 2018-02-13 NOTE — CONSULTS
Palliative Medicine    Chart reviewed. Message left with Dr. Alejandra Shabazz. After chart review, I am unsure how our team can help at this time, will need to discuss with attending prior to seeing patient.

## 2018-02-13 NOTE — PROGRESS NOTES
Bedside shift change report given to Dmitriy Valente (oncoming nurse) by Ayden Rodgers (offgoing nurse). Report included the following information SBAR, Kardex, ED Summary, Procedure Summary, Intake/Output, MAR, Recent Results and Med Rec Status.

## 2018-02-13 NOTE — PROGRESS NOTES
Bedside shift change report given to Ingrid Thorne (oncoming nurse) by Emily Sandoval (offgoing nurse). Report included the following information SBAR, Kardex, OR Summary, Procedure Summary, Intake/Output, MAR, Accordion and Recent Results.

## 2018-02-14 LAB
GLUCOSE BLD STRIP.AUTO-MCNC: 192 MG/DL (ref 65–100)
GLUCOSE BLD STRIP.AUTO-MCNC: 99 MG/DL (ref 65–100)
SERVICE CMNT-IMP: ABNORMAL
SERVICE CMNT-IMP: NORMAL

## 2018-02-14 PROCEDURE — 82962 GLUCOSE BLOOD TEST: CPT

## 2018-02-14 PROCEDURE — 65270000032 HC RM SEMIPRIVATE

## 2018-02-14 PROCEDURE — 74011250636 HC RX REV CODE- 250/636: Performed by: COLON & RECTAL SURGERY

## 2018-02-14 PROCEDURE — C9113 INJ PANTOPRAZOLE SODIUM, VIA: HCPCS | Performed by: COLON & RECTAL SURGERY

## 2018-02-14 PROCEDURE — 77030018846 HC SOL IRR STRL H20 ICUM -A

## 2018-02-14 PROCEDURE — 74011250637 HC RX REV CODE- 250/637: Performed by: COLON & RECTAL SURGERY

## 2018-02-14 PROCEDURE — 74011000250 HC RX REV CODE- 250: Performed by: COLON & RECTAL SURGERY

## 2018-02-14 RX ORDER — OMEPRAZOLE 40 MG/1
40 CAPSULE, DELAYED RELEASE ORAL
Status: DISCONTINUED | OUTPATIENT
Start: 2018-02-14 | End: 2018-02-14 | Stop reason: CLARIF

## 2018-02-14 RX ORDER — LEVOFLOXACIN 5 MG/ML
500 INJECTION, SOLUTION INTRAVENOUS ONCE
Status: DISCONTINUED | OUTPATIENT
Start: 2018-02-14 | End: 2018-02-14

## 2018-02-14 RX ORDER — BUPROPION HYDROCHLORIDE 75 MG/1
75 TABLET ORAL 2 TIMES DAILY
Status: DISCONTINUED | OUTPATIENT
Start: 2018-02-14 | End: 2018-02-14

## 2018-02-14 RX ORDER — PANTOPRAZOLE SODIUM 40 MG/1
40 TABLET, DELAYED RELEASE ORAL
Status: DISCONTINUED | OUTPATIENT
Start: 2018-02-14 | End: 2018-02-15 | Stop reason: HOSPADM

## 2018-02-14 RX ORDER — METRONIDAZOLE 250 MG/1
500 TABLET ORAL 3 TIMES DAILY
Status: DISCONTINUED | OUTPATIENT
Start: 2018-02-14 | End: 2018-02-15 | Stop reason: HOSPADM

## 2018-02-14 RX ADMIN — METRONIDAZOLE 500 MG: 250 TABLET ORAL at 17:39

## 2018-02-14 RX ADMIN — HYDROMORPHONE HYDROCHLORIDE 1 MG: 1 INJECTION, SOLUTION INTRAMUSCULAR; INTRAVENOUS; SUBCUTANEOUS at 19:20

## 2018-02-14 RX ADMIN — DICYCLOMINE HYDROCHLORIDE 10 MG: 10 CAPSULE ORAL at 08:48

## 2018-02-14 RX ADMIN — DICYCLOMINE HYDROCHLORIDE 10 MG: 10 CAPSULE ORAL at 13:30

## 2018-02-14 RX ADMIN — DICYCLOMINE HYDROCHLORIDE 10 MG: 10 CAPSULE ORAL at 17:39

## 2018-02-14 RX ADMIN — Medication 10 ML: at 05:08

## 2018-02-14 RX ADMIN — HYDROMORPHONE HYDROCHLORIDE 0.6 MG: 1 INJECTION, SOLUTION INTRAMUSCULAR; INTRAVENOUS; SUBCUTANEOUS at 10:53

## 2018-02-14 RX ADMIN — LORAZEPAM 1 MG: 2 INJECTION INTRAMUSCULAR; INTRAVENOUS at 21:35

## 2018-02-14 RX ADMIN — HYDROMORPHONE HYDROCHLORIDE 1 MG: 1 INJECTION, SOLUTION INTRAMUSCULAR; INTRAVENOUS; SUBCUTANEOUS at 05:08

## 2018-02-14 RX ADMIN — SODIUM CHLORIDE, SODIUM LACTATE, POTASSIUM CHLORIDE, AND CALCIUM CHLORIDE 100 ML/HR: 600; 310; 30; 20 INJECTION, SOLUTION INTRAVENOUS at 05:04

## 2018-02-14 RX ADMIN — METRONIDAZOLE 500 MG: 250 TABLET ORAL at 21:39

## 2018-02-14 RX ADMIN — ZOLPIDEM TARTRATE 10 MG: 5 TABLET ORAL at 23:08

## 2018-02-14 RX ADMIN — SODIUM CHLORIDE 5 MG: 9 INJECTION INTRAMUSCULAR; INTRAVENOUS; SUBCUTANEOUS at 21:31

## 2018-02-14 RX ADMIN — HYDROMORPHONE HYDROCHLORIDE 1 MG: 1 INJECTION, SOLUTION INTRAMUSCULAR; INTRAVENOUS; SUBCUTANEOUS at 10:55

## 2018-02-14 RX ADMIN — PANTOPRAZOLE SODIUM 40 MG: 40 TABLET, DELAYED RELEASE ORAL at 21:39

## 2018-02-14 RX ADMIN — PREGABALIN 200 MG: 100 CAPSULE ORAL at 17:39

## 2018-02-14 RX ADMIN — HYDROMORPHONE HYDROCHLORIDE 0.6 MG: 1 INJECTION, SOLUTION INTRAMUSCULAR; INTRAVENOUS; SUBCUTANEOUS at 13:30

## 2018-02-14 RX ADMIN — HYDROMORPHONE HYDROCHLORIDE 0.6 MG: 1 INJECTION, SOLUTION INTRAMUSCULAR; INTRAVENOUS; SUBCUTANEOUS at 07:30

## 2018-02-14 RX ADMIN — HYDROMORPHONE HYDROCHLORIDE 0.6 MG: 1 INJECTION, SOLUTION INTRAMUSCULAR; INTRAVENOUS; SUBCUTANEOUS at 23:07

## 2018-02-14 RX ADMIN — SODIUM CHLORIDE 5 MG: 9 INJECTION INTRAMUSCULAR; INTRAVENOUS; SUBCUTANEOUS at 08:52

## 2018-02-14 RX ADMIN — PREGABALIN 200 MG: 100 CAPSULE ORAL at 08:48

## 2018-02-14 RX ADMIN — SODIUM CHLORIDE 40 MG: 9 INJECTION INTRAMUSCULAR; INTRAVENOUS; SUBCUTANEOUS at 08:48

## 2018-02-14 RX ADMIN — LORAZEPAM 1 MG: 2 INJECTION INTRAMUSCULAR; INTRAVENOUS at 08:55

## 2018-02-14 RX ADMIN — Medication 10 ML: at 13:31

## 2018-02-14 RX ADMIN — HYDROMORPHONE HYDROCHLORIDE 0.6 MG: 1 INJECTION, SOLUTION INTRAMUSCULAR; INTRAVENOUS; SUBCUTANEOUS at 17:40

## 2018-02-14 NOTE — PROGRESS NOTES
Bedside and Verbal shift change report given to Renetta (oncoming nurse) by Theodora Hall RN (offgoing nurse). Report included the following information SBAR, Kardex, OR Summary, Intake/Output and MAR.

## 2018-02-14 NOTE — PROGRESS NOTES
Bedside shift change report given to Deacon Rivers RN (oncoming nurse) by Jenni Foster RN (offgoing nurse). Report included the following information SBAR, Kardex, Intake/Output and MAR.

## 2018-02-14 NOTE — PROGRESS NOTES
General Daily Progress Note    Admission Date: 2/12/2018  Hospital Day 3  Post-Op Day Not Applicable  Subjective:   Pain is mild. No nausea. Tolerating GI lite diet so far. Objective:   Patient Vitals for the past 24 hrs:   BP Temp Pulse Resp SpO2   02/14/18 0757 97/59 98.7 °F (37.1 °C) 82 18 98 %   02/13/18 1640 96/58 97.5 °F (36.4 °C) (!) 102 20 99 %        02/12 1901 - 02/14 0700  In: -   Out: 7536 [Urine:2300]      PHYSICAL EXAMINATION:    GENERAL:  No distress. HEENT:  Anicteric. ABDOMEN:  Soft.  Non-distended.  Mildly tender.  Multiple scars.  Catheter in place in right lower quadrant ostomy an draining thin liquid stool. NEURO:  Alert and oriented. Data Review   Recent Results (from the past 24 hour(s))   GLUCOSE, POC    Collection Time: 02/14/18 11:25 AM   Result Value Ref Range    Glucose (POC) 192 (H) 65 - 100 mg/dL    Performed by Jelani Nolasco            Assessment:     Active Problems:    Ileal pouchitis ([address] (5/1/2004)      Chronic narcotic dependence ([address] (1/20/2018)      Drug-induced ileus ([address] (2/12/2018)      Acute kidney injury ([address] (2/12/2018)      Small bowel obstruction (2/12/2018)      Hemodynamically stable. The catheter appears to be draining the ileal pouch effectively.     Flagyl day #3; however, for reasons that are unclear the order dropped off. Pain management is improved.           Plan:   Continue GI lite diet with caution. Continue drainage of ileal pouch via catheter. Change to intermittent catheterization, as the patient does at home, tonight. Discontinue parenteral fluids. Continue Flagyl for treatment of possible pouchitis. Change to the oral version. Hopefully, the patient will be dischargeable tomorrow.

## 2018-02-14 NOTE — ROUTINE PROCESS
Bedside shift change report given to Adelina Reyes RN (oncoming nurse) by Pat Best RN (offgoing nurse). Report included the following information SBAR, Kardex, Intake/Output, MAR, Accordion and Recent Results.

## 2018-02-15 VITALS
RESPIRATION RATE: 16 BRPM | HEART RATE: 97 BPM | DIASTOLIC BLOOD PRESSURE: 65 MMHG | TEMPERATURE: 98.1 F | SYSTOLIC BLOOD PRESSURE: 99 MMHG | OXYGEN SATURATION: 97 % | WEIGHT: 179 LBS | HEIGHT: 72 IN | BODY MASS INDEX: 24.24 KG/M2

## 2018-02-15 PROCEDURE — 74011250637 HC RX REV CODE- 250/637: Performed by: COLON & RECTAL SURGERY

## 2018-02-15 PROCEDURE — 74011250636 HC RX REV CODE- 250/636: Performed by: COLON & RECTAL SURGERY

## 2018-02-15 PROCEDURE — 74011000250 HC RX REV CODE- 250: Performed by: COLON & RECTAL SURGERY

## 2018-02-15 RX ORDER — METRONIDAZOLE 500 MG/1
500 TABLET ORAL 3 TIMES DAILY
Qty: 30 TAB | Refills: 0 | Status: SHIPPED | OUTPATIENT
Start: 2018-02-15 | End: 2018-02-25

## 2018-02-15 RX ORDER — OXYCODONE AND ACETAMINOPHEN 10; 325 MG/1; MG/1
2 TABLET ORAL ONCE
Status: COMPLETED | OUTPATIENT
Start: 2018-02-15 | End: 2018-02-15

## 2018-02-15 RX ADMIN — LORAZEPAM 1 MG: 2 INJECTION INTRAMUSCULAR; INTRAVENOUS at 09:24

## 2018-02-15 RX ADMIN — HYDROMORPHONE HYDROCHLORIDE 1 MG: 1 INJECTION, SOLUTION INTRAMUSCULAR; INTRAVENOUS; SUBCUTANEOUS at 04:11

## 2018-02-15 RX ADMIN — SODIUM CHLORIDE 5 MG: 9 INJECTION INTRAMUSCULAR; INTRAVENOUS; SUBCUTANEOUS at 09:28

## 2018-02-15 RX ADMIN — OXYCODONE HYDROCHLORIDE AND ACETAMINOPHEN 2 TABLET: 10; 325 TABLET ORAL at 13:29

## 2018-02-15 RX ADMIN — METRONIDAZOLE 500 MG: 250 TABLET ORAL at 10:03

## 2018-02-15 RX ADMIN — PREGABALIN 200 MG: 100 CAPSULE ORAL at 10:04

## 2018-02-15 RX ADMIN — HYDROMORPHONE HYDROCHLORIDE 0.6 MG: 1 INJECTION, SOLUTION INTRAMUSCULAR; INTRAVENOUS; SUBCUTANEOUS at 07:59

## 2018-02-15 RX ADMIN — HYDROMORPHONE HYDROCHLORIDE 0.6 MG: 1 INJECTION, SOLUTION INTRAMUSCULAR; INTRAVENOUS; SUBCUTANEOUS at 12:10

## 2018-02-15 NOTE — PROGRESS NOTES
Bedside and Verbal shift change report given to Oscar Flores RN (oncoming nurse) by Cheryle Lacer, RN (offgoing nurse). Report included the following information SBAR, Kardex, Intake/Output, MAR, Accordion, Recent Results and Med Rec Status.

## 2018-02-15 NOTE — PROGRESS NOTES
General Daily Progress Note    Admission Date: 2/12/2018  Hospital Day 4  Post-Op Day Not Applicable  Subjective: Tolerating GI lite diet. Switched back to intermittent catheterization of the ileal pouch, and no setback so far. Objective:   Patient Vitals for the past 24 hrs:   BP Temp Pulse Resp SpO2   02/15/18 0850 99/64 98.3 °F (36.8 °C) 92 16 97 %   02/15/18 0336 100/70 - (!) 101 - -   02/15/18 0335 100/55 - - - -   02/15/18 0331 (!) 84/54 97.8 °F (36.6 °C) 84 16 98 %   02/14/18 2256 (!) 89/58 98.5 °F (36.9 °C) 77 18 100 %     02/15 0701 - 02/15 1900  In: -   Out: 700 [Urine:400]  02/13 1901 - 02/15 0700  In: -   Out: 4300 [Urine:2700]      PHYSICAL EXAMINATION:    GENERAL:  No distress. NEURO:  Alert and oriented. Data Review   Recent Results (from the past 24 hour(s))   GLUCOSE, POC    Collection Time: 02/14/18 11:25 AM   Result Value Ref Range    Glucose (POC) 192 (H) 65 - 100 mg/dL    Performed by Jennifer Sutherland    GLUCOSE, POC    Collection Time: 02/14/18  9:53 PM   Result Value Ref Range    Glucose (POC) 99 65 - 100 mg/dL    Performed by Sergey RUELAS            Assessment:     Active Problems:    Ileal pouchitis ([address] (5/1/2004)      Chronic narcotic dependence ([address] (1/20/2018)      Drug-induced ileus ([address] (2/12/2018)      Acute kidney injury ([address] (2/12/2018)      Small bowel obstruction (2/12/2018)      Hemodynamically stable. The patient appears to be tolerating intermittent catheterization. If he continues to do so, then he should be able to go home this afternoon.      Flagyl day #4. Plan:   Continue GI lite diet with caution. Continue drainage of ileal pouch via catheter. Change to intermittent catheterization, as the patient does at home, tonight. Discontinue parenteral fluids. Continue Flagyl for treatment of possible pouchitis. Change to the oral version.     Hopefully, the patient will be dischargeable tomorrow.

## 2018-02-15 NOTE — DISCHARGE INSTRUCTIONS
DISCHARGE SUMMARY from Nurse    PATIENT INSTRUCTIONS:    After general anesthesia or intravenous sedation, for 24 hours or while taking prescription Narcotics:  · Limit your activities  · Do not drive and operate hazardous machinery  · Do not make important personal or business decisions  · Do  not drink alcoholic beverages  · If you have not urinated within 8 hours after discharge, please contact your surgeon on call. Report the following to your surgeon:  · Excessive pain, swelling, redness or odor of or around the surgical area  · Temperature over 100.5  · Nausea and vomiting lasting longer than 4 hours or if unable to take medications  · Any signs of decreased circulation or nerve impairment to extremity: change in color, persistent  numbness, tingling, coldness or increase pain  · Any questions    What to do at Home:  Recommended activity: per instructions If you experience any of the following symptoms per instructions, please follow up with per instructions. *  Please give a list of your current medications to your Primary Care Provider. *  Please update this list whenever your medications are discontinued, doses are      changed, or new medications (including over-the-counter products) are added. *  Please carry medication information at all times in case of emergency situations. These are general instructions for a healthy lifestyle:    No smoking/ No tobacco products/ Avoid exposure to second hand smoke  Surgeon General's Warning:  Quitting smoking now greatly reduces serious risk to your health.     Obesity, smoking, and sedentary lifestyle greatly increases your risk for illness    A healthy diet, regular physical exercise & weight monitoring are important for maintaining a healthy lifestyle    You may be retaining fluid if you have a history of heart failure or if you experience any of the following symptoms:  Weight gain of 3 pounds or more overnight or 5 pounds in a week, increased swelling in our hands or feet or shortness of breath while lying flat in bed. Please call your doctor as soon as you notice any of these symptoms; do not wait until your next office visit. Recognize signs and symptoms of STROKE:    F-face looks uneven    A-arms unable to move or move unevenly    S-speech slurred or non-existent    T-time-call 911 as soon as signs and symptoms begin-DO NOT go       Back to bed or wait to see if you get better-TIME IS BRAIN. Warning Signs of HEART ATTACK     Call 911 if you have these symptoms:   Chest discomfort. Most heart attacks involve discomfort in the center of the chest that lasts more than a few minutes, or that goes away and comes back. It can feel like uncomfortable pressure, squeezing, fullness, or pain.  Discomfort in other areas of the upper body. Symptoms can include pain or discomfort in one or both arms, the back, neck, jaw, or stomach.  Shortness of breath with or without chest discomfort.  Other signs may include breaking out in a cold sweat, nausea, or lightheadedness. Don't wait more than five minutes to call 911 - MINUTES MATTER! Fast action can save your life. Calling 911 is almost always the fastest way to get lifesaving treatment. Emergency Medical Services staff can begin treatment when they arrive -- up to an hour sooner than if someone gets to the hospital by car. The discharge information has been reviewed with the patient. The patient verbalized understanding. Discharge medications reviewed with the patient and appropriate educational materials and side effects teaching were provided. ___________________________________________________________________________________________________________________________________  Patient Discharge Instructions    Marcos Nix / [id number] : 1975    Admitted 2018 Discharged: 2/15/2018         · It is important that you take medications exactly as they are prescribed.    · Keep your medication in the bottles provided by the pharmacist and keep a list of the medication names, dosages, and times to be taken in your wallet. · Do not take other medications without consulting your doctor. What To Do At Home  Recommended Diet:  You should resume your usual diet as tolerated. Recommended Activity: As tolerated. Other:  If you have questions or other problems, call Dr. Frandy Swartz. Follow-Up:  Follow up with Dr. Mario Araujo on 2/16/2018 as scheduled. Information obtained by :  I understand that if any problems occur once I am at home I am to contact my physician. I understand and acknowledge receipt of the instructions indicated above.                                                                                                                                            Physician's or R.N.'s Signature                                                                  Date/Time                                                                                                                                              Patient or Representative Signature                                                          Date/Time

## 2018-02-15 NOTE — PROGRESS NOTES
Spiritual Care Partner Volunteer visited patient in [address] [place] [address] on 2.15.18. Documented by:  Rev. Hermes Lopez M.Div, [facility]

## 2018-02-15 NOTE — PROGRESS NOTES
Bedside shift change report given to Chris Teran (oncoming nurse) by Abi Moon (offgoing nurse). Report included the following information SBAR.

## 2018-02-15 NOTE — DISCHARGE SUMMARY
Discharge Summary     Patient ID:    Marcos Nix  [id number]  86 y.o.  1975    Admission Date: 2/12/2018    Discharge Date: 2/15/2018    Admission Diagnoses:  1. Partial small bowel obstruction  2. Chronic narcotic dependence  3. Ileal pouchitis  4. Acute kidney injury      Chronic Diagnoses:    Patient Active Problem List   Diagnosis Code    Ileal pouchitis ([facility] K91.850    GERD (gastroesophageal reflux disease) K21.9    Depression with anxiety F41.8    Anemia D64.9    Chronic narcotic dependence (HCC) F11.20    Drug-induced ileus (HCC) K56.7, T50.905A    Acute kidney injury ([facility] N17.9    Small bowel obstruction K56.609       Discharge Diagnoses:  1. Partial small bowel obstruction (resolved)  2. Chronic narcotic dependence (unchanged)  3. Ileal pouchitis (improved)  4. Drug-induced ileus (improved)  5. Acute kidney injury (resolved)        Hospital Problems as of 2/15/2018  Date Reviewed: 2/12/2018          Codes Class Noted - Resolved POA    Drug-induced ileus ([facility] (Chronic) ICD-10-CM: K56.7, T50.905A  ICD-9-CM: 560.1, E947.9  2/12/2018 - Present Yes        Acute kidney injury ([facility] ICD-10-CM: N17.9  ICD-9-CM: 584.9  2/12/2018 - Present Yes        Small bowel obstruction ICD-10-CM: G84.031  ICD-9-CM: 560.9  2/12/2018 - Present Yes        Chronic narcotic dependence (HCC) (Chronic) ICD-10-CM: F11.20  ICD-9-CM: 304.91  1/20/2018 - Present Yes        Ileal pouchitis ([facility] ICD-10-CM: Y93.962  ICD-9-CM: 569.71  5/1/2004 - Present Yes              Procedures Performed:  None. Discharge Medications:   Current Discharge Medication List      START taking these medications    Details   metroNIDAZOLE (FLAGYL) 500 mg tablet Take 1 Tab by mouth three (3) times daily for 10 days. Qty: 30 Tab, Refills: 0         CONTINUE these medications which have NOT CHANGED    Details   citalopram (CELEXA) 20 mg tablet Take 20 mg by mouth daily.       zolpidem (AMBIEN) 10 mg tablet Take 10 mg by mouth nightly. OTHER Take 2-3 Tabs by mouth three (3) times daily (with meals). Kimberley Enzymes      B.infantis-B.ani-B.long-B.bifi (PROBIOTIC 4X) 10-15 mg TbEC Take 1 Cap by mouth nightly. oxyCODONE-acetaminophen (PERCOCET)  mg per tablet Take 1-2 Tabs by mouth every four (4) hours as needed for Pain (Being tapered per Dr. Kaylene Mooney). acetaminophen (TYLENOL) 500 mg tablet Take 500 mg by mouth four (4) times daily as needed (mild pain, heache (usually has headaches 1 week after Stelara)). prochlorperazine (COMPAZINE) 10 mg tablet Take 10 mg by mouth two (2) times daily as needed (Moderate Nausea). Ustekinumab (STELARA) 90 mg/mL injection 90 mg by SubCUTAneous route. Every 8 weeks       promethazine (PHENERGAN) 25 mg tablet Take 25 mg by mouth every six (6) hours as needed (Severe Nausea). ondansetron hcl (ZOFRAN, AS HYDROCHLORIDE,) 8 mg tablet Take 8 mg by mouth every twelve (12) hours as needed (Mild Nausea). omeprazole (PRILOSEC) 40 mg capsule Take 40 mg by mouth nightly. dicyclomine (BENTYL) 10 mg capsule Take 10 mg by mouth every six (6) hours as needed. ergocalciferol (ERGOCALCIFEROL) 50,000 unit capsule Take 50,000 Units by mouth every Sunday. pregabalin (LYRICA) 100 mg capsule Take 100 mg by mouth four (4) times daily. cyanocobalamin (VITAMIN B-12) 1,000 mcg/mL injection 1,000 mcg by IntraMUSCular route every Sunday. Indications: Once weekly      diazepam (VALIUM) 5 mg tablet Take 5 mg by mouth every eight (8) hours as needed (bowel relaxation). traMADol (ULTRAM) 50 mg tablet Take 50 mg by mouth three (3) times daily as needed (moderate pain). Diet:  Resume usual diet. Activity:  As tolerated. Wound Care:  None. Discharge Condition:  Improved compared to that upon admission. Disposition:  Home. Follow-up Care:  Edwin Le MD within one week.       Significant Diagnostic Studies:   Recent Labs      02/13/18   0513   WBC 9. 9   HGB  10.8*   HCT  33.3*   PLT  270     Recent Labs      02/13/18   0513   NA  143   K  3.6   CL  108   CO2  26   BUN  11   CREA  0.63*   GLU  73   CA  8.7   PHOS  2.8       Lab Results   Component Value Date/Time    Glucose (POC) 99 02/14/2018 09:53 PM    Glucose (POC) 192 (H) 02/14/2018 11:25 AM    Glucose (POC) 100 06/17/2017 11:22 AM    Glucose (POC) 137 (H) 06/17/2017 05:54 AM    Glucose (POC) 120 (H) 06/16/2017 09:40 PM         HOSPITAL COURSE & TREATMENT RENDERED:    The patient is a 50-year-old male with a complicated medical and surgical history who is well known to me.  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 His most recent previous hospitalization was from 1/19/2018 to 1/22/2018 for treatment of a partial small bowel obstruction that resolved with bowel rest and parenteral fluids.     He presented to the ER during the night on 2/11/2018 with abdominal pain, nausea, and a decrease in ileostomy output that followed a marked increase in the output that had occurred several hours earlier. He had also been unable to catheterize his ileal pouch. Upon presentation, his laboratory results were remarkable for a leukocytosis (WBC 15.4 K/uL), and artificially elevated hemoglobin (15.5 g/dL) and an elevated creatinine (1.55 mg/dL). A CT scan of the abdomen and pelvis performed without oral contrast revealed dilated small bowel loops consistent with an obstruction. The patient had what appeared to be a distal partial small bowel obstruction. The site of the obstruction was though perhaps to be the \"valve\" of the pouch itself or somewhere proximal to, but near, the pouch.   It was also thought likely that the chronic narcotic use and an element of pouchitis could be contributing to the problems.     He was admitted and treated with parenteral fluids, nasogastric tube decompression, continuous decompression of the ileal pouch via a catheter, parenteral metronidazole (to cover possible pouchitis), and scheduled doses of Dilaudid to prevent opioid withdrawal.    The nasogastric tube \"came out\" in the evening on 2/12/2018, but the patient did not appear to experience any setback as a result. He was allowed to begin a clear liquid diet, which was eventually advanced to \"GI lite. \"  He conducted what appeared to be a successful trial of intermittent catheterization of the ileal pouch, and on 2/15/2018 he was judged to be fit for discharge to home. His hospitalization was without complications, and he appeared to have understood all discharge and follow-up instructions.       Signed:  Jacklyn Brannon MD

## 2018-03-17 LAB
ALBUMIN SERPL-MCNC: 3.7 G/DL (ref 3.5–5)
ALBUMIN/GLOB SERPL: 0.9 {RATIO} (ref 1.1–2.2)
ALP SERPL-CCNC: 191 U/L (ref 45–117)
ALT SERPL-CCNC: 37 U/L (ref 12–78)
ANION GAP SERPL CALC-SCNC: 5 MMOL/L (ref 5–15)
AST SERPL-CCNC: 29 U/L (ref 15–37)
BASOPHILS # BLD: 0 K/UL (ref 0–0.1)
BASOPHILS NFR BLD: 0 % (ref 0–1)
BILIRUB SERPL-MCNC: 0.2 MG/DL (ref 0.2–1)
BUN SERPL-MCNC: 14 MG/DL (ref 6–20)
BUN/CREAT SERPL: 15 (ref 12–20)
CALCIUM SERPL-MCNC: 9.2 MG/DL (ref 8.5–10.1)
CHLORIDE SERPL-SCNC: 102 MMOL/L (ref 97–108)
CO2 SERPL-SCNC: 34 MMOL/L (ref 21–32)
CREAT SERPL-MCNC: 0.93 MG/DL (ref 0.7–1.3)
DIFFERENTIAL METHOD BLD: ABNORMAL
EOSINOPHIL # BLD: 0.4 K/UL (ref 0–0.4)
EOSINOPHIL NFR BLD: 5 % (ref 0–7)
ERYTHROCYTE [DISTWIDTH] IN BLOOD BY AUTOMATED COUNT: 17.2 % (ref 11.5–14.5)
GLOBULIN SER CALC-MCNC: 3.9 G/DL (ref 2–4)
GLUCOSE SERPL-MCNC: 125 MG/DL (ref 65–100)
HCT VFR BLD AUTO: 39.1 % (ref 36.6–50.3)
HGB BLD-MCNC: 12.6 G/DL (ref 12.1–17)
IMM GRANULOCYTES # BLD: 0 K/UL (ref 0–0.04)
IMM GRANULOCYTES NFR BLD AUTO: 0 % (ref 0–0.5)
LYMPHOCYTES # BLD: 2.5 K/UL (ref 0.8–3.5)
LYMPHOCYTES NFR BLD: 31 % (ref 12–49)
MCH RBC QN AUTO: 27.5 PG (ref 26–34)
MCHC RBC AUTO-ENTMCNC: 32.2 G/DL (ref 30–36.5)
MCV RBC AUTO: 85.2 FL (ref 80–99)
MONOCYTES # BLD: 0.4 K/UL (ref 0–1)
MONOCYTES NFR BLD: 4 % (ref 5–13)
NEUTS SEG # BLD: 4.8 K/UL (ref 1.8–8)
NEUTS SEG NFR BLD: 60 % (ref 32–75)
NRBC # BLD: 0 K/UL (ref 0–0.01)
NRBC BLD-RTO: 0 PER 100 WBC
PLATELET # BLD AUTO: 256 K/UL (ref 150–400)
PMV BLD AUTO: 10.9 FL (ref 8.9–12.9)
POTASSIUM SERPL-SCNC: 3 MMOL/L (ref 3.5–5.1)
PROT SERPL-MCNC: 7.6 G/DL (ref 6.4–8.2)
RBC # BLD AUTO: 4.59 M/UL (ref 4.1–5.7)
SODIUM SERPL-SCNC: 141 MMOL/L (ref 136–145)
WBC # BLD AUTO: 8 K/UL (ref 4.1–11.1)

## 2018-03-17 PROCEDURE — 36415 COLL VENOUS BLD VENIPUNCTURE: CPT | Performed by: EMERGENCY MEDICINE

## 2018-03-17 PROCEDURE — 99283 EMERGENCY DEPT VISIT LOW MDM: CPT

## 2018-03-17 PROCEDURE — 83690 ASSAY OF LIPASE: CPT | Performed by: EMERGENCY MEDICINE

## 2018-03-17 PROCEDURE — 85025 COMPLETE CBC W/AUTO DIFF WBC: CPT | Performed by: EMERGENCY MEDICINE

## 2018-03-17 PROCEDURE — 80053 COMPREHEN METABOLIC PANEL: CPT | Performed by: EMERGENCY MEDICINE

## 2018-03-18 ENCOUNTER — HOSPITAL ENCOUNTER (EMERGENCY)
Age: 43
Discharge: HOME | End: 2018-03-18
Attending: EMERGENCY MEDICINE
Payer: COMMERCIAL

## 2018-03-18 ENCOUNTER — APPOINTMENT (OUTPATIENT)
Dept: CT IMAGING | Age: 43
End: 2018-03-18
Attending: NURSE PRACTITIONER
Payer: COMMERCIAL

## 2018-03-18 VITALS
DIASTOLIC BLOOD PRESSURE: 80 MMHG | WEIGHT: 179 LBS | OXYGEN SATURATION: 100 % | SYSTOLIC BLOOD PRESSURE: 117 MMHG | TEMPERATURE: 98 F | HEIGHT: 72 IN | RESPIRATION RATE: 16 BRPM | BODY MASS INDEX: 24.24 KG/M2 | HEART RATE: 89 BPM

## 2018-03-18 DIAGNOSIS — R11.2 NAUSEA AND VOMITING, INTRACTABILITY OF VOMITING NOT SPECIFIED, UNSPECIFIED VOMITING TYPE: ICD-10-CM

## 2018-03-18 DIAGNOSIS — E87.6 HYPOKALEMIA: ICD-10-CM

## 2018-03-18 DIAGNOSIS — F11.20 CHRONIC NARCOTIC DEPENDENCE (HCC): Chronic | ICD-10-CM

## 2018-03-18 DIAGNOSIS — R10.84 ABDOMINAL PAIN, GENERALIZED: Primary | ICD-10-CM

## 2018-03-18 LAB — LIPASE SERPL-CCNC: 105 U/L (ref 73–393)

## 2018-03-18 PROCEDURE — 74177 CT ABD & PELVIS W/CONTRAST: CPT

## 2018-03-18 PROCEDURE — 74011636320 HC RX REV CODE- 636/320: Performed by: NURSE PRACTITIONER

## 2018-03-18 PROCEDURE — 74011000258 HC RX REV CODE- 258: Performed by: NURSE PRACTITIONER

## 2018-03-18 RX ORDER — METRONIDAZOLE 500 MG/1
500 TABLET ORAL 2 TIMES DAILY
Qty: 28 TAB | Refills: 0 | Status: SHIPPED | OUTPATIENT
Start: 2018-03-18 | End: 2018-03-18

## 2018-03-18 RX ORDER — PROMETHAZINE HYDROCHLORIDE 25 MG/1
25 TABLET ORAL
Status: DISCONTINUED | OUTPATIENT
Start: 2018-03-18 | End: 2018-03-18 | Stop reason: HOSPADM

## 2018-03-18 RX ORDER — METRONIDAZOLE 500 MG/1
500 TABLET ORAL 3 TIMES DAILY
Qty: 42 TAB | Refills: 0 | Status: SHIPPED | OUTPATIENT
Start: 2018-03-18 | End: 2018-04-01

## 2018-03-18 RX ORDER — SODIUM CHLORIDE 0.9 % (FLUSH) 0.9 %
10 SYRINGE (ML) INJECTION
Status: COMPLETED | OUTPATIENT
Start: 2018-03-18 | End: 2018-03-18

## 2018-03-18 RX ORDER — POTASSIUM CHLORIDE 750 MG/1
40 TABLET, FILM COATED, EXTENDED RELEASE ORAL
Status: DISCONTINUED | OUTPATIENT
Start: 2018-03-18 | End: 2018-03-18 | Stop reason: HOSPADM

## 2018-03-18 RX ADMIN — IOPAMIDOL 100 ML: 755 INJECTION, SOLUTION INTRAVENOUS at 02:35

## 2018-03-18 RX ADMIN — Medication 10 ML: at 02:35

## 2018-03-18 RX ADMIN — SODIUM CHLORIDE 100 ML: 900 INJECTION, SOLUTION INTRAVENOUS at 02:35

## 2018-03-18 NOTE — ED PROVIDER NOTES
HPI Comments: Bishop Batres is a 44 y.o. male with Hx of UC, ileal pouchitis, hiatal hernia, GERD, anal stenosis/ fistula, Crohn's disease, depression, anxiety, chronic narcotic dependence, abdominal pain who presents ambulatory by himself to [facility] ED with cc of abdominal pain. Pt states that he had abd pain which started 2d ago. Pt notes he has chronic abdominal pain and is currently taking Percocet 10 mg every 4 hours for his pain. He states that he had wisdom tooth surgery done earlier this week and was initially placed on Augumentin. This was switched to Clindamycin on Thursday. Pt reports worsening generalized abd pain w/ decreased pouch output since then. Pt also reports he had some nausea and NB emesis earlier yesterday. He has also seen some bloody drainage as well. No fevers, chills, body aches, urinary symptoms. (-) tobacco/ ETOH/ substance abuse. Pt reports having numerous Rx to manage nausea at home including: Compazine, Zofran, and Promethazine. Medical record review- last admit 2/2018 w/ SBO- d/c after bowel rest/ Flagyl Rx provided. His most recent surgical procedure was the conversion of an end-ileostomy to a Briseida Bustard continent intestinal reservoir (BCIR) on 11/29/2017 in [place], [place]. His most recent previous hospitalization was from 1/19/2018 to 1/22/2018 for treatment of a partial small bowel obstruction that resolved with bowel rest and parenteral fluids. PCP: Chad Hodgson MD          The history is provided by the patient. Past Medical History:   Diagnosis Date    Anal fistula     The patient no longer has an anus.  Anal stenosis     The patient no longer has an anus.  Crohn's disease ([address]     GERD (gastroesophageal reflux disease)     H/O ulcerative colitis     Symptoms began in 1996. Total proctocolectomy was performed in 2004. Patient later developed Crohn's disease.     Hiatal hernia     Ileal pouchitis ([address] 05/2004    The patient no longer has a pouch.    Nausea & vomiting     Combination of Scopalamine patch & Zofran IV worked well in past    Numbness in right leg     Chronic.  Opioid dependence ([address]     History of opioid dependence requiring a detox program.    Psychiatric disorder     depression and anxiety    Skin lesion of back 3/17/2014    Syncopal episodes        Past Surgical History:   Procedure Laterality Date    ABDOMEN SURGERY PROC UNLISTED  3/25/2004    Total proctocolectomy with creation of ileoanal J-pouch and loop ileostomy; Dr. Mahi Kauffman.   CHOLECYSTECTOMY  12/2016    Dr. Olena Hernandez  GI  5/2/2004    Examination under anesthesia with endoscopic evaluation of ileoanal pouch and placement of drain; Dr. Mahi Kauffman.   GI  5/25/2004    Ileostomy closure with small bowel resection and anastomosis; Dr. Mahi Kauffman.   GI  5/24/2012    Examination under anesthesia, anal dilatation, anal biopsy, and placement of draining seton; Dr. Mahi Kauffman.   GI  7/3/2012    Incision and drainage of perirectal abscess and rigid endoscopy of ileoanal pouch; Dr. Mahi Kauffman.   GI  7/31/2012    Incision and drainage of perirectal abscess, placement of draining seton, and anal dilatation; Dr. Mahi Kauffman.   GI  1/22/2013    Repair of anal fistulas with debridement, partial fistulectomy, and flap closure; Dr. Mahi Kauffman.   GI  5/17/2013    Examination under anesthesia, partial fistulotomy,  and placement of draining setons; Dr. Mahi Kauffman.   GI  8/6/2013    Anal fistulotomy and application of ACell micromatrix; Dr. Mahi Kauffman.   GI  2/20/2014    Examination under anesthesia and dilation of anal canal with rigid proctoscopy; Juanito Khan MD.     GI  4/29/2015    Creation of Clide Dubs continent intestinal reservoir (BCIR), abdominoperineal resection of ileoanal J-pouch; lysis of adhesions, and gastrostomy; Uzma Franco.  Gabo Frye MD ([place], [place])     GI  8/7/2015    Stoma revision; [facility] Birdie Muller MD ([place], [place])    HX GI  10/29/2015    Extensive lysis of adhesions, resection of continent intestinal reservoir, repair of serosal tears, and creation of end-ileostomy; Dr. Jumana Miller.  HX GI  03/14/2017    Laparotomy, extensive lysis of adhesions and revision of ileostomy; Dr. Jumana Miller.  HX ORTHOPAEDIC  2008    Left ACL repair    HX OTHER SURGICAL  11/29/2017    Conversion of end-ileostomy to BCIR ([address]); [place], [place].    HX UROLOGICAL  03/14/2017    Cystoscopy and placement of bilateral temporary ureteral catheters; Rubina Miller MD.         Family History:   Problem Relation Age of Onset    Cancer Father     Asthma Neg Hx     Diabetes Neg Hx     Heart Disease Neg Hx     Hypertension Neg Hx     Stroke Neg Hx     Malignant Hyperthermia Neg Hx     Pseudocholinesterase Deficiency Neg Hx     Delayed Awakening Neg Hx     Post-op Nausea/Vomiting Neg Hx        Social History     Social History    Marital status:      Spouse name: N/A    Number of children: N/A    Years of education: N/A     Occupational History    Not on file. Social History Main Topics    Smoking status: Former Smoker     Packs/day: 0.00     Years: 7.00     Quit date: 1/14/2017    Smokeless tobacco: Never Used    Alcohol use No    Drug use: No    Sexual activity: Not on file     Other Topics Concern    Not on file     Social History Narrative         ALLERGIES: Remicade [infliximab]; Bactrim [sulfamethoprim ds]; Morphine; and Nsaids (non-steroidal anti-inflammatory drug)    Review of Systems   Constitutional: Negative for activity change, appetite change, chills and fever. HENT: Negative for congestion, rhinorrhea, sinus pressure, sneezing and sore throat. Eyes: Negative for pain, discharge and visual disturbance. Respiratory: Negative for cough and shortness of breath. Cardiovascular: Negative for chest pain.    Gastrointestinal: Positive for abdominal pain, blood in stool, nausea and vomiting. Negative for diarrhea. Genitourinary: Negative for dysuria, flank pain, frequency and urgency. Musculoskeletal: Negative for arthralgias, back pain, gait problem, joint swelling, myalgias and neck pain. Skin: Negative for color change and rash. Neurological: Negative for dizziness, speech difficulty, weakness, light-headedness, numbness and headaches. Psychiatric/Behavioral: Negative for agitation, behavioral problems and confusion. All other systems reviewed and are negative. Vitals:    03/17/18 2234 03/18/18 0526   BP: 124/82 117/80   Pulse: (!) 111 89   Resp: 18 16   Temp: 98.2 °F (36.8 °C) 98 °F (36.7 °C)   SpO2: 100% 100%   Weight: 81.2 kg (179 lb)    Height: 6' (1.829 m)             Physical Exam   Constitutional: He is oriented to person, place, and time. He appears well-developed and well-nourished. No distress. HENT:   Head: Normocephalic and atraumatic. Right Ear: External ear normal.   Left Ear: External ear normal.   Nose: Nose normal.   Mouth/Throat: Oropharynx is clear and moist. No oropharyngeal exudate. Eyes: Conjunctivae and EOM are normal. Pupils are equal, round, and reactive to light. Neck: Normal range of motion. Neck supple. Cardiovascular: Normal rate, regular rhythm, normal heart sounds and intact distal pulses. Pulmonary/Chest: Effort normal and breath sounds normal.   Abdominal: Soft. Bowel sounds are normal. There is no tenderness. There is no rebound and no guarding. Healed surgical incisions in abd   Small stoma w/ minimal serosanguinous drainage at pouch location    Musculoskeletal: Normal range of motion. Neurological: He is alert and oriented to person, place, and time. Skin: Skin is warm and dry. Psychiatric: He has a normal mood and affect. His behavior is normal. Judgment and thought content normal.   Nursing note and vitals reviewed.        MDM  Number of Diagnoses or Management Options  Abdominal pain, generalized:   Chronic narcotic dependence ([facility]): Hypokalemia:   Nausea and vomiting, intractability of vomiting not specified, unspecified vomiting type:   Diagnosis management comments: Ddx: dehydration, SBO, chronic abd pain     Labs reviewed/ stable. K replaced w/o difficulty. CT improved and no SBO present. Discussed care w/ colorectal who advised much of the pt care could be done at home and didn't require admit. Pt given Rx for Flagyl. Stated he had Red rubber catheter at home- offered tineo bag to place on cont drainage at home. Pt refused all Rx and bag to have cont drainage at home at d/c and further assistance. He advised that he could f/u w/ colorectal on Monday for any further concerning/ worrisome s/sx. Amount and/or Complexity of Data Reviewed  Clinical lab tests: ordered and reviewed  Tests in the radiology section of CPT®: ordered and reviewed  Review and summarize past medical records: yes  Discuss the patient with other providers: yes          ED Course       Procedures    LABORATORY TESTS:  Recent Results (from the past 12 hour(s))   CBC WITH AUTOMATED DIFF    Collection Time: 03/17/18 10:49 PM   Result Value Ref Range    WBC 8.0 4.1 - 11.1 K/uL    RBC 4.59 4. 10 - 5.70 M/uL    HGB 12.6 12.1 - 17.0 g/dL    HCT 39.1 36.6 - 50.3 %    MCV 85.2 80.0 - 99.0 FL    MCH 27.5 26.0 - 34.0 PG    MCHC 32.2 30.0 - 36.5 g/dL    RDW 17.2 (H) 11.5 - 14.5 %    PLATELET 865 764 - 417 K/uL    MPV 10.9 8.9 - 12.9 FL    NRBC 0.0 0  WBC    ABSOLUTE NRBC 0.00 0.00 - 0.01 K/uL    NEUTROPHILS 60 32 - 75 %    LYMPHOCYTES 31 12 - 49 %    MONOCYTES 4 (L) 5 - 13 %    EOSINOPHILS 5 0 - 7 %    BASOPHILS 0 0 - 1 %    IMMATURE GRANULOCYTES 0 0.0 - 0.5 %    ABS. NEUTROPHILS 4.8 1.8 - 8.0 K/UL    ABS. LYMPHOCYTES 2.5 0.8 - 3.5 K/UL    ABS. MONOCYTES 0.4 0.0 - 1.0 K/UL    ABS. EOSINOPHILS 0.4 0.0 - 0.4 K/UL    ABS. BASOPHILS 0.0 0.0 - 0.1 K/UL    ABS. IMM.  GRANS. 0.0 0.00 - 0.04 K/UL    DF AUTOMATED     METABOLIC PANEL, COMPREHENSIVE    Collection Time: 03/17/18 10:49 PM   Result Value Ref Range    Sodium 141 136 - 145 mmol/L    Potassium 3.0 (L) 3.5 - 5.1 mmol/L    Chloride 102 97 - 108 mmol/L    CO2 34 (H) 21 - 32 mmol/L    Anion gap 5 5 - 15 mmol/L    Glucose 125 (H) 65 - 100 mg/dL    BUN 14 6 - 20 MG/DL    Creatinine 0.93 0.70 - 1.30 MG/DL    BUN/Creatinine ratio 15 12 - 20      GFR est AA >60 >60 ml/min/1.73m2    GFR est non-AA >60 >60 ml/min/1.73m2    Calcium 9.2 8.5 - 10.1 MG/DL    Bilirubin, total 0.2 0.2 - 1.0 MG/DL    ALT (SGPT) 37 12 - 78 U/L    AST (SGOT) 29 15 - 37 U/L    Alk. phosphatase 191 (H) 45 - 117 U/L    Protein, total 7.6 6.4 - 8.2 g/dL    Albumin 3.7 3.5 - 5.0 g/dL    Globulin 3.9 2.0 - 4.0 g/dL    A-G Ratio 0.9 (L) 1.1 - 2.2     LIPASE    Collection Time: 03/17/18 10:49 PM   Result Value Ref Range    Lipase 105 73 - 393 U/L       IMAGING RESULTS:  CT ABD PELV W CONT   Final Result      EXAM:  CT ABD PELV W CONT  Clinical history: Colectomy, partial small bowel obstruction  INDICATION: hx of colectomy/ partial SB obstruction     COMPARISON: 2/12/2018     CONTRAST:  100 mL of Isovue-370.     TECHNIQUE:   Following the uneventful intravenous administration of contrast, thin axial  images were obtained through the abdomen and pelvis. Coronal and sagittal  reconstructions were generated. Oral contrast was not administered. CT dose  reduction was achieved through use of a standardized protocol tailored for this  examination and automatic exposure control for dose modulation.     FINDINGS:   LUNG BASES: No abnormality. LIVER: Hepatic steatosis. GALLBLADDER: Surgically absent. SPLEEN: No enlargement or lesion. PANCREAS: No mass or ductal dilatation. ADRENALS: No mass. KIDNEYS: No mass, calculus, or hydronephrosis. GI TRACT:  Dilated small bowel loops. Anastomosis in the deep pelvis. PERITONEUM: No free air or free fluid. APPENDIX: Surgically absent.   RETROPERITONEUM: No lymphadenopathy or aortic aneurysm. ADDITIONAL COMMENTS: N/A.  URINARY BLADDER: Unremarkable. REPRODUCTIVE ORGANS: Unremarkable. LYMPH NODES:  None enlarged. FREE FLUID:  Free fluid. [place] Lucas BONES: No destructive bone lesion. ADDITIONAL COMMENTS: N/A.     IMPRESSION  IMPRESSION:  Remote colectomy. Continued small bowel distention with hyperemia of small bowel  loops. Degree of distention is diminished when compared to the to 2/12/2018  exam.    MEDICATIONS GIVEN:  Medications   potassium chloride SR (KLOR-CON 10) tablet 40 mEq (40 mEq Oral Refused 3/18/18 0257)   promethazine (PHENERGAN) tablet 25 mg (25 mg Oral Refused 3/18/18 3606)   sodium chloride 0.9 % bolus infusion 100 mL (100 mL IntraVENous New Bag 3/18/18 0235)   iopamidol (ISOVUE-370) 76 % injection 100 mL (100 mL IntraVENous Given 3/18/18 0235)   sodium chloride (NS) flush 10 mL (10 mL IntraVENous Given 3/18/18 0235)     EXAM:  CT ABD PELV W CONT  Clinical history: Colectomy, partial small bowel obstruction  INDICATION: hx of colectomy/ partial SB obstruction     COMPARISON: 2/12/2018     CONTRAST:  100 mL of Isovue-370.     TECHNIQUE:   Following the uneventful intravenous administration of contrast, thin axial  images were obtained through the abdomen and pelvis. Coronal and sagittal  reconstructions were generated. Oral contrast was not administered. CT dose  reduction was achieved through use of a standardized protocol tailored for this  examination and automatic exposure control for dose modulation.     FINDINGS:   LUNG BASES: No abnormality. LIVER: Hepatic steatosis. GALLBLADDER: Surgically absent. SPLEEN: No enlargement or lesion. PANCREAS: No mass or ductal dilatation. ADRENALS: No mass. KIDNEYS: No mass, calculus, or hydronephrosis. GI TRACT:  Dilated small bowel loops. Anastomosis in the deep pelvis. PERITONEUM: No free air or free fluid. APPENDIX: Surgically absent. RETROPERITONEUM: No lymphadenopathy or aortic aneurysm.   ADDITIONAL COMMENTS: N/A.  URINARY BLADDER: Unremarkable. REPRODUCTIVE ORGANS: Unremarkable. LYMPH NODES:  None enlarged. FREE FLUID:  Free fluid. Ying Heaps BONES: No destructive bone lesion. ADDITIONAL COMMENTS: N/A.     IMPRESSION  IMPRESSION:  Remote colectomy. Continued small bowel distention with hyperemia of small bowel  loops. Degree of distention is diminished when compared to the to 2/12/2018  exam.  IMPRESSION:  1. Abdominal pain, generalized    2. Chronic narcotic dependence ([address]    3. Hypokalemia    4. Nausea and vomiting, intractability of vomiting not specified, unspecified vomiting type        PLAN:  1. Discharge Medication List as of 3/18/2018  4:43 AM      START taking these medications    Details   metroNIDAZOLE (FLAGYL) 500 mg tablet Take 1 Tab by mouth two (2) times a day for 14 days. , Print, Disp-28 Tab, R-0         CONTINUE these medications which have NOT CHANGED    Details   citalopram (CELEXA) 20 mg tablet Take 20 mg by mouth daily. , Historical Med      zolpidem (AMBIEN) 10 mg tablet Take 10 mg by mouth nightly., Historical Med      OTHER Take 2-3 Tabs by mouth three (3) times daily (with meals). Kimberley Enzymes, Historical Med      B.infantis-B.ani-B.long-B.bifi (PROBIOTIC 4X) 10-15 mg TbEC Take 1 Cap by mouth nightly., Historical Med      oxyCODONE-acetaminophen (PERCOCET)  mg per tablet Take 1-2 Tabs by mouth every four (4) hours as needed for Pain (Being tapered per Dr. Juan Adnersen). , Historical Med      acetaminophen (TYLENOL) 500 mg tablet Take 500 mg by mouth four (4) times daily as needed (mild pain, heache (usually has headaches 1 week after Stelara)). , Historical Med      prochlorperazine (COMPAZINE) 10 mg tablet Take 10 mg by mouth two (2) times daily as needed (Moderate Nausea). , Historical Med      traMADol (ULTRAM) 50 mg tablet Take 50 mg by mouth three (3) times daily as needed (moderate pain). , Historical Med      Ustekinumab (STELARA) 90 mg/mL injection 90 mg by SubCUTAneous route. Every 8 weeks , Historical Med      promethazine (PHENERGAN) 25 mg tablet Take 25 mg by mouth every six (6) hours as needed (Severe Nausea). , Historical Med      ondansetron hcl (ZOFRAN, AS HYDROCHLORIDE,) 8 mg tablet Take 8 mg by mouth every twelve (12) hours as needed (Mild Nausea). , Historical Med      omeprazole (PRILOSEC) 40 mg capsule Take 40 mg by mouth nightly., Historical Med      dicyclomine (BENTYL) 10 mg capsule Take 10 mg by mouth every six (6) hours as needed., Historical Med      ergocalciferol (ERGOCALCIFEROL) 50,000 unit capsule Take 50,000 Units by mouth every Sunday., Historical Med      pregabalin (LYRICA) 100 mg capsule Take 100 mg by mouth four (4) times daily. , Historical Med      cyanocobalamin (VITAMIN B-12) 1,000 mcg/mL injection 1,000 mcg by IntraMUSCular route every Sunday. Indications: Once weekly, Historical Med      diazepam (VALIUM) 5 mg tablet Take 5 mg by mouth every eight (8) hours as needed (bowel relaxation). , Historical Med           2. Follow-up Information     Follow up With Details Comments Adina Valenzuela MD Schedule an appointment as soon as possible for a visit  [address] [place] [address] [address], [facility] DEP Go to As needed, If symptoms worsen [facility]  [phone]        3. Return to ED if worse    Discharge Note:    The patient is ready for discharge. The patient's signs, symptoms, diagnosis, and discharge instruction have been discussed and the patient has conveyed their understanding. The patient is to follow up as recommended or return to the ER should their symptoms worsen. Plan has been discussed and the patient is in agreement.     Adeola Banuelos NP

## 2018-03-18 NOTE — ED TRIAGE NOTES
Pt presents with complaints of abdominal pain and decreased stool output from his internal pouch. Pt reports nausea and vomiting that began today and blood from the site of his pouch.  Pt states that he has been on antibiotics for a dental infection and is currently taking clindamycin

## 2018-04-12 ENCOUNTER — ANESTHESIA EVENT (OUTPATIENT)
Dept: ENDOSCOPY | Age: 43
End: 2018-04-12
Payer: COMMERCIAL

## 2018-04-12 ENCOUNTER — ANESTHESIA (OUTPATIENT)
Dept: ENDOSCOPY | Age: 43
End: 2018-04-12
Payer: COMMERCIAL

## 2018-04-12 ENCOUNTER — HOSPITAL ENCOUNTER (OUTPATIENT)
Age: 43
Setting detail: OUTPATIENT SURGERY
Discharge: HOME | End: 2018-04-12
Attending: INTERNAL MEDICINE | Admitting: INTERNAL MEDICINE
Payer: COMMERCIAL

## 2018-04-12 VITALS
SYSTOLIC BLOOD PRESSURE: 93 MMHG | HEART RATE: 88 BPM | WEIGHT: 189 LBS | BODY MASS INDEX: 25.6 KG/M2 | HEIGHT: 72 IN | OXYGEN SATURATION: 100 % | TEMPERATURE: 99 F | DIASTOLIC BLOOD PRESSURE: 58 MMHG | RESPIRATION RATE: 19 BRPM

## 2018-04-12 LAB
H PYLORI FROM TISSUE: NEGATIVE
KIT LOT NO., HCLOLOT: NORMAL
NEGATIVE CONTROL: NEGATIVE
POSITIVE CONTROL: POSITIVE

## 2018-04-12 PROCEDURE — 74011250636 HC RX REV CODE- 250/636: Performed by: INTERNAL MEDICINE

## 2018-04-12 PROCEDURE — 77030027957 HC TBNG IRR ENDOGTR BUSS -B: Performed by: INTERNAL MEDICINE

## 2018-04-12 PROCEDURE — 87077 CULTURE AEROBIC IDENTIFY: CPT | Performed by: INTERNAL MEDICINE

## 2018-04-12 PROCEDURE — 74011000250 HC RX REV CODE- 250

## 2018-04-12 PROCEDURE — 76060000032 HC ANESTHESIA 0.5 TO 1 HR: Performed by: INTERNAL MEDICINE

## 2018-04-12 PROCEDURE — 76040000007: Performed by: INTERNAL MEDICINE

## 2018-04-12 PROCEDURE — 74011250636 HC RX REV CODE- 250/636

## 2018-04-12 PROCEDURE — 77030009426 HC FCPS BIOP ENDOSC BSC -B: Performed by: INTERNAL MEDICINE

## 2018-04-12 RX ORDER — LIDOCAINE HYDROCHLORIDE 20 MG/ML
INJECTION, SOLUTION EPIDURAL; INFILTRATION; INTRACAUDAL; PERINEURAL AS NEEDED
Status: DISCONTINUED | OUTPATIENT
Start: 2018-04-12 | End: 2018-04-12 | Stop reason: HOSPADM

## 2018-04-12 RX ORDER — HYDROMORPHONE HYDROCHLORIDE 2 MG/ML
1 INJECTION, SOLUTION INTRAMUSCULAR; INTRAVENOUS; SUBCUTANEOUS ONCE
Status: COMPLETED | OUTPATIENT
Start: 2018-04-12 | End: 2018-04-12

## 2018-04-12 RX ORDER — NALOXONE HYDROCHLORIDE 0.4 MG/ML
0.4 INJECTION, SOLUTION INTRAMUSCULAR; INTRAVENOUS; SUBCUTANEOUS
Status: DISCONTINUED | OUTPATIENT
Start: 2018-04-12 | End: 2018-04-12 | Stop reason: HOSPADM

## 2018-04-12 RX ORDER — DEXTROMETHORPHAN/PSEUDOEPHED 2.5-7.5/.8
1.2 DROPS ORAL
Status: DISCONTINUED | OUTPATIENT
Start: 2018-04-12 | End: 2018-04-12 | Stop reason: HOSPADM

## 2018-04-12 RX ORDER — ONDANSETRON 2 MG/ML
4 INJECTION INTRAMUSCULAR; INTRAVENOUS ONCE
Status: COMPLETED | OUTPATIENT
Start: 2018-04-12 | End: 2018-04-12

## 2018-04-12 RX ORDER — FENTANYL CITRATE 50 UG/ML
200 INJECTION, SOLUTION INTRAMUSCULAR; INTRAVENOUS
Status: DISCONTINUED | OUTPATIENT
Start: 2018-04-12 | End: 2018-04-12 | Stop reason: HOSPADM

## 2018-04-12 RX ORDER — SODIUM CHLORIDE 0.9 % (FLUSH) 0.9 %
5-10 SYRINGE (ML) INJECTION EVERY 8 HOURS
Status: DISCONTINUED | OUTPATIENT
Start: 2018-04-12 | End: 2018-04-12 | Stop reason: HOSPADM

## 2018-04-12 RX ORDER — SODIUM CHLORIDE 0.9 % (FLUSH) 0.9 %
5-10 SYRINGE (ML) INJECTION AS NEEDED
Status: DISCONTINUED | OUTPATIENT
Start: 2018-04-12 | End: 2018-04-12 | Stop reason: HOSPADM

## 2018-04-12 RX ORDER — MIDAZOLAM HYDROCHLORIDE 1 MG/ML
.25-1 INJECTION, SOLUTION INTRAMUSCULAR; INTRAVENOUS
Status: DISCONTINUED | OUTPATIENT
Start: 2018-04-12 | End: 2018-04-12 | Stop reason: HOSPADM

## 2018-04-12 RX ORDER — SODIUM CHLORIDE 9 MG/ML
INJECTION, SOLUTION INTRAVENOUS
Status: DISCONTINUED | OUTPATIENT
Start: 2018-04-12 | End: 2018-04-12 | Stop reason: HOSPADM

## 2018-04-12 RX ORDER — EPINEPHRINE 0.1 MG/ML
1 INJECTION INTRACARDIAC; INTRAVENOUS
Status: DISCONTINUED | OUTPATIENT
Start: 2018-04-12 | End: 2018-04-12 | Stop reason: HOSPADM

## 2018-04-12 RX ORDER — FLUMAZENIL 0.1 MG/ML
0.2 INJECTION INTRAVENOUS
Status: DISCONTINUED | OUTPATIENT
Start: 2018-04-12 | End: 2018-04-12 | Stop reason: HOSPADM

## 2018-04-12 RX ORDER — PROPOFOL 10 MG/ML
INJECTION, EMULSION INTRAVENOUS AS NEEDED
Status: DISCONTINUED | OUTPATIENT
Start: 2018-04-12 | End: 2018-04-12 | Stop reason: HOSPADM

## 2018-04-12 RX ORDER — ATROPINE SULFATE 0.1 MG/ML
0.5 INJECTION INTRAVENOUS
Status: DISCONTINUED | OUTPATIENT
Start: 2018-04-12 | End: 2018-04-12 | Stop reason: HOSPADM

## 2018-04-12 RX ORDER — SODIUM CHLORIDE 9 MG/ML
50 INJECTION, SOLUTION INTRAVENOUS CONTINUOUS
Status: DISCONTINUED | OUTPATIENT
Start: 2018-04-12 | End: 2018-04-12 | Stop reason: HOSPADM

## 2018-04-12 RX ORDER — HYDROMORPHONE HYDROCHLORIDE 5 MG/5ML
1 SOLUTION ORAL ONCE
Status: DISCONTINUED | OUTPATIENT
Start: 2018-04-12 | End: 2018-04-12

## 2018-04-12 RX ADMIN — LIDOCAINE HYDROCHLORIDE 30 MG: 20 INJECTION, SOLUTION EPIDURAL; INFILTRATION; INTRACAUDAL; PERINEURAL at 10:30

## 2018-04-12 RX ADMIN — PROPOFOL 50 MG: 10 INJECTION, EMULSION INTRAVENOUS at 10:51

## 2018-04-12 RX ADMIN — PROPOFOL 100 MG: 10 INJECTION, EMULSION INTRAVENOUS at 10:31

## 2018-04-12 RX ADMIN — PROPOFOL 50 MG: 10 INJECTION, EMULSION INTRAVENOUS at 10:41

## 2018-04-12 RX ADMIN — PROPOFOL 60 MG: 10 INJECTION, EMULSION INTRAVENOUS at 10:38

## 2018-04-12 RX ADMIN — SODIUM CHLORIDE: 9 INJECTION, SOLUTION INTRAVENOUS at 10:15

## 2018-04-12 RX ADMIN — HYDROMORPHONE HYDROCHLORIDE 1 MG: 2 INJECTION INTRAMUSCULAR; INTRAVENOUS; SUBCUTANEOUS at 11:31

## 2018-04-12 RX ADMIN — SODIUM CHLORIDE: 9 INJECTION, SOLUTION INTRAVENOUS at 10:58

## 2018-04-12 RX ADMIN — ONDANSETRON 4 MG: 2 INJECTION INTRAMUSCULAR; INTRAVENOUS at 11:53

## 2018-04-12 RX ADMIN — PROPOFOL 50 MG: 10 INJECTION, EMULSION INTRAVENOUS at 10:35

## 2018-04-12 RX ADMIN — PROPOFOL 40 MG: 10 INJECTION, EMULSION INTRAVENOUS at 10:32

## 2018-04-12 RX ADMIN — HYDROMORPHONE HYDROCHLORIDE 1 MG: 2 INJECTION, SOLUTION INTRAMUSCULAR; INTRAVENOUS; SUBCUTANEOUS at 12:04

## 2018-04-12 NOTE — PROGRESS NOTES

## 2018-04-12 NOTE — ANESTHESIA POSTPROCEDURE EVALUATION
Post-Anesthesia Evaluation and Assessment    Patient: Alejandro Mcdaniel MRN: [id number]  SSN: [id number]    YOB: 1975  Age: 44 y.o. Sex: male       Cardiovascular Function/Vital Signs  Visit Vitals    BP 98/57    Pulse 78    Temp 37.2 °C (99 °F)    Resp 18    Ht 6' (1.829 m)    Wt 85.7 kg (189 lb)    SpO2 100%    BMI 25.63 kg/m2       Patient is status post MAC anesthesia for Procedure(s):  COLONOSCOPY  ESOPHAGOGASTRODUODENOSCOPY (EGD)  ESOPHAGOGASTRODUODENAL (EGD) BIOPSY. Nausea/Vomiting: None    Postoperative hydration reviewed and adequate. Pain:  Pain Scale 1: Numeric (0 - 10) (04/12/18 1154)  Pain Intensity 1: 5 (04/12/18 1154)   Managed    Neurological Status: At baseline    Mental Status and Level of Consciousness: Arousable    Pulmonary Status:   O2 Device: Room air (04/12/18 1154)   Adequate oxygenation and airway patent    Complications related to anesthesia: None    Post-anesthesia assessment completed.  No concerns    Signed By: Kaushik Mcghee MD     April 12, 2018

## 2018-04-12 NOTE — ANESTHESIA PREPROCEDURE EVALUATION
Anesthetic History   No history of anesthetic complications            Review of Systems / Medical History  Patient summary reviewed, nursing notes reviewed and pertinent labs reviewed    Pulmonary  Within defined limits                 Neuro/Psych   Within defined limits      Psychiatric history     Cardiovascular  Within defined limits                     GI/Hepatic/Renal  Within defined limits   GERD           Endo/Other  Within defined limits           Other Findings              Physical Exam    Airway  Mallampati: II  TM Distance: > 6 cm  Neck ROM: normal range of motion   Mouth opening: Normal     Cardiovascular  Regular rate and rhythm,  S1 and S2 normal,  no murmur, click, rub, or gallop             Dental  No notable dental hx       Pulmonary  Breath sounds clear to auscultation               Abdominal  GI exam deferred       Other Findings            Anesthetic Plan    ASA: 2  Anesthesia type: MAC          Induction: Intravenous  Anesthetic plan and risks discussed with: Patient

## 2018-04-12 NOTE — IP AVS SNAPSHOT
[address] [facility] [address] [place] [address] 
[phone] Patient: Hammad Lima MRN: [id number] :1975 About your hospitalization You were admitted on:  2018 [facility] You were discharged on:  2018 Why you were hospitalized Your primary diagnosis was:  Not on File Follow-up Information Follow up With Details Comments Contact Info Edilson Roblero MD   [address] [facility] [address] [place] [address] 
[phone] Discharge Orders None A check loc indicates which time of day the medication should be taken. My Medications CONTINUE taking these medications Instructions Each Dose to Equal  
 Morning Noon Evening Bedtime  
 acetaminophen 500 mg tablet Commonly known as:  TYLENOL Your last dose was: Your next dose is: Take 500 mg by mouth four (4) times daily as needed (mild pain, heache (usually has headaches 1 week after Stelara)). 500 mg  
    
   
   
   
  
 citalopram 20 mg tablet Commonly known as:  Onofre Krill Your last dose was: Your next dose is: Take 20 mg by mouth daily. 20 mg  
    
   
   
   
  
 COMPAZINE 10 mg tablet Generic drug:  prochlorperazine Your last dose was: Your next dose is: Take 10 mg by mouth two (2) times daily as needed (Moderate Nausea). 10 mg  
    
   
   
   
  
 dicyclomine 10 mg capsule Commonly known as:  BENTYL Your last dose was: Your next dose is: Take 10 mg by mouth every six (6) hours as needed. 10 mg  
    
   
   
   
  
 ergocalciferol 50,000 unit capsule Commonly known as:  ERGOCALCIFEROL Your last dose was: Your next dose is: Take 50,000 Units by mouth every . 06698 Units LYRICA 100 mg capsule Generic drug:  pregabalin Your last dose was: Your next dose is: Take 100 mg by mouth four (4) times daily. 100 mg  
    
   
   
   
  
 omeprazole 40 mg capsule Commonly known as:  PRILOSEC Your last dose was: Your next dose is: Take 40 mg by mouth nightly. 40 mg  
    
   
   
   
  
 OTHER Your last dose was: Your next dose is: Take 2-3 Tabs by mouth three (3) times daily (with meals). Kimberley Enzymes 2-3 Tab PERCOCET  mg per tablet Generic drug:  oxyCODONE-acetaminophen Your last dose was: Your next dose is: Take 1-2 Tabs by mouth every four (4) hours as needed for Pain (Being tapered per Dr. Alejandro Soliz). 1-2 Tab PROBIOTIC 4X 10-15 mg Tbec Generic drug:  B.infantis-B.ani-B.long-B.bifi Your last dose was: Your next dose is: Take 1 Cap by mouth nightly. 1 Cap  
    
   
   
   
  
 promethazine 25 mg tablet Commonly known as:  PHENERGAN Your last dose was: Your next dose is: Take 25 mg by mouth every six (6) hours as needed (Severe Nausea). 25 mg  
    
   
   
   
  
 traMADol 50 mg tablet Commonly known as:  ULTRAM  
   
Your last dose was: Your next dose is: Take 50 mg by mouth three (3) times daily as needed (moderate pain). 50 mg Ustekinumab 90 mg/mL injection Commonly known as:  Abdullahi Cardoso Your last dose was: Your next dose is:    
   
   
 90 mg by SubCUTAneous route. Every 8 weeks 90 mg  
    
   
   
   
  
 VALIUM 5 mg tablet Generic drug:  diazePAM  
   
Your last dose was: Your next dose is: Take 5 mg by mouth every eight (8) hours as needed (bowel relaxation). 5 mg VITAMIN B-12 1,000 mcg/mL injection Generic drug:  cyanocobalamin Your last dose was: Your next dose is:    
   
   
 1,000 mcg by IntraMUSCular route every Sunday. Indications: Once weekly 1000 mcg ZOFRAN (AS HYDROCHLORIDE) 8 mg tablet Generic drug:  ondansetron hcl Your last dose was: Your next dose is: Take 8 mg by mouth every twelve (12) hours as needed (Mild Nausea). 8 mg  
    
   
   
   
  
 zolpidem 10 mg tablet Commonly known as:  AMBIEN Your last dose was: Your next dose is: Take 10 mg by mouth nightly. 10 mg Opioid Education Prescription Opioids: What You Need to Know: 
 
Prescription opioids can be used to help relieve moderate-to-severe pain and are often prescribed following a surgery or injury, or for certain health conditions. These medications can be an important part of treatment but also come with serious risks. Opioids are strong pain medicines. Examples include hydrocodone, oxycodone, fentanyl, and morphine. Heroin is an example of an illegal opioid. It is important to work with your health care provider to make sure you are getting the safest, most effective care. WHAT ARE THE RISKS AND SIDE EFFECTS OF OPIOID USE? Prescription opioids carry serious risks of addiction and overdose, especially with prolonged use. An opioid overdose, often marked by slow breathing, can cause sudden death. The use of prescription opioids can have a number of side effects as well, even when taken as directed. · Tolerance-meaning you might need to take more of a medication for the same pain relief · Physical dependence-meaning you have symptoms of withdrawal when the medication is stopped. Withdrawal symptoms can include nausea, sweating, chills, diarrhea, stomach cramps, and muscle aches. Withdrawal can last up to several weeks, depending on which drug you took and how long you took it. · Increased sensitivity to pain · Constipation · Nausea, vomiting, and dry mouth · Sleepiness and dizziness · Confusion · Depression · Low levels of testosterone that can result in lower sex drive, energy, and strength · Itching and sweating RISKS ARE GREATER WITH:      
· History of drug misuse, substance use disorder, or overdose · Mental health conditions (such as depression or anxiety) · Sleep apnea · Older age (72 years or older) · Pregnancy Avoid alcohol while taking prescription opioids. Also, unless specifically advised by your health care provider, medications to avoid include: · Benzodiazepines (such as Xanax or Valium) · Muscle relaxants (such as Soma or Flexeril) · Hypnotics (such as Ambien or Lunesta) · Other prescription opioids KNOW YOUR OPTIONS Talk to your health care provider about ways to manage your pain that don't involve prescription opioids. Some of these options may actually work better and have fewer risks and side effects. Options may include: 
· Pain relievers such as acetaminophen, ibuprofen, and naproxen · Some medications that are also used for depression or seizures · Physical therapy and exercise · Counseling to help patients learn how to cope better with triggers of pain and stress. · Application of heat or cold compress · Massage therapy · Relaxation techniques Be Informed Make sure you know the name of your medication, how much and how often to take it, and its potential risks & side effects. IF YOU ARE PRESCRIBED OPIOIDS FOR PAIN: 
· Never take opioids in greater amounts or more often than prescribed. Remember the goal is not to be pain-free but to manage your pain at a tolerable level. · Follow up with your primary care provider to: · Work together to create a plan on how to manage your pain. · Talk about ways to help manage your pain that don't involve prescription opioids. · Talk about any and all concerns and side effects. · Help prevent misuse and abuse. · Never sell or share prescription opioids · Help prevent misuse and abuse. · Store prescription opioids in a secure place and out of reach of others (this may include visitors, children, friends, and family). · Safely dispose of unused/unwanted prescription opioids: Find your community drug take-back program or your pharmacy mail-back program, or flush them down the toilet, following guidance from the Food and Drug Administration (www.fda.gov/Drugs/ResourcesForYou). · Visit www.cdc.gov/drugoverdose to learn about the risks of opioid abuse and overdose. · If you believe you may be struggling with addiction, tell your health care provider and ask for guidance or call WebTV at 6-943-523-CZVZ. Discharge Instructions Dr Trudy House [facility] [address] 
[phone] Kira Jordan [id number] 
1975 COLONOSCOPY DISCHARGE INSTRUCTIONS Discomfort: 
Redness at IV site- apply warm compress to area; if redness or soreness persist- contact your physician There may be a slight amount of blood passed from the rectum Gaseous discomfort- walking, belching will help relieve any discomfort You may not operate a vehicle for 12 hours You may not engage in an occupation involving machinery or appliances for rest of today You may not drink alcoholic beverages for at least 12 hours Avoid making any critical decisions for at least 24 hour DIET: 
 Regular diet.  however -  remember your colon is empty and a heavy meal will produce gas. Avoid these foods:  vegetables, fried / greasy foods, carbonated drinks for today ACTIVITY: 
You may resume your normal daily activities it is recommended that you spend the remainder of the day resting -  avoid any strenuous activity. CALL M.D.   ANY SIGN OF:  
 Increasing pain, nausea, vomiting Abdominal distension (swelling) New increased bleeding (oral or rectal) Fever (chills) Pain in chest area Bloody discharge from nose or mouth Shortness of breath Follow-up Instructions: 
 Call Dr. Fernando Dacosta for any questions or problems. Telephone # [phone] Biopsy results will be available in  5 to 7 days Should have a repeat colonoscopy in 10 years Impression:  1.- Bile in Stomach 2.- Gastritis with Intra-Mucosal Blood 3.- No Evidence of Activity of Crohn's Disease Introducing [facility]! Dear Scot Campbell: Thank you for requesting a MicroTransponder account. Our records indicate that you already have an active MicroTransponder account. You can access your account anytime at [email or web address] Did you know that you can access your hospital and ER discharge instructions at any time in MicroTransponder? You can also review all of your test results from your hospital stay or ER visit. Additional Information If you have questions, please visit the Frequently Asked Questions section of the MicroTransponder website at [email or web address] Remember, MicroTransponder is NOT to be used for urgent needs. For medical emergencies, dial 911. Now available from your iPhone and Android! Introducing Michael Mathur As a [facility]viktor Andrez patient, I wanted to make you aware of our electronic visit tool called Michael Flacoshaemolly. Jacques Maguire 24/7 allows you to connect within minutes with a medical provider 24 hours a day, seven days a week via a mobile device or tablet or logging into a secure website from your computer. You can access Michael Mathur from anywhere in the [place].  
 
A virtual visit might be right for you when you have a simple condition and feel like you just dont want to get out of bed, or cant get away from work for an appointment, when your regular Benuel Andrez provider is not available (evenings, weekends or holidays), or when youre out of town and need minor care. Electronic visits cost only $49 and if the [facility] [place]/Second Half Playbook provider determines a prescription is needed to treat your condition, one can be electronically transmitted to a nearby pharmacy*. Please take a moment to enroll today if you have not already done so. The enrollment process is free and takes just a few minutes. To enroll, please download the Corpsolv/Second Half Playbook josé to your tablet or phone, or visit [email or web address] to enroll on your computer. And, as an [address] [place] [address] patient with a Accera account, the results of your visits will be scanned into your electronic medical record and your primary care provider will be able to view the scanned results. We urge you to continue to see your regular [facility] provider for your ongoing medical care. And while your primary care provider may not be the one available when you seek a Prizeo virtual visit, the peace of mind you get from getting a real diagnosis real time can be priceless. For more information on Prizeo, view our Frequently Asked Questions (FAQs) at [email or web address]. Sincerely, 
 
Moshe Amato MD 
Chief Medical Officer [address] [place] [address] *:  certain medications cannot be prescribed via Prizeo Providers Seen During Your Hospitalization Provider Specialty Primary office phone [place] MD Gastroenterology [phone] Your Primary Care Physician (PCP) Primary Care Physician Office Phone Office Fax [place], [address] [phone] You are allergic to the following Allergen Reactions Remicade (Infliximab) Anaphylaxis Shortness of Breath Bactrim (Sulfamethoprim Ds) Other (comments) Increased GI distress. Morphine Nausea Only Tolerates Dilaudid Nsaids (Non-Steroidal Anti-Inflammatory Drug) Other (comments) Stomach ulcers Recent Documentation Height Weight BMI Smoking Status 1.829 m 85.7 kg 25.63 kg/m2 Former Smoker Emergency Contacts Name Discharge Info Relation Home Work Mobile [address] CAREGIVER [3] Spouse [3] [phone] Patient Belongings The following personal items are in your possession at time of discharge: 
  Dental Appliances: None  Visual Aid: None Please provide this summary of care documentation to your next provider. Signatures-by signing, you are acknowledging that this After Visit Summary has been reviewed with you and you have received a copy. Patient Signature:  ____________________________________________________________ Date:  ____________________________________________________________  
  
[facility] Provider Signature:  ____________________________________________________________ Date:  ____________________________________________________________

## 2018-04-12 NOTE — PROCEDURES
Dr Giselle Schaeffer [address]. [address] Murillo [address] [facility]  [phone]      [facility]  [id number]  1975    Colonoscopy Operative Report    Procedure Type:   Colonoscopy --diagnostic     Indications:  ABDOMINAL PAIN     Pre-operative Diagnosis: see indication above    Post-operative Diagnosis:  See findings below    :  Fabrizio Jimenez MD    Referring Provider: Fabrizio Jimenez MD      Sedation:  MAC anesthesia Propofol    Procedure Details:  After informed consent was obtained with all risks and benefits of procedure explained and preoperative exam completed, the patient was taken to the endoscopy suite and placed in the left lateral decubitus position. Upon sequential sedation as per above, a digital rectal exam was performed demonstrating no hemorrhoids. The Olympus videocolonoscope  was inserted in the rectum and carefully advanced to the cecum, which was identified by Normal working Ileostomy. The cecum was identified by the ileocecal valve and appendiceal orifice. The quality of preparation was excellent. The colonoscope was slowly withdrawn with careful evaluation between folds. Retroflexion in the rectum was completed demonstrating no hemorrhoids. Findings:   Rectum: Endoscopy done via Ileostomy  Sigmoid:  As above  Descending Colon: As above  Transverse Colon:  As above  Ascending Colon: AS above  Cecum: As Above  Terminal Ileum:As above      Specimen Removed:  Previous Colectomy. Endoscopy done via Ileostomy    Complications: None. EBL:  None. Impression: Normal Ileostomy    Recommendations: --Follow up with me. Regular diet. Resume normal medication(s). - Follow up with me. Discharge Disposition:  Home in the company of a  when able to ambulate.

## 2018-04-12 NOTE — PROCEDURES
[facility]    Endoscopic Gastroduodenoscopy Procedure Note    El Varma  1975  [id number]    Procedure: Endoscopic Gastroduodenoscopy --diagnostic    Indication: Heartburn     Pre-operative Diagnosis: see indication above    Post-operative Diagnosis: see findings below    : Daniel Kimball MD    Referring Provider:  Daniel Kimball MD      Anesthesia/Sedation:  MAC anesthesia Propofol    Airway assessment: No airway problems anticipated    Procedure Details     After infomed consent was obtained for the procedure, with all risks and benefits of procedure explained the patient was taken to the endoscopy suite and placed in the left lateral decubitus position. Following sequential administration of sedation as per above, the endoscope was inserted into the mouth and advanced under direct vision to second portion of the duodenum. A careful inspection was made as the gastroscope was withdrawn, including a retroflexed view of the proximal stomach; findings and interventions are described below. Findings:   Esophagus:normal  Stomach: Gastritis. Presence of intramucosal blood and Bile  Duodenum/jejunum: normal      Therapies:  none    Specimens: Gastric biopsies           EBL:  None. Complications:   None; patient tolerated the procedure well. Impression:    -Gastritis    Pt. was Alert. Left the endoscopy suite in good general condition. Recommendations:  -Follow up with me.     Daniel Kimball MD

## 2018-04-12 NOTE — ROUTINE PROCESS
Hammad Lima  1975  [id number]    Situation:  Verbal report received from: Nichole Boxer RN  Procedure: Procedure(s) with comments:  COLONOSCOPY - through ileostomy site  ESOPHAGOGASTRODUODENOSCOPY (EGD)  ESOPHAGOGASTRODUODENAL (EGD) BIOPSY    Background:    Preoperative diagnosis: CROHNS DISEASE, ABDOMINAL PAIN   Postoperative diagnosis: 1.- Bile in Stomach  2.- Gastritis with Intra-Mucosal Blood  3.- No Evidence of Activity of Crohn's Disease    :  Dr. Lashaun Stark  Assistant(s): Endoscopy Technician-1: Dhiraj Rodriguez IV  Endoscopy RN-1: Richie Shook RN    Specimens: * No specimens in log *  H. Pylori  no    Assessment:  Intra-procedure medications       Anesthesia gave intra-procedure sedation and medications, see anesthesia flow sheet yes    Intravenous fluids: NS@ KVO     Vital signs stable   Abdominal assessment: round and soft     Recommendation:  Discharge patient per MD order  Family or Friend Pt wife  Permission to share finding with family or friend yes

## 2018-04-12 NOTE — DISCHARGE INSTRUCTIONS
Dr Lee Close of [address] [place] [address]. Trudi Murillo [address], [address]  [phone]    Jonathan Bernal  [id number]  1975    COLONOSCOPY DISCHARGE INSTRUCTIONS  Discomfort:  Redness at IV site- apply warm compress to area; if redness or soreness persist- contact your physician  There may be a slight amount of blood passed from the rectum  Gaseous discomfort- walking, belching will help relieve any discomfort  You may not operate a vehicle for 12 hours  You may not engage in an occupation involving machinery or appliances for rest of today  You may not drink alcoholic beverages for at least 12 hours  Avoid making any critical decisions for at least 24 hour  DIET:   Regular diet. - however -  remember your colon is empty and a heavy meal will produce gas. Avoid these foods:  vegetables, fried / greasy foods, carbonated drinks for today         ACTIVITY:  You may resume your normal daily activities it is recommended that you spend the remainder of the day resting -  avoid any strenuous activity. CALL M.D. ANY SIGN OF:   Increasing pain, nausea, vomiting  Abdominal distension (swelling)  New increased bleeding (oral or rectal)  Fever (chills)  Pain in chest area  Bloody discharge from nose or mouth  Shortness of breath     Follow-up Instructions:   Call Dr. Sarina Leone for any questions or problems.    Telephone # [phone]  Biopsy results will be available in  5 to 7 days  Should have a repeat colonoscopy in 10 years    Impression:  1.- Bile in Stomach  2.- Gastritis with Intra-Mucosal Blood  3.- No Evidence of Activity of Crohn's Disease

## 2018-09-29 ENCOUNTER — HOSPITAL ENCOUNTER (EMERGENCY)
Age: 43
Discharge: HOME | End: 2018-09-29
Attending: EMERGENCY MEDICINE
Payer: COMMERCIAL

## 2018-09-29 ENCOUNTER — APPOINTMENT (OUTPATIENT)
Dept: GENERAL RADIOLOGY | Age: 43
End: 2018-09-29
Attending: EMERGENCY MEDICINE
Payer: COMMERCIAL

## 2018-09-29 VITALS
DIASTOLIC BLOOD PRESSURE: 75 MMHG | BODY MASS INDEX: 25.73 KG/M2 | RESPIRATION RATE: 16 BRPM | TEMPERATURE: 99 F | HEIGHT: 72 IN | SYSTOLIC BLOOD PRESSURE: 116 MMHG | WEIGHT: 190 LBS | HEART RATE: 129 BPM | OXYGEN SATURATION: 99 %

## 2018-09-29 DIAGNOSIS — J20.9 ACUTE BRONCHITIS, UNSPECIFIED ORGANISM: Primary | ICD-10-CM

## 2018-09-29 LAB
ANION GAP SERPL CALC-SCNC: 9 MMOL/L (ref 5–15)
BASOPHILS # BLD: 0 K/UL (ref 0–0.1)
BASOPHILS NFR BLD: 0 % (ref 0–1)
BUN SERPL-MCNC: 7 MG/DL (ref 6–20)
BUN/CREAT SERPL: 7 (ref 12–20)
CALCIUM SERPL-MCNC: 9.7 MG/DL (ref 8.5–10.1)
CHLORIDE SERPL-SCNC: 103 MMOL/L (ref 97–108)
CO2 SERPL-SCNC: 29 MMOL/L (ref 21–32)
COMMENT, HOLDF: NORMAL
CREAT SERPL-MCNC: 0.94 MG/DL (ref 0.7–1.3)
DIFFERENTIAL METHOD BLD: ABNORMAL
EOSINOPHIL # BLD: 0.1 K/UL (ref 0–0.4)
EOSINOPHIL NFR BLD: 1 % (ref 0–7)
ERYTHROCYTE [DISTWIDTH] IN BLOOD BY AUTOMATED COUNT: 13.2 % (ref 11.5–14.5)
GLUCOSE SERPL-MCNC: 92 MG/DL (ref 65–100)
HCT VFR BLD AUTO: 42.6 % (ref 36.6–50.3)
HGB BLD-MCNC: 13.4 G/DL (ref 12.1–17)
IMM GRANULOCYTES # BLD: 0 K/UL (ref 0–0.04)
IMM GRANULOCYTES NFR BLD AUTO: 0 % (ref 0–0.5)
LYMPHOCYTES # BLD: 1.4 K/UL (ref 0.8–3.5)
LYMPHOCYTES NFR BLD: 12 % (ref 12–49)
MCH RBC QN AUTO: 28.6 PG (ref 26–34)
MCHC RBC AUTO-ENTMCNC: 31.5 G/DL (ref 30–36.5)
MCV RBC AUTO: 90.8 FL (ref 80–99)
MONOCYTES # BLD: 0.6 K/UL (ref 0–1)
MONOCYTES NFR BLD: 5 % (ref 5–13)
NEUTS SEG # BLD: 9.8 K/UL (ref 1.8–8)
NEUTS SEG NFR BLD: 82 % (ref 32–75)
NRBC # BLD: 0 K/UL (ref 0–0.01)
NRBC BLD-RTO: 0 PER 100 WBC
PLATELET # BLD AUTO: 242 K/UL (ref 150–400)
PMV BLD AUTO: 10.7 FL (ref 8.9–12.9)
POTASSIUM SERPL-SCNC: 4 MMOL/L (ref 3.5–5.1)
RBC # BLD AUTO: 4.69 M/UL (ref 4.1–5.7)
SAMPLES BEING HELD,HOLD: NORMAL
SODIUM SERPL-SCNC: 141 MMOL/L (ref 136–145)
WBC # BLD AUTO: 12 K/UL (ref 4.1–11.1)

## 2018-09-29 PROCEDURE — 85025 COMPLETE CBC W/AUTO DIFF WBC: CPT | Performed by: EMERGENCY MEDICINE

## 2018-09-29 PROCEDURE — 74011636637 HC RX REV CODE- 636/637: Performed by: EMERGENCY MEDICINE

## 2018-09-29 PROCEDURE — 94640 AIRWAY INHALATION TREATMENT: CPT

## 2018-09-29 PROCEDURE — 80048 BASIC METABOLIC PNL TOTAL CA: CPT | Performed by: EMERGENCY MEDICINE

## 2018-09-29 PROCEDURE — 71046 X-RAY EXAM CHEST 2 VIEWS: CPT

## 2018-09-29 PROCEDURE — 77030027138 HC INCENT SPIROMETER -A

## 2018-09-29 PROCEDURE — 77030013140 HC MSK NEB VYRM -A

## 2018-09-29 PROCEDURE — 74011000250 HC RX REV CODE- 250: Performed by: EMERGENCY MEDICINE

## 2018-09-29 PROCEDURE — 99284 EMERGENCY DEPT VISIT MOD MDM: CPT

## 2018-09-29 PROCEDURE — 36415 COLL VENOUS BLD VENIPUNCTURE: CPT | Performed by: EMERGENCY MEDICINE

## 2018-09-29 RX ORDER — PREDNISONE 20 MG/1
40 TABLET ORAL
Qty: 10 TAB | Refills: 0 | Status: SHIPPED | OUTPATIENT
Start: 2018-09-29 | End: 2018-10-04

## 2018-09-29 RX ORDER — ALBUTEROL SULFATE 90 UG/1
2 AEROSOL, METERED RESPIRATORY (INHALATION)
Qty: 1 INHALER | Refills: 0 | Status: SHIPPED | OUTPATIENT
Start: 2018-09-29 | End: 2020-09-28

## 2018-09-29 RX ORDER — PREDNISONE 20 MG/1
60 TABLET ORAL
Status: COMPLETED | OUTPATIENT
Start: 2018-09-29 | End: 2018-09-29

## 2018-09-29 RX ORDER — IPRATROPIUM BROMIDE AND ALBUTEROL SULFATE 2.5; .5 MG/3ML; MG/3ML
3 SOLUTION RESPIRATORY (INHALATION)
Status: COMPLETED | OUTPATIENT
Start: 2018-09-29 | End: 2018-09-29

## 2018-09-29 RX ORDER — BENZONATATE 200 MG/1
200 CAPSULE ORAL
Qty: 21 CAP | Refills: 0 | Status: SHIPPED | OUTPATIENT
Start: 2018-09-29 | End: 2018-10-06

## 2018-09-29 RX ADMIN — PREDNISONE 60 MG: 20 TABLET ORAL at 13:26

## 2018-09-29 RX ADMIN — IPRATROPIUM BROMIDE AND ALBUTEROL SULFATE 3 ML: .5; 3 SOLUTION RESPIRATORY (INHALATION) at 13:27

## 2018-09-29 NOTE — ED NOTES
Pt w hx of Crohns on immunosuppression presents with SOB. Recently completed a Z-pack for a sinus infection/bronchitis but symptoms have been worsening. Coarse breath sounds with wheezing. Fevers resolved with Z-pack. Productive cough. 12:35 PM 
I have evaluated the patient as the Provider in Triage. I have reviewed His vital signs and the triage nurse assessment. I have talked with the patient and any available family and advised that I am the provider in triage and have ordered the appropriate study to initiate their work up based on the clinical presentation during my assessment. I have advised that the patient will be accommodated in the Main ED as soon as possible. I have also requested to contact the triage nurse or myself immediately if the patient experiences any changes in their condition during this brief waiting period.  
Petra Frazier MD

## 2018-09-29 NOTE — DISCHARGE INSTRUCTIONS
We hope that we have addressed all of your medical concerns. The examination and treatment you received in the Emergency Department were for an emergent problem and were not intended as complete care. It is important that you follow up with your healthcare provider(s) for ongoing care. If your symptoms worsen or do not improve as expected, and you are unable to reach your usual health care provider(s), you should return to the Emergency Department. Today's healthcare is undergoing tremendous change, and patient satisfaction surveys are one of the many tools to assess the quality of medical care. You may receive a survey from the [employer] organization regarding your experience in the Emergency Department. I hope that your experience has been completely positive, particularly the medical care that I provided. As such, please participate in the survey; anything less than excellent does not meet my expectations or intentions. Thank you for allowing us to provide you with medical care today. We realize that you have many choices for your emergency care needs. Please choose us in the future for any continued health care needs. Recent Results (from the past 24 hour(s))   CBC WITH AUTOMATED DIFF    Collection Time: 09/29/18  1:13 PM   Result Value Ref Range    WBC 12.0 (H) 4.1 - 11.1 K/uL    RBC 4.69 4.10 - 5.70 M/uL    HGB 13.4 12.1 - 17.0 g/dL    HCT 42.6 36.6 - 50.3 %    MCV 90.8 80.0 - 99.0 FL    MCH 28.6 26.0 - 34.0 PG    MCHC 31.5 30.0 - 36.5 g/dL    RDW 13.2 11.5 - 14.5 %    PLATELET 610 768 - 365 K/uL    MPV 10.7 8.9 - 12.9 FL    NRBC 0.0 0  WBC    ABSOLUTE NRBC 0.00 0.00 - 0.01 K/uL    NEUTROPHILS 82 (H) 32 - 75 %    LYMPHOCYTES 12 12 - 49 %    MONOCYTES 5 5 - 13 %    EOSINOPHILS 1 0 - 7 %    BASOPHILS 0 0 - 1 %    IMMATURE GRANULOCYTES 0 0.0 - 0.5 %    ABS. NEUTROPHILS 9.8 (H) 1.8 - 8.0 K/UL    ABS. LYMPHOCYTES 1.4 0.8 - 3.5 K/UL    ABS. MONOCYTES 0.6 0.0 - 1.0 K/UL    ABS. EOSINOPHILS 0.1 0.0 - 0.4 K/UL    ABS. BASOPHILS 0.0 0.0 - 0.1 K/UL    ABS. IMM. GRANS. 0.0 0.00 - 0.04 K/UL    DF AUTOMATED     METABOLIC PANEL, BASIC    Collection Time: 09/29/18  1:13 PM   Result Value Ref Range    Sodium 141 136 - 145 mmol/L    Potassium 4.0 3.5 - 5.1 mmol/L    Chloride 103 97 - 108 mmol/L    CO2 29 21 - 32 mmol/L    Anion gap 9 5 - 15 mmol/L    Glucose 92 65 - 100 mg/dL    BUN 7 6 - 20 MG/DL    Creatinine 0.94 0.70 - 1.30 MG/DL    BUN/Creatinine ratio 7 (L) 12 - 20      GFR est AA >60 >60 ml/min/1.73m2    GFR est non-AA >60 >60 ml/min/1.73m2    Calcium 9.7 8.5 - 10.1 MG/DL   SAMPLES BEING HELD    Collection Time: 09/29/18  1:13 PM   Result Value Ref Range    SAMPLES BEING HELD 1SST     COMMENT        Add-on orders for these samples will be processed based on acceptable specimen integrity and analyte stability, which may vary by analyte. Xr Chest Pa Lat    Result Date: 9/29/2018  INDICATION: . painful breathing COMPARISON: Previous chest xray, 4/3/2017. Jarett Inch FINDINGS: PA and lateral view of the chest. . Lines/tubes/surgical: None. Heart/mediastinum: Unremarkable. Lungs/pleura:  No focal consolidation or mass. Minimal linear opacity in the lateral, left base suggesting scarring/atelectasis No visualized pleural effusion or pneumothorax. Additional Comments: None. Jarett Inch IMPRESSION: 1. No radiographic evidence of acute cardiopulmonary disease. Bronchitis: Care Instructions  Your Care Instructions    Bronchitis is inflammation of the bronchial tubes, which carry air to the lungs. The tubes swell and produce mucus, or phlegm. The mucus and inflamed bronchial tubes make you cough. You may have trouble breathing. Most cases of bronchitis are caused by viruses like those that cause colds. Antibiotics usually do not help and they may be harmful. Bronchitis usually develops rapidly and lasts about 2 to 3 weeks in otherwise healthy people.   Follow-up care is a key part of your treatment and safety. Be sure to make and go to all appointments, and call your doctor if you are having problems. It's also a good idea to know your test results and keep a list of the medicines you take. How can you care for yourself at home? · Take all medicines exactly as prescribed. Call your doctor if you think you are having a problem with your medicine. · Get some extra rest.  · Take an over-the-counter pain medicine, such as acetaminophen (Tylenol), ibuprofen (Advil, Motrin), or naproxen (Aleve) to reduce fever and relieve body aches. Read and follow all instructions on the label. · Do not take two or more pain medicines at the same time unless the doctor told you to. Many pain medicines have acetaminophen, which is Tylenol. Too much acetaminophen (Tylenol) can be harmful. · Take an over-the-counter cough medicine that contains dextromethorphan to help quiet a dry, hacking cough so that you can sleep. Avoid cough medicines that have more than one active ingredient. Read and follow all instructions on the label. · Breathe moist air from a humidifier, hot shower, or sink filled with hot water. The heat and moisture will thin mucus so you can cough it out. · Do not smoke. Smoking can make bronchitis worse. If you need help quitting, talk to your doctor about stop-smoking programs and medicines. These can increase your chances of quitting for good. When should you call for help? Call 911 anytime you think you may need emergency care. For example, call if:    · You have severe trouble breathing.    Call your doctor now or seek immediate medical care if:    · You have new or worse trouble breathing.     · You cough up dark brown or bloody mucus (sputum).     · You have a new or higher fever.     · You have a new rash.    Watch closely for changes in your health, and be sure to contact your doctor if:    · You cough more deeply or more often, especially if you notice more mucus or a change in the color of your mucus.   · You are not getting better as expected. Where can you learn more? Go to http://malu-ashley.info/. Enter H333 in the search box to learn more about \"Bronchitis: Care Instructions. \"  Current as of: December 6, 2017  Content Version: 11.7  © 0166-4241 [employer], [employer]. Care instructions adapted under license by [employer] (which disclaims liability or warranty for this information). If you have questions about a medical condition or this instruction, always ask your healthcare professional. [address] any warranty or liability for your use of this information.

## 2018-09-29 NOTE — ED TRIAGE NOTES
Had nasal congestion/sinus infection and now having chest congestion. Having pain when taking deep breaths. Productive cough grey phlegm. Just finished [employer] yesterday.

## 2018-09-29 NOTE — ED PROVIDER NOTES
HPI Comments: Patient is 44 y.o. male with history of Chrohn's disease on Stelara, Tobacco abuse, GERD, CKD who presents with chest congestion and productive cough. Reports starting with nasal congestion and cough 1-2 weeks ago and was started on Azithromycin. Completed antibiotic course yesterday, but has had persistent chest congestion. Fevers up to 101F, last fever on Wednesday. Describes persistent cough with gray sputum and sore throat. Patient is 1/2-1ppd smoker. Denies nausea/vomiting, Diarrhea, abdominal pain. The history is provided by the patient. Past Medical History:  
Diagnosis Date  Anal fistula The patient no longer has an anus.  Anal stenosis The patient no longer has an anus.  Chronic kidney disease  Crohn's disease ([address]  GERD (gastroesophageal reflux disease)  H/O ulcerative colitis Symptoms began in 1996. Total proctocolectomy was performed in 2004. Patient later developed Crohn's disease.  Hiatal hernia  Ileal pouchitis ([address] 05/2004 The patient no longer has a pouch.  Nausea & vomiting Combination of Scopalamine patch & Zofran IV worked well in past  
 Numbness in right leg Chronic.  Opioid dependence ([address] History of opioid dependence requiring a detox program.  
 Psychiatric disorder   
 depression and anxiety  Skin lesion of back 3/17/2014  Syncopal episodes Past Surgical History:  
Procedure Laterality Date  ABDOMEN SURGERY PROC UNLISTED  3/25/2004 Total proctocolectomy with creation of ileoanal J-pouch and loop ileostomy; Dr. Nanci Benoit.  COLONOSCOPY N/A 4/12/2018 COLONOSCOPY performed by Don Bolton MD at [address] [place] [address]  12/2016 Dr. Chi Richards  HX GI  5/2/2004 Examination under anesthesia with endoscopic evaluation of ileoanal pouch and placement of drain; Dr. Nanci Benoit.   GI  5/25/2004 Ileostomy closure with small bowel resection and anastomosis; Dr. Delbert Topete.   GI  5/24/2012 Examination under anesthesia, anal dilatation, anal biopsy, and placement of draining seton; Dr. Delbert Topete.   GI  7/3/2012 Incision and drainage of perirectal abscess and rigid endoscopy of ileoanal pouch; Dr. Delbert Topete.   GI  7/31/2012 Incision and drainage of perirectal abscess, placement of draining seton, and anal dilatation; Dr. Delbert Topete.   GI  1/22/2013 Repair of anal fistulas with debridement, partial fistulectomy, and flap closure; Dr. Delbert Topete.   GI  5/17/2013 Examination under anesthesia, partial fistulotomy,  and placement of draining setons; Dr. Delbert Topete.   GI  8/6/2013 Anal fistulotomy and application of ACell micromatrix; Dr. Delbert Topete.   GI  2/20/2014 Examination under anesthesia and dilation of anal canal with rigid proctoscopy; Corrie Yao MD.  
[address] [place] [address] GI  4/29/2015 Creation of Franc Horseman continent intestinal reservoir (BCIR), abdominoperineal resection of ileoanal J-pouch; lysis of adhesions, and gastrostomy; Logan Holbrook MD ([facility])   GI  8/7/2015 Stoma revision; Edie Ritchie. Manjula Holbrook MD ([facility])   GI  10/29/2015 Extensive lysis of adhesions, resection of continent intestinal reservoir, repair of serosal tears, and creation of end-ileostomy; Dr. Delbert Topete.   GI  03/14/2017 Laparotomy, extensive lysis of adhesions and revision of ileostomy; Dr. Delbert Topete. [facility]  [facility] Left [facility]  HX OTHER SURGICAL  11/29/2017 Conversion of end-ileostomy to BCIR ([address]); [facility] HX UROLOGICAL  03/14/2017 Cystoscopy and placement of bilateral temporary ureteral catheters; Elder Snider MD.  
 
   
Family History:  
Problem Relation Age of Onset  Cancer Father  Asthma Neg Hx  Diabetes Neg Hx  Heart Disease Neg Hx  Hypertension Neg Hx  Stroke Neg Hx  Malignant Hyperthermia Neg Hx  Pseudocholinesterase Deficiency Neg Hx  Delayed Awakening Neg Hx  Post-op Nausea/Vomiting Neg Hx Social History Social History  Marital status:  Spouse name: N/A  
 Number of children: N/A  
 Years of education: N/A Occupational History  Not on file. Social History Main Topics  Smoking status: Former Smoker Packs/day: 0.00 Years: 7.00 Quit date: 1/14/2017  Smokeless tobacco: Never Used  Alcohol use No  
 Drug use: No  
 Sexual activity: Not on file Other Topics Concern  Not on file Social History Narrative ALLERGIES: Remicade [infliximab]; Bactrim [sulfamethoprim ds]; Morphine; and Nsaids (non-steroidal anti-inflammatory drug) Review of Systems Constitutional: Negative for chills and fever. HENT: Positive for congestion and sore throat. Negative for ear discharge, ear pain and facial swelling. Respiratory: Positive for cough and chest tightness. Negative for choking. Cardiovascular: Negative for chest pain. Skin: Negative for color change and pallor. Neurological: Negative for headaches. All other systems reviewed and are negative. Vitals:  
 09/29/18 1233 BP: 118/73 Pulse: (!) 129 Resp: 16 Temp: 99 °F (37.2 °C) SpO2: 100% Weight: 86.2 kg (190 lb) Height: 6' (1.829 m) Physical Exam  
Constitutional: He appears well-developed and well-nourished. No distress. HENT:  
Head: Normocephalic and atraumatic. Neck: Normal range of motion. Neck supple. Cardiovascular: Regular rhythm, S1 normal, S2 normal, normal heart sounds and intact distal pulses. Tachycardia present. Exam reveals no gallop and no friction rub. No murmur heard. Pulmonary/Chest:  
Rhoncus breath sounds Abdominal: Soft. Bowel sounds are normal. He exhibits no distension. There is no tenderness. Nursing note and vitals reviewed. MDM Number of Diagnoses or Management Options Acute bronchitis, unspecified organism:  
Diagnosis management comments: Noting mild Leukocytosis, Tachycardic, but history of baseline Tachycardia ranging 110-130's rate, related to pain and previously evaluated by Cardiology. CXR without acute cardiopulmonary process. Noting possible mild Atelectasis. Treatment for Bronchitis with Albuterol, Prednisone burst, and Tessalon. Given Solumedrol and albuterol in ED. Advised on importance of smoking cessation. 2:20 PM 
Patient's results have been reviewed with them. Patient and/or family have verbally conveyed their understanding and agreement of the patient's signs, symptoms, diagnosis, treatment and prognosis and additionally agree to follow up as recommended or return to the Emergency Room should their condition change prior to follow-up. Discharge instructions have also been provided to the patient with some educational information regarding their diagnosis as well a list of reasons why they would want to return to the ER prior to their follow-up appointment should their condition change. Amount and/or Complexity of Data Reviewed Clinical lab tests: ordered and reviewed Tests in the radiology section of CPT®: ordered and reviewed Discuss the patient with other providers: yes Risk of Complications, Morbidity, and/or Mortality Presenting problems: low Diagnostic procedures: low Management options: low Patient Progress Patient progress: stable ED Course Procedures

## 2018-12-07 ENCOUNTER — APPOINTMENT (OUTPATIENT)
Dept: GENERAL RADIOLOGY | Age: 43
DRG: 389 | End: 2018-12-07
Attending: EMERGENCY MEDICINE
Payer: COMMERCIAL

## 2018-12-07 ENCOUNTER — HOSPITAL ENCOUNTER (INPATIENT)
Age: 43
LOS: 5 days | Discharge: HOME | DRG: 389 | End: 2018-12-12
Attending: EMERGENCY MEDICINE | Admitting: COLON & RECTAL SURGERY
Payer: COMMERCIAL

## 2018-12-07 ENCOUNTER — APPOINTMENT (OUTPATIENT)
Dept: CT IMAGING | Age: 43
DRG: 389 | End: 2018-12-07
Attending: EMERGENCY MEDICINE
Payer: COMMERCIAL

## 2018-12-07 DIAGNOSIS — K50.919 CROHN'S DISEASE WITH COMPLICATION, UNSPECIFIED GASTROINTESTINAL TRACT LOCATION (HCC): ICD-10-CM

## 2018-12-07 DIAGNOSIS — K56.609 SMALL BOWEL OBSTRUCTION (HCC): Primary | ICD-10-CM

## 2018-12-07 LAB
ALBUMIN SERPL-MCNC: 3.9 G/DL (ref 3.5–5)
ALBUMIN/GLOB SERPL: 1 {RATIO} (ref 1.1–2.2)
ALP SERPL-CCNC: 123 U/L (ref 45–117)
ALT SERPL-CCNC: 17 U/L (ref 12–78)
ANION GAP SERPL CALC-SCNC: 8 MMOL/L (ref 5–15)
AST SERPL-CCNC: 14 U/L (ref 15–37)
BASOPHILS # BLD: 0 K/UL (ref 0–0.1)
BASOPHILS NFR BLD: 0 % (ref 0–1)
BILIRUB SERPL-MCNC: 0.4 MG/DL (ref 0.2–1)
BUN SERPL-MCNC: 12 MG/DL (ref 6–20)
BUN/CREAT SERPL: 11 (ref 12–20)
CALCIUM SERPL-MCNC: 9 MG/DL (ref 8.5–10.1)
CHLORIDE SERPL-SCNC: 108 MMOL/L (ref 97–108)
CO2 SERPL-SCNC: 27 MMOL/L (ref 21–32)
COMMENT, HOLDF: NORMAL
CREAT SERPL-MCNC: 1.07 MG/DL (ref 0.7–1.3)
DIFFERENTIAL METHOD BLD: ABNORMAL
EOSINOPHIL # BLD: 0 K/UL (ref 0–0.4)
EOSINOPHIL NFR BLD: 0 % (ref 0–7)
ERYTHROCYTE [DISTWIDTH] IN BLOOD BY AUTOMATED COUNT: 14.1 % (ref 11.5–14.5)
GLOBULIN SER CALC-MCNC: 3.8 G/DL (ref 2–4)
GLUCOSE SERPL-MCNC: 96 MG/DL (ref 65–100)
HCT VFR BLD AUTO: 46.1 % (ref 36.6–50.3)
HGB BLD-MCNC: 14.6 G/DL (ref 12.1–17)
IMM GRANULOCYTES # BLD: 0 K/UL (ref 0–0.04)
IMM GRANULOCYTES NFR BLD AUTO: 0 % (ref 0–0.5)
LIPASE SERPL-CCNC: 99 U/L (ref 73–393)
LYMPHOCYTES # BLD: 2 K/UL (ref 0.8–3.5)
LYMPHOCYTES NFR BLD: 19 % (ref 12–49)
MCH RBC QN AUTO: 29.2 PG (ref 26–34)
MCHC RBC AUTO-ENTMCNC: 31.7 G/DL (ref 30–36.5)
MCV RBC AUTO: 92.2 FL (ref 80–99)
MONOCYTES # BLD: 0.3 K/UL (ref 0–1)
MONOCYTES NFR BLD: 3 % (ref 5–13)
NEUTS SEG # BLD: 8.3 K/UL (ref 1.8–8)
NEUTS SEG NFR BLD: 77 % (ref 32–75)
NRBC # BLD: 0 K/UL (ref 0–0.01)
NRBC BLD-RTO: 0 PER 100 WBC
PLATELET # BLD AUTO: 202 K/UL (ref 150–400)
PMV BLD AUTO: 11.5 FL (ref 8.9–12.9)
POTASSIUM SERPL-SCNC: 3.7 MMOL/L (ref 3.5–5.1)
PROT SERPL-MCNC: 7.7 G/DL (ref 6.4–8.2)
RBC # BLD AUTO: 5 M/UL (ref 4.1–5.7)
SAMPLES BEING HELD,HOLD: NORMAL
SODIUM SERPL-SCNC: 143 MMOL/L (ref 136–145)
WBC # BLD AUTO: 10.7 K/UL (ref 4.1–11.1)

## 2018-12-07 PROCEDURE — 74177 CT ABD & PELVIS W/CONTRAST: CPT

## 2018-12-07 PROCEDURE — 74011250636 HC RX REV CODE- 250/636: Performed by: EMERGENCY MEDICINE

## 2018-12-07 PROCEDURE — 96374 THER/PROPH/DIAG INJ IV PUSH: CPT

## 2018-12-07 PROCEDURE — 85025 COMPLETE CBC W/AUTO DIFF WBC: CPT

## 2018-12-07 PROCEDURE — 96361 HYDRATE IV INFUSION ADD-ON: CPT

## 2018-12-07 PROCEDURE — 83690 ASSAY OF LIPASE: CPT

## 2018-12-07 PROCEDURE — 65270000029 HC RM PRIVATE

## 2018-12-07 PROCEDURE — 96375 TX/PRO/DX INJ NEW DRUG ADDON: CPT

## 2018-12-07 PROCEDURE — 74011636320 HC RX REV CODE- 636/320: Performed by: RADIOLOGY

## 2018-12-07 PROCEDURE — 74011250636 HC RX REV CODE- 250/636: Performed by: COLON & RECTAL SURGERY

## 2018-12-07 PROCEDURE — 80053 COMPREHEN METABOLIC PANEL: CPT

## 2018-12-07 PROCEDURE — 74011250637 HC RX REV CODE- 250/637: Performed by: COLON & RECTAL SURGERY

## 2018-12-07 PROCEDURE — 36415 COLL VENOUS BLD VENIPUNCTURE: CPT

## 2018-12-07 PROCEDURE — 74011000258 HC RX REV CODE- 258: Performed by: RADIOLOGY

## 2018-12-07 PROCEDURE — 74019 RADEX ABDOMEN 2 VIEWS: CPT

## 2018-12-07 PROCEDURE — 96376 TX/PRO/DX INJ SAME DRUG ADON: CPT

## 2018-12-07 PROCEDURE — 99284 EMERGENCY DEPT VISIT MOD MDM: CPT

## 2018-12-07 RX ORDER — HYDROMORPHONE HYDROCHLORIDE 2 MG/ML
1-1.5 INJECTION, SOLUTION INTRAMUSCULAR; INTRAVENOUS; SUBCUTANEOUS
Status: DISCONTINUED | OUTPATIENT
Start: 2018-12-07 | End: 2018-12-12 | Stop reason: HOSPADM

## 2018-12-07 RX ORDER — HYDROMORPHONE HYDROCHLORIDE 2 MG/ML
1 INJECTION, SOLUTION INTRAMUSCULAR; INTRAVENOUS; SUBCUTANEOUS
Status: COMPLETED | OUTPATIENT
Start: 2018-12-07 | End: 2018-12-07

## 2018-12-07 RX ORDER — ALBUTEROL SULFATE 0.83 MG/ML
2.5 SOLUTION RESPIRATORY (INHALATION)
Status: DISCONTINUED | OUTPATIENT
Start: 2018-12-07 | End: 2018-12-12 | Stop reason: HOSPADM

## 2018-12-07 RX ORDER — DICLOFENAC SODIUM 10 MG/G
2 GEL TOPICAL
COMMUNITY

## 2018-12-07 RX ORDER — DICYCLOMINE HYDROCHLORIDE 10 MG/1
10 CAPSULE ORAL
Status: DISCONTINUED | OUTPATIENT
Start: 2018-12-07 | End: 2018-12-09

## 2018-12-07 RX ORDER — ALBUTEROL SULFATE 90 UG/1
2 AEROSOL, METERED RESPIRATORY (INHALATION)
Status: DISCONTINUED | OUTPATIENT
Start: 2018-12-07 | End: 2018-12-07 | Stop reason: CLARIF

## 2018-12-07 RX ORDER — PREGABALIN 100 MG/1
100 CAPSULE ORAL 4 TIMES DAILY
Status: DISCONTINUED | OUTPATIENT
Start: 2018-12-07 | End: 2018-12-12 | Stop reason: HOSPADM

## 2018-12-07 RX ORDER — HYDROMORPHONE HYDROCHLORIDE 2 MG/ML
1 INJECTION, SOLUTION INTRAMUSCULAR; INTRAVENOUS; SUBCUTANEOUS ONCE
Status: COMPLETED | OUTPATIENT
Start: 2018-12-07 | End: 2018-12-07

## 2018-12-07 RX ORDER — SODIUM CHLORIDE 0.9 % (FLUSH) 0.9 %
10 SYRINGE (ML) INJECTION
Status: COMPLETED | OUTPATIENT
Start: 2018-12-07 | End: 2018-12-07

## 2018-12-07 RX ORDER — ONDANSETRON 2 MG/ML
4 INJECTION INTRAMUSCULAR; INTRAVENOUS
Status: COMPLETED | OUTPATIENT
Start: 2018-12-07 | End: 2018-12-07

## 2018-12-07 RX ORDER — PROCHLORPERAZINE EDISYLATE 5 MG/ML
10 INJECTION INTRAMUSCULAR; INTRAVENOUS
Status: COMPLETED | OUTPATIENT
Start: 2018-12-07 | End: 2018-12-07

## 2018-12-07 RX ORDER — DEXTROSE MONOHYDRATE, SODIUM CHLORIDE, SODIUM LACTATE, POTASSIUM CHLORIDE, CALCIUM CHLORIDE 5; 600; 310; 179; 20 G/100ML; MG/100ML; MG/100ML; MG/100ML; MG/100ML
INJECTION, SOLUTION INTRAVENOUS CONTINUOUS
Status: DISCONTINUED | OUTPATIENT
Start: 2018-12-07 | End: 2018-12-12 | Stop reason: HOSPADM

## 2018-12-07 RX ORDER — SODIUM CHLORIDE 0.9 % (FLUSH) 0.9 %
5-10 SYRINGE (ML) INJECTION AS NEEDED
Status: DISCONTINUED | OUTPATIENT
Start: 2018-12-07 | End: 2018-12-12 | Stop reason: HOSPADM

## 2018-12-07 RX ORDER — TEMAZEPAM 30 MG/1
30 CAPSULE ORAL
Status: ON HOLD | COMMUNITY
End: 2019-01-04

## 2018-12-07 RX ORDER — ZOLPIDEM TARTRATE 5 MG/1
10 TABLET ORAL
Status: DISCONTINUED | OUTPATIENT
Start: 2018-12-07 | End: 2018-12-12 | Stop reason: HOSPADM

## 2018-12-07 RX ORDER — NALOXONE HYDROCHLORIDE 0.4 MG/ML
0.4 INJECTION, SOLUTION INTRAMUSCULAR; INTRAVENOUS; SUBCUTANEOUS AS NEEDED
Status: DISCONTINUED | OUTPATIENT
Start: 2018-12-07 | End: 2018-12-12 | Stop reason: HOSPADM

## 2018-12-07 RX ORDER — PROCHLORPERAZINE EDISYLATE 5 MG/ML
5 INJECTION INTRAMUSCULAR; INTRAVENOUS
Status: DISCONTINUED | OUTPATIENT
Start: 2018-12-07 | End: 2018-12-07 | Stop reason: SDUPTHER

## 2018-12-07 RX ORDER — DIAZEPAM 5 MG/1
5 TABLET ORAL 2 TIMES DAILY
Status: DISCONTINUED | OUTPATIENT
Start: 2018-12-07 | End: 2018-12-12 | Stop reason: HOSPADM

## 2018-12-07 RX ORDER — ONDANSETRON 2 MG/ML
4 INJECTION INTRAMUSCULAR; INTRAVENOUS
Status: DISCONTINUED | OUTPATIENT
Start: 2018-12-07 | End: 2018-12-12 | Stop reason: HOSPADM

## 2018-12-07 RX ORDER — DIPHENHYDRAMINE HYDROCHLORIDE 50 MG/ML
12.5 INJECTION, SOLUTION INTRAMUSCULAR; INTRAVENOUS
Status: ACTIVE | OUTPATIENT
Start: 2018-12-07 | End: 2018-12-08

## 2018-12-07 RX ORDER — METRONIDAZOLE 500 MG/100ML
500 INJECTION, SOLUTION INTRAVENOUS EVERY 12 HOURS
Status: DISCONTINUED | OUTPATIENT
Start: 2018-12-07 | End: 2018-12-08

## 2018-12-07 RX ORDER — SAME BUTANEDISULFONATE/BETAINE 400-600 MG
250 POWDER IN PACKET (EA) ORAL
COMMUNITY

## 2018-12-07 RX ORDER — SODIUM CHLORIDE 0.9 % (FLUSH) 0.9 %
5-10 SYRINGE (ML) INJECTION EVERY 8 HOURS
Status: DISCONTINUED | OUTPATIENT
Start: 2018-12-07 | End: 2018-12-12 | Stop reason: HOSPADM

## 2018-12-07 RX ADMIN — IOPAMIDOL 100 ML: 755 INJECTION, SOLUTION INTRAVENOUS at 17:38

## 2018-12-07 RX ADMIN — SODIUM CHLORIDE 1000 ML: 900 INJECTION, SOLUTION INTRAVENOUS at 15:38

## 2018-12-07 RX ADMIN — METRONIDAZOLE 500 MG: 500 INJECTION, SOLUTION INTRAVENOUS at 21:41

## 2018-12-07 RX ADMIN — Medication 10 ML: at 17:38

## 2018-12-07 RX ADMIN — SODIUM CHLORIDE 100 ML: 900 INJECTION, SOLUTION INTRAVENOUS at 17:39

## 2018-12-07 RX ADMIN — HYDROMORPHONE HYDROCHLORIDE 1.5 MG: 2 INJECTION INTRAMUSCULAR; INTRAVENOUS; SUBCUTANEOUS at 21:21

## 2018-12-07 RX ADMIN — PROCHLORPERAZINE EDISYLATE 10 MG: 5 INJECTION INTRAMUSCULAR; INTRAVENOUS at 18:05

## 2018-12-07 RX ADMIN — DEXTROSE MONOHYDRATE, SODIUM CHLORIDE, SODIUM LACTATE, POTASSIUM CHLORIDE, CALCIUM CHLORIDE: 5; 600; 310; 179; 20 INJECTION, SOLUTION INTRAVENOUS at 23:00

## 2018-12-07 RX ADMIN — SODIUM CHLORIDE 1000 ML: 900 INJECTION, SOLUTION INTRAVENOUS at 18:05

## 2018-12-07 RX ADMIN — DIAZEPAM 5 MG: 2 TABLET ORAL at 21:21

## 2018-12-07 RX ADMIN — PREGABALIN 100 MG: 100 CAPSULE ORAL at 21:21

## 2018-12-07 RX ADMIN — HYDROMORPHONE HYDROCHLORIDE 1 MG: 2 INJECTION INTRAMUSCULAR; INTRAVENOUS; SUBCUTANEOUS at 18:05

## 2018-12-07 RX ADMIN — ZOLPIDEM TARTRATE 10 MG: 5 TABLET ORAL at 23:00

## 2018-12-07 RX ADMIN — Medication 10 ML: at 21:21

## 2018-12-07 RX ADMIN — HYDROMORPHONE HYDROCHLORIDE 1 MG: 2 INJECTION INTRAMUSCULAR; INTRAVENOUS; SUBCUTANEOUS at 15:38

## 2018-12-07 RX ADMIN — ONDANSETRON 4 MG: 2 INJECTION INTRAMUSCULAR; INTRAVENOUS at 15:38

## 2018-12-07 NOTE — ED PROVIDER NOTES
HPI  
 
  42y M with hx of crohns here with abd pain, vomiting c/w prior episodes of SBO. Started in the past 6-7 days. Tried to manage at home but sx's became too severe. Saw his doctor today who sent him here for further evaluation. No back or flank pain. No fever. No cough. No trouble breathing. Nothing makes sx's better or worse. Has diffuse abd pain when he tries to eat. Also reports NB/NB vomiting. Past Medical History:  
Diagnosis Date  Anal fistula The patient no longer has an anus.  Anal stenosis The patient no longer has an anus.  Chronic kidney disease  Crohn's disease ([address]  GERD (gastroesophageal reflux disease)  H/O ulcerative colitis Symptoms began in 1996. Total proctocolectomy was performed in 2004. Patient later developed Crohn's disease.  Hiatal hernia  Ileal pouchitis ([address] 05/2004 The patient no longer has a pouch.  Nausea & vomiting Combination of Scopalamine patch & Zofran IV worked well in past  
 Numbness in right leg Chronic.  Opioid dependence ([address] History of opioid dependence requiring a detox program.  
 Psychiatric disorder   
 depression and anxiety  Skin lesion of back 3/17/2014  Syncopal episodes Past Surgical History:  
Procedure Laterality Date  ABDOMEN SURGERY PROC UNLISTED  3/25/2004 Total proctocolectomy with creation of ileoanal J-pouch and loop ileostomy; Dr. Marin Miller.  COLONOSCOPY N/A 4/12/2018 COL[facility]OSCOPY [facility] Sachin Mercedes [facility]  12/2016 Dr. Elizabeth Grow   GI  5/2/2004 Examination under anesthesia with endoscopic evaluation of ileoanal pouch and placement of drain; Dr. Marin Miller.  HX GI  5/25/2004 Ileostomy closure with small bowel resection and anastomosis; Dr. Marin Miller.   GI  5/24/2012 Examination under anesthesia, anal dilatation, anal biopsy, and placement of draining seton; Dr. Marin Miller.   GI  7/3/2012 Incision and drainage of perirectal abscess and rigid endoscopy of ileoanal pouch; Dr. Ting Poole.   GI  7/31/2012 Incision and drainage of perirectal abscess, placement of draining seton, and anal dilatation; Dr. Ting Poole.   GI  1/22/2013 Repair of anal fistulas with debridement, partial fistulectomy, and flap closure; Dr. Ting Poole.   GI  5/17/2013 Examination under anesthesia, partial fistulotomy,  and placement of draining setons; Dr. Ting Poole.  HX GI  8/6/2013 Anal fistulotomy and application of ACell micromatrix; Dr. Ting Poole.   GI  2/20/2014 Examination under anesthesia and dilation of anal canal with rigid proctoscopy; Richard Ceballos MD.  
[facility] GI  4/29/2015 Creation of Everlean Irineo continent intestinal reservoir (BCIR), abdominoperineal resection of ileoanal J-pouch; lysis of adhesions, and gastrostomy; Marily Johnson MD ([place])   GI  8/7/2015 Stoma revision; Jin Johnson MD ([place], [place])   GI  10/29/2015 Extensive lysis of adhesions, resection of continent intestinal reservoir, repair of serosal tears, and creation of end-ileostomy; Dr. Ting Patterson   GI  03/14/2017 Laparotomy, extensive lysis of adhesions and revision of ileostomy; Dr. Ting Poole. [facility]  2008 Left ACL repair  HX OTHER SURGICAL  11/29/2017 Conversion of end-ileostomy to BCIR ([address]); [facility] HX UROLOGICAL  03/14/2017 Cystoscopy and placement of bilateral temporary ureteral catheters; Nilsa Stevens MD.  
 
   
Family History:  
Problem Relation Age of Onset  Cancer Father  Asthma Neg Hx  Diabetes Neg Hx   
 Heart Disease Neg Hx  Hypertension Neg Hx  Stroke Neg Hx  Malignant Hyperthermia Neg Hx  Pseudocholinesterase Deficiency Neg Hx  Delayed Awakening Neg Hx  Post-op Nausea/Vomiting Neg Hx Social History Socioeconomic History  Marital status:  Spouse name: Not on file  Number of children: Not on file  Years of education: Not on file  Highest education level: Not on file Social Needs  Financial resource strain: Not on file  Food insecurity - worry: Not on file  Food insecurity - inability: Not on file  Transportation needs - medical: Not on file  Transportation needs - non-medical: Not on file Occupational History  Not on file Tobacco Use  Smoking status: Current Every Day Smoker Packs/day: 0.50 Years: 7.00 Pack years: 3.50 Last attempt to quit: 2017 Years since quittin.8  Smokeless tobacco: Never Used Substance and Sexual Activity  Alcohol use: No  
 Drug use: No  
 Sexual activity: Not on file Other Topics Concern  Not on file Social History Narrative  Not on file ALLERGIES: Remicade [infliximab]; Bactrim [sulfamethoprim ds]; Morphine; and Nsaids (non-steroidal anti-inflammatory drug) Review of Systems Review of Systems Constitutional: (-) weight loss. HEENT: (-) stiff neck Eyes: (-) discharge. Respiratory: (-) cough. Cardiovascular: (-) syncope. Gastrointestinal: (-) blood in stool. Genitourinary: (-) hematuria. Musculoskeletal: (-) myalgias. Neurological: (-) seizure. Skin: (-) petechiae Lymph/Immunologic: (-) enlarged lymph nodes All other systems reviewed and are negative. Vitals:  
 18 1503 18 1516 BP:  123/81 Pulse: (!) 140 95 Resp: 16 16 Temp:  98.6 °F (37 °C) SpO2: 99% 100% Weight:  86.2 kg (190 lb) Height:  6' (1.829 m) Physical Exam Nursing note and vitals reviewed. Constitutional: oriented to person, place, and time. appears well-developed and well-nourished. No distress. Head: Normocephalic and atraumatic. Sclera anicteric Nose: No rhinorrhea Mouth/Throat: Oropharynx is clear and moist. Pharynx normal 
 Eyes: Conjunctivae are normal. Pupils are equal, round, and reactive to light. Right eye exhibits no discharge. Left eye exhibits no discharge. Neck: Painless normal range of motion. Neck supple. No LAD. Cardiovascular: Normal rate, regular rhythm, normal heart sounds and intact distal pulses. Exam reveals no gallop and no friction rub. No murmur heard. Pulmonary/Chest:  No respiratory distress. No wheezes. No rales. No rhonchi. No increased work of breathing. No accessory muscle use. Good air exchange throughout. Abdominal: soft, diffusely tender, old scars noted, no rebound or guarding. No hepatosplenomegaly. Normal bowel sounds throughout. Back: no tenderness to palpation, no deformities, no CVA tenderness Extremities/Musculoskeletal: Normal range of motion. no tenderness. No edema. Distal extremities are neurovasc intact. Lymphadenopathy:   No adenopathy. Neurological:  Alert and oriented to person, place, and time. Coordination normal. CN 2-12 intact. Motor and sensory function intact. Skin: Skin is warm and dry. No rash noted. No pallor. MDM  42y M here with abd pain. Feels like SBO. Hx of Crohns. Will need labs and imaging. Likely admit. Procedures

## 2018-12-07 NOTE — PROGRESS NOTES
Admission Medication Reconciliation: 
 
Information obtained from:  patient, chart review, insurance claim history Comments/Recommendations: All medications/allergies have been reviewed and updated; last medication administration times reviewed and recorded. The patient was a good historian and could recall names and doses of medications. Changes made to Prior to Admission (PTA) Medication List: ? Medications Added:  
- temazepam 30 mg QHS PRN 
- diclofenax gel ? Medications Changed:  
- diazepam changed to BID 
- changed formulation of probiotic from infant to adult ? Medications Removed:  
- Celexa 20 mg daily - Kimberley Enzyme 2-3 tablets TID Significant PMH/Disease States:  
Past Medical History:  
Diagnosis Date  Anal fistula The patient no longer has an anus.  Anal stenosis The patient no longer has an anus.  Chronic kidney disease  Crohn's disease ([address]  GERD (gastroesophageal reflux disease)  H/O ulcerative colitis Symptoms began in 1996. Total proctocolectomy was performed in 2004. Patient later developed Crohn's disease.  Hiatal hernia  Ileal pouchitis ([address] 05/2004 The patient no longer has a pouch.  Nausea & vomiting Combination of Scopalamine patch & Zofran IV worked well in past  
 Numbness in right leg Chronic.  Opioid dependence ([address] History of opioid dependence requiring a detox program.  
 Psychiatric disorder   
 depression and anxiety  Skin lesion of back 3/17/2014  Syncopal episodes Chief Complaint for this Admission: Chief Complaint Patient presents with  Abdominal Pain  Referral / Consult Allergies:  Remicade [infliximab]; Bactrim [sulfamethoprim ds]; Morphine; and Nsaids (non-steroidal anti-inflammatory drug) Prior to Admission Medications:  
Prior to Admission Medications Prescriptions Last Dose Informant Patient Reported? Taking? Saccharomyces boulardii (FLORASTOR) 250 mg capsule 2018 at Unknown time  Yes Yes Sig: Take 250 mg by mouth nightly. Ustekinumab (STELARA) 90 mg/mL injection 2018 Self Yes No  
Si mg by SubCUTAneous route. Every 8 weeks   
acetaminophen (TYLENOL) 500 mg tablet   Yes No  
Sig: Take 500 mg by mouth four (4) times daily as needed (mild pain, heache (usually has headaches 1 week after Stelara)). albuterol (PROVENTIL HFA, VENTOLIN HFA, PROAIR HFA) 90 mcg/actuation inhaler   No No  
Sig: Take 2 Puffs by inhalation every four (4) hours as needed for Wheezing. cyanocobalamin (VITAMIN B-12) 1,000 mcg/mL injection  Self Yes No  
Si,000 mcg by IntraMUSCular route every . Indications: Once weekly  
diazepam (VALIUM) 5 mg tablet 2018 at Unknown time Self Yes Yes Sig: Take 5 mg by mouth two (2) times a day. diclofenac (VOLTAREN) 1 % gel   Yes Yes Sig: Apply 2 g to affected area four (4) times daily as needed. dicyclomine (BENTYL) 10 mg capsule 2018 at Unknown time Self Yes Yes Sig: Take 10 mg by mouth every six (6) hours as needed. ergocalciferol (ERGOCALCIFEROL) 50,000 unit capsule  Self Yes No  
Sig: Take 50,000 Units by mouth every . omeprazole (PRILOSEC) 40 mg capsule  Self Yes No  
Sig: Take 40 mg by mouth nightly. ondansetron hcl (ZOFRAN, AS HYDROCHLORIDE,) 8 mg tablet  Self Yes No  
Sig: Take 8 mg by mouth every twelve (12) hours as needed (Mild Nausea). oxyCODONE-acetaminophen (PERCOCET)  mg per tablet   Yes No  
Sig: Take 1-2 Tabs by mouth every four (4) hours as needed for Pain (Being tapered per Dr. Guillaume Garcia). pregabalin (LYRICA) 100 mg capsule 2018 at Unknown time Self Yes Yes Sig: Take 100 mg by mouth four (4) times daily. prochlorperazine (COMPAZINE) 10 mg tablet   Yes No  
Sig: Take 10 mg by mouth two (2) times daily as needed (Moderate Nausea).   
promethazine (PHENERGAN) 25 mg tablet  Self Yes No  
 Sig: Take 25 mg by mouth every six (6) hours as needed (Severe Nausea). temazepam (RESTORIL) 30 mg capsule   Yes Yes Sig: Take 30 mg by mouth nightly as needed for Sleep.  
traMADol (ULTRAM) 50 mg tablet  Self Yes No  
Sig: Take 50 mg by mouth three (3) times daily as needed (moderate pain). zolpidem (AMBIEN) 10 mg tablet 12/6/2018 at Unknown time  Yes Yes Sig: Take 10 mg by mouth nightly. Facility-Administered Medications: None Thank you for allowing pharmacy to participate in the coordination of this patient's care. If you have any other questions, please contact the medication reconciliation pharmacist at x [phone]. Dg Mckeon, Pharm. D., BCPS

## 2018-12-07 NOTE — ED TRIAGE NOTES
Pt reports having Chron's and has been experiencing nausea, vomiting and severe left lower abdominal pain for the past week. Pt states he had been taking Stelara but has been out of it due to financial issues.

## 2018-12-08 LAB
ANION GAP SERPL CALC-SCNC: 5 MMOL/L (ref 5–15)
BASOPHILS # BLD: 0 K/UL (ref 0–0.1)
BASOPHILS NFR BLD: 0 % (ref 0–1)
BUN SERPL-MCNC: 10 MG/DL (ref 6–20)
BUN/CREAT SERPL: 11 (ref 12–20)
CALCIUM SERPL-MCNC: 8.3 MG/DL (ref 8.5–10.1)
CHLORIDE SERPL-SCNC: 112 MMOL/L (ref 97–108)
CO2 SERPL-SCNC: 27 MMOL/L (ref 21–32)
CREAT SERPL-MCNC: 0.87 MG/DL (ref 0.7–1.3)
DIFFERENTIAL METHOD BLD: ABNORMAL
EOSINOPHIL # BLD: 0.2 K/UL (ref 0–0.4)
EOSINOPHIL NFR BLD: 2 % (ref 0–7)
ERYTHROCYTE [DISTWIDTH] IN BLOOD BY AUTOMATED COUNT: 14.2 % (ref 11.5–14.5)
GLUCOSE SERPL-MCNC: 95 MG/DL (ref 65–100)
HCT VFR BLD AUTO: 37.8 % (ref 36.6–50.3)
HGB BLD-MCNC: 11.6 G/DL (ref 12.1–17)
IMM GRANULOCYTES # BLD: 0 K/UL (ref 0–0.04)
IMM GRANULOCYTES NFR BLD AUTO: 0 % (ref 0–0.5)
LYMPHOCYTES # BLD: 1.9 K/UL (ref 0.8–3.5)
LYMPHOCYTES NFR BLD: 21 % (ref 12–49)
MAGNESIUM SERPL-MCNC: 1.5 MG/DL (ref 1.6–2.4)
MCH RBC QN AUTO: 28.8 PG (ref 26–34)
MCHC RBC AUTO-ENTMCNC: 30.7 G/DL (ref 30–36.5)
MCV RBC AUTO: 93.8 FL (ref 80–99)
MONOCYTES # BLD: 0.4 K/UL (ref 0–1)
MONOCYTES NFR BLD: 5 % (ref 5–13)
NEUTS SEG # BLD: 6.7 K/UL (ref 1.8–8)
NEUTS SEG NFR BLD: 72 % (ref 32–75)
NRBC # BLD: 0 K/UL (ref 0–0.01)
NRBC BLD-RTO: 0 PER 100 WBC
PHOSPHATE SERPL-MCNC: 3.4 MG/DL (ref 2.6–4.7)
PLATELET # BLD AUTO: 164 K/UL (ref 150–400)
PMV BLD AUTO: 11.6 FL (ref 8.9–12.9)
POTASSIUM SERPL-SCNC: 4.2 MMOL/L (ref 3.5–5.1)
RBC # BLD AUTO: 4.03 M/UL (ref 4.1–5.7)
SODIUM SERPL-SCNC: 144 MMOL/L (ref 136–145)
WBC # BLD AUTO: 9.3 K/UL (ref 4.1–11.1)

## 2018-12-08 PROCEDURE — 80048 BASIC METABOLIC PNL TOTAL CA: CPT

## 2018-12-08 PROCEDURE — 84100 ASSAY OF PHOSPHORUS: CPT

## 2018-12-08 PROCEDURE — 36415 COLL VENOUS BLD VENIPUNCTURE: CPT

## 2018-12-08 PROCEDURE — 74011000250 HC RX REV CODE- 250: Performed by: COLON & RECTAL SURGERY

## 2018-12-08 PROCEDURE — 85025 COMPLETE CBC W/AUTO DIFF WBC: CPT

## 2018-12-08 PROCEDURE — 74011250637 HC RX REV CODE- 250/637: Performed by: COLON & RECTAL SURGERY

## 2018-12-08 PROCEDURE — 74011250636 HC RX REV CODE- 250/636: Performed by: COLON & RECTAL SURGERY

## 2018-12-08 PROCEDURE — 65270000029 HC RM PRIVATE

## 2018-12-08 PROCEDURE — 83735 ASSAY OF MAGNESIUM: CPT

## 2018-12-08 RX ORDER — MAGNESIUM SULFATE 1 G/100ML
1 INJECTION INTRAVENOUS ONCE
Status: COMPLETED | OUTPATIENT
Start: 2018-12-08 | End: 2018-12-08

## 2018-12-08 RX ORDER — ACETAMINOPHEN 500 MG
1000 TABLET ORAL
Status: DISCONTINUED | OUTPATIENT
Start: 2018-12-08 | End: 2018-12-12 | Stop reason: HOSPADM

## 2018-12-08 RX ORDER — METRONIDAZOLE 500 MG/100ML
500 INJECTION, SOLUTION INTRAVENOUS EVERY 12 HOURS
Status: DISCONTINUED | OUTPATIENT
Start: 2018-12-08 | End: 2018-12-12 | Stop reason: HOSPADM

## 2018-12-08 RX ADMIN — Medication 10 ML: at 23:04

## 2018-12-08 RX ADMIN — PREGABALIN 100 MG: 100 CAPSULE ORAL at 12:56

## 2018-12-08 RX ADMIN — Medication 10 ML: at 13:03

## 2018-12-08 RX ADMIN — DIAZEPAM 5 MG: 2 TABLET ORAL at 09:05

## 2018-12-08 RX ADMIN — Medication 8 ML: at 06:24

## 2018-12-08 RX ADMIN — HYDROMORPHONE HYDROCHLORIDE 1.5 MG: 2 INJECTION INTRAMUSCULAR; INTRAVENOUS; SUBCUTANEOUS at 03:57

## 2018-12-08 RX ADMIN — PROCHLORPERAZINE EDISYLATE 5 MG: 5 INJECTION INTRAMUSCULAR; INTRAVENOUS at 12:03

## 2018-12-08 RX ADMIN — ZOLPIDEM TARTRATE 10 MG: 5 TABLET ORAL at 23:03

## 2018-12-08 RX ADMIN — ONDANSETRON 4 MG: 2 INJECTION INTRAMUSCULAR; INTRAVENOUS at 00:31

## 2018-12-08 RX ADMIN — PROCHLORPERAZINE EDISYLATE 5 MG: 5 INJECTION INTRAMUSCULAR; INTRAVENOUS at 20:02

## 2018-12-08 RX ADMIN — METRONIDAZOLE 500 MG: 500 INJECTION, SOLUTION INTRAVENOUS at 20:08

## 2018-12-08 RX ADMIN — PREGABALIN 100 MG: 100 CAPSULE ORAL at 09:05

## 2018-12-08 RX ADMIN — HYDROMORPHONE HYDROCHLORIDE 1.5 MG: 2 INJECTION INTRAMUSCULAR; INTRAVENOUS; SUBCUTANEOUS at 10:31

## 2018-12-08 RX ADMIN — DIAZEPAM 5 MG: 2 TABLET ORAL at 17:07

## 2018-12-08 RX ADMIN — METRONIDAZOLE 500 MG: 500 INJECTION, SOLUTION INTRAVENOUS at 09:04

## 2018-12-08 RX ADMIN — PREGABALIN 100 MG: 100 CAPSULE ORAL at 23:03

## 2018-12-08 RX ADMIN — HYDROMORPHONE HYDROCHLORIDE 1.5 MG: 2 INJECTION INTRAMUSCULAR; INTRAVENOUS; SUBCUTANEOUS at 17:05

## 2018-12-08 RX ADMIN — MAGNESIUM SULFATE HEPTAHYDRATE 1 G: 1 INJECTION, SOLUTION INTRAVENOUS at 12:57

## 2018-12-08 RX ADMIN — HYDROMORPHONE HYDROCHLORIDE 1.5 MG: 2 INJECTION INTRAMUSCULAR; INTRAVENOUS; SUBCUTANEOUS at 06:59

## 2018-12-08 RX ADMIN — HYDROMORPHONE HYDROCHLORIDE 1.5 MG: 2 INJECTION INTRAMUSCULAR; INTRAVENOUS; SUBCUTANEOUS at 20:03

## 2018-12-08 RX ADMIN — HYDROMORPHONE HYDROCHLORIDE 1.5 MG: 2 INJECTION INTRAMUSCULAR; INTRAVENOUS; SUBCUTANEOUS at 23:03

## 2018-12-08 RX ADMIN — DEXTROSE MONOHYDRATE, SODIUM CHLORIDE, SODIUM LACTATE, POTASSIUM CHLORIDE, CALCIUM CHLORIDE: 5; 600; 310; 179; 20 INJECTION, SOLUTION INTRAVENOUS at 10:36

## 2018-12-08 RX ADMIN — HYDROMORPHONE HYDROCHLORIDE 1.5 MG: 2 INJECTION INTRAMUSCULAR; INTRAVENOUS; SUBCUTANEOUS at 00:30

## 2018-12-08 RX ADMIN — PREGABALIN 100 MG: 100 CAPSULE ORAL at 17:07

## 2018-12-08 RX ADMIN — HYDROMORPHONE HYDROCHLORIDE 1.5 MG: 2 INJECTION INTRAMUSCULAR; INTRAVENOUS; SUBCUTANEOUS at 13:57

## 2018-12-08 RX ADMIN — ACETAMINOPHEN 1000 MG: 500 TABLET ORAL at 11:59

## 2018-12-08 RX ADMIN — PROCHLORPERAZINE EDISYLATE 5 MG: 5 INJECTION INTRAMUSCULAR; INTRAVENOUS at 03:57

## 2018-12-08 NOTE — ROUTINE PROCESS
TRANSFER - OUT REPORT: 
 
Verbal report given to Val(name) on Tristin Dieter  being transferred to [facility] [place]) for routine progression of care Report consisted of patients Situation, Background, Assessment and  
Recommendations(SBAR). Information from the following report(s) SBAR, ED Summary, Intake/Output, MAR and Recent Results was reviewed with the receiving nurse. Lines:    
 
Opportunity for questions and clarification was provided. Patient transported with: 
 [employer]

## 2018-12-08 NOTE — PROGRESS NOTES
General Daily Progress Note Admission Date: 12/7/2018 Hospital Day 2 Post-Op Day Not Applicable. Subjective:  
Nausea and pain are both somewhat less. Hasn't been able to keep a drainage tube in the ileostomy continuously so far due to lack of the right clip. He has intermittently drained large volumes. Objective:  
 
Patient Vitals for the past 24 hrs: 
 BP Temp Pulse Resp SpO2 Height Weight 12/08/18 1135 104/70 98.7 °F (37.1 °C) 96 18 96 %    
12/08/18 0807 98/65 98.4 °F (36.9 °C) 95 14 95 %    
12/08/18 0347 111/71 98.3 °F (36.8 °C) 95 16 91 %    
12/08/18 0002 96/60 97.9 °F (36.6 °C) 85 16 95 %    
12/07/18 2033 123/70 98.2 °F (36.8 °C) 88 16 94 %    
12/07/18 2000 106/59 98 °F (36.7 °C) 75 16 94 %    
12/07/18 1930 112/67  85 16 93 %    
12/07/18 1809 119/68  90 16 100 %    
12/07/18 1600 114/71    96 %    
12/07/18 1516 123/81 98.6 °F (37 °C) 95 16 100 % 6' (1.829 m) 86.2 kg (190 lb) 12/07/18 1503   (!) 140 16 99 %   No intake/output data recorded. No intake/output data recorded. PHYSICAL EXAMINATION:   
GENERAL:  Less uncomfortable. ABDOMEN:  Soft. Diffusely tender to palpation, but less so than yesterday. No rebound tenderness or involuntary guarding. Multiple scars. No palpable masses or hernias. Right lower quadrant ileostomy. NEURO:  Alert and oriented. Data Review Recent Results (from the past 24 hour(s)) LIPASE Collection Time: 12/07/18  2:49 PM  
Result Value Ref Range Lipase 99 73 - 393 U/L  
CBC WITH AUTOMATED DIFF Collection Time: 12/07/18  3:19 PM  
Result Value Ref Range WBC 10.7 4.1 - 11.1 K/uL  
 RBC 5.00 4. 10 - 5.70 M/uL  
 HGB 14.6 12.1 - 17.0 g/dL HCT 46.1 36.6 - 50.3 % MCV 92.2 80.0 - 99.0 FL  
 MCH 29.2 26.0 - 34.0 PG  
 MCHC 31.7 30.0 - 36.5 g/dL  
 RDW 14.1 11.5 - 14.5 % PLATELET 708 093 - 846 K/uL MPV 11.5 8.9 - 12.9 FL  
 NRBC 0.0 0  WBC ABSOLUTE NRBC 0.00 0.00 - 0.01 K/uL NEUTROPHILS 77 (H) 32 - 75 % LYMPHOCYTES 19 12 - 49 % MONOCYTES 3 (L) 5 - 13 % EOSINOPHILS 0 0 - 7 % BASOPHILS 0 0 - 1 % IMMATURE GRANULOCYTES 0 0.0 - 0.5 % ABS. NEUTROPHILS 8.3 (H) 1.8 - 8.0 K/UL  
 ABS. LYMPHOCYTES 2.0 0.8 - 3.5 K/UL  
 ABS. MONOCYTES 0.3 0.0 - 1.0 K/UL  
 ABS. EOSINOPHILS 0.0 0.0 - 0.4 K/UL  
 ABS. BASOPHILS 0.0 0.0 - 0.1 K/UL  
 ABS. IMM. GRANS. 0.0 0.00 - 0.04 K/UL  
 DF AUTOMATED METABOLIC PANEL, COMPREHENSIVE Collection Time: 12/07/18  3:19 PM  
Result Value Ref Range Sodium 143 136 - 145 mmol/L Potassium 3.7 3.5 - 5.1 mmol/L Chloride 108 97 - 108 mmol/L  
 CO2 27 21 - 32 mmol/L Anion gap 8 5 - 15 mmol/L Glucose 96 65 - 100 mg/dL BUN 12 6 - 20 MG/DL Creatinine 1.07 0.70 - 1.30 MG/DL  
 BUN/Creatinine ratio 11 (L) 12 - 20 GFR est AA >60 >60 ml/min/1.73m2 GFR est non-AA >60 >60 ml/min/1.73m2 Calcium 9.0 8.5 - 10.1 MG/DL Bilirubin, total 0.4 0.2 - 1.0 MG/DL  
 ALT (SGPT) 17 12 - 78 U/L  
 AST (SGOT) 14 (L) 15 - 37 U/L Alk. phosphatase 123 (H) 45 - 117 U/L Protein, total 7.7 6.4 - 8.2 g/dL Albumin 3.9 3.5 - 5.0 g/dL Globulin 3.8 2.0 - 4.0 g/dL A-G Ratio 1.0 (L) 1.1 - 2.2 SAMPLES BEING HELD Collection Time: 12/07/18  3:19 PM  
Result Value Ref Range SAMPLES BEING HELD 1RED 1BLU   
 COMMENT Add-on orders for these samples will be processed based on acceptable specimen integrity and analyte stability, which may vary by analyte. METABOLIC PANEL, BASIC Collection Time: 12/08/18  5:39 AM  
Result Value Ref Range Sodium 144 136 - 145 mmol/L Potassium 4.2 3.5 - 5.1 mmol/L Chloride 112 (H) 97 - 108 mmol/L  
 CO2 27 21 - 32 mmol/L Anion gap 5 5 - 15 mmol/L Glucose 95 65 - 100 mg/dL BUN 10 6 - 20 MG/DL Creatinine 0.87 0.70 - 1.30 MG/DL  
 BUN/Creatinine ratio 11 (L) 12 - 20 GFR est AA >60 >60 ml/min/1.73m2 GFR est non-AA >60 >60 ml/min/1.73m2  Calcium 8.3 (L) 8.5 - 10.1 MG/DL  
 CBC WITH AUTOMATED DIFF Collection Time: 12/08/18  5:39 AM  
Result Value Ref Range WBC 9.3 4.1 - 11.1 K/uL  
 RBC 4.03 (L) 4.10 - 5.70 M/uL  
 HGB 11.6 (L) 12.1 - 17.0 g/dL HCT 37.8 36.6 - 50.3 % MCV 93.8 80.0 - 99.0 FL  
 MCH 28.8 26.0 - 34.0 PG  
 MCHC 30.7 30.0 - 36.5 g/dL  
 RDW 14.2 11.5 - 14.5 % PLATELET 940 293 - 965 K/uL MPV 11.6 8.9 - 12.9 FL  
 NRBC 0.0 0  WBC ABSOLUTE NRBC 0.00 0.00 - 0.01 K/uL NEUTROPHILS 72 32 - 75 % LYMPHOCYTES 21 12 - 49 % MONOCYTES 5 5 - 13 % EOSINOPHILS 2 0 - 7 % BASOPHILS 0 0 - 1 % IMMATURE GRANULOCYTES 0 0.0 - 0.5 % ABS. NEUTROPHILS 6.7 1.8 - 8.0 K/UL  
 ABS. LYMPHOCYTES 1.9 0.8 - 3.5 K/UL  
 ABS. MONOCYTES 0.4 0.0 - 1.0 K/UL  
 ABS. EOSINOPHILS 0.2 0.0 - 0.4 K/UL  
 ABS. BASOPHILS 0.0 0.0 - 0.1 K/UL  
 ABS. IMM. GRANS. 0.0 0.00 - 0.04 K/UL  
 DF AUTOMATED PHOSPHORUS Collection Time: 12/08/18  5:39 AM  
Result Value Ref Range Phosphorus 3.4 2.6 - 4.7 MG/DL MAGNESIUM Collection Time: 12/08/18  5:39 AM  
Result Value Ref Range Magnesium 1.5 (L) 1.6 - 2.4 mg/dL Assessment:  
 
Principal Problem: 
  Small bowel obstruction ([address] (2/12/2018) Active Problems: 
  Ileal pouchitis ([address] (5/1/2004) Chronic narcotic dependence ([address] (1/20/2018) Partial obstruction being managed, but it will still be best to have continuous tube decompression (as I had intended) rather than intermittent. Plan:  
Clear liquid diet with caution. Continuous distal decompression if possible. Continue Flagyl.

## 2018-12-08 NOTE — PROGRESS NOTES
Verbal shift change report given to [id number] [facility] Hector (oncoming nurse) by Edith Crowder (offgoing nurse). Report included the following information SBAR, ED Summary and MAR.

## 2018-12-08 NOTE — CONSULTS
Colorectal Surgery Consultation/Admission Note          NAME:  Nick Landis   :   1975   MRN:   [id number]     Referring Physician:  Paula Jackson MD    Consultation/Admission Date: 2018    Chief Complaint:  Abdominal pain, nausea, and vomiting. History of Present Illness: The patient is a 42-year-old male with a complicated medical and surgical history who is well known to me.  His most recent surgical procedure was the conversion of an end-ileostomy to a [place] continent intestinal reservoir (BCIR) on 2017 in [place], [place]. His most recent previous hospitalization was from 2018 to  for treatment of a partial small bowel obstruction and ileal pouchitis that were successfully treated with bowel rest, distal decompression, parenteral fluids, and parenteral metronidazole. He presented to the ER today after having been seen by his primary care physician/gastroenterologist Judy Stauffer MD) reporting worsening abdominal discomfort, nausea, vomiting, and watery ileostomy output. Of note, he is on Stelara for treatment of Crohn's disease and he is approximately one week overdue for his next dose because he has been unable to obtain the medication. In the office he appeared ill, and he was therefore sent to the ER for further evaluation. A CT scan obtained in the ER (which I have reviewed with the on-duty radiologist, reveals diffuse small intestinal dilation without a transition point and no inflammation to suggest active Crohn's disease. PMH:  Past Medical History:   Diagnosis Date    Anal fistula     The patient no longer has an anus.  Anal stenosis     The patient no longer has an anus.  Chronic kidney disease     Crohn's disease ([address]     GERD (gastroesophageal reflux disease)     H/O ulcerative colitis     Symptoms began in . Total proctocolectomy was performed in . Patient later developed Crohn's disease.     Hiatal hernia     [facility] 05/2004    The patient no longer has a pouch.  Nausea & vomiting     Combination of Scopalamine patch & Zofran IV worked well in past    Numbness in right leg     Chronic.  Opioid dependence ([address]     History of opioid dependence requiring a detox program.    Psychiatric disorder     depression and anxiety    Skin lesion of back 3/17/2014    Syncopal episodes        PSH:  Past Surgical History:   Procedure Laterality Date    ABDOMEN SURGERY PROC UNLISTED  3/25/2004    Total proctocolectomy with creation of ileoanal J-pouch and loop ileostomy; Dr. Luis Enrique Schmitt.  COLONOSCOPY N/A 4/12/2018    Flexible endoscopy via ileostomy (NOT a colonoscopy); Geoff Drummond MD.     CHOLECYSTECTOMY  12/2016    Dr. Jose Bhatt HX GI  5/2/2004    Examination under anesthesia with endoscopic evaluation of ileoanal pouch and placement of drain; Dr. Luis Enrique Schmitt.   GI  5/25/2004    Ileostomy closure with small bowel resection and anastomosis; Dr. Luis Enrique Schmitt.   GI  5/24/2012    Examination under anesthesia, anal dilatation, anal biopsy, and placement of draining seton; Dr. Luis Enrique Schmitt.  HX GI  7/3/2012    Incision and drainage of perirectal abscess and rigid endoscopy of ileoanal pouch; Dr. Luis Enrique Schmitt.   GI  7/31/2012    Incision and drainage of perirectal abscess, placement of draining seton, and anal dilatation; Dr. Luis Enrique Schmitt.   GI  1/22/2013    Repair of anal fistulas with debridement, partial fistulectomy, and flap closure; Dr. Luis Enrique Schmitt.   GI  5/17/2013    Examination under anesthesia, partial fistulotomy,  and placement of draining setons; Dr. Luis Enrique Schmitt.   GI  8/6/2013    Anal fistulotomy and application of ACell micromatrix; Dr. Luis Enrique Schmitt.   GI  2/20/2014    Examination under anesthesia and dilation of anal canal with rigid proctoscopy;  Jin Harris MD.     GI  4/29/2015    Creation of B[place] continent intestinal reservoir (BCIR), abdominoperineal resection of ileoanal J-pouch; lysis of adhesions, and gastrostomy; La Cosme MD ([place], [place])    HX GI  2015    Stoma revision; Taz Fan. Cristi Cosme MD ([place], [place])    HX GI  10/29/2015    Extensive lysis of adhesions, resection of continent intestinal reservoir, repair of serosal tears, and creation of end-ileostomy; Dr. Jason Enriquez.  HX GI  2017    Laparotomy, extensive lysis of adhesions and revision of ileostomy; Dr. Jason Enriquez.  HX ORTHOPAEDIC      Left ACL repair    HX OTHER SURGICAL  2017    Conversion of end-ileostomy to BCIR ([address]); [place],  [facility] [address] HX UROLOGICAL  2017    Cystoscopy and placement of bilateral temporary ureteral catheters; Adrienne Márquez MD.       Home Medications:  Prior to Admission Medications   Prescriptions Last Dose Informant Patient Reported? Taking? Saccharomyces boulardii (FLORASTOR) 250 mg capsule 2018 at Unknown time  Yes Yes   Sig: Take 250 mg by mouth nightly. Ustekinumab (STELARA) 90 mg/mL injection 2018 Self Yes No   Si mg by SubCUTAneous route. Every 8 weeks    acetaminophen (TYLENOL) 500 mg tablet   Yes No   Sig: Take 500 mg by mouth four (4) times daily as needed (mild pain, heache (usually has headaches 1 week after Stelara)). albuterol (PROVENTIL HFA, VENTOLIN HFA, PROAIR HFA) 90 mcg/actuation inhaler   No No   Sig: Take 2 Puffs by inhalation every four (4) hours as needed for Wheezing. cyanocobalamin (VITAMIN B-12) 1,000 mcg/mL injection  Self Yes No   Si,000 mcg by IntraMUSCular route every . Indications: Once weekly   diazepam (VALIUM) 5 mg tablet 2018 at Unknown time Self Yes Yes   Sig: Take 5 mg by mouth two (2) times a day. diclofenac (VOLTAREN) 1 % gel   Yes Yes   Sig: Apply 2 g to affected area four (4) times daily as needed.    dicyclomine (BENTYL) 10 mg capsule 2018 at Unknown time Self Yes Yes   Sig: Take 10 mg by mouth every six (6) hours as needed. ergocalciferol (ERGOCALCIFEROL) 50,000 unit capsule  Self Yes No   Sig: Take 50,000 Units by mouth every Sunday. omeprazole (PRILOSEC) 40 mg capsule  Self Yes No   Sig: Take 40 mg by mouth nightly. ondansetron hcl (ZOFRAN, AS HYDROCHLORIDE,) 8 mg tablet  Self Yes No   Sig: Take 8 mg by mouth every twelve (12) hours as needed (Mild Nausea). oxyCODONE-acetaminophen (PERCOCET)  mg per tablet   Yes No   Sig: Take 1-2 Tabs by mouth every four (4) hours as needed for Pain (Being tapered per Dr. Rudy Fine). pregabalin (LYRICA) 100 mg capsule 12/7/2018 at Unknown time Self Yes Yes   Sig: Take 100 mg by mouth four (4) times daily. prochlorperazine (COMPAZINE) 10 mg tablet   Yes No   Sig: Take 10 mg by mouth two (2) times daily as needed (Moderate Nausea). promethazine (PHENERGAN) 25 mg tablet  Self Yes No   Sig: Take 25 mg by mouth every six (6) hours as needed (Severe Nausea). temazepam (RESTORIL) 30 mg capsule   Yes Yes   Sig: Take 30 mg by mouth nightly as needed for Sleep.   traMADol (ULTRAM) 50 mg tablet  Self Yes No   Sig: Take 50 mg by mouth three (3) times daily as needed (moderate pain). zolpidem (AMBIEN) 10 mg tablet 12/6/2018 at Unknown time  Yes Yes   Sig: Take 10 mg by mouth nightly. Facility-Administered Medications: None       Hospital Medications:  No current facility-administered medications for this encounter. Current Outpatient Medications   Medication Sig    Saccharomyces boulardii (FLORASTOR) 250 mg capsule Take 250 mg by mouth nightly.  temazepam (RESTORIL) 30 mg capsule Take 30 mg by mouth nightly as needed for Sleep.  diclofenac (VOLTAREN) 1 % gel Apply 2 g to affected area four (4) times daily as needed.  zolpidem (AMBIEN) 10 mg tablet Take 10 mg by mouth nightly.  dicyclomine (BENTYL) 10 mg capsule Take 10 mg by mouth every six (6) hours as needed.     pregabalin (LYRICA) 100 mg capsule Take 100 mg by mouth four (4) times daily.  diazepam (VALIUM) 5 mg tablet Take 5 mg by mouth two (2) times a day.  albuterol (PROVENTIL HFA, VENTOLIN HFA, PROAIR HFA) 90 mcg/actuation inhaler Take 2 Puffs by inhalation every four (4) hours as needed for Wheezing.  oxyCODONE-acetaminophen (PERCOCET)  mg per tablet Take 1-2 Tabs by mouth every four (4) hours as needed for Pain (Being tapered per Dr. Nilesh Pedro).  acetaminophen (TYLENOL) 500 mg tablet Take 500 mg by mouth four (4) times daily as needed (mild pain, heache (usually has headaches 1 week after Stelara)).  prochlorperazine (COMPAZINE) 10 mg tablet Take 10 mg by mouth two (2) times daily as needed (Moderate Nausea).  traMADol (ULTRAM) 50 mg tablet Take 50 mg by mouth three (3) times daily as needed (moderate pain).  Ustekinumab (STELARA) 90 mg/mL injection 90 mg by SubCUTAneous route. Every 8 weeks     promethazine (PHENERGAN) 25 mg tablet Take 25 mg by mouth every six (6) hours as needed (Severe Nausea).  ondansetron hcl (ZOFRAN, AS HYDROCHLORIDE,) 8 mg tablet Take 8 mg by mouth every twelve (12) hours as needed (Mild Nausea).  omeprazole (PRILOSEC) 40 mg capsule Take 40 mg by mouth nightly.  ergocalciferol (ERGOCALCIFEROL) 50,000 unit capsule Take 50,000 Units by mouth every Sunday.  cyanocobalamin (VITAMIN B-12) 1,000 mcg/mL injection 1,000 mcg by IntraMUSCular route every Sunday. Indications: Once weekly       Allergies: Allergies   Allergen Reactions    Remicade [Infliximab] Anaphylaxis and Shortness of Breath    Bactrim [Sulfamethoprim Ds] Other (comments)     Increased GI distress.     Morphine Nausea Only     Tolerates Dilaudid    Nsaids (Non-Steroidal Anti-Inflammatory Drug) Other (comments)     Stomach ulcers       Family History:  Family History   Problem Relation Age of Onset    Cancer Father     Asthma Neg Hx     Diabetes Neg Hx     Heart Disease Neg Hx     Hypertension Neg Hx     Stroke Neg Hx     Malignant Hyperthermia Neg Hx     Pseudocholinesterase Deficiency Neg Hx     Delayed Awakening Neg Hx     Post-op Nausea/Vomiting Neg Hx        Social History:  Social History     Tobacco Use    Smoking status: Current Every Day Smoker     Packs/day: 0.50     Years: 7.00     Pack years: 3.50     Last attempt to quit: 2017     Years since quittin.8    Smokeless tobacco: Never Used   Substance Use Topics    Alcohol use: No       Review of Systems:    Symptom Y/N Comments  Symptom Y/N Comments   Fever/Chills Y   Chest Pain N    Cough    Abdominal Pain Y    Sputum    Joint Pain     SOB/CEDILLO N   Pruritis/Rash     Nausea/vomit Y   Tolerating PT/OT     Diarrhea    Tolerating Diet N    Constipation    Other  No dysuria     Could NOT obtain due to:        Objective:     Patient Vitals for the past 8 hrs:   BP Temp Pulse Resp SpO2 Height Weight   18 1809 119/68  90 16 100 %     18 1600 114/71    96 %     18 1516 123/81 98.6 °F (37 °C) 95 16 100 % 6' (1.829 m) 86.2 kg (190 lb)   18 1503   (!) 140 16 99 %       No intake/output data recorded. No intake/output data recorded. PHYSICAL EXAMINATION:    GENERAL:  Uncomfortable and fatigued. HEENT:  Anicteric. LUNGS:  Clear to auscultation bilaterally. HEART:  Regular rate and rhythm with no murmurs, gallops, or rubs. ABDOMEN:  Soft. Diffusely tender to palpation. No rebound tenderness or involuntary guarding. Multiple scars. No palpable masses or hernias. Right lower quadrant ileostomy. NEURO:  Alert and oriented.        Data Review     Recent Labs     18  1519   WBC 10.7   HGB 14.6   HCT 46.1        Recent Labs     18  1519      K 3.7      CO2 27   BUN 12   CREA 1.07   GLU 96   CA 9.0     Recent Labs     18  1519 18  1449   SGOT 14*  --    *  --    TP 7.7  --    ALB 3.9  --    GLOB 3.8  --    LPSE  --  99     No results for input(s): INR, PTP, APTT in the last 72 hours.    No lab exists for component: INREXT, INREXT                    Assessment and Plan:      The patient appears to have a recurrence of the same phenomenon that occurred in February, i.e. a partial small bowel obstruction at the ileostomy \"valve,\" possibly combined with ileal pouchitis. As before, he will be treated with bowel rest, parenteral fluids, distal decompression via catheter, parenteral metronidazole, and continued administration of narcotics to prevent withdrawal.    It is likely that the condition will resolve over the next 2 to 3 days.     Principal Problem:    Small bowel obstruction ([facility] [address] (2/12/2018)    Active Problems:    Ileal pouchitis ([address] (5/1/2004)      Chronic narcotic dependence ([facility] [address] [facility] (1/20/2018)

## 2018-12-09 PROCEDURE — 74011250636 HC RX REV CODE- 250/636: Performed by: COLON & RECTAL SURGERY

## 2018-12-09 PROCEDURE — 65270000029 HC RM PRIVATE

## 2018-12-09 PROCEDURE — 74011250637 HC RX REV CODE- 250/637: Performed by: COLON & RECTAL SURGERY

## 2018-12-09 PROCEDURE — 74011000250 HC RX REV CODE- 250: Performed by: COLON & RECTAL SURGERY

## 2018-12-09 RX ORDER — DICYCLOMINE HYDROCHLORIDE 10 MG/1
10 CAPSULE ORAL
Status: DISCONTINUED | OUTPATIENT
Start: 2018-12-09 | End: 2018-12-12 | Stop reason: HOSPADM

## 2018-12-09 RX ORDER — FLUTICASONE PROPIONATE 50 MCG
2 SPRAY, SUSPENSION (ML) NASAL DAILY
Status: DISCONTINUED | OUTPATIENT
Start: 2018-12-10 | End: 2018-12-12 | Stop reason: HOSPADM

## 2018-12-09 RX ADMIN — DICYCLOMINE HYDROCHLORIDE 10 MG: 10 CAPSULE ORAL at 21:08

## 2018-12-09 RX ADMIN — PREGABALIN 100 MG: 100 CAPSULE ORAL at 08:47

## 2018-12-09 RX ADMIN — DIAZEPAM 5 MG: 2 TABLET ORAL at 19:03

## 2018-12-09 RX ADMIN — Medication 10 ML: at 05:59

## 2018-12-09 RX ADMIN — METRONIDAZOLE 500 MG: 500 INJECTION, SOLUTION INTRAVENOUS at 08:50

## 2018-12-09 RX ADMIN — HYDROMORPHONE HYDROCHLORIDE 1.5 MG: 2 INJECTION INTRAMUSCULAR; INTRAVENOUS; SUBCUTANEOUS at 18:01

## 2018-12-09 RX ADMIN — HYDROMORPHONE HYDROCHLORIDE 1.5 MG: 2 INJECTION INTRAMUSCULAR; INTRAVENOUS; SUBCUTANEOUS at 05:58

## 2018-12-09 RX ADMIN — HYDROMORPHONE HYDROCHLORIDE 1.5 MG: 2 INJECTION INTRAMUSCULAR; INTRAVENOUS; SUBCUTANEOUS at 21:04

## 2018-12-09 RX ADMIN — HYDROMORPHONE HYDROCHLORIDE 1.5 MG: 2 INJECTION INTRAMUSCULAR; INTRAVENOUS; SUBCUTANEOUS at 15:01

## 2018-12-09 RX ADMIN — HYDROMORPHONE HYDROCHLORIDE 1.5 MG: 2 INJECTION INTRAMUSCULAR; INTRAVENOUS; SUBCUTANEOUS at 01:59

## 2018-12-09 RX ADMIN — HYDROMORPHONE HYDROCHLORIDE 1.5 MG: 2 INJECTION INTRAMUSCULAR; INTRAVENOUS; SUBCUTANEOUS at 11:55

## 2018-12-09 RX ADMIN — ONDANSETRON 4 MG: 2 INJECTION INTRAMUSCULAR; INTRAVENOUS at 18:01

## 2018-12-09 RX ADMIN — PREGABALIN 100 MG: 100 CAPSULE ORAL at 21:08

## 2018-12-09 RX ADMIN — PREGABALIN 100 MG: 100 CAPSULE ORAL at 18:01

## 2018-12-09 RX ADMIN — HYDROMORPHONE HYDROCHLORIDE 1 MG: 2 INJECTION INTRAMUSCULAR; INTRAVENOUS; SUBCUTANEOUS at 08:47

## 2018-12-09 RX ADMIN — PROCHLORPERAZINE EDISYLATE 5 MG: 5 INJECTION INTRAMUSCULAR; INTRAVENOUS at 16:26

## 2018-12-09 RX ADMIN — SALINE NASAL SPRAY 2 SPRAY: 1.5 SOLUTION NASAL at 16:26

## 2018-12-09 RX ADMIN — ONDANSETRON 4 MG: 2 INJECTION INTRAMUSCULAR; INTRAVENOUS at 11:58

## 2018-12-09 RX ADMIN — DEXTROSE MONOHYDRATE, SODIUM CHLORIDE, SODIUM LACTATE, POTASSIUM CHLORIDE, CALCIUM CHLORIDE: 5; 600; 310; 179; 20 INJECTION, SOLUTION INTRAVENOUS at 00:55

## 2018-12-09 RX ADMIN — PREGABALIN 100 MG: 100 CAPSULE ORAL at 13:23

## 2018-12-09 RX ADMIN — DEXTROSE MONOHYDRATE, SODIUM CHLORIDE, SODIUM LACTATE, POTASSIUM CHLORIDE, CALCIUM CHLORIDE: 5; 600; 310; 179; 20 INJECTION, SOLUTION INTRAVENOUS at 11:05

## 2018-12-09 RX ADMIN — PROCHLORPERAZINE EDISYLATE 5 MG: 5 INJECTION INTRAMUSCULAR; INTRAVENOUS at 08:46

## 2018-12-09 RX ADMIN — METRONIDAZOLE 500 MG: 500 INJECTION, SOLUTION INTRAVENOUS at 21:04

## 2018-12-09 RX ADMIN — DICYCLOMINE HYDROCHLORIDE 10 MG: 10 CAPSULE ORAL at 15:00

## 2018-12-09 RX ADMIN — ZOLPIDEM TARTRATE 10 MG: 5 TABLET ORAL at 21:08

## 2018-12-09 RX ADMIN — DIAZEPAM 5 MG: 2 TABLET ORAL at 08:56

## 2018-12-09 NOTE — PROGRESS NOTES
General Daily Progress Note Admission Date: 12/7/2018 Hospital Day 3 Post-Op Day Not Applicable Subjective:  
Still having nausea, but he thinks it is due to the sweetness of the clear liquid diet. Good output via the drainage catheter. Reports very dry mucous in nose and some mild epistaxis. Objective:  
 
Patient Vitals for the past 24 hrs: 
 BP Temp Pulse Resp SpO2  
12/09/18 0833 114/66 98.1 °F (36.7 °C) 91 16 100 % 12/09/18 0200 100/64 98.5 °F (36.9 °C) 85 18 97 % 12/08/18 1953 100/64 98 °F (36.7 °C) 92 16 99 % 12/08/18 1600 93/57 98.5 °F (36.9 °C) 93 16 95 % 12/09 0701 - 12/09 1900 In: -  
Out: 9705 [GNERL:8727] 12/07 1901 - 12/09 0700 In: 1600 [P.O.:800; I.V.:800] Out: 2700 [Urine:2000] PHYSICAL EXAMINATION:   
GENERAL:  Reasonably comfortable. Yanni Pizarro ABDOMEN:  Soft.  Diffusely tender to palpation, but less so than yesterday.  No rebound tenderness or involuntary guarding.  Multiple scars.  No palpable masses or hernias.  Right lower quadrant ileostomy with drainage tube in place. NEURO:  Alert and oriented. Data Review No results found for this or any previous visit (from the past 24 hour(s)). Assessment:  
 
Principal Problem: 
  Small bowel obstruction ([address] (2/12/2018) Active Problems: 
  Ileal pouchitis ([address] (5/1/2004) Chronic narcotic dependence ([address] (1/20/2018) Partial obstruction being managed better now with continuous disatl decompression. 
  
   
 
Plan:  
Begin full  liquid diet with caution. Continuous distal decompression. Continue Flagyl. Abdominal radiographs tomorrow morning to reevaluate bowel pattern prior to advancing to low fiber diet and changing from continuous to intermittent catheterization. Saline nasal spray and Flonase.

## 2018-12-10 ENCOUNTER — APPOINTMENT (OUTPATIENT)
Dept: GENERAL RADIOLOGY | Age: 43
DRG: 389 | End: 2018-12-10
Attending: COLON & RECTAL SURGERY
Payer: COMMERCIAL

## 2018-12-10 PROCEDURE — 74019 RADEX ABDOMEN 2 VIEWS: CPT

## 2018-12-10 PROCEDURE — 74011250636 HC RX REV CODE- 250/636: Performed by: COLON & RECTAL SURGERY

## 2018-12-10 PROCEDURE — 74011000250 HC RX REV CODE- 250: Performed by: COLON & RECTAL SURGERY

## 2018-12-10 PROCEDURE — 74011250637 HC RX REV CODE- 250/637: Performed by: COLON & RECTAL SURGERY

## 2018-12-10 PROCEDURE — 94760 N-INVAS EAR/PLS OXIMETRY 1: CPT

## 2018-12-10 PROCEDURE — 65270000029 HC RM PRIVATE

## 2018-12-10 RX ADMIN — DICYCLOMINE HYDROCHLORIDE 10 MG: 10 CAPSULE ORAL at 06:40

## 2018-12-10 RX ADMIN — ONDANSETRON 4 MG: 2 INJECTION INTRAMUSCULAR; INTRAVENOUS at 19:00

## 2018-12-10 RX ADMIN — PREGABALIN 100 MG: 100 CAPSULE ORAL at 22:07

## 2018-12-10 RX ADMIN — ONDANSETRON 4 MG: 2 INJECTION INTRAMUSCULAR; INTRAVENOUS at 06:38

## 2018-12-10 RX ADMIN — DICYCLOMINE HYDROCHLORIDE 10 MG: 10 CAPSULE ORAL at 22:07

## 2018-12-10 RX ADMIN — ONDANSETRON 4 MG: 2 INJECTION INTRAMUSCULAR; INTRAVENOUS at 00:00

## 2018-12-10 RX ADMIN — PROCHLORPERAZINE EDISYLATE 5 MG: 5 INJECTION INTRAMUSCULAR; INTRAVENOUS at 12:53

## 2018-12-10 RX ADMIN — DIAZEPAM 5 MG: 2 TABLET ORAL at 18:10

## 2018-12-10 RX ADMIN — DIAZEPAM 5 MG: 2 TABLET ORAL at 08:55

## 2018-12-10 RX ADMIN — PROCHLORPERAZINE EDISYLATE 5 MG: 5 INJECTION INTRAMUSCULAR; INTRAVENOUS at 03:04

## 2018-12-10 RX ADMIN — HYDROMORPHONE HYDROCHLORIDE 1.5 MG: 2 INJECTION INTRAMUSCULAR; INTRAVENOUS; SUBCUTANEOUS at 15:55

## 2018-12-10 RX ADMIN — PREGABALIN 100 MG: 100 CAPSULE ORAL at 12:49

## 2018-12-10 RX ADMIN — HYDROMORPHONE HYDROCHLORIDE 1.5 MG: 2 INJECTION INTRAMUSCULAR; INTRAVENOUS; SUBCUTANEOUS at 18:59

## 2018-12-10 RX ADMIN — HYDROMORPHONE HYDROCHLORIDE 1.5 MG: 2 INJECTION INTRAMUSCULAR; INTRAVENOUS; SUBCUTANEOUS at 06:39

## 2018-12-10 RX ADMIN — DICYCLOMINE HYDROCHLORIDE 10 MG: 10 CAPSULE ORAL at 11:22

## 2018-12-10 RX ADMIN — HYDROMORPHONE HYDROCHLORIDE 1.5 MG: 2 INJECTION INTRAMUSCULAR; INTRAVENOUS; SUBCUTANEOUS at 22:07

## 2018-12-10 RX ADMIN — PREGABALIN 100 MG: 100 CAPSULE ORAL at 08:56

## 2018-12-10 RX ADMIN — HYDROMORPHONE HYDROCHLORIDE 1.5 MG: 2 INJECTION INTRAMUSCULAR; INTRAVENOUS; SUBCUTANEOUS at 09:36

## 2018-12-10 RX ADMIN — DICYCLOMINE HYDROCHLORIDE 10 MG: 10 CAPSULE ORAL at 15:55

## 2018-12-10 RX ADMIN — DEXTROSE MONOHYDRATE, SODIUM CHLORIDE, SODIUM LACTATE, POTASSIUM CHLORIDE, CALCIUM CHLORIDE: 5; 600; 310; 179; 20 INJECTION, SOLUTION INTRAVENOUS at 18:10

## 2018-12-10 RX ADMIN — ZOLPIDEM TARTRATE 10 MG: 5 TABLET ORAL at 22:07

## 2018-12-10 RX ADMIN — HYDROMORPHONE HYDROCHLORIDE 1.5 MG: 2 INJECTION INTRAMUSCULAR; INTRAVENOUS; SUBCUTANEOUS at 12:49

## 2018-12-10 RX ADMIN — PREGABALIN 100 MG: 100 CAPSULE ORAL at 18:11

## 2018-12-10 RX ADMIN — Medication 10 ML: at 22:07

## 2018-12-10 RX ADMIN — METRONIDAZOLE 500 MG: 500 INJECTION, SOLUTION INTRAVENOUS at 08:56

## 2018-12-10 RX ADMIN — METRONIDAZOLE 500 MG: 500 INJECTION, SOLUTION INTRAVENOUS at 21:25

## 2018-12-10 RX ADMIN — HYDROMORPHONE HYDROCHLORIDE 1.5 MG: 2 INJECTION INTRAMUSCULAR; INTRAVENOUS; SUBCUTANEOUS at 00:00

## 2018-12-10 RX ADMIN — PROCHLORPERAZINE EDISYLATE 5 MG: 5 INJECTION INTRAMUSCULAR; INTRAVENOUS at 22:09

## 2018-12-10 RX ADMIN — HYDROMORPHONE HYDROCHLORIDE 1.5 MG: 2 INJECTION INTRAMUSCULAR; INTRAVENOUS; SUBCUTANEOUS at 03:05

## 2018-12-10 NOTE — ROUTINE PROCESS
Patient's pain fairly controlled on prn medications, brown loose output from ostomy. Bedside shift change report given to Ramy Sharif RN (oncoming nurse) by Raghav Trejo RN (offgoing nurse). Report included the following information SBAR, Kardex, Procedure Summary, Intake/Output, MAR and Recent Results.

## 2018-12-10 NOTE — PROGRESS NOTES
General Daily Progress Note Admission Date: 12/7/2018 Hospital Day 4 Post-Op Day Not Applicable Subjective: No major setback with full liquid diet. Objective:  
 
Patient Vitals for the past 24 hrs: 
 BP Temp Pulse Resp SpO2  
12/10/18 0859 94/60 97.6 °F (36.4 °C) 83 16 98 % 12/09/18 2326 99/65 97.2 °F (36.2 °C) 81 18 99 % 12/09/18 1521 104/66 98.2 °F (36.8 °C) 81 16 98 % 12/10 0701 - 12/10 1900 In: -  
Out: 475 [Urine:450] 12/08 1901 - 12/10 0700 In: 2320 [P.O.:1520; I.V.:800] Out: 2425 [Urine:4800] PHYSICAL EXAMINATION:   
GENERAL:  Reasonably comfortable. Mordecai Amigo ABDOMEN: Right lower quadrant ileostomy with drainage tube in place. NEURO:  Alert and oriented. Data Review No results found for this or any previous visit (from the past 24 hour(s)). Assessment:  
 
Principal Problem: 
  Small bowel obstruction ([address] (2/12/2018) Active Problems: 
  Ileal pouchitis ([address] (5/1/2004) Chronic narcotic dependence ([address] (1/20/2018) Partial obstruction still being managed with continuous disatl decompression. On Flagyl for ileal pouchitis. Plan:  
Continue full  liquid diet and continuous distal decompression pending abdominal radiographs that were ordered yesterday for this morning. Will hopefully be able to advance diet and change to intermittent catheterization. Continue Flagyl.

## 2018-12-11 VITALS
TEMPERATURE: 98.1 F | DIASTOLIC BLOOD PRESSURE: 65 MMHG | HEART RATE: 95 BPM | RESPIRATION RATE: 18 BRPM | HEIGHT: 72 IN | BODY MASS INDEX: 25.73 KG/M2 | OXYGEN SATURATION: 97 % | SYSTOLIC BLOOD PRESSURE: 106 MMHG | WEIGHT: 190 LBS

## 2018-12-11 LAB
25(OH)D3 SERPL-MCNC: 16.7 NG/ML (ref 30–100)
ALBUMIN SERPL-MCNC: 3.1 G/DL (ref 3.5–5)
ALBUMIN/GLOB SERPL: 0.9 {RATIO} (ref 1.1–2.2)
ALP SERPL-CCNC: 126 U/L (ref 45–117)
ALT SERPL-CCNC: 44 U/L (ref 12–78)
ANION GAP SERPL CALC-SCNC: 7 MMOL/L (ref 5–15)
AST SERPL-CCNC: 35 U/L (ref 15–37)
BASOPHILS # BLD: 0 K/UL (ref 0–0.1)
BASOPHILS NFR BLD: 0 % (ref 0–1)
BILIRUB SERPL-MCNC: 0.2 MG/DL (ref 0.2–1)
BUN SERPL-MCNC: 6 MG/DL (ref 6–20)
BUN/CREAT SERPL: 7 (ref 12–20)
CALCIUM SERPL-MCNC: 8.6 MG/DL (ref 8.5–10.1)
CHLORIDE SERPL-SCNC: 108 MMOL/L (ref 97–108)
CO2 SERPL-SCNC: 26 MMOL/L (ref 21–32)
CREAT SERPL-MCNC: 0.88 MG/DL (ref 0.7–1.3)
DIFFERENTIAL METHOD BLD: NORMAL
EOSINOPHIL # BLD: 0.3 K/UL (ref 0–0.4)
EOSINOPHIL NFR BLD: 3 % (ref 0–7)
ERYTHROCYTE [DISTWIDTH] IN BLOOD BY AUTOMATED COUNT: 13.3 % (ref 11.5–14.5)
FERRITIN SERPL-MCNC: 30 NG/ML (ref 26–388)
FOLATE SERPL-MCNC: 7.5 NG/ML (ref 5–21)
GLOBULIN SER CALC-MCNC: 3.4 G/DL (ref 2–4)
GLUCOSE SERPL-MCNC: 71 MG/DL (ref 65–100)
HCT VFR BLD AUTO: 40.6 % (ref 36.6–50.3)
HGB BLD-MCNC: 13 G/DL (ref 12.1–17)
IMM GRANULOCYTES # BLD: 0 K/UL (ref 0–0.04)
IMM GRANULOCYTES NFR BLD AUTO: 0 % (ref 0–0.5)
LYMPHOCYTES # BLD: 1.6 K/UL (ref 0.8–3.5)
LYMPHOCYTES NFR BLD: 19 % (ref 12–49)
MCH RBC QN AUTO: 29 PG (ref 26–34)
MCHC RBC AUTO-ENTMCNC: 32 G/DL (ref 30–36.5)
MCV RBC AUTO: 90.6 FL (ref 80–99)
MONOCYTES # BLD: 0.4 K/UL (ref 0–1)
MONOCYTES NFR BLD: 5 % (ref 5–13)
NEUTS SEG # BLD: 6 K/UL (ref 1.8–8)
NEUTS SEG NFR BLD: 72 % (ref 32–75)
NRBC # BLD: 0 K/UL (ref 0–0.01)
NRBC BLD-RTO: 0 PER 100 WBC
PLATELET # BLD AUTO: 154 K/UL (ref 150–400)
PMV BLD AUTO: 11.7 FL (ref 8.9–12.9)
POTASSIUM SERPL-SCNC: 3.8 MMOL/L (ref 3.5–5.1)
PROT SERPL-MCNC: 6.5 G/DL (ref 6.4–8.2)
RBC # BLD AUTO: 4.48 M/UL (ref 4.1–5.7)
SODIUM SERPL-SCNC: 141 MMOL/L (ref 136–145)
VIT B12 SERPL-MCNC: 449 PG/ML (ref 193–986)
WBC # BLD AUTO: 8.4 K/UL (ref 4.1–11.1)

## 2018-12-11 PROCEDURE — 82607 VITAMIN B-12: CPT

## 2018-12-11 PROCEDURE — 82746 ASSAY OF FOLIC ACID SERUM: CPT

## 2018-12-11 PROCEDURE — 80053 COMPREHEN METABOLIC PANEL: CPT

## 2018-12-11 PROCEDURE — 74011000250 HC RX REV CODE- 250: Performed by: COLON & RECTAL SURGERY

## 2018-12-11 PROCEDURE — 36415 COLL VENOUS BLD VENIPUNCTURE: CPT

## 2018-12-11 PROCEDURE — 74011250637 HC RX REV CODE- 250/637: Performed by: COLON & RECTAL SURGERY

## 2018-12-11 PROCEDURE — 83921 ORGANIC ACID SINGLE QUANT: CPT

## 2018-12-11 PROCEDURE — 65270000029 HC RM PRIVATE

## 2018-12-11 PROCEDURE — 85025 COMPLETE CBC W/AUTO DIFF WBC: CPT

## 2018-12-11 PROCEDURE — 82728 ASSAY OF FERRITIN: CPT

## 2018-12-11 PROCEDURE — 82306 VITAMIN D 25 HYDROXY: CPT

## 2018-12-11 PROCEDURE — 74011250636 HC RX REV CODE- 250/636: Performed by: COLON & RECTAL SURGERY

## 2018-12-11 RX ORDER — METRONIDAZOLE 500 MG/1
500 TABLET ORAL 3 TIMES DAILY
Qty: 30 TAB | Refills: 0 | Status: SHIPPED | OUTPATIENT
Start: 2018-12-11 | End: 2018-12-21

## 2018-12-11 RX ADMIN — HYDROMORPHONE HYDROCHLORIDE 1.5 MG: 2 INJECTION INTRAMUSCULAR; INTRAVENOUS; SUBCUTANEOUS at 06:54

## 2018-12-11 RX ADMIN — DIAZEPAM 5 MG: 2 TABLET ORAL at 09:45

## 2018-12-11 RX ADMIN — METRONIDAZOLE 500 MG: 500 INJECTION, SOLUTION INTRAVENOUS at 09:46

## 2018-12-11 RX ADMIN — HYDROMORPHONE HYDROCHLORIDE 1.5 MG: 2 INJECTION INTRAMUSCULAR; INTRAVENOUS; SUBCUTANEOUS at 01:08

## 2018-12-11 RX ADMIN — PREGABALIN 100 MG: 100 CAPSULE ORAL at 09:45

## 2018-12-11 RX ADMIN — FLUTICASONE PROPIONATE 2 SPRAY: 50 SPRAY, METERED NASAL at 18:44

## 2018-12-11 RX ADMIN — HYDROMORPHONE HYDROCHLORIDE 1.5 MG: 2 INJECTION INTRAMUSCULAR; INTRAVENOUS; SUBCUTANEOUS at 10:02

## 2018-12-11 RX ADMIN — ZOLPIDEM TARTRATE 10 MG: 5 TABLET ORAL at 21:03

## 2018-12-11 RX ADMIN — PREGABALIN 100 MG: 100 CAPSULE ORAL at 13:14

## 2018-12-11 RX ADMIN — DIAZEPAM 5 MG: 2 TABLET ORAL at 18:25

## 2018-12-11 RX ADMIN — PROCHLORPERAZINE EDISYLATE 5 MG: 5 INJECTION INTRAMUSCULAR; INTRAVENOUS at 06:54

## 2018-12-11 RX ADMIN — ONDANSETRON 4 MG: 2 INJECTION INTRAMUSCULAR; INTRAVENOUS at 19:46

## 2018-12-11 RX ADMIN — ONDANSETRON 4 MG: 2 INJECTION INTRAMUSCULAR; INTRAVENOUS at 13:14

## 2018-12-11 RX ADMIN — HYDROMORPHONE HYDROCHLORIDE 1.5 MG: 2 INJECTION INTRAMUSCULAR; INTRAVENOUS; SUBCUTANEOUS at 22:12

## 2018-12-11 RX ADMIN — Medication 10 ML: at 06:53

## 2018-12-11 RX ADMIN — DICYCLOMINE HYDROCHLORIDE 10 MG: 10 CAPSULE ORAL at 16:11

## 2018-12-11 RX ADMIN — HYDROMORPHONE HYDROCHLORIDE 1.5 MG: 2 INJECTION INTRAMUSCULAR; INTRAVENOUS; SUBCUTANEOUS at 19:22

## 2018-12-11 RX ADMIN — DICYCLOMINE HYDROCHLORIDE 10 MG: 10 CAPSULE ORAL at 12:18

## 2018-12-11 RX ADMIN — PROCHLORPERAZINE EDISYLATE 5 MG: 5 INJECTION INTRAMUSCULAR; INTRAVENOUS at 16:11

## 2018-12-11 RX ADMIN — DICYCLOMINE HYDROCHLORIDE 10 MG: 10 CAPSULE ORAL at 06:54

## 2018-12-11 RX ADMIN — PREGABALIN 100 MG: 100 CAPSULE ORAL at 18:25

## 2018-12-11 RX ADMIN — HYDROMORPHONE HYDROCHLORIDE 1.5 MG: 2 INJECTION INTRAMUSCULAR; INTRAVENOUS; SUBCUTANEOUS at 13:14

## 2018-12-11 RX ADMIN — METRONIDAZOLE 500 MG: 500 INJECTION, SOLUTION INTRAVENOUS at 21:03

## 2018-12-11 RX ADMIN — DICYCLOMINE HYDROCHLORIDE 10 MG: 10 CAPSULE ORAL at 21:03

## 2018-12-11 RX ADMIN — HYDROMORPHONE HYDROCHLORIDE 1.5 MG: 2 INJECTION INTRAMUSCULAR; INTRAVENOUS; SUBCUTANEOUS at 16:11

## 2018-12-11 RX ADMIN — PREGABALIN 100 MG: 100 CAPSULE ORAL at 21:04

## 2018-12-11 NOTE — PROGRESS NOTES
Reason for Admission:  partial small bowel obstruction at the ileostomy \"valve RRAT Score:   8 Plan for utilizing home health:    none Likelihood of Readmission:  low Transition of Care Plan:   Patient is a 44 yr old male who presented to ER with complain to ABD pain, N/V with PMH of partial SBO. Patient is fully independent with no concerns for discharge. Patient is insured with [facility]. Care Management Interventions MyChart Signup: No 
Discharge Durable Medical Equipment: No 
Physical Therapy Consult: No 
Occupational Therapy Consult: No 
Speech Therapy Consult: No 
Current Support Network: Own Home Plan discussed with Pt/Family/Caregiver: Yes Discharge Location Discharge Placement: Home

## 2018-12-11 NOTE — ROUTINE PROCESS
Bedside and Verbal shift change report given to Rocío Griffith RN (oncoming nurse) by Jackie Lee RN (offgoing nurse). Report included the following information SBAR, Kardex, Intake/Output, MAR and Recent Results.

## 2018-12-11 NOTE — PROGRESS NOTES
General Daily Progress Note Admission Date: 12/7/2018 Hospital Day 5 Post-Op Day Not Applicable Subjective:  
Doesn't feel any worse since beginning low fiber diet and intermittent catheterization of pouch, but he is concerned because he has had very little output so far. Objective:  
 
Patient Vitals for the past 24 hrs: 
 BP Temp Pulse Resp SpO2  
12/11/18 0814 105/65 97.9 °F (36.6 °C) 93 18 98 % 12/11/18 0018 93/60 97.6 °F (36.4 °C) 72 18 100 % 12/10/18 1604 113/78 98.4 °F (36.9 °C) 88 14 100 % No intake/output data recorded. 12/09 1901 - 12/11 0700 In: -  
Out: 625 [facility] PHYSICAL EXAMINATION:   
GENERAL:  Reasonably comfortable. Junius El ABDOMEN: Non-distended. Soft. Mildly tender. NEURO:  Alert and oriented. Data Review No results found for this or any previous visit (from the past 24 hour(s)). Abdominal Radiographs (12/10/2018): Improvement in bowel gas pattern, but not complete resolution of signs of partial obstruction. Assessment:  
 
Principal Problem: 
  Small bowel obstruction ([address] (2/12/2018) Active Problems: 
  Ileal pouchitis ([address] (5/1/2004) Chronic narcotic dependence ([address] (1/20/2018) Partial obstruction improved. Not yet sure whether the improvement is sufficient. 
  
On Flagyl for ileal pouchitis. Plan:  
 
Continue low fiber diet and intermittent catheterization of pouch. 
  
Continue Flagyl. Labs today as requested by patient's primary physician/gastroenterologist.    
   
Discharge to home later today if patient feeling well enough.

## 2018-12-12 LAB
BASOPHILS # BLD: 0 K/UL (ref 0–0.1)
BASOPHILS NFR BLD: 0 % (ref 0–1)
DIFFERENTIAL METHOD BLD: ABNORMAL
EOSINOPHIL # BLD: 0.3 K/UL (ref 0–0.4)
EOSINOPHIL NFR BLD: 5 % (ref 0–7)
ERYTHROCYTE [DISTWIDTH] IN BLOOD BY AUTOMATED COUNT: 13.2 % (ref 11.5–14.5)
HCT VFR BLD AUTO: 35 % (ref 36.6–50.3)
HGB BLD-MCNC: 11.4 G/DL (ref 12.1–17)
IMM GRANULOCYTES # BLD: 0 K/UL (ref 0–0.04)
IMM GRANULOCYTES NFR BLD AUTO: 0 % (ref 0–0.5)
LYMPHOCYTES # BLD: 2 K/UL (ref 0.8–3.5)
LYMPHOCYTES NFR BLD: 35 % (ref 12–49)
MCH RBC QN AUTO: 29.3 PG (ref 26–34)
MCHC RBC AUTO-ENTMCNC: 32.6 G/DL (ref 30–36.5)
MCV RBC AUTO: 90 FL (ref 80–99)
MONOCYTES # BLD: 0.4 K/UL (ref 0–1)
MONOCYTES NFR BLD: 7 % (ref 5–13)
NEUTS SEG # BLD: 3.1 K/UL (ref 1.8–8)
NEUTS SEG NFR BLD: 53 % (ref 32–75)
NRBC # BLD: 0 K/UL (ref 0–0.01)
NRBC BLD-RTO: 0 PER 100 WBC
PLATELET # BLD AUTO: 157 K/UL (ref 150–400)
PMV BLD AUTO: 11.4 FL (ref 8.9–12.9)
RBC # BLD AUTO: 3.89 M/UL (ref 4.1–5.7)
WBC # BLD AUTO: 5.8 K/UL (ref 4.1–11.1)

## 2018-12-12 PROCEDURE — 74011250636 HC RX REV CODE- 250/636: Performed by: COLON & RECTAL SURGERY

## 2018-12-12 PROCEDURE — 74011250637 HC RX REV CODE- 250/637: Performed by: COLON & RECTAL SURGERY

## 2018-12-12 PROCEDURE — 74011000250 HC RX REV CODE- 250: Performed by: COLON & RECTAL SURGERY

## 2018-12-12 PROCEDURE — 85025 COMPLETE CBC W/AUTO DIFF WBC: CPT

## 2018-12-12 RX ADMIN — METRONIDAZOLE 500 MG: 500 INJECTION, SOLUTION INTRAVENOUS at 10:08

## 2018-12-12 RX ADMIN — ONDANSETRON 4 MG: 2 INJECTION INTRAMUSCULAR; INTRAVENOUS at 04:09

## 2018-12-12 RX ADMIN — PROCHLORPERAZINE EDISYLATE 5 MG: 5 INJECTION INTRAMUSCULAR; INTRAVENOUS at 10:08

## 2018-12-12 RX ADMIN — FLUTICASONE PROPIONATE 2 SPRAY: 50 SPRAY, METERED NASAL at 10:08

## 2018-12-12 RX ADMIN — PREGABALIN 100 MG: 100 CAPSULE ORAL at 10:20

## 2018-12-12 RX ADMIN — PROCHLORPERAZINE EDISYLATE 5 MG: 5 INJECTION INTRAMUSCULAR; INTRAVENOUS at 01:09

## 2018-12-12 RX ADMIN — DICYCLOMINE HYDROCHLORIDE 10 MG: 10 CAPSULE ORAL at 07:03

## 2018-12-12 RX ADMIN — HYDROMORPHONE HYDROCHLORIDE 1.5 MG: 2 INJECTION INTRAMUSCULAR; INTRAVENOUS; SUBCUTANEOUS at 10:08

## 2018-12-12 RX ADMIN — HYDROMORPHONE HYDROCHLORIDE 1.5 MG: 2 INJECTION INTRAMUSCULAR; INTRAVENOUS; SUBCUTANEOUS at 04:08

## 2018-12-12 RX ADMIN — HYDROMORPHONE HYDROCHLORIDE 1.5 MG: 2 INJECTION INTRAMUSCULAR; INTRAVENOUS; SUBCUTANEOUS at 07:03

## 2018-12-12 RX ADMIN — HYDROMORPHONE HYDROCHLORIDE 1.5 MG: 2 INJECTION INTRAMUSCULAR; INTRAVENOUS; SUBCUTANEOUS at 01:09

## 2018-12-12 RX ADMIN — DIAZEPAM 5 MG: 2 TABLET ORAL at 10:08

## 2018-12-12 NOTE — ROUTINE PROCESS
Bedside and Verbal shift change report given to ROSALBA Andrea (oncoming nurse) by Roshni Kenney RN (offgoing nurse). Report included the following information SBAR, Kardex, Intake/Output, MAR and Recent Results.

## 2018-12-12 NOTE — PROGRESS NOTES
General Daily Progress Note    Admission Date: 12/7/2018  Hospital Day 6  Post-Op Day Not Applicable  Subjective:   Not yet sure whether he is truly tolerating the low fiber diet and intermittent ileostomy catheterization. Objective:     Patient Vitals for the past 24 hrs:   BP Temp Pulse Resp SpO2   12/11/18 2314 106/65 98.1 °F (36.7 °C) 95 18 97 %   12/11/18 1556 104/70 98.2 °F (36.8 °C) 89 16 99 %     12/12 0701 - 12/12 1900  In: -   Out: 561 [KCOBX:648]  12/10 1901 - 12/12 0700  In: -   Out: 2300 [Urine:1850]       PHYSICAL EXAMINATION:    GENERAL:  Reasonably comfortable. .  ABDOMEN: Non-distended. Soft. Mildly tender. NEURO:  Alert and oriented. Data Review   Recent Results (from the past 24 hour(s))   METABOLIC PANEL, COMPREHENSIVE    Collection Time: 12/11/18 12:38 PM   Result Value Ref Range    Sodium 141 136 - 145 mmol/L    Potassium 3.8 3.5 - 5.1 mmol/L    Chloride 108 97 - 108 mmol/L    CO2 26 21 - 32 mmol/L    Anion gap 7 5 - 15 mmol/L    Glucose 71 65 - 100 mg/dL    BUN 6 6 - 20 MG/DL    Creatinine 0.88 0.70 - 1.30 MG/DL    BUN/Creatinine ratio 7 (L) 12 - 20      GFR est AA >60 >60 ml/min/1.73m2    GFR est non-AA >60 >60 ml/min/1.73m2    Calcium 8.6 8.5 - 10.1 MG/DL    Bilirubin, total 0.2 0.2 - 1.0 MG/DL    ALT (SGPT) 44 12 - 78 U/L    AST (SGOT) 35 15 - 37 U/L    Alk.  phosphatase 126 (H) 45 - 117 U/L    Protein, total 6.5 6.4 - 8.2 g/dL    Albumin 3.1 (L) 3.5 - 5.0 g/dL    Globulin 3.4 2.0 - 4.0 g/dL    A-G Ratio 0.9 (L) 1.1 - 2.2     FOLATE    Collection Time: 12/11/18 12:38 PM   Result Value Ref Range    Folate 7.5 5.0 - 21.0 ng/mL   VITAMIN B12    Collection Time: 12/11/18 12:38 PM   Result Value Ref Range    Vitamin B12 449 193 - 986 pg/mL   FERRITIN    Collection Time: 12/11/18 12:38 PM   Result Value Ref Range    Ferritin 30 26 - 388 NG/ML   VITAMIN D, 25 HYDROXY    Collection Time: 12/11/18 12:38 PM   Result Value Ref Range    Vitamin D 25-Hydroxy 16.7 (L) 30 - 100 ng/mL CBC WITH AUTOMATED DIFF    Collection Time: 12/11/18 12:38 PM   Result Value Ref Range    WBC 8.4 4.1 - 11.1 K/uL    RBC 4.48 4.10 - 5.70 M/uL    HGB 13.0 12.1 - 17.0 g/dL    HCT 40.6 36.6 - 50.3 %    MCV 90.6 80.0 - 99.0 FL    MCH 29.0 26.0 - 34.0 PG    MCHC 32.0 30.0 - 36.5 g/dL    RDW 13.3 11.5 - 14.5 %    PLATELET 372 204 - 676 K/uL    MPV 11.7 8.9 - 12.9 FL    NRBC 0.0 0  WBC    ABSOLUTE NRBC 0.00 0.00 - 0.01 K/uL    NEUTROPHILS 72 32 - 75 %    LYMPHOCYTES 19 12 - 49 %    MONOCYTES 5 5 - 13 %    EOSINOPHILS 3 0 - 7 %    BASOPHILS 0 0 - 1 %    IMMATURE GRANULOCYTES 0 0.0 - 0.5 %    ABS. NEUTROPHILS 6.0 1.8 - 8.0 K/UL    ABS. LYMPHOCYTES 1.6 0.8 - 3.5 K/UL    ABS. MONOCYTES 0.4 0.0 - 1.0 K/UL    ABS. EOSINOPHILS 0.3 0.0 - 0.4 K/UL    ABS. BASOPHILS 0.0 0.0 - 0.1 K/UL    ABS. IMM. GRANS. 0.0 0.00 - 0.04 K/UL    DF AUTOMATED     CBC WITH AUTOMATED DIFF    Collection Time: 12/12/18  1:20 AM   Result Value Ref Range    WBC 5.8 4.1 - 11.1 K/uL    RBC 3.89 (L) 4.10 - 5.70 M/uL    HGB 11.4 (L) 12.1 - 17.0 g/dL    HCT 35.0 (L) 36.6 - 50.3 %    MCV 90.0 80.0 - 99.0 FL    MCH 29.3 26.0 - 34.0 PG    MCHC 32.6 30.0 - 36.5 g/dL    RDW 13.2 11.5 - 14.5 %    PLATELET 266 651 - 811 K/uL    MPV 11.4 8.9 - 12.9 FL    NRBC 0.0 0  WBC    ABSOLUTE NRBC 0.00 0.00 - 0.01 K/uL    NEUTROPHILS 53 32 - 75 %    LYMPHOCYTES 35 12 - 49 %    MONOCYTES 7 5 - 13 %    EOSINOPHILS 5 0 - 7 %    BASOPHILS 0 0 - 1 %    IMMATURE GRANULOCYTES 0 0.0 - 0.5 %    ABS. NEUTROPHILS 3.1 1.8 - 8.0 K/UL    ABS. LYMPHOCYTES 2.0 0.8 - 3.5 K/UL    ABS. MONOCYTES 0.4 0.0 - 1.0 K/UL    ABS. EOSINOPHILS 0.3 0.0 - 0.4 K/UL    ABS. BASOPHILS 0.0 0.0 - 0.1 K/UL    ABS. IMM. GRANS. 0.0 0.00 - 0.04 K/UL    DF AUTOMATED       Abdominal Radiographs (12/10/2018): Improvement in bowel gas pattern, but not complete resolution of signs of partial obstruction.       Assessment:     Principal Problem:    Small bowel obstruction ([address] (2/12/2018)    Active Problems:    Ileal pouchitis ([facility] (5/1/2004)      Chronic narcotic dependence ([facility] (1/20/2018)      Partial obstruction improved. Probably improved sufficiently to allow discharge.     On Flagyl for ileal pouchitis. Plan:     Continue low fiber diet and intermittent catheterization of pouch.     Continue Flagyl.           Discharge to home later today if no setback after breakfast.    Follow up with Dr. Peter Lund.

## 2018-12-12 NOTE — DISCHARGE INSTRUCTIONS
DISCHARGE SUMMARY from Nurse    PATIENT INSTRUCTIONS:    After general anesthesia or intravenous sedation, for 24 hours or while taking prescription Narcotics:  · Limit your activities  · Do not drive and operate hazardous machinery  · Do not make important personal or business decisions  · Do  not drink alcoholic beverages  · If you have not urinated within 8 hours after discharge, please contact your surgeon on call. Report the following to your surgeon:  · Excessive pain, swelling, redness or odor of or around the surgical area  · Temperature over 100.5  · Nausea and vomiting lasting longer than 4 hours or if unable to take medications  · Any signs of decreased circulation or nerve impairment to extremity: change in color, persistent  numbness, tingling, coldness or increase pain  · Any questions    What to do at Home:    *  Please give a list of your current medications to your Primary Care Provider. *  Please update this list whenever your medications are discontinued, doses are      changed, or new medications (including over-the-counter products) are added. *  Please carry medication information at all times in case of emergency situations. These are general instructions for a healthy lifestyle:    No smoking/ No tobacco products/ Avoid exposure to second hand smoke  Surgeon General's Warning:  Quitting smoking now greatly reduces serious risk to your health. Obesity, smoking, and sedentary lifestyle greatly increases your risk for illness    A healthy diet, regular physical exercise & weight monitoring are important for maintaining a healthy lifestyle    You may be retaining fluid if you have a history of heart failure or if you experience any of the following symptoms:  Weight gain of 3 pounds or more overnight or 5 pounds in a week, increased swelling in our hands or feet or shortness of breath while lying flat in bed.   Please call your doctor as soon as you notice any of these symptoms; do not wait until your next office visit. Recognize signs and symptoms of STROKE:    F-face looks uneven    A-arms unable to move or move unevenly    S-speech slurred or non-existent    T-time-call 911 as soon as signs and symptoms begin-DO NOT go       Back to bed or wait to see if you get better-TIME IS BRAIN. Warning Signs of HEART ATTACK     Call 911 if you have these symptoms:   Chest discomfort. Most heart attacks involve discomfort in the center of the chest that lasts more than a few minutes, or that goes away and comes back. It can feel like uncomfortable pressure, squeezing, fullness, or pain.  Discomfort in other areas of the upper body. Symptoms can include pain or discomfort in one or both arms, the back, neck, jaw, or stomach.  Shortness of breath with or without chest discomfort.  Other signs may include breaking out in a cold sweat, nausea, or lightheadedness. Don't wait more than five minutes to call 911 - MINUTES MATTER! Fast action can save your life. Calling 911 is almost always the fastest way to get lifesaving treatment. Emergency Medical Services staff can begin treatment when they arrive -- up to an hour sooner than if someone gets to the hospital by car. The discharge information has been reviewed with the patient. The patient verbalized understanding. Discharge medications reviewed with the patient and appropriate educational materials and side effects teaching were provided. [employer] Activation    Thank you for requesting access to [employer]. Please follow the instructions below to securely access and download your online medical record. [employer] allows you to send messages to your doctor, view your test results, renew your prescriptions, schedule appointments, and more. How Do I Sign Up? 1. In your internet browser, go to [email or web address]  2. Click on the First Time User? Click Here link in the Sign In box.  You will be redirect to the New Member Sign Up page.  3. Enter your [employer] Access Code exactly as it appears below. You will not need to use this code after youve completed the sign-up process. If you do not sign up before the expiration date, you must request a new code. MyChart Access Code: Activation code not generated  Current [address] Status: Active (This is the date your [employer] access code will )    4. Enter the last four digits of your Social Security Number (xxxx) and Date of Birth (mm/dd/yyyy) as indicated and click Submit. You will be taken to the next sign-up page. 5. Create a [employer] ID. This will be your [address] login ID and cannot be changed, so think of one that is secure and easy to remember. 6. Create a [employer] password. You can change your password at any time. 7. Enter your Password Reset Question and Answer. This can be used at a later time if you forget your password. 8. Enter your e-mail address. You will receive e-mail notification when new information is available in [address]. 9. Click Sign Up. You can now view and download portions of your medical record. 10. Click the Download Summary menu link to download a portable copy of your medical information. Additional Information    If you have questions, please visit the Frequently Asked Questions section of the [address] website at [email or web address] Remember, [address] is NOT to be used for urgent needs.  For medical emergencies, dial 911.      ___________________________________________________________________________________________________________________________________

## 2018-12-12 NOTE — DISCHARGE SUMMARY
Discharge Summary     Patient ID:    Maurice Wright  [id number]  69 y.o.  1975    Admission Date: 12/7/2018    Discharge Date: 12/12/2018    Admission Diagnoses:  1. Partial small bowel obstruction  2. Ileal pouchitis  3. Chronic narcotic dependence    Chronic Diagnoses:    Patient Active Problem List   Diagnosis Code    Ileal pouchitis ([facility] K91.850    GERD (gastroesophageal reflux disease) K21.9    Depression with anxiety F41.8    Anemia D64.9    Chronic narcotic dependence ([address] F11.20    Drug-induced ileus (HCC) K56.7, T50.905A    Acute kidney injury ([address] N17.9    Small bowel obstruction ([facility] K56.609       Discharge Diagnoses:  1. Partial small bowel obstruction (improved)  2. Ileal pouchitis (improved)  3. Chronic narcotic dependence    Hospital Problems as of 12/12/2018 Date Reviewed: 12/7/2018          Codes Class Noted - Resolved POA    * (Principal) Small bowel obstruction ([facility] ICD-10-CM: X12.439  ICD-9-CM: 560.9  2/12/2018 - Present Yes        Chronic narcotic dependence ([facility] (Chronic) ICD-10-CM: F11.20  ICD-9-CM: 304.91  1/20/2018 - Present Yes        Ileal pouchitis ([facility] ICD-10-CM: R95.076  ICD-9-CM: 569.71  5/1/2004 - Present Yes              Procedures Performed:  None. Discharge Medications:  Same as the pre-admission medications with that addition of Flagyl 500 mg by mouth 3 times per day for 10 days. Diet: Low fiber diet. Activity:  As tolerated. Wound Care:  None. Discharge Condition:  Improved compared to that upon admission. Disposition:  Home.     Follow-up Care:  Noelle Bragg MD in 1-2 weeks        Significant Diagnostic Studies:   Recent Labs     12/12/18  0120 12/11/18  1238   WBC 5.8 8.4   HGB 11.4* 13.0   HCT 35.0* 40.6    154     Recent Labs     12/11/18  1238      K 3.8      CO2 26   BUN 6   CREA 0.88   GLU 71   CA 8.6     Recent Labs     12/11/18  1238   SGOT 35   *   TBILI 0.2   TP 6.5   ALB 3.1*   GLOB 3.4 No results for input(s): INR, PTP, APTT in the last 72 hours. No lab exists for component: INREXT   Recent Labs     12/11/18  1238   FERR 30        Lab Results   Component Value Date/Time    Glucose (POC) 99 02/14/2018 09:53 PM    Glucose (POC) 192 (H) 02/14/2018 11:25 AM    Glucose (POC) 100 06/17/2017 11:22 AM    Glucose (POC) 137 (H) 06/17/2017 05:54 AM    Glucose (POC) 120 (H) 06/16/2017 09:40 PM         HOSPITAL COURSE & TREATMENT RENDERED:     The patient is a 42-year-old male with a complicated medical and surgical history who is well known to me. That history includes Crohn's disease and multiple abdominal surgical procedures.    His most recent surgical procedure was the conversion of an end-ileostomy to a [place] continent intestinal reservoir (BCIR) on 11/29/2017 in [place], [place]. His most recent previous hospitalization was from 2/12/2018 to 2/15/2018  for treatment of a partial small bowel obstruction and ileal pouchitis that were successfully treated with bowel rest, distal decompression, parenteral fluids, and parenteral metronidazole.     This time, he presented to the ER on 12/7/2018 after having been seen by his primary care physician/gastroenterologist Alison Foley MD) reporting worsening abdominal discomfort, nausea, vomiting, and watery ileostomy output. Of note, he is on Stelara for treatment of Crohn's disease and he was approximately one week overdue for his next dose because he has been unable to obtain the medication. In the office he appeared ill, and he was therefore sent to the ER for further evaluation.     A CT scan obtained in the ER revealed diffuse small intestinal dilation without a transition point and no inflammation to suggest active Crohn's disease. He appeared to have a recurrence of the same phenomenon that occurred in February, i.e. a partial small bowel obstruction at the ileostomy \"valve,\" possibly combined with ileal pouchitis.     He was admitted to the hospital and once again treated with bowel rest, parenteral fluids, distal decompression via catheter, parenteral metronidazole, and continued administration of narcotics to prevent withdrawal.  His clinical condition gradually improved and on 12/12/2018, after what appeared to have been a successful trial of solid food and intermittent catheterization of the ileal pouch, he was judged to be fit for discharge to home. His hospital course was without complications, his condition upon discharge was improved compared to that upon admission, and he appeared to have understood all discharge and follow-up instructions.         Signed:  Jt Shepherd MD

## 2018-12-12 NOTE — PROGRESS NOTES
Bedside shift change report given to Luly Naranjo RN (oncoming nurse) by ROSALBA Andrea (offgoing nurse). Report included the following information SBAR, Kardex, Intake/Output and MAR.

## 2018-12-12 NOTE — PROGRESS NOTES
Patient discharged in stable condition. Discharge prescriptions and instructions given and understood and opportunity for questions given. No reports of pain, bleeding or sob at dc.

## 2018-12-15 ENCOUNTER — HOSPITAL ENCOUNTER (INPATIENT)
Age: 43
LOS: 5 days | Discharge: HOME | DRG: 389 | End: 2018-12-21
Attending: EMERGENCY MEDICINE | Admitting: COLON & RECTAL SURGERY
Payer: COMMERCIAL

## 2018-12-15 ENCOUNTER — APPOINTMENT (OUTPATIENT)
Dept: CT IMAGING | Age: 43
DRG: 389 | End: 2018-12-15
Attending: EMERGENCY MEDICINE
Payer: COMMERCIAL

## 2018-12-15 DIAGNOSIS — R10.84 ABDOMINAL PAIN, GENERALIZED: Primary | ICD-10-CM

## 2018-12-15 DIAGNOSIS — E87.6 HYPOKALEMIA: ICD-10-CM

## 2018-12-15 DIAGNOSIS — K91.850 ILEAL POUCHITIS (HCC): ICD-10-CM

## 2018-12-15 DIAGNOSIS — G47.00 INSOMNIA, UNSPECIFIED TYPE: ICD-10-CM

## 2018-12-15 DIAGNOSIS — F11.20 CHRONIC NARCOTIC DEPENDENCE (HCC): Chronic | ICD-10-CM

## 2018-12-15 LAB
ALBUMIN SERPL-MCNC: 3.6 G/DL (ref 3.5–5)
ALBUMIN/GLOB SERPL: 1 {RATIO} (ref 1.1–2.2)
ALP SERPL-CCNC: 135 U/L (ref 45–117)
ALT SERPL-CCNC: 36 U/L (ref 12–78)
AMYLASE SERPL-CCNC: 57 U/L (ref 25–115)
ANION GAP SERPL CALC-SCNC: 6 MMOL/L (ref 5–15)
AST SERPL-CCNC: 22 U/L (ref 15–37)
BASOPHILS # BLD: 0 K/UL (ref 0–0.1)
BASOPHILS NFR BLD: 0 % (ref 0–1)
BILIRUB SERPL-MCNC: 0.2 MG/DL (ref 0.2–1)
BUN SERPL-MCNC: 8 MG/DL (ref 6–20)
BUN/CREAT SERPL: 7 (ref 12–20)
CALCIUM SERPL-MCNC: 8.7 MG/DL (ref 8.5–10.1)
CHLORIDE SERPL-SCNC: 107 MMOL/L (ref 97–108)
CO2 SERPL-SCNC: 30 MMOL/L (ref 21–32)
COMMENT, HOLDF: NORMAL
CREAT SERPL-MCNC: 1.09 MG/DL (ref 0.7–1.3)
DIFFERENTIAL METHOD BLD: NORMAL
EOSINOPHIL # BLD: 0.2 K/UL (ref 0–0.4)
EOSINOPHIL NFR BLD: 3 % (ref 0–7)
ERYTHROCYTE [DISTWIDTH] IN BLOOD BY AUTOMATED COUNT: 13.8 % (ref 11.5–14.5)
GLOBULIN SER CALC-MCNC: 3.6 G/DL (ref 2–4)
GLUCOSE SERPL-MCNC: 90 MG/DL (ref 65–100)
HCT VFR BLD AUTO: 43.1 % (ref 36.6–50.3)
HGB BLD-MCNC: 13.7 G/DL (ref 12.1–17)
IMM GRANULOCYTES # BLD: 0 K/UL (ref 0–0.04)
IMM GRANULOCYTES NFR BLD AUTO: 0 % (ref 0–0.5)
LIPASE SERPL-CCNC: 88 U/L (ref 73–393)
LYMPHOCYTES # BLD: 2.8 K/UL (ref 0.8–3.5)
LYMPHOCYTES NFR BLD: 36 % (ref 12–49)
MCH RBC QN AUTO: 28.7 PG (ref 26–34)
MCHC RBC AUTO-ENTMCNC: 31.8 G/DL (ref 30–36.5)
MCV RBC AUTO: 90.2 FL (ref 80–99)
MONOCYTES # BLD: 0.5 K/UL (ref 0–1)
MONOCYTES NFR BLD: 6 % (ref 5–13)
NEUTS SEG # BLD: 4.1 K/UL (ref 1.8–8)
NEUTS SEG NFR BLD: 54 % (ref 32–75)
NRBC # BLD: 0 K/UL (ref 0–0.01)
NRBC BLD-RTO: 0 PER 100 WBC
PLATELET # BLD AUTO: 187 K/UL (ref 150–400)
PMV BLD AUTO: 11.1 FL (ref 8.9–12.9)
POTASSIUM SERPL-SCNC: 3 MMOL/L (ref 3.5–5.1)
PROT SERPL-MCNC: 7.2 G/DL (ref 6.4–8.2)
RBC # BLD AUTO: 4.78 M/UL (ref 4.1–5.7)
SAMPLES BEING HELD,HOLD: NORMAL
SODIUM SERPL-SCNC: 143 MMOL/L (ref 136–145)
WBC # BLD AUTO: 7.6 K/UL (ref 4.1–11.1)

## 2018-12-15 PROCEDURE — 96374 THER/PROPH/DIAG INJ IV PUSH: CPT

## 2018-12-15 PROCEDURE — 99284 EMERGENCY DEPT VISIT MOD MDM: CPT

## 2018-12-15 PROCEDURE — 85025 COMPLETE CBC W/AUTO DIFF WBC: CPT

## 2018-12-15 PROCEDURE — 83690 ASSAY OF LIPASE: CPT

## 2018-12-15 PROCEDURE — 96361 HYDRATE IV INFUSION ADD-ON: CPT

## 2018-12-15 PROCEDURE — 74011250636 HC RX REV CODE- 250/636: Performed by: EMERGENCY MEDICINE

## 2018-12-15 PROCEDURE — 74011000250 HC RX REV CODE- 250: Performed by: EMERGENCY MEDICINE

## 2018-12-15 PROCEDURE — 74177 CT ABD & PELVIS W/CONTRAST: CPT

## 2018-12-15 PROCEDURE — 82150 ASSAY OF AMYLASE: CPT

## 2018-12-15 PROCEDURE — 74011636320 HC RX REV CODE- 636/320: Performed by: RADIOLOGY

## 2018-12-15 PROCEDURE — 74011000258 HC RX REV CODE- 258: Performed by: RADIOLOGY

## 2018-12-15 PROCEDURE — 80053 COMPREHEN METABOLIC PANEL: CPT

## 2018-12-15 PROCEDURE — 36415 COLL VENOUS BLD VENIPUNCTURE: CPT

## 2018-12-15 PROCEDURE — 96375 TX/PRO/DX INJ NEW DRUG ADDON: CPT

## 2018-12-15 PROCEDURE — 81001 URINALYSIS AUTO W/SCOPE: CPT

## 2018-12-15 RX ORDER — DIPHENHYDRAMINE HYDROCHLORIDE 50 MG/ML
25 INJECTION, SOLUTION INTRAMUSCULAR; INTRAVENOUS
Status: COMPLETED | OUTPATIENT
Start: 2018-12-15 | End: 2018-12-15

## 2018-12-15 RX ORDER — FENTANYL CITRATE 50 UG/ML
100 INJECTION, SOLUTION INTRAMUSCULAR; INTRAVENOUS
Status: COMPLETED | OUTPATIENT
Start: 2018-12-15 | End: 2018-12-15

## 2018-12-15 RX ORDER — SODIUM CHLORIDE 0.9 % (FLUSH) 0.9 %
10 SYRINGE (ML) INJECTION
Status: COMPLETED | OUTPATIENT
Start: 2018-12-15 | End: 2018-12-15

## 2018-12-15 RX ADMIN — DIPHENHYDRAMINE HYDROCHLORIDE 25 MG: 50 INJECTION, SOLUTION INTRAMUSCULAR; INTRAVENOUS at 22:57

## 2018-12-15 RX ADMIN — FENTANYL CITRATE 100 MCG: 50 INJECTION, SOLUTION INTRAMUSCULAR; INTRAVENOUS at 22:57

## 2018-12-15 RX ADMIN — SODIUM CHLORIDE 100 ML: 900 INJECTION, SOLUTION INTRAVENOUS at 23:43

## 2018-12-15 RX ADMIN — Medication 10 ML: at 23:43

## 2018-12-15 RX ADMIN — SODIUM CHLORIDE 1000 ML: 900 INJECTION, SOLUTION INTRAVENOUS at 22:57

## 2018-12-15 RX ADMIN — PROCHLORPERAZINE EDISYLATE 10 MG: 5 INJECTION INTRAMUSCULAR; INTRAVENOUS at 22:57

## 2018-12-15 RX ADMIN — IOPAMIDOL 100 ML: 755 INJECTION, SOLUTION INTRAVENOUS at 23:09

## 2018-12-16 LAB
ANION GAP SERPL CALC-SCNC: 9 MMOL/L (ref 5–15)
APPEARANCE UR: CLEAR
BACTERIA URNS QL MICRO: NEGATIVE /HPF
BILIRUB UR QL: NEGATIVE
BUN SERPL-MCNC: 8 MG/DL (ref 6–20)
BUN/CREAT SERPL: 9 (ref 12–20)
CALCIUM SERPL-MCNC: 8.3 MG/DL (ref 8.5–10.1)
CHLORIDE SERPL-SCNC: 112 MMOL/L (ref 97–108)
CO2 SERPL-SCNC: 23 MMOL/L (ref 21–32)
COLOR UR: NORMAL
CREAT SERPL-MCNC: 0.88 MG/DL (ref 0.7–1.3)
EPITH CASTS URNS QL MICRO: NORMAL /LPF
GLUCOSE SERPL-MCNC: 84 MG/DL (ref 65–100)
GLUCOSE UR STRIP.AUTO-MCNC: NEGATIVE MG/DL
HGB UR QL STRIP: NEGATIVE
HYALINE CASTS URNS QL MICRO: NORMAL /LPF (ref 0–5)
KETONES UR QL STRIP.AUTO: NEGATIVE MG/DL
LEUKOCYTE ESTERASE UR QL STRIP.AUTO: NEGATIVE
NITRITE UR QL STRIP.AUTO: NEGATIVE
PH UR STRIP: 5.5 [PH] (ref 5–8)
POTASSIUM SERPL-SCNC: 3.6 MMOL/L (ref 3.5–5.1)
PROT UR STRIP-MCNC: NEGATIVE MG/DL
RBC #/AREA URNS HPF: NORMAL /HPF (ref 0–5)
SODIUM SERPL-SCNC: 144 MMOL/L (ref 136–145)
SP GR UR REFRACTOMETRY: 1.02 (ref 1–1.03)
UA: UC IF INDICATED,UAUC: NORMAL
UROBILINOGEN UR QL STRIP.AUTO: 0.2 EU/DL (ref 0.2–1)
WBC URNS QL MICRO: NORMAL /HPF (ref 0–4)

## 2018-12-16 PROCEDURE — 74011250636 HC RX REV CODE- 250/636: Performed by: EMERGENCY MEDICINE

## 2018-12-16 PROCEDURE — 65270000032 HC RM SEMIPRIVATE

## 2018-12-16 PROCEDURE — 96375 TX/PRO/DX INJ NEW DRUG ADDON: CPT

## 2018-12-16 PROCEDURE — 80048 BASIC METABOLIC PNL TOTAL CA: CPT

## 2018-12-16 PROCEDURE — 94760 N-INVAS EAR/PLS OXIMETRY 1: CPT

## 2018-12-16 PROCEDURE — 74011250637 HC RX REV CODE- 250/637: Performed by: COLON & RECTAL SURGERY

## 2018-12-16 PROCEDURE — 74011000250 HC RX REV CODE- 250: Performed by: COLON & RECTAL SURGERY

## 2018-12-16 PROCEDURE — 74011000250 HC RX REV CODE- 250: Performed by: EMERGENCY MEDICINE

## 2018-12-16 PROCEDURE — 74011250636 HC RX REV CODE- 250/636: Performed by: COLON & RECTAL SURGERY

## 2018-12-16 PROCEDURE — 36415 COLL VENOUS BLD VENIPUNCTURE: CPT

## 2018-12-16 RX ORDER — PROMETHAZINE HYDROCHLORIDE 25 MG/1
25 TABLET ORAL
Status: DISCONTINUED | OUTPATIENT
Start: 2018-12-16 | End: 2018-12-21 | Stop reason: HOSPADM

## 2018-12-16 RX ORDER — ERGOCALCIFEROL 1.25 MG/1
50000 CAPSULE ORAL
Status: DISCONTINUED | OUTPATIENT
Start: 2018-12-16 | End: 2018-12-21 | Stop reason: HOSPADM

## 2018-12-16 RX ORDER — DIAZEPAM 5 MG/1
5 TABLET ORAL 2 TIMES DAILY
Status: DISCONTINUED | OUTPATIENT
Start: 2018-12-16 | End: 2018-12-21 | Stop reason: HOSPADM

## 2018-12-16 RX ORDER — HYDROMORPHONE HYDROCHLORIDE 2 MG/ML
1-1.5 INJECTION, SOLUTION INTRAMUSCULAR; INTRAVENOUS; SUBCUTANEOUS
Status: DISCONTINUED | OUTPATIENT
Start: 2018-12-16 | End: 2018-12-21 | Stop reason: HOSPADM

## 2018-12-16 RX ORDER — DEXTROSE MONOHYDRATE, SODIUM CHLORIDE, SODIUM LACTATE, POTASSIUM CHLORIDE, CALCIUM CHLORIDE 5; 600; 310; 179; 20 G/100ML; MG/100ML; MG/100ML; MG/100ML; MG/100ML
INJECTION, SOLUTION INTRAVENOUS CONTINUOUS
Status: DISCONTINUED | OUTPATIENT
Start: 2018-12-16 | End: 2018-12-21 | Stop reason: HOSPADM

## 2018-12-16 RX ORDER — TEMAZEPAM 30 MG/1
30 CAPSULE ORAL
Status: DISCONTINUED | OUTPATIENT
Start: 2018-12-16 | End: 2018-12-21 | Stop reason: HOSPADM

## 2018-12-16 RX ORDER — ONDANSETRON 2 MG/ML
4 INJECTION INTRAMUSCULAR; INTRAVENOUS
Status: DISCONTINUED | OUTPATIENT
Start: 2018-12-16 | End: 2018-12-21 | Stop reason: HOSPADM

## 2018-12-16 RX ORDER — POTASSIUM CHLORIDE 7.45 MG/ML
10 INJECTION INTRAVENOUS
Status: COMPLETED | OUTPATIENT
Start: 2018-12-16 | End: 2018-12-16

## 2018-12-16 RX ORDER — ZOLPIDEM TARTRATE 5 MG/1
10 TABLET ORAL
Status: DISCONTINUED | OUTPATIENT
Start: 2018-12-16 | End: 2018-12-21 | Stop reason: HOSPADM

## 2018-12-16 RX ORDER — ALBUTEROL SULFATE 0.83 MG/ML
2.5 SOLUTION RESPIRATORY (INHALATION)
Status: DISCONTINUED | OUTPATIENT
Start: 2018-12-16 | End: 2018-12-21 | Stop reason: HOSPADM

## 2018-12-16 RX ORDER — ACETAMINOPHEN 500 MG
500 TABLET ORAL
Status: DISCONTINUED | OUTPATIENT
Start: 2018-12-16 | End: 2018-12-21 | Stop reason: HOSPADM

## 2018-12-16 RX ORDER — NALOXONE HYDROCHLORIDE 0.4 MG/ML
0.4 INJECTION, SOLUTION INTRAMUSCULAR; INTRAVENOUS; SUBCUTANEOUS AS NEEDED
Status: DISCONTINUED | OUTPATIENT
Start: 2018-12-16 | End: 2018-12-21 | Stop reason: HOSPADM

## 2018-12-16 RX ORDER — ALBUTEROL SULFATE 90 UG/1
2 AEROSOL, METERED RESPIRATORY (INHALATION)
Status: DISCONTINUED | OUTPATIENT
Start: 2018-12-16 | End: 2018-12-16 | Stop reason: CLARIF

## 2018-12-16 RX ORDER — TETRACAINE HYDROCHLORIDE 5 MG/ML
2 SOLUTION OPHTHALMIC
Status: DISCONTINUED | OUTPATIENT
Start: 2018-12-16 | End: 2018-12-16

## 2018-12-16 RX ORDER — POTASSIUM CHLORIDE 750 MG/1
40 TABLET, FILM COATED, EXTENDED RELEASE ORAL
Status: DISCONTINUED | OUTPATIENT
Start: 2018-12-16 | End: 2018-12-16

## 2018-12-16 RX ORDER — PREGABALIN 100 MG/1
100 CAPSULE ORAL 4 TIMES DAILY
Status: DISCONTINUED | OUTPATIENT
Start: 2018-12-16 | End: 2018-12-21 | Stop reason: HOSPADM

## 2018-12-16 RX ORDER — CYANOCOBALAMIN 1000 UG/ML
1000 INJECTION, SOLUTION INTRAMUSCULAR; SUBCUTANEOUS
Status: DISCONTINUED | OUTPATIENT
Start: 2018-12-16 | End: 2018-12-21 | Stop reason: HOSPADM

## 2018-12-16 RX ORDER — PANTOPRAZOLE SODIUM 40 MG/1
40 TABLET, DELAYED RELEASE ORAL
Status: DISCONTINUED | OUTPATIENT
Start: 2018-12-16 | End: 2018-12-21 | Stop reason: HOSPADM

## 2018-12-16 RX ORDER — SODIUM CHLORIDE 0.9 % (FLUSH) 0.9 %
5-10 SYRINGE (ML) INJECTION AS NEEDED
Status: DISCONTINUED | OUTPATIENT
Start: 2018-12-16 | End: 2018-12-21 | Stop reason: HOSPADM

## 2018-12-16 RX ORDER — SODIUM CHLORIDE 0.9 % (FLUSH) 0.9 %
5-10 SYRINGE (ML) INJECTION EVERY 8 HOURS
Status: DISCONTINUED | OUTPATIENT
Start: 2018-12-16 | End: 2018-12-21 | Stop reason: HOSPADM

## 2018-12-16 RX ORDER — DICYCLOMINE HYDROCHLORIDE 10 MG/1
10 CAPSULE ORAL
Status: DISCONTINUED | OUTPATIENT
Start: 2018-12-16 | End: 2018-12-21 | Stop reason: HOSPADM

## 2018-12-16 RX ADMIN — POTASSIUM CHLORIDE, SODIUM CHLORIDE, CALCIUM CHLORIDE, SODIUM LACTATE, AND DEXTROSE MONOHYDRATE: 1.79; 6; .2; 3.1; 5 INJECTION, SOLUTION INTRAVENOUS at 03:03

## 2018-12-16 RX ADMIN — HYDROMORPHONE HYDROCHLORIDE 1.5 MG: 2 INJECTION INTRAMUSCULAR; INTRAVENOUS; SUBCUTANEOUS at 08:05

## 2018-12-16 RX ADMIN — DICYCLOMINE HYDROCHLORIDE 10 MG: 10 CAPSULE ORAL at 23:09

## 2018-12-16 RX ADMIN — PREGABALIN 100 MG: 100 CAPSULE ORAL at 12:41

## 2018-12-16 RX ADMIN — PANTOPRAZOLE SODIUM 40 MG: 40 TABLET, DELAYED RELEASE ORAL at 03:21

## 2018-12-16 RX ADMIN — DIAZEPAM 5 MG: 5 TABLET ORAL at 17:17

## 2018-12-16 RX ADMIN — HYDROMORPHONE HYDROCHLORIDE 1.5 MG: 2 INJECTION INTRAMUSCULAR; INTRAVENOUS; SUBCUTANEOUS at 05:00

## 2018-12-16 RX ADMIN — HYDROMORPHONE HYDROCHLORIDE 1.5 MG: 2 INJECTION INTRAMUSCULAR; INTRAVENOUS; SUBCUTANEOUS at 23:09

## 2018-12-16 RX ADMIN — PREGABALIN 100 MG: 100 CAPSULE ORAL at 08:09

## 2018-12-16 RX ADMIN — FAMOTIDINE 20 MG: 10 INJECTION, SOLUTION INTRAVENOUS at 00:38

## 2018-12-16 RX ADMIN — HYDROMORPHONE HYDROCHLORIDE 1.5 MG: 2 INJECTION INTRAMUSCULAR; INTRAVENOUS; SUBCUTANEOUS at 20:11

## 2018-12-16 RX ADMIN — HYDROMORPHONE HYDROCHLORIDE 1.5 MG: 2 INJECTION INTRAMUSCULAR; INTRAVENOUS; SUBCUTANEOUS at 17:08

## 2018-12-16 RX ADMIN — PROMETHAZINE HYDROCHLORIDE 25 MG: 25 TABLET ORAL at 20:11

## 2018-12-16 RX ADMIN — HYDROMORPHONE HYDROCHLORIDE 1 MG: 2 INJECTION INTRAMUSCULAR; INTRAVENOUS; SUBCUTANEOUS at 01:55

## 2018-12-16 RX ADMIN — PREGABALIN 100 MG: 100 CAPSULE ORAL at 17:17

## 2018-12-16 RX ADMIN — ONDANSETRON 4 MG: 2 INJECTION INTRAMUSCULAR; INTRAVENOUS at 13:53

## 2018-12-16 RX ADMIN — HYDROMORPHONE HYDROCHLORIDE 1.5 MG: 2 INJECTION INTRAMUSCULAR; INTRAVENOUS; SUBCUTANEOUS at 13:53

## 2018-12-16 RX ADMIN — Medication 10 ML: at 21:22

## 2018-12-16 RX ADMIN — Medication 10 ML: at 13:53

## 2018-12-16 RX ADMIN — Medication 1 CAPSULE: at 03:21

## 2018-12-16 RX ADMIN — ZOLPIDEM TARTRATE 10 MG: 5 TABLET ORAL at 03:21

## 2018-12-16 RX ADMIN — HYDROMORPHONE HYDROCHLORIDE 1.5 MG: 2 INJECTION INTRAMUSCULAR; INTRAVENOUS; SUBCUTANEOUS at 10:50

## 2018-12-16 RX ADMIN — CYANOCOBALAMIN 1000 MCG: 1000 INJECTION, SOLUTION INTRAMUSCULAR at 07:00

## 2018-12-16 RX ADMIN — PROCHLORPERAZINE EDISYLATE 5 MG: 5 INJECTION INTRAMUSCULAR; INTRAVENOUS at 08:06

## 2018-12-16 RX ADMIN — ZOLPIDEM TARTRATE 10 MG: 5 TABLET ORAL at 21:22

## 2018-12-16 RX ADMIN — DIAZEPAM 5 MG: 5 TABLET ORAL at 08:10

## 2018-12-16 RX ADMIN — DICYCLOMINE HYDROCHLORIDE 10 MG: 10 CAPSULE ORAL at 17:07

## 2018-12-16 RX ADMIN — POTASSIUM CHLORIDE, SODIUM CHLORIDE, CALCIUM CHLORIDE, SODIUM LACTATE, AND DEXTROSE MONOHYDRATE: 1.79; 6; .2; 3.1; 5 INJECTION, SOLUTION INTRAVENOUS at 17:17

## 2018-12-16 RX ADMIN — Medication 1 CAPSULE: at 21:22

## 2018-12-16 RX ADMIN — ERGOCALCIFEROL 50000 UNITS: 1.25 CAPSULE ORAL at 07:05

## 2018-12-16 RX ADMIN — Medication 10 ML: at 05:01

## 2018-12-16 RX ADMIN — POTASSIUM CHLORIDE 10 MEQ: 10 INJECTION, SOLUTION INTRAVENOUS at 01:30

## 2018-12-16 RX ADMIN — PROCHLORPERAZINE EDISYLATE 5 MG: 5 INJECTION INTRAMUSCULAR; INTRAVENOUS at 17:07

## 2018-12-16 RX ADMIN — PREGABALIN 100 MG: 100 CAPSULE ORAL at 21:22

## 2018-12-16 RX ADMIN — PROMETHAZINE HYDROCHLORIDE 25 MG: 25 TABLET ORAL at 10:49

## 2018-12-16 RX ADMIN — PANTOPRAZOLE SODIUM 40 MG: 40 TABLET, DELAYED RELEASE ORAL at 21:22

## 2018-12-16 RX ADMIN — ONDANSETRON 4 MG: 2 INJECTION INTRAMUSCULAR; INTRAVENOUS at 23:09

## 2018-12-16 RX ADMIN — DICYCLOMINE HYDROCHLORIDE 10 MG: 10 CAPSULE ORAL at 10:49

## 2018-12-16 NOTE — ROUTINE PROCESS
TRANSFER - OUT REPORT:    Verbal report given to ROSALBA Serna(name) on Pavel Beverly  being transferred to (unit) for routine progression of care       Report consisted of patients Situation, Background, Assessment and   Recommendations(SBAR). Information from the following report(s) SBAR, ED Summary, Procedure Summary, MAR and Recent Results was reviewed with the receiving nurse. Lines:   Peripheral IV 12/15/18 Left Forearm (Active)   Site Assessment Clean, dry, & intact 12/15/2018 10:58 PM   Phlebitis Assessment 0 12/15/2018 10:58 PM   Infiltration Assessment 0 12/15/2018 10:58 PM   Dressing Status Clean, dry, & intact 12/15/2018 10:58 PM   Dressing Type Transparent 12/15/2018 10:58 PM   Hub Color/Line Status Pink;Flushed 12/15/2018 10:58 PM   Action Taken Blood drawn 12/15/2018 10:58 PM   Alcohol Cap Used No 12/15/2018 10:58 PM        Opportunity for questions and clarification was provided.       Patient transported with:     Visit Vitals  /66 (BP 1 Location: Left arm, BP Patient Position: At rest)   Pulse 89   Temp 98.5 °F (36.9 °C)   Resp 16   Ht 6' (1.829 m)   SpO2 97%   BMI 25.77 kg/m²

## 2018-12-16 NOTE — ED TRIAGE NOTES
Arrived from home reporting abdominal pain that started 2 night ago. Also reports vomiting.  A&Ox4 upon arrival. 8/10 pain      Hx of sbo and jackie's

## 2018-12-16 NOTE — PROGRESS NOTES
Spiritual Care Partner Volunteer visited patient in Rm 620 on12/16/18. Documented by:   Chaplain Castellon MDiv, MACE  [phone])

## 2018-12-16 NOTE — PROGRESS NOTES
General Daily Progress Note    Admission Date: 12/15/2018  Hospital Day 1  Post-Op Day Not Applicable  Subjective:   Somewhat less pain since draining the pouch. Objective:     Patient Vitals for the past 24 hrs:   BP Temp Pulse Resp SpO2 Height   12/16/18 0816 108/65 97.7 °F (36.5 °C) 86 16 93 %    12/16/18 0239 101/65 97.9 °F (36.6 °C) 70 16 97 %    12/16/18 0110 107/66 98.5 °F (36.9 °C) 89 16 97 %    12/16/18 0000 102/54 98.4 °F (36.9 °C) 88 16 96 %    12/15/18 2246 131/72 97.8 °F (36.6 °C) (!) 102 16 100 % 6' (1.829 m)   12/15/18 2232   (!) 117  98 %      12/16 0701 - 12/16 1900  In: -   Out: 800 [Urine:400]  12/14 1901 - 12/16 0700  In: -   Out: 100       Physical Examination:  General Appearance:  Mild apparent discomfort. Abdomen:  Soft. Non-distended. Mild diffuse tenderness. Catheter in place in the ileostomy. Data Review   Recent Results (from the past 24 hour(s))   SAMPLES BEING HELD    Collection Time: 12/15/18 10:53 PM   Result Value Ref Range    SAMPLES BEING HELD 1RED,1BLUE     COMMENT        Add-on orders for these samples will be processed based on acceptable specimen integrity and analyte stability, which may vary by analyte. CBC WITH AUTOMATED DIFF    Collection Time: 12/15/18 10:53 PM   Result Value Ref Range    WBC 7.6 4.1 - 11.1 K/uL    RBC 4.78 4.10 - 5.70 M/uL    HGB 13.7 12.1 - 17.0 g/dL    HCT 43.1 36.6 - 50.3 %    MCV 90.2 80.0 - 99.0 FL    MCH 28.7 26.0 - 34.0 PG    MCHC 31.8 30.0 - 36.5 g/dL    RDW 13.8 11.5 - 14.5 %    PLATELET 033 128 - 999 K/uL    MPV 11.1 8.9 - 12.9 FL    NRBC 0.0 0  WBC    ABSOLUTE NRBC 0.00 0.00 - 0.01 K/uL    NEUTROPHILS 54 32 - 75 %    LYMPHOCYTES 36 12 - 49 %    MONOCYTES 6 5 - 13 %    EOSINOPHILS 3 0 - 7 %    BASOPHILS 0 0 - 1 %    IMMATURE GRANULOCYTES 0 0.0 - 0.5 %    ABS. NEUTROPHILS 4.1 1.8 - 8.0 K/UL    ABS. LYMPHOCYTES 2.8 0.8 - 3.5 K/UL    ABS. MONOCYTES 0.5 0.0 - 1.0 K/UL    ABS.  EOSINOPHILS 0.2 0.0 - 0.4 K/UL ABS. BASOPHILS 0.0 0.0 - 0.1 K/UL    ABS. IMM. GRANS. 0.0 0.00 - 0.04 K/UL    DF AUTOMATED     METABOLIC PANEL, COMPREHENSIVE    Collection Time: 12/15/18 10:53 PM   Result Value Ref Range    Sodium 143 136 - 145 mmol/L    Potassium 3.0 (L) 3.5 - 5.1 mmol/L    Chloride 107 97 - 108 mmol/L    CO2 30 21 - 32 mmol/L    Anion gap 6 5 - 15 mmol/L    Glucose 90 65 - 100 mg/dL    BUN 8 6 - 20 MG/DL    Creatinine 1.09 0.70 - 1.30 MG/DL    BUN/Creatinine ratio 7 (L) 12 - 20      GFR est AA >60 >60 ml/min/1.73m2    GFR est non-AA >60 >60 ml/min/1.73m2    Calcium 8.7 8.5 - 10.1 MG/DL    Bilirubin, total 0.2 0.2 - 1.0 MG/DL    ALT (SGPT) 36 12 - 78 U/L    AST (SGOT) 22 15 - 37 U/L    Alk.  phosphatase 135 (H) 45 - 117 U/L    Protein, total 7.2 6.4 - 8.2 g/dL    Albumin 3.6 3.5 - 5.0 g/dL    Globulin 3.6 2.0 - 4.0 g/dL    A-G Ratio 1.0 (L) 1.1 - 2.2     LIPASE    Collection Time: 12/15/18 10:53 PM   Result Value Ref Range    Lipase 88 73 - 393 U/L   AMYLASE    Collection Time: 12/15/18 10:53 PM   Result Value Ref Range    Amylase 57 25 - 115 U/L   URINALYSIS W/ REFLEX CULTURE    Collection Time: 12/15/18 11:37 PM   Result Value Ref Range    Color YELLOW/STRAW      Appearance CLEAR CLEAR      Specific gravity 1.016 1.003 - 1.030      pH (UA) 5.5 5.0 - 8.0      Protein NEGATIVE  NEG mg/dL    Glucose NEGATIVE  NEG mg/dL    Ketone NEGATIVE  NEG mg/dL    Bilirubin NEGATIVE  NEG      Blood NEGATIVE  NEG      Urobilinogen 0.2 0.2 - 1.0 EU/dL    Nitrites NEGATIVE  NEG      Leukocyte Esterase NEGATIVE  NEG      WBC 0-4 0 - 4 /hpf    RBC 0-5 0 - 5 /hpf    Epithelial cells FEW FEW /lpf    Bacteria NEGATIVE  NEG /hpf    UA:UC IF INDICATED CULTURE NOT INDICATED BY UA RESULT CNI      Hyaline cast 0-2 0 - 5 /lpf   METABOLIC PANEL, BASIC    Collection Time: 12/16/18  5:10 AM   Result Value Ref Range    Sodium 144 136 - 145 mmol/L    Potassium 3.6 3.5 - 5.1 mmol/L    Chloride 112 (H) 97 - 108 mmol/L    CO2 23 21 - 32 mmol/L    Anion gap 9 5 - 15 mmol/L    Glucose 84 65 - 100 mg/dL    BUN 8 6 - 20 MG/DL    Creatinine 0.88 0.70 - 1.30 MG/DL    BUN/Creatinine ratio 9 (L) 12 - 20      GFR est AA >60 >60 ml/min/1.73m2    GFR est non-AA >60 >60 ml/min/1.73m2    Calcium 8.3 (L) 8.5 - 10.1 MG/DL           Assessment:     Active Problems:    Ileal pouchitis ([address] (5/1/2004)      Chronic narcotic dependence ([address] (1/20/2018)      Small bowel obstruction ([address] (2/12/2018)      Stable. Plan:   Clear liquid diet with caution. Continue continuous distal decompression. Continue parenteral Flagyl. Will consider ordering small bowel follow-through or CT enterography tomorrow.

## 2018-12-16 NOTE — ED PROVIDER NOTES
44 y.o. male with extensive past medical history significant for Crohn's disease, Ulcerative colitis, GERD, Hiatal hernia, anal stenosis, extensive surgical history (see list) who presents from home with chief complaint of abdominal pain. Pt reports an acute on chronic exacerbation of abdominal pain since last night. Pt states that the pain is severe and generalized accompanied by nausea with vomiting. Pt states that he has had minimal output from is ileostomy stump. He notes the use of Zofran and Phenergan without relief. He denies known fever, chills, cough,c ongestion, chest pain, shortness of breath, headache, weakness or numbness. There are no other acute medical concerns at this time. Chart review: Pt admitted 12/7/18-12/12/18 for partial small bowel obstruction, ileal pouchitis and chronic narcotic dependence. Pt treated with bowel rest, parenteral fluids, distal decompression via catheter, metronidazole and pain medication. Pt discharged home after successful trial of consuming solid food. GI: Vania Ann MD  PCP: Estelle Elliott MD    Note written by Virgen Nowak, as dictated by Gt Toribio MD 11:01 PM               Past Medical History:   Diagnosis Date    Anal fistula     The patient no longer has an anus.  Anal stenosis     The patient no longer has an anus.  Chronic kidney disease     Crohn's disease ([address]     GERD (gastroesophageal reflux disease)     H/O ulcerative colitis     Symptoms began in 1996. Total proctocolectomy was performed in 2004. Patient later developed Crohn's disease.  Hiatal hernia     Ileal pouchitis ([address] 05/2004    The patient no longer has a pouch.  Nausea & vomiting     Combination of Scopalamine patch & Zofran IV worked well in past    Numbness in right leg     Chronic.     Opioid dependence ([address]     History of opioid dependence requiring a detox program.    Psychiatric disorder     depression and anxiety    Skin lesion of back 3/17/2014    Syncopal episodes        Past Surgical History:   Procedure Laterality Date    ABDOMEN SURGERY PROC UNLISTED  3/25/2004    Total proctocolectomy with creation of ileoanal J-pouch and loop ileostomy; Dr. John Irizarry.  COLONOSCOPY N/A 4/12/2018    Flexible endoscopy via ileostomy (NOT a colonoscopy); Beau Garner MD.     CHOLECYSTECTOMY  12/2016    Dr. Helga Bernal  GI  5/2/2004    Examination under anesthesia with endoscopic evaluation of ileoanal pouch and placement of drain; Dr. John Irizarry.   GI  5/25/2004    Ileostomy closure with small bowel resection and anastomosis; Dr. John Irizarry.   GI  5/24/2012    Examination under anesthesia, anal dilatation, anal biopsy, and placement of draining seton; Dr. John Irizarry.   GI  7/3/2012    Incision and drainage of perirectal abscess and rigid endoscopy of ileoanal pouch; Dr. John Irizarry.   GI  7/31/2012    Incision and drainage of perirectal abscess, placement of draining seton, and anal dilatation; Dr. John Irizarry.   GI  1/22/2013    Repair of anal fistulas with debridement, partial fistulectomy, and flap closure; Dr. John Irziarry.   GI  5/17/2013    Examination under anesthesia, partial fistulotomy,  and placement of draining setons; Dr. John Irizarry.   GI  8/6/2013    Anal fistulotomy and application of ACell micromatrix; Dr. John Irizarry.   GI  2/20/2014    Examination under anesthesia and dilation of anal canal with rigid proctoscopy; Conner Jacobsen MD.     GI  4/29/2015    Creation of Manan Edman continent intestinal reservoir (BCIR), abdominoperineal resection of ileoanal J-pouch; lysis of adhesions, and gastrostomy; Malika Monzon. Ozzie Hill MD ([facility])     GI  8/7/2015    Stoma revision; Malika Hill MD ([facility])     GI  10/29/2015    Extensive lysis of adhesions, resection of continent intestinal reservoir, repair of serosal tears, and creation of end-ileostomy; Dr. Nessa Laguna.  HX GI  2017    Laparotomy, extensive lysis of adhesions and revision of ileostomy; Dr. Nessa Laguna.  HX ORTHOPAEDIC      Left ACL repair    HX OTHER SURGICAL  2017    Conversion of end-ileostomy to BCIR ([address]); [place], [place].    HX UROLOGICAL  2017    Cystoscopy and placement of bilateral temporary ureteral catheters; Sherin Adams MD.         Family History:   Problem Relation Age of Onset    Cancer Father     Asthma Neg Hx     Diabetes Neg Hx     Heart Disease Neg Hx     Hypertension Neg Hx     Stroke Neg Hx     Malignant Hyperthermia Neg Hx     Pseudocholinesterase Deficiency Neg Hx     Delayed Awakening Neg Hx     Post-op Nausea/Vomiting Neg Hx        Social History     Socioeconomic History    Marital status:      Spouse name: Not on file    Number of children: Not on file    Years of education: Not on file    Highest education level: Not on file   Social Needs    Financial resource strain: Not on file    Food insecurity - worry: Not on file    Food insecurity - inability: Not on file   [employer] needs - medical: Not on file   [employer] needs - non-medical: Not on file   Occupational History    Not on file   Tobacco Use    Smoking status: Current Every Day Smoker     Packs/day: 0.50     Years: 7.00     Pack years: 3.50     Last attempt to quit: 2017     Years since quittin.9    Smokeless tobacco: Never Used   Substance and Sexual Activity    Alcohol use: No    Drug use: No    Sexual activity: Not on file   Other Topics Concern    Not on file   Social History Narrative    Not on file         ALLERGIES: Remicade [infliximab]; Bactrim [sulfamethoprim ds]; Morphine; and Nsaids (non-steroidal anti-inflammatory drug)    Review of Systems   Constitutional: Negative for chills and fever. HENT: Negative for congestion.     Respiratory: Negative for cough and shortness of breath. Cardiovascular: Negative for chest pain. Gastrointestinal: Positive for abdominal pain, nausea and vomiting. Neurological: Negative for weakness, numbness and headaches. All other systems reviewed and are negative. Vitals:    12/15/18 2232 12/15/18 2246   BP:  131/72   Pulse: (!) 117 (!) 102   Resp:  16   Temp:  97.8 °F (36.6 °C)   SpO2: 98% 100%   Height:  6' (1.829 m)            Physical Exam   Nursing note and vitals reviewed. CONSTITUTIONAL: Well-appearing; well-nourished; in mild distress  HEAD: Normocephalic; atraumatic  EYES: PERRL; EOM intact; conjunctiva and sclera are clear bilaterally. ENT: No rhinorrhea; normal pharynx with no tonsillar hypertrophy; mucous membranes pink/moist, no erythema, no exudate. NECK: Supple; non-tender; no cervical lymphadenopathy  CARD: Normal S1, S2; no murmurs, rubs, or gallops. Regular rate and rhythm. RESP: Normal respiratory effort; breath sounds clear and equal bilaterally; no wheezes, rhonchi, or rales. ABD: Normal bowel sounds; non-distended; Diffuse tenderness; no rebound or guarding; no palpable organomegaly, no masses, no bruits. Back Exam: Normal inspection; no vertebral point tenderness, no CVA tenderness. Normal range of motion. EXT: Normal ROM in all four extremities; non-tender to palpation; no swelling or deformity; distal pulses are normal, no edema. SKIN: Warm; dry; no rash. NEURO:Alert and oriented x 3, coherent, LASHANDA-XII grossly intact, sensory and motor are non-focal.        MDM  Number of Diagnoses or Management Options  Diagnosis management comments: Assessment: 67-year-old male, who presents with history of Crohn's disease, chronic abdominal pain, accompanied by nausea and vomiting- rule out bowel obstruction/ narcotic dependency/ obstipation/ electrolytes abnormality, and dehydration    Plan: lab/ IVF/ antiemetic, and analgesia/ CT scan of the abdomen and pelvis/ serial exam/ Monitor and Reevaluate.          Amount and/or Complexity of Data Reviewed  Clinical lab tests: ordered and reviewed  Tests in the radiology section of CPT®: ordered and reviewed  Tests in the medicine section of CPT®: reviewed and ordered  Discussion of test results with the performing providers: yes  Decide to obtain previous medical records or to obtain history from someone other than the patient: yes  Obtain history from someone other than the patient: yes  Review and summarize past medical records: yes  Discuss the patient with other providers: yes  Independent visualization of images, tracings, or specimens: yes    Risk of Complications, Morbidity, and/or Mortality  Presenting problems: moderate  Diagnostic procedures: moderate  Management options: moderate           Procedures     PROGRESS NOTE:  Pt has been reexamined by Malinda Goldmann, MD all available results have been reviewed with pt and any available family. Pt understands sx, dx, and tx in ED. Care plan has been outlined and questions have been answered. Pt and any available family understands and agrees to need for admission to hospital for further tx not available in ED. Pt is ready for admission. Written by Rhina Pedro MD,  1:00 AM    CONSULT NOTE:  Malinda Goldmann, MD spoke with Dr. Vashti Lugo of the Colorectal surgery team. Discussed patient's presentation, history, physical assessment, and available diagnostic results.  He will evaluate, write orders and admit the patient to the hospital. 1:00 AM    .

## 2018-12-16 NOTE — H&P
Colorectal Surgery Admission Note          NAME:  Viktoria Maldonado   :   1975   MRN:   [id number]     Admission Date: 2018    Chief Complaint:  Nausea, vomiting, and abdominal pain. History of Present Illness: The patient is a 37-year-old male with a complicated medical and surgical history who is well known to me. That history includes Crohn's disease and multiple abdominal surgical procedures.    His most recent surgical procedure was the conversion of an end-ileostomy to a [place] continent intestinal reservoir (BCIR) on 2017 in [place], [place]. His most recent previous hospitalization was from 2018 to 2018  for treatment of a partial small bowel obstruction and ileal pouchitis that appeared to have been sufficiently improved after bowel rest, distal decompression, parenteral fluids, and parenteral metronidazole. He returned to the hospital late on 12/15/2018 with exacerbation of the same symptoms with which he had presented on 2018, i.e. abdominal pain, nausea, vomiting, and decreased ileostomy output. As before, he has no leukocytosis but he shows some signs of dehydration. This time he is also somewhat hypokalemic. A CT scan of the abdomen and pelvis that was just performed does not reveal anything significantly different from the previous one.     This time, he presented to the ER on 2018 after having been seen by his primary care physician/gastroenterologist Marisel Mendes MD) reporting worsening abdominal discomfort, nausea, vomiting, and watery ileostomy output.  Of note, he is on Stelara for treatment of Crohn's disease and he was approximately one week overdue for his next dose because he has been unable to obtain the medication.  In the office he appeared ill, and he was therefore sent to the ER for further evaluation.       PMH:  Past Medical History:   Diagnosis Date    Anal fistula     The patient no longer has an anus.     Anal stenosis The patient no longer has an anus.  Chronic kidney disease     Crohn's disease ([address]     GERD (gastroesophageal reflux disease)     H/O ulcerative colitis     Symptoms began in 1996. Total proctocolectomy was performed in 2004. Patient later developed Crohn's disease.  Hiatal hernia     Ileal pouchitis ([facility] 05/2004    The patient no longer has a pouch.  Nausea & vomiting     Combination of Scopalamine patch & Zofran IV worked well in past    Numbness in right leg     Chronic.  Opioid dependence ([address]     History of opioid dependence requiring a detox program.    Psychiatric disorder     depression and anxiety    Skin lesion of back 3/17/2014    Syncopal episodes        PSH:  Past Surgical History:   Procedure Laterality Date    ABDOMEN SURGERY PROC UNLISTED  3/25/2004    Total proctocolectomy with creation of ileoanal J-pouch and loop ileostomy; Dr. Khalif Campo.  COLONOSCOPY N/A 4/12/2018    Flexible endoscopy via ileostomy (NOT a colonoscopy); Gogo Goodson MD.     CHOLECYSTECTOMY  12/2016    Dr. Clayton Lara HX GI  5/2/2004    Examination under anesthesia with endoscopic evaluation of ileoanal pouch and placement of drain; Dr. Khalif Campo.  HX GI  5/25/2004    Ileostomy closure with small bowel resection and anastomosis; Dr. Khalif Campo.  HX GI  5/24/2012    Examination under anesthesia, anal dilatation, anal biopsy, and placement of draining seton; Dr. Khalif Campo.  HX GI  7/3/2012    Incision and drainage of perirectal abscess and rigid endoscopy of ileoanal pouch; Dr. Khalif Campo.  HX GI  7/31/2012    Incision and drainage of perirectal abscess, placement of draining seton, and anal dilatation; Dr. Khalif Campo.  HX GI  1/22/2013    Repair of anal fistulas with debridement, partial fistulectomy, and flap closure; Dr. Khalif Campo.   GI  5/17/2013    Examination under anesthesia, partial fistulotomy,  and placement of draining setons; Dr. Khalif Campo.      GI  2013    Anal fistulotomy and application of ACell micromatrix; Dr. Lucia Vega.   GI  2014    Examination under anesthesia and dilation of anal canal with rigid proctoscopy; Massimo Stephenson MD.    HX GI  2015    Creation of [place] continent intestinal reservoir (BCIR), abdominoperineal resection of ileoanal J-pouch; lysis of adhesions, and gastrostomy; Lambjerry Daniela. Olvin Mo MD ([facility])    HX GI  2015    Stoma revision; Devon Suarez. Olvin Mo MD ([facility])    HX GI  10/29/2015    Extensive lysis of adhesions, resection of continent intestinal reservoir, repair of serosal tears, and creation of end-ileostomy; Dr. Lucia Vega.   GI  2017    Laparotomy, extensive lysis of adhesions and revision of ileostomy; Dr. Lucia Vega.   ORTHOPAEDIC      Left ACL repair    HX OTHER SURGICAL  2017    Conversion of end-ileostomy to BCIR ([address]); [place],  [address] HX UROLOGICAL  2017    Cystoscopy and placement of bilateral temporary ureteral catheters; Chas Do MD.       Home Medications:  Prior to Admission Medications   Prescriptions Last Dose Informant Patient Reported? Taking? Saccharomyces boulardii (FLORASTOR) 250 mg capsule   Yes No   Sig: Take 250 mg by mouth nightly. Ustekinumab (STELARA) 90 mg/mL injection  Self Yes No   Si mg by SubCUTAneous route. Every 8 weeks    acetaminophen (TYLENOL) 500 mg tablet   Yes No   Sig: Take 500 mg by mouth four (4) times daily as needed (mild pain, heache (usually has headaches 1 week after Stelara)). albuterol (PROVENTIL HFA, VENTOLIN HFA, PROAIR HFA) 90 mcg/actuation inhaler   No No   Sig: Take 2 Puffs by inhalation every four (4) hours as needed for Wheezing. cyanocobalamin (VITAMIN B-12) 1,000 mcg/mL injection  Self Yes No   Si,000 mcg by IntraMUSCular route every .  Indications: Once weekly   diazepam (VALIUM) 5 mg tablet  Self Yes No Sig: Take 5 mg by mouth two (2) times a day. diclofenac (VOLTAREN) 1 % gel   Yes No   Sig: Apply 2 g to affected area four (4) times daily as needed. dicyclomine (BENTYL) 10 mg capsule  Self Yes No   Sig: Take 10 mg by mouth every six (6) hours as needed. ergocalciferol (ERGOCALCIFEROL) 50,000 unit capsule  Self Yes No   Sig: Take 50,000 Units by mouth every Sunday. metroNIDAZOLE (FLAGYL) 500 mg tablet   No No   Sig: Take 1 Tab by mouth three (3) times daily for 10 days. omeprazole (PRILOSEC) 40 mg capsule  Self Yes No   Sig: Take 40 mg by mouth nightly. ondansetron hcl (ZOFRAN, AS HYDROCHLORIDE,) 8 mg tablet  Self Yes No   Sig: Take 8 mg by mouth every twelve (12) hours as needed (Mild Nausea). oxyCODONE-acetaminophen (PERCOCET)  mg per tablet   Yes No   Sig: Take 1-2 Tabs by mouth every four (4) hours as needed for Pain (Being tapered per Dr. Sean Fortune). pregabalin (LYRICA) 100 mg capsule  Self Yes No   Sig: Take 100 mg by mouth four (4) times daily. prochlorperazine (COMPAZINE) 10 mg tablet   Yes No   Sig: Take 10 mg by mouth two (2) times daily as needed (Moderate Nausea). promethazine (PHENERGAN) 25 mg tablet  Self Yes No   Sig: Take 25 mg by mouth every six (6) hours as needed (Severe Nausea). temazepam (RESTORIL) 30 mg capsule   Yes No   Sig: Take 30 mg by mouth nightly as needed for Sleep.   traMADol (ULTRAM) 50 mg tablet  Self Yes No   Sig: Take 50 mg by mouth three (3) times daily as needed (moderate pain). zolpidem (AMBIEN) 10 mg tablet   Yes No   Sig: Take 10 mg by mouth nightly.       Facility-Administered Medications: None       Hospital Medications:  Current Facility-Administered Medications   Medication Dose Route Frequency    potassium chloride 10 mEq in 100 ml IVPB  10 mEq IntraVENous NOW    HYDROmorphone (DILAUDID) injection 1-1.5 mg  1-1.5 mg IntraVENous Q3H PRN    iopamidol (ISOVUE-370) 76 % injection         Current Outpatient Medications   Medication Sig  metroNIDAZOLE (FLAGYL) 500 mg tablet Take 1 Tab by mouth three (3) times daily for 10 days.  Saccharomyces boulardii (FLORASTOR) 250 mg capsule Take 250 mg by mouth nightly.  temazepam (RESTORIL) 30 mg capsule Take 30 mg by mouth nightly as needed for Sleep.  diclofenac (VOLTAREN) 1 % gel Apply 2 g to affected area four (4) times daily as needed.  albuterol (PROVENTIL HFA, VENTOLIN HFA, PROAIR HFA) 90 mcg/actuation inhaler Take 2 Puffs by inhalation every four (4) hours as needed for Wheezing.  zolpidem (AMBIEN) 10 mg tablet Take 10 mg by mouth nightly.  oxyCODONE-acetaminophen (PERCOCET)  mg per tablet Take 1-2 Tabs by mouth every four (4) hours as needed for Pain (Being tapered per Dr. Yves Grace).  acetaminophen (TYLENOL) 500 mg tablet Take 500 mg by mouth four (4) times daily as needed (mild pain, heache (usually has headaches 1 week after Stelara)).  prochlorperazine (COMPAZINE) 10 mg tablet Take 10 mg by mouth two (2) times daily as needed (Moderate Nausea).  traMADol (ULTRAM) 50 mg tablet Take 50 mg by mouth three (3) times daily as needed (moderate pain).  Ustekinumab (STELARA) 90 mg/mL injection 90 mg by SubCUTAneous route. Every 8 weeks     promethazine (PHENERGAN) 25 mg tablet Take 25 mg by mouth every six (6) hours as needed (Severe Nausea).  ondansetron hcl (ZOFRAN, AS HYDROCHLORIDE,) 8 mg tablet Take 8 mg by mouth every twelve (12) hours as needed (Mild Nausea).  omeprazole (PRILOSEC) 40 mg capsule Take 40 mg by mouth nightly.  dicyclomine (BENTYL) 10 mg capsule Take 10 mg by mouth every six (6) hours as needed.  ergocalciferol (ERGOCALCIFEROL) 50,000 unit capsule Take 50,000 Units by mouth every Sunday.  pregabalin (LYRICA) 100 mg capsule Take 100 mg by mouth four (4) times daily.  cyanocobalamin (VITAMIN B-12) 1,000 mcg/mL injection 1,000 mcg by IntraMUSCular route every Sunday.  Indications: Once weekly    diazepam (VALIUM) 5 mg tablet Take 5 mg by mouth two (2) times a day. Allergies: Allergies   Allergen Reactions    Remicade [Infliximab] Anaphylaxis and Shortness of Breath    Bactrim [Sulfamethoprim Ds] Other (comments)     Increased GI distress.  Morphine Nausea Only     Tolerates Dilaudid    Nsaids (Non-Steroidal Anti-Inflammatory Drug) Other (comments)     Stomach ulcers       Family History:  Family History   Problem Relation Age of Onset    Cancer Father     Asthma Neg Hx     Diabetes Neg Hx     Heart Disease Neg Hx     Hypertension Neg Hx     Stroke Neg Hx     Malignant Hyperthermia Neg Hx     Pseudocholinesterase Deficiency Neg Hx     Delayed Awakening Neg Hx     Post-op Nausea/Vomiting Neg Hx        Social History:  Social History     Tobacco Use    Smoking status: Current Every Day Smoker     Packs/day: 0.50     Years: 7.00     Pack years: 3.50     Last attempt to quit: 2017     Years since quittin.9    Smokeless tobacco: Never Used   Substance Use Topics    Alcohol use: No       Review of Systems:    Symptom Y/N Comments  Symptom Y/N Comments   Fever/Chills    Chest Pain     Cough    Abdominal Pain     Sputum    Joint Pain     SOB/CEDILLO    Pruritis/Rash     Nausea/vomit    Tolerating PT/OT     Diarrhea    Tolerating Diet     Constipation    Other       Could NOT obtain due to:        Objective:     Patient Vitals for the past 8 hrs:   BP Temp Pulse Resp SpO2 Height   12/15/18 2246 131/72 97.8 °F (36.6 °C) (!) 102 16 100 % 6' (1.829 m)   12/15/18 2232   (!) 117  98 %      No intake/output data recorded. No intake/output data recorded. PHYSICAL EXAMINATION (Per ER Physician):    CONSTITUTIONAL: Well-appearing; well-nourished; in mild distress  CARD: Normal S1, S2; no murmurs, rubs, or gallops. Regular rate and rhythm. RESP: Normal respiratory effort; breath sounds clear and equal bilaterally; no wheezes, rhonchi, or rales.   ABD: Normal bowel sounds; non-distended; Diffuse tenderness; no rebound or guarding; no palpable organomegaly, no masses, no bruits. NEURO:Alert and oriented x 3. Data Review     Recent Labs     12/15/18  2253   WBC 7.6   HGB 13.7   HCT 43.1        Recent Labs     12/15/18  2253      K 3.0*      CO2 30   BUN 8   CREA 1.09   GLU 90   CA 8.7     Recent Labs     12/15/18  2253   SGOT 22   *   TP 7.2   ALB 3.6   GLOB 3.6   AML 57   LPSE 88     No results for input(s): INR, PTP, APTT in the last 72 hours. No lab exists for component: INREXT, INREXT                    Assessment and Plan:      The patient appears to have a relapse of the same distal obstruction and possible ileal pouchitis for which he was just hospitalized. There is no indication for urgent surgery. He will once again be admitted to the hospital for treatment with bowel rest, parenteral fluids, distal decompression via catheter, parenteral metronidazole, and continued administration of narcotics to prevent withdrawal.  This time, once he appears to have improved enough to tolerate it, a small bowel follow through or a CT enterography will also be performed.       Active Problems:    Ileal pouchitis ([address] (5/1/2004)      Chronic narcotic dependence ([address] (1/20/2018)      Small bowel obstruction ([address] (2/12/2018)

## 2018-12-16 NOTE — ED NOTES
Pt requesting IV potassium and states that anything oral is making him nauseous right now.  MD notified

## 2018-12-16 NOTE — PROGRESS NOTES
TRANSFER - IN REPORT:    Verbal report received from Tanner(name) on Fred Elder  being received from ED(unit) for routine progression of care      Report consisted of patients Situation, Background, Assessment and   Recommendations(SBAR). Information from the following report(s) SBAR, Kardex, ED Summary, [facility] and Recent Results was reviewed with the receiving nurse. Opportunity for questions and clarification was provided. Assessment completed upon patients arrival to unit and care assumed.        Primary Nurse Conner Allen RN and Gavin Moore RN performed a dual skin assessment on this patient Impairment noted- see wound doc flow sheet  Eduardo score is 21    Old healed scars at abd from previous surgery  R lower abd ileostomy site

## 2018-12-16 NOTE — PROGRESS NOTES
Problem: Falls - Risk of  Goal: *Absence of Falls  Document Austin Fall Risk and appropriate interventions in the flowsheet.   Outcome: Progressing Towards Goal  Fall Risk Interventions:            Medication Interventions: Patient to call before getting OOB         History of Falls Interventions: Evaluate medications/consider consulting pharmacy

## 2018-12-17 LAB
ANION GAP SERPL CALC-SCNC: 2 MMOL/L (ref 5–15)
BASOPHILS # BLD: 0 K/UL (ref 0–0.1)
BASOPHILS NFR BLD: 1 % (ref 0–1)
BUN SERPL-MCNC: 5 MG/DL (ref 6–20)
BUN/CREAT SERPL: 6 (ref 12–20)
CALCIUM SERPL-MCNC: 8.4 MG/DL (ref 8.5–10.1)
CHLORIDE SERPL-SCNC: 112 MMOL/L (ref 97–108)
CO2 SERPL-SCNC: 28 MMOL/L (ref 21–32)
CREAT SERPL-MCNC: 0.82 MG/DL (ref 0.7–1.3)
DIFFERENTIAL METHOD BLD: ABNORMAL
EOSINOPHIL # BLD: 0.2 K/UL (ref 0–0.4)
EOSINOPHIL NFR BLD: 2 % (ref 0–7)
ERYTHROCYTE [DISTWIDTH] IN BLOOD BY AUTOMATED COUNT: 13.9 % (ref 11.5–14.5)
GLUCOSE SERPL-MCNC: 94 MG/DL (ref 65–100)
HCT VFR BLD AUTO: 37.5 % (ref 36.6–50.3)
HGB BLD-MCNC: 11.6 G/DL (ref 12.1–17)
IMM GRANULOCYTES # BLD: 0 K/UL (ref 0–0.04)
IMM GRANULOCYTES NFR BLD AUTO: 0 % (ref 0–0.5)
LYMPHOCYTES # BLD: 2.3 K/UL (ref 0.8–3.5)
LYMPHOCYTES NFR BLD: 36 % (ref 12–49)
Lab: NORMAL
MAGNESIUM SERPL-MCNC: 1.6 MG/DL (ref 1.6–2.4)
MCH RBC QN AUTO: 28.4 PG (ref 26–34)
MCHC RBC AUTO-ENTMCNC: 30.9 G/DL (ref 30–36.5)
MCV RBC AUTO: 91.7 FL (ref 80–99)
METHYLMALONATE SERPL-SCNC: 189 NMOL/L (ref 0–378)
MONOCYTES # BLD: 0.6 K/UL (ref 0–1)
MONOCYTES NFR BLD: 9 % (ref 5–13)
NEUTS SEG # BLD: 3.5 K/UL (ref 1.8–8)
NEUTS SEG NFR BLD: 52 % (ref 32–75)
NRBC # BLD: 0 K/UL (ref 0–0.01)
NRBC BLD-RTO: 0 PER 100 WBC
PHOSPHATE SERPL-MCNC: 3.4 MG/DL (ref 2.6–4.7)
PLATELET # BLD AUTO: 143 K/UL (ref 150–400)
PMV BLD AUTO: 11.9 FL (ref 8.9–12.9)
POTASSIUM SERPL-SCNC: 3.7 MMOL/L (ref 3.5–5.1)
RBC # BLD AUTO: 4.09 M/UL (ref 4.1–5.7)
SODIUM SERPL-SCNC: 142 MMOL/L (ref 136–145)
WBC # BLD AUTO: 6.6 K/UL (ref 4.1–11.1)

## 2018-12-17 PROCEDURE — 83735 ASSAY OF MAGNESIUM: CPT

## 2018-12-17 PROCEDURE — 84100 ASSAY OF PHOSPHORUS: CPT

## 2018-12-17 PROCEDURE — 74011000250 HC RX REV CODE- 250: Performed by: COLON & RECTAL SURGERY

## 2018-12-17 PROCEDURE — 65270000032 HC RM SEMIPRIVATE

## 2018-12-17 PROCEDURE — 74011250636 HC RX REV CODE- 250/636: Performed by: COLON & RECTAL SURGERY

## 2018-12-17 PROCEDURE — 85025 COMPLETE CBC W/AUTO DIFF WBC: CPT

## 2018-12-17 PROCEDURE — 94760 N-INVAS EAR/PLS OXIMETRY 1: CPT

## 2018-12-17 PROCEDURE — 80048 BASIC METABOLIC PNL TOTAL CA: CPT

## 2018-12-17 PROCEDURE — 36415 COLL VENOUS BLD VENIPUNCTURE: CPT

## 2018-12-17 PROCEDURE — 74011250637 HC RX REV CODE- 250/637: Performed by: COLON & RECTAL SURGERY

## 2018-12-17 RX ORDER — SIMETHICONE 80 MG
80 TABLET,CHEWABLE ORAL
Status: DISCONTINUED | OUTPATIENT
Start: 2018-12-17 | End: 2018-12-21 | Stop reason: HOSPADM

## 2018-12-17 RX ADMIN — Medication 1 CAPSULE: at 21:26

## 2018-12-17 RX ADMIN — DICYCLOMINE HYDROCHLORIDE 10 MG: 10 CAPSULE ORAL at 14:01

## 2018-12-17 RX ADMIN — POTASSIUM CHLORIDE, SODIUM CHLORIDE, CALCIUM CHLORIDE, SODIUM LACTATE, AND DEXTROSE MONOHYDRATE: 1.79; 6; .2; 3.1; 5 INJECTION, SOLUTION INTRAVENOUS at 15:44

## 2018-12-17 RX ADMIN — DICYCLOMINE HYDROCHLORIDE 10 MG: 10 CAPSULE ORAL at 23:35

## 2018-12-17 RX ADMIN — DIAZEPAM 5 MG: 5 TABLET ORAL at 17:17

## 2018-12-17 RX ADMIN — HYDROMORPHONE HYDROCHLORIDE 1.5 MG: 2 INJECTION INTRAMUSCULAR; INTRAVENOUS; SUBCUTANEOUS at 13:54

## 2018-12-17 RX ADMIN — PREGABALIN 100 MG: 100 CAPSULE ORAL at 21:27

## 2018-12-17 RX ADMIN — HYDROMORPHONE HYDROCHLORIDE 1.5 MG: 2 INJECTION INTRAMUSCULAR; INTRAVENOUS; SUBCUTANEOUS at 23:25

## 2018-12-17 RX ADMIN — PANTOPRAZOLE SODIUM 40 MG: 40 TABLET, DELAYED RELEASE ORAL at 21:27

## 2018-12-17 RX ADMIN — Medication 10 ML: at 11:06

## 2018-12-17 RX ADMIN — PREGABALIN 100 MG: 100 CAPSULE ORAL at 09:20

## 2018-12-17 RX ADMIN — Medication 10 ML: at 21:27

## 2018-12-17 RX ADMIN — HYDROMORPHONE HYDROCHLORIDE 1.5 MG: 2 INJECTION INTRAMUSCULAR; INTRAVENOUS; SUBCUTANEOUS at 08:08

## 2018-12-17 RX ADMIN — PROCHLORPERAZINE EDISYLATE 5 MG: 5 INJECTION INTRAMUSCULAR; INTRAVENOUS at 05:03

## 2018-12-17 RX ADMIN — SIMETHICONE CHEW TAB 80 MG 80 MG: 80 TABLET ORAL at 23:35

## 2018-12-17 RX ADMIN — PREGABALIN 100 MG: 100 CAPSULE ORAL at 13:50

## 2018-12-17 RX ADMIN — HYDROMORPHONE HYDROCHLORIDE 1.5 MG: 2 INJECTION INTRAMUSCULAR; INTRAVENOUS; SUBCUTANEOUS at 11:05

## 2018-12-17 RX ADMIN — ACETAMINOPHEN 500 MG: 500 TABLET ORAL at 11:10

## 2018-12-17 RX ADMIN — ZOLPIDEM TARTRATE 10 MG: 5 TABLET ORAL at 21:27

## 2018-12-17 RX ADMIN — ONDANSETRON 4 MG: 2 INJECTION INTRAMUSCULAR; INTRAVENOUS at 23:35

## 2018-12-17 RX ADMIN — POTASSIUM CHLORIDE, SODIUM CHLORIDE, CALCIUM CHLORIDE, SODIUM LACTATE, AND DEXTROSE MONOHYDRATE: 1.79; 6; .2; 3.1; 5 INJECTION, SOLUTION INTRAVENOUS at 03:28

## 2018-12-17 RX ADMIN — POTASSIUM CHLORIDE, SODIUM CHLORIDE, CALCIUM CHLORIDE, SODIUM LACTATE, AND DEXTROSE MONOHYDRATE: 1.79; 6; .2; 3.1; 5 INJECTION, SOLUTION INTRAVENOUS at 23:25

## 2018-12-17 RX ADMIN — PROCHLORPERAZINE EDISYLATE 5 MG: 5 INJECTION INTRAMUSCULAR; INTRAVENOUS at 17:16

## 2018-12-17 RX ADMIN — Medication 10 ML: at 17:16

## 2018-12-17 RX ADMIN — PROMETHAZINE HYDROCHLORIDE 25 MG: 25 TABLET ORAL at 21:27

## 2018-12-17 RX ADMIN — HYDROMORPHONE HYDROCHLORIDE 1.5 MG: 2 INJECTION INTRAMUSCULAR; INTRAVENOUS; SUBCUTANEOUS at 02:09

## 2018-12-17 RX ADMIN — DIAZEPAM 5 MG: 5 TABLET ORAL at 09:20

## 2018-12-17 RX ADMIN — PREGABALIN 100 MG: 100 CAPSULE ORAL at 17:17

## 2018-12-17 RX ADMIN — HYDROMORPHONE HYDROCHLORIDE 1.5 MG: 2 INJECTION INTRAMUSCULAR; INTRAVENOUS; SUBCUTANEOUS at 20:26

## 2018-12-17 RX ADMIN — HYDROMORPHONE HYDROCHLORIDE 1.5 MG: 2 INJECTION INTRAMUSCULAR; INTRAVENOUS; SUBCUTANEOUS at 05:03

## 2018-12-17 RX ADMIN — HYDROMORPHONE HYDROCHLORIDE 1.5 MG: 2 INJECTION INTRAMUSCULAR; INTRAVENOUS; SUBCUTANEOUS at 17:17

## 2018-12-17 RX ADMIN — PROMETHAZINE HYDROCHLORIDE 25 MG: 25 TABLET ORAL at 14:01

## 2018-12-17 RX ADMIN — ONDANSETRON 4 MG: 2 INJECTION INTRAMUSCULAR; INTRAVENOUS at 08:01

## 2018-12-17 NOTE — PROGRESS NOTES
Problem: Falls - Risk of  Goal: *Absence of Falls  Document [place] Fall Risk and appropriate interventions in the flowsheet.   Outcome: Progressing Towards Goal  Fall Risk Interventions:  Medication Interventions: Patient to call before getting OOB, Evaluate medications/consider consulting pharmacy    History of Falls Interventions: Evaluate medications/consider consulting pharmacy, Door open when patient unattended

## 2018-12-18 ENCOUNTER — ANESTHESIA (OUTPATIENT)
Dept: ENDOSCOPY | Age: 43
DRG: 389 | End: 2018-12-18
Payer: COMMERCIAL

## 2018-12-18 ENCOUNTER — ANESTHESIA EVENT (OUTPATIENT)
Dept: ENDOSCOPY | Age: 43
DRG: 389 | End: 2018-12-18
Payer: COMMERCIAL

## 2018-12-18 PROCEDURE — 0DJD8ZZ INSPECTION OF LOWER INTESTINAL TRACT, VIA NATURAL OR ARTIFICIAL OPENING ENDOSCOPIC: ICD-10-PCS | Performed by: COLON & RECTAL SURGERY

## 2018-12-18 PROCEDURE — 76060000031 HC ANESTHESIA FIRST 0.5 HR: Performed by: COLON & RECTAL SURGERY

## 2018-12-18 PROCEDURE — 65270000032 HC RM SEMIPRIVATE

## 2018-12-18 PROCEDURE — 74011250636 HC RX REV CODE- 250/636: Performed by: COLON & RECTAL SURGERY

## 2018-12-18 PROCEDURE — 74011250637 HC RX REV CODE- 250/637: Performed by: COLON & RECTAL SURGERY

## 2018-12-18 PROCEDURE — 74011000250 HC RX REV CODE- 250: Performed by: COLON & RECTAL SURGERY

## 2018-12-18 PROCEDURE — 77030027957 HC TBNG IRR ENDOGTR BUSS -B: Performed by: COLON & RECTAL SURGERY

## 2018-12-18 PROCEDURE — 74011250636 HC RX REV CODE- 250/636

## 2018-12-18 PROCEDURE — 76040000019: Performed by: COLON & RECTAL SURGERY

## 2018-12-18 RX ORDER — FENTANYL CITRATE 50 UG/ML
12.5-5 INJECTION, SOLUTION INTRAMUSCULAR; INTRAVENOUS
Status: DISCONTINUED | OUTPATIENT
Start: 2018-12-18 | End: 2018-12-18 | Stop reason: HOSPADM

## 2018-12-18 RX ORDER — DEXTROMETHORPHAN/PSEUDOEPHED 2.5-7.5/.8
1.2 DROPS ORAL
Status: DISCONTINUED | OUTPATIENT
Start: 2018-12-18 | End: 2018-12-18 | Stop reason: HOSPADM

## 2018-12-18 RX ORDER — SODIUM CHLORIDE 0.9 % (FLUSH) 0.9 %
5-10 SYRINGE (ML) INJECTION AS NEEDED
Status: DISCONTINUED | OUTPATIENT
Start: 2018-12-18 | End: 2018-12-18 | Stop reason: HOSPADM

## 2018-12-18 RX ORDER — FLUMAZENIL 0.1 MG/ML
0.2 INJECTION INTRAVENOUS
Status: DISCONTINUED | OUTPATIENT
Start: 2018-12-18 | End: 2018-12-18 | Stop reason: HOSPADM

## 2018-12-18 RX ORDER — ATROPINE SULFATE 0.1 MG/ML
0.5 INJECTION INTRAVENOUS
Status: DISCONTINUED | OUTPATIENT
Start: 2018-12-18 | End: 2018-12-18 | Stop reason: HOSPADM

## 2018-12-18 RX ORDER — EPINEPHRINE 0.1 MG/ML
1 INJECTION INTRACARDIAC; INTRAVENOUS
Status: DISCONTINUED | OUTPATIENT
Start: 2018-12-18 | End: 2018-12-18 | Stop reason: HOSPADM

## 2018-12-18 RX ORDER — SODIUM CHLORIDE 9 MG/ML
INJECTION, SOLUTION INTRAVENOUS
Status: DISCONTINUED | OUTPATIENT
Start: 2018-12-18 | End: 2018-12-18 | Stop reason: HOSPADM

## 2018-12-18 RX ORDER — PROPOFOL 10 MG/ML
INJECTION, EMULSION INTRAVENOUS AS NEEDED
Status: DISCONTINUED | OUTPATIENT
Start: 2018-12-18 | End: 2018-12-18 | Stop reason: HOSPADM

## 2018-12-18 RX ORDER — MIDAZOLAM HYDROCHLORIDE 1 MG/ML
.25-5 INJECTION, SOLUTION INTRAMUSCULAR; INTRAVENOUS
Status: DISCONTINUED | OUTPATIENT
Start: 2018-12-18 | End: 2018-12-18 | Stop reason: HOSPADM

## 2018-12-18 RX ORDER — NALOXONE HYDROCHLORIDE 0.4 MG/ML
0.4 INJECTION, SOLUTION INTRAMUSCULAR; INTRAVENOUS; SUBCUTANEOUS
Status: DISCONTINUED | OUTPATIENT
Start: 2018-12-18 | End: 2018-12-18 | Stop reason: HOSPADM

## 2018-12-18 RX ORDER — SODIUM CHLORIDE 0.9 % (FLUSH) 0.9 %
5-10 SYRINGE (ML) INJECTION EVERY 8 HOURS
Status: DISCONTINUED | OUTPATIENT
Start: 2018-12-18 | End: 2018-12-18 | Stop reason: HOSPADM

## 2018-12-18 RX ADMIN — HYDROMORPHONE HYDROCHLORIDE 1 MG: 2 INJECTION INTRAMUSCULAR; INTRAVENOUS; SUBCUTANEOUS at 15:00

## 2018-12-18 RX ADMIN — PROMETHAZINE HYDROCHLORIDE 25 MG: 25 TABLET ORAL at 23:58

## 2018-12-18 RX ADMIN — ONDANSETRON 4 MG: 2 INJECTION INTRAMUSCULAR; INTRAVENOUS at 21:08

## 2018-12-18 RX ADMIN — HYDROMORPHONE HYDROCHLORIDE 1.5 MG: 2 INJECTION INTRAMUSCULAR; INTRAVENOUS; SUBCUTANEOUS at 21:08

## 2018-12-18 RX ADMIN — ONDANSETRON 4 MG: 2 INJECTION INTRAMUSCULAR; INTRAVENOUS at 12:22

## 2018-12-18 RX ADMIN — PREGABALIN 100 MG: 100 CAPSULE ORAL at 09:17

## 2018-12-18 RX ADMIN — DICYCLOMINE HYDROCHLORIDE 10 MG: 10 CAPSULE ORAL at 23:58

## 2018-12-18 RX ADMIN — HYDROMORPHONE HYDROCHLORIDE 1 MG: 2 INJECTION INTRAMUSCULAR; INTRAVENOUS; SUBCUTANEOUS at 23:58

## 2018-12-18 RX ADMIN — Medication 10 ML: at 16:11

## 2018-12-18 RX ADMIN — HYDROMORPHONE HYDROCHLORIDE 1.5 MG: 2 INJECTION INTRAMUSCULAR; INTRAVENOUS; SUBCUTANEOUS at 02:51

## 2018-12-18 RX ADMIN — DIAZEPAM 5 MG: 5 TABLET ORAL at 09:17

## 2018-12-18 RX ADMIN — Medication 1 CAPSULE: at 21:08

## 2018-12-18 RX ADMIN — SODIUM CHLORIDE: 9 INJECTION, SOLUTION INTRAVENOUS at 15:04

## 2018-12-18 RX ADMIN — POTASSIUM CHLORIDE, SODIUM CHLORIDE, CALCIUM CHLORIDE, SODIUM LACTATE, AND DEXTROSE MONOHYDRATE: 1.79; 6; .2; 3.1; 5 INJECTION, SOLUTION INTRAVENOUS at 09:16

## 2018-12-18 RX ADMIN — PREGABALIN 100 MG: 100 CAPSULE ORAL at 17:54

## 2018-12-18 RX ADMIN — ZOLPIDEM TARTRATE 10 MG: 5 TABLET ORAL at 21:08

## 2018-12-18 RX ADMIN — HYDROMORPHONE HYDROCHLORIDE 1.5 MG: 2 INJECTION INTRAMUSCULAR; INTRAVENOUS; SUBCUTANEOUS at 06:18

## 2018-12-18 RX ADMIN — PROCHLORPERAZINE EDISYLATE 5 MG: 5 INJECTION INTRAMUSCULAR; INTRAVENOUS at 02:51

## 2018-12-18 RX ADMIN — PROPOFOL 110 MG: 10 INJECTION, EMULSION INTRAVENOUS at 15:09

## 2018-12-18 RX ADMIN — HYDROMORPHONE HYDROCHLORIDE 1.5 MG: 2 INJECTION INTRAMUSCULAR; INTRAVENOUS; SUBCUTANEOUS at 09:17

## 2018-12-18 RX ADMIN — Medication 10 ML: at 06:19

## 2018-12-18 RX ADMIN — PROCHLORPERAZINE EDISYLATE 5 MG: 5 INJECTION INTRAMUSCULAR; INTRAVENOUS at 16:10

## 2018-12-18 RX ADMIN — PROPOFOL 30 MG: 10 INJECTION, EMULSION INTRAVENOUS at 15:15

## 2018-12-18 RX ADMIN — POTASSIUM CHLORIDE, SODIUM CHLORIDE, CALCIUM CHLORIDE, SODIUM LACTATE, AND DEXTROSE MONOHYDRATE: 1.79; 6; .2; 3.1; 5 INJECTION, SOLUTION INTRAVENOUS at 21:13

## 2018-12-18 RX ADMIN — Medication 10 ML: at 21:12

## 2018-12-18 RX ADMIN — DIAZEPAM 5 MG: 5 TABLET ORAL at 17:54

## 2018-12-18 RX ADMIN — PREGABALIN 100 MG: 100 CAPSULE ORAL at 12:23

## 2018-12-18 RX ADMIN — HYDROMORPHONE HYDROCHLORIDE 1.5 MG: 2 INJECTION INTRAMUSCULAR; INTRAVENOUS; SUBCUTANEOUS at 17:55

## 2018-12-18 RX ADMIN — PROMETHAZINE HYDROCHLORIDE 25 MG: 25 TABLET ORAL at 07:02

## 2018-12-18 RX ADMIN — Medication 10 ML: at 15:59

## 2018-12-18 RX ADMIN — PREGABALIN 100 MG: 100 CAPSULE ORAL at 21:08

## 2018-12-18 RX ADMIN — HYDROMORPHONE HYDROCHLORIDE 1.5 MG: 2 INJECTION INTRAMUSCULAR; INTRAVENOUS; SUBCUTANEOUS at 12:23

## 2018-12-18 RX ADMIN — PANTOPRAZOLE SODIUM 40 MG: 40 TABLET, DELAYED RELEASE ORAL at 21:08

## 2018-12-18 NOTE — PROGRESS NOTES
NUTRITION COMPLETE ASSESSMENT    RECOMMENDATIONS:   1. Diet advancement per surgery  -- If unable to advance past liquids within the next 48 hours, may need to consider TPN for nutrition support (see recs below prn)    2. Weekly weight     Interventions/Plan:   Food/Nutrient Delivery: TBD     Assessment:   Reason for Assessment:   [x]BPA/MST Referral (EN/PN PTA)    Diet:    Nutritionally Significant Medications: [x] Reviewed & Includes: Vitamin B12 1000 mcg weekly; D5 LR with KCl 20 mEq/L @ 100 mL/hr; ergocalciferol 30092PJ weekly; FloraQ daily; zofran q 4 hours prn; compazine q 8 hours; phenergan q6 hours prn; simethicone 4x/day prn      Meal Intake:   Patient Vitals for the past 100 hrs:   % Diet Eaten   12/17/18 0843 75 %   12/16/18 1402 100 %     Current Hospitalization:   Fluid Restriction:  N/A  Appetite:  Fair  PO Ability: Independent      Subjective:  Pt off the floor in endoscopy. Will revisit. Objective:  Chart reviewed, discussed with RN and team during interdisciplinary rounds. Pt admitted with SBO. PMHx: Crohn's, ileostomy, bowel obstructions, recent ileal pouchitis, others noted. Pt's diet was advanced to full liquids yesterday with Ensure Enlive TID (1050 kcal, 60 g protein). He is currently NPO for endoscopy. IVF is providing 408 kcal dextrose 2400 mL    Vitamin D deficiency noted-- weekly supplementation ordered    MST screened EN/PN PTA. The pt has required TPN in the past, but there is no indication that he was on TPN or tube feeding prior to this admission per EHR. If pt does not tolerate diet advancement and TPN desired, would initiate 5%AA D15 @ 63 mL/hr + MVI, thiamine (100 mg). Goal would be 6%AA D20 @ 75 mL/hr + 20% lipids, 500 mL 3x/week. This would provide a daily average of 2085 kcal, 108 g protein in 1800 mL (2300 mL on lipid days)-- meeting 94% and 100% of estimated kcal and protein needs, respectively.     Estimated Nutrition Needs:   Kcals/day: 2214 Kcals/day(9939-0682 kcal/day (MSJ x 1.2-1.3))  Protein: 91 g( g/day (1-1.2 g/kg))  Fluid: 2200 ml(1 mL/kcal)  Based On: [place] [address] Jeor  Weight Used: Actual wt(90.7 kg)    Pt expected to meet estimated nutrient needs:  []   Yes     []  No [x] Unable to predict at this time    Nutrition Diagnosis:   1. Inadequate protein-energy intake related to Crohn's and SBO as evidenced by NPO/liquid diet x 3 days this admission and limited intake x 2 weeks    Goals:     Pt to tolerate diet advancement within the next 48 hours or initiate PN support     Monitoring & Evaluation:    - Total energy intake   - Weight/weight change     Previous Nutrition Goals Met:   N/A  Previous Recommendations:    N/A    Education & Discharge Needs:   [x] None Identified   [] Identified and addressed    [x] Participated in care plan, discharge planning, and/or interdisciplinary rounds        Cultural, Jewish and ethnic food preferences identified:   NONE  Skin Integrity: [x]Intact  []Other  Edema: [x]None []Other  Last BM: ileostomy (1160 yesterday)  Food Allergies: [x]None []Other    Anthropometrics:    Weight Loss Metrics 12/18/2018 12/7/2018 9/29/2018 4/12/2018 3/18/2018 3/17/2018 2/12/2018   Today's Wt 200 lb 190 lb 190 lb 189 lb - 179 lb -   BMI 27.12 kg/m2 25.77 kg/m2 25.77 kg/m2 25.63 kg/m2 24.28 kg/m2 - 24.28 kg/m2         Height: 6' (182.9 cm),    Body mass index is 27.12 kg/m².   IBW : 80.7 kg (178 lb),    Usual Body Weight: 86.2 kg (190 lb)(12/7/18),      Labs:    Lab Results   Component Value Date/Time    Sodium 142 12/17/2018 03:32 AM    Potassium 3.7 12/17/2018 03:32 AM    Chloride 112 (H) 12/17/2018 03:32 AM    CO2 28 12/17/2018 03:32 AM    Glucose 94 12/17/2018 03:32 AM    BUN 5 (L) 12/17/2018 03:32 AM    Creatinine 0.82 12/17/2018 03:32 AM    Calcium 8.4 (L) 12/17/2018 03:32 AM    Magnesium 1.6 12/17/2018 03:32 AM    Phosphorus 3.4 12/17/2018 03:32 AM    Albumin 3.6 12/15/2018 10:53 PM     Lab Results   Component Value Date/Time    Hemoglobin A1c 6.1 01/20/2018 09:11 AM     Karen Tee, [address] [facility]

## 2018-12-18 NOTE — ANESTHESIA PREPROCEDURE EVALUATION
Anesthetic History     PONV          Review of Systems / Medical History  Patient summary reviewed, nursing notes reviewed and pertinent labs reviewed    Pulmonary                   Neuro/Psych         Psychiatric history     Cardiovascular                  Exercise tolerance: >4 METS     GI/Hepatic/Renal     GERD          Comments: Ileal pouchitis   Ulcerative colitis Endo/Other  Within defined limits           Other Findings                   Anesthetic Plan    ASA: 2  Anesthesia type: MAC          Induction: Intravenous  Anesthetic plan and risks discussed with: Patient

## 2018-12-18 NOTE — PROCEDURES
Wynema Buerger  [id number]  1975    Flexible Endoscopy Operative Report    Procedure Type:   Flexible endoscopy of ileal pouch    Pre-operative Diagnosis:  Partial small bowel obstruction. Ileal pouch dysfunction. Post-operative Diagnosis:  Partial small bowel obstruction. Ileal pouch dysfunction. .    Surgeon:  Deb Arango MD    Sedation and Analgesia:  MAC anesthesia Propofol (200 mg)    Specimens Removed:  None. EBL:  0 mL. Complications:  None apparent. Indication For Procedure: The patient is a 71-year-old male with a complicated medical and surgical history that includes Crohn's disease. He has a [place] continent intestinal reservoir (BCIR) that was created in [place], [place] on 11/29/2017 and he is currently an inpatient hospitalized for the fourth time this year for management of nausea, vomiting, and abdominal pain that are believed to be at least in part secondary to partial small bowel obstruction related to pouch dysfunction. As has been done before, the condition is being managed by bowel rest and continuous distal decompression, but the patient's surgeons in [place] have suggested that an endoscopic examination of the pouch might be informative. The patient and I discussed the procedure, and he agreed to proceed. Findings: The outlet of the ileal pouch permitted the passage of the pediatric colonoscope without difficulty. It did not appear to be too narrow, twisted, or kinked. Similarly, the anatomy of the pouch itself did not appear to be obviously abnormal.  There did not appear to be any signifciant inflammation, and no biopsies were indicated. The ileum for several centimeters proximal to the pouch also did not appeaer to be be strictured, dilated, or otherwise abnormal.    Procedure Details:  After informed consent was obtained, the patient was taken to the endoscopy suite where standard monitoring devices were attached.   Sedation was administered by the anesthetist as needed throughout the procedure. The patient was left in the supine position. The Olympus pediatric videocolonoscope was lubricated and inserted through the ileostomy into the ileal pouch. It was advanced into the ileum proximal to the pouch, and the bowel was examined for several centimeters. The scope was partially withdrawn, and the pouch and its outlet were carefully studied. The findings were as noted above. There were no apparent complications, and the patient appeared to have tolerated the procedure well. At its conclusion, he was transported to the recovery area in good condition. Impression:    No obvious abnormality of the pouch or the small intestine immediately proximal to it. These findings do not, however, rule out a functional problem with the bowel. Recommendations/Plan:  Continuous distal decompression of the pouch and a full liuid diet will be resumed.

## 2018-12-18 NOTE — ROUTINE PROCESS
TRANSFER - OUT REPORT:    Verbal report given to Birdie(name) on Maurice Wright  being transferred to (unit) for routine post - op       Report consisted of patients Situation, Background, Assessment and   Recommendations(SBAR). Information from the following report(s) SBAR and Procedure Summary was reviewed with the receiving nurse. Lines:   Peripheral IV 12/15/18 Left Forearm (Active)   Site Assessment Clean, dry, & intact 12/17/2018  9:41 PM   Phlebitis Assessment 0 12/17/2018  9:41 PM   Infiltration Assessment 0 12/17/2018  9:41 PM   Dressing Status Clean, dry, & intact 12/17/2018  9:41 PM   Dressing Type Transparent;Tape 12/17/2018  9:41 PM   Hub Color/Line Status Pink;Flushed;Capped 12/17/2018 10:01 PM   Action Taken Open ports on tubing capped 12/17/2018  9:41 PM   Alcohol Cap Used Yes 12/17/2018  9:41 PM       Peripheral IV 12/17/18 Right; Lower Forearm (Active)   Site Assessment Clean, dry, & intact 12/17/2018 10:01 PM   Phlebitis Assessment 0 12/17/2018 10:01 PM   Infiltration Assessment 0 12/17/2018 10:01 PM   Dressing Status New 12/17/2018 10:01 PM   Dressing Type Transparent;Tape 12/17/2018 10:01 PM   Hub Color/Line Status Blue; Infusing 12/17/2018 10:01 PM   Action Taken Open ports on tubing capped 12/17/2018 10:01 PM   Alcohol Cap Used Yes 12/17/2018 10:01 PM        Opportunity for questions and clarification was provided.       Patient transported with:

## 2018-12-18 NOTE — PROGRESS NOTES

## 2018-12-18 NOTE — PROGRESS NOTES
Problem: Falls - Risk of  Goal: *Absence of Falls  Document [place] Fall Risk and appropriate interventions in the flowsheet.   Outcome: Progressing Towards Goal  Fall Risk Interventions:            Medication Interventions: Teach patient to arise slowly, Patient to call before getting OOB         History of Falls Interventions: Evaluate medications/consider consulting pharmacy

## 2018-12-18 NOTE — PROGRESS NOTES
Colorectal Surgery Note    Yesterday, the patient communicated with his surgeons in [place]. They suggested that we keep the ileostomy drainage tube on low intermittent suction and that I perform a flexible endoscopy of the ileal pouch to look for an anatomic abnormality that might be impairing its function. The patient is aware of the risks of the endoscopy, and he has agreed to proceed.

## 2018-12-18 NOTE — ROUTINE PROCESS
TRANSFER - IN REPORT:    Verbal report received from [address] [place] [address] RN(name) on [facility]  being received from 6E(unit) for ordered procedure      Report consisted of patients Situation, Background, Assessment and   Recommendations(SBAR). Information from the following report(s) SBAR was reviewed with the receiving nurse. Opportunity for questions and clarification was provided. Assessment completed upon patients arrival to unit and care assumed.

## 2018-12-18 NOTE — PROGRESS NOTES
Problem: Falls - Risk of  Goal: *Absence of Falls  Document [place] Fall Risk and appropriate interventions in the flowsheet.   Outcome: Progressing Towards Goal  Fall Risk Interventions    Medication Interventions: Patient to call before getting OOB, Teach patient to arise slowly, Evaluate medications/consider consulting pharmacy    History of Falls Interventions: Evaluate medications/consider consulting pharmacy, Door open when patient unattended

## 2018-12-18 NOTE — ROUTINE PROCESS
Nick Landis  1975  [id number]    Situation:  Verbal report received from: Maribeth Smith RN  Procedure: Procedure(s):  COLONOSCOPY via Iliostomy with Peds Scope    Background:    Preoperative diagnosis: Anemia  Postoperative diagnosis: 1.- Pouch disfunction    :  Dr. Radha Phan  Assistant(s): Endoscopy Technician-1: Misael RUELAS  Endoscopy RN-1: Ozzy Jennings RN    Specimens: * No specimens in log *  H. Pylori  no    Assessment:  Intra-procedure medications     Anesthesia gave intra-procedure sedation and medications, see anesthesia flow sheet yes    Intravenous fluids: NS@ KVO     Vital signs stable     Abdominal assessment: round and soft     Recommendation:  Discharge patient per MD order.   Return to floor  Family or Friend   Permission to share finding with family or friend no and n/a

## 2018-12-19 ENCOUNTER — APPOINTMENT (OUTPATIENT)
Dept: GENERAL RADIOLOGY | Age: 43
DRG: 389 | End: 2018-12-19
Attending: COLON & RECTAL SURGERY
Payer: COMMERCIAL

## 2018-12-19 LAB
ANION GAP SERPL CALC-SCNC: 6 MMOL/L (ref 5–15)
BUN SERPL-MCNC: 4 MG/DL (ref 6–20)
BUN/CREAT SERPL: 5 (ref 12–20)
CALCIUM SERPL-MCNC: 8.7 MG/DL (ref 8.5–10.1)
CHLORIDE SERPL-SCNC: 108 MMOL/L (ref 97–108)
CO2 SERPL-SCNC: 29 MMOL/L (ref 21–32)
CREAT SERPL-MCNC: 0.84 MG/DL (ref 0.7–1.3)
GLUCOSE SERPL-MCNC: 87 MG/DL (ref 65–100)
MAGNESIUM SERPL-MCNC: 1.7 MG/DL (ref 1.6–2.4)
PHOSPHATE SERPL-MCNC: 3.9 MG/DL (ref 2.6–4.7)
POTASSIUM SERPL-SCNC: 3.9 MMOL/L (ref 3.5–5.1)
SODIUM SERPL-SCNC: 143 MMOL/L (ref 136–145)

## 2018-12-19 PROCEDURE — 84100 ASSAY OF PHOSPHORUS: CPT

## 2018-12-19 PROCEDURE — 74011250636 HC RX REV CODE- 250/636: Performed by: COLON & RECTAL SURGERY

## 2018-12-19 PROCEDURE — 65270000032 HC RM SEMIPRIVATE

## 2018-12-19 PROCEDURE — 74011250637 HC RX REV CODE- 250/637: Performed by: COLON & RECTAL SURGERY

## 2018-12-19 PROCEDURE — 74019 RADEX ABDOMEN 2 VIEWS: CPT

## 2018-12-19 PROCEDURE — 83735 ASSAY OF MAGNESIUM: CPT

## 2018-12-19 PROCEDURE — 74011000250 HC RX REV CODE- 250: Performed by: COLON & RECTAL SURGERY

## 2018-12-19 PROCEDURE — 36415 COLL VENOUS BLD VENIPUNCTURE: CPT

## 2018-12-19 PROCEDURE — 80048 BASIC METABOLIC PNL TOTAL CA: CPT

## 2018-12-19 RX ADMIN — PANTOPRAZOLE SODIUM 40 MG: 40 TABLET, DELAYED RELEASE ORAL at 21:15

## 2018-12-19 RX ADMIN — HYDROMORPHONE HYDROCHLORIDE 1 MG: 2 INJECTION INTRAMUSCULAR; INTRAVENOUS; SUBCUTANEOUS at 07:19

## 2018-12-19 RX ADMIN — HYDROMORPHONE HYDROCHLORIDE 1.5 MG: 2 INJECTION INTRAMUSCULAR; INTRAVENOUS; SUBCUTANEOUS at 16:17

## 2018-12-19 RX ADMIN — HYDROMORPHONE HYDROCHLORIDE 1 MG: 2 INJECTION INTRAMUSCULAR; INTRAVENOUS; SUBCUTANEOUS at 03:12

## 2018-12-19 RX ADMIN — ONDANSETRON 4 MG: 2 INJECTION INTRAMUSCULAR; INTRAVENOUS at 10:21

## 2018-12-19 RX ADMIN — DICYCLOMINE HYDROCHLORIDE 10 MG: 10 CAPSULE ORAL at 10:20

## 2018-12-19 RX ADMIN — ZOLPIDEM TARTRATE 10 MG: 5 TABLET ORAL at 21:22

## 2018-12-19 RX ADMIN — SIMETHICONE CHEW TAB 80 MG 80 MG: 80 TABLET ORAL at 18:00

## 2018-12-19 RX ADMIN — HYDROMORPHONE HYDROCHLORIDE 1.5 MG: 2 INJECTION INTRAMUSCULAR; INTRAVENOUS; SUBCUTANEOUS at 19:27

## 2018-12-19 RX ADMIN — PROCHLORPERAZINE EDISYLATE 5 MG: 5 INJECTION INTRAMUSCULAR; INTRAVENOUS at 13:40

## 2018-12-19 RX ADMIN — HYDROMORPHONE HYDROCHLORIDE 1.5 MG: 2 INJECTION INTRAMUSCULAR; INTRAVENOUS; SUBCUTANEOUS at 22:32

## 2018-12-19 RX ADMIN — PROMETHAZINE HYDROCHLORIDE 25 MG: 25 TABLET ORAL at 15:00

## 2018-12-19 RX ADMIN — PROCHLORPERAZINE EDISYLATE 5 MG: 5 INJECTION INTRAMUSCULAR; INTRAVENOUS at 03:11

## 2018-12-19 RX ADMIN — HYDROMORPHONE HYDROCHLORIDE 1.5 MG: 2 INJECTION INTRAMUSCULAR; INTRAVENOUS; SUBCUTANEOUS at 13:30

## 2018-12-19 RX ADMIN — PREGABALIN 100 MG: 100 CAPSULE ORAL at 13:49

## 2018-12-19 RX ADMIN — SIMETHICONE CHEW TAB 80 MG 80 MG: 80 TABLET ORAL at 10:21

## 2018-12-19 RX ADMIN — PROMETHAZINE HYDROCHLORIDE 25 MG: 25 TABLET ORAL at 07:19

## 2018-12-19 RX ADMIN — PREGABALIN 100 MG: 100 CAPSULE ORAL at 21:14

## 2018-12-19 RX ADMIN — POTASSIUM CHLORIDE, SODIUM CHLORIDE, CALCIUM CHLORIDE, SODIUM LACTATE, AND DEXTROSE MONOHYDRATE: 1.79; 6; .2; 3.1; 5 INJECTION, SOLUTION INTRAVENOUS at 07:21

## 2018-12-19 RX ADMIN — DIAZEPAM 5 MG: 5 TABLET ORAL at 18:04

## 2018-12-19 RX ADMIN — Medication 10 ML: at 13:30

## 2018-12-19 RX ADMIN — DIAZEPAM 5 MG: 5 TABLET ORAL at 10:20

## 2018-12-19 RX ADMIN — HYDROMORPHONE HYDROCHLORIDE 1.5 MG: 2 INJECTION INTRAMUSCULAR; INTRAVENOUS; SUBCUTANEOUS at 10:20

## 2018-12-19 RX ADMIN — PREGABALIN 100 MG: 100 CAPSULE ORAL at 10:23

## 2018-12-19 RX ADMIN — DICYCLOMINE HYDROCHLORIDE 10 MG: 10 CAPSULE ORAL at 18:00

## 2018-12-19 RX ADMIN — Medication 1 CAPSULE: at 21:14

## 2018-12-19 RX ADMIN — Medication 10 ML: at 07:19

## 2018-12-19 RX ADMIN — ACETAMINOPHEN 500 MG: 500 TABLET ORAL at 13:29

## 2018-12-19 RX ADMIN — PREGABALIN 100 MG: 100 CAPSULE ORAL at 18:04

## 2018-12-19 NOTE — ANESTHESIA POSTPROCEDURE EVALUATION
Post-Anesthesia Evaluation and Assessment    Patient: Arline Reyna MRN: [id number]  SSN: [id number]    YOB: 1975  Age: 44 y.o. Sex: male      I have evaluated the patient and they are stable and ready for discharge from the PACU. Cardiovascular Function/Vital Signs  Visit Vitals  /66 (BP 1 Location: Left arm, BP Patient Position: At rest)   Pulse 72   Temp 36.8 °C (98.2 °F)   Resp 18   Ht 6' (1.829 m)   Wt 90.7 kg (200 lb)   SpO2 100%   BMI 27.12 kg/m²       Patient is status post MAC anesthesia for Procedure(s):  COLONOSCOPY via Iliostomy with Peds Scope. Nausea/Vomiting: None    Postoperative hydration reviewed and adequate. Pain:  Pain Scale 1: Numeric (0 - 10) (12/19/18 1032)  Pain Intensity 1: 6 (12/19/18 1032)   Managed    Neurological Status: At baseline    Mental Status, Level of Consciousness: Alert and  oriented to person, place, and time    Pulmonary Status:   O2 Device: Room air (12/18/18 2115)   Adequate oxygenation and airway patent    Complications related to anesthesia: None    Post-anesthesia assessment completed. No concerns    Signed By: Niurka White MD     December 19, 2018              Post-Anesthesia Evaluation and Assessment    Patient: Arline Reyna MRN: [id number]  SSN: [id number]    YOB: 1975  Age: 44 y.o. Sex: male      I have evaluated the patient and they are stable and ready for discharge from the PACU. Cardiovascular Function/Vital Signs  Visit Vitals  /66 (BP 1 Location: Left arm, BP Patient Position: At rest)   Pulse 72   Temp 36.8 °C (98.2 °F)   Resp 18   Ht 6' (1.829 m)   Wt 90.7 kg (200 lb)   SpO2 100%   BMI 27.12 kg/m²       Patient is status post MAC anesthesia for Procedure(s):  COLONOSCOPY via Iliostomy with Peds Scope. Nausea/Vomiting: None    Postoperative hydration reviewed and adequate.     Pain:  Pain Scale 1: Numeric (0 - 10) (12/19/18 1032)  Pain Intensity 1: 6 (12/19/18 1032) Managed    Neurological Status: At baseline    Mental Status, Level of Consciousness: Alert and  oriented to person, place, and time    Pulmonary Status:   O2 Device: Room air (12/18/18 2115)   Adequate oxygenation and airway patent    Complications related to anesthesia: None    Post-anesthesia assessment completed.  No concerns    Signed By: Alex Haas MD     December 19, 2018              Procedure(s):  COLONOSCOPY via Iliostomy with Peds Scope.    <BSHSIANPOST>    Visit Vitals  /66 (BP 1 Location: Left arm, BP Patient Position: At rest)   Pulse 72   Temp 36.8 °C (98.2 °F)   Resp 18   Ht 6' (1.829 m)   Wt 90.7 kg (200 lb)   SpO2 100%   BMI 27.12 kg/m²

## 2018-12-19 NOTE — PROGRESS NOTES
Problem: Falls - Risk of  Goal: *Absence of Falls  Document [place] Fall Risk and appropriate interventions in the flowsheet.   Outcome: Progressing Towards Goal  Fall Risk Interventions:            Medication Interventions: Teach patient to arise slowly         History of Falls Interventions: Consult care management for discharge planning, Evaluate medications/consider consulting pharmacy

## 2018-12-19 NOTE — PROGRESS NOTES
General Daily Progress Note    Admission Date: 12/15/2018  Hospital Day 4  Post-Op Day Not Applicable  Subjective:   Decreased abdominal pain. Mild nausea. Tolerating small quantities of low reside diet so far with decompression tube still on low intermittent suction. Objective:     Patient Vitals for the past 24 hrs:   BP Temp Pulse Resp SpO2   12/19/18 1434 112/66 98.2 °F (36.8 °C) 72 18 100 %   12/19/18 0833 92/55 98.5 °F (36.9 °C) 75 18 99 %   12/19/18 0309 99/63 98.4 °F (36.9 °C) 71 18 100 %   12/18/18 2050 98/66 98.5 °F (36.9 °C) 76 18 99 %     12/19 0701 - 12/19 1900  In: 800 [I.V.:800]  Out: 9824 [Urine:1650]  12/17 1901 - 12/19 0700  In: 5596 [P.O.:860; I.V.:4402]  Out: 6364 [Urine:4170]      Physical Examination:  General Appearance:  Mild apparent discomfort. Abdomen:  Non-distended.  Catheter in place in the ileostomy.                          Data Review   Recent Results (from the past 24 hour(s))   METABOLIC PANEL, BASIC    Collection Time: 12/19/18  3:18 AM   Result Value Ref Range    Sodium 143 136 - 145 mmol/L    Potassium 3.9 3.5 - 5.1 mmol/L    Chloride 108 97 - 108 mmol/L    CO2 29 21 - 32 mmol/L    Anion gap 6 5 - 15 mmol/L    Glucose 87 65 - 100 mg/dL    BUN 4 (L) 6 - 20 MG/DL    Creatinine 0.84 0.70 - 1.30 MG/DL    BUN/Creatinine ratio 5 (L) 12 - 20      GFR est AA >60 >60 ml/min/1.73m2    GFR est non-AA >60 >60 ml/min/1.73m2    Calcium 8.7 8.5 - 10.1 MG/DL   MAGNESIUM    Collection Time: 12/19/18  3:18 AM   Result Value Ref Range    Magnesium 1.7 1.6 - 2.4 mg/dL   PHOSPHORUS    Collection Time: 12/19/18  3:18 AM   Result Value Ref Range    Phosphorus 3.9 2.6 - 4.7 MG/DL     Abdominal Radiographs (12/19/18): Non-obstructive bowel gas pattern. Decompression tube in place. Gastric air-fluid level.         Assessment:     Active Problems:    Ileal pouchitis ([address] (5/1/2004)      Chronic narcotic dependence ([address] (1/20/2018)      Small bowel obstruction ([address] (2/12/2018)    Flexible endoscopy of the ileal pouch performed on 12/18/2018. Stable and gradually improving. Plan:   Continue low fiber diet begun earlier today. Continue low intermittent suction distal tube decompression until tomorrow. If no setback by tomorrow morning, resume intermittent catheterization of the pouch and continue to observe on low fiber diet. Continue Fagyl.

## 2018-12-19 NOTE — PROGRESS NOTES
NUTRITION       Recommendations:  1. Continue diet as ordered and encourage low concentrated sweets  2. May need to consider checking additional fat soluble vitamin lab levels and/or trial of AQUADEKs vitamins in setting of possible fat malabsorption  3. Continue daily weights    Brief follow up. Chart reviewed, discussed with RN and team during interdisciplinary rounds. Pt's diet advanced to Regular Low Fiber. Visited to discuss usual intake, ileostomy output, hydration status and weight. Pt admits he typically eats very little during the day when he's working (recently started working 12 hours/week). On work days, he is hesitant to eat and drink for fear of having high output at work. He will drink 1 Boost High Protein and soda during the course of the work day. His largest meal is at dinner and, based on symptoms (sweating, lethargy, nausea, high output), it seems he could be experiencing dumping syndrome vs ?reactive hypoglycemia post-prandially. Discussed dumping syndrome and will return to provide handout. Also discussed supplement alternatives and will send to try (Boost Glucose Control, Glucerna and Vital 1.5). Encouraged him to limit sugar sweetened beverages (or consume over 30 minutes to see if the rate of output improves). He also admits he has a difficult time staying hydrated despite use of gatorade and other hydration drinks. He has a home vitamin/mineral regimen which is appropriate considering his anatomy. He takes weekly B12 shots, liquid iron, calcium and Vitamin D once/week. He also tries to take a regular MVI daily. Would continue on discharge secondary to risk of malabsorption. Noted Vitamin D levels decreased and MMA WNL on 12/11/18 labs. May need to consider AQUADEKs for fat malabsorption. Noted weight has been stable x 2 years, which is surprising considering his reported symptoms and lack of intake.     Estimated Nutrition Needs:   Kcals/day: 2214 Kcals/day(2298-1956 kcal/day (MSJ x 1.2-1.3))  Protein: 91 g( g/day (1-1.2 g/kg))  Fluid: 2200 ml(1 mL/kcal)     Based On: Jaime Ruiz  Weight Used: Actual wt(90.7 kg)    RD to follow.     [address]

## 2018-12-20 PROCEDURE — 74011000250 HC RX REV CODE- 250: Performed by: COLON & RECTAL SURGERY

## 2018-12-20 PROCEDURE — 74011250636 HC RX REV CODE- 250/636: Performed by: COLON & RECTAL SURGERY

## 2018-12-20 PROCEDURE — 65270000032 HC RM SEMIPRIVATE

## 2018-12-20 PROCEDURE — 94760 N-INVAS EAR/PLS OXIMETRY 1: CPT

## 2018-12-20 PROCEDURE — 74011250637 HC RX REV CODE- 250/637: Performed by: COLON & RECTAL SURGERY

## 2018-12-20 RX ORDER — OXYCODONE AND ACETAMINOPHEN 10; 325 MG/1; MG/1
2 TABLET ORAL
Status: DISCONTINUED | OUTPATIENT
Start: 2018-12-20 | End: 2018-12-21 | Stop reason: HOSPADM

## 2018-12-20 RX ADMIN — HYDROMORPHONE HYDROCHLORIDE 1.5 MG: 2 INJECTION INTRAMUSCULAR; INTRAVENOUS; SUBCUTANEOUS at 20:20

## 2018-12-20 RX ADMIN — DIAZEPAM 5 MG: 5 TABLET ORAL at 17:30

## 2018-12-20 RX ADMIN — HYDROMORPHONE HYDROCHLORIDE 1.5 MG: 2 INJECTION INTRAMUSCULAR; INTRAVENOUS; SUBCUTANEOUS at 05:16

## 2018-12-20 RX ADMIN — PROCHLORPERAZINE EDISYLATE 5 MG: 5 INJECTION INTRAMUSCULAR; INTRAVENOUS at 17:29

## 2018-12-20 RX ADMIN — PROCHLORPERAZINE EDISYLATE 5 MG: 5 INJECTION INTRAMUSCULAR; INTRAVENOUS at 02:04

## 2018-12-20 RX ADMIN — SIMETHICONE CHEW TAB 80 MG 80 MG: 80 TABLET ORAL at 09:02

## 2018-12-20 RX ADMIN — HYDROMORPHONE HYDROCHLORIDE 1.5 MG: 2 INJECTION INTRAMUSCULAR; INTRAVENOUS; SUBCUTANEOUS at 17:25

## 2018-12-20 RX ADMIN — PREGABALIN 100 MG: 100 CAPSULE ORAL at 13:50

## 2018-12-20 RX ADMIN — ONDANSETRON 4 MG: 2 INJECTION INTRAMUSCULAR; INTRAVENOUS at 09:02

## 2018-12-20 RX ADMIN — PROMETHAZINE HYDROCHLORIDE 25 MG: 25 TABLET ORAL at 04:27

## 2018-12-20 RX ADMIN — Medication 10 ML: at 01:53

## 2018-12-20 RX ADMIN — PROMETHAZINE HYDROCHLORIDE 25 MG: 25 TABLET ORAL at 20:20

## 2018-12-20 RX ADMIN — DIAZEPAM 5 MG: 5 TABLET ORAL at 09:03

## 2018-12-20 RX ADMIN — Medication 1 CAPSULE: at 22:59

## 2018-12-20 RX ADMIN — PROMETHAZINE HYDROCHLORIDE 25 MG: 25 TABLET ORAL at 14:14

## 2018-12-20 RX ADMIN — DICYCLOMINE HYDROCHLORIDE 10 MG: 10 CAPSULE ORAL at 09:03

## 2018-12-20 RX ADMIN — Medication 10 ML: at 05:17

## 2018-12-20 RX ADMIN — PREGABALIN 100 MG: 100 CAPSULE ORAL at 17:30

## 2018-12-20 RX ADMIN — HYDROMORPHONE HYDROCHLORIDE 1.5 MG: 2 INJECTION INTRAMUSCULAR; INTRAVENOUS; SUBCUTANEOUS at 23:51

## 2018-12-20 RX ADMIN — Medication 10 ML: at 14:15

## 2018-12-20 RX ADMIN — HYDROMORPHONE HYDROCHLORIDE 1.5 MG: 2 INJECTION INTRAMUSCULAR; INTRAVENOUS; SUBCUTANEOUS at 14:14

## 2018-12-20 RX ADMIN — SIMETHICONE CHEW TAB 80 MG 80 MG: 80 TABLET ORAL at 17:30

## 2018-12-20 RX ADMIN — SIMETHICONE CHEW TAB 80 MG 80 MG: 80 TABLET ORAL at 04:27

## 2018-12-20 RX ADMIN — ONDANSETRON 4 MG: 2 INJECTION INTRAMUSCULAR; INTRAVENOUS at 23:51

## 2018-12-20 RX ADMIN — HYDROMORPHONE HYDROCHLORIDE 1.5 MG: 2 INJECTION INTRAMUSCULAR; INTRAVENOUS; SUBCUTANEOUS at 01:53

## 2018-12-20 RX ADMIN — Medication 10 ML: at 22:59

## 2018-12-20 RX ADMIN — POTASSIUM CHLORIDE, SODIUM CHLORIDE, CALCIUM CHLORIDE, SODIUM LACTATE, AND DEXTROSE MONOHYDRATE: 1.79; 6; .2; 3.1; 5 INJECTION, SOLUTION INTRAVENOUS at 03:15

## 2018-12-20 RX ADMIN — ZOLPIDEM TARTRATE 10 MG: 5 TABLET ORAL at 22:54

## 2018-12-20 RX ADMIN — PREGABALIN 100 MG: 100 CAPSULE ORAL at 22:59

## 2018-12-20 RX ADMIN — PREGABALIN 100 MG: 100 CAPSULE ORAL at 09:03

## 2018-12-20 RX ADMIN — DICYCLOMINE HYDROCHLORIDE 10 MG: 10 CAPSULE ORAL at 17:30

## 2018-12-20 RX ADMIN — PANTOPRAZOLE SODIUM 40 MG: 40 TABLET, DELAYED RELEASE ORAL at 22:59

## 2018-12-20 RX ADMIN — HYDROMORPHONE HYDROCHLORIDE 1.5 MG: 2 INJECTION INTRAMUSCULAR; INTRAVENOUS; SUBCUTANEOUS at 09:03

## 2018-12-20 NOTE — PROGRESS NOTES
General Daily Progress Note    Admission Date: 12/15/2018  Hospital Day 5  Post-Op Day Not Applicable. Subjective:   A bit more pain this morning. The tube did not seem to be evacuating gas as well during the night. Hasn't eaten breakfast yet. Objective:     Patient Vitals for the past 24 hrs:   BP Temp Pulse Resp SpO2   12/20/18 0819 90/57 97.8 °F (36.6 °C) 71 16 98 %   12/20/18 0546     99 %   12/20/18 0208 106/70 98.1 °F (36.7 °C) 74 14 98 %   12/19/18 2037 110/70 98.3 °F (36.8 °C) 71 18 100 %   12/19/18 1434 112/66 98.2 °F (36.8 °C) 72 18 100 %     12/20 0701 - 12/20 1900  In: 240 [P.O.:240]  Out: -   12/18 1901 - 12/20 0700  In: 7466 [P.O.:500; I.V.:2790]  Out: 7420 [Urine:5620]    Physical Examination:  General Appearance:  Mild apparent discomfort. Abdomen:  Catheter in place in the ileostomy.            Data Review No results found for this or any previous visit (from the past 24 hour(s)). Abdominal Radiographs (12/19/18): Non-obstructive bowel gas pattern. Decompression tube in place. Gastric air-fluid level. Assessment:     Active Problems:    Ileal pouchitis ([address] (5/1/2004)      Chronic narcotic dependence ([address] (1/20/2018)      Small bowel obstruction ([address] (2/12/2018)      Flexible endoscopy of the ileal pouch performed on 12/18/2018.     Stable. Not yet sure whether he will tolerate solid food. Plan:   Continue low fiber diet begun yesterday.      If breakfast tolerated, remove ileal catheter and resume intermittent catheterization.     Continue Fagyl.

## 2018-12-21 VITALS
RESPIRATION RATE: 18 BRPM | SYSTOLIC BLOOD PRESSURE: 97 MMHG | HEIGHT: 72 IN | OXYGEN SATURATION: 94 % | DIASTOLIC BLOOD PRESSURE: 63 MMHG | WEIGHT: 196.3 LBS | TEMPERATURE: 98.5 F | BODY MASS INDEX: 26.59 KG/M2 | HEART RATE: 80 BPM

## 2018-12-21 PROCEDURE — 74011250636 HC RX REV CODE- 250/636: Performed by: COLON & RECTAL SURGERY

## 2018-12-21 PROCEDURE — 74011000250 HC RX REV CODE- 250: Performed by: COLON & RECTAL SURGERY

## 2018-12-21 PROCEDURE — 74011250637 HC RX REV CODE- 250/637: Performed by: COLON & RECTAL SURGERY

## 2018-12-21 RX ORDER — ZOLPIDEM TARTRATE 10 MG/1
10 TABLET ORAL
Qty: 30 TAB | Refills: 0 | Status: SHIPPED | OUTPATIENT
Start: 2018-12-21 | End: 2020-09-28

## 2018-12-21 RX ORDER — OXYCODONE AND ACETAMINOPHEN 10; 325 MG/1; MG/1
2 TABLET ORAL
Qty: 25 TAB | Refills: 0 | Status: ON HOLD | OUTPATIENT
Start: 2018-12-21 | End: 2019-03-08 | Stop reason: SDUPTHER

## 2018-12-21 RX ADMIN — Medication 10 ML: at 13:33

## 2018-12-21 RX ADMIN — PREGABALIN 100 MG: 100 CAPSULE ORAL at 09:31

## 2018-12-21 RX ADMIN — HYDROMORPHONE HYDROCHLORIDE 1.5 MG: 2 INJECTION INTRAMUSCULAR; INTRAVENOUS; SUBCUTANEOUS at 10:37

## 2018-12-21 RX ADMIN — DICYCLOMINE HYDROCHLORIDE 10 MG: 10 CAPSULE ORAL at 10:52

## 2018-12-21 RX ADMIN — POTASSIUM CHLORIDE, SODIUM CHLORIDE, CALCIUM CHLORIDE, SODIUM LACTATE, AND DEXTROSE MONOHYDRATE: 1.79; 6; .2; 3.1; 5 INJECTION, SOLUTION INTRAVENOUS at 10:46

## 2018-12-21 RX ADMIN — HYDROMORPHONE HYDROCHLORIDE 1.5 MG: 2 INJECTION INTRAMUSCULAR; INTRAVENOUS; SUBCUTANEOUS at 03:05

## 2018-12-21 RX ADMIN — DIAZEPAM 5 MG: 5 TABLET ORAL at 09:31

## 2018-12-21 RX ADMIN — HYDROMORPHONE HYDROCHLORIDE 1.5 MG: 2 INJECTION INTRAMUSCULAR; INTRAVENOUS; SUBCUTANEOUS at 06:05

## 2018-12-21 RX ADMIN — Medication 10 ML: at 06:05

## 2018-12-21 RX ADMIN — PROCHLORPERAZINE EDISYLATE 5 MG: 5 INJECTION INTRAMUSCULAR; INTRAVENOUS at 13:44

## 2018-12-21 RX ADMIN — SIMETHICONE CHEW TAB 80 MG 80 MG: 80 TABLET ORAL at 10:52

## 2018-12-21 RX ADMIN — PROCHLORPERAZINE EDISYLATE 5 MG: 5 INJECTION INTRAMUSCULAR; INTRAVENOUS at 03:05

## 2018-12-21 RX ADMIN — PROMETHAZINE HYDROCHLORIDE 25 MG: 25 TABLET ORAL at 06:05

## 2018-12-21 RX ADMIN — PREGABALIN 100 MG: 100 CAPSULE ORAL at 13:33

## 2018-12-21 RX ADMIN — HYDROMORPHONE HYDROCHLORIDE 1.5 MG: 2 INJECTION INTRAMUSCULAR; INTRAVENOUS; SUBCUTANEOUS at 13:44

## 2018-12-21 RX ADMIN — Medication 10 ML: at 10:38

## 2018-12-21 NOTE — DISCHARGE SUMMARY
Discharge Summary     Patient ID:    Lynn Desai  [id number]  56 y.o.  1975    Admission Date: 12/15/2018    Discharge Date: 12/21/2018    Admission Diagnoses:  1. Distal partial small bowel obstruction  2. Possible ileal pouchitis  3. Chronic narcotic dependence      Chronic Diagnoses:    Patient Active Problem List   Diagnosis Code    Ileal [facility]itis ([facility] K91.850    GERD (gastroesophageal reflux disease) K21.9    Depression with anxiety F41.8    Anemia D64.9    Chronic narcotic dependence ([facility] F11.20    Drug-induced ileus (HCC) K56.7, T50.905A    Acute kidney injury ([facility] N17.9    Small bowel obstruction ([facility] K56.609       Discharge Diagnoses:  1. Distal partial small bowel obstruction (resolved)  2. Possible ileal pouchitis  3. Chronic narcotic dependence      Hospital Problems as of 12/21/2018 Date Reviewed: 12/7/2018          Codes Class Noted - Resolved POA    Small bowel obstruction ([facility] ICD-10-CM: M31.403  ICD-9-CM: 560.9  2/12/2018 - Present Unknown        Chronic narcotic dependence ([facility] (Chronic) ICD-10-CM: F11.20  ICD-9-CM: 304.91  1/20/2018 - Present Yes        Ileal pouchitis ([facility] ICD-10-CM: J79.282  ICD-9-CM: 569.71  5/1/2004 - Present Yes              Procedures Performed:  Flexible endoscopy of the ileal pouch was performed by Dr. Shonda Lazo on 12/18/2018. Discharge Medications:   Current Discharge Medication List      CONTINUE these medications which have CHANGED    Details   oxyCODONE-acetaminophen (PERCOCET 10)  mg per tablet Take 2 Tabs by mouth every four (4) hours as needed. Max Daily Amount: 12 Tabs. Qty: 25 Tab, Refills: 0    Associated Diagnoses: Abdominal pain, generalized      zolpidem (AMBIEN) 10 mg tablet Take 1 Tab by mouth nightly.  Max Daily Amount: 10 mg.  Qty: 30 Tab, Refills: 0    Associated Diagnoses: Insomnia, unspecified type         CONTINUE these medications which have NOT CHANGED    Details   Saccharomyces boulardii (FLORASTOR) 250 mg capsule Take 250 mg by mouth nightly. temazepam (RESTORIL) 30 mg capsule Take 30 mg by mouth nightly as needed for Sleep. diclofenac (VOLTAREN) 1 % gel Apply 2 g to affected area four (4) times daily as needed. albuterol (PROVENTIL HFA, VENTOLIN HFA, PROAIR HFA) 90 mcg/actuation inhaler Take 2 Puffs by inhalation every four (4) hours as needed for Wheezing. Qty: 1 Inhaler, Refills: 0      acetaminophen (TYLENOL) 500 mg tablet Take 500 mg by mouth four (4) times daily as needed (mild pain, heache (usually has headaches 1 week after Stelara)). prochlorperazine (COMPAZINE) 10 mg tablet Take 10 mg by mouth two (2) times daily as needed (Moderate Nausea). Ustekinumab (STELARA) 90 mg/mL injection 90 mg by SubCUTAneous route. Every 8 weeks       promethazine (PHENERGAN) 25 mg tablet Take 25 mg by mouth every six (6) hours as needed (Severe Nausea). ondansetron hcl (ZOFRAN, AS HYDROCHLORIDE,) 8 mg tablet Take 8 mg by mouth every twelve (12) hours as needed (Mild Nausea). omeprazole (PRILOSEC) 40 mg capsule Take 40 mg by mouth nightly. dicyclomine (BENTYL) 10 mg capsule Take 10 mg by mouth every six (6) hours as needed. ergocalciferol (ERGOCALCIFEROL) 50,000 unit capsule Take 50,000 Units by mouth every Sunday. pregabalin (LYRICA) 100 mg capsule Take 100 mg by mouth four (4) times daily. cyanocobalamin (VITAMIN B-12) 1,000 mcg/mL injection 1,000 mcg by IntraMUSCular route every Sunday. Indications: Once weekly      diazepam (VALIUM) 5 mg tablet Take 5 mg by mouth two (2) times a day. STOP taking these medications       metroNIDAZOLE (FLAGYL) 500 mg tablet Comments:   Reason for Stopping:         traMADol (ULTRAM) 50 mg tablet Comments:   Reason for Stopping:                Diet:  Low fiber diet as tolerated. Activity:  As tolerated. Wound Care:  None. Discharge Condition:  Improved compared to that upon admission.     Disposition: Home.      Follow-up Care:  Dr. Horacio Flores within 2 weeks. Significant Diagnostic Studies:   No results for input(s): WBC, HGB, HCT, PLT, HGBEXT, HCTEXT, PLTEXT in the last 72 hours. Recent Labs     12/19/18  0318      K 3.9      CO2 29   BUN 4*   CREA 0.84   GLU 87   CA 8.7   MG 1.7   PHOS 3.9       Lab Results   Component Value Date/Time    Glucose (POC) 99 02/14/2018 09:53 PM    Glucose (POC) 192 (H) 02/14/2018 11:25 AM    Glucose (POC) 100 06/17/2017 11:22 AM    Glucose (POC) 137 (H) 06/17/2017 05:54 AM    Glucose (POC) 120 (H) 06/16/2017 09:40 PM         HOSPITAL COURSE & TREATMENT RENDERED:   The patient is a 37-year-old male with a complicated medical and surgical history who is well known to me.  That history includes Crohn's disease and multiple abdominal surgical procedures.    His most recent surgical procedure was the conversion of an end-ileostomy to a [place] continent intestinal reservoir (BCIR) on 11/29/2017 in [place], [place]. His most recent previous hospitalization was from 12/7/2018 to 12/12/2018  for treatment of a partial small bowel obstruction and ileal pouchitis that appeared to have been sufficiently improved after bowel rest, distal decompression, parenteral fluids, and parenteral metronidazole.     He returned to the hospital late on 12/15/2018 with exacerbation of the same symptoms with which he had presented on 12/7/2018, i.e. abdominal pain, nausea, vomiting, and decreased ileostomy output. As before, he had no leukocytosis but he showed some signs of dehydration. This time he was also somewhat hypokalemic. A CT scan of the abdomen and pelvis did not reveal anything significantly different from the previous one. He was admitted and treated as he had been before, i.e. with bowel rest, parenteral fluids, continuous distal decompression, and parenteral Flagyl.  His greatest improvement seemed to occur once the distal decompression was performed using low intermittent suction rather than gravity drainage. The ileal pouch was evaluated via flexible endoscopy on 12/18/2018, and there was no abnormality identified. Subsequently, his diet was gradually advanced and he was judged to be fit for discharge to home in improved condition compared to that upon admission. Because he is narcotic dependent, he had run out of his Percocet, and he would not be able to see Dr. Margarita Chavez to obtain a new prescription until at least 12/26/2018, I gave him a prescription for what was intended to be a 2-week supply of that drug.         Signed:  Natalee Dawn MD

## 2018-12-21 NOTE — DISCHARGE INSTRUCTIONS
Patient Discharge Instructions    Pavel Beverly / [id number] : 1975    Admitted 12/15/2018 Discharged: 2018        · It is important that you take medications exactly as they are prescribed. · Keep your medication in the bottles provided by the pharmacist and keep a list of the medication names, dosages, and times to be taken in your wallet. · Do not take other medications without consulting your doctor. What To Do At Home  Recommended Diet:  You should continue to consume the low fiber diet and nutritional supplements as tolerated. Recommended Activity: As tolerated. Hygiene: You may resume your usual routines. Other:  If you have questions or other problems, call Dr. Kimberly Brice. Follow-Up:  Call to schedule an appointment with Dr. Margarita Chavez for sometime within the next 2 weeks. Information obtained by :  I understand that if any problems occur once I am at home I am to contact my physician. I understand and acknowledge receipt of the instructions indicated above.                                                                                                                                            Physician's or R.N.'s Signature                                                                  Date/Time                                                                                                                                              Patient or Representative Signature                                                          Date/Time

## 2018-12-21 NOTE — PROGRESS NOTES
General Daily Progress Note    Admission Date: 12/15/2018  Hospital Day 6  Post-Op Day Not Applicable  Subjective:   No severe pain so far with intermittent catheterization. No vomiting. Ileostomy output a bit less than usual.  Just ate breakfast.        Objective:     Patient Vitals for the past 24 hrs:   BP Temp Pulse Resp SpO2 Weight   12/21/18 0834 104/69 98.8 °F (37.1 °C) 92 18 97 %    12/21/18 0536      89 kg (196 lb 4.8 oz)   12/21/18 0041 117/62 98.4 °F (36.9 °C) 76 20 99 %    12/20/18 2045 123/88 98.4 °F (36.9 °C) 78 18 97 %    12/20/18 1427 100/63 99 °F (37.2 °C) 87 18 96 %      No intake/output data recorded. 12/19 1901 - 12/21 0700  In: 3020 [P.O.:1440; I.V.:119]  Out: 6100 [Urine:5000]    Physical Examination:  General Appearance:  Mild apparent discomfort. Abdomen:  Non-distended. Soft. Mildly tender.             Data Review No results found for this or any previous visit (from the past 24 hour(s)). Abdominal Radiographs (12/19/18):  Non-obstructive bowel gas pattern. Decompression tube in place.  Gastric air-fluid level. Assessment:     Active Problems:    Ileal pouchitis ([address] (5/1/2004)      Chronic narcotic dependence ([address] (1/20/2018)      Small bowel obstruction ([address] (2/12/2018)    Flexible endoscopy of the ileal pouch performed on 12/18/2018.     Stable. Seems to be tolerating solid food and intermittent catheterization of the pouch. Plan:   Continue low fiber diet. Discharge to home this afternoon if still doing well after lunch. Follow up with Dr. Heriberto Reno.

## 2018-12-21 NOTE — PROGRESS NOTES
Problem: Falls - Risk of  Goal: *Absence of Falls  Document [place] Fall Risk and appropriate interventions in the flowsheet.   Outcome: Progressing Towards Goal  Fall Risk Interventions:            Medication Interventions: Evaluate medications/consider consulting pharmacy         History of Falls Interventions: Door open when patient unattended

## 2018-12-28 ENCOUNTER — APPOINTMENT (OUTPATIENT)
Dept: GENERAL RADIOLOGY | Age: 43
End: 2018-12-28
Attending: EMERGENCY MEDICINE
Payer: COMMERCIAL

## 2018-12-28 ENCOUNTER — HOSPITAL ENCOUNTER (EMERGENCY)
Age: 43
Discharge: HOME | End: 2018-12-28
Attending: EMERGENCY MEDICINE
Payer: COMMERCIAL

## 2018-12-28 VITALS
TEMPERATURE: 98.5 F | HEART RATE: 74 BPM | OXYGEN SATURATION: 96 % | DIASTOLIC BLOOD PRESSURE: 59 MMHG | BODY MASS INDEX: 27.09 KG/M2 | SYSTOLIC BLOOD PRESSURE: 103 MMHG | HEIGHT: 72 IN | WEIGHT: 200 LBS | RESPIRATION RATE: 13 BRPM

## 2018-12-28 DIAGNOSIS — R10.9 CHRONIC ABDOMINAL PAIN: ICD-10-CM

## 2018-12-28 DIAGNOSIS — E86.0 MILD DEHYDRATION: Primary | ICD-10-CM

## 2018-12-28 DIAGNOSIS — G89.29 CHRONIC ABDOMINAL PAIN: ICD-10-CM

## 2018-12-28 DIAGNOSIS — K91.850 ILEAL POUCHITIS (HCC): ICD-10-CM

## 2018-12-28 LAB
ALBUMIN SERPL-MCNC: 3.8 G/DL (ref 3.5–5)
ALBUMIN/GLOB SERPL: 1.1 {RATIO} (ref 1.1–2.2)
ALP SERPL-CCNC: 144 U/L (ref 45–117)
ALT SERPL-CCNC: 26 U/L (ref 12–78)
ANION GAP SERPL CALC-SCNC: 4 MMOL/L (ref 5–15)
AST SERPL-CCNC: 19 U/L (ref 15–37)
BASOPHILS # BLD: 0 K/UL (ref 0–0.1)
BASOPHILS NFR BLD: 0 % (ref 0–1)
BILIRUB SERPL-MCNC: 0.2 MG/DL (ref 0.2–1)
BUN SERPL-MCNC: 11 MG/DL (ref 6–20)
BUN/CREAT SERPL: 11 (ref 12–20)
CALCIUM SERPL-MCNC: 9 MG/DL (ref 8.5–10.1)
CHLORIDE SERPL-SCNC: 103 MMOL/L (ref 97–108)
CO2 SERPL-SCNC: 30 MMOL/L (ref 21–32)
CREAT SERPL-MCNC: 1.02 MG/DL (ref 0.7–1.3)
CRP SERPL-MCNC: <0.29 MG/DL (ref 0–0.6)
DIFFERENTIAL METHOD BLD: ABNORMAL
EOSINOPHIL # BLD: 0.1 K/UL (ref 0–0.4)
EOSINOPHIL NFR BLD: 1 % (ref 0–7)
ERYTHROCYTE [DISTWIDTH] IN BLOOD BY AUTOMATED COUNT: 13.3 % (ref 11.5–14.5)
ERYTHROCYTE [SEDIMENTATION RATE] IN BLOOD: 4 MM/HR (ref 0–15)
GLOBULIN SER CALC-MCNC: 3.4 G/DL (ref 2–4)
GLUCOSE SERPL-MCNC: 135 MG/DL (ref 65–100)
HCT VFR BLD AUTO: 43.9 % (ref 36.6–50.3)
HGB BLD-MCNC: 14.3 G/DL (ref 12.1–17)
IMM GRANULOCYTES # BLD: 0 K/UL (ref 0–0.04)
IMM GRANULOCYTES NFR BLD AUTO: 0 % (ref 0–0.5)
LACTATE SERPL-SCNC: 1 MMOL/L (ref 0.4–2)
LIPASE SERPL-CCNC: 71 U/L (ref 73–393)
LYMPHOCYTES # BLD: 1.8 K/UL (ref 0.8–3.5)
LYMPHOCYTES NFR BLD: 21 % (ref 12–49)
MCH RBC QN AUTO: 29.1 PG (ref 26–34)
MCHC RBC AUTO-ENTMCNC: 32.6 G/DL (ref 30–36.5)
MCV RBC AUTO: 89.4 FL (ref 80–99)
MONOCYTES # BLD: 0.3 K/UL (ref 0–1)
MONOCYTES NFR BLD: 3 % (ref 5–13)
NEUTS SEG # BLD: 6.3 K/UL (ref 1.8–8)
NEUTS SEG NFR BLD: 74 % (ref 32–75)
NRBC # BLD: 0 K/UL (ref 0–0.01)
NRBC BLD-RTO: 0 PER 100 WBC
PLATELET # BLD AUTO: 187 K/UL (ref 150–400)
PMV BLD AUTO: 11.6 FL (ref 8.9–12.9)
POTASSIUM SERPL-SCNC: 4.1 MMOL/L (ref 3.5–5.1)
PROT SERPL-MCNC: 7.2 G/DL (ref 6.4–8.2)
RBC # BLD AUTO: 4.91 M/UL (ref 4.1–5.7)
SODIUM SERPL-SCNC: 137 MMOL/L (ref 136–145)
WBC # BLD AUTO: 8.6 K/UL (ref 4.1–11.1)

## 2018-12-28 PROCEDURE — 74019 RADEX ABDOMEN 2 VIEWS: CPT

## 2018-12-28 PROCEDURE — 74011250636 HC RX REV CODE- 250/636: Performed by: EMERGENCY MEDICINE

## 2018-12-28 PROCEDURE — 86140 C-REACTIVE PROTEIN: CPT

## 2018-12-28 PROCEDURE — 83690 ASSAY OF LIPASE: CPT

## 2018-12-28 PROCEDURE — 99284 EMERGENCY DEPT VISIT MOD MDM: CPT

## 2018-12-28 PROCEDURE — 96374 THER/PROPH/DIAG INJ IV PUSH: CPT

## 2018-12-28 PROCEDURE — 96376 TX/PRO/DX INJ SAME DRUG ADON: CPT

## 2018-12-28 PROCEDURE — 85652 RBC SED RATE AUTOMATED: CPT

## 2018-12-28 PROCEDURE — 96375 TX/PRO/DX INJ NEW DRUG ADDON: CPT

## 2018-12-28 PROCEDURE — 83605 ASSAY OF LACTIC ACID: CPT

## 2018-12-28 PROCEDURE — 96361 HYDRATE IV INFUSION ADD-ON: CPT

## 2018-12-28 PROCEDURE — 36415 COLL VENOUS BLD VENIPUNCTURE: CPT

## 2018-12-28 PROCEDURE — 80053 COMPREHEN METABOLIC PANEL: CPT

## 2018-12-28 PROCEDURE — 85025 COMPLETE CBC W/AUTO DIFF WBC: CPT

## 2018-12-28 RX ORDER — FENTANYL CITRATE 50 UG/ML
50 INJECTION, SOLUTION INTRAMUSCULAR; INTRAVENOUS
Status: DISCONTINUED | OUTPATIENT
Start: 2018-12-28 | End: 2018-12-28 | Stop reason: HOSPADM

## 2018-12-28 RX ORDER — ONDANSETRON 2 MG/ML
4 INJECTION INTRAMUSCULAR; INTRAVENOUS
Status: DISCONTINUED | OUTPATIENT
Start: 2018-12-28 | End: 2018-12-28 | Stop reason: HOSPADM

## 2018-12-28 RX ADMIN — ONDANSETRON 4 MG: 2 INJECTION INTRAMUSCULAR; INTRAVENOUS at 17:23

## 2018-12-28 RX ADMIN — SODIUM CHLORIDE 1000 ML: 900 INJECTION, SOLUTION INTRAVENOUS at 19:10

## 2018-12-28 RX ADMIN — SODIUM CHLORIDE 1000 ML: 900 INJECTION, SOLUTION INTRAVENOUS at 17:23

## 2018-12-28 RX ADMIN — FENTANYL CITRATE 50 MCG: 50 INJECTION, SOLUTION INTRAMUSCULAR; INTRAVENOUS at 17:23

## 2018-12-28 RX ADMIN — FENTANYL CITRATE 50 MCG: 50 INJECTION, SOLUTION INTRAMUSCULAR; INTRAVENOUS at 19:10

## 2018-12-28 NOTE — ED PROVIDER NOTES
44 y.o. male with complicated past medical history including GERD, ulcerative colitis, Crohn's disease, anal fistula, opoid dependence, s/p multiple surgeries (see list) including BCIR creation, presents ambulatory to the ED with chief complaint of abdominal pain. Patient states that he has some chronic lower abdominal pain at baseline, but his current discomfort is different. Notes that last night he felt the onset of severe \"burning\" upper abdominal pain which is exacerbated with PO intake. He rates his current level of discomfort as a 9/10 in severity, and he also complains of nausea and one episode of vomiting associated with the pain. Reports a decrease in output from his stoma site, which is related to a decrease in PO intake. Has been taking Zofran, Phenergan, Percocet, and Compazine without significant relief. Patient specifically denies any fever. Patient has a [place] continent intestinal reservoir (BCIR) that was created in [place], [place] on 11/29/2017. He states that he has been hospitalized on two previous occasions in the past 30-40 days for management of similar nausea, vomiting, and abdominal pain. Patient states that these symptoms are believed to be at least in part due to partial SBO related to pouch dysfunction. Notes that when he has been admitted he has required bowel rest and distal decompression. Patient's last hospitalization was 12/15-12/21/18. Underwent flexible endoscopy of ileal pouch on 12/18/18 which was normal.  
 
There are no other acute medical concerns at this time. Social hx: Positive Tobacco use (0.5 PPD); Denies EtOH use; Denies Illicit Drug Abuse GI: Kena Lemus MD 
Colorectal Surgery: Dr. Wolf Clay Note written by Debby Main. Tatyana Hurt, as dictated by Ken Rojas MD 5:19 PM  
 
 
The history is provided by the patient and medical records. No  was used. Past Medical History:  
Diagnosis Date  Anal fistula The patient no longer has an anus.  Anal stenosis The patient no longer has an anus.  Chronic kidney disease  Crohn's disease ([address]  GERD (gastroesophageal reflux disease)  H/O ulcerative colitis Symptoms began in 1996. Total proctocolectomy was performed in 2004. Patient later developed Crohn's disease.  Hiatal hernia  Ileal pouchitis ([address] 05/2004 The patient no longer has a pouch.  Nausea & vomiting Combination of Scopalamine patch & Zofran IV worked well in past  
 Numbness in right leg Chronic.  Opioid dependence ([address] History of opioid dependence requiring a detox program.  
 Psychiatric disorder   
 depression and anxiety  Skin lesion of back 3/17/2014  Syncopal episodes Past Surgical History:  
Procedure Laterality Date  ABDOMEN SURGERY PROC UNLISTED  3/25/2004 Total proctocolectomy with creation of ileoanal J-pouch and loop ileostomy; Dr. Violette Rodriguez.  COLONOSCOPY N/A 4/12/2018 Flexible endoscopy via ileostomy (NOT a colonoscopy); Don Bolton MD.  
 COLONOSCOPY N/A 12/18/2018 COLONOSCOPY via Iliostomy with Peds Scope performed by Marco Boogie MD at [address] [place] [address]  12/2016 Dr. Chi Richards  HX GI  5/2/2004 Examination under anesthesia with endoscopic evaluation of ileoanal pouch and placement of drain; Dr. Violette Rodriguez.  HX GI  5/25/2004 Ileostomy closure with small bowel resection and anastomosis; Dr. Violette Rodriguez.   GI  5/24/2012 Examination under anesthesia, anal dilatation, anal biopsy, and placement of draining seton; Dr. Violette Rodriguez.   GI  7/3/2012 Incision and drainage of perirectal abscess and rigid endoscopy of ileoanal pouch; Dr. Violette Rodriguez.   GI  7/31/2012 Incision and drainage of perirectal abscess, placement of draining seton, and anal dilatation; Dr. Violette Rodriguez.   GI  1/22/2013 Repair of anal fistulas with debridement, partial fistulectomy, and flap closure; Dr. Khalif Campo.   GI  5/17/2013 Examination under anesthesia, partial fistulotomy,  and placement of draining setons; Dr. Khalif Campo.   GI  8/6/2013 Anal fistulotomy and application of ACell micromatrix; Dr. Khalif Campo.   GI  2/20/2014 Examination under anesthesia and dilation of anal canal with rigid proctoscopy; Rose Mary Ac MD.  
[address] [place] [address] GI  4/29/2015 Creation of Arletta Bombard continent intestinal reservoir (BCIR), abdominoperineal resection of ileoanal J-pouch; lysis of adhesions, and gastrostomy; Alejandro Mckeon MD ([facility])   GI  8/7/2015 Stoma revision; Justin Mckeon MD ([facility])   GI  10/29/2015 Extensive lysis of adhesions, resection of continent intestinal reservoir, repair of serosal tears, and creation of end-ileostomy; Dr. Khalif Campo.   GI  03/14/2017 Laparotomy, extensive lysis of adhesions and revision of ileostomy; Dr. Khalif Campo. Nikki Koenig ORTHOPAEDIC  2008 Left ACL repair  HX OTHER SURGICAL  11/29/2017 Conversion of end-ileostomy to BCIR ([address]); [facility] HX UROLOGICAL  03/14/2017 Cystoscopy and placement of bilateral temporary ureteral catheters; Ricardo Jara MD.  
 
   
Family History:  
Problem Relation Age of Onset  Cancer Father  Asthma Neg Hx  Diabetes Neg Hx   
 Heart Disease Neg Hx  Hypertension Neg Hx  Stroke Neg Hx  Malignant Hyperthermia Neg Hx  Pseudocholinesterase Deficiency Neg Hx  Delayed Awakening Neg Hx  Post-op Nausea/Vomiting Neg Hx Social History Socioeconomic History  Marital status:  Spouse name: Not on file  Number of children: Not on file  Years of education: Not on file  Highest education level: Not on file Social Needs  Financial resource strain: Not on file  Food insecurity - worry: Not on file  Food insecurity - inability: Not on file  Transportation needs - medical: Not on file  Transportation needs - non-medical: Not on file Occupational History  Not on file Tobacco Use  Smoking status: Current Every Day Smoker Packs/day: 0.50 Years: 7.00 Pack years: 3.50 Last attempt to quit: 2017 Years since quittin.9  Smokeless tobacco: Never Used Substance and Sexual Activity  Alcohol use: No  
 Drug use: No  
 Sexual activity: Not on file Other Topics Concern  Not on file Social History Narrative  Not on file ALLERGIES: Remicade [infliximab]; Bactrim [sulfamethoprim ds]; Morphine; and Nsaids (non-steroidal anti-inflammatory drug) Review of Systems Constitutional: Positive for appetite change. Negative for fever. Gastrointestinal: Positive for abdominal pain, nausea and vomiting. All other systems reviewed and are negative. Vitals:  
 18 1623 18 1700 18 1730 18 1910 BP: 105/73 112/78 105/69 108/70 Pulse: (!) 114 100 88 98 Resp: 18 12 11 22 Temp: 98.6 °F (37 °C) SpO2: 98% 98% 97% 98% Weight: 90.7 kg (200 lb) Height: 6' (1.829 m) Physical Exam  
Constitutional: He appears well-developed and well-nourished. No distress. HENT:  
Head: Normocephalic and atraumatic. Eyes: Conjunctivae are normal.  
Neck: Neck supple. No tracheal deviation present. Cardiovascular: Regular rhythm and normal heart sounds. Tachycardia present. Pulmonary/Chest: Effort normal and breath sounds normal. No respiratory distress. Abdominal: Bowel sounds are normal. He exhibits no distension. No focal abdominal tenderness. Stoma site is clean, dry, and intact. Musculoskeletal: Normal range of motion. He exhibits no deformity. Neurological: He is alert. No cranial nerve deficit. Skin: Skin is warm and dry.   
Psychiatric: His behavior is normal.  
 Nursing note and vitals reviewed. Note written by Vicente Robledo, as dictated by Jose Noonan MD 5:19 PM  
 
MDM 
  
44 y.o. male presents with recurrent abdominal pain after eating last night. Has had 1 episode of vomiting and loss of appetite with poor po intake. He had recent admission and imaging for same. Pain well controlled here, given IV fluid for losses. Discussed with colorectal surgery and there does not appear to be any benefit to inpatient management at this point. Pt lacks endorgan damage or other signs of severe dehydration. Plan for supportive outpatient management with clear diet and slow advancement with straight drain of ileal pouch. Plan to follow up with PCP as needed and return precautions discussed for worsening or new concerning symptoms. Procedures CONSULT NOTE: 
6:36 PM Jose Noonan MD spoke with Dr. Telma Maguire, Consult for colorectal surgery. Discussed available diagnostic tests and clinical findings. Dr. Kiel Shell states that there is nothing we would do in the hospital other than hydrate him, watch his bag, and transition to low suction vs straight drain from his ostomy site. He recommends trying an outpatient treatment trial after IV hydration therapy and to monitor his symptoms. Patient can follow-up with Dr. Yaz Ramirez (GI) in the office. 6:46 PM 
Patient's results have been reviewed with them. Patient and/or family have verbally conveyed their understanding and agreement of the patient's signs, symptoms, diagnosis, treatment and prognosis and additionally agree to follow up as recommended or return to the Emergency Room should their condition change prior to follow-up. Discharge instructions have also been provided to the patient with some educational information regarding their diagnosis as well a list of reasons why they would want to return to the ER prior to their follow-up appointment should their condition change.

## 2018-12-28 NOTE — DISCHARGE INSTRUCTIONS
Abdominal Pain: Care Instructions  Your Care Instructions    Abdominal pain has many possible causes. Some aren't serious and get better on their own in a few days. Others need more testing and treatment. If your pain continues or gets worse, you need to be rechecked and may need more tests to find out what is wrong. You may need surgery to correct the problem. Don't ignore new symptoms, such as fever, nausea and vomiting, urination problems, pain that gets worse, and dizziness. These may be signs of a more serious problem. Your doctor may have recommended a follow-up visit in the next 8 to 12 hours. If you are not getting better, you may need more tests or treatment. The doctor has checked you carefully, but problems can develop later. If you notice any problems or new symptoms, get medical treatment right away. Follow-up care is a key part of your treatment and safety. Be sure to make and go to all appointments, and call your doctor if you are having problems. It's also a good idea to know your test results and keep a list of the medicines you take. How can you care for yourself at home? · Rest until you feel better. · To prevent dehydration, drink plenty of fluids, enough so that your urine is light yellow or clear like water. Choose water and other caffeine-free clear liquids until you feel better. If you have kidney, heart, or liver disease and have to limit fluids, talk with your doctor before you increase the amount of fluids you drink. · If your stomach is upset, eat mild foods, such as rice, dry toast or crackers, bananas, and applesauce. Try eating several small meals instead of two or three large ones. · Wait until 48 hours after all symptoms have gone away before you have spicy foods, alcohol, and drinks that contain caffeine. · Do not eat foods that are high in fat. · Avoid anti-inflammatory medicines such as aspirin, ibuprofen (Advil, Motrin), and naproxen (Aleve).  These can cause stomach upset. Talk to your doctor if you take daily aspirin for another health problem. When should you call for help? Call 911 anytime you think you may need emergency care. For example, call if:    · You passed out (lost consciousness).     · You pass maroon or very bloody stools.     · You vomit blood or what looks like coffee grounds.     · You have new, severe belly pain.    Call your doctor now or seek immediate medical care if:    · Your pain gets worse, especially if it becomes focused in one area of your belly.     · You have a new or higher fever.     · Your stools are black and look like tar, or they have streaks of blood.     · You have unexpected vaginal bleeding.     · You have symptoms of a urinary tract infection. These may include:  ? Pain when you urinate. ? Urinating more often than usual.  ? Blood in your urine.     · You are dizzy or lightheaded, or you feel like you may faint.    Watch closely for changes in your health, and be sure to contact your doctor if:    · You are not getting better after 1 day (24 hours). Where can you learn more? Go to http://malu-ashley.info/. Enter Y252 in the search box to learn more about \"Abdominal Pain: Care Instructions. \"  Current as of: November 20, 2017  Content Version: 11.8  © 7895-8906 [employer]. Care instructions adapted under license by [employer] (which disclaims liability or warranty for this information). If you have questions about a medical condition or this instruction, always ask your healthcare professional. [place] [address] any warranty or liability for your use of this information. Dehydration: Care Instructions  Your Care Instructions  Dehydration happens when your body loses too much fluid. This might happen when you do not drink enough water or you lose large amounts of fluids from your body because of diarrhea, vomiting, or sweating.  Severe dehydration can be life-threatening. Water and minerals called electrolytes help put your body fluids back in balance. Learn the early signs of fluid loss, and drink more fluids to prevent dehydration. Follow-up care is a key part of your treatment and safety. Be sure to make and go to all appointments, and call your doctor if you are having problems. It's also a good idea to know your test results and keep a list of the medicines you take. How can you care for yourself at home? · To prevent dehydration, drink plenty of fluids, enough so that your urine is light yellow or clear like water. Choose water and other caffeine-free clear liquids until you feel better. If you have kidney, heart, or liver disease and have to limit fluids, talk with your doctor before you increase the amount of fluids you drink. · If you do not feel like eating or drinking, try taking small sips of water, sports drinks, or other rehydration drinks. · Get plenty of rest.  To prevent dehydration  · Add more fluids to your diet and daily routine, unless your doctor has told you not to. · During hot weather, drink more fluids. Drink even more fluids if you exercise a lot. Stay away from drinks with alcohol or caffeine. · Watch for the symptoms of dehydration. These include:  ? A dry, sticky mouth. ? Dark yellow urine, and not much of it. ? Dry and sunken eyes. ? Feeling very tired. · Learn what problems can lead to dehydration. These include:  ? Diarrhea, fever, and vomiting. ? Any illness with a fever, such as pneumonia or the flu. ? Activities that cause heavy sweating, such as endurance races and heavy outdoor work in hot or humid weather. ? Alcohol or drug abuse or withdrawal.  ? Certain medicines, such as cold and allergy pills (antihistamines), diet pills (diuretics), and laxatives. ? Certain diseases, such as diabetes, cancer, and heart or kidney disease. When should you call for help?   Call 911 anytime you think you may need emergency care. For example, call if:    · You passed out (lost consciousness).    Call your doctor now or seek immediate medical care if:    · You are confused and cannot think clearly.     · You are dizzy or lightheaded, or you feel like you may faint.     · You have signs of needing more fluids. You have sunken eyes and a dry mouth, and you pass only a little dark urine.     · You cannot keep fluids down.    Watch closely for changes in your health, and be sure to contact your doctor if:    · You are not making tears.     · Your skin is very dry and sags slowly back into place after you pinch it.     · Your mouth and eyes are very dry. Where can you learn more? Go to http://malu-ashley.info/. Enter K340 in the search box to learn more about \"Dehydration: Care Instructions. \"  Current as of: November 20, 2017  Content Version: 11.8  © 3300-9871 [employer]. Care instructions adapted under license by [employer] (which disclaims liability or warranty for this information). If you have questions about a medical condition or this instruction, always ask your healthcare professional. [place] [address] any warranty or liability for your use of this information.

## 2018-12-28 NOTE — ED TRIAGE NOTES
Pt arrives from home for abd pain with n/v x 2 days. Hx of crohn's. Being followed by GI, Dr. Radha Eagle and Dr. Sajan Almodovar

## 2018-12-28 NOTE — PROGRESS NOTES
Date of previous inpatient admission/ ED visit? Patient last admitted 12/15-12/21 for Small Bowel Obstruction and 12/7-12/12 for Small Bowel Obstruction. RRAT score is 13. Patient with 2 ED visits in the past 6 months and 3 IP admissions in the past 12 months. What brought the patient back to ED? Chart reviewed. Patient arrives from home for abd pain with n/v x 2 days. Hx of crohn's. Being followed by GI, Dr. Shahab Montoya and Dr. Jose Valencia. Did patient decline recommended services during last admission/ ED visit (if yes, what)? NO Has patient seen a provider since their last inpatient admission/ED visit (if yes, when)? NO 
 
CM Interventions: 
From previous inpatient admission/ED visit: Patient discharged home with family assistance. No CM consults or needs at time of discharge. Follow-up placed on AVS. From current inpatient admission/ED visit: Patient currently in the ED being evaluated and needs assessed. There are no current CM consults or needs at this time. Disposition needs TBD/subject to change pending recommendations. CM will continue to follow and assist with disposition needs as they arise. Care Management Interventions PCP Verified by CM: Yes 
Palliative Care Criteria Met (RRAT>21 & CHF Dx)?: No 
Mode of Transport at Discharge: BLS Transition of Care Consult (CM Consult): (There are no CM consults or needs at this time.) Discharge Durable Medical Equipment: No 
Physical Therapy Consult: No 
Occupational Therapy Consult: No 
Speech Therapy Consult: No 
Current Support Network: Own Home Confirm Follow Up Transport: Family Plan discussed with Pt/Family/Caregiver: Yes Discharge Location Discharge Placement: Home with family assistance(Disposition needs TBD/or subject to change pending recommendations) JEREMIAH Escobar/ERLINDA 
5:03 PM

## 2018-12-29 NOTE — ED NOTES
Patient has been medically cleared for discharge at this time. He was given all discharge instructions and education at this time. Seen leaving the department with a steady gait and all belongings.

## 2019-01-04 ENCOUNTER — HOSPITAL ENCOUNTER (INPATIENT)
Age: 44
LOS: 10 days | Discharge: HOME | DRG: 394 | End: 2019-01-14
Attending: COLON & RECTAL SURGERY | Admitting: COLON & RECTAL SURGERY
Payer: COMMERCIAL

## 2019-01-04 ENCOUNTER — APPOINTMENT (OUTPATIENT)
Dept: GENERAL RADIOLOGY | Age: 44
DRG: 394 | End: 2019-01-04
Attending: COLON & RECTAL SURGERY
Payer: COMMERCIAL

## 2019-01-04 PROBLEM — K56.609 SMALL BOWEL OBSTRUCTION (HCC): Status: RESOLVED | Noted: 2018-02-12 | Resolved: 2019-01-04

## 2019-01-04 PROBLEM — N17.9 ACUTE KIDNEY INJURY (HCC): Status: RESOLVED | Noted: 2018-02-12 | Resolved: 2019-01-04

## 2019-01-04 LAB
ALBUMIN SERPL-MCNC: 3.6 G/DL (ref 3.5–5)
ALBUMIN/GLOB SERPL: 1.2 {RATIO} (ref 1.1–2.2)
ALP SERPL-CCNC: 169 U/L (ref 45–117)
ALT SERPL-CCNC: 66 U/L (ref 12–78)
ANION GAP SERPL CALC-SCNC: 4 MMOL/L (ref 5–15)
AST SERPL-CCNC: 46 U/L (ref 15–37)
BASOPHILS # BLD: 0 K/UL (ref 0–0.1)
BASOPHILS NFR BLD: 0 % (ref 0–1)
BILIRUB SERPL-MCNC: 0.4 MG/DL (ref 0.2–1)
BUN SERPL-MCNC: 6 MG/DL (ref 6–20)
BUN/CREAT SERPL: 7 (ref 12–20)
CALCIUM SERPL-MCNC: 8.7 MG/DL (ref 8.5–10.1)
CHLORIDE SERPL-SCNC: 106 MMOL/L (ref 97–108)
CO2 SERPL-SCNC: 29 MMOL/L (ref 21–32)
CREAT SERPL-MCNC: 0.85 MG/DL (ref 0.7–1.3)
DIFFERENTIAL METHOD BLD: NORMAL
EOSINOPHIL # BLD: 0.3 K/UL (ref 0–0.4)
EOSINOPHIL NFR BLD: 4 % (ref 0–7)
ERYTHROCYTE [DISTWIDTH] IN BLOOD BY AUTOMATED COUNT: 13.5 % (ref 11.5–14.5)
GLOBULIN SER CALC-MCNC: 2.9 G/DL (ref 2–4)
GLUCOSE SERPL-MCNC: 89 MG/DL (ref 65–100)
HCT VFR BLD AUTO: 39.6 % (ref 36.6–50.3)
HGB BLD-MCNC: 12.7 G/DL (ref 12.1–17)
IMM GRANULOCYTES # BLD: 0 K/UL (ref 0–0.04)
IMM GRANULOCYTES NFR BLD AUTO: 0 % (ref 0–0.5)
LYMPHOCYTES # BLD: 2.1 K/UL (ref 0.8–3.5)
LYMPHOCYTES NFR BLD: 24 % (ref 12–49)
MCH RBC QN AUTO: 28.7 PG (ref 26–34)
MCHC RBC AUTO-ENTMCNC: 32.1 G/DL (ref 30–36.5)
MCV RBC AUTO: 89.6 FL (ref 80–99)
MONOCYTES # BLD: 0.5 K/UL (ref 0–1)
MONOCYTES NFR BLD: 6 % (ref 5–13)
NEUTS SEG # BLD: 5.8 K/UL (ref 1.8–8)
NEUTS SEG NFR BLD: 66 % (ref 32–75)
NRBC # BLD: 0 K/UL (ref 0–0.01)
NRBC BLD-RTO: 0 PER 100 WBC
PLATELET # BLD AUTO: 175 K/UL (ref 150–400)
PMV BLD AUTO: 11.4 FL (ref 8.9–12.9)
POTASSIUM SERPL-SCNC: 3.4 MMOL/L (ref 3.5–5.1)
PROT SERPL-MCNC: 6.5 G/DL (ref 6.4–8.2)
RBC # BLD AUTO: 4.42 M/UL (ref 4.1–5.7)
SODIUM SERPL-SCNC: 139 MMOL/L (ref 136–145)
WBC # BLD AUTO: 8.8 K/UL (ref 4.1–11.1)

## 2019-01-04 PROCEDURE — 74011250636 HC RX REV CODE- 250/636: Performed by: COLON & RECTAL SURGERY

## 2019-01-04 PROCEDURE — 85025 COMPLETE CBC W/AUTO DIFF WBC: CPT

## 2019-01-04 PROCEDURE — 74011250637 HC RX REV CODE- 250/637: Performed by: COLON & RECTAL SURGERY

## 2019-01-04 PROCEDURE — 74019 RADEX ABDOMEN 2 VIEWS: CPT

## 2019-01-04 PROCEDURE — 65270000029 HC RM PRIVATE

## 2019-01-04 PROCEDURE — 80053 COMPREHEN METABOLIC PANEL: CPT

## 2019-01-04 PROCEDURE — 36415 COLL VENOUS BLD VENIPUNCTURE: CPT

## 2019-01-04 RX ORDER — PREGABALIN 100 MG/1
100 CAPSULE ORAL 4 TIMES DAILY
Status: DISCONTINUED | OUTPATIENT
Start: 2019-01-04 | End: 2019-01-15 | Stop reason: HOSPADM

## 2019-01-04 RX ORDER — SAME BUTANEDISULFONATE/BETAINE 400-600 MG
250 POWDER IN PACKET (EA) ORAL
Status: DISCONTINUED | OUTPATIENT
Start: 2019-01-04 | End: 2019-01-04 | Stop reason: CLARIF

## 2019-01-04 RX ORDER — CYANOCOBALAMIN 1000 UG/ML
1000 INJECTION, SOLUTION INTRAMUSCULAR; SUBCUTANEOUS
Status: DISCONTINUED | OUTPATIENT
Start: 2019-01-06 | End: 2019-01-15 | Stop reason: HOSPADM

## 2019-01-04 RX ORDER — ZOLPIDEM TARTRATE 10 MG/1
10 TABLET ORAL
Status: DISCONTINUED | OUTPATIENT
Start: 2019-01-04 | End: 2019-01-15 | Stop reason: HOSPADM

## 2019-01-04 RX ORDER — SODIUM CHLORIDE 0.9 % (FLUSH) 0.9 %
5-10 SYRINGE (ML) INJECTION AS NEEDED
Status: DISCONTINUED | OUTPATIENT
Start: 2019-01-04 | End: 2019-01-15 | Stop reason: HOSPADM

## 2019-01-04 RX ORDER — ONDANSETRON 2 MG/ML
4 INJECTION INTRAMUSCULAR; INTRAVENOUS
Status: DISCONTINUED | OUTPATIENT
Start: 2019-01-04 | End: 2019-01-15 | Stop reason: HOSPADM

## 2019-01-04 RX ORDER — NALOXONE HYDROCHLORIDE 0.4 MG/ML
0.4 INJECTION, SOLUTION INTRAMUSCULAR; INTRAVENOUS; SUBCUTANEOUS AS NEEDED
Status: DISCONTINUED | OUTPATIENT
Start: 2019-01-04 | End: 2019-01-15 | Stop reason: HOSPADM

## 2019-01-04 RX ORDER — SUCRALFATE 1 G/10ML
1 SUSPENSION ORAL 2 TIMES DAILY
Status: DISCONTINUED | OUTPATIENT
Start: 2019-01-05 | End: 2019-01-15 | Stop reason: HOSPADM

## 2019-01-04 RX ORDER — HYDROMORPHONE HYDROCHLORIDE 2 MG/ML
1.5 INJECTION, SOLUTION INTRAMUSCULAR; INTRAVENOUS; SUBCUTANEOUS
Status: DISCONTINUED | OUTPATIENT
Start: 2019-01-04 | End: 2019-01-09

## 2019-01-04 RX ORDER — ENOXAPARIN SODIUM 100 MG/ML
40 INJECTION SUBCUTANEOUS EVERY 24 HOURS
Status: DISCONTINUED | OUTPATIENT
Start: 2019-01-04 | End: 2019-01-15 | Stop reason: HOSPADM

## 2019-01-04 RX ORDER — DICYCLOMINE HYDROCHLORIDE 10 MG/1
10 CAPSULE ORAL
Status: DISCONTINUED | OUTPATIENT
Start: 2019-01-04 | End: 2019-01-15 | Stop reason: HOSPADM

## 2019-01-04 RX ORDER — DIAZEPAM 5 MG/1
5 TABLET ORAL 2 TIMES DAILY
Status: DISCONTINUED | OUTPATIENT
Start: 2019-01-04 | End: 2019-01-15 | Stop reason: HOSPADM

## 2019-01-04 RX ORDER — SODIUM CHLORIDE 0.9 % (FLUSH) 0.9 %
5-10 SYRINGE (ML) INJECTION EVERY 8 HOURS
Status: DISCONTINUED | OUTPATIENT
Start: 2019-01-04 | End: 2019-01-15 | Stop reason: HOSPADM

## 2019-01-04 RX ORDER — PROMETHAZINE HYDROCHLORIDE 25 MG/1
25 TABLET ORAL
Status: DISCONTINUED | OUTPATIENT
Start: 2019-01-04 | End: 2019-01-15 | Stop reason: HOSPADM

## 2019-01-04 RX ORDER — PROCHLORPERAZINE MALEATE 10 MG
10 TABLET ORAL
Status: DISCONTINUED | OUTPATIENT
Start: 2019-01-04 | End: 2019-01-15 | Stop reason: HOSPADM

## 2019-01-04 RX ORDER — METRONIDAZOLE 500 MG/100ML
500 INJECTION, SOLUTION INTRAVENOUS EVERY 8 HOURS
Status: DISCONTINUED | OUTPATIENT
Start: 2019-01-04 | End: 2019-01-15 | Stop reason: HOSPADM

## 2019-01-04 RX ORDER — ERGOCALCIFEROL 1.25 MG/1
50000 CAPSULE ORAL
Status: DISCONTINUED | OUTPATIENT
Start: 2019-01-06 | End: 2019-01-15 | Stop reason: HOSPADM

## 2019-01-04 RX ORDER — DEXTROSE MONOHYDRATE, SODIUM CHLORIDE, SODIUM LACTATE, POTASSIUM CHLORIDE, CALCIUM CHLORIDE 5; 600; 310; 179; 20 G/100ML; MG/100ML; MG/100ML; MG/100ML; MG/100ML
INJECTION, SOLUTION INTRAVENOUS CONTINUOUS
Status: DISCONTINUED | OUTPATIENT
Start: 2019-01-04 | End: 2019-01-15 | Stop reason: HOSPADM

## 2019-01-04 RX ORDER — ALBUTEROL SULFATE 90 UG/1
2 AEROSOL, METERED RESPIRATORY (INHALATION)
Status: DISCONTINUED | OUTPATIENT
Start: 2019-01-04 | End: 2019-01-04 | Stop reason: CLARIF

## 2019-01-04 RX ORDER — MESALAMINE 4 G/60ML
4 ENEMA RECTAL DAILY
Status: DISCONTINUED | OUTPATIENT
Start: 2019-01-05 | End: 2019-01-08

## 2019-01-04 RX ORDER — PANTOPRAZOLE SODIUM 40 MG/1
40 TABLET, DELAYED RELEASE ORAL
Status: DISCONTINUED | OUTPATIENT
Start: 2019-01-04 | End: 2019-01-15 | Stop reason: HOSPADM

## 2019-01-04 RX ORDER — ACETAMINOPHEN 500 MG
500 TABLET ORAL
Status: DISCONTINUED | OUTPATIENT
Start: 2019-01-04 | End: 2019-01-15 | Stop reason: HOSPADM

## 2019-01-04 RX ORDER — ALBUTEROL SULFATE 0.83 MG/ML
2.5 SOLUTION RESPIRATORY (INHALATION)
Status: DISCONTINUED | OUTPATIENT
Start: 2019-01-04 | End: 2019-01-15 | Stop reason: HOSPADM

## 2019-01-04 RX ORDER — TEMAZEPAM 15 MG/1
30 CAPSULE ORAL
Status: DISCONTINUED | OUTPATIENT
Start: 2019-01-04 | End: 2019-01-04

## 2019-01-04 RX ORDER — OMEPRAZOLE 40 MG/1
40 CAPSULE, DELAYED RELEASE ORAL
Status: DISCONTINUED | OUTPATIENT
Start: 2019-01-04 | End: 2019-01-04 | Stop reason: CLARIF

## 2019-01-04 RX ADMIN — SODIUM CHLORIDE 1000 ML: 900 INJECTION, SOLUTION INTRAVENOUS at 21:25

## 2019-01-04 RX ADMIN — HYDROMORPHONE HYDROCHLORIDE 1.5 MG: 2 INJECTION INTRAMUSCULAR; INTRAVENOUS; SUBCUTANEOUS at 22:02

## 2019-01-04 RX ADMIN — DIAZEPAM 5 MG: 5 TABLET ORAL at 22:03

## 2019-01-04 RX ADMIN — POTASSIUM CHLORIDE, SODIUM CHLORIDE, CALCIUM CHLORIDE, SODIUM LACTATE, AND DEXTROSE MONOHYDRATE: 1.79; 6; .2; 3.1; 5 INJECTION, SOLUTION INTRAVENOUS at 23:32

## 2019-01-04 RX ADMIN — ZOLPIDEM TARTRATE 10 MG: 10 TABLET ORAL at 22:04

## 2019-01-04 RX ADMIN — Medication 10 ML: at 22:03

## 2019-01-04 RX ADMIN — ONDANSETRON 4 MG: 2 SOLUTION INTRAMUSCULAR; INTRAVENOUS at 22:03

## 2019-01-04 RX ADMIN — METRONIDAZOLE 500 MG: 500 INJECTION, SOLUTION INTRAVENOUS at 23:32

## 2019-01-04 RX ADMIN — PREGABALIN 100 MG: 100 CAPSULE ORAL at 22:04

## 2019-01-04 RX ADMIN — PANTOPRAZOLE SODIUM 40 MG: 40 TABLET, DELAYED RELEASE ORAL at 22:04

## 2019-01-05 PROCEDURE — 74011250636 HC RX REV CODE- 250/636: Performed by: COLON & RECTAL SURGERY

## 2019-01-05 PROCEDURE — 74011250637 HC RX REV CODE- 250/637: Performed by: COLON & RECTAL SURGERY

## 2019-01-05 PROCEDURE — 65270000029 HC RM PRIVATE

## 2019-01-05 RX ADMIN — POTASSIUM CHLORIDE, SODIUM CHLORIDE, CALCIUM CHLORIDE, SODIUM LACTATE, AND DEXTROSE MONOHYDRATE: 1.79; 6; .2; 3.1; 5 INJECTION, SOLUTION INTRAVENOUS at 11:27

## 2019-01-05 RX ADMIN — SUCRALFATE 1 G: 1 SUSPENSION ORAL at 22:22

## 2019-01-05 RX ADMIN — HYDROMORPHONE HYDROCHLORIDE 1.5 MG: 2 INJECTION INTRAMUSCULAR; INTRAVENOUS; SUBCUTANEOUS at 05:03

## 2019-01-05 RX ADMIN — ZOLPIDEM TARTRATE 10 MG: 10 TABLET ORAL at 22:21

## 2019-01-05 RX ADMIN — PREGABALIN 100 MG: 100 CAPSULE ORAL at 17:24

## 2019-01-05 RX ADMIN — Medication 10 ML: at 14:02

## 2019-01-05 RX ADMIN — HYDROMORPHONE HYDROCHLORIDE 1.5 MG: 2 INJECTION INTRAMUSCULAR; INTRAVENOUS; SUBCUTANEOUS at 01:40

## 2019-01-05 RX ADMIN — HYDROMORPHONE HYDROCHLORIDE 1.5 MG: 2 INJECTION INTRAMUSCULAR; INTRAVENOUS; SUBCUTANEOUS at 17:24

## 2019-01-05 RX ADMIN — ONDANSETRON 4 MG: 2 SOLUTION INTRAMUSCULAR; INTRAVENOUS at 20:43

## 2019-01-05 RX ADMIN — ACETAMINOPHEN 500 MG: 500 TABLET ORAL at 22:47

## 2019-01-05 RX ADMIN — ONDANSETRON 4 MG: 2 SOLUTION INTRAMUSCULAR; INTRAVENOUS at 08:21

## 2019-01-05 RX ADMIN — METRONIDAZOLE 500 MG: 500 INJECTION, SOLUTION INTRAVENOUS at 05:03

## 2019-01-05 RX ADMIN — HYDROMORPHONE HYDROCHLORIDE 1.5 MG: 2 INJECTION INTRAMUSCULAR; INTRAVENOUS; SUBCUTANEOUS at 11:25

## 2019-01-05 RX ADMIN — Medication 10 ML: at 05:03

## 2019-01-05 RX ADMIN — MESALAMINE 4 G: 4 ENEMA RECTAL at 09:21

## 2019-01-05 RX ADMIN — SUCRALFATE 1 G: 1 SUSPENSION ORAL at 17:24

## 2019-01-05 RX ADMIN — Medication 1 CAPSULE: at 09:21

## 2019-01-05 RX ADMIN — HYDROMORPHONE HYDROCHLORIDE 1.5 MG: 2 INJECTION INTRAMUSCULAR; INTRAVENOUS; SUBCUTANEOUS at 23:38

## 2019-01-05 RX ADMIN — POTASSIUM CHLORIDE, SODIUM CHLORIDE, CALCIUM CHLORIDE, SODIUM LACTATE, AND DEXTROSE MONOHYDRATE: 1.79; 6; .2; 3.1; 5 INJECTION, SOLUTION INTRAVENOUS at 23:29

## 2019-01-05 RX ADMIN — ENOXAPARIN SODIUM 40 MG: 40 INJECTION SUBCUTANEOUS at 09:21

## 2019-01-05 RX ADMIN — HYDROMORPHONE HYDROCHLORIDE 1.5 MG: 2 INJECTION INTRAMUSCULAR; INTRAVENOUS; SUBCUTANEOUS at 20:43

## 2019-01-05 RX ADMIN — PREGABALIN 100 MG: 100 CAPSULE ORAL at 09:21

## 2019-01-05 RX ADMIN — DIAZEPAM 5 MG: 5 TABLET ORAL at 17:24

## 2019-01-05 RX ADMIN — HYDROMORPHONE HYDROCHLORIDE 1.5 MG: 2 INJECTION INTRAMUSCULAR; INTRAVENOUS; SUBCUTANEOUS at 14:31

## 2019-01-05 RX ADMIN — HYDROMORPHONE HYDROCHLORIDE 1.5 MG: 2 INJECTION INTRAMUSCULAR; INTRAVENOUS; SUBCUTANEOUS at 08:22

## 2019-01-05 RX ADMIN — METRONIDAZOLE 500 MG: 500 INJECTION, SOLUTION INTRAVENOUS at 14:02

## 2019-01-05 RX ADMIN — METRONIDAZOLE 500 MG: 500 INJECTION, SOLUTION INTRAVENOUS at 20:47

## 2019-01-05 RX ADMIN — PANTOPRAZOLE SODIUM 40 MG: 40 TABLET, DELAYED RELEASE ORAL at 22:21

## 2019-01-05 RX ADMIN — ONDANSETRON 4 MG: 2 SOLUTION INTRAMUSCULAR; INTRAVENOUS at 14:33

## 2019-01-05 RX ADMIN — Medication 10 ML: at 22:24

## 2019-01-05 RX ADMIN — PREGABALIN 100 MG: 100 CAPSULE ORAL at 14:02

## 2019-01-05 RX ADMIN — ACETAMINOPHEN 500 MG: 500 TABLET ORAL at 09:29

## 2019-01-05 RX ADMIN — DIAZEPAM 5 MG: 5 TABLET ORAL at 09:21

## 2019-01-05 RX ADMIN — PREGABALIN 100 MG: 100 CAPSULE ORAL at 22:21

## 2019-01-05 NOTE — PROGRESS NOTES
General Daily Progress Note Admission Date: 1/4/2019 Hospital Day 2 Post-Op Day Not Applicable. Subjective:  
Less pain. Intermittently nauseated. Large volume of ileostomy output. Objective:  
 
Patient Vitals for the past 24 hrs: 
 BP Temp Pulse Resp SpO2 Weight 01/05/19 0900 92/57 98.5 °F (36.9 °C) 93 16 94 %   
01/05/19 0738      89.6 kg (197 lb 8 oz) 01/05/19 0459 103/68 98.4 °F (36.9 °C) 76 16 93 %   
01/04/19 2109 105/70 98.7 °F (37.1 °C) 86 16 98 %   
 
01/05 0701 - 01/05 1900 In: -  
Out: 600  
01/03 1901 - 01/05 0700 In: -  
Out: 1610 [Urine:800] Physical Examination: 
No distress. Abdomen soft, non-distended, and mildly tender. Ileostomy drainage tube in place. Data Review Recent Results (from the past 24 hour(s)) METABOLIC PANEL, COMPREHENSIVE Collection Time: 01/04/19  9:02 PM  
Result Value Ref Range Sodium 139 136 - 145 mmol/L Potassium 3.4 (L) 3.5 - 5.1 mmol/L Chloride 106 97 - 108 mmol/L  
 CO2 29 21 - 32 mmol/L Anion gap 4 (L) 5 - 15 mmol/L Glucose 89 65 - 100 mg/dL BUN 6 6 - 20 MG/DL Creatinine 0.85 0.70 - 1.30 MG/DL  
 BUN/Creatinine ratio 7 (L) 12 - 20 GFR est AA >60 >60 ml/min/1.73m2 GFR est non-AA >60 >60 ml/min/1.73m2 Calcium 8.7 8.5 - 10.1 MG/DL Bilirubin, total 0.4 0.2 - 1.0 MG/DL  
 ALT (SGPT) 66 12 - 78 U/L  
 AST (SGOT) 46 (H) 15 - 37 U/L Alk. phosphatase 169 (H) 45 - 117 U/L Protein, total 6.5 6.4 - 8.2 g/dL Albumin 3.6 3.5 - 5.0 g/dL Globulin 2.9 2.0 - 4.0 g/dL A-G Ratio 1.2 1.1 - 2.2    
CBC WITH AUTOMATED DIFF Collection Time: 01/04/19  9:02 PM  
Result Value Ref Range WBC 8.8 4.1 - 11.1 K/uL  
 RBC 4.42 4.10 - 5.70 M/uL  
 HGB 12.7 12.1 - 17.0 g/dL HCT 39.6 36.6 - 50.3 % MCV 89.6 80.0 - 99.0 FL  
 MCH 28.7 26.0 - 34.0 PG  
 MCHC 32.1 30.0 - 36.5 g/dL  
 RDW 13.5 11.5 - 14.5 % PLATELET 356 293 - 538 K/uL MPV 11.4 8.9 - 12.9 FL  
 NRBC 0.0 0  WBC ABSOLUTE NRBC 0.00 0.00 - 0.01 K/uL NEUTROPHILS 66 32 - 75 % LYMPHOCYTES 24 12 - 49 % MONOCYTES 6 5 - 13 % EOSINOPHILS 4 0 - 7 % BASOPHILS 0 0 - 1 % IMMATURE GRANULOCYTES 0 0.0 - 0.5 % ABS. NEUTROPHILS 5.8 1.8 - 8.0 K/UL  
 ABS. LYMPHOCYTES 2.1 0.8 - 3.5 K/UL  
 ABS. MONOCYTES 0.5 0.0 - 1.0 K/UL  
 ABS. EOSINOPHILS 0.3 0.0 - 0.4 K/UL  
 ABS. BASOPHILS 0.0 0.0 - 0.1 K/UL  
 ABS. IMM. GRANS. 0.0 0.00 - 0.04 K/UL  
 DF AUTOMATED Abdominal Radiographs (1/4/2019): Ileus versus partial SBO. Assessment:  
 
Principal Problem: 
  Ileal pouchitis ([address] (5/1/2004) Active Problems: 
  GERD (gastroesophageal reflux disease) (11/20/2016) Chronic narcotic dependence ([address] (1/20/2018) Stable. Rowasa and sucralfate enemas to begin today. As it turns out, the patient now informs me that after checking his notes he has seen that it was Dr. Bradley Ryder (not his [place] [facility]) who wanted to use the Entocort enemas. Plan:  
Continue bowel rest. 
Begin Rowasa and sucralfate enemas. Continue distal decompression during the times between enemas (allowing for medication contact time). Continue parenteral metronidazole. Ambulate. Lovenox for DVT prophylaxis.

## 2019-01-05 NOTE — PROGRESS NOTES
Spiritual Care Partner Volunteer visited patient in room 211/01 on 1.05.19. Documented by: : Rev. Ramón Durán. Cheyenne Cantrell; [facility], to contact [phone] [address] Gamino call: [phone]

## 2019-01-05 NOTE — H&P
Colorectal Surgery Admission History and Physical Examination Note NAME:  Galen Nino :   1975 MRN:   [id number] Admission Date: 2019 Chief Complaint:  Nausea, vomiting, abdominal pain, and increased ileal pouch output. History of Present Illness: The patient is a 42-year-old male with a complicated medical and surgical history that includes Crohn's disease and multiple abdominal surgical procedures. His most recent surgical procedure was the conversion of an end-ileostomy to a [place] continent intestinal reservoir (BCIR) on 2017 in [place], [place]. His most recent previous hospitalization was from 12/15/2018 to 2018  for treatment of a partial small bowel obstruction and possible ileal pouchitis that appeared to have been sufficiently improved after bowel rest, distal decompression, parenteral fluids, and parenteral metronidazole. During that hospitalization, the ileal pouch was evaluated via flexible endoscopy and no abnormality was identified. At home, the patient has continued to struggle. For the last few days he has been having more abdominal pain, and over the last two days he has had increasing problems with nausea, vomiting, and high ileostomy output (\"like a faucet\"). He was started on Prednisone and Lomotil by Dr. Mickey Issa, and he communicated with his surgery team in [place] who recommended hospital admission for bowel rest, parenteral fluids, parenteral Flagyl, and a combination of budesonide (Entocort) and sucralfate enemas. PMH: 
Past Medical History:  
Diagnosis Date  Anal fistula The patient no longer has an anus.  Anal stenosis The patient no longer has an anus.  Chronic kidney disease  Crohn's disease ([address]  GERD (gastroesophageal reflux disease)  H/O ulcerative colitis Symptoms began in . Total proctocolectomy was performed in . Patient later developed Crohn's disease.  Hiatal hernia  Ileal pouchitis ([facility] [address] 05/2004 The patient no longer has a pouch.  Nausea & vomiting Combination of Scopalamine patch & Zofran IV worked well in past  
 Numbness in right leg Chronic.  Opioid dependence ([facility] [address] History of opioid dependence requiring a detox program.  
 Psychiatric disorder   
 depression and anxiety  Skin lesion of back 3/17/2014  Syncopal episodes PSH: 
Past Surgical History:  
Procedure Laterality Date  ABDOMEN SURGERY PROC UNLISTED  3/25/2004 Total proctocolectomy with creation of ileoanal J-pouch and loop ileostomy; Dr. Vinayak Salgado.  COLONOSCOPY N/A 4/12/2018 Flexible endoscopy via ileostomy (NOT a colonoscopy); Edilson Roblero MD.  
 COLONOSCOPY N/A 12/18/2018 COLONOSCOPY via Iliostomy with Peds Scope performed by Lindsey Khan MD at [facility]  12/2016 Dr. Marco Antonio Reynoso   GI  5/2/2004 Examination under anesthesia with endoscopic evaluation of ileoanal pouch and placement of drain; Dr. Vinayak Salgado.   GI  5/25/2004 Ileostomy closure with small bowel resection and anastomosis; Dr. Vinayak Salgado.   GI  5/24/2012 Examination under anesthesia, anal dilatation, anal biopsy, and placement of draining seton; Dr. Vinayak Salgado.   GI  7/3/2012 Incision and drainage of perirectal abscess and rigid endoscopy of ileoanal pouch; Dr. Vinayak Salgado.   GI  7/31/2012 Incision and drainage of perirectal abscess, placement of draining seton, and anal dilatation; Dr. Vinayak Salgado.   GI  1/22/2013 Repair of anal fistulas with debridement, partial fistulectomy, and flap closure; Dr. Vinayak Salgado.   GI  5/17/2013 Examination under anesthesia, partial fistulotomy,  and placement of draining setons; Dr. Vinayak Salgado.   GI  8/6/2013 Anal fistulotomy and application of ACell micromatrix; Dr. Vinayak Salgado.   GI  2/20/2014 Examination under anesthesia and dilation of anal canal with rigid proctoscopy; Yimi Mcmillan MD.  
[address] [place], [place] [address]le [facility] GI  2015 Creation of [place] continent intestinal reservoir (BCIR), abdominoperineal resection of ileoanal J-pouch; lysis of adhesions, and gastrostomy; Owen Cooper MD ([facility])   GI  2015 Stoma revision; Rajendra Cooper MD ([facility])   GI  10/29/2015 Extensive lysis of adhesions, resection of continent intestinal reservoir, repair of serosal tears, and creation of end-ileostomy; Dr. Norma Garner.   GI  2017 Laparotomy, extensive lysis of adhesions and revision of ileostomy; Dr. Norma Garner. [facility] ORTH[facility]  2008 Left ACL repair  HX OTHER SURGICAL  2017 Conversion of end-ileostomy to BCIR ([address]); [facility] HX UROLOGICAL  2017 Cystoscopy and placement of bilateral temporary ureteral catheters; Aliza Fay MD. Home Medications: 
Prior to Admission Medications Prescriptions Last Dose Informant Patient Reported? Taking? Saccharomyces boulardii (FLORASTOR) 250 mg capsule   Yes No  
Sig: Take 250 mg by mouth nightly. Ustekinumab (STELARA) 90 mg/mL injection  Self Yes No  
Si mg by SubCUTAneous route. Every 8 weeks   
acetaminophen (TYLENOL) 500 mg tablet   Yes No  
Sig: Take 500 mg by mouth four (4) times daily as needed (mild pain, heache (usually has headaches 1 week after Stelara)). albuterol (PROVENTIL HFA, VENTOLIN HFA, PROAIR HFA) 90 mcg/actuation inhaler   No No  
Sig: Take 2 Puffs by inhalation every four (4) hours as needed for Wheezing. cyanocobalamin (VITAMIN B-12) 1,000 mcg/mL injection  Self Yes No  
Si,000 mcg by IntraMUSCular route every . Indications: Once weekly  
diazepam (VALIUM) 5 mg tablet  Self Yes No  
Sig: Take 5 mg by mouth two (2) times a day.   
diclofenac (VOLTAREN) 1 % gel   Yes No  
 Sig: Apply 2 g to affected area four (4) times daily as needed. dicyclomine (BENTYL) 10 mg capsule  Self Yes No  
Sig: Take 10 mg by mouth every six (6) hours as needed. ergocalciferol (ERGOCALCIFEROL) 50,000 unit capsule  Self Yes No  
Sig: Take 50,000 Units by mouth every Sunday. omeprazole (PRILOSEC) 40 mg capsule  Self Yes No  
Sig: Take 40 mg by mouth nightly. ondansetron hcl (ZOFRAN, AS HYDROCHLORIDE,) 8 mg tablet  Self Yes No  
Sig: Take 8 mg by mouth every twelve (12) hours as needed (Mild Nausea). oxyCODONE-acetaminophen (PERCOCET 10)  mg per tablet   No No  
Sig: Take 2 Tabs by mouth every four (4) hours as needed. Max Daily Amount: 12 Tabs. pregabalin (LYRICA) 100 mg capsule  Self Yes No  
Sig: Take 100 mg by mouth four (4) times daily. prochlorperazine (COMPAZINE) 10 mg tablet   Yes No  
Sig: Take 10 mg by mouth two (2) times daily as needed (Moderate Nausea). promethazine (PHENERGAN) 25 mg tablet  Self Yes No  
Sig: Take 25 mg by mouth every six (6) hours as needed (Severe Nausea). temazepam (RESTORIL) 30 mg capsule   Yes No  
Sig: Take 30 mg by mouth nightly as needed for Sleep.  
zolpidem (AMBIEN) 10 mg tablet   No No  
Sig: Take 1 Tab by mouth nightly. Max Daily Amount: 10 mg. Facility-Administered Medications: None Hospital Medications: 
Current Facility-Administered Medications Medication Dose Route Frequency  sodium chloride (NS) flush 5-10 mL  5-10 mL IntraVENous Q8H  
 sodium chloride (NS) flush 5-10 mL  5-10 mL IntraVENous PRN  
 HYDROmorphone (DILAUDID) injection 1.5 mg  1.5 mg IntraVENous Q3H PRN  
 naloxone (NARCAN) injection 0.4 mg  0.4 mg IntraVENous PRN  
 ondansetron (ZOFRAN) injection 4 mg  4 mg IntraVENous Q4H PRN  
 enoxaparin (LOVENOX) injection 40 mg  40 mg SubCUTAneous Q24H  
 dextrose 5% - LR with KCl 20 mEq/L infusion   IntraVENous CONTINUOUS  
 sodium chloride 0.9 % bolus infusion 1,000 mL  1,000 mL IntraVENous ONCE  
  metroNIDAZOLE (FLAGYL) IVPB premix 500 mg  500 mg IntraVENous Q8H  
 [START ON 2019] OTHER(NON-FORMULARY) 2 mg  2 mg Does Not Apply DAILY  [START ON 2019] OTHER(NON-FORMULARY) 1 g  1 g Does Not Apply BID  acetaminophen (TYLENOL) tablet 500 mg  500 mg Oral QID PRN  
 albuterol (PROVENTIL HFA, VENTOLIN HFA, PROAIR HFA) inhaler 2 Puff  2 Puff Inhalation Q4H PRN  
 [START ON 2019] cyanocobalamin (VITAMIN B12) injection 1,000 mcg  1,000 mcg IntraMUSCular every   diazePAM (VALIUM) tablet 5 mg  5 mg Oral BID  dicyclomine (BENTYL) capsule 10 mg  10 mg Oral Q6H PRN  
 [START ON 2019] ergocalciferol capsule 50,000 Units  50,000 Units Oral every Rob  pregabalin (LYRICA) capsule 100 mg  100 mg Oral QID  prochlorperazine (COMPAZINE) tablet 10 mg  10 mg Oral BID PRN  promethazine (PHENERGAN) tablet 25 mg  25 mg Oral Q6H PRN  
 Saccharomyces boulardii (FLORASTOR) capsule 250 mg  250 mg Oral QHS  temazepam (RESTORIL) capsule 30 mg  30 mg Oral QHS PRN  
 zolpidem (AMBIEN) tablet 10 mg  10 mg Oral QHS Allergies: Allergies Allergen Reactions  Remicade [Infliximab] Anaphylaxis and Shortness of Breath  Bactrim [Sulfamethoprim Ds] Other (comments) Increased GI distress.  Morphine Nausea Only Tolerates Dilaudid  Nsaids (Non-Steroidal Anti-Inflammatory Drug) Other (comments) Stomach ulcers Family History: 
Family History Problem Relation Age of Onset  Cancer Father  Asthma Neg Hx  Diabetes Neg Hx   
 Heart Disease Neg Hx  Hypertension Neg Hx  Stroke Neg Hx  Malignant Hyperthermia Neg Hx  Pseudocholinesterase Deficiency Neg Hx  Delayed Awakening Neg Hx  Post-op Nausea/Vomiting Neg Hx Social History: 
Social History Tobacco Use  Smoking status: Current Every Day Smoker Packs/day: 0.50 Years: 7.00 Pack years: 3.50 Last attempt to quit: 2017 Years since quittin.9  Smokeless tobacco: Never Used Substance Use Topics  Alcohol use: No  
 
 
Review of Systems: 
 
Symptom Y/N Comments  Symptom Y/N Comments Fever/Chills N   Chest Pain N   
Cough N   Abdominal Pain Y Sputum N   Joint Pain SOB/CEDILLO N   Pruritis/Rash Nausea/vomit Y   Tolerating PT/OT Diarrhea Y   Tolerating Diet N Constipation N   Other Could NOT obtain due to:   
 
 
Objective:  
No data found. No intake/output data recorded. No intake/output data recorded. PHYSICAL EXAMINATION:   
GENERAL:  Uncomfortable. Appears fatigued. HEENT:  Anicteric. ABDOMEN:  Non-distended. Moderately tender. No rebound tenderness or involuntary guarding. No palpable masses. Multiple scars. Right lower quadrant stoma. NEURO:  Alert and oriented. Data Review No results for input(s): WBC, HGB, HCT, PLT, HGBEXT, HCTEXT, PLTEXT in the last 72 hours. No results for input(s): NA, K, CL, CO2, BUN, CREA, GLU, PHOS, CA in the last 72 hours. No results for input(s): SGOT, GPT, AP, TBIL, TP, ALB, GLOB, GGT, AML, LPSE in the last 72 hours. No lab exists for component: AMYP, HLPSE No results for input(s): INR, PTP, APTT in the last 72 hours. No lab exists for component: INREXT Assessment and Plan: The patient continues to experience ileal pouch dysfunction, and his surgery team in [place] believes that he has pouchitis again. He really needs to get back to [place] so that they can evaluate and treat him, but that is not something that he is able to do now. I agree that he needs to be admitted. He cannot be at home now if he cannot tolerate at least a sufficiently nutritious liquid diet. I will admit him, and I will attempt to follow the recommendations of his [place] surgery team. 
 
He will receive parenteral fluids (beginning with a saline bolus) and parenteral metronidazole while being kept npo except for sips with medications and occasional ice chips. Sucralfate will be administered into the pouch twice daily as an enema. Per the pharmacist, the budesonide is not available as a commercially prepared enema in this country, and it cannot be compounded at [facility] unless a reliable \"recipe\" can be obtained. The pharmacist contacted [facility] and [facility], and neither one has such a \"recipe. \"   We will therefore use Rowasa enemas once per day, at least until I can contact the patient's surgeons in [place] to discuss it with them. Between enemas, the ileal pouch will be decompressed via the catheter and low intermittent suction. The patient's usual home medications will mostly be continued, but (as we have done before) we will substitute parenteral Dilaudid for the Percocet to control pain and to prevent opioid withdrawal. 
 
[Well over one hour was spent admitting the patient, including interviewing and examining him and working out his orders with the pharmacist and the nursing staff.] Principal Problem: 
  Ileal pouchitis ([facility] [address] (5/1/2004) Active Problems: 
  GERD (gastroesophageal reflux disease) (11/20/2016) Chronic narcotic dependence ([facility] [address] (1/20/2018)

## 2019-01-06 LAB
ANION GAP SERPL CALC-SCNC: 5 MMOL/L (ref 5–15)
BASOPHILS # BLD: 0 K/UL (ref 0–0.1)
BASOPHILS NFR BLD: 0 % (ref 0–1)
BUN SERPL-MCNC: 5 MG/DL (ref 6–20)
BUN/CREAT SERPL: 7 (ref 12–20)
CALCIUM SERPL-MCNC: 9.1 MG/DL (ref 8.5–10.1)
CHLORIDE SERPL-SCNC: 106 MMOL/L (ref 97–108)
CO2 SERPL-SCNC: 28 MMOL/L (ref 21–32)
CREAT SERPL-MCNC: 0.76 MG/DL (ref 0.7–1.3)
DIFFERENTIAL METHOD BLD: ABNORMAL
EOSINOPHIL # BLD: 0.3 K/UL (ref 0–0.4)
EOSINOPHIL NFR BLD: 4 % (ref 0–7)
ERYTHROCYTE [DISTWIDTH] IN BLOOD BY AUTOMATED COUNT: 13.4 % (ref 11.5–14.5)
GLUCOSE SERPL-MCNC: 93 MG/DL (ref 65–100)
HCT VFR BLD AUTO: 38.2 % (ref 36.6–50.3)
HGB BLD-MCNC: 12 G/DL (ref 12.1–17)
IMM GRANULOCYTES # BLD: 0 K/UL (ref 0–0.04)
IMM GRANULOCYTES NFR BLD AUTO: 0 % (ref 0–0.5)
LYMPHOCYTES # BLD: 2.3 K/UL (ref 0.8–3.5)
LYMPHOCYTES NFR BLD: 26 % (ref 12–49)
MAGNESIUM SERPL-MCNC: 1.8 MG/DL (ref 1.6–2.4)
MCH RBC QN AUTO: 28.4 PG (ref 26–34)
MCHC RBC AUTO-ENTMCNC: 31.4 G/DL (ref 30–36.5)
MCV RBC AUTO: 90.3 FL (ref 80–99)
MONOCYTES # BLD: 0.4 K/UL (ref 0–1)
MONOCYTES NFR BLD: 5 % (ref 5–13)
NEUTS SEG # BLD: 5.6 K/UL (ref 1.8–8)
NEUTS SEG NFR BLD: 65 % (ref 32–75)
NRBC # BLD: 0 K/UL (ref 0–0.01)
NRBC BLD-RTO: 0 PER 100 WBC
PHOSPHATE SERPL-MCNC: 3.2 MG/DL (ref 2.6–4.7)
PLATELET # BLD AUTO: 147 K/UL (ref 150–400)
PMV BLD AUTO: 11.5 FL (ref 8.9–12.9)
POTASSIUM SERPL-SCNC: 3.8 MMOL/L (ref 3.5–5.1)
RBC # BLD AUTO: 4.23 M/UL (ref 4.1–5.7)
SODIUM SERPL-SCNC: 139 MMOL/L (ref 136–145)
WBC # BLD AUTO: 8.6 K/UL (ref 4.1–11.1)

## 2019-01-06 PROCEDURE — 84100 ASSAY OF PHOSPHORUS: CPT

## 2019-01-06 PROCEDURE — 65270000029 HC RM PRIVATE

## 2019-01-06 PROCEDURE — 85025 COMPLETE CBC W/AUTO DIFF WBC: CPT

## 2019-01-06 PROCEDURE — 36415 COLL VENOUS BLD VENIPUNCTURE: CPT

## 2019-01-06 PROCEDURE — 80048 BASIC METABOLIC PNL TOTAL CA: CPT

## 2019-01-06 PROCEDURE — 83735 ASSAY OF MAGNESIUM: CPT

## 2019-01-06 PROCEDURE — 74011250636 HC RX REV CODE- 250/636: Performed by: COLON & RECTAL SURGERY

## 2019-01-06 PROCEDURE — 74011250637 HC RX REV CODE- 250/637: Performed by: COLON & RECTAL SURGERY

## 2019-01-06 RX ORDER — BUDESONIDE 3 MG/1
9 CAPSULE, COATED PELLETS ORAL DAILY
Status: DISCONTINUED | OUTPATIENT
Start: 2019-01-06 | End: 2019-01-15 | Stop reason: HOSPADM

## 2019-01-06 RX ADMIN — POTASSIUM CHLORIDE, SODIUM CHLORIDE, CALCIUM CHLORIDE, SODIUM LACTATE, AND DEXTROSE MONOHYDRATE: 1.79; 6; .2; 3.1; 5 INJECTION, SOLUTION INTRAVENOUS at 09:28

## 2019-01-06 RX ADMIN — HYDROMORPHONE HYDROCHLORIDE 1.5 MG: 2 INJECTION INTRAMUSCULAR; INTRAVENOUS; SUBCUTANEOUS at 03:03

## 2019-01-06 RX ADMIN — HYDROMORPHONE HYDROCHLORIDE 1.5 MG: 2 INJECTION INTRAMUSCULAR; INTRAVENOUS; SUBCUTANEOUS at 06:02

## 2019-01-06 RX ADMIN — METRONIDAZOLE 500 MG: 500 INJECTION, SOLUTION INTRAVENOUS at 05:46

## 2019-01-06 RX ADMIN — PREGABALIN 100 MG: 100 CAPSULE ORAL at 12:16

## 2019-01-06 RX ADMIN — Medication 10 ML: at 05:46

## 2019-01-06 RX ADMIN — ACETAMINOPHEN 500 MG: 500 TABLET ORAL at 12:17

## 2019-01-06 RX ADMIN — HYDROMORPHONE HYDROCHLORIDE 1.5 MG: 2 INJECTION INTRAMUSCULAR; INTRAVENOUS; SUBCUTANEOUS at 18:27

## 2019-01-06 RX ADMIN — MESALAMINE 4 G: 4 ENEMA RECTAL at 09:14

## 2019-01-06 RX ADMIN — Medication 10 ML: at 21:55

## 2019-01-06 RX ADMIN — DIAZEPAM 5 MG: 5 TABLET ORAL at 18:28

## 2019-01-06 RX ADMIN — BUDESONIDE 9 MG: 3 CAPSULE, GELATIN COATED ORAL at 13:05

## 2019-01-06 RX ADMIN — PREGABALIN 100 MG: 100 CAPSULE ORAL at 21:55

## 2019-01-06 RX ADMIN — HYDROMORPHONE HYDROCHLORIDE 1.5 MG: 2 INJECTION INTRAMUSCULAR; INTRAVENOUS; SUBCUTANEOUS at 09:14

## 2019-01-06 RX ADMIN — ZOLPIDEM TARTRATE 10 MG: 10 TABLET ORAL at 21:55

## 2019-01-06 RX ADMIN — HYDROMORPHONE HYDROCHLORIDE 1.5 MG: 2 INJECTION INTRAMUSCULAR; INTRAVENOUS; SUBCUTANEOUS at 15:14

## 2019-01-06 RX ADMIN — CYANOCOBALAMIN 1000 MCG: 1000 INJECTION, SOLUTION INTRAMUSCULAR at 06:01

## 2019-01-06 RX ADMIN — ONDANSETRON 4 MG: 2 SOLUTION INTRAMUSCULAR; INTRAVENOUS at 03:03

## 2019-01-06 RX ADMIN — SUCRALFATE 1 G: 1 SUSPENSION ORAL at 12:17

## 2019-01-06 RX ADMIN — ERGOCALCIFEROL 50000 UNITS: 1.25 CAPSULE ORAL at 06:02

## 2019-01-06 RX ADMIN — ONDANSETRON 4 MG: 2 SOLUTION INTRAMUSCULAR; INTRAVENOUS at 15:13

## 2019-01-06 RX ADMIN — PANTOPRAZOLE SODIUM 40 MG: 40 TABLET, DELAYED RELEASE ORAL at 21:55

## 2019-01-06 RX ADMIN — Medication 1 CAPSULE: at 09:13

## 2019-01-06 RX ADMIN — ONDANSETRON 4 MG: 2 SOLUTION INTRAMUSCULAR; INTRAVENOUS at 09:14

## 2019-01-06 RX ADMIN — HYDROMORPHONE HYDROCHLORIDE 1.5 MG: 2 INJECTION INTRAMUSCULAR; INTRAVENOUS; SUBCUTANEOUS at 12:17

## 2019-01-06 RX ADMIN — HYDROMORPHONE HYDROCHLORIDE 1.5 MG: 2 INJECTION INTRAMUSCULAR; INTRAVENOUS; SUBCUTANEOUS at 21:54

## 2019-01-06 RX ADMIN — METRONIDAZOLE 500 MG: 500 INJECTION, SOLUTION INTRAVENOUS at 12:17

## 2019-01-06 RX ADMIN — ENOXAPARIN SODIUM 40 MG: 40 INJECTION SUBCUTANEOUS at 09:14

## 2019-01-06 RX ADMIN — METRONIDAZOLE 500 MG: 500 INJECTION, SOLUTION INTRAVENOUS at 21:54

## 2019-01-06 RX ADMIN — PREGABALIN 100 MG: 100 CAPSULE ORAL at 18:28

## 2019-01-06 RX ADMIN — ONDANSETRON 4 MG: 2 SOLUTION INTRAMUSCULAR; INTRAVENOUS at 06:02

## 2019-01-06 RX ADMIN — SUCRALFATE 1 G: 1 SUSPENSION ORAL at 21:54

## 2019-01-06 RX ADMIN — POTASSIUM CHLORIDE, SODIUM CHLORIDE, CALCIUM CHLORIDE, SODIUM LACTATE, AND DEXTROSE MONOHYDRATE: 1.79; 6; .2; 3.1; 5 INJECTION, SOLUTION INTRAVENOUS at 18:28

## 2019-01-06 RX ADMIN — DIAZEPAM 5 MG: 5 TABLET ORAL at 09:13

## 2019-01-06 RX ADMIN — PREGABALIN 100 MG: 100 CAPSULE ORAL at 09:13

## 2019-01-06 RX ADMIN — ONDANSETRON 4 MG: 2 SOLUTION INTRAMUSCULAR; INTRAVENOUS at 21:54

## 2019-01-06 NOTE — PROGRESS NOTES
Bedside and Verbal shift change report given to Zane (oncoming nurse) by Trinh Tinsley (offgoing nurse). Report included the following information SBAR, Kardex and MAR.

## 2019-01-06 NOTE — PROGRESS NOTES
Problem: Falls - Risk of 
Goal: *Absence of Falls Document Beatriz Bucio Fall Risk and appropriate interventions in the flowsheet. Outcome: Progressing Towards Goal 
Fall Risk Interventions: 
  
 
  
 
Medication Interventions: Teach patient to arise slowly

## 2019-01-06 NOTE — PROGRESS NOTES
Problem: Falls - Risk of 
Goal: *Absence of Falls Document Marilee Mullen Fall Risk and appropriate interventions in the flowsheet. Outcome: Progressing Towards Goal 
Fall Risk Interventions: 
  
 
  
 
Medication Interventions: Teach patient to arise slowly

## 2019-01-06 NOTE — PROGRESS NOTES
Primary Nurse Homa Maher RN and [address] [place] [address] Sera RN performed a dual skin assessment on this patient Impairment noted- see wound doc flow sheet Eduardo score is 21

## 2019-01-06 NOTE — PROGRESS NOTES
General Daily Progress Note Admission Date: 1/4/2019 Hospital Day 3 Post-Op Day Not Applicable Subjective:  
Less nausea. Abdominal discomfort about the same. Trying to determine the best way of administering and retaining the enemas. Objective:  
 
Patient Vitals for the past 24 hrs: 
 BP Temp Pulse Resp SpO2 Weight 01/06/19 0818 108/59 98.5 °F (36.9 °C) 82 16 96 %   
01/06/19 0547 109/67 98.5 °F (36.9 °C) 69 16 96 % 90.1 kg (198 lb 9.6 oz) 01/05/19 2247 110/64 98 °F (36.7 °C) 98 16 93 %   
01/05/19 1544 100/65 98.5 °F (36.9 °C) 92 16 96 %  No intake/output data recorded. 01/04 1901 - 01/06 0700 In: -  
Out: 0473 [[id number]] Physical Examination: 
No distress. Ileostomy drainage tube in place. Data Review Recent Results (from the past 24 hour(s)) CBC WITH AUTOMATED DIFF Collection Time: 01/06/19  3:00 AM  
Result Value Ref Range WBC 8.6 4.1 - 11.1 K/uL  
 RBC 4.23 4. 10 - 5.70 M/uL  
 HGB 12.0 (L) 12.1 - 17.0 g/dL HCT 38.2 36.6 - 50.3 % MCV 90.3 80.0 - 99.0 FL  
 MCH 28.4 26.0 - 34.0 PG  
 MCHC 31.4 30.0 - 36.5 g/dL  
 RDW 13.4 11.5 - 14.5 % PLATELET 522 (L) 190 - 400 K/uL MPV 11.5 8.9 - 12.9 FL  
 NRBC 0.0 0  WBC ABSOLUTE NRBC 0.00 0.00 - 0.01 K/uL NEUTROPHILS 65 32 - 75 % LYMPHOCYTES 26 12 - 49 % MONOCYTES 5 5 - 13 % EOSINOPHILS 4 0 - 7 % BASOPHILS 0 0 - 1 % IMMATURE GRANULOCYTES 0 0.0 - 0.5 % ABS. NEUTROPHILS 5.6 1.8 - 8.0 K/UL  
 ABS. LYMPHOCYTES 2.3 0.8 - 3.5 K/UL  
 ABS. MONOCYTES 0.4 0.0 - 1.0 K/UL  
 ABS. EOSINOPHILS 0.3 0.0 - 0.4 K/UL  
 ABS. BASOPHILS 0.0 0.0 - 0.1 K/UL  
 ABS. IMM. GRANS. 0.0 0.00 - 0.04 K/UL  
 DF AUTOMATED METABOLIC PANEL, BASIC Collection Time: 01/06/19  3:00 AM  
Result Value Ref Range Sodium 139 136 - 145 mmol/L Potassium 3.8 3.5 - 5.1 mmol/L Chloride 106 97 - 108 mmol/L  
 CO2 28 21 - 32 mmol/L Anion gap 5 5 - 15 mmol/L Glucose 93 65 - 100 mg/dL BUN 5 (L) 6 - 20 MG/DL Creatinine 0.76 0.70 - 1.30 MG/DL  
 BUN/Creatinine ratio 7 (L) 12 - 20 GFR est AA >60 >60 ml/min/1.73m2 GFR est non-AA >60 >60 ml/min/1.73m2 Calcium 9.1 8.5 - 10.1 MG/DL MAGNESIUM Collection Time: 01/06/19  3:00 AM  
Result Value Ref Range Magnesium 1.8 1.6 - 2.4 mg/dL PHOSPHORUS Collection Time: 01/06/19  3:00 AM  
Result Value Ref Range Phosphorus 3.2 2.6 - 4.7 MG/DL Abdominal Radiographs (1/4/2019): Ileus versus partial SBO. Assessment:  
 
Principal Problem: 
  Ileal pouchitis ([facility] [address] (5/1/2004) Active Problems: 
  GERD (gastroesophageal reflux disease) (11/20/2016) Chronic narcotic dependence ([address] (1/20/2018) Stable. Electrolytes normal. 
Ileostomy output is not documented. We discussed the possible utility of oral Entocort. Plan:  
Continue bowel rest. 
 
Continue Rowasa and sucralfate enemas. Continue distal decompression during the times between enemas (allowing for medication contact time). Begin oral Entocort (9 mg once per day). Continue parenteral metronidazole. Ambulate. Lovenox for DVT prophylaxis. Will hopefully be able to try clear liquid diet tomorrow.

## 2019-01-07 PROCEDURE — 74011250637 HC RX REV CODE- 250/637: Performed by: COLON & RECTAL SURGERY

## 2019-01-07 PROCEDURE — 74011250636 HC RX REV CODE- 250/636: Performed by: COLON & RECTAL SURGERY

## 2019-01-07 PROCEDURE — 77030018836 HC SOL IRR NACL ICUM -A

## 2019-01-07 PROCEDURE — 65270000029 HC RM PRIVATE

## 2019-01-07 RX ADMIN — DIAZEPAM 5 MG: 5 TABLET ORAL at 17:00

## 2019-01-07 RX ADMIN — SUCRALFATE 1 G: 1 SUSPENSION ORAL at 13:24

## 2019-01-07 RX ADMIN — PREGABALIN 100 MG: 100 CAPSULE ORAL at 22:21

## 2019-01-07 RX ADMIN — HYDROMORPHONE HYDROCHLORIDE 1.5 MG: 2 INJECTION INTRAMUSCULAR; INTRAVENOUS; SUBCUTANEOUS at 13:15

## 2019-01-07 RX ADMIN — HYDROMORPHONE HYDROCHLORIDE 1.5 MG: 2 INJECTION INTRAMUSCULAR; INTRAVENOUS; SUBCUTANEOUS at 04:24

## 2019-01-07 RX ADMIN — METRONIDAZOLE 500 MG: 500 INJECTION, SOLUTION INTRAVENOUS at 22:22

## 2019-01-07 RX ADMIN — ZOLPIDEM TARTRATE 10 MG: 10 TABLET ORAL at 22:21

## 2019-01-07 RX ADMIN — Medication 1 CAPSULE: at 09:08

## 2019-01-07 RX ADMIN — SUCRALFATE 1 G: 1 SUSPENSION ORAL at 22:21

## 2019-01-07 RX ADMIN — HYDROMORPHONE HYDROCHLORIDE 1.5 MG: 2 INJECTION INTRAMUSCULAR; INTRAVENOUS; SUBCUTANEOUS at 16:47

## 2019-01-07 RX ADMIN — ONDANSETRON 4 MG: 2 SOLUTION INTRAMUSCULAR; INTRAVENOUS at 16:47

## 2019-01-07 RX ADMIN — Medication 10 ML: at 22:22

## 2019-01-07 RX ADMIN — Medication 10 ML: at 13:16

## 2019-01-07 RX ADMIN — ONDANSETRON 4 MG: 2 SOLUTION INTRAMUSCULAR; INTRAVENOUS at 23:22

## 2019-01-07 RX ADMIN — HYDROMORPHONE HYDROCHLORIDE 1.5 MG: 2 INJECTION INTRAMUSCULAR; INTRAVENOUS; SUBCUTANEOUS at 01:20

## 2019-01-07 RX ADMIN — ONDANSETRON 4 MG: 2 SOLUTION INTRAMUSCULAR; INTRAVENOUS at 04:24

## 2019-01-07 RX ADMIN — BUDESONIDE 9 MG: 3 CAPSULE, GELATIN COATED ORAL at 09:08

## 2019-01-07 RX ADMIN — PREGABALIN 100 MG: 100 CAPSULE ORAL at 09:08

## 2019-01-07 RX ADMIN — DIAZEPAM 5 MG: 5 TABLET ORAL at 09:08

## 2019-01-07 RX ADMIN — HYDROMORPHONE HYDROCHLORIDE 1.5 MG: 2 INJECTION INTRAMUSCULAR; INTRAVENOUS; SUBCUTANEOUS at 20:07

## 2019-01-07 RX ADMIN — METRONIDAZOLE 500 MG: 500 INJECTION, SOLUTION INTRAVENOUS at 04:24

## 2019-01-07 RX ADMIN — HYDROMORPHONE HYDROCHLORIDE 1.5 MG: 2 INJECTION INTRAMUSCULAR; INTRAVENOUS; SUBCUTANEOUS at 07:31

## 2019-01-07 RX ADMIN — HYDROMORPHONE HYDROCHLORIDE 1.5 MG: 2 INJECTION INTRAMUSCULAR; INTRAVENOUS; SUBCUTANEOUS at 23:04

## 2019-01-07 RX ADMIN — METRONIDAZOLE 500 MG: 500 INJECTION, SOLUTION INTRAVENOUS at 13:16

## 2019-01-07 RX ADMIN — HYDROMORPHONE HYDROCHLORIDE 1.5 MG: 2 INJECTION INTRAMUSCULAR; INTRAVENOUS; SUBCUTANEOUS at 10:35

## 2019-01-07 RX ADMIN — POTASSIUM CHLORIDE, SODIUM CHLORIDE, CALCIUM CHLORIDE, SODIUM LACTATE, AND DEXTROSE MONOHYDRATE: 1.79; 6; .2; 3.1; 5 INJECTION, SOLUTION INTRAVENOUS at 16:54

## 2019-01-07 RX ADMIN — PANTOPRAZOLE SODIUM 40 MG: 40 TABLET, DELAYED RELEASE ORAL at 22:21

## 2019-01-07 RX ADMIN — ONDANSETRON 4 MG: 2 SOLUTION INTRAMUSCULAR; INTRAVENOUS at 10:35

## 2019-01-07 RX ADMIN — Medication 10 ML: at 07:31

## 2019-01-07 RX ADMIN — MESALAMINE 4 G: 4 ENEMA RECTAL at 10:05

## 2019-01-07 RX ADMIN — ENOXAPARIN SODIUM 40 MG: 40 INJECTION SUBCUTANEOUS at 09:09

## 2019-01-07 RX ADMIN — PREGABALIN 100 MG: 100 CAPSULE ORAL at 17:00

## 2019-01-07 RX ADMIN — PREGABALIN 100 MG: 100 CAPSULE ORAL at 13:15

## 2019-01-07 RX ADMIN — ONDANSETRON 4 MG: 2 SOLUTION INTRAMUSCULAR; INTRAVENOUS at 20:07

## 2019-01-07 NOTE — PROGRESS NOTES
Bedside and Verbal shift change report given to Zane (oncoming nurse) by Eric Adams (offgoing nurse). Report included the following information SBAR, Kardex and MAR.

## 2019-01-07 NOTE — PROGRESS NOTES
General Daily Progress Note Admission Date: 1/4/2019 Hospital Day 4 Post-Op Day Not Applicable Subjective:  
Nausea about the same, but wants to try liquids. Abdominal pain now mostly at the catheterization site. Seems to have developed a goof routine for administering the enemas, but suggested that doing the Rowasa at night might allow him to retain it better. Objective:  
 
Patient Vitals for the past 24 hrs: 
 BP Temp Pulse Resp SpO2 Height Weight 01/07/19 0855 112/70 98.4 °F (36.9 °C) 71 16 99 %    
01/07/19 0425      6' (1.829 m) 88.8 kg (195 lb 12.8 oz) 01/07/19 0321 114/73 97.8 °F (36.6 °C) 60 16 98 %    
01/06/19 2058 109/71 98.6 °F (37 °C) 71 16 97 %    
01/06/19 1403 113/74 98.7 °F (37.1 °C) 72 16 97 %    
 
01/07 0701 - 01/07 1900 In: -  
Out: [facility]  
01/05 1901 - 01/07 0700 In: -  
Out: [facility] Physical Examination: 
No distress. Ileostomy drainage tube in place. Data Review No results found for this or any previous visit (from the past 24 hour(s)). Abdominal Radiographs (1/4/2019):  Ileus versus partial SBO. Assessment:  
 
Principal Problem: 
  Ileal pouchitis ([address] (5/1/2004) Active Problems: 
  GERD (gastroesophageal reflux disease) (11/20/2016) Chronic narcotic dependence ([address] (1/20/2018) Stable. Ileostomy output at the higher end of the desired range. Oral Entocort begun yesterday. Plan:  
Begin clear liquid diet today. 
  
Continue Rowasa and sucralfate enemas. After this morning's dose, change Rowasa schedule to daily at 11:00 PM. Continue distal decompression during the times between enemas (allowing for medication contact time). 
  
Continue oral Entocort (9 mg once per day). 
  
Continue parenteral metronidazole. 
  
Ambulate.  
Lovenox for DVT prophylaxis.

## 2019-01-07 NOTE — PROGRESS NOTES
NUTRITION COMPLETE ASSESSMENT 
 
RECOMMENDATIONS:  
1. Further diet advancement per CRS to: regular, low fiber, no-concentrated sweets 
 - document % meals consumed in I/O flowsheet 2. Add daily MVI 3. Weekly weights Interventions/Plan:  
Food/Nutrient Delivery:    Commercial supplement(Ensure Clear BID, Boost Glucose Control TID; G2 + salt) Nutrition Education:(hydration; avoidance of concentrated sweets) Assessment:  
Reason for Assessment:[x]BPA/MST Referral  
 
Diet: Clear liquids Supplements: none Nutritionally Significant Medications: [x] Reviewed & Includes:  Entocort, vit B12, valium, ergocalciferol, eliza-Q, mesalamine, flagyl, protonix, lyrica, carafate, D5 LR w/ KCl @ 100ml/hr; PRN: compazine, zofran, bentyl, phenergan Meal Intake: No data found. Pre-Hospitalization: 
Usual Appetite: Fair Diet at Home: low fiber Vitamins/Supplements: Yes(Boost High protein - vanilla) Current Hospitalization:  
Appetite: Fair PO Ability: Independent Average po intake: Average supplements intake:    
 Subjective: \"Any information you might have I'll take. I've had issues with blood sugar in the past, like not feeling good if I didn't eat for a while. \" 
 
Objective: 
Pt admitted for ileal pouchitis. PMHx: depression, GERD, Crohn's dx, UC, anal stenosis/fistula. Extensive GI surgical hx - total proctocolectomy 2004, permanent ileostomy with pouch, anal fistula, multiple LUCIEN. Maintains about 400cm of small intestine per Brief Op note of LUCIEN on 3/14/2017. High ostomy output prior to admit with nausea. Admitted for bowel rest, IV abx, and medicated enemas. Pt well known from previous admits. Recent admit mid-December pt reporting complaints of sweating, lethargy, nausea, high output after large meals at home - had been some discussion of possible dumping syndrome vs ?reactive hypoglycemia post-prandially last admit.  Per discussion with [pt question if more reactive hypoglycemia since no change in upper anatomy and pt reporting vague issues with BG in the past. Regardless, nutrition therapy for either condition is very similar. Drinks Boost High Protein at home (vanilla). Spoke with CRS today. Planning to advance diet to clear liquids today - ok for pt to start Ensure Clear and try Ensure Enlive/Boost even when just on clear liquids. Low K+ on admit. WNL with repletion via IVF. D5 providing 2400ml, 120g CHO, 408kcal.  
 
Pt visited today. Discussed possible dumping vs reactive hypoglycemia. Education provided with handouts given. Encouraged slow consumption of Ensure Clear since high CHO content. Also discussed use of G2 + salt for better fluid absorption in gut. Other oral rehydration solution options for home provided since often dehydration when admitted. Pt with good understanding of diet info reviewed. No questions at this time. Severe wt loss of 2.5% x 1-2 weeks. Still with net weight gain over past year. Wt Readings from Last 10 Encounters:  
01/07/19 88.8 kg (195 lb 12.8 oz) 12/28/18 90.7 kg (200 lb) 12/21/18 89 kg (196 lb 4.8 oz) 12/07/18 86.2 kg (190 lb)  
09/29/18 86.2 kg (190 lb) 04/12/18 85.7 kg (189 lb)  
03/17/18 81.2 kg (179 lb)  
02/11/18 81.2 kg (179 lb)  
01/19/18 82.1 kg (181 lb) 07/07/17 95.3 kg (210 lb) Will continue to follow for further diet advancement per CRS, PO intake/tolerance, stool output, and further education needs PRN. Estimated Nutrition Needs:  
Kcals/day: 2180 Kcals/day(2180-2362kcal) Protein: 98 g(98-106g (1.1-1.2g/kg)) Fluid: 2200 ml(1ml/kcal) Based On: Jaime [place] Mikhail(x 1.2-1.3) Weight Used: Actual wt(88.8kg) Pt expected to meet estimated nutrient needs:  []   Yes     []  No [x] Unable to predict at this time Nutrition Diagnosis:  
1. Inadequate protein-energy intake related to altered GI fx as evidenced by clear liquid status; pouchitis 2. Altered GI function(Altered nutrition related labs) related to pouchitis as evidenced by diarrhea, hypokalemia Goals:   
 Diet advancement to at least full liquids with consumption of at least 75% meals/3 supplements/day in 2-3 days; wt maintenance Monitoring & Evaluation: - Total energy intake, Oral fluids amount, Liquid meal replacement, Protein intake - Weight/weight change, Electrolyte and renal profile, GI 
 
Previous Nutrition Goals Met:   N/A Previous Recommendations:    N/A Education & Discharge Needs: 
 [] None Identified 
 [x] Identified and addressed [x] Participated in care plan, discharge planning, and/or interdisciplinary rounds Cultural, Judaism and ethnic food preferences identified: None Skin Integrity: [x]Intact  []Other Edema: [x]None []Other Last BM: 1/7 - loose Food Allergies: [x]None []Other Diet Restrictions: Cultural/Gnosticist Preference(s): None Anthropometrics:   
Weight Loss Metrics 1/7/2019 12/28/2018 12/21/2018 12/7/2018 9/29/2018 4/12/2018 3/18/2018 Today's Wt 195 lb 12.8 oz 200 lb 196 lb 4.8 oz 190 lb 190 lb 189 lb -  
BMI 26.56 kg/m2 27.12 kg/m2 26.62 kg/m2 25.77 kg/m2 25.77 kg/m2 25.63 kg/m2 24.28 kg/m2 Weight Source: Standing scale (comment) Height: 6' (182.9 cm), Body mass index is 26.56 kg/m². IBW : 80.7 kg (178 lb), % IBW (Calculated): 110 % Usual Body Weight: 90.7 kg (200 lb),   
 
Labs:   
Lab Results Component Value Date/Time Sodium 139 01/06/2019 03:00 AM  
 Potassium 3.8 01/06/2019 03:00 AM  
 Chloride 106 01/06/2019 03:00 AM  
 CO2 28 01/06/2019 03:00 AM  
 Glucose 93 01/06/2019 03:00 AM  
 BUN 5 (L) 01/06/2019 03:00 AM  
 Creatinine 0.76 01/06/2019 03:00 AM  
 Calcium 9.1 01/06/2019 03:00 AM  
 Magnesium 1.8 01/06/2019 03:00 AM  
 Phosphorus 3.2 01/06/2019 03:00 AM  
 Albumin 3.6 01/04/2019 09:02 PM  
 
Lab Results Component Value Date/Time  Hemoglobin A1c 6.1 01/20/2018 09:11 AM  
 
 Valentine Older, [address] [place] [address] Pager #[phone] or [phone]

## 2019-01-07 NOTE — PROGRESS NOTES
Reason for Admission:   pouchitis RRAT Score:  13 Do you (patient/family) have any concerns for transition/discharge? Plan for utilizing home health:  no    
 
Likelihood of readmission?   low Transition of Care Plan:    Pt is independent with ADLs and IADLs. Pt lives with his wife. Plan is to return home at time of discharge. CM will continue to be available incase any needs arise. Care Management Interventions PCP Verified by CM: Yes Mode of Transport at Discharge: Other (see comment)(private vehicle) Discharge Durable Medical Equipment: No 
Physical Therapy Consult: No 
Occupational Therapy Consult: No 
Speech Therapy Consult: No 
Current Support Network: Lives with Spouse(independent with ADLs) Confirm Follow Up Transport: Self Plan discussed with Pt/Family/Caregiver: Yes Freedom of Choice Offered: Yes [employer] Information Provided?: No 
Discharge Location Discharge Placement: Home

## 2019-01-08 LAB
ANION GAP SERPL CALC-SCNC: 7 MMOL/L (ref 5–15)
BUN SERPL-MCNC: 4 MG/DL (ref 6–20)
BUN/CREAT SERPL: 5 (ref 12–20)
CALCIUM SERPL-MCNC: 8.4 MG/DL (ref 8.5–10.1)
CHLORIDE SERPL-SCNC: 109 MMOL/L (ref 97–108)
CO2 SERPL-SCNC: 30 MMOL/L (ref 21–32)
CREAT SERPL-MCNC: 0.88 MG/DL (ref 0.7–1.3)
GLUCOSE SERPL-MCNC: 97 MG/DL (ref 65–100)
POTASSIUM SERPL-SCNC: 3.9 MMOL/L (ref 3.5–5.1)
SODIUM SERPL-SCNC: 146 MMOL/L (ref 136–145)

## 2019-01-08 PROCEDURE — 65270000029 HC RM PRIVATE

## 2019-01-08 PROCEDURE — 80048 BASIC METABOLIC PNL TOTAL CA: CPT

## 2019-01-08 PROCEDURE — 74011250636 HC RX REV CODE- 250/636: Performed by: COLON & RECTAL SURGERY

## 2019-01-08 PROCEDURE — 74011250637 HC RX REV CODE- 250/637: Performed by: COLON & RECTAL SURGERY

## 2019-01-08 PROCEDURE — 36415 COLL VENOUS BLD VENIPUNCTURE: CPT

## 2019-01-08 PROCEDURE — 94760 N-INVAS EAR/PLS OXIMETRY 1: CPT

## 2019-01-08 RX ORDER — MESALAMINE 4 G/60ML
4 ENEMA RECTAL
Status: DISCONTINUED | OUTPATIENT
Start: 2019-01-08 | End: 2019-01-15 | Stop reason: HOSPADM

## 2019-01-08 RX ADMIN — METRONIDAZOLE 500 MG: 500 INJECTION, SOLUTION INTRAVENOUS at 21:43

## 2019-01-08 RX ADMIN — ENOXAPARIN SODIUM 40 MG: 40 INJECTION SUBCUTANEOUS at 09:54

## 2019-01-08 RX ADMIN — Medication 1 CAPSULE: at 09:55

## 2019-01-08 RX ADMIN — MESALAMINE 4 G: 4 ENEMA RECTAL at 09:00

## 2019-01-08 RX ADMIN — HYDROMORPHONE HYDROCHLORIDE 1.5 MG: 2 INJECTION INTRAMUSCULAR; INTRAVENOUS; SUBCUTANEOUS at 15:52

## 2019-01-08 RX ADMIN — HYDROMORPHONE HYDROCHLORIDE 1.5 MG: 2 INJECTION INTRAMUSCULAR; INTRAVENOUS; SUBCUTANEOUS at 21:44

## 2019-01-08 RX ADMIN — PREGABALIN 100 MG: 100 CAPSULE ORAL at 21:47

## 2019-01-08 RX ADMIN — ZOLPIDEM TARTRATE 10 MG: 10 TABLET ORAL at 21:47

## 2019-01-08 RX ADMIN — BUDESONIDE 9 MG: 3 CAPSULE, GELATIN COATED ORAL at 09:55

## 2019-01-08 RX ADMIN — PROCHLORPERAZINE MALEATE 10 MG: 10 TABLET, FILM COATED ORAL at 12:50

## 2019-01-08 RX ADMIN — HYDROMORPHONE HYDROCHLORIDE 1.5 MG: 2 INJECTION INTRAMUSCULAR; INTRAVENOUS; SUBCUTANEOUS at 09:55

## 2019-01-08 RX ADMIN — HYDROMORPHONE HYDROCHLORIDE 1.5 MG: 2 INJECTION INTRAMUSCULAR; INTRAVENOUS; SUBCUTANEOUS at 03:37

## 2019-01-08 RX ADMIN — ONDANSETRON 4 MG: 2 SOLUTION INTRAMUSCULAR; INTRAVENOUS at 15:52

## 2019-01-08 RX ADMIN — ONDANSETRON 4 MG: 2 SOLUTION INTRAMUSCULAR; INTRAVENOUS at 09:55

## 2019-01-08 RX ADMIN — HYDROMORPHONE HYDROCHLORIDE 1.5 MG: 2 INJECTION INTRAMUSCULAR; INTRAVENOUS; SUBCUTANEOUS at 18:46

## 2019-01-08 RX ADMIN — PREGABALIN 100 MG: 100 CAPSULE ORAL at 09:55

## 2019-01-08 RX ADMIN — HYDROMORPHONE HYDROCHLORIDE 1.5 MG: 2 INJECTION INTRAMUSCULAR; INTRAVENOUS; SUBCUTANEOUS at 12:50

## 2019-01-08 RX ADMIN — HYDROMORPHONE HYDROCHLORIDE 1.5 MG: 2 INJECTION INTRAMUSCULAR; INTRAVENOUS; SUBCUTANEOUS at 06:15

## 2019-01-08 RX ADMIN — METRONIDAZOLE 500 MG: 500 INJECTION, SOLUTION INTRAVENOUS at 05:40

## 2019-01-08 RX ADMIN — SUCRALFATE 1 G: 1 SUSPENSION ORAL at 12:50

## 2019-01-08 RX ADMIN — SUCRALFATE 1 G: 1 SUSPENSION ORAL at 21:46

## 2019-01-08 RX ADMIN — ONDANSETRON 4 MG: 2 SOLUTION INTRAMUSCULAR; INTRAVENOUS at 06:45

## 2019-01-08 RX ADMIN — ONDANSETRON 4 MG: 2 SOLUTION INTRAMUSCULAR; INTRAVENOUS at 03:38

## 2019-01-08 RX ADMIN — Medication 10 ML: at 15:53

## 2019-01-08 RX ADMIN — PREGABALIN 100 MG: 100 CAPSULE ORAL at 18:30

## 2019-01-08 RX ADMIN — DIAZEPAM 5 MG: 5 TABLET ORAL at 09:55

## 2019-01-08 RX ADMIN — MESALAMINE 4 G: 4 ENEMA RECTAL at 21:46

## 2019-01-08 RX ADMIN — METRONIDAZOLE 500 MG: 500 INJECTION, SOLUTION INTRAVENOUS at 12:50

## 2019-01-08 RX ADMIN — Medication 10 ML: at 21:45

## 2019-01-08 RX ADMIN — POTASSIUM CHLORIDE, SODIUM CHLORIDE, CALCIUM CHLORIDE, SODIUM LACTATE, AND DEXTROSE MONOHYDRATE: 1.79; 6; .2; 3.1; 5 INJECTION, SOLUTION INTRAVENOUS at 17:56

## 2019-01-08 RX ADMIN — PANTOPRAZOLE SODIUM 40 MG: 40 TABLET, DELAYED RELEASE ORAL at 21:47

## 2019-01-08 RX ADMIN — DIAZEPAM 5 MG: 5 TABLET ORAL at 18:31

## 2019-01-08 RX ADMIN — Medication 10 ML: at 05:40

## 2019-01-08 RX ADMIN — PREGABALIN 100 MG: 100 CAPSULE ORAL at 12:50

## 2019-01-08 RX ADMIN — POTASSIUM CHLORIDE, SODIUM CHLORIDE, CALCIUM CHLORIDE, SODIUM LACTATE, AND DEXTROSE MONOHYDRATE: 1.79; 6; .2; 3.1; 5 INJECTION, SOLUTION INTRAVENOUS at 03:44

## 2019-01-08 RX ADMIN — ONDANSETRON 4 MG: 2 SOLUTION INTRAMUSCULAR; INTRAVENOUS at 21:45

## 2019-01-08 NOTE — PROGRESS NOTES
General Daily Progress Note Admission Date: 1/4/2019 Hospital Day 5 Post-Op Day Not Applicable Subjective:  
Less nausea. Some increase in cramping with clear liquid diet, but not severe. Objective:  
 
Patient Vitals for the past 24 hrs: 
 BP Temp Pulse Resp SpO2 Weight 01/08/19 0908 106/66 98.1 °F (36.7 °C) 74 16 99 %   
01/08/19 0228 96/65 97.7 °F (36.5 °C) (!) 58 16 97 % 88.9 kg (195 lb 15.8 oz) 01/07/19 2023 102/68 98.5 °F (36.9 °C) 71 16 94 %   
01/07/19 1509 107/70 97.8 °F (36.6 °C) 81 16 98 %  No intake/output data recorded. 01/06 1901 - 01/08 0700 In: -  
Out: 5470 [[id number]] Physical Examination: 
No distress. Ileostomy drainage tube in place. Data Review No results found for this or any previous visit (from the past 24 hour(s)). Abdominal Radiographs (1/4/2019):  Ileus versus partial SBO. Assessment:  
 
Principal Problem: 
  Ileal pouchitis ([facility] [address] (5/1/2004) Active Problems: 
  GERD (gastroesophageal reflux disease) (11/20/2016) Chronic narcotic dependence ([address] (1/20/2018) Stable. Ileostomy output acceptable. Still benefiting from parenteral fluids. Plan:  
Begin full liquid diet today. 
  
Continue Rowasa and sucralfate enemas. 
  
Continue distal decompression during the times between enemas (allowing for medication contact time). 
  
Continue oral Entocort (9 mg once per day). 
  
Continue parenteral metronidazole. 
  
Ambulate. Lovenox for DVT prophylaxis.

## 2019-01-08 NOTE — PROGRESS NOTES
Bedside shift change report given to Yves Latham (oncoming nurse) by Susan Roberson RN (offgoing nurse).  Report included the following information SBAR, Kardex and MAR. '

## 2019-01-09 LAB
ERYTHROCYTE [DISTWIDTH] IN BLOOD BY AUTOMATED COUNT: 13.2 % (ref 11.5–14.5)
HCT VFR BLD AUTO: 40.6 % (ref 36.6–50.3)
HGB BLD-MCNC: 13 G/DL (ref 12.1–17)
MAGNESIUM SERPL-MCNC: 1.6 MG/DL (ref 1.6–2.4)
MCH RBC QN AUTO: 29.1 PG (ref 26–34)
MCHC RBC AUTO-ENTMCNC: 32 G/DL (ref 30–36.5)
MCV RBC AUTO: 90.8 FL (ref 80–99)
NRBC # BLD: 0 K/UL (ref 0–0.01)
NRBC BLD-RTO: 0 PER 100 WBC
PHOSPHATE SERPL-MCNC: 4.7 MG/DL (ref 2.6–4.7)
PLATELET # BLD AUTO: 155 K/UL (ref 150–400)
PMV BLD AUTO: 11.6 FL (ref 8.9–12.9)
RBC # BLD AUTO: 4.47 M/UL (ref 4.1–5.7)
WBC # BLD AUTO: 6.2 K/UL (ref 4.1–11.1)

## 2019-01-09 PROCEDURE — 83735 ASSAY OF MAGNESIUM: CPT

## 2019-01-09 PROCEDURE — 74011250637 HC RX REV CODE- 250/637: Performed by: COLON & RECTAL SURGERY

## 2019-01-09 PROCEDURE — 74011250636 HC RX REV CODE- 250/636: Performed by: COLON & RECTAL SURGERY

## 2019-01-09 PROCEDURE — 65270000029 HC RM PRIVATE

## 2019-01-09 PROCEDURE — 36415 COLL VENOUS BLD VENIPUNCTURE: CPT

## 2019-01-09 PROCEDURE — 85027 COMPLETE CBC AUTOMATED: CPT

## 2019-01-09 PROCEDURE — 84100 ASSAY OF PHOSPHORUS: CPT

## 2019-01-09 RX ORDER — HYDROMORPHONE HYDROCHLORIDE 2 MG/ML
2 INJECTION, SOLUTION INTRAMUSCULAR; INTRAVENOUS; SUBCUTANEOUS
Status: DISCONTINUED | OUTPATIENT
Start: 2019-01-09 | End: 2019-01-15 | Stop reason: HOSPADM

## 2019-01-09 RX ORDER — HYDROMORPHONE HYDROCHLORIDE 2 MG/ML
1.5 INJECTION, SOLUTION INTRAMUSCULAR; INTRAVENOUS; SUBCUTANEOUS
Status: DISCONTINUED | OUTPATIENT
Start: 2019-01-09 | End: 2019-01-15 | Stop reason: HOSPADM

## 2019-01-09 RX ADMIN — Medication 10 ML: at 07:00

## 2019-01-09 RX ADMIN — Medication 1 CAPSULE: at 10:06

## 2019-01-09 RX ADMIN — BUDESONIDE 9 MG: 3 CAPSULE, GELATIN COATED ORAL at 10:06

## 2019-01-09 RX ADMIN — HYDROMORPHONE HYDROCHLORIDE 1.5 MG: 2 INJECTION INTRAMUSCULAR; INTRAVENOUS; SUBCUTANEOUS at 16:55

## 2019-01-09 RX ADMIN — METRONIDAZOLE 500 MG: 500 INJECTION, SOLUTION INTRAVENOUS at 21:34

## 2019-01-09 RX ADMIN — SUCRALFATE 1 G: 1 SUSPENSION ORAL at 12:59

## 2019-01-09 RX ADMIN — PROCHLORPERAZINE MALEATE 10 MG: 10 TABLET, FILM COATED ORAL at 12:59

## 2019-01-09 RX ADMIN — PREGABALIN 100 MG: 100 CAPSULE ORAL at 12:59

## 2019-01-09 RX ADMIN — ZOLPIDEM TARTRATE 10 MG: 10 TABLET ORAL at 21:34

## 2019-01-09 RX ADMIN — POTASSIUM CHLORIDE, SODIUM CHLORIDE, CALCIUM CHLORIDE, SODIUM LACTATE, AND DEXTROSE MONOHYDRATE: 1.79; 6; .2; 3.1; 5 INJECTION, SOLUTION INTRAVENOUS at 17:01

## 2019-01-09 RX ADMIN — PREGABALIN 100 MG: 100 CAPSULE ORAL at 17:01

## 2019-01-09 RX ADMIN — HYDROMORPHONE HYDROCHLORIDE 1.5 MG: 2 INJECTION INTRAMUSCULAR; INTRAVENOUS; SUBCUTANEOUS at 03:52

## 2019-01-09 RX ADMIN — POTASSIUM CHLORIDE, SODIUM CHLORIDE, CALCIUM CHLORIDE, SODIUM LACTATE, AND DEXTROSE MONOHYDRATE: 1.79; 6; .2; 3.1; 5 INJECTION, SOLUTION INTRAVENOUS at 03:52

## 2019-01-09 RX ADMIN — HYDROMORPHONE HYDROCHLORIDE 1.5 MG: 2 INJECTION INTRAMUSCULAR; INTRAVENOUS; SUBCUTANEOUS at 00:49

## 2019-01-09 RX ADMIN — PANTOPRAZOLE SODIUM 40 MG: 40 TABLET, DELAYED RELEASE ORAL at 21:34

## 2019-01-09 RX ADMIN — ONDANSETRON 4 MG: 2 SOLUTION INTRAMUSCULAR; INTRAVENOUS at 16:55

## 2019-01-09 RX ADMIN — HYDROMORPHONE HYDROCHLORIDE 1.5 MG: 2 INJECTION INTRAMUSCULAR; INTRAVENOUS; SUBCUTANEOUS at 10:06

## 2019-01-09 RX ADMIN — HYDROMORPHONE HYDROCHLORIDE 2 MG: 2 INJECTION INTRAMUSCULAR; INTRAVENOUS; SUBCUTANEOUS at 21:38

## 2019-01-09 RX ADMIN — ONDANSETRON 4 MG: 2 SOLUTION INTRAMUSCULAR; INTRAVENOUS at 03:51

## 2019-01-09 RX ADMIN — DIAZEPAM 5 MG: 5 TABLET ORAL at 10:06

## 2019-01-09 RX ADMIN — HYDROMORPHONE HYDROCHLORIDE 1.5 MG: 2 INJECTION INTRAMUSCULAR; INTRAVENOUS; SUBCUTANEOUS at 06:59

## 2019-01-09 RX ADMIN — DIAZEPAM 5 MG: 5 TABLET ORAL at 17:01

## 2019-01-09 RX ADMIN — PREGABALIN 100 MG: 100 CAPSULE ORAL at 10:06

## 2019-01-09 RX ADMIN — MESALAMINE 4 G: 4 ENEMA RECTAL at 21:34

## 2019-01-09 RX ADMIN — Medication 10 ML: at 21:35

## 2019-01-09 RX ADMIN — Medication 10 ML: at 13:03

## 2019-01-09 RX ADMIN — METRONIDAZOLE 500 MG: 500 INJECTION, SOLUTION INTRAVENOUS at 12:59

## 2019-01-09 RX ADMIN — METRONIDAZOLE 500 MG: 500 INJECTION, SOLUTION INTRAVENOUS at 06:59

## 2019-01-09 RX ADMIN — PROCHLORPERAZINE MALEATE 10 MG: 10 TABLET, FILM COATED ORAL at 19:45

## 2019-01-09 RX ADMIN — SUCRALFATE 1 G: 1 SUSPENSION ORAL at 21:35

## 2019-01-09 RX ADMIN — PREGABALIN 100 MG: 100 CAPSULE ORAL at 21:34

## 2019-01-09 RX ADMIN — HYDROMORPHONE HYDROCHLORIDE 1.5 MG: 2 INJECTION INTRAMUSCULAR; INTRAVENOUS; SUBCUTANEOUS at 19:37

## 2019-01-09 RX ADMIN — HYDROMORPHONE HYDROCHLORIDE 1.5 MG: 2 INJECTION INTRAMUSCULAR; INTRAVENOUS; SUBCUTANEOUS at 12:59

## 2019-01-09 RX ADMIN — ONDANSETRON 4 MG: 2 SOLUTION INTRAMUSCULAR; INTRAVENOUS at 10:06

## 2019-01-09 NOTE — PROGRESS NOTES
NUTRITION brief Recommendations: 1. Continue diet advancement per CRS to: low fiber 2. Document % meals consumed in flowsheet 
 - should be consuming at least supplements 3. Add daily MVI with hx of Crohn's (already on IM vitamin B12) 4. Weekly weights Diet: full liquids Supplements: Boost Glucose Control TID; Ensure Clear BID Meal intake:No data found. Chart reviewed and discussed at rounds with RN for PO check. Diet advanced to full liquids and tolerating with no issues noted. RN reports good PO intake with dinner and breakfast this morning. However no documentation available - please document % meals and supplements consumed in I/O flowsheet. Will continue supplements as ordered. If pt consuming at least oral nutrition supplements will be meeting over 50% needs + PO with meals. Plan to continue to follow for PO intake, diet advancement and education reinforcement PRN. See previous full assessment for additional details. Estimated Nutrition Needs:  
Kcals/day: 2180 Kcals/day(2180-2362kcal) Protein: 98 g(98-106g (1.1-1.2g/kg)) Fluid: 2200 ml(1ml/kcal) Based On: Balsam St Elizondo(x 1.2-1.3) Weight Used: Actual wt(88.8kg) Jerline Lesches, [address] Pager #[phone] or [phone]

## 2019-01-09 NOTE — PROGRESS NOTES
Problem: Falls - Risk of 
Goal: *Absence of Falls Document Valorie Nielsen Fall Risk and appropriate interventions in the flowsheet. Outcome: Progressing Towards Goal 
Fall Risk Interventions: 
  
 
  
 
Medication Interventions: Patient to call before getting OOB History of Falls Interventions: Door open when patient unattended

## 2019-01-10 PROCEDURE — 74011250637 HC RX REV CODE- 250/637: Performed by: COLON & RECTAL SURGERY

## 2019-01-10 PROCEDURE — 94760 N-INVAS EAR/PLS OXIMETRY 1: CPT

## 2019-01-10 PROCEDURE — 65270000029 HC RM PRIVATE

## 2019-01-10 PROCEDURE — 74011250636 HC RX REV CODE- 250/636: Performed by: COLON & RECTAL SURGERY

## 2019-01-10 RX ADMIN — PREGABALIN 100 MG: 100 CAPSULE ORAL at 21:53

## 2019-01-10 RX ADMIN — METRONIDAZOLE 500 MG: 500 INJECTION, SOLUTION INTRAVENOUS at 13:04

## 2019-01-10 RX ADMIN — PREGABALIN 100 MG: 100 CAPSULE ORAL at 18:30

## 2019-01-10 RX ADMIN — Medication 10 ML: at 13:06

## 2019-01-10 RX ADMIN — DIAZEPAM 5 MG: 5 TABLET ORAL at 08:54

## 2019-01-10 RX ADMIN — DIAZEPAM 5 MG: 5 TABLET ORAL at 18:30

## 2019-01-10 RX ADMIN — HYDROMORPHONE HYDROCHLORIDE 1.5 MG: 2 INJECTION INTRAMUSCULAR; INTRAVENOUS; SUBCUTANEOUS at 18:30

## 2019-01-10 RX ADMIN — BUDESONIDE 9 MG: 3 CAPSULE, GELATIN COATED ORAL at 08:54

## 2019-01-10 RX ADMIN — ONDANSETRON 4 MG: 2 SOLUTION INTRAMUSCULAR; INTRAVENOUS at 21:54

## 2019-01-10 RX ADMIN — PANTOPRAZOLE SODIUM 40 MG: 40 TABLET, DELAYED RELEASE ORAL at 21:53

## 2019-01-10 RX ADMIN — POTASSIUM CHLORIDE, SODIUM CHLORIDE, CALCIUM CHLORIDE, SODIUM LACTATE, AND DEXTROSE MONOHYDRATE: 1.79; 6; .2; 3.1; 5 INJECTION, SOLUTION INTRAVENOUS at 23:31

## 2019-01-10 RX ADMIN — ONDANSETRON 4 MG: 2 SOLUTION INTRAMUSCULAR; INTRAVENOUS at 15:31

## 2019-01-10 RX ADMIN — ONDANSETRON 4 MG: 2 SOLUTION INTRAMUSCULAR; INTRAVENOUS at 08:53

## 2019-01-10 RX ADMIN — ONDANSETRON 4 MG: 2 SOLUTION INTRAMUSCULAR; INTRAVENOUS at 00:32

## 2019-01-10 RX ADMIN — PROCHLORPERAZINE MALEATE 10 MG: 10 TABLET, FILM COATED ORAL at 08:54

## 2019-01-10 RX ADMIN — HYDROMORPHONE HYDROCHLORIDE 2 MG: 2 INJECTION INTRAMUSCULAR; INTRAVENOUS; SUBCUTANEOUS at 23:31

## 2019-01-10 RX ADMIN — MESALAMINE 4 G: 4 ENEMA RECTAL at 21:54

## 2019-01-10 RX ADMIN — ENOXAPARIN SODIUM 40 MG: 40 INJECTION SUBCUTANEOUS at 08:58

## 2019-01-10 RX ADMIN — Medication 1 CAPSULE: at 08:54

## 2019-01-10 RX ADMIN — PROCHLORPERAZINE MALEATE 10 MG: 10 TABLET, FILM COATED ORAL at 18:36

## 2019-01-10 RX ADMIN — HYDROMORPHONE HYDROCHLORIDE 1.5 MG: 2 INJECTION INTRAMUSCULAR; INTRAVENOUS; SUBCUTANEOUS at 11:46

## 2019-01-10 RX ADMIN — METRONIDAZOLE 500 MG: 500 INJECTION, SOLUTION INTRAVENOUS at 21:53

## 2019-01-10 RX ADMIN — HYDROMORPHONE HYDROCHLORIDE 2 MG: 2 INJECTION INTRAMUSCULAR; INTRAVENOUS; SUBCUTANEOUS at 13:27

## 2019-01-10 RX ADMIN — METRONIDAZOLE 500 MG: 500 INJECTION, SOLUTION INTRAVENOUS at 05:35

## 2019-01-10 RX ADMIN — Medication 10 ML: at 21:54

## 2019-01-10 RX ADMIN — ZOLPIDEM TARTRATE 10 MG: 10 TABLET ORAL at 21:53

## 2019-01-10 RX ADMIN — SUCRALFATE 1 G: 1 SUSPENSION ORAL at 21:54

## 2019-01-10 RX ADMIN — Medication 10 ML: at 05:36

## 2019-01-10 RX ADMIN — HYDROMORPHONE HYDROCHLORIDE 1.5 MG: 2 INJECTION INTRAMUSCULAR; INTRAVENOUS; SUBCUTANEOUS at 00:32

## 2019-01-10 RX ADMIN — HYDROMORPHONE HYDROCHLORIDE 1.5 MG: 2 INJECTION INTRAMUSCULAR; INTRAVENOUS; SUBCUTANEOUS at 05:36

## 2019-01-10 RX ADMIN — HYDROMORPHONE HYDROCHLORIDE 1.5 MG: 2 INJECTION INTRAMUSCULAR; INTRAVENOUS; SUBCUTANEOUS at 15:31

## 2019-01-10 RX ADMIN — HYDROMORPHONE HYDROCHLORIDE 1.5 MG: 2 INJECTION INTRAMUSCULAR; INTRAVENOUS; SUBCUTANEOUS at 08:53

## 2019-01-10 RX ADMIN — SUCRALFATE 1 G: 1 SUSPENSION ORAL at 13:04

## 2019-01-10 RX ADMIN — PREGABALIN 100 MG: 100 CAPSULE ORAL at 08:54

## 2019-01-10 RX ADMIN — PREGABALIN 100 MG: 100 CAPSULE ORAL at 13:03

## 2019-01-10 RX ADMIN — POTASSIUM CHLORIDE, SODIUM CHLORIDE, CALCIUM CHLORIDE, SODIUM LACTATE, AND DEXTROSE MONOHYDRATE: 1.79; 6; .2; 3.1; 5 INJECTION, SOLUTION INTRAVENOUS at 05:36

## 2019-01-10 RX ADMIN — HYDROMORPHONE HYDROCHLORIDE: 2 INJECTION INTRAMUSCULAR; INTRAVENOUS; SUBCUTANEOUS at 21:54

## 2019-01-10 NOTE — PROGRESS NOTES
Problem: Falls - Risk of 
Goal: *Absence of Falls Document [place] Rank Fall Risk and appropriate interventions in the flowsheet. Outcome: Progressing Towards Goal 
Fall Risk Interventions: 
  
 
  
 
Medication Interventions: Teach patient to arise slowly History of Falls Interventions: Door open when patient unattended

## 2019-01-10 NOTE — PROGRESS NOTES
Bedside and Verbal shift change report given to [address] [place] [address], [address], RN (oncoming nurse) by Gadiel Burroughs RN (offgoing nurse). Report included the following information SBAR, Kardex, Intake/Output, MAR and Recent Results.

## 2019-01-10 NOTE — PROGRESS NOTES
Bedside and Verbal shift change report given to Zane (oncoming nurse) by Lindsay (offgoing nurse). Report included the following information SBAR, Kardex and MAR.

## 2019-01-10 NOTE — PROGRESS NOTES
Both left and right forearm IV sites red and firm to touch. Nurse Lavinia Roche RN advised to put Kad pad on both sites and to advise Dr Shahrzad Steinberg that patient is becoming limited with veins we can use.

## 2019-01-10 NOTE — PROGRESS NOTES
General Daily Progress Note Admission Date: 1/4/2019 Hospital Day 6 Post-Op Day Not Applicable Subjective: Tolerating full liquid diet. Ileostomy output has been relatively thick. Pain is sometimes poorly controlled. Objective:  
 
Patient Vitals for the past 24 hrs: 
 BP Temp Pulse Resp SpO2 Weight 01/09/19 1604 130/86 98.5 °F (36.9 °C) 76 16 97 %   
01/09/19 0939 105/66 97.7 °F (36.5 °C) 76 16 100 %   
01/09/19 0327 110/74 97.8 °F (36.6 °C) 73 16 98 % 91.2 kg (201 lb 1.6 oz) 01/08/19 2112 99/67 98.1 °F (36.7 °C) 63 16 98 %  No intake/output data recorded. 01/08 0701 - 01/09 1900 In: -  
Out: 7033 [[id number]] Physical Examination: 
No distress. Ileostomy drainage tube in place. Data Review Recent Results (from the past 24 hour(s)) CBC W/O DIFF Collection Time: 01/09/19  3:30 AM  
Result Value Ref Range WBC 6.2 4.1 - 11.1 K/uL  
 RBC 4.47 4.10 - 5.70 M/uL  
 HGB 13.0 12.1 - 17.0 g/dL HCT 40.6 36.6 - 50.3 % MCV 90.8 80.0 - 99.0 FL  
 MCH 29.1 26.0 - 34.0 PG  
 MCHC 32.0 30.0 - 36.5 g/dL  
 RDW 13.2 11.5 - 14.5 % PLATELET 357 679 - 036 K/uL MPV 11.6 8.9 - 12.9 FL  
 NRBC 0.0 0  WBC ABSOLUTE NRBC 0.00 0.00 - 0.01 K/uL MAGNESIUM Collection Time: 01/09/19  3:30 AM  
Result Value Ref Range Magnesium 1.6 1.6 - 2.4 mg/dL PHOSPHORUS Collection Time: 01/09/19  3:30 AM  
Result Value Ref Range Phosphorus 4.7 2.6 - 4.7 MG/DL Abdominal Radiographs (1/4/2019):  Ileus versus partial SBO. Assessment:  
 
Principal Problem: 
  Ileal pouchitis ([address] (5/1/2004) Active Problems: 
  GERD (gastroesophageal reflux disease) (11/20/2016) Chronic narcotic dependence ([facility] [address] (1/20/2018) Stable. Ileostomy output acceptable. Still benefiting from parenteral fluids. Dietitian's notes reviewed. Plan:  
Begin low fiber diet this evening. Continue nutritional supplements.  
Begin calorie count. 
  
 Continue Rowasa and sucralfate enemas. 
  
Continue distal decompression during the times between enemas (allowing for medication contact time). Document ileostomy output. 
  
Continue oral Entocort (9 mg once per day). 
  
Continue parenteral metronidazole. 
  
Ambulate. Lovenox for DVT prophylaxis. Will consider ordering small bowel follow-through (SBFT) for 1/11/2019.

## 2019-01-10 NOTE — PROGRESS NOTES
General Daily Progress Note Admission Date: 1/4/2019 Hospital Day 7 Post-Op Day Not Applicable Subjective: Tolerating low fiber diet so far. Ileostomy output moderate. Objective:  
 
Patient Vitals for the past 24 hrs: 
 BP Temp Pulse Resp SpO2 Weight 01/10/19 1436 114/71 98.4 °F (36.9 °C) 77 16 98 %   
01/10/19 0837 117/78 98.5 °F (36.9 °C) 73 16 100 %   
01/10/19 0316 118/76 98.7 °F (37.1 °C) 88 16 97 %   
01/10/19 0221      91.7 kg (202 lb 3.2 oz) 01/09/19 2101 110/67 98.4 °F (36.9 °C) (!) 57 16 97 %   
 
01/10 0701 - 01/10 1900 In: 240 [P.O.:240] Out: 1000 [Urine:1000] 01/08 1901 - 01/10 0700 In: -  
Out: [address] Physical Examination: 
No distress. Ileostomy drainage tube in place.  
     
 
 
Data Review No results found for this or any previous visit (from the past 24 hour(s)). Abdominal Radiographs (1/4/2019):  Ileus versus partial SBO. Assessment:  
 
Principal Problem: 
  Ileal pouchitis ([address] (5/1/2004) Active Problems: 
  GERD (gastroesophageal reflux disease) (11/20/2016) Chronic narcotic dependence ([address] (1/20/2018) Stable. Ileostomy output acceptable. Still benefiting from parenteral fluids, but they can be decreased. Getting close to being able to go home. I would like to obtain a small bowel follow through tomorrow (which is also something that the patient's surgeons in [place] want) and then make sure that he continues to tolerate solid food before hopefully discharging him on 1/12/2019. 
  
   
 
Plan:  
 
Continue low fiber diet this evening, but will need to be npo except for sips with medications tomorrow. Continue nutritional supplements. Continue calorie count. 
  
Continue Rowasa and sucralfate enemas. 
  
Continue distal decompression during the times between enemas (allowing for medication contact time). 
  
Document ileostomy output. 
  
Continue oral Entocort (9 mg once per day).  
  
 Continue parenteral metronidazole. 
  
Ambulate.  
Lovenox for DVT prophylaxis. 
  
Small bowel follow-through (SBFT) tomorrow.

## 2019-01-11 ENCOUNTER — APPOINTMENT (OUTPATIENT)
Dept: GENERAL RADIOLOGY | Age: 44
DRG: 394 | End: 2019-01-11
Attending: COLON & RECTAL SURGERY
Payer: COMMERCIAL

## 2019-01-11 PROCEDURE — 74011250637 HC RX REV CODE- 250/637: Performed by: COLON & RECTAL SURGERY

## 2019-01-11 PROCEDURE — 65270000029 HC RM PRIVATE

## 2019-01-11 PROCEDURE — 74011250636 HC RX REV CODE- 250/636: Performed by: COLON & RECTAL SURGERY

## 2019-01-11 PROCEDURE — 74250 X-RAY XM SM INT 1CNTRST STD: CPT

## 2019-01-11 RX ADMIN — METRONIDAZOLE 500 MG: 500 INJECTION, SOLUTION INTRAVENOUS at 05:13

## 2019-01-11 RX ADMIN — MESALAMINE 4 G: 4 ENEMA RECTAL at 21:47

## 2019-01-11 RX ADMIN — DIAZEPAM 5 MG: 5 TABLET ORAL at 17:07

## 2019-01-11 RX ADMIN — HYDROMORPHONE HYDROCHLORIDE 1.5 MG: 2 INJECTION INTRAMUSCULAR; INTRAVENOUS; SUBCUTANEOUS at 19:59

## 2019-01-11 RX ADMIN — ONDANSETRON 4 MG: 2 SOLUTION INTRAMUSCULAR; INTRAVENOUS at 12:16

## 2019-01-11 RX ADMIN — HYDROMORPHONE HYDROCHLORIDE 2 MG: 2 INJECTION INTRAMUSCULAR; INTRAVENOUS; SUBCUTANEOUS at 21:40

## 2019-01-11 RX ADMIN — ZOLPIDEM TARTRATE 10 MG: 10 TABLET ORAL at 21:48

## 2019-01-11 RX ADMIN — HYDROMORPHONE HYDROCHLORIDE 1.5 MG: 2 INJECTION INTRAMUSCULAR; INTRAVENOUS; SUBCUTANEOUS at 02:31

## 2019-01-11 RX ADMIN — Medication 10 ML: at 21:42

## 2019-01-11 RX ADMIN — PROCHLORPERAZINE MALEATE 10 MG: 10 TABLET, FILM COATED ORAL at 21:46

## 2019-01-11 RX ADMIN — HYDROMORPHONE HYDROCHLORIDE 1.5 MG: 2 INJECTION INTRAMUSCULAR; INTRAVENOUS; SUBCUTANEOUS at 05:39

## 2019-01-11 RX ADMIN — BUDESONIDE 9 MG: 3 CAPSULE, GELATIN COATED ORAL at 12:25

## 2019-01-11 RX ADMIN — Medication 10 ML: at 05:13

## 2019-01-11 RX ADMIN — PREGABALIN 100 MG: 100 CAPSULE ORAL at 17:07

## 2019-01-11 RX ADMIN — ONDANSETRON 4 MG: 2 SOLUTION INTRAMUSCULAR; INTRAVENOUS at 02:41

## 2019-01-11 RX ADMIN — Medication 10 ML: at 15:34

## 2019-01-11 RX ADMIN — HYDROMORPHONE HYDROCHLORIDE 1.5 MG: 2 INJECTION INTRAMUSCULAR; INTRAVENOUS; SUBCUTANEOUS at 15:34

## 2019-01-11 RX ADMIN — METRONIDAZOLE 500 MG: 500 INJECTION, SOLUTION INTRAVENOUS at 12:26

## 2019-01-11 RX ADMIN — ACETAMINOPHEN 500 MG: 500 TABLET ORAL at 05:39

## 2019-01-11 RX ADMIN — HYDROMORPHONE HYDROCHLORIDE 1.5 MG: 2 INJECTION INTRAMUSCULAR; INTRAVENOUS; SUBCUTANEOUS at 08:08

## 2019-01-11 RX ADMIN — PREGABALIN 100 MG: 100 CAPSULE ORAL at 12:27

## 2019-01-11 RX ADMIN — SUCRALFATE 1 G: 1 SUSPENSION ORAL at 22:15

## 2019-01-11 RX ADMIN — Medication 1 CAPSULE: at 12:25

## 2019-01-11 RX ADMIN — PANTOPRAZOLE SODIUM 40 MG: 40 TABLET, DELAYED RELEASE ORAL at 21:48

## 2019-01-11 RX ADMIN — HYDROMORPHONE HYDROCHLORIDE 2 MG: 2 INJECTION INTRAMUSCULAR; INTRAVENOUS; SUBCUTANEOUS at 17:00

## 2019-01-11 RX ADMIN — SUCRALFATE 1 G: 1 SUSPENSION ORAL at 17:01

## 2019-01-11 RX ADMIN — PREGABALIN 100 MG: 100 CAPSULE ORAL at 21:48

## 2019-01-11 RX ADMIN — METRONIDAZOLE 500 MG: 500 INJECTION, SOLUTION INTRAVENOUS at 21:46

## 2019-01-11 RX ADMIN — DIAZEPAM 5 MG: 5 TABLET ORAL at 12:25

## 2019-01-11 RX ADMIN — HYDROMORPHONE HYDROCHLORIDE 1.5 MG: 2 INJECTION INTRAMUSCULAR; INTRAVENOUS; SUBCUTANEOUS at 12:16

## 2019-01-11 NOTE — PROGRESS NOTES
Bedside and Verbal shift change report given to Zane (oncoming nurse) by Tal Linn (offgoing nurse). Report included the following information SBAR, Kardex and MAR.

## 2019-01-11 NOTE — PROGRESS NOTES
General Daily Progress Note Admission Date: 1/4/2019 Hospital Day 8 Post-Op Day Not Applicable Subjective:  
Was npo for most of the day waiting for and then completing the small bowel follow-through. Just trying solid food again now. Objective:  
 
Patient Vitals for the past 24 hrs: 
 BP Temp Pulse Resp SpO2 Weight 01/11/19 1539 121/79 98.3 °F (36.8 °C) (!) 59 16 100 %   
01/11/19 1206 121/80 98.1 °F (36.7 °C) 68 16 96 %   
01/11/19 0332 112/72 97.7 °F (36.5 °C) 83 16 94 % 91.4 kg (201 lb 6.4 oz) 01/10/19 2138 112/71 98.8 °F (37.1 °C) 78 16 100 %  No intake/output data recorded. 01/09 1901 - 01/11 0700 In: 240 [P.O.:240] Out: 4000 [[id number]:7712] Physical Examination: 
No distress. Ileostomy drainage tube in place.   Output very thick. Data Review No results found for this or any previous visit (from the past 24 hour(s)). Abdominal Radiographs (1/4/2019):  Ileus versus partial SBO. Small Bowel Follow-Through (1/11/2019):  Distal small bowel distension with proximal decompression. 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 Assessment:  
 
Principal Problem: 
  Ileal pouchitis ([address] (5/1/2004) Active Problems: 
  GERD (gastroesophageal reflux disease) (11/20/2016) Chronic narcotic dependence ([address] (1/20/2018) Stable. Ileostomy output acceptable. Still hasn't proven that he can tolerate sufficient food and fluids. I reviewed the small bowel follow-through images with one of the radiologists, and I agree that there appears to be a stricture just proximal to the inlet of the ileal pouch. The patient and I discussed the radiographic findings at length, and I provided him with a disc containing the images from today's study and from the CT scans performed on 12/15/2018 and 1/19/2018. Plan:  
Continue low fiber diet. Continue nutritional supplements.  
Continue calorie count. 
  
 Continue Rowasa and sucralfate enemas. 
  
Continue distal decompression during the times between enemas (allowing for medication contact time). 
  
Document ileostomy output. 
  
Continue oral Entocort (9 mg once per day). 
  
Continue parenteral metronidazole. 
  
Ambulate. Lovenox for DVT prophylaxis. 
  
Hopefully, the patient will be able to go home tomorrow if he is still tolerating adequate oral nutrition. The patient will send the disc containing the radiographic images to his surgery team in [place] for their review. We will plan to obtain a retrograde contrast study of the  ileal pouch and distal ileum as an outpatient to better characterize the possible stricture. Ultimately, the plan will be for the patient to return to his surgeons in [place] for reevaluation and treatment.

## 2019-01-11 NOTE — PROGRESS NOTES
NUTRITION Recommendations: 1. Continue Calorie Count per CRS  
 - pt did well yesterday (see below) so do not necessarily need to continue at this time 2. Add daily MVI 3. Weekly weights Interventions Diet:  Low fiber (prior to procedure) Supplements: Ensure Clear BID, Boost Glucose Control TID Chart reviewed for PO check. Pt for SBFT today. Ileostomy output improved some per MD. Potential d/c tomorrow noted. Calorie Count started yesterday per MD notes. Pt off the floor for SBFT but has been documenting oral intake himself - sheet reviewed. Breakfast: 530kcal, 27g protein Lunch:  695kcal, 34g protein Dinner: 737kcal, 16g protein Total: 1963kcal, 77g protein - Meets 90% energy and 79% protein needs. Snacks added between meals to maximize intake with Boost Pudding adding BID (480kcal, 14g protein) since pt tolerating pudding well per documentation. Ensure Clear discontinued since not drinking. Will continue to follow for PO intake while inpatient. Predict that as symptoms continue to improve that pt will be able to meet nutrition needs at home. Estimated Nutrition Needs:  
Kcals/day: 2180 Kcals/day(2180-2362kcal) Protein: 98 g(98-106g (1.1-1.2g/kg)) Fluid: 2200 ml(1ml/kcal) Based On: Jaime Ruiz(x 1.2-1.3) Weight Used: Actual wt(88.8kg) Catherine Soto RD

## 2019-01-11 NOTE — PROGRESS NOTES
Problem: Falls - Risk of 
Goal: *Absence of Falls Document [place] Officer Fall Risk and appropriate interventions in the flowsheet. Outcome: Progressing Towards Goal 
Fall Risk Interventions: 
  
 
  
 
Medication Interventions: Teach patient to arise slowly History of Falls Interventions: Door open when patient unattended Problem: Pain Goal: *Control of Pain Outcome: Progressing Towards Goal 
Pt calls when needing PRN pain med.

## 2019-01-11 NOTE — PROGRESS NOTES
Bedside shift change report given to Selvin Staples (oncoming nurse) by Aurea Stevens (offgoing nurse). Report included the following information SBAR, Kardex, MAR and Recent Results.

## 2019-01-12 PROCEDURE — 74011250637 HC RX REV CODE- 250/637: Performed by: COLON & RECTAL SURGERY

## 2019-01-12 PROCEDURE — 65270000029 HC RM PRIVATE

## 2019-01-12 PROCEDURE — 74011250636 HC RX REV CODE- 250/636: Performed by: COLON & RECTAL SURGERY

## 2019-01-12 RX ADMIN — ONDANSETRON 4 MG: 2 SOLUTION INTRAMUSCULAR; INTRAVENOUS at 06:36

## 2019-01-12 RX ADMIN — ONDANSETRON 4 MG: 2 SOLUTION INTRAMUSCULAR; INTRAVENOUS at 00:12

## 2019-01-12 RX ADMIN — HYDROMORPHONE HYDROCHLORIDE 1.5 MG: 2 INJECTION INTRAMUSCULAR; INTRAVENOUS; SUBCUTANEOUS at 03:37

## 2019-01-12 RX ADMIN — Medication 10 ML: at 05:27

## 2019-01-12 RX ADMIN — DIAZEPAM 5 MG: 5 TABLET ORAL at 09:59

## 2019-01-12 RX ADMIN — DIAZEPAM 5 MG: 5 TABLET ORAL at 18:45

## 2019-01-12 RX ADMIN — PROMETHAZINE HYDROCHLORIDE 25 MG: 25 TABLET ORAL at 03:42

## 2019-01-12 RX ADMIN — Medication 1 CAPSULE: at 09:59

## 2019-01-12 RX ADMIN — SUCRALFATE 1 G: 1 SUSPENSION ORAL at 14:21

## 2019-01-12 RX ADMIN — PREGABALIN 100 MG: 100 CAPSULE ORAL at 09:59

## 2019-01-12 RX ADMIN — SUCRALFATE 1 G: 1 SUSPENSION ORAL at 22:57

## 2019-01-12 RX ADMIN — PREGABALIN 100 MG: 100 CAPSULE ORAL at 18:45

## 2019-01-12 RX ADMIN — PREGABALIN 100 MG: 100 CAPSULE ORAL at 21:36

## 2019-01-12 RX ADMIN — HYDROMORPHONE HYDROCHLORIDE 1.5 MG: 2 INJECTION INTRAMUSCULAR; INTRAVENOUS; SUBCUTANEOUS at 06:36

## 2019-01-12 RX ADMIN — METRONIDAZOLE 500 MG: 500 INJECTION, SOLUTION INTRAVENOUS at 14:06

## 2019-01-12 RX ADMIN — MESALAMINE 4 G: 4 ENEMA RECTAL at 22:57

## 2019-01-12 RX ADMIN — HYDROMORPHONE HYDROCHLORIDE 1.5 MG: 2 INJECTION INTRAMUSCULAR; INTRAVENOUS; SUBCUTANEOUS at 10:02

## 2019-01-12 RX ADMIN — ONDANSETRON 4 MG: 2 SOLUTION INTRAMUSCULAR; INTRAVENOUS at 17:07

## 2019-01-12 RX ADMIN — HYDROMORPHONE HYDROCHLORIDE 1.5 MG: 2 INJECTION INTRAMUSCULAR; INTRAVENOUS; SUBCUTANEOUS at 19:54

## 2019-01-12 RX ADMIN — HYDROMORPHONE HYDROCHLORIDE 2 MG: 2 INJECTION INTRAMUSCULAR; INTRAVENOUS; SUBCUTANEOUS at 11:33

## 2019-01-12 RX ADMIN — ONDANSETRON 4 MG: 2 SOLUTION INTRAMUSCULAR; INTRAVENOUS at 22:57

## 2019-01-12 RX ADMIN — HYDROMORPHONE HYDROCHLORIDE 1.5 MG: 2 INJECTION INTRAMUSCULAR; INTRAVENOUS; SUBCUTANEOUS at 14:06

## 2019-01-12 RX ADMIN — METRONIDAZOLE 500 MG: 500 INJECTION, SOLUTION INTRAVENOUS at 05:26

## 2019-01-12 RX ADMIN — HYDROMORPHONE HYDROCHLORIDE 2 MG: 2 INJECTION INTRAMUSCULAR; INTRAVENOUS; SUBCUTANEOUS at 21:36

## 2019-01-12 RX ADMIN — PROMETHAZINE HYDROCHLORIDE 25 MG: 25 TABLET ORAL at 19:54

## 2019-01-12 RX ADMIN — PREGABALIN 100 MG: 100 CAPSULE ORAL at 14:06

## 2019-01-12 RX ADMIN — POTASSIUM CHLORIDE, SODIUM CHLORIDE, CALCIUM CHLORIDE, SODIUM LACTATE, AND DEXTROSE MONOHYDRATE: 1.79; 6; .2; 3.1; 5 INJECTION, SOLUTION INTRAVENOUS at 05:27

## 2019-01-12 RX ADMIN — METRONIDAZOLE 500 MG: 500 INJECTION, SOLUTION INTRAVENOUS at 21:36

## 2019-01-12 RX ADMIN — Medication 10 ML: at 14:07

## 2019-01-12 RX ADMIN — PROCHLORPERAZINE MALEATE 10 MG: 10 TABLET, FILM COATED ORAL at 17:07

## 2019-01-12 RX ADMIN — HYDROMORPHONE HYDROCHLORIDE 1.5 MG: 2 INJECTION INTRAMUSCULAR; INTRAVENOUS; SUBCUTANEOUS at 00:07

## 2019-01-12 RX ADMIN — BUDESONIDE 9 MG: 3 CAPSULE, GELATIN COATED ORAL at 09:59

## 2019-01-12 RX ADMIN — HYDROMORPHONE HYDROCHLORIDE 1.5 MG: 2 INJECTION INTRAMUSCULAR; INTRAVENOUS; SUBCUTANEOUS at 17:07

## 2019-01-12 RX ADMIN — Medication 10 ML: at 21:36

## 2019-01-12 RX ADMIN — ZOLPIDEM TARTRATE 10 MG: 10 TABLET ORAL at 21:36

## 2019-01-12 RX ADMIN — Medication 10 ML: at 22:57

## 2019-01-12 RX ADMIN — PANTOPRAZOLE SODIUM 40 MG: 40 TABLET, DELAYED RELEASE ORAL at 21:36

## 2019-01-12 RX ADMIN — HYDROMORPHONE HYDROCHLORIDE 1.5 MG: 2 INJECTION INTRAMUSCULAR; INTRAVENOUS; SUBCUTANEOUS at 22:57

## 2019-01-12 NOTE — PROGRESS NOTES
General Daily Progress Note Admission Date: 1/4/2019 Hospital Day 9 Post-Op Day Not Applicable Subjective:  
Some increase in pain last night. Ileostomy output still quite thick, possible secondary to the contrast. 
Working on evacuating the pouch now. Objective:  
 
Patient Vitals for the past 24 hrs: 
 BP Temp Pulse Resp SpO2 Weight 01/12/19 0907 108/68 98.5 °F (36.9 °C) 63 16 97 %   
01/12/19 0353      91.4 kg (201 lb 9.6 oz) 01/12/19 0326 119/71 98.4 °F (36.9 °C) 83 16 99 %   
01/11/19 2101 111/72 98.5 °F (36.9 °C) 76 16 99 %   
01/11/19 1539 121/79 98.3 °F (36.8 °C) (!) 59 16 100 %   
01/11/19 1206 121/80 98.1 °F (36.7 °C) 68 16 96 %   
 
01/12 0701 - 01/12 1900 In: -  
Out: 1950 [Urine:1200] 01/10 1901 - 01/12 0700 In: -  
Out: 2200 [DITWU:1407] Physical Examination: 
No distress. Abdomen soft and non-distended. Mildly tender in the left lower quadrant. Ileostomy drainage tube in place.   Output very thick. Data Review No results found for this or any previous visit (from the past 24 hour(s)). Assessment:  
 
Principal Problem: 
  Ileal pouchitis ([address] (5/1/2004) Active Problems: 
  GERD (gastroesophageal reflux disease) (11/20/2016) Chronic narcotic dependence ([address] (1/20/2018) Stable. Ileostomy output acceptable but thick and slow to drain. The pateint will likely be dischargeable later to day or by tomorrow morning depending upon how he feels after having switched back to the intermittent drainage routine that he follows at home. Plan:  
 
Continue low fiber diet. Continue nutritional supplements. Continue calorie count. 
  
Continue Rowasa and sucralfate enemas. 
  
Resume intermittent decompression of the ileal pouch as the patient does at home. 
  
Document ileostomy output. 
  
Continue oral Entocort (9 mg once per day). 
  
Continue metronidazole for now, but will not continue it after discharge.  
  
Ambulate. Lovenox for DVT prophylaxis. 
  
Discharge later today or tomorrow morning depending upon how the patient is feeling. I will phone in his new prescriptions (Entocort; Rowasa; sucralfate enemas) today. 
  
As noted yesterday, the patient will send the disc containing the radiographic images to his surgery team in [place] for their review. We will plan to obtain a retrograde contrast study of the ileal pouch and distal ileum as an outpatient to better characterize the possible stricture.   Ultimately, the plan will be for the patient to return to his surgeons in [place] for reevaluation and treatment.

## 2019-01-12 NOTE — PROGRESS NOTES
Problem: Falls - Risk of 
Goal: *Absence of Falls Document Roland Song Fall Risk and appropriate interventions in the flowsheet. Outcome: Progressing Towards Goal 
Fall Risk Interventions: 
  
 
  
 
Medication Interventions: Patient to call before getting OOB History of Falls Interventions: Door open when patient unattended Problem: Pain Goal: *Control of Pain Outcome: Progressing Towards Goal 
Pt requires multiple PRN for pain management.

## 2019-01-12 NOTE — PROGRESS NOTES
Bedside shift change report given to Rylee Waggoner (oncoming nurse) by Gracie Simon (offgoing nurse). Report included the following information SBAR, Kardex, MAR and Recent Results.

## 2019-01-12 NOTE — PROGRESS NOTES
Bedside shift change report given to Yves Latham (oncoming nurse) by Derick Breaux RN (offgoing nurse). Report included the following information SBAR, Kardex and MAR.

## 2019-01-13 PROCEDURE — 74011250636 HC RX REV CODE- 250/636: Performed by: COLON & RECTAL SURGERY

## 2019-01-13 PROCEDURE — 65270000029 HC RM PRIVATE

## 2019-01-13 PROCEDURE — 74011250637 HC RX REV CODE- 250/637: Performed by: COLON & RECTAL SURGERY

## 2019-01-13 PROCEDURE — 77030018836 HC SOL IRR NACL ICUM -A

## 2019-01-13 RX ORDER — SUCRALFATE 1 G/10ML
1 SUSPENSION ORAL 2 TIMES DAILY
Qty: 600 ML | Refills: 0 | Status: ON HOLD | OUTPATIENT
Start: 2019-01-13 | End: 2019-02-02 | Stop reason: SDUPTHER

## 2019-01-13 RX ORDER — BUDESONIDE 3 MG/1
9 CAPSULE, COATED PELLETS ORAL DAILY
Qty: 90 CAP | Refills: 0 | Status: SHIPPED | OUTPATIENT
Start: 2019-01-14 | End: 2019-02-13

## 2019-01-13 RX ORDER — SODIUM BICARBONATE 650 MG/1
1300 TABLET ORAL DAILY
Status: DISCONTINUED | OUTPATIENT
Start: 2019-01-13 | End: 2019-01-15 | Stop reason: HOSPADM

## 2019-01-13 RX ORDER — MESALAMINE 4 G/60ML
4 ENEMA RECTAL
Qty: 30 BOTTLE | Refills: 0 | Status: ON HOLD | OUTPATIENT
Start: 2019-01-13 | End: 2019-02-02 | Stop reason: SDUPTHER

## 2019-01-13 RX ADMIN — HYDROMORPHONE HYDROCHLORIDE 2 MG: 2 INJECTION INTRAMUSCULAR; INTRAVENOUS; SUBCUTANEOUS at 13:04

## 2019-01-13 RX ADMIN — Medication 10 ML: at 04:58

## 2019-01-13 RX ADMIN — Medication 10 ML: at 20:53

## 2019-01-13 RX ADMIN — PROMETHAZINE HYDROCHLORIDE 25 MG: 25 TABLET ORAL at 20:53

## 2019-01-13 RX ADMIN — PROCHLORPERAZINE MALEATE 10 MG: 10 TABLET, FILM COATED ORAL at 13:18

## 2019-01-13 RX ADMIN — HYDROMORPHONE HYDROCHLORIDE 1.5 MG: 2 INJECTION INTRAMUSCULAR; INTRAVENOUS; SUBCUTANEOUS at 23:41

## 2019-01-13 RX ADMIN — PANTOPRAZOLE SODIUM 40 MG: 40 TABLET, DELAYED RELEASE ORAL at 20:53

## 2019-01-13 RX ADMIN — PROCHLORPERAZINE MALEATE 10 MG: 10 TABLET, FILM COATED ORAL at 01:59

## 2019-01-13 RX ADMIN — HYDROMORPHONE HYDROCHLORIDE 1.5 MG: 2 INJECTION INTRAMUSCULAR; INTRAVENOUS; SUBCUTANEOUS at 11:15

## 2019-01-13 RX ADMIN — ONDANSETRON 4 MG: 2 SOLUTION INTRAMUSCULAR; INTRAVENOUS at 23:41

## 2019-01-13 RX ADMIN — MESALAMINE 4 G: 4 ENEMA RECTAL at 20:52

## 2019-01-13 RX ADMIN — ERGOCALCIFEROL 50000 UNITS: 1.25 CAPSULE ORAL at 04:59

## 2019-01-13 RX ADMIN — ZOLPIDEM TARTRATE 10 MG: 10 TABLET ORAL at 20:53

## 2019-01-13 RX ADMIN — DIAZEPAM 5 MG: 5 TABLET ORAL at 18:10

## 2019-01-13 RX ADMIN — METRONIDAZOLE 500 MG: 500 INJECTION, SOLUTION INTRAVENOUS at 13:05

## 2019-01-13 RX ADMIN — HYDROMORPHONE HYDROCHLORIDE 1.5 MG: 2 INJECTION INTRAMUSCULAR; INTRAVENOUS; SUBCUTANEOUS at 18:11

## 2019-01-13 RX ADMIN — METRONIDAZOLE 500 MG: 500 INJECTION, SOLUTION INTRAVENOUS at 04:58

## 2019-01-13 RX ADMIN — ONDANSETRON 4 MG: 2 SOLUTION INTRAMUSCULAR; INTRAVENOUS at 11:22

## 2019-01-13 RX ADMIN — Medication 10 ML: at 11:23

## 2019-01-13 RX ADMIN — PROMETHAZINE HYDROCHLORIDE 25 MG: 25 TABLET ORAL at 11:22

## 2019-01-13 RX ADMIN — METRONIDAZOLE 500 MG: 500 INJECTION, SOLUTION INTRAVENOUS at 20:52

## 2019-01-13 RX ADMIN — BUDESONIDE 9 MG: 3 CAPSULE, GELATIN COATED ORAL at 09:24

## 2019-01-13 RX ADMIN — Medication 10 ML: at 11:15

## 2019-01-13 RX ADMIN — PREGABALIN 100 MG: 100 CAPSULE ORAL at 18:10

## 2019-01-13 RX ADMIN — HYDROMORPHONE HYDROCHLORIDE 1.5 MG: 2 INJECTION INTRAMUSCULAR; INTRAVENOUS; SUBCUTANEOUS at 07:50

## 2019-01-13 RX ADMIN — HYDROMORPHONE HYDROCHLORIDE 1.5 MG: 2 INJECTION INTRAMUSCULAR; INTRAVENOUS; SUBCUTANEOUS at 04:59

## 2019-01-13 RX ADMIN — PREGABALIN 100 MG: 100 CAPSULE ORAL at 20:53

## 2019-01-13 RX ADMIN — HYDROMORPHONE HYDROCHLORIDE 1.5 MG: 2 INJECTION INTRAMUSCULAR; INTRAVENOUS; SUBCUTANEOUS at 15:09

## 2019-01-13 RX ADMIN — Medication 10 ML: at 13:04

## 2019-01-13 RX ADMIN — HYDROMORPHONE HYDROCHLORIDE 1.5 MG: 2 INJECTION INTRAMUSCULAR; INTRAVENOUS; SUBCUTANEOUS at 01:59

## 2019-01-13 RX ADMIN — DIAZEPAM 5 MG: 5 TABLET ORAL at 09:24

## 2019-01-13 RX ADMIN — CYANOCOBALAMIN 1000 MCG: 1000 INJECTION, SOLUTION INTRAMUSCULAR at 07:51

## 2019-01-13 RX ADMIN — SUCRALFATE 1 G: 1 SUSPENSION ORAL at 20:52

## 2019-01-13 RX ADMIN — PREGABALIN 100 MG: 100 CAPSULE ORAL at 09:24

## 2019-01-13 RX ADMIN — HYDROMORPHONE HYDROCHLORIDE 1.5 MG: 2 INJECTION INTRAMUSCULAR; INTRAVENOUS; SUBCUTANEOUS at 20:52

## 2019-01-13 RX ADMIN — ONDANSETRON 4 MG: 2 SOLUTION INTRAMUSCULAR; INTRAVENOUS at 18:10

## 2019-01-13 RX ADMIN — Medication 10 ML: at 15:10

## 2019-01-13 RX ADMIN — ONDANSETRON 4 MG: 2 SOLUTION INTRAMUSCULAR; INTRAVENOUS at 04:59

## 2019-01-13 RX ADMIN — SODIUM BICARBONATE 1300 MG: 650 TABLET ORAL at 15:05

## 2019-01-13 RX ADMIN — PREGABALIN 100 MG: 100 CAPSULE ORAL at 13:04

## 2019-01-13 RX ADMIN — SUCRALFATE 1 G: 1 SUSPENSION ORAL at 13:05

## 2019-01-13 RX ADMIN — Medication 1 CAPSULE: at 09:24

## 2019-01-13 NOTE — PROGRESS NOTES
Bedside shift change report given to [address] (oncoming nurse) by Anita Correa RN (offgoing nurse). Report included the following information SBAR, Kardex, Intake/Output, MAR and Recent Results.

## 2019-01-13 NOTE — PROGRESS NOTES
Problem: Falls - Risk of 
Goal: *Absence of Falls Document Roderick Dust Fall Risk and appropriate interventions in the flowsheet. Outcome: Progressing Towards Goal 
Fall Risk Interventions: 
  
 
  
 
Medication Interventions: Patient to call before getting OOB History of Falls Interventions: Door open when patient unattended

## 2019-01-13 NOTE — PROGRESS NOTES
General Daily Progress Note Admission Date: 1/4/2019 Hospital Day 10 Post-Op Day Not Applicable. Subjective: The patient reports that he had some increase in nausea and abdominal pain and had to resume using suction last night to facilitate evacuation of the ileal pouch. He would like to have a bit more time to see whether or not he is going to be jeremy to tolerate solid food. Objective:  
 
Patient Vitals for the past 24 hrs: 
 BP Temp Pulse Resp SpO2 Weight 01/13/19 0921 116/72 98.8 °F (37.1 °C) 91 16 97 %   
01/13/19 0655      90.9 kg (200 lb 8 oz) 01/13/19 0202 97/72 97.9 °F (36.6 °C) 74 16 98 %   
01/12/19 2030 108/71 98.7 °F (37.1 °C) 80 16 96 %   
01/12/19 1440 107/71 98.7 °F (37.1 °C) 76 16 98 %   
 
01/13 0701 - 01/13 1900 In: -  
Out: 1125 [Urine:525] 01/11 1901 - 01/13 0700 In: -  
Out: 2950 [Urine:1500] Physical Examination: Not performed. Data Review No results found for this or any previous visit (from the past 24 hour(s)). Abdominal Radiographs (1/4/2019):  Ileus versus partial SBO. 
  
Small Bowel Follow-Through (1/11/2019):  Distal small bowel distension with proximal decompression. 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 Assessment:  
 
Principal Problem: 
  Ileal pouchitis ([facility] (5/1/2004) Active Problems: 
  GERD (gastroesophageal reflux disease) (11/20/2016) Chronic narcotic dependence ([facility] (1/20/2018) Clinically, some setback with the return from continuous to intermittent catheterization of the ileal pouch. We spent approximately 10 minutes discussing the concept that he might just have to remain on a liquid diet until he can see his ileal pouch surgeons in [place]. What we have been doing using the continuous suction is a way of temporarily managing the problem, but it is not a solution for it.   What appears to be a distal stricture of the small intestine will most likely require an operation that I am not willing to do and that I think needs to be done by his other surgeons. I believe that he is currently adequately hydrated and that he probably does not need parenteral fluids anymore. He should be able to ingest and absorb enough to prevent dehydration, but that also might mean that he will have to temporarily avoid most solid food. Plan:  
Discharge to home later today if the patient is feeling well enough. If he is not, he may stay until tomorrow but will then most likely need to be discharged on a liquid diet with plans to return to his surgeons in [place] for further evaluation and treatment.

## 2019-01-14 ENCOUNTER — APPOINTMENT (OUTPATIENT)
Dept: GENERAL RADIOLOGY | Age: 44
DRG: 394 | End: 2019-01-14
Attending: COLON & RECTAL SURGERY
Payer: COMMERCIAL

## 2019-01-14 VITALS
BODY MASS INDEX: 27.17 KG/M2 | WEIGHT: 200.62 LBS | HEART RATE: 93 BPM | OXYGEN SATURATION: 97 % | DIASTOLIC BLOOD PRESSURE: 72 MMHG | SYSTOLIC BLOOD PRESSURE: 113 MMHG | TEMPERATURE: 98.7 F | RESPIRATION RATE: 16 BRPM | HEIGHT: 72 IN

## 2019-01-14 PROCEDURE — 74011636320 HC RX REV CODE- 636/320: Performed by: RADIOLOGY

## 2019-01-14 PROCEDURE — 74011250637 HC RX REV CODE- 250/637: Performed by: COLON & RECTAL SURGERY

## 2019-01-14 PROCEDURE — 74011250636 HC RX REV CODE- 250/636: Performed by: COLON & RECTAL SURGERY

## 2019-01-14 PROCEDURE — 74270 X-RAY XM COLON 1CNTRST STD: CPT

## 2019-01-14 RX ADMIN — DIAZEPAM 5 MG: 5 TABLET ORAL at 08:55

## 2019-01-14 RX ADMIN — Medication 10 ML: at 05:37

## 2019-01-14 RX ADMIN — Medication 10 ML: at 14:18

## 2019-01-14 RX ADMIN — HYDROMORPHONE HYDROCHLORIDE 1.5 MG: 2 INJECTION INTRAMUSCULAR; INTRAVENOUS; SUBCUTANEOUS at 08:55

## 2019-01-14 RX ADMIN — ONDANSETRON 4 MG: 2 SOLUTION INTRAMUSCULAR; INTRAVENOUS at 17:26

## 2019-01-14 RX ADMIN — DIATRIZOATE MEGLUMINE AND DIATRIZOATE SODIUM 240 ML: 660; 100 LIQUID ORAL; RECTAL at 11:44

## 2019-01-14 RX ADMIN — HYDROMORPHONE HYDROCHLORIDE 2 MG: 2 INJECTION INTRAMUSCULAR; INTRAVENOUS; SUBCUTANEOUS at 14:16

## 2019-01-14 RX ADMIN — HYDROMORPHONE HYDROCHLORIDE 1.5 MG: 2 INJECTION INTRAMUSCULAR; INTRAVENOUS; SUBCUTANEOUS at 17:27

## 2019-01-14 RX ADMIN — HYDROMORPHONE HYDROCHLORIDE 2 MG: 2 INJECTION INTRAMUSCULAR; INTRAVENOUS; SUBCUTANEOUS at 19:34

## 2019-01-14 RX ADMIN — PREGABALIN 100 MG: 100 CAPSULE ORAL at 08:55

## 2019-01-14 RX ADMIN — PREGABALIN 100 MG: 100 CAPSULE ORAL at 17:32

## 2019-01-14 RX ADMIN — MESALAMINE 4 G: 4 ENEMA RECTAL at 19:36

## 2019-01-14 RX ADMIN — BUDESONIDE 9 MG: 3 CAPSULE, GELATIN COATED ORAL at 08:56

## 2019-01-14 RX ADMIN — SUCRALFATE 1 G: 1 SUSPENSION ORAL at 19:36

## 2019-01-14 RX ADMIN — ONDANSETRON 4 MG: 2 SOLUTION INTRAMUSCULAR; INTRAVENOUS at 08:54

## 2019-01-14 RX ADMIN — HYDROMORPHONE HYDROCHLORIDE 1.5 MG: 2 INJECTION INTRAMUSCULAR; INTRAVENOUS; SUBCUTANEOUS at 03:02

## 2019-01-14 RX ADMIN — HYDROMORPHONE HYDROCHLORIDE 1.5 MG: 2 INJECTION INTRAMUSCULAR; INTRAVENOUS; SUBCUTANEOUS at 12:42

## 2019-01-14 RX ADMIN — PROCHLORPERAZINE MALEATE 10 MG: 10 TABLET, FILM COATED ORAL at 19:34

## 2019-01-14 RX ADMIN — PROCHLORPERAZINE MALEATE 10 MG: 10 TABLET, FILM COATED ORAL at 03:08

## 2019-01-14 RX ADMIN — SODIUM BICARBONATE 1300 MG: 650 TABLET ORAL at 08:55

## 2019-01-14 RX ADMIN — PREGABALIN 100 MG: 100 CAPSULE ORAL at 12:40

## 2019-01-14 RX ADMIN — DIAZEPAM 5 MG: 5 TABLET ORAL at 17:32

## 2019-01-14 RX ADMIN — METRONIDAZOLE 500 MG: 500 INJECTION, SOLUTION INTRAVENOUS at 12:39

## 2019-01-14 RX ADMIN — Medication 1 CAPSULE: at 08:55

## 2019-01-14 RX ADMIN — SUCRALFATE 1 G: 1 SUSPENSION ORAL at 12:41

## 2019-01-14 RX ADMIN — METRONIDAZOLE 500 MG: 500 INJECTION, SOLUTION INTRAVENOUS at 05:36

## 2019-01-14 RX ADMIN — PROCHLORPERAZINE MALEATE 10 MG: 10 TABLET, FILM COATED ORAL at 12:39

## 2019-01-14 RX ADMIN — ONDANSETRON 4 MG: 2 SOLUTION INTRAMUSCULAR; INTRAVENOUS at 03:02

## 2019-01-14 NOTE — PROGRESS NOTES
Spiritual Care Assessment/Progress Note [address] 
 
 
NAME: Cristi Valle      MRN: [id number] AGE: 44 y.o. SEX: male Muslim Affiliation: Mandaen  
Language: [place] 1/14/2019     Total Time (in minutes): 10 Spiritual Assessment begun in [facility] SUR[facility] through conversation with: 
  
    [x]Patient        [] Family    [] Friend(s) Reason for Consult: Initial/Spiritual assessment, patient floor Spiritual beliefs: (Please include comment if needed) [x] Identifies with a supriya tradition:     
   [x] Supported by a supriya community:        
   [] Claims no spiritual orientation:       
   [] Seeking spiritual identity:            
   [] Adheres to an individual form of spirituality:       
   [] Not able to assess:                   
 
    
Identified resources for coping:  
   [x] Prayer                           
   [] Music                  [] Guided Imagery [x] Family/friends                 [] Pet visits [] Devotional reading                         [] Unknown 
   [] Other:                                          
 
 
Interventions offered during this visit: (See comments for more details) Patient Interventions: Affirmation of emotions/emotional suffering, Affirmation of supriya, Normalization of emotional/spiritual concerns, Catharsis/review of pertinent events in supportive environment, Coping skills reviewed/reinforced, Prayer (actual), Prayer (assurance of) Plan of Care: 
 
 [] Support spiritual and/or cultural needs  
 [] Support AMD and/or advance care planning process    
 [] Support grieving process 
 [] Coordinate Rites and/or Rituals  
 [] Coordination with community clergy [] No spiritual needs identified at this time 
 [] Detailed Plan of Care below (See Comments)  [] Make referral to Music Therapy 
[] Make referral to Pet Therapy    
[] Make referral to Addiction services 
[] Make referral to [facility] [] Make referral to Spiritual Care Partner 
[] No future visits requested       
[x] Follow up visits as needed Comments:  for initial visit. Pt was in bed and welcomed visit. Pt shared that it has been back and forth with leaving and he is hoping to leave today. Pt mentioned his children at home who he is looking forward to getting home to as well as them being ready for him to come home. Pt shared prayer has been very helpful and requested prayer.  provided pastoral listening, support and prayer. Let pt know of  availability.  follow up as needed. Mika Hein, MACE 
 [phone])

## 2019-01-14 NOTE — PROGRESS NOTES
NUTRITION COMPLETE ASSESSMENT 
 
RECOMMENDATIONS:  
1. Provide prescription for: Boost Glucose Control 2x/day and Boost Pudding 2x/day  
 - may help to see if insurance can cover some of supplements since will be on liquid diet at home 2. Continue current diet order 
 - document % meals consumed in I/O flowsheet 3. Add daily MVI 4. Weekly weights Interventions/Plan:  
Food/Nutrient Delivery:    Commercial supplement(Boost TID, Boost Pudding BID) Nutrition Education:(liquid diet) Assessment:  
Reason for Assessment:[x]Reassessment Diet: regular, low fiber Supplements: Boost Glucose Control TID, Boost Pudding BID Nutritionally Significant Medications: [x] Reviewed & Includes:  Entocort, vit B12, valium, ergocalciferol, eliza-Q, mesalamine, flagyl, protonix, lyrica, carafate, D5 LR w/ KCl @ 25ml/hr; PRN: compazine, zofran, bentyl, phenergan Subjective: Brittany Abelardo information you might have I'll take. .. I'll eat the same thing and some days I do ok and others I don't. Those (Boost) puddings are really good. \" 
 
Objective: 
Pt admitted for ileal pouchitis. PMHx: depression, GERD, Crohn's dx, UC, anal stenosis/fistula. Extensive GI surgical hx - total proctocolectomy 2004, permanent ileostomy with pouch, anal fistula, multiple LUCIEN. Maintains about 400cm of small intestine per Brief Op note of LUCIEN on 3/14/2017. SBFT showing possible stricture proximal to ileal pouch. CRS noting possible geovanna to remain on liquid diet until pt can see surgeon's in [place]. Waiting to evaluate PO intake prior to discharge. Pt visited today. He reports tolerating some solids but worsening reflux yesterday midday. Protonix rx Discussed avoidance of excessive amounts of caffeine and carbonated beverages. Apparently has been drinking a fair share of soda from the floor. Boost Pudding added over the weekend and pt has been doing well with that.  Handouts provided for liquid diet if that need to be continued at home. Also provided information if pt interested in joining a support group for Crohn's dx. With limited diet options that are well tolerated recommend providing a prescription for Boost Glucose Control 2x/day and Boost Pudding 2x/day to continue at home in hopes that insurance will cover supplements. Pt with good understanding of nutrition recommendations, no further questions at this time. Severe wt loss of 2.5% x 1-2 weeks PTA. Still with net weight gain over past year. Will continue to follow for PO intake/tolerance, stool output, and further education needs PRN. Estimated Nutrition Needs:  
Kcals/day: 2180 Kcals/day(2180-2362kcal) Protein: 98 g(98-106g (1.1-1.2g/kg)) Fluid: 2200 ml(1ml/kcal) Based On: [place] [address] Jeor(x 1.2-1.3) Weight Used: Actual wt(88.8kg) Pt expected to meet estimated nutrient needs:  [x]   Yes - with supplements     []  No [] Unable to predict at this time Nutrition Diagnosis:  
1. Inadequate protein-energy intake(improving) related to altered GI fx as evidenced by possible stricture, some reflux, >50-75% meals Goals:   
 Consumption of at least 3 supplement per day and at least 75% of meals in 5-7 days; wt maintenance Monitoring & Evaluation: - Total energy intake - Weight/weight change Previous Nutrition Goals Met:   Yes Previous Recommendations:    Yes 
 
Education & Discharge Needs: 
 [] None Identified 
 [x] Identified and addressed [x] Participated in care plan, discharge planning, and/or interdisciplinary rounds Cultural, Presybeterian and ethnic food preferences identified: None Skin Integrity: [x]Intact  []Other Edema: [x]None []Other Last BM: 1/13 - loose Food Allergies: [x]None []Other Diet Restrictions: Cultural/Rastafari Preference(s): None Anthropometrics:   
Weight Loss Metrics 1/14/2019 12/28/2018 12/21/2018 12/7/2018 9/29/2018 4/12/2018 3/18/2018 Today's Wt 200 lb 9.9 oz 200 lb 196 lb 4.8 oz 190 lb 190 lb 189 lb -  
BMI 27.21 kg/m2 27.12 kg/m2 26.62 kg/m2 25.77 kg/m2 25.77 kg/m2 25.63 kg/m2 24.28 kg/m2 Weight Source: Standing scale (comment) Height: 6' (182.9 cm), Body mass index is 27.21 kg/m². IBW : 80.7 kg (178 lb), % IBW (Calculated): 110 % Usual Body Weight: 90.7 kg (200 lb),   
 
Labs:   
Lab Results Component Value Date/Time Sodium 146 (H) 01/08/2019 03:58 PM  
 Potassium 3.9 01/08/2019 03:58 PM  
 Chloride 109 (H) 01/08/2019 03:58 PM  
 CO2 30 01/08/2019 03:58 PM  
 Glucose 97 01/08/2019 03:58 PM  
 BUN 4 (L) 01/08/2019 03:58 PM  
 Creatinine 0.88 01/08/2019 03:58 PM  
 Calcium 8.4 (L) 01/08/2019 03:58 PM  
 Magnesium 1.6 01/09/2019 03:30 AM  
 Phosphorus 4.7 01/09/2019 03:30 AM  
 Albumin 3.6 01/04/2019 09:02 PM  
 
Lab Results Component Value Date/Time Hemoglobin A1c 6.1 01/20/2018 09:11 AM  
 
Miller Bashir RD Pager #[phone] or [phone]

## 2019-01-14 NOTE — PROGRESS NOTES
Problem: Falls - Risk of 
Goal: *Absence of Falls Document Amanda Tan Fall Risk and appropriate interventions in the flowsheet. Outcome: Progressing Towards Goal 
Fall Risk Interventions: 
  
 
  
 
Medication Interventions: Patient to call before getting OOB History of Falls Interventions: Door open when patient unattended

## 2019-01-14 NOTE — DISCHARGE SUMMARY
Discharge Summary     Patient ID:    Sandra Alva  [id number]  35 y.o.  1975    Admission Date: 1/4/2019    Discharge Date: 1/14/2019    Admission Diagnoses:  1. Ileal pouchitis  2. Chronic narcotic dependence  3. Gastroesophageal reflux disease      Chronic Diagnoses:    Patient Active Problem List   Diagnosis Code    Ileal pouchitis ([facility] K91.850    GERD (gastroesophageal reflux disease) K21.9    Depression with anxiety F41.8    Anemia D64.9    Chronic narcotic dependence ([facility] F11.20    Drug-induced ileus ([facility] K56.7, T50.905A       Discharge Diagnoses:  1. Ileal pouchitis  2. Chronic narcotic dependence  3. Gastroesophageal reflux disease  4. Possible ileal stricture      Hospital Problems as of 1/14/2019 Date Reviewed: 1/4/2019          Codes Class Noted - Resolved POA    Chronic narcotic dependence ([facility] (Chronic) ICD-10-CM: F11.20  ICD-9-CM: 304.91  1/20/2018 - Present Yes        GERD (gastroesophageal reflux disease) (Chronic) ICD-10-CM: K21.9  ICD-9-CM: 530.81  11/20/2016 - Present Yes        * (Principal) Ileal pouchitis ([facility] ICD-10-CM: Z46.406  ICD-9-CM: 569.71  5/1/2004 - Present Yes              Procedures Performed:  No surgical procedures were performed. Discharge Medications:   Current Discharge Medication List      START taking these medications    Details   nut.tx.gluc intol,lf,soy-fiber (BOOST GLUCOSE CONTROL) 0.07-0.8 gram-kcal/mL liqd Take 1 Bottle by mouth two (2) times a day for 30 days. Qty: 60 Bottle, Refills: as needed      nut. suppl. - milk based formula (BOOST PUDDING) pudg One serving by mouth twice per day. Qty: 60 Can, Refills: 1      budesonide (ENTOCORT EC) 3 mg capsule Take 3 Caps by mouth daily for 30 days. Qty: 90 Cap, Refills: 0    Comments: Phoned in to [facility] on 1/12/2019. mesalamine (ROWASA) 4 gram/60 mL enema Insert 60 mL into rectum nightly for 30 days. Qty: 30 Bottle, Refills: 0    Comments:  To be instilled into ileal pouch.    Phoned in to [facility] on 1/12/2019.      sucralfate (CARAFATE) 100 mg/mL suspension 10 mL by Per J Tube route two (2) times a day for 30 days. Qty: 600 mL, Refills: 0    Comments: To be instilled into ileal pouch. Phoned in to [facility] on 1/12/2019. CONTINUE these medications which have NOT CHANGED    Details   oxyCODONE-acetaminophen (PERCOCET 10)  mg per tablet Take 2 Tabs by mouth every four (4) hours as needed. Max Daily Amount: 12 Tabs. Qty: 25 Tab, Refills: 0    Associated Diagnoses: Abdominal pain, generalized      zolpidem (AMBIEN) 10 mg tablet Take 1 Tab by mouth nightly. Max Daily Amount: 10 mg.  Qty: 30 Tab, Refills: 0    Associated Diagnoses: Insomnia, unspecified type      Saccharomyces boulardii (FLORASTOR) 250 mg capsule Take 250 mg by mouth nightly. diclofenac (VOLTAREN) 1 % gel Apply 2 g to affected area four (4) times daily as needed. albuterol (PROVENTIL HFA, VENTOLIN HFA, PROAIR HFA) 90 mcg/actuation inhaler Take 2 Puffs by inhalation every four (4) hours as needed for Wheezing. Qty: 1 Inhaler, Refills: 0      acetaminophen (TYLENOL) 500 mg tablet Take 500 mg by mouth four (4) times daily as needed (mild pain, heache (usually has headaches 1 week after Stelara)). prochlorperazine (COMPAZINE) 10 mg tablet Take 10 mg by mouth two (2) times daily as needed (Moderate Nausea). Ustekinumab (STELARA) 90 mg/mL injection 90 mg by SubCUTAneous route. Every 8 weeks       promethazine (PHENERGAN) 25 mg tablet Take 25 mg by mouth every six (6) hours as needed (Severe Nausea). ondansetron hcl (ZOFRAN, AS HYDROCHLORIDE,) 8 mg tablet Take 8 mg by mouth every twelve (12) hours as needed (Mild Nausea). omeprazole (PRILOSEC) 40 mg capsule Take 40 mg by mouth nightly. dicyclomine (BENTYL) 10 mg capsule Take 10 mg by mouth every six (6) hours as needed. ergocalciferol (ERGOCALCIFEROL) 50,000 unit capsule Take 50,000 Units by mouth every Sunday. pregabalin (LYRICA) 100 mg capsule Take 100 mg by mouth four (4) times daily. cyanocobalamin (VITAMIN B-12) 1,000 mcg/mL injection 1,000 mcg by IntraMUSCular route every Sunday. Indications: Once weekly      diazepam (VALIUM) 5 mg tablet Take 5 mg by mouth two (2) times a day. Diet: As tolerated. This might have to be mostly liquids for now. Recommendations were provided by the dietitian. Activity:  As tolerated. Wound Care:  None. Discharge Condition:  Improved compared to that upon admission. Disposition:  Home. Follow-up Care:  1. Matheus Diamond MD on 1/16/2019 as scheduled. 2.  The patient was instructed to send the radiology disk as quickly as possible to his surgeons in [address] [place] [address] in [place] and to work on arranging follow-up and treatment with them. Significant Diagnostic Studies:    Abdominal Radiographs (1/4/2019):  Ileus versus partial SBO.     Small Bowel Follow-Through (1/11/2019):  Distal small bowel distension with proximal decompression.  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 Retrograde Contrast Study via the Ileostomy (1/14/2019):  No definite stricture demonstrated.         Lab Results   Component Value Date/Time    Glucose (POC) 99 02/14/2018 09:53 PM    Glucose (POC) 192 (H) 02/14/2018 11:25 AM    Glucose (POC) 100 06/17/2017 11:22 AM    Glucose (POC) 137 (H) 06/17/2017 05:54 AM    Glucose (POC) 120 (H) 06/16/2017 09:40 PM         HOSPITAL COURSE & TREATMENT RENDERED:     The patient is a 69-year-old male with a complicated medical and surgical history that includes Crohn's disease and multiple abdominal surgical procedures. His most recent surgical procedure was the conversion of an end-ileostomy to a [place] continent intestinal reservoir (BCIR) on 11/29/2017 in [place], [place].  His most recent previous hospitalization was from 12/15/2018 to 12/21/2018  for treatment of a partial small bowel obstruction and possible ileal pouchitis that appeared to have been sufficiently improved after bowel rest, distal decompression, parenteral fluids, and parenteral metronidazole. During that hospitalization, the ileal pouch was evaluated via flexible endoscopy and no abnormality was identified.     At home, the patient continued to struggle. He experienced continued abdominal pain as well as two days of increasing problems with nausea, vomiting, and high ileostomy output (\"like a faucet\"). He was started on Prednisone and Lomotil by Dr. Mickey Issa, and he communicated with his surgery team in [place] who recommended hospital admission for bowel rest, parenteral fluids, parenteral Flagyl, and a combination of budesonide (Entocort) and sucralfate enemas. He was admitted to the hospital, and those treatments were administered. He received budesonide by mouth, Rowasa enemas, sucralfate enemas, and parenteral Flagyl. His diet was initially restricted to ice chips, then clear liquids, then full liquids, and finally small quantities of solid food. His ileostomy was decompressed distally via an indwelling tube attached to low intermittent suction that was discontinued at intervals designed to allow for the Rowasa and sucralfate to have an effect. Diagnostic tests were performed as listed above.     The patient gradually improved, and he was eventually judged to be fit for discharge.       Signed:  Domenico Collins MD

## 2019-01-14 NOTE — PROGRESS NOTES
Bedside shift change report given to Hue Mathew (oncoming nurse) by Harsha Allison (offgoing nurse). Report included the following information SBAR, Kardex, MAR and Recent Results.

## 2019-01-14 NOTE — PROGRESS NOTES
General Daily Progress Note Admission Date: 1/4/2019 Hospital Day 11 Post-Op Day Not Applicable Subjective: Increased pain and GERD symptoms yesterday afternoon. Didn't feel well enough to go home. Doing a bit better so far today. Objective:  
 
Patient Vitals for the past 24 hrs: 
 BP Temp Pulse Resp SpO2 Weight 01/14/19 0829 100/64 97.6 °F (36.4 °C) 94 16 97 %   
01/14/19 0606 120/75 98.5 °F (36.9 °C) 82 16 98 %   
01/14/19 0538      91 kg (200 lb 9.9 oz) 01/13/19 2133 113/74 98.6 °F (37 °C) 81 16 99 %   
01/13/19 1517 112/76 98 °F (36.7 °C) 91 16 100 %  No intake/output data recorded. 01/12 1901 - 01/14 0700 In: -  
Out: 3125 [address]ta Foot[address] Physical Examination: 
No distress. Data Review No results found for this or any previous visit (from the past 24 hour(s)). Abdominal Radiographs (1/4/2019):  Ileus versus partial SBO. 
  
Small Bowel Follow-Through (1/11/2019):  Distal small bowel distension with proximal decompression.  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 Assessment:  
 
Principal Problem: 
  Ileal pouchitis ([address] (5/1/2004) Active Problems: 
  GERD (gastroesophageal reflux disease) (11/20/2016) Chronic narcotic dependence ([address] (1/20/2018) Clinically, the patient might be as good as he is going to get without surgical intervention. As we discussed yesterday, he  might just have to remain on a liquid diet until he can see his ileal pouch surgeons in [place]. What we have been doing using the continuous suction is a way of temporarily managing the problem, but it is not a solution for it. By now, he might have passed enough of the contrast from the SBFT done on 1/11/2019 to allow a retrograde contrast study to be performed.  The purpose of this study would be to further evaluate what appears to be a distal stricture of the small intestine that will most likely require an operation that I am not willing to do and that I think needs to be done by his other surgeons. Plan:  
Retrograde contrast study via the ileostomy today. Discharge to home later today, on a liquid diet if necessary, with plans to return to his surgeons in [place] for further evaluation and treatment.

## 2019-01-14 NOTE — DISCHARGE INSTRUCTIONS
Patient Discharge Instructions    Fatemeh Arevalo / [id number] : 1975    Admitted 2019 Discharged: 2019       · It is important that you take medications exactly as they are prescribed. · Keep your medication in the bottles provided by the pharmacist and keep a list of the medication names, dosages, and times to be taken in your wallet. · Do not take other medications without consulting your doctor. What To Do At Home  Recommended Diet:  You should continue to consume whatever diet you can tolerate. This might have to be mostly liquids. See the recommendations from the dietitian attached below. Recommended Activity: Activity as tolerated. Follow-Up:  1. Follow up with Dr. Jeffrey Dave on 2019 as scheduled. You will need to see him for renewal of medications. 2.  Send your radiology disk as quickly as possible to your surgeons in [place], and work on arranging follow-up and treatment with them. Information obtained by :  I understand that if any problems occur once I am at home I am to contact my physician. I understand and acknowledge receipt of the instructions indicated above. Physician's or R.N.'s Signature                                                                  Date/Time                                                                                                                                              Patient or Representative Signature                                                          Date/Time      Nutrition Recommendations for Discharge:             Continue Oral Nutrition Supplements at discharge:   Ensure High Protein for Muscle Health and/or Boost/Ensure Pudding or similar product  Twice daily for 30 days unless otherwise directed by your Primary Care Physician.  This product can be purchased at your local grocery store, pharmacy and/or online.       Reyes Peck, RD

## 2019-01-15 NOTE — PROGRESS NOTES
MD order to discharge. IV removed. Rx scriipts given. Discharge instruction revieiwed with pt and wife. Wheelchair assist to discharge area. Wife to drive home private vehicle.

## 2019-01-15 NOTE — PROGRESS NOTES
Problem: Falls - Risk of 
Goal: *Absence of Falls Document [place] Rank Fall Risk and appropriate interventions in the flowsheet. Outcome: Progressing Towards Goal 
Fall Risk Interventions: 
  
 
  
 
Medication Interventions: Patient to call before getting OOB History of Falls Interventions: Door open when patient unattended

## 2019-01-29 ENCOUNTER — HOSPITAL ENCOUNTER (INPATIENT)
Age: 44
LOS: 3 days | Discharge: HOME | DRG: 394 | End: 2019-02-02
Attending: EMERGENCY MEDICINE | Admitting: COLON & RECTAL SURGERY
Payer: COMMERCIAL

## 2019-01-29 ENCOUNTER — APPOINTMENT (OUTPATIENT)
Dept: GENERAL RADIOLOGY | Age: 44
DRG: 394 | End: 2019-01-29
Attending: EMERGENCY MEDICINE
Payer: COMMERCIAL

## 2019-01-29 DIAGNOSIS — R11.2 NON-INTRACTABLE VOMITING WITH NAUSEA, UNSPECIFIED VOMITING TYPE: ICD-10-CM

## 2019-01-29 DIAGNOSIS — R10.84 ABDOMINAL PAIN, GENERALIZED: Primary | ICD-10-CM

## 2019-01-29 LAB
COMMENT, HOLDF: NORMAL
SAMPLES BEING HELD,HOLD: NORMAL

## 2019-01-29 PROCEDURE — 96374 THER/PROPH/DIAG INJ IV PUSH: CPT

## 2019-01-29 PROCEDURE — 74011250636 HC RX REV CODE- 250/636: Performed by: EMERGENCY MEDICINE

## 2019-01-29 PROCEDURE — 85025 COMPLETE CBC W/AUTO DIFF WBC: CPT

## 2019-01-29 PROCEDURE — 99283 EMERGENCY DEPT VISIT LOW MDM: CPT

## 2019-01-29 PROCEDURE — 83605 ASSAY OF LACTIC ACID: CPT

## 2019-01-29 PROCEDURE — 74019 RADEX ABDOMEN 2 VIEWS: CPT

## 2019-01-29 PROCEDURE — 80053 COMPREHEN METABOLIC PANEL: CPT

## 2019-01-29 PROCEDURE — 83690 ASSAY OF LIPASE: CPT

## 2019-01-29 PROCEDURE — 96361 HYDRATE IV INFUSION ADD-ON: CPT

## 2019-01-29 PROCEDURE — 36415 COLL VENOUS BLD VENIPUNCTURE: CPT

## 2019-01-29 PROCEDURE — 99282 EMERGENCY DEPT VISIT SF MDM: CPT

## 2019-01-29 PROCEDURE — 96375 TX/PRO/DX INJ NEW DRUG ADDON: CPT

## 2019-01-29 RX ORDER — ONDANSETRON 2 MG/ML
4 INJECTION INTRAMUSCULAR; INTRAVENOUS
Status: COMPLETED | OUTPATIENT
Start: 2019-01-29 | End: 2019-01-29

## 2019-01-29 RX ORDER — HYDROMORPHONE HYDROCHLORIDE 2 MG/ML
1.5 INJECTION, SOLUTION INTRAMUSCULAR; INTRAVENOUS; SUBCUTANEOUS
Status: DISCONTINUED | OUTPATIENT
Start: 2019-01-29 | End: 2019-02-02 | Stop reason: HOSPADM

## 2019-01-29 RX ADMIN — ONDANSETRON 4 MG: 2 INJECTION INTRAMUSCULAR; INTRAVENOUS at 23:48

## 2019-01-29 RX ADMIN — SODIUM CHLORIDE 1000 ML: 900 INJECTION, SOLUTION INTRAVENOUS at 23:47

## 2019-01-29 RX ADMIN — HYDROMORPHONE HYDROCHLORIDE 1.5 MG: 2 INJECTION, SOLUTION INTRAMUSCULAR; INTRAVENOUS; SUBCUTANEOUS at 23:50

## 2019-01-30 LAB
ALBUMIN SERPL-MCNC: 3.4 G/DL (ref 3.5–5)
ALBUMIN/GLOB SERPL: 0.9 {RATIO} (ref 1.1–2.2)
ALP SERPL-CCNC: 109 U/L (ref 45–117)
ALT SERPL-CCNC: 22 U/L (ref 12–78)
ANION GAP SERPL CALC-SCNC: 10 MMOL/L (ref 5–15)
APPEARANCE UR: CLEAR
AST SERPL-CCNC: 17 U/L (ref 15–37)
BACTERIA URNS QL MICRO: NEGATIVE /HPF
BASOPHILS # BLD: 0 K/UL (ref 0–0.1)
BASOPHILS NFR BLD: 0 % (ref 0–1)
BILIRUB SERPL-MCNC: 0.3 MG/DL (ref 0.2–1)
BILIRUB UR QL: NEGATIVE
BUN SERPL-MCNC: 6 MG/DL (ref 6–20)
BUN/CREAT SERPL: 6 (ref 12–20)
CALCIUM SERPL-MCNC: 8.9 MG/DL (ref 8.5–10.1)
CHLORIDE SERPL-SCNC: 104 MMOL/L (ref 97–108)
CO2 SERPL-SCNC: 28 MMOL/L (ref 21–32)
COLOR UR: NORMAL
CREAT SERPL-MCNC: 0.97 MG/DL (ref 0.7–1.3)
DIFFERENTIAL METHOD BLD: NORMAL
EOSINOPHIL # BLD: 0.3 K/UL (ref 0–0.4)
EOSINOPHIL NFR BLD: 3 % (ref 0–7)
EPITH CASTS URNS QL MICRO: NORMAL /LPF
ERYTHROCYTE [DISTWIDTH] IN BLOOD BY AUTOMATED COUNT: 13.8 % (ref 11.5–14.5)
GLOBULIN SER CALC-MCNC: 3.7 G/DL (ref 2–4)
GLUCOSE SERPL-MCNC: 90 MG/DL (ref 65–100)
GLUCOSE UR STRIP.AUTO-MCNC: NEGATIVE MG/DL
HCT VFR BLD AUTO: 42.1 % (ref 36.6–50.3)
HGB BLD-MCNC: 13.3 G/DL (ref 12.1–17)
HGB UR QL STRIP: NEGATIVE
HYALINE CASTS URNS QL MICRO: NORMAL /LPF (ref 0–5)
IMM GRANULOCYTES # BLD AUTO: 0 K/UL (ref 0–0.04)
IMM GRANULOCYTES NFR BLD AUTO: 0 % (ref 0–0.5)
KETONES UR QL STRIP.AUTO: NEGATIVE MG/DL
LACTATE SERPL-SCNC: 1.2 MMOL/L (ref 0.4–2)
LEUKOCYTE ESTERASE UR QL STRIP.AUTO: NEGATIVE
LIPASE SERPL-CCNC: 69 U/L (ref 73–393)
LYMPHOCYTES # BLD: 2.5 K/UL (ref 0.8–3.5)
LYMPHOCYTES NFR BLD: 32 % (ref 12–49)
MCH RBC QN AUTO: 28.4 PG (ref 26–34)
MCHC RBC AUTO-ENTMCNC: 31.6 G/DL (ref 30–36.5)
MCV RBC AUTO: 89.8 FL (ref 80–99)
MONOCYTES # BLD: 0.4 K/UL (ref 0–1)
MONOCYTES NFR BLD: 6 % (ref 5–13)
NEUTS SEG # BLD: 4.4 K/UL (ref 1.8–8)
NEUTS SEG NFR BLD: 58 % (ref 32–75)
NITRITE UR QL STRIP.AUTO: NEGATIVE
NRBC # BLD: 0 K/UL (ref 0–0.01)
NRBC BLD-RTO: 0 PER 100 WBC
PH UR STRIP: 5.5 [PH] (ref 5–8)
PLATELET # BLD AUTO: 211 K/UL (ref 150–400)
PMV BLD AUTO: 11.4 FL (ref 8.9–12.9)
POTASSIUM SERPL-SCNC: 3.4 MMOL/L (ref 3.5–5.1)
PROT SERPL-MCNC: 7.1 G/DL (ref 6.4–8.2)
PROT UR STRIP-MCNC: NEGATIVE MG/DL
RBC # BLD AUTO: 4.69 M/UL (ref 4.1–5.7)
RBC #/AREA URNS HPF: NORMAL /HPF (ref 0–5)
SODIUM SERPL-SCNC: 142 MMOL/L (ref 136–145)
SP GR UR REFRACTOMETRY: 1.02 (ref 1–1.03)
UR CULT HOLD, URHOLD: NORMAL
UROBILINOGEN UR QL STRIP.AUTO: 0.2 EU/DL (ref 0.2–1)
WBC # BLD AUTO: 7.7 K/UL (ref 4.1–11.1)
WBC URNS QL MICRO: NORMAL /HPF (ref 0–4)

## 2019-01-30 PROCEDURE — 65270000032 HC RM SEMIPRIVATE

## 2019-01-30 PROCEDURE — 94760 N-INVAS EAR/PLS OXIMETRY 1: CPT

## 2019-01-30 PROCEDURE — 74011250636 HC RX REV CODE- 250/636: Performed by: EMERGENCY MEDICINE

## 2019-01-30 PROCEDURE — 74011250636 HC RX REV CODE- 250/636: Performed by: COLON & RECTAL SURGERY

## 2019-01-30 PROCEDURE — 74011250637 HC RX REV CODE- 250/637: Performed by: COLON & RECTAL SURGERY

## 2019-01-30 PROCEDURE — 81001 URINALYSIS AUTO W/SCOPE: CPT

## 2019-01-30 RX ORDER — BUDESONIDE 3 MG/1
9 CAPSULE, COATED PELLETS ORAL DAILY
Status: DISCONTINUED | OUTPATIENT
Start: 2019-01-30 | End: 2019-02-02 | Stop reason: HOSPADM

## 2019-01-30 RX ORDER — ONDANSETRON 2 MG/ML
4 INJECTION INTRAMUSCULAR; INTRAVENOUS
Status: DISCONTINUED | OUTPATIENT
Start: 2019-01-30 | End: 2019-02-02 | Stop reason: HOSPADM

## 2019-01-30 RX ORDER — CYANOCOBALAMIN 1000 UG/ML
1000 INJECTION, SOLUTION INTRAMUSCULAR; SUBCUTANEOUS
Status: DISCONTINUED | OUTPATIENT
Start: 2019-02-03 | End: 2019-02-02 | Stop reason: HOSPADM

## 2019-01-30 RX ORDER — SODIUM CHLORIDE 0.9 % (FLUSH) 0.9 %
5-40 SYRINGE (ML) INJECTION EVERY 8 HOURS
Status: DISCONTINUED | OUTPATIENT
Start: 2019-01-30 | End: 2019-02-02 | Stop reason: HOSPADM

## 2019-01-30 RX ORDER — ENOXAPARIN SODIUM 100 MG/ML
40 INJECTION SUBCUTANEOUS EVERY 24 HOURS
Status: DISCONTINUED | OUTPATIENT
Start: 2019-01-30 | End: 2019-02-02 | Stop reason: HOSPADM

## 2019-01-30 RX ORDER — PREGABALIN 50 MG/1
100 CAPSULE ORAL 4 TIMES DAILY
Status: DISCONTINUED | OUTPATIENT
Start: 2019-01-30 | End: 2019-02-02 | Stop reason: HOSPADM

## 2019-01-30 RX ORDER — SODIUM CHLORIDE 0.9 % (FLUSH) 0.9 %
5-40 SYRINGE (ML) INJECTION AS NEEDED
Status: DISCONTINUED | OUTPATIENT
Start: 2019-01-30 | End: 2019-02-02 | Stop reason: HOSPADM

## 2019-01-30 RX ORDER — SUCRALFATE 1 G/10ML
1 SUSPENSION ORAL 2 TIMES DAILY
Status: DISCONTINUED | OUTPATIENT
Start: 2019-01-30 | End: 2019-02-02 | Stop reason: HOSPADM

## 2019-01-30 RX ORDER — DIAZEPAM 5 MG/1
5 TABLET ORAL 2 TIMES DAILY
Status: DISCONTINUED | OUTPATIENT
Start: 2019-01-30 | End: 2019-02-02 | Stop reason: HOSPADM

## 2019-01-30 RX ORDER — ACETAMINOPHEN 500 MG
500 TABLET ORAL
Status: DISCONTINUED | OUTPATIENT
Start: 2019-01-30 | End: 2019-02-02 | Stop reason: HOSPADM

## 2019-01-30 RX ORDER — PROCHLORPERAZINE MALEATE 10 MG
10 TABLET ORAL
Status: DISCONTINUED | OUTPATIENT
Start: 2019-01-30 | End: 2019-02-02 | Stop reason: HOSPADM

## 2019-01-30 RX ORDER — PROMETHAZINE HYDROCHLORIDE 25 MG/1
25 TABLET ORAL
Status: DISCONTINUED | OUTPATIENT
Start: 2019-01-30 | End: 2019-02-02 | Stop reason: HOSPADM

## 2019-01-30 RX ORDER — DICYCLOMINE HYDROCHLORIDE 10 MG/1
10 CAPSULE ORAL
Status: DISCONTINUED | OUTPATIENT
Start: 2019-01-30 | End: 2019-02-02 | Stop reason: HOSPADM

## 2019-01-30 RX ORDER — ERGOCALCIFEROL 1.25 MG/1
50000 CAPSULE ORAL
Status: DISCONTINUED | OUTPATIENT
Start: 2019-02-03 | End: 2019-02-02 | Stop reason: HOSPADM

## 2019-01-30 RX ORDER — NALOXONE HYDROCHLORIDE 0.4 MG/ML
0.4 INJECTION, SOLUTION INTRAMUSCULAR; INTRAVENOUS; SUBCUTANEOUS AS NEEDED
Status: DISCONTINUED | OUTPATIENT
Start: 2019-01-30 | End: 2019-02-02 | Stop reason: HOSPADM

## 2019-01-30 RX ORDER — ZOLPIDEM TARTRATE 5 MG/1
10 TABLET ORAL
Status: DISCONTINUED | OUTPATIENT
Start: 2019-01-30 | End: 2019-02-02 | Stop reason: HOSPADM

## 2019-01-30 RX ORDER — ALBUTEROL SULFATE 0.83 MG/ML
2.5 SOLUTION RESPIRATORY (INHALATION)
Status: DISCONTINUED | OUTPATIENT
Start: 2019-01-30 | End: 2019-02-02 | Stop reason: HOSPADM

## 2019-01-30 RX ORDER — DEXTROSE MONOHYDRATE, SODIUM CHLORIDE, SODIUM LACTATE, POTASSIUM CHLORIDE, CALCIUM CHLORIDE 5; 600; 310; 179; 20 G/100ML; MG/100ML; MG/100ML; MG/100ML; MG/100ML
INJECTION, SOLUTION INTRAVENOUS CONTINUOUS
Status: DISCONTINUED | OUTPATIENT
Start: 2019-01-30 | End: 2019-02-02 | Stop reason: HOSPADM

## 2019-01-30 RX ORDER — MESALAMINE 4 G/60ML
4 ENEMA RECTAL
Status: DISCONTINUED | OUTPATIENT
Start: 2019-01-30 | End: 2019-02-02 | Stop reason: HOSPADM

## 2019-01-30 RX ORDER — PANTOPRAZOLE SODIUM 40 MG/1
40 TABLET, DELAYED RELEASE ORAL
Status: DISCONTINUED | OUTPATIENT
Start: 2019-01-30 | End: 2019-02-02 | Stop reason: HOSPADM

## 2019-01-30 RX ADMIN — ONDANSETRON HYDROCHLORIDE 4 MG: 2 INJECTION INTRAMUSCULAR; INTRAVENOUS at 10:55

## 2019-01-30 RX ADMIN — PANTOPRAZOLE SODIUM 40 MG: 40 TABLET, DELAYED RELEASE ORAL at 20:56

## 2019-01-30 RX ADMIN — HYDROMORPHONE HYDROCHLORIDE 1.5 MG: 2 INJECTION, SOLUTION INTRAMUSCULAR; INTRAVENOUS; SUBCUTANEOUS at 05:57

## 2019-01-30 RX ADMIN — Medication 10 ML: at 05:50

## 2019-01-30 RX ADMIN — PREGABALIN 100 MG: 50 CAPSULE ORAL at 09:36

## 2019-01-30 RX ADMIN — HYDROMORPHONE HYDROCHLORIDE 1.5 MG: 2 INJECTION, SOLUTION INTRAMUSCULAR; INTRAVENOUS; SUBCUTANEOUS at 03:05

## 2019-01-30 RX ADMIN — ZOLPIDEM TARTRATE 10 MG: 5 TABLET ORAL at 03:06

## 2019-01-30 RX ADMIN — Medication 1 CAPSULE: at 03:06

## 2019-01-30 RX ADMIN — HYDROMORPHONE HYDROCHLORIDE 1.5 MG: 2 INJECTION, SOLUTION INTRAMUSCULAR; INTRAVENOUS; SUBCUTANEOUS at 20:51

## 2019-01-30 RX ADMIN — PREGABALIN 100 MG: 50 CAPSULE ORAL at 20:56

## 2019-01-30 RX ADMIN — HYDROMORPHONE HYDROCHLORIDE 1.5 MG: 2 INJECTION, SOLUTION INTRAMUSCULAR; INTRAVENOUS; SUBCUTANEOUS at 15:03

## 2019-01-30 RX ADMIN — SUCRALFATE 1 G: 1 SUSPENSION ORAL at 10:06

## 2019-01-30 RX ADMIN — PANTOPRAZOLE SODIUM 40 MG: 40 TABLET, DELAYED RELEASE ORAL at 03:06

## 2019-01-30 RX ADMIN — Medication 10 ML: at 20:53

## 2019-01-30 RX ADMIN — ONDANSETRON HYDROCHLORIDE 4 MG: 2 INJECTION INTRAMUSCULAR; INTRAVENOUS at 17:00

## 2019-01-30 RX ADMIN — DEXTROSE MONOHYDRATE, SODIUM CHLORIDE, SODIUM LACTATE, POTASSIUM CHLORIDE, CALCIUM CHLORIDE: 5; 600; 310; 179; 20 INJECTION, SOLUTION INTRAVENOUS at 05:49

## 2019-01-30 RX ADMIN — DEXTROSE MONOHYDRATE, SODIUM CHLORIDE, SODIUM LACTATE, POTASSIUM CHLORIDE, CALCIUM CHLORIDE: 5; 600; 310; 179; 20 INJECTION, SOLUTION INTRAVENOUS at 15:35

## 2019-01-30 RX ADMIN — HYDROMORPHONE HYDROCHLORIDE 1.5 MG: 2 INJECTION, SOLUTION INTRAMUSCULAR; INTRAVENOUS; SUBCUTANEOUS at 18:00

## 2019-01-30 RX ADMIN — PROMETHAZINE HYDROCHLORIDE 25 MG: 25 TABLET ORAL at 10:55

## 2019-01-30 RX ADMIN — Medication 1 CAPSULE: at 20:57

## 2019-01-30 RX ADMIN — BUDESONIDE 9 MG: 3 CAPSULE, GELATIN COATED ORAL at 09:36

## 2019-01-30 RX ADMIN — HYDROMORPHONE HYDROCHLORIDE 1.5 MG: 2 INJECTION, SOLUTION INTRAMUSCULAR; INTRAVENOUS; SUBCUTANEOUS at 09:06

## 2019-01-30 RX ADMIN — SUCRALFATE 1 G: 1 SUSPENSION ORAL at 20:54

## 2019-01-30 RX ADMIN — Medication 10 ML: at 13:12

## 2019-01-30 RX ADMIN — HYDROMORPHONE HYDROCHLORIDE 1.5 MG: 2 INJECTION, SOLUTION INTRAMUSCULAR; INTRAVENOUS; SUBCUTANEOUS at 12:02

## 2019-01-30 RX ADMIN — PROMETHAZINE HYDROCHLORIDE 25 MG: 25 TABLET ORAL at 17:01

## 2019-01-30 RX ADMIN — DIAZEPAM 5 MG: 5 TABLET ORAL at 09:36

## 2019-01-30 RX ADMIN — PREGABALIN 100 MG: 50 CAPSULE ORAL at 17:00

## 2019-01-30 RX ADMIN — MESALAMINE 4 G: 4 ENEMA RECTAL at 03:16

## 2019-01-30 RX ADMIN — MESALAMINE 4 G: 4 ENEMA RECTAL at 21:01

## 2019-01-30 RX ADMIN — PREGABALIN 100 MG: 50 CAPSULE ORAL at 13:12

## 2019-01-30 RX ADMIN — ZOLPIDEM TARTRATE 10 MG: 5 TABLET ORAL at 21:00

## 2019-01-30 RX ADMIN — DIAZEPAM 5 MG: 5 TABLET ORAL at 17:01

## 2019-01-30 NOTE — ROUTINE PROCESS
TRANSFER - OUT REPORT: 
 
Verbal report given to ROSALBA Velasquez(name) on [place]  being transferred to  [address](unit[facility] for routine progression of care Report consisted of patients Situation, Background, Assessment and  
Recommendations(SBAR). Information from the following report(s) SBAR, ED Summary, Intake/Output, MAR and Recent Results was reviewed with the receiving nurse. Lines:  
Peripheral IV 01/29/19 Left Hand (Active) Site Assessment Clean, dry, & intact 1/29/2019 11:41 PM  
Phlebitis Assessment 0 1/29/2019 11:41 PM  
Infiltration Assessment 0 1/29/2019 11:41 PM  
Dressing Status Clean, dry, & intact 1/29/2019 11:41 PM  
Dressing Type Transparent 1/29/2019 11:41 PM  
Hub Color/Line Status Pink;Patent; Flushed 1/29/2019 11:41 PM  
Action Taken Blood drawn 1/29/2019 11:41 PM  
  
 
Opportunity for questions and clarification was provided. Patient transported with: 
transport

## 2019-01-30 NOTE — PROGRESS NOTES
A Spiritual Care Partner Volunteer visited patient in Rm 604 on 1/30/2019. Documented by: 
Chaplain Torres MDiv, MS, [facility] 
[phone]

## 2019-01-30 NOTE — ED TRIAGE NOTES
\"I've had a couple of bad days. I have abdominal pain, vomiting, and pouchitis. \"  Pt is supposed to be following up with Specialist in [place], but called Dr. Mauricio Carter that wishes patient to be admitted.

## 2019-01-30 NOTE — H&P
Colorectal Surgery Admission History and Physical Examination Note NAME:  Umesh Love :   1975 MRN:   [id number] Admission Date: 2019 Chief Complaint:  Abdominal pain, vomiting, and high ileostomy output. History of Present Illness: The patient is a 71-year-old whose complex medical history has been documented repeatedly in other notes. It includes Crohn's disease and multiple abdominal surgical procedures, the most recent of which was the conversion of an end-ileostomy to a [place] continent intestinal reservoir (BCIR) on 2017 in [facility]. His most recent previous hospitalization was from 2019 to 2019 for treatment of ileal pouchitis. In addition to distal decompression of his ileal pouch, he received parenteral fluids, parenteral Flagyl, oral Entocort, and Rowasa and sucralfate enemas. While hospitalized, he also underwent a small bowel follow-through and a retrograde contrast study via the ileostomy. These studies produced apparently contradictory information regarding the presence of a distal ileal stricture. By 2019, he appeared to have improved sufficiently to continue his treatment at home. He did reasonably well initially, but ran out of the enemas approximately one week ago then began to decline. Over the last few days he has had increased abdominal pain, high ileostomy output, and increasing difficulty tolerating oral nutrition and fluids. He has been vomiting and he finally reached the point of calling me for help. I instructed him to return to the ER for evalution and probable admission. I reviewed his laboratory studies and radiographs during the night as they became available, and I admitted him for treatment. PMH: 
Past Medical History:  
Diagnosis Date  Anal fistula The patient no longer has an anus.  Anal stenosis The patient no longer has an anus.  Chronic kidney disease  [facility]'s disease ([facility]  GERD (gastroesophageal reflux disease)  H/O ulcerative colitis Symptoms began in 1996. Total proctocolectomy was performed in 2004. Patient later developed Crohn's disease.  Hiatal hernia  Ileal pouchitis ([facility] [address] 05/2004 The patient no longer has a pouch.  Nausea & vomiting Combination of Scopalamine patch & Zofran IV worked well in past  
 Numbness in right leg Chronic.  Opioid dependence ([facility] [address] History of opioid dependence requiring a detox program.  
 Psychiatric disorder   
 depression and anxiety  Skin lesion of back 3/17/2014  Syncopal episodes PSH: 
Past Surgical History:  
Procedure Laterality Date  ABDOMEN SURGERY PROC UNLISTED  3/25/2004 Total proctocolectomy with creation of ileoanal J-pouch and loop ileostomy; Dr. Ulises Mckeon.  COLONOSCOPY N/A 4/12/2018 Flexible endoscopy via ileostomy (NOT a colonoscopy); Mendez Roche MD.  
 COLONOSCOPY N/A 12/18/2018 COLONOSCOPY via Iliostomy with Peds Scope performed by Zoe Ayala MD at [address] [place] [address]  12/2016 Dr. Shona Sharma   GI  5/2/2004 Examination under anesthesia with endoscopic evaluation of ileoanal pouch and placement of drain; Dr. Ulises Mckeon.   GI  5/25/2004 Ileostomy closure with small bowel resection and anastomosis; Dr. Ulises Mckeon.   GI  5/24/2012 Examination under anesthesia, anal dilatation, anal biopsy, and placement of draining seton; Dr. Ulises Mckeon.   GI  7/3/2012 Incision and drainage of perirectal abscess and rigid endoscopy of ileoanal pouch; Dr. Ulises Mckeon.   GI  7/31/2012 Incision and drainage of perirectal abscess, placement of draining seton, and anal dilatation; Dr. Ulises Mckeon.   GI  1/22/2013 Repair of anal fistulas with debridement, partial fistulectomy, and flap closure; Dr. Ulises Mckeon.   GI  5/17/2013 Examination under anesthesia, partial fistulotomy,  and placement of draining setons; Dr. Mika Hatch.   GI  2013 Anal fistulotomy and application of ACell micromatrix; Dr. Mika Hatch.   GI  2014 Examination under anesthesia and dilation of anal canal with rigid proctoscopy; Tammie Vera MD.  
[facility]  2015 Creation of Tanda Mayans continent intestinal reservoir (BCIR), abdominoperineal resection of ileoanal J-pouch; lysis of adhesions, and gastrostomy; Karol Parekh MD ([place], [place])   GI  2015 Stoma revision; Li Parekh MD ([place], [place])   GI  10/29/2015 Extensive lysis of adhesions, resection of continent intestinal reservoir, repair of serosal tears, and creation of end-ileostomy; Dr. Mika Hatch.   GI  2017 Laparotomy, extensive lysis of adhesions and revision of ileostomy; Dr. Mika Hatch. [facility] [place] ORTHOPAEDIC  2008 Left ACL repair  HX OTHER SURGICAL  2017 Conversion of end-ileostomy to BCIR ([address]); [facility] HX UROLOGICAL  2017 Cystoscopy and placement of bilateral temporary ureteral catheters; Holden Valle MD. Home Medications: 
Prior to Admission Medications Prescriptions Last Dose Informant Patient Reported? Taking? Saccharomyces boulardii (FLORASTOR) 250 mg capsule   Yes No  
Sig: Take 250 mg by mouth nightly. Ustekinumab (STELARA) 90 mg/mL injection  Self Yes No  
Si mg by SubCUTAneous route. Every 8 weeks   
acetaminophen (TYLENOL) 500 mg tablet   Yes No  
Sig: Take 500 mg by mouth four (4) times daily as needed (mild pain, heache (usually has headaches 1 week after Stelara)). albuterol (PROVENTIL HFA, VENTOLIN HFA, PROAIR HFA) 90 mcg/actuation inhaler   No No  
Sig: Take 2 Puffs by inhalation every four (4) hours as needed for Wheezing.   
budesonide (ENTOCORT EC) 3 mg capsule   No No  
 Sig: Take 3 Caps by mouth daily for 30 days. cyanocobalamin (VITAMIN B-12) 1,000 mcg/mL injection  Self Yes No  
Si,000 mcg by IntraMUSCular route every . Indications: Once weekly  
diazepam (VALIUM) 5 mg tablet  Self Yes No  
Sig: Take 5 mg by mouth two (2) times a day. diclofenac (VOLTAREN) 1 % gel   Yes No  
Sig: Apply 2 g to affected area four (4) times daily as needed. dicyclomine (BENTYL) 10 mg capsule  Self Yes No  
Sig: Take 10 mg by mouth every six (6) hours as needed. ergocalciferol (ERGOCALCIFEROL) 50,000 unit capsule  Self Yes No  
Sig: Take 50,000 Units by mouth every . mesalamine (ROWASA) 4 gram/60 mL enema   No No  
Sig: Insert 60 mL into rectum nightly for 30 days. nut.suppl. - milk based formula (BOOST PUDDING) pudg   No No  
Sig: One serving by mouth twice per day.  
nut.tx.gluc intol,lf,soy-fiber (BOOST GLUCOSE CONTROL) 0.07-0.8 gram-kcal/mL liqd   No No  
Sig: Take 1 Bottle by mouth two (2) times a day for 30 days. omeprazole (PRILOSEC) 40 mg capsule  Self Yes No  
Sig: Take 40 mg by mouth nightly. ondansetron hcl (ZOFRAN, AS HYDROCHLORIDE,) 8 mg tablet  Self Yes No  
Sig: Take 8 mg by mouth every twelve (12) hours as needed (Mild Nausea). oxyCODONE-acetaminophen (PERCOCET 10)  mg per tablet   No No  
Sig: Take 2 Tabs by mouth every four (4) hours as needed. Max Daily Amount: 12 Tabs. pregabalin (LYRICA) 100 mg capsule  Self Yes No  
Sig: Take 100 mg by mouth four (4) times daily. prochlorperazine (COMPAZINE) 10 mg tablet   Yes No  
Sig: Take 10 mg by mouth two (2) times daily as needed (Moderate Nausea). promethazine (PHENERGAN) 25 mg tablet  Self Yes No  
Sig: Take 25 mg by mouth every six (6) hours as needed (Severe Nausea). sucralfate (CARAFATE) 100 mg/mL suspension   No No  
Sig: 10 mL by Per J Tube route two (2) times a day for 30 days. zolpidem (AMBIEN) 10 mg tablet   No No  
Sig: Take 1 Tab by mouth nightly. Max Daily Amount: 10 mg. Facility-Administered Medications: None Hospital Medications: 
Current Facility-Administered Medications Medication Dose Route Frequency  acetaminophen (TYLENOL) tablet 500 mg  500 mg Oral QID PRN  
 albuterol (PROVENTIL VENTOLIN) nebulizer solution 2.5 mg  2.5 mg Nebulization Q4H PRN  
 budesonide (ENTOCORT EC) capsule 9 mg  9 mg Oral DAILY  [START ON 2/3/2019] cyanocobalamin (VITAMIN B12) injection 1,000 mcg  1,000 mcg IntraMUSCular every Sunday  diazePAM (VALIUM) tablet 5 mg  5 mg Oral BID  
 . PHARMACY TO SUBSTITUTE PER PROTOCOL (Reordered from: diclofenac (VOLTAREN) 1 % gel)    Per Protocol  dicyclomine (BENTYL) capsule 10 mg  10 mg Oral Q6H PRN  
 [START ON 2/3/2019] ergocalciferol capsule 50,000 Units  50,000 Units Oral every Sunday  mesalamine (ROWASA) 4 gram/60 mL enema 4 g  4 g Rectal QHS  pantoprazole (PROTONIX) tablet 40 mg  40 mg Oral QHS  pregabalin (LYRICA) capsule 100 mg  100 mg Oral QID  prochlorperazine (COMPAZINE) tablet 10 mg  10 mg Oral BID PRN  promethazine (PHENERGAN) tablet 25 mg  25 mg Oral Q6H PRN  
 lactobac ac& pc-s.therm-b.anim (VALERIE Q/RISAQUAD)  1 Cap Oral QHS  sucralfate (CARAFATE) 100 mg/mL oral suspension 1 g  1 g Per J Tube BID  zolpidem (AMBIEN) tablet 10 mg  10 mg Oral QHS  sodium chloride (NS) flush 5-40 mL  5-40 mL IntraVENous Q8H  
 sodium chloride (NS) flush 5-40 mL  5-40 mL IntraVENous PRN  
 naloxone (NARCAN) injection 0.4 mg  0.4 mg IntraVENous PRN  
 ondansetron (ZOFRAN) injection 4 mg  4 mg IntraVENous Q4H PRN  
 enoxaparin (LOVENOX) injection 40 mg  40 mg SubCUTAneous Q24H  
 dextrose 5% - LR with KCl 20 mEq/L infusion   IntraVENous CONTINUOUS  
 HYDROmorphone (PF) (DILAUDID) injection 1.5 mg  1.5 mg IntraVENous Q3H PRN Allergies: Allergies Allergen Reactions  Remicade [Infliximab] Anaphylaxis and Shortness of Breath  Bactrim [Sulfamethoprim Ds] Other (comments) Increased GI distress.  Morphine Nausea Only Tolerates Dilaudid  Nsaids (Non-Steroidal Anti-Inflammatory Drug) Other (comments) Stomach ulcers Family History: 
Family History Problem Relation Age of Onset  Cancer Father  Asthma Neg Hx  Diabetes Neg Hx   
 Heart Disease Neg Hx  Hypertension Neg Hx  Stroke Neg Hx  Malignant Hyperthermia Neg Hx  Pseudocholinesterase Deficiency Neg Hx  Delayed Awakening Neg Hx  Post-op Nausea/Vomiting Neg Hx Social History: 
Social History Tobacco Use  Smoking status: Current Every Day Smoker Packs/day: 0.50 Years: 7.00 Pack years: 3.50 Last attempt to quit: 2017 Years since quittin.0  Smokeless tobacco: Never Used Substance Use Topics  Alcohol use: No  
 
 
Review of Systems: 
 
Symptom Y/N Comments  Symptom Y/N Comments Fever/Chills N   Chest Pain N   
Cough N   Abdominal Pain Y Sputum N   Joint Pain SOB/CEDILLO N   Pruritis/Rash Nausea/vomit Y   Tolerating PT/OT Diarrhea    Tolerating Diet N Constipation N   Other Could NOT obtain due to:   
 
 
Objective:  
 
Patient Vitals for the past 8 hrs: 
 BP Temp Pulse Resp SpO2  
19 0926 107/68 98.4 °F (36.9 °C) 83 18 96 % No intake/output data recorded.  190 -  0700 In: 1000 [I.V.:1000] Out: 800 PHYSICAL EXAMINATION:   
GENERAL:  Somewhat uncomfortable. HEENT:  Anicteric. HEART:  Regular rate. ABDOMEN:  Soft. Non-distended. Diffusely tender without rebound tenderness or involuntary guarding. No palpable masses. Multiple scars. Right lower quadrant ileostomy draining dark watery fluid. NEURO:  Alert and oriented. Data Review Recent Labs  
  19 
2340 WBC 7.7 HGB 13.3 HCT 42.1  Recent Labs  
  19 
2340   
K 3.4*  
 CO2 28 BUN 6  
CREA 0.97 GLU 90  
CA 8.9 Recent Labs  
  19 
2340 SGOT 17  
  
TP 7.1 ALB 3.4*  
GLOB 3.7 LPSE 69* No results for input(s): INR, PTP, APTT in the last 72 hours. No lab exists for component: INREXT Assessment and Plan: The same problem for which the patient has been admitted multiple times and for which he has spent numerous days in the hospital over the last year is still occurring and probably will not be solved unless he can see his surgeons in [place]. He seems to have a mechanical problem as well as an element of pouchitis, and both of these are present in the setting of narcotic dependence. Arrrangments for a trip to see the surgery team in [place] for evaluation and treatment are in the process of being made, but for now he will need to be treated as before with bowel rest, parenteral fluids, parenteral Flagyl, distal decompression of the ileal pouch, and Rowasa and sucralfate enemas. The endpoint this time does not need to be tolerance of solid food. Instead, if he is metabolically stable and able to maintain adequate hydration and nutrition with oral liquids then it should be possible to discharge him. Principal Problem: 
  Ileal pouchitis ([address] (5/1/2004) Active Problems: 
  GERD (gastroesophageal reflux disease) (11/20/2016) Chronic narcotic dependence ([address] (1/20/2018)

## 2019-01-30 NOTE — ED PROVIDER NOTES
44 y.o. male with complicated past medical history significant for ulcerative colitis, Crohn's disease, ileal pouchitis, GERD, CKD, opiod dependence, s/p multiple surgeries (see list) including BCIR creation, who presents ambulatory to the ED with cc of abdominal pain. Pt states he was referred to the ED by his gastroenterologist for treatment and admission. Pt reports 8/10 left-sided periumbilical abdominal pain x 3 days in the setting of extensive gastrointestinal medical and surgical history. He endorses associated nausea, vomiting. Tmax 99.2 measured at home. He states symptoms worsened last night, and he has been vomiting \"bile\" and has had increased ostomy output. Pt notes PSHx of BCIR creation on 11/29/2017, which he uses to void stool via self-inserted catheter usually 3-4 times per day; today, he reports voiding 10 times. He endorses use of Percocet for pain, prescribed by GI and last used at ~4:00PM today without improvement. Per old chart, pt was recently admitted between 1/1/19 - 1/15/19 for ileal pouchitis, and he is arranging a follow-up appointment with his surgeon in [place]. Pt specifically denies any hematuria or dysuria. There are no other acute medical concerns at this time. Social Hx: daily Tobacco use (0.5 ppd), denies EtOH use, denies Illicit Drug use GI: Claudia Rich MD 
 
Note written by Virgen Purdy, as dictated by Goldie Hernandez MD 11:00 PM 
 
 
 
The history is provided by the patient. No  was used. Past Medical History:  
Diagnosis Date  Anal fistula The patient no longer has an anus.  Anal stenosis The patient no longer has an anus.  Chronic kidney disease  Crohn's disease ([address]  GERD (gastroesophageal reflux disease)  H/O ulcerative colitis Symptoms began in 1996. Total proctocolectomy was performed in 2004. Patient later developed Crohn's disease.  Hiatal hernia  Ileal pouchitis ([address] 05/2004 The patient no longer has a pouch.  Nausea & vomiting Combination of Scopalamine patch & Zofran IV worked well in past  
 Numbness in right leg Chronic.  Opioid dependence ([address] History of opioid dependence requiring a detox program.  
 Psychiatric disorder   
 depression and anxiety  Skin lesion of back 3/17/2014  Syncopal episodes Past Surgical History:  
Procedure Laterality Date  ABDOMEN SURGERY PROC UNLISTED  3/25/2004 Total proctocolectomy with creation of ileoanal J-pouch and loop ileostomy; Dr. Yehuda Eason.  COLONOSCOPY N/A 4/12/2018 Flexible endoscopy via ileostomy (NOT a colonoscopy); Caryle Fitz, MD.  
 COLONOSCOPY N/A 12/18/2018 COLONOSCOPY via Iliostomy with Peds Scope performed by Remington Spence MD at [address] [place] [address]  12/2016 Dr. Emily Gallego   GI  5/2/2004 Examination under anesthesia with endoscopic evaluation of ileoanal pouch and placement of drain; Dr. Yehuda Eason.   GI  5/25/2004 Ileostomy closure with small bowel resection and anastomosis; Dr. Yehuda Eason.   GI  5/24/2012 Examination under anesthesia, anal dilatation, anal biopsy, and placement of draining seton; Dr. Yehuda Eason.   GI  7/3/2012 Incision and drainage of perirectal abscess and rigid endoscopy of ileoanal pouch; Dr. Yehuda Eason.   GI  7/31/2012 Incision and drainage of perirectal abscess, placement of draining seton, and anal dilatation; Dr. Yehuda Eason.   GI  1/22/2013 Repair of anal fistulas with debridement, partial fistulectomy, and flap closure; Dr. Yehuda Eason.   GI  5/17/2013 Examination under anesthesia, partial fistulotomy,  and placement of draining setons; Dr. Yehuda Eason.   GI  8/6/2013 Anal fistulotomy and application of ACell micromatrix; Dr. Yehuda Eason.   GI  2/20/2014  Examination under anesthesia and dilation of anal canal with rigid proctoscopy; Tammie Vera MD.  
[facility]  2015 Creation of Tanda Mayans continent intestinal reservoir (BCIR), abdominoperineal resection of ileoanal J-pouch; lysis of adhesions, and gastrostomy; Karol Parekh MD ([place], [place])  HX GI  2015 Stoma revision; Li Parekh MD ([place], [place])  HX GI  10/29/2015 Extensive lysis of adhesions, resection of continent intestinal reservoir, repair of serosal tears, and creation of end-ileostomy; Dr. Mika Hatch.  HX GI  2017 Laparotomy, extensive lysis of adhesions and revision of ileostomy; Dr. Mika Hatch. [facility] [place] [facility] [place] ORTHOP[facility]EDIC   Left ACL repair  HX OTHER SURGICAL  2017 Conversion of end-ileostomy to BCIR ([address]); [facility] HX UROLOGICAL  2017 Cystoscopy and placement of bilateral temporary ureteral catheters; Holden Valle MD.  
 
   
Family History:  
Problem Relation Age of Onset  Cancer Father  Asthma Neg Hx  Diabetes Neg Hx   
 Heart Disease Neg Hx  Hypertension Neg Hx  Stroke Neg Hx  Malignant Hyperthermia Neg Hx  Pseudocholinesterase Deficiency Neg Hx  Delayed Awakening Neg Hx  Post-op Nausea/Vomiting Neg Hx Social History Socioeconomic History  Marital status:  Spouse name: Not on file  Number of children: Not on file  Years of education: Not on file  Highest education level: Not on file Social Needs  Financial resource strain: Not on file  Food insecurity - worry: Not on file  Food insecurity - inability: Not on file  Transportation needs - medical: Not on file  Transportation needs - non-medical: Not on file Occupational History  Not on file Tobacco Use  Smoking status: Current Every Day Smoker Packs/day: 0.50 Years: 7.00 Pack years: 3.50 Last attempt to quit: 2017 Years since quittin.0  Smokeless tobacco: Never Used Substance and Sexual Activity  Alcohol use: No  
 Drug use: No  
 Sexual activity: Not on file Other Topics Concern  Not on file Social History Narrative  Not on file ALLERGIES: Remicade [infliximab]; Bactrim [sulfamethoprim ds]; Morphine; and Nsaids (non-steroidal anti-inflammatory drug) Review of Systems Constitutional: Negative for appetite change and fever. HENT: Negative for congestion, nosebleeds and sore throat. Eyes: Negative for discharge. Respiratory: Negative for cough and shortness of breath. Cardiovascular: Negative for chest pain. Gastrointestinal: Positive for abdominal pain, nausea and vomiting. Genitourinary: Negative for dysuria and hematuria. Musculoskeletal: Negative. Skin: Negative for rash. Neurological: Negative for weakness and headaches. Hematological: Negative for adenopathy. Psychiatric/Behavioral: Negative. All other systems reviewed and are negative. Vitals:  
 01/29/19 2250 SpO2: 100% Physical Exam  
Constitutional: He is oriented to person, place, and time. He appears well-developed and well-nourished. HENT:  
Head: Normocephalic and atraumatic. Mouth/Throat: Oropharynx is clear and moist.  
Eyes: Conjunctivae are normal.  
Neck: Normal range of motion. Neck supple. Cardiovascular: Regular rhythm and normal heart sounds. Tachycardia present. Pulmonary/Chest: Effort normal and breath sounds normal.  
Abdominal: Soft. Bowel sounds are normal. There is no tenderness. Multiple surgical incisions on his abdomen. When distracted, he does not appear to be tender. Entrance to intestinal reservoir does not appear to have any erythema or drainage. Musculoskeletal: Normal range of motion. He exhibits no edema or tenderness. Neurological: He is alert and oriented to person, place, and time. Skin: Skin is warm and dry. Psychiatric: He has a normal mood and affect. His behavior is normal.  
Nursing note and vitals reviewed. Note written by Virgen Barrera, as dictated by Laura Rendon MD 11:00 PM 
 
MDM Procedures 10:58 PM 
Old Chart Review: Recent admission 1/1/19 - 1/15/19. Evaluated with barium enema on 1/14/19 for stricture proximal to ileal pouch, and no stricture was found. CONSULT NOTE: 
11:14 PM Laura Rendon MD spoke with Dr. Uday Durbin, Consult for Colorectal Surgery. Discussed available diagnostic tests and clinical findings. Dr. Aden Lawrence recommends start with plain films and labs and he will admit pt from home and see in the morning given normal results. 12:51 Cruz Francois Mc the read and plain images of possible recurrence of SBO. Does not feel pt needs CT scan at this time, and he will admit. A/P: 
1. Abdominal pain - hx Crohns disease. Admitted by colorectal surgery. 2. N/V - none in ED.

## 2019-01-30 NOTE — PROGRESS NOTES
Primary Nurse Divina Betancourt RN and Cayetano Griffith RN performed a dual skin assessment on this patient No impairment noted Eduardo score is 22 Two vertical midline scars on abdomen and internal  Pouch with opening right lower abdomen.

## 2019-01-30 NOTE — ED NOTES
Pt resting comfortably in bed in L lateral recumbent position c/o 8/10 pain, medications recently given. Pt aware staff needs urine sample, urinal at bedside. Pt has no other complaints at this time. VSS. Call bell within reach. Will continue to monitor and assess.

## 2019-01-31 LAB
ANION GAP SERPL CALC-SCNC: 7 MMOL/L (ref 5–15)
BASOPHILS # BLD: 0 K/UL (ref 0–0.1)
BASOPHILS NFR BLD: 0 % (ref 0–1)
BUN SERPL-MCNC: 5 MG/DL (ref 6–20)
BUN/CREAT SERPL: 7 (ref 12–20)
CALCIUM SERPL-MCNC: 8.6 MG/DL (ref 8.5–10.1)
CHLORIDE SERPL-SCNC: 108 MMOL/L (ref 97–108)
CO2 SERPL-SCNC: 28 MMOL/L (ref 21–32)
CREAT SERPL-MCNC: 0.71 MG/DL (ref 0.7–1.3)
DIFFERENTIAL METHOD BLD: ABNORMAL
EOSINOPHIL # BLD: 0.3 K/UL (ref 0–0.4)
EOSINOPHIL NFR BLD: 4 % (ref 0–7)
ERYTHROCYTE [DISTWIDTH] IN BLOOD BY AUTOMATED COUNT: 13.3 % (ref 11.5–14.5)
GLUCOSE SERPL-MCNC: 97 MG/DL (ref 65–100)
HCT VFR BLD AUTO: 35.8 % (ref 36.6–50.3)
HGB BLD-MCNC: 11.5 G/DL (ref 12.1–17)
IMM GRANULOCYTES # BLD AUTO: 0.1 K/UL (ref 0–0.04)
IMM GRANULOCYTES NFR BLD AUTO: 1 % (ref 0–0.5)
LYMPHOCYTES # BLD: 1.9 K/UL (ref 0.8–3.5)
LYMPHOCYTES NFR BLD: 24 % (ref 12–49)
MAGNESIUM SERPL-MCNC: 1.7 MG/DL (ref 1.6–2.4)
MCH RBC QN AUTO: 29.2 PG (ref 26–34)
MCHC RBC AUTO-ENTMCNC: 32.1 G/DL (ref 30–36.5)
MCV RBC AUTO: 90.9 FL (ref 80–99)
MONOCYTES # BLD: 0.4 K/UL (ref 0–1)
MONOCYTES NFR BLD: 5 % (ref 5–13)
NEUTS SEG # BLD: 5.4 K/UL (ref 1.8–8)
NEUTS SEG NFR BLD: 67 % (ref 32–75)
NRBC # BLD: 0 K/UL (ref 0–0.01)
NRBC BLD-RTO: 0 PER 100 WBC
PHOSPHATE SERPL-MCNC: 3.3 MG/DL (ref 2.6–4.7)
PLATELET # BLD AUTO: 177 K/UL (ref 150–400)
PMV BLD AUTO: 11.4 FL (ref 8.9–12.9)
POTASSIUM SERPL-SCNC: 4 MMOL/L (ref 3.5–5.1)
RBC # BLD AUTO: 3.94 M/UL (ref 4.1–5.7)
SODIUM SERPL-SCNC: 143 MMOL/L (ref 136–145)
WBC # BLD AUTO: 8.1 K/UL (ref 4.1–11.1)

## 2019-01-31 PROCEDURE — 74011250637 HC RX REV CODE- 250/637: Performed by: COLON & RECTAL SURGERY

## 2019-01-31 PROCEDURE — 85025 COMPLETE CBC W/AUTO DIFF WBC: CPT

## 2019-01-31 PROCEDURE — 36415 COLL VENOUS BLD VENIPUNCTURE: CPT

## 2019-01-31 PROCEDURE — 65270000032 HC RM SEMIPRIVATE

## 2019-01-31 PROCEDURE — 84100 ASSAY OF PHOSPHORUS: CPT

## 2019-01-31 PROCEDURE — 83735 ASSAY OF MAGNESIUM: CPT

## 2019-01-31 PROCEDURE — 77030034696 HC CATH URETH FOL 2W BARD -A

## 2019-01-31 PROCEDURE — 80048 BASIC METABOLIC PNL TOTAL CA: CPT

## 2019-01-31 PROCEDURE — 74011250636 HC RX REV CODE- 250/636: Performed by: EMERGENCY MEDICINE

## 2019-01-31 PROCEDURE — 74011250636 HC RX REV CODE- 250/636: Performed by: COLON & RECTAL SURGERY

## 2019-01-31 RX ADMIN — DEXTROSE MONOHYDRATE, SODIUM CHLORIDE, SODIUM LACTATE, POTASSIUM CHLORIDE, CALCIUM CHLORIDE: 5; 600; 310; 179; 20 INJECTION, SOLUTION INTRAVENOUS at 00:14

## 2019-01-31 RX ADMIN — HYDROMORPHONE HYDROCHLORIDE 1.5 MG: 2 INJECTION, SOLUTION INTRAMUSCULAR; INTRAVENOUS; SUBCUTANEOUS at 03:03

## 2019-01-31 RX ADMIN — HYDROMORPHONE HYDROCHLORIDE 1.5 MG: 2 INJECTION, SOLUTION INTRAMUSCULAR; INTRAVENOUS; SUBCUTANEOUS at 06:04

## 2019-01-31 RX ADMIN — PANTOPRAZOLE SODIUM 40 MG: 40 TABLET, DELAYED RELEASE ORAL at 21:09

## 2019-01-31 RX ADMIN — ONDANSETRON HYDROCHLORIDE 4 MG: 2 INJECTION INTRAMUSCULAR; INTRAVENOUS at 08:35

## 2019-01-31 RX ADMIN — DEXTROSE MONOHYDRATE, SODIUM CHLORIDE, SODIUM LACTATE, POTASSIUM CHLORIDE, CALCIUM CHLORIDE: 5; 600; 310; 179; 20 INJECTION, SOLUTION INTRAVENOUS at 10:59

## 2019-01-31 RX ADMIN — ZOLPIDEM TARTRATE 10 MG: 5 TABLET ORAL at 21:09

## 2019-01-31 RX ADMIN — HYDROMORPHONE HYDROCHLORIDE 1.5 MG: 2 INJECTION, SOLUTION INTRAMUSCULAR; INTRAVENOUS; SUBCUTANEOUS at 00:08

## 2019-01-31 RX ADMIN — HYDROMORPHONE HYDROCHLORIDE 1.5 MG: 2 INJECTION, SOLUTION INTRAMUSCULAR; INTRAVENOUS; SUBCUTANEOUS at 12:22

## 2019-01-31 RX ADMIN — DIAZEPAM 5 MG: 5 TABLET ORAL at 17:20

## 2019-01-31 RX ADMIN — ACETAMINOPHEN 500 MG: 500 TABLET ORAL at 18:43

## 2019-01-31 RX ADMIN — HYDROMORPHONE HYDROCHLORIDE 1.5 MG: 2 INJECTION, SOLUTION INTRAMUSCULAR; INTRAVENOUS; SUBCUTANEOUS at 15:14

## 2019-01-31 RX ADMIN — ONDANSETRON HYDROCHLORIDE 4 MG: 2 INJECTION INTRAMUSCULAR; INTRAVENOUS at 12:22

## 2019-01-31 RX ADMIN — SUCRALFATE 1 G: 1 SUSPENSION ORAL at 21:00

## 2019-01-31 RX ADMIN — PREGABALIN 100 MG: 50 CAPSULE ORAL at 21:08

## 2019-01-31 RX ADMIN — Medication 1 CAPSULE: at 21:08

## 2019-01-31 RX ADMIN — HYDROMORPHONE HYDROCHLORIDE 1.5 MG: 2 INJECTION, SOLUTION INTRAMUSCULAR; INTRAVENOUS; SUBCUTANEOUS at 21:09

## 2019-01-31 RX ADMIN — PREGABALIN 100 MG: 50 CAPSULE ORAL at 09:15

## 2019-01-31 RX ADMIN — BUDESONIDE 9 MG: 3 CAPSULE, GELATIN COATED ORAL at 09:33

## 2019-01-31 RX ADMIN — DIAZEPAM 5 MG: 5 TABLET ORAL at 09:12

## 2019-01-31 RX ADMIN — HYDROMORPHONE HYDROCHLORIDE 1.5 MG: 2 INJECTION, SOLUTION INTRAMUSCULAR; INTRAVENOUS; SUBCUTANEOUS at 18:14

## 2019-01-31 RX ADMIN — HYDROMORPHONE HYDROCHLORIDE 1.5 MG: 2 INJECTION, SOLUTION INTRAMUSCULAR; INTRAVENOUS; SUBCUTANEOUS at 09:11

## 2019-01-31 RX ADMIN — PROMETHAZINE HYDROCHLORIDE 25 MG: 25 TABLET ORAL at 09:15

## 2019-01-31 RX ADMIN — MESALAMINE 4 G: 4 ENEMA RECTAL at 22:00

## 2019-01-31 RX ADMIN — PREGABALIN 100 MG: 50 CAPSULE ORAL at 12:23

## 2019-01-31 RX ADMIN — Medication 10 ML: at 06:04

## 2019-01-31 RX ADMIN — SUCRALFATE 1 G: 1 SUSPENSION ORAL at 06:04

## 2019-01-31 RX ADMIN — PREGABALIN 100 MG: 50 CAPSULE ORAL at 17:19

## 2019-01-31 RX ADMIN — Medication 10 ML: at 21:09

## 2019-01-31 NOTE — PROGRESS NOTES
General Daily Progress Note Admission Date: 1/29/2019 Hospital Day 2 Post-Op Day Not Applicable Subjective:  
Less abdominal pain. Still having problems with nausea at times. Objective:  
 
Patient Vitals for the past 24 hrs: 
 BP Temp Pulse Resp SpO2 Weight 01/31/19 1232      89.4 kg (197 lb) 01/31/19 0806 111/73 99.4 °F (37.4 °C) 87 18 93 %   
01/31/19 0307 101/64 98.2 °F (36.8 °C) 92 18 98 %   
01/30/19 2102 102/66 98.4 °F (36.9 °C) 80 18 98 %   
01/30/19 1748     97 %   
01/30/19 1400 102/65 98.4 °F (36.9 °C) 87 18 97 %   
 
01/31 0701 - 01/31 1900 In: -  
Out: 700 [Urine:700] 01/29 1901 - 01/31 0700 In: 1000 [I.V.:1000] Out: 4300 [Urine:2550] Physical Examination: 
General Appearance - Somewhat uncomfortable. Abdomen - Soft. Non-distended. Mildly tender to palpation. No rebound tenderness or involuntary guarding. Data Review Recent Results (from the past 24 hour(s)) CBC WITH AUTOMATED DIFF Collection Time: 01/31/19  3:07 AM  
Result Value Ref Range WBC 8.1 4.1 - 11.1 K/uL  
 RBC 3.94 (L) 4.10 - 5.70 M/uL  
 HGB 11.5 (L) 12.1 - 17.0 g/dL HCT 35.8 (L) 36.6 - 50.3 % MCV 90.9 80.0 - 99.0 FL  
 MCH 29.2 26.0 - 34.0 PG  
 MCHC 32.1 30.0 - 36.5 g/dL  
 RDW 13.3 11.5 - 14.5 % PLATELET 914 552 - 213 K/uL MPV 11.4 8.9 - 12.9 FL  
 NRBC 0.0 0  WBC ABSOLUTE NRBC 0.00 0.00 - 0.01 K/uL NEUTROPHILS 67 32 - 75 % LYMPHOCYTES 24 12 - 49 % MONOCYTES 5 5 - 13 % EOSINOPHILS 4 0 - 7 % BASOPHILS 0 0 - 1 % IMMATURE GRANULOCYTES 1 (H) 0.0 - 0.5 % ABS. NEUTROPHILS 5.4 1.8 - 8.0 K/UL  
 ABS. LYMPHOCYTES 1.9 0.8 - 3.5 K/UL  
 ABS. MONOCYTES 0.4 0.0 - 1.0 K/UL  
 ABS. EOSINOPHILS 0.3 0.0 - 0.4 K/UL  
 ABS. BASOPHILS 0.0 0.0 - 0.1 K/UL  
 ABS. IMM. GRANS. 0.1 (H) 0.00 - 0.04 K/UL  
 DF AUTOMATED METABOLIC PANEL, BASIC Collection Time: 01/31/19  3:07 AM  
Result Value Ref Range  Sodium 143 136 - 145 mmol/L  
 Potassium 4.0 3.5 - 5.1 mmol/L Chloride 108 97 - 108 mmol/L  
 CO2 28 21 - 32 mmol/L Anion gap 7 5 - 15 mmol/L Glucose 97 65 - 100 mg/dL BUN 5 (L) 6 - 20 MG/DL Creatinine 0.71 0.70 - 1.30 MG/DL  
 BUN/Creatinine ratio 7 (L) 12 - 20 GFR est AA >60 >60 ml/min/1.73m2 GFR est non-AA >60 >60 ml/min/1.73m2 Calcium 8.6 8.5 - 10.1 MG/DL MAGNESIUM Collection Time: 01/31/19  3:07 AM  
Result Value Ref Range Magnesium 1.7 1.6 - 2.4 mg/dL PHOSPHORUS Collection Time: 01/31/19  3:07 AM  
Result Value Ref Range Phosphorus 3.3 2.6 - 4.7 MG/DL Assessment:  
 
Principal Problem: 
  Ileal pouchitis ([facility] (5/1/2004) Active Problems: 
  GERD (gastroesophageal reflux disease) (11/20/2016) Chronic narcotic dependence ([facility] (1/20/2018) Stable. Somewhat improved. We discussed, once again, that tolerating solid food will not be required in order for him to go home. The goal will be for him to be able to ingest enough fluid and nutrition in any form and to have a manageable ileostomy output. Plan:  
Begin full liquid diet. Continue nutritional supplements. Document ileostomy output. Continue Flagyl, Entocort, Rowasa, and sucralfate.

## 2019-02-01 LAB
ANION GAP SERPL CALC-SCNC: 8 MMOL/L (ref 5–15)
BASOPHILS # BLD: 0 K/UL (ref 0–0.1)
BASOPHILS NFR BLD: 0 % (ref 0–1)
BUN SERPL-MCNC: 5 MG/DL (ref 6–20)
BUN/CREAT SERPL: 7 (ref 12–20)
CALCIUM SERPL-MCNC: 9.1 MG/DL (ref 8.5–10.1)
CHLORIDE SERPL-SCNC: 109 MMOL/L (ref 97–108)
CO2 SERPL-SCNC: 26 MMOL/L (ref 21–32)
CREAT SERPL-MCNC: 0.72 MG/DL (ref 0.7–1.3)
DIFFERENTIAL METHOD BLD: ABNORMAL
EOSINOPHIL # BLD: 0.3 K/UL (ref 0–0.4)
EOSINOPHIL NFR BLD: 5 % (ref 0–7)
ERYTHROCYTE [DISTWIDTH] IN BLOOD BY AUTOMATED COUNT: 13.3 % (ref 11.5–14.5)
GLUCOSE SERPL-MCNC: 95 MG/DL (ref 65–100)
HCT VFR BLD AUTO: 35.1 % (ref 36.6–50.3)
HGB BLD-MCNC: 11.1 G/DL (ref 12.1–17)
IMM GRANULOCYTES # BLD AUTO: 0 K/UL (ref 0–0.04)
IMM GRANULOCYTES NFR BLD AUTO: 0 % (ref 0–0.5)
LYMPHOCYTES # BLD: 2 K/UL (ref 0.8–3.5)
LYMPHOCYTES NFR BLD: 34 % (ref 12–49)
MCH RBC QN AUTO: 28.8 PG (ref 26–34)
MCHC RBC AUTO-ENTMCNC: 31.6 G/DL (ref 30–36.5)
MCV RBC AUTO: 90.9 FL (ref 80–99)
MONOCYTES # BLD: 0.4 K/UL (ref 0–1)
MONOCYTES NFR BLD: 7 % (ref 5–13)
NEUTS SEG # BLD: 3.2 K/UL (ref 1.8–8)
NEUTS SEG NFR BLD: 54 % (ref 32–75)
NRBC # BLD: 0 K/UL (ref 0–0.01)
NRBC BLD-RTO: 0 PER 100 WBC
PLATELET # BLD AUTO: 152 K/UL (ref 150–400)
PMV BLD AUTO: 11.4 FL (ref 8.9–12.9)
POTASSIUM SERPL-SCNC: 3.9 MMOL/L (ref 3.5–5.1)
RBC # BLD AUTO: 3.86 M/UL (ref 4.1–5.7)
SODIUM SERPL-SCNC: 143 MMOL/L (ref 136–145)
WBC # BLD AUTO: 5.8 K/UL (ref 4.1–11.1)

## 2019-02-01 PROCEDURE — 80048 BASIC METABOLIC PNL TOTAL CA: CPT

## 2019-02-01 PROCEDURE — 74011250637 HC RX REV CODE- 250/637: Performed by: COLON & RECTAL SURGERY

## 2019-02-01 PROCEDURE — 74011250636 HC RX REV CODE- 250/636: Performed by: COLON & RECTAL SURGERY

## 2019-02-01 PROCEDURE — 36415 COLL VENOUS BLD VENIPUNCTURE: CPT

## 2019-02-01 PROCEDURE — 85025 COMPLETE CBC W/AUTO DIFF WBC: CPT

## 2019-02-01 PROCEDURE — 65270000032 HC RM SEMIPRIVATE

## 2019-02-01 PROCEDURE — 74011250636 HC RX REV CODE- 250/636: Performed by: EMERGENCY MEDICINE

## 2019-02-01 RX ORDER — METRONIDAZOLE 250 MG/1
500 TABLET ORAL EVERY 12 HOURS
Status: DISCONTINUED | OUTPATIENT
Start: 2019-02-01 | End: 2019-02-02 | Stop reason: HOSPADM

## 2019-02-01 RX ADMIN — Medication 10 ML: at 06:26

## 2019-02-01 RX ADMIN — HYDROMORPHONE HYDROCHLORIDE 1.5 MG: 2 INJECTION, SOLUTION INTRAMUSCULAR; INTRAVENOUS; SUBCUTANEOUS at 09:31

## 2019-02-01 RX ADMIN — Medication 1 TABLET: at 10:59

## 2019-02-01 RX ADMIN — PROMETHAZINE HYDROCHLORIDE 25 MG: 25 TABLET ORAL at 01:03

## 2019-02-01 RX ADMIN — HYDROMORPHONE HYDROCHLORIDE 1.5 MG: 2 INJECTION, SOLUTION INTRAMUSCULAR; INTRAVENOUS; SUBCUTANEOUS at 15:20

## 2019-02-01 RX ADMIN — HYDROMORPHONE HYDROCHLORIDE 1.5 MG: 2 INJECTION, SOLUTION INTRAMUSCULAR; INTRAVENOUS; SUBCUTANEOUS at 03:34

## 2019-02-01 RX ADMIN — ONDANSETRON HYDROCHLORIDE 4 MG: 2 INJECTION INTRAMUSCULAR; INTRAVENOUS at 18:58

## 2019-02-01 RX ADMIN — DEXTROSE MONOHYDRATE, SODIUM CHLORIDE, SODIUM LACTATE, POTASSIUM CHLORIDE, CALCIUM CHLORIDE: 5; 600; 310; 179; 20 INJECTION, SOLUTION INTRAVENOUS at 04:05

## 2019-02-01 RX ADMIN — HYDROMORPHONE HYDROCHLORIDE 1.5 MG: 2 INJECTION, SOLUTION INTRAMUSCULAR; INTRAVENOUS; SUBCUTANEOUS at 00:27

## 2019-02-01 RX ADMIN — PROMETHAZINE HYDROCHLORIDE 25 MG: 25 TABLET ORAL at 08:45

## 2019-02-01 RX ADMIN — Medication 1 CAPSULE: at 21:53

## 2019-02-01 RX ADMIN — PREGABALIN 100 MG: 50 CAPSULE ORAL at 12:57

## 2019-02-01 RX ADMIN — SUCRALFATE 1 G: 1 SUSPENSION ORAL at 21:53

## 2019-02-01 RX ADMIN — METRONIDAZOLE 500 MG: 250 TABLET ORAL at 09:31

## 2019-02-01 RX ADMIN — ONDANSETRON HYDROCHLORIDE 4 MG: 2 INJECTION INTRAMUSCULAR; INTRAVENOUS at 12:32

## 2019-02-01 RX ADMIN — PREGABALIN 100 MG: 50 CAPSULE ORAL at 21:53

## 2019-02-01 RX ADMIN — MESALAMINE 4 G: 4 ENEMA RECTAL at 22:09

## 2019-02-01 RX ADMIN — PREGABALIN 100 MG: 50 CAPSULE ORAL at 08:45

## 2019-02-01 RX ADMIN — SUCRALFATE 1 G: 1 SUSPENSION ORAL at 06:26

## 2019-02-01 RX ADMIN — BUDESONIDE 9 MG: 3 CAPSULE, GELATIN COATED ORAL at 08:45

## 2019-02-01 RX ADMIN — DIAZEPAM 5 MG: 5 TABLET ORAL at 17:39

## 2019-02-01 RX ADMIN — DIAZEPAM 5 MG: 5 TABLET ORAL at 09:31

## 2019-02-01 RX ADMIN — METRONIDAZOLE 500 MG: 250 TABLET ORAL at 21:53

## 2019-02-01 RX ADMIN — HYDROMORPHONE HYDROCHLORIDE 1.5 MG: 2 INJECTION, SOLUTION INTRAMUSCULAR; INTRAVENOUS; SUBCUTANEOUS at 12:32

## 2019-02-01 RX ADMIN — ONDANSETRON HYDROCHLORIDE 4 MG: 2 INJECTION INTRAMUSCULAR; INTRAVENOUS at 21:54

## 2019-02-01 RX ADMIN — PREGABALIN 100 MG: 50 CAPSULE ORAL at 17:39

## 2019-02-01 RX ADMIN — PANTOPRAZOLE SODIUM 40 MG: 40 TABLET, DELAYED RELEASE ORAL at 21:53

## 2019-02-01 RX ADMIN — HYDROMORPHONE HYDROCHLORIDE 1.5 MG: 2 INJECTION, SOLUTION INTRAMUSCULAR; INTRAVENOUS; SUBCUTANEOUS at 21:54

## 2019-02-01 RX ADMIN — PROMETHAZINE HYDROCHLORIDE 25 MG: 25 TABLET ORAL at 15:51

## 2019-02-01 RX ADMIN — HYDROMORPHONE HYDROCHLORIDE 1.5 MG: 2 INJECTION, SOLUTION INTRAMUSCULAR; INTRAVENOUS; SUBCUTANEOUS at 18:50

## 2019-02-01 RX ADMIN — Medication 10 ML: at 21:55

## 2019-02-01 RX ADMIN — Medication 10 ML: at 08:51

## 2019-02-01 RX ADMIN — HYDROMORPHONE HYDROCHLORIDE 1.5 MG: 2 INJECTION, SOLUTION INTRAMUSCULAR; INTRAVENOUS; SUBCUTANEOUS at 06:26

## 2019-02-01 RX ADMIN — ZOLPIDEM TARTRATE 10 MG: 5 TABLET ORAL at 22:09

## 2019-02-01 NOTE — PROGRESS NOTES
Clinical Pharmacy Note: Metronidazole Dosing Please note that the metronidazole dose for Finesse Nj has been changed to 500 mg PO q12h per [facility]-approved protocol. Please contact the pharmacy with any questions.  
 
Faisal Chen, MERCEDESD

## 2019-02-02 VITALS
SYSTOLIC BLOOD PRESSURE: 117 MMHG | OXYGEN SATURATION: 99 % | RESPIRATION RATE: 18 BRPM | HEIGHT: 72 IN | TEMPERATURE: 98.4 F | DIASTOLIC BLOOD PRESSURE: 74 MMHG | WEIGHT: 197 LBS | BODY MASS INDEX: 26.68 KG/M2 | HEART RATE: 76 BPM

## 2019-02-02 LAB
ANION GAP SERPL CALC-SCNC: 7 MMOL/L (ref 5–15)
BASOPHILS # BLD: 0 K/UL (ref 0–0.1)
BASOPHILS NFR BLD: 0 % (ref 0–1)
BUN SERPL-MCNC: 6 MG/DL (ref 6–20)
BUN/CREAT SERPL: 8 (ref 12–20)
CALCIUM SERPL-MCNC: 9 MG/DL (ref 8.5–10.1)
CHLORIDE SERPL-SCNC: 107 MMOL/L (ref 97–108)
CO2 SERPL-SCNC: 28 MMOL/L (ref 21–32)
CREAT SERPL-MCNC: 0.78 MG/DL (ref 0.7–1.3)
DIFFERENTIAL METHOD BLD: ABNORMAL
EOSINOPHIL # BLD: 0.4 K/UL (ref 0–0.4)
EOSINOPHIL NFR BLD: 6 % (ref 0–7)
ERYTHROCYTE [DISTWIDTH] IN BLOOD BY AUTOMATED COUNT: 13.2 % (ref 11.5–14.5)
GLUCOSE SERPL-MCNC: 83 MG/DL (ref 65–100)
HCT VFR BLD AUTO: 37.8 % (ref 36.6–50.3)
HGB BLD-MCNC: 11.9 G/DL (ref 12.1–17)
IMM GRANULOCYTES # BLD AUTO: 0 K/UL (ref 0–0.04)
IMM GRANULOCYTES NFR BLD AUTO: 0 % (ref 0–0.5)
LYMPHOCYTES # BLD: 2.4 K/UL (ref 0.8–3.5)
LYMPHOCYTES NFR BLD: 33 % (ref 12–49)
MCH RBC QN AUTO: 28.9 PG (ref 26–34)
MCHC RBC AUTO-ENTMCNC: 31.5 G/DL (ref 30–36.5)
MCV RBC AUTO: 91.7 FL (ref 80–99)
MONOCYTES # BLD: 0.5 K/UL (ref 0–1)
MONOCYTES NFR BLD: 7 % (ref 5–13)
NEUTS SEG # BLD: 4 K/UL (ref 1.8–8)
NEUTS SEG NFR BLD: 54 % (ref 32–75)
NRBC # BLD: 0 K/UL (ref 0–0.01)
NRBC BLD-RTO: 0 PER 100 WBC
PLATELET # BLD AUTO: 138 K/UL (ref 150–400)
PMV BLD AUTO: 11.6 FL (ref 8.9–12.9)
POTASSIUM SERPL-SCNC: 3.9 MMOL/L (ref 3.5–5.1)
RBC # BLD AUTO: 4.12 M/UL (ref 4.1–5.7)
RBC MORPH BLD: ABNORMAL
SODIUM SERPL-SCNC: 142 MMOL/L (ref 136–145)
WBC # BLD AUTO: 7.3 K/UL (ref 4.1–11.1)

## 2019-02-02 PROCEDURE — 80048 BASIC METABOLIC PNL TOTAL CA: CPT

## 2019-02-02 PROCEDURE — 74011250637 HC RX REV CODE- 250/637: Performed by: COLON & RECTAL SURGERY

## 2019-02-02 PROCEDURE — 74011250636 HC RX REV CODE- 250/636: Performed by: EMERGENCY MEDICINE

## 2019-02-02 PROCEDURE — 36415 COLL VENOUS BLD VENIPUNCTURE: CPT

## 2019-02-02 PROCEDURE — 85025 COMPLETE CBC W/AUTO DIFF WBC: CPT

## 2019-02-02 RX ORDER — MESALAMINE 4 G/60ML
4 ENEMA RECTAL
Qty: 1800 ML | Refills: 0 | Status: SHIPPED | OUTPATIENT
Start: 2019-02-02 | End: 2019-03-08

## 2019-02-02 RX ORDER — METRONIDAZOLE 500 MG/1
500 TABLET ORAL EVERY 12 HOURS
Qty: 60 TAB | Refills: 0 | Status: SHIPPED | OUTPATIENT
Start: 2019-02-02 | End: 2019-03-10

## 2019-02-02 RX ORDER — SUCRALFATE 1 G/10ML
1 SUSPENSION ORAL 2 TIMES DAILY
Qty: 600 ML | Refills: 0 | Status: SHIPPED | OUTPATIENT
Start: 2019-02-02 | End: 2019-03-04

## 2019-02-02 RX ADMIN — PROCHLORPERAZINE MALEATE 10 MG: 10 TABLET, FILM COATED ORAL at 04:31

## 2019-02-02 RX ADMIN — HYDROMORPHONE HYDROCHLORIDE 1.5 MG: 2 INJECTION, SOLUTION INTRAMUSCULAR; INTRAVENOUS; SUBCUTANEOUS at 00:58

## 2019-02-02 RX ADMIN — Medication 10 ML: at 13:28

## 2019-02-02 RX ADMIN — DIAZEPAM 5 MG: 5 TABLET ORAL at 08:54

## 2019-02-02 RX ADMIN — SUCRALFATE 1 G: 1 SUSPENSION ORAL at 08:56

## 2019-02-02 RX ADMIN — HYDROMORPHONE HYDROCHLORIDE 1.5 MG: 2 INJECTION, SOLUTION INTRAMUSCULAR; INTRAVENOUS; SUBCUTANEOUS at 10:22

## 2019-02-02 RX ADMIN — PROMETHAZINE HYDROCHLORIDE 25 MG: 25 TABLET ORAL at 08:54

## 2019-02-02 RX ADMIN — BUDESONIDE 9 MG: 3 CAPSULE, GELATIN COATED ORAL at 08:55

## 2019-02-02 RX ADMIN — PREGABALIN 100 MG: 50 CAPSULE ORAL at 08:54

## 2019-02-02 RX ADMIN — Medication 1 TABLET: at 08:55

## 2019-02-02 RX ADMIN — HYDROMORPHONE HYDROCHLORIDE 1.5 MG: 2 INJECTION, SOLUTION INTRAMUSCULAR; INTRAVENOUS; SUBCUTANEOUS at 13:28

## 2019-02-02 RX ADMIN — HYDROMORPHONE HYDROCHLORIDE 1.5 MG: 2 INJECTION, SOLUTION INTRAMUSCULAR; INTRAVENOUS; SUBCUTANEOUS at 04:20

## 2019-02-02 RX ADMIN — Medication 10 ML: at 07:04

## 2019-02-02 RX ADMIN — HYDROMORPHONE HYDROCHLORIDE 1.5 MG: 2 INJECTION, SOLUTION INTRAMUSCULAR; INTRAVENOUS; SUBCUTANEOUS at 07:03

## 2019-02-02 RX ADMIN — METRONIDAZOLE 500 MG: 250 TABLET ORAL at 08:54

## 2019-02-02 RX ADMIN — PROMETHAZINE HYDROCHLORIDE 25 MG: 25 TABLET ORAL at 00:58

## 2019-02-02 NOTE — DISCHARGE SUMMARY
Discharge Summary     Patient ID:    Musa Billy  [id number]  81 y.o.  1975    Admission Date: 1/29/2019    Discharge Date: 2/2/2019    Admission Diagnoses:  1. Ileal pouchitis  2. Chronic narcotic dependence    Chronic Diagnoses:    Patient Active Problem List   Diagnosis Code    Ileal pouchitis ([facility] K91.850    GERD (gastroesophageal reflux disease) K21.9    Depression with anxiety F41.8    Anemia D64.9    Chronic narcotic dependence ([facility] F11.20    Drug-[facility] ile[facility] ([facility] K56.7, T50.905A     Discharge Diagnoses:  1. Ileal pouchitis  2. Chronic narcotic dependence      Hospital Problems as of 2/2/2019 Date Reviewed: 1/30/2019          Codes Class Noted - Resolved POA    Chronic narcotic dependence ([facility] (Chronic) ICD-10-CM: F11.20  ICD-9-CM: 304.91  1/20/2018 - Present Yes        GERD (gastroesophageal reflux disease) (Chronic) ICD-10-CM: K21.9  ICD-9-CM: 530.81  11/20/2016 - Present Yes        * (Principal) Ileal pouchitis ([facility] ICD-10-CM: F05.879  ICD-9-CM: 569.71  5/1/2004 - Present Yes              Procedures Performed:  None. Discharge Medications:   Current Discharge Medication List      START taking these medications    Details   metroNIDAZOLE (FLAGYL) 500 mg tablet Take 1 Tab by mouth every twelve (12) hours. Qty: 60 Tab, Refills: 0      multivitamin with iron (FLINTSTONES) chewable tablet Take 1 Tab by mouth daily. Qty: 30 Tab, Refills: 2         CONTINUE these medications which have CHANGED    Details   mesalamine (ROWASA) 4 gram/60 mL enema Insert 60 mL into rectum nightly for 30 days. Qty: 1800 mL, Refills: 0    Comments: To be instilled into ileal pouch. The patient has no rectum. sucralfate (CARAFATE) 100 mg/mL suspension 10 mL by Per J Tube route two (2) times a day for 30 days. Qty: 600 mL, Refills: 0    Comments: To be instilled into ileal pouch.          CONTINUE these medications which have NOT CHANGED    Details   nut.tx.gluc intol,lf,soy-fiber (BOOST GLUCOSE CONTROL) 0.07-0.8 gram-kcal/mL liqd Take 1 Bottle by mouth two (2) times a day for 30 days. Qty: 60 Bottle, Refills: as needed      nut. suppl. - milk based formula (BOOST PUDDING) pudg One serving by mouth twice per day. Qty: 60 Can, Refills: 1      budesonide (ENTOCORT EC) 3 mg capsule Take 3 Caps by mouth daily for 30 days. Qty: 90 Cap, Refills: 0    Comments: Phoned in to [place] on 1/12/2019. oxyCODONE-acetaminophen (PERCOCET 10)  mg per tablet Take 2 Tabs by mouth every four (4) hours as needed. Max Daily Amount: 12 Tabs. Qty: 25 Tab, Refills: 0    Associated Diagnoses: Abdominal pain, generalized      zolpidem (AMBIEN) 10 mg tablet Take 1 Tab by mouth nightly. Max Daily Amount: 10 mg.  Qty: 30 Tab, Refills: 0    Associated Diagnoses: Insomnia, unspecified type      Saccharomyces boulardii (FLORASTOR) 250 mg capsule Take 250 mg by mouth nightly. diclofenac (VOLTAREN) 1 % gel Apply 2 g to affected area four (4) times daily as needed. albuterol (PROVENTIL HFA, VENTOLIN HFA, PROAIR HFA) 90 mcg/actuation inhaler Take 2 Puffs by inhalation every four (4) hours as needed for Wheezing. Qty: 1 Inhaler, Refills: 0      acetaminophen (TYLENOL) 500 mg tablet Take 500 mg by mouth four (4) times daily as needed (mild pain, heache (usually has headaches 1 week after Stelara)). prochlorperazine (COMPAZINE) 10 mg tablet Take 10 mg by mouth two (2) times daily as needed (Moderate Nausea). Ustekinumab (STELARA) 90 mg/mL injection 90 mg by SubCUTAneous route. Every 8 weeks       promethazine (PHENERGAN) 25 mg tablet Take 25 mg by mouth every six (6) hours as needed (Severe Nausea). ondansetron hcl (ZOFRAN, AS HYDROCHLORIDE,) 8 mg tablet Take 8 mg by mouth every twelve (12) hours as needed (Mild Nausea). omeprazole (PRILOSEC) 40 mg capsule Take 40 mg by mouth nightly. dicyclomine (BENTYL) 10 mg capsule Take 10 mg by mouth every six (6) hours as needed.       ergocalciferol (ERGOCALCIFEROL) 50,000 unit capsule Take 50,000 Units by mouth every Sunday. pregabalin (LYRICA) 100 mg capsule Take 100 mg by mouth four (4) times daily. cyanocobalamin (VITAMIN B-12) 1,000 mcg/mL injection 1,000 mcg by IntraMUSCular route every Sunday. Indications: Once weekly      diazepam (VALIUM) 5 mg tablet Take 5 mg by mouth two (2) times a day. Diet:  Full liquid diet with nutritional supplements. Consumption of solid food was not recommended. Activity:  As tolerated. Wound Care:  None. Discharge Condition:  Improved compared to that upon admission. Disposition:  Home. Follow-up Care:  1. The patient is to arrange an appointment with his surgeons in [place]. 2. Clare Bauer MD in 1-2 weeks      Significant Diagnostic Studies:   Recent Labs     02/02/19  0304 02/01/19  0406   WBC 7.3 5.8   HGB 11.9* 11.1*   HCT 37.8 35.1*   * 152     Recent Labs     02/02/19  0304 02/01/19  0406 01/31/19  0307    143 143   K 3.9 3.9 4.0    109* 108   CO2 28 26 28   BUN 6 5* 5*   CREA 0.78 0.72 0.71   GLU 83 95 97   CA 9.0 9.1 8.6   MG  --   --  1.7   PHOS  --   --  3.3       Lab Results   Component Value Date/Time    Glucose (POC) 99 02/14/2018 09:53 PM    Glucose (POC) 192 (H) 02/14/2018 11:25 AM    Glucose (POC) 100 06/17/2017 11:22 AM    Glucose (POC) 137 (H) 06/17/2017 05:54 AM    Glucose (POC) 120 (H) 06/16/2017 09:40 PM         HOSPITAL COURSE & TREATMENT RENDERED:     The patient is a 46-year-old whose complex medical history has been documented repeatedly in other notes. It includes Crohn's disease and multiple abdominal surgical procedures, the most recent of which was the conversion of an end-ileostomy to a [place] intestinal reservoir (BCIR) on 11/29/2017 in [place], [place]. His most recent previous hospitalization was from 1/4/2019 to 1/14/2019 for treatment of ileal pouchitis.   In addition to distal decompression of his ileal pouch, he received parenteral fluids, parenteral Flagyl, oral Entocort, and Rowasa and sucralfate enemas. While hospitalized, he also underwent a small bowel follow-through and a retrograde contrast study via the ileostomy. These studies produced apparently contradictory information regarding the presence of a distal ileal stricture. By 1/14/2019, he appeared to have improved sufficiently to continue his treatment at home.     He did reasonably well initially, but ran out of the enemas approximately one week ago then began to decline. Over a period of a few days he had increased abdominal pain, high ileostomy output, and increasing difficulty tolerating oral nutrition and fluids. He had been vomiting and he finally reached the point of calling me for help. I instructed him to return to the ER for evalution and probable admission, and he did so.     I reviewed his laboratory studies and radiographs during the night as they became available, and I admitted him for treatment which included bowel rest, Flagyl, Entocort, Rowasa, and sucralfate enemas. On 2/2/2019, I decided that he had improved sufficiently to allow him to be discharged to home. His hospital course had been without complications, and he appeared to have understood all discharge and follow-up instructions.         Signed:  Yadira Cabrera MD

## 2019-02-02 NOTE — PROGRESS NOTES
Discharged pt. Educated on all meds with side effects and made aware of current doctor appointments.

## 2019-02-02 NOTE — PROGRESS NOTES
General Daily Progress Note Admission Date: 1/29/2019 Hospital Day 4 Post-Op Day Not Applicable Subjective:  
Abdominal pain manageable. No emesis. Objective:  
 
Patient Vitals for the past 24 hrs: 
 BP Temp Pulse Resp SpO2  
02/02/19 0922 117/74 98.4 °F (36.9 °C) 76 18 99 % 02/02/19 0257 108/65 98.6 °F (37 °C) 61 18 98 % 02/01/19 1430 113/67 98.8 °F (37.1 °C) 74 18 98 % 02/02 0701 - 02/02 1900 In: 137.5 [P.O.:120; I.V.:17.5] Out: 750 [Urine:300] 01/31 1901 - 02/02 0700 In: 840 [P.O.:720; I.V.:120] Out: Daily Carter [GWFUF:0136] Physical Examination: 
General Appearance - Somewhat uncomfortable. Abdomen - Soft. Non-distended. Mildly tender to palpation. No rebound tenderness or involuntary guarding. Data Review Recent Results (from the past 24 hour(s)) CBC WITH AUTOMATED DIFF Collection Time: 02/02/19  3:04 AM  
Result Value Ref Range WBC 7.3 4.1 - 11.1 K/uL  
 RBC 4.12 4.10 - 5.70 M/uL  
 HGB 11.9 (L) 12.1 - 17.0 g/dL HCT 37.8 36.6 - 50.3 % MCV 91.7 80.0 - 99.0 FL  
 MCH 28.9 26.0 - 34.0 PG  
 MCHC 31.5 30.0 - 36.5 g/dL  
 RDW 13.2 11.5 - 14.5 % PLATELET 177 (L) 787 - 400 K/uL MPV 11.6 8.9 - 12.9 FL  
 NRBC 0.0 0  WBC ABSOLUTE NRBC 0.00 0.00 - 0.01 K/uL NEUTROPHILS 54 32 - 75 % LYMPHOCYTES 33 12 - 49 % MONOCYTES 7 5 - 13 % EOSINOPHILS 6 0 - 7 % BASOPHILS 0 0 - 1 % IMMATURE GRANULOCYTES 0 0.0 - 0.5 % ABS. NEUTROPHILS 4.0 1.8 - 8.0 K/UL  
 ABS. LYMPHOCYTES 2.4 0.8 - 3.5 K/UL  
 ABS. MONOCYTES 0.5 0.0 - 1.0 K/UL  
 ABS. EOSINOPHILS 0.4 0.0 - 0.4 K/UL  
 ABS. BASOPHILS 0.0 0.0 - 0.1 K/UL  
 ABS. IMM. GRANS. 0.0 0.00 - 0.04 K/UL  
 DF SMEAR SCANNED    
 RBC COMMENTS NORMOCYTIC, NORMOCHROMIC METABOLIC PANEL, BASIC Collection Time: 02/02/19  3:04 AM  
Result Value Ref Range Sodium 142 136 - 145 mmol/L Potassium 3.9 3.5 - 5.1 mmol/L Chloride 107 97 - 108 mmol/L  
 CO2 28 21 - 32 mmol/L  Anion gap 7 5 - 15 mmol/L  
 Glucose 83 65 - 100 mg/dL BUN 6 6 - 20 MG/DL Creatinine 0.78 0.70 - 1.30 MG/DL  
 BUN/Creatinine ratio 8 (L) 12 - 20 GFR est AA >60 >60 ml/min/1.73m2 GFR est non-AA >60 >60 ml/min/1.73m2 Calcium 9.0 8.5 - 10.1 MG/DL Assessment:  
 
Principal Problem: 
  Ileal pouchitis ([facility] (5/1/2004) Active Problems: 
  GERD (gastroesophageal reflux disease) (11/20/2016) Chronic narcotic dependence ([facility] (1/20/2018) Stable. Ostomy output not excessive. Improved sufficiently to allow discharge to home. [Note:  Although I saw the patient yesterday and made adjustments to his orders, it appears that I neglected to complete a progress note.] Plan:  
Discharge to home on full liquid diet with nutritional supplements. Will continue all current medications except for the Dilaudid. Follow-up with Dr. Kaleb Dow on 2/6/2019 as already scheduled.

## 2019-02-02 NOTE — DISCHARGE INSTRUCTIONS
Patient Discharge Instructions    Finesse Clem / [id number] : 1975    Admitted 2019 Discharged: 2019       · It is important that you take medications exactly as they are prescribed. · Keep your medication in the bottles provided by the pharmacist and keep a list of the medication names, dosages, and times to be taken in your wallet. · Do not take other medications without consulting your doctor. What To Do At Home  Recommended Diet:  You should continue to consume the full liquid diet and use the nutritional supplements. Despite what you discussed with the dietitian, my preference would be for you to avoid solid food. Recommended Activity: As tolerated. Ileal Pouch Management:  Use your catheter, with or without suction, as needed the decompress the bowel. Follow-Up:  Please keep your scheduled appointment with Dr. Elinor Del Toro on 2019. Information obtained by :  I understand that if any problems occur once I am at home I am to contact my physician. I understand and acknowledge receipt of the instructions indicated above.                                                                                                                                            Physician's or R.N.'s Signature                                                                  Date/Time                                                                                                                                              Patient or Representative Signature                                                          Date/Time

## 2019-03-05 ENCOUNTER — HOSPITAL ENCOUNTER (INPATIENT)
Age: 44
LOS: 5 days | Discharge: HOME | DRG: 871 | End: 2019-03-10
Attending: EMERGENCY MEDICINE | Admitting: INTERNAL MEDICINE
Payer: COMMERCIAL

## 2019-03-05 ENCOUNTER — APPOINTMENT (OUTPATIENT)
Dept: GENERAL RADIOLOGY | Age: 44
DRG: 871 | End: 2019-03-05
Attending: EMERGENCY MEDICINE
Payer: COMMERCIAL

## 2019-03-05 ENCOUNTER — APPOINTMENT (OUTPATIENT)
Dept: CT IMAGING | Age: 44
DRG: 871 | End: 2019-03-05
Attending: EMERGENCY MEDICINE
Payer: COMMERCIAL

## 2019-03-05 DIAGNOSIS — R10.84 ABDOMINAL PAIN, GENERALIZED: ICD-10-CM

## 2019-03-05 DIAGNOSIS — R19.7 DIARRHEA, UNSPECIFIED TYPE: ICD-10-CM

## 2019-03-05 DIAGNOSIS — J18.9 PNEUMONIA OF LEFT LUNG DUE TO INFECTIOUS ORGANISM, UNSPECIFIED PART OF LUNG: Primary | ICD-10-CM

## 2019-03-05 PROBLEM — A41.9 SEPSIS (HCC): Status: ACTIVE | Noted: 2019-03-05

## 2019-03-05 LAB
ALBUMIN SERPL-MCNC: 3.4 G/DL (ref 3.5–5)
ALBUMIN/GLOB SERPL: 0.9 {RATIO} (ref 1.1–2.2)
ALP SERPL-CCNC: 138 U/L (ref 45–117)
ALT SERPL-CCNC: 26 U/L (ref 12–78)
ANION GAP SERPL CALC-SCNC: 10 MMOL/L (ref 5–15)
APPEARANCE UR: CLEAR
AST SERPL-CCNC: 18 U/L (ref 15–37)
BACTERIA URNS QL MICRO: NEGATIVE /HPF
BASOPHILS # BLD: 0 K/UL (ref 0–0.1)
BASOPHILS NFR BLD: 0 % (ref 0–1)
BILIRUB SERPL-MCNC: 0.5 MG/DL (ref 0.2–1)
BILIRUB UR QL CFM: NEGATIVE
BUN SERPL-MCNC: 12 MG/DL (ref 6–20)
BUN/CREAT SERPL: 12 (ref 12–20)
CALCIUM SERPL-MCNC: 9.3 MG/DL (ref 8.5–10.1)
CHLORIDE SERPL-SCNC: 101 MMOL/L (ref 97–108)
CO2 SERPL-SCNC: 24 MMOL/L (ref 21–32)
COLOR UR: ABNORMAL
COMMENT, HOLDF: NORMAL
CREAT SERPL-MCNC: 1.04 MG/DL (ref 0.7–1.3)
DIFFERENTIAL METHOD BLD: ABNORMAL
EOSINOPHIL # BLD: 0.1 K/UL (ref 0–0.4)
EOSINOPHIL NFR BLD: 1 % (ref 0–7)
EPITH CASTS URNS QL MICRO: ABNORMAL /LPF
ERYTHROCYTE [DISTWIDTH] IN BLOOD BY AUTOMATED COUNT: 13.8 % (ref 11.5–14.5)
FLUAV AG NPH QL IA: NEGATIVE
FLUBV AG NOSE QL IA: NEGATIVE
GLOBULIN SER CALC-MCNC: 3.8 G/DL (ref 2–4)
GLUCOSE SERPL-MCNC: 169 MG/DL (ref 65–100)
GLUCOSE UR STRIP.AUTO-MCNC: 100 MG/DL
HCT VFR BLD AUTO: 41.5 % (ref 36.6–50.3)
HGB BLD-MCNC: 13.5 G/DL (ref 12.1–17)
HGB UR QL STRIP: NEGATIVE
IMM GRANULOCYTES # BLD AUTO: 0.1 K/UL (ref 0–0.04)
IMM GRANULOCYTES NFR BLD AUTO: 0 % (ref 0–0.5)
KETONES UR QL STRIP.AUTO: ABNORMAL MG/DL
LACTATE BLD-SCNC: 2.22 MMOL/L (ref 0.4–2)
LACTATE SERPL-SCNC: 2 MMOL/L (ref 0.4–2)
LEUKOCYTE ESTERASE UR QL STRIP.AUTO: ABNORMAL
LYMPHOCYTES # BLD: 1 K/UL (ref 0.8–3.5)
LYMPHOCYTES NFR BLD: 7 % (ref 12–49)
MCH RBC QN AUTO: 29 PG (ref 26–34)
MCHC RBC AUTO-ENTMCNC: 32.5 G/DL (ref 30–36.5)
MCV RBC AUTO: 89.1 FL (ref 80–99)
MONOCYTES # BLD: 0.8 K/UL (ref 0–1)
MONOCYTES NFR BLD: 5 % (ref 5–13)
MUCOUS THREADS URNS QL MICRO: ABNORMAL /LPF
NEUTS SEG # BLD: 13.8 K/UL (ref 1.8–8)
NEUTS SEG NFR BLD: 87 % (ref 32–75)
NITRITE UR QL STRIP.AUTO: NEGATIVE
NRBC # BLD: 0 K/UL (ref 0–0.01)
NRBC BLD-RTO: 0 PER 100 WBC
PH UR STRIP: 5 [PH] (ref 5–8)
PLATELET # BLD AUTO: 166 K/UL (ref 150–400)
PMV BLD AUTO: 12.5 FL (ref 8.9–12.9)
POTASSIUM SERPL-SCNC: 3.2 MMOL/L (ref 3.5–5.1)
PROT SERPL-MCNC: 7.2 G/DL (ref 6.4–8.2)
PROT UR STRIP-MCNC: 30 MG/DL
RBC # BLD AUTO: 4.66 M/UL (ref 4.1–5.7)
RBC #/AREA URNS HPF: ABNORMAL /HPF (ref 0–5)
SAMPLES BEING HELD,HOLD: NORMAL
SODIUM SERPL-SCNC: 135 MMOL/L (ref 136–145)
SP GR UR REFRACTOMETRY: >1.03 (ref 1–1.03)
UR CULT HOLD, URHOLD: NORMAL
UROBILINOGEN UR QL STRIP.AUTO: 1 EU/DL (ref 0.2–1)
WBC # BLD AUTO: 15.9 K/UL (ref 4.1–11.1)
WBC URNS QL MICRO: ABNORMAL /HPF (ref 0–4)

## 2019-03-05 PROCEDURE — 99285 EMERGENCY DEPT VISIT HI MDM: CPT

## 2019-03-05 PROCEDURE — 80053 COMPREHEN METABOLIC PANEL: CPT

## 2019-03-05 PROCEDURE — 83605 ASSAY OF LACTIC ACID: CPT

## 2019-03-05 PROCEDURE — 96375 TX/PRO/DX INJ NEW DRUG ADDON: CPT

## 2019-03-05 PROCEDURE — 74011250636 HC RX REV CODE- 250/636: Performed by: EMERGENCY MEDICINE

## 2019-03-05 PROCEDURE — 81001 URINALYSIS AUTO W/SCOPE: CPT

## 2019-03-05 PROCEDURE — 74019 RADEX ABDOMEN 2 VIEWS: CPT

## 2019-03-05 PROCEDURE — 74011250637 HC RX REV CODE- 250/637: Performed by: EMERGENCY MEDICINE

## 2019-03-05 PROCEDURE — 74011250636 HC RX REV CODE- 250/636: Performed by: INTERNAL MEDICINE

## 2019-03-05 PROCEDURE — 71045 X-RAY EXAM CHEST 1 VIEW: CPT

## 2019-03-05 PROCEDURE — 74011000258 HC RX REV CODE- 258: Performed by: INTERNAL MEDICINE

## 2019-03-05 PROCEDURE — 96361 HYDRATE IV INFUSION ADD-ON: CPT

## 2019-03-05 PROCEDURE — 96374 THER/PROPH/DIAG INJ IV PUSH: CPT

## 2019-03-05 PROCEDURE — 85025 COMPLETE CBC W/AUTO DIFF WBC: CPT

## 2019-03-05 PROCEDURE — 87804 INFLUENZA ASSAY W/OPTIC: CPT

## 2019-03-05 PROCEDURE — 87040 BLOOD CULTURE FOR BACTERIA: CPT

## 2019-03-05 PROCEDURE — 93005 ELECTROCARDIOGRAM TRACING: CPT

## 2019-03-05 PROCEDURE — 36415 COLL VENOUS BLD VENIPUNCTURE: CPT

## 2019-03-05 PROCEDURE — 65660000000 HC RM CCU STEPDOWN

## 2019-03-05 RX ORDER — HYDROMORPHONE HYDROCHLORIDE 1 MG/ML
1 INJECTION, SOLUTION INTRAMUSCULAR; INTRAVENOUS; SUBCUTANEOUS
Status: DISCONTINUED | OUTPATIENT
Start: 2019-03-05 | End: 2019-03-06

## 2019-03-05 RX ORDER — SODIUM CHLORIDE, SODIUM LACTATE, POTASSIUM CHLORIDE, CALCIUM CHLORIDE 600; 310; 30; 20 MG/100ML; MG/100ML; MG/100ML; MG/100ML
150 INJECTION, SOLUTION INTRAVENOUS CONTINUOUS
Status: DISCONTINUED | OUTPATIENT
Start: 2019-03-05 | End: 2019-03-08

## 2019-03-05 RX ORDER — SODIUM CHLORIDE 0.9 % (FLUSH) 0.9 %
5-40 SYRINGE (ML) INJECTION EVERY 8 HOURS
Status: DISCONTINUED | OUTPATIENT
Start: 2019-03-05 | End: 2019-03-10 | Stop reason: HOSPADM

## 2019-03-05 RX ORDER — ACETAMINOPHEN 325 MG/1
975 TABLET ORAL
Status: COMPLETED | OUTPATIENT
Start: 2019-03-05 | End: 2019-03-05

## 2019-03-05 RX ORDER — VANCOMYCIN 2 GRAM/500 ML IN 0.9 % SODIUM CHLORIDE INTRAVENOUS
2000 ONCE
Status: COMPLETED | OUTPATIENT
Start: 2019-03-05 | End: 2019-03-06

## 2019-03-05 RX ORDER — MORPHINE SULFATE 2 MG/ML
2 INJECTION, SOLUTION INTRAMUSCULAR; INTRAVENOUS
Status: DISCONTINUED | OUTPATIENT
Start: 2019-03-05 | End: 2019-03-05

## 2019-03-05 RX ORDER — ONDANSETRON 2 MG/ML
4 INJECTION INTRAMUSCULAR; INTRAVENOUS
Status: DISCONTINUED | OUTPATIENT
Start: 2019-03-05 | End: 2019-03-10 | Stop reason: HOSPADM

## 2019-03-05 RX ORDER — ONDANSETRON 2 MG/ML
8 INJECTION INTRAMUSCULAR; INTRAVENOUS
Status: COMPLETED | OUTPATIENT
Start: 2019-03-05 | End: 2019-03-05

## 2019-03-05 RX ORDER — HYDROMORPHONE HYDROCHLORIDE 1 MG/ML
1 INJECTION, SOLUTION INTRAMUSCULAR; INTRAVENOUS; SUBCUTANEOUS
Status: COMPLETED | OUTPATIENT
Start: 2019-03-05 | End: 2019-03-05

## 2019-03-05 RX ORDER — SODIUM CHLORIDE 0.9 % (FLUSH) 0.9 %
5-40 SYRINGE (ML) INJECTION AS NEEDED
Status: DISCONTINUED | OUTPATIENT
Start: 2019-03-05 | End: 2019-03-10 | Stop reason: HOSPADM

## 2019-03-05 RX ADMIN — PIPERACILLIN AND TAZOBACTAM 3.38 G: 3; .375 INJECTION, POWDER, FOR SOLUTION INTRAVENOUS at 23:51

## 2019-03-05 RX ADMIN — HYDROMORPHONE HYDROCHLORIDE 1 MG: 1 INJECTION, SOLUTION INTRAMUSCULAR; INTRAVENOUS; SUBCUTANEOUS at 23:52

## 2019-03-05 RX ADMIN — ONDANSETRON 8 MG: 2 INJECTION INTRAMUSCULAR; INTRAVENOUS at 20:28

## 2019-03-05 RX ADMIN — SODIUM CHLORIDE 637 ML: 900 INJECTION, SOLUTION INTRAVENOUS at 22:51

## 2019-03-05 RX ADMIN — SODIUM CHLORIDE 1000 ML: 900 INJECTION, SOLUTION INTRAVENOUS at 20:28

## 2019-03-05 RX ADMIN — HYDROMORPHONE HYDROCHLORIDE 1 MG: 1 INJECTION, SOLUTION INTRAMUSCULAR; INTRAVENOUS; SUBCUTANEOUS at 20:27

## 2019-03-05 RX ADMIN — ACETAMINOPHEN 975 MG: 325 TABLET ORAL at 20:27

## 2019-03-05 RX ADMIN — SODIUM CHLORIDE 1000 ML: 900 INJECTION, SOLUTION INTRAVENOUS at 20:06

## 2019-03-05 RX ADMIN — Medication 10 ML: at 22:51

## 2019-03-06 ENCOUNTER — APPOINTMENT (OUTPATIENT)
Dept: CT IMAGING | Age: 44
DRG: 871 | End: 2019-03-06
Attending: INTERNAL MEDICINE
Payer: COMMERCIAL

## 2019-03-06 LAB
ALBUMIN SERPL-MCNC: 2.9 G/DL (ref 3.5–5)
ALBUMIN/GLOB SERPL: 1 {RATIO} (ref 1.1–2.2)
ALP SERPL-CCNC: 110 U/L (ref 45–117)
ALT SERPL-CCNC: 21 U/L (ref 12–78)
AMPHET UR QL SCN: NEGATIVE
AMYLASE SERPL-CCNC: 63 U/L (ref 25–115)
ANION GAP SERPL CALC-SCNC: 10 MMOL/L (ref 5–15)
AST SERPL-CCNC: 12 U/L (ref 15–37)
ATRIAL RATE: 155 BPM
BARBITURATES UR QL SCN: NEGATIVE
BASOPHILS # BLD: 0 K/UL (ref 0–0.1)
BASOPHILS NFR BLD: 0 % (ref 0–1)
BENZODIAZ UR QL: POSITIVE
BILIRUB SERPL-MCNC: 0.6 MG/DL (ref 0.2–1)
BUN SERPL-MCNC: 8 MG/DL (ref 6–20)
BUN/CREAT SERPL: 10 (ref 12–20)
CALCIUM SERPL-MCNC: 8.4 MG/DL (ref 8.5–10.1)
CALCULATED P AXIS, ECG09: 70 DEGREES
CALCULATED R AXIS, ECG10: 93 DEGREES
CALCULATED T AXIS, ECG11: 35 DEGREES
CANNABINOIDS UR QL SCN: NEGATIVE
CHLORIDE SERPL-SCNC: 109 MMOL/L (ref 97–108)
CK MB CFR SERPL CALC: ABNORMAL % (ref 0–2.5)
CK MB SERPL-MCNC: <1 NG/ML (ref 5–25)
CK SERPL-CCNC: 31 U/L (ref 39–308)
CO2 SERPL-SCNC: 23 MMOL/L (ref 21–32)
COCAINE UR QL SCN: NEGATIVE
CREAT SERPL-MCNC: 0.79 MG/DL (ref 0.7–1.3)
DIAGNOSIS, 93000: NORMAL
DIFFERENTIAL METHOD BLD: ABNORMAL
DRUG SCRN COMMENT,DRGCM: ABNORMAL
EOSINOPHIL # BLD: 0.2 K/UL (ref 0–0.4)
EOSINOPHIL NFR BLD: 2 % (ref 0–7)
ERYTHROCYTE [DISTWIDTH] IN BLOOD BY AUTOMATED COUNT: 13.8 % (ref 11.5–14.5)
EST. AVERAGE GLUCOSE BLD GHB EST-MCNC: 108 MG/DL
GLOBULIN SER CALC-MCNC: 2.9 G/DL (ref 2–4)
GLUCOSE SERPL-MCNC: 100 MG/DL (ref 65–100)
HBA1C MFR BLD: 5.4 % (ref 4.2–6.3)
HCT VFR BLD AUTO: 35.3 % (ref 36.6–50.3)
HGB BLD-MCNC: 11.1 G/DL (ref 12.1–17)
IMM GRANULOCYTES # BLD AUTO: 0.1 K/UL (ref 0–0.04)
IMM GRANULOCYTES NFR BLD AUTO: 0 % (ref 0–0.5)
LACTATE SERPL-SCNC: 0.7 MMOL/L (ref 0.4–2)
LIPASE SERPL-CCNC: 42 U/L (ref 73–393)
LYMPHOCYTES # BLD: 1.4 K/UL (ref 0.8–3.5)
LYMPHOCYTES NFR BLD: 12 % (ref 12–49)
MAGNESIUM SERPL-MCNC: 1.5 MG/DL (ref 1.6–2.4)
MCH RBC QN AUTO: 28.8 PG (ref 26–34)
MCHC RBC AUTO-ENTMCNC: 31.4 G/DL (ref 30–36.5)
MCV RBC AUTO: 91.5 FL (ref 80–99)
METHADONE UR QL: NEGATIVE
MONOCYTES # BLD: 0.7 K/UL (ref 0–1)
MONOCYTES NFR BLD: 6 % (ref 5–13)
NEUTS SEG # BLD: 9.6 K/UL (ref 1.8–8)
NEUTS SEG NFR BLD: 80 % (ref 32–75)
NRBC # BLD: 0 K/UL (ref 0–0.01)
NRBC BLD-RTO: 0 PER 100 WBC
OPIATES UR QL: POSITIVE
P-R INTERVAL, ECG05: 120 MS
PCP UR QL: NEGATIVE
PHOSPHATE SERPL-MCNC: 2.9 MG/DL (ref 2.6–4.7)
PLATELET # BLD AUTO: 144 K/UL (ref 150–400)
PMV BLD AUTO: 12.2 FL (ref 8.9–12.9)
POTASSIUM SERPL-SCNC: 3.4 MMOL/L (ref 3.5–5.1)
PROT SERPL-MCNC: 5.8 G/DL (ref 6.4–8.2)
Q-T INTERVAL, ECG07: 324 MS
QRS DURATION, ECG06: 82 MS
QTC CALCULATION (BEZET), ECG08: 520 MS
RBC # BLD AUTO: 3.86 M/UL (ref 4.1–5.7)
SODIUM SERPL-SCNC: 142 MMOL/L (ref 136–145)
TROPONIN I SERPL-MCNC: <0.05 NG/ML
TSH SERPL DL<=0.05 MIU/L-ACNC: 0.27 UIU/ML (ref 0.36–3.74)
VENTRICULAR RATE, ECG03: 155 BPM
WBC # BLD AUTO: 12.1 K/UL (ref 4.1–11.1)

## 2019-03-06 PROCEDURE — 85025 COMPLETE CBC W/AUTO DIFF WBC: CPT

## 2019-03-06 PROCEDURE — 65270000029 HC RM PRIVATE

## 2019-03-06 PROCEDURE — 84100 ASSAY OF PHOSPHORUS: CPT

## 2019-03-06 PROCEDURE — 74011000258 HC RX REV CODE- 258: Performed by: INTERNAL MEDICINE

## 2019-03-06 PROCEDURE — 83605 ASSAY OF LACTIC ACID: CPT

## 2019-03-06 PROCEDURE — 84484 ASSAY OF TROPONIN QUANT: CPT

## 2019-03-06 PROCEDURE — 74011250637 HC RX REV CODE- 250/637: Performed by: COLON & RECTAL SURGERY

## 2019-03-06 PROCEDURE — 84443 ASSAY THYROID STIM HORMONE: CPT

## 2019-03-06 PROCEDURE — 82150 ASSAY OF AMYLASE: CPT

## 2019-03-06 PROCEDURE — 83690 ASSAY OF LIPASE: CPT

## 2019-03-06 PROCEDURE — 74011250636 HC RX REV CODE- 250/636: Performed by: HOSPITALIST

## 2019-03-06 PROCEDURE — 71275 CT ANGIOGRAPHY CHEST: CPT

## 2019-03-06 PROCEDURE — 83735 ASSAY OF MAGNESIUM: CPT

## 2019-03-06 PROCEDURE — 80053 COMPREHEN METABOLIC PANEL: CPT

## 2019-03-06 PROCEDURE — 74011250637 HC RX REV CODE- 250/637: Performed by: INTERNAL MEDICINE

## 2019-03-06 PROCEDURE — 74011000258 HC RX REV CODE- 258: Performed by: RADIOLOGY

## 2019-03-06 PROCEDURE — 36415 COLL VENOUS BLD VENIPUNCTURE: CPT

## 2019-03-06 PROCEDURE — 83036 HEMOGLOBIN GLYCOSYLATED A1C: CPT

## 2019-03-06 PROCEDURE — 74011636320 HC RX REV CODE- 636/320: Performed by: RADIOLOGY

## 2019-03-06 PROCEDURE — 77030018836 HC SOL IRR NACL ICUM -A

## 2019-03-06 PROCEDURE — 74011250636 HC RX REV CODE- 250/636

## 2019-03-06 PROCEDURE — 74011250636 HC RX REV CODE- 250/636: Performed by: INTERNAL MEDICINE

## 2019-03-06 PROCEDURE — 82550 ASSAY OF CK (CPK): CPT

## 2019-03-06 PROCEDURE — 74177 CT ABD & PELVIS W/CONTRAST: CPT

## 2019-03-06 PROCEDURE — 77030018846 HC SOL IRR STRL H20 ICUM -A

## 2019-03-06 PROCEDURE — 77030027138 HC INCENT SPIROMETER -A

## 2019-03-06 PROCEDURE — 80307 DRUG TEST PRSMV CHEM ANLYZR: CPT

## 2019-03-06 RX ORDER — POTASSIUM CHLORIDE 14.9 MG/ML
10 INJECTION INTRAVENOUS
Status: DISPENSED | OUTPATIENT
Start: 2019-03-06 | End: 2019-03-06

## 2019-03-06 RX ORDER — PROCHLORPERAZINE MALEATE 10 MG
10 TABLET ORAL
Status: DISCONTINUED | OUTPATIENT
Start: 2019-03-06 | End: 2019-03-10 | Stop reason: HOSPADM

## 2019-03-06 RX ORDER — SODIUM CHLORIDE 0.9 % (FLUSH) 0.9 %
10 SYRINGE (ML) INJECTION
Status: COMPLETED | OUTPATIENT
Start: 2019-03-06 | End: 2019-03-06

## 2019-03-06 RX ORDER — OXYCODONE AND ACETAMINOPHEN 10; 325 MG/1; MG/1
2 TABLET ORAL
Status: DISCONTINUED | OUTPATIENT
Start: 2019-03-06 | End: 2019-03-10 | Stop reason: HOSPADM

## 2019-03-06 RX ORDER — POTASSIUM CHLORIDE 7.45 MG/ML
INJECTION INTRAVENOUS
Status: COMPLETED
Start: 2019-03-06 | End: 2019-03-06

## 2019-03-06 RX ORDER — HYDROMORPHONE HYDROCHLORIDE 2 MG/ML
1.5 INJECTION, SOLUTION INTRAMUSCULAR; INTRAVENOUS; SUBCUTANEOUS
Status: DISCONTINUED | OUTPATIENT
Start: 2019-03-06 | End: 2019-03-10 | Stop reason: HOSPADM

## 2019-03-06 RX ORDER — SODIUM CHLORIDE 0.9 % (FLUSH) 0.9 %
5-40 SYRINGE (ML) INJECTION AS NEEDED
Status: DISCONTINUED | OUTPATIENT
Start: 2019-03-06 | End: 2019-03-10 | Stop reason: HOSPADM

## 2019-03-06 RX ORDER — HEPARIN SODIUM 5000 [USP'U]/ML
5000 INJECTION, SOLUTION INTRAVENOUS; SUBCUTANEOUS EVERY 8 HOURS
Status: DISCONTINUED | OUTPATIENT
Start: 2019-03-06 | End: 2019-03-10 | Stop reason: HOSPADM

## 2019-03-06 RX ORDER — VANCOMYCIN HYDROCHLORIDE
1250 EVERY 8 HOURS
Status: DISCONTINUED | OUTPATIENT
Start: 2019-03-06 | End: 2019-03-07

## 2019-03-06 RX ORDER — MESALAMINE 4 G/60ML
4 ENEMA RECTAL
Status: DISCONTINUED | OUTPATIENT
Start: 2019-03-06 | End: 2019-03-10 | Stop reason: HOSPADM

## 2019-03-06 RX ORDER — PANTOPRAZOLE SODIUM 40 MG/1
40 TABLET, DELAYED RELEASE ORAL
Status: DISCONTINUED | OUTPATIENT
Start: 2019-03-06 | End: 2019-03-10 | Stop reason: HOSPADM

## 2019-03-06 RX ORDER — PREGABALIN 100 MG/1
100 CAPSULE ORAL 4 TIMES DAILY
Status: DISCONTINUED | OUTPATIENT
Start: 2019-03-06 | End: 2019-03-10 | Stop reason: HOSPADM

## 2019-03-06 RX ORDER — ZOLPIDEM TARTRATE 5 MG/1
10 TABLET ORAL
Status: DISCONTINUED | OUTPATIENT
Start: 2019-03-06 | End: 2019-03-10 | Stop reason: HOSPADM

## 2019-03-06 RX ORDER — DIAZEPAM 5 MG/1
5 TABLET ORAL 2 TIMES DAILY
Status: DISCONTINUED | OUTPATIENT
Start: 2019-03-06 | End: 2019-03-10 | Stop reason: HOSPADM

## 2019-03-06 RX ORDER — SODIUM CHLORIDE 0.9 % (FLUSH) 0.9 %
5-40 SYRINGE (ML) INJECTION EVERY 8 HOURS
Status: DISCONTINUED | OUTPATIENT
Start: 2019-03-06 | End: 2019-03-10 | Stop reason: HOSPADM

## 2019-03-06 RX ADMIN — IOPAMIDOL 100 ML: 755 INJECTION, SOLUTION INTRAVENOUS at 08:28

## 2019-03-06 RX ADMIN — OXYCODONE AND ACETAMINOPHEN 2 TABLET: 10; 325 TABLET ORAL at 22:35

## 2019-03-06 RX ADMIN — VANCOMYCIN HYDROCHLORIDE 1250 MG: 10 INJECTION, POWDER, LYOPHILIZED, FOR SOLUTION INTRAVENOUS at 18:45

## 2019-03-06 RX ADMIN — HYDROMORPHONE HYDROCHLORIDE 1.5 MG: 2 INJECTION, SOLUTION INTRAMUSCULAR; INTRAVENOUS; SUBCUTANEOUS at 15:17

## 2019-03-06 RX ADMIN — PIPERACILLIN AND TAZOBACTAM 3.38 G: 3; .375 INJECTION, POWDER, FOR SOLUTION INTRAVENOUS at 15:07

## 2019-03-06 RX ADMIN — HYDROMORPHONE HYDROCHLORIDE 1 MG: 1 INJECTION, SOLUTION INTRAMUSCULAR; INTRAVENOUS; SUBCUTANEOUS at 07:51

## 2019-03-06 RX ADMIN — HEPARIN SODIUM 5000 UNITS: 5000 INJECTION INTRAVENOUS; SUBCUTANEOUS at 01:16

## 2019-03-06 RX ADMIN — ZOLPIDEM TARTRATE 10 MG: 10 TABLET, FILM COATED ORAL at 22:28

## 2019-03-06 RX ADMIN — SODIUM CHLORIDE 100 ML: 900 INJECTION, SOLUTION INTRAVENOUS at 08:28

## 2019-03-06 RX ADMIN — HYDROMORPHONE HYDROCHLORIDE 1.5 MG: 2 INJECTION, SOLUTION INTRAMUSCULAR; INTRAVENOUS; SUBCUTANEOUS at 11:41

## 2019-03-06 RX ADMIN — DIAZEPAM 5 MG: 5 TABLET ORAL at 17:05

## 2019-03-06 RX ADMIN — PROCHLORPERAZINE MALEATE 10 MG: 10 TABLET, FILM COATED ORAL at 11:43

## 2019-03-06 RX ADMIN — ONDANSETRON 4 MG: 2 INJECTION INTRAMUSCULAR; INTRAVENOUS at 04:07

## 2019-03-06 RX ADMIN — PREGABALIN 100 MG: 100 CAPSULE ORAL at 08:50

## 2019-03-06 RX ADMIN — PIPERACILLIN AND TAZOBACTAM 3.38 G: 3; .375 INJECTION, POWDER, FOR SOLUTION INTRAVENOUS at 06:09

## 2019-03-06 RX ADMIN — PIPERACILLIN AND TAZOBACTAM 3.38 G: 3; .375 INJECTION, POWDER, FOR SOLUTION INTRAVENOUS at 22:29

## 2019-03-06 RX ADMIN — PREGABALIN 100 MG: 100 CAPSULE ORAL at 15:17

## 2019-03-06 RX ADMIN — ONDANSETRON 4 MG: 2 INJECTION INTRAMUSCULAR; INTRAVENOUS at 07:52

## 2019-03-06 RX ADMIN — Medication 10 ML: at 06:10

## 2019-03-06 RX ADMIN — VANCOMYCIN HYDROCHLORIDE 1250 MG: 10 INJECTION, POWDER, LYOPHILIZED, FOR SOLUTION INTRAVENOUS at 11:00

## 2019-03-06 RX ADMIN — POTASSIUM CHLORIDE 10 MEQ: 10 INJECTION, SOLUTION INTRAVENOUS at 08:49

## 2019-03-06 RX ADMIN — POTASSIUM CHLORIDE 10 MEQ: 200 INJECTION, SOLUTION INTRAVENOUS at 04:11

## 2019-03-06 RX ADMIN — HYDROMORPHONE HYDROCHLORIDE 1.5 MG: 2 INJECTION, SOLUTION INTRAMUSCULAR; INTRAVENOUS; SUBCUTANEOUS at 23:59

## 2019-03-06 RX ADMIN — ONDANSETRON 4 MG: 2 INJECTION INTRAMUSCULAR; INTRAVENOUS at 23:59

## 2019-03-06 RX ADMIN — POTASSIUM CHLORIDE 10 MEQ: 200 INJECTION, SOLUTION INTRAVENOUS at 01:17

## 2019-03-06 RX ADMIN — PREGABALIN 100 MG: 100 CAPSULE ORAL at 22:28

## 2019-03-06 RX ADMIN — ZOLPIDEM TARTRATE 10 MG: 10 TABLET, FILM COATED ORAL at 01:00

## 2019-03-06 RX ADMIN — ONDANSETRON 4 MG: 2 INJECTION INTRAMUSCULAR; INTRAVENOUS at 20:10

## 2019-03-06 RX ADMIN — HEPARIN SODIUM 5000 UNITS: 5000 INJECTION INTRAVENOUS; SUBCUTANEOUS at 08:48

## 2019-03-06 RX ADMIN — SODIUM CHLORIDE, SODIUM LACTATE, POTASSIUM CHLORIDE, AND CALCIUM CHLORIDE 150 ML/HR: 600; 310; 30; 20 INJECTION, SOLUTION INTRAVENOUS at 01:15

## 2019-03-06 RX ADMIN — VANCOMYCIN HYDROCHLORIDE 2000 MG: 10 INJECTION, POWDER, LYOPHILIZED, FOR SOLUTION INTRAVENOUS at 00:03

## 2019-03-06 RX ADMIN — Medication 10 ML: at 15:18

## 2019-03-06 RX ADMIN — Medication 10 ML: at 01:00

## 2019-03-06 RX ADMIN — DIAZEPAM 5 MG: 5 TABLET ORAL at 07:52

## 2019-03-06 RX ADMIN — HEPARIN SODIUM 5000 UNITS: 5000 INJECTION INTRAVENOUS; SUBCUTANEOUS at 17:04

## 2019-03-06 RX ADMIN — HYDROMORPHONE HYDROCHLORIDE 1.5 MG: 2 INJECTION, SOLUTION INTRAMUSCULAR; INTRAVENOUS; SUBCUTANEOUS at 20:10

## 2019-03-06 RX ADMIN — Medication 10 ML: at 14:00

## 2019-03-06 RX ADMIN — Medication 20 ML: at 22:31

## 2019-03-06 RX ADMIN — MESALAMINE 4 G: 4 ENEMA RECTAL at 22:31

## 2019-03-06 RX ADMIN — HYDROMORPHONE HYDROCHLORIDE 1 MG: 1 INJECTION, SOLUTION INTRAMUSCULAR; INTRAVENOUS; SUBCUTANEOUS at 04:07

## 2019-03-06 RX ADMIN — Medication 10 ML: at 08:28

## 2019-03-06 RX ADMIN — Medication 10 ML: at 22:31

## 2019-03-06 NOTE — H&P
[address]  HISTORY AND PHYSICAL    Name:  Jaci Watters  MR#:  [id number]  :  1975  ACCOUNT #:  [de-identified]  ADMIT DATE:  2019      PRIMARY CARE PHYSICIAN:  Daniel Kimball MD.    SOURCE OF INFORMATION:  The patient. CHIEF COMPLAINT:  Abdominal pain. HISTORY OF PRESENT ILLNESS:  This is a 18-year-old man with a past medical history significant for Crohn's disease, status post multiple abdominal surgeries as a result of the Crohn's disease, which include total proctocolectomy with creation of ileoanal pouch and end ileostomy and status post ileostomy revision, anxiety/depression, chronic narcotic dependency, who was in his usual state of health until three days ago when the patient developed abdominal pain. The pain is located at the left side of the abdomen. The patient has his ileoanal pouch on the right side of his abdomen. The pain is constant, sharp pain, 10/10 in severity, associated with nausea and vomiting. The patient also stated that he has been having a lot of output from his pouch. He also stated that he has fever, rigors and chills at home, took Tylenol before coming to the emergency room without any significant relief. The patient came to the emergency room for further evaluation. When the patient arrived at the emergency room, chest x-ray was obtained. The chest x-ray shows possible pneumonia. The patient was started on antibiotics and was referred to the hospitalist service for evaluation for admission. He was last admitted to this hospital from 2019 to 2019. The patient was admitted and treated for ileal pouchitis and chronic narcotic dependency. Following that hospitalization, the patient was admitted to the surgical service and following that hospitalization, the patient was advised to follow up with his surgeon in [place], who performed the original surgery.   The patient stated that he went to see his surgeon in [place] and continuation of conservative therapy was advised. He was doing relatively well until 3 days ago when the patient developed another episode of abdominal pain associated with fever, chills, and rigors. PAST MEDICAL HISTORY:  Chronic narcotic dependency, Crohn's disease status post multiple abdominal surgeries. PAST SURGICAL HISTORY:  This is significant for total proctocolectomy with creation of ileoanal pouch, and loop ileostomy. ALLERGIES:  THE PATIENT IS ALLERGIC TO REMICADE, BACTRIM, AND NONSTEROIDAL ANTI-INFLAMMATORY DRUGS. MEDICATIONS:  Tylenol 500 mg 4 times daily as needed for pain, albuterol 90 mcg 2 puffs by inhalation every 4 hours as needed for wheezing, Valium 5 mg twice daily, Bentyl 10 mg every 6 hours as needed, Rowasa 4 g suppository daily, multivitamin 1 tablet daily, Prilosec 40 mg daily, Percocet 10/325 2 tablets every 4 hours as needed, Lyrica 100 mg 4 times daily, Compazine 10 mg twice daily as needed, Phenergan 25 mg every 6 hours as needed, Carafate 10 mL twice a day, Stelara 90 mg subcutaneously every 8 weeks, Ambien 10 mg daily at bedtime. FAMILY HISTORY:  This was reviewed. His father had cancer, the type of cancer is not known. SOCIAL HISTORY:  The patient smokes half a pack of cigarettes daily. Denies alcohol abuse. REVIEW OF SYSTEMS:  HEAD, EYES, EARS, NOSE, AND THROAT:  No headache, no dizziness, no blurring of vision. No photophobia. RESPIRATORY SYSTEM:  No cough, no shortness of breath. No hemoptysis. CARDIOVASCULAR SYSTEM:  No chest pain, orthopnea, no palpitation. GASTROINTESTINAL SYSTEM:  This is positive for abdominal pain, nausea and vomiting, diarrhea. No constipation. GENITOURINARY SYSTEM:  No dysuria, no urgency, and no frequency. All other systems are reviewed and they are negative. PHYSICAL EXAMINATION:  GENERAL APPEARANCE:  The patient appeared ill, in moderate distress.   VITAL SIGNS:  On arrival at the emergency room, temperature 103, pulse 160, respiratory rate 20, blood pressure 123/81, oxygen saturation 94% on room air. HEENT:  Head:  Normocephalic, atraumatic. Eyes:  Normal eye movements. No redness, no drainage, no discharge. Ears:  Normal external ears with no evidence of drainage. Nose:  No deformity and no drainage. Mouth and Throat:  No visible oral lesion. Dry oral mucosa. NECK:  Supple. No JVD. No thyromegaly. CHEST:  Clear breath sounds. No wheezing. No crackles. HEART:  Normal S1 and S2, regular. No clinically appreciable murmur. ABDOMEN:  Diffuse tenderness. No rebound tenderness. No guarding. Normal bowel sounds. CNS:  Alert, oriented x3. No gross focal neurological deficit. EXTREMITIES:  No edema. Pulses 2+ bilaterally. MUSCULOSKELETAL SYSTEM:  No evidence of joint deformity and swelling. SKIN:  No active skin lesions seen in the exposed part of the body. PSYCHIATRY:  Normal mood and affect. LYMPHATIC SYSTEM:  No cervical lymphadenopathy. DIAGNOSTIC DATA:  EKG shows sinus tachycardia and nonspecific ST and T-waves abnormalities suggestive of inferolateral ischemia. Chest x-ray shows findings suggestive of pneumonia. Abdominal x-ray shows significant bowel distention, likely small bowel concerning for obstruction. LABORATORY DATA:  Lactic acid level 2.22. Hematology, WBC 15.9, hemoglobin at 13.5, hematocrit 41.5, platelets of 425. Lactic acid level 2.0. Chemistry, sodium 135, potassium 3.2, chloride at 101, CO2 24, glucose 169, BUN 12, creatinine 1.04, calcium 9.3, total bilirubin 0.5, ALT 26, AST 18, alkaline phosphatase 138, total protein at 7.2, albumin level 3.4, globulin at 3.8. Urinalysis, this is significant for negative nitrite, trace leuko esterase, negative bacteria. Influenza A antigen negative, influenza B antigen negative. ASSESSMENT:  1. Suspected sepsis. 2.  Bacterial pneumonia. 3.  Hypokalemia. 4.  Anxiety/depression. 5.  Tobacco abuse.   6.  Chronic narcotic dependency. 7.  Suspected small bowel obstruction. 8.  Hyperglycemia. 9.  Crohn's disease. PLAN:  1. Suspected sepsis. We will admit the patient for further evaluation and treatment. We will start the patient on vancomycin and Zosyn. The diagnosis of sepsis is supported by fever, tachycardia, leukocytosis, elevated lactic acid level and is most likely coming from the bacterial pneumonia. We will obtain a CT scan of the abdomen and pelvis for further evaluation for possible other sources of infection. We will check amylase and lipase level. We will await blood culture result, if done in the emergency room. 2.  Bacterial pneumonia. We will start the patient on antibiotics as stated above. This is a potential source of the sepsis. We will obtain a CT scan of the chest for further evaluation of the pneumonia. 3.  Hypokalemia. We will replace potassium and repeat potassium level. We will also check magnesium level. 4.  Anxiety/depression. Continue with preadmission medication. 5.  Tobacco abuse. The patient advised to quit smoking. He does not want to be placed on a Nicoderm patch. 6.  Chronic narcotic dependency. We will carry out pain control and supportive therapy. 7.  Suspected small bowel obstruction. We will place the patient on clear liquid. The patient's surgeon will be consulted to assist in further evaluation and treatment. We will await the result of CT scan of the abdomen and pelvis. 8.  Hyperglycemia. We will check hemoglobin A1c level. The patient has no history of diabetes. 9.  Crohn's disease. We will continue with supportive therapy and home medication. 10.  Other Issues:  Code Status: The patient is a full code. We will place the patient on heparin for DVT prophylaxis. Sepsis reassessment completed. The patient has normal capillary refills, improved cardiopulmonary examination and moist mucous membrane.   11.  Functional status prior to admission, the patient is ambulatory without assistant or device.         Mary Mcnamara MD      [email or web address]  D:  03/06/2019 5:20  T:  03/06/2019 7:20  JOB #:  [id number]  CC:  Lynn Hoffman MD

## 2019-03-06 NOTE — PROGRESS NOTES
Bedside and Verbal shift change report given to James Major (oncoming nurse) by Sean Minor (offgoing nurse).  Report included the following information SBAR, Kardex and Cardiac Rhythm ST.

## 2019-03-06 NOTE — PROGRESS NOTES
Problem: General Medical Care Plan  Goal: *Vital signs within specified parameters  Outcome: Progressing Towards Goal  Goal is maintain stable vital signs  Goal: *Absence of infection signs and symptoms  Outcome: Progressing Towards Goal  WBC high but Lactic trending down  Goal: *Fluid volume balance  Outcome: Progressing Towards Goal  IVF's ongoing  Goal: *Optimize nutritional status  NPO now-CT of abdomen/pelvis/chest  Goal: *Progressive mobility and function (eg: ADL's)  Outcome: Progressing Towards Goal  Up ad kim    Problem: Falls - Risk of  Goal: *Absence of Falls  Document Austin Fall Risk and appropriate interventions in the flowsheet. Outcome: Progressing Towards Goal  Fall Risk Interventions:            Medication Interventions: Patient to call before getting OOB, Teach patient to arise slowly    Bedside and Verbal shift change report given to Lucrecia (oncoming nurse) by Vito Gauthier (offgoing nurse). Report included the following information SBAR, Kardex, ED Summary, Intake/Output and MAR. Problem: Pressure Injury - Risk of  Goal: *Prevention of pressure injury  Document Eduardo Scale and appropriate interventions in the flowsheet.   Outcome: Progressing Towards Goal  Pressure Injury Interventions:                      Nutrition Interventions: Discuss nutritional consult with provider, Document food/fluid/supplement intake

## 2019-03-06 NOTE — PROGRESS NOTES
Care Management Interventions  PCP Verified by CM: Yes(Dr Gutierrez)  Palliative Care Criteria Met (RRAT>21 & CHF Dx)?: No  Mode of Transport at Discharge: (wife Guillaume Gay car)  Transition of Care Consult (CM Consult): Other(f/u with surgeon)  Steven Signup: No  Discharge Durable Medical Equipment: No  Physical Therapy Consult: No  Occupational Therapy Consult: No  Speech Therapy Consult: No  Current Support Network: Lives with Spouse(Mickie   [phone])  Confirm Follow Up Transport: (wife or friend )  Plan discussed with Pt/Family/Caregiver: Yes(Patient  no AMD at present   Uses [facility] on [address] [facility])  [employer] Provided?: No  Discharge Location  Discharge Placement: Home   Care manager  Introduced self to patient who states he has been feeling badly for the last 4-5 days. He was told by ED MD that he has pneumonia and is currently on 2 liters of oxygen via nasal cannula. Patient has a internal pouch and does his own self care. 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 His wife works as a teacher in [employer] at [employer] and per patient has some flexibility in her job. Patient states he might require a PICC line and TPN per surgeon and his conversation this am.  Patient feels like he has lost 5-6 lbs over the last week or so. He currently is complaining of constant nausea and nursing is aware of this and has contacted his MD.  Patient states that his pain is intermittent in nature and is primarily in nature. Presently patient does not feel he has any needs upon discharge. Patient is also aware that he may be transferred to a med-surg unit in next 24 hrs.

## 2019-03-06 NOTE — ED PROVIDER NOTES
44 y.o. male with significant past medical history of Anal fistula, Anal stenosis, Chronic Kidney Disease, Crohn's Disease, GERD, ulcerative colitis, Hiatal hernia, Ileal pouchitis, Opioid dependence, Depression and Anxiety, and Syncopal episodes who presents from home with chief complaint of fever. Patient states he has Crohn's disease and has had numerous GI surgeries and fistulas. Patient states that he began experiencing diarrhea, abd pain, nausea and vomiting 3 days ago. He describes his stool as \"light brown and watery\" and has been vomiting bile with 2 episodes of vomiting today. Pt then developed a fever yesterday which has been ranging from 99.7-103.5 degrees Fahrenheit at home. Patient reports last dose of Tylenol was ~3.5 hours ago. He called his GI doctor who recommended he come into the ED for evaluation. Patient states KDAs are Remicade, Bactrim (Sulfamethoprim Ds), Morphine, and NSAIDs. Patient denies chest pain and  hematochezia. There are no other acute medical concerns at this time. Chart Review: Pt has a h/o Crohn's disease and multiple abdominal surgical procedures, the most recent of which was the conversion of an end-ileostomy to a [place] continent intestinal reservoir (BCIR) on 11/29/2017 in [place], [place]. His most recent previous hospitalization was from 1/4/2019 to 1/14/2019 for treatment of ileal pouchitis. Social hx: (+) Tobacco use, no EtOH use, no illicit drug use  PCP: Gracie Jimenez MD  GI: Taurus Pardo MD    Note written by Virgen Stanley, as dictated by Mina Putnam MD 8:00 PM      The history is provided by the patient. No  was used. Past Medical History:   Diagnosis Date    Anal fistula     The patient no longer has an anus.  Anal stenosis     The patient no longer has an anus.     Chronic kidney disease     Crohn's disease ([address]     GERD (gastroesophageal reflux disease)     H/O ulcerative colitis Symptoms began in 1996. Total proctocolectomy was performed in 2004. Patient later developed Crohn's disease.  Hiatal hernia     Ileal pouchitis ([facility] [address] 05/2004    The patient no longer has a pouch.  Nausea & vomiting     Combination of Scopalamine patch & Zofran IV worked well in past    Numbness in right leg     Chronic.  Opioid dependence ([address]     History of opioid dependence requiring a detox program.    Psychiatric disorder     depression and anxiety    Skin lesion of back 3/17/2014    Syncopal episodes        Past Surgical History:   Procedure Laterality Date    ABDOMEN SURGERY PROC UNLISTED  3/25/2004    Total proctocolectomy with creation of ileoanal J-pouch and loop ileostomy; Dr. Lu Govea.  COLONOSCOPY N/A 4/12/2018    Flexible endoscopy via ileostomy (NOT a colonoscopy); Mendez Ham MD.    COLONOSCOPY N/A 12/18/2018    COLONOSCOPY via Iliostomy with Peds Scope performed by Mariah Lees MD at [facility] ENDOSCOPY     CHOLECYSTECTOMY  12/2016    Dr. Justin Rush HX GI  5/2/2004    Examination under anesthesia with endoscopic evaluation of ileoanal pouch and placement of drain; Dr. Lu Goins   GI  5/25/2004    Ileostomy closure with small bowel resection and anastomosis; Dr. Lu Goins   GI  5/24/2012    Examination under anesthesia, anal dilatation, anal biopsy, and placement of draining seton; Dr. Lu Goins   GI  7/3/2012    Incision and drainage of perirectal abscess and rigid endoscopy of ileoanal pouch; Dr. Lu Goins   GI  7/31/2012    Incision and drainage of perirectal abscess, placement of draining seton, and anal dilatation; Dr. Lu Goins   GI  1/22/2013    Repair of anal fistulas with debridement, partial fistulectomy, and flap closure; Dr. Lu Goins   GI  5/17/2013    Examination under anesthesia, partial fistulotomy,  and placement of draining setons; Dr. Lu Goins      GI  8/6/2013    Anal fistulotomy and application of ACell micromatrix; Dr. Tonya Alfaro.  HX GI  2014    Examination under anesthesia and dilation of anal canal with rigid proctoscopy; Ventura Bruno MD.    HX GI  2015    Creation of [place] continent intestinal reservoir (BCIR), abdominoperineal resection of ileoanal J-pouch; lysis of adhesions, and gastrostomy; Severo Shown. Randon Opitz, MD ([place], [place])    HX GI  2015    Stoma revision; Severo Shown. Randon Opitz, MD ([place], [place])    HX GI  10/29/2015    Extensive lysis of adhesions, resection of continent intestinal reservoir, repair of serosal tears, and creation of end-ileostomy; Dr. Tonya Alfaro.  HX GI  2017    Laparotomy, extensive lysis of adhesions and revision of ileostomy; Dr. Tonya Alfaro.  HX ORTHOPAEDIC      Left ACL repair    HX OTHER SURGICAL  2017    Conversion of end-ileostomy to BCIR ([address]);  [place], [place].    HX UROLOGICAL  2017    Cystoscopy and placement of bilateral temporary ureteral catheters; Eusebio Mccloud MD.         Family History:   Problem Relation Age of Onset    Cancer Father     Asthma Neg Hx     Diabetes Neg Hx     Heart Disease Neg Hx     Hypertension Neg Hx     Stroke Neg Hx     Malignant Hyperthermia Neg Hx     Pseudocholinesterase Deficiency Neg Hx     Delayed Awakening Neg Hx     Post-op Nausea/Vomiting Neg Hx        Social History     Socioeconomic History    Marital status:      Spouse name: Not on file    Number of children: Not on file    Years of education: Not on file    Highest education level: Not on file   Social Needs    Financial resource strain: Not on file    Food insecurity - worry: Not on file    Food insecurity - inability: Not on file   [employer] needs - medical: Not on file   [employer] needs - non-medical: Not on file   Occupational History    Not on file   Tobacco Use    Smoking status: Current Every Day Smoker Packs/day: 0.50     Years: 7.00     Pack years: 3.50     Last attempt to quit: 2017     Years since quittin.1    Smokeless tobacco: Never Used   Substance and Sexual Activity    Alcohol use: No    Drug use: No    Sexual activity: Not on file   Other Topics Concern    Not on file   Social History Narrative    Not on file         ALLERGIES: Remicade [infliximab]; Bactrim [sulfamethoprim ds]; Morphine; and Nsaids (non-steroidal anti-inflammatory drug)    Review of Systems   Constitutional: Positive for fever. Negative for activity change, appetite change and fatigue. HENT: Negative for ear pain, facial swelling, sore throat and trouble swallowing. Eyes: Negative for pain, discharge and visual disturbance. Respiratory: Positive for cough. Negative for chest tightness, shortness of breath and wheezing. Cardiovascular: Negative for chest pain and palpitations. Gastrointestinal: Positive for abdominal pain, diarrhea, nausea and vomiting. Negative for blood in stool. Genitourinary: Negative for difficulty urinating, flank pain and hematuria. Musculoskeletal: Positive for myalgias (Generalized). Negative for arthralgias, joint swelling and neck pain. Skin: Negative for color change and rash. Neurological: Negative for dizziness, weakness, numbness and headaches. Hematological: Negative for adenopathy. Does not bruise/bleed easily. Psychiatric/Behavioral: Negative for behavioral problems, confusion and sleep disturbance. All other systems reviewed and are negative. Vitals:    19 1927 19 2100 19 2127   BP: 123/81 123/74 114/70    Pulse: (!) 160 (!) 142 (!) 109    Resp: 20 16 15    Temp: (!) 103 °F (39.4 °C)   98.7 °F (37.1 °C)   SpO2: 94% 92% (!) 89%    Weight: 87.9 kg (193 lb 12.6 oz)      Height: 6' (1.829 m)               Physical Exam   Constitutional: He is oriented to person, place, and time. He appears well-developed and well-nourished.  No distress. HENT:   Head: Normocephalic and atraumatic. Nose: Nose normal.   Mouth/Throat: Oropharynx is clear and moist.   Eyes: Conjunctivae and EOM are normal. Pupils are equal, round, and reactive to light. No scleral icterus. Neck: Normal range of motion. Neck supple. No JVD present. No tracheal deviation present. No thyromegaly present. No carotid bruits noted. Cardiovascular: Regular rhythm, normal heart sounds and intact distal pulses. Tachycardia present. Exam reveals no gallop and no friction rub. No murmur heard. Pulmonary/Chest: Effort normal. No respiratory distress. He has no wheezes. He has rhonchi (coarse rhonchi left lung field). He has no rales. He exhibits no tenderness. Abdominal: Soft. Bowel sounds are normal. He exhibits no distension and no mass. There is tenderness. There is no rebound and no guarding. Mild discomfort around the umbilicus. Subjectively c/o a lot of pain. Multiple surgical scars. Musculoskeletal: Normal range of motion. He exhibits no edema or tenderness. Lymphadenopathy:     He has no cervical adenopathy. Neurological: He is alert and oriented to person, place, and time. He has normal reflexes. No cranial nerve deficit. Coordination normal.   Skin: Skin is warm and dry. No rash noted. No erythema. Psychiatric: He has a normal mood and affect. His behavior is normal. Judgment and thought content normal.   Nursing note and vitals reviewed.     Note written by Virgen Mayo, as dictated by Kassandra Dutta MD 9:28 PM       MDM  Number of Diagnoses or Management Options  Diarrhea, unspecified type: new and requires workup  Pneumonia of left lung due to infectious organism, unspecified part of lung: new and requires workup     Amount and/or Complexity of Data Reviewed  Clinical lab tests: ordered and reviewed  Tests in the radiology section of CPT®: ordered and reviewed  Decide to obtain previous medical records or to obtain history from someone other than the patient: yes  Review and summarize past medical records: yes  Discuss the patient with other providers: yes  Independent visualization of images, tracings, or specimens: yes    Risk of Complications, Morbidity, and/or Mortality  Presenting problems: high  Diagnostic procedures: high  Management options: high    Patient Progress  Patient progress: stable         Procedures    PROGRESS NOTE:  8:56 PM  HR down from 150 to 108. PROGRESS NOTE:  9:08 PM  Pt is now c/o LLQ pain on re-eval.    ED EKG interpretation:  Rhythm: sinus tachycardia; and regular . Rate (approx.): 155; Axis: normal; ST/T wave: non-specific changes; normal intervals. Note written by Virgen Mcginnis, as dictated by Bety Gutierres MD 9:19 PM    CONSULT NOTE:  10:08 PM Bety Gutierres MD spoke with Dr. Ary Garcia, Consult for [facility]-rectal surgery. Discussed available diagnostic tests and clinical findings. He states that the pt often has the LLQ pain. Pt has chronic pain and has been w/u extensively. He also has narcotic dependence. He states that he wouldn't be overly concerned about tenderness because it is always tender there. Will order flat/rerect instead of CT and call hospitalist for admission. CONSULT NOTE:  10:48 PM Bety Gutierres MD communicated with Dr. Bhaskar Ferrer, Consult for Hospitalist via [facility] Text. Discussed available diagnostic tests and clinical findings. Pt has been accepted for admission. 10:49 PM  Patient is being admitted to the hospital.  The results of their tests and reasons for their admission have been discussed with them and/or available family. They convey agreement and understanding for the need to be admitted and for their admission diagnosis.

## 2019-03-06 NOTE — CONSULTS
Colorectal Surgery Consultation Note          NAME:  Sahra Poster   :   1975   MRN:   [id number]     Referring Physician: Rachel Henry MD    Consultation Date: 3/6/2019    Chief Complaint:  Abdominal pain and increased output from ileal pouch. History of Present Illness: The patient is a 29-year-old male whose complex medical history has been documented repeatedly in other notes.  It includes Crohn's disease and multiple abdominal surgical procedures, the most recent of which was the conversion of an end-ileostomy to a [place] continent intestinal reservoir (BCIR) on 2017 in [place], [place]. His most recent previous hospitalization was from 2019 to 2019 for treatment of ileal pouchitis.  In addition to distal decompression of his ileal pouch, he received parenteral fluids, parenteral Flagyl, oral Entocort, and Rowasa and sucralfate enemas. He improved sufficiently to [permit discharge, and the plan was for him to follow up with his surgeons in [place]. He has been communicating with them, and he states that they reviewed his radiographs from here and told him that they appear to be within normal limits for a BCIR patient. Nevertheless, he is awaiting a second opinion form another group (in [place]) that also has experience with the procedure. He was admitted to the hospital yesterday via the ER after presenting with a high fever (Tmax 103.9), abdominal pain, and diarrhea. He was diagnosed with pneumonia and started on treatment for that. Prior to admission, he has taken the first two days of doses of a Z-pack that was prescribed by Dr. Elaine Guevara. A CT scan of the chest, abdomen, and pelvis was performed this morning, and I have reviewed the images. The interpretation pertinent to me is summarized below.        PMH:  Past Medical History:   Diagnosis Date    Anal fistula     The patient no longer has an anus.     Anal stenosis     The patient no longer has an anus.    Chronic kidney disease     Crohn's disease ([address]     GERD (gastroesophageal reflux disease)     H/O ulcerative colitis     Symptoms began in 1996. Total proctocolectomy was performed in 2004. Patient later developed Crohn's disease.  Hiatal hernia     Ileal pouchitis ([address] 05/2004    The patient no longer has a pouch.  Nausea & vomiting     Combination of Scopalamine patch & Zofran IV worked well in past    Numbness in right leg     Chronic.  Opioid dependence ([address]     History of opioid dependence requiring a detox program.    Psychiatric disorder     depression and anxiety    Skin lesion of back 3/17/2014    Syncopal episodes        PSH:  Past Surgical History:   Procedure Laterality Date    ABDOMEN SURGERY PROC UNLISTED  3/25/2004    Total proctocolectomy with creation of ileoanal J-pouch and loop ileostomy; Dr. Gabrielle Ren.  COLONOSCOPY N/A 4/12/2018    Flexible endoscopy via ileostomy (NOT a colonoscopy); Selin Parkinson MD.    COLONOSCOPY N/A 12/18/2018    COLONOSCOPY via Iliostomy with Peds Scope performed by Barak Hidalgo MD at [address] [place] [address] ENDOSCOPY     CHOLECYSTECTOMY  12/2016    Dr. Dewight Frankel HX GI  5/2/2004    Examination under anesthesia with endoscopic evaluation of ileoanal pouch and placement of drain; Dr. Gabrielle Ren.   GI  5/25/2004    Ileostomy closure with small bowel resection and anastomosis; Dr. Gabrielle Ren.   GI  5/24/2012    Examination under anesthesia, anal dilatation, anal biopsy, and placement of draining seton; Dr. Gabrielle Ren.   GI  7/3/2012    Incision and drainage of perirectal abscess and rigid endoscopy of ileoanal pouch; Dr. Gabrielle Ren.   GI  7/31/2012    Incision and drainage of perirectal abscess, placement of draining seton, and anal dilatation; Dr. Gabrielle Ren.   GI  1/22/2013    Repair of anal fistulas with debridement, partial fistulectomy, and flap closure; Dr. Gabrielle Ren.      GI  5/17/2013 Examination under anesthesia, partial fistulotomy,  and placement of draining setons; Dr. Shahrzad Steinberg.   GI  2013    Anal fistulotomy and application of ACell micromatrix; Dr. Shahrzad Steinberg.   GI  2014    Examination under anesthesia and dilation of anal canal with rigid proctoscopy; Kashif Montaoñ MD.    HX GI  2015    Creation of Melanie Breech continent intestinal reservoir (BCIR), abdominoperineal resection of ileoanal J-pouch; lysis of adhesions, and gastrostomy; Tawanda Clamp. Paradise Paredes MD ([place], [place])     GI  2015    Stoma revision; Tawanda Clamp. Paradise Paredes MD ([place], [place])     GI  10/29/2015    Extensive lysis of adhesions, resection of continent intestinal reservoir, repair of serosal tears, and creation of end-ileostomy; Dr. Shahrzad Steinberg.   GI  2017    Laparotomy, extensive lysis of adhesions and revision of ileostomy; Dr. Shahrzad Steinberg.   ORTHOPAEDIC      Left ACL repair    HX OTHER SURGICAL  2017    Conversion of end-ileostomy to BCIR ([address]); [place],  [address] HX UROLOGICAL  2017    Cystoscopy and placement of bilateral temporary ureteral catheters; Wicho Daigle MD.       Home Medications:  Prior to Admission Medications   Prescriptions Last Dose Informant Patient Reported? Taking? Saccharomyces boulardii (FLORASTOR) 250 mg capsule   Yes No   Sig: Take 250 mg by mouth nightly. Ustekinumab (STELARA) 90 mg/mL injection  Self Yes No   Si mg by SubCUTAneous route. Every 8 weeks    acetaminophen (TYLENOL) 500 mg tablet   Yes No   Sig: Take 500 mg by mouth four (4) times daily as needed (mild pain, heache (usually has headaches 1 week after Stelara)). albuterol (PROVENTIL HFA, VENTOLIN HFA, PROAIR HFA) 90 mcg/actuation inhaler   No No   Sig: Take 2 Puffs by inhalation every four (4) hours as needed for Wheezing.    cyanocobalamin (VITAMIN B-12) 1,000 mcg/mL injection  Self Yes No   Si,000 mcg by IntraMUSCular route every Sunday. Indications: Once weekly   diazepam (VALIUM) 5 mg tablet  Self Yes No   Sig: Take 5 mg by mouth two (2) times a day. diclofenac (VOLTAREN) 1 % gel   Yes No   Sig: Apply 2 g to affected area four (4) times daily as needed. dicyclomine (BENTYL) 10 mg capsule  Self Yes No   Sig: Take 10 mg by mouth every six (6) hours as needed. ergocalciferol (ERGOCALCIFEROL) 50,000 unit capsule  Self Yes No   Sig: Take 50,000 Units by mouth every Sunday. mesalamine (ROWASA) 4 gram/60 mL enema   No No   Sig: Insert 60 mL into rectum nightly for 30 days. metroNIDAZOLE (FLAGYL) 500 mg tablet   No No   Sig: Take 1 Tab by mouth every twelve (12) hours. multivitamin with iron (FLINTSTONES) chewable tablet   No No   Sig: Take 1 Tab by mouth daily.   nut.suppl. - milk based formula (BOOST PUDDING) pudg   No No   Sig: One serving by mouth twice per day. omeprazole (PRILOSEC) 40 mg capsule  Self Yes No   Sig: Take 40 mg by mouth nightly. ondansetron hcl (ZOFRAN, AS HYDROCHLORIDE,) 8 mg tablet  Self Yes No   Sig: Take 8 mg by mouth every twelve (12) hours as needed (Mild Nausea). oxyCODONE-acetaminophen (PERCOCET 10)  mg per tablet   No No   Sig: Take 2 Tabs by mouth every four (4) hours as needed. Max Daily Amount: 12 Tabs. pregabalin (LYRICA) 100 mg capsule  Self Yes No   Sig: Take 100 mg by mouth four (4) times daily. prochlorperazine (COMPAZINE) 10 mg tablet   Yes No   Sig: Take 10 mg by mouth two (2) times daily as needed (Moderate Nausea). promethazine (PHENERGAN) 25 mg tablet  Self Yes No   Sig: Take 25 mg by mouth every six (6) hours as needed (Severe Nausea). sucralfate (CARAFATE) 100 mg/mL suspension   No No   Sig: 10 mL by Per J Tube route two (2) times a day for 30 days. zolpidem (AMBIEN) 10 mg tablet   No No   Sig: Take 1 Tab by mouth nightly. Max Daily Amount: 10 mg.       Facility-Administered Medications: None       Hospital Medications:  Current Facility-Administered Medications   Medication Dose Route Frequency    diazePAM (VALIUM) tablet 5 mg  5 mg Oral BID    mesalamine (ROWASA) 4 gram/60 mL enema 4 g  4 g Rectal QHS    oxyCODONE-acetaminophen (PERCOCET 10)  mg per tablet 2 Tab  2 Tab Oral Q4H PRN    pregabalin (LYRICA) capsule 100 mg  100 mg Oral QID    zolpidem (AMBIEN) tablet 10 mg  10 mg Oral QHS    sodium chloride (NS) flush 5-40 mL  5-40 mL IntraVENous Q8H    sodium chloride (NS) flush 5-40 mL  5-40 mL IntraVENous PRN    heparin (porcine) injection 5,000 Units  5,000 Units SubCUTAneous Q8H    vancomycin (VANCOCIN) 1250 mg in  ml infusion  1,250 mg IntraVENous Q8H    HYDROmorphone (PF) (DILAUDID) injection 1.5 mg  1.5 mg IntraVENous Q4H PRN    prochlorperazine (COMPAZINE) tablet 10 mg  10 mg Oral BID PRN    sodium chloride (NS) flush 5-40 mL  5-40 mL IntraVENous Q8H    sodium chloride (NS) flush 5-40 mL  5-40 mL IntraVENous PRN    ondansetron (ZOFRAN) injection 4 mg  4 mg IntraVENous Q4H PRN    piperacillin-tazobactam (ZOSYN) 3.375 g in 0.9% sodium chloride (MBP/ADV) 100 mL  3.375 g IntraVENous Q8H    lactated Ringers infusion  150 mL/hr IntraVENous CONTINUOUS    Vancomycin Pharmacy to Dose   Other Rx Dosing/Monitoring       Allergies: Allergies   Allergen Reactions    Remicade [Infliximab] Anaphylaxis and Shortness of Breath    Bactrim [Sulfamethoprim Ds] Other (comments)     Increased GI distress.     Morphine Nausea Only     Tolerates Dilaudid    Nsaids (Non-Steroidal Anti-Inflammatory Drug) Other (comments)     Stomach ulcers       Family History:  Family History   Problem Relation Age of Onset    Cancer Father     Asthma Neg Hx     Diabetes Neg Hx     Heart Disease Neg Hx     Hypertension Neg Hx     Stroke Neg Hx     Malignant Hyperthermia Neg Hx     Pseudocholinesterase Deficiency Neg Hx     Delayed Awakening Neg Hx     Post-op Nausea/Vomiting Neg Hx        Social History:  Social History     Tobacco Use    Smoking status: Current Every Day Smoker     Packs/day: 0.50     Years: 7.00     Pack years: 3.50     Last attempt to quit: 2017     Years since quittin.1    Smokeless tobacco: Never Used   Substance Use Topics    Alcohol use: No       Review of Systems:    Symptom Y/N Comments  Symptom Y/N Comments   Fever/Chills Y   Chest Pain     Cough Y   Abdominal Pain Y    Sputum    Joint Pain     SOB/CEDILLO Y   Pruritis/Rash     Nausea/vomit Y   Tolerating PT/OT     Diarrhea Y   Tolerating Diet N    Constipation    Other       Could NOT obtain due to:        Objective:     Patient Vitals for the past 8 hrs:   BP Temp Pulse Resp SpO2 Height Weight   19 0756 118/59 99.8 °F (37.7 °C) (!) 113 18 95 %     19 0413      6' (1.829 m) 89.1 kg (196 lb 6.9 oz)     701 - 1900  In: 100 [I.V.:100]  Out: -   1901 -  07  In: -   Out: 1500 [Urine:800]    PHYSICAL EXAMINATION:    GENERAL:  Fatigued. Somewhat uncomfortable. HEENT:  Anicteric. ABDOMEN:  Soft. Non-distended. Somewhat tender to palpation without rebound tenderness or involuntary guarding. Hyperactive bowel sounds. Ileal pouch stoma in right lower quadrant. NEURO:  Alert and oriented. Data Review     Recent Labs     19  0619   WBC 12.1* 15.9*   HGB 11.1* 13.5   HCT 35.3* 41.5   * 166     Recent Labs     19    135*   K 3.4* 3.2*   * 101   CO2 23 24   BUN 8 12   CREA 0.79 1.04    169*   PHOS 2.9  --    CA 8.4* 9.3     Recent Labs     19   SGOT 12* 18    138*   TP 5.8* 7.2   ALB 2.9* 3.4*   GLOB 2.9 3.8   AML 63  --    LPSE 42*  --      No results for input(s): INR, PTP, APTT in the last 72 hours. No lab exists for component: INREXT    CT Scan of Chest, Abdomen, and Pelvis (3/6/2019):  Chronically fluid filled distal small bowel without evidence of obstruction. No abscess or pneumoperitoneum. Assessment and Plan:      The abdominal pain is chronic, and the patient is narcotic dependent. The functional issues with the small intestine and the ileal pouch are also chronic, and still in the process of being evaluated by the [place] surgeons who specialize in the [facility] procedure that the patient had. The patient does not appear to be obstructed, and there is no indication for urgent surgical intervention. As has been done during previous hospitalizations, distal decompression of the ileum using the patient's drainage catheter attached to low intermittent suction might be helpful. The Rowasa enemas should also be continued. As the GI issues improve, the diet can be advanced, but, as before, it might not be necessary or possible, for the patient to tolerate solid food.       Principal Problem:    Sepsis ([address] (3/5/2019)

## 2019-03-06 NOTE — ROUTINE PROCESS
TRANSFER - OUT REPORT:    Verbal report given to Bo(name) on Sharla Jay  being transferred to [facility](unit) for routine progression of care       Report consisted of patients Situation, Background, Assessment and   Recommendations(SBAR). Information from the following report(s) SBAR, Kardex, ED Summary, Procedure Summary, Intake/Output, MAR, Recent Results and Med Rec Status was reviewed with the receiving nurse. Lines:   Peripheral IV 03/05/19 Right Antecubital (Active)   Site Assessment Clean, dry, & intact 3/5/2019  8:10 PM   Phlebitis Assessment 0 3/5/2019  8:10 PM   Infiltration Assessment 0 3/5/2019  8:10 PM   Dressing Status Clean, dry, & intact 3/5/2019  8:10 PM   Dressing Type Tape;Transparent 3/5/2019  8:10 PM   Hub Color/Line Status Pink;Flushed;Patent 3/5/2019  8:10 PM   Action Taken Blood drawn 3/5/2019  8:10 PM   Alcohol Cap Used No 3/5/2019  8:10 PM       Peripheral IV 03/05/19 Left Hand (Active)   Site Assessment Clean, dry, & intact 3/5/2019  8:27 PM   Phlebitis Assessment 0 3/5/2019  8:27 PM   Infiltration Assessment 0 3/5/2019  8:27 PM   Dressing Status Clean, dry, & intact 3/5/2019  8:27 PM   Dressing Type Transparent 3/5/2019  8:27 PM   Hub Color/Line Status Pink;Patent; Flushed 3/5/2019  8:27 PM   Action Taken Blood drawn 3/5/2019  8:27 PM       Peripheral IV 03/05/19 Right Forearm (Active)   Site Assessment Clean, dry, & intact 3/5/2019 10:54 PM   Phlebitis Assessment 0 3/5/2019 10:54 PM   Infiltration Assessment 0 3/5/2019 10:54 PM   Dressing Status Clean, dry, & intact 3/5/2019 10:54 PM   Dressing Type Transparent 3/5/2019 10:54 PM   Hub Color/Line Status Pink 3/5/2019 10:54 PM        Opportunity for questions and clarification was provided.       Patient transported with:   Registered Nurse

## 2019-03-06 NOTE — PROGRESS NOTES
Pharmacist Note - Vancomycin Dosing    Consult provided for this 44 y.o. male for indication of sepsis. Antibiotic regimen(s): vancomycin and zosyn    Recent Labs     19  1930   WBC 15.9*   CREA 1.04   BUN 12     Frequency of BMP: daily  Height: 182.9 cm  Weight: 87.9 kg  Est CrCl: >100 ml/min  Temp (24hrs), Av.9 °F (38.3 °C), Min:98.7 °F (37.1 °C), Max:103 °F (39.4 °C)    Cultures:  3/5 blood    Goal trough = 15 - 20 mcg/mL    Therapy will be initiated with a loading dose of 2000 mg IV x 1 to be followed by a maintenance dose of 1250 mg IV every 8 hours. Pharmacy to follow patient daily and order levels / make dose adjustments as appropriate.

## 2019-03-06 NOTE — PROGRESS NOTES
A Spiritual Care Partner Volunteer visited patient in  [place] on 3/06/2019.   Documented by:  Chaplain Fermin MDiv, MS, [address] [employer]  [facility] ([id number])

## 2019-03-06 NOTE — PROGRESS NOTES
To CT after being medicated with Dilaudid 1mg and PO Valium 5 mg. Patient voided 250 severiano urine.

## 2019-03-07 ENCOUNTER — HOME HEALTH ADMISSION (OUTPATIENT)
Dept: HOME HEALTH SERVICES | Facility: HOME HEALTH | Age: 44
End: 2019-03-07

## 2019-03-07 LAB
ALBUMIN SERPL-MCNC: 2.3 G/DL (ref 3.5–5)
ALBUMIN/GLOB SERPL: 0.8 {RATIO} (ref 1.1–2.2)
ALP SERPL-CCNC: 93 U/L (ref 45–117)
ALT SERPL-CCNC: 13 U/L (ref 12–78)
ANION GAP SERPL CALC-SCNC: 9 MMOL/L (ref 5–15)
AST SERPL-CCNC: 7 U/L (ref 15–37)
BILIRUB SERPL-MCNC: 0.4 MG/DL (ref 0.2–1)
BUN SERPL-MCNC: 5 MG/DL (ref 6–20)
BUN/CREAT SERPL: 8 (ref 12–20)
CALCIUM SERPL-MCNC: 8.5 MG/DL (ref 8.5–10.1)
CHLORIDE SERPL-SCNC: 109 MMOL/L (ref 97–108)
CO2 SERPL-SCNC: 24 MMOL/L (ref 21–32)
CREAT SERPL-MCNC: 0.59 MG/DL (ref 0.7–1.3)
DATE LAST DOSE: NORMAL
GLOBULIN SER CALC-MCNC: 3 G/DL (ref 2–4)
GLUCOSE SERPL-MCNC: 83 MG/DL (ref 65–100)
LACTATE SERPL-SCNC: 1.5 MMOL/L (ref 0.4–2)
POTASSIUM SERPL-SCNC: 3.7 MMOL/L (ref 3.5–5.1)
PROT SERPL-MCNC: 5.3 G/DL (ref 6.4–8.2)
REPORTED DOSE,DOSE: NORMAL UNITS
REPORTED DOSE/TIME,TMG: NORMAL
SODIUM SERPL-SCNC: 142 MMOL/L (ref 136–145)
T3FREE SERPL-MCNC: 1.9 PG/ML (ref 2.2–4)
T4 FREE SERPL-MCNC: 1 NG/DL (ref 0.8–1.5)
TSH SERPL DL<=0.05 MIU/L-ACNC: 0.52 UIU/ML (ref 0.36–3.74)
VANCOMYCIN TROUGH SERPL-MCNC: 8.6 UG/ML (ref 5–10)

## 2019-03-07 PROCEDURE — 74011250636 HC RX REV CODE- 250/636: Performed by: HOSPITALIST

## 2019-03-07 PROCEDURE — 74011250637 HC RX REV CODE- 250/637: Performed by: COLON & RECTAL SURGERY

## 2019-03-07 PROCEDURE — 74011250637 HC RX REV CODE- 250/637: Performed by: INTERNAL MEDICINE

## 2019-03-07 PROCEDURE — 80202 ASSAY OF VANCOMYCIN: CPT

## 2019-03-07 PROCEDURE — 36415 COLL VENOUS BLD VENIPUNCTURE: CPT

## 2019-03-07 PROCEDURE — 80053 COMPREHEN METABOLIC PANEL: CPT

## 2019-03-07 PROCEDURE — 74011250636 HC RX REV CODE- 250/636: Performed by: INTERNAL MEDICINE

## 2019-03-07 PROCEDURE — 84443 ASSAY THYROID STIM HORMONE: CPT

## 2019-03-07 PROCEDURE — 84481 FREE ASSAY (FT-3): CPT

## 2019-03-07 PROCEDURE — 74011000258 HC RX REV CODE- 258: Performed by: INTERNAL MEDICINE

## 2019-03-07 PROCEDURE — 83605 ASSAY OF LACTIC ACID: CPT

## 2019-03-07 PROCEDURE — 65270000032 HC RM SEMIPRIVATE

## 2019-03-07 PROCEDURE — 84439 ASSAY OF FREE THYROXINE: CPT

## 2019-03-07 PROCEDURE — 74011250637 HC RX REV CODE- 250/637: Performed by: HOSPITALIST

## 2019-03-07 RX ORDER — VANCOMYCIN/0.9 % SOD CHLORIDE 1.5G/250ML
1500 PLASTIC BAG, INJECTION (ML) INTRAVENOUS EVERY 8 HOURS
Status: DISCONTINUED | OUTPATIENT
Start: 2019-03-07 | End: 2019-03-07

## 2019-03-07 RX ADMIN — HEPARIN SODIUM 5000 UNITS: 5000 INJECTION INTRAVENOUS; SUBCUTANEOUS at 02:00

## 2019-03-07 RX ADMIN — HEPARIN SODIUM 5000 UNITS: 5000 INJECTION INTRAVENOUS; SUBCUTANEOUS at 08:19

## 2019-03-07 RX ADMIN — MESALAMINE 4 G: 4 ENEMA RECTAL at 21:21

## 2019-03-07 RX ADMIN — PIPERACILLIN AND TAZOBACTAM 3.38 G: 3; .375 INJECTION, POWDER, FOR SOLUTION INTRAVENOUS at 21:06

## 2019-03-07 RX ADMIN — OXYCODONE AND ACETAMINOPHEN 2 TABLET: 10; 325 TABLET ORAL at 17:10

## 2019-03-07 RX ADMIN — Medication 10 ML: at 13:07

## 2019-03-07 RX ADMIN — VANCOMYCIN HYDROCHLORIDE 1250 MG: 10 INJECTION, POWDER, LYOPHILIZED, FOR SOLUTION INTRAVENOUS at 04:00

## 2019-03-07 RX ADMIN — HYDROMORPHONE HYDROCHLORIDE 1.5 MG: 2 INJECTION, SOLUTION INTRAMUSCULAR; INTRAVENOUS; SUBCUTANEOUS at 21:07

## 2019-03-07 RX ADMIN — PIPERACILLIN AND TAZOBACTAM 3.38 G: 3; .375 INJECTION, POWDER, FOR SOLUTION INTRAVENOUS at 06:50

## 2019-03-07 RX ADMIN — PREGABALIN 100 MG: 100 CAPSULE ORAL at 17:10

## 2019-03-07 RX ADMIN — Medication 10 ML: at 06:50

## 2019-03-07 RX ADMIN — HYDROMORPHONE HYDROCHLORIDE 1.5 MG: 2 INJECTION, SOLUTION INTRAMUSCULAR; INTRAVENOUS; SUBCUTANEOUS at 16:13

## 2019-03-07 RX ADMIN — HYDROMORPHONE HYDROCHLORIDE 1.5 MG: 2 INJECTION, SOLUTION INTRAMUSCULAR; INTRAVENOUS; SUBCUTANEOUS at 08:19

## 2019-03-07 RX ADMIN — PREGABALIN 100 MG: 100 CAPSULE ORAL at 12:08

## 2019-03-07 RX ADMIN — Medication 1 CAPSULE: at 00:00

## 2019-03-07 RX ADMIN — ZOLPIDEM TARTRATE 10 MG: 10 TABLET, FILM COATED ORAL at 21:06

## 2019-03-07 RX ADMIN — OXYCODONE AND ACETAMINOPHEN 2 TABLET: 10; 325 TABLET ORAL at 23:43

## 2019-03-07 RX ADMIN — OXYCODONE AND ACETAMINOPHEN 2 TABLET: 10; 325 TABLET ORAL at 09:47

## 2019-03-07 RX ADMIN — PREGABALIN 100 MG: 100 CAPSULE ORAL at 21:07

## 2019-03-07 RX ADMIN — SODIUM CHLORIDE, SODIUM LACTATE, POTASSIUM CHLORIDE, AND CALCIUM CHLORIDE 150 ML/HR: 600; 310; 30; 20 INJECTION, SOLUTION INTRAVENOUS at 23:45

## 2019-03-07 RX ADMIN — ONDANSETRON 4 MG: 2 INJECTION INTRAMUSCULAR; INTRAVENOUS at 12:08

## 2019-03-07 RX ADMIN — DIAZEPAM 5 MG: 5 TABLET ORAL at 17:10

## 2019-03-07 RX ADMIN — Medication 10 ML: at 21:06

## 2019-03-07 RX ADMIN — HYDROMORPHONE HYDROCHLORIDE 1.5 MG: 2 INJECTION, SOLUTION INTRAMUSCULAR; INTRAVENOUS; SUBCUTANEOUS at 04:11

## 2019-03-07 RX ADMIN — PIPERACILLIN AND TAZOBACTAM 3.38 G: 3; .375 INJECTION, POWDER, FOR SOLUTION INTRAVENOUS at 16:05

## 2019-03-07 RX ADMIN — SODIUM CHLORIDE, SODIUM LACTATE, POTASSIUM CHLORIDE, AND CALCIUM CHLORIDE 150 ML/HR: 600; 310; 30; 20 INJECTION, SOLUTION INTRAVENOUS at 08:19

## 2019-03-07 RX ADMIN — HYDROMORPHONE HYDROCHLORIDE 1.5 MG: 2 INJECTION, SOLUTION INTRAMUSCULAR; INTRAVENOUS; SUBCUTANEOUS at 12:08

## 2019-03-07 RX ADMIN — PREGABALIN 100 MG: 100 CAPSULE ORAL at 08:19

## 2019-03-07 RX ADMIN — HEPARIN SODIUM 5000 UNITS: 5000 INJECTION INTRAVENOUS; SUBCUTANEOUS at 16:13

## 2019-03-07 RX ADMIN — VANCOMYCIN HYDROCHLORIDE 1250 MG: 10 INJECTION, POWDER, LYOPHILIZED, FOR SOLUTION INTRAVENOUS at 13:05

## 2019-03-07 RX ADMIN — PROCHLORPERAZINE MALEATE 10 MG: 10 TABLET, FILM COATED ORAL at 04:44

## 2019-03-07 RX ADMIN — PROCHLORPERAZINE MALEATE 10 MG: 10 TABLET, FILM COATED ORAL at 17:10

## 2019-03-07 RX ADMIN — ONDANSETRON 4 MG: 2 INJECTION INTRAMUSCULAR; INTRAVENOUS at 04:11

## 2019-03-07 RX ADMIN — PANTOPRAZOLE SODIUM 40 MG: 40 TABLET, DELAYED RELEASE ORAL at 00:00

## 2019-03-07 RX ADMIN — Medication 1 CAPSULE: at 21:06

## 2019-03-07 RX ADMIN — SODIUM CHLORIDE, SODIUM LACTATE, POTASSIUM CHLORIDE, AND CALCIUM CHLORIDE 150 ML/HR: 600; 310; 30; 20 INJECTION, SOLUTION INTRAVENOUS at 16:12

## 2019-03-07 RX ADMIN — DIAZEPAM 5 MG: 5 TABLET ORAL at 08:19

## 2019-03-07 RX ADMIN — PANTOPRAZOLE SODIUM 40 MG: 40 TABLET, DELAYED RELEASE ORAL at 21:07

## 2019-03-07 NOTE — PROGRESS NOTES
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 Interval history / Subjective:    F/U for sepsis, pneumonia   Doing a little better today, able to drink some. Still having high output from ostomy. Assessment & Plan:     Sepsis(fever,tachycardia) due to bacterial pneumonia,POA  -Continue zosyn, DC Vanc no sign of MRSA  - if stable can transition to single agent Levaquin tomorrow   - Sputum culture  -Need f/u imaging in 6-8 weeks to ensure resolution of the pneumonia      H/o chron' disease. Chronic bowel obstruction. -S/P multiple abdominal surgical procedures, the most recent of which was the conversion of an end-ileostomy to a [place] continent intestinal reservoir (BCIR) on 2017 in [place], [place].  -Recently admitted  2019 to 2019 for treatment of ileal pouchitis. -CT A/P showed :Postsurgical appearance of the bowel, chronically dilated and fluid-filled. No evidence of bowel obstruction. Status post proctocolectomy.  -Discussed with Colorectal surgery. Per CRC,these findings are chronic and no surgical intervention needed. Diet as tolerated.   -On mesalamine    Hypokalemia.:replace.     Anxiety/depression.  Continue with preadmission medication.     Tobacco abuse.  The patient advised to quit smoking. [facility] does not want to be placed on a Nicoderm patch.     Chronic narcotic dependency: PRN opiates while in house, would be very strict about discharge on no or small amt of narcotics.      Low TSh: likely sick euthyroid, but will need to repeat this outpatient with PCP once no longer ill. Code status: FULL  DVT prophylaxis: Heparin     Care Plan discussed with: Patient/Family  Disposition: Home w/Family     Hospital Problems  Date Reviewed: 3/5/2019          Codes Class Noted POA    * (Principal) Sepsis ([facility] [address] ICD-10-CM: A41.9  ICD-9-CM: 038.9, 995.91  3/5/2019 Yes                Review of Systems:   A comprehensive review of systems was negative except for that written in the HPI. Vital Signs:    Last 24hrs VS reviewed since prior progress note. Most recent are:  Visit Vitals  /66   Pulse 98   Temp 99.4 °F (37.4 °C)   Resp 16   Ht 6' (1.829 m)   Wt 89 kg (196 lb 3.4 oz)   SpO2 94%   BMI 26.61 kg/m²         Intake/Output Summary (Last 24 hours) at 3/7/2019 1622  Last data filed at 3/7/2019 1414  Gross per 24 hour   Intake 3780 ml   Output 2400 ml   Net 1380 ml        Physical Examination:             Constitutional:  No acute distress, cooperative, pleasant    ENT:  Oral mucous moist, oropharynx benign. Neck supple,    Resp:  CTA bilaterally. No wheezing/rhonchi/rales. No accessory muscle use   CV:  Regular rhythm, normal rate, no murmurs, gallops, rubs    GI:  Soft, non distended, non tender. normoactive bowel sounds, no hepatosplenomegaly. Multiple piror surgical scars, small stoma c/d/i without erythema     Musculoskeletal:  No edema, warm, 2+ pulses throughout    Neurologic:  Moves all extremities. AAOx3, CN II-XII reviewed     Psych:  Good insight, Not anxious nor agitated. Data Review: All imaging and laboratory data reviewed.        Labs:     Recent Labs     03/06/19  0606 03/05/19  1930   WBC 12.1* 15.9*   HGB 11.1* 13.5   HCT 35.3* 41.5   * 166     Recent Labs     03/07/19  0440 03/06/19 0131 03/05/19 1930    142 135*   K 3.7 3.4* 3.2*   * 109* 101   CO2 24 23 24   BUN 5* 8 12   CREA 0.59* 0.79 1.04   GLU 83 100 169*   CA 8.5 8.4* 9.3   MG  --  1.5*  --    PHOS  --  2.9  --      Recent Labs     03/07/19  0440 03/06/19 0131 03/05/19 1930   SGOT 7* 12* 18   ALT 13 21 26   AP 93 110 138*   TBILI 0.4 0.6 0.5   TP 5.3* 5.8* 7.2   ALB 2.3* 2.9* 3.4*   GLOB 3.0 2.9 3.8   AML  --  63  --    LPSE  --  42*  --      No results for input(s): INR, PTP, APTT in the last 72 hours. No lab exists for component: INREXT   No results for input(s): FE, TIBC, PSAT, FERR in the last 72 hours. Lab Results   Component Value Date/Time    Folate 7.5 12/11/2018 12:38 PM      No results for input(s): PH, PCO2, PO2 in the last 72 hours.   Recent Labs     03/06/19  0606 03/06/19 0131   CPK  --  31*   CKNDX  --  Cannot be calculated   TROIQ <0.05  --      Lab Results   Component Value Date/Time    Cholesterol, total 223 (H) 02/19/2009 04:00 PM    HDL Cholesterol 51 02/19/2009 04:00 PM    LDL, calculated 129.4 (H) 02/19/2009 04:00 PM    Triglyceride 213 (H) 02/19/2009 04:00 PM    CHOL/HDL Ratio 4.4 02/19/2009 04:00 PM     Lab Results   Component Value Date/Time    Glucose (POC) 99 02/14/2018 09:53 PM    Glucose (POC) 192 (H) 02/14/2018 11:25 AM    Glucose (POC) 100 06/17/2017 11:22 AM    Glucose (POC) 137 (H) 06/17/2017 05:54 AM    Glucose (POC) 120 (H) 06/16/2017 09:40 PM     Lab Results   Component Value Date/Time    Color DARK YELLOW 03/05/2019 08:55 PM    Appearance CLEAR 03/05/2019 08:55 PM    Specific gravity >1.030 (H) 03/05/2019 08:55 PM    Specific gravity 1.023 01/30/2019 12:16 AM    pH (UA) 5.0 03/05/2019 08:55 PM    Protein 30 (A) 03/05/2019 08:55 PM    Glucose 100 (A) 03/05/2019 08:55 PM    Ketone TRACE (A) 03/05/2019 08:55 PM    Bilirubin NEGATIVE  01/30/2019 12:16 AM    Urobilinogen 1.0 03/05/2019 08:55 PM    Nitrites NEGATIVE  03/05/2019 08:55 PM    Leukocyte Esterase TRACE (A) 03/05/2019 08:55 PM    Epithelial cells FEW 03/05/2019 08:55 PM    Bacteria NEGATIVE  03/05/2019 08:55 PM    WBC 0-4 03/05/2019 08:55 PM    RBC 0-5 03/05/2019 08:55 PM         Medications Reviewed:     Current Facility-Administered Medications   Medication Dose Route Frequency    vancomycin (VANCOCIN) 1500 mg in  ml infusion  1,500 mg IntraVENous Q8H    diazePAM (VALIUM) tablet 5 mg  5 mg Oral BID    mesalamine (ROWASA) 4 gram/60 mL enema 4 g  4 g Rectal QHS    oxyCODONE-acetaminophen (PERCOCET 10)  mg per tablet 2 Tab  2 Tab Oral Q4H PRN    pregabalin (LYRICA) capsule 100 mg  100 mg Oral QID    zolpidem (AMBIEN) tablet 10 mg  10 mg Oral QHS    sodium chloride (NS) flush 5-40 mL  5-40 mL IntraVENous Q8H    sodium chloride (NS) flush 5-40 mL  5-40 mL IntraVENous PRN    heparin (porcine) injection 5,000 Units  5,000 Units SubCUTAneous Q8H    HYDROmorphone (PF) (DILAUDID) injection 1.5 mg  1.5 mg IntraVENous Q4H PRN    prochlorperazine (COMPAZINE) tablet 10 mg  10 mg Oral BID PRN    pantoprazole (PROTONIX) tablet 40 mg  40 mg Oral QHS    lactobac ac& pc-s.therm-b.anim (VALERIE Q/RISAQUAD)  1 Cap Oral QHS    sodium chloride (NS) flush 5-40 mL  5-40 mL IntraVENous Q8H    sodium chloride (NS) flush 5-40 mL  5-40 mL IntraVENous PRN    ondansetron (ZOFRAN) injection 4 mg  4 mg IntraVENous Q4H PRN    piperacillin-tazobactam (ZOSYN) 3.375 g in 0.9% sodium chloride (MBP/ADV) 100 mL  3.375 g IntraVENous Q8H    lactated Ringers infusion  150 mL/hr IntraVENous CONTINUOUS    Vancomycin Pharmacy to Dose   Other Rx Dosing/Monitoring     ______________________________________________________________________  EXPECTED LENGTH OF STAY: 4d 19h  ACTUAL LENGTH OF STAY:          2                 Keron Brothers MD

## 2019-03-07 NOTE — PROGRESS NOTES
TRANSFER - OUT REPORT:    Verbal report given to [facility] RN(name) on Diamond Garrison  being transferred to (unit) for routine progression of care       Report consisted of patients Situation, Background, Assessment and   Recommendations(SBAR). Information from the following report(s) SBAR, Intake/Output, MAR, Recent Results and Cardiac Rhythm Sinus tach was reviewed with the receiving nurse. Lines:   Peripheral IV 03/05/19 Right Forearm (Active)   Site Assessment Clean, dry, & intact 3/7/2019  8:24 AM   Phlebitis Assessment 0 3/7/2019  8:24 AM   Infiltration Assessment 0 3/7/2019  8:24 AM   Dressing Status Clean, dry, & intact 3/7/2019  8:24 AM   Dressing Type Tape;Transparent 3/7/2019  8:24 AM   Hub Color/Line Status Pink;Patent 3/7/2019  8:24 AM   Action Taken Open ports on tubing capped 3/7/2019 12:20 AM   Alcohol Cap Used Yes 3/7/2019  8:24 AM       Peripheral IV 03/06/19 Right Wrist (Active)   Site Assessment Clean, dry, & intact 3/7/2019  8:24 AM   Phlebitis Assessment 0 3/7/2019  8:24 AM   Infiltration Assessment 0 3/7/2019  8:24 AM   Dressing Status Clean, dry, & intact 3/7/2019  8:24 AM   Dressing Type Tape;Transparent 3/7/2019  8:24 AM   Hub Color/Line Status Blue; Infusing;Patent 3/7/2019  8:24 AM   Action Taken Open ports on tubing capped 3/7/2019  4:00 AM   Alcohol Cap Used Yes 3/7/2019  8:24 AM       Peripheral IV 03/06/19 Right;Distal Cephalic (Active)   Site Assessment Clean, dry, & intact 3/7/2019  8:24 AM   Phlebitis Assessment 0 3/7/2019  8:24 AM   Infiltration Assessment 0 3/7/2019  8:24 AM   Dressing Status Clean, dry, & intact 3/7/2019  8:24 AM   Dressing Type Tape;Transparent 3/7/2019  8:24 AM   Hub Color/Line Status Blue;Capped 3/7/2019  8:24 AM   Action Taken Open ports on tubing capped 3/7/2019  4:00 AM   Alcohol Cap Used Yes 3/7/2019  8:24 AM        Opportunity for questions and clarification was provided.       Patient transported with:   Datam

## 2019-03-07 NOTE — PROGRESS NOTES
Reason for Admission:   Abdominal pain. RRAT Score:    13-Yellow-Moderate              Do you (patient/family) have any concerns for transition/discharge? None reported. Plan for utilizing home health:     Patient agreeable to an [facility] for pneumonia. CM to complete referral.    Likelihood of readmission? Moderate            Transition of Care Plan:      CM met with patient at bedside to inform of CM role and to assess needs. Patient verified demographic info. Patient lives at home with his wife and two sons. Patient reports that he last seen his PCP Dr. Zari Benitez two weeks ago. He prefers [facility]. Patient uses a cane at home as needed but is otherwise independent with self-care and ADls. Patient has an internal ostomy. There are no other CM needs at this time but CM will continue to monitor.      Gaby Leigh MS

## 2019-03-07 NOTE — PROGRESS NOTES
Hospitalist Progress Note  Reggie Medrano MD  Answering service: [phone] or [phone] from in house phone      Date of Service:  3/6/2019  NAME:  Parth Zaldivar  :  1975  MRN:  [id number]    Admission summary    This is a 56-year-old man with a past medical history significant for Crohn's disease, status post multiple abdominal surgeries as a result of the Crohn's disease, which include total proctocolectomy with creation of ileoanal pouch and end ileostomy and status post ileostomy revision, anxiety/depression, chronic narcotic dependency, who was in his usual state of health until three days ago when the patient developed abdominal pain. Follow up for:   Sepsis  Pneumonia    Subjective     -He still complains of severe abdominal pain  -Denied sob,chest pain. Assessment and Plan   Sepsis(fever,tachycardia) due to bacterial pneumonia,POA  -Continue IV vancomycin and zosyn. Iv fluids.  -Need f/u imaging in 6-8 weeks to ensure resolution of the pneumonia     Hypokalemia.:replace. Anxiety/depression. Continue with preadmission medication. Tobacco abuse. The patient advised to quit smoking. He does not want to be placed on a Nicoderm patch. Chronic narcotic dependency: in pain meds for now. H/o chron' disease. Chronic bowel obstruction. -S/P multiple abdominal surgical procedures, the most recent of which was the conversion of an end-ileostomy to a [place] intestinal reservoir (BCIR) on 2017 in [place], [place].  -Recently admitted  2019 to 2019 for treatment of ileal pouchitis. -CT A/P showed :Postsurgical appearance of the bowel, chronically dilated and fluid-filled. No evidence of bowel obstruction. Status post proctocolectomy.  -Discussed with Colorectal surgery. Per CRC,these findings are chronic and no surgical intervention needed. Diet as tolerated.   -On mesalamine    Low TSh:check full thyroid panel.       Code status:full DVT prophylaxis:scd   SUP:na   Disposition  · Preadmission lived:home  · Anticipated NLOC:home      Care Plan discussed with:  Patient/Family and Nurse         Current facility administered and prior to admit medications reviewed. x         Review of Systems:  A comprehensive review of systems was negative except for that written in the HPI. PHYSICAL EXAM:  O:  Visit Vitals  /55 (BP 1 Location: Right arm, BP Patient Position: Sitting)   Pulse (!) 115   Temp 100.1 °F (37.8 °C)   Resp 18   Ht 6' (1.829 m)   Wt 89.1 kg (196 lb 6.9 oz)   SpO2 100%   BMI 26.64 kg/m²       General Appears comfortable,not in distress. HEENT Head atraumatic. Unicteric sclera. Moist buccal mucosa. PERRLA   CVS RRR, no MRG, no JVD, no peripheral edema   Chest No deformity  No accessory muscle use  Vesicular air entry symmetrically  No wheezing,ronchi or crepitations   Abdomen &Pelvis Not distended. Normoactive. Soft. Non tender. No hepatomegally   Genitourinary  No CVA or suprapubic tenderness   Musculoskeletal No edema, deformity of extremities,back   Skin No erythema,rash,depigmentation   Neurology · Mental status: alert and oriented x4  · Cranial nerves: CN 2-12 intact. · Motor 5/5 through out  · Sensation: light touch sensation intact  · DTR 2/4  · Meningeal signs negative  · Coordination: finger to nose and heel to shin normal  · Gait: steady. not ataxic.    Psychiatry            Intake/Output Summary (Last 24 hours) at 3/6/2019 2231  Last data filed at 3/6/2019 1528  Gross per 24 hour   Intake 3882.5 ml   Output 3350 ml   Net 532.5 ml          Recent labs & imaging reviewed:    Problem List as of 3/6/2019 Date Reviewed: 3/5/2019          Codes Class Noted - Resolved    * (Principal) Sepsis ([facility] ICD-10-CM: A41.9  ICD-9-CM: 038.9, 995.91  3/5/2019 - Present        Drug-induced ileus (HCC) (Chronic) ICD-10-CM: K56.7, T50.905A  ICD-9-CM: 560.1, E947.9  2/12/2018 - Present        Chronic narcotic dependence ([facility] (Chronic) ICD-10-CM: F11.20  ICD-9-CM: 304.91  1/20/2018 - Present        Depression with anxiety (Chronic) ICD-10-CM: F41.8  ICD-9-CM: 300.4  12/25/2016 - Present        Anemia (Chronic) ICD-10-CM: D64.9  ICD-9-CM: 285.9  12/25/2016 - Present        GERD (gastroesophageal reflux disease) (Chronic) ICD-10-CM: K21.9  ICD-9-CM: 530.81  11/20/2016 - Present        Ileal pouchitis ([facility] ICD-10-CM: K91.850  ICD-9-CM: 569.71  5/1/2004 - Present        RESOLVED: Acute kidney injury ([facility] ICD-10-CM: N17.9  ICD-9-CM: 584.9  2/12/2018 - 1/4/2019        RESOLVED: Small bowel obstruction ([facility] ICD-10-CM: X28.795  ICD-9-CM: 560.9  2/12/2018 - 1/4/2019        RESOLVED: Bowel obstruction ([facility] ICD-10-CM: I20.636  ICD-9-CM: 560.9  7/9/2017 - 7/9/2017        RESOLVED: HCAP (healthcare-associated pneumonia) ICD-10-CM: J18.9  ICD-9-CM: 486  4/3/2017 - 5/20/2017        RESOLVED: SIRS (systemic inflammatory response syndrome) ([facility] ICD-10-CM: R65.10  ICD-9-CM: 995.90  4/3/2017 - 4/4/2017        RESOLVED: SBO (small bowel obstruction) ([facility] ICD-10-CM: I25.850  ICD-9-CM: 560.9  3/7/2017 - 7/9/2017        RESOLVED: Insomnia ICD-10-CM: G47.00  ICD-9-CM: 780.52  12/25/2016 - 1/1/2017        RESOLVED: Hypernatremia ICD-10-CM: E87.0  ICD-9-CM: 276.0  12/25/2016 - 1/1/2017        RESOLVED: Biliary dyskinesia ICD-10-CM: K82.8  ICD-9-CM: 575.8  12/21/2016 - 12/25/2016        RESOLVED: Gallbladder sludge ICD-10-CM: K82.8  ICD-9-CM: 575.8  12/20/2016 - 12/25/2016        RESOLVED: Hypokalemia ICD-10-CM: E87.6  ICD-9-CM: 276.8  12/18/2016 - 1/1/2017        RESOLVED: Leukocytosis ICD-10-CM: D72.829  ICD-9-CM: 288.60  12/18/2016 - 12/25/2016        RESOLVED: Crohn's disease ([facility] ICD-10-CM: K50.90  ICD-9-CM: 555.9  11/20/2016 - 12/25/2016        RESOLVED: Intractable abdominal pain ICD-10-CM: R10.9  ICD-9-CM: 789.00  11/20/2016 - 1/1/2017        RESOLVED: Intractable nausea and vomiting ICD-10-CM: R11.2  ICD-9-CM: 536.2  11/20/2016 - 12/25/2016        RESOLVED: Enterostomy malfunction ([facility] (Chronic) ICD-10-CM: K94.13  ICD-9-CM: 569.62  10/29/2015 - 11/21/2016        RESOLVED: Ileostomy dysfunction ([facility] ICD-10-CM: O84.65  ICD-9-CM: 569.62  10/29/2015 - 11/21/2016        RESOLVED: Skin lesion of back ICD-10-CM: L98.9  ICD-9-CM: 709.9  3/17/2014 - 11/21/2016                Galina Mendez MD  Internal Medicine  Date of Service: 3/6/2019

## 2019-03-07 NOTE — ROUTINE PROCESS
Bedside and Verbal shift change report given to Lisa Robles rn (oncoming nurse) by Mateo Phan (offgoing nurse). Report included the following information SBAR, Kardex, ED Summary, Procedure Summary, Intake/Output, MAR and Recent Results.

## 2019-03-07 NOTE — PROGRESS NOTES
Bedside and Verbal shift change report given to Ok (oncoming nurse) by Joao Dale (offgoing nurse). Report included the following information SBAR, ED Summary, Procedure Summary, Intake/Output, MAR, Accordion, Recent Results, Med Rec Status and Cardiac Rhythm normal sinus sinus tach.

## 2019-03-07 NOTE — PROGRESS NOTES
Pharmacist Note - Vancomycin Dosing  Therapy day 2  Indication: Sepsis, PNA  Current regimen: 1250 mg q8h    A Trough Level resulted at 8.6 mcg/mL which was obtained 9 hrs post-dose. The extrapolated \"true\" trough is approximately 10 mcg/mL based on the patient's known kinetics. Goal trough: 15 - 20 mcg/mL     Plan: Change to 1500 mg q8h . Pharmacy will continue to monitor this patient daily for changes in clinical status and renal function.     Sarah Banks, AugustinaD

## 2019-03-07 NOTE — PROGRESS NOTES
1235: Assumed care of patient. 1245: vanc trough sent. 1415: 2 out of 3 IV's infiltrated. Patient requesting PICC line now.  RN notified MD.

## 2019-03-08 PROBLEM — J18.9 PNEUMONIA: Status: ACTIVE | Noted: 2019-03-08

## 2019-03-08 LAB
ANION GAP SERPL CALC-SCNC: 8 MMOL/L (ref 5–15)
BASOPHILS # BLD: 0 K/UL (ref 0–0.1)
BASOPHILS NFR BLD: 0 % (ref 0–1)
BUN SERPL-MCNC: 3 MG/DL (ref 6–20)
BUN/CREAT SERPL: 4 (ref 12–20)
CALCIUM SERPL-MCNC: 8.7 MG/DL (ref 8.5–10.1)
CHLORIDE SERPL-SCNC: 106 MMOL/L (ref 97–108)
CO2 SERPL-SCNC: 27 MMOL/L (ref 21–32)
CREAT SERPL-MCNC: 0.73 MG/DL (ref 0.7–1.3)
DIFFERENTIAL METHOD BLD: ABNORMAL
EOSINOPHIL # BLD: 0.3 K/UL (ref 0–0.4)
EOSINOPHIL NFR BLD: 4 % (ref 0–7)
ERYTHROCYTE [DISTWIDTH] IN BLOOD BY AUTOMATED COUNT: 13.6 % (ref 11.5–14.5)
GLUCOSE SERPL-MCNC: 106 MG/DL (ref 65–100)
HCT VFR BLD AUTO: 30.5 % (ref 36.6–50.3)
HGB BLD-MCNC: 9.7 G/DL (ref 12.1–17)
IMM GRANULOCYTES # BLD AUTO: 0 K/UL (ref 0–0.04)
IMM GRANULOCYTES NFR BLD AUTO: 0 % (ref 0–0.5)
LYMPHOCYTES # BLD: 1.5 K/UL (ref 0.8–3.5)
LYMPHOCYTES NFR BLD: 19 % (ref 12–49)
MCH RBC QN AUTO: 29 PG (ref 26–34)
MCHC RBC AUTO-ENTMCNC: 31.8 G/DL (ref 30–36.5)
MCV RBC AUTO: 91 FL (ref 80–99)
MONOCYTES # BLD: 0.4 K/UL (ref 0–1)
MONOCYTES NFR BLD: 5 % (ref 5–13)
NEUTS SEG # BLD: 5.9 K/UL (ref 1.8–8)
NEUTS SEG NFR BLD: 72 % (ref 32–75)
NRBC # BLD: 0 K/UL (ref 0–0.01)
NRBC BLD-RTO: 0 PER 100 WBC
PLATELET # BLD AUTO: 140 K/UL (ref 150–400)
PMV BLD AUTO: 11.3 FL (ref 8.9–12.9)
POTASSIUM SERPL-SCNC: 3.2 MMOL/L (ref 3.5–5.1)
RBC # BLD AUTO: 3.35 M/UL (ref 4.1–5.7)
SODIUM SERPL-SCNC: 141 MMOL/L (ref 136–145)
WBC # BLD AUTO: 8.2 K/UL (ref 4.1–11.1)

## 2019-03-08 PROCEDURE — 74011250637 HC RX REV CODE- 250/637: Performed by: INTERNAL MEDICINE

## 2019-03-08 PROCEDURE — 74011000258 HC RX REV CODE- 258: Performed by: INTERNAL MEDICINE

## 2019-03-08 PROCEDURE — 74011250637 HC RX REV CODE- 250/637: Performed by: COLON & RECTAL SURGERY

## 2019-03-08 PROCEDURE — 74011250636 HC RX REV CODE- 250/636: Performed by: INTERNAL MEDICINE

## 2019-03-08 PROCEDURE — 74011250636 HC RX REV CODE- 250/636: Performed by: HOSPITALIST

## 2019-03-08 PROCEDURE — 85025 COMPLETE CBC W/AUTO DIFF WBC: CPT

## 2019-03-08 PROCEDURE — 36415 COLL VENOUS BLD VENIPUNCTURE: CPT

## 2019-03-08 PROCEDURE — 87070 CULTURE OTHR SPECIMN AEROBIC: CPT

## 2019-03-08 PROCEDURE — 80048 BASIC METABOLIC PNL TOTAL CA: CPT

## 2019-03-08 PROCEDURE — 65270000032 HC RM SEMIPRIVATE

## 2019-03-08 PROCEDURE — 74011250637 HC RX REV CODE- 250/637: Performed by: HOSPITALIST

## 2019-03-08 RX ORDER — ONDANSETRON HYDROCHLORIDE 8 MG/1
8 TABLET, FILM COATED ORAL
Qty: 15 TAB | Refills: 0 | Status: SHIPPED | OUTPATIENT
Start: 2019-03-08

## 2019-03-08 RX ORDER — MESALAMINE 4 G/60ML
4 ENEMA RECTAL
Qty: 1800 ML | Refills: 0 | Status: SHIPPED
Start: 2019-03-08 | End: 2019-04-07

## 2019-03-08 RX ORDER — AMOXICILLIN AND CLAVULANATE POTASSIUM 875; 125 MG/1; MG/1
1 TABLET, FILM COATED ORAL EVERY 12 HOURS
Qty: 8 TAB | Refills: 0 | Status: SHIPPED | OUTPATIENT
Start: 2019-03-08 | End: 2019-03-12

## 2019-03-08 RX ORDER — OXYCODONE AND ACETAMINOPHEN 10; 325 MG/1; MG/1
2 TABLET ORAL
Qty: 6 TAB | Refills: 0 | Status: SHIPPED | OUTPATIENT
Start: 2019-03-08 | End: 2019-03-11

## 2019-03-08 RX ORDER — AMOXICILLIN AND CLAVULANATE POTASSIUM 875; 125 MG/1; MG/1
1 TABLET, FILM COATED ORAL EVERY 12 HOURS
Status: DISCONTINUED | OUTPATIENT
Start: 2019-03-08 | End: 2019-03-10 | Stop reason: HOSPADM

## 2019-03-08 RX ADMIN — Medication 10 ML: at 21:33

## 2019-03-08 RX ADMIN — OXYCODONE AND ACETAMINOPHEN 2 TABLET: 10; 325 TABLET ORAL at 15:10

## 2019-03-08 RX ADMIN — Medication 1 CAPSULE: at 21:32

## 2019-03-08 RX ADMIN — SODIUM CHLORIDE, SODIUM LACTATE, POTASSIUM CHLORIDE, AND CALCIUM CHLORIDE 150 ML/HR: 600; 310; 30; 20 INJECTION, SOLUTION INTRAVENOUS at 05:47

## 2019-03-08 RX ADMIN — HYDROMORPHONE HYDROCHLORIDE 1.5 MG: 2 INJECTION, SOLUTION INTRAMUSCULAR; INTRAVENOUS; SUBCUTANEOUS at 14:06

## 2019-03-08 RX ADMIN — HYDROMORPHONE HYDROCHLORIDE 1.5 MG: 2 INJECTION, SOLUTION INTRAMUSCULAR; INTRAVENOUS; SUBCUTANEOUS at 01:10

## 2019-03-08 RX ADMIN — Medication 10 ML: at 05:56

## 2019-03-08 RX ADMIN — AMOXICILLIN AND CLAVULANATE POTASSIUM 1 TABLET: 875; 125 TABLET, FILM COATED ORAL at 21:32

## 2019-03-08 RX ADMIN — HEPARIN SODIUM 5000 UNITS: 5000 INJECTION INTRAVENOUS; SUBCUTANEOUS at 09:00

## 2019-03-08 RX ADMIN — OXYCODONE AND ACETAMINOPHEN 2 TABLET: 10; 325 TABLET ORAL at 23:50

## 2019-03-08 RX ADMIN — PANTOPRAZOLE SODIUM 40 MG: 40 TABLET, DELAYED RELEASE ORAL at 21:32

## 2019-03-08 RX ADMIN — PREGABALIN 100 MG: 100 CAPSULE ORAL at 21:32

## 2019-03-08 RX ADMIN — PREGABALIN 100 MG: 100 CAPSULE ORAL at 17:25

## 2019-03-08 RX ADMIN — SODIUM CHLORIDE 1000 ML: 900 INJECTION, SOLUTION INTRAVENOUS at 17:25

## 2019-03-08 RX ADMIN — HYDROMORPHONE HYDROCHLORIDE 1.5 MG: 2 INJECTION, SOLUTION INTRAMUSCULAR; INTRAVENOUS; SUBCUTANEOUS at 18:14

## 2019-03-08 RX ADMIN — DIAZEPAM 5 MG: 5 TABLET ORAL at 09:00

## 2019-03-08 RX ADMIN — HYDROMORPHONE HYDROCHLORIDE 1.5 MG: 2 INJECTION, SOLUTION INTRAMUSCULAR; INTRAVENOUS; SUBCUTANEOUS at 05:43

## 2019-03-08 RX ADMIN — PROCHLORPERAZINE MALEATE 10 MG: 10 TABLET, FILM COATED ORAL at 05:43

## 2019-03-08 RX ADMIN — HEPARIN SODIUM 5000 UNITS: 5000 INJECTION INTRAVENOUS; SUBCUTANEOUS at 17:25

## 2019-03-08 RX ADMIN — Medication 10 ML: at 21:32

## 2019-03-08 RX ADMIN — HYDROMORPHONE HYDROCHLORIDE 1.5 MG: 2 INJECTION, SOLUTION INTRAMUSCULAR; INTRAVENOUS; SUBCUTANEOUS at 22:16

## 2019-03-08 RX ADMIN — PREGABALIN 100 MG: 100 CAPSULE ORAL at 13:21

## 2019-03-08 RX ADMIN — SODIUM CHLORIDE, SODIUM LACTATE, POTASSIUM CHLORIDE, AND CALCIUM CHLORIDE 150 ML/HR: 600; 310; 30; 20 INJECTION, SOLUTION INTRAVENOUS at 13:21

## 2019-03-08 RX ADMIN — MESALAMINE 4 G: 4 ENEMA RECTAL at 21:33

## 2019-03-08 RX ADMIN — Medication 10 ML: at 05:57

## 2019-03-08 RX ADMIN — OXYCODONE AND ACETAMINOPHEN 2 TABLET: 10; 325 TABLET ORAL at 19:40

## 2019-03-08 RX ADMIN — HYDROMORPHONE HYDROCHLORIDE 1.5 MG: 2 INJECTION, SOLUTION INTRAMUSCULAR; INTRAVENOUS; SUBCUTANEOUS at 10:01

## 2019-03-08 RX ADMIN — AMOXICILLIN AND CLAVULANATE POTASSIUM 1 TABLET: 875; 125 TABLET, FILM COATED ORAL at 09:00

## 2019-03-08 RX ADMIN — HEPARIN SODIUM 5000 UNITS: 5000 INJECTION INTRAVENOUS; SUBCUTANEOUS at 01:11

## 2019-03-08 RX ADMIN — Medication 10 ML: at 13:21

## 2019-03-08 RX ADMIN — DIAZEPAM 5 MG: 5 TABLET ORAL at 17:25

## 2019-03-08 RX ADMIN — ZOLPIDEM TARTRATE 10 MG: 10 TABLET, FILM COATED ORAL at 21:32

## 2019-03-08 RX ADMIN — PREGABALIN 100 MG: 100 CAPSULE ORAL at 09:00

## 2019-03-08 RX ADMIN — OXYCODONE AND ACETAMINOPHEN 2 TABLET: 10; 325 TABLET ORAL at 09:00

## 2019-03-08 RX ADMIN — PIPERACILLIN AND TAZOBACTAM 3.38 G: 3; .375 INJECTION, POWDER, FOR SOLUTION INTRAVENOUS at 05:36

## 2019-03-08 NOTE — PROGRESS NOTES
Bedside shift change report given to [facility] (oncoming nurse) by Dominik Muniz RN (offgoing nurse). Report included the following information SBAR, Kardex, ED Summary, Intake/Output, MAR and Recent Results.

## 2019-03-08 NOTE — PROGRESS NOTES
Problem: Falls - Risk of  Goal: *Absence of Falls  Document Austin Fall Risk and appropriate interventions in the flowsheet. Outcome: Resolved/Met Date Met: 03/08/19  Fall Risk Interventions:            Medication Interventions: Patient to call before getting OOB    Elimination Interventions: Toilet paper/wipes in reach             Problem: Pressure Injury - Risk of  Goal: *Prevention of pressure injury  Document Eduardo Scale and appropriate interventions in the flowsheet.   Outcome: Resolved/Met Date Met: 03/08/19  Pressure Injury Interventions:                      Nutrition Interventions: Document food/fluid/supplement intake    Friction and Shear Interventions: Lift sheet

## 2019-03-08 NOTE — PROGRESS NOTES
Hospitalist Progress Note  Zaire Valle MD  Answering service: [phone] from in house phone        Date of Service:  3/8/2019  NAME:  Musa Billy  :  1975  MRN:  [id number]      Admission Summary: This is a 55-year-old man with a past medical history significant for Crohn's disease, status post multiple abdominal surgeries as a result of the Crohn's disease, which include total proctocolectomy with creation of ileoanal pouch and end ileostomy and status post ileostomy revision, anxiety/depression, chronic narcotic dependency, who was in his usual state of health until three days ago when the patient developed abdominal pain. Interval history / Subjective:   F/U for sepsis, pneumonia   Pt says today is the first day he is feeling a little better, continues to have cough. HR remains elevated. Assessment & Plan:     Sepsis(fever,tachycardia) due to bacterial pneumonia,POA  -Continue zosyn, DC Vanc no sign of MRSA  - if stable can transition to single agent Levaquin tomorrow   - Sputum culture  - Give 1L bolus today, HR needs to be better before discharge.   -Need f/u imaging in 6-8 weeks to ensure resolution of the pneumonia      H/o chron' disease. Chronic bowel obstruction. -S/P multiple abdominal surgical procedures, the most recent of which was the conversion of an end-ileostomy to a [place] continent intestinal reservoir (BCIR) on 2017 in [place], [place].  -Recently admitted  2019 to 2019 for treatment of ileal pouchitis. -CT A/P showed :Postsurgical appearance of the bowel, chronically dilated and fluid-filled. No evidence of bowel obstruction. Status post proctocolectomy.  -Discussed with Colorectal surgery. Per CRC,these findings are chronic and no surgical intervention needed. Diet as tolerated.   -On mesalamine  - Tolerating diet today, output has slowed down.  Augmentin should cover some enteric pathogens as well, though his source appears to be due to pneumonia. Hypokalemia.:replace.     Anxiety/depression.  Continue with preadmission medication.     Tobacco abuse.  The patient advised to quit smoking. Mary Leader does not want to be placed on a Nicoderm patch.     Chronic narcotic dependency: PRN opiates while in house, would be very strict about discharge on no or small amt of narcotics.      Low TSh: likely sick euthyroid, but will need to repeat this outpatient with PCP once no longer ill. Code status: FULL  DVT prophylaxis: Heparin     Care Plan discussed with: Patient/Family  Disposition: Home w/Family, discharge tomorrow if HR improved. Hospital Problems  Date Reviewed: 3/8/2019          Codes Class Noted POA    Pneumonia ICD-10-CM: J18.9  ICD-9-CM: 615  3/8/2019 Unknown        * (Principal) Sepsis ([facility] ICD-10-CM: A41.9  ICD-9-CM: 038.9, 995.91  3/5/2019 Yes                Review of Systems:   A comprehensive review of systems was negative except for that written in the HPI. Vital Signs:    Last 24hrs VS reviewed since prior progress note. Most recent are:  Visit Vitals  /71   Pulse (!) 101   Temp 99.1 °F (37.3 °C)   Resp 17   Ht 6' (1.829 m)   Wt 90.3 kg (199 lb)   SpO2 95%   BMI 26.99 kg/m²         Intake/Output Summary (Last 24 hours) at 3/8/2019 1652  Last data filed at 3/8/2019 1509  Gross per 24 hour   Intake 3472.5 ml   Output 3450 ml   Net 22.5 ml        Physical Examination:             Constitutional:  No acute distress, cooperative, pleasant    ENT:  Oral mucous moist, oropharynx benign. Neck supple,    Resp:  CTA bilaterally. No wheezing/rhonchi/rales. No accessory muscle use   CV:  Regular rhythm, normal rate, no murmurs, gallops, rubs    GI:  Soft, non distended, non tender. normoactive bowel sounds, no hepatosplenomegaly.  Multiple piror surgical scars, small stoma c/d/i without erythema     Musculoskeletal:  No edema, warm, 2+ pulses throughout Neurologic:  Moves all extremities. AAOx3, CN II-XII reviewed     Psych:  Good insight, Not anxious nor agitated. Data Review: All imaging and laboratory data reviewed. Labs:     Recent Labs     03/08/19 0115 03/06/19 0606   WBC 8.2 12.1*   HGB 9.7* 11.1*   HCT 30.5* 35.3*   * 144*     Recent Labs     03/08/19 0115 03/07/19 0440 03/06/19 0131    142 142   K 3.2* 3.7 3.4*    109* 109*   CO2 27 24 23   BUN 3* 5* 8   CREA 0.73 0.59* 0.79   * 83 100   CA 8.7 8.5 8.4*   MG  --   --  1.5*   PHOS  --   --  2.9     Recent Labs     03/07/19 0440 03/06/19 0131 03/05/19  1930   SGOT 7* 12* 18   ALT 13 21 26   AP 93 110 138*   TBILI 0.4 0.6 0.5   TP 5.3* 5.8* 7.2   ALB 2.3* 2.9* 3.4*   GLOB 3.0 2.9 3.8   AML  --  63  --    LPSE  --  42*  --      No results for input(s): INR, PTP, APTT in the last 72 hours. No lab exists for component: INREXT, INREXT   No results for input(s): FE, TIBC, PSAT, FERR in the last 72 hours. Lab Results   Component Value Date/Time    Folate 7.5 12/11/2018 12:38 PM      No results for input(s): PH, PCO2, PO2 in the last 72 hours.   Recent Labs     03/06/19 0606 03/06/19 0131   CPK  --  31*   CKNDX  --  Cannot be calculated   TROIQ <0.05  --      Lab Results   Component Value Date/Time    Cholesterol, total 223 (H) 02/19/2009 04:00 PM    HDL Cholesterol 51 02/19/2009 04:00 PM    LDL, calculated 129.4 (H) 02/19/2009 04:00 PM    Triglyceride 213 (H) 02/19/2009 04:00 PM    CHOL/HDL Ratio 4.4 02/19/2009 04:00 PM     Lab Results   Component Value Date/Time    Glucose (POC) 99 02/14/2018 09:53 PM    Glucose (POC) 192 (H) 02/14/2018 11:25 AM    Glucose (POC) 100 06/17/2017 11:22 AM    Glucose (POC) 137 (H) 06/17/2017 05:54 AM    Glucose (POC) 120 (H) 06/16/2017 09:40 PM     Lab Results   Component Value Date/Time    Color DARK YELLOW 03/05/2019 08:55 PM    Appearance CLEAR 03/05/2019 08:55 PM    Specific gravity >1.030 (H) 03/05/2019 08:55 PM    Specific gravity 1.023 01/30/2019 12:16 AM    pH (UA) 5.0 03/05/2019 08:55 PM    Protein 30 (A) 03/05/2019 08:55 PM    Glucose 100 (A) 03/05/2019 08:55 PM    Ketone TRACE (A) 03/05/2019 08:55 PM    Bilirubin NEGATIVE  01/30/2019 12:16 AM    Urobilinogen 1.0 03/05/2019 08:55 PM    Nitrites NEGATIVE  03/05/2019 08:55 PM    Leukocyte Esterase TRACE (A) 03/05/2019 08:55 PM    Epithelial cells FEW 03/05/2019 08:55 PM    Bacteria NEGATIVE  03/05/2019 08:55 PM    WBC 0-4 03/05/2019 08:55 PM    RBC 0-5 03/05/2019 08:55 PM         Medications Reviewed:     Current Facility-Administered Medications   Medication Dose Route Frequency    amoxicillin-clavulanate (AUGMENTIN) 875-125 mg per tablet 1 Tab  1 Tab Oral Q12H    diazePAM (VALIUM) tablet 5 mg  5 mg Oral BID    mesalamine (ROWASA) 4 gram/60 mL enema 4 g  4 g Rectal QHS    oxyCODONE-acetaminophen (PERCOCET 10)  mg per tablet 2 Tab  2 Tab Oral Q4H PRN    pregabalin (LYRICA) capsule 100 mg  100 mg Oral QID    zolpidem (AMBIEN) tablet 10 mg  10 mg Oral QHS    sodium chloride (NS) flush 5-40 mL  5-40 mL IntraVENous Q8H    sodium chloride (NS) flush 5-40 mL  5-40 mL IntraVENous PRN    heparin (porcine) injection 5,000 Units  5,000 Units SubCUTAneous Q8H    HYDROmorphone (PF) (DILAUDID) injection 1.5 mg  1.5 mg IntraVENous Q4H PRN    prochlorperazine (COMPAZINE) tablet 10 mg  10 mg Oral BID PRN    pantoprazole (PROTONIX) tablet 40 mg  40 mg Oral QHS    lactobac ac& pc-s.therm-b.anim (VALERIE Q/RISAQUAD)  1 Cap Oral QHS    sodium chloride (NS) flush 5-40 mL  5-40 mL IntraVENous Q8H    sodium chloride (NS) flush 5-40 mL  5-40 mL IntraVENous PRN    ondansetron (ZOFRAN) injection 4 mg  4 mg IntraVENous Q4H PRN     ______________________________________________________________________  EXPECTED LENGTH OF STAY: 4d 19h  ACTUAL LENGTH OF STAY:          3                 Zaire Valle MD

## 2019-03-09 ENCOUNTER — APPOINTMENT (OUTPATIENT)
Dept: GENERAL RADIOLOGY | Age: 44
DRG: 871 | End: 2019-03-09
Attending: ANESTHESIOLOGY
Payer: COMMERCIAL

## 2019-03-09 LAB
ANION GAP SERPL CALC-SCNC: 7 MMOL/L (ref 5–15)
BUN SERPL-MCNC: 3 MG/DL (ref 6–20)
BUN/CREAT SERPL: 4 (ref 12–20)
CALCIUM SERPL-MCNC: 9 MG/DL (ref 8.5–10.1)
CHLORIDE SERPL-SCNC: 108 MMOL/L (ref 97–108)
CO2 SERPL-SCNC: 29 MMOL/L (ref 21–32)
CREAT SERPL-MCNC: 0.77 MG/DL (ref 0.7–1.3)
GLUCOSE SERPL-MCNC: 86 MG/DL (ref 65–100)
POTASSIUM SERPL-SCNC: 3.4 MMOL/L (ref 3.5–5.1)
SODIUM SERPL-SCNC: 144 MMOL/L (ref 136–145)

## 2019-03-09 PROCEDURE — 74011250636 HC RX REV CODE- 250/636: Performed by: HOSPITALIST

## 2019-03-09 PROCEDURE — 74011250637 HC RX REV CODE- 250/637: Performed by: INTERNAL MEDICINE

## 2019-03-09 PROCEDURE — 65270000032 HC RM SEMIPRIVATE

## 2019-03-09 PROCEDURE — 74011250636 HC RX REV CODE- 250/636: Performed by: INTERNAL MEDICINE

## 2019-03-09 PROCEDURE — 74011250637 HC RX REV CODE- 250/637: Performed by: HOSPITALIST

## 2019-03-09 PROCEDURE — 02HV33Z INSERTION OF INFUSION DEVICE INTO SUPERIOR VENA CAVA, PERCUTANEOUS APPROACH: ICD-10-PCS | Performed by: ANESTHESIOLOGY

## 2019-03-09 PROCEDURE — 74011250637 HC RX REV CODE- 250/637: Performed by: COLON & RECTAL SURGERY

## 2019-03-09 PROCEDURE — 36415 COLL VENOUS BLD VENIPUNCTURE: CPT

## 2019-03-09 PROCEDURE — C1751 CATH, INF, PER/CENT/MIDLINE: HCPCS

## 2019-03-09 PROCEDURE — 71045 X-RAY EXAM CHEST 1 VIEW: CPT

## 2019-03-09 PROCEDURE — 80048 BASIC METABOLIC PNL TOTAL CA: CPT

## 2019-03-09 PROCEDURE — 36556 INSERT NON-TUNNEL CV CATH: CPT

## 2019-03-09 PROCEDURE — 76010000093 HC SPECIAL PROCEDURE

## 2019-03-09 RX ORDER — POTASSIUM CHLORIDE 750 MG/1
20 TABLET, FILM COATED, EXTENDED RELEASE ORAL
Status: COMPLETED | OUTPATIENT
Start: 2019-03-09 | End: 2019-03-09

## 2019-03-09 RX ORDER — POTASSIUM CHLORIDE AND SODIUM CHLORIDE 900; 300 MG/100ML; MG/100ML
INJECTION, SOLUTION INTRAVENOUS CONTINUOUS
Status: DISCONTINUED | OUTPATIENT
Start: 2019-03-09 | End: 2019-03-10 | Stop reason: HOSPADM

## 2019-03-09 RX ADMIN — HYDROMORPHONE HYDROCHLORIDE 1.5 MG: 2 INJECTION, SOLUTION INTRAMUSCULAR; INTRAVENOUS; SUBCUTANEOUS at 20:33

## 2019-03-09 RX ADMIN — OXYCODONE AND ACETAMINOPHEN 2 TABLET: 10; 325 TABLET ORAL at 22:35

## 2019-03-09 RX ADMIN — OXYCODONE AND ACETAMINOPHEN 2 TABLET: 10; 325 TABLET ORAL at 05:54

## 2019-03-09 RX ADMIN — HYDROMORPHONE HYDROCHLORIDE 1.5 MG: 2 INJECTION, SOLUTION INTRAMUSCULAR; INTRAVENOUS; SUBCUTANEOUS at 16:23

## 2019-03-09 RX ADMIN — POTASSIUM CHLORIDE AND SODIUM CHLORIDE: 900; 300 INJECTION, SOLUTION INTRAVENOUS at 09:15

## 2019-03-09 RX ADMIN — HYDROMORPHONE HYDROCHLORIDE 1.5 MG: 2 INJECTION, SOLUTION INTRAMUSCULAR; INTRAVENOUS; SUBCUTANEOUS at 03:38

## 2019-03-09 RX ADMIN — PREGABALIN 100 MG: 100 CAPSULE ORAL at 22:34

## 2019-03-09 RX ADMIN — Medication 10 ML: at 14:50

## 2019-03-09 RX ADMIN — POTASSIUM CHLORIDE 20 MEQ: 750 TABLET, FILM COATED, EXTENDED RELEASE ORAL at 09:21

## 2019-03-09 RX ADMIN — OXYCODONE AND ACETAMINOPHEN 2 TABLET: 10; 325 TABLET ORAL at 18:34

## 2019-03-09 RX ADMIN — PREGABALIN 100 MG: 100 CAPSULE ORAL at 09:21

## 2019-03-09 RX ADMIN — DIAZEPAM 5 MG: 5 TABLET ORAL at 18:34

## 2019-03-09 RX ADMIN — HEPARIN SODIUM 5000 UNITS: 5000 INJECTION INTRAVENOUS; SUBCUTANEOUS at 02:00

## 2019-03-09 RX ADMIN — PREGABALIN 100 MG: 100 CAPSULE ORAL at 12:32

## 2019-03-09 RX ADMIN — PANTOPRAZOLE SODIUM 40 MG: 40 TABLET, DELAYED RELEASE ORAL at 22:34

## 2019-03-09 RX ADMIN — DIAZEPAM 5 MG: 5 TABLET ORAL at 09:21

## 2019-03-09 RX ADMIN — OXYCODONE AND ACETAMINOPHEN 2 TABLET: 10; 325 TABLET ORAL at 10:18

## 2019-03-09 RX ADMIN — PROCHLORPERAZINE MALEATE 10 MG: 10 TABLET, FILM COATED ORAL at 03:57

## 2019-03-09 RX ADMIN — AMOXICILLIN AND CLAVULANATE POTASSIUM 1 TABLET: 875; 125 TABLET, FILM COATED ORAL at 22:34

## 2019-03-09 RX ADMIN — PREGABALIN 100 MG: 100 CAPSULE ORAL at 18:34

## 2019-03-09 RX ADMIN — HEPARIN SODIUM 5000 UNITS: 5000 INJECTION INTRAVENOUS; SUBCUTANEOUS at 16:23

## 2019-03-09 RX ADMIN — OXYCODONE AND ACETAMINOPHEN 2 TABLET: 10; 325 TABLET ORAL at 14:48

## 2019-03-09 RX ADMIN — AMOXICILLIN AND CLAVULANATE POTASSIUM 1 TABLET: 875; 125 TABLET, FILM COATED ORAL at 09:21

## 2019-03-09 RX ADMIN — HYDROMORPHONE HYDROCHLORIDE 1.5 MG: 2 INJECTION, SOLUTION INTRAMUSCULAR; INTRAVENOUS; SUBCUTANEOUS at 08:24

## 2019-03-09 RX ADMIN — Medication 1 CAPSULE: at 22:52

## 2019-03-09 RX ADMIN — ZOLPIDEM TARTRATE 10 MG: 10 TABLET, FILM COATED ORAL at 22:34

## 2019-03-09 RX ADMIN — HYDROMORPHONE HYDROCHLORIDE 1.5 MG: 2 INJECTION, SOLUTION INTRAMUSCULAR; INTRAVENOUS; SUBCUTANEOUS at 12:25

## 2019-03-09 RX ADMIN — Medication 20 ML: at 05:56

## 2019-03-09 RX ADMIN — HEPARIN SODIUM 5000 UNITS: 5000 INJECTION INTRAVENOUS; SUBCUTANEOUS at 09:24

## 2019-03-09 NOTE — PROGRESS NOTES
Bedside shift change report given to Petrona (oncoming nurse) by Miranda Muñoz (offgoing nurse). Report included the following information SBAR.

## 2019-03-09 NOTE — PROGRESS NOTES
Hospitalist Progress Note  Mandy Flynn MD  Answering service: [phone] from in house phone        Date of Service:  3/9/2019  NAME:  Zoe Devi  :  1975  MRN:  [id number]      Admission Summary: This is a 78-year-old man with a past medical history significant for Crohn's disease, status post multiple abdominal surgeries as a result of the Crohn's disease, which include total proctocolectomy with creation of ileoanal pouch and end ileostomy and status post ileostomy revision, anxiety/depression, chronic narcotic dependency, who was in his usual state of health until three days ago when the patient developed abdominal pain. Interval history / Subjective:    F/U for sepsis, pneumonia   Doing better. Heart rate still elevated. Will give fluids and monitor. Continue antibiotics for pneumonia. Chest is clear no significant abdominal tenderness on exam.     Assessment & Plan:     Sepsis(fever,tachycardia) due to bacterial pneumonia,POA  -Continue zosyn, DC Vanc no sign of MRSA  - if stable can transition to single agent Levaquin tomorrow   - Sputum culture  -Need f/u imaging in 6-8 weeks to ensure resolution of the pneumonia      H/o chron' disease. Chronic bowel obstruction. -S/P multiple abdominal surgical procedures, the most recent of which was the conversion of an end-ileostomy to a [place] intestinal reservoir (BCIR) on 2017 in [place], [place].  -Recently admitted  2019 to 2019 for treatment of ileal pouchitis. -CT A/P showed :Postsurgical appearance of the bowel, chronically dilated and fluid-filled. No evidence of bowel obstruction. Status post proctocolectomy.  -Discussed with Colorectal surgery. Per CRC,these findings are chronic and no surgical intervention needed. Diet as tolerated.   -On mesalamine    Hypokalemia.:replace.     Anxiety/depression.  Continue with preadmission medication.     Tobacco abuse.  The patient advised to quit smoking. Elana Bledsoe does not want to be placed on a Nicoderm patch.     Chronic narcotic dependency: PRN opiates while in house, would be very strict about discharge on no or small amt of narcotics.      Low TSh: likely sick euthyroid, but will need to repeat this outpatient with PCP once no longer ill. Code status: FULL  DVT prophylaxis: Heparin     Care Plan discussed with: Patient/Family  Disposition: Home w/Family     Hospital Problems  Date Reviewed: 3/8/2019          Codes Class Noted POA    Pneumonia ICD-10-CM: J18.9  ICD-9-CM: 856  3/8/2019 Unknown        * (Principal) Sepsis ([facility] [address] ICD-10-CM: A41.9  ICD-9-CM: 038.9, 995.91  3/5/2019 Yes                Review of Systems:   A comprehensive review of systems was negative except for that written in the HPI. Vital Signs:    Last 24hrs VS reviewed since prior progress note. Most recent are:  Visit Vitals  /71 (BP 1 Location: Left arm, BP Patient Position: At rest)   Pulse (!) 114   Temp 98.8 °F (37.1 °C)   Resp 16   Ht 6' (1.829 m)   Wt 90.3 kg (199 lb)   SpO2 92%   BMI 26.99 kg/m²         Intake/Output Summary (Last 24 hours) at 3/9/2019 0800  Last data filed at 3/8/2019 1509  Gross per 24 hour   Intake 3472.5 ml   Output 2300 ml   Net 1172.5 ml        Physical Examination:             Constitutional:  No acute distress, cooperative, pleasant    ENT:  Oral mucous moist, oropharynx benign. Neck supple,    Resp:  CTA bilaterally. No wheezing/rhonchi/rales. No accessory muscle use   CV:  Regular rhythm, normal rate, no murmurs, gallops, rubs    GI:  Soft, non distended, non tender. normoactive bowel sounds, no hepatosplenomegaly. Multiple piror surgical scars, small stoma c/d/i without erythema     Musculoskeletal:  No edema, warm, 2+ pulses throughout    Neurologic:  Moves all extremities. AAOx3, CN II-XII reviewed     Psych:  Good insight, Not anxious nor agitated. Data Review:    All imaging and laboratory data reviewed. Labs:     Recent Labs     03/08/19  0115   WBC 8.2   HGB 9.7*   HCT 30.5*   *     Recent Labs     03/09/19  0352 03/08/19  0115 03/07/19  0440    141 142   K 3.4* 3.2* 3.7    106 109*   CO2 29 27 24   BUN 3* 3* 5*   CREA 0.77 0.73 0.59*   GLU 86 106* 83   CA 9.0 8.7 8.5     Recent Labs     03/07/19  0440   SGOT 7*   ALT 13   AP 93   TBILI 0.4   TP 5.3*   ALB 2.3*   GLOB 3.0     No results for input(s): INR, PTP, APTT in the last 72 hours. No lab exists for component: INREXT, INREXT   No results for input(s): FE, TIBC, PSAT, FERR in the last 72 hours. Lab Results   Component Value Date/Time    Folate 7.5 12/11/2018 12:38 PM      No results for input(s): PH, PCO2, PO2 in the last 72 hours. No results for input(s): CPK, CKNDX, TROIQ in the last 72 hours.     No lab exists for component: CPKMB  Lab Results   Component Value Date/Time    Cholesterol, total 223 (H) 02/19/2009 04:00 PM    HDL Cholesterol 51 02/19/2009 04:00 PM    LDL, calculated 129.4 (H) 02/19/2009 04:00 PM    Triglyceride 213 (H) 02/19/2009 04:00 PM    CHOL/HDL Ratio 4.4 02/19/2009 04:00 PM     Lab Results   Component Value Date/Time    Glucose (POC) 99 02/14/2018 09:53 PM    Glucose (POC) 192 (H) 02/14/2018 11:25 AM    Glucose (POC) 100 06/17/2017 11:22 AM    Glucose (POC) 137 (H) 06/17/2017 05:54 AM    Glucose (POC) 120 (H) 06/16/2017 09:40 PM     Lab Results   Component Value Date/Time    Color DARK YELLOW 03/05/2019 08:55 PM    Appearance CLEAR 03/05/2019 08:55 PM    Specific gravity >1.030 (H) 03/05/2019 08:55 PM    Specific gravity 1.023 01/30/2019 12:16 AM    pH (UA) 5.0 03/05/2019 08:55 PM    Protein 30 (A) 03/05/2019 08:55 PM    Glucose 100 (A) 03/05/2019 08:55 PM    Ketone TRACE (A) 03/05/2019 08:55 PM    Bilirubin NEGATIVE  01/30/2019 12:16 AM    Urobilinogen 1.0 03/05/2019 08:55 PM    Nitrites NEGATIVE  03/05/2019 08:55 PM    Leukocyte Esterase TRACE (A) 03/05/2019 08:55 PM Epithelial cells FEW 03/05/2019 08:55 PM    Bacteria NEGATIVE  03/05/2019 08:55 PM    WBC 0-4 03/05/2019 08:55 PM    RBC 0-5 03/05/2019 08:55 PM         Medications Reviewed:     Current Facility-Administered Medications   Medication Dose Route Frequency    0.9% sodium chloride with KCl 40 mEq/L infusion   IntraVENous CONTINUOUS    potassium chloride SR (KLOR-CON 10) tablet 20 mEq  20 mEq Oral NOW    amoxicillin-clavulanate (AUGMENTIN) 875-125 mg per tablet 1 Tab  1 Tab Oral Q12H    diazePAM (VALIUM) tablet 5 mg  5 mg Oral BID    mesalamine (ROWASA) 4 gram/60 mL enema 4 g  4 g Rectal QHS    oxyCODONE-acetaminophen (PERCOCET 10)  mg per tablet 2 Tab  2 Tab Oral Q4H PRN    pregabalin (LYRICA) capsule 100 mg  100 mg Oral QID    zolpidem (AMBIEN) tablet 10 mg  10 mg Oral QHS    sodium chloride (NS) flush 5-40 mL  5-40 mL IntraVENous Q8H    sodium chloride (NS) flush 5-40 mL  5-40 mL IntraVENous PRN    heparin (porcine) injection 5,000 Units  5,000 Units SubCUTAneous Q8H    HYDROmorphone (PF) (DILAUDID) injection 1.5 mg  1.5 mg IntraVENous Q4H PRN    prochlorperazine (COMPAZINE) tablet 10 mg  10 mg Oral BID PRN    pantoprazole (PROTONIX) tablet 40 mg  40 mg Oral QHS    lactobac ac& pc-s.therm-b.anim (VALERIE Q/RISAQUAD)  1 Cap Oral QHS    sodium chloride (NS) flush 5-40 mL  5-40 mL IntraVENous Q8H    sodium chloride (NS) flush 5-40 mL  5-40 mL IntraVENous PRN    ondansetron (ZOFRAN) injection 4 mg  4 mg IntraVENous Q4H PRN     ______________________________________________________________________  EXPECTED LENGTH OF STAY: 4d 19h  ACTUAL LENGTH OF STAY:          4                 Khai Dodd MD

## 2019-03-09 NOTE — PROGRESS NOTES
Bedside shift change report given to [address] [place] [address] (oncoming nurse) by ROSALBA Hamilton (offgoing nurse). Report included the following information SBAR, Kardex and MAR.

## 2019-03-10 VITALS
TEMPERATURE: 98.3 F | DIASTOLIC BLOOD PRESSURE: 72 MMHG | HEIGHT: 72 IN | OXYGEN SATURATION: 97 % | SYSTOLIC BLOOD PRESSURE: 108 MMHG | BODY MASS INDEX: 27.17 KG/M2 | WEIGHT: 200.6 LBS | HEART RATE: 98 BPM | RESPIRATION RATE: 16 BRPM

## 2019-03-10 LAB
ANION GAP SERPL CALC-SCNC: 8 MMOL/L (ref 5–15)
BACTERIA SPEC CULT: ABNORMAL
BACTERIA SPEC CULT: ABNORMAL
BACTERIA SPEC CULT: NORMAL
BUN SERPL-MCNC: 2 MG/DL (ref 6–20)
BUN/CREAT SERPL: 3 (ref 12–20)
CALCIUM SERPL-MCNC: 8.9 MG/DL (ref 8.5–10.1)
CHLORIDE SERPL-SCNC: 107 MMOL/L (ref 97–108)
CO2 SERPL-SCNC: 28 MMOL/L (ref 21–32)
CREAT SERPL-MCNC: 0.7 MG/DL (ref 0.7–1.3)
ERYTHROCYTE [DISTWIDTH] IN BLOOD BY AUTOMATED COUNT: 13.6 % (ref 11.5–14.5)
GLUCOSE SERPL-MCNC: 87 MG/DL (ref 65–100)
GRAM STN SPEC: ABNORMAL
HCT VFR BLD AUTO: 30.5 % (ref 36.6–50.3)
HGB BLD-MCNC: 9.8 G/DL (ref 12.1–17)
MCH RBC QN AUTO: 29.2 PG (ref 26–34)
MCHC RBC AUTO-ENTMCNC: 32.1 G/DL (ref 30–36.5)
MCV RBC AUTO: 90.8 FL (ref 80–99)
NRBC # BLD: 0 K/UL (ref 0–0.01)
NRBC BLD-RTO: 0 PER 100 WBC
PLATELET # BLD AUTO: 191 K/UL (ref 150–400)
PMV BLD AUTO: 11 FL (ref 8.9–12.9)
POTASSIUM SERPL-SCNC: 3.6 MMOL/L (ref 3.5–5.1)
RBC # BLD AUTO: 3.36 M/UL (ref 4.1–5.7)
SERVICE CMNT-IMP: ABNORMAL
SERVICE CMNT-IMP: NORMAL
SODIUM SERPL-SCNC: 143 MMOL/L (ref 136–145)
WBC # BLD AUTO: 7.5 K/UL (ref 4.1–11.1)

## 2019-03-10 PROCEDURE — 80048 BASIC METABOLIC PNL TOTAL CA: CPT

## 2019-03-10 PROCEDURE — 74011250637 HC RX REV CODE- 250/637: Performed by: INTERNAL MEDICINE

## 2019-03-10 PROCEDURE — 74011250636 HC RX REV CODE- 250/636: Performed by: HOSPITALIST

## 2019-03-10 PROCEDURE — 85027 COMPLETE CBC AUTOMATED: CPT

## 2019-03-10 PROCEDURE — 74011250636 HC RX REV CODE- 250/636: Performed by: INTERNAL MEDICINE

## 2019-03-10 PROCEDURE — 74011250637 HC RX REV CODE- 250/637: Performed by: COLON & RECTAL SURGERY

## 2019-03-10 PROCEDURE — 36415 COLL VENOUS BLD VENIPUNCTURE: CPT

## 2019-03-10 RX ADMIN — OXYCODONE AND ACETAMINOPHEN 2 TABLET: 10; 325 TABLET ORAL at 20:12

## 2019-03-10 RX ADMIN — PREGABALIN 100 MG: 100 CAPSULE ORAL at 17:47

## 2019-03-10 RX ADMIN — PROCHLORPERAZINE MALEATE 10 MG: 10 TABLET, FILM COATED ORAL at 03:38

## 2019-03-10 RX ADMIN — Medication 10 ML: at 15:14

## 2019-03-10 RX ADMIN — PREGABALIN 100 MG: 100 CAPSULE ORAL at 13:40

## 2019-03-10 RX ADMIN — HYDROMORPHONE HYDROCHLORIDE 1.5 MG: 2 INJECTION, SOLUTION INTRAMUSCULAR; INTRAVENOUS; SUBCUTANEOUS at 17:48

## 2019-03-10 RX ADMIN — Medication 40 ML: at 13:43

## 2019-03-10 RX ADMIN — OXYCODONE AND ACETAMINOPHEN 2 TABLET: 10; 325 TABLET ORAL at 03:28

## 2019-03-10 RX ADMIN — Medication 10 ML: at 04:45

## 2019-03-10 RX ADMIN — PREGABALIN 100 MG: 100 CAPSULE ORAL at 08:31

## 2019-03-10 RX ADMIN — AMOXICILLIN AND CLAVULANATE POTASSIUM 1 TABLET: 875; 125 TABLET, FILM COATED ORAL at 08:31

## 2019-03-10 RX ADMIN — ONDANSETRON 4 MG: 2 INJECTION INTRAMUSCULAR; INTRAVENOUS at 15:14

## 2019-03-10 RX ADMIN — AMOXICILLIN AND CLAVULANATE POTASSIUM 1 TABLET: 875; 125 TABLET, FILM COATED ORAL at 20:13

## 2019-03-10 RX ADMIN — OXYCODONE AND ACETAMINOPHEN 2 TABLET: 10; 325 TABLET ORAL at 16:00

## 2019-03-10 RX ADMIN — HEPARIN SODIUM 5000 UNITS: 5000 INJECTION INTRAVENOUS; SUBCUTANEOUS at 08:31

## 2019-03-10 RX ADMIN — HYDROMORPHONE HYDROCHLORIDE 1.5 MG: 2 INJECTION, SOLUTION INTRAMUSCULAR; INTRAVENOUS; SUBCUTANEOUS at 00:30

## 2019-03-10 RX ADMIN — HYDROMORPHONE HYDROCHLORIDE 1.5 MG: 2 INJECTION, SOLUTION INTRAMUSCULAR; INTRAVENOUS; SUBCUTANEOUS at 09:53

## 2019-03-10 RX ADMIN — HEPARIN SODIUM 5000 UNITS: 5000 INJECTION INTRAVENOUS; SUBCUTANEOUS at 17:48

## 2019-03-10 RX ADMIN — MESALAMINE 4 G: 4 ENEMA RECTAL at 00:39

## 2019-03-10 RX ADMIN — OXYCODONE AND ACETAMINOPHEN 2 TABLET: 10; 325 TABLET ORAL at 07:34

## 2019-03-10 RX ADMIN — DIAZEPAM 5 MG: 5 TABLET ORAL at 08:31

## 2019-03-10 RX ADMIN — OXYCODONE AND ACETAMINOPHEN 2 TABLET: 10; 325 TABLET ORAL at 11:44

## 2019-03-10 RX ADMIN — DIAZEPAM 5 MG: 5 TABLET ORAL at 17:47

## 2019-03-10 RX ADMIN — PROCHLORPERAZINE MALEATE 10 MG: 10 TABLET, FILM COATED ORAL at 16:00

## 2019-03-10 RX ADMIN — POTASSIUM CHLORIDE AND SODIUM CHLORIDE: 900; 300 INJECTION, SOLUTION INTRAVENOUS at 05:52

## 2019-03-10 RX ADMIN — HEPARIN SODIUM 5000 UNITS: 5000 INJECTION INTRAVENOUS; SUBCUTANEOUS at 00:39

## 2019-03-10 RX ADMIN — HYDROMORPHONE HYDROCHLORIDE 1.5 MG: 2 INJECTION, SOLUTION INTRAMUSCULAR; INTRAVENOUS; SUBCUTANEOUS at 13:41

## 2019-03-10 RX ADMIN — HYDROMORPHONE HYDROCHLORIDE 1.5 MG: 2 INJECTION, SOLUTION INTRAMUSCULAR; INTRAVENOUS; SUBCUTANEOUS at 05:44

## 2019-03-10 NOTE — DISCHARGE INSTRUCTIONS
Discharge Instructions       PATIENT ID: cShuyler Oakes  MRN: [id number]   YOB: 1975    DATE OF ADMISSION: 3/5/2019  7:43 PM    DATE OF DISCHARGE: 3/10/2019    PRIMARY CARE PROVIDER: Linda Hernandez MD     ATTENDING PHYSICIAN: Shahana Frank MD  DISCHARGING PROVIDER: Samantha Benavides MD    To contact this individual call [phone] and ask the  to page. If unavailable ask to be transferred the Adult Hospitalist Department. DISCHARGE DIAGNOSES and CARE RECOMMENDATIONS: Pneumonia        DIET: Regular Diet      ACTIVITY: Activity as tolerated    Repeat chest xray in one week      PENDING TEST RESULTS:   At the time of discharge the following test results are still pending: ***    FOLLOW UP APPOINTMENTS:   Follow-up Information     Follow up With Specialties Details Why [address]    Linda Hernandez MD Gastroenterology   [address] [address]  [phone]               YOU WERE SEEN BY THE FOLLOWING CONSULTATIONS:   IP CONSULT TO GENERAL SURGERY  IP CONSULT TO COLORECTAL SURGERY    YOU HAD THE FOLLOWING PROCEDURES/SURGERIES  :   Procedure(s) with comments:  INFUSION CATHETER INSERTION - central line placement              DISCHARGE MEDICATIONS:   See Medication Reconciliation Form    · It is important that you take the medication exactly as they are prescribed. · Keep your medication in the bottles provided by the pharmacist and keep a list of the medication names, dosages, and times to be taken in your wallet. · Do not take other medications without consulting your doctor. NOTIFY YOUR PHYSICIAN FOR ANY OF THE FOLLOWING: ***  Fever over 101 degrees for 24 hours. Chest pain, shortness of breath, fever, chills, nausea, vomiting, diarrhea, change in mentation, falling, weakness, bleeding.  Severe pain or pain not relieved by medications. Or, any other signs or symptoms that you may have questions about. Services you need on discharge***     HOME HEALTH NURSE: MEDICATIONS CHECK,WOUND CARE ETC...     [address] VISIT     DISPATCH HEALTH          Signed:   Kenji Bates MD  3/10/2019  11:46 AM

## 2019-03-10 NOTE — PROGRESS NOTES
Central Line Procedure Note    Indication: Inadequate venous access Sepsis protocol    Risks, benefits, alternatives explained and patient agrees to proceed. Patient positioned in Trendelenburg. An ultrasound was used and the vascular structures were identified and marked. 7-Step Sterility Protocol followed. (cap, mask sterile gown, sterile gloves, large sterile sheet, hand hygiene, 2% chlorhexidine for cutaneous antisepsis)    5 mL 1% Lidocaine placed at insertion site. Right internal jugular cannulated x 1 attempt(s) utilizing the Seldinger technique. An 8.5 Fr Quad lumen catheter was secured with a suture and a Biopatch and sterile Tegaderm were applied. All ports were aspirated and flushed. CXR pending.

## 2019-03-10 NOTE — PROGRESS NOTES
1930- Pt educated on discharge instructions and medications. Verbalized understanding. Allowed time for questions. Signed discharge left on hard chart.

## 2019-03-10 NOTE — ROUTINE PROCESS
Bedside shift change report given to Yahaira Salcedo RN and Demian Reynoso RN (oncoming nurse) by Sachin Almanza (offgoing nurse). Report included the following information SBAR, Kardex and Procedure Summary. System downtime was enacted for one hour to accomodate the ending of Daylight Saving Time at 2:00 a.m. Clinical documentation has been captured on paper to be scanned into the system. Medications administered during this time have been entered when the system became available.

## 2019-03-10 NOTE — PERIOP NOTES
2318 Pt received to PACU for central line insertion. Consent signed on chart. [phone] [facility] Dr. Doug Wells at bedside. Timeout completed    2325 Pt placed in trendelenburg and procedure started per Dr. Doug Wells. 2335 RIJ quad lumen central line inserted per Dr. Doug Wells. Pt tolerated procedure well. VSS. Positive blood return noted x 4 ports and each flushed with NS and capped. Sterile dsg applied. [address] done per orders. Report called to Homer Hylton [phone] Pt transferred back to  506b via bed.

## 2019-03-11 ENCOUNTER — HOME CARE VISIT (OUTPATIENT)
Dept: HOME HEALTH SERVICES | Facility: HOME HEALTH | Age: 44
End: 2019-03-11

## 2019-03-11 NOTE — DISCHARGE SUMMARY
Lenny Lugo 2906 SUMMARY    Name:  Sheila Moran  MR#:  [id number]  :  1975  ACCOUNT #:  [de-identified]  ADMIT DATE:  2019  DISCHARGE DATE:  03/10/2019    PRIMARY CARE PHYSICIAN:  Lynn Hoffman MD    ADMISSION DIAGNOSIS:  Pneumonia. DISCHARGE DIAGNOSIS:  Pneumonia. HOSPITAL COURSE:  The patient is a 72-year-old gentleman, whose previous history is significant for Crohn's disease, status post multiple abdominal surgeries including total proctocolectomy with creation of ileoanal pouch, presented to the emergency room with abdominal pain. A chest x-ray was obtained which was suggestive of pneumonia. The patient was started on IV vancomycin and Zosyn. His initial white count was 15.9. He improved significantly with treatment. His cultures were negative for any bacteria. His white count gradually came down. On the day of discharge, it is 7.5. He feels much better. The patient was also seen by Dr. Larry Wesley on the  for chronically fluid-filled distal small bowel without any obstruction. Dr. Larry Wesley thought that the patient does not appear to be obstructive and no indication for surgery. The patient has been using his drainage catheter. He was supposed to get Rowasa enema also. At this time, the patient feels much better and will be discharged. He received some IV fluids . For the last 36 hours he is afebrile on oral Augmentin. He will be discharged home on     oral Augmentin 875/125 b.i.d. for 7 more days. We will continue Rowasa 4 g enema every bedtime, Lyrica 100 mg four times daily, omeprazole 40 mg daily and Florastor 250 mg daily. He is also on Stelara every 8 weeks by GI which will be continued. He is supposed to follow up with his primary care physician in the next 3-4 days.         Mireya Arreola MD      [id number]  D:  03/10/2019 11:44  T:  2019 2:14  JOB #:  [id number]

## 2019-03-12 ENCOUNTER — HOME CARE VISIT (OUTPATIENT)
Dept: HOME HEALTH SERVICES | Facility: HOME HEALTH | Age: 44
End: 2019-03-12

## 2019-06-01 NOTE — PERIOP NOTES
Anesthesia staff at patient's bedside administering anesthesia and monitoring patients vital signs throughout procedure. See anesthesia note.
Patient tolerated procedure without problems. Abdomen soft and patient arousable and voices no complaints Report received from CRNA, see anesthesia note. Patient transported to endoscopy recovery area.  Bedside report given to Kim Fitzgerald, [facility]
Rapid H Pylori obtained.
Statement Selected

## 2019-07-08 ENCOUNTER — HOSPITAL ENCOUNTER (INPATIENT)
Age: 44
LOS: 6 days | Discharge: HOME | DRG: 394 | End: 2019-07-14
Attending: COLON & RECTAL SURGERY | Admitting: COLON & RECTAL SURGERY
Payer: COMMERCIAL

## 2019-07-08 DIAGNOSIS — F11.20 CHRONIC NARCOTIC DEPENDENCE (HCC): Primary | Chronic | ICD-10-CM

## 2019-07-08 LAB
ALBUMIN SERPL-MCNC: 3.9 G/DL (ref 3.5–5)
ALBUMIN/GLOB SERPL: 1 {RATIO} (ref 1.1–2.2)
ALP SERPL-CCNC: 155 U/L (ref 45–117)
ALT SERPL-CCNC: 31 U/L (ref 12–78)
ANION GAP SERPL CALC-SCNC: 7 MMOL/L (ref 5–15)
AST SERPL-CCNC: 22 U/L (ref 15–37)
BASOPHILS # BLD: 0 K/UL (ref 0–0.1)
BASOPHILS NFR BLD: 0 % (ref 0–1)
BILIRUB SERPL-MCNC: 0.4 MG/DL (ref 0.2–1)
BUN SERPL-MCNC: 7 MG/DL (ref 6–20)
BUN/CREAT SERPL: 7 (ref 12–20)
CALCIUM SERPL-MCNC: 10 MG/DL (ref 8.5–10.1)
CHLORIDE SERPL-SCNC: 105 MMOL/L (ref 97–108)
CO2 SERPL-SCNC: 28 MMOL/L (ref 21–32)
COMMENT, HOLDF: NORMAL
CREAT SERPL-MCNC: 1.05 MG/DL (ref 0.7–1.3)
DIFFERENTIAL METHOD BLD: ABNORMAL
EOSINOPHIL # BLD: 0 K/UL (ref 0–0.4)
EOSINOPHIL NFR BLD: 0 % (ref 0–7)
ERYTHROCYTE [DISTWIDTH] IN BLOOD BY AUTOMATED COUNT: 13.6 % (ref 11.5–14.5)
GLOBULIN SER CALC-MCNC: 3.9 G/DL (ref 2–4)
GLUCOSE SERPL-MCNC: 108 MG/DL (ref 65–100)
HCT VFR BLD AUTO: 47.2 % (ref 36.6–50.3)
HGB BLD-MCNC: 15.4 G/DL (ref 12.1–17)
IMM GRANULOCYTES # BLD AUTO: 0 K/UL (ref 0–0.04)
IMM GRANULOCYTES NFR BLD AUTO: 0 % (ref 0–0.5)
LYMPHOCYTES # BLD: 1.6 K/UL (ref 0.8–3.5)
LYMPHOCYTES NFR BLD: 13 % (ref 12–49)
MCH RBC QN AUTO: 29.2 PG (ref 26–34)
MCHC RBC AUTO-ENTMCNC: 32.6 G/DL (ref 30–36.5)
MCV RBC AUTO: 89.4 FL (ref 80–99)
MONOCYTES # BLD: 0.3 K/UL (ref 0–1)
MONOCYTES NFR BLD: 3 % (ref 5–13)
NEUTS SEG # BLD: 10.2 K/UL (ref 1.8–8)
NEUTS SEG NFR BLD: 84 % (ref 32–75)
NRBC # BLD: 0 K/UL (ref 0–0.01)
NRBC BLD-RTO: 0 PER 100 WBC
PLATELET # BLD AUTO: 204 K/UL (ref 150–400)
PMV BLD AUTO: 11.4 FL (ref 8.9–12.9)
POTASSIUM SERPL-SCNC: 3.6 MMOL/L (ref 3.5–5.1)
PROT SERPL-MCNC: 7.8 G/DL (ref 6.4–8.2)
RBC # BLD AUTO: 5.28 M/UL (ref 4.1–5.7)
SAMPLES BEING HELD,HOLD: NORMAL
SODIUM SERPL-SCNC: 140 MMOL/L (ref 136–145)
WBC # BLD AUTO: 12.2 K/UL (ref 4.1–11.1)

## 2019-07-08 PROCEDURE — 65270000029 HC RM PRIVATE

## 2019-07-08 PROCEDURE — C9113 INJ PANTOPRAZOLE SODIUM, VIA: HCPCS | Performed by: COLON & RECTAL SURGERY

## 2019-07-08 PROCEDURE — 74011000250 HC RX REV CODE- 250: Performed by: COLON & RECTAL SURGERY

## 2019-07-08 PROCEDURE — 74011250637 HC RX REV CODE- 250/637: Performed by: COLON & RECTAL SURGERY

## 2019-07-08 PROCEDURE — 36415 COLL VENOUS BLD VENIPUNCTURE: CPT

## 2019-07-08 PROCEDURE — 74011250636 HC RX REV CODE- 250/636: Performed by: COLON & RECTAL SURGERY

## 2019-07-08 PROCEDURE — 80053 COMPREHEN METABOLIC PANEL: CPT

## 2019-07-08 PROCEDURE — 94760 N-INVAS EAR/PLS OXIMETRY 1: CPT

## 2019-07-08 PROCEDURE — 85025 COMPLETE CBC W/AUTO DIFF WBC: CPT

## 2019-07-08 RX ORDER — CYANOCOBALAMIN 1000 UG/ML
1000 INJECTION, SOLUTION INTRAMUSCULAR; SUBCUTANEOUS
Status: DISCONTINUED | OUTPATIENT
Start: 2019-07-14 | End: 2019-07-14 | Stop reason: HOSPADM

## 2019-07-08 RX ORDER — ACETAMINOPHEN 500 MG
500 TABLET ORAL
Status: DISCONTINUED | OUTPATIENT
Start: 2019-07-08 | End: 2019-07-14 | Stop reason: HOSPADM

## 2019-07-08 RX ORDER — PROCHLORPERAZINE MALEATE 10 MG
10 TABLET ORAL
Status: DISCONTINUED | OUTPATIENT
Start: 2019-07-08 | End: 2019-07-08

## 2019-07-08 RX ORDER — SUCRALFATE 1 G/1
1 TABLET ORAL
Status: DISCONTINUED | OUTPATIENT
Start: 2019-07-08 | End: 2019-07-08 | Stop reason: SDUPTHER

## 2019-07-08 RX ORDER — SUCRALFATE 1 G/1
1 TABLET ORAL
Status: DISCONTINUED | OUTPATIENT
Start: 2019-07-08 | End: 2019-07-14 | Stop reason: HOSPADM

## 2019-07-08 RX ORDER — PROMETHAZINE HYDROCHLORIDE 25 MG/1
25 TABLET ORAL
Status: DISCONTINUED | OUTPATIENT
Start: 2019-07-08 | End: 2019-07-08

## 2019-07-08 RX ORDER — SODIUM CHLORIDE 0.9 % (FLUSH) 0.9 %
5-40 SYRINGE (ML) INJECTION EVERY 8 HOURS
Status: DISCONTINUED | OUTPATIENT
Start: 2019-07-08 | End: 2019-07-14 | Stop reason: HOSPADM

## 2019-07-08 RX ORDER — ALBUTEROL SULFATE 0.83 MG/ML
2.5 SOLUTION RESPIRATORY (INHALATION)
Status: DISCONTINUED | OUTPATIENT
Start: 2019-07-08 | End: 2019-07-14 | Stop reason: HOSPADM

## 2019-07-08 RX ORDER — SODIUM CHLORIDE AND POTASSIUM CHLORIDE .9; .15 G/100ML; G/100ML
SOLUTION INTRAVENOUS CONTINUOUS
Status: DISCONTINUED | OUTPATIENT
Start: 2019-07-08 | End: 2019-07-14 | Stop reason: HOSPADM

## 2019-07-08 RX ORDER — BUDESONIDE 3 MG/1
6 CAPSULE, COATED PELLETS ORAL
Status: DISCONTINUED | OUTPATIENT
Start: 2019-07-09 | End: 2019-07-14 | Stop reason: HOSPADM

## 2019-07-08 RX ORDER — NALOXONE HYDROCHLORIDE 0.4 MG/ML
0.4 INJECTION, SOLUTION INTRAMUSCULAR; INTRAVENOUS; SUBCUTANEOUS AS NEEDED
Status: DISCONTINUED | OUTPATIENT
Start: 2019-07-08 | End: 2019-07-14 | Stop reason: HOSPADM

## 2019-07-08 RX ORDER — SODIUM CHLORIDE 0.9 % (FLUSH) 0.9 %
5-40 SYRINGE (ML) INJECTION AS NEEDED
Status: DISCONTINUED | OUTPATIENT
Start: 2019-07-08 | End: 2019-07-14 | Stop reason: HOSPADM

## 2019-07-08 RX ORDER — PROMETHAZINE HYDROCHLORIDE 25 MG/1
25 TABLET ORAL
Status: DISCONTINUED | OUTPATIENT
Start: 2019-07-08 | End: 2019-07-14 | Stop reason: HOSPADM

## 2019-07-08 RX ORDER — PANTOPRAZOLE SODIUM 40 MG/1
40 TABLET, DELAYED RELEASE ORAL
Status: DISCONTINUED | OUTPATIENT
Start: 2019-07-08 | End: 2019-07-08

## 2019-07-08 RX ORDER — ZOLPIDEM TARTRATE 10 MG/1
10 TABLET ORAL
Status: DISCONTINUED | OUTPATIENT
Start: 2019-07-08 | End: 2019-07-14 | Stop reason: HOSPADM

## 2019-07-08 RX ORDER — DIAZEPAM 5 MG/1
5 TABLET ORAL 2 TIMES DAILY
Status: DISCONTINUED | OUTPATIENT
Start: 2019-07-08 | End: 2019-07-14 | Stop reason: HOSPADM

## 2019-07-08 RX ORDER — ONDANSETRON 2 MG/ML
4 INJECTION INTRAMUSCULAR; INTRAVENOUS
Status: DISCONTINUED | OUTPATIENT
Start: 2019-07-08 | End: 2019-07-14 | Stop reason: HOSPADM

## 2019-07-08 RX ORDER — HYDROMORPHONE HYDROCHLORIDE 1 MG/ML
0.5 INJECTION, SOLUTION INTRAMUSCULAR; INTRAVENOUS; SUBCUTANEOUS
Status: DISCONTINUED | OUTPATIENT
Start: 2019-07-08 | End: 2019-07-10 | Stop reason: CLARIF

## 2019-07-08 RX ORDER — PREGABALIN 100 MG/1
100 CAPSULE ORAL 4 TIMES DAILY
Status: DISCONTINUED | OUTPATIENT
Start: 2019-07-08 | End: 2019-07-14 | Stop reason: HOSPADM

## 2019-07-08 RX ORDER — ALBUTEROL SULFATE 90 UG/1
2 AEROSOL, METERED RESPIRATORY (INHALATION)
Status: DISCONTINUED | OUTPATIENT
Start: 2019-07-08 | End: 2019-07-08 | Stop reason: SDUPTHER

## 2019-07-08 RX ORDER — DICYCLOMINE HYDROCHLORIDE 10 MG/1
10 CAPSULE ORAL
Status: DISCONTINUED | OUTPATIENT
Start: 2019-07-08 | End: 2019-07-14 | Stop reason: HOSPADM

## 2019-07-08 RX ORDER — METRONIDAZOLE 500 MG/100ML
500 INJECTION, SOLUTION INTRAVENOUS EVERY 8 HOURS
Status: DISCONTINUED | OUTPATIENT
Start: 2019-07-08 | End: 2019-07-14 | Stop reason: HOSPADM

## 2019-07-08 RX ORDER — ERGOCALCIFEROL 1.25 MG/1
50000 CAPSULE ORAL
Status: DISCONTINUED | OUTPATIENT
Start: 2019-07-14 | End: 2019-07-14 | Stop reason: HOSPADM

## 2019-07-08 RX ORDER — MESALAMINE 4 G/60ML
4 ENEMA RECTAL EVERY EVENING
Status: DISCONTINUED | OUTPATIENT
Start: 2019-07-08 | End: 2019-07-14 | Stop reason: HOSPADM

## 2019-07-08 RX ORDER — DICLOFENAC SODIUM 10 MG/G
4 GEL TOPICAL AS NEEDED
Status: DISCONTINUED | OUTPATIENT
Start: 2019-07-10 | End: 2019-07-14 | Stop reason: HOSPADM

## 2019-07-08 RX ADMIN — Medication 10 ML: at 21:26

## 2019-07-08 RX ADMIN — DIAZEPAM 5 MG: 5 TABLET ORAL at 21:25

## 2019-07-08 RX ADMIN — Medication 1 CAPSULE: at 21:25

## 2019-07-08 RX ADMIN — SUCRALFATE 1 G: 1 TABLET ORAL at 21:25

## 2019-07-08 RX ADMIN — METRONIDAZOLE 500 MG: 500 INJECTION, SOLUTION INTRAVENOUS at 20:50

## 2019-07-08 RX ADMIN — SODIUM CHLORIDE AND POTASSIUM CHLORIDE: 9; 1.49 INJECTION, SOLUTION INTRAVENOUS at 20:50

## 2019-07-08 RX ADMIN — MESALAMINE 4 G: 4 ENEMA RECTAL at 21:26

## 2019-07-08 RX ADMIN — PREGABALIN 100 MG: 100 CAPSULE ORAL at 21:25

## 2019-07-08 RX ADMIN — SODIUM CHLORIDE 40 MG: 9 INJECTION INTRAMUSCULAR; INTRAVENOUS; SUBCUTANEOUS at 21:31

## 2019-07-08 RX ADMIN — HYDROMORPHONE HYDROCHLORIDE 0.5 MG: 1 INJECTION, SOLUTION INTRAMUSCULAR; INTRAVENOUS; SUBCUTANEOUS at 21:32

## 2019-07-08 RX ADMIN — ONDANSETRON 4 MG: 2 INJECTION INTRAMUSCULAR; INTRAVENOUS at 22:15

## 2019-07-08 RX ADMIN — ZOLPIDEM TARTRATE 10 MG: 10 TABLET ORAL at 21:25

## 2019-07-08 NOTE — H&P
Colorectal Surgery Admission History and Physical Examination Note          NAME:  Maura Giordano   :   1975   MRN:   [id number]     Admission Date: 2019    Chief Complaint:  Nausea, vomiting, high ileostomy output, and abdominal pain. History of Present Illness: The patient is a 43-year-old male whose complex medical history has been documented repeatedly in other notes.  It includes Crohn's disease and multiple abdominal surgical procedures, the most recent of which was the conversion of an end-ileostomy to a Vernal Read continent intestinal reservoir (BCIR) on 2017 in [place], [place]. He was hospitalized from 3/5/2019 to 3/10/2019 for treatment of pneumonia, but his most recent previous hospitalization for gastrointestinal issues was from 2019 to 2019 for treatment of ileal pouchitis.  In addition to distal decompression of his ileal pouch, he received parenteral fluids, parenteral Flagyl, oral Entocort, and Rowasa and sucralfate enemas. He improved sufficiently to permit discharge, and the plan was for him to follow up with his surgeons in [place]. He communicated with them, and they reportedly reviewed his radiographs from here and told him that they appear to be within normal limits for a BCIR patient. He also attempted to obtain a second opinion form another group (in [place]) that also has experience with the procedure. In the interim, he had actually been doing reasonably well until 2 or 3 days ago when he began to have more abdominal pain, nausea, and increased output from the ileal pouch. He has not been on antibiotics since completing the treatment for the pneumonia. He has used Rowasa enemas, but only once per week and he used the last one that he had 4 or 5 days ago. He has continued to receive Stelara, and his last dose of that was administered 2 or 3 days ago. He has not used Entocort or Sucralfate enemas for months.   Today, he has been unable to tolerate any oral nutrition or fluids except for ice chips. His urine has been somewhat concentrated, and he has had a high output of brown watery liquid from the ileostomy. PMH:  Past Medical History:   Diagnosis Date    Anal fistula     The patient no longer has an anus.  Anal stenosis     The patient no longer has an anus.  Chronic kidney disease     Crohn's disease ([address]     GERD (gastroesophageal reflux disease)     H/O ulcerative colitis     Symptoms began in 1996. Total proctocolectomy was performed in 2004. Patient later developed Crohn's disease.  Hiatal hernia     History of pneumonia 03/2019    Hospitalized for treatment of pneumonia from 3/5/2019 to 3/10/2019.  Ileal pouchitis ([address] 05/2004    The patient no longer has a pouch.  Nausea & vomiting     Combination of Scopalamine patch & Zofran IV worked well in past    Numbness in right leg     Chronic.  Opioid dependence ([address]     History of opioid dependence requiring a detox program.    Psychiatric disorder     depression and anxiety    Skin lesion of back 3/17/2014    Syncopal episodes        PSH:  Past Surgical History:   Procedure Laterality Date    ABDOMEN SURGERY PROC UNLISTED  3/25/2004    Total proctocolectomy with creation of ileoanal J-pouch and loop ileostomy; Dr. Bess Rasheed.  COLONOSCOPY N/A 4/12/2018    Flexible endoscopy via ileostomy (NOT a colonoscopy); Cade Wilkinson MD.    COLONOSCOPY N/A 12/18/2018    ENTEROSCOPYperformed via ileostomy with pediatric scope; Dr. Bess Rasheed at [facility] ENDOSCOPY    HX CHOLECYSTECTOMY  12/2016    Dr. Sarah James HX GI  5/2/2004    Examination under anesthesia with endoscopic evaluation of ileoanal pouch and placement of drain; Dr. Bess Rasheed.  HX GI  5/25/2004    Ileostomy closure with small bowel resection and anastomosis; Dr. Bess Rasheed.     HX GI  5/24/2012    Examination under anesthesia, anal dilatation, anal biopsy, and placement of draining seton;  Pollo Raphael.   GI  7/3/2012    Incision and drainage of perirectal abscess and rigid endoscopy of ileoanal pouch; Dr. Pollo Raphael.   GI  2012    Incision and drainage of perirectal abscess, placement of draining seton, and anal dilatation; Dr. Pollo Raphael.   GI  2013    Repair of anal fistulas with debridement, partial fistulectomy, and flap closure; Dr. Pollo Raphael.   GI  2013    Examination under anesthesia, partial fistulotomy,  and placement of draining setons; Dr. Pollo Raphael.  HX GI  2013    Anal fistulotomy and application of ACell micromatrix; Dr. Pollo Raphael.   GI  2014    Examination under anesthesia and dilation of anal canal with rigid proctoscopy; Javan Flores MD.     GI  2015    Creation of Caitlin Finders continent intestinal reservoir (BCIR), abdominoperineal resection of ileoanal J-pouch; lysis of adhesions, and gastrostomy; Rich Odell. Flakito Shaikh MD ([facility])     GI  2015    Stoma revision; Rich Shaikh MD ([facility])     GI  10/29/2015    Extensive lysis of adhesions, resection of continent intestinal reservoir, repair of serosal tears, and creation of end-ileostomy; Dr. Pollo Raphael.   GI  2017    Laparotomy, extensive lysis of adhesions and revision of ileostomy; Dr. Pollo Raphael.   ORTHOPAEDIC      Left ACL repair    HX OTHER SURGICAL  2017    Conversion of end-ileostomy to BCIR ([address]); [place],  [address] HX UROLOGICAL  2017    Cystoscopy and placement of bilateral temporary ureteral catheters; Walter Mares MD.       Home Medications:  Prior to Admission Medications   Prescriptions Last Dose Informant Patient Reported? Taking? Saccharomyces boulardii (FLORASTOR) 250 mg capsule   Yes No   Sig: Take 250 mg by mouth nightly. Ustekinumab (STELARA) 90 mg/mL injection  Self Yes No   Si mg by SubCUTAneous route.  Every 8 weeks acetaminophen (TYLENOL) 500 mg tablet   Yes No   Sig: Take 500 mg by mouth four (4) times daily as needed (mild pain, heache (usually has headaches 1 week after Stelara)). albuterol (PROVENTIL HFA, VENTOLIN HFA, PROAIR HFA) 90 mcg/actuation inhaler   No No   Sig: Take 2 Puffs by inhalation every four (4) hours as needed for Wheezing. cyanocobalamin (VITAMIN B-12) 1,000 mcg/mL injection  Self Yes No   Si,000 mcg by IntraMUSCular route every . Indications: Once weekly   diazepam (VALIUM) 5 mg tablet  Self Yes No   Sig: Take 5 mg by mouth two (2) times a day. diclofenac (VOLTAREN) 1 % gel   Yes No   Sig: Apply 2 g to affected area four (4) times daily as needed. dicyclomine (BENTYL) 10 mg capsule  Self Yes No   Sig: Take 10 mg by mouth every six (6) hours as needed. ergocalciferol (ERGOCALCIFEROL) 50,000 unit capsule  Self Yes No   Sig: Take 50,000 Units by mouth every . multivitamin with iron (FLINTSTONES) chewable tablet   No No   Sig: Take 1 Tab by mouth daily.   nut.suppl. - milk based formula (BOOST PUDDING) pudg   No No   Sig: One serving by mouth twice per day. omeprazole (PRILOSEC) 40 mg capsule  Self Yes No   Sig: Take 40 mg by mouth nightly. ondansetron hcl (ZOFRAN) 8 mg tablet   No No   Sig: Take 1 Tab by mouth every twelve (12) hours as needed (Mild Nausea). pregabalin (LYRICA) 100 mg capsule  Self Yes No   Sig: Take 100 mg by mouth four (4) times daily. prochlorperazine (COMPAZINE) 10 mg tablet   Yes No   Sig: Take 10 mg by mouth two (2) times daily as needed (Moderate Nausea). promethazine (PHENERGAN) 25 mg tablet  Self Yes No   Sig: Take 25 mg by mouth every six (6) hours as needed (Severe Nausea). zolpidem (AMBIEN) 10 mg tablet   No No   Sig: Take 1 Tab by mouth nightly. Max Daily Amount: 10 mg.       Facility-Administered Medications: None       Hospital Medications:  Current Facility-Administered Medications   Medication Dose Route Frequency    acetaminophen (TYLENOL) tablet 500 mg  500 mg Oral QID PRN    [START ON 7/14/2019] cyanocobalamin (VITAMIN B12) injection 1,000 mcg  1,000 mcg IntraMUSCular every Sunday    diazePAM (VALIUM) tablet 5 mg  5 mg Oral BID    . PHARMACY TO SUBSTITUTE PER PROTOCOL (Reordered from: diclofenac (VOLTAREN) 1 % gel)    Per Protocol    dicyclomine (BENTYL) capsule 10 mg  10 mg Oral Q6H PRN    [START ON 7/14/2019] ergocalciferol capsule 50,000 Units  50,000 Units Oral every Sunday    [START ON 7/9/2019] multivitamin with iron (FLINTSTONES) chewable tablet 1 Tab  1 Tab Oral DAILY    pregabalin (LYRICA) capsule 100 mg  100 mg Oral QID    lactobac ac& pc-s.therm-b.anim (VALERIE Q/RISAQUAD)  1 Cap Oral QHS    zolpidem (AMBIEN) tablet 10 mg  10 mg Oral QHS    ondansetron (ZOFRAN) injection 4 mg  4 mg IntraVENous Q6H PRN    metroNIDAZOLE (FLAGYL) IVPB premix 500 mg  500 mg IntraVENous Q8H    [START ON 7/9/2019] budesonide (ENTOCORT EC) capsule 6 mg  6 mg Oral 7am    mesalamine (ROWASA) 4 gram/60 mL enema 4 g  4 g Rectal QPM    sucralfate (CARAFATE) tablet 1 g  1 g Oral AC&HS    sodium chloride (NS) flush 5-40 mL  5-40 mL IntraVENous Q8H    sodium chloride (NS) flush 5-40 mL  5-40 mL IntraVENous PRN    0.9% sodium chloride with KCl 20 mEq/L infusion   IntraVENous CONTINUOUS    naloxone (NARCAN) injection 0.4 mg  0.4 mg IntraVENous PRN    albuterol (PROVENTIL VENTOLIN) nebulizer solution 2.5 mg  2.5 mg Nebulization Q4H PRN    HYDROmorphone (PF) (DILAUDID) injection 0.5 mg  0.5 mg IntraVENous Q3H PRN    pantoprazole (PROTONIX) 40 mg in sodium chloride 0.9% 10 mL injection  40 mg IntraVENous Q24H    prochlorperazine (COMPAZINE) 10 mg in 0.9% sodium chloride 50 mL IVPB  10 mg IntraVENous Q6H PRN    promethazine (PHENERGAN) tablet 25 mg  25 mg Oral Q8H PRN       Allergies:   Allergies   Allergen Reactions    Remicade [Infliximab] Anaphylaxis and Shortness of Breath    Bactrim [Sulfamethoprim Ds] Other (comments)     Increased GI distress.  Morphine Nausea Only     Tolerates Dilaudid    Nsaids (Non-Steroidal Anti-Inflammatory Drug) Other (comments)     Stomach ulcers       Family History:  Family History   Problem Relation Age of Onset    Cancer Father     Asthma Neg Hx     Diabetes Neg Hx     Heart Disease Neg Hx     Hypertension Neg Hx     Stroke Neg Hx     Malignant Hyperthermia Neg Hx     Pseudocholinesterase Deficiency Neg Hx     Delayed Awakening Neg Hx     Post-op Nausea/Vomiting Neg Hx        Social History:  Social History     Tobacco Use    Smoking status: Current Every Day Smoker     Packs/day: 0.50     Years: 7.00     Pack years: 3.50     Last attempt to quit: 2017     Years since quittin.4    Smokeless tobacco: Never Used   Substance Use Topics    Alcohol use: No       Review of Systems:    Symptom Y/N Comments  Symptom Y/N Comments   Fever/Chills N   Chest Pain N    Cough N   Abdominal Pain Y    Sputum N   Joint Pain     SOB/CEDILLO N   Pruritis/Rash     Nausea/vomit Y   Tolerating PT/OT     Diarrhea Y   Tolerating Diet N    Constipation N   Other       Could NOT obtain due to:        Objective:     Patient Vitals for the past 8 hrs:   BP Temp Pulse Resp SpO2   196 109/69 98.9 °F (37.2 °C) (!) 108 16 93 %     No intake/output data recorded. No intake/output data recorded. PHYSICAL EXAMINATION:    GENERAL:  Uncomfortable. HEENT:  Anicteric. LUNGS:  Clear to auscultation bilaterally. HEART:  Mildly tachycardic. ABDOMEN:  Soft. Perhaps somewhat distended. Diffusely, mildly tender. No palpable masses. NEURO:  Alert and oriented. Data Review     Recent Labs     19   WBC 12.2*   HGB 15.4   HCT 47.2        Recent Labs     19      K 3.6      CO2 28   BUN 7   CREA 1.05   *   CA 10.0     Recent Labs     19   SGOT 22   *   TP 7.8   ALB 3.9   GLOB 3.9     No results for input(s): INR, PTP, APTT in the last 72 hours.     No lab exists for component: INREXT, INREXT                    Assessment and Plan:      The patient appears to be experiencing another episode of ileal pouchitis, and based upon previous episodes it will hopefully respond to bowel rest, tube decompression of the ileal pouch, parenteral fluids, parenteral Flagyl, oral Entocort, and Rowasa and sucralfate enemas. The patient is being admitted or that treatment.       Principal Problem:    Ileal pouchitis ([facility] (5/1/2004)    Active Problems:    GERD (gastroesophageal reflux disease) (11/20/2016)      Chronic narcotic dependence ([facility] (1/20/2018)

## 2019-07-09 PROCEDURE — 74011000250 HC RX REV CODE- 250: Performed by: COLON & RECTAL SURGERY

## 2019-07-09 PROCEDURE — 74011250637 HC RX REV CODE- 250/637: Performed by: COLON & RECTAL SURGERY

## 2019-07-09 PROCEDURE — C1751 CATH, INF, PER/CENT/MIDLINE: HCPCS

## 2019-07-09 PROCEDURE — 74011250636 HC RX REV CODE- 250/636: Performed by: COLON & RECTAL SURGERY

## 2019-07-09 PROCEDURE — C9113 INJ PANTOPRAZOLE SODIUM, VIA: HCPCS | Performed by: COLON & RECTAL SURGERY

## 2019-07-09 PROCEDURE — 02HV33Z INSERTION OF INFUSION DEVICE INTO SUPERIOR VENA CAVA, PERCUTANEOUS APPROACH: ICD-10-PCS | Performed by: COLON & RECTAL SURGERY

## 2019-07-09 PROCEDURE — 65270000029 HC RM PRIVATE

## 2019-07-09 PROCEDURE — 77030020365 HC SOL INJ SOD CL 0.9% 50ML

## 2019-07-09 PROCEDURE — 36573 INSJ PICC RS&I 5 YR+: CPT | Performed by: COLON & RECTAL SURGERY

## 2019-07-09 PROCEDURE — 77030020847 HC STATLOK BARD -A

## 2019-07-09 PROCEDURE — 76937 US GUIDE VASCULAR ACCESS: CPT

## 2019-07-09 RX ORDER — HYDROMORPHONE HYDROCHLORIDE 1 MG/ML
1 INJECTION, SOLUTION INTRAMUSCULAR; INTRAVENOUS; SUBCUTANEOUS
Status: DISCONTINUED | OUTPATIENT
Start: 2019-07-09 | End: 2019-07-14 | Stop reason: HOSPADM

## 2019-07-09 RX ORDER — OXYCODONE AND ACETAMINOPHEN 10; 325 MG/1; MG/1
1 TABLET ORAL
Status: DISCONTINUED | OUTPATIENT
Start: 2019-07-09 | End: 2019-07-14 | Stop reason: HOSPADM

## 2019-07-09 RX ORDER — OXYCODONE AND ACETAMINOPHEN 5; 325 MG/1; MG/1
2 TABLET ORAL
Status: DISCONTINUED | OUTPATIENT
Start: 2019-07-09 | End: 2019-07-09

## 2019-07-09 RX ADMIN — Medication 10 ML: at 15:56

## 2019-07-09 RX ADMIN — DIAZEPAM 5 MG: 5 TABLET ORAL at 08:15

## 2019-07-09 RX ADMIN — Medication 10 ML: at 05:45

## 2019-07-09 RX ADMIN — HYDROMORPHONE HYDROCHLORIDE 0.5 MG: 1 INJECTION, SOLUTION INTRAMUSCULAR; INTRAVENOUS; SUBCUTANEOUS at 10:07

## 2019-07-09 RX ADMIN — Medication 10 ML: at 10:08

## 2019-07-09 RX ADMIN — HYDROMORPHONE HYDROCHLORIDE 0.5 MG: 1 INJECTION, SOLUTION INTRAMUSCULAR; INTRAVENOUS; SUBCUTANEOUS at 22:53

## 2019-07-09 RX ADMIN — SODIUM CHLORIDE 10 MG: 9 INJECTION INTRAMUSCULAR; INTRAVENOUS; SUBCUTANEOUS at 13:01

## 2019-07-09 RX ADMIN — PREGABALIN 100 MG: 100 CAPSULE ORAL at 21:07

## 2019-07-09 RX ADMIN — HYDROMORPHONE HYDROCHLORIDE 0.5 MG: 1 INJECTION, SOLUTION INTRAMUSCULAR; INTRAVENOUS; SUBCUTANEOUS at 13:01

## 2019-07-09 RX ADMIN — PREGABALIN 100 MG: 100 CAPSULE ORAL at 17:54

## 2019-07-09 RX ADMIN — DIAZEPAM 5 MG: 5 TABLET ORAL at 17:54

## 2019-07-09 RX ADMIN — Medication 1 TABLET: at 08:15

## 2019-07-09 RX ADMIN — PROMETHAZINE HYDROCHLORIDE 25 MG: 25 TABLET ORAL at 23:01

## 2019-07-09 RX ADMIN — METRONIDAZOLE 500 MG: 500 INJECTION, SOLUTION INTRAVENOUS at 21:06

## 2019-07-09 RX ADMIN — SODIUM CHLORIDE 10 MG: 9 INJECTION INTRAMUSCULAR; INTRAVENOUS; SUBCUTANEOUS at 06:51

## 2019-07-09 RX ADMIN — Medication 1 CAPSULE: at 21:07

## 2019-07-09 RX ADMIN — SUCRALFATE 1 G: 1 TABLET ORAL at 06:53

## 2019-07-09 RX ADMIN — Medication 10 ML: at 13:02

## 2019-07-09 RX ADMIN — SODIUM CHLORIDE 10 MG: 9 INJECTION INTRAMUSCULAR; INTRAVENOUS; SUBCUTANEOUS at 19:34

## 2019-07-09 RX ADMIN — HYDROMORPHONE HYDROCHLORIDE 0.5 MG: 1 INJECTION, SOLUTION INTRAMUSCULAR; INTRAVENOUS; SUBCUTANEOUS at 06:51

## 2019-07-09 RX ADMIN — MESALAMINE 4 G: 4 ENEMA RECTAL at 17:54

## 2019-07-09 RX ADMIN — HYDROMORPHONE HYDROCHLORIDE 0.5 MG: 1 INJECTION, SOLUTION INTRAMUSCULAR; INTRAVENOUS; SUBCUTANEOUS at 00:36

## 2019-07-09 RX ADMIN — HYDROMORPHONE HYDROCHLORIDE 1 MG: 1 INJECTION, SOLUTION INTRAMUSCULAR; INTRAVENOUS; SUBCUTANEOUS at 21:25

## 2019-07-09 RX ADMIN — HYDROMORPHONE HYDROCHLORIDE 0.5 MG: 1 INJECTION, SOLUTION INTRAMUSCULAR; INTRAVENOUS; SUBCUTANEOUS at 19:35

## 2019-07-09 RX ADMIN — SODIUM CHLORIDE AND POTASSIUM CHLORIDE: 9; 1.49 INJECTION, SOLUTION INTRAVENOUS at 08:15

## 2019-07-09 RX ADMIN — PREGABALIN 100 MG: 100 CAPSULE ORAL at 08:14

## 2019-07-09 RX ADMIN — HYDROMORPHONE HYDROCHLORIDE 0.5 MG: 1 INJECTION, SOLUTION INTRAMUSCULAR; INTRAVENOUS; SUBCUTANEOUS at 03:45

## 2019-07-09 RX ADMIN — SODIUM CHLORIDE 40 MG: 9 INJECTION INTRAMUSCULAR; INTRAVENOUS; SUBCUTANEOUS at 21:07

## 2019-07-09 RX ADMIN — ZOLPIDEM TARTRATE 10 MG: 10 TABLET ORAL at 21:08

## 2019-07-09 RX ADMIN — SUCRALFATE 1 G: 1 TABLET ORAL at 10:10

## 2019-07-09 RX ADMIN — Medication 10 ML: at 21:22

## 2019-07-09 RX ADMIN — PREGABALIN 100 MG: 100 CAPSULE ORAL at 12:52

## 2019-07-09 RX ADMIN — METRONIDAZOLE 500 MG: 500 INJECTION, SOLUTION INTRAVENOUS at 05:45

## 2019-07-09 RX ADMIN — SODIUM CHLORIDE 10 MG: 9 INJECTION INTRAMUSCULAR; INTRAVENOUS; SUBCUTANEOUS at 00:08

## 2019-07-09 RX ADMIN — SUCRALFATE 1 G: 1 TABLET ORAL at 21:08

## 2019-07-09 RX ADMIN — OXYCODONE AND ACETAMINOPHEN 1 TABLET: 10; 325 TABLET ORAL at 12:52

## 2019-07-09 RX ADMIN — BUDESONIDE 6 MG: 3 CAPSULE ORAL at 06:52

## 2019-07-09 RX ADMIN — ONDANSETRON 4 MG: 2 INJECTION INTRAMUSCULAR; INTRAVENOUS at 10:14

## 2019-07-09 RX ADMIN — SUCRALFATE 1 G: 1 TABLET ORAL at 17:54

## 2019-07-09 RX ADMIN — HYDROMORPHONE HYDROCHLORIDE 0.5 MG: 1 INJECTION, SOLUTION INTRAMUSCULAR; INTRAVENOUS; SUBCUTANEOUS at 15:56

## 2019-07-09 RX ADMIN — METRONIDAZOLE 500 MG: 500 INJECTION, SOLUTION INTRAVENOUS at 12:53

## 2019-07-09 RX ADMIN — OXYCODONE AND ACETAMINOPHEN 1 TABLET: 10; 325 TABLET ORAL at 21:07

## 2019-07-09 NOTE — PROGRESS NOTES
Spoke with Dr Miquel Rubi about Midline request and patients hx of poor venous access and difficulty with obtaining labs. He agreed to PICC placement.  Allan NAVARRETE advised of plan

## 2019-07-09 NOTE — PROGRESS NOTES
Problem: Falls - Risk of  Goal: *Absence of Falls  Description  Document [facility] Fall Risk and appropriate interventions in the flowsheet.   Outcome: Progressing Towards Goal

## 2019-07-09 NOTE — PROCEDURES
PICC Placement Note : Order received and consent obtained from patient after explaining the reason for  PICC placement, the procedure,risks,benefits and potential complications. An opportunity was provided to ask questions and relay concerns. 5 fr Double Lumen Power Solo PICC was placed using sterile technique and max barrier precautions at patients bedside. Modified Seldinger Technique with ultrasound guidance used to locate the vein. Vein accessed with 21G Introducer Safety and guidewire easily thread. [employer] Integrated Pathology PICC tip  device used to determine PICC tip direction. PRE-PROCEDURE VERIFICATION  Correct Procedure: yes  Correct Site:  yes  Temperature: Temp: 98.1 °F (36.7 °C), Temperature Source: Temp Source: Oral  Recent Labs     07/08/19 2014   BUN 7   CREA 1.05      WBC 12.2*     Allergies: Remicade [infliximab]; Bactrim [sulfamethoprim ds]; Morphine; and Nsaids (non-steroidal anti-inflammatory drug)  Education materials, including PICC Booklet, for PICC Care given to patient: yes. See Patient Education activity for further details. PROCEDURE DETAIL  A double lumen PICC line was started for antibiotic therapy and desire for reliable access. The following documentation is in addition to the PICC properties in the lines/airways flowsheet :  Lot #: [id number]  Was xylocaine 1% used intradermally:  yes 1 ml used. Catheter Length: 41 (cm) Circumference 35cm  Vein Selection for PICC:left basilic  Central Line Bundle followed yes  Complication Related to Insertion: none    The placement was verified by ECG technology. PICC is in the SVC per ECG waveform. Waveform documented in the paper chart.  Handoff done with [address] [place] [address] 601 is okay to use: yes    Todd Couch RN

## 2019-07-09 NOTE — PROGRESS NOTES
MARTHA Plan:    1) Discharge home  2) GI F/U    Reason for Admission:   Chief Complaint:  Nausea, vomiting, high ileostomy output, and abdominal pain. RRAT Score:   15               Do you (patient/family) have any concerns for transition/discharge? None expressed              Plan for utilizing home health:   None    Current Advanced Directive/Advance Care Plan:   None on file              Transition of Care Plan:      Patient admitted due to ileal pouchitis with a PMH significant for Crohn's disease and multiple abdominal surgical procedures, the most recent of which was the conversion of an end-ileostomy to a [place] continent intestinal reservoir and PNA. Patient reports full independence at baseline. CM to follow hospital course and assess for MARTHA needs. Care Management Interventions  PCP Verified by CM:  Yes  Current Support Network: Lives with Spouse  Plan discussed with Pt/Family/Caregiver: Yes  Discharge Location  Discharge Placement: [address], [address]

## 2019-07-09 NOTE — PROGRESS NOTES
General Daily Progress Note    Admission Date: 7/8/2019  Hospital Day 2  Post-Op Day Not Applicable  Subjective:   More abdominal pain. Persistent nausea but tolerating some liquids by mouth. High output from ileal pouch. Urine looks concentrated. Objective:     Patient Vitals for the past 24 hrs:   BP Temp Pulse Resp SpO2 Weight   07/09/19 1518 98/68 98.1 °F (36.7 °C) 82 16 96 %    07/09/19 0913 111/74 98.1 °F (36.7 °C) 99 16 96 %    07/09/19 0655      91.7 kg (202 lb 1.6 oz)   07/09/19 0253 99/67 97.6 °F (36.4 °C) 81 16 95 %    07/08/19 2046 109/69 98.9 °F (37.2 °C) (!) 108 16 93 %      No intake/output data recorded. 07/08 0701 - 07/09 1900  In: 500 [P.O.:500]  Out: 2200       Physical Examination:  Not examinable as he is currently undergoing PICC placement. Data Review   Recent Results (from the past 24 hour(s))   METABOLIC PANEL, COMPREHENSIVE    Collection Time: 07/08/19  8:14 PM   Result Value Ref Range    Sodium 140 136 - 145 mmol/L    Potassium 3.6 3.5 - 5.1 mmol/L    Chloride 105 97 - 108 mmol/L    CO2 28 21 - 32 mmol/L    Anion gap 7 5 - 15 mmol/L    Glucose 108 (H) 65 - 100 mg/dL    BUN 7 6 - 20 MG/DL    Creatinine 1.05 0.70 - 1.30 MG/DL    BUN/Creatinine ratio 7 (L) 12 - 20      GFR est AA >60 >60 ml/min/1.73m2    GFR est non-AA >60 >60 ml/min/1.73m2    Calcium 10.0 8.5 - 10.1 MG/DL    Bilirubin, total 0.4 0.2 - 1.0 MG/DL    ALT (SGPT) 31 12 - 78 U/L    AST (SGOT) 22 15 - 37 U/L    Alk.  phosphatase 155 (H) 45 - 117 U/L    Protein, total 7.8 6.4 - 8.2 g/dL    Albumin 3.9 3.5 - 5.0 g/dL    Globulin 3.9 2.0 - 4.0 g/dL    A-G Ratio 1.0 (L) 1.1 - 2.2     CBC WITH AUTOMATED DIFF    Collection Time: 07/08/19  8:14 PM   Result Value Ref Range    WBC 12.2 (H) 4.1 - 11.1 K/uL    RBC 5.28 4.10 - 5.70 M/uL    HGB 15.4 12.1 - 17.0 g/dL    HCT 47.2 36.6 - 50.3 %    MCV 89.4 80.0 - 99.0 FL    MCH 29.2 26.0 - 34.0 PG    MCHC 32.6 30.0 - 36.5 g/dL    RDW 13.6 11.5 - 14.5 %    PLATELET 767 150 - 400 K/uL    MPV 11.4 8.9 - 12.9 FL    NRBC 0.0 0  WBC    ABSOLUTE NRBC 0.00 0.00 - 0.01 K/uL    NEUTROPHILS 84 (H) 32 - 75 %    LYMPHOCYTES 13 12 - 49 %    MONOCYTES 3 (L) 5 - 13 %    EOSINOPHILS 0 0 - 7 %    BASOPHILS 0 0 - 1 %    IMMATURE GRANULOCYTES 0 0.0 - 0.5 %    ABS. NEUTROPHILS 10.2 (H) 1.8 - 8.0 K/UL    ABS. LYMPHOCYTES 1.6 0.8 - 3.5 K/UL    ABS. MONOCYTES 0.3 0.0 - 1.0 K/UL    ABS. EOSINOPHILS 0.0 0.0 - 0.4 K/UL    ABS. BASOPHILS 0.0 0.0 - 0.1 K/UL    ABS. IMM. GRANS. 0.0 0.00 - 0.04 K/UL    DF AUTOMATED     SAMPLES BEING HELD    Collection Time: 07/08/19  8:14 PM   Result Value Ref Range    SAMPLES BEING HELD 1BLU,1SST,1GREY     COMMENT        Add-on orders for these samples will be processed based on acceptable specimen integrity and analyte stability, which may vary by analyte. Assessment:     Principal Problem:    Ileal pouchitis ([facility] (5/1/2004)    Active Problems:    GERD (gastroesophageal reflux disease) (11/20/2016)      Chronic narcotic dependence ([facility] (1/20/2018)      It is too soon to expect much of an improvement in the symptoms after less than 24 hours of treatment, but hopefully there will be some within the next day or two. The patient informed me that he has communicated with his surgeons in [place] and that since he cannot go there they would like him to have an enteroscopy of the ileal pouch with retroflexion and photography of the valve done here. They will then be able to review the images and they might be able to tell whether or not there is an anatomical problem. The patient also asked whether he ought to have another upper endoscopy since he has been having more severe reflux symptoms. Plan:   Continue the current medications. Add breakthrough doses of Dilaudid. Increase the IV fluid rate. Continue tube decompression of the ileal pouch. Will consult DR. Gutierrez regarding possible EGD. Labs and abdominal radiographs tomorrow.

## 2019-07-09 NOTE — PROGRESS NOTES
Problem: Falls - Risk of  Goal: *Absence of Falls  Description  Document [facility] Fall Risk and appropriate interventions in the flowsheet.   Outcome: Progressing Towards Goal     Problem: Patient Education: Go to Patient Education Activity  Goal: Patient/Family Education  Outcome: Progressing Towards Goal

## 2019-07-09 NOTE — PROGRESS NOTES
Bedside shift change report given to Royr Carlos RN (oncoming nurse) by Rosie Delgado RN (offgoing nurse). Report included the following information SBAR, Kardex and MAR.

## 2019-07-10 ENCOUNTER — APPOINTMENT (OUTPATIENT)
Dept: GENERAL RADIOLOGY | Age: 44
DRG: 394 | End: 2019-07-10
Attending: COLON & RECTAL SURGERY
Payer: COMMERCIAL

## 2019-07-10 LAB
ANION GAP SERPL CALC-SCNC: 8 MMOL/L (ref 5–15)
BASOPHILS # BLD: 0 K/UL (ref 0–0.1)
BASOPHILS NFR BLD: 0 % (ref 0–1)
BUN SERPL-MCNC: 5 MG/DL (ref 6–20)
BUN/CREAT SERPL: 7 (ref 12–20)
CALCIUM SERPL-MCNC: 8.8 MG/DL (ref 8.5–10.1)
CHLORIDE SERPL-SCNC: 111 MMOL/L (ref 97–108)
CO2 SERPL-SCNC: 25 MMOL/L (ref 21–32)
CREAT SERPL-MCNC: 0.73 MG/DL (ref 0.7–1.3)
DIFFERENTIAL METHOD BLD: ABNORMAL
EOSINOPHIL # BLD: 0.1 K/UL (ref 0–0.4)
EOSINOPHIL NFR BLD: 1 % (ref 0–7)
ERYTHROCYTE [DISTWIDTH] IN BLOOD BY AUTOMATED COUNT: 13.5 % (ref 11.5–14.5)
GLUCOSE SERPL-MCNC: 79 MG/DL (ref 65–100)
HCT VFR BLD AUTO: 36.2 % (ref 36.6–50.3)
HGB BLD-MCNC: 11.2 G/DL (ref 12.1–17)
IMM GRANULOCYTES # BLD AUTO: 0 K/UL (ref 0–0.04)
IMM GRANULOCYTES NFR BLD AUTO: 0 % (ref 0–0.5)
LYMPHOCYTES # BLD: 2 K/UL (ref 0.8–3.5)
LYMPHOCYTES NFR BLD: 29 % (ref 12–49)
MAGNESIUM SERPL-MCNC: 1.6 MG/DL (ref 1.6–2.4)
MCH RBC QN AUTO: 28.7 PG (ref 26–34)
MCHC RBC AUTO-ENTMCNC: 30.9 G/DL (ref 30–36.5)
MCV RBC AUTO: 92.8 FL (ref 80–99)
MONOCYTES # BLD: 0.4 K/UL (ref 0–1)
MONOCYTES NFR BLD: 5 % (ref 5–13)
NEUTS SEG # BLD: 4.4 K/UL (ref 1.8–8)
NEUTS SEG NFR BLD: 64 % (ref 32–75)
NRBC # BLD: 0 K/UL (ref 0–0.01)
NRBC BLD-RTO: 0 PER 100 WBC
PHOSPHATE SERPL-MCNC: 3 MG/DL (ref 2.6–4.7)
PLATELET # BLD AUTO: 142 K/UL (ref 150–400)
PMV BLD AUTO: 12 FL (ref 8.9–12.9)
POTASSIUM SERPL-SCNC: 3.9 MMOL/L (ref 3.5–5.1)
RBC # BLD AUTO: 3.9 M/UL (ref 4.1–5.7)
SODIUM SERPL-SCNC: 144 MMOL/L (ref 136–145)
WBC # BLD AUTO: 6.9 K/UL (ref 4.1–11.1)

## 2019-07-10 PROCEDURE — 84100 ASSAY OF PHOSPHORUS: CPT

## 2019-07-10 PROCEDURE — 36415 COLL VENOUS BLD VENIPUNCTURE: CPT

## 2019-07-10 PROCEDURE — 65270000029 HC RM PRIVATE

## 2019-07-10 PROCEDURE — 74019 RADEX ABDOMEN 2 VIEWS: CPT

## 2019-07-10 PROCEDURE — 83735 ASSAY OF MAGNESIUM: CPT

## 2019-07-10 PROCEDURE — 85025 COMPLETE CBC W/AUTO DIFF WBC: CPT

## 2019-07-10 PROCEDURE — 74011250636 HC RX REV CODE- 250/636: Performed by: COLON & RECTAL SURGERY

## 2019-07-10 PROCEDURE — C9113 INJ PANTOPRAZOLE SODIUM, VIA: HCPCS | Performed by: COLON & RECTAL SURGERY

## 2019-07-10 PROCEDURE — 80048 BASIC METABOLIC PNL TOTAL CA: CPT

## 2019-07-10 PROCEDURE — 74011000250 HC RX REV CODE- 250: Performed by: COLON & RECTAL SURGERY

## 2019-07-10 PROCEDURE — 74011250637 HC RX REV CODE- 250/637: Performed by: COLON & RECTAL SURGERY

## 2019-07-10 RX ORDER — HYDROMORPHONE HYDROCHLORIDE 1 MG/ML
0.5 INJECTION, SOLUTION INTRAMUSCULAR; INTRAVENOUS; SUBCUTANEOUS
Status: DISCONTINUED | OUTPATIENT
Start: 2019-07-10 | End: 2019-07-14 | Stop reason: HOSPADM

## 2019-07-10 RX ADMIN — HYDROMORPHONE HYDROCHLORIDE 0.5 MG: 1 INJECTION, SOLUTION INTRAMUSCULAR; INTRAVENOUS; SUBCUTANEOUS at 08:04

## 2019-07-10 RX ADMIN — PROMETHAZINE HYDROCHLORIDE 25 MG: 25 TABLET ORAL at 06:59

## 2019-07-10 RX ADMIN — SODIUM CHLORIDE 40 MG: 9 INJECTION INTRAMUSCULAR; INTRAVENOUS; SUBCUTANEOUS at 22:00

## 2019-07-10 RX ADMIN — OXYCODONE AND ACETAMINOPHEN 1 TABLET: 10; 325 TABLET ORAL at 15:25

## 2019-07-10 RX ADMIN — OXYCODONE AND ACETAMINOPHEN 1 TABLET: 10; 325 TABLET ORAL at 05:08

## 2019-07-10 RX ADMIN — HYDROMORPHONE HYDROCHLORIDE 0.5 MG: 1 INJECTION, SOLUTION INTRAMUSCULAR; INTRAVENOUS; SUBCUTANEOUS at 11:36

## 2019-07-10 RX ADMIN — OXYCODONE AND ACETAMINOPHEN 1 TABLET: 10; 325 TABLET ORAL at 23:11

## 2019-07-10 RX ADMIN — SODIUM CHLORIDE 10 MG: 9 INJECTION INTRAMUSCULAR; INTRAVENOUS; SUBCUTANEOUS at 23:11

## 2019-07-10 RX ADMIN — DIAZEPAM 5 MG: 5 TABLET ORAL at 18:24

## 2019-07-10 RX ADMIN — METRONIDAZOLE 500 MG: 500 INJECTION, SOLUTION INTRAVENOUS at 20:52

## 2019-07-10 RX ADMIN — PREGABALIN 100 MG: 100 CAPSULE ORAL at 12:54

## 2019-07-10 RX ADMIN — PREGABALIN 100 MG: 100 CAPSULE ORAL at 22:00

## 2019-07-10 RX ADMIN — Medication 10 ML: at 21:21

## 2019-07-10 RX ADMIN — PREGABALIN 100 MG: 100 CAPSULE ORAL at 08:48

## 2019-07-10 RX ADMIN — HYDROMORPHONE HYDROCHLORIDE 1 MG: 1 INJECTION, SOLUTION INTRAMUSCULAR; INTRAVENOUS; SUBCUTANEOUS at 18:24

## 2019-07-10 RX ADMIN — HYDROMORPHONE HYDROCHLORIDE 0.5 MG: 1 INJECTION, SOLUTION INTRAMUSCULAR; INTRAVENOUS; SUBCUTANEOUS at 15:15

## 2019-07-10 RX ADMIN — SODIUM CHLORIDE 10 MG: 9 INJECTION INTRAMUSCULAR; INTRAVENOUS; SUBCUTANEOUS at 08:03

## 2019-07-10 RX ADMIN — SUCRALFATE 1 G: 1 TABLET ORAL at 18:25

## 2019-07-10 RX ADMIN — Medication 10 ML: at 15:16

## 2019-07-10 RX ADMIN — PREGABALIN 100 MG: 100 CAPSULE ORAL at 18:25

## 2019-07-10 RX ADMIN — HYDROMORPHONE HYDROCHLORIDE 0.5 MG: 1 INJECTION, SOLUTION INTRAMUSCULAR; INTRAVENOUS; SUBCUTANEOUS at 05:04

## 2019-07-10 RX ADMIN — SODIUM CHLORIDE 10 MG: 9 INJECTION INTRAMUSCULAR; INTRAVENOUS; SUBCUTANEOUS at 15:25

## 2019-07-10 RX ADMIN — SUCRALFATE 1 G: 1 TABLET ORAL at 21:21

## 2019-07-10 RX ADMIN — HYDROMORPHONE HYDROCHLORIDE 0.5 MG: 1 INJECTION, SOLUTION INTRAMUSCULAR; INTRAVENOUS; SUBCUTANEOUS at 23:56

## 2019-07-10 RX ADMIN — SODIUM CHLORIDE 10 MG: 9 INJECTION INTRAMUSCULAR; INTRAVENOUS; SUBCUTANEOUS at 02:02

## 2019-07-10 RX ADMIN — SODIUM CHLORIDE AND POTASSIUM CHLORIDE: 9; 1.49 INJECTION, SOLUTION INTRAVENOUS at 05:10

## 2019-07-10 RX ADMIN — Medication 10 ML: at 09:41

## 2019-07-10 RX ADMIN — MESALAMINE 4 G: 4 ENEMA RECTAL at 17:30

## 2019-07-10 RX ADMIN — Medication 10 ML: at 05:13

## 2019-07-10 RX ADMIN — SODIUM CHLORIDE AND POTASSIUM CHLORIDE: 9; 1.49 INJECTION, SOLUTION INTRAVENOUS at 21:19

## 2019-07-10 RX ADMIN — DIAZEPAM 5 MG: 5 TABLET ORAL at 08:48

## 2019-07-10 RX ADMIN — SUCRALFATE 1 G: 1 TABLET ORAL at 07:00

## 2019-07-10 RX ADMIN — METRONIDAZOLE 500 MG: 500 INJECTION, SOLUTION INTRAVENOUS at 12:54

## 2019-07-10 RX ADMIN — BUDESONIDE 6 MG: 3 CAPSULE ORAL at 08:48

## 2019-07-10 RX ADMIN — Medication 1 CAPSULE: at 20:51

## 2019-07-10 RX ADMIN — ZOLPIDEM TARTRATE 10 MG: 10 TABLET ORAL at 23:11

## 2019-07-10 RX ADMIN — HYDROMORPHONE HYDROCHLORIDE 0.5 MG: 1 INJECTION, SOLUTION INTRAMUSCULAR; INTRAVENOUS; SUBCUTANEOUS at 02:06

## 2019-07-10 RX ADMIN — PROMETHAZINE HYDROCHLORIDE 25 MG: 25 TABLET ORAL at 20:51

## 2019-07-10 RX ADMIN — HYDROMORPHONE HYDROCHLORIDE 1 MG: 1 INJECTION, SOLUTION INTRAMUSCULAR; INTRAVENOUS; SUBCUTANEOUS at 09:40

## 2019-07-10 RX ADMIN — METRONIDAZOLE 500 MG: 500 INJECTION, SOLUTION INTRAVENOUS at 05:08

## 2019-07-10 RX ADMIN — SUCRALFATE 1 G: 1 TABLET ORAL at 11:30

## 2019-07-10 RX ADMIN — HYDROMORPHONE HYDROCHLORIDE 0.5 MG: 1 INJECTION, SOLUTION INTRAMUSCULAR; INTRAVENOUS; SUBCUTANEOUS at 20:49

## 2019-07-10 RX ADMIN — Medication 1 TABLET: at 08:48

## 2019-07-10 NOTE — PROGRESS NOTES
Bedside shift change report given to [facility] RN (oncoming nurse) by Jewels Peralta RN (offgoing nurse). Report included the following information SBAR, MAR and Recent Results.

## 2019-07-10 NOTE — PROGRESS NOTES
General Daily Progress Note    Admission Date: 7/8/2019  Hospital Day 3  Post-Op Day Not Applicable  Subjective:   Abdominal pain about the same. Urine less concentrated. Able to keep some liquids down. Objective:     Patient Vitals for the past 24 hrs:   BP Temp Pulse Resp SpO2 Weight   07/10/19 1434 105/71 97.6 °F (36.4 °C) 83 16 99 %    07/10/19 0841 109/72 98.4 °F (36.9 °C) 77 16 98 %    07/10/19 0233 104/69 97.3 °F (36.3 °C) 76 16 93 %    07/10/19 0230      93 kg (205 lb 0.4 oz)   07/09/19 2230 133/75 97.7 °F (36.5 °C) 77 16 94 %      07/10 0701 - 07/10 1900  In: 1050 [P.O.:1050]  Out: 625 [Urine:225]  07/08 1901 - 07/10 0700  In: 1165 [P.O.:1165]  Out: 4700 [Urine:2000]      PHYSICAL EXAMINATION:    GENERAL:  No distress. ABDOMEN:  Soft. Somewhat distended. Diffusely, mildly tender. No palpable masses. EXTREMITIES:  LUE PICC. NEURO:  Alert and oriented. Data Review   Recent Results (from the past 24 hour(s))   CBC WITH AUTOMATED DIFF    Collection Time: 07/10/19  2:10 AM   Result Value Ref Range    WBC 6.9 4.1 - 11.1 K/uL    RBC 3.90 (L) 4.10 - 5.70 M/uL    HGB 11.2 (L) 12.1 - 17.0 g/dL    HCT 36.2 (L) 36.6 - 50.3 %    MCV 92.8 80.0 - 99.0 FL    MCH 28.7 26.0 - 34.0 PG    MCHC 30.9 30.0 - 36.5 g/dL    RDW 13.5 11.5 - 14.5 %    PLATELET 874 (L) 990 - 400 K/uL    MPV 12.0 8.9 - 12.9 FL    NRBC 0.0 0  WBC    ABSOLUTE NRBC 0.00 0.00 - 0.01 K/uL    NEUTROPHILS 64 32 - 75 %    LYMPHOCYTES 29 12 - 49 %    MONOCYTES 5 5 - 13 %    EOSINOPHILS 1 0 - 7 %    BASOPHILS 0 0 - 1 %    IMMATURE GRANULOCYTES 0 0.0 - 0.5 %    ABS. NEUTROPHILS 4.4 1.8 - 8.0 K/UL    ABS. LYMPHOCYTES 2.0 0.8 - 3.5 K/UL    ABS. MONOCYTES 0.4 0.0 - 1.0 K/UL    ABS. EOSINOPHILS 0.1 0.0 - 0.4 K/UL    ABS. BASOPHILS 0.0 0.0 - 0.1 K/UL    ABS. IMM.  GRANS. 0.0 0.00 - 0.04 K/UL    DF AUTOMATED     METABOLIC PANEL, BASIC    Collection Time: 07/10/19  2:10 AM   Result Value Ref Range    Sodium 144 136 - 145 mmol/L Potassium 3.9 3.5 - 5.1 mmol/L    Chloride 111 (H) 97 - 108 mmol/L    CO2 25 21 - 32 mmol/L    Anion gap 8 5 - 15 mmol/L    Glucose 79 65 - 100 mg/dL    BUN 5 (L) 6 - 20 MG/DL    Creatinine 0.73 0.70 - 1.30 MG/DL    BUN/Creatinine ratio 7 (L) 12 - 20      GFR est AA >60 >60 ml/min/1.73m2    GFR est non-AA >60 >60 ml/min/1.73m2    Calcium 8.8 8.5 - 10.1 MG/DL   MAGNESIUM    Collection Time: 07/10/19  2:10 AM   Result Value Ref Range    Magnesium 1.6 1.6 - 2.4 mg/dL   PHOSPHORUS    Collection Time: 07/10/19  2:10 AM   Result Value Ref Range    Phosphorus 3.0 2.6 - 4.7 MG/DL       Abdominal Radiographs (7/10/2019):  Small intestinal distension that is not as significant as it was on 3/5/2019. Assessment:     Principal Problem:    Ileal pouchitis ([facility] (5/1/2004)    Active Problems:    GERD (gastroesophageal reflux disease) (11/20/2016)      Chronic narcotic dependence ([address] (1/20/2018)      Some improvement, but still benefiting from relative bowel rest and distal decompression. Dr. Jackson Arnold has seen the patient and is planning to perform an EGD tomorrow to evaluate the worsened reflux symptoms. The patient showed me the communication from his surgeons in [place]. It recommends performing the enteroscopy of the ileal pouch using a pediatric gastroscope or a 27 Fr. upper endoscope. Plan:   Continue the current medications and IV fluid. Continue tube decompression of the ileal pouch.     NPO after midnight for EGD and enteroscopy of the pouch tomorrow at 8:30 AM.

## 2019-07-11 ENCOUNTER — ANESTHESIA (OUTPATIENT)
Dept: ENDOSCOPY | Age: 44
DRG: 394 | End: 2019-07-11
Payer: COMMERCIAL

## 2019-07-11 ENCOUNTER — ANESTHESIA EVENT (OUTPATIENT)
Dept: ENDOSCOPY | Age: 44
DRG: 394 | End: 2019-07-11
Payer: COMMERCIAL

## 2019-07-11 LAB
ANION GAP SERPL CALC-SCNC: 5 MMOL/L (ref 5–15)
BUN SERPL-MCNC: 3 MG/DL (ref 6–20)
BUN/CREAT SERPL: 4 (ref 12–20)
CALCIUM SERPL-MCNC: 8.6 MG/DL (ref 8.5–10.1)
CHLORIDE SERPL-SCNC: 111 MMOL/L (ref 97–108)
CO2 SERPL-SCNC: 28 MMOL/L (ref 21–32)
CREAT SERPL-MCNC: 0.85 MG/DL (ref 0.7–1.3)
ERYTHROCYTE [DISTWIDTH] IN BLOOD BY AUTOMATED COUNT: 13.3 % (ref 11.5–14.5)
GLUCOSE SERPL-MCNC: 100 MG/DL (ref 65–100)
H PYLORI FROM TISSUE: NEGATIVE
HCT VFR BLD AUTO: 39.2 % (ref 36.6–50.3)
HGB BLD-MCNC: 12.5 G/DL (ref 12.1–17)
KIT LOT NO., HCLOLOT: NORMAL
MAGNESIUM SERPL-MCNC: 1.7 MG/DL (ref 1.6–2.4)
MCH RBC QN AUTO: 29 PG (ref 26–34)
MCHC RBC AUTO-ENTMCNC: 31.9 G/DL (ref 30–36.5)
MCV RBC AUTO: 91 FL (ref 80–99)
NEGATIVE CONTROL: NEGATIVE
NRBC # BLD: 0 K/UL (ref 0–0.01)
NRBC BLD-RTO: 0 PER 100 WBC
PLATELET # BLD AUTO: 148 K/UL (ref 150–400)
PMV BLD AUTO: 11.6 FL (ref 8.9–12.9)
POSITIVE CONTROL: POSITIVE
POTASSIUM SERPL-SCNC: 3.8 MMOL/L (ref 3.5–5.1)
RBC # BLD AUTO: 4.31 M/UL (ref 4.1–5.7)
SODIUM SERPL-SCNC: 144 MMOL/L (ref 136–145)
WBC # BLD AUTO: 7.1 K/UL (ref 4.1–11.1)

## 2019-07-11 PROCEDURE — 94760 N-INVAS EAR/PLS OXIMETRY 1: CPT

## 2019-07-11 PROCEDURE — 87077 CULTURE AEROBIC IDENTIFY: CPT | Performed by: INTERNAL MEDICINE

## 2019-07-11 PROCEDURE — 0DB68ZX EXCISION OF STOMACH, VIA NATURAL OR ARTIFICIAL OPENING ENDOSCOPIC, DIAGNOSTIC: ICD-10-PCS | Performed by: INTERNAL MEDICINE

## 2019-07-11 PROCEDURE — 74011250636 HC RX REV CODE- 250/636

## 2019-07-11 PROCEDURE — 88305 TISSUE EXAM BY PATHOLOGIST: CPT

## 2019-07-11 PROCEDURE — 83735 ASSAY OF MAGNESIUM: CPT

## 2019-07-11 PROCEDURE — 85027 COMPLETE CBC AUTOMATED: CPT

## 2019-07-11 PROCEDURE — 74011250637 HC RX REV CODE- 250/637: Performed by: COLON & RECTAL SURGERY

## 2019-07-11 PROCEDURE — 77030021593 HC FCPS BIOP ENDOSC BSC -A: Performed by: INTERNAL MEDICINE

## 2019-07-11 PROCEDURE — 76060000032 HC ANESTHESIA 0.5 TO 1 HR: Performed by: INTERNAL MEDICINE

## 2019-07-11 PROCEDURE — 77030022711 HC TU ENTRPRO BLN DISP OLSI -C: Performed by: INTERNAL MEDICINE

## 2019-07-11 PROCEDURE — 74011250636 HC RX REV CODE- 250/636: Performed by: COLON & RECTAL SURGERY

## 2019-07-11 PROCEDURE — 0DJD8ZZ INSPECTION OF LOWER INTESTINAL TRACT, VIA NATURAL OR ARTIFICIAL OPENING ENDOSCOPIC: ICD-10-PCS | Performed by: COLON & RECTAL SURGERY

## 2019-07-11 PROCEDURE — C9113 INJ PANTOPRAZOLE SODIUM, VIA: HCPCS | Performed by: COLON & RECTAL SURGERY

## 2019-07-11 PROCEDURE — 74011250637 HC RX REV CODE- 250/637: Performed by: INTERNAL MEDICINE

## 2019-07-11 PROCEDURE — 74011000250 HC RX REV CODE- 250: Performed by: COLON & RECTAL SURGERY

## 2019-07-11 PROCEDURE — 65270000029 HC RM PRIVATE

## 2019-07-11 PROCEDURE — 36415 COLL VENOUS BLD VENIPUNCTURE: CPT

## 2019-07-11 PROCEDURE — 76040000007: Performed by: INTERNAL MEDICINE

## 2019-07-11 PROCEDURE — 0DB58ZX EXCISION OF ESOPHAGUS, VIA NATURAL OR ARTIFICIAL OPENING ENDOSCOPIC, DIAGNOSTIC: ICD-10-PCS | Performed by: INTERNAL MEDICINE

## 2019-07-11 PROCEDURE — 80048 BASIC METABOLIC PNL TOTAL CA: CPT

## 2019-07-11 RX ORDER — PROPOFOL 10 MG/ML
INJECTION, EMULSION INTRAVENOUS AS NEEDED
Status: DISCONTINUED | OUTPATIENT
Start: 2019-07-11 | End: 2019-07-11 | Stop reason: HOSPADM

## 2019-07-11 RX ORDER — FENTANYL CITRATE 50 UG/ML
200 INJECTION, SOLUTION INTRAMUSCULAR; INTRAVENOUS
Status: DISCONTINUED | OUTPATIENT
Start: 2019-07-11 | End: 2019-07-11 | Stop reason: SDUPTHER

## 2019-07-11 RX ORDER — ATROPINE SULFATE 0.1 MG/ML
0.5 INJECTION INTRAVENOUS
Status: DISCONTINUED | OUTPATIENT
Start: 2019-07-11 | End: 2019-07-11 | Stop reason: HOSPADM

## 2019-07-11 RX ORDER — LIDOCAINE HYDROCHLORIDE 20 MG/ML
INJECTION, SOLUTION EPIDURAL; INFILTRATION; INTRACAUDAL; PERINEURAL AS NEEDED
Status: DISCONTINUED | OUTPATIENT
Start: 2019-07-11 | End: 2019-07-11 | Stop reason: HOSPADM

## 2019-07-11 RX ORDER — SODIUM CHLORIDE 9 MG/ML
50 INJECTION, SOLUTION INTRAVENOUS CONTINUOUS
Status: DISPENSED | OUTPATIENT
Start: 2019-07-11 | End: 2019-07-11

## 2019-07-11 RX ORDER — NALOXONE HYDROCHLORIDE 0.4 MG/ML
0.4 INJECTION, SOLUTION INTRAMUSCULAR; INTRAVENOUS; SUBCUTANEOUS
Status: DISCONTINUED | OUTPATIENT
Start: 2019-07-11 | End: 2019-07-11 | Stop reason: HOSPADM

## 2019-07-11 RX ORDER — NALOXONE HYDROCHLORIDE 0.4 MG/ML
0.4 INJECTION, SOLUTION INTRAMUSCULAR; INTRAVENOUS; SUBCUTANEOUS
Status: DISCONTINUED | OUTPATIENT
Start: 2019-07-11 | End: 2019-07-11 | Stop reason: SDUPTHER

## 2019-07-11 RX ORDER — FENTANYL CITRATE 50 UG/ML
200 INJECTION, SOLUTION INTRAMUSCULAR; INTRAVENOUS
Status: DISCONTINUED | OUTPATIENT
Start: 2019-07-11 | End: 2019-07-11 | Stop reason: HOSPADM

## 2019-07-11 RX ORDER — SODIUM CHLORIDE 0.9 % (FLUSH) 0.9 %
5-40 SYRINGE (ML) INJECTION AS NEEDED
Status: DISCONTINUED | OUTPATIENT
Start: 2019-07-11 | End: 2019-07-14 | Stop reason: HOSPADM

## 2019-07-11 RX ORDER — FENTANYL CITRATE 50 UG/ML
INJECTION, SOLUTION INTRAMUSCULAR; INTRAVENOUS
Status: DISPENSED
Start: 2019-07-11 | End: 2019-07-11

## 2019-07-11 RX ORDER — SODIUM CHLORIDE 0.9 % (FLUSH) 0.9 %
5-40 SYRINGE (ML) INJECTION EVERY 8 HOURS
Status: DISCONTINUED | OUTPATIENT
Start: 2019-07-11 | End: 2019-07-14 | Stop reason: HOSPADM

## 2019-07-11 RX ORDER — MIDAZOLAM HYDROCHLORIDE 1 MG/ML
.25-1 INJECTION, SOLUTION INTRAMUSCULAR; INTRAVENOUS
Status: DISCONTINUED | OUTPATIENT
Start: 2019-07-11 | End: 2019-07-11 | Stop reason: SDUPTHER

## 2019-07-11 RX ORDER — FENTANYL CITRATE 50 UG/ML
50 INJECTION, SOLUTION INTRAMUSCULAR; INTRAVENOUS ONCE
Status: COMPLETED | OUTPATIENT
Start: 2019-07-11 | End: 2019-07-11

## 2019-07-11 RX ORDER — SODIUM CHLORIDE 9 MG/ML
INJECTION, SOLUTION INTRAVENOUS
Status: DISCONTINUED | OUTPATIENT
Start: 2019-07-11 | End: 2019-07-11 | Stop reason: HOSPADM

## 2019-07-11 RX ORDER — EPINEPHRINE 0.1 MG/ML
1 INJECTION INTRACARDIAC; INTRAVENOUS
Status: DISCONTINUED | OUTPATIENT
Start: 2019-07-11 | End: 2019-07-11 | Stop reason: HOSPADM

## 2019-07-11 RX ORDER — FLUMAZENIL 0.1 MG/ML
0.2 INJECTION INTRAVENOUS
Status: DISCONTINUED | OUTPATIENT
Start: 2019-07-11 | End: 2019-07-11 | Stop reason: SDUPTHER

## 2019-07-11 RX ORDER — MIDAZOLAM HYDROCHLORIDE 1 MG/ML
.25-1 INJECTION, SOLUTION INTRAMUSCULAR; INTRAVENOUS
Status: DISCONTINUED | OUTPATIENT
Start: 2019-07-11 | End: 2019-07-11 | Stop reason: HOSPADM

## 2019-07-11 RX ORDER — EPINEPHRINE 0.1 MG/ML
1 INJECTION INTRACARDIAC; INTRAVENOUS
Status: DISCONTINUED | OUTPATIENT
Start: 2019-07-11 | End: 2019-07-11 | Stop reason: SDUPTHER

## 2019-07-11 RX ORDER — FLUMAZENIL 0.1 MG/ML
0.2 INJECTION INTRAVENOUS
Status: DISCONTINUED | OUTPATIENT
Start: 2019-07-11 | End: 2019-07-11 | Stop reason: HOSPADM

## 2019-07-11 RX ORDER — DEXTROMETHORPHAN/PSEUDOEPHED 2.5-7.5/.8
1.2 DROPS ORAL
Status: DISCONTINUED | OUTPATIENT
Start: 2019-07-11 | End: 2019-07-11 | Stop reason: SDUPTHER

## 2019-07-11 RX ORDER — CHOLESTYRAMINE 4 G/4.8G
4 POWDER, FOR SUSPENSION ORAL
Status: DISCONTINUED | OUTPATIENT
Start: 2019-07-11 | End: 2019-07-14 | Stop reason: HOSPADM

## 2019-07-11 RX ORDER — ATROPINE SULFATE 0.1 MG/ML
0.5 INJECTION INTRAVENOUS
Status: DISCONTINUED | OUTPATIENT
Start: 2019-07-11 | End: 2019-07-11 | Stop reason: SDUPTHER

## 2019-07-11 RX ORDER — DEXTROMETHORPHAN/PSEUDOEPHED 2.5-7.5/.8
1.2 DROPS ORAL
Status: DISCONTINUED | OUTPATIENT
Start: 2019-07-11 | End: 2019-07-11 | Stop reason: HOSPADM

## 2019-07-11 RX ADMIN — PROPOFOL 50 MG: 10 INJECTION, EMULSION INTRAVENOUS at 08:47

## 2019-07-11 RX ADMIN — OXYCODONE AND ACETAMINOPHEN 1 TABLET: 10; 325 TABLET ORAL at 10:38

## 2019-07-11 RX ADMIN — SODIUM CHLORIDE: 9 INJECTION, SOLUTION INTRAVENOUS at 08:40

## 2019-07-11 RX ADMIN — Medication 10 ML: at 05:56

## 2019-07-11 RX ADMIN — HYDROMORPHONE HYDROCHLORIDE 0.5 MG: 1 INJECTION, SOLUTION INTRAMUSCULAR; INTRAVENOUS; SUBCUTANEOUS at 10:39

## 2019-07-11 RX ADMIN — FENTANYL CITRATE 50 MCG: 50 INJECTION, SOLUTION INTRAMUSCULAR; INTRAVENOUS at 08:00

## 2019-07-11 RX ADMIN — PROMETHAZINE HYDROCHLORIDE 25 MG: 25 TABLET ORAL at 19:21

## 2019-07-11 RX ADMIN — PROPOFOL 25 MG: 10 INJECTION, EMULSION INTRAVENOUS at 08:57

## 2019-07-11 RX ADMIN — SUCRALFATE 1 G: 1 TABLET ORAL at 10:38

## 2019-07-11 RX ADMIN — ZOLPIDEM TARTRATE 10 MG: 10 TABLET ORAL at 21:06

## 2019-07-11 RX ADMIN — PROMETHAZINE HYDROCHLORIDE 25 MG: 25 TABLET ORAL at 10:47

## 2019-07-11 RX ADMIN — Medication 1 CAPSULE: at 21:06

## 2019-07-11 RX ADMIN — PROPOFOL 25 MG: 10 INJECTION, EMULSION INTRAVENOUS at 09:12

## 2019-07-11 RX ADMIN — HYDROMORPHONE HYDROCHLORIDE 0.5 MG: 1 INJECTION, SOLUTION INTRAMUSCULAR; INTRAVENOUS; SUBCUTANEOUS at 21:05

## 2019-07-11 RX ADMIN — SUCRALFATE 1 G: 1 TABLET ORAL at 17:57

## 2019-07-11 RX ADMIN — SODIUM CHLORIDE 40 MG: 9 INJECTION INTRAMUSCULAR; INTRAVENOUS; SUBCUTANEOUS at 21:06

## 2019-07-11 RX ADMIN — Medication 10 ML: at 10:39

## 2019-07-11 RX ADMIN — SODIUM CHLORIDE 10 MG: 9 INJECTION INTRAMUSCULAR; INTRAVENOUS; SUBCUTANEOUS at 05:56

## 2019-07-11 RX ADMIN — PROPOFOL 25 MG: 10 INJECTION, EMULSION INTRAVENOUS at 08:51

## 2019-07-11 RX ADMIN — SUCRALFATE 1 G: 1 TABLET ORAL at 21:06

## 2019-07-11 RX ADMIN — PROPOFOL 25 MG: 10 INJECTION, EMULSION INTRAVENOUS at 08:49

## 2019-07-11 RX ADMIN — HYDROMORPHONE HYDROCHLORIDE 0.5 MG: 1 INJECTION, SOLUTION INTRAMUSCULAR; INTRAVENOUS; SUBCUTANEOUS at 23:58

## 2019-07-11 RX ADMIN — PREGABALIN 100 MG: 100 CAPSULE ORAL at 10:38

## 2019-07-11 RX ADMIN — HYDROMORPHONE HYDROCHLORIDE 0.5 MG: 1 INJECTION, SOLUTION INTRAMUSCULAR; INTRAVENOUS; SUBCUTANEOUS at 05:56

## 2019-07-11 RX ADMIN — Medication 20 ML: at 13:43

## 2019-07-11 RX ADMIN — PREGABALIN 100 MG: 100 CAPSULE ORAL at 21:06

## 2019-07-11 RX ADMIN — PROPOFOL 25 MG: 10 INJECTION, EMULSION INTRAVENOUS at 09:08

## 2019-07-11 RX ADMIN — PROPOFOL 25 MG: 10 INJECTION, EMULSION INTRAVENOUS at 08:59

## 2019-07-11 RX ADMIN — ONDANSETRON 4 MG: 2 INJECTION INTRAMUSCULAR; INTRAVENOUS at 17:57

## 2019-07-11 RX ADMIN — Medication 10 ML: at 10:40

## 2019-07-11 RX ADMIN — METRONIDAZOLE 500 MG: 500 INJECTION, SOLUTION INTRAVENOUS at 21:05

## 2019-07-11 RX ADMIN — PROPOFOL 25 MG: 10 INJECTION, EMULSION INTRAVENOUS at 08:55

## 2019-07-11 RX ADMIN — Medication 10 ML: at 13:43

## 2019-07-11 RX ADMIN — PREGABALIN 100 MG: 100 CAPSULE ORAL at 13:43

## 2019-07-11 RX ADMIN — PREGABALIN 100 MG: 100 CAPSULE ORAL at 17:57

## 2019-07-11 RX ADMIN — DIAZEPAM 5 MG: 5 TABLET ORAL at 17:57

## 2019-07-11 RX ADMIN — HYDROMORPHONE HYDROCHLORIDE 0.5 MG: 1 INJECTION, SOLUTION INTRAMUSCULAR; INTRAVENOUS; SUBCUTANEOUS at 13:42

## 2019-07-11 RX ADMIN — PROPOFOL 25 MG: 10 INJECTION, EMULSION INTRAVENOUS at 09:03

## 2019-07-11 RX ADMIN — PROPOFOL 25 MG: 10 INJECTION, EMULSION INTRAVENOUS at 08:53

## 2019-07-11 RX ADMIN — HYDROMORPHONE HYDROCHLORIDE 0.5 MG: 1 INJECTION, SOLUTION INTRAMUSCULAR; INTRAVENOUS; SUBCUTANEOUS at 17:57

## 2019-07-11 RX ADMIN — PROPOFOL 50 MG: 10 INJECTION, EMULSION INTRAVENOUS at 09:10

## 2019-07-11 RX ADMIN — PROPOFOL 75 MG: 10 INJECTION, EMULSION INTRAVENOUS at 08:45

## 2019-07-11 RX ADMIN — METRONIDAZOLE 500 MG: 500 INJECTION, SOLUTION INTRAVENOUS at 13:42

## 2019-07-11 RX ADMIN — PROPOFOL 25 MG: 10 INJECTION, EMULSION INTRAVENOUS at 09:18

## 2019-07-11 RX ADMIN — DIAZEPAM 5 MG: 5 TABLET ORAL at 10:38

## 2019-07-11 RX ADMIN — Medication 10 ML: at 21:06

## 2019-07-11 RX ADMIN — HYDROMORPHONE HYDROCHLORIDE 0.5 MG: 1 INJECTION, SOLUTION INTRAMUSCULAR; INTRAVENOUS; SUBCUTANEOUS at 02:57

## 2019-07-11 RX ADMIN — PROPOFOL 25 MG: 10 INJECTION, EMULSION INTRAVENOUS at 09:01

## 2019-07-11 RX ADMIN — METRONIDAZOLE 500 MG: 500 INJECTION, SOLUTION INTRAVENOUS at 05:56

## 2019-07-11 RX ADMIN — SODIUM CHLORIDE 10 MG: 9 INJECTION INTRAMUSCULAR; INTRAVENOUS; SUBCUTANEOUS at 13:46

## 2019-07-11 RX ADMIN — PROPOFOL 25 MG: 10 INJECTION, EMULSION INTRAVENOUS at 09:05

## 2019-07-11 RX ADMIN — BUDESONIDE 6 MG: 3 CAPSULE ORAL at 10:37

## 2019-07-11 RX ADMIN — MESALAMINE 4 G: 4 ENEMA RECTAL at 18:32

## 2019-07-11 RX ADMIN — HYDROMORPHONE HYDROCHLORIDE 1 MG: 1 INJECTION, SOLUTION INTRAMUSCULAR; INTRAVENOUS; SUBCUTANEOUS at 11:42

## 2019-07-11 RX ADMIN — LIDOCAINE HYDROCHLORIDE 100 MG: 20 INJECTION, SOLUTION EPIDURAL; INFILTRATION; INTRACAUDAL; PERINEURAL at 08:45

## 2019-07-11 RX ADMIN — OXYCODONE AND ACETAMINOPHEN 1 TABLET: 10; 325 TABLET ORAL at 19:21

## 2019-07-11 RX ADMIN — PROPOFOL 25 MG: 10 INJECTION, EMULSION INTRAVENOUS at 09:15

## 2019-07-11 RX ADMIN — HYDROMORPHONE HYDROCHLORIDE 1 MG: 1 INJECTION, SOLUTION INTRAMUSCULAR; INTRAVENOUS; SUBCUTANEOUS at 19:17

## 2019-07-11 RX ADMIN — Medication 1 TABLET: at 10:37

## 2019-07-11 NOTE — PROCEDURES
Francisco J  [id number]  1975    Endoscopy Operative Report    Procedure Type:   Endoscopy of ileal pouch     Pre-operative Diagnosis:  Ileal pouchitis. Post-operative Diagnosis:  Ileal pouch dysfunction. Surgeon:  Magi Hernandez MD    Sedation and Analgesia:  MAC anesthesia Propofol (500 mg)    Specimens Removed:  None. EBL:  0 mL. Complications: None apparent. Indication For Procedure: The patient is a 78-year-old male with Crohn's disease and a [address] ([address]) who has had multiple hospitalizations for treatment of ileal pouchitis, partial small bowel obstruction, and pouch dysfunction. He was admitted this time on 7/8/2019 with symptoms of recurrent ileal pouchitis, and he has been receiving treatment for that condition. [facility] also [facility], and they suggested/requested that an endoscopy of the pouch be performed to assess the anatomy, especially that of the valve. Findings: The ileal pouch mucosal did not appear to be particularly inflamed. The small intestine proximal to the pouch was grossly normal for the entire length examined. The valve appeared to terminate 10 cm from the skin, and it did not appear to be inflamed. Within the pouch, at its inlet (the junction between the normal small intestine and the pouch itself) there were two small lesions that appeared to be consistent with scarring, possibly from old superficial ulceration or catheterization trauma. Procedure Details:  After informed consent was obtained, and immediately after Dr. Jt Wolf had performed an EGD, the patient (who was already sedated and in the endoscopy suite) was positioned supine on the stretcher. Standard monitoring devices were in use, and the sedation was administered by the anesthetist as needed throughout the procedure.     The Olympus pediatric videogastroscope was lubricated and inserted through the stoma in the right lower quadrant of the abdomen and into the ileal pouch (the BCIR). Retained fluid was evacuated and the pouch was studied. The scope was then carefully advanced into the proximal small bowel until approximately 80 cm had been inserted. Inspection was performed during withdrawal, and the findings were as noted above. The pouch was reevaluated, and retroflexion allowed for reasonable visualization of the valve. Numerous photographs were taken throughout the procedure to be shared with the patient's surgeons in  [place]. There were no apparent complications, and the patient appeared to have tolerated the procedure well. At its conclusion, he was transported to the recovery area in good condition. Impression:    No definite anatomical abnormality was identified except for the two areas of possible ulceration or scarring in the pouch where it looks as if the drainage catheter could have been a source of trauma. Recommendations/Plan:  Although there was no apparent pouchitis or distal ileitis and no distal obstruction unless located at the valve itself, the current treatment will be continued for a while longer in the hopes of achieving more significant improvement in the symptoms. The images obtained today will eventually be shared with the patient's surgeons in [place], and we will see whether or not they have any other ideas.

## 2019-07-11 NOTE — PROCEDURES
[address]     Endoscopic Gastroduodenoscopy Procedure Note    Narciso Pedraza  1975  [id number]    Procedure: Endoscopic Gastroduodenoscopy --diagnostic    Indication: Heartburn     Pre-operative Diagnosis: see indication above    Post-operative Diagnosis: see findings below    : Osiel Myles MD    Referring Provider:  Александр Ceballos MD      Anesthesia/Sedation:  MAC anesthesia Propofol    Airway assessment: No airway problems anticipated    Procedure Details     After infomed consent was obtained for the procedure, with all risks and benefits of procedure explained the patient was taken to the endoscopy suite and placed in the left lateral decubitus position. Following sequential administration of sedation as per above, the endoscope was inserted into the mouth and advanced under direct vision to second portion of the duodenum. A careful inspection was made as the gastroscope was withdrawn, including a retroflexed view of the proximal stomach; findings and interventions are described below. Findings:   Esophagus:Hiatus Hernia. Esophagitis  Stomach: A lots of bile. Gastriti  Duodenum/jejunum: normal      Therapies:  none    Specimens: Gastric,esophageal biopsies taken           EBL:  None. Complications:   None; patient tolerated the procedure well. Implants: None    Surgical Assistant: None       Impression:    -Hiatus hernia. Esophagitis. Biliary Gastritis    Pt. was Alert. Left the endoscopy suite in good general condition. Recommendations:  -Follow up with me.     Osiel Myles MD

## 2019-07-11 NOTE — CONSULTS
GI Note. Presence of Biliary Gastritis. Needs Cholesteramine (Powder) 1 scoop in water after lunch.  We will discuss

## 2019-07-11 NOTE — ROUTINE PROCESS
Sakshi Stonewall 1975 
[id number] Situation: 
Verbal report received from: Zurdo Joaquin RN Procedure: Procedure(s): ENTEROSCOPY 
ESOPHAGOGASTRODUODENOSCOPY (EGD) ESOPHAGOGASTRODUODENAL (EGD) BIOPSY Background: 
 
Preoperative diagnosis: stricture Postoperative diagnosis: Gastritis Hiatal Hernia Esophagitis :  Dr. Jackson Arnold, Dr. Stephany Valdez Assistant(s): Endoscopy Technician-1: aKren Pena Endoscopy RN-1: Zurdo Joaquin RN Specimens:  
ID Type Source Tests Collected by Time Destination 1 : Gastric Bx Preservative   Danielle Brink MD 7/11/2019 5334 Pathology 2 : [address] Dysplasia Preservative   Danielle Brink MD 7/11/2019 6089 Pathology H. Pylori  no Assessment: 
Intra-procedure medications Anesthesia gave intra-procedure sedation and medications, see anesthesia flow sheet yes Intravenous fluids: NS@ [facility] Vital signs stable Abdominal assessment: round and soft Recommendation: 
Discharge patient per MD order. Return to floor Permission to share finding with family or friend n/a 
 25-Jun-2017 12:07

## 2019-07-11 NOTE — PROGRESS NOTES

## 2019-07-11 NOTE — PROGRESS NOTES
TRANSFER - OUT REPORT:    Verbal report given to Zahra Groves (name) on Ulus Ben  being transferred to (unit) for routine post - op   Post egd/enteroscopy    Report consisted of patients Situation, Background, Assessment and   Recommendations(SBAR). Information from the following report(s) SBAR, Kardex, Procedure Summary, Intake/Output, MAR and Cardiac Rhythm SR was reviewed with the receiving nurse.     Lines:   PICC Double Lumen 28/72/72 Left;Basilic (Active)   Central Line Being Utilized Yes 7/9/2019 11:53 PM   Criteria for Appropriate Use Limited/no vessel suitable for conventional peripheral access 7/9/2019 11:53 PM   Site Assessment Intact 7/9/2019 11:53 PM   Phlebitis Assessment 0 7/9/2019 11:53 PM   Infiltration Assessment 0 7/9/2019 11:53 PM   Date of Last Dressing Change 07/08/19 7/9/2019 11:53 PM   Dressing Status Clean, dry, & intact 7/9/2019 11:53 PM   Action Taken Open ports on tubing capped 7/9/2019 11:53 PM   Dressing Type Transparent 7/9/2019 11:53 PM   Hub Color/Line Status Red 7/9/2019 11:53 PM   Positive Blood Return (Site #1) Yes 7/9/2019 11:53 PM   Hub Color/Line Status Purple 7/9/2019 11:53 PM   Positive Blood Return (Site #2) Yes 7/9/2019 11:53 PM   Alcohol Cap Used Yes 7/9/2019 11:53 PM       Peripheral IV 07/08/19 Left;Posterior Hand (Active)   Site Assessment Clean, dry, & intact 7/9/2019 11:53 PM   Phlebitis Assessment 0 7/9/2019 11:53 PM   Infiltration Assessment 0 7/9/2019 11:53 PM   Dressing Status Clean, dry, & intact 7/9/2019 11:53 PM   Dressing Type Transparent 7/9/2019 11:53 PM   Hub Color/Line Status Blue 7/9/2019 11:53 PM   Action Taken Open ports on tubing capped 7/9/2019 11:53 PM   Alcohol Cap Used Yes 7/9/2019 11:53 PM       Peripheral IV 07/09/19 Posterior;Right Wrist (Active)   Site Assessment Clean, dry, & intact 7/9/2019 11:53 PM   Phlebitis Assessment 0 7/9/2019 11:53 PM   Infiltration Assessment 0 7/9/2019 11:53 PM   Dressing Status Clean, dry, & intact 7/9/2019 11:53 PM   Dressing Type Transparent 7/9/2019 11:53 PM   Hub Color/Line Status Capped 7/9/2019 11:53 PM   Action Taken Open ports on tubing capped 7/9/2019 11:53 PM   Alcohol Cap Used Yes 7/9/2019 11:53 PM        Opportunity for questions and clarification was provided.

## 2019-07-11 NOTE — PROGRESS NOTES
Problem: Falls - Risk of  Goal: *Absence of Falls  Description  Document Anais Jacome Fall Risk and appropriate interventions in the flowsheet.   Outcome: Progressing Towards Goal     Problem: Patient Education: Go to Patient Education Activity  Goal: Patient/Family Education  Outcome: Progressing Towards Goal

## 2019-07-11 NOTE — PROGRESS NOTES
Dusty Gonzales  1975  [id number]    Situation:    Scheduled Procedure: Procedure(s):  ENTEROSCOPY  ESOPHAGOGASTRODUODENOSCOPY (EGD)  Verbal report received from: ROSALBA Izaguirre  Preoperative diagnosis: stricture    Background:    Procedure: Procedure(s):  ENTEROSCOPY  ESOPHAGOGASTRODUODENOSCOPY (EGD)  Physician performing procedure; Dr. Cala Sever RN    NPO Status/Last PO Intake: NPO since midnight  Is the patient taking Blood Thinners: no  Is the patient diabetic: no     Does the patient have a Pacemaker/Defibrillator in place?: no  Does the patient need antibiotics before/during/after procedure: no  Does the patient have SCD in place:no   Is patient on CONTACT precautions: no    Assessment:  Are the vital signs stable prior to patient coming to ENDO? yes  Is the patient alert/oriented and able to sign consent for the procedures: yes  How does the patient's abdomen feel prior to coming to ENDO? round and soft, ileostomy in place  Does the patient have a patient IV in place?  yes    Recommendation:  Family or Friend present: no

## 2019-07-11 NOTE — CONSULTS
[address]    Name:  Aneesh Payne  MR#:  [id number]  :  1975  ACCOUNT #:  [de-identified]  DATE OF SERVICE:  2019      The patient is at [facility] in the room 211. CHIEF COMPLAINT:  Seems to be intractable heartburn, abdominal pain. HISTORY OF PRESENT ILLNESS:  This is a 28-year-old male who has Crohn's disease for a number of years and has had numerous procedures trying to correct in this condition. The last definite procedure that he has had was an internal pouch converted from an ileostomy. Since the beginning of this year, he has been having abdominal pain and occasional nausea. He has been doing very well until 3 or 4 days ago, when he started to have more nausea and more abdominal pain. He became gradually dehydrated and finally came to the emergency room and admitted. PAST PERSONAL HISTORY:  The patient has been on numerous medications for Crohn's disease. The lately that has been working reasonably well has been [employer] about 3 days prior to this admission. Also, the patient has had several episodes of pouchitis, treated with medication with an excellent result. HABITS:  The patient smokes on daily basis. He also has a narcotic dependence, and he takes narcotics on a regular basis. At the present time, he is in the tapering down program.    PREVIOUS SURGICAL PROCEDURE:  The patient had a complete proctocolectomy in , cholecystectomy in 2016, several abdominal surgeries for intestinal fistula. MEDICATIONS PRIOR TO ADMISSION:  The patient is taking several medication at the present time, particularly symptomatic medication for heartburn and for nausea. The patient is on Remicade at the present time with good results. SOCIAL LIFE:  The patient is . On disability at the present time. He lives with his wife and kids.     REVIEW OF SYSTEMS:  No evidence of any chest pain or shortness of breath, although symptoms seem to be limited to the abdomen and nausea. PHYSICAL EXAMINATION:  GENERAL:  The general appearance of the patient is consistent with his Crohn's disease condition. VITAL SIGNS:  The blood pressure is 105/71, the temperature is 97.6, the heart rate is 73, respirations 16, pulse oximetry 99%. The patient is alert and oriented. HEAD AND NECK:  Essentially negative. No evidence of any jaundice or paleness. LUNGS:  Clear. No rhonchi. No wheezes. No rales. HEART:  Sounds are regular. No murmurs. ABDOMEN:  Slightly distended. Not really tender. No pain by pressure in the abdomen. EXTREMITIES:  Essentially negative. IMPRESSION:  1. Crohn's disease. 2.  Gastroesophageal reflux disease. 3.  Ileal pouchitis. 4.  Narcotic dependence. 5.  Chronic renal failure. OBSERVATIONS:  I have seen the patient in consultation with Dr. Leoncio Patel. He will have an upper GI endoscopy and enteroscopy on Thursday, 07/11, in the morning. We will decide to change any course on the treatment of his Crohn's disease at this point.         MD REINIER Blount/ALVIN_I/RICHARD_P  D:  07/11/2019 8:10  T:  07/11/2019 12:23  JOB #:  [id number]

## 2019-07-11 NOTE — ANESTHESIA POSTPROCEDURE EVALUATION
Post-Anesthesia Evaluation and Assessment    Patient: Mickey George MRN: [id number]  SSN: [id number]    YOB: 1975  Age: 38 y.o. Sex: male      I have evaluated the patient and they are stable and ready for discharge from the PACU. Cardiovascular Function/Vital Signs  Visit Vitals  /67   Pulse 86   Temp 36.5 °C (97.7 °F)   Resp 11   Wt 93.1 kg (205 lb 4.8 oz)   SpO2 99%   BMI 27.84 kg/m²       Patient is status post MAC anesthesia for Procedure(s):  ENTEROSCOPY  ESOPHAGOGASTRODUODENOSCOPY (EGD)  ESOPHAGOGASTRODUODENAL (EGD) BIOPSY. Nausea/Vomiting: None    Postoperative hydration reviewed and adequate. Pain:  Pain Scale 1: Numeric (0 - 10) (07/11/19 0940)  Pain Intensity 1: 0 (07/11/19 0940)   Managed    Neurological Status: At baseline    Mental Status, Level of Consciousness: Alert and  oriented to person, place, and time    Pulmonary Status:   O2 Device: Room air (07/11/19 0940)   Adequate oxygenation and airway patent    Complications related to anesthesia: None    Post-anesthesia assessment completed. No concerns    Signed By: Edwige Nazario MD     July 11, 2019              Procedure(s):  ENTEROSCOPY  ESOPHAGOGASTRODUODENOSCOPY (EGD)  ESOPHAGOGASTRODUODENAL (EGD) BIOPSY. MAC    <BSHSIANPOST>    Vitals Value Taken Time   /67 7/11/2019  9:40 AM   Temp 36.5 °C (97.7 °F) 7/11/2019  9:30 AM   Pulse 86 7/11/2019  9:41 AM   Resp 14 7/11/2019  9:41 AM   SpO2 97 % 7/11/2019  9:41 AM   Vitals shown include unvalidated device data.

## 2019-07-11 NOTE — ANESTHESIA PREPROCEDURE EVALUATION
Relevant Problems   No relevant active problems       Anesthetic History     PONV          Review of Systems / Medical History  Patient summary reviewed, nursing notes reviewed and pertinent labs reviewed    Pulmonary  Within defined limits                 Neuro/Psych         Psychiatric history     Cardiovascular  Within defined limits                     GI/Hepatic/Renal     GERD           Endo/Other  Within defined limits           Other Findings              Physical Exam    Airway  Mallampati: II  TM Distance: > 6 cm  Neck ROM: normal range of motion   Mouth opening: Normal     Cardiovascular  Regular rate and rhythm,  S1 and S2 normal,  no murmur, click, rub, or gallop             Dental  No notable dental hx       Pulmonary  Breath sounds clear to auscultation               Abdominal  GI exam deferred       Other Findings            Anesthetic Plan    ASA: 2  Anesthesia type: MAC            Anesthetic plan and risks discussed with: Patient

## 2019-07-11 NOTE — ROUTINE PROCESS
Bedside shift change report given to James (oncoming nurse) by Lupe Pickard (offgoing nurse). Report included the following information SBAR, Kardex, MAR and Recent Results.

## 2019-07-12 LAB
ANION GAP SERPL CALC-SCNC: 3 MMOL/L (ref 5–15)
BASOPHILS # BLD: 0 K/UL (ref 0–0.1)
BASOPHILS NFR BLD: 0 % (ref 0–1)
BUN SERPL-MCNC: 3 MG/DL (ref 6–20)
BUN/CREAT SERPL: 3 (ref 12–20)
CALCIUM SERPL-MCNC: 8.6 MG/DL (ref 8.5–10.1)
CHLORIDE SERPL-SCNC: 113 MMOL/L (ref 97–108)
CO2 SERPL-SCNC: 28 MMOL/L (ref 21–32)
CREAT SERPL-MCNC: 0.86 MG/DL (ref 0.7–1.3)
DIFFERENTIAL METHOD BLD: NORMAL
EOSINOPHIL # BLD: 0.2 K/UL (ref 0–0.4)
EOSINOPHIL NFR BLD: 2 % (ref 0–7)
ERYTHROCYTE [DISTWIDTH] IN BLOOD BY AUTOMATED COUNT: 13.2 % (ref 11.5–14.5)
GLUCOSE SERPL-MCNC: 90 MG/DL (ref 65–100)
HCT VFR BLD AUTO: 38.8 % (ref 36.6–50.3)
HGB BLD-MCNC: 12.2 G/DL (ref 12.1–17)
IMM GRANULOCYTES # BLD AUTO: 0 K/UL (ref 0–0.04)
IMM GRANULOCYTES NFR BLD AUTO: 0 % (ref 0–0.5)
LYMPHOCYTES # BLD: 2.3 K/UL (ref 0.8–3.5)
LYMPHOCYTES NFR BLD: 28 % (ref 12–49)
MCH RBC QN AUTO: 28.8 PG (ref 26–34)
MCHC RBC AUTO-ENTMCNC: 31.4 G/DL (ref 30–36.5)
MCV RBC AUTO: 91.7 FL (ref 80–99)
MONOCYTES # BLD: 0.4 K/UL (ref 0–1)
MONOCYTES NFR BLD: 5 % (ref 5–13)
NEUTS SEG # BLD: 5.4 K/UL (ref 1.8–8)
NEUTS SEG NFR BLD: 65 % (ref 32–75)
NRBC # BLD: 0 K/UL (ref 0–0.01)
NRBC BLD-RTO: 0 PER 100 WBC
PLATELET # BLD AUTO: 164 K/UL (ref 150–400)
PMV BLD AUTO: 11.4 FL (ref 8.9–12.9)
POTASSIUM SERPL-SCNC: 4.1 MMOL/L (ref 3.5–5.1)
RBC # BLD AUTO: 4.23 M/UL (ref 4.1–5.7)
SODIUM SERPL-SCNC: 144 MMOL/L (ref 136–145)
WBC # BLD AUTO: 8.3 K/UL (ref 4.1–11.1)

## 2019-07-12 PROCEDURE — 80048 BASIC METABOLIC PNL TOTAL CA: CPT

## 2019-07-12 PROCEDURE — 36415 COLL VENOUS BLD VENIPUNCTURE: CPT

## 2019-07-12 PROCEDURE — 65270000029 HC RM PRIVATE

## 2019-07-12 PROCEDURE — 85025 COMPLETE CBC W/AUTO DIFF WBC: CPT

## 2019-07-12 PROCEDURE — 74011250636 HC RX REV CODE- 250/636: Performed by: COLON & RECTAL SURGERY

## 2019-07-12 PROCEDURE — 74011250637 HC RX REV CODE- 250/637: Performed by: INTERNAL MEDICINE

## 2019-07-12 PROCEDURE — C9113 INJ PANTOPRAZOLE SODIUM, VIA: HCPCS | Performed by: COLON & RECTAL SURGERY

## 2019-07-12 PROCEDURE — 74011250637 HC RX REV CODE- 250/637: Performed by: COLON & RECTAL SURGERY

## 2019-07-12 PROCEDURE — 74011000250 HC RX REV CODE- 250: Performed by: COLON & RECTAL SURGERY

## 2019-07-12 RX ADMIN — SODIUM CHLORIDE 10 MG: 9 INJECTION INTRAMUSCULAR; INTRAVENOUS; SUBCUTANEOUS at 13:15

## 2019-07-12 RX ADMIN — HYDROMORPHONE HYDROCHLORIDE 1 MG: 1 INJECTION, SOLUTION INTRAMUSCULAR; INTRAVENOUS; SUBCUTANEOUS at 11:13

## 2019-07-12 RX ADMIN — CHOLESTYRAMINE 4 G: 4 POWDER, FOR SUSPENSION ORAL at 06:24

## 2019-07-12 RX ADMIN — Medication 10 ML: at 06:24

## 2019-07-12 RX ADMIN — OXYCODONE AND ACETAMINOPHEN 1 TABLET: 10; 325 TABLET ORAL at 23:01

## 2019-07-12 RX ADMIN — CHOLESTYRAMINE 4 G: 4 POWDER, FOR SUSPENSION ORAL at 16:49

## 2019-07-12 RX ADMIN — HYDROMORPHONE HYDROCHLORIDE 0.5 MG: 1 INJECTION, SOLUTION INTRAMUSCULAR; INTRAVENOUS; SUBCUTANEOUS at 19:55

## 2019-07-12 RX ADMIN — SUCRALFATE 1 G: 1 TABLET ORAL at 06:24

## 2019-07-12 RX ADMIN — SUCRALFATE 1 G: 1 TABLET ORAL at 23:01

## 2019-07-12 RX ADMIN — DIAZEPAM 5 MG: 5 TABLET ORAL at 09:20

## 2019-07-12 RX ADMIN — ZOLPIDEM TARTRATE 10 MG: 10 TABLET ORAL at 23:01

## 2019-07-12 RX ADMIN — Medication 10 ML: at 13:27

## 2019-07-12 RX ADMIN — SODIUM CHLORIDE 10 MG: 9 INJECTION INTRAMUSCULAR; INTRAVENOUS; SUBCUTANEOUS at 19:55

## 2019-07-12 RX ADMIN — Medication 1 CAPSULE: at 23:01

## 2019-07-12 RX ADMIN — SODIUM CHLORIDE AND POTASSIUM CHLORIDE: 9; 1.49 INJECTION, SOLUTION INTRAVENOUS at 09:35

## 2019-07-12 RX ADMIN — METRONIDAZOLE 500 MG: 500 INJECTION, SOLUTION INTRAVENOUS at 23:01

## 2019-07-12 RX ADMIN — PROMETHAZINE HYDROCHLORIDE 25 MG: 25 TABLET ORAL at 11:19

## 2019-07-12 RX ADMIN — HYDROMORPHONE HYDROCHLORIDE 0.5 MG: 1 INJECTION, SOLUTION INTRAMUSCULAR; INTRAVENOUS; SUBCUTANEOUS at 06:24

## 2019-07-12 RX ADMIN — Medication 10 ML: at 13:26

## 2019-07-12 RX ADMIN — PREGABALIN 100 MG: 100 CAPSULE ORAL at 23:02

## 2019-07-12 RX ADMIN — PREGABALIN 100 MG: 100 CAPSULE ORAL at 13:15

## 2019-07-12 RX ADMIN — HYDROMORPHONE HYDROCHLORIDE 0.5 MG: 1 INJECTION, SOLUTION INTRAMUSCULAR; INTRAVENOUS; SUBCUTANEOUS at 13:16

## 2019-07-12 RX ADMIN — HYDROMORPHONE HYDROCHLORIDE 0.5 MG: 1 INJECTION, SOLUTION INTRAMUSCULAR; INTRAVENOUS; SUBCUTANEOUS at 16:47

## 2019-07-12 RX ADMIN — HYDROMORPHONE HYDROCHLORIDE 0.5 MG: 1 INJECTION, SOLUTION INTRAMUSCULAR; INTRAVENOUS; SUBCUTANEOUS at 23:13

## 2019-07-12 RX ADMIN — HYDROMORPHONE HYDROCHLORIDE 1 MG: 1 INJECTION, SOLUTION INTRAMUSCULAR; INTRAVENOUS; SUBCUTANEOUS at 18:07

## 2019-07-12 RX ADMIN — ACETAMINOPHEN 500 MG: 500 TABLET ORAL at 09:35

## 2019-07-12 RX ADMIN — SODIUM CHLORIDE 40 MG: 9 INJECTION INTRAMUSCULAR; INTRAVENOUS; SUBCUTANEOUS at 23:01

## 2019-07-12 RX ADMIN — Medication 10 ML: at 23:02

## 2019-07-12 RX ADMIN — SODIUM CHLORIDE 10 MG: 9 INJECTION INTRAMUSCULAR; INTRAVENOUS; SUBCUTANEOUS at 06:29

## 2019-07-12 RX ADMIN — SODIUM CHLORIDE 10 MG: 9 INJECTION INTRAMUSCULAR; INTRAVENOUS; SUBCUTANEOUS at 00:05

## 2019-07-12 RX ADMIN — Medication 10 ML: at 13:28

## 2019-07-12 RX ADMIN — METRONIDAZOLE 500 MG: 500 INJECTION, SOLUTION INTRAVENOUS at 13:22

## 2019-07-12 RX ADMIN — PROMETHAZINE HYDROCHLORIDE 25 MG: 25 TABLET ORAL at 23:01

## 2019-07-12 RX ADMIN — METRONIDAZOLE 500 MG: 500 INJECTION, SOLUTION INTRAVENOUS at 06:24

## 2019-07-12 RX ADMIN — OXYCODONE AND ACETAMINOPHEN 1 TABLET: 10; 325 TABLET ORAL at 15:31

## 2019-07-12 RX ADMIN — SUCRALFATE 1 G: 1 TABLET ORAL at 12:00

## 2019-07-12 RX ADMIN — BUDESONIDE 6 MG: 3 CAPSULE ORAL at 07:23

## 2019-07-12 RX ADMIN — PREGABALIN 100 MG: 100 CAPSULE ORAL at 09:20

## 2019-07-12 RX ADMIN — HYDROMORPHONE HYDROCHLORIDE 0.5 MG: 1 INJECTION, SOLUTION INTRAMUSCULAR; INTRAVENOUS; SUBCUTANEOUS at 03:11

## 2019-07-12 RX ADMIN — HYDROMORPHONE HYDROCHLORIDE 0.5 MG: 1 INJECTION, SOLUTION INTRAMUSCULAR; INTRAVENOUS; SUBCUTANEOUS at 09:22

## 2019-07-12 RX ADMIN — OXYCODONE AND ACETAMINOPHEN 1 TABLET: 10; 325 TABLET ORAL at 07:18

## 2019-07-12 RX ADMIN — Medication 1 TABLET: at 09:20

## 2019-07-12 RX ADMIN — PREGABALIN 100 MG: 100 CAPSULE ORAL at 17:58

## 2019-07-12 RX ADMIN — PROMETHAZINE HYDROCHLORIDE 25 MG: 25 TABLET ORAL at 03:11

## 2019-07-12 RX ADMIN — MESALAMINE 4 G: 4 ENEMA RECTAL at 17:58

## 2019-07-12 RX ADMIN — SUCRALFATE 1 G: 1 TABLET ORAL at 16:51

## 2019-07-12 RX ADMIN — DIAZEPAM 5 MG: 5 TABLET ORAL at 17:58

## 2019-07-12 NOTE — PROGRESS NOTES
General Daily Progress Note    Admission Date: 7/8/2019  Hospital Day 5  Post-Op Day Not Applicable  Subjective:   Feeling significantly better than yesterday, but too soon to know whether this represents real improvement. Ate some solid food. Drainage about the same as yesterday. Objective:     Patient Vitals for the past 24 hrs:   BP Temp Pulse Resp SpO2 Weight   07/12/19 1408 117/76 98.5 °F (36.9 °C) 81 18 99 %    07/12/19 0852 110/74 98 °F (36.7 °C) 77 18 99 %    07/12/19 0502      93.1 kg (205 lb 4.8 oz)   07/12/19 0318 105/71 97.7 °F (36.5 °C) 72 16 98 %    07/11/19 2054 112/71 98.2 °F (36.8 °C) 76 16 99 %    07/11/19 1950     95 %      No intake/output data recorded. 07/11 0701 - 07/12 1900  In: 1360 [P.O.:860; I.V.:500]  Out: 2000 [Urine:675]    PHYSICAL EXAMINATION:    GENERAL:  No distress. EXTREMITIES:  LUE PICC. NEURO:  Alert and oriented. Data Review   Recent Results (from the past 24 hour(s))   CBC WITH AUTOMATED DIFF    Collection Time: 07/12/19 12:06 AM   Result Value Ref Range    WBC 8.3 4.1 - 11.1 K/uL    RBC 4.23 4. 10 - 5.70 M/uL    HGB 12.2 12.1 - 17.0 g/dL    HCT 38.8 36.6 - 50.3 %    MCV 91.7 80.0 - 99.0 FL    MCH 28.8 26.0 - 34.0 PG    MCHC 31.4 30.0 - 36.5 g/dL    RDW 13.2 11.5 - 14.5 %    PLATELET 728 734 - 277 K/uL    MPV 11.4 8.9 - 12.9 FL    NRBC 0.0 0  WBC    ABSOLUTE NRBC 0.00 0.00 - 0.01 K/uL    NEUTROPHILS 65 32 - 75 %    LYMPHOCYTES 28 12 - 49 %    MONOCYTES 5 5 - 13 %    EOSINOPHILS 2 0 - 7 %    BASOPHILS 0 0 - 1 %    IMMATURE GRANULOCYTES 0 0.0 - 0.5 %    ABS. NEUTROPHILS 5.4 1.8 - 8.0 K/UL    ABS. LYMPHOCYTES 2.3 0.8 - 3.5 K/UL    ABS. MONOCYTES 0.4 0.0 - 1.0 K/UL    ABS. EOSINOPHILS 0.2 0.0 - 0.4 K/UL    ABS. BASOPHILS 0.0 0.0 - 0.1 K/UL    ABS. IMM.  GRANS. 0.0 0.00 - 0.04 K/UL    DF AUTOMATED     METABOLIC PANEL, BASIC    Collection Time: 07/12/19 12:06 AM   Result Value Ref Range    Sodium 144 136 - 145 mmol/L    Potassium 4.1 3.5 - 5.1 mmol/L    Chloride 113 (H) 97 - 108 mmol/L    CO2 28 21 - 32 mmol/L    Anion gap 3 (L) 5 - 15 mmol/L    Glucose 90 65 - 100 mg/dL    BUN 3 (L) 6 - 20 MG/DL    Creatinine 0.86 0.70 - 1.30 MG/DL    BUN/Creatinine ratio 3 (L) 12 - 20      GFR est AA >60 >60 ml/min/1.73m2    GFR est non-AA >60 >60 ml/min/1.73m2    Calcium 8.6 8.5 - 10.1 MG/DL     Abdominal Radiographs (7/10/2019):  Small intestinal distension that is not as significant as it was on 3/5/2019. Assessment:     Principal Problem:    Ileal pouchitis ([facility] [address] (5/1/2004)    Active Problems:    GERD (gastroesophageal reflux disease) (11/20/2016)      Chronic narcotic dependence ([address] (1/20/2018)      LUE PICC placement was performed on 7/9/2019. EGD was performed by Dr. Ny Sherman on 7/11/2019. Enteroscopy via the ileal pouch was performed on 7/11/2019. Stable. Perhaps gradually improving. Will know more about his ability to tolerate sufficient oral fluids and nutrition within the next 24 hours. Plan:   Continue current medications. Decrease IV fluid rate.

## 2019-07-12 NOTE — PROGRESS NOTES
MARTHA Plan:     1) Discharge home  2) GI F/U    Patient was to have upper GI endoscopy and enteroscopy yesterday. Patient with Ileal pouchitis. Disposition is planned for home when cleared medically. No barriers to discharge identified.     Jodi Celestin 58

## 2019-07-13 LAB
ANION GAP SERPL CALC-SCNC: 2 MMOL/L (ref 5–15)
BUN SERPL-MCNC: 6 MG/DL (ref 6–20)
BUN/CREAT SERPL: 8 (ref 12–20)
CALCIUM SERPL-MCNC: 8 MG/DL (ref 8.5–10.1)
CHLORIDE SERPL-SCNC: 115 MMOL/L (ref 97–108)
CO2 SERPL-SCNC: 28 MMOL/L (ref 21–32)
CREAT SERPL-MCNC: 0.79 MG/DL (ref 0.7–1.3)
GLUCOSE SERPL-MCNC: 111 MG/DL (ref 65–100)
POTASSIUM SERPL-SCNC: 4.9 MMOL/L (ref 3.5–5.1)
SODIUM SERPL-SCNC: 145 MMOL/L (ref 136–145)

## 2019-07-13 PROCEDURE — 74011250636 HC RX REV CODE- 250/636: Performed by: COLON & RECTAL SURGERY

## 2019-07-13 PROCEDURE — 80048 BASIC METABOLIC PNL TOTAL CA: CPT

## 2019-07-13 PROCEDURE — 65270000029 HC RM PRIVATE

## 2019-07-13 PROCEDURE — 74011250637 HC RX REV CODE- 250/637: Performed by: COLON & RECTAL SURGERY

## 2019-07-13 PROCEDURE — 74011000250 HC RX REV CODE- 250: Performed by: COLON & RECTAL SURGERY

## 2019-07-13 PROCEDURE — C9113 INJ PANTOPRAZOLE SODIUM, VIA: HCPCS | Performed by: COLON & RECTAL SURGERY

## 2019-07-13 PROCEDURE — 36415 COLL VENOUS BLD VENIPUNCTURE: CPT

## 2019-07-13 PROCEDURE — 74011250637 HC RX REV CODE- 250/637: Performed by: INTERNAL MEDICINE

## 2019-07-13 RX ADMIN — HYDROMORPHONE HYDROCHLORIDE 0.5 MG: 1 INJECTION, SOLUTION INTRAMUSCULAR; INTRAVENOUS; SUBCUTANEOUS at 09:00

## 2019-07-13 RX ADMIN — SUCRALFATE 1 G: 1 TABLET ORAL at 20:30

## 2019-07-13 RX ADMIN — HYDROMORPHONE HYDROCHLORIDE 0.5 MG: 1 INJECTION, SOLUTION INTRAMUSCULAR; INTRAVENOUS; SUBCUTANEOUS at 06:05

## 2019-07-13 RX ADMIN — PREGABALIN 100 MG: 100 CAPSULE ORAL at 12:56

## 2019-07-13 RX ADMIN — BUDESONIDE 6 MG: 3 CAPSULE ORAL at 06:14

## 2019-07-13 RX ADMIN — Medication 10 ML: at 07:07

## 2019-07-13 RX ADMIN — HYDROMORPHONE HYDROCHLORIDE 1 MG: 1 INJECTION, SOLUTION INTRAMUSCULAR; INTRAVENOUS; SUBCUTANEOUS at 18:41

## 2019-07-13 RX ADMIN — Medication 10 ML: at 13:01

## 2019-07-13 RX ADMIN — DIAZEPAM 5 MG: 5 TABLET ORAL at 18:40

## 2019-07-13 RX ADMIN — ACETAMINOPHEN 500 MG: 500 TABLET ORAL at 12:56

## 2019-07-13 RX ADMIN — Medication 10 ML: at 22:34

## 2019-07-13 RX ADMIN — DIAZEPAM 5 MG: 5 TABLET ORAL at 09:00

## 2019-07-13 RX ADMIN — DICYCLOMINE HYDROCHLORIDE 10 MG: 10 CAPSULE ORAL at 12:56

## 2019-07-13 RX ADMIN — PREGABALIN 100 MG: 100 CAPSULE ORAL at 08:59

## 2019-07-13 RX ADMIN — HYDROMORPHONE HYDROCHLORIDE 1 MG: 1 INJECTION, SOLUTION INTRAMUSCULAR; INTRAVENOUS; SUBCUTANEOUS at 11:09

## 2019-07-13 RX ADMIN — METRONIDAZOLE 500 MG: 500 INJECTION, SOLUTION INTRAVENOUS at 06:06

## 2019-07-13 RX ADMIN — SODIUM CHLORIDE AND POTASSIUM CHLORIDE: 9; 1.49 INJECTION, SOLUTION INTRAVENOUS at 00:53

## 2019-07-13 RX ADMIN — SUCRALFATE 1 G: 1 TABLET ORAL at 11:09

## 2019-07-13 RX ADMIN — CHOLESTYRAMINE 4 G: 4 POWDER, FOR SUSPENSION ORAL at 16:01

## 2019-07-13 RX ADMIN — PREGABALIN 100 MG: 100 CAPSULE ORAL at 20:31

## 2019-07-13 RX ADMIN — OXYCODONE AND ACETAMINOPHEN 1 TABLET: 10; 325 TABLET ORAL at 07:06

## 2019-07-13 RX ADMIN — PROMETHAZINE HYDROCHLORIDE 25 MG: 25 TABLET ORAL at 07:06

## 2019-07-13 RX ADMIN — PROMETHAZINE HYDROCHLORIDE 25 MG: 25 TABLET ORAL at 23:20

## 2019-07-13 RX ADMIN — SUCRALFATE 1 G: 1 TABLET ORAL at 06:06

## 2019-07-13 RX ADMIN — HYDROMORPHONE HYDROCHLORIDE 0.5 MG: 1 INJECTION, SOLUTION INTRAMUSCULAR; INTRAVENOUS; SUBCUTANEOUS at 12:56

## 2019-07-13 RX ADMIN — PROMETHAZINE HYDROCHLORIDE 25 MG: 25 TABLET ORAL at 15:12

## 2019-07-13 RX ADMIN — SODIUM CHLORIDE 10 MG: 9 INJECTION INTRAMUSCULAR; INTRAVENOUS; SUBCUTANEOUS at 02:28

## 2019-07-13 RX ADMIN — ONDANSETRON 4 MG: 2 INJECTION INTRAMUSCULAR; INTRAVENOUS at 11:09

## 2019-07-13 RX ADMIN — Medication 1 CAPSULE: at 20:30

## 2019-07-13 RX ADMIN — OXYCODONE AND ACETAMINOPHEN 1 TABLET: 10; 325 TABLET ORAL at 23:20

## 2019-07-13 RX ADMIN — CHOLESTYRAMINE 4 G: 4 POWDER, FOR SUSPENSION ORAL at 11:09

## 2019-07-13 RX ADMIN — HYDROMORPHONE HYDROCHLORIDE 0.5 MG: 1 INJECTION, SOLUTION INTRAMUSCULAR; INTRAVENOUS; SUBCUTANEOUS at 20:32

## 2019-07-13 RX ADMIN — PREGABALIN 100 MG: 100 CAPSULE ORAL at 18:40

## 2019-07-13 RX ADMIN — Medication 1 TABLET: at 08:59

## 2019-07-13 RX ADMIN — OXYCODONE AND ACETAMINOPHEN 1 TABLET: 10; 325 TABLET ORAL at 15:12

## 2019-07-13 RX ADMIN — ACETAMINOPHEN 500 MG: 500 TABLET ORAL at 22:30

## 2019-07-13 RX ADMIN — HYDROMORPHONE HYDROCHLORIDE 0.5 MG: 1 INJECTION, SOLUTION INTRAMUSCULAR; INTRAVENOUS; SUBCUTANEOUS at 23:49

## 2019-07-13 RX ADMIN — MESALAMINE 4 G: 4 ENEMA RECTAL at 18:40

## 2019-07-13 RX ADMIN — Medication 10 ML: at 13:00

## 2019-07-13 RX ADMIN — HYDROMORPHONE HYDROCHLORIDE 0.5 MG: 1 INJECTION, SOLUTION INTRAMUSCULAR; INTRAVENOUS; SUBCUTANEOUS at 16:01

## 2019-07-13 RX ADMIN — SUCRALFATE 1 G: 1 TABLET ORAL at 16:01

## 2019-07-13 RX ADMIN — CHOLESTYRAMINE 4 G: 4 POWDER, FOR SUSPENSION ORAL at 06:06

## 2019-07-13 RX ADMIN — METRONIDAZOLE 500 MG: 500 INJECTION, SOLUTION INTRAVENOUS at 12:56

## 2019-07-13 RX ADMIN — SODIUM CHLORIDE 10 MG: 9 INJECTION INTRAMUSCULAR; INTRAVENOUS; SUBCUTANEOUS at 08:59

## 2019-07-13 RX ADMIN — ZOLPIDEM TARTRATE 10 MG: 10 TABLET ORAL at 20:31

## 2019-07-13 RX ADMIN — SODIUM CHLORIDE 10 MG: 9 INJECTION INTRAMUSCULAR; INTRAVENOUS; SUBCUTANEOUS at 18:40

## 2019-07-13 RX ADMIN — HYDROMORPHONE HYDROCHLORIDE 0.5 MG: 1 INJECTION, SOLUTION INTRAMUSCULAR; INTRAVENOUS; SUBCUTANEOUS at 02:28

## 2019-07-13 RX ADMIN — SODIUM CHLORIDE 40 MG: 9 INJECTION INTRAMUSCULAR; INTRAVENOUS; SUBCUTANEOUS at 20:31

## 2019-07-13 RX ADMIN — METRONIDAZOLE 500 MG: 500 INJECTION, SOLUTION INTRAVENOUS at 20:31

## 2019-07-13 NOTE — PROGRESS NOTES
Problem: General Medical Care Plan  Goal: *Absence of infection signs and symptoms  Outcome: Progressing Towards Goal     Problem: General Medical Care Plan  Goal: *Optimal pain control at patient's stated goal  Outcome: Progressing Towards Goal     P

## 2019-07-13 NOTE — PROGRESS NOTES
General Daily Progress Note    Admission Date: 7/8/2019  Hospital Day 6  Post-Op Day Not Applicable  Subjective:   Making progress with gradually increasing oral intake. Output from ileal pouch is becoming thicker. No major setback so far. Reflux symptoms are somewhat improved. Objective:     Patient Vitals for the past 24 hrs:   BP Temp Pulse Resp SpO2 Weight   07/13/19 0839 125/81 98.1 °F (36.7 °C) 84 16 100 %    07/13/19 0310 119/75 97.9 °F (36.6 °C) 74 16 97 % 95.1 kg (209 lb 9.6 oz)   07/12/19 1954 124/82 98.4 °F (36.9 °C) 84 16 100 %    07/12/19 1408 117/76 98.5 °F (36.9 °C) 81 18 99 %      No intake/output data recorded. 07/11 1901 - 07/13 0700  In: 1200 [P.O.:1200]  Out: 3860 [Urine:3210]    PHYSICAL EXAMINATION:    GENERAL:  No distress. EXTREMITIES:  LUE PICC. NEURO:  Alert and oriented. Data Review   Recent Results (from the past 24 hour(s))   METABOLIC PANEL, BASIC    Collection Time: 07/13/19  2:35 AM   Result Value Ref Range    Sodium 145 136 - 145 mmol/L    Potassium 4.9 3.5 - 5.1 mmol/L    Chloride 115 (H) 97 - 108 mmol/L    CO2 28 21 - 32 mmol/L    Anion gap 2 (L) 5 - 15 mmol/L    Glucose 111 (H) 65 - 100 mg/dL    BUN 6 6 - 20 MG/DL    Creatinine 0.79 0.70 - 1.30 MG/DL    BUN/Creatinine ratio 8 (L) 12 - 20      GFR est AA >60 >60 ml/min/1.73m2    GFR est non-AA >60 >60 ml/min/1.73m2    Calcium 8.0 (L) 8.5 - 10.1 MG/DL     Abdominal Radiographs (7/10/2019):  Small intestinal distension that is not as significant as it was on 3/5/2019. Assessment:     Principal Problem:    Ileal pouchitis ([facility] [address] (5/1/2004)    Active Problems:    GERD (gastroesophageal reflux disease) (11/20/2016)      Chronic narcotic dependence ([facility] [address] (1/20/2018)        LUE PICC placement was performed on 7/9/2019.     EGD was performed by Dr. Yaniv Gómez on 7/11/2019.     Enteroscopy via the ileal pouch was performed on 7/11/2019.     Stable. Gradually improving.   Probably needs another day to prove himself on the oral nutrition. Still using a lot of narcotics, but that problem will not be solved here or by me. Plan:   Continue current medications. Decrease IV fluid rate. Probable discharge tomorrow.

## 2019-07-14 VITALS
BODY MASS INDEX: 28.05 KG/M2 | RESPIRATION RATE: 16 BRPM | TEMPERATURE: 98.5 F | DIASTOLIC BLOOD PRESSURE: 81 MMHG | WEIGHT: 206.79 LBS | SYSTOLIC BLOOD PRESSURE: 124 MMHG | OXYGEN SATURATION: 99 % | HEART RATE: 86 BPM

## 2019-07-14 LAB
ANION GAP SERPL CALC-SCNC: 5 MMOL/L (ref 5–15)
BUN SERPL-MCNC: 7 MG/DL (ref 6–20)
BUN/CREAT SERPL: 7 (ref 12–20)
CALCIUM SERPL-MCNC: 8.7 MG/DL (ref 8.5–10.1)
CHLORIDE SERPL-SCNC: 108 MMOL/L (ref 97–108)
CO2 SERPL-SCNC: 30 MMOL/L (ref 21–32)
CREAT SERPL-MCNC: 0.95 MG/DL (ref 0.7–1.3)
GLUCOSE SERPL-MCNC: 112 MG/DL (ref 65–100)
MAGNESIUM SERPL-MCNC: 1.6 MG/DL (ref 1.6–2.4)
PHOSPHATE SERPL-MCNC: 4.8 MG/DL (ref 2.6–4.7)
POTASSIUM SERPL-SCNC: 3.8 MMOL/L (ref 3.5–5.1)
SODIUM SERPL-SCNC: 143 MMOL/L (ref 136–145)

## 2019-07-14 PROCEDURE — 83735 ASSAY OF MAGNESIUM: CPT

## 2019-07-14 PROCEDURE — 74011000250 HC RX REV CODE- 250: Performed by: COLON & RECTAL SURGERY

## 2019-07-14 PROCEDURE — 84100 ASSAY OF PHOSPHORUS: CPT

## 2019-07-14 PROCEDURE — 74011250637 HC RX REV CODE- 250/637: Performed by: INTERNAL MEDICINE

## 2019-07-14 PROCEDURE — 36415 COLL VENOUS BLD VENIPUNCTURE: CPT

## 2019-07-14 PROCEDURE — 80048 BASIC METABOLIC PNL TOTAL CA: CPT

## 2019-07-14 PROCEDURE — 74011250636 HC RX REV CODE- 250/636: Performed by: COLON & RECTAL SURGERY

## 2019-07-14 PROCEDURE — 74011250637 HC RX REV CODE- 250/637: Performed by: COLON & RECTAL SURGERY

## 2019-07-14 RX ORDER — OXYCODONE AND ACETAMINOPHEN 10; 325 MG/1; MG/1
1 TABLET ORAL
Qty: 1 TAB | Refills: 0 | Status: SHIPPED
Start: 2019-07-14 | End: 2019-07-15

## 2019-07-14 RX ORDER — CHOLESTYRAMINE 4 G/4.8G
4 POWDER, FOR SUSPENSION ORAL
Qty: 1 PACKET | Refills: 0 | Status: SHIPPED
Start: 2019-07-14 | End: 2020-09-28

## 2019-07-14 RX ORDER — BUDESONIDE 3 MG/1
CAPSULE, COATED PELLETS ORAL
Qty: 1 CAP | Refills: 0 | Status: SHIPPED
Start: 2019-07-14 | End: 2020-10-04

## 2019-07-14 RX ORDER — SUCRALFATE 1 G/1
1 TABLET ORAL
Qty: 1 TAB | Refills: 0 | Status: SHIPPED
Start: 2019-07-14 | End: 2020-09-28

## 2019-07-14 RX ORDER — MESALAMINE 4 G/60ML
4 ENEMA RECTAL EVERY EVENING
Qty: 1 BOTTLE | Refills: 0 | Status: ON HOLD
Start: 2019-07-14 | End: 2020-10-04 | Stop reason: SDUPTHER

## 2019-07-14 RX ORDER — METRONIDAZOLE 500 MG/1
500 TABLET ORAL 2 TIMES DAILY
Qty: 16 TAB | Refills: 0 | Status: SHIPPED | OUTPATIENT
Start: 2019-07-14 | End: 2019-07-22

## 2019-07-14 RX ORDER — BACITRACIN 500 UNIT/G
PACKET (EA) TOPICAL
Status: DISCONTINUED
Start: 2019-07-14 | End: 2019-07-14 | Stop reason: HOSPADM

## 2019-07-14 RX ADMIN — HYDROMORPHONE HYDROCHLORIDE 0.5 MG: 1 INJECTION, SOLUTION INTRAMUSCULAR; INTRAVENOUS; SUBCUTANEOUS at 09:10

## 2019-07-14 RX ADMIN — Medication 10 ML: at 05:46

## 2019-07-14 RX ADMIN — Medication 1 TABLET: at 09:09

## 2019-07-14 RX ADMIN — DIAZEPAM 5 MG: 5 TABLET ORAL at 09:09

## 2019-07-14 RX ADMIN — PREGABALIN 100 MG: 100 CAPSULE ORAL at 09:09

## 2019-07-14 RX ADMIN — PROMETHAZINE HYDROCHLORIDE 25 MG: 25 TABLET ORAL at 06:51

## 2019-07-14 RX ADMIN — CHOLESTYRAMINE 4 G: 4 POWDER, FOR SUSPENSION ORAL at 06:51

## 2019-07-14 RX ADMIN — ERGOCALCIFEROL 50000 UNITS: 1.25 CAPSULE ORAL at 06:51

## 2019-07-14 RX ADMIN — SUCRALFATE 1 G: 1 TABLET ORAL at 06:51

## 2019-07-14 RX ADMIN — SUCRALFATE 1 G: 1 TABLET ORAL at 11:07

## 2019-07-14 RX ADMIN — HYDROMORPHONE HYDROCHLORIDE 0.5 MG: 1 INJECTION, SOLUTION INTRAMUSCULAR; INTRAVENOUS; SUBCUTANEOUS at 12:51

## 2019-07-14 RX ADMIN — BUDESONIDE 6 MG: 3 CAPSULE ORAL at 09:25

## 2019-07-14 RX ADMIN — SODIUM CHLORIDE AND POTASSIUM CHLORIDE: 9; 1.49 INJECTION, SOLUTION INTRAVENOUS at 01:04

## 2019-07-14 RX ADMIN — HYDROMORPHONE HYDROCHLORIDE 0.5 MG: 1 INJECTION, SOLUTION INTRAMUSCULAR; INTRAVENOUS; SUBCUTANEOUS at 05:45

## 2019-07-14 RX ADMIN — HYDROMORPHONE HYDROCHLORIDE 1 MG: 1 INJECTION, SOLUTION INTRAMUSCULAR; INTRAVENOUS; SUBCUTANEOUS at 11:07

## 2019-07-14 RX ADMIN — CHOLESTYRAMINE 4 G: 4 POWDER, FOR SUSPENSION ORAL at 09:24

## 2019-07-14 RX ADMIN — CHOLESTYRAMINE 4 G: 4 POWDER, FOR SUSPENSION ORAL at 11:07

## 2019-07-14 RX ADMIN — HYDROMORPHONE HYDROCHLORIDE 0.5 MG: 1 INJECTION, SOLUTION INTRAMUSCULAR; INTRAVENOUS; SUBCUTANEOUS at 02:42

## 2019-07-14 RX ADMIN — SODIUM CHLORIDE 10 MG: 9 INJECTION INTRAMUSCULAR; INTRAVENOUS; SUBCUTANEOUS at 09:09

## 2019-07-14 RX ADMIN — SODIUM CHLORIDE 10 MG: 9 INJECTION INTRAMUSCULAR; INTRAVENOUS; SUBCUTANEOUS at 01:04

## 2019-07-14 RX ADMIN — METRONIDAZOLE 500 MG: 500 INJECTION, SOLUTION INTRAVENOUS at 05:45

## 2019-07-14 RX ADMIN — CYANOCOBALAMIN 1000 MCG: 1000 INJECTION, SOLUTION INTRAMUSCULAR; SUBCUTANEOUS at 09:25

## 2019-07-14 RX ADMIN — OXYCODONE AND ACETAMINOPHEN 1 TABLET: 10; 325 TABLET ORAL at 06:51

## 2019-07-14 RX ADMIN — ONDANSETRON 4 MG: 2 INJECTION INTRAMUSCULAR; INTRAVENOUS at 12:51

## 2019-07-14 RX ADMIN — PREGABALIN 100 MG: 100 CAPSULE ORAL at 12:51

## 2019-07-14 NOTE — DISCHARGE SUMMARY
Discharge Summary     Patient ID:    Jp Godfrey  [id number]  79 y.o.  1975    Admission Date: 7/8/2019    Discharge Date: 7/14/2019    Admission Diagnoses:  1. Ileal pouchitis  2. Gastroesophageal reflux disease  3. Chronic narcotic dependence    Chronic Diagnoses:    Patient Active Problem List   Diagnosis Code    Ileal pouchitis ([facility] K91.850    GERD (gastroesophageal reflux disease) K21.9    Depression with anxiety F41.8    Anemia D64.9    Chronic narcotic dependence ([facility] F11.20    Drug-induced ileus ([facility] K56.7, T50.905A       Discharge Diagnoses:  1. Ileal pouchitis (improved)  2. Ileal pouch dysfunction  3. Gastroesophageal reflux disease  4. Esophagitis  5. Chronic narcotic dependence        Hospital Problems as of 7/14/2019 Date Reviewed: 7/11/2019          Codes Class Noted - Resolved POA    Chronic narcotic dependence ([facility] (Chronic) ICD-10-CM: F11.20  ICD-9-CM: 304.91  1/20/2018 - Present Yes        GERD (gastroesophageal reflux disease) (Chronic) ICD-10-CM: K21.9  ICD-9-CM: 530.81  11/20/2016 - Present Yes        * (Principal) Ileal pouchitis ([facility] [address] [facility] ICD-10-CM: D22.770  ICD-9-CM: 569.71  5/1/2004 - Present Yes              Procedures Performed:  1. Left upper extremity PICC placement was perfomred on 7/9/2019.  2. Esophagogastroduodenoscopy with biopsies was performed by Barron Sommers MD on 7/11/2019.  3. Endoscopy of the ileal pouch was performed by Dr. Geoff Shabazz on 7/11/2019. Discharge Medications:   Current Discharge Medication List      START taking these medications    Details   budesonide (ENTOCORT EC) 3 mg capsule Use the prescription that you already have. This electronic version is a placeholder for documentation purposes. Qty: 1 Cap, Refills: 0      cholestyramine-aspartame (QUESTRAN LIGHT) 4 gram packet Take 1 Packet by mouth three (3) times daily (with meals). Use the prescription that you already have.   This electronic version is a placeholder for documentation purposes. Qty: 1 Packet, Refills: 0      mesalamine (ROWASA) 4 gram/60 mL enema Insert 60 mL into rectum every evening. Use the prescription that you already have. This electronic version is a placeholder for documentation purposes. Qty: 1 Bottle, Refills: 0      metroNIDAZOLE (FLAGYL) 500 mg tablet Take 1 Tab by mouth two (2) times a day for 8 days. Qty: 16 Tab, Refills: 0      oxyCODONE-acetaminophen (PERCOCET 10)  mg per tablet Take 1 Tab by mouth every eight (8) hours as needed for Pain for up to 1 dose. Max Daily Amount: 3 Tabs. Use the prescription that you already have. This electronic version is a placeholder for documentation purposes. Qty: 1 Tab, Refills: 0    Associated Diagnoses: Chronic narcotic dependence (HCC)      sucralfate (CARAFATE) 1 gram tablet Take 1 Tab by mouth Before breakfast, lunch, dinner and at bedtime. Use the prescription that you already have. This electronic version is a placeholder for documentation purposes. Qty: 1 Tab, Refills: 0         CONTINUE these medications which have NOT CHANGED    Details   ondansetron hcl (ZOFRAN) 8 mg tablet Take 1 Tab by mouth every twelve (12) hours as needed (Mild Nausea). Qty: 15 Tab, Refills: 0      multivitamin with iron (FLINTSTONES) chewable tablet Take 1 Tab by mouth daily. Qty: 30 Tab, Refills: 2      zolpidem (AMBIEN) 10 mg tablet Take 1 Tab by mouth nightly. Max Daily Amount: 10 mg.  Qty: 30 Tab, Refills: 0    Associated Diagnoses: Insomnia, unspecified type      Saccharomyces boulardii (FLORASTOR) 250 mg capsule Take 250 mg by mouth nightly. diclofenac (VOLTAREN) 1 % gel Apply 2 g to affected area four (4) times daily as needed. acetaminophen (TYLENOL) 500 mg tablet Take 500 mg by mouth four (4) times daily as needed (mild pain, heache (usually has headaches 1 week after Stelara)). prochlorperazine (COMPAZINE) 10 mg tablet Take 10 mg by mouth two (2) times daily as needed (Moderate Nausea). Ustekinumab (STELARA) 90 mg/mL injection 90 mg by SubCUTAneous route. Every 8 weeks       promethazine (PHENERGAN) 25 mg tablet Take 25 mg by mouth every six (6) hours as needed (Severe Nausea). omeprazole (PRILOSEC) 40 mg capsule Take 40 mg by mouth nightly.      ergocalciferol (ERGOCALCIFEROL) 50,000 unit capsule Take 50,000 Units by mouth every Sunday. pregabalin (LYRICA) 100 mg capsule Take 100 mg by mouth four (4) times daily. cyanocobalamin (VITAMIN B-12) 1,000 mcg/mL injection 1,000 mcg by IntraMUSCular route every Sunday. Indications: Once weekly      diazepam (VALIUM) 5 mg tablet Take 5 mg by mouth two (2) times a day.      nut.suppl. - milk based formula (BOOST PUDDING) pudg One serving by mouth twice per day. Qty: 60 Can, Refills: 1      albuterol (PROVENTIL HFA, VENTOLIN HFA, PROAIR HFA) 90 mcg/actuation inhaler Take 2 Puffs by inhalation every four (4) hours as needed for Wheezing. Qty: 1 Inhaler, Refills: 0      dicyclomine (BENTYL) 10 mg capsule Take 10 mg by mouth every six (6) hours as needed. Diet:  As tolerated. Activity:  As tolerated. Wound Care:  None. Discharge Condition:  Improved compared to that upon admission. Disposition:  Home. Follow-up Care:  1. Dr. Liz Isaac within one week. 2. Surgery team in [place] when possible.       Significant Diagnostic Studies:   Recent Labs     07/12/19  0006   WBC 8.3   HGB 12.2   HCT 38.8        Recent Labs     07/14/19  0247 07/13/19  0235 07/12/19  0006    145 144   K 3.8 4.9 4.1    115* 113*   CO2 30 28 28   BUN 7 6 3*   CREA 0.95 0.79 0.86   * 111* 90   CA 8.7 8.0* 8.6   MG 1.6  --   --    PHOS 4.8*  --   --        Lab Results   Component Value Date/Time    Glucose (POC) 99 02/14/2018 09:53 PM    Glucose (POC) 192 (H) 02/14/2018 11:25 AM    Glucose (POC) 100 06/17/2017 11:22 AM    Glucose (POC) 137 (H) 06/17/2017 05:54 AM    Glucose (POC) 120 (H) 06/16/2017 09:40 PM         HOSPITAL COURSE & TREATMENT RENDERED:    The patient is a 46-year-old male with Crohn's disease and a [facility]le[facility] [place] ([address]) who has had multiple hospitalizations for treatment of ileal pouchitis, partial small bowel obstruction, and pouch dysfunction. He was admitted this time on 7/8/2019 with symptoms of recurrent ileal pouchitis, and he received treatment for that condition with bowel rest, tube decompression of the ileal pouch, mesalamine and sucralfate enemas, parenteral metronidazole, and narcotics (to prevent opioid withdrawal). While hospitalized, he communicated with his [facility] surgery team, and they suggested/requested that an endoscopy of the pouch be performed to assess the anatomy, especially that of the valve. Because he was also having increased GERD symptoms, an upper endoscopy was indicated. The procedures were performed without apparent complications on 5/33/6460, and the findings are documented in the corresponding reports. Notably, the ileal pouch mucosal did not appear to be particularly inflamed. The small intestine proximal to the pouch was grossly normal for the entire length examined. The valve appeared to terminate 10 cm from the skin, and it did not appear to be inflamed. Within the pouch, at its inlet (the junction between the normal small intestine and the pouch itself) there were two small lesions that appeared to be consistent with scarring, possibly from old superficial ulceration or catheterization trauma. Numerous photographs were taken throughout the enteroscopy to be shared with the patient's surgeons in  [place]. Although there was no apparent pouchitis or distal ileitis and no distal obstruction unless located at the valve itself, the medical treatment was continued. The patient gradually improved, and his diet was advanced as tolerated.     By 7/14/2019, he was no longer dependent on parenteral fluids and medications and he appeared to be fit for discharge to home. His PICC was therefore removed. His condition upon discharge was improved compared to that upon admission, and he appeared to have understood all discharge and follow-up instructions.         Signed:  Nayeli Rosen MD

## 2019-07-14 NOTE — PROGRESS NOTES
Problem: Falls - Risk of  Goal: *Absence of Falls  Description  Document Rani Late Fall Risk and appropriate interventions in the flowsheet.   Outcome: Progressing Towards Goal

## 2019-07-14 NOTE — PROGRESS NOTES
General Daily Progress Note    Admission Date: 7/8/2019  Hospital Day 7  Post-Op Day Not Applicable  Subjective:   Feeling much better. Seems to be tolerating small amounts of solid food. Was able to leave the drainage tube out of the ileal pouch for a few hours without an apparent setback. Objective:     Patient Vitals for the past 24 hrs:   BP Temp Pulse Resp SpO2 Weight   07/14/19 0906 124/81 98.5 °F (36.9 °C) 86 16 99 %    07/14/19 0719      93.8 kg (206 lb 12.7 oz)   07/14/19 0431 107/68 97.7 °F (36.5 °C) 68 14 97 %    07/13/19 2110 107/66 97.5 °F (36.4 °C) 75 14 97 %    07/13/19 2031     96 %    07/13/19 1410 106/73 98.1 °F (36.7 °C) 79 16 96 %      No intake/output data recorded. 07/12 1901 - 07/14 0700  In: -   Out: 1360 [Urine:1360]      PHYSICAL EXAMINATION:    GENERAL:  No distress. EXTREMITIES:  LUE PICC. NEURO:  Alert and oriented. Data Review   Recent Results (from the past 24 hour(s))   METABOLIC PANEL, BASIC    Collection Time: 07/14/19  2:47 AM   Result Value Ref Range    Sodium 143 136 - 145 mmol/L    Potassium 3.8 3.5 - 5.1 mmol/L    Chloride 108 97 - 108 mmol/L    CO2 30 21 - 32 mmol/L    Anion gap 5 5 - 15 mmol/L    Glucose 112 (H) 65 - 100 mg/dL    BUN 7 6 - 20 MG/DL    Creatinine 0.95 0.70 - 1.30 MG/DL    BUN/Creatinine ratio 7 (L) 12 - 20      GFR est AA >60 >60 ml/min/1.73m2    GFR est non-AA >60 >60 ml/min/1.73m2    Calcium 8.7 8.5 - 10.1 MG/DL   MAGNESIUM    Collection Time: 07/14/19  2:47 AM   Result Value Ref Range    Magnesium 1.6 1.6 - 2.4 mg/dL   PHOSPHORUS    Collection Time: 07/14/19  2:47 AM   Result Value Ref Range    Phosphorus 4.8 (H) 2.6 - 4.7 MG/DL       Abdominal Radiographs (7/10/2019):  Small intestinal distension that is not as significant as it was on 3/5/2019.         Assessment:     Principal Problem:    Ileal pouchitis ([address] (5/1/2004)    Active Problems:    GERD (gastroesophageal reflux disease) (11/20/2016)      Chronic narcotic dependence ([facility] (1/20/2018)      LUE PICC placement was performed on 7/9/2019.     EGD was performed by Dr. Iva Sevilla on 7/11/2019.     Enteroscopy via the ileal pouch was performed on 7/11/2019.     The patient appears to have improved sufficiently to permit discharge to home.         Plan:   Remove PICC after lunch, then discharge to home on all of the current enteral medications plus oral Flagyl. He already has active prescriptions for everything but that. Follow-up to be scheduled with Dr. Iva Sevilla for sometime this week.

## 2019-07-14 NOTE — DISCHARGE INSTRUCTIONS
Patient Discharge Instructions    Maura Pasquale / [id number] : 1975    Admitted 2019 Discharged: 2019       · It is important that you take medications exactly as they are prescribed. · Keep your medication in the bottles provided by the pharmacist and keep a list of the medication names, dosages, and times to be taken in your wallet. · Do not take other medications without consulting your doctor. What To Do At Home  Recommended Diet:  You should continue to consume the diet that you can tolerate. Recommended Activity: Resume your usual activities. Medications: The list being printed is intended to include accurate dosing information for the medications that you were taking prior to admission and that you should continue to take, those for which you already had prescriptions but that you were not currently using and should now use again, and the Flagyl (metronidazole) that is new. Follow-Up:  Please call Dr. Eron Gr office to schedule follow-up with him for sometime this week. Information obtained by :  I understand that if any problems occur once I am at home I am to contact my physician. I understand and acknowledge receipt of the instructions indicated above.                                                                                                                                            Physician's or R.N.'s Signature                                                                  Date/Time                                                                                                                                              Patient or Representative Signature                                                          Date/Time

## 2020-01-30 NOTE — PROGRESS NOTES
Kelvin Corbett MD   Phone/text: [phone] (7am to 7pm)  After 7pm please call  for hospitalist on call    Hospitalist Progress Note     6/15/2017   PCP:  Dr. Librado Alarcon MD  Chief complaint: abdominal pain, nausea    Admission Summary:   Carmen Liu is a 40 y.o. 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 Reason For Visit:  SBO    Assessment/Plan   Small bowel obstruction (POA) - ostomy output low, NG output high  - CT abdomen/pelvis 6/11 with dilated fluid/stool filled distal small bowel loops concerning  for obstruction  - Barium enema 6/12 with contrast reflux into nondilated small bowel but not into dilated ileum, suggesting obstruction  - AXR 6/15 without evidence of bowel obstruction  - NG tube to suction, trial of clamping  - TPN for nutrition  - Dr. Abimbola James consulted    Nausea/ vomiting (POA) - due to SBO and chronic nausea  - Zofran prn    Abdominal pain, acute on chronic - stable  - Continued pain though noted previous plans to wean narcotics given recurrent SBO, will discuss after acute episode is resolved  - Monitor    Right hydronephrosis - seen on barium enema but not on CT scan, renal function normal. Asymptomatic. Likely false positive. Monitor    Crohn's disease (chronic) - plan as above    Anemia (chronic) - monitor HH    GERD (chronic) - IV PPI    See orders for other plans. VTE prophylaxis: SCDs  Discussed plan of care with Patient/Family and Nurse   Pre-admission lived at home  Discharge plan: home  Estimated time to discharge: unclear     Subjective   Mr. Zafar Glasgow is feeling a bit better today.  Not putting much out through the ostomy but feels bowels rumbling. Reviewed interval history  Physical examination     Visit Vitals    /78    Pulse (!) 108    Temp 98.4 °F (36.9 °C)    Resp 16    Ht 6' (1.829 m)    Wt 93.4 kg (205 lb 14.4 oz)    SpO2 97%    BMI 27.93 kg/m2       Temp (24hrs), Av.3 °F (36.8 °C), Min:98.1 °F (36.7 °C), Max:98.4 °F (36.9 °C)      Intake/Output Summary (Last 24 hours) at 06/15/17 1010  Last data filed at 06/15/17 0508   Gross per 24 hour   Intake                0 ml   Output             2960 ml   Net            -2960 ml       Gen - NAD  HEENT - MMM, NG tube in place  Neck - supple, full ROM  CV - RRR, no MRG  Resp - lungs CTA b/l, no wheezing, normal WOB  GI - abdomen S, NT, ND, +BS. Ostomy   - no CVA tenderness, bladder non-palpable in lower abdomen  MSK - normal tone and bulk, no edema  Neuro - A&O, no focal deficits  Psych - calm and cooperative with normal affect    Data Review       Telemetry    ECG    Xray x   CT scan    MRI    Echocardiogram    Ultrasound              I have reviewed the flow sheet and recent notes  New labs and data personally reviewed.     Recent Labs      17   0507  17   0951   WBC  7.3  4.7   HGB  10.2*  10.9*   HCT  32.6*  35.0*   PLT  184  182     Recent Labs      06/15/17   0409  17   0507  17   0507   NA  142  140  137   K  3.8  3.1*  5.0   CL  104  102  103   CO2  25  27  27   GLU  71  78  92   BUN  5*  6  8   CREA  0.55*  0.53*  0.78   CA  8.9  8.7  9.2       Medications reviewed  Current Facility-Administered Medications   Medication Dose Route Frequency    sodium chloride 0.9 % injection        sodium chloride (NS) flush 20 mL  20 mL InterCATHeter PRN    sodium chloride (NS) flush 10 mL  10 mL InterCATHeter Q24H    sodium chloride (NS) flush 10 mL  10 mL InterCATHeter PRN    sodium chloride (NS) flush 10 mL  10 mL InterCATHeter Q8H    alteplase (CATHFLO) 1 mg in sterile water (preservative free) 1 mL injection  1 mg InterCATHeter PRN    bacitracin 500 unit/gram packet 1 Packet  1 Packet Topical PRN    TPN ADULT - CENTRAL AA 5% D20% W/ ELECTROLYTES AND CA   IntraVENous CONTINUOUS    fat emulsion 20% (LIPOSYN, INTRALIPID) infusion 500 mL  500 mL IntraVENous Q MON, WED & FRI    sodium chloride (NS) flush 5-10 mL  5-10 mL IntraVENous Q8H    sodium chloride (NS) flush 5-10 mL  5-10 mL IntraVENous PRN    ondansetron (ZOFRAN) injection 4 mg  4 mg IntraVENous Q6H PRN    HYDROmorphone (PF) (DILAUDID) injection 2 mg  2 mg IntraVENous Q3H PRN    prochlorperazine (COMPAZINE) with saline injection 5 mg  5 mg IntraVENous Q4H PRN    temazepam (RESTORIL) capsule 30 mg  30 mg Oral QHS PRN         Gayatri Wilson MD  Internal Medicine  6/15/2017 socially

## 2020-09-28 ENCOUNTER — HOSPITAL ENCOUNTER (INPATIENT)
Age: 45
LOS: 5 days | Discharge: HOME | DRG: 394 | End: 2020-10-04
Attending: EMERGENCY MEDICINE | Admitting: COLON & RECTAL SURGERY
Payer: COMMERCIAL

## 2020-09-28 ENCOUNTER — APPOINTMENT (OUTPATIENT)
Dept: CT IMAGING | Age: 45
DRG: 394 | End: 2020-09-28
Attending: NURSE PRACTITIONER
Payer: COMMERCIAL

## 2020-09-28 DIAGNOSIS — K91.850 ILEAL POUCHITIS (HCC): ICD-10-CM

## 2020-09-28 DIAGNOSIS — R10.13 ABDOMINAL PAIN, EPIGASTRIC: Primary | ICD-10-CM

## 2020-09-28 LAB
ALBUMIN SERPL-MCNC: 4.5 G/DL (ref 3.5–5)
ALBUMIN/GLOB SERPL: 1.1 {RATIO} (ref 1.1–2.2)
ALP SERPL-CCNC: 165 U/L (ref 45–117)
ALT SERPL-CCNC: 19 U/L (ref 12–78)
ANION GAP SERPL CALC-SCNC: 9 MMOL/L (ref 5–15)
APPEARANCE UR: CLEAR
AST SERPL-CCNC: 14 U/L (ref 15–37)
BACTERIA URNS QL MICRO: NEGATIVE /HPF
BASOPHILS # BLD: 0 K/UL (ref 0–0.1)
BASOPHILS NFR BLD: 0 % (ref 0–1)
BILIRUB SERPL-MCNC: 0.5 MG/DL (ref 0.2–1)
BILIRUB UR QL CFM: NEGATIVE
BUN SERPL-MCNC: 12 MG/DL (ref 6–20)
BUN/CREAT SERPL: 10 (ref 12–20)
CALCIUM SERPL-MCNC: 10.1 MG/DL (ref 8.5–10.1)
CHLORIDE SERPL-SCNC: 103 MMOL/L (ref 97–108)
CO2 SERPL-SCNC: 25 MMOL/L (ref 21–32)
COLOR UR: ABNORMAL
COMMENT, HOLDF: NORMAL
CREAT SERPL-MCNC: 1.22 MG/DL (ref 0.7–1.3)
CRP SERPL-MCNC: <0.29 MG/DL (ref 0–0.6)
DIFFERENTIAL METHOD BLD: ABNORMAL
EOSINOPHIL # BLD: 0.1 K/UL (ref 0–0.4)
EOSINOPHIL NFR BLD: 1 % (ref 0–7)
EPITH CASTS URNS QL MICRO: ABNORMAL /LPF
ERYTHROCYTE [DISTWIDTH] IN BLOOD BY AUTOMATED COUNT: 13.2 % (ref 11.5–14.5)
ERYTHROCYTE [SEDIMENTATION RATE] IN BLOOD: 5 MM/HR (ref 0–15)
GLOBULIN SER CALC-MCNC: 4.1 G/DL (ref 2–4)
GLUCOSE SERPL-MCNC: 101 MG/DL (ref 65–100)
GLUCOSE UR STRIP.AUTO-MCNC: NEGATIVE MG/DL
HCT VFR BLD AUTO: 49.5 % (ref 36.6–50.3)
HGB BLD-MCNC: 16.4 G/DL (ref 12.1–17)
HGB UR QL STRIP: NEGATIVE
HYALINE CASTS URNS QL MICRO: ABNORMAL /LPF (ref 0–5)
IMM GRANULOCYTES # BLD AUTO: 0 K/UL (ref 0–0.04)
IMM GRANULOCYTES NFR BLD AUTO: 0 % (ref 0–0.5)
KETONES UR QL STRIP.AUTO: ABNORMAL MG/DL
LACTATE SERPL-SCNC: 1.2 MMOL/L (ref 0.4–2)
LEUKOCYTE ESTERASE UR QL STRIP.AUTO: ABNORMAL
LIPASE SERPL-CCNC: 153 U/L (ref 73–393)
LYMPHOCYTES # BLD: 2.9 K/UL (ref 0.8–3.5)
LYMPHOCYTES NFR BLD: 24 % (ref 12–49)
MCH RBC QN AUTO: 29.8 PG (ref 26–34)
MCHC RBC AUTO-ENTMCNC: 33.1 G/DL (ref 30–36.5)
MCV RBC AUTO: 90 FL (ref 80–99)
MONOCYTES # BLD: 0.4 K/UL (ref 0–1)
MONOCYTES NFR BLD: 3 % (ref 5–13)
NEUTS SEG # BLD: 8.6 K/UL (ref 1.8–8)
NEUTS SEG NFR BLD: 72 % (ref 32–75)
NITRITE UR QL STRIP.AUTO: NEGATIVE
NRBC # BLD: 0 K/UL (ref 0–0.01)
NRBC BLD-RTO: 0 PER 100 WBC
PH UR STRIP: 5.5 [PH] (ref 5–8)
PLATELET # BLD AUTO: 253 K/UL (ref 150–400)
PMV BLD AUTO: 10.9 FL (ref 8.9–12.9)
POTASSIUM SERPL-SCNC: 3.5 MMOL/L (ref 3.5–5.1)
PROT SERPL-MCNC: 8.6 G/DL (ref 6.4–8.2)
PROT UR STRIP-MCNC: ABNORMAL MG/DL
RBC # BLD AUTO: 5.5 M/UL (ref 4.1–5.7)
RBC #/AREA URNS HPF: ABNORMAL /HPF (ref 0–5)
SAMPLES BEING HELD,HOLD: NORMAL
SODIUM SERPL-SCNC: 137 MMOL/L (ref 136–145)
SP GR UR REFRACTOMETRY: >1.03
UR CULT HOLD, URHOLD: NORMAL
UROBILINOGEN UR QL STRIP.AUTO: 1 EU/DL (ref 0.2–1)
WBC # BLD AUTO: 12.1 K/UL (ref 4.1–11.1)
WBC URNS QL MICRO: ABNORMAL /HPF (ref 0–4)

## 2020-09-28 PROCEDURE — 83605 ASSAY OF LACTIC ACID: CPT

## 2020-09-28 PROCEDURE — 85025 COMPLETE CBC W/AUTO DIFF WBC: CPT

## 2020-09-28 PROCEDURE — 99285 EMERGENCY DEPT VISIT HI MDM: CPT

## 2020-09-28 PROCEDURE — 74011000636 HC RX REV CODE- 636: Performed by: RADIOLOGY

## 2020-09-28 PROCEDURE — 80053 COMPREHEN METABOLIC PANEL: CPT

## 2020-09-28 PROCEDURE — 85652 RBC SED RATE AUTOMATED: CPT

## 2020-09-28 PROCEDURE — 86140 C-REACTIVE PROTEIN: CPT

## 2020-09-28 PROCEDURE — 96361 HYDRATE IV INFUSION ADD-ON: CPT

## 2020-09-28 PROCEDURE — 74177 CT ABD & PELVIS W/CONTRAST: CPT

## 2020-09-28 PROCEDURE — 93005 ELECTROCARDIOGRAM TRACING: CPT

## 2020-09-28 PROCEDURE — 81001 URINALYSIS AUTO W/SCOPE: CPT

## 2020-09-28 PROCEDURE — 96376 TX/PRO/DX INJ SAME DRUG ADON: CPT

## 2020-09-28 PROCEDURE — 83690 ASSAY OF LIPASE: CPT

## 2020-09-28 PROCEDURE — 36415 COLL VENOUS BLD VENIPUNCTURE: CPT

## 2020-09-28 PROCEDURE — 96375 TX/PRO/DX INJ NEW DRUG ADDON: CPT

## 2020-09-28 PROCEDURE — 96374 THER/PROPH/DIAG INJ IV PUSH: CPT

## 2020-09-28 PROCEDURE — 74011000258 HC RX REV CODE- 258: Performed by: RADIOLOGY

## 2020-09-28 PROCEDURE — 74011250636 HC RX REV CODE- 250/636: Performed by: NURSE PRACTITIONER

## 2020-09-28 PROCEDURE — 84484 ASSAY OF TROPONIN QUANT: CPT

## 2020-09-28 RX ORDER — HYDROMORPHONE HYDROCHLORIDE 1 MG/ML
1 INJECTION, SOLUTION INTRAMUSCULAR; INTRAVENOUS; SUBCUTANEOUS ONCE
Status: COMPLETED | OUTPATIENT
Start: 2020-09-28 | End: 2020-09-28

## 2020-09-28 RX ORDER — MORPHINE SULFATE 10 MG/ML
6 INJECTION, SOLUTION INTRAMUSCULAR; INTRAVENOUS ONCE
Status: DISCONTINUED | OUTPATIENT
Start: 2020-09-28 | End: 2020-09-28

## 2020-09-28 RX ORDER — SODIUM CHLORIDE 0.9 % (FLUSH) 0.9 %
10 SYRINGE (ML) INJECTION
Status: COMPLETED | OUTPATIENT
Start: 2020-09-28 | End: 2020-09-28

## 2020-09-28 RX ORDER — ONDANSETRON 2 MG/ML
8 INJECTION INTRAMUSCULAR; INTRAVENOUS
Status: COMPLETED | OUTPATIENT
Start: 2020-09-28 | End: 2020-09-28

## 2020-09-28 RX ORDER — SODIUM CHLORIDE, SODIUM LACTATE, POTASSIUM CHLORIDE, CALCIUM CHLORIDE 600; 310; 30; 20 MG/100ML; MG/100ML; MG/100ML; MG/100ML
1000 INJECTION, SOLUTION INTRAVENOUS CONTINUOUS
Status: DISCONTINUED | OUTPATIENT
Start: 2020-09-28 | End: 2020-09-29

## 2020-09-28 RX ADMIN — HYDROMORPHONE HYDROCHLORIDE 1 MG: 1 INJECTION, SOLUTION INTRAMUSCULAR; INTRAVENOUS; SUBCUTANEOUS at 19:41

## 2020-09-28 RX ADMIN — IOPAMIDOL 100 ML: 755 INJECTION, SOLUTION INTRAVENOUS at 22:02

## 2020-09-28 RX ADMIN — SODIUM CHLORIDE 100 ML: 900 INJECTION, SOLUTION INTRAVENOUS at 22:02

## 2020-09-28 RX ADMIN — HYDROMORPHONE HYDROCHLORIDE 1 MG: 1 INJECTION, SOLUTION INTRAMUSCULAR; INTRAVENOUS; SUBCUTANEOUS at 22:18

## 2020-09-28 RX ADMIN — ONDANSETRON 8 MG: 2 INJECTION INTRAMUSCULAR; INTRAVENOUS at 19:40

## 2020-09-28 RX ADMIN — Medication 10 ML: at 22:02

## 2020-09-28 RX ADMIN — SODIUM CHLORIDE, SODIUM LACTATE, POTASSIUM CHLORIDE, AND CALCIUM CHLORIDE 1000 ML: 600; 310; 30; 20 INJECTION, SOLUTION INTRAVENOUS at 19:40

## 2020-09-28 NOTE — ED TRIAGE NOTES
Patient presents from home with complaints of mid abdominal pain, nausea, and diarrhea that started this morning. Patient reports he has \"half a digestive system\" and this has happened to him before.  Patient reports he has been fighting dehydration the past couple of days

## 2020-09-28 NOTE — ED PROVIDER NOTES
This is a 43-year-old male extensive GI history, including Crohn's disease, ulcerative colitis (s/p numerous colonic and small bowel surgeries), SBO, and anxiety who presents the ER today for the chief complaint of abdominal pain. Patient states for the last several weeks, he has felt intermittent abdominal cramping and decreased appetite, but starting today he developed \"sharp\" 9/10 intensity epigastric pain that has not responded to prescribed pain medications. He states that his symptoms feel similar to his prior small bowel obstruction / flares of IBD. He states that he currently uses an intermittent catheter to empty his bowels, and states that they have gotten more liquidy as of today. He denies blood/melena. ROS negative for: Fever/chills, vomiting, alcohol use, dysuria, chest pain, shortness of breath, dizziness           Past Medical History:   Diagnosis Date    Anal fistula     The patient no longer has an anus.  Anal stenosis     The patient no longer has an anus.  Chronic kidney disease     Crohn's disease ([address]     GERD (gastroesophageal reflux disease)     H/O ulcerative colitis     Symptoms began in 1996. Total proctocolectomy was performed in 2004. Patient later developed Crohn's disease.  Hiatal hernia     History of pneumonia 03/2019    Hospitalized for treatment of pneumonia from 3/5/2019 to 3/10/2019.  Ileal pouchitis ([address] 05/2004    The patient no longer has a pouch.  Nausea & vomiting     Combination of Scopalamine patch & Zofran IV worked well in past    Numbness in right leg     Chronic.     Opioid dependence ([address]     History of opioid dependence requiring a detox program.    Psychiatric disorder     depression and anxiety    Skin lesion of back 3/17/2014    Syncopal episodes        Past Surgical History:   Procedure Laterality Date    ABDOMEN SURGERY PROC UNLISTED  3/25/2004    Total proctocolectomy with creation of ileoanal J-pouch and loop ileostomy;  Donald Salas.  COLONOSCOPY N/A 4/12/2018    Flexible endoscopy via ileostomy (NOT a colonoscopy); Michael Kim MD.    COLONOSCOPY N/A 12/18/2018    ENTEROSCOPYperformed via ileostomy with pediatric scope; Dr. Donald [facility] at [facility]COPY    HX CHOLECYSTECTOMY  12/2016    Dr. Chucky Mcneill HX GI  5/2/2004    Examination under anesthesia with endoscopic evaluation of ileoanal pouch and placement of drain; Dr. Donald Ghotra  HX GI  5/25/2004    Ileostomy closure with small bowel resection and anastomosis; Dr. Donald Ghotra  HX GI  5/24/2012    Examination under anesthesia, anal dilatation, anal biopsy, and placement of draining seton; Dr. Donald Ghotra  HX GI  7/3/2012    Incision and drainage of perirectal abscess and rigid endoscopy of ileoanal pouch; Dr. Donald Ghotra  HX GI  7/31/2012    Incision and drainage of perirectal abscess, placement of draining seton, and anal dilatation; Dr. Donald Ghotra  HX GI  1/22/2013    Repair of anal fistulas with debridement, partial fistulectomy, and flap closure; Dr. Donald Ghotra  HX GI  5/17/2013    Examination under anesthesia, partial fistulotomy,  and placement of draining setons; Dr. Donald Ghotra  HX GI  8/6/2013    Anal fistulotomy and application of ACell micromatrix; Dr. Donald Ghotra   GI  2/20/2014    Examination under anesthesia and dilation of anal canal with rigid proctoscopy; Destin Keller MD.     GI  4/29/2015    Creation of Cher Kathya continent intestinal reservoir (BCIR), abdominoperineal resection of ileoanal J-pouch; lysis of adhesions, and gastrostomy; Quentin Vail. Madelyn Day MD ([facility])    HX GI  8/7/2015    Stoma revision; Quentin Day MD ([facility])    HX GI  10/29/2015    Extensive lysis of adhesions, resection of continent intestinal reservoir, repair of serosal tears, and creation of end-ileostomy; Dr. Donald Ghotra      GI  03/14/2017    Laparotomy, extensive lysis of adhesions and revision of ileostomy; Dr. Viet Garcia.  HX ORTHOPAEDIC  2008    Left ACL repair    HX OTHER SURGICAL  11/29/2017    Conversion of end-ileostomy to BCIR ([address]);  [place], [place].    HX UROLOGICAL  03/14/2017    Cystoscopy and placement of bilateral temporary ureteral catheters; Marcell Reyna MD.         Family History:   Problem Relation Age of Onset    Cancer Father     Asthma Neg Hx     Diabetes Neg Hx     Heart Disease Neg Hx     Hypertension Neg Hx     Stroke Neg Hx     Malignant Hyperthermia Neg Hx     Pseudocholinesterase Deficiency Neg Hx     Delayed Awakening Neg Hx     Post-op Nausea/Vomiting Neg Hx        Social History     Socioeconomic History    Marital status:      Spouse name: Not on file    Number of children: Not on file    Years of education: Not on file    Highest education level: Not on file   Occupational History    Not on file   Social Needs    Financial resource strain: Not on file    Food insecurity     Worry: Not on file     Inability: Not on file    Transportation needs     Medical: Not on file     Non-medical: Not on file   Tobacco Use    Smoking status: Current Every Day Smoker     Packs/day: 0.50     Years: 7.00     Pack years: 3.50     Last attempt to quit: 1/14/2017     Years since quitting: 3.7    Smokeless tobacco: Never Used   Substance and Sexual Activity    Alcohol use: No    Drug use: No    Sexual activity: Not on file   Lifestyle    Physical activity     Days per week: Not on file     Minutes per session: Not on file    Stress: Not on file   Relationships    Social connections     Talks on phone: Not on file     Gets together: Not on file     Attends [employer] service: Not on file     Active member of club or organization: Not on file     Attends meetings of clubs or organizations: Not on file     Relationship status: Not on file    Intimate partner violence     Fear of current or ex partner: Not on file Emotionally abused: Not on file     Physically abused: Not on file     Forced sexual activity: Not on file   Other Topics Concern    Not on file   Social History Narrative    Not on file         ALLERGIES: Remicade [infliximab]; Bactrim [sulfamethoprim ds]; Morphine; and Nsaids (non-steroidal anti-inflammatory drug)    Review of Systems   Constitutional: Positive for fatigue. Negative for chills and fever. HENT: Negative for sore throat. Eyes: Negative for visual disturbance. Respiratory: Negative for shortness of breath. Cardiovascular: Negative for palpitations. Gastrointestinal: Positive for abdominal pain, diarrhea and nausea. Negative for blood in stool and vomiting. Genitourinary: Negative for dysuria and flank pain. Musculoskeletal: Negative for myalgias. Skin: Negative for rash. Neurological: Negative for dizziness. Psychiatric/Behavioral: Negative for dysphoric mood. Vitals:    09/28/20 1832   BP: 111/80   Pulse: (!) 121   Resp: 16   Temp: 98.2 °F (36.8 °C)   SpO2: 98%            Physical Exam  Vitals signs and nursing note reviewed. Constitutional:       General: He is in acute distress. Appearance: Normal appearance. He is well-developed. He is not ill-appearing or toxic-appearing. HENT:      Head: Normocephalic and atraumatic. Nose: Nose normal.      Mouth/Throat:      Mouth: Mucous membranes are dry. Pharynx: Oropharynx is clear. Eyes:      Extraocular Movements: Extraocular movements intact. Neck:      Musculoskeletal: Normal range of motion and neck supple. Cardiovascular:      Rate and Rhythm: Regular rhythm. Tachycardia present. Pulses: Normal pulses. Heart sounds: Normal heart sounds. No murmur. Pulmonary:      Effort: Pulmonary effort is normal. No respiratory distress. Breath sounds: Normal breath sounds. No wheezing or rales. Abdominal:      General: Abdomen is flat. A surgical scar is present. Bowel sounds are decreased. Palpations: Abdomen is soft. Tenderness: There is abdominal tenderness in the epigastric area. There is no right CVA tenderness or left CVA tenderness. Musculoskeletal: Normal range of motion. Skin:     General: Skin is warm and dry. Neurological:      Mental Status: He is alert and oriented to person, place, and time. Mental status is at baseline. Psychiatric:         Mood and Affect: Mood normal.         Behavior: Behavior normal.          MDM      VITAL SIGNS:  Patient Vitals for the past 4 hrs:   Temp Pulse Resp BP   09/28/20 2303 98.6 °F (37 °C) 90 16 126/86         LABS:  Recent Results (from the past 6 hour(s))   CBC WITH AUTOMATED DIFF    Collection Time: 09/28/20  6:45 PM   Result Value Ref Range    WBC 12.1 (H) 4.1 - 11.1 K/uL    RBC 5.50 4. 10 - 5.70 M/uL    HGB 16.4 12.1 - 17.0 g/dL    HCT 49.5 36.6 - 50.3 %    MCV 90.0 80.0 - 99.0 FL    MCH 29.8 26.0 - 34.0 PG    MCHC 33.1 30.0 - 36.5 g/dL    RDW 13.2 11.5 - 14.5 %    PLATELET 693 356 - 091 K/uL    MPV 10.9 8.9 - 12.9 FL    NRBC 0.0 0  WBC    ABSOLUTE NRBC 0.00 0.00 - 0.01 K/uL    NEUTROPHILS 72 32 - 75 %    LYMPHOCYTES 24 12 - 49 %    MONOCYTES 3 (L) 5 - 13 %    EOSINOPHILS 1 0 - 7 %    BASOPHILS 0 0 - 1 %    IMMATURE GRANULOCYTES 0 0.0 - 0.5 %    ABS. NEUTROPHILS 8.6 (H) 1.8 - 8.0 K/UL    ABS. LYMPHOCYTES 2.9 0.8 - 3.5 K/UL    ABS. MONOCYTES 0.4 0.0 - 1.0 K/UL    ABS. EOSINOPHILS 0.1 0.0 - 0.4 K/UL    ABS. BASOPHILS 0.0 0.0 - 0.1 K/UL    ABS. IMM.  GRANS. 0.0 0.00 - 0.04 K/UL    DF AUTOMATED     METABOLIC PANEL, COMPREHENSIVE    Collection Time: 09/28/20  6:45 PM   Result Value Ref Range    Sodium 137 136 - 145 mmol/L    Potassium 3.5 3.5 - 5.1 mmol/L    Chloride 103 97 - 108 mmol/L    CO2 25 21 - 32 mmol/L    Anion gap 9 5 - 15 mmol/L    Glucose 101 (H) 65 - 100 mg/dL    BUN 12 6 - 20 MG/DL    Creatinine 1.22 0.70 - 1.30 MG/DL    BUN/Creatinine ratio 10 (L) 12 - 20      GFR est AA >60 >60 ml/min/1.73m2    GFR est non-AA >60 >60 ml/min/1.73m2    Calcium 10.1 8.5 - 10.1 MG/DL    Bilirubin, total 0.5 0.2 - 1.0 MG/DL    ALT (SGPT) 19 12 - 78 U/L    AST (SGOT) 14 (L) 15 - 37 U/L    Alk. phosphatase 165 (H) 45 - 117 U/L    Protein, total 8.6 (H) 6.4 - 8.2 g/dL    Albumin 4.5 3.5 - 5.0 g/dL    Globulin 4.1 (H) 2.0 - 4.0 g/dL    A-G Ratio 1.1 1.1 - 2.2     LIPASE    Collection Time: 09/28/20  6:45 PM   Result Value Ref Range    Lipase 153 73 - 393 U/L   C REACTIVE PROTEIN, QT    Collection Time: 09/28/20  6:45 PM   Result Value Ref Range    C-Reactive protein <0.29 0.00 - 0.60 mg/dL   SED RATE (ESR)    Collection Time: 09/28/20  6:45 PM   Result Value Ref Range    Sed rate, automated 5 0 - 15 mm/hr   SAMPLES BEING HELD    Collection Time: 09/28/20  6:45 PM   Result Value Ref Range    SAMPLES BEING HELD 1RED 1BLU     COMMENT        Add-on orders for these samples will be processed based on acceptable specimen integrity and analyte stability, which may vary by analyte. LACTIC ACID    Collection Time: 09/28/20  6:45 PM   Result Value Ref Range    Lactic acid 1.2 0.4 - 2.0 MMOL/L   URINALYSIS W/MICROSCOPIC    Collection Time: 09/28/20  6:45 PM   Result Value Ref Range    Color DARK YELLOW      Appearance CLEAR CLEAR      Specific gravity >1.030     pH (UA) 5.5 5.0 - 8.0      Protein TRACE (A) NEG mg/dL    Glucose Negative NEG mg/dL    Ketone TRACE (A) NEG mg/dL    Blood Negative NEG      Urobilinogen 1.0 0.2 - 1.0 EU/dL    Nitrites Negative NEG      Leukocyte Esterase TRACE (A) NEG      WBC 0-4 0 - 4 /hpf    RBC 0-5 0 - 5 /hpf    Epithelial cells FEW FEW /lpf    Bacteria Negative NEG /hpf    Hyaline cast 0-2 0 - 5 /lpf   URINE CULTURE HOLD SAMPLE    Collection Time: 09/28/20  6:45 PM    Specimen: Serum; Urine   Result Value Ref Range    Urine culture hold        Urine on hold in Microbiology dept for 2 days. If unpreserved urine is submitted, it cannot be used for addtional testing after 24 hours, recollection will be required.    BILIRUBIN, CONFIRM Collection Time: 09/28/20  6:45 PM   Result Value Ref Range    Bilirubin UA, confirm Negative     EKG, 12 LEAD, INITIAL    Collection Time: 09/28/20 10:55 PM   Result Value Ref Range    Ventricular Rate 70 BPM    Atrial Rate 70 BPM    P-R Interval 132 ms    QRS Duration 92 ms    Q-T Interval 356 ms    QTC Calculation (Bezet) 384 ms    Calculated P Axis 30 degrees    Calculated R Axis 60 degrees    Calculated T Axis 41 degrees    Diagnosis       Normal sinus rhythm  When compared with ECG of 05-MAR-2019 19:45,  Vent. rate has decreased BY  85 BPM  ST elevation now present in Inferior leads  ST no longer depressed in Anterolateral leads  T wave inversion no longer evident in Inferior leads  T wave inversion no longer evident in Lateral leads          IMAGING:  CT ABD PELV W CONT   Final Result   IMPRESSION:      1. Diffuse dilatation of small bowel status post proctocolectomy, possibly   chronic and postoperative, but decreased severity of small bowel dilatation   since the most recent available comparison study 12/15/2018. No obvious   inflammatory change. 2. Mild hepatic steatosis. 3. Other incidental findings. Medications During Visit:  Medications   lactated Ringers infusion 1,000 mL (has no administration in time range)   lactated ringers bolus infusion 1,000 mL (1,000 mL IntraVENous New Bag 9/28/20 1940)   ondansetron (ZOFRAN) injection 8 mg (8 mg IntraVENous Given 9/28/20 1940)   HYDROmorphone (PF) (DILAUDID) injection 1 mg (1 mg IntraVENous Given 9/28/20 1941)   HYDROmorphone (PF) (DILAUDID) injection 1 mg (1 mg IntraVENous Given 9/28/20 2218)   sodium chloride 0.9 % bolus infusion 100 mL (0 mL IntraVENous IV Completed 9/28/20 2309)   iopamidoL (ISOVUE-370) 76 % injection 100 mL (100 mL IntraVENous Given 9/28/20 2202)   sodium chloride (NS) flush 10 mL (10 mL IntraVENous Given 9/28/20 2202)         DECISION MAKING:  Tenisha Perez is a 41 y.o. male who comes in as above.    Tachycardia resolved after receiving 2 L of crystalloids. Patient reports no vomiting since arriving to the emergency department. Pain controlled with total of 2 mg of IV Dilaudid and 2 separate doses. Acute phase reactants negative. Mild leukocytosis. Discussed plan with Viet Garcia who will admit the patient at this time. IMPRESSION:  1.  Abdominal pain, epigastric        DISPOSITION:  Admitted

## 2020-09-28 NOTE — CONSULTS
Colorectal Surgery Consultation Note          NAME:  Marisol Dutta   :   1975   MRN:   [id number]     Consultation Date: 2020    Chief Complaint:  Abdominal pain, nausea, vomiting, and increased ostomy output. History of Present Illness: The patient is a 80-year-old male with Crohn's disease and a [address] ([address]) who is familiar to me from multiple encounters including operations and multiple hospitalizations for treatment of ileal pouchitis, partial small bowel obstruction, and pouch dysfunction. The most recent hospitalization was from 2019 to 2019, during which he was treated for Ileal pouchitis and pouch dysfunction, esophagitis, and gastritis. Today he reports that he has been struggling with increased nausea, food intolerance, increased ileal pouch output, and dehydration over the last few days. Today he also experienced a new, sharp epigastric pain about which he is very concerned. He has been unable to tolerate much liquid today, and his urine has appeared to be concentrated. He has not had any fever or chills. PMH:  Past Medical History:   Diagnosis Date    Anal fistula     The patient no longer has an anus.  Anal stenosis     The patient no longer has an anus.  Chronic kidney disease     Crohn's disease ([address]     GERD (gastroesophageal reflux disease)     H/O ulcerative colitis     Symptoms began in . Total proctocolectomy was performed in . Patient later developed Crohn's disease.  Hiatal hernia     History of pneumonia 2019    Hospitalized for treatment of pneumonia from 3/5/2019 to 3/10/2019.  Ileal pouchitis ([address] 2004    The patient no longer has a pouch.  Nausea & vomiting     Combination of Scopalamine patch & Zofran IV worked well in past    Numbness in right leg     Chronic.     Opioid dependence ([address]     History of opioid dependence requiring a detox program.    Psychiatric disorder depression and anxiety    Skin lesion of back 3/17/2014    Syncopal episodes        PSH:  Past Surgical History:   Procedure Laterality Date    ABDOMEN SURGERY PROC UNLISTED  3/25/2004    Total proctocolectomy with creation of ileoanal J-pouch and loop ileostomy; Dr. Jesusita Alonzo.  COLONOSCOPY N/A 4/12/2018    Flexible endoscopy via ileostomy (NOT a colonoscopy); Romayne Muscat, MD.    COLONOSCOPY N/A 12/18/2018    ENTEROSCOPYperformed via ileostomy with pediatric scope; [facility]. Jesusita [facility]onzo at [facility]    HX CHOLECYSTECTOMY  12/2016    Dr. Abdullahi Kerr HX GI  5/2/2004    Examination under anesthesia with endoscopic evaluation of ileoanal pouch and placement of drain; Dr. Jesusita Shin  HX GI  5/25/2004    Ileostomy closure with small bowel resection and anastomosis; Dr. Jesusita Alonzo.  HX GI  5/24/2012    Examination under anesthesia, anal dilatation, anal biopsy, and placement of draining seton; Dr. Jesusita Alonzo.  HX GI  7/3/2012    Incision and drainage of perirectal abscess and rigid endoscopy of ileoanal pouch; Dr. Jesusita Shin  HX GI  7/31/2012    Incision and drainage of perirectal abscess, placement of draining seton, and anal dilatation; Dr. Jesusita Shin  HX GI  1/22/2013    Repair of anal fistulas with debridement, partial fistulectomy, and flap closure; Dr. Jesusita Alonzo.  HX GI  5/17/2013    Examination under anesthesia, partial fistulotomy,  and placement of draining setons; Dr. Jesusita Shin  HX GI  8/6/2013    Anal fistulotomy and application of ACell micromatrix; Dr. Jesusita Shin  HX GI  2/20/2014    Examination under anesthesia and dilation of anal canal with rigid proctoscopy; Noa Case MD.    HX GI  4/29/2015    Creation of Adryan Standing continent intestinal reservoir (BCIR), abdominoperineal resection of ileoanal J-pouch; lysis of adhesions, and gastrostomy; Karen Mesa. Maile Mccauley MD ([place], [place])    HX GI  8/7/2015    Stoma revision; Karen Mccauley MD ([facility])    HX GI  10/29/2015    Extensive lysis of adhesions, resection of continent intestinal reservoir, repair of serosal tears, and creation of end-ileostomy; Dr. Slim Muniz.  HX GI  2017    Laparotomy, extensive lysis of adhesions and revision of ileostomy; Dr. Slim Muniz.  HX GI  2019    EGD with biopsies; Dr. Brittney Clayton HX GI  2019    Flexible endoscopy of ileal pouch; Dr. Slim Muniz.  HX ORTHOPAEDIC  2008    Left ACL repair    HX OTHER SURGICAL  2017    Conversion of end-ileostomy to BCIR ([address]); [place],  [facility] [address] HX UROLOGICAL  2017    Cystoscopy and placement of bilateral temporary ureteral catheters; Beth Flynn MD.       Home Medications:  Prior to Admission Medications   Prescriptions Last Dose Informant Patient Reported? Taking? Saccharomyces boulardii (FLORASTOR) 250 mg capsule   Yes No   Sig: Take 250 mg by mouth nightly. Ustekinumab (STELARA) 90 mg/mL injection  Self Yes No   Si mg by SubCUTAneous route. Every 8 weeks    acetaminophen (TYLENOL) 500 mg tablet   Yes No   Sig: Take 500 mg by mouth four (4) times daily as needed (mild pain, heache (usually has headaches 1 week after Stelara)). albuterol (PROVENTIL HFA, VENTOLIN HFA, PROAIR HFA) 90 mcg/actuation inhaler   No No   Sig: Take 2 Puffs by inhalation every four (4) hours as needed for Wheezing. budesonide (ENTOCORT EC) 3 mg capsule   No No   Sig: Use the prescription that you already have. This electronic version is a placeholder for documentation purposes. cholestyramine-aspartame (QUESTRAN LIGHT) 4 gram packet   No No   Sig: Take 1 Packet by mouth three (3) times daily (with meals). Use the prescription that you already have. This electronic version is a placeholder for documentation purposes. cyanocobalamin (VITAMIN B-12) 1,000 mcg/mL injection  Self Yes No   Si,000 mcg by IntraMUSCular route every .  Indications: Once weekly   diazepam (VALIUM) 5 mg tablet  Self Yes No   Sig: Take 5 mg by mouth two (2) times a day. diclofenac (VOLTAREN) 1 % gel   Yes No   Sig: Apply 2 g to affected area four (4) times daily as needed. dicyclomine (BENTYL) 10 mg capsule  Self Yes No   Sig: Take 10 mg by mouth every six (6) hours as needed. ergocalciferol (ERGOCALCIFEROL) 50,000 unit capsule  Self Yes No   Sig: Take 50,000 Units by mouth every Sunday. mesalamine (ROWASA) 4 gram/60 mL enema   No No   Sig: Insert 60 mL into rectum every evening. Use the prescription that you already have. This electronic version is a placeholder for documentation purposes. multivitamin with iron (FLINTSTONES) chewable tablet   No No   Sig: Take 1 Tab by mouth daily.   nut.suppl. - milk based formula (BOOST PUDDING) pudg   No No   Sig: One serving by mouth twice per day. omeprazole (PRILOSEC) 40 mg capsule  Self Yes No   Sig: Take 40 mg by mouth nightly. ondansetron hcl (ZOFRAN) 8 mg tablet   No No   Sig: Take 1 Tab by mouth every twelve (12) hours as needed (Mild Nausea). pregabalin (LYRICA) 100 mg capsule  Self Yes No   Sig: Take 100 mg by mouth four (4) times daily. prochlorperazine (COMPAZINE) 10 mg tablet   Yes No   Sig: Take 10 mg by mouth two (2) times daily as needed (Moderate Nausea). promethazine (PHENERGAN) 25 mg tablet  Self Yes No   Sig: Take 25 mg by mouth every six (6) hours as needed (Severe Nausea). sucralfate (CARAFATE) 1 gram tablet   No No   Sig: Take 1 Tab by mouth Before breakfast, lunch, dinner and at bedtime. Use the prescription that you already have. This electronic version is a placeholder for documentation purposes. zolpidem (AMBIEN) 10 mg tablet   No No   Sig: Take 1 Tab by mouth nightly. Max Daily Amount: 10 mg.       Facility-Administered Medications: None       Hospital Medications:  Current Facility-Administered Medications   Medication Dose Route Frequency    lactated Ringers infusion 1,000 mL  1,000 mL IntraVENous CONTINUOUS     Current Outpatient Medications   Medication Sig    budesonide (ENTOCORT EC) 3 mg capsule Use the prescription that you already have. This electronic version is a placeholder for documentation purposes.  mesalamine (ROWASA) 4 gram/60 mL enema Insert 60 mL into rectum every evening. Use the prescription that you already have. This electronic version is a placeholder for documentation purposes.  ondansetron hcl (ZOFRAN) 8 mg tablet Take 1 Tab by mouth every twelve (12) hours as needed (Mild Nausea).  multivitamin with iron (FLINTSTONES) chewable tablet Take 1 Tab by mouth daily.  nut. suppl. - milk based formula (BOOST PUDDING) pudg One serving by mouth twice per day.  Saccharomyces boulardii (FLORASTOR) 250 mg capsule Take 250 mg by mouth nightly.  diclofenac (VOLTAREN) 1 % gel Apply 2 g to affected area four (4) times daily as needed.  acetaminophen (TYLENOL) 500 mg tablet Take 500 mg by mouth four (4) times daily as needed (mild pain, heache (usually has headaches 1 week after Stelara)).  prochlorperazine (COMPAZINE) 10 mg tablet Take 10 mg by mouth two (2) times daily as needed (Moderate Nausea).  Ustekinumab (STELARA) 90 mg/mL injection 90 mg by SubCUTAneous route. Every 8 weeks     promethazine (PHENERGAN) 25 mg tablet Take 25 mg by mouth every six (6) hours as needed (Severe Nausea).  omeprazole (PRILOSEC) 40 mg capsule Take 40 mg by mouth nightly.  dicyclomine (BENTYL) 10 mg capsule Take 10 mg by mouth every six (6) hours as needed.  ergocalciferol (ERGOCALCIFEROL) 50,000 unit capsule Take 50,000 Units by mouth every Sunday.  pregabalin (LYRICA) 100 mg capsule Take 100 mg by mouth four (4) times daily.  cyanocobalamin (VITAMIN B-12) 1,000 mcg/mL injection 1,000 mcg by IntraMUSCular route every Sunday. Indications: Once weekly    diazepam (VALIUM) 5 mg tablet Take 5 mg by mouth two (2) times a day. Allergies:   Allergies   Allergen Reactions    Remicade [Infliximab] Anaphylaxis and Shortness of Breath    Bactrim [Sulfamethoprim Ds] Other (comments)     Increased GI distress.  Morphine Nausea Only     Tolerates Dilaudid    Nsaids (Non-Steroidal Anti-Inflammatory Drug) Other (comments)     Stomach ulcers       Family History:  Family History   Problem Relation Age of Onset    Cancer Father     Asthma Neg Hx     Diabetes Neg Hx     Heart Disease Neg Hx     Hypertension Neg Hx     Stroke Neg Hx     Malignant Hyperthermia Neg Hx     Pseudocholinesterase Deficiency Neg Hx     Delayed Awakening Neg Hx     Post-op Nausea/Vomiting Neg Hx        Social History:  Social History     Tobacco Use    Smoking status: Current Every Day Smoker     Packs/day: 0.50     Years: 7.00     Pack years: 3.50     Last attempt to quit: 1/14/2017     Years since quitting: 3.7    Smokeless tobacco: Never Used   Substance Use Topics    Alcohol use: No       Review of Systems:    Symptom Y/N Comments  Symptom Y/N Comments   Fever/Chills N   Chest Pain N    Cough N   Abdominal Pain Y    Sputum N   Joint Pain     SOB/CEDILLO N   Pruritis/Rash     Nausea/vomit Y   Tolerating PT/OT     Diarrhea Y   Tolerating Diet N    Constipation N   Other       Could NOT obtain due to:        Objective:     Patient Vitals for the past 8 hrs:   BP Temp Pulse Resp SpO2   09/28/20 2303 126/86 98.6 °F (37 °C) 90 16    09/28/20 1832 111/80 98.2 °F (36.8 °C) (!) 121 16 98 %     No intake/output data recorded. No intake/output data recorded. PHYSICAL EXAMINATION:    GENERAL:  Uncomfortable. HEENT:  Anicteric. HEART:  Regular rate and rhythm with no murmurs, gallops, or rubs. ABDOMEN:  Soft. Non-distended. [id number] tender. No rebound or involuntary guarding. Well healed scars. No palpable masses or hernias. Right lower quadrant ostomy. EXTREMITIES:  No edema. NEURO:  Alert and oriented.        Data Review     Recent Labs     09/28/20  1845   WBC 12.1*   HGB 16.4   HCT 49.5        Recent Labs     09/28/20  1845      K 3.5      CO2 25   BUN 12   CREA 1.22   *   CA 10.1     Recent Labs     09/28/20  1845   *   TP 8.6*   ALB 4.5   GLOB 4.1*   LPSE 153     No results for input(s): INR, PTP, APTT, INREXT, INREXT in the last 72 hours. CT Scan of Abdomen and Pelvis (9/28/2020):  Small bowel dilation. No pneumoperitoneum. No abscess. Assessment and Plan:      The patient has non-specific symptoms, a mild leukocytosis, laboratory evidence of hypovolemia, and CT findings that are abnormal but of unclear significance. Given his history and the similarity of his current symptoms to those that have occurred previously, he could have ileal pouchitis again. Will admit for observation, bowel rest, parenteral fluids, parenteral metronidazole, and mesalamine enemas.     Principal Problem:    Ileal pouchitis ([facility] (5/1/2004)    Active Problems:    GERD (gastroesophageal reflux disease) (11/20/2016)      Chronic narcotic dependence ([facility] (1/20/2018)

## 2020-09-29 LAB
ANION GAP SERPL CALC-SCNC: 7 MMOL/L (ref 5–15)
ATRIAL RATE: 70 BPM
BASOPHILS # BLD: 0 K/UL (ref 0–0.1)
BASOPHILS NFR BLD: 0 % (ref 0–1)
BUN SERPL-MCNC: 13 MG/DL (ref 6–20)
BUN/CREAT SERPL: 12 (ref 12–20)
CALCIUM SERPL-MCNC: 8.9 MG/DL (ref 8.5–10.1)
CALCULATED P AXIS, ECG09: 30 DEGREES
CALCULATED R AXIS, ECG10: 60 DEGREES
CALCULATED T AXIS, ECG11: 41 DEGREES
CHLORIDE SERPL-SCNC: 106 MMOL/L (ref 97–108)
CO2 SERPL-SCNC: 25 MMOL/L (ref 21–32)
CREAT SERPL-MCNC: 1.06 MG/DL (ref 0.7–1.3)
DIAGNOSIS, 93000: NORMAL
DIFFERENTIAL METHOD BLD: NORMAL
EOSINOPHIL # BLD: 0.2 K/UL (ref 0–0.4)
EOSINOPHIL NFR BLD: 2 % (ref 0–7)
ERYTHROCYTE [DISTWIDTH] IN BLOOD BY AUTOMATED COUNT: 13.4 % (ref 11.5–14.5)
GLUCOSE SERPL-MCNC: 94 MG/DL (ref 65–100)
HCT VFR BLD AUTO: 40.5 % (ref 36.6–50.3)
HGB BLD-MCNC: 13.6 G/DL (ref 12.1–17)
IMM GRANULOCYTES # BLD AUTO: 0 K/UL (ref 0–0.04)
IMM GRANULOCYTES NFR BLD AUTO: 0 % (ref 0–0.5)
LYMPHOCYTES # BLD: 2.7 K/UL (ref 0.8–3.5)
LYMPHOCYTES NFR BLD: 29 % (ref 12–49)
MAGNESIUM SERPL-MCNC: 1.8 MG/DL (ref 1.6–2.4)
MCH RBC QN AUTO: 30 PG (ref 26–34)
MCHC RBC AUTO-ENTMCNC: 33.6 G/DL (ref 30–36.5)
MCV RBC AUTO: 89.4 FL (ref 80–99)
MONOCYTES # BLD: 0.5 K/UL (ref 0–1)
MONOCYTES NFR BLD: 5 % (ref 5–13)
NEUTS SEG # BLD: 6.1 K/UL (ref 1.8–8)
NEUTS SEG NFR BLD: 64 % (ref 32–75)
NRBC # BLD: 0 K/UL (ref 0–0.01)
NRBC BLD-RTO: 0 PER 100 WBC
P-R INTERVAL, ECG05: 132 MS
PHOSPHATE SERPL-MCNC: 4 MG/DL (ref 2.6–4.7)
PLATELET # BLD AUTO: 188 K/UL (ref 150–400)
PMV BLD AUTO: 10.3 FL (ref 8.9–12.9)
POTASSIUM SERPL-SCNC: 3.8 MMOL/L (ref 3.5–5.1)
Q-T INTERVAL, ECG07: 356 MS
QRS DURATION, ECG06: 92 MS
QTC CALCULATION (BEZET), ECG08: 384 MS
RBC # BLD AUTO: 4.53 M/UL (ref 4.1–5.7)
SODIUM SERPL-SCNC: 138 MMOL/L (ref 136–145)
TROPONIN I SERPL-MCNC: <0.05 NG/ML
VENTRICULAR RATE, ECG03: 70 BPM
WBC # BLD AUTO: 9.4 K/UL (ref 4.1–11.1)

## 2020-09-29 PROCEDURE — C9113 INJ PANTOPRAZOLE SODIUM, VIA: HCPCS | Performed by: COLON & RECTAL SURGERY

## 2020-09-29 PROCEDURE — 96376 TX/PRO/DX INJ SAME DRUG ADON: CPT

## 2020-09-29 PROCEDURE — 99218 HC RM OBSERVATION: CPT

## 2020-09-29 PROCEDURE — 96361 HYDRATE IV INFUSION ADD-ON: CPT

## 2020-09-29 PROCEDURE — 96375 TX/PRO/DX INJ NEW DRUG ADDON: CPT

## 2020-09-29 PROCEDURE — 65270000029 HC RM PRIVATE

## 2020-09-29 PROCEDURE — 85025 COMPLETE CBC W/AUTO DIFF WBC: CPT

## 2020-09-29 PROCEDURE — 74011000250 HC RX REV CODE- 250: Performed by: COLON & RECTAL SURGERY

## 2020-09-29 PROCEDURE — 74011250636 HC RX REV CODE- 250/636: Performed by: COLON & RECTAL SURGERY

## 2020-09-29 PROCEDURE — 36415 COLL VENOUS BLD VENIPUNCTURE: CPT

## 2020-09-29 PROCEDURE — 74011250637 HC RX REV CODE- 250/637: Performed by: COLON & RECTAL SURGERY

## 2020-09-29 PROCEDURE — 84100 ASSAY OF PHOSPHORUS: CPT

## 2020-09-29 PROCEDURE — 83735 ASSAY OF MAGNESIUM: CPT

## 2020-09-29 PROCEDURE — 80048 BASIC METABOLIC PNL TOTAL CA: CPT

## 2020-09-29 RX ORDER — PROCHLORPERAZINE MALEATE 10 MG
10 TABLET ORAL
Status: DISCONTINUED | OUTPATIENT
Start: 2020-09-29 | End: 2020-10-04 | Stop reason: HOSPADM

## 2020-09-29 RX ORDER — HYDROMORPHONE HYDROCHLORIDE 1 MG/ML
1 INJECTION, SOLUTION INTRAMUSCULAR; INTRAVENOUS; SUBCUTANEOUS
Status: DISCONTINUED | OUTPATIENT
Start: 2020-09-29 | End: 2020-10-03

## 2020-09-29 RX ORDER — HYDROMORPHONE HYDROCHLORIDE 1 MG/ML
2 INJECTION, SOLUTION INTRAMUSCULAR; INTRAVENOUS; SUBCUTANEOUS
Status: DISCONTINUED | OUTPATIENT
Start: 2020-09-29 | End: 2020-10-04 | Stop reason: HOSPADM

## 2020-09-29 RX ORDER — MESALAMINE 4 G/60ML
4 ENEMA RECTAL EVERY EVENING
Status: DISCONTINUED | OUTPATIENT
Start: 2020-09-29 | End: 2020-10-04 | Stop reason: HOSPADM

## 2020-09-29 RX ORDER — OXYCODONE AND ACETAMINOPHEN 10; 325 MG/1; MG/1
1 TABLET ORAL
Status: DISCONTINUED | OUTPATIENT
Start: 2020-09-29 | End: 2020-10-02 | Stop reason: SDUPTHER

## 2020-09-29 RX ORDER — ONDANSETRON 4 MG/1
8 TABLET, ORALLY DISINTEGRATING ORAL
Status: DISCONTINUED | OUTPATIENT
Start: 2020-09-29 | End: 2020-10-04 | Stop reason: HOSPADM

## 2020-09-29 RX ORDER — METRONIDAZOLE 500 MG/100ML
500 INJECTION, SOLUTION INTRAVENOUS EVERY 12 HOURS
Status: COMPLETED | OUTPATIENT
Start: 2020-09-29 | End: 2020-10-01

## 2020-09-29 RX ORDER — DIAZEPAM 5 MG/1
5 TABLET ORAL 2 TIMES DAILY
Status: DISCONTINUED | OUTPATIENT
Start: 2020-09-29 | End: 2020-10-04 | Stop reason: HOSPADM

## 2020-09-29 RX ORDER — PROMETHAZINE HYDROCHLORIDE 25 MG/1
25 TABLET ORAL
Status: DISCONTINUED | OUTPATIENT
Start: 2020-09-29 | End: 2020-10-01

## 2020-09-29 RX ORDER — ACETAMINOPHEN 500 MG
500 TABLET ORAL
Status: DISCONTINUED | OUTPATIENT
Start: 2020-09-29 | End: 2020-10-02

## 2020-09-29 RX ORDER — DEXTROSE, SODIUM CHLORIDE, AND POTASSIUM CHLORIDE 5; .45; .15 G/100ML; G/100ML; G/100ML
50 INJECTION INTRAVENOUS CONTINUOUS
Status: DISCONTINUED | OUTPATIENT
Start: 2020-09-29 | End: 2020-10-04 | Stop reason: HOSPADM

## 2020-09-29 RX ORDER — DICYCLOMINE HYDROCHLORIDE 10 MG/1
10 CAPSULE ORAL
Status: DISCONTINUED | OUTPATIENT
Start: 2020-09-29 | End: 2020-10-04 | Stop reason: HOSPADM

## 2020-09-29 RX ORDER — SODIUM CHLORIDE 0.9 % (FLUSH) 0.9 %
5-40 SYRINGE (ML) INJECTION AS NEEDED
Status: DISCONTINUED | OUTPATIENT
Start: 2020-09-29 | End: 2020-10-04 | Stop reason: HOSPADM

## 2020-09-29 RX ORDER — PREGABALIN 100 MG/1
100 CAPSULE ORAL 4 TIMES DAILY
Status: DISCONTINUED | OUTPATIENT
Start: 2020-09-29 | End: 2020-10-04 | Stop reason: HOSPADM

## 2020-09-29 RX ORDER — NALOXONE HYDROCHLORIDE 0.4 MG/ML
0.4 INJECTION, SOLUTION INTRAMUSCULAR; INTRAVENOUS; SUBCUTANEOUS AS NEEDED
Status: DISCONTINUED | OUTPATIENT
Start: 2020-09-29 | End: 2020-10-04 | Stop reason: HOSPADM

## 2020-09-29 RX ORDER — SODIUM CHLORIDE 0.9 % (FLUSH) 0.9 %
5-40 SYRINGE (ML) INJECTION EVERY 8 HOURS
Status: DISCONTINUED | OUTPATIENT
Start: 2020-09-29 | End: 2020-10-04 | Stop reason: HOSPADM

## 2020-09-29 RX ADMIN — ACETAMINOPHEN 500 MG: 500 TABLET ORAL at 15:10

## 2020-09-29 RX ADMIN — DIAZEPAM 5 MG: 5 TABLET ORAL at 09:25

## 2020-09-29 RX ADMIN — Medication 10 ML: at 17:26

## 2020-09-29 RX ADMIN — Medication 10 ML: at 09:26

## 2020-09-29 RX ADMIN — HYDROMORPHONE HYDROCHLORIDE 1 MG: 1 INJECTION, SOLUTION INTRAMUSCULAR; INTRAVENOUS; SUBCUTANEOUS at 06:55

## 2020-09-29 RX ADMIN — METRONIDAZOLE 500 MG: 500 INJECTION, SOLUTION INTRAVENOUS at 09:26

## 2020-09-29 RX ADMIN — PROCHLORPERAZINE MALEATE 10 MG: 10 TABLET ORAL at 01:50

## 2020-09-29 RX ADMIN — HYDROMORPHONE HYDROCHLORIDE 1 MG: 1 INJECTION, SOLUTION INTRAMUSCULAR; INTRAVENOUS; SUBCUTANEOUS at 23:36

## 2020-09-29 RX ADMIN — HYDROMORPHONE HYDROCHLORIDE 1 MG: 1 INJECTION, SOLUTION INTRAMUSCULAR; INTRAVENOUS; SUBCUTANEOUS at 01:50

## 2020-09-29 RX ADMIN — MESALAMINE 4 G: 4 ENEMA RECTAL at 19:04

## 2020-09-29 RX ADMIN — HYDROMORPHONE HYDROCHLORIDE 2 MG: 1 INJECTION, SOLUTION INTRAMUSCULAR; INTRAVENOUS; SUBCUTANEOUS at 19:04

## 2020-09-29 RX ADMIN — SODIUM CHLORIDE 40 MG: 9 INJECTION, SOLUTION INTRAMUSCULAR; INTRAVENOUS; SUBCUTANEOUS at 09:26

## 2020-09-29 RX ADMIN — HYDROMORPHONE HYDROCHLORIDE 1 MG: 1 INJECTION, SOLUTION INTRAMUSCULAR; INTRAVENOUS; SUBCUTANEOUS at 14:39

## 2020-09-29 RX ADMIN — HYDROMORPHONE HYDROCHLORIDE 1 MG: 1 INJECTION, SOLUTION INTRAMUSCULAR; INTRAVENOUS; SUBCUTANEOUS at 17:26

## 2020-09-29 RX ADMIN — PREGABALIN 100 MG: 100 CAPSULE ORAL at 12:42

## 2020-09-29 RX ADMIN — Medication 10 ML: at 23:36

## 2020-09-29 RX ADMIN — Medication 1 CAPSULE: at 21:26

## 2020-09-29 RX ADMIN — PREGABALIN 100 MG: 100 CAPSULE ORAL at 21:26

## 2020-09-29 RX ADMIN — METRONIDAZOLE 500 MG: 500 INJECTION, SOLUTION INTRAVENOUS at 19:04

## 2020-09-29 RX ADMIN — DIAZEPAM 5 MG: 5 TABLET ORAL at 17:26

## 2020-09-29 RX ADMIN — DEXTROSE MONOHYDRATE, SODIUM CHLORIDE, AND POTASSIUM CHLORIDE 100 ML/HR: 50; 4.5; 1.49 INJECTION, SOLUTION INTRAVENOUS at 23:41

## 2020-09-29 RX ADMIN — ONDANSETRON 8 MG: 4 TABLET, ORALLY DISINTEGRATING ORAL at 21:25

## 2020-09-29 RX ADMIN — DICYCLOMINE HYDROCHLORIDE 10 MG: 10 CAPSULE ORAL at 17:32

## 2020-09-29 RX ADMIN — HYDROMORPHONE HYDROCHLORIDE 1 MG: 1 INJECTION, SOLUTION INTRAMUSCULAR; INTRAVENOUS; SUBCUTANEOUS at 12:42

## 2020-09-29 RX ADMIN — PROMETHAZINE HYDROCHLORIDE 25 MG: 25 TABLET ORAL at 04:03

## 2020-09-29 RX ADMIN — DEXTROSE MONOHYDRATE, SODIUM CHLORIDE, AND POTASSIUM CHLORIDE 100 ML/HR: 50; 4.5; 1.49 INJECTION, SOLUTION INTRAVENOUS at 09:35

## 2020-09-29 RX ADMIN — HYDROMORPHONE HYDROCHLORIDE 1 MG: 1 INJECTION, SOLUTION INTRAMUSCULAR; INTRAVENOUS; SUBCUTANEOUS at 10:47

## 2020-09-29 RX ADMIN — Medication 1 TABLET: at 10:42

## 2020-09-29 RX ADMIN — HYDROMORPHONE HYDROCHLORIDE 1 MG: 1 INJECTION, SOLUTION INTRAMUSCULAR; INTRAVENOUS; SUBCUTANEOUS at 21:25

## 2020-09-29 RX ADMIN — PREGABALIN 100 MG: 100 CAPSULE ORAL at 09:25

## 2020-09-29 RX ADMIN — PROMETHAZINE HYDROCHLORIDE 25 MG: 25 TABLET ORAL at 17:26

## 2020-09-29 RX ADMIN — HYDROMORPHONE HYDROCHLORIDE 1 MG: 1 INJECTION, SOLUTION INTRAMUSCULAR; INTRAVENOUS; SUBCUTANEOUS at 04:03

## 2020-09-29 RX ADMIN — PREGABALIN 100 MG: 100 CAPSULE ORAL at 17:26

## 2020-09-29 NOTE — PROGRESS NOTES
General Daily Progress Note    Admission Date: 9/28/2020  Hospital Day 2  Post-Op Day Not Applicable  Subjective:   Still having epigastric pain, at times a bit worse than it had been yesterday. No emesis. Objective:     Patient Vitals for the past 24 hrs:   BP Temp Pulse Resp SpO2 Height Weight   09/29/20 1500 106/71 98.5 °F (36.9 °C) 98 18 95 %     09/29/20 1344      6' 0.01\" (1.829 m)    09/29/20 0857 109/71 98 °F (36.7 °C) 81 18 94 %     09/29/20 0323 117/79 97.8 °F (36.6 °C) 84 18 96 %  88.5 kg (195 lb)   09/29/20 0205 111/73 97.9 °F (36.6 °C) 95 16 96 %     09/28/20 2303 126/86 98.6 °F (37 °C) 90 16      09/28/20 1832 111/80 98.2 °F (36.8 °C) (!) 121 16 98 %       09/29 0701 - 09/29 1900  In: 720 [P.O.:720]  Out: 500 [Urine:500]  No intake/output data recorded. Physical Examination:  General Appearance - Somewhat uncomfortable  Abdomen - Non-distended. Data Review   Recent Results (from the past 24 hour(s))   CBC WITH AUTOMATED DIFF    Collection Time: 09/28/20  6:45 PM   Result Value Ref Range    WBC 12.1 (H) 4.1 - 11.1 K/uL    RBC 5.50 4. 10 - 5.70 M/uL    HGB 16.4 12.1 - 17.0 g/dL    HCT 49.5 36.6 - 50.3 %    MCV 90.0 80.0 - 99.0 FL    MCH 29.8 26.0 - 34.0 PG    MCHC 33.1 30.0 - 36.5 g/dL    RDW 13.2 11.5 - 14.5 %    PLATELET 415 175 - 008 K/uL    MPV 10.9 8.9 - 12.9 FL    NRBC 0.0 0  WBC    ABSOLUTE NRBC 0.00 0.00 - 0.01 K/uL    NEUTROPHILS 72 32 - 75 %    LYMPHOCYTES 24 12 - 49 %    MONOCYTES 3 (L) 5 - 13 %    EOSINOPHILS 1 0 - 7 %    BASOPHILS 0 0 - 1 %    IMMATURE GRANULOCYTES 0 0.0 - 0.5 %    ABS. NEUTROPHILS 8.6 (H) 1.8 - 8.0 K/UL    ABS. LYMPHOCYTES 2.9 0.8 - 3.5 K/UL    ABS. MONOCYTES 0.4 0.0 - 1.0 K/UL    ABS. EOSINOPHILS 0.1 0.0 - 0.4 K/UL    ABS. BASOPHILS 0.0 0.0 - 0.1 K/UL    ABS. IMM.  GRANS. 0.0 0.00 - 0.04 K/UL    DF AUTOMATED     METABOLIC PANEL, COMPREHENSIVE    Collection Time: 09/28/20  6:45 PM   Result Value Ref Range    Sodium 137 136 - 145 mmol/L    Potassium 3.5 3.5 - 5.1 mmol/L    Chloride 103 97 - 108 mmol/L    CO2 25 21 - 32 mmol/L    Anion gap 9 5 - 15 mmol/L    Glucose 101 (H) 65 - 100 mg/dL    BUN 12 6 - 20 MG/DL    Creatinine 1.22 0.70 - 1.30 MG/DL    BUN/Creatinine ratio 10 (L) 12 - 20      GFR est AA >60 >60 ml/min/1.73m2    GFR est non-AA >60 >60 ml/min/1.73m2    Calcium 10.1 8.5 - 10.1 MG/DL    Bilirubin, total 0.5 0.2 - 1.0 MG/DL    ALT (SGPT) 19 12 - 78 U/L    AST (SGOT) 14 (L) 15 - 37 U/L    Alk. phosphatase 165 (H) 45 - 117 U/L    Protein, total 8.6 (H) 6.4 - 8.2 g/dL    Albumin 4.5 3.5 - 5.0 g/dL    Globulin 4.1 (H) 2.0 - 4.0 g/dL    A-G Ratio 1.1 1.1 - 2.2     LIPASE    Collection Time: 09/28/20  6:45 PM   Result Value Ref Range    Lipase 153 73 - 393 U/L   C REACTIVE PROTEIN, QT    Collection Time: 09/28/20  6:45 PM   Result Value Ref Range    C-Reactive protein <0.29 0.00 - 0.60 mg/dL   SED RATE (ESR)    Collection Time: 09/28/20  6:45 PM   Result Value Ref Range    Sed rate, automated 5 0 - 15 mm/hr   SAMPLES BEING HELD    Collection Time: 09/28/20  6:45 PM   Result Value Ref Range    SAMPLES BEING HELD 1RED 1BLU     COMMENT        Add-on orders for these samples will be processed based on acceptable specimen integrity and analyte stability, which may vary by analyte.    LACTIC ACID    Collection Time: 09/28/20  6:45 PM   Result Value Ref Range    Lactic acid 1.2 0.4 - 2.0 MMOL/L   URINALYSIS W/MICROSCOPIC    Collection Time: 09/28/20  6:45 PM   Result Value Ref Range    Color DARK YELLOW      Appearance CLEAR CLEAR      Specific gravity >1.030     pH (UA) 5.5 5.0 - 8.0      Protein TRACE (A) NEG mg/dL    Glucose Negative NEG mg/dL    Ketone TRACE (A) NEG mg/dL    Blood Negative NEG      Urobilinogen 1.0 0.2 - 1.0 EU/dL    Nitrites Negative NEG      Leukocyte Esterase TRACE (A) NEG      WBC 0-4 0 - 4 /hpf    RBC 0-5 0 - 5 /hpf    Epithelial cells FEW FEW /lpf    Bacteria Negative NEG /hpf    Hyaline cast 0-2 0 - 5 /lpf   URINE CULTURE HOLD SAMPLE    Collection Time: 09/28/20  6:45 PM    Specimen: Serum; Urine   Result Value Ref Range    Urine culture hold        Urine on hold in Microbiology dept for 2 days. If unpreserved urine is submitted, it cannot be used for addtional testing after 24 hours, recollection will be required. BILIRUBIN, CONFIRM    Collection Time: 09/28/20  6:45 PM   Result Value Ref Range    Bilirubin UA, confirm Negative     TROPONIN I    Collection Time: 09/28/20  6:45 PM   Result Value Ref Range    Troponin-I, Qt. <0.05 <0.05 ng/mL   EKG, 12 LEAD, INITIAL    Collection Time: 09/28/20 10:55 PM   Result Value Ref Range    Ventricular Rate 70 BPM    Atrial Rate 70 BPM    P-R Interval 132 ms    QRS Duration 92 ms    Q-T Interval 356 ms    QTC Calculation (Bezet) 384 ms    Calculated P Axis 30 degrees    Calculated R Axis 60 degrees    Calculated T Axis 41 degrees    Diagnosis       Normal sinus rhythm  When compared with ECG of 05-MAR-2019 19:45,  Vent.  rate has decreased BY  85 BPM  ST elevation now present in Inferior leads  ST no longer depressed in Anterolateral leads  T wave inversion no longer evident in Inferior leads  T wave inversion no longer evident in Lateral leads  Confirmed by ALBERTO Astudillo, Ailyn Cross ([id number]) on 9/29/2020 55:46:58 AM     METABOLIC PANEL, BASIC    Collection Time: 09/29/20  7:41 AM   Result Value Ref Range    Sodium 138 136 - 145 mmol/L    Potassium 3.8 3.5 - 5.1 mmol/L    Chloride 106 97 - 108 mmol/L    CO2 25 21 - 32 mmol/L    Anion gap 7 5 - 15 mmol/L    Glucose 94 65 - 100 mg/dL    BUN 13 6 - 20 MG/DL    Creatinine 1.06 0.70 - 1.30 MG/DL    BUN/Creatinine ratio 12 12 - 20      GFR est AA >60 >60 ml/min/1.73m2    GFR est non-AA >60 >60 ml/min/1.73m2    Calcium 8.9 8.5 - 10.1 MG/DL   PHOSPHORUS    Collection Time: 09/29/20  7:41 AM   Result Value Ref Range    Phosphorus 4.0 2.6 - 4.7 MG/DL   MAGNESIUM    Collection Time: 09/29/20  7:41 AM   Result Value Ref Range    Magnesium 1.8 1.6 - 2.4 mg/dL   CBC WITH AUTOMATED DIFF    Collection Time: 09/29/20  7:41 AM   Result Value Ref Range    WBC 9.4 4.1 - 11.1 K/uL    RBC 4.53 4.10 - 5.70 M/uL    HGB 13.6 12.1 - 17.0 g/dL    HCT 40.5 36.6 - 50.3 %    MCV 89.4 80.0 - 99.0 FL    MCH 30.0 26.0 - 34.0 PG    MCHC 33.6 30.0 - 36.5 g/dL    RDW 13.4 11.5 - 14.5 %    PLATELET 799 797 - 486 K/uL    MPV 10.3 8.9 - 12.9 FL    NRBC 0.0 0  WBC    ABSOLUTE NRBC 0.00 0.00 - 0.01 K/uL    NEUTROPHILS 64 32 - 75 %    LYMPHOCYTES 29 12 - 49 %    MONOCYTES 5 5 - 13 %    EOSINOPHILS 2 0 - 7 %    BASOPHILS 0 0 - 1 %    IMMATURE GRANULOCYTES 0 0.0 - 0.5 %    ABS. NEUTROPHILS 6.1 1.8 - 8.0 K/UL    ABS. LYMPHOCYTES 2.7 0.8 - 3.5 K/UL    ABS. MONOCYTES 0.5 0.0 - 1.0 K/UL    ABS. EOSINOPHILS 0.2 0.0 - 0.4 K/UL    ABS. BASOPHILS 0.0 0.0 - 0.1 K/UL    ABS. IMM. GRANS. 0.0 0.00 - 0.04 K/UL    DF AUTOMATED             Assessment and Plan:     Principal Problem:    Ileal pouchitis ([facility] (5/1/2004)    Active Problems:    GERD (gastroesophageal reflux disease) (11/20/2016)      Chronic narcotic dependence ([facility] (1/20/2018)      Leukocytosis resolved and dehydration improved as reflected in the decreased hemoglobin and decreased creatinine. Persistent abdominal pain could be secondary to gastritis or ulcer. GI consultation obtained from Dr. Kaela Middleton, who plans to perform EGD tomorrow. Continue diet restriction, parenteral fluids, parenteral metronidazole, and mesalamine enemas.

## 2020-09-29 NOTE — CONSULTS
Kojo Baeza    Name:  Von Workman  MR#:  [id number]  :  1975  ACCOUNT #:  [de-identified]  DATE OF SERVICE:  2020    LOCATION:  The patient is at [facility] in the room 619. CHIEF COMPLAINT:  Abdominal pain. HISTORY OF PRESENT ILLNESS:  This is a 29-year-old male who has had a colectomy for Crohn's disease in . Ever since, he has had several fistula formation in the abdomen and surgery for this condition. At this point, he is addicted to painkillers including Roxicodone and we are; tapered down the medications in order to be at some point narcotic free. The patient has had pain in the epigastric area in the last week and has not felt any better. He finally was admitted in the hospital.  He will have tests to locate the origin of the pain. PAST PERSONAL HISTORY:  The patient is allergic to Remicade. He is  and has two kids. He works on a limited basis. PAST SURGICAL HISTORY:  1. Previous cholecystectomy. 2.  Arthroscopic surgery on the left knee. 3.  Colectomy in . 4.  Several surgeries for fistula formation. HABITS:  The patient does not smoke and does not abuse alcohol. PAST FAMILY HISTORY:  Father and mother alive. Father has prostate cancer. The patient has two brothers and one sister, two children. REVIEW OF SYSTEMS:  The patient denies any nausea or vomiting. He denies any urinary tract symptoms. No evidence of any blood in the stools. PHYSICAL EXAMINATION:  GENERAL APPEARANCE:  Reasonably well, but consistent with his chronic illness. VITAL SIGNS:  Temperature is 98, heart rate 81, blood pressure 109/71. HEAD AND NECK:  Essentially negative. No paleness. No jaundice. LUNGS:  Clear. No rhonchi, no wheezing, no rales. HEART:  The heart sounds are regular. No murmurs. ABDOMEN:  Shows several scar from different surgeries. Epigastric area seems to be tender by palpation.   Bowel sounds are normal.  EXTREMITIES:  Essentially negative. IMPRESSION:  1. Epigastric pain of unknown etiology. 2.  Crohn's disease with previous colectomy and current treatment with biologics. NOTE:  The patient will be scheduled for EGD tomorrow morning.       Zoltan Rosen MD      [id number]  D:  09/29/2020 11:59  T:  09/29/2020 17:06  JOB #:  [id number]

## 2020-09-29 NOTE — PROGRESS NOTES
Problem: Falls - Risk of  Goal: *Absence of Falls  Description: Document Juventino Shafer Fall Risk and appropriate interventions in the flowsheet.   Outcome: Progressing Towards Goal  Note: Fall Risk Interventions:            Medication Interventions: Evaluate medications/consider consulting pharmacy                   Problem: Patient Education: Go to Patient Education Activity  Goal: Patient/Family Education  Outcome: Progressing Towards Goal     Problem: Pain  Goal: *Control of Pain  Outcome: Progressing Towards Goal  Goal: *PALLIATIVE CARE:  Alleviation of Pain  Outcome: Progressing Towards Goal     Problem: Patient Education: Go to Patient Education Activity  Goal: Patient/Family Education  Outcome: Progressing Towards Goal     Problem: General Infection Care Plan (Adult and Pediatric)  Goal: Improvement in signs and symptoms of infection  Outcome: Progressing Towards Goal  Goal: *Optimize nutritional status  Outcome: Progressing Towards Goal     Problem: Patient Education: Go to Patient Education Activity  Goal: Patient/Family Education  Outcome: Progressing Towards Goal

## 2020-09-29 NOTE — PROGRESS NOTES
Clinical Pharmacy Note: Metronidazole Dosing    Please note that the metronidazole dose for Jose Manuel Gabriel has been changed to 500 mg IV q12h per [facility]-approved protocol. Please contact the pharmacy with any questions.     PRISCILLA Jessica

## 2020-09-29 NOTE — ROUTINE PROCESS
TRANSFER - OUT REPORT: 
 
Verbal report given to Dominique Bragg RN(name) on Que Dixon  being transferred to (unit) for routine progression of care Report consisted of patients Situation, Background, Assessment and  
Recommendations(SBAR). Information from the following report(s) SBAR, ED Summary, [facility] and Recent Results was reviewed with the receiving nurse. Lines:  
Peripheral IV 09/28/20 Right Antecubital (Active) Opportunity for questions and clarification was provided. Patient transported with: 
 [employer]

## 2020-09-30 ENCOUNTER — ANESTHESIA EVENT (OUTPATIENT)
Dept: ENDOSCOPY | Age: 45
DRG: 394 | End: 2020-09-30
Payer: COMMERCIAL

## 2020-09-30 ENCOUNTER — ANESTHESIA (OUTPATIENT)
Dept: ENDOSCOPY | Age: 45
DRG: 394 | End: 2020-09-30
Payer: COMMERCIAL

## 2020-09-30 LAB
ANION GAP SERPL CALC-SCNC: 8 MMOL/L (ref 5–15)
BASOPHILS # BLD: 0 K/UL (ref 0–0.1)
BASOPHILS NFR BLD: 0 % (ref 0–1)
BUN SERPL-MCNC: 7 MG/DL (ref 6–20)
BUN/CREAT SERPL: 7 (ref 12–20)
CALCIUM SERPL-MCNC: 8.8 MG/DL (ref 8.5–10.1)
CHLORIDE SERPL-SCNC: 105 MMOL/L (ref 97–108)
CO2 SERPL-SCNC: 22 MMOL/L (ref 21–32)
CREAT SERPL-MCNC: 0.97 MG/DL (ref 0.7–1.3)
DIFFERENTIAL METHOD BLD: NORMAL
EOSINOPHIL # BLD: 0.1 K/UL (ref 0–0.4)
EOSINOPHIL NFR BLD: 1 % (ref 0–7)
ERYTHROCYTE [DISTWIDTH] IN BLOOD BY AUTOMATED COUNT: 13.3 % (ref 11.5–14.5)
GLUCOSE SERPL-MCNC: 110 MG/DL (ref 65–100)
H PYLORI FROM TISSUE: NEGATIVE
HCT VFR BLD AUTO: 40.9 % (ref 36.6–50.3)
HGB BLD-MCNC: 13.2 G/DL (ref 12.1–17)
IMM GRANULOCYTES # BLD AUTO: 0 K/UL (ref 0–0.04)
IMM GRANULOCYTES NFR BLD AUTO: 0 % (ref 0–0.5)
KIT LOT NO., HCLOLOT: NORMAL
LYMPHOCYTES # BLD: 1.8 K/UL (ref 0.8–3.5)
LYMPHOCYTES NFR BLD: 20 % (ref 12–49)
MCH RBC QN AUTO: 29.7 PG (ref 26–34)
MCHC RBC AUTO-ENTMCNC: 32.3 G/DL (ref 30–36.5)
MCV RBC AUTO: 92.1 FL (ref 80–99)
MONOCYTES # BLD: 0.6 K/UL (ref 0–1)
MONOCYTES NFR BLD: 6 % (ref 5–13)
NEGATIVE CONTROL: NEGATIVE
NEUTS SEG # BLD: 6.6 K/UL (ref 1.8–8)
NEUTS SEG NFR BLD: 73 % (ref 32–75)
NRBC # BLD: 0 K/UL (ref 0–0.01)
NRBC BLD-RTO: 0 PER 100 WBC
PLATELET # BLD AUTO: 183 K/UL (ref 150–400)
PMV BLD AUTO: 11 FL (ref 8.9–12.9)
POSITIVE CONTROL: POSITIVE
POTASSIUM SERPL-SCNC: 4 MMOL/L (ref 3.5–5.1)
RBC # BLD AUTO: 4.44 M/UL (ref 4.1–5.7)
SODIUM SERPL-SCNC: 135 MMOL/L (ref 136–145)
WBC # BLD AUTO: 9.1 K/UL (ref 4.1–11.1)

## 2020-09-30 PROCEDURE — 36415 COLL VENOUS BLD VENIPUNCTURE: CPT

## 2020-09-30 PROCEDURE — 2709999900 HC NON-CHARGEABLE SUPPLY: Performed by: INTERNAL MEDICINE

## 2020-09-30 PROCEDURE — 74011000250 HC RX REV CODE- 250: Performed by: STUDENT IN AN ORGANIZED HEALTH CARE EDUCATION/TRAINING PROGRAM

## 2020-09-30 PROCEDURE — 74011250637 HC RX REV CODE- 250/637: Performed by: INTERNAL MEDICINE

## 2020-09-30 PROCEDURE — 0DB98ZX EXCISION OF DUODENUM, VIA NATURAL OR ARTIFICIAL OPENING ENDOSCOPIC, DIAGNOSTIC: ICD-10-PCS | Performed by: INTERNAL MEDICINE

## 2020-09-30 PROCEDURE — 96361 HYDRATE IV INFUSION ADD-ON: CPT

## 2020-09-30 PROCEDURE — 99218 HC RM OBSERVATION: CPT

## 2020-09-30 PROCEDURE — 80048 BASIC METABOLIC PNL TOTAL CA: CPT

## 2020-09-30 PROCEDURE — 88305 TISSUE EXAM BY PATHOLOGIST: CPT

## 2020-09-30 PROCEDURE — 87077 CULTURE AEROBIC IDENTIFY: CPT | Performed by: INTERNAL MEDICINE

## 2020-09-30 PROCEDURE — 76060000031 HC ANESTHESIA FIRST 0.5 HR: Performed by: INTERNAL MEDICINE

## 2020-09-30 PROCEDURE — 76040000019: Performed by: INTERNAL MEDICINE

## 2020-09-30 PROCEDURE — C9113 INJ PANTOPRAZOLE SODIUM, VIA: HCPCS | Performed by: COLON & RECTAL SURGERY

## 2020-09-30 PROCEDURE — 96376 TX/PRO/DX INJ SAME DRUG ADON: CPT

## 2020-09-30 PROCEDURE — 74011250636 HC RX REV CODE- 250/636: Performed by: COLON & RECTAL SURGERY

## 2020-09-30 PROCEDURE — 74011250636 HC RX REV CODE- 250/636: Performed by: STUDENT IN AN ORGANIZED HEALTH CARE EDUCATION/TRAINING PROGRAM

## 2020-09-30 PROCEDURE — 0DB68ZX EXCISION OF STOMACH, VIA NATURAL OR ARTIFICIAL OPENING ENDOSCOPIC, DIAGNOSTIC: ICD-10-PCS | Performed by: INTERNAL MEDICINE

## 2020-09-30 PROCEDURE — 74011250637 HC RX REV CODE- 250/637: Performed by: COLON & RECTAL SURGERY

## 2020-09-30 PROCEDURE — 85025 COMPLETE CBC W/AUTO DIFF WBC: CPT

## 2020-09-30 PROCEDURE — 77030021593 HC FCPS BIOP ENDOSC BSC -A: Performed by: INTERNAL MEDICINE

## 2020-09-30 PROCEDURE — 65270000029 HC RM PRIVATE

## 2020-09-30 PROCEDURE — 74011000250 HC RX REV CODE- 250: Performed by: COLON & RECTAL SURGERY

## 2020-09-30 RX ORDER — SODIUM CHLORIDE 9 MG/ML
50 INJECTION, SOLUTION INTRAVENOUS CONTINUOUS
Status: DISPENSED | OUTPATIENT
Start: 2020-09-30 | End: 2020-09-30

## 2020-09-30 RX ORDER — LIDOCAINE HYDROCHLORIDE 20 MG/ML
INJECTION, SOLUTION EPIDURAL; INFILTRATION; INTRACAUDAL; PERINEURAL AS NEEDED
Status: DISCONTINUED | OUTPATIENT
Start: 2020-09-30 | End: 2020-09-30 | Stop reason: HOSPADM

## 2020-09-30 RX ORDER — NALOXONE HYDROCHLORIDE 0.4 MG/ML
0.4 INJECTION, SOLUTION INTRAMUSCULAR; INTRAVENOUS; SUBCUTANEOUS
Status: DISCONTINUED | OUTPATIENT
Start: 2020-09-30 | End: 2020-09-30 | Stop reason: HOSPADM

## 2020-09-30 RX ORDER — SODIUM CHLORIDE 0.9 % (FLUSH) 0.9 %
5-40 SYRINGE (ML) INJECTION EVERY 8 HOURS
Status: DISCONTINUED | OUTPATIENT
Start: 2020-09-30 | End: 2020-10-04 | Stop reason: HOSPADM

## 2020-09-30 RX ORDER — PHENYLEPHRINE HCL IN 0.9% NACL 0.4MG/10ML
SYRINGE (ML) INTRAVENOUS AS NEEDED
Status: DISCONTINUED | OUTPATIENT
Start: 2020-09-30 | End: 2020-09-30 | Stop reason: HOSPADM

## 2020-09-30 RX ORDER — DEXTROMETHORPHAN/PSEUDOEPHED 2.5-7.5/.8
1.2 DROPS ORAL
Status: DISCONTINUED | OUTPATIENT
Start: 2020-09-30 | End: 2020-09-30 | Stop reason: HOSPADM

## 2020-09-30 RX ORDER — FLUMAZENIL 0.1 MG/ML
0.2 INJECTION INTRAVENOUS
Status: DISCONTINUED | OUTPATIENT
Start: 2020-09-30 | End: 2020-09-30 | Stop reason: HOSPADM

## 2020-09-30 RX ORDER — SODIUM CHLORIDE 9 MG/ML
INJECTION, SOLUTION INTRAVENOUS
Status: DISCONTINUED | OUTPATIENT
Start: 2020-09-30 | End: 2020-09-30 | Stop reason: HOSPADM

## 2020-09-30 RX ORDER — ATROPINE SULFATE 0.1 MG/ML
0.5 INJECTION INTRAVENOUS
Status: DISCONTINUED | OUTPATIENT
Start: 2020-09-30 | End: 2020-09-30 | Stop reason: HOSPADM

## 2020-09-30 RX ORDER — EPINEPHRINE 0.1 MG/ML
1 INJECTION INTRACARDIAC; INTRAVENOUS
Status: DISCONTINUED | OUTPATIENT
Start: 2020-09-30 | End: 2020-09-30 | Stop reason: HOSPADM

## 2020-09-30 RX ORDER — PROPOFOL 10 MG/ML
INJECTION, EMULSION INTRAVENOUS AS NEEDED
Status: DISCONTINUED | OUTPATIENT
Start: 2020-09-30 | End: 2020-09-30 | Stop reason: HOSPADM

## 2020-09-30 RX ORDER — FENTANYL CITRATE 50 UG/ML
200 INJECTION, SOLUTION INTRAMUSCULAR; INTRAVENOUS
Status: DISCONTINUED | OUTPATIENT
Start: 2020-09-30 | End: 2020-09-30 | Stop reason: HOSPADM

## 2020-09-30 RX ORDER — GLYCOPYRROLATE 0.2 MG/ML
INJECTION INTRAMUSCULAR; INTRAVENOUS AS NEEDED
Status: DISCONTINUED | OUTPATIENT
Start: 2020-09-30 | End: 2020-09-30 | Stop reason: HOSPADM

## 2020-09-30 RX ORDER — CHOLESTYRAMINE 4 G/4.8G
4 POWDER, FOR SUSPENSION ORAL
Status: DISCONTINUED | OUTPATIENT
Start: 2020-09-30 | End: 2020-10-04 | Stop reason: HOSPADM

## 2020-09-30 RX ORDER — SUCRALFATE 1 G/1
1 TABLET ORAL
Status: DISCONTINUED | OUTPATIENT
Start: 2020-09-30 | End: 2020-10-04 | Stop reason: HOSPADM

## 2020-09-30 RX ORDER — MIDAZOLAM HYDROCHLORIDE 1 MG/ML
.25-1 INJECTION, SOLUTION INTRAMUSCULAR; INTRAVENOUS
Status: DISCONTINUED | OUTPATIENT
Start: 2020-09-30 | End: 2020-09-30 | Stop reason: HOSPADM

## 2020-09-30 RX ORDER — PSEUDOEPHEDRINE HCL 30 MG
30 TABLET ORAL
Status: DISCONTINUED | OUTPATIENT
Start: 2020-09-30 | End: 2020-10-04 | Stop reason: HOSPADM

## 2020-09-30 RX ORDER — SODIUM CHLORIDE 0.9 % (FLUSH) 0.9 %
5-40 SYRINGE (ML) INJECTION AS NEEDED
Status: DISCONTINUED | OUTPATIENT
Start: 2020-09-30 | End: 2020-10-04 | Stop reason: HOSPADM

## 2020-09-30 RX ADMIN — ACETAMINOPHEN 500 MG: 500 TABLET ORAL at 20:19

## 2020-09-30 RX ADMIN — Medication 10 ML: at 22:58

## 2020-09-30 RX ADMIN — PREGABALIN 100 MG: 100 CAPSULE ORAL at 10:08

## 2020-09-30 RX ADMIN — DIAZEPAM 5 MG: 5 TABLET ORAL at 10:08

## 2020-09-30 RX ADMIN — MESALAMINE 4 G: 4 ENEMA RECTAL at 17:25

## 2020-09-30 RX ADMIN — ACETAMINOPHEN 500 MG: 500 TABLET ORAL at 07:45

## 2020-09-30 RX ADMIN — PREGABALIN 100 MG: 100 CAPSULE ORAL at 13:40

## 2020-09-30 RX ADMIN — HYDROMORPHONE HYDROCHLORIDE 1 MG: 1 INJECTION, SOLUTION INTRAMUSCULAR; INTRAVENOUS; SUBCUTANEOUS at 21:32

## 2020-09-30 RX ADMIN — HYDROMORPHONE HYDROCHLORIDE 1 MG: 1 INJECTION, SOLUTION INTRAMUSCULAR; INTRAVENOUS; SUBCUTANEOUS at 03:42

## 2020-09-30 RX ADMIN — HYDROMORPHONE HYDROCHLORIDE 1 MG: 1 INJECTION, SOLUTION INTRAMUSCULAR; INTRAVENOUS; SUBCUTANEOUS at 13:01

## 2020-09-30 RX ADMIN — SODIUM CHLORIDE: 900 INJECTION, SOLUTION INTRAVENOUS at 12:05

## 2020-09-30 RX ADMIN — HYDROMORPHONE HYDROCHLORIDE 1 MG: 1 INJECTION, SOLUTION INTRAMUSCULAR; INTRAVENOUS; SUBCUTANEOUS at 17:25

## 2020-09-30 RX ADMIN — SUCRALFATE 1 G: 1 TABLET ORAL at 17:02

## 2020-09-30 RX ADMIN — CHOLESTYRAMINE 4 G: 4 POWDER, FOR SUSPENSION ORAL at 12:35

## 2020-09-30 RX ADMIN — Medication 10 ML: at 05:48

## 2020-09-30 RX ADMIN — SODIUM CHLORIDE 40 MG: 9 INJECTION, SOLUTION INTRAMUSCULAR; INTRAVENOUS; SUBCUTANEOUS at 10:08

## 2020-09-30 RX ADMIN — LORATADINE, PSEUDOEPHEDRINE SULFATE 1 TABLET: 5; 120 TABLET, FILM COATED, EXTENDED RELEASE ORAL at 20:20

## 2020-09-30 RX ADMIN — METRONIDAZOLE 500 MG: 500 INJECTION, SOLUTION INTRAVENOUS at 08:45

## 2020-09-30 RX ADMIN — PROPOFOL 25 MG: 10 INJECTION, EMULSION INTRAVENOUS at 12:21

## 2020-09-30 RX ADMIN — HYDROMORPHONE HYDROCHLORIDE 2 MG: 1 INJECTION, SOLUTION INTRAMUSCULAR; INTRAVENOUS; SUBCUTANEOUS at 23:30

## 2020-09-30 RX ADMIN — Medication 1 TABLET: at 10:08

## 2020-09-30 RX ADMIN — DEXTROSE MONOHYDRATE, SODIUM CHLORIDE, AND POTASSIUM CHLORIDE 100 ML/HR: 50; 4.5; 1.49 INJECTION, SOLUTION INTRAVENOUS at 10:46

## 2020-09-30 RX ADMIN — CHOLESTYRAMINE 4 G: 4 POWDER, FOR SUSPENSION ORAL at 17:02

## 2020-09-30 RX ADMIN — PROCHLORPERAZINE MALEATE 10 MG: 10 TABLET ORAL at 21:32

## 2020-09-30 RX ADMIN — Medication 1 CAPSULE: at 22:55

## 2020-09-30 RX ADMIN — HYDROMORPHONE HYDROCHLORIDE 1 MG: 1 INJECTION, SOLUTION INTRAMUSCULAR; INTRAVENOUS; SUBCUTANEOUS at 05:47

## 2020-09-30 RX ADMIN — PREGABALIN 100 MG: 100 CAPSULE ORAL at 22:56

## 2020-09-30 RX ADMIN — PROPOFOL 50 MG: 10 INJECTION, EMULSION INTRAVENOUS at 12:18

## 2020-09-30 RX ADMIN — Medication 10 ML: at 21:33

## 2020-09-30 RX ADMIN — PROMETHAZINE HYDROCHLORIDE 25 MG: 25 TABLET ORAL at 15:32

## 2020-09-30 RX ADMIN — HYDROMORPHONE HYDROCHLORIDE 1 MG: 1 INJECTION, SOLUTION INTRAMUSCULAR; INTRAVENOUS; SUBCUTANEOUS at 13:40

## 2020-09-30 RX ADMIN — PREGABALIN 100 MG: 100 CAPSULE ORAL at 17:02

## 2020-09-30 RX ADMIN — DIAZEPAM 5 MG: 5 TABLET ORAL at 17:02

## 2020-09-30 RX ADMIN — PROPOFOL 25 MG: 10 INJECTION, EMULSION INTRAVENOUS at 12:24

## 2020-09-30 RX ADMIN — HYDROMORPHONE HYDROCHLORIDE 2 MG: 1 INJECTION, SOLUTION INTRAMUSCULAR; INTRAVENOUS; SUBCUTANEOUS at 10:12

## 2020-09-30 RX ADMIN — GLYCOPYRROLATE 0.2 MG: 0.2 INJECTION, SOLUTION INTRAMUSCULAR; INTRAVENOUS at 12:08

## 2020-09-30 RX ADMIN — METRONIDAZOLE 500 MG: 500 INJECTION, SOLUTION INTRAVENOUS at 19:22

## 2020-09-30 RX ADMIN — SUCRALFATE 1 G: 1 TABLET ORAL at 22:56

## 2020-09-30 RX ADMIN — PHENYLEPHRINE HYDROCHLORIDE 80 MCG: 10 INJECTION INTRAVENOUS at 12:24

## 2020-09-30 RX ADMIN — HYDROMORPHONE HYDROCHLORIDE 1 MG: 1 INJECTION, SOLUTION INTRAMUSCULAR; INTRAVENOUS; SUBCUTANEOUS at 19:22

## 2020-09-30 RX ADMIN — LIDOCAINE HYDROCHLORIDE 60 MG: 20 INJECTION, SOLUTION EPIDURAL; INFILTRATION; INTRACAUDAL; PERINEURAL at 12:08

## 2020-09-30 RX ADMIN — HYDROMORPHONE HYDROCHLORIDE 1 MG: 1 INJECTION, SOLUTION INTRAMUSCULAR; INTRAVENOUS; SUBCUTANEOUS at 15:32

## 2020-09-30 RX ADMIN — HYDROMORPHONE HYDROCHLORIDE 1 MG: 1 INJECTION, SOLUTION INTRAMUSCULAR; INTRAVENOUS; SUBCUTANEOUS at 08:43

## 2020-09-30 NOTE — PROGRESS NOTES
Problem: Falls - Risk of  Goal: *Absence of Falls  Description: Document Veatrice Marker Fall Risk and appropriate interventions in the flowsheet.   Outcome: Progressing Towards Goal  Note: Fall Risk Interventions:            Medication Interventions: Evaluate medications/consider consulting pharmacy                   Problem: Patient Education: Go to Patient Education Activity  Goal: Patient/Family Education  Outcome: Progressing Towards Goal     Problem: Pain  Goal: *Control of Pain  Outcome: Progressing Towards Goal  Goal: *PALLIATIVE CARE:  Alleviation of Pain  Outcome: Progressing Towards Goal     Problem: Patient Education: Go to Patient Education Activity  Goal: Patient/Family Education  Outcome: Progressing Towards Goal     Problem: General Infection Care Plan (Adult and Pediatric)  Goal: Improvement in signs and symptoms of infection  Outcome: Progressing Towards Goal  Goal: *Optimize nutritional status  Outcome: Progressing Towards Goal     Problem: Patient Education: Go to Patient Education Activity  Goal: Patient/Family Education  Outcome: Progressing Towards Goal

## 2020-09-30 NOTE — ANESTHESIA PREPROCEDURE EVALUATION
Relevant Problems   No relevant active problems       Anesthetic History     PONV          Review of Systems / Medical History  Patient summary reviewed, nursing notes reviewed and pertinent labs reviewed    Pulmonary  Within defined limits                 Neuro/Psych   Within defined limits           Cardiovascular  Within defined limits                     GI/Hepatic/Renal     GERD           Endo/Other  Within defined limits           Other Findings              Physical Exam    Airway  Mallampati: II  TM Distance: > 6 cm  Neck ROM: normal range of motion   Mouth opening: Normal     Cardiovascular  Regular rate and rhythm,  S1 and S2 normal,  no murmur, click, rub, or gallop             Dental  No notable dental hx       Pulmonary  Breath sounds clear to auscultation               Abdominal  GI exam deferred       Other Findings            Anesthetic Plan    ASA: 2  Anesthesia type: MAC            Anesthetic plan and risks discussed with: Patient

## 2020-09-30 NOTE — PROCEDURES
[address]     Endoscopic Gastroduodenoscopy Procedure Note    Raleigh Banks  1975  [id number]    Procedure: Endoscopic Gastroduodenoscopy --diagnostic    Indication: Epigastric Pain     Pre-operative Diagnosis: see indication above    Post-operative Diagnosis: see findings below    : Dick Armstrong MD    Referring Provider:  Queen Fitz MD      Anesthesia/Sedation:  MAC anesthesia Propofol    Airway assessment: No airway problems anticipated    Procedure Details     After infomed consent was obtained for the procedure, with all risks and benefits of procedure explained the patient was taken to the endoscopy suite and placed in the left lateral decubitus position. Following sequential administration of sedation as per above, the endoscope was inserted into the mouth and advanced under direct vision to second portion of the duodenum. A careful inspection was made as the gastroscope was withdrawn, including a retroflexed view of the proximal stomach; findings and interventions are described below. Findings:   Esophagus:Hiatus Hernia  Stomach: Large amount of bile. Gastritis  Duodenum/jejunum: normal      Therapies:  none    Specimens: Gastric and duodenal biopsies taken           EBL:  None. Complications:   None; patient tolerated the procedure well. Implants: None    Surgical Assistant: None       Impression:    -Hiatus Hernia. Biliary Gastritis    Pt. was Alert. Left the endoscopy suite in good general condition. Recommendations:  -Follow up with me.     Dick Armstrong MD

## 2020-09-30 NOTE — PERIOP NOTES

## 2020-09-30 NOTE — PERIOP NOTES
TRANSFER - IN REPORT:    Verbal report received from [facility], RN on Robert Kwong  being received from  for ordered procedure      Report consisted of patients Situation, Background, Assessment and   Recommendations(SBAR). Information from the following report(s) SBAR and Cardiac Rhythm SR was reviewed with the receiving nurse. Opportunity for questions and clarification was provided. Assessment completed upon patients arrival to unit and care assumed. TRANSFER - OUT REPORT:    Verbal report given to [facility], RN on Robert Kwong  being transferred to  for routine progression of care       Report consisted of patients Situation, Background, Assessment and   Recommendations(SBAR). Information from the following report(s) SBAR and Procedure Summary was reviewed with the receiving nurse. Lines:   Peripheral IV 09/29/20 Anterior; Left Forearm (Active)   Site Assessment Clean, dry, & intact 09/30/20 1035   Phlebitis Assessment 0 09/30/20 1035   Infiltration Assessment 0 09/30/20 0905   Dressing Status Clean, dry, & intact 09/30/20 1035   Dressing Type Transparent 09/30/20 1035   Hub Color/Line Status Blue; Infusing 09/30/20 9624   Action Taken Open ports on tubing capped 09/30/20 1035   Alcohol Cap Used Yes 09/30/20 1035        Opportunity for questions and clarification was provided.       Patient transported with:   Tech, Student Nurse, Chart, IV, IV pump

## 2020-09-30 NOTE — ANESTHESIA POSTPROCEDURE EVALUATION
Procedure(s):  ESOPHAGOGASTRODUODENAL (EGD) BIOPSY  ESOPHAGOGASTRODUODENOSCOPY (EGD). MAC    Anesthesia Post Evaluation      Multimodal analgesia: multimodal analgesia used between 6 hours prior to anesthesia start to PACU discharge  Patient location during evaluation: bedside  Patient participation: waiting for patient participation  Level of consciousness: awake  Pain management: adequate  Airway patency: patent  Anesthetic complications: no  Cardiovascular status: acceptable  Respiratory status: unassisted  Hydration status: acceptable  Comments: Post-Anesthesia Evaluation and Assessment    I have evaluated the patient and they are ready for PACU discharge. Patient: Marion Hudson MRN: [id number]  SSN: [id number]   YOB: 1975  Age: 41 y.o. Sex: male      Cardiovascular Function/Vital Signs  BP (!) 102/58   Pulse 85   Temp 37.1 °C (98.8 °F)   Resp 12   Ht 6' 0.01\" (1.829 m)   Wt 88.5 kg (195 lb)   SpO2 95%   BMI 26.44 kg/m²     Patient is status post MAC anesthesia for Procedure(s):  ESOPHAGOGASTRODUODENAL (EGD) BIOPSY  ESOPHAGOGASTRODUODENOSCOPY (EGD). Nausea/Vomiting: None    Postoperative hydration reviewed and adequate. Pain:  Pain Scale 1: Numeric (0 - 10) (09/30/20 1304)  Pain Intensity 1: 6 (09/30/20 1304)   Managed    Neurological Status: At baseline    Mental Status, Level of Consciousness: Alert and  oriented to person, place, and time    Pulmonary Status:   O2 Device: Room air (09/30/20 1304)   Adequate oxygenation and airway patent    Complications related to anesthesia: None    Post-anesthesia assessment completed.  No concerns    Signed By: Tonya Cisneros MD    September 30, 2020                   INITIAL Post-op Vital signs:   Vitals Value Taken Time   /63 9/30/2020  1:09 PM   Temp 37.1 °C (98.8 °F) 9/30/2020 12:34 PM   Pulse 103 9/30/2020  1:12 PM   Resp 15 9/30/2020  1:12 PM   SpO2 91 % 9/30/2020  1:12 PM   Vitals shown include unvalidated device data.

## 2020-09-30 NOTE — ROUTINE PROCESS
Eriberto Araujo 1975 
[id number] Situation: 
Verbal report received from: Rupal Flores Procedure: Procedure(s): ESOPHAGOGASTRODUODENAL (EGD) BIOPSY 
ESOPHAGOGASTRODUODENOSCOPY (EGD) Background: 
 
Preoperative diagnosis: GASTRIC PAIN Postoperative diagnosis: 1)bile gastritis :  Dr. Sujatha Phillips Assistant(s): Endoscopy Technician-1: Arianna Taylor Endoscopy RN-1: Gunnar Aguilar RN Specimens:  
ID Type Source Tests Collected by Time Destination 1 : duodenum bx Preservative Duodenum  Blank Rojo MD 9/30/2020 1221 Pathology H. Pylori  -yes Assessment: 
Intra-procedure medications Anesthesia gave intra-procedure sedation and medications, see anesthesia flow sheet Intravenous fluids: NS@ Myrene Dheeraj Vital signs stable Abdominal assessment: round and soft Recommendation: 
 
Return to floor -room 331

## 2020-09-30 NOTE — PROGRESS NOTES
General Daily Progress Note    Admission Date: 9/28/2020  Hospital Day 3  Post-Op Day Not Applicable  Subjective:   Still having epigastric pain. Drained the pouch last night and again this morning. Objective:     Patient Vitals for the past 24 hrs:   BP Temp Pulse Resp SpO2   09/30/20 1356 113/72 98.7 °F (37.1 °C) (!) 110 14 94 %   09/30/20 1314 (!) 95/54  94 10 90 %   09/30/20 1304 (!) 102/58  85 12 95 %   09/30/20 1255 103/65  91 14 98 %   09/30/20 1249 (!) 115/56  87 10 97 %   09/30/20 1239 (!) 111/58  97 16 97 %   09/30/20 1234 (!) 111/58 98.8 °F (37.1 °C) 95 15 97 %   09/30/20 1229 (!) 94/53  91 16 98 %   09/30/20 1118 (!) 146/61 98.9 °F (37.2 °C) 96 16 97 %   09/30/20 0812 118/72 99 °F (37.2 °C) 95 16 94 %   09/30/20 0238 110/74 98.5 °F (36.9 °C) 90 18 95 %   09/29/20 2026 111/71 98.2 °F (36.8 °C) 90 18 94 %     09/30 0701 - 09/30 1900  In: 1110 [P.O.:960; I.V.:150]  Out: 2100 [Urine:1600]  09/28 1901 - 09/30 0700  In: 720 [P.O.:720]  Out: 6481 [Urine:2575]    Physical Examination:  General Appearance - Somewhat uncomfortable  Abdomen - Non-distended.             Data Review   Recent Results (from the past 24 hour(s))   METABOLIC PANEL, BASIC    Collection Time: 09/30/20  3:35 AM   Result Value Ref Range    Sodium 135 (L) 136 - 145 mmol/L    Potassium 4.0 3.5 - 5.1 mmol/L    Chloride 105 97 - 108 mmol/L    CO2 22 21 - 32 mmol/L    Anion gap 8 5 - 15 mmol/L    Glucose 110 (H) 65 - 100 mg/dL    BUN 7 6 - 20 MG/DL    Creatinine 0.97 0.70 - 1.30 MG/DL    BUN/Creatinine ratio 7 (L) 12 - 20      GFR est AA >60 >60 ml/min/1.73m2    GFR est non-AA >60 >60 ml/min/1.73m2    Calcium 8.8 8.5 - 10.1 MG/DL   CBC WITH AUTOMATED DIFF    Collection Time: 09/30/20  4:34 AM   Result Value Ref Range    WBC 9.1 4.1 - 11.1 K/uL    RBC 4.44 4.10 - 5.70 M/uL    HGB 13.2 12.1 - 17.0 g/dL    HCT 40.9 36.6 - 50.3 %    MCV 92.1 80.0 - 99.0 FL    MCH 29.7 26.0 - 34.0 PG    MCHC 32.3 30.0 - 36.5 g/dL    RDW 13.3 11.5 - 14.5 % PLATELET 293 577 - 212 K/uL    MPV 11.0 8.9 - 12.9 FL    NRBC 0.0 0  WBC    ABSOLUTE NRBC 0.00 0.00 - 0.01 K/uL    NEUTROPHILS 73 32 - 75 %    LYMPHOCYTES 20 12 - 49 %    MONOCYTES 6 5 - 13 %    EOSINOPHILS 1 0 - 7 %    BASOPHILS 0 0 - 1 %    IMMATURE GRANULOCYTES 0 0.0 - 0.5 %    ABS. NEUTROPHILS 6.6 1.8 - 8.0 K/UL    ABS. LYMPHOCYTES 1.8 0.8 - 3.5 K/UL    ABS. MONOCYTES 0.6 0.0 - 1.0 K/UL    ABS. EOSINOPHILS 0.1 0.0 - 0.4 K/UL    ABS. BASOPHILS 0.0 0.0 - 0.1 K/UL    ABS. IMM. GRANS. 0.0 0.00 - 0.04 K/UL    DF AUTOMATED     POC H. PYLORI, TISSUE    Collection Time: 09/30/20 12:25 PM   Result Value Ref Range    H. pylori from tissue Negative Negative    Positive control Positive     Negative control Negative     Lot no. 165,109            Assessment:     Principal Problem:    Ileal pouchitis ([facility] (5/1/2004)    Active Problems:    GERD (gastroesophageal reflux disease) (11/20/2016)      Chronic narcotic dependence ([facility] (1/20/2018)      EGD with biopsies was performed today by Dr. Cameron Doan. It revealed bile gastritis that could be contributing to the pain, and Dr. Cameron Doan started sucralfate and cholestyramine to treat it.     Continue clear liquid diet. Will hopefully be able to advance the diet tomorrow. Continue parenteral fluids, parenteral metronidazole, and mesalamine enemas.

## 2020-10-01 PROBLEM — K29.70 GASTRITIS: Status: ACTIVE | Noted: 2020-10-01

## 2020-10-01 PROCEDURE — 96376 TX/PRO/DX INJ SAME DRUG ADON: CPT

## 2020-10-01 PROCEDURE — 96361 HYDRATE IV INFUSION ADD-ON: CPT

## 2020-10-01 PROCEDURE — 65270000029 HC RM PRIVATE

## 2020-10-01 PROCEDURE — 74011250637 HC RX REV CODE- 250/637: Performed by: COLON & RECTAL SURGERY

## 2020-10-01 PROCEDURE — 99218 HC RM OBSERVATION: CPT

## 2020-10-01 PROCEDURE — 74011250637 HC RX REV CODE- 250/637: Performed by: INTERNAL MEDICINE

## 2020-10-01 PROCEDURE — 74011000250 HC RX REV CODE- 250: Performed by: COLON & RECTAL SURGERY

## 2020-10-01 PROCEDURE — 74011250636 HC RX REV CODE- 250/636: Performed by: COLON & RECTAL SURGERY

## 2020-10-01 PROCEDURE — C9113 INJ PANTOPRAZOLE SODIUM, VIA: HCPCS | Performed by: COLON & RECTAL SURGERY

## 2020-10-01 RX ORDER — TEMAZEPAM 15 MG/1
15 CAPSULE ORAL
Status: DISCONTINUED | OUTPATIENT
Start: 2020-10-01 | End: 2020-10-04 | Stop reason: HOSPADM

## 2020-10-01 RX ORDER — PROMETHAZINE HYDROCHLORIDE 25 MG/1
25 TABLET ORAL
Status: DISCONTINUED | OUTPATIENT
Start: 2020-10-01 | End: 2020-10-04 | Stop reason: HOSPADM

## 2020-10-01 RX ORDER — TEMAZEPAM 15 MG/1
7.5 CAPSULE ORAL
Status: DISCONTINUED | OUTPATIENT
Start: 2020-10-01 | End: 2020-10-01 | Stop reason: DRUGHIGH

## 2020-10-01 RX ADMIN — HYDROMORPHONE HYDROCHLORIDE 1 MG: 1 INJECTION, SOLUTION INTRAMUSCULAR; INTRAVENOUS; SUBCUTANEOUS at 07:17

## 2020-10-01 RX ADMIN — PSEUDOEPHEDRINE HCL 30 MG: 30 TABLET, FILM COATED ORAL at 17:57

## 2020-10-01 RX ADMIN — HYDROMORPHONE HYDROCHLORIDE 1 MG: 1 INJECTION, SOLUTION INTRAMUSCULAR; INTRAVENOUS; SUBCUTANEOUS at 09:22

## 2020-10-01 RX ADMIN — PREGABALIN 100 MG: 100 CAPSULE ORAL at 08:51

## 2020-10-01 RX ADMIN — DEXTROSE MONOHYDRATE, SODIUM CHLORIDE, AND POTASSIUM CHLORIDE 100 ML/HR: 50; 4.5; 1.49 INJECTION, SOLUTION INTRAVENOUS at 00:18

## 2020-10-01 RX ADMIN — MESALAMINE 4 G: 4 ENEMA RECTAL at 17:10

## 2020-10-01 RX ADMIN — Medication 1 CAPSULE: at 21:45

## 2020-10-01 RX ADMIN — PREGABALIN 100 MG: 100 CAPSULE ORAL at 13:11

## 2020-10-01 RX ADMIN — Medication 10 ML: at 13:12

## 2020-10-01 RX ADMIN — HYDROMORPHONE HYDROCHLORIDE 1 MG: 1 INJECTION, SOLUTION INTRAMUSCULAR; INTRAVENOUS; SUBCUTANEOUS at 15:19

## 2020-10-01 RX ADMIN — CHOLESTYRAMINE 4 G: 4 POWDER, FOR SUSPENSION ORAL at 17:10

## 2020-10-01 RX ADMIN — HYDROMORPHONE HYDROCHLORIDE 1 MG: 1 INJECTION, SOLUTION INTRAMUSCULAR; INTRAVENOUS; SUBCUTANEOUS at 13:11

## 2020-10-01 RX ADMIN — CHOLESTYRAMINE 4 G: 4 POWDER, FOR SUSPENSION ORAL at 11:12

## 2020-10-01 RX ADMIN — HYDROMORPHONE HYDROCHLORIDE 2 MG: 1 INJECTION, SOLUTION INTRAMUSCULAR; INTRAVENOUS; SUBCUTANEOUS at 11:12

## 2020-10-01 RX ADMIN — HYDROMORPHONE HYDROCHLORIDE 1 MG: 1 INJECTION, SOLUTION INTRAMUSCULAR; INTRAVENOUS; SUBCUTANEOUS at 03:52

## 2020-10-01 RX ADMIN — HYDROMORPHONE HYDROCHLORIDE 1 MG: 1 INJECTION, SOLUTION INTRAMUSCULAR; INTRAVENOUS; SUBCUTANEOUS at 17:10

## 2020-10-01 RX ADMIN — ACETAMINOPHEN 500 MG: 500 TABLET ORAL at 17:10

## 2020-10-01 RX ADMIN — PREGABALIN 100 MG: 100 CAPSULE ORAL at 21:45

## 2020-10-01 RX ADMIN — SUCRALFATE 1 G: 1 TABLET ORAL at 21:45

## 2020-10-01 RX ADMIN — PREGABALIN 100 MG: 100 CAPSULE ORAL at 17:09

## 2020-10-01 RX ADMIN — SUCRALFATE 1 G: 1 TABLET ORAL at 17:10

## 2020-10-01 RX ADMIN — Medication 1 TABLET: at 08:51

## 2020-10-01 RX ADMIN — DEXTROSE MONOHYDRATE, SODIUM CHLORIDE, AND POTASSIUM CHLORIDE 75 ML/HR: 50; 4.5; 1.49 INJECTION, SOLUTION INTRAVENOUS at 23:52

## 2020-10-01 RX ADMIN — DIAZEPAM 5 MG: 5 TABLET ORAL at 17:09

## 2020-10-01 RX ADMIN — PROMETHAZINE HYDROCHLORIDE 25 MG: 25 TABLET ORAL at 15:19

## 2020-10-01 RX ADMIN — ONDANSETRON 8 MG: 4 TABLET, ORALLY DISINTEGRATING ORAL at 07:16

## 2020-10-01 RX ADMIN — SUCRALFATE 1 G: 1 TABLET ORAL at 11:12

## 2020-10-01 RX ADMIN — DEXTROSE MONOHYDRATE, SODIUM CHLORIDE, AND POTASSIUM CHLORIDE 75 ML/HR: 50; 4.5; 1.49 INJECTION, SOLUTION INTRAVENOUS at 11:13

## 2020-10-01 RX ADMIN — HYDROMORPHONE HYDROCHLORIDE 1 MG: 1 INJECTION, SOLUTION INTRAMUSCULAR; INTRAVENOUS; SUBCUTANEOUS at 19:16

## 2020-10-01 RX ADMIN — Medication 10 ML: at 21:50

## 2020-10-01 RX ADMIN — CHOLESTYRAMINE 4 G: 4 POWDER, FOR SUSPENSION ORAL at 07:22

## 2020-10-01 RX ADMIN — DIAZEPAM 5 MG: 5 TABLET ORAL at 08:51

## 2020-10-01 RX ADMIN — SODIUM CHLORIDE 40 MG: 9 INJECTION, SOLUTION INTRAMUSCULAR; INTRAVENOUS; SUBCUTANEOUS at 08:51

## 2020-10-01 RX ADMIN — HYDROMORPHONE HYDROCHLORIDE 2 MG: 1 INJECTION, SOLUTION INTRAMUSCULAR; INTRAVENOUS; SUBCUTANEOUS at 21:46

## 2020-10-01 RX ADMIN — SUCRALFATE 1 G: 1 TABLET ORAL at 07:17

## 2020-10-01 RX ADMIN — Medication 10 ML: at 07:17

## 2020-10-01 RX ADMIN — ONDANSETRON 8 MG: 4 TABLET, ORALLY DISINTEGRATING ORAL at 21:45

## 2020-10-01 NOTE — PROGRESS NOTES
Problem: Falls - Risk of  Goal: *Absence of Falls  Description: Document Juliano Baez Fall Risk and appropriate interventions in the flowsheet.   Outcome: Progressing Towards Goal  Note: Fall Risk Interventions:            Medication Interventions: Evaluate medications/consider consulting pharmacy                   Problem: Patient Education: Go to Patient Education Activity  Goal: Patient/Family Education  Outcome: Progressing Towards Goal     Problem: Pain  Goal: *Control of Pain  Outcome: Progressing Towards Goal  Goal: *PALLIATIVE CARE:  Alleviation of Pain  Outcome: Progressing Towards Goal     Problem: Patient Education: Go to Patient Education Activity  Goal: Patient/Family Education  Outcome: Progressing Towards Goal     Problem: General Infection Care Plan (Adult and Pediatric)  Goal: Improvement in signs and symptoms of infection  Outcome: Progressing Towards Goal  Goal: *Optimize nutritional status  Outcome: Progressing Towards Goal     Problem: Patient Education: Go to Patient Education Activity  Goal: Patient/Family Education  Outcome: Progressing Towards Goal

## 2020-10-01 NOTE — PROGRESS NOTES
General Daily Progress Note    Admission Date: 9/28/2020  Hospital Day 4  Post-Op Day Not Appicable  Subjective: The pain hasn't improved much so far, but the patient has been able to tolerate clear liquids. Objective:     Patient Vitals for the past 24 hrs:   BP Temp Pulse Resp SpO2   10/01/20 0818 107/72 98 °F (36.7 °C) 93 17 95 %   10/01/20 0242 97/62 98.5 °F (36.9 °C) 88 16 96 %   09/30/20 2009 109/76 98.6 °F (37 °C) 87 14 96 %   09/30/20 1356 113/72 98.7 °F (37.1 °C) (!) 110 14 94 %   09/30/20 1314 (!) 95/54  94 10 90 %   09/30/20 1304 (!) 102/58  85 12 95 %   09/30/20 1255 103/65  91 14 98 %   09/30/20 1249 (!) 115/56  87 10 97 %   09/30/20 1239 (!) 111/58  97 16 97 %   09/30/20 1234 (!) 111/58 98.8 °F (37.1 °C) 95 15 97 %   09/30/20 1229 (!) 94/53  91 16 98 %   09/30/20 1118 (!) 146/61 98.9 °F (37.2 °C) 96 16 97 %     No intake/output data recorded. 09/29 1901 - 10/01 0700  In: 1110 [P.O.:960; I.V.:150]  Out: 5125 [Urine:4325]    Physical Examination:  General Appearance - Somewhat uncomfortable  Abdomen - Non-distended. Soft. Data Review   Recent Results (from the past 24 hour(s))   POC H. PYLORI, TISSUE    Collection Time: 09/30/20 12:25 PM   Result Value Ref Range    H. pylori from tissue Negative Negative    Positive control Positive     Negative control Negative     Lot no. 165,109            Assessment:     Principal Problem:    Ileal pouchitis ([facility] (5/1/2004)    Active Problems:    GERD (gastroesophageal reflux disease) (11/20/2016)      Chronic narcotic dependence ([facility] (1/20/2018)      EGD with biopsies was performed by Dr. Michael Dyer on 9/30/2020. Begin full liquid diet. Decrease maintenance IV fluid rate. Continue cholestyramine and sucralfate begun by Dr. Michael Dyer on 9/30/2020. Continue parenteral fluids, parenteral metronidazole, and mesalamine enemas.

## 2020-10-02 LAB
ANION GAP SERPL CALC-SCNC: 6 MMOL/L (ref 5–15)
BASOPHILS # BLD: 0 K/UL (ref 0–0.1)
BASOPHILS NFR BLD: 0 % (ref 0–1)
BUN SERPL-MCNC: 3 MG/DL (ref 6–20)
BUN/CREAT SERPL: 4 (ref 12–20)
CALCIUM SERPL-MCNC: 9 MG/DL (ref 8.5–10.1)
CHLORIDE SERPL-SCNC: 106 MMOL/L (ref 97–108)
CO2 SERPL-SCNC: 27 MMOL/L (ref 21–32)
CREAT SERPL-MCNC: 0.81 MG/DL (ref 0.7–1.3)
DIFFERENTIAL METHOD BLD: ABNORMAL
EOSINOPHIL # BLD: 0.2 K/UL (ref 0–0.4)
EOSINOPHIL NFR BLD: 2 % (ref 0–7)
ERYTHROCYTE [DISTWIDTH] IN BLOOD BY AUTOMATED COUNT: 12.9 % (ref 11.5–14.5)
GLUCOSE SERPL-MCNC: 84 MG/DL (ref 65–100)
HCT VFR BLD AUTO: 37.1 % (ref 36.6–50.3)
HGB BLD-MCNC: 12 G/DL (ref 12.1–17)
IMM GRANULOCYTES # BLD AUTO: 0 K/UL (ref 0–0.04)
IMM GRANULOCYTES NFR BLD AUTO: 0 % (ref 0–0.5)
LYMPHOCYTES # BLD: 2 K/UL (ref 0.8–3.5)
LYMPHOCYTES NFR BLD: 24 % (ref 12–49)
MAGNESIUM SERPL-MCNC: 1.8 MG/DL (ref 1.6–2.4)
MCH RBC QN AUTO: 29.4 PG (ref 26–34)
MCHC RBC AUTO-ENTMCNC: 32.3 G/DL (ref 30–36.5)
MCV RBC AUTO: 90.9 FL (ref 80–99)
MONOCYTES # BLD: 0.4 K/UL (ref 0–1)
MONOCYTES NFR BLD: 5 % (ref 5–13)
NEUTS SEG # BLD: 5.7 K/UL (ref 1.8–8)
NEUTS SEG NFR BLD: 69 % (ref 32–75)
NRBC # BLD: 0 K/UL (ref 0–0.01)
NRBC BLD-RTO: 0 PER 100 WBC
PHOSPHATE SERPL-MCNC: 2.9 MG/DL (ref 2.6–4.7)
PLATELET # BLD AUTO: 155 K/UL (ref 150–400)
PMV BLD AUTO: 11 FL (ref 8.9–12.9)
POTASSIUM SERPL-SCNC: 3.9 MMOL/L (ref 3.5–5.1)
RBC # BLD AUTO: 4.08 M/UL (ref 4.1–5.7)
SODIUM SERPL-SCNC: 139 MMOL/L (ref 136–145)
WBC # BLD AUTO: 8.4 K/UL (ref 4.1–11.1)

## 2020-10-02 PROCEDURE — 74011250636 HC RX REV CODE- 250/636: Performed by: COLON & RECTAL SURGERY

## 2020-10-02 PROCEDURE — 74011250637 HC RX REV CODE- 250/637: Performed by: COLON & RECTAL SURGERY

## 2020-10-02 PROCEDURE — 74011000250 HC RX REV CODE- 250: Performed by: COLON & RECTAL SURGERY

## 2020-10-02 PROCEDURE — 36415 COLL VENOUS BLD VENIPUNCTURE: CPT

## 2020-10-02 PROCEDURE — 84100 ASSAY OF PHOSPHORUS: CPT

## 2020-10-02 PROCEDURE — 74011250637 HC RX REV CODE- 250/637: Performed by: INTERNAL MEDICINE

## 2020-10-02 PROCEDURE — 85025 COMPLETE CBC W/AUTO DIFF WBC: CPT

## 2020-10-02 PROCEDURE — 83735 ASSAY OF MAGNESIUM: CPT

## 2020-10-02 PROCEDURE — 65270000029 HC RM PRIVATE

## 2020-10-02 PROCEDURE — C9113 INJ PANTOPRAZOLE SODIUM, VIA: HCPCS | Performed by: COLON & RECTAL SURGERY

## 2020-10-02 PROCEDURE — 80048 BASIC METABOLIC PNL TOTAL CA: CPT

## 2020-10-02 RX ORDER — FLUTICASONE PROPIONATE 50 MCG
2 SPRAY, SUSPENSION (ML) NASAL DAILY
Status: DISCONTINUED | OUTPATIENT
Start: 2020-10-02 | End: 2020-10-04 | Stop reason: HOSPADM

## 2020-10-02 RX ORDER — OXYCODONE AND ACETAMINOPHEN 10; 325 MG/1; MG/1
1 TABLET ORAL
Status: DISCONTINUED | OUTPATIENT
Start: 2020-10-02 | End: 2020-10-04 | Stop reason: HOSPADM

## 2020-10-02 RX ADMIN — PSEUDOEPHEDRINE HCL 30 MG: 30 TABLET, FILM COATED ORAL at 17:25

## 2020-10-02 RX ADMIN — PREGABALIN 100 MG: 100 CAPSULE ORAL at 13:15

## 2020-10-02 RX ADMIN — CHOLESTYRAMINE 4 G: 4 POWDER, FOR SUSPENSION ORAL at 11:05

## 2020-10-02 RX ADMIN — OXYCODONE HYDROCHLORIDE AND ACETAMINOPHEN 1 TABLET: 10; 325 TABLET ORAL at 20:35

## 2020-10-02 RX ADMIN — TEMAZEPAM 15 MG: 15 CAPSULE ORAL at 21:28

## 2020-10-02 RX ADMIN — HYDROMORPHONE HYDROCHLORIDE 1 MG: 1 INJECTION, SOLUTION INTRAMUSCULAR; INTRAVENOUS; SUBCUTANEOUS at 06:39

## 2020-10-02 RX ADMIN — HYDROMORPHONE HYDROCHLORIDE 1 MG: 1 INJECTION, SOLUTION INTRAMUSCULAR; INTRAVENOUS; SUBCUTANEOUS at 01:49

## 2020-10-02 RX ADMIN — PSEUDOEPHEDRINE HCL 30 MG: 30 TABLET, FILM COATED ORAL at 06:41

## 2020-10-02 RX ADMIN — PREGABALIN 100 MG: 100 CAPSULE ORAL at 08:42

## 2020-10-02 RX ADMIN — Medication 10 ML: at 15:22

## 2020-10-02 RX ADMIN — FLUTICASONE PROPIONATE 2 SPRAY: 50 SPRAY, METERED NASAL at 10:02

## 2020-10-02 RX ADMIN — Medication 10 ML: at 10:54

## 2020-10-02 RX ADMIN — SODIUM CHLORIDE 40 MG: 9 INJECTION, SOLUTION INTRAMUSCULAR; INTRAVENOUS; SUBCUTANEOUS at 08:42

## 2020-10-02 RX ADMIN — HYDROMORPHONE HYDROCHLORIDE 1 MG: 1 INJECTION, SOLUTION INTRAMUSCULAR; INTRAVENOUS; SUBCUTANEOUS at 17:26

## 2020-10-02 RX ADMIN — SUCRALFATE 1 G: 1 TABLET ORAL at 10:55

## 2020-10-02 RX ADMIN — PREGABALIN 100 MG: 100 CAPSULE ORAL at 17:25

## 2020-10-02 RX ADMIN — DIAZEPAM 5 MG: 5 TABLET ORAL at 17:26

## 2020-10-02 RX ADMIN — HYDROMORPHONE HYDROCHLORIDE 2 MG: 1 INJECTION, SOLUTION INTRAMUSCULAR; INTRAVENOUS; SUBCUTANEOUS at 10:54

## 2020-10-02 RX ADMIN — SUCRALFATE 1 G: 1 TABLET ORAL at 15:27

## 2020-10-02 RX ADMIN — HYDROMORPHONE HYDROCHLORIDE 1 MG: 1 INJECTION, SOLUTION INTRAMUSCULAR; INTRAVENOUS; SUBCUTANEOUS at 15:21

## 2020-10-02 RX ADMIN — MESALAMINE 4 G: 4 ENEMA RECTAL at 18:54

## 2020-10-02 RX ADMIN — PROMETHAZINE HYDROCHLORIDE 25 MG: 25 TABLET ORAL at 06:38

## 2020-10-02 RX ADMIN — PREGABALIN 100 MG: 100 CAPSULE ORAL at 20:36

## 2020-10-02 RX ADMIN — HYDROMORPHONE HYDROCHLORIDE 1 MG: 1 INJECTION, SOLUTION INTRAMUSCULAR; INTRAVENOUS; SUBCUTANEOUS at 04:13

## 2020-10-02 RX ADMIN — HYDROMORPHONE HYDROCHLORIDE 1 MG: 1 INJECTION, SOLUTION INTRAMUSCULAR; INTRAVENOUS; SUBCUTANEOUS at 13:16

## 2020-10-02 RX ADMIN — HYDROMORPHONE HYDROCHLORIDE 2 MG: 1 INJECTION, SOLUTION INTRAMUSCULAR; INTRAVENOUS; SUBCUTANEOUS at 21:29

## 2020-10-02 RX ADMIN — SUCRALFATE 1 G: 1 TABLET ORAL at 20:35

## 2020-10-02 RX ADMIN — Medication 1 CAPSULE: at 20:35

## 2020-10-02 RX ADMIN — DEXTROSE MONOHYDRATE, SODIUM CHLORIDE, AND POTASSIUM CHLORIDE 75 ML/HR: 50; 4.5; 1.49 INJECTION, SOLUTION INTRAVENOUS at 13:49

## 2020-10-02 RX ADMIN — Medication 10 ML: at 13:17

## 2020-10-02 RX ADMIN — SUCRALFATE 1 G: 1 TABLET ORAL at 06:39

## 2020-10-02 RX ADMIN — Medication 10 ML: at 08:56

## 2020-10-02 RX ADMIN — HYDROMORPHONE HYDROCHLORIDE 1 MG: 1 INJECTION, SOLUTION INTRAMUSCULAR; INTRAVENOUS; SUBCUTANEOUS at 19:22

## 2020-10-02 RX ADMIN — CHOLESTYRAMINE 4 G: 4 POWDER, FOR SUSPENSION ORAL at 08:05

## 2020-10-02 RX ADMIN — HYDROMORPHONE HYDROCHLORIDE 1 MG: 1 INJECTION, SOLUTION INTRAMUSCULAR; INTRAVENOUS; SUBCUTANEOUS at 09:00

## 2020-10-02 RX ADMIN — Medication 1 TABLET: at 08:42

## 2020-10-02 RX ADMIN — DIAZEPAM 5 MG: 5 TABLET ORAL at 08:42

## 2020-10-02 RX ADMIN — ACETAMINOPHEN 500 MG: 500 TABLET ORAL at 15:30

## 2020-10-02 NOTE — PROGRESS NOTES
General Daily Progress Note    Admission Date: 9/28/2020  Hospital Day 5  Post-Op Day Not Applicable  Subjective:   Still having pain, but wants to try solid food. Concerned about the side effects of cholestyramine based on his previous experience with it. Didn't sleep well. Complains of stuffy nose. Objective:     Patient Vitals for the past 24 hrs:   BP Temp Pulse Resp SpO2   10/02/20 0759 106/67 98.1 °F (36.7 °C) 97 17 98 %   10/02/20 0255 112/67 97.7 °F (36.5 °C) 88 18 99 %   10/01/20 2014 100/61 98.1 °F (36.7 °C) 77 16 97 %   10/01/20 1409 116/70 98 °F (36.7 °C) 90 18 98 %     10/02 0701 - 10/02 1900  In: -   Out: 650 [Urine:650]  09/30 1901 - 10/02 0700  In: 6083 [P.O.:960; I.V.:5123]  Out: 5000 [Urine:4350]      Physical Examination:  General Appearance - Somewhat uncomfortable  Abdomen - Non-distended. Soft. Mildly tender. Data Review   Recent Results (from the past 24 hour(s))   CBC WITH AUTOMATED DIFF    Collection Time: 10/02/20  4:18 AM   Result Value Ref Range    WBC 8.4 4.1 - 11.1 K/uL    RBC 4.08 (L) 4.10 - 5.70 M/uL    HGB 12.0 (L) 12.1 - 17.0 g/dL    HCT 37.1 36.6 - 50.3 %    MCV 90.9 80.0 - 99.0 FL    MCH 29.4 26.0 - 34.0 PG    MCHC 32.3 30.0 - 36.5 g/dL    RDW 12.9 11.5 - 14.5 %    PLATELET 829 183 - 384 K/uL    MPV 11.0 8.9 - 12.9 FL    NRBC 0.0 0  WBC    ABSOLUTE NRBC 0.00 0.00 - 0.01 K/uL    NEUTROPHILS 69 32 - 75 %    LYMPHOCYTES 24 12 - 49 %    MONOCYTES 5 5 - 13 %    EOSINOPHILS 2 0 - 7 %    BASOPHILS 0 0 - 1 %    IMMATURE GRANULOCYTES 0 0.0 - 0.5 %    ABS. NEUTROPHILS 5.7 1.8 - 8.0 K/UL    ABS. LYMPHOCYTES 2.0 0.8 - 3.5 K/UL    ABS. MONOCYTES 0.4 0.0 - 1.0 K/UL    ABS. EOSINOPHILS 0.2 0.0 - 0.4 K/UL    ABS. BASOPHILS 0.0 0.0 - 0.1 K/UL    ABS. IMM.  GRANS. 0.0 0.00 - 0.04 K/UL    DF AUTOMATED     METABOLIC PANEL, BASIC    Collection Time: 10/02/20  4:18 AM   Result Value Ref Range    Sodium 139 136 - 145 mmol/L    Potassium 3.9 3.5 - 5.1 mmol/L    Chloride 106 97 - 108 mmol/L    CO2 27 21 - 32 mmol/L    Anion gap 6 5 - 15 mmol/L    Glucose 84 65 - 100 mg/dL    BUN 3 (L) 6 - 20 MG/DL    Creatinine 0.81 0.70 - 1.30 MG/DL    BUN/Creatinine ratio 4 (L) 12 - 20      GFR est AA >60 >60 ml/min/1.73m2    GFR est non-AA >60 >60 ml/min/1.73m2    Calcium 9.0 8.5 - 10.1 MG/DL   MAGNESIUM    Collection Time: 10/02/20  4:18 AM   Result Value Ref Range    Magnesium 1.8 1.6 - 2.4 mg/dL   PHOSPHORUS    Collection Time: 10/02/20  4:18 AM   Result Value Ref Range    Phosphorus 2.9 2.6 - 4.7 MG/DL           Assessment:     Principal Problem:    Ileal pouchitis ([facility] (5/1/2004)    Active Problems:    GERD (gastroesophageal reflux disease) (11/20/2016)      Chronic narcotic dependence ([facility] (1/20/2018)      Gastritis (10/1/2020)      EGD with biopsies was performed by Dr. Elier Chun on 9/30/2020.     Begin low fiber diet. Continue maintenance IV fluid rate. Continue cholestyramine and sucralfate begun by Dr. Elier Chun on 9/30/2020.     Continue parenteral fluids, parenteral metronidazole, and mesalamine enemas.     Will try to begin shifting to oral pain medication later today if diet tolerated.

## 2020-10-03 PROCEDURE — 74011000250 HC RX REV CODE- 250: Performed by: COLON & RECTAL SURGERY

## 2020-10-03 PROCEDURE — 74011250637 HC RX REV CODE- 250/637: Performed by: COLON & RECTAL SURGERY

## 2020-10-03 PROCEDURE — 65270000029 HC RM PRIVATE

## 2020-10-03 PROCEDURE — 74011250636 HC RX REV CODE- 250/636: Performed by: COLON & RECTAL SURGERY

## 2020-10-03 PROCEDURE — 94760 N-INVAS EAR/PLS OXIMETRY 1: CPT

## 2020-10-03 PROCEDURE — C9113 INJ PANTOPRAZOLE SODIUM, VIA: HCPCS | Performed by: COLON & RECTAL SURGERY

## 2020-10-03 PROCEDURE — 74011250637 HC RX REV CODE- 250/637: Performed by: INTERNAL MEDICINE

## 2020-10-03 RX ORDER — HYDROMORPHONE HYDROCHLORIDE 1 MG/ML
1 INJECTION, SOLUTION INTRAMUSCULAR; INTRAVENOUS; SUBCUTANEOUS
Status: DISCONTINUED | OUTPATIENT
Start: 2020-10-03 | End: 2020-10-04 | Stop reason: HOSPADM

## 2020-10-03 RX ADMIN — MESALAMINE 4 G: 4 ENEMA RECTAL at 17:03

## 2020-10-03 RX ADMIN — HYDROMORPHONE HYDROCHLORIDE 1 MG: 1 INJECTION, SOLUTION INTRAMUSCULAR; INTRAVENOUS; SUBCUTANEOUS at 06:47

## 2020-10-03 RX ADMIN — HYDROMORPHONE HYDROCHLORIDE 1 MG: 1 INJECTION, SOLUTION INTRAMUSCULAR; INTRAVENOUS; SUBCUTANEOUS at 01:03

## 2020-10-03 RX ADMIN — TEMAZEPAM 15 MG: 15 CAPSULE ORAL at 23:18

## 2020-10-03 RX ADMIN — SUCRALFATE 1 G: 1 TABLET ORAL at 06:44

## 2020-10-03 RX ADMIN — CHOLESTYRAMINE 4 G: 4 POWDER, FOR SUSPENSION ORAL at 11:12

## 2020-10-03 RX ADMIN — PROMETHAZINE HYDROCHLORIDE 25 MG: 25 TABLET ORAL at 10:12

## 2020-10-03 RX ADMIN — Medication 1 CAPSULE: at 21:57

## 2020-10-03 RX ADMIN — CHOLESTYRAMINE 4 G: 4 POWDER, FOR SUSPENSION ORAL at 16:56

## 2020-10-03 RX ADMIN — HYDROMORPHONE HYDROCHLORIDE 1 MG: 1 INJECTION, SOLUTION INTRAMUSCULAR; INTRAVENOUS; SUBCUTANEOUS at 14:03

## 2020-10-03 RX ADMIN — OXYCODONE HYDROCHLORIDE AND ACETAMINOPHEN 1 TABLET: 10; 325 TABLET ORAL at 21:57

## 2020-10-03 RX ADMIN — HYDROMORPHONE HYDROCHLORIDE 1 MG: 1 INJECTION, SOLUTION INTRAMUSCULAR; INTRAVENOUS; SUBCUTANEOUS at 20:10

## 2020-10-03 RX ADMIN — Medication 10 ML: at 06:44

## 2020-10-03 RX ADMIN — PREGABALIN 100 MG: 100 CAPSULE ORAL at 21:57

## 2020-10-03 RX ADMIN — SUCRALFATE 1 G: 1 TABLET ORAL at 21:57

## 2020-10-03 RX ADMIN — DIAZEPAM 5 MG: 5 TABLET ORAL at 17:02

## 2020-10-03 RX ADMIN — HYDROMORPHONE HYDROCHLORIDE 1 MG: 1 INJECTION, SOLUTION INTRAMUSCULAR; INTRAVENOUS; SUBCUTANEOUS at 03:06

## 2020-10-03 RX ADMIN — PSEUDOEPHEDRINE HCL 30 MG: 30 TABLET, FILM COATED ORAL at 08:53

## 2020-10-03 RX ADMIN — SODIUM CHLORIDE 40 MG: 9 INJECTION, SOLUTION INTRAMUSCULAR; INTRAVENOUS; SUBCUTANEOUS at 08:48

## 2020-10-03 RX ADMIN — PREGABALIN 100 MG: 100 CAPSULE ORAL at 17:02

## 2020-10-03 RX ADMIN — Medication 10 ML: at 14:03

## 2020-10-03 RX ADMIN — CHOLESTYRAMINE 4 G: 4 POWDER, FOR SUSPENSION ORAL at 08:48

## 2020-10-03 RX ADMIN — HYDROMORPHONE HYDROCHLORIDE 1 MG: 1 INJECTION, SOLUTION INTRAMUSCULAR; INTRAVENOUS; SUBCUTANEOUS at 08:53

## 2020-10-03 RX ADMIN — DEXTROSE MONOHYDRATE, SODIUM CHLORIDE, AND POTASSIUM CHLORIDE 75 ML/HR: 50; 4.5; 1.49 INJECTION, SOLUTION INTRAVENOUS at 02:10

## 2020-10-03 RX ADMIN — SUCRALFATE 1 G: 1 TABLET ORAL at 10:57

## 2020-10-03 RX ADMIN — HYDROMORPHONE HYDROCHLORIDE 1 MG: 1 INJECTION, SOLUTION INTRAMUSCULAR; INTRAVENOUS; SUBCUTANEOUS at 17:01

## 2020-10-03 RX ADMIN — OXYCODONE HYDROCHLORIDE AND ACETAMINOPHEN 1 TABLET: 10; 325 TABLET ORAL at 18:10

## 2020-10-03 RX ADMIN — PREGABALIN 100 MG: 100 CAPSULE ORAL at 13:10

## 2020-10-03 RX ADMIN — OXYCODONE HYDROCHLORIDE AND ACETAMINOPHEN 1 TABLET: 10; 325 TABLET ORAL at 13:10

## 2020-10-03 RX ADMIN — HYDROMORPHONE HYDROCHLORIDE 2 MG: 1 INJECTION, SOLUTION INTRAMUSCULAR; INTRAVENOUS; SUBCUTANEOUS at 10:57

## 2020-10-03 RX ADMIN — ONDANSETRON 8 MG: 4 TABLET, ORALLY DISINTEGRATING ORAL at 21:57

## 2020-10-03 RX ADMIN — SUCRALFATE 1 G: 1 TABLET ORAL at 16:56

## 2020-10-03 RX ADMIN — Medication 10 ML: at 13:10

## 2020-10-03 RX ADMIN — HYDROMORPHONE HYDROCHLORIDE 2 MG: 1 INJECTION, SOLUTION INTRAMUSCULAR; INTRAVENOUS; SUBCUTANEOUS at 23:15

## 2020-10-03 RX ADMIN — FLUTICASONE PROPIONATE 2 SPRAY: 50 SPRAY, METERED NASAL at 08:49

## 2020-10-03 RX ADMIN — DIAZEPAM 5 MG: 5 TABLET ORAL at 08:48

## 2020-10-03 RX ADMIN — DEXTROSE MONOHYDRATE, SODIUM CHLORIDE, AND POTASSIUM CHLORIDE 50 ML/HR: 50; 4.5; 1.49 INJECTION, SOLUTION INTRAVENOUS at 17:07

## 2020-10-03 RX ADMIN — Medication 1 TABLET: at 08:48

## 2020-10-03 RX ADMIN — PREGABALIN 100 MG: 100 CAPSULE ORAL at 08:48

## 2020-10-03 RX ADMIN — OXYCODONE HYDROCHLORIDE AND ACETAMINOPHEN 1 TABLET: 10; 325 TABLET ORAL at 02:10

## 2020-10-04 VITALS
SYSTOLIC BLOOD PRESSURE: 114 MMHG | WEIGHT: 195 LBS | BODY MASS INDEX: 26.41 KG/M2 | HEART RATE: 86 BPM | DIASTOLIC BLOOD PRESSURE: 67 MMHG | TEMPERATURE: 98 F | OXYGEN SATURATION: 96 % | RESPIRATION RATE: 18 BRPM | HEIGHT: 72 IN

## 2020-10-04 PROCEDURE — 74011250637 HC RX REV CODE- 250/637: Performed by: COLON & RECTAL SURGERY

## 2020-10-04 PROCEDURE — C9113 INJ PANTOPRAZOLE SODIUM, VIA: HCPCS | Performed by: COLON & RECTAL SURGERY

## 2020-10-04 PROCEDURE — 74011250636 HC RX REV CODE- 250/636: Performed by: COLON & RECTAL SURGERY

## 2020-10-04 PROCEDURE — 74011000250 HC RX REV CODE- 250: Performed by: COLON & RECTAL SURGERY

## 2020-10-04 PROCEDURE — 74011250637 HC RX REV CODE- 250/637: Performed by: INTERNAL MEDICINE

## 2020-10-04 RX ORDER — PANTOPRAZOLE SODIUM 40 MG/1
40 TABLET, DELAYED RELEASE ORAL DAILY
Qty: 30 TAB | Refills: 0 | Status: SHIPPED | OUTPATIENT
Start: 2020-10-04

## 2020-10-04 RX ORDER — SUCRALFATE 1 G/1
1 TABLET ORAL
Qty: 1 TAB | Refills: 0 | Status: SHIPPED | OUTPATIENT
Start: 2020-10-04

## 2020-10-04 RX ORDER — NALOXONE HYDROCHLORIDE 4 MG/.1ML
SPRAY NASAL
Qty: 1 EACH | Refills: 0 | Status: SHIPPED | OUTPATIENT
Start: 2020-10-04

## 2020-10-04 RX ORDER — OXYCODONE AND ACETAMINOPHEN 10; 325 MG/1; MG/1
1 TABLET ORAL
Qty: 36 TAB | Refills: 0 | Status: SHIPPED | OUTPATIENT
Start: 2020-10-04 | End: 2020-10-11

## 2020-10-04 RX ORDER — CHOLESTYRAMINE 4 G/4.8G
4 POWDER, FOR SUSPENSION ORAL
Qty: 1 EACH | Refills: 0 | Status: SHIPPED | OUTPATIENT
Start: 2020-10-04

## 2020-10-04 RX ORDER — MESALAMINE 4 G/60ML
4 ENEMA RECTAL EVERY EVENING
Qty: 7 BOTTLE | Refills: 0 | Status: SHIPPED
Start: 2020-10-04 | End: 2020-10-11

## 2020-10-04 RX ADMIN — HYDROMORPHONE HYDROCHLORIDE 1 MG: 1 INJECTION, SOLUTION INTRAMUSCULAR; INTRAVENOUS; SUBCUTANEOUS at 13:07

## 2020-10-04 RX ADMIN — HYDROMORPHONE HYDROCHLORIDE 1 MG: 1 INJECTION, SOLUTION INTRAMUSCULAR; INTRAVENOUS; SUBCUTANEOUS at 02:01

## 2020-10-04 RX ADMIN — SODIUM CHLORIDE 40 MG: 9 INJECTION, SOLUTION INTRAMUSCULAR; INTRAVENOUS; SUBCUTANEOUS at 08:05

## 2020-10-04 RX ADMIN — SUCRALFATE 1 G: 1 TABLET ORAL at 16:08

## 2020-10-04 RX ADMIN — OXYCODONE HYDROCHLORIDE AND ACETAMINOPHEN 1 TABLET: 10; 325 TABLET ORAL at 06:27

## 2020-10-04 RX ADMIN — SUCRALFATE 1 G: 1 TABLET ORAL at 11:57

## 2020-10-04 RX ADMIN — OXYCODONE HYDROCHLORIDE AND ACETAMINOPHEN 1 TABLET: 10; 325 TABLET ORAL at 15:03

## 2020-10-04 RX ADMIN — SUCRALFATE 1 G: 1 TABLET ORAL at 06:27

## 2020-10-04 RX ADMIN — HYDROMORPHONE HYDROCHLORIDE 2 MG: 1 INJECTION, SOLUTION INTRAMUSCULAR; INTRAVENOUS; SUBCUTANEOUS at 10:00

## 2020-10-04 RX ADMIN — DIAZEPAM 5 MG: 5 TABLET ORAL at 08:05

## 2020-10-04 RX ADMIN — PREGABALIN 100 MG: 100 CAPSULE ORAL at 08:11

## 2020-10-04 RX ADMIN — Medication 1 TABLET: at 08:05

## 2020-10-04 RX ADMIN — Medication 10 ML: at 10:00

## 2020-10-04 RX ADMIN — PREGABALIN 100 MG: 100 CAPSULE ORAL at 13:07

## 2020-10-04 RX ADMIN — OXYCODONE HYDROCHLORIDE AND ACETAMINOPHEN 1 TABLET: 10; 325 TABLET ORAL at 01:17

## 2020-10-04 RX ADMIN — PROMETHAZINE HYDROCHLORIDE 25 MG: 25 TABLET ORAL at 08:05

## 2020-10-04 RX ADMIN — HYDROMORPHONE HYDROCHLORIDE 1 MG: 1 INJECTION, SOLUTION INTRAMUSCULAR; INTRAVENOUS; SUBCUTANEOUS at 16:08

## 2020-10-04 RX ADMIN — CHOLESTYRAMINE 4 G: 4 POWDER, FOR SUSPENSION ORAL at 16:08

## 2020-10-04 RX ADMIN — OXYCODONE HYDROCHLORIDE AND ACETAMINOPHEN 1 TABLET: 10; 325 TABLET ORAL at 11:12

## 2020-10-04 RX ADMIN — HYDROMORPHONE HYDROCHLORIDE 1 MG: 1 INJECTION, SOLUTION INTRAMUSCULAR; INTRAVENOUS; SUBCUTANEOUS at 07:15

## 2020-10-04 RX ADMIN — PSEUDOEPHEDRINE HCL 30 MG: 30 TABLET, FILM COATED ORAL at 13:17

## 2020-10-04 RX ADMIN — Medication 10 ML: at 13:08

## 2020-10-04 RX ADMIN — FLUTICASONE PROPIONATE 2 SPRAY: 50 SPRAY, METERED NASAL at 08:11

## 2020-10-04 RX ADMIN — PSEUDOEPHEDRINE HCL 30 MG: 30 TABLET, FILM COATED ORAL at 01:17

## 2020-10-04 RX ADMIN — CHOLESTYRAMINE 4 G: 4 POWDER, FOR SUSPENSION ORAL at 11:57

## 2020-10-04 RX ADMIN — CHOLESTYRAMINE 4 G: 4 POWDER, FOR SUSPENSION ORAL at 08:16

## 2020-10-04 NOTE — DISCHARGE SUMMARY
Discharge Summary     Patient ID:    Daniel Kohler  [id number]  89 y.o.  1975    Admission Date: 9/28/2020    Discharge Date: 10/4/2020    Admission Diagnoses:  1. Ileal pouchitis  2. Bile gastritis  3. Abdominal pain  4. Chronic narcotic dependence      Chronic Diagnoses:    Patient Active Problem List   Diagnosis Code    Ileal pouchitis ([facility] K91.850    GERD (gastroesophageal reflux disease) K21.9    Depression with anxiety F41.8    Anemia D64.9    Chronic narcotic dependence ([facility]) F11.20    Drug-induced ileus ([facility]) K56.7, T50.905A    Gastritis K29.70       Discharge Diagnoses:  1. Ileal pouchitis  2. Bile gastritis  3. Abdominal pain  4. Chronic narcotic dependence    Hospital Problems as of 10/4/2020 Date Reviewed: 9/28/2020          Codes Class Noted - Resolved POA    Gastritis ICD-10-CM: K29.70  ICD-9-CM: 535.50  10/1/2020 - Present Unknown        Chronic narcotic dependence ([facility] (Chronic) ICD-10-CM: F11.20  ICD-9-CM: 304.91  1/20/2018 - Present Yes        GERD (gastroesophageal reflux disease) (Chronic) ICD-10-CM: K21.9  ICD-9-CM: 530.81  11/20/2016 - Present Yes        * (Principal) Ileal pouchitis ([facility] ICD-10-CM: I34.504  ICD-9-CM: 569.71  5/1/2004 - Present Yes              Procedures Performed:  EGD with biopsies was performed by Kyle Alanis MD on 9/30/2020. Discharge Medications:   Current Discharge Medication List      START taking these medications    Details   cholestyramine-aspartame (QUESTRAN LIGHT) 4 gram packet Take 1 Packet by mouth three (3) times daily (with meals). Use the presciptionn that you already have. The electronic version is for documentation purposes. Qty: 1 Each, Refills: 0      pantoprazole (PROTONIX) 40 mg tablet Take 1 Tab by mouth daily. Qty: 30 Tab, Refills: 0      sucralfate (CARAFATE) 1 gram tablet Take 1 Tab by mouth Before breakfast, lunch, dinner and at bedtime. Use the prescription that you already have.   The electronic prescription is for documentation purposes. Qty: 1 Tab, Refills: 0      oxyCODONE-acetaminophen (PERCOCET 10)  mg per tablet Take 1 Tab by mouth every four (4) hours as needed for Pain for up to 7 days. Max Daily Amount: 6 Tabs. Qty: 36 Tab, Refills: 0    Associated Diagnoses: Abdominal pain, epigastric      naloxone (NARCAN) 4 mg/actuation nasal spray Use 1 spray intranasally, then discard. Repeat with new spray every 2 min as needed for opioid overdose symptoms, alternating nostrils. Qty: 1 Each, Refills: 0         CONTINUE these medications which have CHANGED    Details   mesalamine (ROWASA) 4 gram/60 mL enema Insert 60 mL into rectum every evening for 7 days. Use the prescription that you already have. This electronic version is a placeholder for documentation purposes. Qty: 7 Bottle, Refills: 0    Associated Diagnoses: Ileal pouchitis ([address]         CONTINUE these medications which have NOT CHANGED    Details   ondansetron hcl (ZOFRAN) 8 mg tablet Take 1 Tab by mouth every twelve (12) hours as needed (Mild Nausea). Qty: 15 Tab, Refills: 0      multivitamin with iron (FLINTSTONES) chewable tablet Take 1 Tab by mouth daily. Qty: 30 Tab, Refills: 2      Saccharomyces boulardii (FLORASTOR) 250 mg capsule Take 250 mg by mouth nightly. diclofenac (VOLTAREN) 1 % gel Apply 2 g to affected area four (4) times daily as needed. acetaminophen (TYLENOL) 500 mg tablet Take 500 mg by mouth four (4) times daily as needed (mild pain, heache (usually has headaches 1 week after Stelara)). prochlorperazine (COMPAZINE) 10 mg tablet Take 10 mg by mouth two (2) times daily as needed (Moderate Nausea). Ustekinumab (STELARA) 90 mg/mL injection 90 mg by SubCUTAneous route. Every 8 weeks       promethazine (PHENERGAN) 25 mg tablet Take 25 mg by mouth every six (6) hours as needed (Severe Nausea). dicyclomine (BENTYL) 10 mg capsule Take 10 mg by mouth every six (6) hours as needed.       ergocalciferol (ERGOCALCIFEROL) 50,000 unit capsule Take 50,000 Units by mouth every Sunday. pregabalin (LYRICA) 100 mg capsule Take 100 mg by mouth four (4) times daily. cyanocobalamin (VITAMIN B-12) 1,000 mcg/mL injection 1,000 mcg by IntraMUSCular route every Sunday. Indications: Once weekly      diazepam (VALIUM) 5 mg tablet Take 5 mg by mouth two (2) times a day. STOP taking these medications       budesonide (ENTOCORT EC) 3 mg capsule Comments:   Reason for Stopping:         omeprazole (PRILOSEC) 40 mg capsule Comments:   Reason for Stopping:         nut.suppl. - milk based formula (BOOST PUDDING) pudg Comments:   Reason for Stopping:                Diet:  Low fiber diet and high protein nutritional supplements. Activity:  No restrictions. Wound Care:  None. Discharge Condition:  Improved compared to that upon admission. Disposition:  Home. Follow-up Care: To see Dr. Joseph Huffman in approximately one week. Significant Diagnostic Studies:   Recent Labs     10/02/20  0418   WBC 8.4   HGB 12.0*   HCT 37.1        Recent Labs     10/02/20  0418      K 3.9      CO2 27   BUN 3*   CREA 0.81   GLU 84   CA 9.0   MG 1.8   PHOS 2.9       Lab Results   Component Value Date/Time    Glucose (POC) 99 02/14/2018 09:53 PM    Glucose (POC) 192 (H) 02/14/2018 11:25 AM    Glucose (POC) 100 06/17/2017 11:22 AM    Glucose (POC) 137 (H) 06/17/2017 05:54 AM    Glucose (POC) 120 (H) 06/16/2017 09:40 PM         HOSPITAL COURSE & TREATMENT RENDERED:     The patient is a 78-year-old male with Crohn's disease and a [address] ([address]) who is familiar to me from multiple encounters including operations and multiple hospitalizations for treatment of ileal pouchitis, partial small bowel obstruction, and pouch dysfunction.   The most recent previous hospitalization was from 7/8/2019 to 7/14/2019, during which he was treated for Ileal pouchitis and pouch dysfunction, esophagitis, and gastritis.     On 9/28/2020 he reported that he had been struggling with increased nausea, food intolerance, increased ileal pouch output, and dehydration over the preceding few days. He also experienced a new, sharp epigastric pain about which he is very concerned. He has been unable to tolerate much liquid, and his urine appeared to be concentrated. He presented to the ER, and the evaluation there revealed evidence of dehydration and small bowel distension consistent with recurrent ileal pouchitis. He was admitted and treated with bowel rest, parenteral fluids, parenteral metronidazole, Rowasa enemas, and (of course) parenteral narcotics. Liliana Alcazar MD, his primary physician and gastroenterologist, was consulted on 9/29/2020, and he performed an EGD with biopsies on 9/30/2020. Bile gastritis was diagnosed and then treatment with sucralfate and cholestyramine. The patient gradually improved, his diet was advanced, and a transition was made from parenteral to oral narcotic analgesics. His hospitalization was without complications, and his condition upon discharge was improved compared to that upon admission. He appeared to have understood all discharge and follow-up instructions.         Signed:  Roselyn Duran MD

## 2020-10-04 NOTE — PROGRESS NOTES
Problem: Falls - Risk of  Goal: *Absence of Falls  Description: Document Justiceivania Anusha Fall Risk and appropriate interventions in the flowsheet.   Outcome: Resolved/Met  Note: Fall Risk Interventions:            Medication Interventions: Patient to call before getting OOB

## 2020-10-04 NOTE — PROGRESS NOTES
General Daily Progress Note    Admission Date: 9/28/2020  Hospital Day 7  Post-Op Day Not Applicable  Subjective:   He thinks that the pain will now be manageable at home with oral medication. Objective:     Patient Vitals for the past 24 hrs:   BP Temp Pulse Resp SpO2   10/04/20 0845 100/69 98 °F (36.7 °C) 93 17 96 %   10/04/20 0219 102/71 97.9 °F (36.6 °C) 91 17 98 %   10/03/20 1942 124/75 97.5 °F (36.4 °C) 89 18 95 %   10/03/20 1436 107/70 98.5 °F (36.9 °C) 77 18 99 %     No intake/output data recorded. 10/02 1901 - 10/04 0700  In: -   Out: 1450 [Urine:1450]    Physical Examination:  General Appearance - Somewhat uncomfortable  Abdomen - Non-distended.  Soft.  Mildly tender. Data Review No results found for this or any previous visit (from the past 24 hour(s)). Assessment and Plan:     Principal Problem:    Ileal pouchitis ([address] (5/1/2004)    Active Problems:    GERD (gastroesophageal reflux disease) (11/20/2016)      Chronic narcotic dependence ([address] (1/20/2018)      Gastritis (10/1/2020)      EGD with biopsies was performed by Dr. Elier Chun on 9/30/2020. The patient appears to have improved enough to be discharged. He had several questions, and most of them were answered. We reviewed the medication and follow-up plans. Discharge to home. Follow up with Dr. Elier Chun in approximately one week.

## 2020-12-10 NOTE — DISCHARGE INSTRUCTIONS
Patient Discharge Instructions    Dorie Longoria / [id number] : 1975    Admitted 2020 Discharged: 10/4/2020       · It is important that you take medications exactly as they are prescribed. · Keep your medication in the bottles provided by the pharmacist and keep a list of the medication names, dosages, and times to be taken in your wallet. · Do not take other medications without consulting your doctor. What To Do At Home  Recommended Diet:  You should continue to consume a low fiber diet and high protein nutritional supplements as tolerated. Recommended Activity: Resume physical activity as tolerated. Hygiene:  No restrictions. Questions/Problems:  If you have questions or other problems, call Dr. Donald Salas or Dr. Shelly Snow. Follow-Up:  Call Dr. Misael Aranda office tomorrow to reschedule your appointment with him for sometime next week. Information obtained by :  I understand that if any problems occur once I am at home I am to contact my physician. I understand and acknowledge receipt of the instructions indicated above.                                                                                                                                            Physician's or R.N.'s Signature                                                                  Date/Time                                                                                                                                              Patient or Representative Signature                                                          Date/Time Additional Notes: Previous 3 warts are gone but one new wart. Detail Level: Simple

## 2021-05-21 NOTE — PROGRESS NOTES
Patient for bowel resection with Dr. Mo Finney.  Bilateral temporary ureteral stents requested. Discussed risks/benefits with patient. He is agreeable to proceeding. Target Cumulative Dosage (In Mg/Kg): 135

## 2021-12-06 ENCOUNTER — TRANSCRIBE ORDER (OUTPATIENT)
Dept: REGISTRATION | Age: 46
End: 2021-12-06

## 2021-12-06 ENCOUNTER — HOSPITAL ENCOUNTER (OUTPATIENT)
Dept: PREADMISSION TESTING | Age: 46
Discharge: HOME | End: 2021-12-06
Attending: INTERNAL MEDICINE
Payer: COMMERCIAL

## 2021-12-06 DIAGNOSIS — Z01.812 PRE-PROCEDURE LAB EXAM: Primary | ICD-10-CM

## 2021-12-06 DIAGNOSIS — Z01.812 PRE-PROCEDURE LAB EXAM: ICD-10-CM

## 2021-12-06 PROCEDURE — U0005 INFEC AGEN DETEC AMPLI PROBE: HCPCS

## 2021-12-08 LAB
SARS-COV-2, XPLCVT: NOT DETECTED
SOURCE, COVRS: NORMAL

## 2021-12-09 ENCOUNTER — HOSPITAL ENCOUNTER (OUTPATIENT)
Age: 46
Setting detail: OUTPATIENT SURGERY
Discharge: HOME | End: 2021-12-09
Attending: INTERNAL MEDICINE | Admitting: INTERNAL MEDICINE
Payer: COMMERCIAL

## 2021-12-09 ENCOUNTER — ANESTHESIA (OUTPATIENT)
Dept: ENDOSCOPY | Age: 46
End: 2021-12-09
Payer: COMMERCIAL

## 2021-12-09 ENCOUNTER — ANESTHESIA EVENT (OUTPATIENT)
Dept: ENDOSCOPY | Age: 46
End: 2021-12-09
Payer: COMMERCIAL

## 2021-12-09 VITALS
TEMPERATURE: 99.1 F | SYSTOLIC BLOOD PRESSURE: 107 MMHG | OXYGEN SATURATION: 95 % | RESPIRATION RATE: 18 BRPM | HEART RATE: 83 BPM | DIASTOLIC BLOOD PRESSURE: 80 MMHG

## 2021-12-09 PROCEDURE — 77030039825 HC MSK NSL PAP SUPERNO2VA VYRM -B: Performed by: NURSE ANESTHETIST, CERTIFIED REGISTERED

## 2021-12-09 PROCEDURE — 87077 CULTURE AEROBIC IDENTIFY: CPT | Performed by: INTERNAL MEDICINE

## 2021-12-09 PROCEDURE — 88305 TISSUE EXAM BY PATHOLOGIST: CPT

## 2021-12-09 PROCEDURE — 77030021593 HC FCPS BIOP ENDOSC BSC -A: Performed by: INTERNAL MEDICINE

## 2021-12-09 PROCEDURE — 76060000032 HC ANESTHESIA 0.5 TO 1 HR: Performed by: INTERNAL MEDICINE

## 2021-12-09 PROCEDURE — 74011250636 HC RX REV CODE- 250/636: Performed by: NURSE ANESTHETIST, CERTIFIED REGISTERED

## 2021-12-09 PROCEDURE — 76040000007: Performed by: INTERNAL MEDICINE

## 2021-12-09 PROCEDURE — 74011000250 HC RX REV CODE- 250: Performed by: NURSE ANESTHETIST, CERTIFIED REGISTERED

## 2021-12-09 PROCEDURE — 2709999900 HC NON-CHARGEABLE SUPPLY: Performed by: INTERNAL MEDICINE

## 2021-12-09 PROCEDURE — 74011250636 HC RX REV CODE- 250/636: Performed by: INTERNAL MEDICINE

## 2021-12-09 RX ORDER — MIDAZOLAM HYDROCHLORIDE 1 MG/ML
.25-1 INJECTION, SOLUTION INTRAMUSCULAR; INTRAVENOUS
Status: DISCONTINUED | OUTPATIENT
Start: 2021-12-09 | End: 2021-12-09 | Stop reason: HOSPADM

## 2021-12-09 RX ORDER — FENTANYL CITRATE 50 UG/ML
200 INJECTION, SOLUTION INTRAMUSCULAR; INTRAVENOUS
Status: DISCONTINUED | OUTPATIENT
Start: 2021-12-09 | End: 2021-12-09 | Stop reason: HOSPADM

## 2021-12-09 RX ORDER — ATROPINE SULFATE 0.1 MG/ML
0.5 INJECTION INTRAVENOUS
Status: DISCONTINUED | OUTPATIENT
Start: 2021-12-09 | End: 2021-12-09 | Stop reason: HOSPADM

## 2021-12-09 RX ORDER — EPINEPHRINE 0.1 MG/ML
1 INJECTION INTRACARDIAC; INTRAVENOUS
Status: DISCONTINUED | OUTPATIENT
Start: 2021-12-09 | End: 2021-12-09 | Stop reason: HOSPADM

## 2021-12-09 RX ORDER — DEXTROMETHORPHAN/PSEUDOEPHED 2.5-7.5/.8
1.2 DROPS ORAL
Status: DISCONTINUED | OUTPATIENT
Start: 2021-12-09 | End: 2021-12-09 | Stop reason: HOSPADM

## 2021-12-09 RX ORDER — PROPOFOL 10 MG/ML
INJECTION, EMULSION INTRAVENOUS AS NEEDED
Status: DISCONTINUED | OUTPATIENT
Start: 2021-12-09 | End: 2021-12-09 | Stop reason: HOSPADM

## 2021-12-09 RX ORDER — SODIUM CHLORIDE 0.9 % (FLUSH) 0.9 %
5-40 SYRINGE (ML) INJECTION AS NEEDED
Status: DISCONTINUED | OUTPATIENT
Start: 2021-12-09 | End: 2021-12-09 | Stop reason: HOSPADM

## 2021-12-09 RX ORDER — ESZOPICLONE 3 MG/1
3 TABLET, FILM COATED ORAL
COMMUNITY

## 2021-12-09 RX ORDER — SODIUM CHLORIDE 0.9 % (FLUSH) 0.9 %
5-40 SYRINGE (ML) INJECTION EVERY 8 HOURS
Status: DISCONTINUED | OUTPATIENT
Start: 2021-12-09 | End: 2021-12-09 | Stop reason: HOSPADM

## 2021-12-09 RX ORDER — HYDROMORPHONE HYDROCHLORIDE 1 MG/ML
0.5 INJECTION, SOLUTION INTRAMUSCULAR; INTRAVENOUS; SUBCUTANEOUS ONCE
Status: COMPLETED | OUTPATIENT
Start: 2021-12-09 | End: 2021-12-09

## 2021-12-09 RX ORDER — FLUMAZENIL 0.1 MG/ML
0.2 INJECTION INTRAVENOUS
Status: DISCONTINUED | OUTPATIENT
Start: 2021-12-09 | End: 2021-12-09 | Stop reason: HOSPADM

## 2021-12-09 RX ORDER — SODIUM CHLORIDE 9 MG/ML
50 INJECTION, SOLUTION INTRAVENOUS CONTINUOUS
Status: DISCONTINUED | OUTPATIENT
Start: 2021-12-09 | End: 2021-12-09 | Stop reason: HOSPADM

## 2021-12-09 RX ORDER — SODIUM CHLORIDE 9 MG/ML
INJECTION, SOLUTION INTRAVENOUS
Status: DISCONTINUED | OUTPATIENT
Start: 2021-12-09 | End: 2021-12-09 | Stop reason: HOSPADM

## 2021-12-09 RX ORDER — NALOXONE HYDROCHLORIDE 0.4 MG/ML
0.4 INJECTION, SOLUTION INTRAMUSCULAR; INTRAVENOUS; SUBCUTANEOUS
Status: DISCONTINUED | OUTPATIENT
Start: 2021-12-09 | End: 2021-12-09 | Stop reason: HOSPADM

## 2021-12-09 RX ORDER — ZOLPIDEM TARTRATE 10 MG/1
TABLET ORAL
COMMUNITY

## 2021-12-09 RX ORDER — LIDOCAINE HYDROCHLORIDE 20 MG/ML
INJECTION, SOLUTION EPIDURAL; INFILTRATION; INTRACAUDAL; PERINEURAL AS NEEDED
Status: DISCONTINUED | OUTPATIENT
Start: 2021-12-09 | End: 2021-12-09 | Stop reason: HOSPADM

## 2021-12-09 RX ADMIN — PROPOFOL 70 MG: 10 INJECTION, EMULSION INTRAVENOUS at 09:29

## 2021-12-09 RX ADMIN — PROPOFOL 60 MG: 10 INJECTION, EMULSION INTRAVENOUS at 09:15

## 2021-12-09 RX ADMIN — PROPOFOL 50 MG: 10 INJECTION, EMULSION INTRAVENOUS at 09:10

## 2021-12-09 RX ADMIN — PROPOFOL 100 MG: 10 INJECTION, EMULSION INTRAVENOUS at 09:04

## 2021-12-09 RX ADMIN — PROPOFOL 50 MG: 10 INJECTION, EMULSION INTRAVENOUS at 09:07

## 2021-12-09 RX ADMIN — LIDOCAINE HYDROCHLORIDE 40 MG: 20 INJECTION, SOLUTION EPIDURAL; INFILTRATION; INTRACAUDAL; PERINEURAL at 09:02

## 2021-12-09 RX ADMIN — HYDROMORPHONE HYDROCHLORIDE 0.5 MG: 1 INJECTION, SOLUTION INTRAMUSCULAR; INTRAVENOUS; SUBCUTANEOUS at 09:54

## 2021-12-09 RX ADMIN — PROPOFOL 50 MG: 10 INJECTION, EMULSION INTRAVENOUS at 09:21

## 2021-12-09 RX ADMIN — PROPOFOL 50 MG: 10 INJECTION, EMULSION INTRAVENOUS at 09:02

## 2021-12-09 RX ADMIN — PROPOFOL 40 MG: 10 INJECTION, EMULSION INTRAVENOUS at 09:18

## 2021-12-09 RX ADMIN — PROPOFOL 50 MG: 10 INJECTION, EMULSION INTRAVENOUS at 09:03

## 2021-12-09 RX ADMIN — SODIUM CHLORIDE: 900 INJECTION, SOLUTION INTRAVENOUS at 08:45

## 2021-12-09 NOTE — PROCEDURES
[facility]    Endoscopic Gastroduodenoscopy Procedure Note    Melani Li  1975  [id number]    Procedure: Endoscopic Gastroduodenoscopy    Indication: Screening     Pre-operative Diagnosis: see indication above    Post-operative Diagnosis: see findings below    : Samira Abraham MD    Referring Provider:  Heather Hughes MD      Anesthesia/Sedation:  MAC anesthesia Propofol    Airway assessment: No airway problems anticipated    Procedure Details     After infomed consent was obtained for the procedure, with all risks and benefits of procedure explained the patient was taken to the endoscopy suite and placed in the left lateral decubitus position. Following sequential administration of sedation as per above, the endoscope was inserted into the mouth and advanced under direct vision to second portion of the duodenum. A careful inspection was made as the gastroscope was withdrawn, including a retroflexed view of the proximal stomach; findings and interventions are described below. Findings:   Esophagus:Hiatus Hernia. Esophagitis  Stomach: Gastritis. Presence of blood. Duodenum/jejunum: normal      Therapies:  none    Specimens: Gastric, duodenal and esophageal biopsies taken           EBL:  None. Complications:   None; patient tolerated the procedure well. Implants: None    Surgical Assistant: None       Impression:    -Hiatus Hernia, Esophagitis. Gastritis    Pt. was Alert. Left the endoscopy suite in good general condition. Recommendations:  -Follow up with me.     Samira Abraham MD

## 2021-12-09 NOTE — ANESTHESIA POSTPROCEDURE EVALUATION
Post-Anesthesia Evaluation and Assessment    Patient: Phoebe Johnson MRN: [id number]  SSN: [id number]    YOB: 1975  Age: 40 y.o. Sex: male      I have evaluated the patient and they are stable and ready for discharge from the PACU. Cardiovascular Function/Vital Signs  Visit Vitals  /80   Pulse 83   Temp 37.3 °C (99.1 °F)   Resp 18   SpO2 95%       Patient is status post MAC anesthesia for Procedure(s):  ESOPHAGOGASTRODUODENOSCOPY (EGD), COLONOSCOPY  COLONOSCOPY   :-  ESOPHAGOGASTRODUODENAL (EGD) BIOPSY  COLON BIOPSY. Nausea/Vomiting: None    Postoperative hydration reviewed and adequate. Pain:  Pain Scale 1: Numeric (0 - 10) (12/09/21 1000)  Pain Intensity 1: 3 (12/09/21 1000)   Managed    Neurological Status: At baseline    Mental Status, Level of Consciousness: Alert and  oriented to person, place, and time    Pulmonary Status:   O2 Device: None (Room air) (12/09/21 1000)   Adequate oxygenation and airway patent    Complications related to anesthesia: None    Post-anesthesia assessment completed. No concerns    Signed By: Madelyn Marcial MD     December 9, 2021              Procedure(s):  ESOPHAGOGASTRODUODENOSCOPY (EGD), COLONOSCOPY  COLONOSCOPY   :-  ESOPHAGOGASTRODUODENAL (EGD) BIOPSY  COLON BIOPSY. total IV anesthesia, MAC    <BSHSIANPOST>    INITIAL Post-op Vital signs:   Vitals Value Taken Time   /80 12/09/21 1000   Temp 37.3 °C (99.1 °F) 12/09/21 0944   Pulse 85 12/09/21 1012   Resp 15 12/09/21 1012   SpO2 96 % 12/09/21 1008   Vitals shown include unvalidated device data.

## 2021-12-09 NOTE — PROCEDURES
Dr Mcdonald [address] [facility]  [phone]      Olafedwige Fountain  [id number]  1975    Colonoscopy Operative Report    Procedure Type:   Colonoscopy --screening     Indications:  Screening     Pre-operative Diagnosis: see indication above    Post-operative Diagnosis:  See findings below    :  Bakari Kimball MD    Referring Provider: Stanislav Inman MD      Sedation:  MAC anesthesia Propofol    Procedure Details:  After informed consent was obtained with all risks and benefits of procedure explained and preoperative exam completed, the patient was taken to the endoscopy suite and placed in the left lateral decubitus position. Upon sequential sedation as per above, a digital rectal exam was performed demonstrating no hemorrhoids. The Olympus videocolonoscope  was inserted in the rectum and carefully advanced to the cecum, which was identified by Pt has a complete colectomy. Ileum was seen, An ulcer was found in the pouch. The cecum was identified by the ileocecal valve and appendiceal orifice. The quality of preparation was excellent. The colonoscope was slowly withdrawn with careful evaluation between folds. Retroflexion in the rectum was completed demonstrating no hemorrhoids. Findings:   Rectum: Endoscopy was done via ostomy. An ulcer was found at the level of the anastomosis. Biopsies were taken  Sigmoid: Was removed surgically  Descending Colon: Same as before  Transverse Colon: Same  Ascending Colon: Same  Cecum: Same  Terminal Ileum: Same      Specimen Removed:  Biopsies from the ulcer were taken    Complications: None. Implants: None    Surgical Assistant: None    EBL:  None. Impression:    Ulcer at the level of the Pouch    Recommendations: --Await pathology. Regular diet. Resume normal medication(s). - Follow up with me.       Discharge Disposition:  Home in the company of a  when able to ambulate.

## 2021-12-09 NOTE — PERIOP NOTES

## 2021-12-09 NOTE — PROGRESS NOTES
Orlando Denton  1975  [id number]    Situation:  Verbal report received from: [facility]  Procedure: Procedure(s):  ESOPHAGOGASTRODUODENOSCOPY (EGD), COLONOSCOPY  COLONOSCOPY   :-  ESOPHAGOGASTRODUODENAL (EGD) BIOPSY  COLON BIOPSY    Background:    Preoperative diagnosis: GASTROESOPHAGEAL REFLUX DISEASE, CROHNS DISEASE  Postoperative diagnosis: eosphagitis  gastritis  hiatal hernia  Normal pouchoscopy    :  Dr. Soraya Walker  Assistant(s): Endoscopy Technician-1: Naima Borrego  Endoscopy RN-1: Amy Flores RN    Specimens:   ID Type Source Tests Collected by Time Destination   1 : duodenal biopsy Preservative Duodenum  Hamida Johnson MD 12/9/2021 4464 Pathology   2 :  Preservative GE Marion Gonzales MD 12/9/2021 4040 Pathology   3 : anastamosis biopsy ulcer Preservative   Hamida Johnson MD 12/9/2021 3646 Pathology     H. Pylori  no    Assessment:      Anesthesia gave intra-procedure sedation and medications, see anesthesia flow sheet yes    Intravenous fluids: NS@ KVO     Vital signs stable     Abdominal assessment: round and soft     Recommendation:  Discharge patient per MD order  Family   Permission to share finding with family or friend yes

## 2021-12-09 NOTE — DISCHARGE INSTRUCTIONS
Dr Matilde Cho [address]. [address], [address] [facility]  [phone]    Tania Butler  [id number]  1975    EGD and COLONOSCOPY DISCHARGE INSTRUCTIONS    DISCOMFORT:  Redness at IV site- apply warm compress to area; if redness or soreness persist- contact your physician  Sore throat- throat lozenges or warm salt water gargle  There may be a slight amount of blood passed from the rectum  Gaseous discomfort- walking, belching will help relieve any discomfort    DIET:   Regular diet, however, remember your colon is empty and a heavy meal will produce gas. Avoid these foods:  vegetables, fried / greasy foods, carbonated drinks for today  You may not drink alcoholic beverages for at least 12 hours       ACTIVITY:  You may not operate a vehicle for 12 hours  You may not engage in an occupation involving machinery or appliances for rest of today  You may then resume your normal daily activities it is recommended that you spend the remainder of the day resting -  avoid any strenuous activity. Avoid making any critical decisions for at least 24 hour    CALL M.D. ANY SIGN OF:   Increasing pain, nausea, vomiting  Abdominal distension (swelling)  New increased bleeding (oral or rectal)  Fever (chills)  Pain in chest area  Bloody discharge from nose or mouth  Shortness of breath     Follow-up Instructions:   Call Dr. Ricardo Mccollum for any questions or problems. Telephone # [phone]  Biopsy results will be available in  5 to 7 days      Impression:  eosphagitis  gastritis  hiatal hernia  Normal pouchoscopy      Learning About Coronavirus (COVID-19)  Coronavirus (COVID-19): Overview  What is coronavirus (COVID-19)? The coronavirus disease (COVID-19) is caused by a virus. It is an illness that was first found in [place], [place], in December 2019. It has since spread worldwide. The virus can cause fever, cough, and trouble breathing.  In severe cases, it can cause pneumonia and make it hard to breathe without help. It can cause death. Coronaviruses are a large group of viruses. They cause the common cold. They also cause more serious illnesses like Middle East respiratory syndrome (MERS) and severe acute respiratory syndrome (SARS). COVID-19 is caused by a novel coronavirus. That means it's a new type that has not been seen in people before. This virus spreads person-to-person through droplets from coughing and sneezing. It can also spread when you are close to someone who is infected. And it can spread when you touch something that has the virus on it, such as a doorknob or a tabletop. What can you do to protect yourself from coronavirus (COVID-19)? The best way to protect yourself from getting sick is to:  · Avoid areas where there is an outbreak. · Avoid contact with people who may be infected. · Wash your hands often with soap or alcohol-based hand sanitizers. · Avoid crowds and try to stay at least 6 feet away from other people. · Wash your hands often, especially after you cough or sneeze. Use soap and water, and scrub for at least 20 seconds. If soap and water aren't available, use an alcohol-based hand . · Avoid touching your mouth, nose, and eyes. What can you do to avoid spreading the virus to others? To help avoid spreading the virus to others:  · Cover your mouth with a tissue when you cough or sneeze. Then throw the tissue in the trash. · Use a disinfectant to clean things that you touch often. · Stay home if you are sick or have been exposed to the virus. Don't go to school, work, or public areas. And don't use public transportation. · If you are sick:  ? Leave your home only if you need to get medical care. But call the doctor's office first so they know you're coming. And wear a face mask, if you have one.  ? If you have a face mask, wear it whenever you're around other people.  It can help stop the spread of the virus when you cough or sneeze. ? Clean and disinfect your home every day. Use household  and disinfectant wipes or sprays. Take special care to clean things that you grab with your hands. These include doorknobs, remote controls, phones, and handles on your refrigerator and microwave. And don't forget countertops, tabletops, bathrooms, and computer keyboards. When to call for help  Call 911 anytime you think you may need emergency care. For example, call if:  · You have severe trouble breathing. (You can't talk at all.)  · You have constant chest pain or pressure. · You are severely dizzy or lightheaded. · You are confused or can't think clearly. · Your face and lips have a blue color. · You pass out (lose consciousness) or are very hard to wake up. Call your doctor now if you develop symptoms such as:  · Shortness of breath. · Fever. · Cough. If you need to get care, call ahead to the doctor's office for instructions before you go. Make sure you wear a face mask, if you have one, to prevent exposing other people to the virus. Where can you get the latest information? The following health organizations are tracking and studying this virus. Their websites contain the most up-to-date information. Edward Montoya also learn what to do if you think you may have been exposed to the virus. · U.S. Centers for Disease Control and Prevention (CDC): The CDC provides updated news about the disease and travel advice. The website also tells you how to prevent the spread of infection. www.cdc.gov  · World Health Organization [facility]): WHO offers information about the virus outbreaks. WHO also has travel advice. www.who.int  Current as of: April 1, 2020               Content Version: 12.4  © 2718-4654 [employer].    Care instructions adapted under license by your healthcare professional. If you have questions about a medical condition or this instruction, always ask your healthcare professional. Timothyägen Salena any warranty or liability for your use of this information.

## 2022-01-04 NOTE — PROGRESS NOTES
Bedside and Verbal shift change report given to Mariah Jaffe RN (oncoming nurse) by Orion Kiran RN (offgoing nurse). Report included the following information SBAR, Kardex, Procedure Summary, Intake/Output, MAR and Recent Results. Clothing

## 2022-03-18 PROBLEM — F11.20 CHRONIC NARCOTIC DEPENDENCE (HCC): Status: ACTIVE | Noted: 2018-01-20

## 2022-03-19 PROBLEM — T50.905A DRUG-INDUCED ILEUS (HCC): Status: ACTIVE | Noted: 2018-02-12

## 2022-03-19 PROBLEM — K56.7 DRUG-INDUCED ILEUS (HCC): Status: ACTIVE | Noted: 2018-02-12

## 2022-03-19 PROBLEM — K29.70 GASTRITIS: Status: ACTIVE | Noted: 2020-10-01

## 2022-05-29 NOTE — PROGRESS NOTES
General Daily Progress Note    Admission Date: 12/15/2018  Hospital Day 2  Post-Op Day Not Applicable  Subjective:   Less abdominal pain. Less nausea. Having some problems with gas and fluid being expelled around the tube. Objective:     Patient Vitals for the past 24 hrs:   BP Temp Pulse Resp SpO2   12/17/18 0816 97/55 98 °F (36.7 °C) 74 16 98 %   12/17/18 0447     98 %   12/17/18 0321 93/59 98.1 °F (36.7 °C) 88 16 96 %   12/16/18 2038 99/62 98.7 °F (37.1 °C) 74 16 99 %   12/16/18 1511 108/69 97.9 °F (36.6 °C) 74 16 97 %     12/17 0701 - 12/17 1900  In: 200 [P.O.:200]  Out: 550 [Urine:340]  12/15 1901 - 12/17 0700  In: 1948 [P.O.:600; I.V.:1348]  Out: 7459 [Urine:1375]      Physical Examination:  General Appearance:  Mild apparent discomfort. Abdomen:  Soft. Non-distended. Mild diffuse tenderness. Catheter in place in the ileostomy. Data Review   Recent Results (from the past 24 hour(s))   CBC WITH AUTOMATED DIFF    Collection Time: 12/17/18  3:32 AM   Result Value Ref Range    WBC 6.6 4.1 - 11.1 K/uL    RBC 4.09 (L) 4.10 - 5.70 M/uL    HGB 11.6 (L) 12.1 - 17.0 g/dL    HCT 37.5 36.6 - 50.3 %    MCV 91.7 80.0 - 99.0 FL    MCH 28.4 26.0 - 34.0 PG    MCHC 30.9 30.0 - 36.5 g/dL    RDW 13.9 11.5 - 14.5 %    PLATELET 610 (L) 051 - 400 K/uL    MPV 11.9 8.9 - 12.9 FL    NRBC 0.0 0  WBC    ABSOLUTE NRBC 0.00 0.00 - 0.01 K/uL    NEUTROPHILS 52 32 - 75 %    LYMPHOCYTES 36 12 - 49 %    MONOCYTES 9 5 - 13 %    EOSINOPHILS 2 0 - 7 %    BASOPHILS 1 0 - 1 %    IMMATURE GRANULOCYTES 0 0.0 - 0.5 %    ABS. NEUTROPHILS 3.5 1.8 - 8.0 K/UL    ABS. LYMPHOCYTES 2.3 0.8 - 3.5 K/UL    ABS. MONOCYTES 0.6 0.0 - 1.0 K/UL    ABS. EOSINOPHILS 0.2 0.0 - 0.4 K/UL    ABS. BASOPHILS 0.0 0.0 - 0.1 K/UL    ABS. IMM.  GRANS. 0.0 0.00 - 0.04 K/UL    DF AUTOMATED     METABOLIC PANEL, BASIC    Collection Time: 12/17/18  3:32 AM   Result Value Ref Range    Sodium 142 136 - 145 mmol/L    Potassium 3.7 3.5 - 5.1 mmol/L Chloride 112 (H) 97 - 108 mmol/L    CO2 28 21 - 32 mmol/L    Anion gap 2 (L) 5 - 15 mmol/L    Glucose 94 65 - 100 mg/dL    BUN 5 (L) 6 - 20 MG/DL    Creatinine 0.82 0.70 - 1.30 MG/DL    BUN/Creatinine ratio 6 (L) 12 - 20      GFR est AA >60 >60 ml/min/1.73m2    GFR est non-AA >60 >60 ml/min/1.73m2    Calcium 8.4 (L) 8.5 - 10.1 MG/DL   MAGNESIUM    Collection Time: 12/17/18  3:32 AM   Result Value Ref Range    Magnesium 1.6 1.6 - 2.4 mg/dL   PHOSPHORUS    Collection Time: 12/17/18  3:32 AM   Result Value Ref Range    Phosphorus 3.4 2.6 - 4.7 MG/DL           Assessment:     Active Problems:    Ileal pouchitis ([address] (5/1/2004)      Chronic narcotic dependence ([address] (1/20/2018)      Small bowel obstruction ([address] (2/12/2018)    Stable. Electrolytes normal.    CT images from 12/15/2018 and 12/7/2018 reviewed again with a radiologist this morning. There is no significant difference between the two sets of images, and the point of obstruction appears to be the \"valve. \"    The patient and I discussed different ideas about how to manage this problem, and I asked him to try to contact his surgeons in [place] to see what they think. Plan:   Begin full liquid diet with caution. Continue continuous distal decompression. Continue parenteral Flagyl. No additional imaging today. 29-May-2022 05:27

## 2022-12-14 ENCOUNTER — ANESTHESIA EVENT (OUTPATIENT)
Dept: ENDOSCOPY | Age: 47
End: 2022-12-14
Payer: COMMERCIAL

## 2022-12-14 ENCOUNTER — ANESTHESIA (OUTPATIENT)
Dept: ENDOSCOPY | Age: 47
End: 2022-12-14
Payer: COMMERCIAL

## 2022-12-14 ENCOUNTER — HOSPITAL ENCOUNTER (OUTPATIENT)
Age: 47
Setting detail: OUTPATIENT SURGERY
Discharge: HOME | End: 2022-12-14
Attending: COLON & RECTAL SURGERY | Admitting: COLON & RECTAL SURGERY
Payer: COMMERCIAL

## 2022-12-14 ENCOUNTER — APPOINTMENT (OUTPATIENT)
Dept: GENERAL RADIOLOGY | Age: 47
End: 2022-12-14
Attending: COLON & RECTAL SURGERY
Payer: COMMERCIAL

## 2022-12-14 VITALS
HEART RATE: 91 BPM | SYSTOLIC BLOOD PRESSURE: 99 MMHG | RESPIRATION RATE: 15 BRPM | BODY MASS INDEX: 28.44 KG/M2 | TEMPERATURE: 99.3 F | HEIGHT: 72 IN | DIASTOLIC BLOOD PRESSURE: 62 MMHG | WEIGHT: 210 LBS | OXYGEN SATURATION: 97 %

## 2022-12-14 PROBLEM — K50.018 CROHN'S DISEASE OF SMALL INTESTINE WITH OTHER COMPLICATION (HCC): Status: ACTIVE | Noted: 2022-12-14

## 2022-12-14 LAB
ALBUMIN SERPL-MCNC: 3.6 G/DL (ref 3.5–5)
ALBUMIN/GLOB SERPL: 1.1 {RATIO} (ref 1.1–2.2)
ALP SERPL-CCNC: 125 U/L (ref 45–117)
ALT SERPL-CCNC: 23 U/L (ref 12–78)
ANION GAP SERPL CALC-SCNC: 1 MMOL/L (ref 5–15)
AST SERPL-CCNC: 13 U/L (ref 15–37)
BASOPHILS # BLD: 0 K/UL (ref 0–0.1)
BASOPHILS NFR BLD: 0 % (ref 0–1)
BILIRUB SERPL-MCNC: 0.3 MG/DL (ref 0.2–1)
BUN SERPL-MCNC: 13 MG/DL (ref 6–20)
BUN/CREAT SERPL: 12 (ref 12–20)
CALCIUM SERPL-MCNC: 8.7 MG/DL (ref 8.5–10.1)
CHLORIDE SERPL-SCNC: 107 MMOL/L (ref 97–108)
CO2 SERPL-SCNC: 30 MMOL/L (ref 21–32)
COMMENT, HOLDF: NORMAL
CREAT SERPL-MCNC: 1.08 MG/DL (ref 0.7–1.3)
DIFFERENTIAL METHOD BLD: ABNORMAL
EOSINOPHIL # BLD: 0 K/UL (ref 0–0.4)
EOSINOPHIL NFR BLD: 0 % (ref 0–7)
ERYTHROCYTE [DISTWIDTH] IN BLOOD BY AUTOMATED COUNT: 12.4 % (ref 11.5–14.5)
GLOBULIN SER CALC-MCNC: 3.2 G/DL (ref 2–4)
GLUCOSE SERPL-MCNC: 114 MG/DL (ref 65–100)
HCT VFR BLD AUTO: 43.8 % (ref 36.6–50.3)
HGB BLD-MCNC: 14.8 G/DL (ref 12.1–17)
IMM GRANULOCYTES # BLD AUTO: 0 K/UL (ref 0–0.04)
IMM GRANULOCYTES NFR BLD AUTO: 0 % (ref 0–0.5)
LYMPHOCYTES # BLD: 1.1 K/UL (ref 0.8–3.5)
LYMPHOCYTES NFR BLD: 9 % (ref 12–49)
MAGNESIUM SERPL-MCNC: 1.8 MG/DL (ref 1.6–2.4)
MCH RBC QN AUTO: 30.5 PG (ref 26–34)
MCHC RBC AUTO-ENTMCNC: 33.8 G/DL (ref 30–36.5)
MCV RBC AUTO: 90.3 FL (ref 80–99)
MONOCYTES # BLD: 0.4 K/UL (ref 0–1)
MONOCYTES NFR BLD: 3 % (ref 5–13)
NEUTS SEG # BLD: 10.6 K/UL (ref 1.8–8)
NEUTS SEG NFR BLD: 88 % (ref 32–75)
NRBC # BLD: 0 K/UL (ref 0–0.01)
NRBC BLD-RTO: 0 PER 100 WBC
PHOSPHATE SERPL-MCNC: 2.6 MG/DL (ref 2.6–4.7)
PLATELET # BLD AUTO: 208 K/UL (ref 150–400)
PMV BLD AUTO: 10.9 FL (ref 8.9–12.9)
POTASSIUM SERPL-SCNC: 4 MMOL/L (ref 3.5–5.1)
PROT SERPL-MCNC: 6.8 G/DL (ref 6.4–8.2)
RBC # BLD AUTO: 4.85 M/UL (ref 4.1–5.7)
SAMPLES BEING HELD,HOLD: NORMAL
SODIUM SERPL-SCNC: 138 MMOL/L (ref 136–145)
WBC # BLD AUTO: 12.2 K/UL (ref 4.1–11.1)

## 2022-12-14 PROCEDURE — 36415 COLL VENOUS BLD VENIPUNCTURE: CPT

## 2022-12-14 PROCEDURE — 84100 ASSAY OF PHOSPHORUS: CPT

## 2022-12-14 PROCEDURE — 74019 RADEX ABDOMEN 2 VIEWS: CPT

## 2022-12-14 PROCEDURE — 74011250636 HC RX REV CODE- 250/636: Performed by: NURSE ANESTHETIST, CERTIFIED REGISTERED

## 2022-12-14 PROCEDURE — 83735 ASSAY OF MAGNESIUM: CPT

## 2022-12-14 PROCEDURE — 85025 COMPLETE CBC W/AUTO DIFF WBC: CPT

## 2022-12-14 PROCEDURE — 76060000032 HC ANESTHESIA 0.5 TO 1 HR: Performed by: COLON & RECTAL SURGERY

## 2022-12-14 PROCEDURE — 77030040361 HC SLV COMPR DVT MDII -B: Performed by: COLON & RECTAL SURGERY

## 2022-12-14 PROCEDURE — 76040000007: Performed by: COLON & RECTAL SURGERY

## 2022-12-14 PROCEDURE — 74011250636 HC RX REV CODE- 250/636: Performed by: COLON & RECTAL SURGERY

## 2022-12-14 PROCEDURE — 80053 COMPREHEN METABOLIC PANEL: CPT

## 2022-12-14 RX ORDER — NALOXONE HYDROCHLORIDE 0.4 MG/ML
0.4 INJECTION, SOLUTION INTRAMUSCULAR; INTRAVENOUS; SUBCUTANEOUS
Status: DISCONTINUED | OUTPATIENT
Start: 2022-12-14 | End: 2022-12-14 | Stop reason: HOSPADM

## 2022-12-14 RX ORDER — SODIUM CHLORIDE 9 MG/ML
INJECTION, SOLUTION INTRAVENOUS
Status: DISCONTINUED | OUTPATIENT
Start: 2022-12-14 | End: 2022-12-14 | Stop reason: HOSPADM

## 2022-12-14 RX ORDER — MIDAZOLAM HYDROCHLORIDE 1 MG/ML
.25-5 INJECTION, SOLUTION INTRAMUSCULAR; INTRAVENOUS
Status: DISCONTINUED | OUTPATIENT
Start: 2022-12-14 | End: 2022-12-14 | Stop reason: HOSPADM

## 2022-12-14 RX ORDER — OXYCODONE HYDROCHLORIDE 10 MG/1
TABLET ORAL
COMMUNITY
Start: 2021-12-31

## 2022-12-14 RX ORDER — DEXTROMETHORPHAN/PSEUDOEPHED 2.5-7.5/.8
1.2 DROPS ORAL
Status: DISCONTINUED | OUTPATIENT
Start: 2022-12-14 | End: 2022-12-14 | Stop reason: HOSPADM

## 2022-12-14 RX ORDER — TEMAZEPAM 15 MG/1
CAPSULE ORAL
COMMUNITY
Start: 2022-11-27

## 2022-12-14 RX ORDER — LEMBOREXANT 5 MG/1
TABLET, FILM COATED ORAL
COMMUNITY

## 2022-12-14 RX ORDER — SODIUM CHLORIDE, SODIUM LACTATE, POTASSIUM CHLORIDE, CALCIUM CHLORIDE 600; 310; 30; 20 MG/100ML; MG/100ML; MG/100ML; MG/100ML
150 INJECTION, SOLUTION INTRAVENOUS CONTINUOUS
Status: DISCONTINUED | OUTPATIENT
Start: 2022-12-14 | End: 2022-12-14 | Stop reason: HOSPADM

## 2022-12-14 RX ORDER — SODIUM CHLORIDE 0.9 % (FLUSH) 0.9 %
5-40 SYRINGE (ML) INJECTION AS NEEDED
Status: DISCONTINUED | OUTPATIENT
Start: 2022-12-14 | End: 2022-12-14 | Stop reason: HOSPADM

## 2022-12-14 RX ORDER — ONDANSETRON 2 MG/ML
INJECTION INTRAMUSCULAR; INTRAVENOUS AS NEEDED
Status: DISCONTINUED | OUTPATIENT
Start: 2022-12-14 | End: 2022-12-14 | Stop reason: HOSPADM

## 2022-12-14 RX ORDER — PROPOFOL 10 MG/ML
INJECTION, EMULSION INTRAVENOUS AS NEEDED
Status: DISCONTINUED | OUTPATIENT
Start: 2022-12-14 | End: 2022-12-14 | Stop reason: HOSPADM

## 2022-12-14 RX ORDER — FLUMAZENIL 0.1 MG/ML
0.2 INJECTION INTRAVENOUS
Status: DISCONTINUED | OUTPATIENT
Start: 2022-12-14 | End: 2022-12-14 | Stop reason: HOSPADM

## 2022-12-14 RX ORDER — SODIUM CHLORIDE 0.9 % (FLUSH) 0.9 %
5-40 SYRINGE (ML) INJECTION EVERY 8 HOURS
Status: DISCONTINUED | OUTPATIENT
Start: 2022-12-14 | End: 2022-12-14 | Stop reason: HOSPADM

## 2022-12-14 RX ORDER — SODIUM CHLORIDE 9 MG/ML
100 INJECTION, SOLUTION INTRAVENOUS CONTINUOUS
Status: DISCONTINUED | OUTPATIENT
Start: 2022-12-14 | End: 2022-12-14 | Stop reason: HOSPADM

## 2022-12-14 RX ORDER — ATROPINE SULFATE 0.1 MG/ML
0.5 INJECTION INTRAVENOUS
Status: DISCONTINUED | OUTPATIENT
Start: 2022-12-14 | End: 2022-12-14 | Stop reason: HOSPADM

## 2022-12-14 RX ORDER — FENTANYL CITRATE 50 UG/ML
12.5-25 INJECTION, SOLUTION INTRAMUSCULAR; INTRAVENOUS
Status: DISCONTINUED | OUTPATIENT
Start: 2022-12-14 | End: 2022-12-14 | Stop reason: HOSPADM

## 2022-12-14 RX ORDER — EPINEPHRINE 0.1 MG/ML
1 INJECTION INTRACARDIAC; INTRAVENOUS
Status: DISCONTINUED | OUTPATIENT
Start: 2022-12-14 | End: 2022-12-14 | Stop reason: HOSPADM

## 2022-12-14 RX ORDER — HYDROMORPHONE HYDROCHLORIDE 1 MG/ML
0.5 INJECTION, SOLUTION INTRAMUSCULAR; INTRAVENOUS; SUBCUTANEOUS ONCE
Status: DISCONTINUED | OUTPATIENT
Start: 2022-12-14 | End: 2022-12-14 | Stop reason: HOSPADM

## 2022-12-14 RX ORDER — DEXTROAMPHETAMINE SACCHARATE, AMPHETAMINE ASPARTATE, DEXTROAMPHETAMINE SULFATE AND AMPHETAMINE SULFATE 3.75; 3.75; 3.75; 3.75 MG/1; MG/1; MG/1; MG/1
TABLET ORAL
COMMUNITY
Start: 2022-11-03

## 2022-12-14 RX ORDER — HYDROMORPHONE HYDROCHLORIDE 1 MG/ML
0.5 INJECTION, SOLUTION INTRAMUSCULAR; INTRAVENOUS; SUBCUTANEOUS ONCE
Status: COMPLETED | OUTPATIENT
Start: 2022-12-14 | End: 2022-12-14

## 2022-12-14 RX ADMIN — SODIUM CHLORIDE, POTASSIUM CHLORIDE, SODIUM LACTATE AND CALCIUM CHLORIDE 150 ML/HR: 600; 310; 30; 20 INJECTION, SOLUTION INTRAVENOUS at 10:54

## 2022-12-14 RX ADMIN — SODIUM CHLORIDE: 900 INJECTION, SOLUTION INTRAVENOUS at 10:17

## 2022-12-14 RX ADMIN — HYDROMORPHONE HYDROCHLORIDE 0.5 MG: 1 INJECTION, SOLUTION INTRAMUSCULAR; INTRAVENOUS; SUBCUTANEOUS at 11:09

## 2022-12-14 RX ADMIN — SODIUM CHLORIDE: 900 INJECTION, SOLUTION INTRAVENOUS at 09:57

## 2022-12-14 RX ADMIN — PROPOFOL 30 MG: 10 INJECTION, EMULSION INTRAVENOUS at 10:24

## 2022-12-14 RX ADMIN — PROPOFOL 100 MG: 10 INJECTION, EMULSION INTRAVENOUS at 10:30

## 2022-12-14 RX ADMIN — SODIUM CHLORIDE: 900 INJECTION, SOLUTION INTRAVENOUS at 09:58

## 2022-12-14 RX ADMIN — PROPOFOL 30 MG: 10 INJECTION, EMULSION INTRAVENOUS at 10:21

## 2022-12-14 RX ADMIN — ONDANSETRON HYDROCHLORIDE 4 MG: 2 INJECTION, SOLUTION INTRAMUSCULAR; INTRAVENOUS at 10:41

## 2022-12-14 RX ADMIN — PROPOFOL 30 MG: 10 INJECTION, EMULSION INTRAVENOUS at 10:19

## 2022-12-14 RX ADMIN — PROPOFOL 150 MG: 10 INJECTION, EMULSION INTRAVENOUS at 10:15

## 2022-12-14 RX ADMIN — PROPOFOL 50 MG: 10 INJECTION, EMULSION INTRAVENOUS at 10:16

## 2022-12-14 RX ADMIN — HYDROMORPHONE HYDROCHLORIDE 0.5 MG: 1 INJECTION, SOLUTION INTRAMUSCULAR; INTRAVENOUS; SUBCUTANEOUS at 12:50

## 2022-12-14 NOTE — ANESTHESIA PREPROCEDURE EVALUATION
Relevant Problems   NEUROLOGY   (+) Depression with anxiety      GASTROINTESTINAL   (+) GERD (gastroesophageal reflux disease)      HEMATOLOGY   (+) Anemia       Anesthetic History   No history of anesthetic complications  PONV          Review of Systems / Medical History  Patient summary reviewed, nursing notes reviewed and pertinent labs reviewed    Pulmonary  Within defined limits                 Neuro/Psych   Within defined limits           Cardiovascular  Within defined limits                     GI/Hepatic/Renal  Within defined limits   GERD    Renal disease: CRI       Endo/Other  Within defined limits           Other Findings              Physical Exam    Airway  Mallampati: II  TM Distance: > 6 cm  Neck ROM: normal range of motion   Mouth opening: Normal     Cardiovascular  Regular rate and rhythm,  S1 and S2 normal,  no murmur, click, rub, or gallop             Dental  No notable dental hx       Pulmonary  Breath sounds clear to auscultation               Abdominal  GI exam deferred       Other Findings            Anesthetic Plan    ASA: 3  Anesthesia type: MAC            Anesthetic plan and risks discussed with: Patient

## 2022-12-14 NOTE — ANESTHESIA POSTPROCEDURE EVALUATION
Procedure(s):  ENTEROSCOPY. MAC    Anesthesia Post Evaluation      Multimodal analgesia: multimodal analgesia not used between 6 hours prior to anesthesia start to PACU discharge  Patient location during evaluation: PACU  Patient participation: complete - patient participated  Level of consciousness: awake  Pain score: 0  Pain management: adequate  Airway patency: patent  Anesthetic complications: no  Cardiovascular status: acceptable  Respiratory status: acceptable  Hydration status: acceptable  Comments: I have evaluated the patient and meets criteria for discharge from PACU. John Yates MD    Final Post Anesthesia Temperature Assessment:  Normothermia (36.0-37.5 degrees C)      INITIAL Post-op Vital signs:   Vitals Value Taken Time   /70 12/14/22 1100   Temp 37.4 °C (99.3 °F) 12/14/22 1054   Pulse 97 12/14/22 1102   Resp 19 12/14/22 1102   SpO2 98 % 12/14/22 1102   Vitals shown include unvalidated device data.

## 2022-12-14 NOTE — PROCEDURES
Travis Cameron  [id number]  1975    Endoscopy Operative Report    Procedure Type:   Endoscopy of ileal pouch     Pre-operative Diagnosis:  Crohn's disease    Post-operative Diagnosis:  Crohn's disease    Surgeon:  Jelena Foss MD    Assistant:  Endoscopy Technician-1: Moshe Meléndez    Referring Provider: Lawrence Hamilton MD    Sedation and Analgesia:  MAC anesthesia Propofol (90 mg)    Specimens Removed:  None    EBL:  0 mL. Complications: None apparent. Implants:  None. Indication For Procedure: The patient is a 45-year-old male with Crohn's disease and a [address] ([address]) who has had multiple hospitalizations for treatment of ileal pouchitis, partial small bowel obstruction, and pouch dysfunction. His gastroenterologist has been treated him with multiple agents, and he has undergone multiple endoscopic procedures. He continues to experience symptoms consistent with active disease, and his last ileoscopy was performed on 12/9/2021. Another enteroscopy via the ileostomy is indicated to assess the disease activity and the response to the current treatment. Findings: The ileal pouch mucosal did not appear to be inflamed. There was one area of apparent scarring, possibly related to previous ulceration or catheterization-associated trauma, but there were no active ulcers identified. Procedure Details:  After informed consent was obtained, the patient was positioned supine on the stretcher. Standard monitoring devices were in use, and the sedation was administered by the anesthetist as needed throughout the procedure. The Olympus pediatric videogastroscope was lubricated and inserted through the stoma in the right lower quadrant of the abdomen and into the ileal pouch (the BCIR). Retained fluid was evacuated and the pouch was carefully studied. Despite multiple attempts, I was unable to advance the scope into the small intestine proximal to the pouch. There was no identifiable point of obstruction, e.g. due to stricture, but eventually I did not think that it would be safe to continue. The scope was therefore withdrawn. There were no apparent complications, and the patient appeared to have tolerated the procedure well. At its conclusion, he was transported to the recovery area in good condition. Impression:    There was no definite abnormality of the ileal pouch. Further evaluation of the small intestine was unsuccessful. Recommendations/Plan:  The patient will need further evaluation of the stomach and the small intestine by a gastroenterologist.  Ideally, this physician should also be the person who will be taking over the medical management of the Crohn's disease after Dr. Fanta Giordano. For today, we will check a panel of laboratory studies to better assess the patient's hematologic and metabolic status.

## 2022-12-14 NOTE — DISCHARGE INSTRUCTIONS
Fanny Mcknight, [address] [facility], [address] [facility]  [phone]      Post-Procedure Instructions    Do not drive, operate dangerous machinery, sign legal documents, or conduct any important business for the rest of the day. You may resume your usual diet as tolerated. If you develop a fever with a temperature of 101.0 or higher, please call my office. Findings and Follow-up:  The ileal reservoir did not appear to be ulcerated or inflamed. Unfortunately, I was unable to advance the scope into the small intestine proximal to the pouch. I will contact you to discuss today's laboratory results and next steps, but you should also plan to follow up with Dr. Liyah Mckenna.

## 2022-12-14 NOTE — H&P
History and Physical (outpatient)    Patient: Terri Santo 56 y.o. male     Chief Complaint: Crohn's disease    History of Present Illness: The patient is a 50-year-old male with Crohn's disease and a [address] ([address]) who has had multiple hospitalizations for treatment of ileal pouchitis, partial small bowel obstruction, and pouch dysfunction. His gastroenterologist has been treated him with multiple agents, and he has undergone multiple endoscopic procedures. He continues to experience symptoms consistent with active disease, and his last ileoscopy was performed on 12/9/2021. History:  Past Medical History:   Diagnosis Date    Anal fistula     The patient no longer has an anus. Anal stenosis     The patient no longer has an anus. Chronic kidney disease     Crohn's disease ([address]     GERD (gastroesophageal reflux disease)     H/O ulcerative colitis     Symptoms began in 1996. Total proctocolectomy was performed in 2004. Patient later developed Crohn's disease. Hiatal hernia     History of pneumonia 03/2019    Hospitalized for treatment of pneumonia from 3/5/2019 to 3/10/2019. Ileal pouchitis ([address] 05/2004    The patient no longer has a pouch. Nausea & vomiting     Combination of Scopalamine patch & Zofran IV worked well in past    Numbness in right leg     Chronic. Opioid dependence ([address]     History of opioid dependence requiring a detox program.    Psychiatric disorder     depression and anxiety    Skin lesion of back 3/17/2014    Syncopal episodes        Past Surgical History:   Procedure Laterality Date    COLONOSCOPY N/A 04/12/2018    Flexible endoscopy via ileostomy (NOT a colonoscopy); Jose Miguel Morales MD.    COLONOSCOPY N/A 12/18/2018    ENTEROSCOPYperformed via ileostomy with pediatric scope; Dr. Charles Miranda at [facility] ENDOSCOPY    COLONOSCOPY N/A 12/09/2021    Flexible endoscopy via ileostomy (NOT a colonoscopy);  Jose Miguel Morales MD.    [address] CHOLECYSTECTOMY 12/2016    Dr. Macy Hu [facility]  05/02/2004    Examination under anesthesia with endoscopic evaluation of ileoanal pouch and placement of drain; Dr. Natali Armenta.  GI  05/25/2004    Ileostomy closure with small bowel resection and anastomosis; Dr. Natali Armenta.  GI  05/24/2012    Examination under anesthesia, anal dilatation, anal biopsy, and placement of draining seton; Dr. Natali Armenta.  GI  07/03/2012    Incision and drainage of perirectal abscess and rigid endoscopy of ileoanal pouch; Dr. Natali Armenta.  GI  07/31/2012    Incision and drainage of perirectal abscess, placement of draining seton, and anal dilatation; Dr. Natali Armenta.  GI  01/22/2013    Repair of anal fistulas with debridement, partial fistulectomy, and flap closure; Dr. Natali Armenta.  GI  05/17/2013    Examination under anesthesia, partial fistulotomy,  and placement of draining setons; Dr. Natali Armenta.  GI  08/06/2013    Anal fistulotomy and application of ACell micromatrix; Dr. Natali Armenta.  GI  02/20/2014    Examination under anesthesia and dilation of anal canal with rigid proctoscopy; Libia Trotter MD.    [facility]  04/29/2015    Creation of Curtistine Po continent intestinal reservoir (BCIR), abdominoperineal resection of ileoanal J-pouch; lysis of adhesions, and gastrostomy; Jorje Anne MD ([facility])     GI  08/07/2015    Stoma revision; Charo Anne MD ([facility])     GI  10/29/2015    Extensive lysis of adhesions, resection of continent intestinal reservoir, repair of serosal tears, and creation of end-ileostomy; Dr. Natali Armenta.  GI  03/14/2017    Laparotomy, extensive lysis of adhesions and revision of ileostomy; Dr. Natali Armenta.  GI  07/11/2019    EGD with biopsies; Dr. Emily Cobian [facility]  07/11/2019    Flexible endoscopy of ileal pouch; Dr. Natali Armenta.      ORTHOPAEDIC  2008    Left ACL repair    HX OTHER SURGICAL  11/29/2017    Conversion of end-ileostomy to BCIR ([address]); [place], [place]. HX UROLOGICAL  2017    Cystoscopy and placement of bilateral temporary ureteral catheters; Peter Hare MD.    [place] ABDOMEN SURGERY [address] UNLISTED  2004    Total proctocolectomy with creation of ileoanal J-pouch and loop ileostomy; Dr. Lazarus Gasmen. Family History   Problem Relation Age of Onset    Cancer Father     Asthma Neg Hx     Diabetes Neg Hx     Heart Disease Neg Hx     Hypertension Neg Hx     Stroke Neg Hx     Malignant Hyperthermia Neg Hx     Pseudocholinesterase Deficiency Neg Hx     Delayed Awakening Neg Hx     Post-op Nausea/Vomiting Neg Hx      Social History     Socioeconomic History    Marital status:      Spouse name: Not on file    Number of children: Not on file    Years of education: Not on file    Highest education level: Not on file   Occupational History    Not on file   Tobacco Use    Smoking status: Every Day     Packs/day: 0.50     Years: 7.00     Pack years: 3.50     Types: Cigarettes     Last attempt to quit: 2017     Years since quittin.9    Smokeless tobacco: Never   Substance and Sexual Activity    Alcohol use: No    Drug use: No    Sexual activity: Not on file   Other Topics Concern    Not on file   Social History Narrative    Not on file     Social Determinants of Health     Financial Resource Strain: Not on file   Food Insecurity: Not on file   Transportation Needs: Not on file   Physical Activity: Not on file   Stress: Not on file   Social Connections: Not on file   Intimate Partner Violence: Not on file   Housing Stability: Not on file       Allergies: Allergies   Allergen Reactions    Remicade [Infliximab] Anaphylaxis and Shortness of Breath    Bactrim [Sulfamethoprim Ds] Other (comments)     Increased GI distress.     Morphine Nausea Only     Tolerates Dilaudid    Nsaids (Non-Steroidal Anti-Inflammatory Drug) Other (comments)     Stomach ulcers       Current Medications:  Prior to Admission Medications   Prescriptions Last Dose Informant Patient Reported? Taking? Saccharomyces boulardii (FLORASTOR) 250 mg capsule   Yes No   Sig: Take 250 mg by mouth nightly. Ustekinumab (STELARA) 90 mg/mL injection  Self Yes No   Si mg by SubCUTAneous route. Every 8 weeks    acetaminophen (TYLENOL) 500 mg tablet   Yes No   Sig: Take 500 mg by mouth four (4) times daily as needed (mild pain, heache (usually has headaches 1 week after Stelara)). cholestyramine-aspartame (QUESTRAN LIGHT) 4 gram packet   No No   Sig: Take 1 Packet by mouth three (3) times daily (with meals). Use the presciptionn that you already have. The electronic version is for documentation purposes. cyanocobalamin (VITAMIN B-12) 1,000 mcg/mL injection  Self Yes No   Si,000 mcg by IntraMUSCular route every . Indications: Once weekly   diazepam (VALIUM) 5 mg tablet  Self Yes No   Sig: Take 5 mg by mouth two (2) times a day. diclofenac (VOLTAREN) 1 % gel   Yes No   Sig: Apply 2 g to affected area four (4) times daily as needed. dicyclomine (BENTYL) 10 mg capsule  Self Yes No   Sig: Take 10 mg by mouth every six (6) hours as needed. ergocalciferol (ERGOCALCIFEROL) 50,000 unit capsule  Self Yes No   Sig: Take 50,000 Units by mouth every . Patient not taking: Reported on 2021   eszopiclone (Lunesta) 3 mg tablet   Yes No   Sig: Take 3 mg by mouth nightly. multivitamin with iron (FLINTSTONES) chewable tablet   No No   Sig: Take 1 Tab by mouth daily. naloxone (NARCAN) 4 mg/actuation nasal spray   No No   Sig: Use 1 spray intranasally, then discard. Repeat with new spray every 2 min as needed for opioid overdose symptoms, alternating nostrils. Patient not taking: Reported on 2021   ondansetron hcl (ZOFRAN) 8 mg tablet   No No   Sig: Take 1 Tab by mouth every twelve (12) hours as needed (Mild Nausea).    pantoprazole (PROTONIX) 40 mg tablet   No No   Sig: Take 1 Tab by mouth daily.   pregabalin (LYRICA) 100 mg capsule  Self Yes No   Sig: Take 100 mg by mouth four (4) times daily. prochlorperazine (COMPAZINE) 10 mg tablet   Yes No   Sig: Take 10 mg by mouth two (2) times daily as needed (Moderate Nausea). promethazine (PHENERGAN) 25 mg tablet  Self Yes No   Sig: Take 25 mg by mouth every six (6) hours as needed (Severe Nausea). sucralfate (CARAFATE) 1 gram tablet   No No   Sig: Take 1 Tab by mouth Before breakfast, lunch, dinner and at bedtime. Use the prescription that you already have. The electronic prescription is for documentation purposes. zolpidem (Ambien) 10 mg tablet   Yes No   Sig: Take  by mouth nightly as needed for Sleep. Facility-Administered Medications: None       Current Facility-Administered Medications   Medication Dose Route Frequency    0.9% sodium chloride infusion  100 mL/hr IntraVENous CONTINUOUS    sodium chloride (NS) flush 5-40 mL  5-40 mL IntraVENous Q8H    sodium chloride (NS) flush 5-40 mL  5-40 mL IntraVENous PRN    midazolam (VERSED) injection 0.25-5 mg  0.25-5 mg IntraVENous Multiple    fentaNYL citrate (PF) injection 12.5-25 mcg  12.5-25 mcg IntraVENous Multiple    naloxone (NARCAN) injection 0.4 mg  0.4 mg IntraVENous Multiple    flumazeniL (ROMAZICON) 0.1 mg/mL injection 0.2 mg  0.2 mg IntraVENous Multiple    simethicone (MYLICON) 54AS/7.9WR oral drops 80 mg  1.2 mL Oral Multiple    atropine injection 0.5 mg  0.5 mg IntraVENous ONCE PRN    EPINEPHrine (ADRENALIN) 0.1 mg/mL syringe 1 mg  1 mg Endoscopically ONCE PRN            Physical Exam:  There were no vitals taken for this visit. GENERAL:  No apparent distress. LUNGS:  Clear to auscultation bilaterally. HEART:  Regular rate and rhythm with no murmurs, gallops, or rubs. ABDOMEN: Ileostomy present. Multiple scars. NEUROLOGIC: Alert and oriented. No gross deficits.       Alerts:    Hospital Problems  Date Reviewed: 12/14/2022   None    Laboratory:    No results for input(s): HGB, HCT, WBC, PLT, INR, BUN, CREA, K, CRCLT, HGBEXT, HCTEXT, PLTEXT, INREXT, HGBEXT, HCTEXT, PLTEXT, INREXT in the last 72 hours. No lab exists for component: PTT, PT    Assessment:  Another enteroscopy is indicated in order to assess the disease activity and the response to the current treatment. Plan:  The risks, benefits, and alternatives were reviewed with the patient, and he has agreed to proceed with the procedure. The plan for this patient includes MAC.

## 2023-03-06 ENCOUNTER — OFFICE VISIT (OUTPATIENT)
Dept: INTERNAL MEDICINE CLINIC | Age: 48
End: 2023-03-06
Payer: COMMERCIAL

## 2023-03-06 VITALS
OXYGEN SATURATION: 99 % | BODY MASS INDEX: 26.55 KG/M2 | HEART RATE: 126 BPM | DIASTOLIC BLOOD PRESSURE: 64 MMHG | SYSTOLIC BLOOD PRESSURE: 120 MMHG | TEMPERATURE: 98.2 F | WEIGHT: 196 LBS | HEIGHT: 72 IN | RESPIRATION RATE: 18 BRPM

## 2023-03-06 DIAGNOSIS — K50.018 CROHN'S DISEASE OF SMALL INTESTINE WITH OTHER COMPLICATION (HCC): ICD-10-CM

## 2023-03-06 DIAGNOSIS — Z83.3 FAMILY HISTORY OF DIABETES MELLITUS: ICD-10-CM

## 2023-03-06 DIAGNOSIS — Z12.5 PROSTATE CANCER SCREENING: ICD-10-CM

## 2023-03-06 DIAGNOSIS — Z00.00 ENCOUNTER FOR MEDICAL EXAMINATION TO ESTABLISH CARE: Primary | ICD-10-CM

## 2023-03-06 DIAGNOSIS — E61.1 IRON DEFICIENCY: ICD-10-CM

## 2023-03-06 DIAGNOSIS — R00.0 TACHYCARDIA: ICD-10-CM

## 2023-03-06 DIAGNOSIS — F11.20 CHRONIC NARCOTIC DEPENDENCE (HCC): ICD-10-CM

## 2023-03-06 DIAGNOSIS — Z13.220 LIPID SCREENING: ICD-10-CM

## 2023-03-06 DIAGNOSIS — R53.82 CHRONIC FATIGUE: ICD-10-CM

## 2023-03-06 DIAGNOSIS — F90.9 ATTENTION DEFICIT HYPERACTIVITY DISORDER (ADHD), UNSPECIFIED ADHD TYPE: ICD-10-CM

## 2023-03-06 DIAGNOSIS — E53.8 VITAMIN B 12 DEFICIENCY: ICD-10-CM

## 2023-03-06 PROCEDURE — 99204 OFFICE O/P NEW MOD 45 MIN: CPT | Performed by: INTERNAL MEDICINE

## 2023-03-06 NOTE — PROGRESS NOTES
ADVISED PATIENT OF THE FOLLOWING HEALTH MAINTAINCE DUE  Health Maintenance Due   Topic Date Due    Hepatitis C Screening  Never done    COVID-19 Vaccine (1) Never done    Pneumococcal 0-64 years (1 - PCV) Never done    Depression Monitoring  Never done    DTaP/Tdap/Td series (1 - Tdap) Never done    Lipid Screen  03/11/2016    Flu Vaccine (1) Never done      Chief Complaint   Patient presents with    Establish Care       1. \"Have you been to the ER, urgent care clinic since your last visit? Hospitalized since your last visit? \" No    2. \"Have you seen or consulted any other health care providers outside of the [address] [place] [address] since your last visit? \" No     3. For patients aged 39-70: Has the patient had a colonoscopy / FIT/ Cologuard? No      If the patient is female:    4. For patients aged 41-77: Has the patient had a mammogram within the past 2 years? NA - based on age or sex      11. For patients aged 21-65: Has the patient had a pap smear?  NA - based on age or sex

## 2023-03-06 NOTE — LETTER
CONTROLLED SUBSTANCE MEDICATION AGREEMENT  Patient Name: James Hand  Patient YOB: 1975     I understand, that controlled substance medications may be used to help better manage my symptoms and to improve my ability to function at home, work and in social settings. However, I also understand that these medications do have risks, which have been discussed with me, including possible development of physical or psychological dependence. I understand that successful treatment requires mutual trust and honesty between me and my provider. I understand and agree that following this Medication Agreement is necessary in continuing my provider-patient relationship and the success of my treatment plan. Explanation from my Provider: Benefits and Goals of Controlled Substance Medications: There are two potential goals for your treatment: (1) decreased pain and suffering (2) improved daily life functions. There are many possible treatments for your chronic condition(s). Alternatives such as physical therapy, yoga, massage, home daily exercise, meditation, relaxation techniques, injections, chiropractic manipulations, surgery, cognitive therapy, hypnosis and many medications that are not habit-forming may be used. Use of controlled substance medications may be helpful, but they are unlikely to resolve all symptoms or restore all function. Explanation from my Provider: Risks of Controlled Substance Medications:   Opioid pain medications: These medications can lead to problems such as addiction/dependence, sedation, lightheadedness/dizziness, memory issues, falls, constipation, nausea, or vomiting. They may also impair the ability to drive or operate machinery. Additionally, these medications may lower testosterone levels, leading to loss of bone strength, stamina and sex drive.   They may cause problems with breathing, sleep apnea and reduced coughing, which is especially dangerous for patients with lung disease. Overdose or dangerous interactions with alcohol and other medications may occur, leading to death. Hyperalgesia may develop, which means patients receiving opioids for the treatment of pain may become more sensitive to certain painful stimuli, and in some cases, experience pain from ordinarily non-painful stimuli. Women between the ages of 14-53 who could become pregnant should carefully weigh the risks and benefits of opioids with their physicians, as these medications increase the risk of pregnancy complications, including miscarriage,  delivery and stillbirth. It is also possible for babies to be born addicted to opioids. Opioid dependence withdrawal symptoms may include; feelings of uneasiness, increased pain, irritability, belly pain, diarrhea, sweats and goose-flesh. Testosterone replacement therapy:  Potential side effects include increased risk of stroke and heart attack, blood clots, increased blood pressure, increased cholesterol, enlarged prostate, sleep apnea, irritability/aggression and other mood disorders, and decreased fertility. Kalyani Escudero (1975)             Page 1 of 4    Initials:_______    Benzodiazepines and non-benzodiazepine sleep medications: These medications can lead to problems such as addiction/dependence, sedation, fatigue, lightheadedness, dizziness, incoordination, falls, depression, hallucinations, and impaired judgment, memory and concentration. The ability to drive and operate machinery may also be affected. Abnormal sleep-related behaviors have been reported, including sleepwalking, driving, making telephone calls, eating, or having sex while not fully awake. These medications can suppress breathing and worsen sleep apnea, particularly when combined with alcohol or other sedating medications, potentially leading to death. Dependence withdrawal symptoms may include tremors, anxiety, hallucinations and seizures.    Stimulants:  Common adverse effects include addiction/dependence, increased blood pressure and heart rate, decreased appetite, nausea, involuntary weight loss, insomnia,  irritability, and headaches. These risks may increase when these medications are combined with other stimulants, such as caffeine pills or energy drinks, certain weight loss supplements and oral decongestants. Dependence withdrawal symptoms may include depressed mood, loss of interest, suicidal thoughts, anxiety, fatigue, appetite changes and agitation. I agree and understand that I and my prescriber have the following rights and responsibilities regarding my treatment plan:   1. MY RIGHTS:  To be informed of my treatment and medication plan. To be an active participant in my health and wellbeing. 2. MY RESPONSIBILITY AND UNDERSTANDING FOR USE OF MEDICATIONS   I will take medications at the dose and frequency as directed. For my safety, I will not increase or change how I take my medications without the recommendation of my healthcare provider.  I will actively participate in any program recommended by my provider which may improve function, including social, physical, psychological programs.  I will not take my medications with alcohol or other drugs not prescribed to me. I understand that drinking alcohol with my medications increases the chances of side effects, including reduced breathing rate and could lead to personal injury when operating machinery.  I understand that if I have a history of substance use disorders, including alcohol or other illicit drugs, that I may be at increased risk of addiction to my medications.  I agree to notify my provider immediately if I should become pregnant so that my treatment plan can be adjusted.    I agree and understand that I shall only receive controlled substance medications from the prescriber that signed this agreement unless there is written agreement among other prescribers of controlled substances outlining the responsibility of the medications being prescribed.  I understand that the if the controlled medication is not helping to achieve goals, the dosage may be tapered and no longer prescribed. 3. MY RESPONSIBILITY FOR COMMUNICATION / PRESCRIPTION RENEWALS   I agree that all controlled substance medications that I take will be prescribed only by my provider. If another healthcare provider prescribes me medication in an emergency, I will notify my provider within seventy-two (72) hours. Shayla Pérez (1975)             Page 2 of 4    Initials:_______  Lewis Hernandez I will arrange for refills at the prescribed interval ONLY during regular office hours. I will not ask for refills earlier than agreed, after-hours, on holidays or weekends. Refills may take up to 72 hours for processing and prescriptions to reach the pharmacy.  I will inform my other health care providers that I am taking these medications and of the existence of this [id number]. In the event of an emergency, I will provide the same information to the emergency department prescribers.  I will keep my provider updated on the pharmacy I am using for controlled medication prescription filling. 4. MY RESPONSIBILITY FOR PROTECTING MEDICATIONS   I will protect my prescriptions and medications. I understand that lost or misplaced prescriptions will not be replaced.  I will keep medications only for my own use and will not share them with others. I will keep all medications away from children.  I agree that if my medications are adjusted or discontinued, I will properly dispose of any remaining medications. I understand that I will be required to dispose of any remaining controlled medications as, directed by my prescriber, prior to being provided with any prescriptions for other controlled medications.   Medication drop box locations can be found at: [employer]  5. MY RESPONSIBILITY WITH ILLEGAL DRUGS    I will not use illegal or street drugs or another person's prescription medications not prescribed to me.  If there are identified addiction type symptoms, then referral to a program may be provided by my provider and I agree to follow through with this recommendation. 6. MY RESPONSIBILITY FOR COOPERATION WITH INVESTIGATIONS   I understand that my provider will comply with any applicable law and may discuss my use and/or possible misuse/abuse of controlled substances and alcohol, as appropriate, with any health care provider involved in my care, pharmacist, or legal authority.  I authorize my provider and pharmacy to cooperate fully with law enforcement agencies (as permitted by law) in the investigation of any possible misuse, sale, or other diversion of my controlled substances.  I agree to waive any applicable privilege or right of privacy or confidentiality with respect to these authorizations. 7. PROVIDERS RIGHT TO MONITOR FOR SAFETY: PRESCRIPTION MONITORING / DRUG TESTING   I consent to drug/toxicology screening and will submit to a drug screen upon my providers request to assure I am only taking the prescribed drugs for my safety monitoring. I understand that a drug screen is a laboratory test in which a sample of my urine, blood or saliva is checked to see what drugs I have been taking. This may entail an observed urine specimen, which means that a nurse or other health care provider may watch me provide urine, and I will cooperate if I am asked to provide an observed specimen. Jeremie Diaz (1975)             Page 3 of 4    Initials:_______  Darrin Pena I understand that my provider will check a copy of my State Prescription Monitoring Program () Report in order to safely prescribe medications.      Pill Counts: I consent to pill counts when requested. I may be asked to bring all my prescribed controlled substance medications, in their original bottles, to all of my scheduled appointments. In addition, my provider may ask me to come to the practice at any time for a random pill count. 8. TERMINATION OF THIS AGREEMENT   For my safety, my prescriber has the right to stop prescribing controlled substance medications and may end this agreement.  Conditions that may result in termination of this agreement:  a. I do not show any improvement in pain, or my activity has not improved. b. I develop rapid tolerance or loss of improvement, as described in my treatment plan.  c. I develop significant side effects from the medication. d. My behavior is not consistent with the responsibilities outlined above, thereby causing safety concerns to continue prescribing controlled substance medications. e. I fail to follow the terms of this agreement. f. Other:____________________________     UNDERSTANDING THIS MEDICATION AGREEMENT:    I have read the above and have had all my questions answered. For chronic disease management, I know that my symptoms can be managed with many types of treatments. A chronic medication trial may be part of my treatment, but I must be an active participant in my care. Medication therapy is only one part of my symptom management plan. In some cases, there may be limited scientific evidence to support the chronic use of certain medications to improve symptoms and daily function. Furthermore, in certain circumstances, there may be scientific information that suggests that the use of chronic controlled substances may worsen my symptoms and increase my risk of unintentional death directly related to this medication therapy. I know that if my provider feels my risk from controlled medications is greater than my benefit, I will have my controlled substance medication(s) compassionately lowered or removed altogether. I further agree to allow this office to contact my HIPAA contact if there are concerns about my safety and use of the controlled medications. I have agreed to use the prescribed controlled substance medications to me as instructed by my provider and as stated in this Medication Agreement. My initial on each page and my signature below shows that I have read each page and I have had the opportunity to ask questions with answers provided by my provider.       Patient Name (Printed): _____________________________________    Patient Signature:  ______________________   Date: _____________      Prescriber Name (Printed): ___________________________________    Prescriber Signature: _____________________  Date: _____________     Jeremie Diaz (1975)             Page 4 of 4

## 2023-03-06 NOTE — PROGRESS NOTES
HISTORY OF PRESENT ILLNESS  Pascual Jang is a 56 y.o. male. Patient was seen to establish care. PMH of chronic fatigue, crohn's of the small intestine, depression, anxiety and ADHD. Patient is being followed by mental health. Is on Adderall and Xanax. Has a significant history of  related concerns. In the last few years has now had a stoma placed to the right lower abd. He catheterizes himself several times a day. This is one of the many surgeries for what was thought to be US at one time. Is also on chronic pain control. Sees Dr. Doyle Barros, but was originally being followed by Dr. Sussy Ospina for GI and PCP. Has been using B 12 injections. Has bene having ongoing concerns with tachycardia. Is being followed by cardiology. Stress test was negative. Thoughts that this is likely due for the need to have fluid resuscitation. IV fluids have been given in the past and resolved. Was mentioned to get a PORT for intermittent replacement. Has a follow up coming up with Dr. Lukasz Darden   Is a smoker. No cough or SOB  Has had some ongoing numbness and tingling to the toes at times. And suffers from [address] [employer]. Visit Vitals  /64 (BP 1 Location: Right arm, BP Patient Position: Sitting, BP Cuff Size: Adult)   Pulse (!) 126   Temp 98.2 °F (36.8 °C) (Oral)   Resp 18   Ht 6' (1.829 m)   Wt 196 lb (88.9 kg)   SpO2 99%   BMI 26.58 kg/m²     Past Medical History:   Diagnosis Date    Anal fistula     The patient no longer has an anus. Anal stenosis     The patient no longer has an anus. Chronic kidney disease     Crohn's disease ([address]     GERD (gastroesophageal reflux disease)     H/O ulcerative colitis     Symptoms began in 1996. Total proctocolectomy was performed in 2004. Patient later developed Crohn's disease. Hiatal hernia     History of pneumonia 03/2019    Hospitalized for treatment of pneumonia from 3/5/2019 to 3/10/2019. [facility]leal pouchitis [address] [facility] 05/2004    The patient no longer has a pouch.     Nausea & vomiting Combination of Scopalamine patch & Zofran IV worked well in past    Numbness in right leg     Chronic. Opioid dependence ([address]     History of opioid dependence requiring a detox program.    Psychiatric disorder     depression and anxiety    Skin lesion of back 3/17/2014    Syncopal episodes      Past Surgical History:   Procedure Laterality Date    COLONOSCOPY N/A 04/12/2018    Flexible endoscopy via ileostomy (NOT a colonoscopy); Phu Caputo MD.    COLONOSCOPY N/A 12/18/2018    ENTEROSCOPYperformed via ileostomy with pediatric scope; Dr. Tano Whitfield at [address] [place] [address] ENDOSCOPY    COLONOSCOPY N/A 12/09/2021    Flexible endoscopy via ileostomy (NOT a colonoscopy); Phu Caputo MD.    Erika [facility]  12/2016    Dr. Shukla [facility]  05/02/2004    Examination under anesthesia with endoscopic evaluation of ileoanal pouch and placement of drain; Dr. Tano Whitfield.  GI  05/25/2004    Ileostomy closure with small bowel resection and anastomosis; Dr. Tano Whitfield.  GI  05/24/2012    Examination under anesthesia, anal dilatation, anal biopsy, and placement of draining seton; Dr. Tano Whitfield.  GI  07/03/2012    Incision and drainage of perirectal abscess and rigid endoscopy of ileoanal pouch; Dr. Tano Whitfield.  GI  07/31/2012    Incision and drainage of perirectal abscess, placement of draining seton, and anal dilatation; Dr. Tano Whitfield.  GI  01/22/2013    Repair of anal fistulas with debridement, partial fistulectomy, and flap closure; Dr. Tano Whitfield.  GI  05/17/2013    Examination under anesthesia, partial fistulotomy,  and placement of draining setons; Dr. Tano Whitfield.  GI  08/06/2013    Anal fistulotomy and application of ACell micromatrix; Dr. Tano Whitfield.  GI  02/20/2014    Examination under anesthesia and dilation of anal canal with rigid proctoscopy;  Dillon Valencia MD.    [facility]  04/29/2015    Creation of Romayne Apo continent intestinal reservoir (BCIR), abdominoperineal resection of ileoanal J-pouch; lysis of adhesions, and gastrostomy; Sharyn Mcginnis MD ([place], [place])    HX GI  08/07/2015    Stoma revision; Eunice Mccloud. Lili Mcginnis MD ([place], [place])    HX GI  10/29/2015    Extensive lysis of adhesions, resection of continent intestinal reservoir, repair of serosal tears, and creation of end-ileostomy; Dr. Catia Thurston. HX GI  03/14/2017    Laparotomy, extensive lysis of adhesions and revision of ileostomy; Dr. Catia Thurston.  GI  07/11/2019    EGD with biopsies; Dr. Amanda Joseph GI  07/11/2019    Flexible endoscopy of ileal pouch; Dr. Catia Thurston. HX ORTHOPAEDIC  2008    Left ACL repair    HX OTHER SURGICAL  11/29/2017    Conversion of end-ileostomy to BCIR ([address]); [place], [place]. HX UROLOGICAL  03/14/2017    Cystoscopy and placement of bilateral temporary ureteral catheters; Claudia Velasco MD.    [place] UNLISTED PROCEDURE ABDOMEN PERITONEUM & OMENTUM  03/25/2004    Total proctocolectomy with creation of ileoanal J-pouch and loop ileostomy; Dr. Catia Thurston. Family History   Problem Relation Age of Onset    Hypertension Mother     Elevated Lipids Mother     Diabetes Mother     Leukemia Mother     Gall Bladder Disease Mother     Heart Disease Father         CAD w/stent    Cancer Father         prostate ca, melnoma    Alzheimer's Disease Father     Cancer Maternal Grandfather         stomach ca    Asthma Neg Hx     Stroke Neg Hx     Malignant Hyperthermia Neg Hx     Pseudocholinesterase Deficiency Neg Hx     Delayed Awakening Neg Hx     Post-op Nausea/Vomiting Neg Hx      Outpatient Encounter Medications as of 3/6/2023   Medication Sig Dispense Refill    risankizumab-rzaa (SKYRIZI SC) by SubCUTAneous route. lemborexant ([place]) 5 mg tab Take  by mouth. dextroamphetamine-amphetamine (ADDERALL) 15 mg tablet TAKE 1 TABLET BY MOUTH TWICE DAILY.  DO NOT TAKE SECOND DOSE PAST 2 PM      oxyCODONE IR (ROXICODONE) 10 mg tab immediate release tablet Take 5 mg by mouth three (3) times daily. temazepam (RESTORIL) 15 mg capsule TAKE 2 CAPSULES BY MOUTH EVERY NIGHT AT BEDTIME      sucralfate (CARAFATE) 1 gram tablet Take 1 Tab by mouth Before breakfast, lunch, dinner and at bedtime. Use the prescription that you already have. The electronic prescription is for documentation purposes. (Patient taking differently: Take 1 g by mouth Before breakfast, lunch, dinner and at bedtime. Use the prescription that you already have. The electronic prescription is for documentation purposes. ) 1 Tab 0    naloxone (NARCAN) 4 mg/actuation nasal spray Use 1 spray intranasally, then discard. Repeat with new spray every 2 min as needed for opioid overdose symptoms, alternating nostrils. 1 Each 0    ondansetron hcl (ZOFRAN) 8 mg tablet Take 1 Tab by mouth every twelve (12) hours as needed (Mild Nausea). 15 Tab 0    multivitamin with iron (FLINTSTONES) chewable tablet Take 1 Tab by mouth daily. 30 Tab 2    Saccharomyces boulardii (FLORASTOR) 250 mg capsule Take 250 mg by mouth nightly. diclofenac (VOLTAREN) 1 % gel Apply 2 g to affected area four (4) times daily as needed. acetaminophen (TYLENOL) 500 mg tablet Take 500 mg by mouth four (4) times daily as needed (mild pain, heache (usually has headaches 1 week after Stelara)). promethazine (PHENERGAN) 25 mg tablet Take 25 mg by mouth every six (6) hours as needed (Severe Nausea). dicyclomine (BENTYL) 10 mg capsule Take 10 mg by mouth every six (6) hours as needed. ergocalciferol (ERGOCALCIFEROL) 50,000 unit capsule Take 50,000 Units by mouth every Sunday. pregabalin (LYRICA) 100 mg capsule Take 100 mg by mouth three (3) times daily. cyanocobalamin (VITAMIN B12) 1,000 mcg/mL injection 1,000 mcg by IntraMUSCular route every Sunday. Indications: Once weekly      [DISCONTINUED] eszopiclone (LUNESTA) 3 mg tablet Take 3 mg by mouth nightly.  (Patient not taking: Reported on 12/14/2022)      [DISCONTINUED] zolpidem (AMBIEN) 10 mg tablet Take  by mouth nightly as needed for Sleep. [DISCONTINUED] cholestyramine-aspartame (QUESTRAN LIGHT) 4 gram packet Take 1 Packet by mouth three (3) times daily (with meals). Use the presciptionn that you already have. The electronic version is for documentation purposes. (Patient not taking: Reported on 12/14/2022) 1 Each 0    [DISCONTINUED] pantoprazole (PROTONIX) 40 mg tablet Take 1 Tab by mouth daily. (Patient not taking: Reported on 3/6/2023) 30 Tab 0    [DISCONTINUED] prochlorperazine (COMPAZINE) 10 mg tablet Take 10 mg by mouth two (2) times daily as needed (Moderate Nausea). (Patient not taking: Reported on 3/6/2023)      [DISCONTINUED] diazePAM (VALIUM) 5 mg tablet Take 5 mg by mouth two (2) times a day. No facility-administered encounter medications on file as of 3/6/2023. Establish Care  Pertinent negatives include no chest pain and no abdominal pain. Review of Systems   Constitutional:  Positive for malaise/fatigue. Negative for fever. Respiratory: Negative. Cardiovascular:  Positive for palpitations. Negative for chest pain and leg swelling. Gastrointestinal:  Negative for abdominal pain. Ongoing loose stool    Musculoskeletal: Negative. Neurological: Negative. Psychiatric/Behavioral:  The patient is nervous/anxious. Physical Exam  Vitals and nursing note reviewed. Cardiovascular:      Rate and Rhythm: Normal rate and regular rhythm. Pulmonary:      Effort: Pulmonary effort is normal.      Breath sounds: Normal breath sounds. Abdominal:      General: Bowel sounds are normal.      Palpations: Abdomen is soft. Comments: Clean stoma   Musculoskeletal:      Right lower leg: No edema. Left lower leg: No edema. Skin:     General: Skin is warm. Neurological:      Mental Status: He is alert and oriented to person, place, and time.    Psychiatric:         Behavior: Behavior normal. ASSESSMENT and PLAN  Diagnoses and all orders for this visit:    1. Encounter for medical examination to establish care    2. Attention deficit hyperactivity disorder (ADHD), unspecified ADHD type        -     is being followed by mental health   3. Tachycardia  -     CBC WITH AUTOMATED DIFF; Future  -     TSH 3RD GENERATION; Future    4. Vitamin B 12 deficiency  -     VITAMIN B12; Future    5. Iron deficiency  -     IRON PROFILE; Future    6. Family history of diabetes mellitus  -     HEMOGLOBIN A1C WITH EAG; Future    7. Lipid screening  -     LIPID PANEL; Future    8. Prostate cancer screening  -     PSA, DIAGNOSTIC (PROSTATE SPECIFIC AG); Future    9. Chronic fatigue    10. Chronic narcotic dependence ([facility]        -    completed pain control contract.    11. Crohn's disease of small intestine with other complication ([email or web address]      Follow-up and Dispositions    Return in about 3 months (around 6/6/2023), or if symptoms worsen or fail to improve.       lab results and schedule of future lab studies reviewed with patient  reviewed diet, exercise and weight control  cardiovascular risk and specific lipid/LDL goals reviewed  reviewed medications and side effects in detail

## 2023-03-13 DIAGNOSIS — Z20.822 CLOSE EXPOSURE TO COVID-19 VIRUS: Primary | ICD-10-CM

## 2023-04-17 ENCOUNTER — TRANSCRIBE ORDER (OUTPATIENT)
Dept: GENERAL RADIOLOGY | Age: 48
End: 2023-04-17

## 2023-04-17 ENCOUNTER — HOSPITAL ENCOUNTER (OUTPATIENT)
Dept: GENERAL RADIOLOGY | Age: 48
Discharge: HOME | End: 2023-04-17
Attending: INTERNAL MEDICINE
Payer: COMMERCIAL

## 2023-04-17 DIAGNOSIS — R06.02 SOB (SHORTNESS OF BREATH): Primary | ICD-10-CM

## 2023-04-17 DIAGNOSIS — R06.02 SOB (SHORTNESS OF BREATH): ICD-10-CM

## 2023-04-17 PROCEDURE — 71046 X-RAY EXAM CHEST 2 VIEWS: CPT

## 2023-04-18 LAB
BASOPHILS # BLD AUTO: 0 X10E3/UL (ref 0–0.2)
BASOPHILS NFR BLD AUTO: 0 %
CHOLEST SERPL-MCNC: 218 MG/DL (ref 100–199)
EOSINOPHIL # BLD AUTO: 0.1 X10E3/UL (ref 0–0.4)
EOSINOPHIL NFR BLD AUTO: 1 %
ERYTHROCYTE [DISTWIDTH] IN BLOOD BY AUTOMATED COUNT: 12.4 % (ref 11.6–15.4)
EST. AVERAGE GLUCOSE BLD GHB EST-MCNC: 120 MG/DL
HBA1C MFR BLD: 5.8 % (ref 4.8–5.6)
HCT VFR BLD AUTO: 47.5 % (ref 37.5–51)
HDLC SERPL-MCNC: 50 MG/DL
HGB BLD-MCNC: 15.8 G/DL (ref 13–17.7)
IMM GRANULOCYTES # BLD AUTO: 0 X10E3/UL (ref 0–0.1)
IMM GRANULOCYTES NFR BLD AUTO: 0 %
IMP & REVIEW OF LAB RESULTS: NORMAL
IRON SATN MFR SERPL: 13 % (ref 15–55)
IRON SERPL-MCNC: 59 UG/DL (ref 38–169)
LDLC SERPL CALC-MCNC: 131 MG/DL (ref 0–99)
LYMPHOCYTES # BLD AUTO: 2.1 X10E3/UL (ref 0.7–3.1)
LYMPHOCYTES NFR BLD AUTO: 15 %
MCH RBC QN AUTO: 31.1 PG (ref 26.6–33)
MCHC RBC AUTO-ENTMCNC: 33.3 G/DL (ref 31.5–35.7)
MCV RBC AUTO: 94 FL (ref 79–97)
MONOCYTES # BLD AUTO: 0.4 X10E3/UL (ref 0.1–0.9)
MONOCYTES NFR BLD AUTO: 3 %
NEUTROPHILS # BLD AUTO: 11.6 X10E3/UL (ref 1.4–7)
NEUTROPHILS NFR BLD AUTO: 81 %
PLATELET # BLD AUTO: 210 X10E3/UL (ref 150–450)
PSA SERPL-MCNC: 0.8 NG/ML (ref 0–4)
RBC # BLD AUTO: 5.08 X10E6/UL (ref 4.14–5.8)
TIBC SERPL-MCNC: 462 UG/DL (ref 250–450)
TRIGL SERPL-MCNC: 206 MG/DL (ref 0–149)
TSH SERPL DL<=0.005 MIU/L-ACNC: 1.35 UIU/ML (ref 0.45–4.5)
UIBC SERPL-MCNC: 403 UG/DL (ref 111–343)
VIT B12 SERPL-MCNC: 408 PG/ML (ref 232–1245)
VLDLC SERPL CALC-MCNC: 37 MG/DL (ref 5–40)
WBC # BLD AUTO: 14.4 X10E3/UL (ref 3.4–10.8)

## 2023-04-18 NOTE — PROGRESS NOTES
WBC up and so are his neutrophils. Was he or is he having a flare? Lipids remain elevated. Please make sure he is following a low fat diet and lean and vegetable like   Iron slightly low, encourage green leafy vegetables to diet   Is now a pre DM. Watch his carb and sugar intake. Keep this low.

## 2023-04-26 DIAGNOSIS — F11.20 CHRONIC NARCOTIC DEPENDENCE (HCC): ICD-10-CM

## 2023-04-26 RX ORDER — OXYCODONE HYDROCHLORIDE 10 MG/1
5 TABLET ORAL 3 TIMES DAILY
Qty: 45 TABLET | Refills: 0 | Status: SHIPPED | OUTPATIENT
Start: 2023-04-26 | End: 2023-05-26

## 2023-04-26 NOTE — TELEPHONE ENCOUNTER
From: Vimal Louis  To:  Office of Latanya Howard  Sent: 4/26/2023 8:31 AM EDT  Subject: Medication Renewal Request    Refills have been requested for the following medications:     oxyCODONE IR (ROXICODONE) 10 mg tab immediate release tablet [Ivet Hopkins]    Preferred pharmacy: [facility] #[id number] [place] [id number]

## 2023-05-25 DIAGNOSIS — K50.018 CROHN'S DISEASE OF SMALL INTESTINE WITH OTHER COMPLICATION (HCC): Primary | ICD-10-CM

## 2023-05-25 DIAGNOSIS — K50.018 CROHN'S DISEASE OF SMALL INTESTINE WITH OTHER COMPLICATION (HCC): ICD-10-CM

## 2023-05-25 RX ORDER — OXYCODONE HYDROCHLORIDE 5 MG/1
5 TABLET ORAL EVERY 8 HOURS PRN
Qty: 90 TABLET | Refills: 0 | Status: SHIPPED | OUTPATIENT
Start: 2023-05-25 | End: 2023-06-24

## 2023-05-25 RX ORDER — OXYCODONE HYDROCHLORIDE 10 MG/1
10 TABLET ORAL EVERY 6 HOURS PRN
Qty: 120 TABLET | Refills: 0 | Status: CANCELLED | OUTPATIENT
Start: 2023-05-25 | End: 2023-06-24

## 2023-05-25 RX ORDER — OXYCODONE HYDROCHLORIDE 5 MG/1
5 TABLET ORAL EVERY 8 HOURS PRN
Qty: 90 TABLET | Refills: 0 | Status: SHIPPED | OUTPATIENT
Start: 2023-05-25 | End: 2023-05-25 | Stop reason: SDUPTHER

## 2023-06-06 ENCOUNTER — TELEMEDICINE (OUTPATIENT)
Age: 48
End: 2023-06-06
Payer: COMMERCIAL

## 2023-06-06 DIAGNOSIS — E55.9 VITAMIN D DEFICIENCY: ICD-10-CM

## 2023-06-06 DIAGNOSIS — F11.20 CHRONIC NARCOTIC DEPENDENCE (HCC): ICD-10-CM

## 2023-06-06 DIAGNOSIS — K50.018 CROHN'S DISEASE OF SMALL INTESTINE WITH OTHER COMPLICATION (HCC): Primary | ICD-10-CM

## 2023-06-06 DIAGNOSIS — K50.018 CROHN'S DISEASE OF SMALL INTESTINE WITH OTHER COMPLICATION (HCC): ICD-10-CM

## 2023-06-06 DIAGNOSIS — R53.82 CHRONIC FATIGUE, UNSPECIFIED: ICD-10-CM

## 2023-06-06 PROCEDURE — 99214 OFFICE O/P EST MOD 30 MIN: CPT | Performed by: INTERNAL MEDICINE

## 2023-06-06 RX ORDER — LORAZEPAM 2 MG/1
TABLET ORAL
COMMUNITY
Start: 2023-05-24

## 2023-06-06 SDOH — ECONOMIC STABILITY: INCOME INSECURITY: HOW HARD IS IT FOR YOU TO PAY FOR THE VERY BASICS LIKE FOOD, HOUSING, MEDICAL CARE, AND HEATING?: NOT HARD AT ALL

## 2023-06-06 SDOH — ECONOMIC STABILITY: HOUSING INSECURITY
IN THE LAST 12 MONTHS, WAS THERE A TIME WHEN YOU DID NOT HAVE A STEADY PLACE TO SLEEP OR SLEPT IN A SHELTER (INCLUDING NOW)?: NO

## 2023-06-06 SDOH — ECONOMIC STABILITY: FOOD INSECURITY: WITHIN THE PAST 12 MONTHS, YOU WORRIED THAT YOUR FOOD WOULD RUN OUT BEFORE YOU GOT MONEY TO BUY MORE.: NEVER TRUE

## 2023-06-06 SDOH — ECONOMIC STABILITY: FOOD INSECURITY: WITHIN THE PAST 12 MONTHS, THE FOOD YOU BOUGHT JUST DIDN'T LAST AND YOU DIDN'T HAVE MONEY TO GET MORE.: NEVER TRUE

## 2023-06-06 ASSESSMENT — ENCOUNTER SYMPTOMS
ANAL BLEEDING: 0
DIARRHEA: 1
ABDOMINAL DISTENTION: 0
NAUSEA: 1
ABDOMINAL PAIN: 1
BLOOD IN STOOL: 0
RESPIRATORY NEGATIVE: 1

## 2023-06-06 ASSESSMENT — ANXIETY QUESTIONNAIRES
GAD7 TOTAL SCORE: 0
IF YOU CHECKED OFF ANY PROBLEMS ON THIS QUESTIONNAIRE, HOW DIFFICULT HAVE THESE PROBLEMS MADE IT FOR YOU TO DO YOUR WORK, TAKE CARE OF THINGS AT HOME, OR GET ALONG WITH OTHER PEOPLE: NOT DIFFICULT AT ALL
3. WORRYING TOO MUCH ABOUT DIFFERENT THINGS: 0
5. BEING SO RESTLESS THAT IT IS HARD TO SIT STILL: 0
7. FEELING AFRAID AS IF SOMETHING AWFUL MIGHT HAPPEN: 0
2. NOT BEING ABLE TO STOP OR CONTROL WORRYING: 0
1. FEELING NERVOUS, ANXIOUS, OR ON EDGE: 0
6. BECOMING EASILY ANNOYED OR IRRITABLE: 0
4. TROUBLE RELAXING: 0

## 2023-06-06 ASSESSMENT — PATIENT HEALTH QUESTIONNAIRE - PHQ9
SUM OF ALL RESPONSES TO PHQ9 QUESTIONS 1 & 2: 0
9. THOUGHTS THAT YOU WOULD BE BETTER OFF DEAD, OR OF HURTING YOURSELF: 0
6. FEELING BAD ABOUT YOURSELF - OR THAT YOU ARE A FAILURE OR HAVE LET YOURSELF OR YOUR FAMILY DOWN: 0
SUM OF ALL RESPONSES TO PHQ QUESTIONS 1-9: 0
1. LITTLE INTEREST OR PLEASURE IN DOING THINGS: 0
SUM OF ALL RESPONSES TO PHQ QUESTIONS 1-9: 0
8. MOVING OR SPEAKING SO SLOWLY THAT OTHER PEOPLE COULD HAVE NOTICED. OR THE OPPOSITE, BEING SO FIGETY OR RESTLESS THAT YOU HAVE BEEN MOVING AROUND A LOT MORE THAN USUAL: 0
3. TROUBLE FALLING OR STAYING ASLEEP: 0
5. POOR APPETITE OR OVEREATING: 0
7. TROUBLE CONCENTRATING ON THINGS, SUCH AS READING THE NEWSPAPER OR WATCHING TELEVISION: 0
SUM OF ALL RESPONSES TO PHQ QUESTIONS 1-9: 0
10. IF YOU CHECKED OFF ANY PROBLEMS, HOW DIFFICULT HAVE THESE PROBLEMS MADE IT FOR YOU TO DO YOUR WORK, TAKE CARE OF THINGS AT HOME, OR GET ALONG WITH OTHER PEOPLE: 0
4. FEELING TIRED OR HAVING LITTLE ENERGY: 0
SUM OF ALL RESPONSES TO PHQ QUESTIONS 1-9: 0
2. FEELING DOWN, DEPRESSED OR HOPELESS: 0

## 2023-06-06 NOTE — PROGRESS NOTES
Ludmila Moon (:  1975) is a Established patient, presenting virtually for evaluation of the following:    Assessment & Plan   Below is the assessment and plan developed based on review of pertinent history, physical exam, labs, studies, and medications. 1. Crohn's disease of small intestine with other complication ([facility]  -     Comprehensive Metabolic Panel; Future  -     CBC with Auto Differential; Future    2. Vitamin D deficiency  -     Vitamin D 25 Hydroxy; Future    3. Chronic fatigue, unspecified    4. Chronic narcotic dependence ([facility].)    Reviewed with patient medication side effects in detail   I answered all patient questions and concerns  Labs and testing done and/or upcoming labs/test were reviewed and discussed   Reviewed and discussed daily activity, exercise and diet      Return in about 3 months (around 2023), or if symptoms worsen or fail to improve. Subjective  patient was seen via video. Patient is on Oxycodone IR 5 mg every 8 hours for chronic pain. Was on this prior to starting with me. Signed a contract on his initial visit. This is helpful. Reports that the time of this last blood work he was having a flare and his Ilsa Castillo was likely wearing off. Will follow up with his WBC. Reports that he has been trying to make sure he got in his fluids. Not able to tolerate skin on vegetable. Also on B 12 injections as he has been def. Would like to follow up with the vitamin d and calcium. HPI  Review of Systems   Constitutional:  Positive for fatigue. Negative for fever. Respiratory: Negative. Cardiovascular: Negative. Gastrointestinal:  Positive for abdominal pain, diarrhea and nausea. Negative for abdominal distention, anal bleeding and blood in stool. Genitourinary: Negative. Neurological: Negative. Objective   Patient-Reported Vitals  Patient-Reported Pulse: [address], was evaluated through a synchronous (real-time) audio-video encounter.

## 2023-06-06 NOTE — PROGRESS NOTES
1. \"Have you been to the ER, urgent care clinic since your last visit? Hospitalized since your last visit? \" No    2. \"Have you seen or consulted any other health care providers outside of the [address] [place] [address] since your last visit? \" No    3. For patients aged 39-70: Has the patient had a colonoscopy / FIT/ Cologuard? Recommendation: Colonoscopy every 10y or annual FIT test from 50-75 or every 3 year stool DNA based test with consideration of ongoing screening from 76-85. and Up to date or Completed      If the patient is female:    4. For patients aged 41-77: Has the patient had a mammogram within the past 2 years? No    5. For patients aged 21-65: Has the patient had a pap smear?  No

## 2023-06-22 DIAGNOSIS — K50.018 CROHN'S DISEASE OF SMALL INTESTINE WITH OTHER COMPLICATION (HCC): ICD-10-CM

## 2023-06-22 RX ORDER — OXYCODONE HYDROCHLORIDE 5 MG/1
5 TABLET ORAL EVERY 8 HOURS PRN
Qty: 90 TABLET | Refills: 0 | Status: SHIPPED | OUTPATIENT
Start: 2023-06-22 | End: 2023-07-22

## 2023-07-24 DIAGNOSIS — K50.018 CROHN'S DISEASE OF SMALL INTESTINE WITH OTHER COMPLICATION (HCC): Primary | ICD-10-CM

## 2023-07-24 RX ORDER — OXYCODONE HYDROCHLORIDE 5 MG/1
5 TABLET ORAL EVERY 8 HOURS PRN
Qty: 90 TABLET | Refills: 0 | Status: SHIPPED | OUTPATIENT
Start: 2023-07-24 | End: 2023-08-23

## 2023-08-21 DIAGNOSIS — K50.018 CROHN'S DISEASE OF SMALL INTESTINE WITH OTHER COMPLICATION (HCC): ICD-10-CM

## 2023-08-21 RX ORDER — OXYCODONE HYDROCHLORIDE 5 MG/1
5 TABLET ORAL EVERY 8 HOURS PRN
Qty: 90 TABLET | Refills: 0 | Status: SHIPPED | OUTPATIENT
Start: 2023-08-21 | End: 2023-09-20

## 2023-09-15 ENCOUNTER — APPOINTMENT (OUTPATIENT)
Facility: HOSPITAL | Age: 48
End: 2023-09-15
Payer: COMMERCIAL

## 2023-09-15 ENCOUNTER — HOSPITAL ENCOUNTER (EMERGENCY)
Facility: HOSPITAL | Age: 48
Discharge: HOME | End: 2023-09-15
Attending: STUDENT IN AN ORGANIZED HEALTH CARE EDUCATION/TRAINING PROGRAM
Payer: COMMERCIAL

## 2023-09-15 VITALS
HEART RATE: 92 BPM | SYSTOLIC BLOOD PRESSURE: 120 MMHG | BODY MASS INDEX: 25.26 KG/M2 | OXYGEN SATURATION: 99 % | HEIGHT: 72 IN | RESPIRATION RATE: 16 BRPM | TEMPERATURE: 98.1 F | DIASTOLIC BLOOD PRESSURE: 87 MMHG | WEIGHT: 186.51 LBS

## 2023-09-15 DIAGNOSIS — R10.84 GENERALIZED ABDOMINAL PAIN: ICD-10-CM

## 2023-09-15 DIAGNOSIS — K50.90 CROHN'S DISEASE WITHOUT COMPLICATION, UNSPECIFIED GASTROINTESTINAL TRACT LOCATION (HCC): Primary | ICD-10-CM

## 2023-09-15 LAB
ALBUMIN SERPL-MCNC: 4.2 G/DL (ref 3.5–5)
ALBUMIN/GLOB SERPL: 1.1 (ref 1.1–2.2)
ALP SERPL-CCNC: 142 U/L (ref 45–117)
ALT SERPL-CCNC: 20 U/L (ref 12–78)
ANION GAP SERPL CALC-SCNC: 4 MMOL/L (ref 5–15)
AST SERPL-CCNC: 10 U/L (ref 15–37)
BASOPHILS # BLD: 0 K/UL (ref 0–0.1)
BASOPHILS NFR BLD: 0 % (ref 0–1)
BILIRUB SERPL-MCNC: 0.4 MG/DL (ref 0.2–1)
BUN SERPL-MCNC: 11 MG/DL (ref 6–20)
BUN/CREAT SERPL: 10 (ref 12–20)
CALCIUM SERPL-MCNC: 9.5 MG/DL (ref 8.5–10.1)
CHLORIDE SERPL-SCNC: 102 MMOL/L (ref 97–108)
CO2 SERPL-SCNC: 30 MMOL/L (ref 21–32)
CREAT SERPL-MCNC: 1.15 MG/DL (ref 0.7–1.3)
DIFFERENTIAL METHOD BLD: ABNORMAL
EOSINOPHIL # BLD: 0.1 K/UL (ref 0–0.4)
EOSINOPHIL NFR BLD: 1 % (ref 0–7)
ERYTHROCYTE [DISTWIDTH] IN BLOOD BY AUTOMATED COUNT: 12.4 % (ref 11.5–14.5)
GLOBULIN SER CALC-MCNC: 3.8 G/DL (ref 2–4)
GLUCOSE SERPL-MCNC: 92 MG/DL (ref 65–100)
HCT VFR BLD AUTO: 47.8 % (ref 36.6–50.3)
HGB BLD-MCNC: 15.6 G/DL (ref 12.1–17)
IMM GRANULOCYTES # BLD AUTO: 0 K/UL (ref 0–0.04)
IMM GRANULOCYTES NFR BLD AUTO: 0 % (ref 0–0.5)
LACTATE SERPL-SCNC: 1.3 MMOL/L (ref 0.4–2)
LIPASE SERPL-CCNC: 53 U/L (ref 73–393)
LYMPHOCYTES # BLD: 2.8 K/UL (ref 0.8–3.5)
LYMPHOCYTES NFR BLD: 24 % (ref 12–49)
MCH RBC QN AUTO: 30.3 PG (ref 26–34)
MCHC RBC AUTO-ENTMCNC: 32.6 G/DL (ref 30–36.5)
MCV RBC AUTO: 92.8 FL (ref 80–99)
MONOCYTES # BLD: 0.4 K/UL (ref 0–1)
MONOCYTES NFR BLD: 3 % (ref 5–13)
NEUTS SEG # BLD: 8.4 K/UL (ref 1.8–8)
NEUTS SEG NFR BLD: 72 % (ref 32–75)
NRBC # BLD: 0 K/UL (ref 0–0.01)
NRBC BLD-RTO: 0 PER 100 WBC
PLATELET # BLD AUTO: 253 K/UL (ref 150–400)
PMV BLD AUTO: 10.7 FL (ref 8.9–12.9)
POTASSIUM SERPL-SCNC: 3.3 MMOL/L (ref 3.5–5.1)
PROCALCITONIN SERPL-MCNC: <0.05 NG/ML
PROT SERPL-MCNC: 8 G/DL (ref 6.4–8.2)
RBC # BLD AUTO: 5.15 M/UL (ref 4.1–5.7)
SODIUM SERPL-SCNC: 136 MMOL/L (ref 136–145)
WBC # BLD AUTO: 11.7 K/UL (ref 4.1–11.1)

## 2023-09-15 PROCEDURE — 80053 COMPREHEN METABOLIC PANEL: CPT

## 2023-09-15 PROCEDURE — 83605 ASSAY OF LACTIC ACID: CPT

## 2023-09-15 PROCEDURE — 85025 COMPLETE CBC W/AUTO DIFF WBC: CPT

## 2023-09-15 PROCEDURE — 6370000000 HC RX 637 (ALT 250 FOR IP): Performed by: NURSE PRACTITIONER

## 2023-09-15 PROCEDURE — 36415 COLL VENOUS BLD VENIPUNCTURE: CPT

## 2023-09-15 PROCEDURE — 87040 BLOOD CULTURE FOR BACTERIA: CPT

## 2023-09-15 PROCEDURE — 83690 ASSAY OF LIPASE: CPT

## 2023-09-15 PROCEDURE — 99285 EMERGENCY DEPT VISIT HI MDM: CPT

## 2023-09-15 PROCEDURE — 74177 CT ABD & PELVIS W/CONTRAST: CPT

## 2023-09-15 PROCEDURE — 6360000004 HC RX CONTRAST MEDICATION: Performed by: NURSE PRACTITIONER

## 2023-09-15 PROCEDURE — 84145 PROCALCITONIN (PCT): CPT

## 2023-09-15 RX ORDER — ONDANSETRON 2 MG/ML
4 INJECTION INTRAMUSCULAR; INTRAVENOUS ONCE
Status: DISCONTINUED | OUTPATIENT
Start: 2023-09-15 | End: 2023-09-16 | Stop reason: HOSPADM

## 2023-09-15 RX ORDER — HYDROMORPHONE HYDROCHLORIDE 1 MG/ML
0.5 INJECTION, SOLUTION INTRAMUSCULAR; INTRAVENOUS; SUBCUTANEOUS ONCE
Status: DISCONTINUED | OUTPATIENT
Start: 2023-09-15 | End: 2023-09-15

## 2023-09-15 RX ORDER — POTASSIUM CHLORIDE 750 MG/1
40 TABLET, FILM COATED, EXTENDED RELEASE ORAL ONCE
Status: COMPLETED | OUTPATIENT
Start: 2023-09-15 | End: 2023-09-15

## 2023-09-15 RX ORDER — PREDNISONE 20 MG/1
60 TABLET ORAL DAILY
Status: DISCONTINUED | OUTPATIENT
Start: 2023-09-16 | End: 2023-09-15

## 2023-09-15 RX ORDER — PREDNISONE 5 MG/1
TABLET ORAL
Qty: 1 EACH | Refills: 0 | Status: SHIPPED | OUTPATIENT
Start: 2023-09-15

## 2023-09-15 RX ORDER — PREDNISONE 20 MG/1
60 TABLET ORAL
Status: COMPLETED | OUTPATIENT
Start: 2023-09-15 | End: 2023-09-15

## 2023-09-15 RX ORDER — 0.9 % SODIUM CHLORIDE 0.9 %
1000 INTRAVENOUS SOLUTION INTRAVENOUS ONCE
Status: DISCONTINUED | OUTPATIENT
Start: 2023-09-15 | End: 2023-09-15

## 2023-09-15 RX ADMIN — POTASSIUM CHLORIDE 40 MEQ: 750 TABLET, FILM COATED, EXTENDED RELEASE ORAL at 23:53

## 2023-09-15 RX ADMIN — IOPAMIDOL 100 ML: 755 INJECTION, SOLUTION INTRAVENOUS at 20:50

## 2023-09-15 RX ADMIN — PREDNISONE 60 MG: 20 TABLET ORAL at 23:53

## 2023-09-15 ASSESSMENT — ENCOUNTER SYMPTOMS
BLOOD IN STOOL: 1
DIARRHEA: 1
NAUSEA: 1
SHORTNESS OF BREATH: 0
ABDOMINAL PAIN: 1
EYE DISCHARGE: 0

## 2023-09-15 NOTE — ED TRIAGE NOTES
Triage: Pt arrives ambulatory from home with CC of feeling as though something is lodged in his throat and abdominal pain. He is followed by Colorectal surgery for hx of Chron's. He also reports having a BCIR. He has been only consuming clear liquids for several days. Referred by Dr. Abhinav Graham as symptoms are not improving.

## 2023-09-15 NOTE — ED PROVIDER NOTES
Combination of Scopalamine patch & Zofran IV worked well in past    Numbness in right leg     Chronic. Opioid dependence ([address])     History of opioid dependence requiring a detox program.    Psychiatric disorder     depression and anxiety    Skin lesion of back 3/17/2014    Syncopal episodes          SURGICAL HISTORY       Past Surgical History:   Procedure Laterality Date    CHOLECYSTECTOMY  12/2016    Dr. Ewelina Patterson     COLONOSCOPY N/A 12/09/2021    Flexible endoscopy via ileostomy (NOT a colonoscopy); Noah Morris MD.    COLONOSCOPY N/A 12/18/2018    ENTEROSCOPYperformed via ileostomy with pediatric scope; Dr. Ana Calle at [facility] ENDOSCOPY    COLONOSCOPY N/A 04/12/2018    Flexible endoscopy via ileostomy (NOT a colonoscopy); Noah Morris MD.    Estel Grain  07/11/2019    Flexible endoscopy of ileal pouch; Dr. Ana Calle. GI  07/11/2019    EGD with biopsies; Dr. Eli Garcia    GI  03/14/2017    Laparotomy, extensive lysis of adhesions and revision of ileostomy; Dr. Ana Arndt GI  10/29/2015    Extensive lysis of adhesions, resection of continent intestinal reservoir, repair of serosal tears, and creation of end-ileostomy; Dr. Ana Arndt GI  08/07/2015    St[facility]; [facility] [place] [place]    GI  04/29/2015    Creation of [place] continent intestinal reservoir (BCIR), abdominoperineal resection of ileoanal J-pouch; lysis of adhesions, and gastrostomy; Chiquis Crouch [facility] ([address] [place] [place])    GI  02/20/2014    Examination under anesthesia and dilation of anal canal with rigid proctoscopy; Mauricio Merrill MD.    Estel Grain  08/06/2013    Anal fistulotomy and application of ACell micromatrix; Dr. Ana Arndt GI  05/17/2013    Examination under anesthesia, partial fistulotomy,  and placement of draining setons; Dr. Aan Arndt GI  01/22/2013    Repair of anal fistulas with debridement, partial fistulectomy, and flap closure; Dr. Ana Arndt     GI  07/31/2012    Incision and

## 2023-09-16 NOTE — DISCHARGE INSTRUCTIONS
Thank you for allowing us to provide you with medical care today. We realize that you have many choices for your emergency care needs. We thank you for choosing [facility]. Please choose us in the future for any continued health care needs. We hope we addressed all of your medical concerns. We strive to provide excellent quality care in the Emergency Department. Anything less than excellent does not meet our expectations. The exam and treatment you received in the Emergency Department were for an emergent problem and are not intended as complete care. It is important that you follow up with a doctor, nurse practitioner, or [address] [place] [address] assistant for ongoing care. If your symptoms worsen or you do not improve as expected and you are unable to reach your usual health care provider, you should return to the Emergency Department. We are available 24 hours a day. Take this sheet with you when you go to your follow-up visit. If you have any problem arranging the follow-up visit, contact the Emergency Department immediately. Make an appointment your family doctor for follow up of this visit. Return to the ER if you are unable to be seen in a timely manner.

## 2023-09-17 LAB
BACTERIA SPEC CULT: NORMAL
SERVICE CMNT-IMP: NORMAL

## 2023-09-19 ENCOUNTER — PATIENT MESSAGE (OUTPATIENT)
Age: 48
End: 2023-09-19

## 2023-09-19 DIAGNOSIS — K50.018 CROHN'S DISEASE OF SMALL INTESTINE WITH OTHER COMPLICATION (HCC): ICD-10-CM

## 2023-09-19 NOTE — TELEPHONE ENCOUNTER
From: Brandy Cornelius  To: Delmi Gallagher  Sent: 9/19/2023 9:01 AM EDT  Subject: Refill request     Good morning- hope your week has started well! I need one refill please at the [facility] for:      Oxycodone 5mg Immediate Rel Tabs  Prescriber: Kenia Rees prescribed: 90  Directions: Take 1 tablet by mouth every 8 hours as needed for pain for up to 30 days. take lowest dose possible to manage pain maximum daily dose is 3     Pharmacy: [address] [place] [address] [address] [address]     Thank you !

## 2023-09-20 LAB
BACTERIA SPEC CULT: NORMAL
SERVICE CMNT-IMP: NORMAL

## 2023-09-20 RX ORDER — OXYCODONE HYDROCHLORIDE 5 MG/1
5 TABLET ORAL EVERY 8 HOURS PRN
Qty: 90 TABLET | Refills: 0 | Status: SHIPPED | OUTPATIENT
Start: 2023-09-20 | End: 2023-10-20

## 2023-09-21 ENCOUNTER — TELEMEDICINE (OUTPATIENT)
Age: 48
End: 2023-09-21
Payer: COMMERCIAL

## 2023-09-21 DIAGNOSIS — R10.32 LEFT LOWER QUADRANT ABDOMINAL PAIN: Primary | ICD-10-CM

## 2023-09-21 DIAGNOSIS — K50.018 CROHN'S DISEASE OF SMALL INTESTINE WITH OTHER COMPLICATION (HCC): ICD-10-CM

## 2023-09-21 PROCEDURE — 99214 OFFICE O/P EST MOD 30 MIN: CPT | Performed by: INTERNAL MEDICINE

## 2023-09-21 ASSESSMENT — PATIENT HEALTH QUESTIONNAIRE - PHQ9
1. LITTLE INTEREST OR PLEASURE IN DOING THINGS: 0
SUM OF ALL RESPONSES TO PHQ QUESTIONS 1-9: 0
7. TROUBLE CONCENTRATING ON THINGS, SUCH AS READING THE NEWSPAPER OR WATCHING TELEVISION: 0
8. MOVING OR SPEAKING SO SLOWLY THAT OTHER PEOPLE COULD HAVE NOTICED. OR THE OPPOSITE, BEING SO FIGETY OR RESTLESS THAT YOU HAVE BEEN MOVING AROUND A LOT MORE THAN USUAL: 0
6. FEELING BAD ABOUT YOURSELF - OR THAT YOU ARE A FAILURE OR HAVE LET YOURSELF OR YOUR FAMILY DOWN: 0
SUM OF ALL RESPONSES TO PHQ QUESTIONS 1-9: 0
SUM OF ALL RESPONSES TO PHQ9 QUESTIONS 1 & 2: 0
SUM OF ALL RESPONSES TO PHQ QUESTIONS 1-9: 0
9. THOUGHTS THAT YOU WOULD BE BETTER OFF DEAD, OR OF HURTING YOURSELF: 0
10. IF YOU CHECKED OFF ANY PROBLEMS, HOW DIFFICULT HAVE THESE PROBLEMS MADE IT FOR YOU TO DO YOUR WORK, TAKE CARE OF THINGS AT HOME, OR GET ALONG WITH OTHER PEOPLE: 0
3. TROUBLE FALLING OR STAYING ASLEEP: 0
4. FEELING TIRED OR HAVING LITTLE ENERGY: 0
SUM OF ALL RESPONSES TO PHQ QUESTIONS 1-9: 0
2. FEELING DOWN, DEPRESSED OR HOPELESS: 0
5. POOR APPETITE OR OVEREATING: 0

## 2023-09-21 ASSESSMENT — ANXIETY QUESTIONNAIRES
1. FEELING NERVOUS, ANXIOUS, OR ON EDGE: 3
7. FEELING AFRAID AS IF SOMETHING AWFUL MIGHT HAPPEN: 0
5. BEING SO RESTLESS THAT IT IS HARD TO SIT STILL: 0
GAD7 TOTAL SCORE: 5
6. BECOMING EASILY ANNOYED OR IRRITABLE: 0
IF YOU CHECKED OFF ANY PROBLEMS ON THIS QUESTIONNAIRE, HOW DIFFICULT HAVE THESE PROBLEMS MADE IT FOR YOU TO DO YOUR WORK, TAKE CARE OF THINGS AT HOME, OR GET ALONG WITH OTHER PEOPLE: NOT DIFFICULT AT ALL
4. TROUBLE RELAXING: 0
3. WORRYING TOO MUCH ABOUT DIFFERENT THINGS: 0
2. NOT BEING ABLE TO STOP OR CONTROL WORRYING: 2

## 2023-09-21 NOTE — PROGRESS NOTES
1. Have you been to the ER, urgent care clinic since your last visit? Hospitalized since your last visit? [facility]  FRIDAY  GASTRIC PROBLEMS    2. Have you seen or consulted any other health care providers outside of the [address] [place] [address] Avenue since your last visit? Include any pap smears or colon screening.    NO

## 2023-10-12 NOTE — TELEPHONE ENCOUNTER
Desirex  risankizumab-rzaa (SKYRIZI SC)  09/21/2023  No upcoming    [facility] so they can reach out to another provider. Chel has not prescribed this medication for this patient.

## 2023-10-18 ENCOUNTER — PATIENT MESSAGE (OUTPATIENT)
Age: 48
End: 2023-10-18

## 2023-10-18 DIAGNOSIS — K50.018 CROHN'S DISEASE OF SMALL INTESTINE WITH OTHER COMPLICATION (HCC): ICD-10-CM

## 2023-10-19 DIAGNOSIS — K50.018 CROHN'S DISEASE OF SMALL INTESTINE WITH OTHER COMPLICATION (HCC): ICD-10-CM

## 2023-10-19 RX ORDER — OXYCODONE HYDROCHLORIDE 5 MG/1
5 TABLET ORAL EVERY 8 HOURS PRN
Qty: 90 TABLET | Refills: 0 | OUTPATIENT
Start: 2023-10-19 | End: 2023-11-18

## 2023-10-19 RX ORDER — OXYCODONE HYDROCHLORIDE 5 MG/1
5 TABLET ORAL EVERY 8 HOURS PRN
Qty: 90 TABLET | Refills: 0 | Status: SHIPPED | OUTPATIENT
Start: 2023-10-19 | End: 2023-11-18

## 2023-10-19 NOTE — TELEPHONE ENCOUNTER
09/21/2023  No upcoming appointment      From: Elbridge Gosselin  To:  Office of Denzel Pack  Sent: 10/19/2023 12:15 PM EDT  Subject: Medication Renewal Request    Refills have been requested for the following medications:        oxyCODONE (ROXICODONE) 5 MG immediate release tablet BLAINE Ortega - NP]    Preferred pharmacy: [facility] #[id number], [facility] [address] [place] [address] Ne -  [phone]

## 2023-11-16 ENCOUNTER — PATIENT MESSAGE (OUTPATIENT)
Age: 48
End: 2023-11-16

## 2023-11-16 DIAGNOSIS — K50.018 CROHN'S DISEASE OF SMALL INTESTINE WITH OTHER COMPLICATION (HCC): ICD-10-CM

## 2023-11-17 RX ORDER — OXYCODONE HYDROCHLORIDE 5 MG/1
5 TABLET ORAL EVERY 8 HOURS PRN
Qty: 90 TABLET | Refills: 0 | Status: SHIPPED | OUTPATIENT
Start: 2023-11-17 | End: 2023-12-17

## 2023-12-14 DIAGNOSIS — K50.018 CROHN'S DISEASE OF SMALL INTESTINE WITH OTHER COMPLICATION (HCC): ICD-10-CM

## 2023-12-14 RX ORDER — OXYCODONE HYDROCHLORIDE 5 MG/1
5 TABLET ORAL EVERY 8 HOURS PRN
Qty: 90 TABLET | Refills: 0 | Status: SHIPPED | OUTPATIENT
Start: 2023-12-14 | End: 2024-01-13

## 2024-01-12 DIAGNOSIS — K50.018 CROHN'S DISEASE OF SMALL INTESTINE WITH OTHER COMPLICATION (HCC): ICD-10-CM

## 2024-01-12 RX ORDER — OXYCODONE HYDROCHLORIDE 5 MG/1
5 TABLET ORAL EVERY 8 HOURS PRN
Qty: 90 TABLET | Refills: 0 | Status: SHIPPED | OUTPATIENT
Start: 2024-01-12 | End: 2024-02-11

## 2024-02-08 ENCOUNTER — PATIENT MESSAGE (OUTPATIENT)
Age: 49
End: 2024-02-08

## 2024-02-08 DIAGNOSIS — K50.018 CROHN'S DISEASE OF SMALL INTESTINE WITH OTHER COMPLICATION (HCC): ICD-10-CM

## 2024-02-08 RX ORDER — OXYCODONE HYDROCHLORIDE 5 MG/1
5 TABLET ORAL EVERY 8 HOURS PRN
Qty: 45 TABLET | Refills: 0 | Status: SHIPPED | OUTPATIENT
Start: 2024-02-08 | End: 2024-02-23

## 2024-02-08 NOTE — TELEPHONE ENCOUNTER
From: Ab Ayala  To: Park M Aileen  Sent: 2/8/2024 10:34 AM EST  Subject: Apologies and Px Refill Request     Good morning Park- my apologies for having to cancel the recent appointment. I called the office and tried to switch it to a telehealth session so we could still speak but the staff couldn’t get through. So, again my apologies but please know I tried.     I was summoned by the ’s office to testify in a criminal court case and had to pack prepare and drive to [facility]. From there I was ordered to see a patient near [place], [place] so I definitely piled up the mileage. Once back in [place] my son got very sick and I ultimately had to take him to the ER. Fluids, meds, blood, urine, and CT later he was able to go home and fortunately nothing significant.     I was able to reschedule the in-person appointment for 02/19/24- but I do need a refill on the pain medication. I recall there are some constraints on the process which is why I needed to be seen in person. If the full refill cannot be done until I see you on the 19th would it be ok to do a partial fill of like 10-15days that would cover the gap? I believe today is day 28 of the 30 day px but can double check.     Thank you for understanding, hope you’re doing well and look forward to seeing you in-person very soon!   Tramaine    Oxycodone 5mg Immediate Rel Tabs  New Rx Needed  Prescriber: PARK BIGGS  Qty prescribed: 90  Directions: Take 1 tablet by mouth every 8 hours as needed for pain. reduce doses taken as pain becomes manageable  Pharmacy: [id number] [facility]  [id number]

## 2024-02-19 ENCOUNTER — OFFICE VISIT (OUTPATIENT)
Age: 49
End: 2024-02-19
Payer: COMMERCIAL

## 2024-02-19 VITALS
TEMPERATURE: 98 F | OXYGEN SATURATION: 99 % | DIASTOLIC BLOOD PRESSURE: 68 MMHG | SYSTOLIC BLOOD PRESSURE: 124 MMHG | BODY MASS INDEX: 25.73 KG/M2 | WEIGHT: 190 LBS | RESPIRATION RATE: 16 BRPM | HEIGHT: 72 IN | HEART RATE: 115 BPM

## 2024-02-19 DIAGNOSIS — M62.831 MUSCLE SPASM OF CALF: ICD-10-CM

## 2024-02-19 DIAGNOSIS — F11.20 CHRONIC NARCOTIC DEPENDENCE (HCC): ICD-10-CM

## 2024-02-19 DIAGNOSIS — K50.018 CROHN'S DISEASE OF SMALL INTESTINE WITH OTHER COMPLICATION (HCC): Primary | ICD-10-CM

## 2024-02-19 DIAGNOSIS — R22.0 SWELLING ASSOCIATED WITH DENTAL STRUCTURE: ICD-10-CM

## 2024-02-19 DIAGNOSIS — R53.82 CHRONIC FATIGUE, UNSPECIFIED: ICD-10-CM

## 2024-02-19 DIAGNOSIS — K50.018 CROHN'S DISEASE OF SMALL INTESTINE WITH OTHER COMPLICATION (HCC): ICD-10-CM

## 2024-02-19 PROCEDURE — 99214 OFFICE O/P EST MOD 30 MIN: CPT | Performed by: INTERNAL MEDICINE

## 2024-02-19 RX ORDER — AMOXICILLIN AND CLAVULANATE POTASSIUM 875; 125 MG/1; MG/1
1 TABLET, FILM COATED ORAL 2 TIMES DAILY
Qty: 14 TABLET | Refills: 0 | Status: SHIPPED | OUTPATIENT
Start: 2024-02-19 | End: 2024-02-26

## 2024-02-19 RX ORDER — ADENOSINE 3 MG/ML
12 INJECTION INTRAVENOUS 2 TIMES DAILY
COMMUNITY

## 2024-02-19 RX ORDER — BUPROPION HYDROCHLORIDE 75 MG/1
75 TABLET ORAL DAILY
COMMUNITY

## 2024-02-19 SDOH — ECONOMIC STABILITY: INCOME INSECURITY: HOW HARD IS IT FOR YOU TO PAY FOR THE VERY BASICS LIKE FOOD, HOUSING, MEDICAL CARE, AND HEATING?: NOT VERY HARD

## 2024-02-19 SDOH — ECONOMIC STABILITY: FOOD INSECURITY: WITHIN THE PAST 12 MONTHS, YOU WORRIED THAT YOUR FOOD WOULD RUN OUT BEFORE YOU GOT MONEY TO BUY MORE.: NEVER TRUE

## 2024-02-19 SDOH — ECONOMIC STABILITY: FOOD INSECURITY: WITHIN THE PAST 12 MONTHS, THE FOOD YOU BOUGHT JUST DIDN'T LAST AND YOU DIDN'T HAVE MONEY TO GET MORE.: NEVER TRUE

## 2024-02-19 ASSESSMENT — PATIENT HEALTH QUESTIONNAIRE - PHQ9
SUM OF ALL RESPONSES TO PHQ QUESTIONS 1-9: 0
5. POOR APPETITE OR OVEREATING: 0
3. TROUBLE FALLING OR STAYING ASLEEP: 0
7. TROUBLE CONCENTRATING ON THINGS, SUCH AS READING THE NEWSPAPER OR WATCHING TELEVISION: 0
SUM OF ALL RESPONSES TO PHQ QUESTIONS 1-9: 0
SUM OF ALL RESPONSES TO PHQ QUESTIONS 1-9: 0
1. LITTLE INTEREST OR PLEASURE IN DOING THINGS: 0
4. FEELING TIRED OR HAVING LITTLE ENERGY: 0
10. IF YOU CHECKED OFF ANY PROBLEMS, HOW DIFFICULT HAVE THESE PROBLEMS MADE IT FOR YOU TO DO YOUR WORK, TAKE CARE OF THINGS AT HOME, OR GET ALONG WITH OTHER PEOPLE: 0
9. THOUGHTS THAT YOU WOULD BE BETTER OFF DEAD, OR OF HURTING YOURSELF: 0
2. FEELING DOWN, DEPRESSED OR HOPELESS: 0
8. MOVING OR SPEAKING SO SLOWLY THAT OTHER PEOPLE COULD HAVE NOTICED. OR THE OPPOSITE, BEING SO FIGETY OR RESTLESS THAT YOU HAVE BEEN MOVING AROUND A LOT MORE THAN USUAL: 0
6. FEELING BAD ABOUT YOURSELF - OR THAT YOU ARE A FAILURE OR HAVE LET YOURSELF OR YOUR FAMILY DOWN: 0
SUM OF ALL RESPONSES TO PHQ9 QUESTIONS 1 & 2: 0
SUM OF ALL RESPONSES TO PHQ QUESTIONS 1-9: 0

## 2024-02-19 ASSESSMENT — ANXIETY QUESTIONNAIRES
GAD7 TOTAL SCORE: 2
2. NOT BEING ABLE TO STOP OR CONTROL WORRYING: 1
IF YOU CHECKED OFF ANY PROBLEMS ON THIS QUESTIONNAIRE, HOW DIFFICULT HAVE THESE PROBLEMS MADE IT FOR YOU TO DO YOUR WORK, TAKE CARE OF THINGS AT HOME, OR GET ALONG WITH OTHER PEOPLE: NOT DIFFICULT AT ALL
3. WORRYING TOO MUCH ABOUT DIFFERENT THINGS: 0
1. FEELING NERVOUS, ANXIOUS, OR ON EDGE: 1
6. BECOMING EASILY ANNOYED OR IRRITABLE: 0
7. FEELING AFRAID AS IF SOMETHING AWFUL MIGHT HAPPEN: 0
5. BEING SO RESTLESS THAT IT IS HARD TO SIT STILL: 0
4. TROUBLE RELAXING: 0

## 2024-02-19 ASSESSMENT — ENCOUNTER SYMPTOMS
RESPIRATORY NEGATIVE: 1
ABDOMINAL PAIN: 1

## 2024-02-19 NOTE — PROGRESS NOTES
Subjective    Ab Ayala is a 48 y.o. male who presents today for the following: patient was seen for a follow up.     Is on chronic pain control. Has been for years. Reports that his pain has been increased over the last month. Has been without his Skyrizi for over a month. Has not been able to see GI.     Also reports that for the last few weeks he has began to have sensation of contraction, numbness and tingling to the calfs on and off. No swelling. Soaking and use of Epsom salt has helped.     Also had dental work in the last few months. Knows that he needs more but has not been able to go back yet. Reports tenderness to the left jaw. No fever. But often has to switch sides to chew due to the pain.     Chief Complaint   Patient presents with    Crohn's Disease    Medication Refill    Follow-up    CALF PAIN     Weird sensation, feels like legs are sore and contracted            PMH/PSH/Allergies/Social History/medication list and most recent studies reviewed with patient.     reports that he has been smoking cigarettes. He has a 5.0 pack-year smoking history. He has never used smokeless tobacco.    reports no history of alcohol use.     Vitals:    02/19/24 1436   BP: 124/68   Pulse: (!) 115   Resp: 16   Temp: 98 °F (36.7 °C)   SpO2: 99%     Body mass index is 25.77 kg/m².      2/19/2024     2:36 PM 9/15/2023     6:29 PM 3/6/2023     2:50 PM 12/14/2022     9:44 AM   Weight Metrics   Weight 190 lb 186 lb 8.2 oz 196 lb 210 lb   BMI (Calculated) 25.8 kg/m2 0 kg/m2 26.6 kg/m2 28.5 kg/m2       Past Medical History:   Diagnosis Date    ADHD (attention deficit hyperactivity disorder)     Anal fistula     The patient no longer has an anus.    Anal stenosis     The patient no longer has an anus.    Anxiety     Chronic back pain     Chronic kidney disease     Crohn's disease (HCC)     GERD (gastroesophageal reflux disease)     H/O ulcerative colitis     Symptoms began in 1996.  Total proctocolectomy was performed in

## 2024-02-19 NOTE — PROGRESS NOTES
1. \"Have you been to the ER, urgent care clinic since your last visit?  Hospitalized since your last visit?\" No    2. \"Have you seen or consulted any other health care providers outside of the [facility] since your last visit?\" No    3. For patients aged 45-75: Has the patient had a colonoscopy / FIT/ Cologuard? Recommendation: Colonoscopy every 10y or annual FIT test from 50-75 or every 3 year stool DNA based test with consideration of ongoing screening from 76-85.      If the patient is female:    4. For patients aged 40-74: Has the patient had a mammogram within the past 2 years? No    5. For patients aged 21-65: Has the patient had a pap smear? No

## 2024-02-22 ENCOUNTER — PATIENT MESSAGE (OUTPATIENT)
Age: 49
End: 2024-02-22

## 2024-02-22 DIAGNOSIS — K50.018 CROHN'S DISEASE OF SMALL INTESTINE WITH OTHER COMPLICATION (HCC): ICD-10-CM

## 2024-02-23 DIAGNOSIS — K50.018 CROHN'S DISEASE OF SMALL INTESTINE WITH OTHER COMPLICATION (HCC): ICD-10-CM

## 2024-02-23 LAB
BASOPHILS # BLD AUTO: 0 X10E3/UL (ref 0–0.2)
BASOPHILS NFR BLD AUTO: 0 %
EOSINOPHIL # BLD AUTO: 0.1 X10E3/UL (ref 0–0.4)
EOSINOPHIL NFR BLD AUTO: 1 %
ERYTHROCYTE [DISTWIDTH] IN BLOOD BY AUTOMATED COUNT: 12.5 % (ref 11.6–15.4)
HCT VFR BLD AUTO: 49.8 % (ref 37.5–51)
HGB BLD-MCNC: 16.7 G/DL (ref 13–17.7)
IMM GRANULOCYTES # BLD AUTO: 0 X10E3/UL (ref 0–0.1)
IMM GRANULOCYTES NFR BLD AUTO: 0 %
LYMPHOCYTES # BLD AUTO: 1.5 X10E3/UL (ref 0.7–3.1)
LYMPHOCYTES NFR BLD AUTO: 16 %
MAGNESIUM SERPL-MCNC: 2 MG/DL (ref 1.6–2.3)
MCH RBC QN AUTO: 30.9 PG (ref 26.6–33)
MCHC RBC AUTO-ENTMCNC: 33.5 G/DL (ref 31.5–35.7)
MCV RBC AUTO: 92 FL (ref 79–97)
MONOCYTES # BLD AUTO: 0.3 X10E3/UL (ref 0.1–0.9)
MONOCYTES NFR BLD AUTO: 4 %
NEUTROPHILS # BLD AUTO: 7.4 X10E3/UL (ref 1.4–7)
NEUTROPHILS NFR BLD AUTO: 79 %
PHOSPHATE SERPL-MCNC: 2.5 MG/DL (ref 2.8–4.1)
PLATELET # BLD AUTO: 233 X10E3/UL (ref 150–450)
RBC # BLD AUTO: 5.41 X10E6/UL (ref 4.14–5.8)
TSH SERPL DL<=0.005 MIU/L-ACNC: 2.41 UIU/ML (ref 0.45–4.5)
WBC # BLD AUTO: 9.4 X10E3/UL (ref 3.4–10.8)

## 2024-02-23 NOTE — TELEPHONE ENCOUNTER
From: Ab Ayala  To: Park Gallagher  Sent: 2/22/2024 11:45 PM EST  Subject: Updates and refill request     Good morning Park- it was great seeing you in person and thank you for your time. Some updates-     Blood work was completed Thurs morning for your orders and the GI orders- only took three pokes lol.     I finally got the Skyrizi insurance issues settled- shipped and receiving soon.     Calves are still the same-the contraction and soreness with that weird feeling. Hopefully the lab work will clarify that issue.     Pain meds- I need to request my monthly refill. I’m hoping the skyrizi coming back on board will help the elevated morning pain and difficulties but know it takes time. Let me know if a temporary morning dosage increase would be helpful for this over the next month.      Thank you for your time and care- please know I’ll have limited service Friday. Unfortunately, there was a suicide at one of the facilities in my region so I’ll be responding to that. Stay well and have a good Friday and great weekend!     Tramaine     Oxycodone 5mg Immediate Rel Tabs  New Rx Needed  Prescriber: PARK GALLAGHER  Qty prescribed: 90  Directions: Take 1 tablet by mouth every 8 hours as needed for pain. reduce doses taken as pain becomes manageable  Pharmacy: [id number]  [id number]

## 2024-02-23 NOTE — TELEPHONE ENCOUNTER
From: Ab Ayala  To: Office of Ericka Verdin  Sent: 2/23/2024 1:09 PM EST  Subject: Medication Renewal Request    Refills have been requested for the following medications:     oxyCODONE (ROXICODONE) 5 MG immediate release tablet [Ericka Verdin, APRN - CNP]   Patient Comment: Please refill normal 30day px- thank you    Preferred pharmacy: [facility] [address] [place] [address] [address] - P [phone] - F [phone]

## 2024-02-25 RX ORDER — OXYCODONE HYDROCHLORIDE 5 MG/1
5 TABLET ORAL EVERY 8 HOURS PRN
Qty: 90 TABLET | Refills: 0 | Status: SHIPPED | OUTPATIENT
Start: 2024-02-25 | End: 2024-03-26

## 2024-02-25 RX ORDER — OXYCODONE HYDROCHLORIDE 5 MG/1
5 TABLET ORAL EVERY 8 HOURS PRN
Qty: 45 TABLET | Refills: 0 | OUTPATIENT
Start: 2024-02-25 | End: 2024-03-11

## 2024-02-28 LAB — TESTOST FREE SERPL-MCNC: 9 PG/ML (ref 6.8–21.5)

## 2024-03-22 DIAGNOSIS — K50.018 CROHN'S DISEASE OF SMALL INTESTINE WITH OTHER COMPLICATION (HCC): ICD-10-CM

## 2024-03-22 RX ORDER — OXYCODONE HYDROCHLORIDE 5 MG/1
5 TABLET ORAL EVERY 8 HOURS PRN
Qty: 90 TABLET | Refills: 0 | Status: SHIPPED | OUTPATIENT
Start: 2024-03-22 | End: 2024-04-21

## 2024-04-19 ENCOUNTER — PATIENT MESSAGE (OUTPATIENT)
Age: 49
End: 2024-04-19

## 2024-04-19 DIAGNOSIS — K50.018 CROHN'S DISEASE OF SMALL INTESTINE WITH OTHER COMPLICATION (HCC): ICD-10-CM

## 2024-04-19 RX ORDER — OXYCODONE HYDROCHLORIDE 5 MG/1
5 TABLET ORAL EVERY 8 HOURS PRN
Qty: 90 TABLET | Refills: 0 | Status: SHIPPED | OUTPATIENT
Start: 2024-04-19 | End: 2024-05-19

## 2024-04-19 NOTE — TELEPHONE ENCOUNTER
02/19/2024  No upcoming      From: Ab Ayala  To: Park Gallagher  Sent: 4/19/2024  7:54 AM EDT  Subject: Refill request     Good morning Park- I have been on the road a ton this week from [place] to [place], and one more to [place] before heading back to [place]. Tired lol. I wanted to send the refill reminder for the pain medication while I was able. Hope you have a good weekend! Tramaine     Oxycodone 5mg Immediate Rel Tabs  New Rx Needed  Prescriber: PARK GALLAGHER  Qty prescribed: 90  Directions: Take 1 tablet by mouth every 8 hours as needed for pain. reduce doses taken as pain becomes manageable  Pharmacy: [id number]   [place]  [id number]

## 2024-05-17 DIAGNOSIS — K50.018 CROHN'S DISEASE OF SMALL INTESTINE WITH OTHER COMPLICATION (HCC): ICD-10-CM

## 2024-05-17 RX ORDER — OXYCODONE HYDROCHLORIDE 5 MG/1
5 TABLET ORAL EVERY 8 HOURS PRN
Qty: 90 TABLET | Refills: 0 | Status: SHIPPED | OUTPATIENT
Start: 2024-05-17 | End: 2024-06-16

## 2024-05-17 NOTE — TELEPHONE ENCOUNTER
Last appt 2/19/2024      Next Apt:     Future Appointments   Date Time Provider Department Center   5/24/2024  1:45 PM Chel Gallagher, BLAINE - NP [facility]

## 2024-06-17 ENCOUNTER — TELEMEDICINE (OUTPATIENT)
Age: 49
End: 2024-06-17
Payer: COMMERCIAL

## 2024-06-17 DIAGNOSIS — R73.09 ELEVATED GLUCOSE: ICD-10-CM

## 2024-06-17 DIAGNOSIS — Z13.220 LIPID SCREENING: ICD-10-CM

## 2024-06-17 DIAGNOSIS — K50.018 CROHN'S DISEASE OF SMALL INTESTINE WITH OTHER COMPLICATION (HCC): Primary | ICD-10-CM

## 2024-06-17 DIAGNOSIS — K50.018 CROHN'S DISEASE OF SMALL INTESTINE WITH OTHER COMPLICATION (HCC): ICD-10-CM

## 2024-06-17 DIAGNOSIS — F11.20 CHRONIC NARCOTIC DEPENDENCE (HCC): ICD-10-CM

## 2024-06-17 PROCEDURE — 99214 OFFICE O/P EST MOD 30 MIN: CPT | Performed by: INTERNAL MEDICINE

## 2024-06-17 RX ORDER — OXYCODONE HYDROCHLORIDE 5 MG/1
5 TABLET ORAL EVERY 8 HOURS PRN
Qty: 90 TABLET | Refills: 0 | Status: SHIPPED | OUTPATIENT
Start: 2024-06-17 | End: 2024-07-17

## 2024-06-17 RX ORDER — ZOLPIDEM TARTRATE 5 MG/1
5 TABLET ORAL DAILY
COMMUNITY
Start: 2024-06-12

## 2024-06-17 ASSESSMENT — ENCOUNTER SYMPTOMS
ABDOMINAL PAIN: 1
BLOOD IN STOOL: 1
RESPIRATORY NEGATIVE: 1

## 2024-06-17 ASSESSMENT — PATIENT HEALTH QUESTIONNAIRE - PHQ9
7. TROUBLE CONCENTRATING ON THINGS, SUCH AS READING THE NEWSPAPER OR WATCHING TELEVISION: NOT AT ALL
4. FEELING TIRED OR HAVING LITTLE ENERGY: NOT AT ALL
SUM OF ALL RESPONSES TO PHQ9 QUESTIONS 1 & 2: 1
SUM OF ALL RESPONSES TO PHQ QUESTIONS 1-9: 1
5. POOR APPETITE OR OVEREATING: NOT AT ALL
6. FEELING BAD ABOUT YOURSELF - OR THAT YOU ARE A FAILURE OR HAVE LET YOURSELF OR YOUR FAMILY DOWN: NOT AT ALL
SUM OF ALL RESPONSES TO PHQ QUESTIONS 1-9: 1
SUM OF ALL RESPONSES TO PHQ QUESTIONS 1-9: 1
8. MOVING OR SPEAKING SO SLOWLY THAT OTHER PEOPLE COULD HAVE NOTICED. OR THE OPPOSITE, BEING SO FIGETY OR RESTLESS THAT YOU HAVE BEEN MOVING AROUND A LOT MORE THAN USUAL: NOT AT ALL
3. TROUBLE FALLING OR STAYING ASLEEP: NOT AT ALL
SUM OF ALL RESPONSES TO PHQ QUESTIONS 1-9: 1
9. THOUGHTS THAT YOU WOULD BE BETTER OFF DEAD, OR OF HURTING YOURSELF: NOT AT ALL
1. LITTLE INTEREST OR PLEASURE IN DOING THINGS: NOT AT ALL
10. IF YOU CHECKED OFF ANY PROBLEMS, HOW DIFFICULT HAVE THESE PROBLEMS MADE IT FOR YOU TO DO YOUR WORK, TAKE CARE OF THINGS AT HOME, OR GET ALONG WITH OTHER PEOPLE: NOT DIFFICULT AT ALL
2. FEELING DOWN, DEPRESSED OR HOPELESS: SEVERAL DAYS

## 2024-06-17 NOTE — PROGRESS NOTES
2024    TELEHEALTH EVALUATION -- Audio/Visual    HPI:    Ab Ayala (:  1975) has requested an audio/video evaluation for the following concern(s):      History of Present Illness  The patient presents via virtual visit for follow-up, especially of chronic pain management.    The patient reports experiencing fluctuations in her pain management, particularly in the mornings, which he attributes to pressure build-ups. Despite the discomfort, she manages to maintain his current pain management regimen. he has resumed Skyrizi injections, administered every 8 weeks, which he finds effective. However, he experienced increased symptoms, including increased blood, increased output, discharge, and pain, a week prior to administration of other medications, biologics, Stelara.     he is scheduled to consult with her gastroenterologist, Dr. Caal, in 2024. His last consultation with him was approximately 4 to 5 months ago. At that time, he was prescribed Skyrizi due to inflammation and bleeding. Dr. Caal recommended at least 2 doses prior to the endoscopy. The patient has adjusted her diet well, identifying potential triggers. he experienced two episodes of vomiting recently, which she attributes to life and stress. he continues to consume smoothies and simple foods throughout the day.    His current medications include lorazepam, and Ambien. he is also on a dissolvable stimulant for ADHD, insomnia, and anxiety, prescribed by Dr. Garsia. She is under the care of Dr. Ayon every 3 weeks, but due to recent life events and increased stressors, he is seeing her every 2 weeks. His Wellbutrin dosage was recently increased to 100 mg. he has not consulted any other specialists since her last visit.     he continues to experience intermittent swelling on the left side of her gland, which she attributes to a broken tooth. Trying to get in to see his dentist. he wakes up in the middle of the night to use mouthwash

## 2024-06-17 NOTE — PROGRESS NOTES
Chief Complaint   Patient presents with    Follow-up    Medication Refill     \"Have you been to the ER, urgent care clinic since your last visit?  Hospitalized since your last visit?\"    NO    “Have you seen or consulted any other health care providers outside of [facility] since your last visit?”    NO            Click Here for Release of Records Request

## 2024-06-22 LAB
ALBUMIN SERPL-MCNC: 4.4 G/DL (ref 4.1–5.1)
ALP SERPL-CCNC: 177 IU/L (ref 44–121)
ALT SERPL-CCNC: 60 IU/L (ref 0–44)
AST SERPL-CCNC: 36 IU/L (ref 0–40)
BILIRUB SERPL-MCNC: 0.3 MG/DL (ref 0–1.2)
BUN SERPL-MCNC: 9 MG/DL (ref 6–24)
BUN/CREAT SERPL: 12 (ref 9–20)
CALCIUM SERPL-MCNC: 9.8 MG/DL (ref 8.7–10.2)
CHLORIDE SERPL-SCNC: 106 MMOL/L (ref 96–106)
CHOLEST SERPL-MCNC: 160 MG/DL (ref 100–199)
CO2 SERPL-SCNC: 25 MMOL/L (ref 20–29)
CREAT SERPL-MCNC: 0.78 MG/DL (ref 0.76–1.27)
EGFRCR SERPLBLD CKD-EPI 2021: 110 ML/MIN/1.73
GLOBULIN SER CALC-MCNC: 2 G/DL (ref 1.5–4.5)
GLUCOSE SERPL-MCNC: 105 MG/DL (ref 70–99)
HBA1C MFR BLD: 5.7 % (ref 4.8–5.6)
HDLC SERPL-MCNC: 51 MG/DL
IMP & REVIEW OF LAB RESULTS: NORMAL
LDLC SERPL CALC-MCNC: 84 MG/DL (ref 0–99)
PHOSPHATE SERPL-MCNC: 3.6 MG/DL (ref 2.8–4.1)
POTASSIUM SERPL-SCNC: 5 MMOL/L (ref 3.5–5.2)
PROT SERPL-MCNC: 6.4 G/DL (ref 6–8.5)
SODIUM SERPL-SCNC: 144 MMOL/L (ref 134–144)
TRIGL SERPL-MCNC: 142 MG/DL (ref 0–149)
VLDLC SERPL CALC-MCNC: 25 MG/DL (ref 5–40)

## 2024-06-24 ENCOUNTER — TELEPHONE (OUTPATIENT)
Age: 49
End: 2024-06-24

## 2024-06-24 DIAGNOSIS — R74.8 ELEVATED LIVER ENZYMES: ICD-10-CM

## 2024-06-24 DIAGNOSIS — R74.8 ELEVATED LIVER ENZYMES: Primary | ICD-10-CM

## 2024-06-24 NOTE — TELEPHONE ENCOUNTER
Called 09:07 AM  Lvm  Regarding labs      ----- Message from BLAINE Forrest NP sent at 6/22/2024  8:34 AM EDT -----  Alk phosphate and ALT are elevated. Be sure he is not taking tylenol often or drinking often. This may also be from absorption concerns. Repeat CMP in 2 weeks     Remains a pre DM.     All other labs ok

## 2024-06-28 NOTE — PROGRESS NOTES
"0900. Call received from [facility]- bed assigned at [facility]. Demographics sheet faxed to Tr[facility] Fang [facility]. Report called to receiving nurse, Jordan Dominguez, at [facility]. All questions answered. 0945. Dr. Puma Cameron & Dr. Ellis Romo notified of patient's transfer to [facility]. Emtala form started. [facility] arranged at 1200. All information communicated to patient and wife- verbalized understanding. 1014. Informed [facility] of transport time at 1200 today.

## 2024-07-15 ENCOUNTER — PATIENT MESSAGE (OUTPATIENT)
Age: 49
End: 2024-07-15

## 2024-07-15 DIAGNOSIS — F11.20 CHRONIC NARCOTIC DEPENDENCE (HCC): ICD-10-CM

## 2024-07-15 DIAGNOSIS — K50.018 CROHN'S DISEASE OF SMALL INTESTINE WITH OTHER COMPLICATION (HCC): ICD-10-CM

## 2024-07-15 RX ORDER — OXYCODONE HYDROCHLORIDE 5 MG/1
5 TABLET ORAL EVERY 8 HOURS PRN
Qty: 90 TABLET | Refills: 0 | Status: SHIPPED | OUTPATIENT
Start: 2024-07-15 | End: 2024-08-14

## 2024-07-15 NOTE — TELEPHONE ENCOUNTER
From: Ab Ayala  To: Park Gallagher  Sent: 7/15/2024 9:59 AM EDT  Subject: Medication refill     Good morning Park- when able please send in a refill for the pain medication. Hope you had a good weekend and are doing well! Tramaine     Oxycodone 5mg Immediate Rel Tabs  New Rx Needed  Prescriber: PARK GALLAGHER  Qty prescribed: 90  Directions: Take 1 tablet by mouth every 8 hours as needed for pain. reduce doses taken as pain becomes manageable  Pharmacy: [id number] [id number]

## 2024-08-14 ENCOUNTER — PATIENT MESSAGE (OUTPATIENT)
Age: 49
End: 2024-08-14

## 2024-08-14 DIAGNOSIS — F11.20 CHRONIC NARCOTIC DEPENDENCE (HCC): ICD-10-CM

## 2024-08-14 DIAGNOSIS — K50.018 CROHN'S DISEASE OF SMALL INTESTINE WITH OTHER COMPLICATION (HCC): ICD-10-CM

## 2024-08-14 RX ORDER — OXYCODONE HYDROCHLORIDE 5 MG/1
5 TABLET ORAL EVERY 8 HOURS PRN
Qty: 90 TABLET | Refills: 0 | Status: SHIPPED | OUTPATIENT
Start: 2024-08-14 | End: 2024-09-13

## 2024-09-11 ENCOUNTER — PATIENT MESSAGE (OUTPATIENT)
Age: 49
End: 2024-09-11

## 2024-09-11 DIAGNOSIS — K50.018 CROHN'S DISEASE OF SMALL INTESTINE WITH OTHER COMPLICATION (HCC): ICD-10-CM

## 2024-09-11 DIAGNOSIS — F11.20 CHRONIC NARCOTIC DEPENDENCE (HCC): ICD-10-CM

## 2024-09-11 RX ORDER — OXYCODONE HYDROCHLORIDE 5 MG/1
5 TABLET ORAL EVERY 8 HOURS PRN
Qty: 90 TABLET | Refills: 0 | Status: SHIPPED | OUTPATIENT
Start: 2024-09-11 | End: 2024-10-11

## 2024-09-21 DIAGNOSIS — R73.09 ELEVATED GLUCOSE: ICD-10-CM

## 2024-09-21 DIAGNOSIS — R53.82 CHRONIC FATIGUE, UNSPECIFIED: ICD-10-CM

## 2024-09-21 DIAGNOSIS — K50.018 CROHN'S DISEASE OF SMALL INTESTINE WITH OTHER COMPLICATION (HCC): ICD-10-CM

## 2024-10-09 ENCOUNTER — PATIENT MESSAGE (OUTPATIENT)
Age: 49
End: 2024-10-09

## 2024-10-09 DIAGNOSIS — K50.018 CROHN'S DISEASE OF SMALL INTESTINE WITH OTHER COMPLICATION (HCC): ICD-10-CM

## 2024-10-09 DIAGNOSIS — F11.20 CHRONIC NARCOTIC DEPENDENCE (HCC): ICD-10-CM

## 2024-10-09 RX ORDER — OXYCODONE HYDROCHLORIDE 5 MG/1
5 TABLET ORAL EVERY 8 HOURS PRN
Qty: 21 TABLET | Refills: 0 | Status: SHIPPED | OUTPATIENT
Start: 2024-10-09 | End: 2024-11-08

## 2024-10-09 NOTE — TELEPHONE ENCOUNTER
Last appt 6/17/2024      Next Apt:     No future appointments.      [id number] [place] [place] - [address] - P [phone] - F [phone]  [facility] [address]  Phone: [phone] Fax: [phone]

## 2024-10-10 DIAGNOSIS — K50.018 CROHN'S DISEASE OF SMALL INTESTINE WITH OTHER COMPLICATION (HCC): ICD-10-CM

## 2024-10-10 DIAGNOSIS — F11.20 CHRONIC NARCOTIC DEPENDENCE (HCC): ICD-10-CM

## 2024-10-10 RX ORDER — OXYCODONE HYDROCHLORIDE 5 MG/1
5 TABLET ORAL EVERY 8 HOURS PRN
Qty: 90 TABLET | Refills: 0 | OUTPATIENT
Start: 2024-10-10 | End: 2024-11-09

## 2024-10-15 ENCOUNTER — TELEPHONE (OUTPATIENT)
Age: 49
End: 2024-10-15

## 2024-10-15 NOTE — TELEPHONE ENCOUNTER
Per Chel, patient needs to be scheduled for an in office visit because he needs to be assessed for drop foot in person. LVM with this information and requested that he calls back to get rescheduled for an in office visit.

## 2024-10-16 ENCOUNTER — TELEMEDICINE (OUTPATIENT)
Age: 49
End: 2024-10-16
Payer: COMMERCIAL

## 2024-10-16 DIAGNOSIS — F11.20 CHRONIC NARCOTIC DEPENDENCE (HCC): ICD-10-CM

## 2024-10-16 DIAGNOSIS — K08.9 DENTAL DISEASE: ICD-10-CM

## 2024-10-16 DIAGNOSIS — K50.018 CROHN'S DISEASE OF SMALL INTESTINE WITH OTHER COMPLICATION (HCC): Primary | ICD-10-CM

## 2024-10-16 PROCEDURE — 99214 OFFICE O/P EST MOD 30 MIN: CPT | Performed by: INTERNAL MEDICINE

## 2024-10-16 RX ORDER — OXYCODONE HYDROCHLORIDE 5 MG/1
5 TABLET ORAL EVERY 8 HOURS PRN
Qty: 90 TABLET | Refills: 0 | Status: SHIPPED | OUTPATIENT
Start: 2024-10-16 | End: 2024-11-15

## 2024-10-16 RX ORDER — RISANKIZUMAB-RZAA 360 MG/2.4
2.4 WEARABLE INJECTOR SUBCUTANEOUS
COMMUNITY
Start: 2024-08-29

## 2024-10-16 RX ORDER — PREGABALIN 100 MG/1
100 CAPSULE ORAL 2 TIMES DAILY
Qty: 120 CAPSULE | Refills: 1 | Status: SHIPPED | OUTPATIENT
Start: 2024-10-16 | End: 2025-02-13

## 2024-10-16 ASSESSMENT — ENCOUNTER SYMPTOMS
BLOOD IN STOOL: 0
RESPIRATORY NEGATIVE: 1

## 2024-10-16 NOTE — PROGRESS NOTES
Chief Complaint   Patient presents with    Medication Refill     \"Have you been to the ER, urgent care clinic since your last visit?  Hospitalized since your last visit?\"    NO    “Have you seen or consulted any other health care providers outside our system since your last visit?”    NO             
during this visit to record and process the conversation to generate a clinical note. The patient (or guardian, if applicable) and other individuals in attendance at the appointment consented to the use of AI, including the recording.

## 2024-11-13 DIAGNOSIS — F11.20 CHRONIC NARCOTIC DEPENDENCE (HCC): ICD-10-CM

## 2024-11-13 DIAGNOSIS — K50.018 CROHN'S DISEASE OF SMALL INTESTINE WITH OTHER COMPLICATION (HCC): ICD-10-CM

## 2024-11-13 RX ORDER — OXYCODONE HYDROCHLORIDE 5 MG/1
5 TABLET ORAL EVERY 8 HOURS PRN
Qty: 90 TABLET | Refills: 0 | Status: SHIPPED | OUTPATIENT
Start: 2024-11-13 | End: 2024-12-13

## 2024-11-23 LAB
ALBUMIN SERPL-MCNC: 4.3 G/DL (ref 4.1–5.1)
ALP SERPL-CCNC: 167 IU/L (ref 44–121)
ALT SERPL-CCNC: 39 IU/L (ref 0–44)
APPEARANCE UR: CLEAR
AST SERPL-CCNC: 24 IU/L (ref 0–40)
BACTERIA #/AREA URNS HPF: NORMAL /[HPF]
BASOPHILS # BLD AUTO: 0 X10E3/UL (ref 0–0.2)
BASOPHILS NFR BLD AUTO: 0 %
BILIRUB SERPL-MCNC: 0.3 MG/DL (ref 0–1.2)
BILIRUB UR QL STRIP: ABNORMAL
BUN SERPL-MCNC: 19 MG/DL (ref 6–24)
BUN/CREAT SERPL: 18 (ref 9–20)
CALCIUM SERPL-MCNC: 9.5 MG/DL (ref 8.7–10.2)
CASTS URNS QL MICRO: NORMAL /LPF
CHLORIDE SERPL-SCNC: 96 MMOL/L (ref 96–106)
CO2 SERPL-SCNC: 28 MMOL/L (ref 20–29)
COLOR UR: YELLOW
CREAT SERPL-MCNC: 1.05 MG/DL (ref 0.76–1.27)
EGFRCR SERPLBLD CKD-EPI 2021: 88 ML/MIN/1.73
EOSINOPHIL # BLD AUTO: 0.1 X10E3/UL (ref 0–0.4)
EOSINOPHIL NFR BLD AUTO: 1 %
EPI CELLS #/AREA URNS HPF: NORMAL /HPF (ref 0–10)
ERYTHROCYTE [DISTWIDTH] IN BLOOD BY AUTOMATED COUNT: 12.1 % (ref 11.6–15.4)
GLOBULIN SER CALC-MCNC: 2.3 G/DL (ref 1.5–4.5)
GLUCOSE SERPL-MCNC: 115 MG/DL (ref 70–99)
GLUCOSE UR QL STRIP: NEGATIVE
HBA1C MFR BLD: 5.8 % (ref 4.8–5.6)
HCT VFR BLD AUTO: 45.6 % (ref 37.5–51)
HGB BLD-MCNC: 14.9 G/DL (ref 13–17.7)
HGB UR QL STRIP: NEGATIVE
IMM GRANULOCYTES # BLD AUTO: 0 X10E3/UL (ref 0–0.1)
IMM GRANULOCYTES NFR BLD AUTO: 0 %
KETONES UR QL STRIP: ABNORMAL
LEUKOCYTE ESTERASE UR QL STRIP: NEGATIVE
LYMPHOCYTES # BLD AUTO: 1.9 X10E3/UL (ref 0.7–3.1)
LYMPHOCYTES NFR BLD AUTO: 17 %
MCH RBC QN AUTO: 30.3 PG (ref 26.6–33)
MCHC RBC AUTO-ENTMCNC: 32.7 G/DL (ref 31.5–35.7)
MCV RBC AUTO: 93 FL (ref 79–97)
MICRO URNS: ABNORMAL
MICRO URNS: ABNORMAL
MONOCYTES # BLD AUTO: 0.7 X10E3/UL (ref 0.1–0.9)
MONOCYTES NFR BLD AUTO: 6 %
NEUTROPHILS # BLD AUTO: 8.6 X10E3/UL (ref 1.4–7)
NEUTROPHILS NFR BLD AUTO: 76 %
NITRITE UR QL STRIP: NEGATIVE
PH UR STRIP: 6 [PH] (ref 5–7.5)
PLATELET # BLD AUTO: 328 X10E3/UL (ref 150–450)
POTASSIUM SERPL-SCNC: 4.5 MMOL/L (ref 3.5–5.2)
PROT SERPL-MCNC: 6.6 G/DL (ref 6–8.5)
PROT UR QL STRIP: ABNORMAL
RBC # BLD AUTO: 4.91 X10E6/UL (ref 4.14–5.8)
RBC #/AREA URNS HPF: NORMAL /HPF (ref 0–2)
SODIUM SERPL-SCNC: 139 MMOL/L (ref 134–144)
SP GR UR STRIP: >=1.03 (ref 1–1.03)
URINALYSIS REFLEX: ABNORMAL
UROBILINOGEN UR STRIP-MCNC: 1 MG/DL (ref 0.2–1)
VIT B12 SERPL-MCNC: 312 PG/ML (ref 232–1245)
WBC # BLD AUTO: 11.3 X10E3/UL (ref 3.4–10.8)
WBC #/AREA URNS HPF: NORMAL /HPF (ref 0–5)

## 2024-11-25 ENCOUNTER — OFFICE VISIT (OUTPATIENT)
Age: 49
End: 2024-11-25
Payer: COMMERCIAL

## 2024-11-25 VITALS
HEART RATE: 76 BPM | HEIGHT: 72 IN | RESPIRATION RATE: 18 BRPM | BODY MASS INDEX: 22.48 KG/M2 | OXYGEN SATURATION: 98 % | SYSTOLIC BLOOD PRESSURE: 98 MMHG | WEIGHT: 166 LBS | DIASTOLIC BLOOD PRESSURE: 60 MMHG

## 2024-11-25 DIAGNOSIS — K08.9 DENTAL DISEASE: ICD-10-CM

## 2024-11-25 DIAGNOSIS — K50.018 CROHN'S DISEASE OF SMALL INTESTINE WITH OTHER COMPLICATION (HCC): Primary | ICD-10-CM

## 2024-11-25 DIAGNOSIS — M54.50 CHRONIC MIDLINE LOW BACK PAIN WITHOUT SCIATICA: ICD-10-CM

## 2024-11-25 DIAGNOSIS — F11.20 CHRONIC NARCOTIC DEPENDENCE (HCC): ICD-10-CM

## 2024-11-25 DIAGNOSIS — R73.03 PRE-DIABETES: ICD-10-CM

## 2024-11-25 DIAGNOSIS — R13.10 DYSPHAGIA, UNSPECIFIED TYPE: ICD-10-CM

## 2024-11-25 DIAGNOSIS — G89.29 CHRONIC MIDLINE LOW BACK PAIN WITHOUT SCIATICA: ICD-10-CM

## 2024-11-25 PROCEDURE — 99214 OFFICE O/P EST MOD 30 MIN: CPT | Performed by: INTERNAL MEDICINE

## 2024-11-25 ASSESSMENT — ENCOUNTER SYMPTOMS
BACK PAIN: 1
ABDOMINAL PAIN: 1
RESPIRATORY NEGATIVE: 1

## 2024-11-25 NOTE — PROGRESS NOTES
Chief Complaint   Patient presents with    Crohn's Disease       \"Have you been to the ER, urgent care clinic since your last visit?  Hospitalized since your last visit?\"    NO    “Have you seen or consulted any other health care providers outside our system since your last visit?”    NO             
changes to the current treatment plan are necessary at this time.    3. Pain management.  Pain levels fluctuate between 5 and 7, especially in the mornings and evenings. He is advised to continue using Tylenol as needed. The use of Lyrica has been beneficial, and he should continue with the current regimen.    4. Difficulty swallowing.  The patient reports increased difficulty swallowing over the past 6 to 9 months, particularly with capsules and gummy-like substances. He is advised to follow up with gastroenterology for an upper endoscopy and with his dental provider in February 2025.    5. Vision changes.  The patient reports significant changes in near vision over the past year. He is advised to schedule an eye exam with his eye provider.        Ab was seen today for crohn's disease.    Diagnoses and all orders for this visit:    Crohn's disease of small intestine with other complication (HCC)    Chronic narcotic dependence (HCC)    Chronic midline low back pain without sciatica    Pre-diabetes    Dysphagia, unspecified type    Dental disease      Reviewed with patient medication side effects in detail   I answered all patient questions and concerns  Labs and testing done and/or upcoming labs/test were reviewed and discussed   Reviewed and discussed daily activity, exercise and diet      Return in about 3 months (around 2/25/2025) for follow up if symptoms persist, follow up for routine visit or before if needed.     BLAINE Forrest - NP      The patient (or guardian, if applicable) and other individuals in attendance with the patient were advised that Artificial Intelligence will be utilized during this visit to record and process the conversation to generate a clinical note. The patient (or guardian, if applicable) and other individuals in attendance at the appointment consented to the use of AI, including the recording.

## 2024-11-26 ENCOUNTER — TELEPHONE (OUTPATIENT)
Age: 49
End: 2024-11-26

## 2024-11-26 LAB — DRUGS UR: NORMAL

## 2024-11-26 NOTE — TELEPHONE ENCOUNTER
Attempted x 2 to call patient with provider to discuss recent lab results. Message received on phone ( Person not accepting calls)

## 2024-12-11 DIAGNOSIS — F11.20 CHRONIC NARCOTIC DEPENDENCE (HCC): ICD-10-CM

## 2024-12-11 DIAGNOSIS — K50.018 CROHN'S DISEASE OF SMALL INTESTINE WITH OTHER COMPLICATION (HCC): ICD-10-CM

## 2024-12-11 RX ORDER — OXYCODONE HYDROCHLORIDE 5 MG/1
5 TABLET ORAL EVERY 8 HOURS PRN
Qty: 90 TABLET | Refills: 0 | Status: SHIPPED | OUTPATIENT
Start: 2024-12-11 | End: 2025-01-10

## 2025-01-07 DIAGNOSIS — K50.018 CROHN'S DISEASE OF SMALL INTESTINE WITH OTHER COMPLICATION (HCC): ICD-10-CM

## 2025-01-07 DIAGNOSIS — F11.20 CHRONIC NARCOTIC DEPENDENCE (HCC): ICD-10-CM

## 2025-01-08 RX ORDER — OXYCODONE HYDROCHLORIDE 5 MG/1
5 TABLET ORAL EVERY 8 HOURS PRN
Qty: 90 TABLET | Refills: 0 | Status: SHIPPED | OUTPATIENT
Start: 2025-01-08 | End: 2025-02-07

## 2025-02-05 DIAGNOSIS — F11.20 CHRONIC NARCOTIC DEPENDENCE (HCC): ICD-10-CM

## 2025-02-05 DIAGNOSIS — K50.018 CROHN'S DISEASE OF SMALL INTESTINE WITH OTHER COMPLICATION (HCC): ICD-10-CM

## 2025-02-05 RX ORDER — OXYCODONE HYDROCHLORIDE 5 MG/1
5 TABLET ORAL EVERY 8 HOURS PRN
Qty: 90 TABLET | Refills: 0 | Status: SHIPPED | OUTPATIENT
Start: 2025-02-05 | End: 2025-03-07

## 2025-03-04 ENCOUNTER — PATIENT MESSAGE (OUTPATIENT)
Age: 50
End: 2025-03-04

## 2025-03-04 DIAGNOSIS — F11.20 CHRONIC NARCOTIC DEPENDENCE (HCC): ICD-10-CM

## 2025-03-04 DIAGNOSIS — K50.018 CROHN'S DISEASE OF SMALL INTESTINE WITH OTHER COMPLICATION (HCC): ICD-10-CM

## 2025-03-05 RX ORDER — OXYCODONE HYDROCHLORIDE 5 MG/1
5 TABLET ORAL EVERY 8 HOURS PRN
Qty: 15 TABLET | Refills: 0 | Status: SHIPPED | OUTPATIENT
Start: 2025-03-05 | End: 2025-04-04

## 2025-03-10 ENCOUNTER — TELEMEDICINE (OUTPATIENT)
Age: 50
End: 2025-03-10
Payer: COMMERCIAL

## 2025-03-10 DIAGNOSIS — K91.850 ILEAL POUCHITIS (HCC): ICD-10-CM

## 2025-03-10 DIAGNOSIS — K50.018 CROHN'S DISEASE OF SMALL INTESTINE WITH OTHER COMPLICATION (HCC): Primary | ICD-10-CM

## 2025-03-10 DIAGNOSIS — F11.20 CHRONIC NARCOTIC DEPENDENCE (HCC): ICD-10-CM

## 2025-03-10 DIAGNOSIS — M54.50 CHRONIC MIDLINE LOW BACK PAIN WITHOUT SCIATICA: ICD-10-CM

## 2025-03-10 DIAGNOSIS — R25.2 MUSCLE CRAMPING: ICD-10-CM

## 2025-03-10 DIAGNOSIS — G89.29 CHRONIC MIDLINE LOW BACK PAIN WITHOUT SCIATICA: ICD-10-CM

## 2025-03-10 PROCEDURE — 99213 OFFICE O/P EST LOW 20 MIN: CPT | Performed by: INTERNAL MEDICINE

## 2025-03-10 RX ORDER — OXYCODONE HYDROCHLORIDE 5 MG/1
5 TABLET ORAL EVERY 8 HOURS PRN
Qty: 90 TABLET | Refills: 0 | Status: SHIPPED | OUTPATIENT
Start: 2025-03-10 | End: 2025-04-09

## 2025-03-10 RX ORDER — AMPHETAMINE 12.5 MG/1
12.5 TABLET, ORALLY DISINTEGRATING ORAL DAILY
COMMUNITY
Start: 2023-05-10

## 2025-03-10 SDOH — ECONOMIC STABILITY: FOOD INSECURITY: WITHIN THE PAST 12 MONTHS, YOU WORRIED THAT YOUR FOOD WOULD RUN OUT BEFORE YOU GOT MONEY TO BUY MORE.: NEVER TRUE

## 2025-03-10 SDOH — ECONOMIC STABILITY: INCOME INSECURITY: IN THE LAST 12 MONTHS, WAS THERE A TIME WHEN YOU WERE NOT ABLE TO PAY THE MORTGAGE OR RENT ON TIME?: YES

## 2025-03-10 SDOH — ECONOMIC STABILITY: TRANSPORTATION INSECURITY
IN THE PAST 12 MONTHS, HAS LACK OF TRANSPORTATION KEPT YOU FROM MEETINGS, WORK, OR FROM GETTING THINGS NEEDED FOR DAILY LIVING?: NO

## 2025-03-10 SDOH — ECONOMIC STABILITY: FOOD INSECURITY: WITHIN THE PAST 12 MONTHS, THE FOOD YOU BOUGHT JUST DIDN'T LAST AND YOU DIDN'T HAVE MONEY TO GET MORE.: NEVER TRUE

## 2025-03-10 SDOH — ECONOMIC STABILITY: TRANSPORTATION INSECURITY
IN THE PAST 12 MONTHS, HAS THE LACK OF TRANSPORTATION KEPT YOU FROM MEDICAL APPOINTMENTS OR FROM GETTING MEDICATIONS?: NO

## 2025-03-10 ASSESSMENT — PATIENT HEALTH QUESTIONNAIRE - PHQ9
5. POOR APPETITE OR OVEREATING: NOT AT ALL
1. LITTLE INTEREST OR PLEASURE IN DOING THINGS: NOT AT ALL
10. IF YOU CHECKED OFF ANY PROBLEMS, HOW DIFFICULT HAVE THESE PROBLEMS MADE IT FOR YOU TO DO YOUR WORK, TAKE CARE OF THINGS AT HOME, OR GET ALONG WITH OTHER PEOPLE: NOT DIFFICULT AT ALL
2. FEELING DOWN, DEPRESSED OR HOPELESS: NOT AT ALL
SUM OF ALL RESPONSES TO PHQ QUESTIONS 1-9: 0
4. FEELING TIRED OR HAVING LITTLE ENERGY: NOT AT ALL
9. THOUGHTS THAT YOU WOULD BE BETTER OFF DEAD, OR OF HURTING YOURSELF: NOT AT ALL
6. FEELING BAD ABOUT YOURSELF - OR THAT YOU ARE A FAILURE OR HAVE LET YOURSELF OR YOUR FAMILY DOWN: NOT AT ALL
8. MOVING OR SPEAKING SO SLOWLY THAT OTHER PEOPLE COULD HAVE NOTICED. OR THE OPPOSITE, BEING SO FIGETY OR RESTLESS THAT YOU HAVE BEEN MOVING AROUND A LOT MORE THAN USUAL: NOT AT ALL
3. TROUBLE FALLING OR STAYING ASLEEP: NOT AT ALL
SUM OF ALL RESPONSES TO PHQ QUESTIONS 1-9: 0
7. TROUBLE CONCENTRATING ON THINGS, SUCH AS READING THE NEWSPAPER OR WATCHING TELEVISION: NOT AT ALL

## 2025-03-10 ASSESSMENT — ENCOUNTER SYMPTOMS
ABDOMINAL PAIN: 1
RESPIRATORY NEGATIVE: 1

## 2025-03-10 NOTE — PROGRESS NOTES
Chief Complaint   Patient presents with    Follow-up     \"Have you been to the ER, urgent care clinic since your last visit?  Hospitalized since your last visit?\"    NO    “Have you seen or consulted any other health care providers outside our system since your last visit?”    NO           
signs of ataxia         [] Normal range of motion of neck        [] Abnormal-       Neurological:        [] No Facial Asymmetry (Cranial nerve 7 motor function) (limited exam to video visit)          [] No gaze palsy        [] Abnormal-         Skin:        [] No significant exanthematous lesions or discoloration noted on facial skin         [] Abnormal-            Psychiatric:       [] Normal Affect [] No Hallucinations        [] Abnormal-     Other pertinent observable physical exam findings-     ASSESSMENT/PLAN:  1. Crohn's disease of small intestine with other complication (HCC)    - oxyCODONE (ROXICODONE) 5 MG immediate release tablet; Take 1 tablet by mouth every 8 hours as needed for Pain for up to 30 days. Max Daily Amount: 15 mg  Dispense: 90 tablet; Refill: 0    2. Ileal pouchitis (HCC)      3. Chronic narcotic dependence (HCC)    - oxyCODONE (ROXICODONE) 5 MG immediate release tablet; Take 1 tablet by mouth every 8 hours as needed for Pain for up to 30 days. Max Daily Amount: 15 mg  Dispense: 90 tablet; Refill: 0    4. Chronic midline low back pain without sciatica      5. Muscle cramping      Reviewed with patient medication side effects in detail   I answered all patient questions and concerns  Labs and testing done and/or upcoming labs/test were reviewed and discussed   Reviewed and discussed daily activity, exercise and diet    Assessment & Plan  1. Crohn's Disease.  He is scheduled for an upper and lower endoscopy on 04/02/2025, with Dr. Rivera. He is currently on Skyrizi for Crohn's management.    2. Chronic Pain.  He reports consistent lower back pain and joint soreness, which he attributes to mechanical pain from physical activity. He is currently taking Lyrica, which he finds somewhat effective. He is advised to continue Lyrica and monitor its effectiveness. He is also advised to consider adjusting the timing of his Wellbutrin to see if it affects his sleep quality. A prescription for Lyrica 5 mg,

## 2025-03-20 NOTE — PROGRESS NOTES
Upper GI Endoscopy Discharge Instructions    Activity:  Do not drive or operate heavy machinery for the next 24 hours. The medication you have received may impair your reflexes and coordination.    You may feel sleepy or groggy for the rest of the day.    Tomorrow, you can resume your full activity unless otherwise instructed by your doctor.    Diet:  Resume a regular diet.    Do not drink alcohol for 24 hours.     Special Instructions:  You may have a sore throat after your procedure. Using throat lozenges or warm salt-water gargle can help.     It is normal to have mild abdominal pain and bloating.     Call your Gastroenterologists if you develop the following:  Severe abdominal pain or bloating  Chills or fever of 100 degrees or more  Chest pain  Persistent nausea or vomiting  Any new problems or complications after your procedure  Black tarry stools        Created on: 10/2020  Created by:  Pablo   Revised on:  10/2020   Xavier [address]ias[address] [place] [address]  [address]  [phone]      Medical Progress Note      NAME: Purvi José   :  1975  MRM:  [id number]    Date/Time: 3/8/2017           Subjective:     Chief Complaint:      No acute events. Pt still with abdominal pain and minimal ostomy outpt          Objective:       Vitals:          Last 24hrs VS reviewed since prior progress note.  Most recent are:    Visit Vitals    /72 (BP 1 Location: Left arm, BP Patient Position: At rest)    Pulse (!) 111    Temp 98.2 °F (36.8 °C)    Resp 18    Ht 6' (1.829 m)    Wt 90.4 kg (199 lb 4.7 oz)    SpO2 94%    BMI 27.03 kg/m2     SpO2 Readings from Last 6 Encounters:   17 94%   17 100%   17 94%   17 100%   17 95%   17 95%          Intake/Output Summary (Last 24 hours) at 17 1810  Last data filed at 17 1632   Gross per 24 hour   Intake              120 ml   Output              600 ml   Net             -480 ml          Exam:     Physical Exam:    Gen:  Well-developed, well-nourished, in no acute distress  HEENT:  Pink conjunctivae, PERRL, hearing intact to voice, moist mucous membranes  Neck:  Supple, without masses, thyroid non-tender  Resp:  No accessory muscle use, clear breath sounds without wheezes rales or rhonchi  Card:  No murmurs, normal S1, S2 without thrills, bruits or peripheral edema  Abd:  Soft, diffuse ttp with decreased ostomy outpt, non-distended, normoactive bowel sounds are present  Musc:  No cyanosis or clubbing  Skin:  No rashes or ulcers, skin turgor is good  Neuro:  Cranial nerves 3-12 are grossly intact,  strength is 5/5 bilaterally and dorsi / plantarflexion is 5/5 bilaterally, follows commands appropriately  Psych:  Good insight, oriented to person, place and time, alert      Medications Reviewed: (see below)    Lab Data Reviewed: (see below)    ______________________________________________________________________    Medications:     Current Facility-Administered Medications   Medication Dose Route Frequency    HYDROmorphone (PF) (DILAUDID) injection 1 mg  1 mg IntraVENous Q3H PRN    sodium chloride (NS) flush 5-10 mL  5-10 mL IntraVENous Q8H    sodium chloride (NS) flush 5-10 mL  5-10 mL IntraVENous PRN    ondansetron (ZOFRAN) injection 4 mg  4 mg IntraVENous Q4H PRN    enoxaparin (LOVENOX) injection 40 mg  40 mg SubCUTAneous Q24H    lactated ringers infusion  125 mL/hr IntraVENous CONTINUOUS    naloxone (NARCAN) injection 0.4 mg  0.4 mg IntraVENous PRN    famotidine (PF) (PEPCID) 20 mg in sodium chloride 0.9 % 10 mL injection  20 mg IntraVENous Q12H    sodium chloride (NS) flush 5-10 mL  5-10 mL IntraVENous Q8H    sodium chloride (NS) flush 5-10 mL  5-10 mL IntraVENous PRN    prochlorperazine (COMPAZINE) injection 5 mg  5 mg IntraVENous Q8H PRN    amitriptyline (ELAVIL) tablet 30 mg  30 mg Oral QHS    temazepam (RESTORIL) capsule 30 mg  30 mg Oral QHS    diazePAM (VALIUM) tablet 5 mg  5 mg Oral Q8H PRN            Lab Review:     Recent Labs      03/08/17   0708  03/07/17   0643   WBC  10.1  13.1*   HGB  14.1  15.0   HCT  44.9  44.4   PLT  165  223     Recent Labs      03/08/17   0708  03/07/17   0643   NA  140  143   K  4.1  4.0   CL  105  106   CO2  27  27   GLU  106*  103*   BUN  10  11   CREA  0.70  0.90   CA  8.9  9.5   ALB   --   3.9   SGOT   --   10*   ALT   --   19     No components found for: [place]         Assessment / Plan:     SBO (small bowel obstruction): recent admission in January for the same. Consult general surgery and start supportive care     Intractable nausea / Vomiting / Abdominal pain: long standing history with multiple admissions. tx SBP as above. Seen by palliative care last hospital stay     Crohns disease: does not appear to be an active flare, no steroids or abx for now. On stelara injections outpt. Consult GI     Anxiety / Depression / Insomnia: continue lyrica    Called transfer center for [facility] transfer, hospitalist declined transfer until he spoke with the surgeon      Total time spent with patient: 30 [address] discussed with: Patient    Discussed:  Care Plan    Prophylaxis:  Lovenox    Disposition:  Home w/Family           ___________________________________________________    Attending Physician: Melvi Tubbs MD

## 2025-04-07 DIAGNOSIS — K50.018 CROHN'S DISEASE OF SMALL INTESTINE WITH OTHER COMPLICATION: ICD-10-CM

## 2025-04-07 DIAGNOSIS — F11.20 CHRONIC NARCOTIC DEPENDENCE (HCC): ICD-10-CM

## 2025-04-07 RX ORDER — OXYCODONE HYDROCHLORIDE 5 MG/1
5 TABLET ORAL EVERY 8 HOURS PRN
Qty: 90 TABLET | Refills: 0 | Status: SHIPPED | OUTPATIENT
Start: 2025-04-07 | End: 2025-05-07

## 2025-04-30 DIAGNOSIS — F11.20 CHRONIC NARCOTIC DEPENDENCE (HCC): ICD-10-CM

## 2025-04-30 RX ORDER — PREGABALIN 100 MG/1
CAPSULE ORAL
Qty: 180 CAPSULE | Refills: 0 | Status: SHIPPED | OUTPATIENT
Start: 2025-04-30 | End: 2025-07-29

## 2025-04-30 NOTE — TELEPHONE ENCOUNTER
Last appt 3/10/2025      Next Apt:     No future appointments.      [id number] - [place], [place] - [address] - P [phone] - F [phone]  [facility] [address]  Phone: [phone] Fax: [phone]

## 2025-05-05 DIAGNOSIS — K50.018 CROHN'S DISEASE OF SMALL INTESTINE WITH OTHER COMPLICATION (HCC): ICD-10-CM

## 2025-05-05 DIAGNOSIS — F11.20 CHRONIC NARCOTIC DEPENDENCE (HCC): ICD-10-CM

## 2025-05-05 RX ORDER — OXYCODONE HYDROCHLORIDE 5 MG/1
5 TABLET ORAL EVERY 8 HOURS PRN
Qty: 90 TABLET | Refills: 0 | Status: SHIPPED | OUTPATIENT
Start: 2025-05-05 | End: 2025-06-04

## 2025-06-02 DIAGNOSIS — F11.20 CHRONIC NARCOTIC DEPENDENCE (HCC): ICD-10-CM

## 2025-06-02 DIAGNOSIS — K50.018 CROHN'S DISEASE OF SMALL INTESTINE WITH OTHER COMPLICATION (HCC): ICD-10-CM

## 2025-06-02 RX ORDER — OXYCODONE HYDROCHLORIDE 5 MG/1
5 TABLET ORAL EVERY 8 HOURS PRN
Qty: 90 TABLET | Refills: 0 | Status: SHIPPED | OUTPATIENT
Start: 2025-06-02 | End: 2025-07-02

## 2025-07-03 ENCOUNTER — HOSPITAL ENCOUNTER (OUTPATIENT)
Facility: HOSPITAL | Age: 50
Discharge: HOME | End: 2025-07-03
Payer: COMMERCIAL

## 2025-07-03 ENCOUNTER — OFFICE VISIT (OUTPATIENT)
Age: 50
End: 2025-07-03
Payer: COMMERCIAL

## 2025-07-03 VITALS
SYSTOLIC BLOOD PRESSURE: 104 MMHG | HEART RATE: 130 BPM | OXYGEN SATURATION: 97 % | WEIGHT: 174.6 LBS | BODY MASS INDEX: 23.65 KG/M2 | HEIGHT: 72 IN | DIASTOLIC BLOOD PRESSURE: 82 MMHG | RESPIRATION RATE: 16 BRPM

## 2025-07-03 DIAGNOSIS — M54.50 ACUTE MIDLINE LOW BACK PAIN WITHOUT SCIATICA: ICD-10-CM

## 2025-07-03 DIAGNOSIS — R73.03 PRE-DIABETES: ICD-10-CM

## 2025-07-03 DIAGNOSIS — M54.9 MID BACK PAIN: ICD-10-CM

## 2025-07-03 DIAGNOSIS — K50.018 CROHN'S DISEASE OF SMALL INTESTINE WITH OTHER COMPLICATION (HCC): Primary | ICD-10-CM

## 2025-07-03 DIAGNOSIS — K08.9 DENTAL DISEASE: ICD-10-CM

## 2025-07-03 DIAGNOSIS — Z13.220 LIPID SCREENING: ICD-10-CM

## 2025-07-03 PROCEDURE — 99214 OFFICE O/P EST MOD 30 MIN: CPT | Performed by: INTERNAL MEDICINE

## 2025-07-03 PROCEDURE — 72100 X-RAY EXAM L-S SPINE 2/3 VWS: CPT

## 2025-07-03 PROCEDURE — 72072 X-RAY EXAM THORAC SPINE 3VWS: CPT

## 2025-07-03 RX ORDER — PREGABALIN 50 MG/1
50 CAPSULE ORAL 2 TIMES DAILY
Qty: 120 CAPSULE | Refills: 1 | Status: SHIPPED | OUTPATIENT
Start: 2025-07-03 | End: 2025-10-31

## 2025-07-03 RX ORDER — CYANOCOBALAMIN 1000 UG/ML
1000 INJECTION, SOLUTION INTRAMUSCULAR; SUBCUTANEOUS
Qty: 10 ML | Refills: 0 | Status: SHIPPED | OUTPATIENT
Start: 2025-07-03

## 2025-07-03 RX ORDER — OXYCODONE HYDROCHLORIDE 5 MG/1
5 TABLET ORAL EVERY 8 HOURS PRN
Qty: 90 TABLET | Refills: 0 | Status: SHIPPED | OUTPATIENT
Start: 2025-07-03 | End: 2025-08-02

## 2025-07-03 NOTE — PROGRESS NOTES
Chief Complaint   Patient presents with    Follow-up      Have you been to the ER, urgent care clinic since your last visit?  Hospitalized since your last visit?   NO    Have you seen or consulted any other health care providers outside our system since your last visit?   Upper and lower endoscopy Dr Cj RICHARDS

## 2025-07-06 ENCOUNTER — RESULTS FOLLOW-UP (OUTPATIENT)
Age: 50
End: 2025-07-06

## 2025-07-18 DIAGNOSIS — M54.50 ACUTE MIDLINE LOW BACK PAIN WITHOUT SCIATICA: ICD-10-CM

## 2025-07-18 DIAGNOSIS — M54.9 MID BACK PAIN: ICD-10-CM

## 2025-07-18 RX ORDER — OXYCODONE HYDROCHLORIDE 5 MG/1
5 TABLET ORAL EVERY 8 HOURS PRN
Qty: 90 TABLET | Refills: 0 | Status: SHIPPED | OUTPATIENT
Start: 2025-07-18 | End: 2025-08-17

## 2025-08-11 DIAGNOSIS — Z72.820 LACK OF ADEQUATE SLEEP: Primary | ICD-10-CM

## 2025-08-11 RX ORDER — ZOLPIDEM TARTRATE 5 MG/1
5 TABLET ORAL NIGHTLY PRN
Qty: 30 TABLET | Refills: 0 | Status: SHIPPED | OUTPATIENT
Start: 2025-08-11 | End: 2025-09-10

## 2025-08-15 DIAGNOSIS — M54.9 MID BACK PAIN: ICD-10-CM

## 2025-08-15 DIAGNOSIS — M54.50 ACUTE MIDLINE LOW BACK PAIN WITHOUT SCIATICA: ICD-10-CM

## 2025-08-15 RX ORDER — OXYCODONE HYDROCHLORIDE 5 MG/1
5 TABLET ORAL EVERY 8 HOURS PRN
Qty: 90 TABLET | Refills: 0 | Status: SHIPPED | OUTPATIENT
Start: 2025-08-15 | End: 2025-09-14

## (undated) DEVICE — SINGLE USE SPLINTING TUBE: Brand: SINGLE USE SPLINTING TUBE

## (undated) DEVICE — ROCKER SWITCH PENCIL BLADE ELECTRODE, HOLSTER: Brand: EDGE

## (undated) DEVICE — SET ADMIN 16ML TBNG L100IN 2 Y INJ SITE IV PIGGY BK DISP

## (undated) DEVICE — KENDALL RADIOLUCENT FOAM MONITORING ELECTRODE -RECTANGULAR SHAPE: Brand: KENDALL

## (undated) DEVICE — BASIN EMESIS 500CC ROSE 250/CS 60/PLT: Brand: MEDEGEN MEDICAL PRODUCTS, LLC

## (undated) DEVICE — RELOAD STPL L60MM REG TISS BLU TI FOR PROX LIN CUT DST SER

## (undated) DEVICE — CANN NASAL O2 CAPNOGRAPHY AD -- FILTERLINE

## (undated) DEVICE — BAG SPEC BIOHZD LF 2MIL 6X10IN -- CONVERT TO ITEM 357326

## (undated) DEVICE — GOWN ISOLATN XL BLU POLY OVR HD NK STYL IMPERV THUMBS UP

## (undated) DEVICE — Device

## (undated) DEVICE — POOLE SUCTION INSTRUMENT WITH REMOVABLE SHEATH: Brand: POOLE

## (undated) DEVICE — STAPLER INT L60MM STD TISS BLU 7/8 FIRING W/ 3.5MM 21 TI

## (undated) DEVICE — 3M™ CUROS™ DISINFECTING CAP FOR NEEDLELESS CONNECTORS 270/CARTON 20 CARTONS/CASE CFF1-270: Brand: CUROS™

## (undated) DEVICE — CATH IV AUTOGRD BC PNK 20GA 25 -- INSYTE

## (undated) DEVICE — BITE BLK ENDOSCP AD 54FR GRN POLYETH ENDOSCP W STRP SLD

## (undated) DEVICE — WRAP SURG W1.31XL1.34M CARD FOR PT 165-172CM THERMOWRP

## (undated) DEVICE — SOLIDIFIER FLUID 3000 CC ABSORB

## (undated) DEVICE — AIRLIFE™ U/CONNECT-IT OXYGEN TUBING 7 FEET (2.1 M) CRUSH-RESISTANT OXYGEN TUBING, VINYL TIPPED: Brand: AIRLIFE™

## (undated) DEVICE — NEEDLE HYPO 18GA L1.5IN PNK S STL HUB POLYPR SHLD REG BVL

## (undated) DEVICE — KENDALL RADIOLUCENT FOAM MONITORING ELECTRODE RECTANGULAR SHAPE: Brand: KENDALL

## (undated) DEVICE — Device: Brand: MEDICAL ACTION INDUSTRIES

## (undated) DEVICE — STERILE POLYISOPRENE POWDER-FREE SURGICAL GLOVES WITH EMOLLIENT COATING: Brand: PROTEXIS

## (undated) DEVICE — DRAPE FLD WRM W44XL66IN C6L FOR INTRATEMP SYS THERMABASIN

## (undated) DEVICE — SYRINGE MED 20ML STD CLR PLAS LUERLOCK TIP N CTRL DISP

## (undated) DEVICE — SOLIDIFIER MEDC 1200ML -- CONVERT TO 356117

## (undated) DEVICE — FORCEPS BX L240CM JAW DIA2.8MM L CAP W/ NDL MIC MESH TOOTH

## (undated) DEVICE — 1200 GUARD II KIT W/5MM TUBE W/O VAC TUBE: Brand: GUARDIAN

## (undated) DEVICE — (D)PREP SKN CHLRAPRP APPL 26ML -- CONVERT TO ITEM 371833

## (undated) DEVICE — BITEBLOCK ENDOSCP 60FR MAXI WHT POLYETH STURDY W/ VELC WVN

## (undated) DEVICE — BW-412T DISP COMBO CLEANING BRUSH: Brand: SINGLE USE COMBINATION CLEANING BRUSH

## (undated) DEVICE — TOWEL SURG W17XL27IN STD BLU COT NONFENESTRATED PREWASHED

## (undated) DEVICE — SOLUTION IRRIG 1000ML H2O STRL BLT

## (undated) DEVICE — CATH IV AUTOGRD BC BLU 22GA 25 -- INSYTE

## (undated) DEVICE — DEVON™ KNEE AND BODY STRAP 60" X 3" (1.5 M X 7.6 CM): Brand: DEVON

## (undated) DEVICE — SOLUTION IV 1000ML 0.9% SOD CHL

## (undated) DEVICE — GARMENT,MEDLINE,DVT,INT,CALF,LG, GEN2: Brand: MEDLINE

## (undated) DEVICE — Z DISCONTINUED GLOVE SURG SZ 7.5 L12IN FNGR THK13MIL WHT ISOLEX

## (undated) DEVICE — BAG BELONG PT PERS CLEAR HANDL

## (undated) DEVICE — ENDO CARRY-ON PROCEDURE KIT INCLUDES ENZYMATIC SPONGE, GAUZE, BIOHAZARD LABEL, TRAY, LUBRICANT, DIRTY SCOPE LABEL, WATER LABEL, TRAY, DRAWSTRING PAD, AND DEFENDO 4-PIECE KIT.: Brand: ENDO CARRY-ON PROCEDURE KIT

## (undated) DEVICE — SUTURE PDS II SZ 1 L36IN ABSRB VLT L48MM CTX 1/2 CIR Z371T

## (undated) DEVICE — CATHETER URET L70CM DIA5FR POLYUR SPRL OLV TIP W/ ADPT

## (undated) DEVICE — TIDISHIELD UROLOGY DRAIN BAGS FROSTY VINYL STERILE FITS SIEMENS UROSKOP ACCESS 20 PER CASE: Brand: TIDISHIELD

## (undated) DEVICE — WRISTBAND ID AD W1XL11.5IN RED POLY ALRG PREPRINTED PERM

## (undated) DEVICE — TUBING HYDR IRR --

## (undated) DEVICE — SURGICAL PROCEDURE PACK TISS 3X5 IN ABSORBABLE SEPRAFILM

## (undated) DEVICE — Z CONVERTED USE 2274299 CUFF BLD PRESSURE LNG MED AD 25-35 CM ARM FLEXIPORT DISP

## (undated) DEVICE — SUTURE PERMAHAND SZ 2-0 L18IN NONABSORBABLE BLK L26MM SH C012D

## (undated) DEVICE — CONNECTOR TBNG AUX H2O JET DISP FOR OLY 160/180 SER

## (undated) DEVICE — CATH URET 5FRX70CM POLYUR -- CONVERT TO ITEM 106403

## (undated) DEVICE — RELOAD STPL L80MM THCK TISS BLU TI FOR PROX LIN CUT DST SER

## (undated) DEVICE — NEONATAL-ADULT SPO2 SENSOR: Brand: NELLCOR

## (undated) DEVICE — REM POLYHESIVE ADULT PATIENT RETURN ELECTRODE: Brand: VALLEYLAB

## (undated) DEVICE — BAG SPEC BIOHZRD 10 X 10 IN --

## (undated) DEVICE — ADPT CATH URETH 2IN LF --

## (undated) DEVICE — CYSTO/BLADDER IRRIGATION SET, REGULATING CLAMP

## (undated) DEVICE — TUBING ADMIN SET INTRAV ARTERI -- CONVERT TO ITEM 340436

## (undated) DEVICE — SLIM BODY SKIN STAPLER: Brand: APPOSE ULC

## (undated) DEVICE — SUT ETHLN 3-0 18IN PS1 BLK --

## (undated) DEVICE — CYSTOSCOPY PACK: Brand: CONVERTORS

## (undated) DEVICE — Z DISCONTINUED USE 2751540 TUBING IRRIG L10IN DISP PMP ENDOGATOR

## (undated) DEVICE — TRAY CATH OD16FR SIL URIN M STATLOK STBL DEV SURSTP

## (undated) DEVICE — AGENT HEMSTAT W2XL14IN OXIDIZED REGENERATED CELOS ABSRB FOR

## (undated) DEVICE — KIT COLON W/ 1.1OZ LUB AND 2 END

## (undated) DEVICE — WOUND RETRACTOR AND PROTECTOR: Brand: ALEXIS WOUND PROTECTOR-RETRACTOR

## (undated) DEVICE — SUTURE VCRL SZ 3-0 L27IN ABSRB UD L26MM SH 1/2 CIR J416H

## (undated) DEVICE — Z DISCONTINUED NO SUB IDED SET EXTN W/ 4 W STPCOCK M SPIN LOK 36IN

## (undated) DEVICE — Z INACTIVE NO USAGE TURNOVER KIT RM CLEANOP

## (undated) DEVICE — DBD-PACK,LAPAROTOMY,2 REINFORCED GOWNS: Brand: MEDLINE

## (undated) DEVICE — JELLY,LUBE,STERILE,FLIP TOP,TUBE,4-OZ: Brand: MEDLINE

## (undated) DEVICE — KENDALL SCD EXPRESS SLEEVES, KNEE LENGTH, MEDIUM: Brand: KENDALL SCD

## (undated) DEVICE — SUTURE VCRL SZ 4-0 L27IN ABSRB UD L26MM SH 1/2 CIR J415H

## (undated) DEVICE — CONTAINER SPEC 20 ML LID NEUT BUFF FORMALIN 10 % POLYPR STS

## (undated) DEVICE — SUTURE PERMAHAND SZ 3-0 L18IN NONABSORBABLE BLK L26MM SH C013D

## (undated) DEVICE — GLOVE SURG SZ 75 L1212IN FNGR THK138MIL BRN LTX FREE

## (undated) DEVICE — Z DISCONTINUEDSOLUTION PREP 2OZ 10% POVIDONE IOD SCR CAP BTL

## (undated) DEVICE — SPONGE LAP 18X18IN STRL -- 5/PK

## (undated) DEVICE — SKIN MARKER,REGULAR TIP WITH RULER AND LABELS: Brand: DEVON

## (undated) DEVICE — HANDLE LT SNAP ON ULT DURABLE LENS FOR TRUMPF ALC DISPOSABLE

## (undated) DEVICE — KIT IV STRT W CHLORAPREP PD 1ML

## (undated) DEVICE — Y-TYPE TUR/BLADDER IRRIGATION SET, REGULATING CLAMP

## (undated) DEVICE — QUILTED PREMIUM COMFORT UNDERPAD,EXTRA HEAVY: Brand: WINGS

## (undated) DEVICE — SUTURE PERMAHAND SZ 2-0 L30IN NONABSORBABLE BLK SILK W/O A305H

## (undated) DEVICE — ASTOUND STANDARD SURGICAL GOWN, XL: Brand: CONVERTORS

## (undated) DEVICE — COLON CLOSING PACK: Brand: MEDLINE INDUSTRIES, INC.

## (undated) DEVICE — STERILE-Z MAYO STAND COVERS CLEAR POLYETHYLENE STERILE UNIVERSAL FIT 20 PER CASE: Brand: STERILE-Z

## (undated) DEVICE — SKIN TEMPERATURE SENSOR: Brand: DEROYAL

## (undated) DEVICE — SURGICAL PROCEDURE PACK BASIN MAJ SET CUST NO CAUT

## (undated) DEVICE — SOLUTION IRRIGATION H2O 0797305] ICU MEDICAL INC]